# Patient Record
Sex: FEMALE | Race: WHITE | NOT HISPANIC OR LATINO | Employment: OTHER | URBAN - METROPOLITAN AREA
[De-identification: names, ages, dates, MRNs, and addresses within clinical notes are randomized per-mention and may not be internally consistent; named-entity substitution may affect disease eponyms.]

---

## 2017-01-16 ENCOUNTER — ALLSCRIPTS OFFICE VISIT (OUTPATIENT)
Dept: OTHER | Facility: OTHER | Age: 47
End: 2017-01-16

## 2017-01-16 DIAGNOSIS — E78.1 PURE HYPERGLYCERIDEMIA: ICD-10-CM

## 2017-01-16 DIAGNOSIS — M79.661 PAIN OF RIGHT LOWER LEG: ICD-10-CM

## 2017-01-16 DIAGNOSIS — E55.9 VITAMIN D DEFICIENCY: ICD-10-CM

## 2017-01-16 DIAGNOSIS — E03.9 HYPOTHYROIDISM: ICD-10-CM

## 2017-01-20 ENCOUNTER — HOSPITAL ENCOUNTER (OUTPATIENT)
Dept: NON INVASIVE DIAGNOSTICS | Facility: CLINIC | Age: 47
Discharge: HOME | End: 2017-01-20
Payer: COMMERCIAL

## 2017-01-20 DIAGNOSIS — M79.661 PAIN OF RIGHT LOWER LEG: ICD-10-CM

## 2017-01-20 PROCEDURE — 93971 EXTREMITY STUDY: CPT

## 2017-02-06 ENCOUNTER — ALLSCRIPTS OFFICE VISIT (OUTPATIENT)
Dept: OTHER | Facility: OTHER | Age: 47
End: 2017-02-06

## 2017-02-07 ENCOUNTER — TRANSCRIBE ORDERS (OUTPATIENT)
Dept: ADMINISTRATIVE | Facility: HOSPITAL | Age: 47
End: 2017-02-07

## 2017-02-07 DIAGNOSIS — J44.1 CHRONIC OBSTRUCTIVE ASTHMA WITH EXACERBATION (HCC): Primary | ICD-10-CM

## 2017-02-07 DIAGNOSIS — J45.901 CHRONIC OBSTRUCTIVE ASTHMA WITH EXACERBATION (HCC): Primary | ICD-10-CM

## 2017-02-13 ENCOUNTER — HOSPITAL ENCOUNTER (OUTPATIENT)
Facility: HOSPITAL | Age: 47
Setting detail: OUTPATIENT SURGERY
Discharge: HOME | End: 2017-02-13
Attending: INTERNAL MEDICINE | Admitting: INTERNAL MEDICINE
Payer: COMMERCIAL

## 2017-02-13 ENCOUNTER — ANESTHESIA (OUTPATIENT)
Dept: GASTROENTEROLOGY | Facility: HOSPITAL | Age: 47
End: 2017-02-13
Payer: COMMERCIAL

## 2017-02-13 ENCOUNTER — GENERIC CONVERSION - ENCOUNTER (OUTPATIENT)
Dept: OTHER | Facility: OTHER | Age: 47
End: 2017-02-13

## 2017-02-13 ENCOUNTER — ANESTHESIA EVENT (OUTPATIENT)
Dept: GASTROENTEROLOGY | Facility: HOSPITAL | Age: 47
End: 2017-02-13
Payer: COMMERCIAL

## 2017-02-13 VITALS
DIASTOLIC BLOOD PRESSURE: 88 MMHG | HEART RATE: 69 BPM | RESPIRATION RATE: 18 BRPM | HEIGHT: 67 IN | SYSTOLIC BLOOD PRESSURE: 139 MMHG | TEMPERATURE: 97.4 F | WEIGHT: 244.27 LBS | BODY MASS INDEX: 38.34 KG/M2 | OXYGEN SATURATION: 95 %

## 2017-02-13 DIAGNOSIS — R10.9 ABDOMINAL PAIN: ICD-10-CM

## 2017-02-13 DIAGNOSIS — K21.9 CHRONIC GERD: ICD-10-CM

## 2017-02-13 LAB — EXT PREGNANCY TEST URINE: NEGATIVE

## 2017-02-13 PROCEDURE — 88305 TISSUE EXAM BY PATHOLOGIST: CPT | Performed by: INTERNAL MEDICINE

## 2017-02-13 PROCEDURE — 88342 IMHCHEM/IMCYTCHM 1ST ANTB: CPT | Performed by: INTERNAL MEDICINE

## 2017-02-13 RX ORDER — SUCRALFATE 1 G/1
1 TABLET ORAL 4 TIMES DAILY
COMMUNITY
End: 2017-07-25

## 2017-02-13 RX ORDER — SERTRALINE HYDROCHLORIDE 100 MG/1
100 TABLET, FILM COATED ORAL 2 TIMES DAILY
COMMUNITY
End: 2018-05-07 | Stop reason: SDUPTHER

## 2017-02-13 RX ORDER — DIAZEPAM 10 MG/1
10 TABLET ORAL EVERY 6 HOURS PRN
COMMUNITY
End: 2018-02-20 | Stop reason: SDUPTHER

## 2017-02-13 RX ORDER — LEVOTHYROXINE SODIUM 88 UG/1
88 CAPSULE ORAL DAILY
COMMUNITY
End: 2018-05-07 | Stop reason: SDUPTHER

## 2017-02-13 RX ORDER — PROMETHAZINE HYDROCHLORIDE AND CODEINE PHOSPHATE 6.25; 1 MG/5ML; MG/5ML
5 SYRUP ORAL EVERY 4 HOURS PRN
COMMUNITY
End: 2017-07-25

## 2017-02-13 RX ORDER — PANTOPRAZOLE SODIUM 40 MG/1
40 TABLET, DELAYED RELEASE ORAL DAILY
COMMUNITY
End: 2018-05-07 | Stop reason: SDUPTHER

## 2017-02-13 RX ORDER — SODIUM CHLORIDE, SODIUM LACTATE, POTASSIUM CHLORIDE, CALCIUM CHLORIDE 600; 310; 30; 20 MG/100ML; MG/100ML; MG/100ML; MG/100ML
125 INJECTION, SOLUTION INTRAVENOUS CONTINUOUS
Status: DISCONTINUED | OUTPATIENT
Start: 2017-02-13 | End: 2017-02-13 | Stop reason: HOSPADM

## 2017-02-13 RX ORDER — ALBUTEROL SULFATE 90 UG/1
2 AEROSOL, METERED RESPIRATORY (INHALATION) EVERY 6 HOURS PRN
COMMUNITY
End: 2021-12-21 | Stop reason: SDUPTHER

## 2017-02-13 RX ORDER — OXYCODONE AND ACETAMINOPHEN 10; 325 MG/1; MG/1
1 TABLET ORAL EVERY 4 HOURS PRN
COMMUNITY
End: 2018-11-08 | Stop reason: SDUPTHER

## 2017-02-13 RX ORDER — GABAPENTIN 100 MG/1
100 CAPSULE ORAL 3 TIMES DAILY
COMMUNITY
End: 2017-07-25

## 2017-02-13 RX ORDER — PROPOFOL 10 MG/ML
INJECTION, EMULSION INTRAVENOUS AS NEEDED
Status: DISCONTINUED | OUTPATIENT
Start: 2017-02-13 | End: 2017-02-13 | Stop reason: SURG

## 2017-02-13 RX ORDER — AMLODIPINE BESYLATE 5 MG/1
5 TABLET ORAL DAILY
COMMUNITY
End: 2018-05-07 | Stop reason: SDUPTHER

## 2017-02-13 RX ORDER — LIDOCAINE HYDROCHLORIDE 10 MG/ML
INJECTION, SOLUTION INFILTRATION; PERINEURAL AS NEEDED
Status: DISCONTINUED | OUTPATIENT
Start: 2017-02-13 | End: 2017-02-13 | Stop reason: SURG

## 2017-02-13 RX ORDER — DULOXETIN HYDROCHLORIDE 30 MG/1
30 CAPSULE, DELAYED RELEASE ORAL 2 TIMES DAILY
COMMUNITY
End: 2018-05-05

## 2017-02-13 RX ADMIN — LIDOCAINE HYDROCHLORIDE 30 MG: 10 INJECTION, SOLUTION INFILTRATION; PERINEURAL at 08:47

## 2017-02-13 RX ADMIN — SODIUM CHLORIDE, SODIUM LACTATE, POTASSIUM CHLORIDE, AND CALCIUM CHLORIDE 125 ML/HR: .6; .31; .03; .02 INJECTION, SOLUTION INTRAVENOUS at 08:03

## 2017-02-13 RX ADMIN — PROPOFOL 40 MG: 10 INJECTION, EMULSION INTRAVENOUS at 08:49

## 2017-02-13 RX ADMIN — PROPOFOL 100 MG: 10 INJECTION, EMULSION INTRAVENOUS at 08:47

## 2017-02-14 ENCOUNTER — GENERIC CONVERSION - ENCOUNTER (OUTPATIENT)
Dept: OTHER | Facility: OTHER | Age: 47
End: 2017-02-14

## 2017-02-14 ENCOUNTER — HOSPITAL ENCOUNTER (OUTPATIENT)
Dept: PULMONOLOGY | Facility: HOSPITAL | Age: 47
Discharge: HOME | End: 2017-02-14
Attending: INTERNAL MEDICINE
Payer: COMMERCIAL

## 2017-02-14 DIAGNOSIS — J45.901 CHRONIC OBSTRUCTIVE ASTHMA WITH EXACERBATION (HCC): ICD-10-CM

## 2017-02-14 DIAGNOSIS — J44.1 CHRONIC OBSTRUCTIVE ASTHMA WITH EXACERBATION (HCC): ICD-10-CM

## 2017-02-14 PROCEDURE — 94729 DIFFUSING CAPACITY: CPT

## 2017-02-14 PROCEDURE — 94726 PLETHYSMOGRAPHY LUNG VOLUMES: CPT

## 2017-02-14 PROCEDURE — 94060 EVALUATION OF WHEEZING: CPT

## 2017-02-14 PROCEDURE — 94760 N-INVAS EAR/PLS OXIMETRY 1: CPT

## 2017-02-14 RX ORDER — SODIUM CHLORIDE FOR INHALATION 0.9 %
VIAL, NEBULIZER (ML) INHALATION
Status: DISCONTINUED
Start: 2017-02-14 | End: 2017-02-18 | Stop reason: HOSPADM

## 2017-02-19 ENCOUNTER — GENERIC CONVERSION - ENCOUNTER (OUTPATIENT)
Dept: OTHER | Facility: OTHER | Age: 47
End: 2017-02-19

## 2017-02-23 ENCOUNTER — ALLSCRIPTS OFFICE VISIT (OUTPATIENT)
Dept: OTHER | Facility: OTHER | Age: 47
End: 2017-02-23

## 2017-02-23 DIAGNOSIS — I73.9 PERIPHERAL VASCULAR DISEASE (HCC): ICD-10-CM

## 2017-03-06 ENCOUNTER — ALLSCRIPTS OFFICE VISIT (OUTPATIENT)
Dept: OTHER | Facility: OTHER | Age: 47
End: 2017-03-06

## 2017-04-03 ENCOUNTER — ALLSCRIPTS OFFICE VISIT (OUTPATIENT)
Dept: OTHER | Facility: OTHER | Age: 47
End: 2017-04-03

## 2017-05-01 ENCOUNTER — GENERIC CONVERSION - ENCOUNTER (OUTPATIENT)
Dept: OTHER | Facility: OTHER | Age: 47
End: 2017-05-01

## 2017-05-12 ENCOUNTER — GENERIC CONVERSION - ENCOUNTER (OUTPATIENT)
Dept: OTHER | Facility: OTHER | Age: 47
End: 2017-05-12

## 2017-05-12 ENCOUNTER — ALLSCRIPTS OFFICE VISIT (OUTPATIENT)
Dept: OTHER | Facility: OTHER | Age: 47
End: 2017-05-12

## 2017-06-13 ENCOUNTER — ALLSCRIPTS OFFICE VISIT (OUTPATIENT)
Dept: OTHER | Facility: OTHER | Age: 47
End: 2017-06-13

## 2017-06-21 ENCOUNTER — ALLSCRIPTS OFFICE VISIT (OUTPATIENT)
Dept: OTHER | Facility: OTHER | Age: 47
End: 2017-06-21

## 2017-06-29 ENCOUNTER — GENERIC CONVERSION - ENCOUNTER (OUTPATIENT)
Dept: OTHER | Facility: OTHER | Age: 47
End: 2017-06-29

## 2017-06-29 ENCOUNTER — ALLSCRIPTS OFFICE VISIT (OUTPATIENT)
Dept: OTHER | Facility: OTHER | Age: 47
End: 2017-06-29

## 2017-06-30 ENCOUNTER — GENERIC CONVERSION - ENCOUNTER (OUTPATIENT)
Dept: OTHER | Facility: OTHER | Age: 47
End: 2017-06-30

## 2017-07-13 ENCOUNTER — HOSPITAL ENCOUNTER (OUTPATIENT)
Dept: NON INVASIVE DIAGNOSTICS | Facility: CLINIC | Age: 47
Discharge: HOME | End: 2017-07-13
Payer: COMMERCIAL

## 2017-07-13 DIAGNOSIS — I73.9 PERIPHERAL VASCULAR DISEASE (HCC): ICD-10-CM

## 2017-07-13 PROCEDURE — 93924 LWR XTR VASC STDY BILAT: CPT

## 2017-07-24 ENCOUNTER — ALLSCRIPTS OFFICE VISIT (OUTPATIENT)
Dept: OTHER | Facility: OTHER | Age: 47
End: 2017-07-24

## 2017-07-24 DIAGNOSIS — E03.9 HYPOTHYROIDISM: ICD-10-CM

## 2017-07-24 DIAGNOSIS — E55.9 VITAMIN D DEFICIENCY: ICD-10-CM

## 2017-07-24 DIAGNOSIS — F32.9 MAJOR DEPRESSIVE DISORDER, SINGLE EPISODE: ICD-10-CM

## 2017-07-24 DIAGNOSIS — M54.50 LOW BACK PAIN: ICD-10-CM

## 2017-07-24 LAB
BILIRUB UR QL STRIP: NORMAL
CLARITY UR: NORMAL
COLOR UR: YELLOW
GLUCOSE (HISTORICAL): NORMAL
HGB UR QL STRIP.AUTO: NORMAL
KETONES UR STRIP-MCNC: NORMAL MG/DL
LEUKOCYTE ESTERASE UR QL STRIP: NORMAL
NITRITE UR QL STRIP: NORMAL
PH UR STRIP.AUTO: 5.5 [PH]
PROT UR STRIP-MCNC: NORMAL MG/DL
SP GR UR STRIP.AUTO: 1.03
UROBILINOGEN UR QL STRIP.AUTO: 0.2

## 2017-07-25 ENCOUNTER — OFFICE VISIT (OUTPATIENT)
Dept: URGENT CARE | Age: 47
End: 2017-07-25
Payer: COMMERCIAL

## 2017-07-25 ENCOUNTER — APPOINTMENT (EMERGENCY)
Dept: RADIOLOGY | Facility: HOSPITAL | Age: 47
End: 2017-07-25
Payer: COMMERCIAL

## 2017-07-25 ENCOUNTER — GENERIC CONVERSION - ENCOUNTER (OUTPATIENT)
Dept: OTHER | Facility: OTHER | Age: 47
End: 2017-07-25

## 2017-07-25 ENCOUNTER — HOSPITAL ENCOUNTER (EMERGENCY)
Facility: HOSPITAL | Age: 47
Discharge: HOME | End: 2017-07-25
Attending: EMERGENCY MEDICINE | Admitting: EMERGENCY MEDICINE
Payer: COMMERCIAL

## 2017-07-25 VITALS
HEIGHT: 67 IN | TEMPERATURE: 97.7 F | HEART RATE: 78 BPM | OXYGEN SATURATION: 99 % | BODY MASS INDEX: 37.2 KG/M2 | DIASTOLIC BLOOD PRESSURE: 50 MMHG | WEIGHT: 237 LBS | RESPIRATION RATE: 17 BRPM | SYSTOLIC BLOOD PRESSURE: 118 MMHG

## 2017-07-25 DIAGNOSIS — K57.32: Primary | ICD-10-CM

## 2017-07-25 LAB
ALBUMIN SERPL BCP-MCNC: 3.8 G/DL (ref 3.5–5)
ALP SERPL-CCNC: 115 U/L (ref 46–116)
ALT SERPL W P-5'-P-CCNC: 23 U/L (ref 12–78)
ANION GAP SERPL CALCULATED.3IONS-SCNC: 7 MMOL/L (ref 4–13)
AST SERPL W P-5'-P-CCNC: 13 U/L (ref 5–45)
BACTERIA UR QL AUTO: ABNORMAL /HPF
BASOPHILS # BLD AUTO: 0.02 THOUSANDS/ΜL (ref 0–0.1)
BASOPHILS NFR BLD AUTO: 0 % (ref 0–1)
BILIRUB SERPL-MCNC: 0.64 MG/DL (ref 0.2–1)
BILIRUB UR QL STRIP: ABNORMAL
BUN SERPL-MCNC: 17 MG/DL (ref 5–25)
CALCIUM SERPL-MCNC: 9.1 MG/DL (ref 8.3–10.1)
CHLORIDE SERPL-SCNC: 108 MMOL/L (ref 100–108)
CLARITY UR: ABNORMAL
CO2 SERPL-SCNC: 22 MMOL/L (ref 21–32)
COLOR UR: YELLOW
COLOR, POC: YELLOW
CREAT SERPL-MCNC: 0.86 MG/DL (ref 0.6–1.3)
EOSINOPHIL # BLD AUTO: 0.07 THOUSAND/ΜL (ref 0–0.61)
EOSINOPHIL NFR BLD AUTO: 0 % (ref 0–6)
ERYTHROCYTE [DISTWIDTH] IN BLOOD BY AUTOMATED COUNT: 15.9 % (ref 11.6–15.1)
GFR SERPL CREATININE-BSD FRML MDRD: 81 ML/MIN/1.73SQ M
GLUCOSE SERPL-MCNC: 97 MG/DL (ref 65–140)
GLUCOSE UR STRIP-MCNC: NEGATIVE MG/DL
HCG UR QL: NEGATIVE
HCT VFR BLD AUTO: 45.5 % (ref 34.8–46.1)
HGB BLD-MCNC: 15.3 G/DL (ref 11.5–15.4)
HGB UR QL STRIP.AUTO: ABNORMAL
HOLD SPECIMEN: NORMAL
HOLD SPECIMEN: NORMAL
HYALINE CASTS #/AREA URNS LPF: ABNORMAL /LPF
KETONES UR STRIP-MCNC: NEGATIVE MG/DL
LEUKOCYTE ESTERASE UR QL STRIP: NEGATIVE
LIPASE SERPL-CCNC: 100 U/L (ref 73–393)
LYMPHOCYTES # BLD AUTO: 2.02 THOUSANDS/ΜL (ref 0.6–4.47)
LYMPHOCYTES NFR BLD AUTO: 12 % (ref 14–44)
MCH RBC QN AUTO: 30.2 PG (ref 26.8–34.3)
MCHC RBC AUTO-ENTMCNC: 33.6 G/DL (ref 31.4–37.4)
MCV RBC AUTO: 90 FL (ref 82–98)
MONOCYTES # BLD AUTO: 1.31 THOUSAND/ΜL (ref 0.17–1.22)
MONOCYTES NFR BLD AUTO: 8 % (ref 4–12)
NEUTROPHILS # BLD AUTO: 13.72 THOUSANDS/ΜL (ref 1.85–7.62)
NEUTS SEG NFR BLD AUTO: 80 % (ref 43–75)
NITRITE UR QL STRIP: NEGATIVE
NON-SQ EPI CELLS URNS QL MICRO: ABNORMAL /HPF
NRBC BLD AUTO-RTO: 0 /100 WBCS
PH UR STRIP.AUTO: 5.5 [PH] (ref 4.5–8)
PLATELET # BLD AUTO: 327 THOUSANDS/UL (ref 149–390)
PMV BLD AUTO: 9.7 FL (ref 8.9–12.7)
POTASSIUM SERPL-SCNC: 3.8 MMOL/L (ref 3.5–5.3)
PROT SERPL-MCNC: 7.8 G/DL (ref 6.4–8.2)
PROT UR STRIP-MCNC: ABNORMAL MG/DL
RBC # BLD AUTO: 5.06 MILLION/UL (ref 3.81–5.12)
RBC #/AREA URNS AUTO: ABNORMAL /HPF
SODIUM SERPL-SCNC: 137 MMOL/L (ref 136–145)
SP GR UR STRIP.AUTO: 1.02 (ref 1–1.03)
UROBILINOGEN UR QL STRIP.AUTO: 0.2 E.U./DL
WBC # BLD AUTO: 17.26 THOUSAND/UL (ref 4.31–10.16)
WBC #/AREA URNS AUTO: ABNORMAL /HPF

## 2017-07-25 PROCEDURE — 36415 COLL VENOUS BLD VENIPUNCTURE: CPT | Performed by: EMERGENCY MEDICINE

## 2017-07-25 PROCEDURE — 96374 THER/PROPH/DIAG INJ IV PUSH: CPT

## 2017-07-25 PROCEDURE — 74177 CT ABD & PELVIS W/CONTRAST: CPT

## 2017-07-25 PROCEDURE — 83690 ASSAY OF LIPASE: CPT | Performed by: EMERGENCY MEDICINE

## 2017-07-25 PROCEDURE — 96361 HYDRATE IV INFUSION ADD-ON: CPT

## 2017-07-25 PROCEDURE — 85025 COMPLETE CBC W/AUTO DIFF WBC: CPT | Performed by: EMERGENCY MEDICINE

## 2017-07-25 PROCEDURE — 81025 URINE PREGNANCY TEST: CPT | Performed by: EMERGENCY MEDICINE

## 2017-07-25 PROCEDURE — 81002 URINALYSIS NONAUTO W/O SCOPE: CPT | Performed by: EMERGENCY MEDICINE

## 2017-07-25 PROCEDURE — 87086 URINE CULTURE/COLONY COUNT: CPT

## 2017-07-25 PROCEDURE — 99284 EMERGENCY DEPT VISIT MOD MDM: CPT

## 2017-07-25 PROCEDURE — G0382 LEV 3 HOSP TYPE B ED VISIT: HCPCS | Performed by: FAMILY MEDICINE

## 2017-07-25 PROCEDURE — 81001 URINALYSIS AUTO W/SCOPE: CPT

## 2017-07-25 PROCEDURE — 80053 COMPREHEN METABOLIC PANEL: CPT | Performed by: EMERGENCY MEDICINE

## 2017-07-25 RX ORDER — AMOXICILLIN AND CLAVULANATE POTASSIUM 875; 125 MG/1; MG/1
1 TABLET, FILM COATED ORAL EVERY 12 HOURS
Qty: 20 TABLET | Refills: 0 | Status: SHIPPED | OUTPATIENT
Start: 2017-07-25 | End: 2017-08-04

## 2017-07-25 RX ORDER — FLUTICASONE PROPIONATE 50 MCG
1 SPRAY, SUSPENSION (ML) NASAL 2 TIMES DAILY
COMMUNITY
End: 2018-05-07 | Stop reason: SDUPTHER

## 2017-07-25 RX ORDER — BUTALBITAL, ASPIRIN, AND CAFFEINE 50; 325; 40 MG/1; MG/1; MG/1
1 CAPSULE ORAL EVERY 4 HOURS PRN
COMMUNITY
End: 2019-03-07 | Stop reason: SDUPTHER

## 2017-07-25 RX ORDER — AMOXICILLIN AND CLAVULANATE POTASSIUM 875; 125 MG/1; MG/1
1 TABLET, FILM COATED ORAL ONCE
Status: COMPLETED | OUTPATIENT
Start: 2017-07-25 | End: 2017-07-25

## 2017-07-25 RX ORDER — HYDROCHLOROTHIAZIDE 25 MG/1
25 TABLET ORAL DAILY
COMMUNITY
End: 2018-05-05

## 2017-07-25 RX ORDER — ONDANSETRON 4 MG/1
4 TABLET, FILM COATED ORAL EVERY 6 HOURS
Qty: 12 TABLET | Refills: 0 | Status: SHIPPED | OUTPATIENT
Start: 2017-07-25 | End: 2017-11-24

## 2017-07-25 RX ORDER — MORPHINE SULFATE 10 MG/ML
6 INJECTION, SOLUTION INTRAMUSCULAR; INTRAVENOUS ONCE
Status: COMPLETED | OUTPATIENT
Start: 2017-07-25 | End: 2017-07-25

## 2017-07-25 RX ADMIN — SODIUM CHLORIDE 1000 ML: 0.9 INJECTION, SOLUTION INTRAVENOUS at 13:57

## 2017-07-25 RX ADMIN — MORPHINE SULFATE 6 MG: 10 INJECTION, SOLUTION INTRAMUSCULAR; INTRAVENOUS at 14:33

## 2017-07-25 RX ADMIN — AMOXICILLIN AND CLAVULANATE POTASSIUM 1 TABLET: 875; 125 TABLET, FILM COATED ORAL at 16:33

## 2017-07-25 RX ADMIN — IOHEXOL 100 ML: 350 INJECTION, SOLUTION INTRAVENOUS at 15:31

## 2017-07-26 LAB
BACTERIA UR CULT: NORMAL
CULTURE RESULT (HISTORICAL): NORMAL

## 2017-08-01 ENCOUNTER — ALLSCRIPTS OFFICE VISIT (OUTPATIENT)
Dept: OTHER | Facility: OTHER | Age: 47
End: 2017-08-01

## 2017-09-28 ENCOUNTER — GENERIC CONVERSION - ENCOUNTER (OUTPATIENT)
Dept: OTHER | Facility: OTHER | Age: 47
End: 2017-09-28

## 2017-09-28 DIAGNOSIS — E55.9 VITAMIN D DEFICIENCY: ICD-10-CM

## 2017-09-28 DIAGNOSIS — E03.9 HYPOTHYROIDISM: ICD-10-CM

## 2017-09-28 DIAGNOSIS — Z13.220 ENCOUNTER FOR SCREENING FOR LIPOID DISORDERS: ICD-10-CM

## 2017-09-28 DIAGNOSIS — I10 ESSENTIAL (PRIMARY) HYPERTENSION: ICD-10-CM

## 2017-10-03 ENCOUNTER — GENERIC CONVERSION - ENCOUNTER (OUTPATIENT)
Dept: OTHER | Facility: OTHER | Age: 47
End: 2017-10-03

## 2017-11-13 ENCOUNTER — ALLSCRIPTS OFFICE VISIT (OUTPATIENT)
Dept: OTHER | Facility: OTHER | Age: 47
End: 2017-11-13

## 2017-11-13 LAB
FLUAV AG SPEC QL IA: NEGATIVE
INFLUENZA B AG (HISTORICAL): NEGATIVE

## 2017-11-14 NOTE — PROGRESS NOTES
Assessment    1  Acute recurrent frontal sinusitis (461 1) (J01 11)   2  COPD (chronic obstructive pulmonary disease) with acute bronchitis (491 22) (J44 0,J20  9)    Plan  Acute recurrent frontal sinusitis    · LevoFLOXacin 500 MG Oral Tablet; TAKE 1 TABLET DAILY AS DIRECTED  COPD (chronic obstructive pulmonary disease) with acute bronchitis    · Albuterol Sulfate (2 5 MG/3ML) 0 083% Inhalation Nebulization Solution; USE 1UNIT DOSE IN NEBULIZER EVERY 4 TO 6 HOURS AS NEEDED  Fever    · Rapid Flu A and B- POC; Source:Nose; Status:Complete;   Done: 91PJL7183 01:40PM  PMH: Acute bronchitis, unspecified organism    · From  Promethazine-Codeine 6 25-10 MG/5ML Oral Syrup TAKE 1 TSP Every 8hours To Promethazine-Codeine 6 25-10 MG/5ML Oral Syrup TAKE 5 ML BY MOUTH ATBEDTIME AS NEEDED    Chief Complaint    1  Cough   2  Fever  Patient is here c/o cough, headache, earache both ears, coughing up green mucus  last Thursday  Pt states symptoms are more worst in the night  History of Present Illness  Cough:   Kristin Loza presents with complaints of sudden onset of constant episodes of moderate cough Episodes started 5 days ago (c/o cough, headache, earache both ears, coughing up green mucus  Started last Thursday  Pt states symptoms are more worst in the night)  Associated symptoms include dyspnea,-- wheezing,-- chills,-- fever,-- mouth breathing,-- noisy breathing-- and-- hoarseness, but-- no runny nose,-- no stuffy nose,-- no sore throat,-- no myalgias,-- no pleuritic chest pain,-- no chest pain,-- no vomiting,-- no heartburn,-- no postnasal drainage,-- no painful swallowing,-- no eye itching,-- no nose itching,-- no headache,-- no hemoptysis-- and-- no night sweats  Review of Systems   Constitutional: fever-- and-- chills, but-- not feeling poorly-- and-- not feeling tired  ENT: as noted in HPI  Cardiovascular: no chest pain-- and-- no palpitations    Respiratory: cough,-- wheezing-- and-- shortness of breath during exertion, but-- no shortness of breath-- and-- no orthopnea  Gastrointestinal: no nausea-- and-- no vomiting  Active Problems    1  Acute lumbar radiculopathy (724 4) (M54 16)   2  Anxiety (300 00) (F41 9)   3  Benign hypertension (401 1) (I10)   4  COPD (chronic obstructive pulmonary disease) with acute bronchitis (491 22) (J44 0,J20 9)   5  Depression (311) (F32 9)   6  Gastroesophageal reflux disease with esophagitis (530 11) (K21 0)   7  Headache (784 0) (R51)   8  Hypothyroidism (244 9) (E03 9)   9  Intermittent claudication (443 9) (I73 9)   10  Low back pain (724 2) (M54 5)   11  Lumbar spinal stenosis (724 02) (M48 061)   12  Nerve pain (729 2) (M79 2)   13  Right sided abdominal pain (789 09) (R10 9)   14  Screening cholesterol level (V77 91) (Z13 220)   15  Seasonal allergies (477 9) (J30 2)   16  Tobacco abuse disorder (305 1) (Z72 0)   17  Vitamin D deficiency (268 9) (E55 9)    Social History     · Current every day smoker (305 1) (G30 817)   · No illicit drug use   · Social alcohol use (Z78 9)    Current Meds   1  AmLODIPine Besylate 5 MG Oral Tablet; TAKE 1 TABLET DAILY  Requested for: 56VYH4712; Last Rx:81Jwt6731 Ordered   2  Butalbital-Aspirin-Caffeine -40 MG Oral Capsule; TAKE 1 CAPSULE EVERY 4 HOURS AS NEEDED; Therapy: 22MEF8876 to (Evaluate:95Vlv9459); Last Rx:61Ksc0352 Ordered   3  DiazePAM 10 MG Oral Tablet; TAKE 1 TABLET Every 6 hours PRN; Therapy: 67RZP7484 to (Evaluate:11Nov2017); Last Rx:06Nov2017 Ordered   4  DULoxetine HCl - 60 MG Oral Capsule Delayed Release Particles; Take 1 capsule twice daily; Therapy: 90EZG4616 to (Evaluate:20Jan2018)  Requested for: 66Nmk0266; Last Rx:32Ahm2330 Ordered   5  Fluticasone Propionate 50 MCG/ACT Nasal Suspension; USE 1 SPRAY IN EACH NOSTRIL TWICE DAILY; Therapy: 98BNQ3783 to (Last Rx:55Bbk5715)  Requested for: 66LPZ0032 Ordered   6  HydroCHLOROthiazide 25 MG Oral Tablet; TAKE 1 TABLET DAILY;  Therapy: 29DEC0761 to (Evaluate:20Jan2018) Requested for: 49Cqs8348; Last Rx:34Knu9237 Ordered   7  Levothyroxine Sodium 88 MCG Oral Tablet; TAKE 1 TABLET DAILY AS DIRECTED  Requested for: 15Krh6969; Last Rx:52End7548 Ordered   8  Multi-Vitamins Oral Tablet; TAKE 1 TABLET DAILY; Therapy: 44PWA5270 to Recorded   9  Oxycodone-Acetaminophen  MG Oral Tablet; TAKE 1 TABLET 4 TIMES DAILY AS NEEDED FOR PAIN; Last RA:76XDX1664 Ordered   10  Pantoprazole Sodium 40 MG Oral Tablet Delayed Release; take one tablet once daily; Therapy: 10UND2188 to (Evaluate:20Jan2018)  Requested for: 79Hmp9802; Last  Rx:52Jme5524 Ordered   11  ProAir  (90 Base) MCG/ACT Inhalation Aerosol Solution; INHALE 1 TO 2 PUFFS  EVERY 4 TO 6 HOURS AS NEEDED; Therapy: 30HVP8860 to (Last Rx:85Bff4961)  Requested for: 12IWZ4910 Ordered   12  Sertraline HCl - 100 MG Oral Tablet; Take 1 tablet twice daily; Therapy: 72LZK4235 to (Last Rx:05Jal9563)  Requested for: 09Whd3236 Ordered   13  Vitamin D (Ergocalciferol) 66066 UNIT Oral Capsule; TAKE 1 CAPSULE WEEKLY; Therapy: 70YMN2010 to (Last Rx:90Jry0760)  Requested for: 25Fdy4213 Ordered    Allergies  1  Toradol SOLN   2  Ultram TABS  3  Seasonal    Vitals   Recorded: 67YXW9830 01:10PM   Temperature 98 2 F, Oral   Heart Rate 72   Systolic 266, LUE, Sitting   Diastolic 60, LUE, Sitting   BP CUFF SIZE Large   Height 5 ft 7 in   Weight 222 lb    BMI Calculated 34 77   BSA Calculated 2 11   O2 Saturation 98, RA       Physical Exam   Constitutional  General appearance: No acute distress, well appearing and well nourished  Eyes  Conjunctiva and lids: No swelling, erythema or discharge  Pupils and irises: Equal, round and reactive to light  Ears, Nose, Mouth, and Throat  External inspection of ears and nose: Normal    Otoscopic examination: Tympanic membranes translucent with normal light reflex  Canals patent without erythema  Nasal mucosa, septum, and turbinates: Abnormal  -- frontal and maxillary sinus TTP    Oropharynx: Abnormal  -- PND and erythema  Pulmonary  Respiratory effort: No increased work of breathing or signs of respiratory distress  Auscultation of lungs: Clear to auscultation  Cardiovascular  Auscultation of heart: Normal rate and rhythm, normal S1 and S2, without murmurs  Examination of extremities for edema and/or varicosities: Normal    Abdomen  Abdomen: Non-tender, no masses  Liver and spleen: No hepatomegaly or splenomegaly     Musculoskeletal  Gait and station: Normal    Psychiatric  Orientation to person, place, and time: Normal    Mood and affect: Normal          Results/Data  Rapid Flu A and B- POC 42CIC7092 01:40PM Nicolle Richardson     Test Name Result Flag Reference   Rapid Flu A Negative     Rapid Flu B Negative         Future Appointments    Date/Time Provider Specialty Site   01/04/2018 11:00 AM Nicolle Richardson DO Internal Medicine [facility]       Signatures   Electronically signed by : Yeimi Abernathy DO; Nov 13 2017  1:56PM EST                       (Author)

## 2017-11-19 ENCOUNTER — HOSPITAL ENCOUNTER (EMERGENCY)
Facility: HOSPITAL | Age: 47
Discharge: HOME | End: 2017-11-19
Attending: EMERGENCY MEDICINE | Admitting: EMERGENCY MEDICINE

## 2017-11-19 ENCOUNTER — APPOINTMENT (OUTPATIENT)
Dept: CT IMAGING | Facility: HOSPITAL | Age: 47
End: 2017-11-19

## 2017-11-19 VITALS
HEIGHT: 63 IN | OXYGEN SATURATION: 100 % | DIASTOLIC BLOOD PRESSURE: 82 MMHG | RESPIRATION RATE: 18 BRPM | BODY MASS INDEX: 30.12 KG/M2 | SYSTOLIC BLOOD PRESSURE: 114 MMHG | TEMPERATURE: 98 F | WEIGHT: 170 LBS | HEART RATE: 67 BPM

## 2017-11-19 DIAGNOSIS — S39.012A STRAIN OF LUMBAR REGION, INITIAL ENCOUNTER: Primary | ICD-10-CM

## 2017-11-19 LAB
ALBUMIN SERPL-MCNC: 4.8 G/DL (ref 3.5–5)
ALBUMIN/GLOB SERPL: 1.7 G/DL (ref 1.5–2.5)
ALP SERPL-CCNC: 65 U/L (ref 35–104)
ALT SERPL W P-5'-P-CCNC: 21 U/L (ref 10–36)
ANION GAP SERPL CALCULATED.3IONS-SCNC: 4.2 MMOL/L (ref 3.6–11.2)
AST SERPL-CCNC: 24 U/L (ref 10–30)
BASOPHILS # BLD AUTO: 0.06 10*3/MM3 (ref 0–0.3)
BASOPHILS NFR BLD AUTO: 1 % (ref 0–2)
BILIRUB SERPL-MCNC: 0.5 MG/DL (ref 0.2–1.8)
BILIRUB UR QL STRIP: NEGATIVE
BUN BLD-MCNC: 16 MG/DL (ref 7–21)
BUN/CREAT SERPL: 17.4 (ref 7–25)
CALCIUM SPEC-SCNC: 9.3 MG/DL (ref 7.7–10)
CHLORIDE SERPL-SCNC: 107 MMOL/L (ref 99–112)
CLARITY UR: CLEAR
CO2 SERPL-SCNC: 26.8 MMOL/L (ref 24.3–31.9)
COLOR UR: YELLOW
CREAT BLD-MCNC: 0.92 MG/DL (ref 0.43–1.29)
DEPRECATED RDW RBC AUTO: 39 FL (ref 37–54)
EOSINOPHIL # BLD AUTO: 0.21 10*3/MM3 (ref 0–0.7)
EOSINOPHIL NFR BLD AUTO: 3.4 % (ref 0–5)
ERYTHROCYTE [DISTWIDTH] IN BLOOD BY AUTOMATED COUNT: 12.4 % (ref 11.5–14.5)
GFR SERPL CREATININE-BSD FRML MDRD: 65 ML/MIN/1.73
GLOBULIN UR ELPH-MCNC: 2.9 GM/DL
GLUCOSE BLD-MCNC: 93 MG/DL (ref 70–110)
GLUCOSE UR STRIP-MCNC: NEGATIVE MG/DL
HCT VFR BLD AUTO: 42.8 % (ref 37–47)
HGB BLD-MCNC: 14.9 G/DL (ref 12–16)
HGB UR QL STRIP.AUTO: NEGATIVE
IMM GRANULOCYTES # BLD: 0.01 10*3/MM3 (ref 0–0.03)
IMM GRANULOCYTES NFR BLD: 0.2 % (ref 0–0.5)
KETONES UR QL STRIP: NEGATIVE
LEUKOCYTE ESTERASE UR QL STRIP.AUTO: NEGATIVE
LYMPHOCYTES # BLD AUTO: 2.32 10*3/MM3 (ref 1–3)
LYMPHOCYTES NFR BLD AUTO: 38.1 % (ref 21–51)
MCH RBC QN AUTO: 30.6 PG (ref 27–33)
MCHC RBC AUTO-ENTMCNC: 34.8 G/DL (ref 33–37)
MCV RBC AUTO: 87.9 FL (ref 80–94)
MONOCYTES # BLD AUTO: 0.38 10*3/MM3 (ref 0.1–0.9)
MONOCYTES NFR BLD AUTO: 6.2 % (ref 0–10)
NEUTROPHILS # BLD AUTO: 3.11 10*3/MM3 (ref 1.4–6.5)
NEUTROPHILS NFR BLD AUTO: 51.1 % (ref 30–70)
NITRITE UR QL STRIP: NEGATIVE
OSMOLALITY SERPL CALC.SUM OF ELEC: 276.6 MOSM/KG (ref 273–305)
PH UR STRIP.AUTO: 7.5 [PH] (ref 5–8)
PLATELET # BLD AUTO: 327 10*3/MM3 (ref 130–400)
PMV BLD AUTO: 10.2 FL (ref 6–10)
POTASSIUM BLD-SCNC: 4 MMOL/L (ref 3.5–5.3)
PROT SERPL-MCNC: 7.7 G/DL (ref 6–8)
PROT UR QL STRIP: NEGATIVE
RBC # BLD AUTO: 4.87 10*6/MM3 (ref 4.2–5.4)
SODIUM BLD-SCNC: 138 MMOL/L (ref 135–153)
SP GR UR STRIP: 1.01 (ref 1–1.03)
UROBILINOGEN UR QL STRIP: NORMAL
WBC NRBC COR # BLD: 6.09 10*3/MM3 (ref 4.5–12.5)

## 2017-11-19 PROCEDURE — 0 IOPAMIDOL 61 % SOLUTION: Performed by: EMERGENCY MEDICINE

## 2017-11-19 PROCEDURE — 99284 EMERGENCY DEPT VISIT MOD MDM: CPT

## 2017-11-19 PROCEDURE — 96375 TX/PRO/DX INJ NEW DRUG ADDON: CPT

## 2017-11-19 PROCEDURE — 80053 COMPREHEN METABOLIC PANEL: CPT | Performed by: PHYSICIAN ASSISTANT

## 2017-11-19 PROCEDURE — 25010000002 MORPHINE PER 10 MG: Performed by: EMERGENCY MEDICINE

## 2017-11-19 PROCEDURE — 96374 THER/PROPH/DIAG INJ IV PUSH: CPT

## 2017-11-19 PROCEDURE — 25010000002 ONDANSETRON PER 1 MG: Performed by: EMERGENCY MEDICINE

## 2017-11-19 PROCEDURE — 85025 COMPLETE CBC W/AUTO DIFF WBC: CPT | Performed by: PHYSICIAN ASSISTANT

## 2017-11-19 PROCEDURE — 74177 CT ABD & PELVIS W/CONTRAST: CPT | Performed by: RADIOLOGY

## 2017-11-19 PROCEDURE — 25010000002 KETOROLAC TROMETHAMINE PER 15 MG: Performed by: PHYSICIAN ASSISTANT

## 2017-11-19 PROCEDURE — 74177 CT ABD & PELVIS W/CONTRAST: CPT

## 2017-11-19 PROCEDURE — 81003 URINALYSIS AUTO W/O SCOPE: CPT | Performed by: PHYSICIAN ASSISTANT

## 2017-11-19 RX ORDER — NAPROXEN 500 MG/1
500 TABLET ORAL 2 TIMES DAILY WITH MEALS
Qty: 20 TABLET | Refills: 0 | Status: SHIPPED | OUTPATIENT
Start: 2017-11-19 | End: 2021-04-28

## 2017-11-19 RX ORDER — MORPHINE SULFATE 2 MG/ML
4 INJECTION, SOLUTION INTRAMUSCULAR; INTRAVENOUS ONCE
Status: COMPLETED | OUTPATIENT
Start: 2017-11-19 | End: 2017-11-19

## 2017-11-19 RX ORDER — CYCLOBENZAPRINE HCL 10 MG
10 TABLET ORAL 3 TIMES DAILY PRN
Qty: 15 TABLET | Refills: 0 | Status: SHIPPED | OUTPATIENT
Start: 2017-11-19 | End: 2021-04-28

## 2017-11-19 RX ORDER — ONDANSETRON 2 MG/ML
4 INJECTION INTRAMUSCULAR; INTRAVENOUS ONCE
Status: COMPLETED | OUTPATIENT
Start: 2017-11-19 | End: 2017-11-19

## 2017-11-19 RX ORDER — KETOROLAC TROMETHAMINE 30 MG/ML
30 INJECTION, SOLUTION INTRAMUSCULAR; INTRAVENOUS ONCE
Status: COMPLETED | OUTPATIENT
Start: 2017-11-19 | End: 2017-11-19

## 2017-11-19 RX ORDER — SODIUM CHLORIDE 0.9 % (FLUSH) 0.9 %
10 SYRINGE (ML) INJECTION AS NEEDED
Status: DISCONTINUED | OUTPATIENT
Start: 2017-11-19 | End: 2017-11-19 | Stop reason: HOSPADM

## 2017-11-19 RX ORDER — LEVOTHYROXINE SODIUM 0.12 MG/1
125 TABLET ORAL DAILY
COMMUNITY
End: 2021-03-05 | Stop reason: DRUGHIGH

## 2017-11-19 RX ADMIN — ONDANSETRON 4 MG: 2 INJECTION, SOLUTION INTRAMUSCULAR; INTRAVENOUS at 09:17

## 2017-11-19 RX ADMIN — MORPHINE SULFATE 4 MG: 2 INJECTION, SOLUTION INTRAMUSCULAR; INTRAVENOUS at 09:19

## 2017-11-19 RX ADMIN — IOPAMIDOL 100 ML: 612 INJECTION, SOLUTION INTRAVENOUS at 10:07

## 2017-11-19 RX ADMIN — KETOROLAC TROMETHAMINE 30 MG: 30 INJECTION, SOLUTION INTRAMUSCULAR at 08:37

## 2017-11-19 NOTE — DISCHARGE INSTRUCTIONS

## 2017-11-19 NOTE — ED PROVIDER NOTES
Subjective   Patient is a 47 y.o. female presenting with flank pain.   Flank Pain   Pain location:  L flank  Pain quality: sharp    Pain radiates to:  LLQ  Pain severity:  Moderate  Onset quality:  Gradual  Duration:  1 day  Timing:  Constant  Progression:  Unchanged  Chronicity:  New  Context: not alcohol use and not sick contacts    Relieved by:  Nothing  Worsened by:  Movement  Associated symptoms: no chest pain, no cough, no dysuria, no fever, no hematuria, no nausea and no vomiting    Associated symptoms comment:  Reports that she has been lifting a lot of work recently.  No pain into her buttock or leg.  No urinary or bowel symptoms      Review of Systems   Constitutional: Negative.  Negative for fever.   HENT: Negative.    Respiratory: Negative.  Negative for cough.    Cardiovascular: Negative.  Negative for chest pain.   Gastrointestinal: Negative.  Negative for abdominal pain, nausea and vomiting.   Endocrine: Negative.    Genitourinary: Positive for flank pain. Negative for dysuria and hematuria.   Skin: Negative.    Neurological: Negative.    Psychiatric/Behavioral: Negative.    All other systems reviewed and are negative.      Past Medical History:   Diagnosis Date   • Fibromyalgia    • Lupus        Allergies   Allergen Reactions   • Augmentin [Amoxicillin-Pot Clavulanate] Rash       Past Surgical History:   Procedure Laterality Date   • HYSTERECTOMY         History reviewed. No pertinent family history.    Social History     Social History   • Marital status:      Spouse name: N/A   • Number of children: N/A   • Years of education: N/A     Social History Main Topics   • Smoking status: Never Smoker   • Smokeless tobacco: None   • Alcohol use No   • Drug use: No   • Sexual activity: Not Asked     Other Topics Concern   • None     Social History Narrative   • None           Objective   Physical Exam   Constitutional: She is oriented to person, place, and time. She appears well-developed and  well-nourished. No distress.   HENT:   Head: Normocephalic and atraumatic.   Right Ear: External ear normal.   Left Ear: External ear normal.   Nose: Nose normal.   Eyes: Conjunctivae and EOM are normal. Pupils are equal, round, and reactive to light.   Neck: Normal range of motion. Neck supple. No JVD present. No tracheal deviation present.   Cardiovascular: Normal rate, regular rhythm and normal heart sounds.    No murmur heard.  Pulmonary/Chest: Effort normal and breath sounds normal. No respiratory distress. She has no wheezes.   Abdominal: Soft. Bowel sounds are normal. There is no tenderness.   Musculoskeletal: Normal range of motion. She exhibits tenderness (mild left lumbar tenderness). She exhibits no edema or deformity.   Neurological: She is alert and oriented to person, place, and time. No cranial nerve deficit.   Skin: Skin is warm and dry. No rash noted. She is not diaphoretic. No erythema. No pallor.   Psychiatric: She has a normal mood and affect. Her behavior is normal. Thought content normal.   Nursing note and vitals reviewed.      Procedures         ED Course  ED Course   Comment By Time   Patient continued to have pain despite Toradol.  We will CT her abd/pelvis LUCIO Linares 11/19 7575                  MDM  Number of Diagnoses or Management Options  new and requires workup     Amount and/or Complexity of Data Reviewed  Clinical lab tests: reviewed and ordered  Tests in the radiology section of CPT®: ordered and reviewed  Discuss the patient with other providers: yes    Risk of Complications, Morbidity, and/or Mortality  Presenting problems: moderate        Final diagnoses:   Strain of lumbar region, initial encounter            LUCIO Linares  11/19/17 1746

## 2017-11-24 ENCOUNTER — APPOINTMENT (EMERGENCY)
Dept: RADIOLOGY | Facility: HOSPITAL | Age: 47
End: 2017-11-24
Payer: COMMERCIAL

## 2017-11-24 ENCOUNTER — HOSPITAL ENCOUNTER (EMERGENCY)
Facility: HOSPITAL | Age: 47
Discharge: HOME | End: 2017-11-24
Attending: EMERGENCY MEDICINE | Admitting: EMERGENCY MEDICINE
Payer: COMMERCIAL

## 2017-11-24 VITALS
RESPIRATION RATE: 18 BRPM | HEART RATE: 60 BPM | WEIGHT: 218 LBS | HEIGHT: 67 IN | OXYGEN SATURATION: 98 % | SYSTOLIC BLOOD PRESSURE: 132 MMHG | DIASTOLIC BLOOD PRESSURE: 71 MMHG | TEMPERATURE: 97.5 F | BODY MASS INDEX: 34.21 KG/M2

## 2017-11-24 DIAGNOSIS — N20.0 KIDNEY STONE: Primary | ICD-10-CM

## 2017-11-24 DIAGNOSIS — R11.0 NAUSEA: ICD-10-CM

## 2017-11-24 DIAGNOSIS — E87.6 HYPOKALEMIA: ICD-10-CM

## 2017-11-24 LAB
ALBUMIN SERPL BCP-MCNC: 3.3 G/DL (ref 3.5–5)
ALP SERPL-CCNC: 85 U/L (ref 46–116)
ALT SERPL W P-5'-P-CCNC: 21 U/L (ref 12–78)
ANION GAP SERPL CALCULATED.3IONS-SCNC: 6 MMOL/L (ref 4–13)
AST SERPL W P-5'-P-CCNC: 22 U/L (ref 5–45)
B-HCG SERPL-ACNC: <2 MIU/ML
BACTERIA UR QL AUTO: ABNORMAL /HPF
BASOPHILS # BLD AUTO: 0.02 THOUSANDS/ΜL (ref 0–0.1)
BASOPHILS NFR BLD AUTO: 0 % (ref 0–1)
BILIRUB SERPL-MCNC: 0.46 MG/DL (ref 0.2–1)
BILIRUB UR QL STRIP: ABNORMAL
BUN SERPL-MCNC: 10 MG/DL (ref 5–25)
CALCIUM SERPL-MCNC: 8.8 MG/DL (ref 8.3–10.1)
CAOX CRY URNS QL MICRO: ABNORMAL /HPF
CHLORIDE SERPL-SCNC: 105 MMOL/L (ref 100–108)
CLARITY UR: ABNORMAL
CO2 SERPL-SCNC: 29 MMOL/L (ref 21–32)
COLOR UR: ABNORMAL
COLOR, POC: NORMAL
CREAT SERPL-MCNC: 0.95 MG/DL (ref 0.6–1.3)
EOSINOPHIL # BLD AUTO: 0.18 THOUSAND/ΜL (ref 0–0.61)
EOSINOPHIL NFR BLD AUTO: 2 % (ref 0–6)
ERYTHROCYTE [DISTWIDTH] IN BLOOD BY AUTOMATED COUNT: 16 % (ref 11.6–15.1)
EXT PREG TEST URINE: NEGATIVE
GFR SERPL CREATININE-BSD FRML MDRD: 72 ML/MIN/1.73SQ M
GLUCOSE SERPL-MCNC: 97 MG/DL (ref 65–140)
GLUCOSE UR STRIP-MCNC: NEGATIVE MG/DL
HCT VFR BLD AUTO: 45.1 % (ref 34.8–46.1)
HGB BLD-MCNC: 14.8 G/DL (ref 11.5–15.4)
HGB UR QL STRIP.AUTO: NEGATIVE
HOLD SPECIMEN: NORMAL
KETONES UR STRIP-MCNC: NEGATIVE MG/DL
LEUKOCYTE ESTERASE UR QL STRIP: NEGATIVE
LIPASE SERPL-CCNC: 70 U/L (ref 73–393)
LYMPHOCYTES # BLD AUTO: 2.07 THOUSANDS/ΜL (ref 0.6–4.47)
LYMPHOCYTES NFR BLD AUTO: 17 % (ref 14–44)
MCH RBC QN AUTO: 29.7 PG (ref 26.8–34.3)
MCHC RBC AUTO-ENTMCNC: 32.8 G/DL (ref 31.4–37.4)
MCV RBC AUTO: 90 FL (ref 82–98)
MONOCYTES # BLD AUTO: 0.92 THOUSAND/ΜL (ref 0.17–1.22)
MONOCYTES NFR BLD AUTO: 8 % (ref 4–12)
MUCOUS THREADS UR QL AUTO: ABNORMAL
NEUTROPHILS # BLD AUTO: 8.76 THOUSANDS/ΜL (ref 1.85–7.62)
NEUTS SEG NFR BLD AUTO: 73 % (ref 43–75)
NITRITE UR QL STRIP: NEGATIVE
NON-SQ EPI CELLS URNS QL MICRO: ABNORMAL /HPF
NRBC BLD AUTO-RTO: 0 /100 WBCS
PH UR STRIP.AUTO: 6 [PH] (ref 4.5–8)
PLATELET # BLD AUTO: 291 THOUSANDS/UL (ref 149–390)
PMV BLD AUTO: 9.3 FL (ref 8.9–12.7)
POTASSIUM SERPL-SCNC: 3 MMOL/L (ref 3.5–5.3)
PROT SERPL-MCNC: 7.6 G/DL (ref 6.4–8.2)
PROT UR STRIP-MCNC: ABNORMAL MG/DL
RBC # BLD AUTO: 4.99 MILLION/UL (ref 3.81–5.12)
RBC #/AREA URNS AUTO: ABNORMAL /HPF
SODIUM SERPL-SCNC: 140 MMOL/L (ref 136–145)
SP GR UR STRIP.AUTO: >=1.03 (ref 1–1.03)
UROBILINOGEN UR QL STRIP.AUTO: 1 E.U./DL
WBC # BLD AUTO: 12.04 THOUSAND/UL (ref 4.31–10.16)
WBC #/AREA URNS AUTO: ABNORMAL /HPF

## 2017-11-24 PROCEDURE — 96361 HYDRATE IV INFUSION ADD-ON: CPT

## 2017-11-24 PROCEDURE — 81001 URINALYSIS AUTO W/SCOPE: CPT

## 2017-11-24 PROCEDURE — 83690 ASSAY OF LIPASE: CPT | Performed by: EMERGENCY MEDICINE

## 2017-11-24 PROCEDURE — 84702 CHORIONIC GONADOTROPIN TEST: CPT | Performed by: EMERGENCY MEDICINE

## 2017-11-24 PROCEDURE — 96374 THER/PROPH/DIAG INJ IV PUSH: CPT

## 2017-11-24 PROCEDURE — 96375 TX/PRO/DX INJ NEW DRUG ADDON: CPT

## 2017-11-24 PROCEDURE — 99284 EMERGENCY DEPT VISIT MOD MDM: CPT

## 2017-11-24 PROCEDURE — 36415 COLL VENOUS BLD VENIPUNCTURE: CPT

## 2017-11-24 PROCEDURE — 80053 COMPREHEN METABOLIC PANEL: CPT | Performed by: EMERGENCY MEDICINE

## 2017-11-24 PROCEDURE — 85025 COMPLETE CBC W/AUTO DIFF WBC: CPT | Performed by: EMERGENCY MEDICINE

## 2017-11-24 PROCEDURE — 81025 URINE PREGNANCY TEST: CPT | Performed by: EMERGENCY MEDICINE

## 2017-11-24 PROCEDURE — 74177 CT ABD & PELVIS W/CONTRAST: CPT

## 2017-11-24 PROCEDURE — 81002 URINALYSIS NONAUTO W/O SCOPE: CPT | Performed by: EMERGENCY MEDICINE

## 2017-11-24 RX ORDER — NAPROXEN 375 MG/1
375 TABLET ORAL 2 TIMES DAILY WITH MEALS
Qty: 10 TABLET | Refills: 0 | Status: SHIPPED | OUTPATIENT
Start: 2017-11-24 | End: 2018-01-27

## 2017-11-24 RX ORDER — POTASSIUM CHLORIDE 20 MEQ/1
40 TABLET, EXTENDED RELEASE ORAL ONCE
Status: COMPLETED | OUTPATIENT
Start: 2017-11-24 | End: 2017-11-24

## 2017-11-24 RX ORDER — LEVOFLOXACIN 500 MG/1
500 TABLET, FILM COATED ORAL DAILY
COMMUNITY
End: 2018-01-27

## 2017-11-24 RX ORDER — ONDANSETRON 2 MG/ML
4 INJECTION INTRAMUSCULAR; INTRAVENOUS ONCE
Status: COMPLETED | OUTPATIENT
Start: 2017-11-24 | End: 2017-11-24

## 2017-11-24 RX ORDER — PROMETHAZINE HYDROCHLORIDE AND CODEINE PHOSPHATE 6.25; 1 MG/5ML; MG/5ML
5 SYRUP ORAL
COMMUNITY
End: 2018-05-05

## 2017-11-24 RX ADMIN — HYDROMORPHONE HYDROCHLORIDE 0.5 MG: 1 INJECTION, SOLUTION INTRAMUSCULAR; INTRAVENOUS; SUBCUTANEOUS at 12:36

## 2017-11-24 RX ADMIN — SODIUM CHLORIDE 1000 ML: 0.9 INJECTION, SOLUTION INTRAVENOUS at 13:26

## 2017-11-24 RX ADMIN — IOHEXOL 100 ML: 350 INJECTION, SOLUTION INTRAVENOUS at 13:51

## 2017-11-24 RX ADMIN — ONDANSETRON 4 MG: 2 INJECTION INTRAMUSCULAR; INTRAVENOUS at 12:35

## 2017-11-24 RX ADMIN — POTASSIUM CHLORIDE 40 MEQ: 1500 TABLET, EXTENDED RELEASE ORAL at 12:36

## 2017-11-24 NOTE — ED ATTENDING ATTESTATION
Vijay Suazo MD, saw and evaluated the patient  I have discussed the patient with the resident/non-physician practitioner and agree with the resident's/non-physician practitioner's findings, Plan of Care, and MDM as documented in the resident's/non-physician practitioner's note, except where noted  All available labs and Radiology studies were reviewed  At this point I agree with the current assessment done in the Emergency Department  I have conducted an independent evaluation of this patient a history and physical is as follows: Pt with rlq abd pain since 4:30 am that radiates to flank  No exacerbating symptoms Pt has had r sided diverticulitis in past PE: alert nad heart reg lungs clear abd soft tender rlq and r flank   Heart reg lungs clear MDM: will do ct divetic versus stone   Critical Care Time  CritCare Time

## 2017-11-24 NOTE — ED PROVIDER NOTES
History  Chief Complaint   Patient presents with    Abdominal Pain     Pt c/o LLQ abdominal pain that began this morning     51-year-old female, history of right-sided diverticulitis and kidney stones, presents to the emergency room with right lower quadrant abdominal pain that radiates to her right flank since 4 a m  this morning  Patient states the pain woke her up from sleep and was initially a crampy pain but has now become more sharp in nature  Nothing improves or worsens the symptoms  Has tried Tylenol without any relief  She also states that she has some nausea but denies any vomiting  Denies any fevers/chills, diarrhea/constipation, chest pain, shortness of breath, cough, or urinary symptoms  She states the pain is similar to her past diverticulitis, which was on her right side  She denies any other symptoms or complaints  History provided by:  Patient  Abdominal Pain   Pain location:  RLQ  Pain quality: cramping and sharp    Pain radiates to:  R flank  Pain severity:  Moderate  Onset quality:  Gradual  Duration: Since 4 a m  this morning  Timing:  Constant  Progression:  Worsening  Chronicity:  New  Context: previous surgery    Context: not alcohol use, not eating, not sick contacts and not trauma    Relieved by:  Nothing  Worsened by:  Nothing  Ineffective treatments:  Acetaminophen  Associated symptoms: nausea    Associated symptoms: no chest pain, no chills, no constipation, no cough, no diarrhea, no dysuria, no fever, no hematuria, no shortness of breath, no sore throat, no vaginal bleeding, no vaginal discharge and no vomiting    Risk factors: no alcohol abuse, has not had multiple surgeries and no recent hospitalization        Prior to Admission Medications   Prescriptions Last Dose Informant Patient Reported? Taking?    DULoxetine (CYMBALTA) 30 mg delayed release capsule 11/24/2017 at Unknown time  Yes Yes   Sig: Take 30 mg by mouth 2 (two) times a day     Ergocalciferol (VITAMIN D2 PO) Past Month at Unknown time  Yes Yes   Sig: Take 50,000 Units by mouth once a week     Levothyroxine Sodium 88 MCG CAPS 2017 at Unknown time  Yes Yes   Sig: Take 88 mcg by mouth daily     albuterol (PROVENTIL HFA,VENTOLIN HFA) 90 mcg/act inhaler Past Week at Unknown time  Yes Yes   Sig: Inhale 2 puffs every 6 (six) hours as needed for wheezing   amLODIPine (NORVASC) 5 mg tablet 2017 at Unknown time  Yes Yes   Sig: Take 5 mg by mouth daily   butalbital-aspirin-caffeine (FIORINAL) -40 mg per capsule Past Week at Unknown time  Yes Yes   Sig: Take 1 capsule by mouth every 4 (four) hours as needed for headaches   diazepam (VALIUM) 10 mg tablet 2017 at Unknown time  Yes Yes   Sig: Take 10 mg by mouth every 6 (six) hours as needed for anxiety   fluticasone (FLONASE) 50 mcg/act nasal spray 2017 at Unknown time  Yes Yes   Si spray into each nostril 2 (two) times a day   hydrochlorothiazide (HYDRODIURIL) 25 mg tablet 2017 at Unknown time  Yes Yes   Sig: Take 25 mg by mouth daily   levofloxacin (LEVAQUIN) 500 mg tablet 2017 at Unknown time  Yes Yes   Sig: Take 500 mg by mouth daily   oxyCODONE-acetaminophen (PERCOCET)  mg per tablet 2017 at Unknown time  Yes Yes   Sig: Take 1 tablet by mouth every 4 (four) hours as needed for moderate pain   pantoprazole (PROTONIX) 40 mg tablet 2017 at Unknown time  Yes Yes   Sig: Take 40 mg by mouth daily   promethazine-codeine (PHENERGAN WITH CODEINE) 6 25-10 mg/5 mL syrup 2017 at Unknown time  Yes Yes   Sig: Take 5 mL by mouth daily at bedtime as needed for cough   sertraline (ZOLOFT) 100 mg tablet 2017 at Unknown time  Yes Yes   Sig: Take 100 mg by mouth 2 (two) times a day        Facility-Administered Medications: None       Past Medical History:   Diagnosis Date    Anxiety     Asthma     Depression     Disease of thyroid gland     GERD (gastroesophageal reflux disease)     Hypertension     Migraine     Neuropathy     Seasonal allergies        Past Surgical History:   Procedure Laterality Date    CARPAL TUNNEL RELEASE      CHOLECYSTECTOMY      EYE SURGERY      FOOT SURGERY      UT ESOPHAGOGASTRODUODENOSCOPY TRANSORAL DIAGNOSTIC N/A 2/13/2017    Procedure: ESOPHAGOGASTRODUODENOSCOPY (EGD); Surgeon: Anjana Rodriguez MD;  Location: [facility]; Service: Gastroenterology       History reviewed  No pertinent family history  I have reviewed and agree with the history as documented  Social History   Substance Use Topics    Smoking status: Current Every Day Smoker     Packs/day: 1 00     Types: Cigarettes    Smokeless tobacco: Never Used    Alcohol use 1 8 oz/week     3 Glasses of wine per week      Comment: ocassionally        Review of Systems   Constitutional: Negative for chills and fever  HENT: Negative for congestion, ear pain and sore throat  Eyes: Negative for pain and visual disturbance  Respiratory: Negative for cough, shortness of breath and wheezing  Cardiovascular: Negative for chest pain and leg swelling  Gastrointestinal: Positive for abdominal pain and nausea  Negative for constipation, diarrhea and vomiting  Genitourinary: Negative for dysuria, frequency, hematuria, urgency, vaginal bleeding and vaginal discharge  Musculoskeletal: Negative for neck pain and neck stiffness  Skin: Negative for rash and wound  Neurological: Negative for weakness, numbness and headaches  Psychiatric/Behavioral: Negative for agitation and confusion  All other systems reviewed and are negative        Physical Exam  ED Triage Vitals [11/24/17 1054]   Temperature Pulse Respirations Blood Pressure SpO2   97 5 °F (36 4 °C) 65 18 133/72 97 %      Temp Source Heart Rate Source Patient Position - Orthostatic VS BP Location FiO2 (%)   Oral Monitor -- -- --      Pain Score       8           Orthostatic Vital Signs  Vitals:    11/24/17 1054 11/24/17 1233 11/24/17 1315 11/24/17 1425   BP: 133/72 131/70 126/69 132/71   Pulse: 65 55 62 60       Physical Exam   Constitutional: She is oriented to person, place, and time  She appears well-developed and well-nourished  HENT:   Head: Normocephalic and atraumatic  Mouth/Throat: Oropharynx is clear and moist    Eyes: EOM are normal  Pupils are equal, round, and reactive to light  Neck: Normal range of motion  Neck supple  Cardiovascular: Normal rate and regular rhythm  Pulmonary/Chest: Effort normal and breath sounds normal  No respiratory distress  She has no wheezes  She has no rales  Abdominal: Soft  Bowel sounds are normal  She exhibits no distension  There is tenderness in the right lower quadrant  There is CVA tenderness and tenderness at McBurney's point  There is no rigidity, no rebound, no guarding and negative Bradford's sign  Musculoskeletal: Normal range of motion  Neurological: She is alert and oriented to person, place, and time  No focal deficits   Skin: Skin is warm and dry  Nursing note and vitals reviewed        ED Medications  Medications   ondansetron (ZOFRAN) injection 4 mg (4 mg Intravenous Given 11/24/17 1235)   HYDROmorphone (DILAUDID) 1 mg/mL injection 0 5 mg (0 5 mg Intravenous Given 11/24/17 1236)   potassium chloride (K-DUR,KLOR-CON) CR tablet 40 mEq (40 mEq Oral Given 11/24/17 1236)   sodium chloride 0 9 % bolus 1,000 mL (0 mL Intravenous Stopped 11/24/17 1443)   iohexol (OMNIPAQUE) 350 MG/ML injection (MULTI-DOSE) 100 mL (100 mL Intravenous Given 11/24/17 1351)       Diagnostic Studies  Results Reviewed     Procedure Component Value Units Date/Time    Urine Microscopic [[id number]]  (Abnormal) Collected:  11/24/17 1123    Lab Status:  Final result Specimen:  Urine from Urine, Clean Catch Updated:  11/24/17 1220     RBC, UA None Seen /hpf      WBC, UA None Seen /hpf      Epithelial Cells Occasional /hpf      Bacteria, UA Occasional /hpf      Ca Oxalate Elena, UA Moderate (A) /hpf      MUCOUS THREADS Occasional    Comprehensive metabolic panel [[id number]]  (Abnormal) Collected:  11/24/17 1129    Lab Status:  Final result Specimen:  Blood from Arm, Right Updated:  11/24/17 1158     Sodium 140 mmol/L      Potassium 3 0 (L) mmol/L      Chloride 105 mmol/L      CO2 29 mmol/L      Anion Gap 6 mmol/L      BUN 10 mg/dL      Creatinine 0 95 mg/dL      Glucose 97 mg/dL      Calcium 8 8 mg/dL      AST 22 U/L      ALT 21 U/L      Alkaline Phosphatase 85 U/L      Total Protein 7 6 g/dL      Albumin 3 3 (L) g/dL      Total Bilirubin 0 46 mg/dL      eGFR 72 ml/min/1 73sq m     Narrative:         National Kidney Disease Education Program recommendations are as follows:  GFR calculation is accurate only with a steady state creatinine  Chronic Kidney disease less than 60 ml/min/1 73 sq  meters  Kidney failure less than 15 ml/min/1 73 sq  meters      Lipase [[id number]]  (Abnormal) Collected:  11/24/17 1129    Lab Status:  Final result Specimen:  Blood from Arm, Right Updated:  11/24/17 1158     Lipase 70 (L) u/L     hCG, quantitative, pregnancy [[id number]]  (Normal) Collected:  11/24/17 1129    Lab Status:  Final result Specimen:  Blood from Arm, Right Updated:  11/24/17 1158     HCG, Quant <2 mIU/mL     Narrative:          Expected Ranges:     Approximate               Approximate HCG  Gestation age          Concentration ( mIU/mL)  _____________          ______________________   Kate Levi                      HCG values  0 2-1                       5-50  1-2                           2-3                         100-5000  3-4                         500-66966  4-5                         1000-42065  5-6                         [id number]  6-8                         [id number]  8-12                        [id number]    CBC and differential [[id number]]  (Abnormal) Collected:  11/24/17 1129    Lab Status:  Final result Specimen:  Blood from Arm, Right Updated:  11/24/17 1138     WBC 12 04 (H) Thousand/uL      RBC 4 99 Million/uL      Hemoglobin 14 8 g/dL Hematocrit 45 1 %      MCV 90 fL      MCH 29 7 pg      MCHC 32 8 g/dL      RDW 16 0 (H) %      MPV 9 3 fL      Platelets 517 Thousands/uL      nRBC 0 /100 WBCs      Neutrophils Relative 73 %      Lymphocytes Relative 17 %      Monocytes Relative 8 %      Eosinophils Relative 2 %      Basophils Relative 0 %      Neutrophils Absolute 8 76 (H) Thousands/µL      Lymphocytes Absolute 2 07 Thousands/µL      Monocytes Absolute 0 92 Thousand/µL      Eosinophils Absolute 0 18 Thousand/µL      Basophils Absolute 0 02 Thousands/µL     POCT pregnancy, urine [[id number]]  (Normal) Resulted:  11/24/17 1130    Lab Status:  Final result Updated:  11/24/17 1131     EXT PREG TEST UR (Ref: Negative) negative    POCT urinalysis dipstick [[id number]]  (Normal) Resulted:  11/24/17 1129    Lab Status:  Final result Specimen:  Urine from Urine, Other Updated:  11/24/17 1129     Color, UA see chart    ED Urine Macroscopic [[id number]]  (Abnormal) Collected:  11/24/17 1123    Lab Status:  Final result Specimen:  Urine Updated:  11/24/17 1125     Color, UA Nat     Clarity, UA Cloudy     pH, UA 6 0     Leukocytes, UA Negative     Nitrite, UA Negative     Protein, UA 30 (1+) (A) mg/dl      Glucose, UA Negative mg/dl      Ketones, UA Negative mg/dl      Urobilinogen, UA 1 0 E U /dl      Bilirubin, UA Interference- unable to analyze (A)     Blood, UA Negative     Specific Gravity, UA >=1 030    Narrative:       [facility] RESULT                 CT abdomen pelvis w contrast   Final Result by Kenneth Pineda MD (11/24 6798)      There is mild to moderate right-sided hydroureteronephrosis with perinephric stranding and diminished enhancement of the lower pole of the right renal cortex  The findings could be due to a recently passed stone although no stone is visible at this time    and therefore other etiologies for ureteral obstruction could not be excluded  There may be pyelonephritis  Recommend urologic consultation and continued follow-up  There is a tiny nonobstructing calculus at the lower pole of the left kidney  There is fatty liver change and minimal splenomegaly and stable minimal prominence of robin hepatis lymph nodes of uncertain significance  There are bilateral patchy pulmonary opacities which could be atelectasis although pneumonia is not excluded  Correlate for any cough or fever  Mild sigmoid diverticulosis         Workstation performed: [id number]               Procedures  Procedures      Phone Consults  ED Phone Contact    ED Course  ED Course                                MDM  Number of Diagnoses or Management Options  Hypokalemia: new and requires workup  Kidney stone: new and requires workup  Nausea: new and requires workup  Diagnosis management comments: Patient with right lower quadrant abdominal pain that radiates to her right flank  Will do abdominal labs, UA to rule out UTI, and CT abdomen and pelvis to rule out intra-abdominal pathology  Will give patient IV fluids, and Zofran, and Dilaudid for symptom relief  Patient reevaluated and feels improved  Patient updated on results of tests  Discharge instructions given including medications, follow-up, and return precautions  Patient demonstrates verbal understanding and agrees with plan         Amount and/or Complexity of Data Reviewed  Clinical lab tests: ordered and reviewed  Tests in the radiology section of CPT®: ordered and reviewed  Tests in the medicine section of CPT®: ordered and reviewed  Discussion of test results with the performing providers: yes  Decide to obtain previous medical records or to obtain history from someone other than the patient: yes  Obtain history from someone other than the patient: yes  Review and summarize past medical records: yes  Discuss the patient with other providers: yes  Independent visualization of images, tracings, or specimens: yes    Patient Progress  Patient progress: improved    CritCare Time    Disposition  Final diagnoses:   Kidney stone   Nausea   Hypokalemia     Time reflects when diagnosis was documented in both MDM as applicable and the Disposition within this note     Time User Action Codes Description Comment    11/24/2017  2:38 PM Esther Antunez Add [N20 0] Kidney stone     11/24/2017  2:38 PM Esther Antunez Add [R11 0] Nausea     11/24/2017  2:42 PM Esther Antunez Add [E87 6] Hypokalemia       ED Disposition     ED Disposition Condition Comment    Discharge  [address] discharge to home/self care  Condition at discharge: Good        Follow-up Information     Follow up With Specialties Details Why [address], [address], Internal Medicine Call in 1 day for follow up within 2-3 days    [facility] Eliana Cha MD Urology Call As needed [address] [facility] [address] [facility]  [phone]          Discharge Medication List as of 11/24/2017  2:41 PM      START taking these medications    Details   naproxen (NAPROSYN) 375 mg tablet Take 1 tablet by mouth 2 (two) times a day with meals, Starting Fri 11/24/2017, Print         CONTINUE these medications which have NOT CHANGED    Details   albuterol (PROVENTIL HFA,VENTOLIN HFA) 90 mcg/act inhaler Inhale 2 puffs every 6 (six) hours as needed for wheezing, Until Discontinued, Historical Med      amLODIPine (NORVASC) 5 mg tablet Take 5 mg by mouth daily, Until Discontinued, Historical Med      butalbital-aspirin-caffeine (FIORINAL) -40 mg per capsule Take 1 capsule by mouth every 4 (four) hours as needed for headaches, Historical Med      diazepam (VALIUM) 10 mg tablet Take 10 mg by mouth every 6 (six) hours as needed for anxiety, Until Discontinued, Historical Med      DULoxetine (CYMBALTA) 30 mg delayed release capsule Take 30 mg by mouth 2 (two) times a day  , Historical Med      Ergocalciferol (VITAMIN D2 PO) Take 50,000 Units by mouth once a week  , Historical Med      fluticasone (FLONASE) 50 mcg/act nasal spray 1 spray into each nostril 2 (two) times a day, Historical Med      hydrochlorothiazide (HYDRODIURIL) 25 mg tablet Take 25 mg by mouth daily, Historical Med      levofloxacin (LEVAQUIN) 500 mg tablet Take 500 mg by mouth daily, Historical Med      Levothyroxine Sodium 88 MCG CAPS Take 88 mcg by mouth daily  , Historical Med      oxyCODONE-acetaminophen (PERCOCET)  mg per tablet Take 1 tablet by mouth every 4 (four) hours as needed for moderate pain, Until Discontinued, Historical Med      pantoprazole (PROTONIX) 40 mg tablet Take 40 mg by mouth daily, Until Discontinued, Historical Med      promethazine-codeine (PHENERGAN WITH CODEINE) 6 25-10 mg/5 mL syrup Take 5 mL by mouth daily at bedtime as needed for cough, Historical Med      sertraline (ZOLOFT) 100 mg tablet Take 100 mg by mouth 2 (two) times a day  , Historical Med           No discharge procedures on file  ED Provider  Attending physically available and evaluated Wallace Boxer I managed the patient along with the ED Attending      Electronically Signed by         Kat Harris DO  Resident  11/24/17 3828

## 2017-11-24 NOTE — DISCHARGE INSTRUCTIONS
Kidney Stones   WHAT YOU NEED TO KNOW:   Kidney stones form in the urinary system when the water and waste in your urine are out of balance  When this happens, certain types of waste crystals separate from the urine  The crystals build up and form kidney stones  You may have 1 or more kidney stones  DISCHARGE INSTRUCTIONS:   Return to the emergency department if:   · You have vomiting that is not relieved by medicine  Contact your healthcare provider if:   · You have a fever  · You have trouble passing urine  · You see blood in your urine  · You have severe pain  · You have any questions or concerns about your condition or care  Medicines:   · NSAIDs , such as ibuprofen, help decrease swelling, pain, and fever  This medicine is available with or without a doctor's order  NSAIDs can cause stomach bleeding or kidney problems in certain people  If you take blood thinner medicine, always ask your healthcare provider if NSAIDs are safe for you  Always read the medicine label and follow directions  · Prescription medicine  may be given  Ask how to take this medicine safely  · Medicines  to balance your electrolytes may be needed  · Take your medicine as directed  Contact your healthcare provider if you think your medicine is not helping or if you have side effects  Tell him or her if you are allergic to any medicine  Keep a list of the medicines, vitamins, and herbs you take  Include the amounts, and when and why you take them  Bring the list or the pill bottles to follow-up visits  Carry your medicine list with you in case of an emergency  Follow up with your healthcare provider as directed: You may need to return for more tests  Write down your questions so you remember to ask them during your visits  Self-care:   · Drink plenty of liquids  Your healthcare provider may tell you to drink at least 8 to 12 (eight-ounce) cups of liquids each day   This helps flush out the kidney stones when you urinate  Water is the best liquid to drink  · Strain your urine every time you go to the bathroom  Urinate through a strainer or a piece of thin cloth to catch the stones  Take the stones to your healthcare provider so they can be sent to the lab for tests  This will help your healthcare providers plan the best treatment for you  · Eat a variety of healthy foods  Healthy foods include fruits, vegetables, whole-grain breads, low-fat dairy products, beans, and fish  You may need to limit how much sodium (salt) or protein you eat  Ask for information about the best foods for you  · Stay active  Your stones may pass more easily by if you stay active  Ask about the best activities for you  After you pass your kidney stones:  Once you have passed your kidney stones, your healthcare provider may  order a 24-hour urine test  Results from a 24-hour urine test will help your healthcare provider plan ways to prevent more stones from forming  If you are told to do a 24-hour test, your healthcare provider will give you more instructions  © 2017 [facility] Information is for End User's use only and may not be sold, redistributed or otherwise used for commercial purposes  All illustrations and images included in CareNotes® are the copyrighted property of A D A M , Inc  or Lars Angela  The above information is an  only  It is not intended as medical advice for individual conditions or treatments  Talk to your doctor, nurse or pharmacist before following any medical regimen to see if it is safe and effective for you

## 2018-01-10 NOTE — RESULT NOTES
Message    Gastroparesis a benign and negative for H  pylori  Patient to eat small frequent meals     Discussed results  CS         Verified Results  (1) TISSUE EXAM [id number] 08:50AM Sung Nielsen     Test Name Result Flag Reference   LAB AP CASE REPORT (Report)     Surgical Pathology Report             Case: [id number]                   Authorizing Provider: Grant Peña MD     Collected:      02/13/2017 0850        Ordering Location:   [facility]    Received:      02/13/2017 [address] [place] [address] Endoscopy                               Pathologist:      Jerry Ross MD                              Specimen:  Stomach, Bx Gastric body   LAB AP FINAL DIAGNOSIS (Report)     A  Gastric body:  - Chronic inactive oxyntic gastritis, negative for curvilinear   Helicobacter pylori, confirmed by immunohistochemical stains, evaluated   with appropriate positive & negative controls  - No atrophy or intestinal metaplasia identified   - No epithelial dysplasia and no evidence of malignancy  Interpretation performed at [facility], [address] [place] [address]  Electronically signed by Jerry Ross MD on 2/14/2017 at 2:28 PM   LAB AP SURGICAL ADDITIONAL INFORMATION (Report)     These tests were developed and their performance characteristics   determined by [facility] or [facility]  They may not be cleared or approved by the [place]  Food and   Drug Administration  The FDA has determined that such clearance or   approval is not necessary  These tests are used for clinical purposes  They should not be regarded as investigational or for research  This   laboratory has been approved by CLIA 88, designated as a high-complexity   laboratory and is qualified to perform these tests  LAB AP GROSS DESCRIPTION (Report)     A   The specimen is received in formalin, labeled with the patient's name   and hospital number, and is designated biopsy gastric body rule out H    pylori  The specimen consists of 2 tan-pink red soft tissue fragments   each measuring 0 4 cm  Entirely submitted  One cassette  Note: The estimated total formalin fixation time based upon information   provided by the submitting clinician and the standard processing schedule   is 19 75 hours   [facility]    LAB AP CLINICAL INFORMATION R/o h pylori     R/o h pylori

## 2018-01-12 VITALS
SYSTOLIC BLOOD PRESSURE: 120 MMHG | WEIGHT: 243.38 LBS | HEART RATE: 82 BPM | OXYGEN SATURATION: 97 % | DIASTOLIC BLOOD PRESSURE: 78 MMHG | BODY MASS INDEX: 38.2 KG/M2 | TEMPERATURE: 98.7 F | HEIGHT: 67 IN

## 2018-01-13 VITALS
BODY MASS INDEX: 35.82 KG/M2 | WEIGHT: 228.25 LBS | SYSTOLIC BLOOD PRESSURE: 124 MMHG | DIASTOLIC BLOOD PRESSURE: 72 MMHG | TEMPERATURE: 98.5 F | HEART RATE: 108 BPM | OXYGEN SATURATION: 97 % | HEIGHT: 67 IN

## 2018-01-13 VITALS
HEIGHT: 67 IN | HEART RATE: 80 BPM | SYSTOLIC BLOOD PRESSURE: 116 MMHG | OXYGEN SATURATION: 98 % | DIASTOLIC BLOOD PRESSURE: 68 MMHG | BODY MASS INDEX: 39.16 KG/M2 | WEIGHT: 249.5 LBS | TEMPERATURE: 98.5 F

## 2018-01-13 VITALS
WEIGHT: 247.5 LBS | HEIGHT: 66 IN | DIASTOLIC BLOOD PRESSURE: 84 MMHG | BODY MASS INDEX: 39.77 KG/M2 | SYSTOLIC BLOOD PRESSURE: 148 MMHG | OXYGEN SATURATION: 96 % | TEMPERATURE: 98.2 F | HEART RATE: 84 BPM

## 2018-01-13 VITALS
OXYGEN SATURATION: 98 % | BODY MASS INDEX: 34.84 KG/M2 | TEMPERATURE: 98.2 F | DIASTOLIC BLOOD PRESSURE: 60 MMHG | HEIGHT: 67 IN | HEART RATE: 72 BPM | SYSTOLIC BLOOD PRESSURE: 116 MMHG | WEIGHT: 222 LBS

## 2018-01-13 VITALS
DIASTOLIC BLOOD PRESSURE: 74 MMHG | OXYGEN SATURATION: 98 % | BODY MASS INDEX: 40.66 KG/M2 | HEART RATE: 85 BPM | HEIGHT: 66 IN | WEIGHT: 253 LBS | TEMPERATURE: 98.3 F | SYSTOLIC BLOOD PRESSURE: 120 MMHG

## 2018-01-13 NOTE — MISCELLANEOUS
Message   Recorded as Task   Date: 06/29/2017 02:47 PM, Created By: Michelle Marques   Task Name: Call Back   Assigned To: Jaylen Ortiz   Regarding Patient: Temo Rubio, Status: Active   Comment:    Thu Benedict - 29 Jun 2017 2:47 PM     TASK CREATED  Caller: Self; Care Coordination; [phone] (Home)  Patient stopped in [facility] office to  a script  She asked at that time for a prescription for path to stop smoking  She stated she saw Myron Schwartz earlier today but wasn't given anything  patient can be reached at [phone]  Thank you! Elsi Honeycutt - 29 Jun 2017 2:56 PM     TASK REPLIED TO: Previously Assigned To [facility] Clinical,team  Please advise the patient that we talked about her discussing this with Dr Shasta Lockhart when she comes in for her next appointment, as today was an acute visit   Rita Torres - 29 Jun 2017 3:52 PM     TASK REASSIGNED: Previously Assigned To Marcella Sands - 30 Jun 2017 10:25 AM     TASK EDITED  Patient informed and she will have that discussion with dr Shasta Lockhart on 8/1/17  Active Problems    1  Abdominal pain (789 00) (R10 9)   2  Acute lumbar radiculopathy (724 4) (M54 16)   3  Acute recurrent frontal sinusitis (461 1) (J01 11)   4  Acute URI (465 9) (J06 9)   5  Anxiety (300 00) (F41 9)   6  Benign hypertension (401 1) (I10)   7  Bilateral lower extremity edema (782 3) (R60 0)   8  Chest pain, unspecified type (786 50) (R07 9)   9  Chronic GERD (530 81) (K21 9)   10  COPD (chronic obstructive pulmonary disease) with acute bronchitis (491 22) (J44 0)   11  Depression (311) (F32 9)   12  Dyspepsia (536 8) (R10 13)   13  Essential hypertriglyceridemia (272 1) (E78 1)   14  Gastroesophageal reflux disease with esophagitis (530 11) (K21 0)   15  Headache (784 0) (R51)   16  Hypotension (458 9) (I95 9)   17  Hypothyroidism (244 9) (E03 9)   18  Intermittent claudication (443 9) (I73 9)   19  Low back pain (724 2) (M54 5)   20   Lumbar spinal stenosis (724 02) (M48 06)   21  Nerve pain (729 2) (M79 2)   22  Right calf pain (729 5) (M79 661)   23  Seasonal allergies (477 9) (J30 2)   24  Tobacco abuse disorder (305 1) (Z72 0)   25  Vitamin D deficiency (268 9) (E55 9)    Current Meds   1  AmLODIPine Besylate 5 MG Oral Tablet; TAKE 1 TABLET DAILY  Requested for:   59ZKE8853; Last Rx:11May2017 Ordered   2  Benzonatate 100 MG Oral Capsule; TAKE 1 CAPSULE 3 TIMES DAILY AS NEEDED; Therapy: 59QEM0597 to (Evaluate:89Boz7390)  Requested for: 15USR2018; Last   Rx:29Jun2017 Ordered   3  Butalbital-ASA-Caffeine -40 MG Oral Capsule (Fiorinal); TAKE 1 CAPSULE   EVERY 4 HOURS AS NEEDED; Therapy: 45RCW3920 to (Evaluate:05Apr2017); Last Rx:06Mar2017 Ordered   4  Cetirizine HCl - 10 MG Oral Tablet; TAKE 1 TABLET AT BEDTIME  Requested for:   64CTI8938; Last Rx:17Nov2016 Ordered   5  DiazePAM 10 MG Oral Tablet (Valium); TAKE 1 TABLET Every 6 hours PRN; Therapy: 59FLU1701 to (Last Rx:29Jun2017) Ordered   6  DULoxetine HCl - 60 MG Oral Capsule Delayed Release Particles; Take 1 capsule twice   daily; Therapy: 03CUO7337 to (Evaluate:77Wur5434)  Requested for: 65YGA1657; Last   Rx:12May2017 Ordered   7  Fluticasone Propionate 50 MCG/ACT Nasal Suspension (Flonase Allergy Relief); USE 1   SPRAY IN EACH NOSTRIL TWICE DAILY; Therapy: 76CLX6979 to (Last Rx:11May2017)  Requested for: 16BBW6130 Ordered   8  Gabapentin 100 MG Oral Capsule; TAKE 1 CAPSULE Bedtime; Therapy: 86DHC2301 to Recorded   9  HydroCHLOROthiazide 25 MG Oral Tablet; TAKE 1 TABLET DAILY; Therapy: 11SOI6746 to (Evaluate:11Jun2017)  Requested for: 59UAF2419; Last   Rx:12May2017 Ordered   10  Levothyroxine Sodium 75 MCG Oral Tablet; TAKE 1 TABLET DAILY  Requested for:    60GKN2189; Last Rx:11May2017 Ordered   11  Multi-Vitamins Oral Tablet; TAKE 1 TABLET DAILY; Therapy: 69KUL3468 to Recorded   12   Oxycodone-Acetaminophen  MG Oral Tablet; TAKE 1 TABLET 4 TIMES DAILY AS    NEEDED FOR PAIN; Last VO:78HBL0997 Ordered   13  Pantoprazole Sodium 40 MG Oral Tablet Delayed Release; take one tablet once daily; Therapy: 94JPN0410 to (Evaluate:18Mar2017)  Requested for: 81BNT4091; Last    Rx:18Nov2016 Ordered   14  ProAir  (90 Base) MCG/ACT Inhalation Aerosol Solution; INHALE 1 TO 2 PUFFS    EVERY 4 TO 6 HOURS AS NEEDED; Therapy: 45MZY8273 to (Last Rx:11May2017)  Requested for: 86ZXM0124 Ordered   15  Sertraline HCl - 100 MG Oral Tablet; TAKE 1 AND 1/2 TABLETS DAILY; Therapy: 80CQG7843 to (Last Rx:11May2017)  Requested for: 47YHS6346 Ordered   16  Sucralfate 1 GM Oral Tablet (Carafate); Take 1 tablet every 12 hours; Therapy: 05PXV4367 to (Rayray Farooq)  Requested for: 44NXR1759; Last    Rx:06Mar2017 Ordered   17  Vitamin D (Ergocalciferol) 23236 UNIT Oral Capsule; TAKE 1 CAPSULE WEEKLY; Therapy: 77GXY5629 to (Last Rx:09Jan2017)  Requested for: 25SKG2442 Ordered    Allergies    1  Toradol SOLN   2   Ultram TABS    3  Seasonal    Signatures   Electronically signed by : Yeimi Abernathy DO; Jul 10 2017  5:58PM EST

## 2018-01-14 VITALS
HEART RATE: 80 BPM | BODY MASS INDEX: 38.02 KG/M2 | DIASTOLIC BLOOD PRESSURE: 84 MMHG | WEIGHT: 242.25 LBS | HEIGHT: 67 IN | SYSTOLIC BLOOD PRESSURE: 126 MMHG

## 2018-01-14 VITALS
HEIGHT: 67 IN | SYSTOLIC BLOOD PRESSURE: 120 MMHG | DIASTOLIC BLOOD PRESSURE: 80 MMHG | BODY MASS INDEX: 37.23 KG/M2 | HEART RATE: 99 BPM | OXYGEN SATURATION: 98 % | WEIGHT: 237.2 LBS | TEMPERATURE: 99.5 F

## 2018-01-14 VITALS
HEIGHT: 67 IN | TEMPERATURE: 98.3 F | BODY MASS INDEX: 38.98 KG/M2 | DIASTOLIC BLOOD PRESSURE: 84 MMHG | OXYGEN SATURATION: 98 % | HEART RATE: 81 BPM | SYSTOLIC BLOOD PRESSURE: 128 MMHG | WEIGHT: 248.38 LBS

## 2018-01-16 NOTE — MISCELLANEOUS
To whom it May concern:        Kindra Looney, claim  number [id number], date of birth July 5, 1970, does not carry the diagnosis of incompetency  She is able to manage her finances  Please do not hesitate to  contact our office with any questions  Sincerely,              FELISA Abdi        Electronically signed by:Andi Solorio DO  Oct 20 2017 12:18PM EST

## 2018-01-18 ENCOUNTER — GENERIC CONVERSION - ENCOUNTER (OUTPATIENT)
Dept: OTHER | Facility: OTHER | Age: 48
End: 2018-01-18

## 2018-01-18 DIAGNOSIS — E78.00 PURE HYPERCHOLESTEROLEMIA: ICD-10-CM

## 2018-01-18 DIAGNOSIS — E03.9 HYPOTHYROIDISM: ICD-10-CM

## 2018-01-22 VITALS
BODY MASS INDEX: 34.29 KG/M2 | HEART RATE: 84 BPM | SYSTOLIC BLOOD PRESSURE: 116 MMHG | OXYGEN SATURATION: 98 % | HEIGHT: 67 IN | DIASTOLIC BLOOD PRESSURE: 72 MMHG | TEMPERATURE: 97.9 F | WEIGHT: 218.5 LBS

## 2018-01-22 VITALS
DIASTOLIC BLOOD PRESSURE: 90 MMHG | HEART RATE: 82 BPM | HEIGHT: 66 IN | WEIGHT: 250.5 LBS | SYSTOLIC BLOOD PRESSURE: 150 MMHG | TEMPERATURE: 97.7 F | OXYGEN SATURATION: 98 % | BODY MASS INDEX: 40.26 KG/M2

## 2018-01-22 VITALS
WEIGHT: 247.5 LBS | TEMPERATURE: 97.9 F | OXYGEN SATURATION: 95 % | HEIGHT: 67 IN | HEART RATE: 77 BPM | SYSTOLIC BLOOD PRESSURE: 124 MMHG | BODY MASS INDEX: 38.84 KG/M2 | DIASTOLIC BLOOD PRESSURE: 80 MMHG

## 2018-01-24 VITALS
DIASTOLIC BLOOD PRESSURE: 72 MMHG | WEIGHT: 221 LBS | SYSTOLIC BLOOD PRESSURE: 130 MMHG | HEART RATE: 107 BPM | TEMPERATURE: 97.9 F | HEIGHT: 67 IN | BODY MASS INDEX: 34.69 KG/M2 | OXYGEN SATURATION: 98 %

## 2018-01-27 ENCOUNTER — OFFICE VISIT (OUTPATIENT)
Dept: URGENT CARE | Age: 48
End: 2018-01-27
Payer: COMMERCIAL

## 2018-01-27 VITALS
TEMPERATURE: 97.2 F | SYSTOLIC BLOOD PRESSURE: 134 MMHG | BODY MASS INDEX: 34.72 KG/M2 | DIASTOLIC BLOOD PRESSURE: 72 MMHG | HEIGHT: 67 IN | RESPIRATION RATE: 24 BRPM | HEART RATE: 80 BPM | OXYGEN SATURATION: 99 % | WEIGHT: 221.2 LBS

## 2018-01-27 DIAGNOSIS — J01.90 ACUTE SINUSITIS, RECURRENCE NOT SPECIFIED, UNSPECIFIED LOCATION: Primary | ICD-10-CM

## 2018-01-27 PROCEDURE — G0382 LEV 3 HOSP TYPE B ED VISIT: HCPCS | Performed by: FAMILY MEDICINE

## 2018-01-27 RX ORDER — AMOXICILLIN AND CLAVULANATE POTASSIUM 875; 125 MG/1; MG/1
1 TABLET, FILM COATED ORAL EVERY 12 HOURS SCHEDULED
Qty: 20 TABLET | Refills: 0 | Status: SHIPPED | OUTPATIENT
Start: 2018-01-27 | End: 2018-02-06

## 2018-01-27 NOTE — PROGRESS NOTES
Assessment/Plan     Diagnoses and all orders for this visit:    Acute sinusitis, recurrence not specified, unspecified location  -     amoxicillin-clavulanate (AUGMENTIN) 875-125 mg per tablet; Take 1 tablet by mouth every 12 (twelve) hours for 10 days  Continue Flonase, push fluids and rest   Continue inhalers as directed  Stop smoking  If no improvement in 2-3 days, begin Augmentin  Probiotics if taking  Finish all of taking  Subjective:     Patient ID: Elba Caban is a 52 y o  female  Chief Complaint:    A 49-year-old female complains of 3 days of sinus pressure and pain, thick nasal drainage, postnasal drainage and productive cough  She smokes 1 pack per day  She does have COPD  She has been using her daily inhaler as well as her rescue inhaler  She denies any improvement in symptoms  Denies fever  Past Medical History:   Diagnosis Date    Anxiety     Asthma     Depression     Disease of thyroid gland     GERD (gastroesophageal reflux disease)     Hypertension     Migraine     Neuropathy     Seasonal allergies        Past Surgical History:   Procedure Laterality Date    CARPAL TUNNEL RELEASE      CHOLECYSTECTOMY      EYE SURGERY      FOOT SURGERY      AR ESOPHAGOGASTRODUODENOSCOPY TRANSORAL DIAGNOSTIC N/A 2/13/2017    Procedure: ESOPHAGOGASTRODUODENOSCOPY (EGD); Surgeon: Vasyl Milner MD;  Location: [facility]; Service: Gastroenterology       No family history on file  Review of Systems   Constitutional: Positive for fatigue  Negative for activity change, chills and fever  HENT: Positive for congestion, rhinorrhea, sinus pain, sinus pressure and sneezing  Negative for ear discharge and ear pain  Respiratory: Positive for cough  Negative for shortness of breath  Cardiovascular: Negative for chest pain  Gastrointestinal: Negative for nausea  All other systems reviewed and are negative        Objective:    /72 (BP Location: Right arm, Patient Position: Sitting, Cuff Size: Standard)   Pulse 80   Temp (!) 97 2 °F (36 2 °C) (Temporal)   Resp (!) 24   Ht 5' 7" (1 702 m)   Wt 100 kg (221 lb 3 2 oz)   SpO2 99%   BMI 34 64 kg/m²     Physical Exam   Constitutional: She is oriented to person, place, and time  She appears well-developed and well-nourished  HENT:   Head: Normocephalic  Right Ear: External ear normal    Left Ear: External ear normal    Nose: Mucosal edema present  Mouth/Throat: Oropharynx is clear and moist    Neck: Neck supple  Cardiovascular: Normal rate, regular rhythm and normal heart sounds  Pulmonary/Chest: Effort normal and breath sounds normal    Lymphadenopathy:     She has no cervical adenopathy  Neurological: She is alert and oriented to person, place, and time  Psychiatric: She has a normal mood and affect  Her behavior is normal    Nursing note and vitals reviewed

## 2018-01-27 NOTE — PATIENT INSTRUCTIONS
Continue Flonase, push fluids and rest   Continue inhalers as directed  Stop smoking  If no improvement in 2-3 days, begin Augmentin  Probiotics if taking  Finish all of taking  Rhinosinusitis   AMBULATORY CARE:   Rhinosinusitis (RS)  is inflammation of your nose and sinuses  It commonly begins as a virus, often as a common cold  Viruses usually last 7 to 10 days and do not need treatment  When the virus does not get better on its own, you may have bacterial RS  This means that bacteria have begun to grow inside your sinuses  Acute RS lasts less than 4 weeks  Chronic RS lasts 12 weeks or more  Recurrent RS is when you have 4 or more episodes of RS in one year  Your signs and symptoms  may be worse when you lie on your back or try to sleep  You may have any of the following:  · Stuffy nose and reduced sense of smell     · Runny nose with thick yellow or green mucus     · Pressure or pain on your face or a headache     · Pain in your teeth or bad breath     · Ear pain or pressure     · Fever or cough     · Tiredness  Seek care immediately if:   · You have double vision or you cannot see  · You have a stiff neck, a fever, or a bad headache  · Your eyeball bulges out or you cannot move your eye  · Your eye and eyelid are red, swollen, and painful  · You cannot open your eye  · You are more sleepy than normal, or you notice changes in your ability to think, move, or talk  · You have swelling of your forehead or scalp  Contact your healthcare provider if:   · Your symptoms are worse or do not improve after 3 to 5 days of treatment  · You have questions or concerns about your condition or care  Treatment for rhinosinusitis  may include any of the following:  · Acetaminophen  decreases pain and fever  It is available without a doctor's order  Ask how much to take and how often to take it  Follow directions  Acetaminophen can cause liver damage if not taken correctly      · NSAIDs , such as ibuprofen, help decrease swelling, pain, and fever  This medicine is available with or without a doctor's order  NSAIDs can cause stomach bleeding or kidney problems in certain people  If you take blood thinner medicine, always ask your healthcare provider if NSAIDs are safe for you  Always read the medicine label and follow directions  · Nasal steroid sprays  decrease inflammation in your nose and sinuses  · Decongestants  reduce swelling and drain mucus in the nose and sinuses  They may help you breathe easier  · Antihistamines  dry mucus in the nose and relieve sneezing  · Antibiotics  treat a bacterial infection and may be needed if your symptoms do not improve or they get worse  · Take your medicine as directed  Contact your healthcare provider if you think your medicine is not helping or if you have side effects  Tell him or her if you are allergic to any medicine  Keep a list of the medicines, vitamins, and herbs you take  Include the amounts, and when and why you take them  Bring the list or the pill bottles to follow-up visits  Carry your medicine list with you in case of an emergency  Self-care:   · Rinse your sinuses  Use a sinus rinse device to rinse your nasal passages with a saline (salt water) solution  This will help thin the mucus in your nose and rinse away pollen and dirt  It will also help reduce swelling so you can breathe normally  Ask your healthcare provider how often to do this  · Breathe in steam   Heat a bowl of water until you see steam  Lean over the bowl and make a tent over your head with a large towel  Breathe deeply for about 20 minutes  Be careful not to get too close to the steam or burn yourself  Do this 3 times a day  You can also breathe deeply when you take a hot shower  · Sleep with your head elevated  Place an extra pillow under your head before you go to sleep to help your sinuses drain  · Drink liquids as directed    Ask your healthcare provider how much liquid to drink each day and which liquids are best for you  Liquids will thin the mucus in your nose and help it drain  Avoid drinks that contain alcohol or caffeine  · Do not smoke, and avoid secondhand smoke  Nicotine and other chemicals in cigarettes and cigars can make your symptoms worse  Ask your healthcare provider for information if you currently smoke and need help to quit  E-cigarettes or smokeless tobacco still contain nicotine  Talk to your healthcare provider before you use these products  Follow up with your healthcare provider as directed: Follow up if your symptoms are worse or not better after 3 to 5 days of treatment  Write down your questions so you remember to ask them during your visits  © 2017 [facility] [facility] Information is for End User's use only and may not be sold, redistributed or otherwise used for commercial purposes  All illustrations and images included in CareNotes® are the copyrighted property of [employer] , [employer]  or Lars Angela  The above information is an  only  It is not intended as medical advice for individual conditions or treatments  Talk to your doctor, nurse or pharmacist before following any medical regimen to see if it is safe and effective for you

## 2018-02-02 ENCOUNTER — CLINICAL SUPPORT (OUTPATIENT)
Dept: INTERNAL MEDICINE CLINIC | Facility: CLINIC | Age: 48
End: 2018-02-02
Payer: COMMERCIAL

## 2018-02-02 DIAGNOSIS — E78.2 MIXED HYPERLIPIDEMIA: ICD-10-CM

## 2018-02-02 DIAGNOSIS — E66.9 OBESITY, UNSPECIFIED CLASSIFICATION, UNSPECIFIED OBESITY TYPE, UNSPECIFIED WHETHER SERIOUS COMORBIDITY PRESENT: Primary | ICD-10-CM

## 2018-02-02 DIAGNOSIS — R53.83 OTHER FATIGUE: ICD-10-CM

## 2018-02-02 DIAGNOSIS — E55.9 VITAMIN D INSUFFICIENCY: ICD-10-CM

## 2018-02-02 DIAGNOSIS — R73.02 IMPAIRED GLUCOSE TOLERANCE: ICD-10-CM

## 2018-02-02 LAB
25(OH)D3 SERPL-MCNC: 56.4 NG/ML (ref 30–100)
ALBUMIN SERPL BCP-MCNC: 3.4 G/DL (ref 3.5–5)
ALP SERPL-CCNC: 94 U/L (ref 46–116)
ALT SERPL W P-5'-P-CCNC: 20 U/L (ref 12–78)
ANION GAP SERPL CALCULATED.3IONS-SCNC: 5 MMOL/L (ref 4–13)
AST SERPL W P-5'-P-CCNC: 12 U/L (ref 5–45)
BILIRUB SERPL-MCNC: 0.41 MG/DL (ref 0.2–1)
BUN SERPL-MCNC: 22 MG/DL (ref 5–25)
CALCIUM SERPL-MCNC: 9 MG/DL (ref 8.3–10.1)
CHLORIDE SERPL-SCNC: 107 MMOL/L (ref 100–108)
CHOLEST SERPL-MCNC: 121 MG/DL (ref 50–200)
CO2 SERPL-SCNC: 28 MMOL/L (ref 21–32)
CREAT SERPL-MCNC: 0.84 MG/DL (ref 0.6–1.3)
CRP SERPL QL: 37.1 MG/L
ERYTHROCYTE [DISTWIDTH] IN BLOOD BY AUTOMATED COUNT: 16.6 % (ref 11.6–15.1)
EST. AVERAGE GLUCOSE BLD GHB EST-MCNC: 103 MG/DL
GFR SERPL CREATININE-BSD FRML MDRD: 83 ML/MIN/1.73SQ M
GLUCOSE P FAST SERPL-MCNC: 77 MG/DL (ref 65–99)
HBA1C MFR BLD: 5.2 % (ref 4.2–6.3)
HCT VFR BLD AUTO: 45.6 % (ref 34.8–46.1)
HDLC SERPL-MCNC: 51 MG/DL (ref 40–60)
HGB BLD-MCNC: 14.5 G/DL (ref 11.5–15.4)
LDLC SERPL CALC-MCNC: 56 MG/DL (ref 0–100)
MCH RBC QN AUTO: 29.3 PG (ref 26.8–34.3)
MCHC RBC AUTO-ENTMCNC: 31.8 G/DL (ref 31.4–37.4)
MCV RBC AUTO: 92 FL (ref 82–98)
PLATELET # BLD AUTO: 285 THOUSANDS/UL (ref 149–390)
PMV BLD AUTO: 10.1 FL (ref 8.9–12.7)
POTASSIUM SERPL-SCNC: 4.5 MMOL/L (ref 3.5–5.3)
PROT SERPL-MCNC: 6.6 G/DL (ref 6.4–8.2)
RBC # BLD AUTO: 4.95 MILLION/UL (ref 3.81–5.12)
SODIUM SERPL-SCNC: 140 MMOL/L (ref 136–145)
TRIGL SERPL-MCNC: 71 MG/DL
TSH SERPL DL<=0.05 MIU/L-ACNC: 2.74 UIU/ML (ref 0.36–3.74)
WBC # BLD AUTO: 10.66 THOUSAND/UL (ref 4.31–10.16)

## 2018-02-02 PROCEDURE — 85027 COMPLETE CBC AUTOMATED: CPT | Performed by: OBSTETRICS & GYNECOLOGY

## 2018-02-02 PROCEDURE — 86140 C-REACTIVE PROTEIN: CPT | Performed by: OBSTETRICS & GYNECOLOGY

## 2018-02-02 PROCEDURE — 83036 HEMOGLOBIN GLYCOSYLATED A1C: CPT | Performed by: OBSTETRICS & GYNECOLOGY

## 2018-02-02 PROCEDURE — 80053 COMPREHEN METABOLIC PANEL: CPT | Performed by: OBSTETRICS & GYNECOLOGY

## 2018-02-02 PROCEDURE — 84443 ASSAY THYROID STIM HORMONE: CPT | Performed by: OBSTETRICS & GYNECOLOGY

## 2018-02-02 PROCEDURE — 36415 COLL VENOUS BLD VENIPUNCTURE: CPT

## 2018-02-02 PROCEDURE — 80061 LIPID PANEL: CPT | Performed by: OBSTETRICS & GYNECOLOGY

## 2018-02-02 PROCEDURE — 99999 PR OFFICE/OUTPT VISIT,PROCEDURE ONLY: CPT

## 2018-02-02 PROCEDURE — 82306 VITAMIN D 25 HYDROXY: CPT | Performed by: OBSTETRICS & GYNECOLOGY

## 2018-02-10 ENCOUNTER — HOSPITAL ENCOUNTER (EMERGENCY)
Facility: HOSPITAL | Age: 48
Discharge: HOME | End: 2018-02-10
Admitting: EMERGENCY MEDICINE
Payer: COMMERCIAL

## 2018-02-10 VITALS
OXYGEN SATURATION: 99 % | BODY MASS INDEX: 34.3 KG/M2 | RESPIRATION RATE: 18 BRPM | DIASTOLIC BLOOD PRESSURE: 56 MMHG | TEMPERATURE: 97.5 F | WEIGHT: 219 LBS | SYSTOLIC BLOOD PRESSURE: 121 MMHG | HEART RATE: 92 BPM

## 2018-02-10 DIAGNOSIS — J01.90 ACUTE SINUSITIS: Primary | ICD-10-CM

## 2018-02-10 PROCEDURE — 99283 EMERGENCY DEPT VISIT LOW MDM: CPT

## 2018-02-10 RX ORDER — GUAIFENESIN 600 MG
600 TABLET, EXTENDED RELEASE 12 HR ORAL EVERY 12 HOURS SCHEDULED
Qty: 28 TABLET | Refills: 0 | Status: SHIPPED | OUTPATIENT
Start: 2018-02-10 | End: 2018-02-24

## 2018-02-10 NOTE — ED PROVIDER NOTES
History  Chief Complaint   Patient presents with    Sinus Problem     Patient states that she has had a cough and congestion for the past 3 days  Started today with sinus pressure, has taken flonase,valum, percocet, and advil with no relief  Currently on augmentin  49-year-old female with a past history of hypothyroidism, back pain, anxiety, hyperlipidemia, presents for evaluation of nasal congestion for the past day  Patient reports that she has been having pain over her maxillary and frontal sinuses and pain that radiates towards her gums  Patient states that she has been trying to take Valium as well as ibuprofen and her pain medication without relief of her symptoms  Patient states that she also has ear pain as well  States that she has been taking her Flonase with minimal relief  Patient denies any fever, chills, nausea, vomiting, difficulty breathing, shortness of breath, chest pain  Prior to Admission Medications   Prescriptions Last Dose Informant Patient Reported? Taking?    DULoxetine (CYMBALTA) 30 mg delayed release capsule 2/10/2018 at Unknown time  Yes Yes   Sig: Take 30 mg by mouth 2 (two) times a day     Ergocalciferol (VITAMIN D2 PO) 2/10/2018 at Unknown time  Yes Yes   Sig: Take 50,000 Units by mouth once a week     Levothyroxine Sodium 88 MCG CAPS 2/10/2018 at Unknown time  Yes Yes   Sig: Take 88 mcg by mouth daily     albuterol (PROVENTIL HFA,VENTOLIN HFA) 90 mcg/act inhaler 2/10/2018 at Unknown time  Yes Yes   Sig: Inhale 2 puffs every 6 (six) hours as needed for wheezing   amLODIPine (NORVASC) 5 mg tablet 2/10/2018 at Unknown time  Yes Yes   Sig: Take 5 mg by mouth daily   butalbital-aspirin-caffeine (FIORINAL) -40 mg per capsule 2/10/2018 at Unknown time  Yes Yes   Sig: Take 1 capsule by mouth every 4 (four) hours as needed for headaches   diazepam (VALIUM) 10 mg tablet 2/10/2018 at Unknown time  Yes Yes   Sig: Take 10 mg by mouth every 6 (six) hours as needed for anxiety   fluticasone (FLONASE) 50 mcg/act nasal spray 2/10/2018 at Unknown time  Yes Yes   Si spray into each nostril 2 (two) times a day   hydrochlorothiazide (HYDRODIURIL) 25 mg tablet 2/10/2018 at Unknown time  Yes Yes   Sig: Take 25 mg by mouth daily   oxyCODONE-acetaminophen (PERCOCET)  mg per tablet 2/10/2018 at Unknown time  Yes Yes   Sig: Take 1 tablet by mouth every 4 (four) hours as needed for moderate pain   pantoprazole (PROTONIX) 40 mg tablet 2/10/2018 at Unknown time  Yes Yes   Sig: Take 40 mg by mouth daily   promethazine-codeine (PHENERGAN WITH CODEINE) 6 25-10 mg/5 mL syrup 2/10/2018 at Unknown time  Yes Yes   Sig: Take 5 mL by mouth daily at bedtime as needed for cough   sertraline (ZOLOFT) 100 mg tablet 2/10/2018 at Unknown time  Yes Yes   Sig: Take 100 mg by mouth 2 (two) times a day        Facility-Administered Medications: None       Past Medical History:   Diagnosis Date    Anxiety     Asthma     Depression     Disease of thyroid gland     GERD (gastroesophageal reflux disease)     Hypertension     Migraine     Neuropathy     Seasonal allergies        Past Surgical History:   Procedure Laterality Date    CARPAL TUNNEL RELEASE      CHOLECYSTECTOMY      EYE SURGERY      FOOT SURGERY      MT ESOPHAGOGASTRODUODENOSCOPY TRANSORAL DIAGNOSTIC N/A 2017    Procedure: ESOPHAGOGASTRODUODENOSCOPY (EGD); Surgeon: Dipti Gibbs MD;  Location: [facility]; Service: Gastroenterology       History reviewed  No pertinent family history  I have reviewed and agree with the history as documented  Social History   Substance Use Topics    Smoking status: Current Every Day Smoker     Packs/day: 1 00     Types: Cigarettes    Smokeless tobacco: Never Used    Alcohol use 1 8 oz/week     3 Glasses of wine per week      Comment: ocassionally        Review of Systems   Constitutional: Negative for chills and fever     HENT: Positive for congestion, postnasal drip, sinus pain and sinus pressure  Negative for sore throat  Respiratory: Negative for cough and wheezing  Cardiovascular: Negative for chest pain  Gastrointestinal: Negative for abdominal pain, nausea and vomiting  Skin: Negative  Physical Exam  ED Triage Vitals [02/10/18 1221]   Temperature Pulse Respirations Blood Pressure SpO2   97 5 °F (36 4 °C) 92 18 121/56 99 %      Temp Source Heart Rate Source Patient Position - Orthostatic VS BP Location FiO2 (%)   Oral Monitor Sitting Right arm --      Pain Score       9           Orthostatic Vital Signs  Vitals:    02/10/18 1221   BP: 121/56   Pulse: 92   Patient Position - Orthostatic VS: Sitting       Physical Exam   Constitutional: She is oriented to person, place, and time  She appears well-developed and well-nourished  She is cooperative  No distress  HENT:   Head: Normocephalic and atraumatic  Right Ear: Hearing, tympanic membrane, external ear and ear canal normal    Left Ear: Hearing, tympanic membrane, external ear and ear canal normal    Nose: Right sinus exhibits maxillary sinus tenderness and frontal sinus tenderness  Left sinus exhibits maxillary sinus tenderness and frontal sinus tenderness  Mouth/Throat: Uvula is midline and oropharynx is clear and moist  No oropharyngeal exudate  Neck: Normal range of motion  Neck supple  Cardiovascular: Normal rate and normal heart sounds  No murmur heard  Pulmonary/Chest: Effort normal and breath sounds normal    Musculoskeletal: Normal range of motion  Neurological: She is alert and oriented to person, place, and time  Skin: Skin is warm  No rash noted  She is not diaphoretic  No erythema  Psychiatric: She has a normal mood and affect  Vitals reviewed        ED Medications  Medications - No data to display    Diagnostic Studies  Results Reviewed     None                 No orders to display              Procedures  Procedures       Phone Contacts  ED Phone Contact    ED Course  ED Course [facility]  CritCare Time    Disposition  Final diagnoses:   Acute sinusitis     Time reflects when diagnosis was documented in both MDM as applicable and the Disposition within this note     Time User Action Codes Description Comment    2/10/2018  1:35 PM Lisa Lundberg Add [J01 90] Acute sinusitis       ED Disposition     ED Disposition Condition Comment    Discharge  [address] discharge to home/self care  Condition at discharge: Good        Follow-up Information     Follow up With Specialties Details Why Contact Lang Frye DO Family Medicine, Internal Medicine Schedule an appointment as soon as possible for a visit in 1 week Follow up for recheck of symptoms in 1 week    [address]           Discharge Medication List as of 2/10/2018  1:38 PM      START taking these medications    Details   guaiFENesin (MUCINEX) 600 mg 12 hr tablet Take 1 tablet (600 mg total) by mouth every 12 (twelve) hours for 14 days, Starting Sat 2/10/2018, Until Sat 2/24/2018, Print         CONTINUE these medications which have NOT CHANGED    Details   albuterol (PROVENTIL HFA,VENTOLIN HFA) 90 mcg/act inhaler Inhale 2 puffs every 6 (six) hours as needed for wheezing, Until Discontinued, Historical Med      amLODIPine (NORVASC) 5 mg tablet Take 5 mg by mouth daily, Until Discontinued, Historical Med      butalbital-aspirin-caffeine (FIORINAL) -40 mg per capsule Take 1 capsule by mouth every 4 (four) hours as needed for headaches, Historical Med      diazepam (VALIUM) 10 mg tablet Take 10 mg by mouth every 6 (six) hours as needed for anxiety, Until Discontinued, Historical Med      DULoxetine (CYMBALTA) 30 mg delayed release capsule Take 30 mg by mouth 2 (two) times a day  , Historical Med      Ergocalciferol (VITAMIN D2 PO) Take 50,000 Units by mouth once a week  , Historical Med      fluticasone (FLONASE) 50 mcg/act nasal spray 1 spray into each nostril 2 (two) times a day, Historical Med      hydrochlorothiazide (HYDRODIURIL) 25 mg tablet Take 25 mg by mouth daily, Historical Med      Levothyroxine Sodium 88 MCG CAPS Take 88 mcg by mouth daily  , Historical Med      oxyCODONE-acetaminophen (PERCOCET)  mg per tablet Take 1 tablet by mouth every 4 (four) hours as needed for moderate pain, Until Discontinued, Historical Med      pantoprazole (PROTONIX) 40 mg tablet Take 40 mg by mouth daily, Until Discontinued, Historical Med      promethazine-codeine (PHENERGAN WITH CODEINE) 6 25-10 mg/5 mL syrup Take 5 mL by mouth daily at bedtime as needed for cough, Historical Med      sertraline (ZOLOFT) 100 mg tablet Take 100 mg by mouth 2 (two) times a day  , Historical Med           No discharge procedures on file      ED Provider  Electronically Signed by           Jones Cameron PA-C  02/10/18 1800

## 2018-02-10 NOTE — DISCHARGE INSTRUCTIONS
- Use a Neti Pot to help with symptoms  Use a steamer to aid with symptoms  Sinusitis, Ambulatory Care   GENERAL INFORMATION:   Sinusitis  is inflammation or infection of your sinuses  It is most often caused by a virus  Acute sinusitis may last up to 12 weeks  Chronic sinusitis lasts longer than 12 weeks  Recurrent sinusitis is when you have 3 or more episodes of sinusitis in 1 year  Common symptoms include the following:   · Fever    · Pain, pressure, redness, or swelling around the forehead, cheeks, or eyes    · Thick yellow or green discharge from your nose    · Tenderness when you touch your face over your sinuses    · Dry cough that happens mostly at night or when you lie down    · Headache and face pain that is worse when you lean forward    · Teeth pain or pain when you chew  Seek immediate care for the following symptoms:   · Vision changes such as double vision    · Confusion or trouble thinking clearly    · Headache and stiff neck    · Trouble breathing  Treatment for sinusitis  may include medicines to relieve nasal and sinus congestion or to decrease pain and fever  Ask your healthcare provider which medicines you should take and how much is safe  Manage sinusitis:   · Drink liquids as directed  Ask your healthcare provider how much liquid to drink each day and which liquids are best for you  Liquids will help loosen and drain the mucus in your sinuses  · Breathe in steam   Heat a bowl of water until you see steam  Lean over the bowl and make a tent over your head with a large towel  Breathe deeply for about 20 minutes  Be careful not to get too close to the steam or burn yourself  Do this 3 times a day  You can also breathe deeply when you take a hot shower  · Rinse your sinuses  Use a sinus rinse device to rinse your nasal passages with a saline (salt water) solution  This will help thin the mucus in your nose and rinse away pollen and dirt   It will also help reduce swelling so you can breathe normally  Ask how often to do this  · Use heat on your sinuses  to decrease pain  Apply heat for 15 to 20 minutes every hour for as many days as directed  · Sleep with your head elevated  Place an extra pillow under your head before you go to sleep to help your sinuses drain  · Do not smoke and avoid secondhand smoke  If you smoke, it is never too late to quit  Ask for information about how to stop smoking if you need help  Prevent the spread of germs that cause sinusitis:  Wash your hands often with soap and water  Wash your hands after you use the bathroom, change a child's diaper, or sneeze  Wash your hands before you prepare or eat food  Follow up with your healthcare provider as directed:  Write down your questions so you remember to ask them during your visits  CARE AGREEMENT:   You have the right to help plan your care  Learn about your health condition and how it may be treated  Discuss treatment options with your caregivers to decide what care you want to receive  You always have the right to refuse treatment  The above information is an  only  It is not intended as medical advice for individual conditions or treatments  Talk to your doctor, nurse or pharmacist before following any medical regimen to see if it is safe and effective for you  © 2014 [address] is for End User's use only and may not be sold, redistributed or otherwise used for commercial purposes  All illustrations and images included in CareNotes® are the copyrighted property of A D A M [employer]  or Lars Angela

## 2018-02-20 DIAGNOSIS — F41.8 DEPRESSION WITH ANXIETY: Primary | ICD-10-CM

## 2018-02-20 RX ORDER — DIAZEPAM 10 MG/1
10 TABLET ORAL EVERY 6 HOURS PRN
Qty: 30 TABLET | Refills: 0 | Status: SHIPPED | OUTPATIENT
Start: 2018-02-20 | End: 2018-04-30 | Stop reason: SDUPTHER

## 2018-04-30 DIAGNOSIS — F41.8 DEPRESSION WITH ANXIETY: ICD-10-CM

## 2018-04-30 RX ORDER — DIAZEPAM 10 MG/1
10 TABLET ORAL EVERY 6 HOURS PRN
Qty: 30 TABLET | Refills: 0 | Status: SHIPPED | OUTPATIENT
Start: 2018-04-30 | End: 2018-07-03 | Stop reason: SDUPTHER

## 2018-05-05 ENCOUNTER — HOSPITAL ENCOUNTER (EMERGENCY)
Facility: HOSPITAL | Age: 48
Discharge: HOME | End: 2018-05-05
Attending: EMERGENCY MEDICINE
Payer: COMMERCIAL

## 2018-05-05 ENCOUNTER — APPOINTMENT (EMERGENCY)
Dept: RADIOLOGY | Facility: HOSPITAL | Age: 48
End: 2018-05-05
Payer: COMMERCIAL

## 2018-05-05 VITALS
RESPIRATION RATE: 16 BRPM | HEIGHT: 67 IN | SYSTOLIC BLOOD PRESSURE: 155 MMHG | BODY MASS INDEX: 34.36 KG/M2 | DIASTOLIC BLOOD PRESSURE: 71 MMHG | OXYGEN SATURATION: 99 % | WEIGHT: 218.92 LBS | HEART RATE: 73 BPM | TEMPERATURE: 98.4 F

## 2018-05-05 DIAGNOSIS — N30.90 CYSTITIS: Primary | ICD-10-CM

## 2018-05-05 LAB
ALBUMIN SERPL BCP-MCNC: 3.5 G/DL (ref 3.5–5)
ALP SERPL-CCNC: 86 U/L (ref 46–116)
ALT SERPL W P-5'-P-CCNC: 19 U/L (ref 12–78)
ANION GAP SERPL CALCULATED.3IONS-SCNC: 5 MMOL/L (ref 4–13)
AST SERPL W P-5'-P-CCNC: 11 U/L (ref 5–45)
BACTERIA UR QL AUTO: ABNORMAL /HPF
BASOPHILS # BLD AUTO: 0.02 THOUSANDS/ΜL (ref 0–0.1)
BASOPHILS NFR BLD AUTO: 0 % (ref 0–1)
BILIRUB SERPL-MCNC: 0.3 MG/DL (ref 0.2–1)
BILIRUB UR QL STRIP: NEGATIVE
BUN SERPL-MCNC: 14 MG/DL (ref 5–25)
CALCIUM SERPL-MCNC: 8.9 MG/DL (ref 8.3–10.1)
CHLORIDE SERPL-SCNC: 109 MMOL/L (ref 100–108)
CLARITY UR: CLEAR
CLARITY, POC: CLEAR
CO2 SERPL-SCNC: 27 MMOL/L (ref 21–32)
COLOR UR: YELLOW
COLOR, POC: YELLOW
CREAT SERPL-MCNC: 0.97 MG/DL (ref 0.6–1.3)
EOSINOPHIL # BLD AUTO: 0.15 THOUSAND/ΜL (ref 0–0.61)
EOSINOPHIL NFR BLD AUTO: 2 % (ref 0–6)
ERYTHROCYTE [DISTWIDTH] IN BLOOD BY AUTOMATED COUNT: 15.8 % (ref 11.6–15.1)
EXT PREG TEST URINE: NEGATIVE
GFR SERPL CREATININE-BSD FRML MDRD: 70 ML/MIN/1.73SQ M
GLUCOSE SERPL-MCNC: 102 MG/DL (ref 65–140)
GLUCOSE UR STRIP-MCNC: NEGATIVE MG/DL
HCT VFR BLD AUTO: 45.7 % (ref 34.8–46.1)
HGB BLD-MCNC: 14.2 G/DL (ref 11.5–15.4)
HGB UR QL STRIP.AUTO: NEGATIVE
HYALINE CASTS #/AREA URNS LPF: ABNORMAL /LPF
KETONES UR STRIP-MCNC: NEGATIVE MG/DL
LEUKOCYTE ESTERASE UR QL STRIP: ABNORMAL
LYMPHOCYTES # BLD AUTO: 2.85 THOUSANDS/ΜL (ref 0.6–4.47)
LYMPHOCYTES NFR BLD AUTO: 31 % (ref 14–44)
MCH RBC QN AUTO: 29.4 PG (ref 26.8–34.3)
MCHC RBC AUTO-ENTMCNC: 31.1 G/DL (ref 31.4–37.4)
MCV RBC AUTO: 95 FL (ref 82–98)
MONOCYTES # BLD AUTO: 0.68 THOUSAND/ΜL (ref 0.17–1.22)
MONOCYTES NFR BLD AUTO: 7 % (ref 4–12)
NEUTROPHILS # BLD AUTO: 5.61 THOUSANDS/ΜL (ref 1.85–7.62)
NEUTS SEG NFR BLD AUTO: 60 % (ref 43–75)
NITRITE UR QL STRIP: NEGATIVE
NON-SQ EPI CELLS URNS QL MICRO: ABNORMAL /HPF
NRBC BLD AUTO-RTO: 0 /100 WBCS
PH UR STRIP.AUTO: 5.5 [PH] (ref 4.5–8)
PLATELET # BLD AUTO: 257 THOUSANDS/UL (ref 149–390)
PMV BLD AUTO: 9.3 FL (ref 8.9–12.7)
POTASSIUM SERPL-SCNC: 3.9 MMOL/L (ref 3.5–5.3)
PROT SERPL-MCNC: 7.3 G/DL (ref 6.4–8.2)
PROT UR STRIP-MCNC: NEGATIVE MG/DL
RBC # BLD AUTO: 4.83 MILLION/UL (ref 3.81–5.12)
RBC #/AREA URNS AUTO: ABNORMAL /HPF
SODIUM SERPL-SCNC: 141 MMOL/L (ref 136–145)
SP GR UR STRIP.AUTO: >=1.03 (ref 1–1.03)
TROPONIN I SERPL-MCNC: <0.02 NG/ML
UROBILINOGEN UR QL STRIP.AUTO: 0.2 E.U./DL
WBC # BLD AUTO: 9.35 THOUSAND/UL (ref 4.31–10.16)
WBC #/AREA URNS AUTO: ABNORMAL /HPF

## 2018-05-05 PROCEDURE — 96361 HYDRATE IV INFUSION ADD-ON: CPT

## 2018-05-05 PROCEDURE — 81025 URINE PREGNANCY TEST: CPT | Performed by: EMERGENCY MEDICINE

## 2018-05-05 PROCEDURE — 36415 COLL VENOUS BLD VENIPUNCTURE: CPT | Performed by: EMERGENCY MEDICINE

## 2018-05-05 PROCEDURE — 96375 TX/PRO/DX INJ NEW DRUG ADDON: CPT

## 2018-05-05 PROCEDURE — 84484 ASSAY OF TROPONIN QUANT: CPT | Performed by: EMERGENCY MEDICINE

## 2018-05-05 PROCEDURE — 74177 CT ABD & PELVIS W/CONTRAST: CPT

## 2018-05-05 PROCEDURE — 85025 COMPLETE CBC W/AUTO DIFF WBC: CPT | Performed by: EMERGENCY MEDICINE

## 2018-05-05 PROCEDURE — 80053 COMPREHEN METABOLIC PANEL: CPT | Performed by: EMERGENCY MEDICINE

## 2018-05-05 PROCEDURE — 93005 ELECTROCARDIOGRAM TRACING: CPT

## 2018-05-05 PROCEDURE — 81001 URINALYSIS AUTO W/SCOPE: CPT

## 2018-05-05 PROCEDURE — 99284 EMERGENCY DEPT VISIT MOD MDM: CPT

## 2018-05-05 PROCEDURE — 81002 URINALYSIS NONAUTO W/O SCOPE: CPT | Performed by: EMERGENCY MEDICINE

## 2018-05-05 PROCEDURE — 93010 ELECTROCARDIOGRAM REPORT: CPT | Performed by: INTERNAL MEDICINE

## 2018-05-05 PROCEDURE — 96365 THER/PROPH/DIAG IV INF INIT: CPT

## 2018-05-05 RX ORDER — ONDANSETRON 2 MG/ML
4 INJECTION INTRAMUSCULAR; INTRAVENOUS ONCE
Status: COMPLETED | OUTPATIENT
Start: 2018-05-05 | End: 2018-05-05

## 2018-05-05 RX ORDER — ONDANSETRON 4 MG/1
4 TABLET, ORALLY DISINTEGRATING ORAL EVERY 8 HOURS PRN
Qty: 20 TABLET | Refills: 0 | Status: SHIPPED | OUTPATIENT
Start: 2018-05-05 | End: 2018-11-08

## 2018-05-05 RX ORDER — CEPHALEXIN 500 MG/1
500 CAPSULE ORAL EVERY 8 HOURS SCHEDULED
Qty: 21 CAPSULE | Refills: 0 | Status: SHIPPED | OUTPATIENT
Start: 2018-05-05 | End: 2018-05-07

## 2018-05-05 RX ADMIN — HYDROMORPHONE HYDROCHLORIDE 0.5 MG: 1 INJECTION, SOLUTION INTRAMUSCULAR; INTRAVENOUS; SUBCUTANEOUS at 19:18

## 2018-05-05 RX ADMIN — ONDANSETRON 4 MG: 2 INJECTION INTRAMUSCULAR; INTRAVENOUS at 18:44

## 2018-05-05 RX ADMIN — IOHEXOL 100 ML: 350 INJECTION, SOLUTION INTRAVENOUS at 20:03

## 2018-05-05 RX ADMIN — SODIUM CHLORIDE 1000 ML: 0.9 INJECTION, SOLUTION INTRAVENOUS at 18:44

## 2018-05-05 RX ADMIN — CEFTRIAXONE 1000 MG: 1 INJECTION, SOLUTION INTRAVENOUS at 21:11

## 2018-05-05 NOTE — ED ATTENDING ATTESTATION
Kai Ling DO, saw and evaluated the patient  I have discussed the patient with the resident/non-physician practitioner and agree with the resident's/non-physician practitioner's findings, Plan of Care, and MDM as documented in the resident's/non-physician practitioner's note, except where noted  All available labs and Radiology studies were reviewed  At this point I agree with the current assessment done in the Emergency Department  I have conducted an independent evaluation of this patient a history and physical is as follows:    40-year-old female presents emergency department with lower abdominal pain worsening over the course of approximately a week  Past Medical History:   Diagnosis Date    Anxiety     Asthma     Depression     Disease of thyroid gland     GERD (gastroesophageal reflux disease)     Hypertension     Migraine     Neuropathy     Seasonal allergies      Surgical history includes cholecystectomy    /89 (BP Location: Right arm)   Pulse 92   Temp 98 4 °F (36 9 °C) (Tympanic)   Resp 18   Ht 5' 7" (1 702 m)   Wt 99 3 kg (218 lb 14 7 oz)   LMP 04/05/2018 (Approximate)   SpO2 100%   BMI 34 29 kg/m²   Patient is in no acute distress, drinking soda, lungs clear, heart regular, abdomen tender in lower right and left quadrants    She states she has a history of diverticulosis    Will CT scan abdomen pelvis, check EKG and belly labs, dispose accordingly    Diagnosis  Lower abdominal pain        Critical Care Time  CritCare Time    Procedures

## 2018-05-06 LAB
ATRIAL RATE: 82 BPM
P AXIS: 57 DEGREES
PR INTERVAL: 154 MS
QRS AXIS: 81 DEGREES
QRSD INTERVAL: 82 MS
QT INTERVAL: 392 MS
QTC INTERVAL: 457 MS
T WAVE AXIS: 39 DEGREES
VENTRICULAR RATE: 82 BPM

## 2018-05-06 NOTE — ED PROVIDER NOTES
History  Chief Complaint   Patient presents with    Abdominal Pain     pt c/o lower abd/pelvis pain with nausea  States pain does radiate down left arm as well  HPI    29-year-old female presents for lower abdominal pain  Onset was four days ago  Aching  Sharp radiates to both flanks and bilateral groin  No hematuria dysuria, vomiting  Patient is constipated without diarrhea  Denies fevers or chills  No other modifying factors  Associated epigastric pressure  Numbness and tingling her left arm  No exertional chest pain or shortness of breath  Feels like her reflux  Exam is significant for bilateral lower quadrant tenderness  Assessment plan:  Abdominal pain no rebound  CT abdomen pelvis for diverticulitis or stone or appendicitis  very possible related to gastritis Pain control  EKG troponin  UA  Prior to Admission Medications   Prescriptions Last Dose Informant Patient Reported? Taking?    Ergocalciferol (VITAMIN D2 PO) Past Week at Unknown time  Yes Yes   Sig: Take 50,000 Units by mouth once a week     Levothyroxine Sodium 88 MCG CAPS Past Week at Unknown time  Yes Yes   Sig: Take 88 mcg by mouth daily     albuterol (PROVENTIL HFA,VENTOLIN HFA) 90 mcg/act inhaler Past Week at Unknown time  Yes Yes   Sig: Inhale 2 puffs every 6 (six) hours as needed for wheezing   amLODIPine (NORVASC) 5 mg tablet Past Week at Unknown time  Yes Yes   Sig: Take 5 mg by mouth daily   butalbital-aspirin-caffeine (FIORINAL) -40 mg per capsule Past Month at Unknown time  Yes Yes   Sig: Take 1 capsule by mouth every 4 (four) hours as needed for headaches   diazepam (VALIUM) 10 mg tablet Past Week at Unknown time  No Yes   Sig: Take 1 tablet (10 mg total) by mouth every 6 (six) hours as needed for anxiety   fluticasone (FLONASE) 50 mcg/act nasal spray Past Month at Unknown time  Yes Yes   Si spray into each nostril 2 (two) times a day   oxyCODONE-acetaminophen (PERCOCET)  mg per tablet 2018 at Unknown time  Yes Yes   Sig: Take 1 tablet by mouth every 4 (four) hours as needed for moderate pain   pantoprazole (PROTONIX) 40 mg tablet 5/4/2018 at Unknown time  Yes Yes   Sig: Take 40 mg by mouth daily   sertraline (ZOLOFT) 100 mg tablet Past Week at Unknown time  Yes Yes   Sig: Take 100 mg by mouth 2 (two) times a day        Facility-Administered Medications: None       Past Medical History:   Diagnosis Date    Anxiety     Asthma     Depression     Disease of thyroid gland     GERD (gastroesophageal reflux disease)     Hypertension     Migraine     Neuropathy     Seasonal allergies        Past Surgical History:   Procedure Laterality Date    CARPAL TUNNEL RELEASE      CHOLECYSTECTOMY      EYE SURGERY      FOOT SURGERY      RI ESOPHAGOGASTRODUODENOSCOPY TRANSORAL DIAGNOSTIC N/A 2/13/2017    Procedure: ESOPHAGOGASTRODUODENOSCOPY (EGD); Surgeon: Jennifer Goyal MD;  Location: [facility]; Service: Gastroenterology       History reviewed  No pertinent family history  I have reviewed and agree with the history as documented  Social History   Substance Use Topics    Smoking status: Current Every Day Smoker     Packs/day: 1 00     Types: Cigarettes    Smokeless tobacco: Never Used    Alcohol use 1 8 oz/week     3 Glasses of wine per week      Comment: ocassionally        Review of Systems   Constitutional: Negative for chills, fatigue and fever  Eyes: Negative for photophobia and visual disturbance  Respiratory: Negative for cough and shortness of breath  Cardiovascular: Negative for chest pain, palpitations and leg swelling  Gastrointestinal: Positive for abdominal pain  Negative for diarrhea, nausea and vomiting  Endocrine: Negative for polydipsia and polyuria  Genitourinary: Negative for decreased urine volume, difficulty urinating, dysuria and frequency  Musculoskeletal: Negative for back pain, neck pain and neck stiffness  Skin: Negative for color change and rash  Allergic/Immunologic: Negative for environmental allergies and immunocompromised state  Neurological: Negative for dizziness and headaches  Hematological: Negative for adenopathy  Does not bruise/bleed easily  Psychiatric/Behavioral: Negative for dysphoric mood  The patient is not nervous/anxious  Physical Exam  ED Triage Vitals [05/05/18 1815]   Temperature Pulse Respirations Blood Pressure SpO2   98 4 °F (36 9 °C) 92 18 136/89 100 %      Temp Source Heart Rate Source Patient Position - Orthostatic VS BP Location FiO2 (%)   Tympanic Monitor Sitting Right arm --      Pain Score       Worst Possible Pain           Orthostatic Vital Signs  Vitals:    05/05/18 1815 05/05/18 1923 05/05/18 2041 05/05/18 2111   BP: 136/89 154/68 134/82 155/71   Pulse: 92 90 79 73   Patient Position - Orthostatic VS: Sitting Sitting Sitting Sitting       Physical Exam   Constitutional: She is oriented to person, place, and time  She appears well-developed and well-nourished  No distress  HENT:   Head: Normocephalic and atraumatic  Nose: Nose normal    Eyes: Conjunctivae and EOM are normal  Pupils are equal, round, and reactive to light  No scleral icterus  Neck: Normal range of motion  Neck supple  No JVD present  No tracheal deviation present  No thyromegaly present  Cardiovascular: Normal rate, regular rhythm, normal heart sounds and intact distal pulses  Exam reveals no gallop and no friction rub  Pulmonary/Chest: Effort normal and breath sounds normal  No respiratory distress  She has no wheezes  She has no rales  She exhibits no tenderness  Abdominal: Soft  Bowel sounds are normal  She exhibits no distension and no mass  There is tenderness  There is no rebound and no guarding  No hernia  Musculoskeletal: Normal range of motion  She exhibits no edema, tenderness or deformity  Neurological: She is alert and oriented to person, place, and time  She has normal reflexes  No cranial nerve deficit  Coordination normal    Skin: Skin is warm and dry  Rash: lower quadrants  She is not diaphoretic  No erythema  Psychiatric: She has a normal mood and affect  Her behavior is normal    Nursing note and vitals reviewed        ED Medications  Medications   sodium chloride 0 9 % bolus 1,000 mL (0 mL Intravenous Stopped 5/5/18 2050)   ondansetron (ZOFRAN) injection 4 mg (4 mg Intravenous Given 5/5/18 1844)   HYDROmorphone (DILAUDID) injection 0 5 mg (0 5 mg Intravenous Given 5/5/18 1918)   iohexol (OMNIPAQUE) 350 MG/ML injection (MULTI-DOSE) 100 mL (100 mL Intravenous Given 5/5/18 2003)   cefTRIAXone (ROCEPHIN) IVPB (premix) 1,000 mg (0 mg Intravenous Stopped 5/5/18 2130)       Diagnostic Studies  Results Reviewed     Procedure Component Value Units Date/Time    POCT pregnancy, urine [[id number]]  (Normal) Resulted:  05/05/18 2003    Lab Status:  Final result Updated:  05/05/18 2003     EXT PREG TEST UR (Ref: Negative) negative    Urine Microscopic [[id number]]  (Abnormal) Collected:  05/05/18 1948    Lab Status:  Final result Specimen:  Urine from Urine, Clean Catch Updated:  05/05/18 1959     RBC, UA 2-4 (A) /hpf      WBC, UA 4-10 (A) /hpf      Epithelial Cells None Seen /hpf      Bacteria, UA Occasional /hpf      Hyaline Casts, UA None Seen /lpf     ED Urine Macroscopic [[id number]]  (Abnormal) Collected:  05/05/18 1948    Lab Status:  Final result Specimen:  Urine Updated:  05/05/18 1944     Color, UA Yellow     Clarity, UA Clear     pH, UA 5 5     Leukocytes, UA Trace (A)     Nitrite, UA Negative     Protein, UA Negative mg/dl      Glucose, UA Negative mg/dl      Ketones, UA Negative mg/dl      Urobilinogen, UA 0 2 E U /dl      Bilirubin, UA Negative     Blood, UA Negative     Specific Gravity, UA >=1 030    Narrative:       CLINITEK RESULT    POCT urinalysis dipstick [[id number]]  (Normal) Resulted:  05/05/18 1944    Lab Status:  Final result Specimen:  Urine Updated:  05/05/18 1944     Color, UA yellow     Clarity, UA clear    Comprehensive metabolic panel [[id number]]  (Abnormal) Collected:  05/05/18 1844    Lab Status:  Final result Specimen:  Blood from Arm, Right Updated:  05/05/18 1925     Sodium 141 mmol/L      Potassium 3 9 mmol/L      Chloride 109 (H) mmol/L      CO2 27 mmol/L      Anion Gap 5 mmol/L      BUN 14 mg/dL      Creatinine 0 97 mg/dL      Glucose 102 mg/dL      Calcium 8 9 mg/dL      AST 11 U/L      ALT 19 U/L      Alkaline Phosphatase 86 U/L      Total Protein 7 3 g/dL      Albumin 3 5 g/dL      Total Bilirubin 0 30 mg/dL      eGFR 70 ml/min/1 73sq m     Narrative:         National Kidney Disease Education Program recommendations are as follows:  GFR calculation is accurate only with a steady state creatinine  Chronic Kidney disease less than 60 ml/min/1 73 sq  meters  Kidney failure less than 15 ml/min/1 73 sq  meters  Troponin I [[id number]]  (Normal) Collected:  05/05/18 1844    Lab Status:  Final result Specimen:  Blood from Arm, Right Updated:  05/05/18 1918     Troponin I <0 02 ng/mL     Narrative:         [employer] Chemistry analyzer 99% cutoff is > 0 04 ng/mL in network labs    o cTnI 99% cutoff is useful only when applied to patients in the clinical setting of myocardial ischemia  o cTnI 99% cutoff should be interpreted in the context of clinical history, ECG findings and possibly cardiac imaging to establish correct diagnosis  o cTnI 99% cutoff may be suggestive but clearly not indicative of a coronary event without the clinical setting of myocardial ischemia      CBC and differential [[id number]]  (Abnormal) Collected:  05/05/18 1844    Lab Status:  Final result Specimen:  Blood from Arm, Right Updated:  05/05/18 1852     WBC 9 35 Thousand/uL      RBC 4 83 Million/uL      Hemoglobin 14 2 g/dL      Hematocrit 45 7 %      MCV 95 fL      MCH 29 4 pg      MCHC 31 1 (L) g/dL      RDW 15 8 (H) %      MPV 9 3 fL      Platelets 805 Thousands/uL      nRBC 0 /100 WBCs      Neutrophils Relative 60 % Lymphocytes Relative 31 %      Monocytes Relative 7 %      Eosinophils Relative 2 %      Basophils Relative 0 %      Neutrophils Absolute 5 61 Thousands/µL      Lymphocytes Absolute 2 85 Thousands/µL      Monocytes Absolute 0 68 Thousand/µL      Eosinophils Absolute 0 15 Thousand/µL      Basophils Absolute 0 02 Thousands/µL                  CT abdomen pelvis with contrast   Final Result by Shannan Rausch MD (05/05 2015)      No acute inflammatory changes within the abdomen or pelvis  Distal colonic diverticulosis without acute diverticulitis  Fatty infiltration liver unchanged from the prior study                 Workstation performed: [id number]               Procedures  Procedures      Phone Consults  ED Phone Contact    ED Course                               MDM  Number of Diagnoses or Management Options  Cystitis: new and requires workup  Diagnosis management comments: Patient feels better will discharge, treat empirically for urinary tract infection/pyelonephritis       Amount and/or Complexity of Data Reviewed  Clinical lab tests: reviewed and ordered  Tests in the radiology section of CPT®: reviewed and ordered  Tests in the medicine section of CPT®: reviewed and ordered  Discuss the patient with other providers: yes      CritCare Time    Disposition  Final diagnoses:   Cystitis     Time reflects when diagnosis was documented in both MDM as applicable and the Disposition within this note     Time User Action Codes Description Comment    5/5/2018  9:24 PM Onelia Yanes Add [N30 90] Cystitis       ED Disposition     ED Disposition Condition Comment    Discharge  Rene RENTERIA [place] discharge to home/self care  Condition at discharge: Good        Follow-up Information    None       Patient's Medications   Discharge Prescriptions    No medications on file     No discharge procedures on file  ED Provider  Attending physically available and evaluated Rene RENTERIA [place]   I managed the patient along with the ED Attending      Electronically Signed by         Patricia Victor DO  05/07/18 6420

## 2018-05-06 NOTE — DISCHARGE INSTRUCTIONS

## 2018-05-07 ENCOUNTER — OFFICE VISIT (OUTPATIENT)
Dept: INTERNAL MEDICINE CLINIC | Facility: CLINIC | Age: 48
End: 2018-05-07
Payer: COMMERCIAL

## 2018-05-07 VITALS
DIASTOLIC BLOOD PRESSURE: 80 MMHG | BODY MASS INDEX: 35.6 KG/M2 | TEMPERATURE: 98.1 F | WEIGHT: 226.8 LBS | SYSTOLIC BLOOD PRESSURE: 128 MMHG | OXYGEN SATURATION: 98 % | HEIGHT: 67 IN | HEART RATE: 84 BPM

## 2018-05-07 DIAGNOSIS — F41.9 ANXIETY: ICD-10-CM

## 2018-05-07 DIAGNOSIS — E03.9 ACQUIRED HYPOTHYROIDISM: ICD-10-CM

## 2018-05-07 DIAGNOSIS — F33.41 RECURRENT MAJOR DEPRESSIVE DISORDER, IN PARTIAL REMISSION (HCC): ICD-10-CM

## 2018-05-07 DIAGNOSIS — Z12.31 ENCOUNTER FOR SCREENING MAMMOGRAM FOR MALIGNANT NEOPLASM OF BREAST: ICD-10-CM

## 2018-05-07 DIAGNOSIS — K21.00 GASTROESOPHAGEAL REFLUX DISEASE WITH ESOPHAGITIS: ICD-10-CM

## 2018-05-07 DIAGNOSIS — E55.9 VITAMIN D DEFICIENCY: ICD-10-CM

## 2018-05-07 DIAGNOSIS — I10 BENIGN HYPERTENSION: Primary | ICD-10-CM

## 2018-05-07 DIAGNOSIS — E78.00 HYPERCHOLESTEROLEMIA: ICD-10-CM

## 2018-05-07 DIAGNOSIS — Z72.0 TOBACCO ABUSE DISORDER: ICD-10-CM

## 2018-05-07 PROBLEM — N30.00 ACUTE CYSTITIS WITHOUT HEMATURIA: Status: ACTIVE | Noted: 2018-05-07

## 2018-05-07 PROCEDURE — 99214 OFFICE O/P EST MOD 30 MIN: CPT | Performed by: FAMILY MEDICINE

## 2018-05-07 PROCEDURE — 3074F SYST BP LT 130 MM HG: CPT | Performed by: FAMILY MEDICINE

## 2018-05-07 PROCEDURE — 3079F DIAST BP 80-89 MM HG: CPT | Performed by: FAMILY MEDICINE

## 2018-05-07 RX ORDER — AMLODIPINE BESYLATE 5 MG/1
5 TABLET ORAL DAILY
Qty: 90 TABLET | Refills: 1 | Status: SHIPPED | OUTPATIENT
Start: 2018-05-07 | End: 2018-11-08 | Stop reason: SDUPTHER

## 2018-05-07 RX ORDER — LEVOTHYROXINE SODIUM 88 UG/1
88 CAPSULE ORAL DAILY
Qty: 90 CAPSULE | Refills: 1 | Status: SHIPPED | OUTPATIENT
Start: 2018-05-07 | End: 2018-11-08 | Stop reason: SDUPTHER

## 2018-05-07 RX ORDER — FLUTICASONE PROPIONATE 50 MCG
1 SPRAY, SUSPENSION (ML) NASAL 2 TIMES DAILY
Qty: 16 G | Refills: 3 | Status: SHIPPED | OUTPATIENT
Start: 2018-05-07 | End: 2018-11-08 | Stop reason: SDUPTHER

## 2018-05-07 RX ORDER — SERTRALINE HYDROCHLORIDE 100 MG/1
100 TABLET, FILM COATED ORAL 2 TIMES DAILY
Qty: 180 TABLET | Refills: 1 | Status: SHIPPED | OUTPATIENT
Start: 2018-05-07 | End: 2018-11-08 | Stop reason: SDUPTHER

## 2018-05-07 RX ORDER — ERGOCALCIFEROL 1.25 MG/1
50000 CAPSULE ORAL WEEKLY
Qty: 12 CAPSULE | Refills: 1 | Status: SHIPPED | OUTPATIENT
Start: 2018-05-07 | End: 2018-11-08 | Stop reason: SDUPTHER

## 2018-05-07 RX ORDER — PANTOPRAZOLE SODIUM 40 MG/1
40 TABLET, DELAYED RELEASE ORAL DAILY
Qty: 90 TABLET | Refills: 1 | Status: SHIPPED | OUTPATIENT
Start: 2018-05-07 | End: 2018-11-08 | Stop reason: SDUPTHER

## 2018-05-07 RX ORDER — SIMVASTATIN 20 MG
20 TABLET ORAL
Qty: 90 TABLET | Refills: 1 | Status: SHIPPED | OUTPATIENT
Start: 2018-05-07 | End: 2018-11-08 | Stop reason: SDUPTHER

## 2018-05-07 NOTE — PROGRESS NOTES
Assessment   1  Benign hypertension (401 1) (I10)   2  Depression (311) (F32 9)   3  Hypothyroidism (244 9) (E03 9)   4  Vitamin D deficiency (268 9) (E55 9)   5  Tobacco abuse disorder (305 1) (Z72 0)   6  Hypercholesterolemia (272 0) (E78 00)     Plan   Benign hypertension    · Renew: AmLODIPine Besylate 5 MG Oral Tablet; TAKE 1 TABLET DAILY   · Renew: HydroCHLOROthiazide 25 MG Oral Tablet; TAKE 1 TABLET DAILY  Depression    ·  · Renew: Sertraline HCl - 100 MG Oral Tablet; Take 1 tablet twice daily  Gastroesophageal reflux disease with esophagitis    · Renew: Pantoprazole Sodium 40 MG Oral Tablet Delayed Release; take one tablet once daily  Hypercholesterolemia    · keep  Simvastatin 20 MG Oral Tablet (Zocor); TAKE 1 TABLET AT BEDTIME   · (1) CK (CPK);    · (1) LIPID PANEL FASTING W DIRECT LDL REFLEX;   Hypothyroidism    · Renew: Levothyroxine Sodium 88 MCG Oral Tablet; TAKE 1 TABLET DAILY AS DIRECTED   · (1) CBC/ PLT (NO DIFF);     · (1) T4, FREE;  · (1) TSH;     · Follow-up visit in 6  Months Evaluation and Treatment  Follow-up   Seasonal allergies    ·  ProAir  (90 Base) MCG/ACT Inhalation Aerosol Solution; INHALE 1 TO 2 PUFFS    EVERY 4 TO 6 HOURS AS NEEDED  Vitamin D deficiency    · Renew: Vitamin D (Ergocalciferol) 30904 UNIT Oral Capsule; TAKE 1 CAPSULE WEEKLY     Discussion/Summary   Discussion Summary:    Labs reviewed  check labs in 6 months, no change to dose with vitamin D   sertraline to 200 mg per day multivitamin, discussed healthy eating and less smoking  Keep  low dose zocor for elevated cholesterol  check labs in 4months  focusing on your health and getting healthier , not motivated to quit smoking  Discussed again       Counseling Documentation With Imm: The patient was counseled regarding diagnostic results  Chief Complaint   Chief Complaint Free Text Note Form: Patient is here for a follow up appointment for Vit D Deficiency, Hypertension, Depression, Hypothyroidism   BW was completed  Chief Complaint Chronic Condition [facility]: Patient is here today for follow up of chronic conditions described in HPI  History of Present Illness   HPI: Patient is here for a follow up appointment for Vit D Deficiency, Hypertension, Depression, Hypothyroidism  feels a little more stressed at this time, feels like her friend is using her as a crutch  She is currently living with this friend at this time, because her friend feels safer when she is there  With her current stress she is not sleeping well and has not taken her medications in two days    D- not sure if she has been taking her vit  D supplement at this time, her vit  D level was 23 with her most resent BW      Hypertension (Follow-Up): The patient presents for follow-up of essential hypertension  The patient states she has been stable with her blood pressure control since the last visit  Symptoms: denies impaired vision,-- denies dyspnea,-- denies intermittent leg claudication-- and-- denies lower extremity edema--       The patient presents with complaints of substernal new onset of chest pain, described as aching, non-radiating Her symptoms are caused by no known event (Feels it may be related to indigestion and gas  When she gets chest pain she takes an Aspirin and it helps  Does not feel like it is a crushing pain)  Associated symptoms include She does report headaches but that is not new  Home monitoring: The patient checks her blood pressure sporadically  Blood pressure control has been good  Medications: the patient is adherent with her medication regimen  -- the patient complains of medication side effects  Disease Management: the patient is doing well with her blood pressure goals  Hyperthyroidism (Follow-Up): The patient is being seen for follow-up of hyperthyroidism        Interval symptoms:  denies heat intolerance,-- denies increased perspiration,-- denies palpitations,-- denies tremor,-- denies hyperactivity,-- denies anxiety,-- denies insomnia-- and-- denies fatigue  Associated symptoms: no weight loss,-- no weight gain,-- no diarrhea,-- no constipation-- and-- no vision changes  Medications:  the patient is not adherent to her medication regimen-- and-- she denies medication side effects  Disease management:  the patient is doing well with her goals  Vitamin D Deficiency: The patient is being seen for follow-up of vitamin D deficiency  Recent laboratory results: date 1/2018, 25-hydroxyvitamin D 23 ng/mL  Review of Systems   Complete-Female:      Constitutional: feeling tired, but-- as noted in HPI,-- no fever-- and-- no chills  Eyes: no eye pain,-- no eyesight problems,-- no dryness of the eyes,-- eyes not red-- and-- no itching of the eyes  ENT: no earache,-- no nosebleeds,-- no sore throat-- and-- no hearing loss  Cardiovascular: the heart rate was not slow,-- no chest pain,-- the heart rate was not fast,-- no palpitations-- and-- no lower extremity edema  Respiratory: no shortness of breath-- and-- no wheezing--        Gastrointestinal: no abdominal pain,-- no nausea,-- no vomiting,-- no diarrhea-- and-- no blood in stools  Genitourinary: no dysuria  Integumentary: no rashes  Neurological: as noted in HPI,-- no numbness,-- no tingling,-- no dizziness-- and-- no fainting  Psychiatric: under more stress lately  Hematologic/Lymphatic: no tendency for easy bleeding-- and-- no tendency for easy bruising          Blood pressure 128/80, pulse 84, temperature 98 1 °F (36 7 °C), temperature source Oral, height 5' 7" (1 702 m), weight 103 kg (226 lb 12 8 oz), SpO2 98 %  Physical Exam        Constitutional      General appearance: No acute distress, well appearing and well nourished  Eyes      Conjunctiva and lids: No swelling, erythema or discharge  Pupils and irises: Equal, round and reactive to light         Ears, Nose, Mouth, and Throat      External inspection of ears and nose: Normal        Oropharynx: Normal with no erythema, edema, exudate or lesions  Pulmonary      Respiratory effort: No increased work of breathing or signs of respiratory distress  Auscultation of lungs: Clear to auscultation  Cardiovascular      Auscultation of heart: Normal rate and rhythm, normal S1 and S2, without murmurs  Examination of extremities for edema and/or varicosities: Normal        Abdomen      Abdomen: Non-tender, no masses  Liver and spleen: No hepatomegaly or splenomegaly  Musculoskeletal      Gait and station: Normal        Skin      Skin and subcutaneous tissue: Normal without rashes or lesions  Neurologic      Cranial nerves: Cranial nerves 2-12 intact         Psychiatric      Orientation to person, place, and time: Normal        Mood and affect: Normal

## 2018-07-03 DIAGNOSIS — F41.8 DEPRESSION WITH ANXIETY: ICD-10-CM

## 2018-07-03 RX ORDER — DIAZEPAM 10 MG/1
10 TABLET ORAL EVERY 6 HOURS PRN
Qty: 30 TABLET | Refills: 0 | Status: SHIPPED | OUTPATIENT
Start: 2018-07-03 | End: 2018-11-08 | Stop reason: SDUPTHER

## 2018-07-20 ENCOUNTER — TRANSCRIBE ORDERS (OUTPATIENT)
Dept: ADMINISTRATIVE | Age: 48
End: 2018-07-20

## 2018-07-20 ENCOUNTER — OFFICE VISIT (OUTPATIENT)
Dept: LAB | Age: 48
End: 2018-07-20
Payer: COMMERCIAL

## 2018-07-20 ENCOUNTER — APPOINTMENT (OUTPATIENT)
Dept: LAB | Age: 48
End: 2018-07-20
Payer: COMMERCIAL

## 2018-07-20 DIAGNOSIS — E78.2 MIXED HYPERLIPIDEMIA: ICD-10-CM

## 2018-07-20 DIAGNOSIS — Z01.818 PRE-OPERATIVE CLEARANCE: Primary | ICD-10-CM

## 2018-07-20 DIAGNOSIS — E55.9 VITAMIN D DEFICIENCY: ICD-10-CM

## 2018-07-20 DIAGNOSIS — E66.9 OBESITY, UNSPECIFIED CLASSIFICATION, UNSPECIFIED OBESITY TYPE, UNSPECIFIED WHETHER SERIOUS COMORBIDITY PRESENT: ICD-10-CM

## 2018-07-20 DIAGNOSIS — E66.9 OBESITY, UNSPECIFIED CLASSIFICATION, UNSPECIFIED OBESITY TYPE, UNSPECIFIED WHETHER SERIOUS COMORBIDITY PRESENT: Primary | ICD-10-CM

## 2018-07-20 DIAGNOSIS — R53.83 OTHER FATIGUE: ICD-10-CM

## 2018-07-20 DIAGNOSIS — E03.9 ACQUIRED HYPOTHYROIDISM: ICD-10-CM

## 2018-07-20 DIAGNOSIS — R73.09 IMPAIRED GLUCOSE TOLERANCE TEST: ICD-10-CM

## 2018-07-20 DIAGNOSIS — I10 BENIGN HYPERTENSION: ICD-10-CM

## 2018-07-20 DIAGNOSIS — E78.00 HYPERCHOLESTEROLEMIA: ICD-10-CM

## 2018-07-20 DIAGNOSIS — Z01.818 PRE-OPERATIVE CLEARANCE: ICD-10-CM

## 2018-07-20 LAB
25(OH)D3 SERPL-MCNC: 18.8 NG/ML (ref 30–100)
ABO GROUP BLD: NORMAL
ALBUMIN SERPL BCP-MCNC: 3.7 G/DL (ref 3.5–5)
ALP SERPL-CCNC: 84 U/L (ref 46–116)
ALT SERPL W P-5'-P-CCNC: 21 U/L (ref 12–78)
ANION GAP SERPL CALCULATED.3IONS-SCNC: 4 MMOL/L (ref 4–13)
AST SERPL W P-5'-P-CCNC: 13 U/L (ref 5–45)
BASOPHILS # BLD AUTO: 0.02 THOUSANDS/ΜL (ref 0–0.1)
BASOPHILS NFR BLD AUTO: 0 % (ref 0–1)
BILIRUB SERPL-MCNC: 0.63 MG/DL (ref 0.2–1)
BLD GP AB SCN SERPL QL: NEGATIVE
BUN SERPL-MCNC: 13 MG/DL (ref 5–25)
CALCIUM SERPL-MCNC: 8.7 MG/DL (ref 8.3–10.1)
CHLORIDE SERPL-SCNC: 110 MMOL/L (ref 100–108)
CHOLEST SERPL-MCNC: 147 MG/DL (ref 50–200)
CO2 SERPL-SCNC: 25 MMOL/L (ref 21–32)
CREAT SERPL-MCNC: 0.82 MG/DL (ref 0.6–1.3)
CRP SERPL QL: 10.1 MG/L
EOSINOPHIL # BLD AUTO: 0.09 THOUSAND/ΜL (ref 0–0.61)
EOSINOPHIL NFR BLD AUTO: 1 % (ref 0–6)
ERYTHROCYTE [DISTWIDTH] IN BLOOD BY AUTOMATED COUNT: 16.1 % (ref 11.6–15.1)
EST. AVERAGE GLUCOSE BLD GHB EST-MCNC: 108 MG/DL
GFR SERPL CREATININE-BSD FRML MDRD: 85 ML/MIN/1.73SQ M
GLUCOSE P FAST SERPL-MCNC: 74 MG/DL (ref 65–99)
HBA1C MFR BLD: 5.4 % (ref 4.2–6.3)
HCT VFR BLD AUTO: 48.6 % (ref 34.8–46.1)
HDLC SERPL-MCNC: 42 MG/DL (ref 40–60)
HGB BLD-MCNC: 14.8 G/DL (ref 11.5–15.4)
IMM GRANULOCYTES # BLD AUTO: 0.09 THOUSAND/UL (ref 0–0.2)
IMM GRANULOCYTES NFR BLD AUTO: 1 % (ref 0–2)
LDLC SERPL CALC-MCNC: 73 MG/DL (ref 0–100)
LYMPHOCYTES # BLD AUTO: 1.6 THOUSANDS/ΜL (ref 0.6–4.47)
LYMPHOCYTES NFR BLD AUTO: 16 % (ref 14–44)
MCH RBC QN AUTO: 28.8 PG (ref 26.8–34.3)
MCHC RBC AUTO-ENTMCNC: 30.5 G/DL (ref 31.4–37.4)
MCV RBC AUTO: 95 FL (ref 82–98)
MONOCYTES # BLD AUTO: 0.64 THOUSAND/ΜL (ref 0.17–1.22)
MONOCYTES NFR BLD AUTO: 6 % (ref 4–12)
NEUTROPHILS # BLD AUTO: 7.77 THOUSANDS/ΜL (ref 1.85–7.62)
NEUTS SEG NFR BLD AUTO: 76 % (ref 43–75)
NONHDLC SERPL-MCNC: 105 MG/DL
NRBC BLD AUTO-RTO: 0 /100 WBCS
PLATELET # BLD AUTO: 271 THOUSANDS/UL (ref 149–390)
PMV BLD AUTO: 10.4 FL (ref 8.9–12.7)
POTASSIUM SERPL-SCNC: 4 MMOL/L (ref 3.5–5.3)
PROT SERPL-MCNC: 7.4 G/DL (ref 6.4–8.2)
RBC # BLD AUTO: 5.14 MILLION/UL (ref 3.81–5.12)
RH BLD: POSITIVE
SODIUM SERPL-SCNC: 139 MMOL/L (ref 136–145)
SPECIMEN EXPIRATION DATE: NORMAL
T4 FREE SERPL-MCNC: 0.89 NG/DL (ref 0.76–1.46)
TRIGL SERPL-MCNC: 158 MG/DL
TSH SERPL DL<=0.05 MIU/L-ACNC: 3.99 UIU/ML (ref 0.36–3.74)
WBC # BLD AUTO: 10.21 THOUSAND/UL (ref 4.31–10.16)

## 2018-07-20 PROCEDURE — 84443 ASSAY THYROID STIM HORMONE: CPT

## 2018-07-20 PROCEDURE — 82306 VITAMIN D 25 HYDROXY: CPT

## 2018-07-20 PROCEDURE — 86140 C-REACTIVE PROTEIN: CPT

## 2018-07-20 PROCEDURE — 84439 ASSAY OF FREE THYROXINE: CPT

## 2018-07-20 PROCEDURE — 93005 ELECTROCARDIOGRAM TRACING: CPT

## 2018-07-20 PROCEDURE — 80061 LIPID PANEL: CPT

## 2018-07-20 PROCEDURE — 86901 BLOOD TYPING SEROLOGIC RH(D): CPT

## 2018-07-20 PROCEDURE — 36415 COLL VENOUS BLD VENIPUNCTURE: CPT

## 2018-07-20 PROCEDURE — 83036 HEMOGLOBIN GLYCOSYLATED A1C: CPT

## 2018-07-20 PROCEDURE — 80053 COMPREHEN METABOLIC PANEL: CPT

## 2018-07-20 PROCEDURE — 86850 RBC ANTIBODY SCREEN: CPT

## 2018-07-20 PROCEDURE — 85025 COMPLETE CBC W/AUTO DIFF WBC: CPT

## 2018-07-20 PROCEDURE — 86900 BLOOD TYPING SEROLOGIC ABO: CPT

## 2018-07-21 LAB
ATRIAL RATE: 83 BPM
P AXIS: 58 DEGREES
PR INTERVAL: 150 MS
QRS AXIS: 70 DEGREES
QRSD INTERVAL: 86 MS
QT INTERVAL: 404 MS
QTC INTERVAL: 474 MS
T WAVE AXIS: 41 DEGREES
VENTRICULAR RATE: 83 BPM

## 2018-07-21 PROCEDURE — 93010 ELECTROCARDIOGRAM REPORT: CPT | Performed by: INTERNAL MEDICINE

## 2018-11-08 ENCOUNTER — TELEPHONE (OUTPATIENT)
Dept: INTERNAL MEDICINE CLINIC | Age: 48
End: 2018-11-08

## 2018-11-08 ENCOUNTER — OFFICE VISIT (OUTPATIENT)
Dept: INTERNAL MEDICINE CLINIC | Age: 48
End: 2018-11-08
Payer: COMMERCIAL

## 2018-11-08 VITALS
HEART RATE: 80 BPM | OXYGEN SATURATION: 98 % | BODY MASS INDEX: 36.26 KG/M2 | SYSTOLIC BLOOD PRESSURE: 108 MMHG | WEIGHT: 231 LBS | HEIGHT: 67 IN | TEMPERATURE: 97.8 F | DIASTOLIC BLOOD PRESSURE: 52 MMHG

## 2018-11-08 DIAGNOSIS — F41.8 DEPRESSION WITH ANXIETY: ICD-10-CM

## 2018-11-08 DIAGNOSIS — Z23 NEED FOR INFLUENZA VACCINATION: ICD-10-CM

## 2018-11-08 DIAGNOSIS — F41.9 ANXIETY: ICD-10-CM

## 2018-11-08 DIAGNOSIS — F33.41 RECURRENT MAJOR DEPRESSIVE DISORDER, IN PARTIAL REMISSION (HCC): ICD-10-CM

## 2018-11-08 DIAGNOSIS — E55.9 VITAMIN D DEFICIENCY: ICD-10-CM

## 2018-11-08 DIAGNOSIS — I10 BENIGN HYPERTENSION: Primary | ICD-10-CM

## 2018-11-08 DIAGNOSIS — R07.89 OTHER CHEST PAIN: ICD-10-CM

## 2018-11-08 DIAGNOSIS — Z72.0 TOBACCO ABUSE DISORDER: ICD-10-CM

## 2018-11-08 DIAGNOSIS — E03.9 ACQUIRED HYPOTHYROIDISM: ICD-10-CM

## 2018-11-08 DIAGNOSIS — M15.9 PRIMARY OSTEOARTHRITIS INVOLVING MULTIPLE JOINTS: ICD-10-CM

## 2018-11-08 DIAGNOSIS — K21.00 GASTROESOPHAGEAL REFLUX DISEASE WITH ESOPHAGITIS: ICD-10-CM

## 2018-11-08 DIAGNOSIS — E78.00 HYPERCHOLESTEROLEMIA: ICD-10-CM

## 2018-11-08 DIAGNOSIS — R63.5 WEIGHT GAIN: ICD-10-CM

## 2018-11-08 DIAGNOSIS — K31.84 GASTROPARESIS: ICD-10-CM

## 2018-11-08 PROBLEM — N30.00 ACUTE CYSTITIS WITHOUT HEMATURIA: Status: RESOLVED | Noted: 2018-05-07 | Resolved: 2018-11-08

## 2018-11-08 PROCEDURE — 90686 IIV4 VACC NO PRSV 0.5 ML IM: CPT

## 2018-11-08 PROCEDURE — 3008F BODY MASS INDEX DOCD: CPT | Performed by: FAMILY MEDICINE

## 2018-11-08 PROCEDURE — 3074F SYST BP LT 130 MM HG: CPT | Performed by: FAMILY MEDICINE

## 2018-11-08 PROCEDURE — 99214 OFFICE O/P EST MOD 30 MIN: CPT | Performed by: FAMILY MEDICINE

## 2018-11-08 PROCEDURE — 3078F DIAST BP <80 MM HG: CPT | Performed by: FAMILY MEDICINE

## 2018-11-08 PROCEDURE — 90471 IMMUNIZATION ADMIN: CPT

## 2018-11-08 RX ORDER — ERGOCALCIFEROL 1.25 MG/1
50000 CAPSULE ORAL WEEKLY
Qty: 12 CAPSULE | Refills: 1 | Status: SHIPPED | OUTPATIENT
Start: 2018-11-08 | End: 2019-07-16 | Stop reason: SDUPTHER

## 2018-11-08 RX ORDER — FLUTICASONE PROPIONATE 50 MCG
1 SPRAY, SUSPENSION (ML) NASAL 2 TIMES DAILY
Qty: 16 G | Refills: 5 | Status: SHIPPED | OUTPATIENT
Start: 2018-11-08 | End: 2020-03-23 | Stop reason: SDUPTHER

## 2018-11-08 RX ORDER — LEVOTHYROXINE SODIUM 88 UG/1
88 CAPSULE ORAL DAILY
Qty: 90 CAPSULE | Refills: 0 | Status: SHIPPED | OUTPATIENT
Start: 2018-11-08 | End: 2018-11-08 | Stop reason: SDUPTHER

## 2018-11-08 RX ORDER — LEVOTHYROXINE SODIUM 88 UG/1
88 CAPSULE ORAL DAILY
Qty: 90 CAPSULE | Refills: 0 | Status: SHIPPED | OUTPATIENT
Start: 2018-11-08 | End: 2019-03-07 | Stop reason: SDUPTHER

## 2018-11-08 RX ORDER — DIAZEPAM 10 MG/1
10 TABLET ORAL EVERY 6 HOURS PRN
Qty: 30 TABLET | Refills: 1 | Status: SHIPPED | OUTPATIENT
Start: 2018-11-08 | End: 2019-03-07 | Stop reason: SDUPTHER

## 2018-11-08 RX ORDER — AMLODIPINE BESYLATE 5 MG/1
5 TABLET ORAL DAILY
Qty: 90 TABLET | Refills: 1 | Status: SHIPPED | OUTPATIENT
Start: 2018-11-08 | End: 2019-07-16 | Stop reason: SDUPTHER

## 2018-11-08 RX ORDER — SIMVASTATIN 20 MG
20 TABLET ORAL
Qty: 90 TABLET | Refills: 1 | Status: SHIPPED | OUTPATIENT
Start: 2018-11-08 | End: 2019-07-16 | Stop reason: SDUPTHER

## 2018-11-08 RX ORDER — OXYCODONE AND ACETAMINOPHEN 10; 325 MG/1; MG/1
1 TABLET ORAL EVERY 6 HOURS PRN
Qty: 30 TABLET | Refills: 0 | OUTPATIENT
Start: 2018-11-08 | End: 2022-06-10

## 2018-11-08 RX ORDER — SERTRALINE HYDROCHLORIDE 100 MG/1
100 TABLET, FILM COATED ORAL 2 TIMES DAILY
Qty: 180 TABLET | Refills: 1 | Status: SHIPPED | OUTPATIENT
Start: 2018-11-08 | End: 2019-07-16 | Stop reason: SDUPTHER

## 2018-11-08 RX ORDER — LEVOTHYROXINE SODIUM 88 UG/1
88 TABLET ORAL DAILY
Qty: 90 TABLET | Refills: 1 | Status: SHIPPED | OUTPATIENT
Start: 2018-11-08 | End: 2019-07-16 | Stop reason: SDUPTHER

## 2018-11-08 RX ORDER — PANTOPRAZOLE SODIUM 40 MG/1
40 TABLET, DELAYED RELEASE ORAL 2 TIMES DAILY PRN
Qty: 180 TABLET | Refills: 1 | Status: SHIPPED | OUTPATIENT
Start: 2018-11-08 | End: 2019-07-16 | Stop reason: SDUPTHER

## 2018-11-08 NOTE — TELEPHONE ENCOUNTER
Per Dr Cleone Gaucher to shred printed script for Percocet pt  Stated she gets different provider and office  Rx from [place], [address]  [facility], [address] [address] Magdi Apple [facility]  [phone]  Shredded script in [facility] office per Dr Cleone Gaucher

## 2018-11-08 NOTE — PROGRESS NOTES
Assessment   1  Benign hypertension (401 1) (I10)   2  Depression (311) (F32 9)   3  Hypothyroidism (244 9) (E03 9)   4  Vitamin D deficiency (268 9) (E55 9)   5  Tobacco abuse disorder (305 1) (Z72 0)   6  Hypercholesterolemia (272 0) (E78 00)     Plan   Benign hypertension    ·: AmLODIPine Besylate 5 MG Oral Tablet; TAKE 1 TABLET DAILY   HydroCHLOROthiazide 25 MG Oral Tablet; TAKE 1 TABLET DAILY  Depression    ·  · : Sertraline HCl - 100 MG Oral Tablet; Take 1 tablet twice daily  Gastroesophageal reflux disease with esophagitis    ·  Pantoprazole Sodium 40 MG Oral Tablet Delayed Release; take one tablet once daily  Hypercholesterolemia    · keep  Simvastatin 20 MG Oral Tablet (Zocor); TAKE 1 TABLET AT BEDTIME   · (1) CK (CPK);    · (1) LIPID PANEL FASTING W DIRECT LDL REFLEX;   Hypothyroidism    · Renew: Levothyroxine Sodium 88 MCG Oral Tablet; TAKE 1 TABLET DAILY AS DIRECTED   · (1) CBC/ PLT (NO DIFF);     · (1) T4, FREE;  · (1) TSH;     · Follow-up visit in 6  Months Evaluation and Treatment  Follow-up   Seasonal allergies    ·  ProAir  (90 Base) MCG/ACT Inhalation Aerosol Solution; INHALE 1 TO 2 PUFFS    EVERY 4 TO 6 HOURS AS NEEDED  Vitamin D deficiency    ·: Vitamin D (Ergocalciferol) 89954 UNIT Oral Capsule; TAKE 1 CAPSULE WEEKLY     Discussion/Summary   Discussion Summary:    Labs reviewed  check labs in 6 months, no change to dose with vitamin D   sertraline to 200 mg per day multivitamin, discussed healthy eating and less smoking  Keep  low dose zocor for elevated cholesterol  check labs in 4months  focusing on your health and getting healthier , not motivated to quit smoking  Discussed again       Counseling Documentation With Imm: The patient was counseled regarding diagnostic results  Chief Complaint   Chief Complaint Free Text Note Form: Patient is here for a follow up appointment for Vit D Deficiency, Hypertension, Depression, Hypothyroidism  BW was completed      Chief Complaint Chronic Condition [facility]: Patient is here today for follow up of chronic conditions described in HPI  History of Present Illness   HPI: Patient is here for a follow up appointment for Vit D Deficiency, Hypertension, Depression, Hypothyroidism  Hypertension (Follow-Up): The patient presents for follow-up of essential hypertension  The patient states she has been stable with her blood pressure control since the last visit  Symptoms: denies impaired vision,-- denies dyspnea,-- denies intermittent leg claudication-- and-- denies lower extremity edema--       The patient presents with complaints of substernal new onset of chest pain, described as aching, non-radiating Her symptoms are caused by no known event (Feels it may be related to indigestion and gas  When she gets chest pain she takes an Aspirin and it helps  Does not feel like it is a crushing pain)  Associated symptoms include She does report headaches but that is not new  Home monitoring: The patient checks her blood pressure sporadically  Blood pressure control has been good  Medications: the patient is adherent with her medication regimen  -- the patient complains of medication side effects  Disease Management: the patient is doing well with her blood pressure goals  Hyperthyroidism (Follow-Up): The patient is being seen for follow-up of hyperthyroidism  Interval symptoms:  denies heat intolerance,-- denies increased perspiration,-- denies palpitations,-- denies tremor,-- denies hyperactivity,-- denies anxiety,-- denies insomnia-- and-- denies fatigue  Associated symptoms: no weight loss,-- no weight gain,-- no diarrhea,-- no constipation-- and-- no vision changes  Medications:  the patient is not adherent to her medication regimen-- and-- she denies medication side effects  Disease management:  the patient is doing well with her goals        Vitamin D Deficiency: The patient is being seen for follow-up of vitamin D deficiency  Recent laboratory results: date 1/2018, 25-hydroxyvitamin D 23 ng/mL  7/18 level is 18     Review of Systems   Complete-Female:      Constitutional: no fever-- and-- no chills  , + continued weight gain    Eyes: no eye pain,-- no eyesight problems,-- no dryness of the eyes,-- eyes not red-- and-- no itching of the eyes  ENT: no earache,-- no nosebleeds,--  See HPI    Cardiovascular: the heart rate was not slow,-- no chest pain,-- the heart rate was not fast,-- no palpitations-- and-- no lower extremity edema  Respiratory: no shortness of breath-- and-- no wheezing--        Gastrointestinal: no abdominal pain,-- no nausea,-- no vomiting,-- no diarrhea-- and-- no blood in stools  Genitourinary: no dysuria  Integumentary: no rashes  Neurological: as noted in HPI,-- no numbness,-- no tingling,-- no dizziness-- and-- no fainting  Psychiatric: under more stress lately  Hematologic/Lymphatic: no tendency for easy bleeding-- and-- no tendency for easy bruising          Blood pressure 108/52, pulse 80, temperature 97 8 °F (36 6 °C), temperature source Tympanic, height 5' 7 25" (1 708 m), weight 105 kg (231 lb), SpO2 98 %  Physical Exam        Constitutional      General appearance: No acute distress, well appearing and well nourished  Eyes      Conjunctiva and lids: No swelling, erythema or discharge  Pupils and irises: Equal, round and reactive to light  Ears, Nose, Mouth, and Throat      External inspection of ears and nose: Normal        Oropharynx: Normal with no erythema, edema, exudate or lesions  Pulmonary      Respiratory effort: No increased work of breathing or signs of respiratory distress  Auscultation of lungs: Clear to auscultation  Cardiovascular      Auscultation of heart: Normal rate and rhythm, normal S1 and S2, without murmurs         Examination of extremities for edema and/or varicosities: Normal  Abdomen      Abdomen: Non-tender, no masses  Liver and spleen: No hepatomegaly or splenomegaly  Musculoskeletal      Gait and station: Normal        Skin      Skin and subcutaneous tissue: Normal without rashes or lesions  Neurologic      Cranial nerves: Cranial nerves 2-12 intact         Psychiatric      Orientation to person, place, and time: Normal        Mood and affect: Normal

## 2018-11-08 NOTE — TELEPHONE ENCOUNTER
Pharmacy called and stated patient's insurance will not cover Synthroid capsules  Can you please change the Rx to levothyroxine sodium 75 mcg oral tablet, take 1 tablet daily

## 2018-11-13 ENCOUNTER — TELEPHONE (OUTPATIENT)
Dept: INTERNAL MEDICINE CLINIC | Facility: CLINIC | Age: 48
End: 2018-11-13

## 2018-11-13 NOTE — TELEPHONE ENCOUNTER
Dr Jackson Rosas ordered a [place] myocardial perfusion spect for Chely  Her appointment is set up for tomorrow 11/14 at [facility] [address] [place] [address]  There needs to be a peer to peer done for this patient  The tracking # [id number]  Please call JEET at [phone]  Thank you!

## 2018-11-14 ENCOUNTER — HOSPITAL ENCOUNTER (OUTPATIENT)
Dept: NON INVASIVE DIAGNOSTICS | Facility: CLINIC | Age: 48
Discharge: HOME | End: 2018-11-14
Payer: COMMERCIAL

## 2018-11-14 DIAGNOSIS — E78.00 HYPERCHOLESTEROLEMIA: ICD-10-CM

## 2018-11-14 DIAGNOSIS — Z72.0 TOBACCO ABUSE DISORDER: ICD-10-CM

## 2018-11-14 DIAGNOSIS — R07.89 OTHER CHEST PAIN: ICD-10-CM

## 2018-11-14 DIAGNOSIS — I10 BENIGN HYPERTENSION: ICD-10-CM

## 2018-11-14 LAB
CHEST PAIN STATEMENT: NORMAL
MAX DIASTOLIC BP: 88 MMHG
MAX HEART RATE: 144 BPM
MAX PREDICTED HEART RATE: 172 BPM
MAX. SYSTOLIC BP: 150 MMHG
PROTOCOL NAME: NORMAL
REASON FOR TERMINATION: NORMAL
TARGET HR FORMULA: NORMAL
TEST INDICATION: NORMAL
TIME IN EXERCISE PHASE: NORMAL

## 2018-11-14 PROCEDURE — A9502 TC99M TETROFOSMIN: HCPCS

## 2018-11-14 PROCEDURE — 78452 HT MUSCLE IMAGE SPECT MULT: CPT

## 2018-11-14 PROCEDURE — 93016 CV STRESS TEST SUPVJ ONLY: CPT | Performed by: INTERNAL MEDICINE

## 2018-11-14 PROCEDURE — 93017 CV STRESS TEST TRACING ONLY: CPT

## 2018-11-14 PROCEDURE — 78452 HT MUSCLE IMAGE SPECT MULT: CPT | Performed by: INTERNAL MEDICINE

## 2018-11-14 PROCEDURE — 93018 CV STRESS TEST I&R ONLY: CPT | Performed by: INTERNAL MEDICINE

## 2018-11-14 RX ADMIN — REGADENOSON 0.4 MG: 0.08 INJECTION, SOLUTION INTRAVENOUS at 09:58

## 2018-11-14 NOTE — TELEPHONE ENCOUNTER
Called for peer to peer  They state no clinical information was sent over regarding why this test was ordered  Fax number is [phone]  Please see if someone can write a letter to fax over tomorrow morning   I do not see in the visit from 11/8 why this was ordered

## 2018-11-15 NOTE — TELEPHONE ENCOUNTER
Dr Marina Richard, do you have any additional information to add for why the stress test is ordered  I dont see anything in the last OV note  Please let me know and I will call tomorrow for peer to peer

## 2018-11-15 NOTE — TELEPHONE ENCOUNTER
All clinical information was faxed over to [facility]  It was received  After further review of all clinical information, the [place] myocardial was denied  A peer to peer is still able to be done  The tracking number is [id number] and the phone number is [phone]  Please let me know that outcome  Thank yo very much

## 2018-11-16 NOTE — TELEPHONE ENCOUNTER
Called for peer to peer  Added information given by Dr Nydia Lennon  There was no EKG sent and no physical impairment   They stated she met criteria for stress echo but not the nuclear stress test  It appear pt already had the test completed on 11/14

## 2018-12-11 ENCOUNTER — HOSPITAL ENCOUNTER (OUTPATIENT)
Dept: MAMMOGRAPHY | Facility: HOSPITAL | Age: 48
Discharge: HOME | End: 2018-12-11
Admitting: INTERNAL MEDICINE

## 2018-12-11 DIAGNOSIS — Z12.39 SCREENING BREAST EXAMINATION: ICD-10-CM

## 2018-12-11 PROCEDURE — 77063 BREAST TOMOSYNTHESIS BI: CPT | Performed by: RADIOLOGY

## 2018-12-11 PROCEDURE — 77067 SCR MAMMO BI INCL CAD: CPT | Performed by: RADIOLOGY

## 2018-12-11 PROCEDURE — 77063 BREAST TOMOSYNTHESIS BI: CPT

## 2018-12-11 PROCEDURE — 77067 SCR MAMMO BI INCL CAD: CPT

## 2018-12-31 ENCOUNTER — TELEPHONE (OUTPATIENT)
Dept: INTERNAL MEDICINE CLINIC | Facility: CLINIC | Age: 48
End: 2018-12-31

## 2018-12-31 DIAGNOSIS — F17.200 SMOKING ADDICTION: Primary | ICD-10-CM

## 2018-12-31 RX ORDER — VARENICLINE TARTRATE 0.5 MG/1
0.5 TABLET, FILM COATED ORAL 2 TIMES DAILY
Qty: 60 TABLET | Refills: 0 | Status: SHIPPED | OUTPATIENT
Start: 2018-12-31 | End: 2019-03-07

## 2018-12-31 NOTE — TELEPHONE ENCOUNTER
Patient was last seen on 11/8/18 at the [place] office, patient called and stated that she needs a prior authorization for Chantix  If someone can please look into this  Thank you!

## 2018-12-31 NOTE — TELEPHONE ENCOUNTER
Prescription sent in for 1 month  After 1 month the dose changes  She should call us before her last pills on this prescriptions so we can change the dose and prescribed the next month's prescription  She can keep her regular scheduled appointment in March    Thank you

## 2018-12-31 NOTE — TELEPHONE ENCOUNTER
Patient is inquiring about getting a prescription for Chantix to quit smoking  She stated that if you could please look into this, and if the patient needs to come in to see you in regards to this she stated that she would  Thank you!

## 2019-01-03 ENCOUNTER — TELEPHONE (OUTPATIENT)
Dept: INTERNAL MEDICINE CLINIC | Age: 49
End: 2019-01-03

## 2019-01-09 ENCOUNTER — HOSPITAL ENCOUNTER (OUTPATIENT)
Dept: GENERAL RADIOLOGY | Facility: HOSPITAL | Age: 49
Discharge: HOME | End: 2019-01-09

## 2019-01-09 ENCOUNTER — HOSPITAL ENCOUNTER (OUTPATIENT)
Dept: GENERAL RADIOLOGY | Facility: HOSPITAL | Age: 49
Discharge: HOME | End: 2019-01-09
Admitting: NURSE PRACTITIONER

## 2019-01-09 ENCOUNTER — TRANSCRIBE ORDERS (OUTPATIENT)
Dept: ADMINISTRATIVE | Facility: HOSPITAL | Age: 49
End: 2019-01-09

## 2019-01-09 DIAGNOSIS — R21 RASH AND OTHER NONSPECIFIC SKIN ERUPTION: Primary | ICD-10-CM

## 2019-01-09 DIAGNOSIS — R21 RASH AND OTHER NONSPECIFIC SKIN ERUPTION: ICD-10-CM

## 2019-01-09 DIAGNOSIS — M06.4 INFLAMMATORY POLYARTHROPATHY (HCC): ICD-10-CM

## 2019-01-09 DIAGNOSIS — G93.32 CHRONIC FATIGUE SYNDROME: ICD-10-CM

## 2019-01-09 DIAGNOSIS — M79.10 MUSCLE PAIN: ICD-10-CM

## 2019-01-09 DIAGNOSIS — M32.9 SYSTEMIC LUPUS ERYTHEMATOSUS, UNSPECIFIED SLE TYPE, UNSPECIFIED ORGAN INVOLVEMENT STATUS (HCC): ICD-10-CM

## 2019-01-09 PROCEDURE — 73630 X-RAY EXAM OF FOOT: CPT

## 2019-01-09 PROCEDURE — 73120 X-RAY EXAM OF HAND: CPT

## 2019-02-19 DIAGNOSIS — F41.8 DEPRESSION WITH ANXIETY: ICD-10-CM

## 2019-02-19 RX ORDER — DIAZEPAM 10 MG/1
TABLET ORAL
Qty: 30 TABLET | Refills: 1 | OUTPATIENT
Start: 2019-02-19

## 2019-03-07 ENCOUNTER — TELEPHONE (OUTPATIENT)
Dept: INTERNAL MEDICINE CLINIC | Age: 49
End: 2019-03-07

## 2019-03-07 ENCOUNTER — OFFICE VISIT (OUTPATIENT)
Dept: INTERNAL MEDICINE CLINIC | Age: 49
End: 2019-03-07
Payer: COMMERCIAL

## 2019-03-07 ENCOUNTER — HOSPITAL ENCOUNTER (OUTPATIENT)
Dept: NON INVASIVE DIAGNOSTICS | Facility: CLINIC | Age: 49
Discharge: HOME | End: 2019-03-07
Payer: COMMERCIAL

## 2019-03-07 VITALS
DIASTOLIC BLOOD PRESSURE: 76 MMHG | SYSTOLIC BLOOD PRESSURE: 104 MMHG | BODY MASS INDEX: 35.1 KG/M2 | OXYGEN SATURATION: 99 % | HEART RATE: 96 BPM | TEMPERATURE: 97.9 F | WEIGHT: 225.8 LBS

## 2019-03-07 DIAGNOSIS — G44.219 EPISODIC TENSION-TYPE HEADACHE, NOT INTRACTABLE: ICD-10-CM

## 2019-03-07 DIAGNOSIS — Z72.0 TOBACCO ABUSE DISORDER: ICD-10-CM

## 2019-03-07 DIAGNOSIS — R60.0 LOCALIZED EDEMA: ICD-10-CM

## 2019-03-07 DIAGNOSIS — J01.00 SUBACUTE MAXILLARY SINUSITIS: ICD-10-CM

## 2019-03-07 DIAGNOSIS — Z23 NEED FOR VACCINATION AGAINST STREPTOCOCCUS PNEUMONIAE USING PNEUMOCOCCAL CONJUGATE VACCINE 13: ICD-10-CM

## 2019-03-07 DIAGNOSIS — K21.00 GASTROESOPHAGEAL REFLUX DISEASE WITH ESOPHAGITIS: ICD-10-CM

## 2019-03-07 DIAGNOSIS — F33.41 RECURRENT MAJOR DEPRESSIVE DISORDER, IN PARTIAL REMISSION (HCC): ICD-10-CM

## 2019-03-07 DIAGNOSIS — E55.9 VITAMIN D DEFICIENCY: ICD-10-CM

## 2019-03-07 DIAGNOSIS — E78.00 HYPERCHOLESTEROLEMIA: ICD-10-CM

## 2019-03-07 DIAGNOSIS — F41.9 ANXIETY: ICD-10-CM

## 2019-03-07 DIAGNOSIS — I10 BENIGN HYPERTENSION: ICD-10-CM

## 2019-03-07 DIAGNOSIS — M79.672 FOOT PAIN, LEFT: ICD-10-CM

## 2019-03-07 DIAGNOSIS — F41.8 DEPRESSION WITH ANXIETY: ICD-10-CM

## 2019-03-07 DIAGNOSIS — E03.9 ACQUIRED HYPOTHYROIDISM: Primary | ICD-10-CM

## 2019-03-07 PROBLEM — R63.5 WEIGHT GAIN: Status: RESOLVED | Noted: 2018-11-08 | Resolved: 2019-03-07

## 2019-03-07 PROCEDURE — 3074F SYST BP LT 130 MM HG: CPT | Performed by: FAMILY MEDICINE

## 2019-03-07 PROCEDURE — 90471 IMMUNIZATION ADMIN: CPT | Performed by: FAMILY MEDICINE

## 2019-03-07 PROCEDURE — 90670 PCV13 VACCINE IM: CPT

## 2019-03-07 PROCEDURE — 3078F DIAST BP <80 MM HG: CPT | Performed by: FAMILY MEDICINE

## 2019-03-07 PROCEDURE — 99214 OFFICE O/P EST MOD 30 MIN: CPT | Performed by: FAMILY MEDICINE

## 2019-03-07 PROCEDURE — 93970 EXTREMITY STUDY: CPT

## 2019-03-07 RX ORDER — AMOXICILLIN 875 MG/1
875 TABLET, COATED ORAL 2 TIMES DAILY
Qty: 14 TABLET | Refills: 0 | Status: SHIPPED | OUTPATIENT
Start: 2019-03-07 | End: 2019-03-14

## 2019-03-07 RX ORDER — DIAZEPAM 10 MG/1
10 TABLET ORAL EVERY 6 HOURS PRN
Qty: 30 TABLET | Refills: 1 | Status: SHIPPED | OUTPATIENT
Start: 2019-03-07 | End: 2019-07-16 | Stop reason: SDUPTHER

## 2019-03-07 RX ORDER — BUTALBITAL, ASPIRIN, AND CAFFEINE 50; 325; 40 MG/1; MG/1; MG/1
1 CAPSULE ORAL EVERY 6 HOURS PRN
Qty: 30 CAPSULE | Refills: 1 | Status: SHIPPED | OUTPATIENT
Start: 2019-03-07 | End: 2020-03-23 | Stop reason: ALTCHOICE

## 2019-03-07 RX ORDER — SUCRALFATE 1 G/1
1 TABLET ORAL
Qty: 90 TABLET | Refills: 5 | Status: SHIPPED | OUTPATIENT
Start: 2019-03-07 | End: 2020-05-12 | Stop reason: SDUPTHER

## 2019-03-07 RX ORDER — VARENICLINE TARTRATE 0.5 MG/1
0.5 TABLET, FILM COATED ORAL 2 TIMES DAILY
Qty: 60 TABLET | Refills: 0 | Status: SHIPPED | OUTPATIENT
Start: 2019-03-07 | End: 2019-07-16

## 2019-03-07 NOTE — PROGRESS NOTES
BMI Counseling: Body mass index is 35 1 kg/m²  Discussed the patient's BMI with her  The BMI is above average  BMI counseling and education was provided to the patient  Nutrition recommendations include reducing portion sizes, decreasing overall calorie intake, 3-5 servings of fruits/vegetables daily, reducing fast food intake, consuming healthier snacks, decreasing soda and/or juice intake, moderation in carbohydrate intake, increasing intake of lean protein, reducing intake of saturated fat and trans fat and reducing intake of cholesterol  Exercise recommendations include moderate aerobic physical activity for 150 minutes/week  Assessment   1  Benign hypertension (401 1) (I10)   2  Depression (311) (F32 9)   3  Hypothyroidism (244 9) (E03 9)   4  Vitamin D deficiency (268 9) (E55 9)   5  Tobacco abuse disorder (305 1) (Z72 0)   6  Hypercholesterolemia (272 0) (E78 00)     Plan   Benign hypertension    ·: AmLODIPine Besylate 5 MG Oral Tablet; TAKE 1 TABLET DAILY   HydroCHLOROthiazide 25 MG Oral Tablet; TAKE 1 TABLET DAILY  Depression    ·  · : Sertraline HCl - 100 MG Oral Tablet;  Take 1 tablet twice daily  Gastroesophageal reflux disease with esophagitis    ·  Pantoprazole Sodium 40 MG Oral Tablet Delayed Release; take one tablet once daily  Hypercholesterolemia    · keep  Simvastatin 20 MG Oral Tablet (Zocor); TAKE 1 TABLET AT BEDTIME   · (1) CK (CPK);    · (1) LIPID PANEL FASTING W DIRECT LDL REFLEX;   Hypothyroidism    · Levothyroxine Sodium 88 MCG Oral Tablet; TAKE 1 TABLET DAILY AS DIRECTED   · (1) CBC/ PLT (NO DIFF);     · (1) T4, FREE;  · (1) TSH;     · Follow-up visit in 6  Months Evaluation and Treatment  Follow-up   Seasonal allergies    ·  ProAir  (90 Base) MCG/ACT Inhalation Aerosol Solution; INHALE 1 TO 2 PUFFS    EVERY 4 TO 6 HOURS AS NEEDED  Vitamin D deficiency    ·: Vitamin D (Ergocalciferol) 21330 UNIT Oral Capsule; TAKE 1 CAPSULE WEEKLY     Discussion/Summary   Discussion Summary: check labs, check STAT doppler r/o DVT - just came back from a road trip  Check L forefoot xray- suspect OA  sertraline to 200 mg per day multivitamin, discussed healthy eating and less smoking  Keep  low dose zocor for elevated cholesterol  check labs in 4months  focusing on your health and getting healthier , not motivated to quit smoking- but willing to try chantix- smokes 2 PPD- new boyfriend is a smoker also  Sarah Crockett Discussed again       Counseling Documentation With Imm: The patient was counseled regarding diagnostic results  Chief Complaint   Chief Complaint Free Text Note Form: Patient is here for a follow up appointment for Vit D Deficiency, Hypertension, Depression, Hypothyroidism  Chief Complaint Chronic Condition [facility]: Patient is here today for follow up of chronic conditions described in HPI  History of Present Illness   HPI: Patient is here for a follow up appointment for Vit D Deficiency, Hypertension, Depression, Hypothyroidism  Hypertension (Follow-Up): The patient presents for follow-up of essential hypertension  The patient states she has been stable with her blood pressure control since the last visit  Symptoms: denies impaired vision,-- denies dyspnea,-- denies intermittent leg claudication-- and-- denies lower extremity edema--       The patient presents with complaints of substernal new onset of chest pain, described as aching, non-radiating Her symptoms are caused by no known event (Feels it may be related to indigestion and gas  When she gets chest pain she takes an Aspirin and it helps  Does not feel like it is a crushing pain)  Associated symptoms include She does report headaches but that is not new  Home monitoring: The patient checks her blood pressure sporadically  Blood pressure control has been good  Medications: the patient is adherent with her medication regimen  -- the patient complains of medication side effects        Disease Management: the patient is doing well with her blood pressure goals  Hyperthyroidism (Follow-Up): The patient is being seen for follow-up of hyperthyroidism  Interval symptoms:  denies heat intolerance,-- denies increased perspiration,-- denies palpitations,-- denies tremor,-- denies hyperactivity,-- denies anxiety,-- denies insomnia-- and-- denies fatigue  Associated symptoms: no weight loss,-- no weight gain,-- no diarrhea,-- no constipation-- and-- no vision changes  Medications:  the patient is not adherent to her medication regimen-- and-- she denies medication side effects  Disease management:  the patient is doing well with her goals  Vitamin D Deficiency: The patient is being seen for follow-up of vitamin D deficiency  Recent laboratory results: date 1/2018, 25-hydroxyvitamin D 23 ng/mL  7/18 level is 18     Review of Systems   Complete-Female:      Constitutional: no fever-- and-- no chills  , +  6 lb weight loss    Eyes: no eye pain,-- no eyesight problems,-- no dryness of the eyes,-- eyes not red-- and-- no itching of the eyes  ENT: no earache,-- no nosebleeds,--  See HPI    Cardiovascular: the heart rate was not slow,-- no chest pain,-- the heart rate was not fast,-- no palpitations-- and-- + lower extremity edema  Respiratory: no shortness of breath-- and-- no wheezing--        Gastrointestinal: no abdominal pain,-- no nausea,-- no vomiting,-- no diarrhea-- and-- no blood in stools  Genitourinary: no dysuria  Integumentary: no rashes  Neurological: as noted in HPI,-- no numbness,-- no tingling,-- no dizziness-- and-- no fainting  Psychiatric: under more stress lately  Hematologic/Lymphatic: no tendency for easy bleeding-- and-- no tendency for easy bruising          Blood pressure 104/76, pulse 96, temperature 97 9 °F (36 6 °C), temperature source Tympanic, weight 102 kg (225 lb 12 8 oz), SpO2 99 %          Physical Exam        Constitutional General appearance: No acute distress, well appearing and well nourished  Eyes      Conjunctiva and lids: No swelling, erythema or discharge  Pupils and irises: Equal, round and reactive to light  Ears, Nose, Mouth, and Throat      External inspection of ears and nose: Normal        Oropharynx: Normal with no erythema, edema, exudate or lesions  Pulmonary      Respiratory effort: No increased work of breathing or signs of respiratory distress  Auscultation of lungs: Clear to auscultation  Cardiovascular      Auscultation of heart: Normal rate and rhythm, normal S1 and S2, without murmurs  Examination of extremities for edema and/or varicosities:    + 1 b/l LE edema     Abdomen      Abdomen: Non-tender, no masses  Liver and spleen: No hepatomegaly or splenomegaly  Musculoskeletal      Gait and station: Normal    TTP L forefoot, FROM    Skin      Skin and subcutaneous tissue: Normal without rashes or lesions  Neurologic      Cranial nerves: Cranial nerves 2-12 intact         Psychiatric      Orientation to person, place, and time: Normal        Mood and affect: Normal

## 2019-03-08 PROCEDURE — 93970 EXTREMITY STUDY: CPT | Performed by: SURGERY

## 2019-03-19 ENCOUNTER — TELEPHONE (OUTPATIENT)
Dept: INTERNAL MEDICINE CLINIC | Facility: CLINIC | Age: 49
End: 2019-03-19

## 2019-03-19 NOTE — TELEPHONE ENCOUNTER
Patient called and stated that she is going out of town for a month and is need of her Oxycodone to be refilled before she leaves  She states that pain management has been in control of prescribing her Oxycodone, but they will not give it to her before she leaves  She is asking if you could send it into the pharmacy so she has it before she leaves  If you could please look into this thank you

## 2019-03-19 NOTE — TELEPHONE ENCOUNTER
No, she must follow up with pain mgmt recommendations otherwise they will discharge her from the practice and she will not be allowed to see them again  I advise she change her travel plans   thanks

## 2019-06-09 DIAGNOSIS — F41.8 DEPRESSION WITH ANXIETY: ICD-10-CM

## 2019-06-10 RX ORDER — DIAZEPAM 10 MG/1
TABLET ORAL
Qty: 30 TABLET | Refills: 1 | OUTPATIENT
Start: 2019-06-10

## 2019-07-03 DIAGNOSIS — F41.8 DEPRESSION WITH ANXIETY: ICD-10-CM

## 2019-07-08 DIAGNOSIS — F41.8 DEPRESSION WITH ANXIETY: ICD-10-CM

## 2019-07-08 RX ORDER — DIAZEPAM 10 MG/1
TABLET ORAL
Qty: 30 TABLET | Refills: 1 | OUTPATIENT
Start: 2019-07-08

## 2019-07-16 ENCOUNTER — OFFICE VISIT (OUTPATIENT)
Dept: INTERNAL MEDICINE CLINIC | Age: 49
End: 2019-07-16
Payer: COMMERCIAL

## 2019-07-16 VITALS
SYSTOLIC BLOOD PRESSURE: 122 MMHG | HEART RATE: 78 BPM | BODY MASS INDEX: 34.08 KG/M2 | WEIGHT: 219.2 LBS | DIASTOLIC BLOOD PRESSURE: 84 MMHG | TEMPERATURE: 97.8 F | OXYGEN SATURATION: 98 %

## 2019-07-16 DIAGNOSIS — E55.9 VITAMIN D DEFICIENCY: ICD-10-CM

## 2019-07-16 DIAGNOSIS — F41.8 DEPRESSION WITH ANXIETY: ICD-10-CM

## 2019-07-16 DIAGNOSIS — F33.41 RECURRENT MAJOR DEPRESSIVE DISORDER, IN PARTIAL REMISSION (HCC): ICD-10-CM

## 2019-07-16 DIAGNOSIS — K21.00 GASTROESOPHAGEAL REFLUX DISEASE WITH ESOPHAGITIS: ICD-10-CM

## 2019-07-16 DIAGNOSIS — Z72.0 TOBACCO ABUSE DISORDER: ICD-10-CM

## 2019-07-16 DIAGNOSIS — Z12.39 SCREENING FOR BREAST CANCER: Primary | ICD-10-CM

## 2019-07-16 DIAGNOSIS — E03.9 ACQUIRED HYPOTHYROIDISM: ICD-10-CM

## 2019-07-16 DIAGNOSIS — F41.9 ANXIETY: ICD-10-CM

## 2019-07-16 DIAGNOSIS — I10 BENIGN HYPERTENSION: ICD-10-CM

## 2019-07-16 DIAGNOSIS — E78.00 HYPERCHOLESTEROLEMIA: ICD-10-CM

## 2019-07-16 PROBLEM — R07.89 OTHER CHEST PAIN: Status: RESOLVED | Noted: 2018-11-08 | Resolved: 2019-07-16

## 2019-07-16 PROCEDURE — 99214 OFFICE O/P EST MOD 30 MIN: CPT | Performed by: FAMILY MEDICINE

## 2019-07-16 PROCEDURE — 4004F PT TOBACCO SCREEN RCVD TLK: CPT | Performed by: FAMILY MEDICINE

## 2019-07-16 PROCEDURE — 3074F SYST BP LT 130 MM HG: CPT | Performed by: FAMILY MEDICINE

## 2019-07-16 RX ORDER — LEVOTHYROXINE SODIUM 88 UG/1
88 TABLET ORAL DAILY
Qty: 90 TABLET | Refills: 1 | Status: SHIPPED | OUTPATIENT
Start: 2019-07-16 | End: 2019-11-11 | Stop reason: SDUPTHER

## 2019-07-16 RX ORDER — ERGOCALCIFEROL 1.25 MG/1
50000 CAPSULE ORAL WEEKLY
Qty: 12 CAPSULE | Refills: 1 | Status: SHIPPED | OUTPATIENT
Start: 2019-07-16 | End: 2019-11-19 | Stop reason: SDUPTHER

## 2019-07-16 RX ORDER — SERTRALINE HYDROCHLORIDE 100 MG/1
100 TABLET, FILM COATED ORAL 2 TIMES DAILY
Qty: 180 TABLET | Refills: 1 | Status: SHIPPED | OUTPATIENT
Start: 2019-07-16 | End: 2019-11-11 | Stop reason: SDUPTHER

## 2019-07-16 RX ORDER — PANTOPRAZOLE SODIUM 40 MG/1
40 TABLET, DELAYED RELEASE ORAL 2 TIMES DAILY PRN
Qty: 180 TABLET | Refills: 1 | Status: SHIPPED | OUTPATIENT
Start: 2019-07-16 | End: 2019-11-11 | Stop reason: SDUPTHER

## 2019-07-16 RX ORDER — VARENICLINE TARTRATE 0.5 MG/1
0.5 TABLET, FILM COATED ORAL 2 TIMES DAILY
Qty: 60 TABLET | Refills: 0 | Status: SHIPPED | OUTPATIENT
Start: 2019-07-16 | End: 2020-03-23 | Stop reason: ALTCHOICE

## 2019-07-16 RX ORDER — AMLODIPINE BESYLATE 5 MG/1
5 TABLET ORAL DAILY
Qty: 90 TABLET | Refills: 1 | Status: SHIPPED | OUTPATIENT
Start: 2019-07-16 | End: 2019-11-11 | Stop reason: SDUPTHER

## 2019-07-16 RX ORDER — DIAZEPAM 10 MG/1
10 TABLET ORAL EVERY 6 HOURS PRN
Qty: 30 TABLET | Refills: 1 | Status: SHIPPED | OUTPATIENT
Start: 2019-07-16 | End: 2019-11-11 | Stop reason: SDUPTHER

## 2019-07-16 RX ORDER — SIMVASTATIN 20 MG
20 TABLET ORAL
Qty: 90 TABLET | Refills: 1 | Status: SHIPPED | OUTPATIENT
Start: 2019-07-16 | End: 2019-11-11 | Stop reason: SDUPTHER

## 2019-07-16 RX ORDER — ALBUTEROL SULFATE 2.5 MG/3ML
2.5 SOLUTION RESPIRATORY (INHALATION) EVERY 6 HOURS PRN
Qty: 10 VIAL | Refills: 0 | Status: SHIPPED | OUTPATIENT
Start: 2019-07-16 | End: 2019-11-11 | Stop reason: SDUPTHER

## 2019-07-16 RX ORDER — VARENICLINE TARTRATE 0.5 MG/1
0.5 TABLET, FILM COATED ORAL 2 TIMES DAILY
Qty: 60 TABLET | Refills: 0 | Status: SHIPPED | OUTPATIENT
Start: 2019-07-16 | End: 2019-07-16 | Stop reason: SDUPTHER

## 2019-07-16 NOTE — PROGRESS NOTES
BMI Counseling: Body mass index is 34 08 kg/m²  Discussed the patient's BMI with her  The BMI is above average  BMI counseling and education was provided to the patient  Nutrition recommendations include reducing portion sizes, decreasing overall calorie intake, 3-5 servings of fruits/vegetables daily, reducing fast food intake, consuming healthier snacks, decreasing soda and/or juice intake, moderation in carbohydrate intake, increasing intake of lean protein, reducing intake of saturated fat and trans fat and reducing intake of cholesterol  Exercise recommendations include moderate aerobic physical activity for 150 minutes/week  Assessment   1  Benign hypertension (401 1) (I10)   2  Depression (311) (F32 9)   3  Hypothyroidism (244 9) (E03 9)   4  Vitamin D deficiency (268 9) (E55 9)   5  Tobacco abuse disorder (305 1) (Z72 0)   6  Hypercholesterolemia (272 0) (E78 00)     Plan   Benign hypertension    ·: AmLODIPine Besylate 5 MG Oral Tablet; TAKE 1 TABLET DAILY   HydroCHLOROthiazide 25 MG Oral Tablet; TAKE 1 TABLET DAILY  Depression    ·  · : Sertraline HCl - 100 MG Oral Tablet;  Take 1 tablet twice daily  Gastroesophageal reflux disease with esophagitis    ·  Pantoprazole Sodium 40 MG Oral Tablet Delayed Release; take one tablet once daily  Hypercholesterolemia    · keep  Simvastatin 20 MG Oral Tablet (Zocor); TAKE 1 TABLET AT BEDTIME   · (1) CK (CPK);    · (1) LIPID PANEL FASTING W DIRECT LDL REFLEX;   Hypothyroidism    · Levothyroxine Sodium 88 MCG Oral Tablet; TAKE 1 TABLET DAILY AS DIRECTED   · (1) CBC/ PLT (NO DIFF);     · (1) T4, FREE;  · (1) TSH;      Seasonal allergies    ·  ProAir  (90 Base) MCG/ACT Inhalation Aerosol Solution; INHALE 1 TO 2 PUFFS    EVERY 4 TO 6 HOURS AS NEEDED  Vitamin D deficiency    ·: Vitamin D (Ergocalciferol) 24893 UNIT Oral Capsule; TAKE 1 CAPSULE WEEKLY     Discussion/Summary   Discussion Summary:         sertraline to 200 mg per day multivitamin, discussed healthy eating and less smoking  Keep  low dose zocor for elevated cholesterol  check labs in 4 months  focusing on your health and getting healthier , not motivated to quit smoking- but willing to try chantix- smokes 2 PPD- new boyfriend is a smoker also  Farncois Bowers Discussed again  Check labs for next appt, return in 1 month for second pack of chantix  BMI Counseling: Body mass index is 34 08 kg/m²  Discussed the patient's BMI with her  The BMI is above average  BMI counseling and education was provided to the patient  Nutrition recommendations include reducing portion sizes  Tobacco Cessation Counseling: Tobacco cessation counseling and education was provided  The patient is sincerely urged to quit consumption of tobacco  She is ready to quit tobacco  The numerous health risks of tobacco consumption were discussed  Prescribed the following medications: varenicline (Chantix)       Counseling Documentation With Imm: The patient was counseled regarding diagnostic results  Chief Complaint   Chief Complaint Free Text Note Form: Patient is here for a follow up appointment for Vit D Deficiency, Hypertension, Depression, Hypothyroidism  Chief Complaint Chronic Condition [facility]: Patient is here today for follow up of chronic conditions described in HPI  History of Present Illness   HPI: Patient is here for a follow up appointment for Vit D Deficiency, Hypertension, Depression, Hypothyroidism  Patient is a 52year old female with a PMH of HTN, HLD, hypothyroidism, vitamin D deficiency, and tobacco abuse who presents today for follow up with complaint of increased need for her albuterol inhaler x 1 month  She started using this inhaler for several months after getting diagnosed with bronchitis in [place]  She is using the inhaler about once every-other day for SOB that is provoked by walking, smoking, and other exertion  No dyspnea at night  Increased sputum production    She continues to smoke but would like to quit, and is interested in renewing Chantix today  Hypertension (Follow-Up): The patient presents for follow-up of essential hypertension  The patient states she has been stable with her blood pressure control since the last visit  Symptoms: denies impaired vision,-- denies dyspnea,-- denies intermittent leg claudication-- and-- denies lower extremity edema--       The patient presents with complaints of substernal new onset of chest pain, described as aching, non-radiating Her symptoms are caused by no known event (Feels it may be related to indigestion and gas  When she gets chest pain she takes an Aspirin and it helps  Does not feel like it is a crushing pain)  Associated symptoms include She does report headaches but that is not new  Home monitoring: The patient checks her blood pressure sporadically  Blood pressure control has been good  Medications: the patient is adherent with her medication regimen  -- the patient complains of medication side effects  Disease Management: the patient is doing well with her blood pressure goals  Hyperthyroidism (Follow-Up): The patient is being seen for follow-up of hyperthyroidism  Interval symptoms:  denies heat intolerance,-- denies increased perspiration,-- denies palpitations,-- denies tremor,-- denies hyperactivity,-- denies anxiety,-- denies insomnia-- and-- denies fatigue  Associated symptoms: no weight loss,-- no weight gain,-- no diarrhea,-- no constipation-- and-- no vision changes  Medications:  the patient is not adherent to her medication regimen-- and-- she denies medication side effects  Disease management:  the patient is doing well with her goals  Vitamin D Deficiency: The patient is being seen for follow-up of vitamin D deficiency  Recent laboratory results: date 1/2018, 25-hydroxyvitamin D 23 ng/mL    7/18 level is 18    Anxiety and depression: stable with present medications, no HI or SI  compliant with meds  tobacco abuse : since age 6, 2-3 packs per day recently, never started chantix         She does not check her BP at home  Continues to have sleep difficulties, decreased appetite, and low energy with depression  She is not having anhedonia, poor concentration, or current suicidal ideation  She does not drink much water, but lots of Pepsi  Eating about 1x/day, usually a sandwich and chips  She does not exercise  Review of Systems   Complete-Female:      Constitutional: no fever-- and-- no chills  ,   6 lb weight loss    Eyes: no eye pain,-- no eyesight problems,-- no dryness of the eyes,-- eyes not red-- and-- no itching of the eyes  Cardiovascular: the heart rate was not slow,-- no chest pain,-- the heart rate was not fast,-- no palpitations-- and-- + lower extremity edema  Respiratory: no shortness of breath-- and-- no wheezing--        Gastrointestinal: no abdominal pain,-- no nausea,-- no vomiting,-- no diarrhea-- and-- no blood in stools  Genitourinary: no dysuria  Integumentary: no rashes  Neurological:-- no numbness,-- no tingling,-- no dizziness-- and-- no fainting  Psychiatric: under more stress lately  Hematologic/Lymphatic: no tendency for easy bleeding-- and-- no tendency for easy bruising          Blood pressure 122/84, pulse 78, temperature 97 8 °F (36 6 °C), temperature source Tympanic, weight 99 4 kg (219 lb 3 2 oz), SpO2 98 %  Physical Exam        Constitutional      General appearance: No acute distress, well appearing and well nourished  Eyes      Conjunctiva and lids: No swelling, erythema or discharge  Pupils and irises: Equal, round and reactive to light  Ears, Nose, Mouth, and Throat      External inspection of ears and nose: Normal        Oropharynx: Normal with no erythema, edema, exudate or lesions         Pulmonary      Respiratory effort: No increased work of breathing or signs of respiratory distress  Auscultation of lungs: Clear to auscultation  Cardiovascular      Auscultation of heart: Normal rate and rhythm, normal S1 and S2, without murmurs  Examination of extremities for edema and/or varicosities:    + 1 b/l LE edema     Abdomen      Abdomen: Non-tender, no masses  Liver and spleen: No hepatomegaly or splenomegaly  Musculoskeletal      Gait and station: Normal    TTP L forefoot, FROM    Skin      Skin and subcutaneous tissue: Normal without rashes or lesions  Neurologic      Cranial nerves: Cranial nerves 2-12 intact         Psychiatric      Orientation to person, place, and time: Normal        Mood and affect: Normal

## 2019-08-07 ENCOUNTER — TELEPHONE (OUTPATIENT)
Dept: INTERNAL MEDICINE CLINIC | Age: 49
End: 2019-08-07

## 2019-08-07 NOTE — TELEPHONE ENCOUNTER
Patient can't come in today due to no ride and also just had injections in her back and unable to get up to go  Hard to walk with this problem    Patient has a pulled muscle in the groin area on the left side  Patient has tried a heating pad on it last night without relief  Patient wasn't sure what she can use OTC for this as well and also would ice help it as well      Please call her back on the mobile

## 2019-08-07 NOTE — TELEPHONE ENCOUNTER
Pt called  Reports she pulled a muscle in her groin  She tried heating pad with no success  She also took her percocet 10mg tab approx 1 hour ago for her back pain  She did have an epidural steroid injection [facility] today  She also has valium that she takes for anxiety primarily, not often for muscle spasms  I recommend she take 1/2 tab (5mg) in 1-2 hours - d/w pt to avoid taking valium and percocet concurrently  Pt to call office back if persists so she can be seen

## 2019-11-05 ENCOUNTER — TELEPHONE (OUTPATIENT)
Dept: INTERNAL MEDICINE CLINIC | Facility: CLINIC | Age: 49
End: 2019-11-05

## 2019-11-05 NOTE — TELEPHONE ENCOUNTER
Patient called office requesting med refills but did not state which med she needed    Called back and [facility]

## 2019-11-06 NOTE — TELEPHONE ENCOUNTER
Message left for patient to call back and leave the name of med, dose and how often she takes the medication daily on the prescription line  She is to also state the pharmacy that she would like the prescriptions filled at

## 2019-11-07 DIAGNOSIS — F41.8 DEPRESSION WITH ANXIETY: ICD-10-CM

## 2019-11-07 DIAGNOSIS — Z72.0 TOBACCO ABUSE DISORDER: ICD-10-CM

## 2019-11-08 RX ORDER — ALBUTEROL SULFATE 2.5 MG/3ML
2.5 SOLUTION RESPIRATORY (INHALATION) EVERY 6 HOURS PRN
Qty: 100 VIAL | Refills: 1 | OUTPATIENT
Start: 2019-11-08

## 2019-11-08 RX ORDER — DIAZEPAM 10 MG/1
10 TABLET ORAL EVERY 6 HOURS PRN
Qty: 30 TABLET | Refills: 1 | OUTPATIENT
Start: 2019-11-08

## 2019-11-11 ENCOUNTER — OFFICE VISIT (OUTPATIENT)
Dept: INTERNAL MEDICINE CLINIC | Facility: CLINIC | Age: 49
End: 2019-11-11
Payer: COMMERCIAL

## 2019-11-11 VITALS
HEART RATE: 75 BPM | SYSTOLIC BLOOD PRESSURE: 102 MMHG | OXYGEN SATURATION: 99 % | DIASTOLIC BLOOD PRESSURE: 72 MMHG | WEIGHT: 231.8 LBS | TEMPERATURE: 98.1 F | HEIGHT: 67 IN | BODY MASS INDEX: 36.38 KG/M2

## 2019-11-11 DIAGNOSIS — F41.8 DEPRESSION WITH ANXIETY: ICD-10-CM

## 2019-11-11 DIAGNOSIS — I10 BENIGN HYPERTENSION: ICD-10-CM

## 2019-11-11 DIAGNOSIS — Z23 FLU VACCINE NEED: Primary | ICD-10-CM

## 2019-11-11 DIAGNOSIS — E03.9 ACQUIRED HYPOTHYROIDISM: ICD-10-CM

## 2019-11-11 DIAGNOSIS — E78.00 HYPERCHOLESTEROLEMIA: ICD-10-CM

## 2019-11-11 DIAGNOSIS — Z72.0 TOBACCO ABUSE DISORDER: ICD-10-CM

## 2019-11-11 DIAGNOSIS — F33.41 RECURRENT MAJOR DEPRESSIVE DISORDER, IN PARTIAL REMISSION (HCC): ICD-10-CM

## 2019-11-11 DIAGNOSIS — E55.9 VITAMIN D DEFICIENCY: ICD-10-CM

## 2019-11-11 DIAGNOSIS — K21.00 GASTROESOPHAGEAL REFLUX DISEASE WITH ESOPHAGITIS: ICD-10-CM

## 2019-11-11 PROCEDURE — 90682 RIV4 VACC RECOMBINANT DNA IM: CPT

## 2019-11-11 PROCEDURE — 99214 OFFICE O/P EST MOD 30 MIN: CPT | Performed by: FAMILY MEDICINE

## 2019-11-11 PROCEDURE — 36415 COLL VENOUS BLD VENIPUNCTURE: CPT

## 2019-11-11 PROCEDURE — 90471 IMMUNIZATION ADMIN: CPT

## 2019-11-11 RX ORDER — IBUPROFEN 600 MG/1
600 TABLET ORAL AS NEEDED
Refills: 0 | COMMUNITY
Start: 2019-08-07 | End: 2022-07-23

## 2019-11-11 RX ORDER — SERTRALINE HYDROCHLORIDE 100 MG/1
100 TABLET, FILM COATED ORAL 2 TIMES DAILY
Qty: 180 TABLET | Refills: 1 | Status: SHIPPED | OUTPATIENT
Start: 2019-11-11 | End: 2020-05-12 | Stop reason: SDUPTHER

## 2019-11-11 RX ORDER — AMLODIPINE BESYLATE 5 MG/1
5 TABLET ORAL DAILY
Qty: 90 TABLET | Refills: 1 | Status: SHIPPED | OUTPATIENT
Start: 2019-11-11 | End: 2020-05-12 | Stop reason: SDUPTHER

## 2019-11-11 RX ORDER — DIAZEPAM 10 MG/1
10 TABLET ORAL EVERY 6 HOURS PRN
Qty: 30 TABLET | Refills: 1 | Status: SHIPPED | OUTPATIENT
Start: 2019-11-11 | End: 2020-05-12 | Stop reason: SDUPTHER

## 2019-11-11 RX ORDER — PANTOPRAZOLE SODIUM 40 MG/1
40 TABLET, DELAYED RELEASE ORAL 2 TIMES DAILY PRN
Qty: 180 TABLET | Refills: 1 | Status: SHIPPED | OUTPATIENT
Start: 2019-11-11 | End: 2020-05-12 | Stop reason: SDUPTHER

## 2019-11-11 RX ORDER — LEVOTHYROXINE SODIUM 88 UG/1
88 TABLET ORAL DAILY
Qty: 90 TABLET | Refills: 1 | Status: SHIPPED | OUTPATIENT
Start: 2019-11-11 | End: 2020-05-12 | Stop reason: SDUPTHER

## 2019-11-11 RX ORDER — SIMVASTATIN 20 MG
20 TABLET ORAL
Qty: 90 TABLET | Refills: 1 | Status: SHIPPED | OUTPATIENT
Start: 2019-11-11 | End: 2020-05-12 | Stop reason: SDUPTHER

## 2019-11-11 RX ORDER — GABAPENTIN 100 MG/1
100 CAPSULE ORAL 2 TIMES DAILY
Refills: 0 | COMMUNITY
Start: 2019-10-04

## 2019-11-11 RX ORDER — ALBUTEROL SULFATE 2.5 MG/3ML
2.5 SOLUTION RESPIRATORY (INHALATION) EVERY 6 HOURS PRN
Qty: 30 VIAL | Refills: 1 | Status: SHIPPED | OUTPATIENT
Start: 2019-11-11 | End: 2020-05-12 | Stop reason: SDUPTHER

## 2019-11-11 NOTE — PROGRESS NOTES
BMI Counseling: Body mass index is 36 31 kg/m²  Discussed the patient's BMI with her  The BMI is above average  BMI counseling and education was provided to the patient  Nutrition recommendations include reducing portion sizes, decreasing overall calorie intake, 3-5 servings of fruits/vegetables daily, reducing fast food intake, consuming healthier snacks, decreasing soda and/or juice intake, moderation in carbohydrate intake, increasing intake of lean protein, reducing intake of saturated fat and trans fat and reducing intake of cholesterol  Exercise recommendations include moderate aerobic physical activity for 150 minutes/week  Assessment   1  Benign hypertension (401 1) (I10)   2  Depression (311) (F32 9)   3  Hypothyroidism (244 9) (E03 9)   4  Vitamin D deficiency (268 9) (E55 9)   5  Tobacco abuse disorder (305 1) (Z72 0)   6  Hypercholesterolemia (272 0) (E78 00)     Plan   Benign hypertension    ·: AmLODIPine Besylate 5 MG Oral Tablet; TAKE 1 TABLET DAILY   HydroCHLOROthiazide 25 MG Oral Tablet; TAKE 1 TABLET DAILY  Depression    ·  · : Sertraline HCl - 100 MG Oral Tablet;  Take 1 tablet twice daily  Gastroesophageal reflux disease with esophagitis    ·  Pantoprazole Sodium 40 MG Oral Tablet Delayed Release; take one tablet once daily  Hypercholesterolemia    · keep  Simvastatin 20 MG Oral Tablet (Zocor); TAKE 1 TABLET AT BEDTIME   · (1) CK (CPK);    · (1) LIPID PANEL FASTING W DIRECT LDL REFLEX;   Hypothyroidism    · Levothyroxine Sodium 88 MCG Oral Tablet; TAKE 1 TABLET DAILY AS DIRECTED   · (1) CBC/ PLT (NO DIFF);     · (1) T4, FREE;  · (1) TSH;      Seasonal allergies    ·  ProAir  (90 Base) MCG/ACT Inhalation Aerosol Solution; INHALE 1 TO 2 PUFFS    EVERY 4 TO 6 HOURS AS NEEDED  Vitamin D deficiency    ·: Vitamin D (Ergocalciferol) 34486 UNIT Oral Capsule; TAKE 1 CAPSULE WEEKLY     Discussion/Summary   Discussion Summary:         sertraline to 200 mg per day multivitamin, discussed healthy eating and less smoking  Keep  low dose zocor for elevated cholesterol  check labs in 4 months  focusing on your health and getting healthier , not motivated to quit smoking- but willing to try chantix- smokes 2 PPD- new boyfriend is a smoker also  Ewavlad Latif Discussed again  Check labs for next appt, return in 1 month for second pack of chantix  BMI Counseling: Body mass index is 36 31 kg/m²  Discussed the patient's BMI with her  The BMI is above average  BMI counseling and education was provided to the patient  Nutrition recommendations include reducing portion sizes  Tobacco Cessation Counseling: Tobacco cessation counseling and education was provided  The patient is sincerely urged to quit consumption of tobacco  She is ready to quit tobacco  The numerous health risks of tobacco consumption were discussed  Prescribed the following medications: varenicline (Chantix)       Counseling Documentation With Imm: The patient was counseled regarding diagnostic results  Chief Complaint   Chief Complaint Free Text Note Form: Patient is here for a follow up appointment for Vit D Deficiency, Hypertension, Depression, Hypothyroidism  Chief Complaint Chronic Condition [facility]: Patient is here today for follow up of chronic conditions described in HPI  History of Present Illness   HPI: Patient is here for a follow up appointment for Vit D Deficiency, Hypertension, Depression, Hypothyroidism  Patient is a 52year old female with a PMH of HTN, HLD, hypothyroidism, vitamin D deficiency, and tobacco abuse who presents today for follow up with complaint of increased need for her albuterol inhaler x 1 month  She started using this inhaler for several months after getting diagnosed with bronchitis in [place]  She is using the inhaler about once every-other day for SOB that is provoked by walking, smoking, and other exertion  No dyspnea at night  Increased sputum production    She continues to smoke but would like to quit, and is interested in renewing Chantix today  Hypertension (Follow-Up): The patient presents for follow-up of essential hypertension  The patient states she has been stable with her blood pressure control since the last visit  Symptoms: denies impaired vision,-- denies dyspnea,-- denies intermittent leg claudication-- and-- denies lower extremity edema--       The patient presents with complaints of substernal new onset of chest pain, described as aching, non-radiating Her symptoms are caused by no known event (Feels it may be related to indigestion and gas  When she gets chest pain she takes an Aspirin and it helps  Does not feel like it is a crushing pain)  Associated symptoms include She does report headaches but that is not new  Home monitoring: The patient checks her blood pressure sporadically  Blood pressure control has been good  Medications: the patient is adherent with her medication regimen  -- the patient complains of medication side effects  Disease Management: the patient is doing well with her blood pressure goals  Hyperthyroidism (Follow-Up): The patient is being seen for follow-up of hyperthyroidism  Interval symptoms:  denies heat intolerance,-- denies increased perspiration,-- denies palpitations,-- denies tremor,-- denies hyperactivity,-- denies anxiety,-- denies insomnia-- and-- denies fatigue  Associated symptoms: no weight loss,-- no weight gain,-- no diarrhea,-- no constipation-- and-- no vision changes  Medications:  the patient is not adherent to her medication regimen-- and-- she denies medication side effects  Disease management:  the patient is doing well with her goals  Vitamin D Deficiency: The patient is being seen for follow-up of vitamin D deficiency  Recent laboratory results: date 1/2018, 25-hydroxyvitamin D 23 ng/mL    7/18 level is 18    Anxiety and depression: stable with present medications, no HI or SI  compliant with meds  tobacco abuse : since age 6, 2-3 packs per day recently, never started chantix         She does not check her BP at home  Continues to have sleep difficulties, decreased appetite, and low energy with depression  She is not having anhedonia, poor concentration, or current suicidal ideation  She does not drink much water, but lots of Pepsi  Eating about 1x/day, usually a sandwich and chips  She does not exercise  Review of Systems   Complete-Female:      Constitutional: no fever-- and-- no chills  ,   6 lb weight loss    Eyes: no eye pain,-- no eyesight problems,-- no dryness of the eyes,-- eyes not red-- and-- no itching of the eyes  Cardiovascular: the heart rate was not slow,-- no chest pain,-- the heart rate was not fast,-- no palpitations-- and-- + lower extremity edema  Respiratory: no shortness of breath-- and-- no wheezing--        Gastrointestinal: no abdominal pain,-- no nausea,-- no vomiting,-- no diarrhea-- and-- no blood in stools  Genitourinary: no dysuria  Integumentary: no rashes  Neurological:-- no numbness,-- no tingling,-- no dizziness-- and-- no fainting  Psychiatric: under more stress lately  Hematologic/Lymphatic: no tendency for easy bleeding-- and-- no tendency for easy bruising          Blood pressure 102/72, pulse 75, temperature 98 1 °F (36 7 °C), temperature source Oral, height 5' 7" (1 702 m), weight 105 kg (231 lb 12 8 oz), SpO2 99 %  Physical Exam        Constitutional      General appearance: No acute distress, well appearing and well nourished  Eyes      Conjunctiva and lids: No swelling, erythema or discharge  Pupils and irises: Equal, round and reactive to light  Ears, Nose, Mouth, and Throat      External inspection of ears and nose: Normal        Oropharynx: Normal with no erythema, edema, exudate or lesions         Pulmonary      Respiratory effort: No increased work of breathing or signs of respiratory distress  Auscultation of lungs: Clear to auscultation  Cardiovascular      Auscultation of heart: Normal rate and rhythm, normal S1 and S2, without murmurs  Examination of extremities for edema and/or varicosities:    + 1 b/l LE edema     Abdomen      Abdomen: Non-tender, no masses  Liver and spleen: No hepatomegaly or splenomegaly  Musculoskeletal      Gait and station: Normal    TTP L forefoot, FROM    Skin      Skin and subcutaneous tissue: Normal without rashes or lesions  Neurologic      Cranial nerves: Cranial nerves 2-12 intact         Psychiatric      Orientation to person, place, and time: Normal        Mood and affect: Normal

## 2019-11-11 NOTE — PROGRESS NOTES
Assessment/Plan:    No problem-specific Assessment & Plan notes found for this encounter  Problem List Items Addressed This Visit        Digestive    Gastroesophageal reflux disease with esophagitis    Relevant Medications    pantoprazole (PROTONIX) 40 mg tablet       Endocrine    Hypothyroidism    Relevant Medications    levothyroxine 88 mcg tablet    Other Relevant Orders    TSH, 3rd generation       Cardiovascular and Mediastinum    Benign hypertension    Relevant Medications    amLODIPine (NORVASC) 5 mg tablet       Other    Depression    Relevant Medications    diazepam (VALIUM) 10 mg tablet    sertraline (ZOLOFT) 100 mg tablet    Hypercholesterolemia    Relevant Medications    simvastatin (ZOCOR) 20 mg tablet    Other Relevant Orders    Comprehensive metabolic panel    CBC and differential    Lipid panel    Tobacco abuse disorder    Relevant Medications    albuterol (2 5 mg/3 mL) 0 083 % nebulizer solution    Vitamin D deficiency    Relevant Orders    Vitamin D 25 hydroxy      Other Visit Diagnoses     Flu vaccine need    -  Primary    Relevant Orders    influenza vaccine, [id number], quadrivalent, recombinant, PF, 0 5 mL, for patients 18 yr+ (FLUBLOK) (Completed)    Depression with anxiety        Relevant Medications    diazepam (VALIUM) 10 mg tablet    sertraline (ZOLOFT) 100 mg tablet              Discussion, refilled all medications today  Patient is compliant  Discussed nutrition and heavy intake of sugary drinks as well as tobacco cessation  Check lab work today and routine follow-up 6 months  May need repeat labs at that time as well  recomm KETO diet      Tobacco Cessation Counseling: Tobacco cessation counseling was provided  The patient is sincerely urged to quit consumption of tobacco  She is not ready to quit tobacco  Medication options and side effects of medication discussed  Subjective:  Routine follow-up, due for labs     Patient ID: Antwon Alva is a 52 y o  female  HPI  Hypertension (Follow-Up): The patient presents for follow-up of essential hypertension  The patient states she has been stable with her blood pressure control since the last visit  Symptoms: denies impaired vision,-- denies dyspnea,-- denies intermittent leg claudication-- and-- denies lower extremity edema--       The patient presents with complaints of substernal new onset of chest pain, described as aching, non-radiating Her symptoms are caused by no known event (Feels it may be related to indigestion and gas  When she gets chest pain she takes an Aspirin and it helps  Does not feel like it is a crushing pain)  Associated symptoms include She does report headaches but that is not new  Home monitoring: The patient checks her blood pressure sporadically  Blood pressure control has been good  Medications: the patient is adherent with her medication regimen  -- the patient complains of medication side effects  Disease Management: the patient is doing well with her blood pressure goals  Hyperthyroidism (Follow-Up): The patient is being seen for follow-up of hyperthyroidism  Interval symptoms:  denies heat intolerance,-- denies increased perspiration,-- denies palpitations,-- denies tremor,-- denies hyperactivity,-- denies anxiety,-- denies insomnia-- and-- denies fatigue  Associated symptoms: no weight loss,-- no weight gain,-- no diarrhea,-- no constipation-- and-- no vision changes  Medications:  the patient is not adherent to her medication regimen-- and-- she denies medication side effects  Disease management:  the patient is doing well with her goals  Vitamin D Deficiency: The patient is being seen for follow-up of vitamin D deficiency  Recent laboratory results: date 1/2018, 25-hydroxyvitamin D 23 ng/mL  7/18 level is 18    Anxiety and depression: stable with present medications, no HI or SI  compliant with meds      tobacco abuse : since age 6, 2-3 packs per day recently, never started chantix November 2019, Chantix neck covered by her insurance company and she does not want to pay out-of-pocket for  Still smoking         She does not check her BP at home  Continues to have sleep difficulties, decreased appetite, and low energy with depression  She is not having anhedonia, poor concentration, or current suicidal ideation  She does not drink much water, but lots of Pepsi  Eating about 1x/day, usually a sandwich and chips  She does not exercise  November 2019, still drinking a lot of soda  Has gained weight since she is not working    Her job is only for the summers    The following portions of the patient's history were reviewed and updated as appropriate: allergies, current medications, past family history, past medical history, past social history, past surgical history and problem list     Review of Systems      Constitutional:  Denies fever or chills 12 lb weight gain in 4 months  Eyes:  Denies change in visual acuity   HENT:  Denies nasal congestion or sore throat   Respiratory:  Denies cough or shortness of breath or wheezing  Cardiovascular:  Denies palpitations or chest pain  GI:  Denies abdominal pain, nausea, or vomiting  Integument:  Denies rash   Neurologic:  Denies headache or focal weakness        Objective:      /72 (BP Location: Left arm, Patient Position: Sitting, Cuff Size: Standard)   Pulse 75   Temp 98 1 °F (36 7 °C) (Oral)   Ht 5' 7" (1 702 m)   Wt 105 kg (231 lb 12 8 oz) Comment: with shoes  SpO2 99% Comment: ra  BMI 36 31 kg/m²          Physical Exam      Constitutional:  Well developed, well nourished, no acute distress, non-toxic appearance , strong tobacco odor present  Eyes:  PERRL, conjunctiva normal , non icteric sclera  HENT:  Atraumatic, oropharynx moist  Neck-  supple   Respiratory:  CTA b/l, normal breath sounds, no rales, no wheezing   Cardiovascular:  RRR, no murmurs, no LE edema b/l  GI:  Soft, nondistended, normal bowel sounds x 4, nontender, no organomegaly, no mass, no rebound, no guarding   Neurologic:  no focal deficits noted   Psychiatric:  Speech and behavior appropriate , AAO x 3

## 2019-11-12 LAB
25(OH)D3 SERPL-MCNC: 18 NG/ML (ref 30–100)
ALBUMIN SERPL-MCNC: 4 G/DL (ref 3.6–5.1)
ALBUMIN/GLOB SERPL: 1.5 (CALC) (ref 1–2.5)
ALP SERPL-CCNC: 73 U/L (ref 33–115)
ALT SERPL-CCNC: 26 U/L (ref 6–29)
AST SERPL-CCNC: 16 U/L (ref 10–35)
BASOPHILS # BLD AUTO: 37 CELLS/UL (ref 0–200)
BASOPHILS NFR BLD AUTO: 0.5 %
BILIRUB SERPL-MCNC: 0.5 MG/DL (ref 0.2–1.2)
BUN SERPL-MCNC: 12 MG/DL (ref 7–25)
BUN/CREAT SERPL: ABNORMAL (CALC) (ref 6–22)
CALCIUM SERPL-MCNC: 9.1 MG/DL (ref 8.6–10.2)
CHLORIDE SERPL-SCNC: 105 MMOL/L (ref 98–110)
CHOLEST SERPL-MCNC: 174 MG/DL
CHOLEST/HDLC SERPL: 4.2 (CALC)
CO2 SERPL-SCNC: 26 MMOL/L (ref 20–32)
CREAT SERPL-MCNC: 0.78 MG/DL (ref 0.5–1.1)
EOSINOPHIL # BLD AUTO: 133 CELLS/UL (ref 15–500)
EOSINOPHIL NFR BLD AUTO: 1.8 %
ERYTHROCYTE [DISTWIDTH] IN BLOOD BY AUTOMATED COUNT: 14.7 % (ref 11–15)
GLOBULIN SER CALC-MCNC: 2.7 G/DL (CALC) (ref 1.9–3.7)
GLUCOSE SERPL-MCNC: 108 MG/DL (ref 65–99)
HCT VFR BLD AUTO: 44.2 % (ref 35–45)
HDLC SERPL-MCNC: 41 MG/DL
HGB BLD-MCNC: 14.5 G/DL (ref 11.7–15.5)
LDLC SERPL CALC-MCNC: 104 MG/DL (CALC)
LYMPHOCYTES # BLD AUTO: 1894 CELLS/UL (ref 850–3900)
LYMPHOCYTES NFR BLD AUTO: 25.6 %
MCH RBC QN AUTO: 31.2 PG (ref 27–33)
MCHC RBC AUTO-ENTMCNC: 32.8 G/DL (ref 32–36)
MCV RBC AUTO: 95.1 FL (ref 80–100)
MONOCYTES # BLD AUTO: 540 CELLS/UL (ref 200–950)
MONOCYTES NFR BLD AUTO: 7.3 %
NEUTROPHILS # BLD AUTO: 4795 CELLS/UL (ref 1500–7800)
NEUTROPHILS NFR BLD AUTO: 64.8 %
NONHDLC SERPL-MCNC: 133 MG/DL (CALC)
PLATELET # BLD AUTO: 271 THOUSAND/UL (ref 140–400)
PMV BLD REES-ECKER: 10.1 FL (ref 7.5–12.5)
POTASSIUM SERPL-SCNC: 4.4 MMOL/L (ref 3.5–5.3)
PROT SERPL-MCNC: 6.7 G/DL (ref 6.1–8.1)
RBC # BLD AUTO: 4.65 MILLION/UL (ref 3.8–5.1)
SL AMB EGFR AFRICAN AMERICAN: 103 ML/MIN/1.73M2
SL AMB EGFR NON AFRICAN AMERICAN: 89 ML/MIN/1.73M2
SODIUM SERPL-SCNC: 140 MMOL/L (ref 135–146)
TRIGL SERPL-MCNC: 171 MG/DL
TSH SERPL-ACNC: 8.79 MIU/L
WBC # BLD AUTO: 7.4 THOUSAND/UL (ref 3.8–10.8)

## 2019-11-19 DIAGNOSIS — E03.9 ACQUIRED HYPOTHYROIDISM: Primary | ICD-10-CM

## 2019-11-19 DIAGNOSIS — E55.9 VITAMIN D DEFICIENCY: ICD-10-CM

## 2019-11-19 RX ORDER — ERGOCALCIFEROL 1.25 MG/1
50000 CAPSULE ORAL WEEKLY
Qty: 12 CAPSULE | Refills: 1 | Status: SHIPPED | OUTPATIENT
Start: 2019-11-19 | End: 2021-03-29 | Stop reason: SDUPTHER

## 2019-12-12 ENCOUNTER — APPOINTMENT (OUTPATIENT)
Dept: MAMMOGRAPHY | Facility: HOSPITAL | Age: 49
End: 2019-12-12

## 2019-12-13 ENCOUNTER — HOSPITAL ENCOUNTER (OUTPATIENT)
Dept: MAMMOGRAPHY | Facility: HOSPITAL | Age: 49
Discharge: HOME | End: 2019-12-13
Admitting: INTERNAL MEDICINE

## 2019-12-13 DIAGNOSIS — Z12.39 SCREENING BREAST EXAMINATION: ICD-10-CM

## 2019-12-13 PROCEDURE — 77067 SCR MAMMO BI INCL CAD: CPT | Performed by: RADIOLOGY

## 2019-12-13 PROCEDURE — 77063 BREAST TOMOSYNTHESIS BI: CPT

## 2019-12-13 PROCEDURE — 77063 BREAST TOMOSYNTHESIS BI: CPT | Performed by: RADIOLOGY

## 2019-12-13 PROCEDURE — 77067 SCR MAMMO BI INCL CAD: CPT

## 2020-03-23 ENCOUNTER — TELEPHONE (OUTPATIENT)
Dept: INTERNAL MEDICINE CLINIC | Facility: CLINIC | Age: 50
End: 2020-03-23

## 2020-03-23 ENCOUNTER — TELEMEDICINE (OUTPATIENT)
Dept: INTERNAL MEDICINE CLINIC | Facility: CLINIC | Age: 50
End: 2020-03-23
Payer: COMMERCIAL

## 2020-03-23 DIAGNOSIS — J06.9 VIRAL URI WITH COUGH: Primary | ICD-10-CM

## 2020-03-23 PROCEDURE — G2012 BRIEF CHECK IN BY MD/QHP: HCPCS | Performed by: NURSE PRACTITIONER

## 2020-03-23 RX ORDER — FLUTICASONE PROPIONATE 50 MCG
1 SPRAY, SUSPENSION (ML) NASAL 2 TIMES DAILY
Qty: 16 G | Refills: 5 | Status: SHIPPED | OUTPATIENT
Start: 2020-03-23 | End: 2021-01-15 | Stop reason: SDUPTHER

## 2020-03-23 NOTE — TELEPHONE ENCOUNTER
Patient has a sinus headache and drainage making her cough, she would like something called in [facility] in [facility] for her

## 2020-03-23 NOTE — TELEPHONE ENCOUNTER
Patient needs to be evaluate by tel phone call or video visit in order to send treatment to the pharmacy

## 2020-03-23 NOTE — ASSESSMENT & PLAN NOTE
Refill flonase, as she has not used this for 2-3 months  Start Mucinex DM  Supportive care measures reviewed  Push fluids  Reviewed red flag signs to call the office with or go to the Emergency Department with  Patient verbalizes understanding

## 2020-03-23 NOTE — PROGRESS NOTES
Virtual Regular Visit    Problem List Items Addressed This Visit        Respiratory    Viral URI with cough - Primary     Refill flonase, as she has not used this for 2-3 months  Start Mucinex DM  Supportive care measures reviewed  Push fluids  Reviewed red flag signs to call the office with or go to the Emergency Department with  Patient verbalizes understanding  Relevant Medications    fluticasone (FLONASE) 50 mcg/act nasal spray    dextromethorphan-guaifenesin (MUCINEX DM)  MG per 12 hr tablet               Reason for visit is post nasal drip, cough    Encounter provider ANTONIO Thorpe    Provider located at [address] [place] [address] [address] [facility] [id number]      Recent Visits  No visits were found meeting these conditions  Showing recent visits within past 7 days and meeting all other requirements     Today's Visits  Date Type Provider Dept   03/23/20 Telephone Jd Lennon DO [facility]   Showing today's visits and meeting all other requirements     Future Appointments  No visits were found meeting these conditions  Showing future appointments within next 150 days and meeting all other requirements        After connecting through [employer], the patient was identified by name and date of birth  Yoselin Jessica was informed that this is a telemedicine visit and that the visit is being conducted through telephone which may not be secure and therefore, might not be HIPAA-compliant  My office door was closed  No one else was in the room  She acknowledged consent and understanding of privacy and security of the video platform  The patient has agreed to participate and understands they can discontinue the visit at any time  Ang Hills is a 52 y o  female   She reports that she has been suffering from a cold for 2 weeks   It started with a sore throat and sneezing, and PND, which made her cough  She was able to clear some of   She had amoxicillin from a previous provider/for bronchitis- 6 pills  She finished them last Wednesday  She took OTC Mucinex (generic)  She stopped taking this last Monday  She had taking honey, blackberry yaneth, and hot water to make a "Hot Toddy" to drink at night  This helped a bit and then for the past 2 nights, she has noted the PND has been worsening  She has been taking tylenol for her sinus headache, which she's had for the past 3 days  She has mild relief from the tylenol for symptoms          Past Medical History:   Diagnosis Date    Allergic sinusitis     last assessed - [id number]    Anxiety     last assessed - [id number]    Asthma     last assessed - [id number]    Bilateral lower extremity edema     last assessed - [id number]    Callus of foot     last assessed - [id number]    Chronic GERD     last assessed - [id number]    Constipation     Resolved - [id number]    Depression     last assessed - [id number]    Disease of thyroid gland     Diverticulitis of colon     last assessed - [id number]    Dyspepsia     Resolved - 11SVN9075    Esophageal reflux     last assessed - [id number]    Essential hypertriglyceridemia     last assessed - 72CQV5159    GERD (gastroesophageal reflux disease)     Gynecological disorder     last assessed - [id number]    Hypertension     last assessed - 44EOE7869    Hypotension     last assessed - 38Pwo3313    Migraine     Neuropathy     Pulmonary emphysema ([address] )     last assessed - [id number]    Seasonal allergies     Urinary frequency     last assessed - 44Unj4979    Vagina, candidiasis     last assessed - 82Xoz5648       Past Surgical History:   Procedure Laterality Date    CARPAL TUNNEL RELEASE      last assessed - [id number]    CHOLECYSTECTOMY      last assessed - 30SQN5747    COLONOSCOPY      Complete colonoscopy     EYE SURGERY      last assessed - [id number]   [facility] FOOT SURGERY      last assessed - 14MMP0548    MI ESOPHAGOGASTRODUODENOSCOPY TRANSORAL DIAGNOSTIC N/A 2/13/2017    Procedure: ESOPHAGOGASTRODUODENOSCOPY (EGD); Surgeon: Marisela Silvestre MD;  Location: [facility];   Service: Gastroenterology       Current Outpatient Medications   Medication Sig Dispense Refill    albuterol (2 5 mg/3 mL) 0 083 % nebulizer solution Take 1 vial (2 5 mg total) by nebulization every 6 (six) hours as needed for wheezing or shortness of breath 30 vial 1    albuterol (PROVENTIL HFA,VENTOLIN HFA) 90 mcg/act inhaler Inhale 2 puffs every 6 (six) hours as needed for wheezing      amLODIPine (NORVASC) 5 mg tablet Take 1 tablet (5 mg total) by mouth daily 90 tablet 1    diazepam (VALIUM) 10 mg tablet Take 1 tablet (10 mg total) by mouth every 6 (six) hours as needed for anxiety 30 tablet 1    ergocalciferol (VITAMIN D2) 50,000 units Take 1 capsule (50,000 Units total) by mouth once a week 12 capsule 1    fluticasone (FLONASE) 50 mcg/act nasal spray 1 spray into each nostril 2 (two) times a day 16 g 5    gabapentin (NEURONTIN) 100 mg capsule TAKE ONE TABLET EVERY 8 HOURS FOR NEUROPATHIC PAIN  0    ibuprofen (MOTRIN) 600 mg tablet   0    levothyroxine 88 mcg tablet Take 1 tablet (88 mcg total) by mouth daily 90 tablet 1    oxyCODONE-acetaminophen (PERCOCET)  mg per tablet Take 1 tablet by mouth every 6 (six) hours as needed for moderate pain Max Daily Amount: 4 tablets 30 tablet 0    pantoprazole (PROTONIX) 40 mg tablet Take 1 tablet (40 mg total) by mouth 2 (two) times a day as needed (reflux) 180 tablet 1    sertraline (ZOLOFT) 100 mg tablet Take 1 tablet (100 mg total) by mouth 2 (two) times a day 180 tablet 1    simvastatin (ZOCOR) 20 mg tablet Take 1 tablet (20 mg total) by mouth daily at bedtime 90 tablet 1    sucralfate (CARAFATE) 1 g tablet Take 1 tablet (1 g total) by mouth 3 (three) times a day before meals 90 tablet 5    dextromethorphan-guaifenesin (MUCINEX DM)  MG per 12 hr tablet Take 1 tablet by mouth every 12 (twelve) hours for 14 days 28 tablet 0     No current facility-administered medications for this visit  Allergies   Allergen Reactions    Hydrocodone-Acetaminophen Hives    Ketorolac Hives and Dizziness    Other     Toradol [Ketorolac Tromethamine] Other (See Comments)     hypotension    Ultram [Tramadol] GI Intolerance, Nausea Only and Vomiting       Review of Systems   Constitutional: Positive for fever (felt "hot", did not measure temp, on 3/12)  Negative for chills  HENT: Positive for congestion, postnasal drip, rhinorrhea, sinus pressure, sinus pain, sore throat and trouble swallowing (1 x, only about 3 days ago)  Negative for ear pain  Respiratory: Positive for cough (d/t PND)  Negative for shortness of breath  Cardiovascular: Negative for leg swelling  Gastrointestinal: Positive for diarrhea (chroniv)  Negative for abdominal pain, nausea and vomiting  Musculoskeletal: Negative for gait problem  Skin: Negative for rash  Neurological: Positive for headaches (from sinuses)  Negative for dizziness  I spent 17 minutes with the patient during this visit        [id number]

## 2020-05-12 ENCOUNTER — TELEMEDICINE (OUTPATIENT)
Dept: INTERNAL MEDICINE CLINIC | Age: 50
End: 2020-05-12
Payer: COMMERCIAL

## 2020-05-12 DIAGNOSIS — I10 BENIGN HYPERTENSION: ICD-10-CM

## 2020-05-12 DIAGNOSIS — E78.00 HYPERCHOLESTEROLEMIA: ICD-10-CM

## 2020-05-12 DIAGNOSIS — Z12.39 SCREENING FOR MALIGNANT NEOPLASM OF BREAST: Primary | ICD-10-CM

## 2020-05-12 DIAGNOSIS — E03.9 ACQUIRED HYPOTHYROIDISM: ICD-10-CM

## 2020-05-12 DIAGNOSIS — F33.41 RECURRENT MAJOR DEPRESSIVE DISORDER, IN PARTIAL REMISSION (HCC): ICD-10-CM

## 2020-05-12 DIAGNOSIS — Z72.0 TOBACCO ABUSE DISORDER: ICD-10-CM

## 2020-05-12 DIAGNOSIS — E55.9 VITAMIN D DEFICIENCY: ICD-10-CM

## 2020-05-12 DIAGNOSIS — F41.8 DEPRESSION WITH ANXIETY: ICD-10-CM

## 2020-05-12 DIAGNOSIS — F41.9 ANXIETY: ICD-10-CM

## 2020-05-12 DIAGNOSIS — K21.00 GASTROESOPHAGEAL REFLUX DISEASE WITH ESOPHAGITIS: ICD-10-CM

## 2020-05-12 PROBLEM — J06.9 VIRAL URI WITH COUGH: Status: RESOLVED | Noted: 2020-03-23 | Resolved: 2020-05-12

## 2020-05-12 PROCEDURE — 99214 OFFICE O/P EST MOD 30 MIN: CPT | Performed by: FAMILY MEDICINE

## 2020-05-12 RX ORDER — PANTOPRAZOLE SODIUM 40 MG/1
40 TABLET, DELAYED RELEASE ORAL 2 TIMES DAILY PRN
Qty: 180 TABLET | Refills: 1 | Status: SHIPPED | OUTPATIENT
Start: 2020-05-12 | End: 2021-01-15 | Stop reason: SDUPTHER

## 2020-05-12 RX ORDER — DIAZEPAM 10 MG/1
10 TABLET ORAL EVERY 6 HOURS PRN
Qty: 30 TABLET | Refills: 1 | Status: SHIPPED | OUTPATIENT
Start: 2020-05-12 | End: 2020-08-07 | Stop reason: SDUPTHER

## 2020-05-12 RX ORDER — SERTRALINE HYDROCHLORIDE 100 MG/1
100 TABLET, FILM COATED ORAL 2 TIMES DAILY
Qty: 180 TABLET | Refills: 1 | Status: SHIPPED | OUTPATIENT
Start: 2020-05-12 | End: 2021-01-15 | Stop reason: SDUPTHER

## 2020-05-12 RX ORDER — LEVOTHYROXINE SODIUM 88 UG/1
88 TABLET ORAL DAILY
Qty: 90 TABLET | Refills: 1 | Status: SHIPPED | OUTPATIENT
Start: 2020-05-12 | End: 2021-01-15 | Stop reason: SDUPTHER

## 2020-05-12 RX ORDER — BUPROPION HYDROCHLORIDE 150 MG/1
150 TABLET ORAL EVERY MORNING
Qty: 90 TABLET | Refills: 1 | Status: SHIPPED | OUTPATIENT
Start: 2020-05-12 | End: 2021-01-15 | Stop reason: SDUPTHER

## 2020-05-12 RX ORDER — SUCRALFATE 1 G/1
1 TABLET ORAL
Qty: 270 TABLET | Refills: 1 | Status: SHIPPED | OUTPATIENT
Start: 2020-05-12 | End: 2021-01-15 | Stop reason: SDUPTHER

## 2020-05-12 RX ORDER — AMLODIPINE BESYLATE 5 MG/1
5 TABLET ORAL DAILY
Qty: 90 TABLET | Refills: 1 | Status: SHIPPED | OUTPATIENT
Start: 2020-05-12 | End: 2020-08-07

## 2020-05-12 RX ORDER — SIMVASTATIN 20 MG
20 TABLET ORAL
Qty: 90 TABLET | Refills: 1 | Status: SHIPPED | OUTPATIENT
Start: 2020-05-12 | End: 2021-01-15 | Stop reason: SDUPTHER

## 2020-05-12 RX ORDER — ALBUTEROL SULFATE 2.5 MG/3ML
2.5 SOLUTION RESPIRATORY (INHALATION) EVERY 6 HOURS PRN
Qty: 30 VIAL | Refills: 5 | Status: SHIPPED | OUTPATIENT
Start: 2020-05-12 | End: 2021-12-21 | Stop reason: SDUPTHER

## 2020-06-09 ENCOUNTER — OFFICE VISIT (OUTPATIENT)
Dept: INTERNAL MEDICINE CLINIC | Facility: CLINIC | Age: 50
End: 2020-06-09
Payer: COMMERCIAL

## 2020-06-09 VITALS
DIASTOLIC BLOOD PRESSURE: 74 MMHG | HEART RATE: 88 BPM | WEIGHT: 237 LBS | SYSTOLIC BLOOD PRESSURE: 102 MMHG | TEMPERATURE: 98.9 F | BODY MASS INDEX: 37.2 KG/M2 | OXYGEN SATURATION: 97 % | HEIGHT: 67 IN

## 2020-06-09 DIAGNOSIS — E03.9 ACQUIRED HYPOTHYROIDISM: Primary | ICD-10-CM

## 2020-06-09 DIAGNOSIS — E55.9 VITAMIN D DEFICIENCY: ICD-10-CM

## 2020-06-09 DIAGNOSIS — E78.00 HYPERCHOLESTEROLEMIA: ICD-10-CM

## 2020-06-09 DIAGNOSIS — N76.89 BOIL, VAGINA: ICD-10-CM

## 2020-06-09 PROCEDURE — 3078F DIAST BP <80 MM HG: CPT | Performed by: FAMILY MEDICINE

## 2020-06-09 PROCEDURE — 4004F PT TOBACCO SCREEN RCVD TLK: CPT | Performed by: FAMILY MEDICINE

## 2020-06-09 PROCEDURE — 3008F BODY MASS INDEX DOCD: CPT | Performed by: FAMILY MEDICINE

## 2020-06-09 PROCEDURE — 99213 OFFICE O/P EST LOW 20 MIN: CPT | Performed by: FAMILY MEDICINE

## 2020-06-09 PROCEDURE — 3074F SYST BP LT 130 MM HG: CPT | Performed by: FAMILY MEDICINE

## 2020-06-09 RX ORDER — SULFAMETHOXAZOLE AND TRIMETHOPRIM 800; 160 MG/1; MG/1
TABLET ORAL
Qty: 14 TABLET | Refills: 0 | Status: SHIPPED | OUTPATIENT
Start: 2020-06-09 | End: 2020-06-15

## 2020-06-10 LAB
25(OH)D3 SERPL-MCNC: 19 NG/ML (ref 30–100)
ALBUMIN SERPL-MCNC: 4.2 G/DL (ref 3.6–5.1)
ALBUMIN/GLOB SERPL: 1.5 (CALC) (ref 1–2.5)
ALP SERPL-CCNC: 104 U/L (ref 31–125)
ALT SERPL-CCNC: 17 U/L (ref 6–29)
AST SERPL-CCNC: 17 U/L (ref 10–35)
BILIRUB SERPL-MCNC: 0.4 MG/DL (ref 0.2–1.2)
BUN SERPL-MCNC: 19 MG/DL (ref 7–25)
BUN/CREAT SERPL: NORMAL (CALC) (ref 6–22)
CALCIUM SERPL-MCNC: 9.3 MG/DL (ref 8.6–10.2)
CHLORIDE SERPL-SCNC: 107 MMOL/L (ref 98–110)
CHOLEST SERPL-MCNC: 121 MG/DL
CHOLEST/HDLC SERPL: 3.4 (CALC)
CK SERPL-CCNC: 43 U/L (ref 29–143)
CO2 SERPL-SCNC: 22 MMOL/L (ref 20–32)
CREAT SERPL-MCNC: 0.92 MG/DL (ref 0.5–1.1)
GLOBULIN SER CALC-MCNC: 2.8 G/DL (CALC) (ref 1.9–3.7)
GLUCOSE SERPL-MCNC: 97 MG/DL (ref 65–99)
HDLC SERPL-MCNC: 36 MG/DL
LDLC SERPL CALC-MCNC: 60 MG/DL (CALC)
NONHDLC SERPL-MCNC: 85 MG/DL (CALC)
POTASSIUM SERPL-SCNC: 4.5 MMOL/L (ref 3.5–5.3)
PROT SERPL-MCNC: 7 G/DL (ref 6.1–8.1)
SL AMB EGFR AFRICAN AMERICAN: 85 ML/MIN/1.73M2
SL AMB EGFR NON AFRICAN AMERICAN: 73 ML/MIN/1.73M2
SODIUM SERPL-SCNC: 140 MMOL/L (ref 135–146)
TRIGL SERPL-MCNC: 174 MG/DL
TSH SERPL-ACNC: 3.87 MIU/L

## 2020-06-19 ENCOUNTER — OFFICE VISIT (OUTPATIENT)
Dept: INTERNAL MEDICINE CLINIC | Age: 50
End: 2020-06-19
Payer: COMMERCIAL

## 2020-06-19 VITALS
TEMPERATURE: 98.2 F | HEIGHT: 67 IN | BODY MASS INDEX: 38.36 KG/M2 | DIASTOLIC BLOOD PRESSURE: 58 MMHG | HEART RATE: 75 BPM | WEIGHT: 244.4 LBS | OXYGEN SATURATION: 98 % | SYSTOLIC BLOOD PRESSURE: 100 MMHG

## 2020-06-19 DIAGNOSIS — F33.41 RECURRENT MAJOR DEPRESSIVE DISORDER, IN PARTIAL REMISSION (HCC): ICD-10-CM

## 2020-06-19 DIAGNOSIS — I10 BENIGN HYPERTENSION: ICD-10-CM

## 2020-06-19 DIAGNOSIS — E55.9 VITAMIN D DEFICIENCY: Primary | ICD-10-CM

## 2020-06-19 DIAGNOSIS — E03.9 ACQUIRED HYPOTHYROIDISM: ICD-10-CM

## 2020-06-19 DIAGNOSIS — F41.9 ANXIETY: ICD-10-CM

## 2020-06-19 DIAGNOSIS — E78.00 HYPERCHOLESTEROLEMIA: ICD-10-CM

## 2020-06-19 DIAGNOSIS — Z72.0 TOBACCO ABUSE DISORDER: ICD-10-CM

## 2020-06-19 PROCEDURE — 3074F SYST BP LT 130 MM HG: CPT | Performed by: FAMILY MEDICINE

## 2020-06-19 PROCEDURE — 3008F BODY MASS INDEX DOCD: CPT | Performed by: FAMILY MEDICINE

## 2020-06-19 PROCEDURE — 99214 OFFICE O/P EST MOD 30 MIN: CPT | Performed by: FAMILY MEDICINE

## 2020-06-19 PROCEDURE — 4004F PT TOBACCO SCREEN RCVD TLK: CPT | Performed by: FAMILY MEDICINE

## 2020-06-19 PROCEDURE — 3078F DIAST BP <80 MM HG: CPT | Performed by: FAMILY MEDICINE

## 2020-06-19 RX ORDER — ERGOCALCIFEROL 1.25 MG/1
50000 CAPSULE ORAL WEEKLY
Qty: 12 CAPSULE | Refills: 3 | Status: SHIPPED | OUTPATIENT
Start: 2020-06-19 | End: 2020-08-07 | Stop reason: ALTCHOICE

## 2020-08-07 ENCOUNTER — OFFICE VISIT (OUTPATIENT)
Dept: INTERNAL MEDICINE CLINIC | Age: 50
End: 2020-08-07
Payer: COMMERCIAL

## 2020-08-07 VITALS
SYSTOLIC BLOOD PRESSURE: 110 MMHG | HEART RATE: 68 BPM | WEIGHT: 243.4 LBS | HEIGHT: 67 IN | BODY MASS INDEX: 38.2 KG/M2 | TEMPERATURE: 98.4 F | OXYGEN SATURATION: 97 % | DIASTOLIC BLOOD PRESSURE: 76 MMHG

## 2020-08-07 DIAGNOSIS — Z12.11 SCREENING FOR COLON CANCER: ICD-10-CM

## 2020-08-07 DIAGNOSIS — R60.0 EDEMA, LOWER EXTREMITY: Primary | ICD-10-CM

## 2020-08-07 DIAGNOSIS — I10 BENIGN HYPERTENSION: ICD-10-CM

## 2020-08-07 DIAGNOSIS — F41.8 DEPRESSION WITH ANXIETY: ICD-10-CM

## 2020-08-07 PROCEDURE — 3078F DIAST BP <80 MM HG: CPT | Performed by: FAMILY MEDICINE

## 2020-08-07 PROCEDURE — 3725F SCREEN DEPRESSION PERFORMED: CPT | Performed by: FAMILY MEDICINE

## 2020-08-07 PROCEDURE — 4004F PT TOBACCO SCREEN RCVD TLK: CPT | Performed by: FAMILY MEDICINE

## 2020-08-07 PROCEDURE — 3008F BODY MASS INDEX DOCD: CPT | Performed by: FAMILY MEDICINE

## 2020-08-07 PROCEDURE — 99213 OFFICE O/P EST LOW 20 MIN: CPT | Performed by: FAMILY MEDICINE

## 2020-08-07 PROCEDURE — 3074F SYST BP LT 130 MM HG: CPT | Performed by: FAMILY MEDICINE

## 2020-08-07 RX ORDER — FUROSEMIDE 20 MG/1
20 TABLET ORAL DAILY PRN
Qty: 30 TABLET | Refills: 0 | Status: SHIPPED | OUTPATIENT
Start: 2020-08-07 | End: 2022-07-23

## 2020-08-07 RX ORDER — DIAZEPAM 10 MG/1
10 TABLET ORAL EVERY 6 HOURS PRN
Qty: 30 TABLET | Refills: 1 | Status: SHIPPED | OUTPATIENT
Start: 2020-08-07 | End: 2021-01-15 | Stop reason: SDUPTHER

## 2020-08-07 NOTE — PROGRESS NOTES
The Assessment/Plan:    1  Edema, lower extremity  -     furosemide (LASIX) 20 mg tablet; Take 1 tablet (20 mg total) by mouth daily as needed (edema)    2  Benign hypertension    3  Depression with anxiety  -     diazepam (VALIUM) 10 mg tablet; Take 1 tablet (10 mg total) by mouth every 6 (six) hours as needed for anxiety    4  Screening for colon cancer  -     Ambulatory referral to Gastroenterology; Future    Okay to stop amlodipine  I feel that she is having side effect from this  Will start low-dose Lasix to take p r n  but I advised her to take it every day for the next 2 or 3 days to monitor lower extremity edema  She is to return in 2 weeks for blood pressure check in at that time after re-evaluation of blood pressure will decide if she needs another agent to help control her blood pressure  All her questions and concerns were addressed  There are no Patient Instructions on file for this visit  Return in about 2 weeks (around 8/21/2020) for Recheck  Subjective:      Patient ID: Mian Tripathi is a 48 y o  female  Chief Complaint   Patient presents with    Foot Swelling     Bilateral foot swelling  Started about a week ago  pt  would like letter to have neice help pt  around [facility]     Due for mammo, Pap, CRC screening and physical       HPI      Lower extremity edema started few days ago  She is compliant with her medications and is asking for water pill  She denies any shortness of breath or nausea vomiting and no other issues or complaints or chest pain        The following portions of the patient's history were reviewed and updated as appropriate: allergies, current medications, past family history, past medical history, past social history, past surgical history and problem list     Review of Systems      Constitutional:  Denies fever or chills   Eyes:  Denies double or blurry vision  HENT:  Denies nasal congestion or sore throat   Respiratory:  Denies cough or shortness of breath or wheezing  Cardiovascular:  Denies palpitations or chest pain  GI:  Denies abdominal pain, nausea, or vomiting  Integument:  Denies rash , no open areas  Neurologic:  Denies headache or focal weakness        Current Outpatient Medications   Medication Sig Dispense Refill    albuterol (2 5 mg/3 mL) 0 083 % nebulizer solution Take 1 vial (2 5 mg total) by nebulization every 6 (six) hours as needed for wheezing or shortness of breath 30 vial 5    albuterol (PROVENTIL HFA,VENTOLIN HFA) 90 mcg/act inhaler Inhale 2 puffs every 6 (six) hours as needed for wheezing      buPROPion (WELLBUTRIN XL) 150 mg 24 hr tablet Take 1 tablet (150 mg total) by mouth every morning 90 tablet 1    diazepam (VALIUM) 10 mg tablet Take 1 tablet (10 mg total) by mouth every 6 (six) hours as needed for anxiety 30 tablet 1    ergocalciferol (VITAMIN D2) 50,000 units Take 1 capsule (50,000 Units total) by mouth once a week 12 capsule 1    fluticasone (FLONASE) 50 mcg/act nasal spray 1 spray into each nostril 2 (two) times a day 16 g 5    gabapentin (NEURONTIN) 100 mg capsule TAKE ONE TABLET EVERY 8 HOURS FOR NEUROPATHIC PAIN  0    ibuprofen (MOTRIN) 600 mg tablet   0    levothyroxine 88 mcg tablet Take 1 tablet (88 mcg total) by mouth daily 90 tablet 1    oxyCODONE-acetaminophen (PERCOCET)  mg per tablet Take 1 tablet by mouth every 6 (six) hours as needed for moderate pain Max Daily Amount: 4 tablets 30 tablet 0    pantoprazole (PROTONIX) 40 mg tablet Take 1 tablet (40 mg total) by mouth 2 (two) times a day as needed (reflux) 180 tablet 1    sertraline (ZOLOFT) 100 mg tablet Take 1 tablet (100 mg total) by mouth 2 (two) times a day 180 tablet 1    simvastatin (ZOCOR) 20 mg tablet Take 1 tablet (20 mg total) by mouth daily at bedtime 90 tablet 1    sucralfate (CARAFATE) 1 g tablet Take 1 tablet (1 g total) by mouth 3 (three) times a day before meals 270 tablet 1    furosemide (LASIX) 20 mg tablet Take 1 tablet (20 mg total) by mouth daily as needed (edema) 30 tablet 0     No current facility-administered medications for this visit          Objective:    /76 (BP Location: Left arm, Patient Position: Sitting, Cuff Size: Standard)   Pulse 68   Temp 98 4 °F (36 9 °C) (Temporal)   Ht 5' 7" (1 702 m)   Wt 110 kg (243 lb 6 4 oz)   SpO2 97%   BMI 38 12 kg/m²        Physical Exam    Constitutional:  Well developed, well nourished, no acute distress, non-toxic appearance   Eyes:  PERRL, conjunctiva normal , non icteric sclera  HENT:  Atraumatic, oropharynx moist  Neck-  supple   Respiratory:  CTA b/l, normal breath sounds, no rales, no wheezing   Cardiovascular:  RRR, no murmurs, trace to +1 LE edema b/l  GI:  Soft, nondistended, normal bowel sounds x 4, nontender, no organomegaly, no mass, no rebound, no guarding   Neurologic:  no focal deficits noted   Psychiatric:  Speech and behavior appropriate , AAO x 3           Velta [place], DO

## 2020-08-19 ENCOUNTER — OFFICE VISIT (OUTPATIENT)
Dept: GASTROENTEROLOGY | Facility: CLINIC | Age: 50
End: 2020-08-19
Payer: COMMERCIAL

## 2020-08-19 VITALS
HEART RATE: 70 BPM | SYSTOLIC BLOOD PRESSURE: 120 MMHG | BODY MASS INDEX: 37.67 KG/M2 | WEIGHT: 240 LBS | DIASTOLIC BLOOD PRESSURE: 86 MMHG | HEIGHT: 67 IN | TEMPERATURE: 96.2 F

## 2020-08-19 DIAGNOSIS — Z12.11 SCREENING FOR COLON CANCER: ICD-10-CM

## 2020-08-19 DIAGNOSIS — K21.9 GASTROESOPHAGEAL REFLUX DISEASE, ESOPHAGITIS PRESENCE NOT SPECIFIED: Primary | ICD-10-CM

## 2020-08-19 DIAGNOSIS — R15.9 FECAL SOILING DUE TO FECAL INCONTINENCE: ICD-10-CM

## 2020-08-19 PROCEDURE — 99244 OFF/OP CNSLTJ NEW/EST MOD 40: CPT | Performed by: INTERNAL MEDICINE

## 2020-08-19 PROCEDURE — 3079F DIAST BP 80-89 MM HG: CPT | Performed by: INTERNAL MEDICINE

## 2020-08-19 PROCEDURE — 3074F SYST BP LT 130 MM HG: CPT | Performed by: INTERNAL MEDICINE

## 2020-08-19 PROCEDURE — 4004F PT TOBACCO SCREEN RCVD TLK: CPT | Performed by: INTERNAL MEDICINE

## 2020-08-19 PROCEDURE — 3008F BODY MASS INDEX DOCD: CPT | Performed by: INTERNAL MEDICINE

## 2020-08-19 RX ORDER — POLYETHYLENE GLYCOL 3350, SODIUM CHLORIDE, SODIUM BICARBONATE, POTASSIUM CHLORIDE 420; 11.2; 5.72; 1.48 G/4L; G/4L; G/4L; G/4L
4000 POWDER, FOR SOLUTION ORAL ONCE
Qty: 4000 ML | Refills: 0 | Status: SHIPPED | OUTPATIENT
Start: 2020-08-19 | End: 2020-10-29 | Stop reason: HOSPADM

## 2020-08-19 NOTE — PATIENT INSTRUCTIONS
Colon/EGD scheduled for 10/29/20 with Dr Nicolette Edmond during clinic block @[facility] instructions given by Maximus ELIZABETH in the office
Educated on pain scale and management.  verbalized understanding.

## 2020-08-19 NOTE — PROGRESS NOTES
[facility] - Outpatient Consultation  Kya Hernandez 48 y o  female MRN: [id number]  Encounter: [id number]          ASSESSMENT AND PLAN:      1  Screening for colon cancer  - Patient is 48 y o , has not been screened for colon cancer in the past, denies any family history of GI malignancy or IBD, no alarm symptoms at this point, currently having normal bowel movements but she has had about 4 episodes of fecal incontinence associated to urgency  - Currently average risk for colonoscopy, other options for colorectal cancer screening were discussed with the patient, will perform colonoscopy, risk and benefits of the procedure were discussed with the patient including but not limited to bleeding, infection, perforation and missing an adenoma  2  Fecal incontinence  Patient described about 4 episodes of fecal incontinence over the last 4 months  Patient describes being unable to stop the urge to defecate  Denies prior rectal surgery or vaginal deliveries, only uterine surgery for fibroids  She was referred to colorectal surgery for evaluation of fecal incontinence    ______________________________________________________________________    HPI:  Patient seen and examined, she is a 51-year-old female patient that was referred to GI for colorectal cancer screening by her PCP, currently is tolerating PO route, denies nausea or vomiting, is passing flatus and having daily bowel movements of normal consistency, has had episodes of fecal incontinence with the last 4 months, denies abdominal pain, no recent weight loss    REVIEW OF SYSTEMS:    CONSTITUTIONAL: Denies any fever, chills, rigors, and weight loss  HEENT: No earache or tinnitus  Denies hearing loss or visual disturbances  CARDIOVASCULAR: No chest pain or palpitations  RESPIRATORY: Denies any cough, hemoptysis, shortness of breath or dyspnea on exertion  GASTROINTESTINAL: As noted in the History of Present Illness     GENITOURINARY: No problems with urination  Denies any hematuria or dysuria  NEUROLOGIC: No dizziness or vertigo, denies headaches  MUSCULOSKELETAL: Denies any muscle or joint pain  SKIN: Denies skin rashes or itching  ENDOCRINE: Denies excessive thirst  Denies intolerance to heat or cold  PSYCHOSOCIAL: Denies depression or anxiety  Denies any recent memory loss  Historical Information   Past Medical History:   Diagnosis Date    Allergic sinusitis     last assessed - [id number]    Anxiety     last assessed - 90AEW0056    Asthma     last assessed - 14SKL0224    Bilateral lower extremity edema     last assessed - 06VWN6355    Callus of foot     last assessed - 52Xxz4694    Chronic GERD     last assessed - 11NFD4427    Constipation     Resolved - 21OFF3698    Depression     last assessed - 35XTA2413    Disease of thyroid gland     Diverticulitis of colon     last assessed - 15QQK0734    Dyspepsia     Resolved - 72UMB8799    Esophageal reflux     last assessed - 46OFQ5518    Essential hypertriglyceridemia     last assessed - 75EWM5522    GERD (gastroesophageal reflux disease)     Gynecological disorder     last assessed - 48RWP8227    Hypertension     last assessed - 48JKY6592    Hypotension     last assessed - 56Teb3255    Migraine     Neuropathy     Pulmonary emphysema ([address] )     last assessed - 22YBN2520    Seasonal allergies     Urinary frequency     last assessed - 32Voz6453    Vagina, candidiasis     last assessed - 51Dwy1445     Past Surgical History:   Procedure Laterality Date    CARPAL TUNNEL RELEASE      last assessed - 75OCP6932    CHOLECYSTECTOMY      last assessed - 50XON0205    COLONOSCOPY      Complete colonoscopy    Jullie Liming EYE SURGERY      last assessed - 60ZYI1576   Jullie Liming FOOT SURGERY      last assessed - 33LUZ9533    ID ESOPHAGOGASTRODUODENOSCOPY TRANSORAL DIAGNOSTIC N/A 2/13/2017    Procedure: ESOPHAGOGASTRODUODENOSCOPY (EGD); Surgeon: Swapna Tovar MD;  Location: [facility];   Service: Gastroenterology     Social History   Social History     Substance and Sexual Activity   Alcohol Use Yes    Alcohol/week: 3 0 standard drinks    Types: 3 Glasses of wine per week    Comment: ocassionally; social alcohol use     Social History     Substance and Sexual Activity   Drug Use No    Comment: no illicit drug use     Social History     Tobacco Use   Smoking Status Current Every Day Smoker    Packs/day: 3 00    Types: Cigarettes   Smokeless Tobacco Never Used     Family History   Problem Relation Age of Onset    Lung cancer Sister     Hypertension Other     Lung cancer Other     Hypertension Other     Lung cancer Other     Lung cancer Other     Lung cancer Maternal Grandmother        Meds/Allergies       Current Outpatient Medications:     albuterol (2 5 mg/3 mL) 0 083 % nebulizer solution    albuterol (PROVENTIL HFA,VENTOLIN HFA) 90 mcg/act inhaler    buPROPion (WELLBUTRIN XL) 150 mg 24 hr tablet    diazepam (VALIUM) 10 mg tablet    ergocalciferol (VITAMIN D2) 50,000 units    fluticasone (FLONASE) 50 mcg/act nasal spray    furosemide (LASIX) 20 mg tablet    gabapentin (NEURONTIN) 100 mg capsule    ibuprofen (MOTRIN) 600 mg tablet    levothyroxine 88 mcg tablet    oxyCODONE-acetaminophen (PERCOCET)  mg per tablet    pantoprazole (PROTONIX) 40 mg tablet    sertraline (ZOLOFT) 100 mg tablet    simvastatin (ZOCOR) 20 mg tablet    sucralfate (CARAFATE) 1 g tablet    Allergies   Allergen Reactions    Hydrocodone-Acetaminophen Hives    Ketorolac Hives and Dizziness    Other     Toradol [Ketorolac Tromethamine] Other (See Comments)     hypotension    Ultram [Tramadol] GI Intolerance, Nausea Only and Vomiting           Objective     Blood pressure 120/86, pulse 70, temperature (!) 96 2 °F (35 7 °C), temperature source Tympanic, height 5' 7" (1 702 m), weight 109 kg (240 lb)  Body mass index is 37 59 kg/m²          PHYSICAL EXAM:      General Appearance:   Alert, cooperative, no distress   HEENT:   Normocephalic, atraumatic, anicteric      Neck:  Supple, symmetrical, trachea midline   Lungs:   Clear to auscultation bilaterally; no rales, rhonchi or wheezing; respirations unlabored    Heart[de-identified]   Regular rate and rhythm; no murmur, rub, or gallop  Abdomen:   Soft, non-tender, non-distended; normal bowel sounds; no masses, no organomegaly    Genitalia:   Deferred    Rectal:   Deferred    Extremities:  No cyanosis, clubbing or edema    Pulses:  2+ and symmetric    Skin:  No jaundice, rashes, or lesions    Lymph nodes:  No palpable cervical lymphadenopathy        Lab Results:   No visits with results within 1 Day(s) from this visit  Latest known visit with results is:   Office Visit on 06/09/2020   Component Date Value    TSH 06/09/2020 3 87     Vitamin D, 25-Hydroxy, S* 06/09/2020 19*    Glucose, Random 06/09/2020 97     BUN 06/09/2020 19     Creatinine 06/09/2020 0 92     eGFR Non  06/09/2020 73     eGFR  06/09/2020 85     SL AMB BUN/CREATININE RA* 53/03/3253 NOT APPLICABLE     Sodium 98/98/1488 140     Potassium 06/09/2020 4 5     Chloride 06/09/2020 107     CO2 06/09/2020 22     Calcium 06/09/2020 9 3     Protein, Total 06/09/2020 7 0     Albumin 06/09/2020 4 2     Globulin 06/09/2020 2 8     Albumin/Globulin Ratio 06/09/2020 1 5     TOTAL BILIRUBIN 06/09/2020 0 4     Alkaline Phosphatase 06/09/2020 104     AST 06/09/2020 17     ALT 06/09/2020 17     Total Cholesterol 06/09/2020 121     HDL 06/09/2020 36*    Triglycerides 06/09/2020 174*    LDL Calculated 06/09/2020 60     Chol HDLC Ratio 06/09/2020 3 4     Non-HDL Cholesterol 06/09/2020 85     Creatine Kinase, Total 06/09/2020 43          Radiology Results:   No results found

## 2020-09-03 ENCOUNTER — OFFICE VISIT (OUTPATIENT)
Dept: INTERNAL MEDICINE CLINIC | Facility: CLINIC | Age: 50
End: 2020-09-03
Payer: COMMERCIAL

## 2020-09-03 ENCOUNTER — TELEPHONE (OUTPATIENT)
Dept: INTERNAL MEDICINE CLINIC | Facility: CLINIC | Age: 50
End: 2020-09-03

## 2020-09-03 VITALS
WEIGHT: 243 LBS | HEIGHT: 67 IN | BODY MASS INDEX: 38.14 KG/M2 | HEART RATE: 74 BPM | OXYGEN SATURATION: 97 % | TEMPERATURE: 98.4 F | DIASTOLIC BLOOD PRESSURE: 64 MMHG | SYSTOLIC BLOOD PRESSURE: 100 MMHG

## 2020-09-03 DIAGNOSIS — R60.0 EDEMA, LOWER EXTREMITY: ICD-10-CM

## 2020-09-03 DIAGNOSIS — I10 BENIGN HYPERTENSION: Primary | ICD-10-CM

## 2020-09-03 DIAGNOSIS — Z72.0 TOBACCO ABUSE DISORDER: ICD-10-CM

## 2020-09-03 PROCEDURE — 4004F PT TOBACCO SCREEN RCVD TLK: CPT | Performed by: FAMILY MEDICINE

## 2020-09-03 PROCEDURE — 99213 OFFICE O/P EST LOW 20 MIN: CPT | Performed by: FAMILY MEDICINE

## 2020-09-03 PROCEDURE — 3078F DIAST BP <80 MM HG: CPT | Performed by: FAMILY MEDICINE

## 2020-09-03 PROCEDURE — 3074F SYST BP LT 130 MM HG: CPT | Performed by: FAMILY MEDICINE

## 2020-09-03 NOTE — PROGRESS NOTES
Assessment/Plan:    1  Benign hypertension    2  Edema, lower extremity    3  Tobacco abuse disorder    Continue with Lasix as needed  No change in medications today  Next appointment in 3 months  Patient states she is not ready to quit yet smoking but is thinking about Chantix  I advised her to mentally prepare herself and fully commit to smoking cessation and call me and I can send prescription for Chantix  She is agreeable with this plan        There are no Patient Instructions on file for this visit  Return for Next scheduled follow up  Subjective:      Patient ID: Jose Antonio Andrews is a 48 y o  female  Chief Complaint   Patient presents with    Follow-up     f/u edema & htn  Pt states her sx have been about the same since last ov       HPI  Update since last visit, has been taking furosemide as needed but not on a daily basis  She did stop her amlodipine  She has not been checking blood pressures at home but feels well  Thinks that the swelling has gone down and most states she does not have any  When she intermittently does she will take a Lasix pill  She has been doing well and has no complaints or concerns    No shortness of breath other than her COPD and smoking      The following portions of the patient's history were reviewed and updated as appropriate: allergies, current medications, past family history, past medical history, past social history, past surgical history and problem list     Review of Systems      Constitutional:  Denies fever or chills   Eyes:  Denies double or blurry vision  HENT:  Denies nasal congestion or sore throat   Respiratory:  Denies cough or shortness of breath or wheezing  Cardiovascular:  Denies palpitations or chest pain  GI:  Denies abdominal pain, nausea, or vomiting  Integument:  Denies rash , no open areas  Neurologic:  Denies headache or focal weakness        Current Outpatient Medications   Medication Sig Dispense Refill    albuterol (2 5 mg/3 mL) 0 083 % nebulizer solution Take 1 vial (2 5 mg total) by nebulization every 6 (six) hours as needed for wheezing or shortness of breath 30 vial 5    albuterol (PROVENTIL HFA,VENTOLIN HFA) 90 mcg/act inhaler Inhale 2 puffs every 6 (six) hours as needed for wheezing      buPROPion (WELLBUTRIN XL) 150 mg 24 hr tablet Take 1 tablet (150 mg total) by mouth every morning 90 tablet 1    diazepam (VALIUM) 10 mg tablet Take 1 tablet (10 mg total) by mouth every 6 (six) hours as needed for anxiety 30 tablet 1    ergocalciferol (VITAMIN D2) 50,000 units Take 1 capsule (50,000 Units total) by mouth once a week 12 capsule 1    fluticasone (FLONASE) 50 mcg/act nasal spray 1 spray into each nostril 2 (two) times a day 16 g 5    furosemide (LASIX) 20 mg tablet Take 1 tablet (20 mg total) by mouth daily as needed (edema) 30 tablet 0    gabapentin (NEURONTIN) 100 mg capsule TAKE ONE TABLET EVERY 8 HOURS FOR NEUROPATHIC PAIN  0    ibuprofen (MOTRIN) 600 mg tablet as needed   0    levothyroxine 88 mcg tablet Take 1 tablet (88 mcg total) by mouth daily 90 tablet 1    oxyCODONE-acetaminophen (PERCOCET)  mg per tablet Take 1 tablet by mouth every 6 (six) hours as needed for moderate pain Max Daily Amount: 4 tablets 30 tablet 0    pantoprazole (PROTONIX) 40 mg tablet Take 1 tablet (40 mg total) by mouth 2 (two) times a day as needed (reflux) 180 tablet 1    sertraline (ZOLOFT) 100 mg tablet Take 1 tablet (100 mg total) by mouth 2 (two) times a day 180 tablet 1    simvastatin (ZOCOR) 20 mg tablet Take 1 tablet (20 mg total) by mouth daily at bedtime 90 tablet 1    sucralfate (CARAFATE) 1 g tablet Take 1 tablet (1 g total) by mouth 3 (three) times a day before meals 270 tablet 1    polyethylene glycol-electrolytes (NULYTELY) 4000 mL solution Take 4,000 mL by mouth once for 1 dose (Patient not taking: Reported on 9/3/2020) 4000 mL 0     No current facility-administered medications for this visit          Objective:    /64 (BP Location: Left arm, Patient Position: Sitting, Cuff Size: Large)   Pulse 74   Temp 98 4 °F (36 9 °C) (Temporal)   Ht 5' 7" (1 702 m)   Wt 110 kg (243 lb) Comment: j carlos sneakers  SpO2 97% Comment: wilma  BMI 38 06 kg/m²        Physical Exam       Constitutional:  Well developed, well nourished, no acute distress, non-toxic appearance   Eyes:  PERRL, conjunctiva normal , non icteric sclera  HENT:  Atraumatic, oropharynx moist  Neck-  supple   Respiratory:  CTA b/l, normal breath sounds, no rales, no wheezing   Cardiovascular:  RRR, no murmurs, no LE edema b/l  GI:  Soft, nondistended, normal bowel sounds x 4, nontender, no organomegaly, no mass, no rebound, no guarding   Neurologic:  no focal deficits noted   Psychiatric:  Speech and behavior appropriate , AAO x 3        [facility], DO

## 2020-10-26 DIAGNOSIS — F41.8 DEPRESSION WITH ANXIETY: ICD-10-CM

## 2020-10-26 DIAGNOSIS — F33.41 RECURRENT MAJOR DEPRESSIVE DISORDER, IN PARTIAL REMISSION (HCC): ICD-10-CM

## 2020-10-26 DIAGNOSIS — E78.00 HYPERCHOLESTEROLEMIA: ICD-10-CM

## 2020-10-26 DIAGNOSIS — R60.0 EDEMA, LOWER EXTREMITY: ICD-10-CM

## 2020-10-26 DIAGNOSIS — E03.9 ACQUIRED HYPOTHYROIDISM: ICD-10-CM

## 2020-10-26 DIAGNOSIS — K21.00 GASTROESOPHAGEAL REFLUX DISEASE WITH ESOPHAGITIS: ICD-10-CM

## 2020-10-26 RX ORDER — BUPROPION HYDROCHLORIDE 150 MG/1
TABLET ORAL
Qty: 90 TABLET | Refills: 1 | OUTPATIENT
Start: 2020-10-26

## 2020-10-26 RX ORDER — SIMVASTATIN 20 MG
TABLET ORAL
Qty: 90 TABLET | Refills: 1 | OUTPATIENT
Start: 2020-10-26

## 2020-10-26 RX ORDER — FUROSEMIDE 20 MG/1
TABLET ORAL
Qty: 30 TABLET | Refills: 0 | OUTPATIENT
Start: 2020-10-26

## 2020-10-26 RX ORDER — SERTRALINE HYDROCHLORIDE 100 MG/1
TABLET, FILM COATED ORAL
Qty: 180 TABLET | Refills: 1 | OUTPATIENT
Start: 2020-10-26

## 2020-10-26 RX ORDER — SUCRALFATE 1 G/1
TABLET ORAL
Qty: 270 TABLET | Refills: 1 | OUTPATIENT
Start: 2020-10-26

## 2020-10-26 RX ORDER — PANTOPRAZOLE SODIUM 40 MG/1
TABLET, DELAYED RELEASE ORAL
Qty: 180 TABLET | Refills: 1 | OUTPATIENT
Start: 2020-10-26

## 2020-10-26 RX ORDER — LEVOTHYROXINE SODIUM 88 UG/1
TABLET ORAL
Qty: 90 TABLET | Refills: 1 | OUTPATIENT
Start: 2020-10-26

## 2020-10-28 ENCOUNTER — ANESTHESIA EVENT (OUTPATIENT)
Dept: GASTROENTEROLOGY | Facility: HOSPITAL | Age: 50
End: 2020-10-28

## 2020-10-29 ENCOUNTER — ANESTHESIA (OUTPATIENT)
Dept: GASTROENTEROLOGY | Facility: HOSPITAL | Age: 50
End: 2020-10-29

## 2020-10-29 ENCOUNTER — HOSPITAL ENCOUNTER (OUTPATIENT)
Dept: GASTROENTEROLOGY | Facility: HOSPITAL | Age: 50
Setting detail: OUTPATIENT SURGERY
Discharge: HOME | End: 2020-10-29
Attending: INTERNAL MEDICINE | Admitting: INTERNAL MEDICINE
Payer: COMMERCIAL

## 2020-10-29 VITALS
RESPIRATION RATE: 16 BRPM | DIASTOLIC BLOOD PRESSURE: 63 MMHG | BODY MASS INDEX: 37.67 KG/M2 | HEART RATE: 83 BPM | SYSTOLIC BLOOD PRESSURE: 115 MMHG | WEIGHT: 240 LBS | TEMPERATURE: 99.9 F | HEIGHT: 67 IN | OXYGEN SATURATION: 100 %

## 2020-10-29 VITALS — HEART RATE: 85 BPM

## 2020-10-29 DIAGNOSIS — Z12.11 SCREENING FOR COLON CANCER: ICD-10-CM

## 2020-10-29 DIAGNOSIS — K21.9 GASTROESOPHAGEAL REFLUX DISEASE: ICD-10-CM

## 2020-10-29 DIAGNOSIS — D12.6 ADENOMATOUS POLYP OF COLON, UNSPECIFIED PART OF COLON: Primary | ICD-10-CM

## 2020-10-29 PROCEDURE — 88305 TISSUE EXAM BY PATHOLOGIST: CPT | Performed by: PATHOLOGY

## 2020-10-29 PROCEDURE — NC001 PR NO CHARGE: Performed by: INTERNAL MEDICINE

## 2020-10-29 PROCEDURE — 45385 COLONOSCOPY W/LESION REMOVAL: CPT | Performed by: INTERNAL MEDICINE

## 2020-10-29 PROCEDURE — 43239 EGD BIOPSY SINGLE/MULTIPLE: CPT | Performed by: INTERNAL MEDICINE

## 2020-10-29 RX ORDER — PROPOFOL 10 MG/ML
INJECTION, EMULSION INTRAVENOUS AS NEEDED
Status: DISCONTINUED | OUTPATIENT
Start: 2020-10-29 | End: 2020-10-29

## 2020-10-29 RX ORDER — PROPOFOL 10 MG/ML
INJECTION, EMULSION INTRAVENOUS CONTINUOUS PRN
Status: DISCONTINUED | OUTPATIENT
Start: 2020-10-29 | End: 2020-10-29

## 2020-10-29 RX ORDER — SODIUM CHLORIDE 9 MG/ML
100 INJECTION, SOLUTION INTRAVENOUS CONTINUOUS
Status: DISCONTINUED | OUTPATIENT
Start: 2020-10-29 | End: 2020-11-02 | Stop reason: HOSPADM

## 2020-10-29 RX ORDER — GLYCOPYRROLATE 0.2 MG/ML
INJECTION INTRAMUSCULAR; INTRAVENOUS AS NEEDED
Status: DISCONTINUED | OUTPATIENT
Start: 2020-10-29 | End: 2020-10-29

## 2020-10-29 RX ADMIN — PROPOFOL 80 MG: 10 INJECTION, EMULSION INTRAVENOUS at 10:01

## 2020-10-29 RX ADMIN — PROPOFOL 20 MG: 10 INJECTION, EMULSION INTRAVENOUS at 10:13

## 2020-10-29 RX ADMIN — SODIUM CHLORIDE 100 ML/HR: 0.9 INJECTION, SOLUTION INTRAVENOUS at 09:21

## 2020-10-29 RX ADMIN — PROPOFOL 10 MG: 10 INJECTION, EMULSION INTRAVENOUS at 10:09

## 2020-10-29 RX ADMIN — SODIUM CHLORIDE: 0.9 INJECTION, SOLUTION INTRAVENOUS at 09:56

## 2020-10-29 RX ADMIN — PROPOFOL 20 MG: 10 INJECTION, EMULSION INTRAVENOUS at 10:16

## 2020-10-29 RX ADMIN — PROPOFOL 20 MG: 10 INJECTION, EMULSION INTRAVENOUS at 10:03

## 2020-10-29 RX ADMIN — PROPOFOL 20 MG: 10 INJECTION, EMULSION INTRAVENOUS at 10:08

## 2020-10-29 RX ADMIN — PROPOFOL 20 MG: 10 INJECTION, EMULSION INTRAVENOUS at 10:06

## 2020-10-29 RX ADMIN — PROPOFOL 80 MCG/KG/MIN: 10 INJECTION, EMULSION INTRAVENOUS at 10:03

## 2020-10-29 RX ADMIN — GLYCOPYRROLATE 0.1 MG: 0.2 INJECTION, SOLUTION INTRAMUSCULAR; INTRAVENOUS at 10:01

## 2020-10-30 DIAGNOSIS — E03.9 ACQUIRED HYPOTHYROIDISM: ICD-10-CM

## 2020-10-30 DIAGNOSIS — F33.41 RECURRENT MAJOR DEPRESSIVE DISORDER, IN PARTIAL REMISSION (HCC): ICD-10-CM

## 2020-10-30 DIAGNOSIS — K21.00 GASTROESOPHAGEAL REFLUX DISEASE WITH ESOPHAGITIS: ICD-10-CM

## 2020-10-30 DIAGNOSIS — F41.8 DEPRESSION WITH ANXIETY: ICD-10-CM

## 2020-10-30 RX ORDER — BUPROPION HYDROCHLORIDE 150 MG/1
TABLET ORAL
Qty: 90 TABLET | Refills: 1 | OUTPATIENT
Start: 2020-10-30

## 2020-10-30 RX ORDER — PANTOPRAZOLE SODIUM 40 MG/1
TABLET, DELAYED RELEASE ORAL
Qty: 180 TABLET | Refills: 1 | OUTPATIENT
Start: 2020-10-30

## 2020-10-30 RX ORDER — LEVOTHYROXINE SODIUM 88 UG/1
TABLET ORAL
Qty: 90 TABLET | Refills: 1 | OUTPATIENT
Start: 2020-10-30

## 2020-10-30 RX ORDER — SUCRALFATE 1 G/1
TABLET ORAL
Qty: 270 TABLET | Refills: 1 | OUTPATIENT
Start: 2020-10-30

## 2020-10-30 RX ORDER — SERTRALINE HYDROCHLORIDE 100 MG/1
TABLET, FILM COATED ORAL
Qty: 180 TABLET | Refills: 1 | OUTPATIENT
Start: 2020-10-30

## 2020-11-01 DIAGNOSIS — E78.00 HYPERCHOLESTEROLEMIA: ICD-10-CM

## 2020-11-02 RX ORDER — SIMVASTATIN 20 MG
TABLET ORAL
Qty: 90 TABLET | Refills: 1 | OUTPATIENT
Start: 2020-11-02

## 2020-11-10 ENCOUNTER — TELEPHONE (OUTPATIENT)
Dept: INTERNAL MEDICINE CLINIC | Facility: CLINIC | Age: 50
End: 2020-11-10

## 2020-11-11 DIAGNOSIS — Z72.0 TOBACCO ABUSE DISORDER: Primary | ICD-10-CM

## 2020-11-11 DIAGNOSIS — F41.8 DEPRESSION WITH ANXIETY: ICD-10-CM

## 2020-11-11 RX ORDER — DIAZEPAM 10 MG/1
TABLET ORAL
Qty: 30 TABLET | OUTPATIENT
Start: 2020-11-11

## 2020-11-11 RX ORDER — VARENICLINE TARTRATE 0.5 MG/1
0.5 TABLET, FILM COATED ORAL 2 TIMES DAILY
Qty: 60 TABLET | Refills: 0 | Status: SHIPPED | OUTPATIENT
Start: 2020-11-11 | End: 2021-01-15 | Stop reason: SDUPTHER

## 2021-01-08 DIAGNOSIS — F41.8 DEPRESSION WITH ANXIETY: ICD-10-CM

## 2021-01-08 DIAGNOSIS — F33.41 RECURRENT MAJOR DEPRESSIVE DISORDER, IN PARTIAL REMISSION (HCC): ICD-10-CM

## 2021-01-08 DIAGNOSIS — E03.9 ACQUIRED HYPOTHYROIDISM: ICD-10-CM

## 2021-01-08 RX ORDER — SERTRALINE HYDROCHLORIDE 100 MG/1
TABLET, FILM COATED ORAL
Qty: 180 TABLET | Refills: 1 | OUTPATIENT
Start: 2021-01-08

## 2021-01-08 RX ORDER — LEVOTHYROXINE SODIUM 88 UG/1
TABLET ORAL
Qty: 90 TABLET | Refills: 1 | OUTPATIENT
Start: 2021-01-08

## 2021-01-08 RX ORDER — BUPROPION HYDROCHLORIDE 150 MG/1
TABLET ORAL
Qty: 90 TABLET | Refills: 1 | OUTPATIENT
Start: 2021-01-08

## 2021-01-15 ENCOUNTER — TELEMEDICINE (OUTPATIENT)
Dept: INTERNAL MEDICINE CLINIC | Age: 51
End: 2021-01-15
Payer: COMMERCIAL

## 2021-01-15 DIAGNOSIS — E78.00 HYPERCHOLESTEROLEMIA: ICD-10-CM

## 2021-01-15 DIAGNOSIS — J06.9 VIRAL URI WITH COUGH: ICD-10-CM

## 2021-01-15 DIAGNOSIS — E03.9 ACQUIRED HYPOTHYROIDISM: ICD-10-CM

## 2021-01-15 DIAGNOSIS — K21.00 GASTROESOPHAGEAL REFLUX DISEASE WITH ESOPHAGITIS: ICD-10-CM

## 2021-01-15 DIAGNOSIS — Z72.0 TOBACCO ABUSE DISORDER: ICD-10-CM

## 2021-01-15 DIAGNOSIS — E55.9 VITAMIN D DEFICIENCY: ICD-10-CM

## 2021-01-15 DIAGNOSIS — R35.0 URINARY FREQUENCY: ICD-10-CM

## 2021-01-15 DIAGNOSIS — I10 BENIGN HYPERTENSION: ICD-10-CM

## 2021-01-15 DIAGNOSIS — Z12.39 ENCOUNTER FOR SCREENING FOR MALIGNANT NEOPLASM OF BREAST, UNSPECIFIED SCREENING MODALITY: Primary | ICD-10-CM

## 2021-01-15 DIAGNOSIS — F33.41 RECURRENT MAJOR DEPRESSIVE DISORDER, IN PARTIAL REMISSION (HCC): ICD-10-CM

## 2021-01-15 DIAGNOSIS — R60.0 EDEMA, LOWER EXTREMITY: ICD-10-CM

## 2021-01-15 DIAGNOSIS — F41.8 DEPRESSION WITH ANXIETY: ICD-10-CM

## 2021-01-15 PROCEDURE — 4004F PT TOBACCO SCREEN RCVD TLK: CPT | Performed by: FAMILY MEDICINE

## 2021-01-15 PROCEDURE — 3725F SCREEN DEPRESSION PERFORMED: CPT | Performed by: FAMILY MEDICINE

## 2021-01-15 PROCEDURE — 99214 OFFICE O/P EST MOD 30 MIN: CPT | Performed by: FAMILY MEDICINE

## 2021-01-15 RX ORDER — PANTOPRAZOLE SODIUM 40 MG/1
40 TABLET, DELAYED RELEASE ORAL 2 TIMES DAILY PRN
Qty: 180 TABLET | Refills: 1 | Status: SHIPPED | OUTPATIENT
Start: 2021-01-15 | End: 2021-07-21

## 2021-01-15 RX ORDER — BUPROPION HYDROCHLORIDE 150 MG/1
150 TABLET ORAL EVERY MORNING
Qty: 90 TABLET | Refills: 1 | Status: SHIPPED | OUTPATIENT
Start: 2021-01-15 | End: 2021-07-21

## 2021-01-15 RX ORDER — LEVOTHYROXINE SODIUM 88 UG/1
88 TABLET ORAL DAILY
Qty: 90 TABLET | Refills: 1 | Status: SHIPPED | OUTPATIENT
Start: 2021-01-15 | End: 2021-03-29 | Stop reason: SDUPTHER

## 2021-01-15 RX ORDER — NALOXONE HYDROCHLORIDE 4 MG/.1ML
SPRAY NASAL
Qty: 1 EACH | Refills: 1 | Status: SHIPPED | OUTPATIENT
Start: 2021-01-15 | End: 2022-07-21

## 2021-01-15 RX ORDER — DIAZEPAM 10 MG/1
10 TABLET ORAL EVERY 6 HOURS PRN
Qty: 30 TABLET | Refills: 1 | Status: SHIPPED | OUTPATIENT
Start: 2021-01-15 | End: 2021-10-13 | Stop reason: SDUPTHER

## 2021-01-15 RX ORDER — SIMVASTATIN 20 MG
20 TABLET ORAL
Qty: 90 TABLET | Refills: 1 | Status: SHIPPED | OUTPATIENT
Start: 2021-01-15 | End: 2021-07-21

## 2021-01-15 RX ORDER — SUCRALFATE 1 G/1
1 TABLET ORAL
Qty: 270 TABLET | Refills: 1 | Status: SHIPPED | OUTPATIENT
Start: 2021-01-15 | End: 2021-07-21

## 2021-01-15 RX ORDER — SERTRALINE HYDROCHLORIDE 100 MG/1
100 TABLET, FILM COATED ORAL 2 TIMES DAILY
Qty: 180 TABLET | Refills: 1 | Status: SHIPPED | OUTPATIENT
Start: 2021-01-15 | End: 2021-07-21

## 2021-01-15 RX ORDER — VARENICLINE TARTRATE 0.5 MG/1
0.5 TABLET, FILM COATED ORAL 2 TIMES DAILY
Qty: 60 TABLET | Refills: 0 | Status: SHIPPED | OUTPATIENT
Start: 2021-01-15 | End: 2021-03-19 | Stop reason: SDUPTHER

## 2021-01-15 RX ORDER — FLUTICASONE PROPIONATE 50 MCG
1 SPRAY, SUSPENSION (ML) NASAL 2 TIMES DAILY
Qty: 16 G | Refills: 5 | Status: SHIPPED | OUTPATIENT
Start: 2021-01-15 | End: 2021-10-13 | Stop reason: SDUPTHER

## 2021-01-15 NOTE — PROGRESS NOTES
Virtual Regular Visit      Assessment/Plan:    Problem List Items Addressed This Visit        Digestive    Gastroesophageal reflux disease with esophagitis    Relevant Medications    sucralfate (CARAFATE) 1 g tablet    pantoprazole (PROTONIX) 40 mg tablet       Endocrine    Hypothyroidism    Relevant Medications    levothyroxine 88 mcg tablet       Cardiovascular and Mediastinum    Benign hypertension       Other    Depression    Relevant Medications    sertraline (ZOLOFT) 100 mg tablet    buPROPion (WELLBUTRIN XL) 150 mg 24 hr tablet    diazepam (VALIUM) 10 mg tablet    varenicline (CHANTIX) 0 5 mg tablet    Other Relevant Orders    TSH, 3rd generation    T4, free    Hypercholesterolemia    Relevant Medications    simvastatin (ZOCOR) 20 mg tablet    Other Relevant Orders    Lipid panel    Comprehensive metabolic panel    Tobacco abuse disorder    Relevant Medications    varenicline (CHANTIX) 0 5 mg tablet    Vitamin D deficiency    Relevant Orders    Vitamin D 25 hydroxy    Edema, lower extremity    Relevant Orders    CBC and differential      Other Visit Diagnoses     Encounter for screening for malignant neoplasm of breast, unspecified screening modality    -  Primary    Relevant Orders    Mammo screening bilateral w 3d & cad    Viral URI with cough        Relevant Medications    fluticasone (FLONASE) 50 mcg/act nasal spray    Depression with anxiety        Relevant Medications    sertraline (ZOLOFT) 100 mg tablet    buPROPion (WELLBUTRIN XL) 150 mg 24 hr tablet    diazepam (VALIUM) 10 mg tablet    varenicline (CHANTIX) 0 5 mg tablet    naloxone (NARCAN) 4 mg/0 1 mL nasal spray    Urinary frequency        Relevant Orders    UA w Reflex to Microscopic w Reflex to Culture -Lab Collect          BMI Counseling: There is no height or weight on file to calculate BMI  The BMI is above normal  Nutrition recommendations include moderation in carbohydrate intake   Exercise recommendations include exercising 3-5 times per week  No pharmacotherapy was ordered  Reason for visit is   Chief Complaint   Patient presents with    Pain     pain on left hand side- for a few days- relief when lpt lays on left side-- [facility]  [phone]         Encounter provider Jae Ross DO    Provider located at [address] [facility] [id number]      Recent Visits  No visits were found meeting these conditions  Showing recent visits within past 7 days and meeting all other requirements     Today's Visits  Date Type Provider Dept   01/15/21 [address], DO [facility]   Showing today's visits and meeting all other requirements     Future Appointments  No visits were found meeting these conditions  Showing future appointments within next 150 days and meeting all other requirements        The patient was identified by name and date of birth  Royal Pandeyuss was informed that this is a telemedicine visit and that the visit is being conducted through [employer] and patient was informed that this is not a secure, HIPAA-compliant platform  She agrees to proceed     My office door was closed  No one else was in the room  She acknowledged consent and understanding of privacy and security of the video platform  The patient has agreed to participate and understands they can discontinue the visit at any time  Patient is aware this is a billable service  Adelso Diaz is a 48 y o  female    HPI   Hypertension (Follow-Up): The patient presents for follow-up of essential hypertension   The patient states she has been stable with her blood pressure control since the last visit     Symptoms: denies impaired vision,-- denies dyspnea,-- denies intermittent leg claudication-- and-- denies lower extremity edema--       The patient presents with complaints of substernal new onset of chest pain, described as aching, non-radiating Her symptoms are caused by no known event (Feels it may be related to indigestion and gas  When she gets chest pain she takes an Aspirin and it helps  Does not feel like it is a crushing pain)  Associated symptoms include She does report headaches but that is not new       Home monitoring: The patient checks her blood pressure sporadically  Blood pressure control has been good       Medications: the patient is adherent with her medication regimen  -- the patient complains of medication side effects       Disease Management: the patient is doing well with her blood pressure goals        Hyperthyroidism (Follow-Up): The patient is being seen for follow-up of hyperthyroidism       Interval symptoms:  denies heat intolerance,-- denies increased perspiration,-- denies palpitations,-- denies tremor,-- denies hyperactivity,-- denies anxiety,-- denies insomnia-- and-- denies fatigue       Associated symptoms: no weight loss,-- no weight gain,-- no diarrhea,-- no constipation-- and-- no vision changes       Medications:  the patient is not adherent to her medication regimen-- and-- she denies medication side effects       Disease management:  the patient is doing well with her goals        Vitamin D Deficiency: The patient is being seen for follow-up of vitamin D deficiency  Recent laboratory results: date 1/2018, 25-hydroxyvitamin D 23 ng/mL   7/18 level is 18 January 2021, due for labs     Anxiety and depression: stable with present medications, no HI or SI  compliant with meds  Continues to have sleep difficulties, decreased appetite, and low energy with depression   She is not having anhedonia, poor concentration, or current suicidal ideation    She does not drink much water, but lots of Pepsi   Eating about 1x/day, usually a sandwich and chips   She does not exercise  May 2020, worsening symptoms since February that she did not mid to anyone    Her ex- whom she had been with for 21 years but  for only 2 and has been  for 8 years was found D ceased  She acquired the dog from him after his death that they had shared together while being   She also has broken up from her previous boyfriend whom which she was on the road a lot because he was a   She is currently moved to [place] in a poor and has an apartment which she is able to financially maintain as well as take care of the dog but patient states she feels guilty about the death of her ex- because he had wanted to continue living together and had asked her to move back to [place] but she states that she could not live with him because she was afraid however never stop loving him  She is also not taking her medications as prescribed since she is afraid to go out due to COVID-19 restrictions and has been rationing at her medications and decreasing dosages or taking every other day  January 2021, relatively well controlled  No SI or HI        tobacco abuse : since age 6, 2-3 packs per day recently, never started chantix May 2020, smoking more than usual since February due to the above history high and unexpected death of her ex-  She has also been eating poorly and drinking her Pepsi more than normal   January 2021, insurance not paying for Chantix    Still smoking and poor motivation to quit            Past Medical History:   Diagnosis Date    Allergic sinusitis     last assessed - [id number]    Anxiety     last assessed - [id number]    Asthma     last assessed - [id number]    Bilateral lower extremity edema     last assessed - [id number]    Callus of foot     last assessed - [id number]    Chronic GERD     last assessed - 39KFB1595    Constipation     Resolved - [id number]    Depression     last assessed - [id number]    Disease of thyroid gland     Diverticulitis of colon     last assessed - [id number]    Dyspepsia     Resolved - [id number]    Esophageal reflux     last assessed - 58XFX1116    Essential hypertriglyceridemia     last assessed - 06Bqa2814    GERD (gastroesophageal reflux disease)     Gynecological disorder     last assessed - [id number]    Hypertension     last assessed - 37KDP9389    Hypotension     last assessed - 93Bzc6785    Migraine     Neuropathy     Pulmonary emphysema ([address] )     last assessed - [id number]    Seasonal allergies     Urinary frequency     last assessed - 97Ltg6944    Vagina, candidiasis     last assessed - 05Kdx3831       Past Surgical History:   Procedure Laterality Date    CARPAL TUNNEL RELEASE      last assessed - [id number]    CHOLECYSTECTOMY      last assessed - [id number]    COLONOSCOPY      Complete colonoscopy    [employer] EYE SURGERY      last assessed - [id number]   [employer] FOOT SURGERY      last assessed - [id number]    VA ESOPHAGOGASTRODUODENOSCOPY TRANSORAL DIAGNOSTIC N/A 2/13/2017    Procedure: ESOPHAGOGASTRODUODENOSCOPY (EGD); Surgeon: Gerber Jeffers MD;  Location: [facility];   Service: Gastroenterology       Current Outpatient Medications   Medication Sig Dispense Refill    albuterol (2 5 mg/3 mL) 0 083 % nebulizer solution Take 1 vial (2 5 mg total) by nebulization every 6 (six) hours as needed for wheezing or shortness of breath 30 vial 5    albuterol (PROVENTIL HFA,VENTOLIN HFA) 90 mcg/act inhaler Inhale 2 puffs every 6 (six) hours as needed for wheezing      buPROPion (WELLBUTRIN XL) 150 mg 24 hr tablet Take 1 tablet (150 mg total) by mouth every morning 90 tablet 1    diazepam (VALIUM) 10 mg tablet Take 1 tablet (10 mg total) by mouth every 6 (six) hours as needed for anxiety 30 tablet 1    ergocalciferol (VITAMIN D2) 50,000 units Take 1 capsule (50,000 Units total) by mouth once a week 12 capsule 1    fluticasone (FLONASE) 50 mcg/act nasal spray 1 spray into each nostril 2 (two) times a day 16 g 5    furosemide (LASIX) 20 mg tablet Take 1 tablet (20 mg total) by mouth daily as needed (edema) 30 tablet 0    gabapentin (NEURONTIN) 100 mg capsule TAKE ONE TABLET EVERY 8 HOURS FOR NEUROPATHIC PAIN  0    ibuprofen (MOTRIN) 600 mg tablet as needed   0    levothyroxine 88 mcg tablet Take 1 tablet (88 mcg total) by mouth daily 90 tablet 1    oxyCODONE-acetaminophen (PERCOCET)  mg per tablet Take 1 tablet by mouth every 6 (six) hours as needed for moderate pain Max Daily Amount: 4 tablets 30 tablet 0    pantoprazole (PROTONIX) 40 mg tablet Take 1 tablet (40 mg total) by mouth 2 (two) times a day as needed (reflux) 180 tablet 1    sertraline (ZOLOFT) 100 mg tablet Take 1 tablet (100 mg total) by mouth 2 (two) times a day 180 tablet 1    simvastatin (ZOCOR) 20 mg tablet Take 1 tablet (20 mg total) by mouth daily at bedtime 90 tablet 1    sucralfate (CARAFATE) 1 g tablet Take 1 tablet (1 g total) by mouth 3 (three) times a day before meals 270 tablet 1    naloxone (NARCAN) 4 mg/0 1 mL nasal spray Administer 1 spray into a nostril  If no response after 2-3 minutes, give another dose in the other nostril using a new spray  1 each 1    varenicline (CHANTIX) 0 5 mg tablet Take 1 tablet (0 5 mg total) by mouth 2 (two) times a day 60 tablet 0     No current facility-administered medications for this visit           Allergies   Allergen Reactions    Hydrocodone-Acetaminophen Hives    Ketorolac Hives and Dizziness    Other     Toradol [Ketorolac Tromethamine] Other (See Comments)     hypotension    Ultram [Tramadol] GI Intolerance, Nausea Only and Vomiting       Review of Systems     Constitutional:  Denies fever or chills , weight gain per patient , for nutrition  Eyes:  Denies double or blurry vision  HENT:  Denies nasal congestion or sore throat   Respiratory:  Denies cough or shortness of breath or wheezing  Cardiovascular:  Denies palpitations or chest pain  GI:  Denies abdominal pain, nausea, or vomiting  Integument:  Denies rash , no open areas  Neurologic:  Denies headache or focal weakness, no dizziness        Video Exam    There were no vitals filed for this visit  Physical Exam       Constitutional:  Well developed, well nourished, no acute distress, non-toxic appearance   Eyes:  Is conjunctiva normal , non icteric sclera  HENT:  Atraumatic, non congested  Respiratory:  Nonlabored, appears comfortable, no conversational dyspnea  Cardiovascular:   no LE edema b/l  Neurologic:  no focal deficits noted   Psychiatric:  Speech and behavior appropriate , AAO x 3    I spent 11 minutes directly with the patient during this visit      [address] acknowledges that she has consented to an online visit or consultation  She understands that the online visit is based solely on information provided by her, and that, in the absence of a face-to-face physical evaluation by the physician, the diagnosis she receives is both limited and provisional in terms of accuracy and completeness  This is not intended to replace a full medical face-to-face evaluation by the physician  Inis Keyanna understands and accepts these terms

## 2021-01-19 ENCOUNTER — IMMUNIZATION (OUTPATIENT)
Dept: VACCINE CLINIC | Facility: HOSPITAL | Age: 51
End: 2021-01-19

## 2021-01-19 ENCOUNTER — TELEPHONE (OUTPATIENT)
Dept: INTERNAL MEDICINE CLINIC | Facility: CLINIC | Age: 51
End: 2021-01-19

## 2021-01-19 PROCEDURE — 0001A: CPT | Performed by: FAMILY MEDICINE

## 2021-01-19 PROCEDURE — 91300 HC SARSCOV02 VAC 30MCG/0.3ML IM: CPT | Performed by: FAMILY MEDICINE

## 2021-01-19 NOTE — TELEPHONE ENCOUNTER
Per Dr Jossie Hartman to call office-Patient needs a note a note to excuse herself from [address] [place] for 03/08/2021

## 2021-01-21 ENCOUNTER — TELEPHONE (OUTPATIENT)
Dept: INTERNAL MEDICINE CLINIC | Age: 51
End: 2021-01-21

## 2021-01-21 NOTE — TELEPHONE ENCOUNTER
I apologize, can you please let me know what diagnoses to put on the letter and I will write it   Thank you

## 2021-02-09 ENCOUNTER — IMMUNIZATION (OUTPATIENT)
Dept: VACCINE CLINIC | Facility: HOSPITAL | Age: 51
End: 2021-02-09

## 2021-02-09 PROCEDURE — 91300 HC SARSCOV02 VAC 30MCG/0.3ML IM: CPT | Performed by: INTERNAL MEDICINE

## 2021-02-09 PROCEDURE — 0002A: CPT | Performed by: INTERNAL MEDICINE

## 2021-03-05 RX ORDER — LEVOTHYROXINE SODIUM 0.15 MG/1
150 TABLET ORAL DAILY
Qty: 30 TABLET | Refills: 0 | Status: SHIPPED | OUTPATIENT
Start: 2021-03-05 | End: 2021-04-08 | Stop reason: SDUPTHER

## 2021-03-08 ENCOUNTER — APPOINTMENT (EMERGENCY)
Dept: RADIOLOGY | Facility: HOSPITAL | Age: 51
End: 2021-03-08
Payer: COMMERCIAL

## 2021-03-08 ENCOUNTER — HOSPITAL ENCOUNTER (EMERGENCY)
Facility: HOSPITAL | Age: 51
Discharge: HOME | End: 2021-03-08
Attending: EMERGENCY MEDICINE | Admitting: EMERGENCY MEDICINE
Payer: COMMERCIAL

## 2021-03-08 VITALS
TEMPERATURE: 98.1 F | SYSTOLIC BLOOD PRESSURE: 120 MMHG | WEIGHT: 240.3 LBS | RESPIRATION RATE: 18 BRPM | HEIGHT: 67 IN | OXYGEN SATURATION: 100 % | DIASTOLIC BLOOD PRESSURE: 74 MMHG | BODY MASS INDEX: 37.72 KG/M2 | HEART RATE: 77 BPM

## 2021-03-08 DIAGNOSIS — E87.6 HYPOKALEMIA: ICD-10-CM

## 2021-03-08 DIAGNOSIS — R20.2 PARESTHESIAS: Primary | ICD-10-CM

## 2021-03-08 DIAGNOSIS — R07.9 CHEST PAIN: ICD-10-CM

## 2021-03-08 LAB
ALBUMIN SERPL BCP-MCNC: 3.5 G/DL (ref 3.5–5)
ALP SERPL-CCNC: 118 U/L (ref 46–116)
ALT SERPL W P-5'-P-CCNC: 39 U/L (ref 12–78)
ANION GAP SERPL CALCULATED.3IONS-SCNC: 5 MMOL/L (ref 4–13)
AST SERPL W P-5'-P-CCNC: 35 U/L (ref 5–45)
ATRIAL RATE: 76 BPM
BASOPHILS # BLD AUTO: 0.03 THOUSANDS/ΜL (ref 0–0.1)
BASOPHILS NFR BLD AUTO: 0 % (ref 0–1)
BILIRUB SERPL-MCNC: 0.42 MG/DL (ref 0.2–1)
BUN SERPL-MCNC: 14 MG/DL (ref 5–25)
CA-I BLD-SCNC: 1.15 MMOL/L (ref 1.12–1.32)
CALCIUM SERPL-MCNC: 9.3 MG/DL (ref 8.3–10.1)
CHLORIDE SERPL-SCNC: 109 MMOL/L (ref 100–108)
CO2 SERPL-SCNC: 31 MMOL/L (ref 21–32)
CREAT SERPL-MCNC: 0.91 MG/DL (ref 0.6–1.3)
EOSINOPHIL # BLD AUTO: 0.09 THOUSAND/ΜL (ref 0–0.61)
EOSINOPHIL NFR BLD AUTO: 1 % (ref 0–6)
ERYTHROCYTE [DISTWIDTH] IN BLOOD BY AUTOMATED COUNT: 14.4 % (ref 11.6–15.1)
GFR SERPL CREATININE-BSD FRML MDRD: 74 ML/MIN/1.73SQ M
GLUCOSE SERPL-MCNC: 108 MG/DL (ref 65–140)
HCG SERPL QL: NEGATIVE
HCT VFR BLD AUTO: 47.3 % (ref 34.8–46.1)
HGB BLD-MCNC: 14.8 G/DL (ref 11.5–15.4)
IMM GRANULOCYTES # BLD AUTO: 0.06 THOUSAND/UL (ref 0–0.2)
IMM GRANULOCYTES NFR BLD AUTO: 1 % (ref 0–2)
LYMPHOCYTES # BLD AUTO: 2.05 THOUSANDS/ΜL (ref 0.6–4.47)
LYMPHOCYTES NFR BLD AUTO: 21 % (ref 14–44)
MAGNESIUM SERPL-MCNC: 2.2 MG/DL (ref 1.6–2.6)
MCH RBC QN AUTO: 30 PG (ref 26.8–34.3)
MCHC RBC AUTO-ENTMCNC: 31.3 G/DL (ref 31.4–37.4)
MCV RBC AUTO: 96 FL (ref 82–98)
MONOCYTES # BLD AUTO: 0.57 THOUSAND/ΜL (ref 0.17–1.22)
MONOCYTES NFR BLD AUTO: 6 % (ref 4–12)
NEUTROPHILS # BLD AUTO: 6.95 THOUSANDS/ΜL (ref 1.85–7.62)
NEUTS SEG NFR BLD AUTO: 71 % (ref 43–75)
NRBC BLD AUTO-RTO: 0 /100 WBCS
P AXIS: 47 DEGREES
PLATELET # BLD AUTO: 204 THOUSANDS/UL (ref 149–390)
PMV BLD AUTO: 10 FL (ref 8.9–12.7)
POTASSIUM SERPL-SCNC: 3.4 MMOL/L (ref 3.5–5.3)
PR INTERVAL: 154 MS
PROT SERPL-MCNC: 7.2 G/DL (ref 6.4–8.2)
QRS AXIS: 66 DEGREES
QRSD INTERVAL: 92 MS
QT INTERVAL: 482 MS
QTC INTERVAL: 542 MS
RBC # BLD AUTO: 4.94 MILLION/UL (ref 3.81–5.12)
SODIUM SERPL-SCNC: 145 MMOL/L (ref 136–145)
T WAVE AXIS: 43 DEGREES
T4 FREE SERPL-MCNC: 0.84 NG/DL (ref 0.76–1.46)
TROPONIN I SERPL-MCNC: <0.02 NG/ML
TSH SERPL DL<=0.05 MIU/L-ACNC: 5.3 UIU/ML (ref 0.36–3.74)
VENTRICULAR RATE: 76 BPM
WBC # BLD AUTO: 9.75 THOUSAND/UL (ref 4.31–10.16)

## 2021-03-08 PROCEDURE — 71046 X-RAY EXAM CHEST 2 VIEWS: CPT

## 2021-03-08 PROCEDURE — 93010 ELECTROCARDIOGRAM REPORT: CPT | Performed by: INTERNAL MEDICINE

## 2021-03-08 PROCEDURE — 84439 ASSAY OF FREE THYROXINE: CPT | Performed by: EMERGENCY MEDICINE

## 2021-03-08 PROCEDURE — 70450 CT HEAD/BRAIN W/O DYE: CPT

## 2021-03-08 PROCEDURE — 84703 CHORIONIC GONADOTROPIN ASSAY: CPT | Performed by: EMERGENCY MEDICINE

## 2021-03-08 PROCEDURE — 83735 ASSAY OF MAGNESIUM: CPT | Performed by: EMERGENCY MEDICINE

## 2021-03-08 PROCEDURE — 72125 CT NECK SPINE W/O DYE: CPT

## 2021-03-08 PROCEDURE — G1004 CDSM NDSC: HCPCS

## 2021-03-08 PROCEDURE — 99285 EMERGENCY DEPT VISIT HI MDM: CPT | Performed by: EMERGENCY MEDICINE

## 2021-03-08 PROCEDURE — 85025 COMPLETE CBC W/AUTO DIFF WBC: CPT | Performed by: EMERGENCY MEDICINE

## 2021-03-08 PROCEDURE — 99284 EMERGENCY DEPT VISIT MOD MDM: CPT

## 2021-03-08 PROCEDURE — 93005 ELECTROCARDIOGRAM TRACING: CPT

## 2021-03-08 PROCEDURE — 84443 ASSAY THYROID STIM HORMONE: CPT | Performed by: EMERGENCY MEDICINE

## 2021-03-08 PROCEDURE — 84484 ASSAY OF TROPONIN QUANT: CPT | Performed by: EMERGENCY MEDICINE

## 2021-03-08 PROCEDURE — 36415 COLL VENOUS BLD VENIPUNCTURE: CPT | Performed by: EMERGENCY MEDICINE

## 2021-03-08 PROCEDURE — 80053 COMPREHEN METABOLIC PANEL: CPT | Performed by: EMERGENCY MEDICINE

## 2021-03-08 PROCEDURE — 82330 ASSAY OF CALCIUM: CPT | Performed by: EMERGENCY MEDICINE

## 2021-03-08 RX ORDER — POTASSIUM CHLORIDE 20 MEQ/1
40 TABLET, EXTENDED RELEASE ORAL ONCE
Status: COMPLETED | OUTPATIENT
Start: 2021-03-08 | End: 2021-03-08

## 2021-03-08 RX ADMIN — POTASSIUM CHLORIDE 40 MEQ: 1500 TABLET, EXTENDED RELEASE ORAL at 15:05

## 2021-03-08 NOTE — ED ATTENDING ATTESTATION
3/8/2021  I, Ulysses Chinchilla, MD, saw and evaluated the patient  I have discussed the patient with the resident/non-physician practitioner and agree with the resident's/non-physician practitioner's findings, Plan of Care, and MDM as documented in the resident's/non-physician practitioner's note, except where noted  All available labs and Radiology studies were reviewed  I was present for key portions of any procedure(s) performed by the resident/non-physician practitioner and I was immediately available to provide assistance  At this point I agree with the current assessment done in the Emergency Department  I have conducted an independent evaluation of this patient a history and physical is as follows:    ED Course         Critical Care Time  Procedures    49 yo female with copd, dm, c/o numbness and tingling in left arm and hand, intermittent and worse last two days, started few months ago  Pt with symptoms in feet, mouth intermittently for few months  No trouble walking, no clumsiness  Pt with chronic headaches, nausea in morning  Pt with dull achy epigastric pain with no radiation  Pt with dyspnea with walking up stairs  No diarrhea, no vomiting, no rash  Vss, afebrile, lungs cta, rrr, abdomen soft nontender, mild cervical tenderness, no focal neuro deficits  Cardiac workup, tsh, ct head  Mg, calcium

## 2021-03-08 NOTE — ED PROVIDER NOTES
History  Chief Complaint   Patient presents with    Tingling     Pt states "It feels like there's a blood pressure cuff always squeezing her arm  It's numb and it's tingling" Has been happening for the past three months but the past week its gotten worse  57-year-old female presenting for evaluation of left arm tingling associated with a feeling of a tight band around her left mid upper arm  This is been going on for about 3 months, does state that it has been worse over the past few days  She does have associated neck pain but it does not seem to be related to her arm discomfort or tingling  The tingling is in all 5 her fingers  She has no loss of motor function in her left upper extremity  In addition to this left arm paresthesia as, she does have intermittent tingling in her other extremities as well  This is not seem associated with any movement or related to anything she is doing, she notices it more rest   Does not have any associated headaches, does have associated morning nausea that has been going on for few days  Does intermittently have some chest wall pain, some dyspnea on exertion as well, does have a smoking history  No bleeding  She is concerned that her symptoms could represent the precursors to a stroke or a heart attack  History provided by:  Patient   used: No        Prior to Admission Medications   Prescriptions Last Dose Informant Patient Reported? Taking?    albuterol (2 5 mg/3 mL) 0 083 % nebulizer solution   No No   Sig: Take 1 vial (2 5 mg total) by nebulization every 6 (six) hours as needed for wheezing or shortness of breath   albuterol (PROVENTIL HFA,VENTOLIN HFA) 90 mcg/act inhaler  Self Yes No   Sig: Inhale 2 puffs every 6 (six) hours as needed for wheezing   buPROPion (WELLBUTRIN XL) 150 mg 24 hr tablet   No No   Sig: Take 1 tablet (150 mg total) by mouth every morning   diazepam (VALIUM) 10 mg tablet   No No   Sig: Take 1 tablet (10 mg total) by mouth every 6 (six) hours as needed for anxiety   ergocalciferol (VITAMIN D2) 50,000 units   No No   Sig: Take 1 capsule (50,000 Units total) by mouth once a week   fluticasone (FLONASE) 50 mcg/act nasal spray   No No   Si spray into each nostril 2 (two) times a day   furosemide (LASIX) 20 mg tablet   No No   Sig: Take 1 tablet (20 mg total) by mouth daily as needed (edema)   gabapentin (NEURONTIN) 100 mg capsule  Self Yes No   Sig: TAKE ONE TABLET EVERY 8 HOURS FOR NEUROPATHIC PAIN   ibuprofen (MOTRIN) 600 mg tablet  Self Yes No   Sig: as needed    levothyroxine 88 mcg tablet   No Yes   Sig: Take 1 tablet (88 mcg total) by mouth daily   naloxone (NARCAN) 4 mg/0 1 mL nasal spray   No No   Sig: Administer 1 spray into a nostril  If no response after 2-3 minutes, give another dose in the other nostril using a new spray     oxyCODONE-acetaminophen (PERCOCET)  mg per tablet  Self No No   Sig: Take 1 tablet by mouth every 6 (six) hours as needed for moderate pain Max Daily Amount: 4 tablets   pantoprazole (PROTONIX) 40 mg tablet   No No   Sig: Take 1 tablet (40 mg total) by mouth 2 (two) times a day as needed (reflux)   sertraline (ZOLOFT) 100 mg tablet   No No   Sig: Take 1 tablet (100 mg total) by mouth 2 (two) times a day   simvastatin (ZOCOR) 20 mg tablet   No No   Sig: Take 1 tablet (20 mg total) by mouth daily at bedtime   sucralfate (CARAFATE) 1 g tablet   No No   Sig: Take 1 tablet (1 g total) by mouth 3 (three) times a day before meals   varenicline (CHANTIX) 0 5 mg tablet   No No   Sig: Take 1 tablet (0 5 mg total) by mouth 2 (two) times a day      Facility-Administered Medications: None       Past Medical History:   Diagnosis Date    Allergic sinusitis     last assessed - [id number]    Anxiety     last assessed - 73WVM7324    Asthma     last assessed - 00WGS6377    Bilateral lower extremity edema     last assessed - 98PGG4903    Callus of foot     last assessed - 00Ykz3277    Chronic GERD last assessed - 75KFH8233    Constipation     Resolved - 14EJX6463    Depression     last assessed - 45CPC7560    Disease of thyroid gland     Diverticulitis of colon     last assessed - 37JOZ9157    Dyspepsia     Resolved - 32WSS1776    Esophageal reflux     last assessed - 47QLX6167    Essential hypertriglyceridemia     last assessed - 36BMU0126    GERD (gastroesophageal reflux disease)     Gynecological disorder     last assessed - 55YSS6015    Hypertension     last assessed - 52SFJ8756    Hypotension     last assessed - 25Xxs3808    Migraine     Neuropathy     Pulmonary emphysema ([address] )     last assessed - 66SBZ5898    Seasonal allergies     Urinary frequency     last assessed - 07Wkw1933    Vagina, candidiasis     last assessed - 97Prn0460       Past Surgical History:   Procedure Laterality Date    CARPAL TUNNEL RELEASE      last assessed - 39SPM0394    CHOLECYSTECTOMY      last assessed - 39LNC0081    COLONOSCOPY      Complete colonoscopy    [facility] EYE SURGERY      last assessed - 99EQQ0668   [facility] FOOT SURGERY      last assessed - 48SDM0313    OR ESOPHAGOGASTRODUODENOSCOPY TRANSORAL DIAGNOSTIC N/A 2/13/2017    Procedure: ESOPHAGOGASTRODUODENOSCOPY (EGD); Surgeon: Modesta Pastor MD;  Location: [facility]; Service: Gastroenterology       Family History   Problem Relation Age of Onset    Lung cancer Sister     Hypertension Other     Lung cancer Other     Hypertension Other     Lung cancer Other     Lung cancer Other     Lung cancer Maternal Grandmother      I have reviewed and agree with the history as documented      E-Cigarette/Vaping    E-Cigarette Use Former User      E-Cigarette/Vaping Substances    Nicotine Yes      Social History     Tobacco Use    Smoking status: Current Every Day Smoker     Packs/day: 2 00     Types: Cigarettes    Smokeless tobacco: Never Used    Tobacco comment: 1 5-2 packs a day instructed on QUIT line   Substance Use Topics    Alcohol use: Yes     Comment: wine twice a year    Drug use: No        Review of Systems   Constitutional: Negative for chills, fatigue and fever  HENT: Negative for congestion, ear pain and sore throat  Eyes: Negative for visual disturbance  Respiratory: Positive for shortness of breath  Negative for cough and wheezing  Cardiovascular: Positive for chest pain  Negative for palpitations  Gastrointestinal: Positive for nausea  Negative for abdominal pain, diarrhea and vomiting  Genitourinary: Negative for dysuria and hematuria  Musculoskeletal: Negative for arthralgias, back pain, neck pain and neck stiffness  Skin: Negative for color change and rash  Neurological: Positive for numbness  Negative for dizziness, seizures, syncope and headaches  Psychiatric/Behavioral: Negative for confusion  The patient is not nervous/anxious  All other systems reviewed and are negative  Physical Exam  ED Triage Vitals [03/08/21 1234]   Temperature Pulse Respirations Blood Pressure SpO2   98 1 °F (36 7 °C) 77 18 120/74 100 %      Temp Source Heart Rate Source Patient Position - Orthostatic VS BP Location FiO2 (%)   Oral Monitor Lying Left arm --      Pain Score       No Pain             Orthostatic Vital Signs  Vitals:    03/08/21 1234   BP: 120/74   Pulse: 77   Patient Position - Orthostatic VS: Lying       Physical Exam  Vitals signs and nursing note reviewed  Constitutional:       General: She is not in acute distress  Appearance: Normal appearance  She is well-developed  She is not ill-appearing, toxic-appearing or diaphoretic  HENT:      Head: Normocephalic and atraumatic  Nose: Nose normal       Mouth/Throat:      Mouth: Mucous membranes are moist       Pharynx: Oropharynx is clear  Eyes:      Conjunctiva/sclera: Conjunctivae normal       Pupils: Pupils are equal, round, and reactive to light  Neck:      Musculoskeletal: Normal range of motion and neck supple  Muscular tenderness (mild cervical TTP) present   No neck rigidity  Cardiovascular:      Rate and Rhythm: Normal rate and regular rhythm  Pulses: Normal pulses  Heart sounds: No murmur  Pulmonary:      Effort: Pulmonary effort is normal  No respiratory distress  Breath sounds: Normal breath sounds  No wheezing  Abdominal:      General: Abdomen is flat  Bowel sounds are normal  There is no distension  Palpations: Abdomen is soft  Tenderness: There is no abdominal tenderness  Musculoskeletal: Normal range of motion  Right lower leg: No edema  Left lower leg: No edema  Skin:     General: Skin is warm and dry  Neurological:      General: No focal deficit present  Mental Status: She is alert and oriented to person, place, and time  Cranial Nerves: No cranial nerve deficit  Sensory: No sensory deficit  Motor: No weakness  Gait: Gait normal    Psychiatric:         Mood and Affect: Mood normal          ED Medications  Medications   potassium chloride (K-DUR,KLOR-CON) CR tablet 40 mEq (40 mEq Oral Given 3/8/21 1505)       Diagnostic Studies  Results Reviewed     Procedure Component Value Units Date/Time    T4, free [[id number]]  (Normal) Collected: 03/08/21 1423    Lab Status: Final result Specimen: Blood from Arm, Right Updated: 03/08/21 1527     Free T4 0 84 ng/dL     hCG, qualitative pregnancy [[id number]]  (Normal) Collected: 03/08/21 1423    Lab Status: Final result Specimen: Blood from Arm, Right Updated: 03/08/21 1507     Preg, Serum Negative    TSH, 3rd generation with Free T4 reflex [[id number]]  (Abnormal) Collected: 03/08/21 1423    Lab Status: Final result Specimen: Blood from Arm, Right Updated: 03/08/21 1507     TSH 3RD Cheryle Larry 5 300 uIU/mL     Narrative:      Patients undergoing fluorescein dye angiography may retain small amounts of fluorescein in the body for 48-72 hours post procedure  Samples containing fluorescein can produce falsely depressed TSH values   If the patient had this procedure,a specimen should be resubmitted post fluorescein clearance        Troponin I [[id number]]  (Normal) Collected: 03/08/21 1423    Lab Status: Final result Specimen: Blood from Arm, Right Updated: 03/08/21 1506     Troponin I <0 02 ng/mL     Comprehensive metabolic panel [[id number]]  (Abnormal) Collected: 03/08/21 1423    Lab Status: Final result Specimen: Blood from Arm, Right Updated: 03/08/21 1457     Sodium 145 mmol/L      Potassium 3 4 mmol/L      Chloride 109 mmol/L      CO2 31 mmol/L      ANION GAP 5 mmol/L      BUN 14 mg/dL      Creatinine 0 91 mg/dL      Glucose 108 mg/dL      Calcium 9 3 mg/dL      AST 35 U/L      ALT 39 U/L      Alkaline Phosphatase 118 U/L      Total Protein 7 2 g/dL      Albumin 3 5 g/dL      Total Bilirubin 0 42 mg/dL      eGFR 74 ml/min/1 73sq m     Narrative:      [place] guidelines for Chronic Kidney Disease (CKD):     Stage 1 with normal or high GFR (GFR > 90 mL/min/1 73 square meters)    Stage 2 Mild CKD (GFR = 60-89 mL/min/1 73 square meters)    Stage 3A Moderate CKD (GFR = 45-59 mL/min/1 73 square meters)    Stage 3B Moderate CKD (GFR = 30-44 mL/min/1 73 square meters)    Stage 4 Severe CKD (GFR = 15-29 mL/min/1 73 square meters)    Stage 5 End Stage CKD (GFR <15 mL/min/1 73 square meters)  Note: GFR calculation is accurate only with a steady state creatinine    Magnesium [[id number]]  (Normal) Collected: 03/08/21 1423    Lab Status: Final result Specimen: Blood from Arm, Right Updated: 03/08/21 1457     Magnesium 2 2 mg/dL     Calcium, ionized [[id number]]  (Normal) Collected: 03/08/21 1423    Lab Status: Final result Specimen: Blood from Arm, Right Updated: 03/08/21 1444     Calcium, Ionized 1 15 mmol/L     CBC and differential [[id number]]  (Abnormal) Collected: 03/08/21 1423    Lab Status: Final result Specimen: Blood from Arm, Right Updated: 03/08/21 1435     WBC 9 75 Thousand/uL      RBC 4 94 Million/uL      Hemoglobin 14 8 g/dL Hematocrit 47 3 %      MCV 96 fL      MCH 30 0 pg      MCHC 31 3 g/dL      RDW 14 4 %      MPV 10 0 fL      Platelets 399 Thousands/uL      nRBC 0 /100 WBCs      Neutrophils Relative 71 %      Immat GRANS % 1 %      Lymphocytes Relative 21 %      Monocytes Relative 6 %      Eosinophils Relative 1 %      Basophils Relative 0 %      Neutrophils Absolute 6 95 Thousands/µL      Immature Grans Absolute 0 06 Thousand/uL      Lymphocytes Absolute 2 05 Thousands/µL      Monocytes Absolute 0 57 Thousand/µL      Eosinophils Absolute 0 09 Thousand/µL      Basophils Absolute 0 03 Thousands/µL                  XR chest 2 views   ED Interpretation by Batsheva Maldonado MD (03/08 1538)   No acute CP disease  Final Result by Katharine Harada, MD (03/08 6428)      No acute cardiopulmonary disease  Workstation performed: [id number]         CT head without contrast   Final Result by Yadi Ortiz DO (03/08 5223)      No acute intracranial abnormality  Workstation performed: [id number]         CT spine cervical without contrast   Final Result by Yadi Ortiz DO (03/08 7918)      No cervical spine fracture or traumatic malalignment  Workstation performed: [id number]               Procedures  ECG 12 Lead Documentation Only    Date/Time: 3/8/2021 1:25 PM  Performed by: Batsheva Maldonado MD  Authorized by: Batsheva Maldonado MD     Indications / Diagnosis:  CP  Patient location:  ED  Previous ECG:     Previous ECG:  Compared to current    Similarity:  Changes noted  Interpretation:     Interpretation: abnormal    Rate:     ECG rate assessment: normal    Rhythm:     Rhythm: sinus rhythm    Ectopy:     Ectopy: none    QRS:     QRS axis:  Normal  Conduction:     Conduction: normal    ST segments:     ST segments:  Non-specific  T waves:     T waves: normal    Comments:      NSR, some nonspecific ST wave changes to morphology, QTc 542, no specific ischemic changes             ED Course  ED Course as of Mar 10 2030   Mon Mar 08, 2021   1536 Free T4: 0 84             HEART Risk Score      Most Recent Value   Heart Score Risk Calculator   History  0 Filed at: 03/08/2021 1310   ECG  1 Filed at: 03/08/2021 1310   Age  1 Filed at: 03/08/2021 1310   Risk Factors  1 Filed at: 03/08/2021 1310   Troponin  0 Filed at: 03/08/2021 1310   HEART Score  3 Filed at: 03/08/2021 1310                      SBIRT 22yo+      Most Recent Value   SBIRT (25 yo +)   In order to provide better care to our patients, we are screening all of our patients for alcohol and drug use  Would it be okay to ask you these screening questions? No Filed at: 03/08/2021 1338                [facility]  Number of Diagnoses or Management Options  Chest pain:   Hypokalemia:   Paresthesias:   Diagnosis management comments: 75-year-old female presenting for evaluation of paresthesias that been going on for the past 3 months as well as some mild intermittent chest wall pain and dyspnea on exertion, patient is concerned that her symptoms may represent a precursor to a stroke or a heart attack therefore she came to the emergency department today  Her primary care physician is out of town currently but she believes that she is going back tomorrow  K 3 4, otherwise electrolytes WNL  Trop negative, ECG without any acute ischemic changes  CXR WNL  CTH and c-spine negative for any increased intracranial pressure, c-spine shows no acute changes as well  Discussed close f/u with pcp and return precautions  Patient reassured, discharged in good condition, ambulatory out of the ED         Disposition  Final diagnoses:   Paresthesias   Chest pain   Hypokalemia     Time reflects when diagnosis was documented in both MDM as applicable and the Disposition within this note     Time User Action Codes Description Comment    3/8/2021  3:40 PM Aye, Taya Yessenia Add [R20 2] Paresthesias     3/8/2021  3:40 PM Aye, Taya Yessenia Add [R07 9] Chest pain     3/8/2021  3:41 PM Mariam Almendarez M Add [E87 6] Hypokalemia       ED Disposition     ED Disposition Condition Date/Time Comment    Discharge Good Mon Mar 8, 2021  3:40 PM Jocelyn Bergman discharge to home/self care  Follow-up Information     Follow up With Specialties Details Why Contact Info Additional Information    Tracey Mays DO Family Medicine, Internal Medicine Schedule an appointment as soon as possible for a visit in 2 days For reevaluation as we discussed   [address] [address]  30 [facility] Emergency Department Emergency Medicine Go to  As needed [address] [place] [address]  958 [facility] 64 [facility] Emergency Department, [address], [address] [employer] [id number] Program Physical Therapy Schedule an appointment as soon as possible for a visit  As needed           Discharge Medication List as of 3/8/2021  3:41 PM      CONTINUE these medications which have NOT CHANGED    Details   levothyroxine 88 mcg tablet Take 1 tablet (88 mcg total) by mouth daily, Starting Fri 1/15/2021, Normal      albuterol (2 5 mg/3 mL) 0 083 % nebulizer solution Take 1 vial (2 5 mg total) by nebulization every 6 (six) hours as needed for wheezing or shortness of breath, Starting Tue 5/12/2020, Normal      albuterol (PROVENTIL HFA,VENTOLIN HFA) 90 mcg/act inhaler Inhale 2 puffs every 6 (six) hours as needed for wheezing, Historical Med      buPROPion (WELLBUTRIN XL) 150 mg 24 hr tablet Take 1 tablet (150 mg total) by mouth every morning, Starting Fri 1/15/2021, Normal      diazepam (VALIUM) 10 mg tablet Take 1 tablet (10 mg total) by mouth every 6 (six) hours as needed for anxiety, Starting Fri 1/15/2021, Normal      ergocalciferol (VITAMIN D2) 50,000 units Take 1 capsule (50,000 Units total) by mouth once a week, Starting Tue 11/19/2019, Normal      fluticasone (FLONASE) 50 mcg/act nasal spray 1 spray into each nostril 2 (two) times a day, Starting Fri 1/15/2021, Normal      furosemide (LASIX) 20 mg tablet Take 1 tablet (20 mg total) by mouth daily as needed (edema), Starting Fri 8/7/2020, Normal      gabapentin (NEURONTIN) 100 mg capsule TAKE ONE TABLET EVERY 8 HOURS FOR NEUROPATHIC PAIN, Historical Med      ibuprofen (MOTRIN) 600 mg tablet as needed , Starting Wed 8/7/2019, Historical Med      naloxone (NARCAN) 4 mg/0 1 mL nasal spray Administer 1 spray into a nostril  If no response after 2-3 minutes, give another dose in the other nostril using a new spray , Normal      oxyCODONE-acetaminophen (PERCOCET)  mg per tablet Take 1 tablet by mouth every 6 (six) hours as needed for moderate pain Max Daily Amount: 4 tablets, Starting Thu 11/8/2018, Print      pantoprazole (PROTONIX) 40 mg tablet Take 1 tablet (40 mg total) by mouth 2 (two) times a day as needed (reflux), Starting Fri 1/15/2021, Normal      sertraline (ZOLOFT) 100 mg tablet Take 1 tablet (100 mg total) by mouth 2 (two) times a day, Starting Fri 1/15/2021, Normal      simvastatin (ZOCOR) 20 mg tablet Take 1 tablet (20 mg total) by mouth daily at bedtime, Starting Fri 1/15/2021, Normal      sucralfate (CARAFATE) 1 g tablet Take 1 tablet (1 g total) by mouth 3 (three) times a day before meals, Starting Fri 1/15/2021, Normal      varenicline (CHANTIX) 0 5 mg tablet Take 1 tablet (0 5 mg total) by mouth 2 (two) times a day, Starting Fri 1/15/2021, Normal           No discharge procedures on file  PDMP Review     None           ED Provider  Attending physically available and evaluated Cira Lowry I managed the patient along with the ED Attending      Electronically Signed by         Sandra Acosta MD  03/10/21 2030       Sandra Acosta MD  03/10/21 2031

## 2021-03-08 NOTE — DISCHARGE INSTRUCTIONS
CT Head:  FINDINGS:     PARENCHYMA:  No intracranial mass, mass effect or midline shift  No CT signs of acute infarction  No acute parenchymal hemorrhage  VENTRICLES AND EXTRA-AXIAL SPACES:  Normal for the patient's age  VISUALIZED ORBITS AND PARANASAL SINUSES:  Unremarkable  CALVARIUM AND EXTRACRANIAL SOFT TISSUES:  Normal      IMPRESSION:     No acute intracranial abnormality  CT C-spine:     ALIGNMENT:  Normal alignment of the cervical spine  No subluxation  VERTEBRAL BODIES:  No fracture  DEGENERATIVE CHANGES:  No significant cervical degenerative changes are noted  PREVERTEBRAL AND PARASPINAL SOFT TISSUES:  Mild cervical degenerative change at the C5-6 level with slight loss of disc height and mild endplate osteophyte formation  THORACIC INLET:  Normal      IMPRESSION:     No cervical spine fracture or traumatic malalignment

## 2021-03-19 DIAGNOSIS — Z72.0 TOBACCO ABUSE DISORDER: ICD-10-CM

## 2021-03-19 RX ORDER — VARENICLINE TARTRATE 0.5 MG/1
0.5 TABLET, FILM COATED ORAL 2 TIMES DAILY
Qty: 60 TABLET | Refills: 0 | Status: SHIPPED | OUTPATIENT
Start: 2021-03-19 | End: 2021-03-29 | Stop reason: SDUPTHER

## 2021-03-29 ENCOUNTER — OFFICE VISIT (OUTPATIENT)
Dept: INTERNAL MEDICINE CLINIC | Facility: CLINIC | Age: 51
End: 2021-03-29
Payer: COMMERCIAL

## 2021-03-29 VITALS
WEIGHT: 254 LBS | RESPIRATION RATE: 20 BRPM | OXYGEN SATURATION: 98 % | BODY MASS INDEX: 39.87 KG/M2 | TEMPERATURE: 99.5 F | SYSTOLIC BLOOD PRESSURE: 118 MMHG | HEART RATE: 81 BPM | DIASTOLIC BLOOD PRESSURE: 78 MMHG | HEIGHT: 67 IN

## 2021-03-29 DIAGNOSIS — I10 BENIGN HYPERTENSION: ICD-10-CM

## 2021-03-29 DIAGNOSIS — K63.5 POLYP OF COLON, UNSPECIFIED PART OF COLON, UNSPECIFIED TYPE: ICD-10-CM

## 2021-03-29 DIAGNOSIS — E03.9 ACQUIRED HYPOTHYROIDISM: ICD-10-CM

## 2021-03-29 DIAGNOSIS — E78.00 HYPERCHOLESTEROLEMIA: ICD-10-CM

## 2021-03-29 DIAGNOSIS — E55.9 VITAMIN D DEFICIENCY: Primary | ICD-10-CM

## 2021-03-29 DIAGNOSIS — Z72.0 TOBACCO ABUSE DISORDER: ICD-10-CM

## 2021-03-29 DIAGNOSIS — F41.9 ANXIETY: ICD-10-CM

## 2021-03-29 DIAGNOSIS — F33.41 RECURRENT MAJOR DEPRESSIVE DISORDER, IN PARTIAL REMISSION (HCC): ICD-10-CM

## 2021-03-29 DIAGNOSIS — E74.39 GLUCOSE INTOLERANCE: ICD-10-CM

## 2021-03-29 DIAGNOSIS — K21.00 GASTROESOPHAGEAL REFLUX DISEASE WITH ESOPHAGITIS WITHOUT HEMORRHAGE: ICD-10-CM

## 2021-03-29 DIAGNOSIS — R63.5 WEIGHT GAIN: ICD-10-CM

## 2021-03-29 PROCEDURE — 3074F SYST BP LT 130 MM HG: CPT | Performed by: FAMILY MEDICINE

## 2021-03-29 PROCEDURE — 4004F PT TOBACCO SCREEN RCVD TLK: CPT | Performed by: FAMILY MEDICINE

## 2021-03-29 PROCEDURE — 3078F DIAST BP <80 MM HG: CPT | Performed by: FAMILY MEDICINE

## 2021-03-29 PROCEDURE — 3008F BODY MASS INDEX DOCD: CPT | Performed by: FAMILY MEDICINE

## 2021-03-29 PROCEDURE — 99214 OFFICE O/P EST MOD 30 MIN: CPT | Performed by: FAMILY MEDICINE

## 2021-03-29 RX ORDER — LEVOTHYROXINE SODIUM 0.1 MG/1
100 TABLET ORAL DAILY
Qty: 90 TABLET | Refills: 3 | Status: SHIPPED | OUTPATIENT
Start: 2021-03-29 | End: 2021-10-14

## 2021-03-29 RX ORDER — ERGOCALCIFEROL 1.25 MG/1
50000 CAPSULE ORAL WEEKLY
Qty: 12 CAPSULE | Refills: 1 | Status: SHIPPED | OUTPATIENT
Start: 2021-03-29 | End: 2021-10-13 | Stop reason: SDUPTHER

## 2021-03-29 RX ORDER — VARENICLINE TARTRATE 1 MG/1
1 TABLET, FILM COATED ORAL 2 TIMES DAILY
Qty: 60 TABLET | Refills: 3 | Status: SHIPPED | OUTPATIENT
Start: 2021-03-29 | End: 2021-10-13

## 2021-03-29 NOTE — PROGRESS NOTES
Assessment/Plan:    1  Vitamin D deficiency  -     Vitamin D 25 hydroxy; Future  -     ergocalciferol (VITAMIN D2) 50,000 units; Take 1 capsule (50,000 Units total) by mouth once a week    2  Tobacco abuse disorder  -     varenicline (CHANTIX) 1 mg tablet; Take 1 tablet (1 mg total) by mouth 2 (two) times a day    3  Acquired hypothyroidism  -     levothyroxine 100 mcg tablet; Take 1 tablet (100 mcg total) by mouth daily  -     TSH, 3rd generation; Future    4  Hypercholesterolemia  -     Lipid panel; Future    5  BMI 39 0-39 9,adult    6  Benign hypertension    7  Anxiety    8  Recurrent major depressive disorder, in partial remission ([facility] [address] )    9  Glucose intolerance  -     Hemoglobin A1C; Future  -     Comprehensive metabolic panel; Future    10  Weight gain    11  Polyp of colon, unspecified part of colon, unspecified type  -     Ambulatory referral to Gastroenterology; Future    12  Gastroesophageal reflux disease with esophagitis without hemorrhage      BMI Counseling: Body mass index is 39 78 kg/m²  The BMI is above normal  Nutrition recommendations include moderation in carbohydrate intake  Exercise recommendations include exercising 3-5 times per week  No pharmacotherapy was ordered  Increase thyroid medication to  100 mg daily  Advise weight loss, smoking cessation and decreasing sugar intake  Repeat lab in 4months, reviewed hospital records and labs  Replete vit D    There are no Patient Instructions on file for this visit  Return in about 4 months (around 7/29/2021) for Recheck  Subjective:      Patient ID: Anthony Burroughs is a 48 y o  female  Chief Complaint   Patient presents with    Follow-up     3 month  was in ED on 3/8 and had some BW done while there  no other issues or concerns    health maintenance     annual exam       HPI       Anxiety and depression: stable with present medications, no HI or SI  compliant with meds  Continues to have sleep difficulties, decreased appetite, and low energy with depression   She is not having anhedonia, poor concentration, or current suicidal ideation    She does not drink much water, but lots of Pepsi   Eating about 1x/day, usually a sandwich and chips   She does not exercise   May 2020, worsening symptoms since February that she did not mid to anyone  Nick Memory ex- whom she had been with for 20 years but  for only 2 and has been  for 8 years was found D ceased   She acquired the dog from him after his death that they had shared together while being   Cristina Castillo also has broken up from her previous boyfriend whom which she was on the road a lot because he was a   Cristina Castillo is currently moved to [place] in a [facility] and has an apartment which she is able to financially maintain as well as take care of the dog but patient states she feels guilty about the death of her ex- because he had wanted to continue living together and had asked her to move back to [place] but she states that she could not live with him because she was afraid however never stop loving him  Cristina Castillo is also not taking her medications as prescribed since she is afraid to go out due to COVID-19 restrictions and has been rationing at her medications and decreasing dosages or taking every other day  June 2020, she states that she has no suicidal homicidal ideations and feels that her depression is stable  She still has guilt over the passing of her ex- but feels that it is slightly better when I saw her last   She does not feel that she needs a change in her medications  She is very bored and not able to exercise since her dog is blind and cannot go outside and she has no fence yd  She continues to drink large amounts of soda and is not watching her nutritional intake  She continues to gain weight  March 2021: wellcontrolled, no HI or SI    Hypertension (Follow-Up): The patient presents for follow-up of essential hypertension   The patient states she has been stable with her blood pressure control since the last visit     Symptoms: denies impaired vision,-- denies dyspnea,-- denies intermittent leg claudication-- and-- denies lower extremity edema--       The patient presents with complaints of substernal new onset of chest pain, described as aching, non-radiating Her symptoms are caused by no known event (Feels it may be related to indigestion and gas  When she gets chest pain she takes an Aspirin and it helps  Does not feel like it is a crushing pain)  Associated symptoms include She does report headaches but that is not new       Home monitoring: The patient checks her blood pressure sporadically  Blood pressure control has been good       Medications: the patient is adherent with her medication regimen  -- the patient complains of medication side effects       Disease Management: the patient is doing well with her blood pressure goals        Hypothyroidism (Follow-Up): The patient is being seen for follow-up of hyperthyroidism       Interval symptoms:  denies heat intolerance,-- denies increased perspiration,-- denies palpitations,-- denies tremor,-- denies hyperactivity,-- denies anxiety,-- denies insomnia-- and-- denies fatigue       Associated symptoms: no weight loss,-- no weight gain,-- no diarrhea,-- no constipation-- and-- no vision changes       Medications:  the patient is not adherent to her medication regimen-- and-- she denies medication side effects       Disease management:  the patient is doing well with her goals        Vitamin D Deficiency: The patient is being seen for follow-up of vitamin D deficiency  Recent laboratory results: date 1/2018, 25-hydroxyvitamin D 23 ng/mL  7/18 level is 18 March 2021 off supplement and has not had labs        tobacco abuse : since age 6, 2-3 packs per day recently, never started chantix  March 2021 started chantix and now smoking  Less but has nto quit    The following portions of the patient's history were reviewed and updated as appropriate: allergies, current medications, past family history, past medical history, past social history, past surgical history and problem list     Review of Systems      Constitutional:  Denies fever or chills, + weight gain   Eyes:  Denies double or blurry vision  HENT:  Denies nasal congestion or sore throat   Respiratory:  Denies cough or shortness of breath or wheezing  Cardiovascular:  Denies palpitations or chest pain  GI:  Denies abdominal pain, nausea, or vomiting, no loose stools  Integument:  Denies rash , no open areas  Neurologic:  Denies headache or focal weakness, no dizziness          Current Outpatient Medications   Medication Sig Dispense Refill    albuterol (2 5 mg/3 mL) 0 083 % nebulizer solution Take 1 vial (2 5 mg total) by nebulization every 6 (six) hours as needed for wheezing or shortness of breath 30 vial 5    albuterol (PROVENTIL HFA,VENTOLIN HFA) 90 mcg/act inhaler Inhale 2 puffs every 6 (six) hours as needed for wheezing      buPROPion (WELLBUTRIN XL) 150 mg 24 hr tablet Take 1 tablet (150 mg total) by mouth every morning 90 tablet 1    diazepam (VALIUM) 10 mg tablet Take 1 tablet (10 mg total) by mouth every 6 (six) hours as needed for anxiety 30 tablet 1    fluticasone (FLONASE) 50 mcg/act nasal spray 1 spray into each nostril 2 (two) times a day (Patient taking differently: 1 spray into each nostril as needed ) 16 g 5    furosemide (LASIX) 20 mg tablet Take 1 tablet (20 mg total) by mouth daily as needed (edema) 30 tablet 0    gabapentin (NEURONTIN) 100 mg capsule as needed   0    ibuprofen (MOTRIN) 600 mg tablet as needed   0    levothyroxine 100 mcg tablet Take 1 tablet (100 mcg total) by mouth daily 90 tablet 3    oxyCODONE-acetaminophen (PERCOCET)  mg per tablet Take 1 tablet by mouth every 6 (six) hours as needed for moderate pain Max Daily Amount: 4 tablets 30 tablet 0    pantoprazole (PROTONIX) 40 mg tablet Take 1 tablet (40 mg total) by mouth 2 (two) times a day as needed (reflux) 180 tablet 1    sertraline (ZOLOFT) 100 mg tablet Take 1 tablet (100 mg total) by mouth 2 (two) times a day 180 tablet 1    simvastatin (ZOCOR) 20 mg tablet Take 1 tablet (20 mg total) by mouth daily at bedtime 90 tablet 1    sucralfate (CARAFATE) 1 g tablet Take 1 tablet (1 g total) by mouth 3 (three) times a day before meals 270 tablet 1    varenicline (CHANTIX) 1 mg tablet Take 1 tablet (1 mg total) by mouth 2 (two) times a day 60 tablet 3    ergocalciferol (VITAMIN D2) 50,000 units Take 1 capsule (50,000 Units total) by mouth once a week 12 capsule 1    naloxone (NARCAN) 4 mg/0 1 mL nasal spray Administer 1 spray into a nostril  If no response after 2-3 minutes, give another dose in the other nostril using a new spray  (Patient not taking: Reported on 3/29/2021) 1 each 1     No current facility-administered medications for this visit  Objective:    /78 (BP Location: Left arm, Patient Position: Sitting, Cuff Size: Large)   Pulse 81   Temp 99 5 °F (37 5 °C) (Tympanic)   Resp 20   Ht 5' 7" (1 702 m)   Wt 115 kg (254 lb)   SpO2 98%   BMI 39 78 kg/m²        Physical Exam       Constitutional:  Well developed, well nourished, no acute distress, non-toxic appearance   Eyes:  PERRL, conjunctiva normal , non icteric sclera  HENT:  Atraumatic, oropharynx dry    Neck-  supple   Respiratory:  CTA b/l, normal breath sounds, no rales, no wheezing   Cardiovascular:  RRR, no murmurs, no LE edema b/l  GI:  Soft, nondistended, normal bowel sounds x 4, nontender, no organomegaly, no mass, no rebound, no guarding   Neurologic:  no focal deficits noted   Psychiatric:  Speech and behavior appropriate , AAO x 3        Bobbette Fall, DO

## 2021-04-08 RX ORDER — LEVOTHYROXINE SODIUM 0.15 MG/1
150 TABLET ORAL DAILY
Qty: 30 TABLET | Refills: 0 | Status: SHIPPED | OUTPATIENT
Start: 2021-04-08 | End: 2021-04-28 | Stop reason: SDUPTHER

## 2021-04-15 ENCOUNTER — TELEPHONE (OUTPATIENT)
Dept: INTERNAL MEDICINE CLINIC | Age: 51
End: 2021-04-15

## 2021-04-15 NOTE — TELEPHONE ENCOUNTER
I called the patient and she states that the "muscle spasm are better this morning "  Appointment offered and she declined  She drinks 3 Pepsi's/day and no water  She wants to know what she can take OTC for this discomfort  She denies any changes in her activities or routine yesterday that may have  Caused this discomfort  I advised that she take Tylenol or OTC NSAID and drink more water  If her condition worsens or she changes her mind, we will see her at the office to to better evaluate her complaints

## 2021-04-15 NOTE — TELEPHONE ENCOUNTER
the patient asking to speak with Nurse     Reports having muscle spasms in legs and stomach     Asking what she can take OTC for this

## 2021-04-23 DIAGNOSIS — F41.8 DEPRESSION WITH ANXIETY: ICD-10-CM

## 2021-04-28 ENCOUNTER — LAB (OUTPATIENT)
Dept: LAB | Facility: HOSPITAL | Age: 51
End: 2021-04-28

## 2021-04-28 ENCOUNTER — OFFICE VISIT (OUTPATIENT)
Dept: ENDOCRINOLOGY | Facility: CLINIC | Age: 51
End: 2021-04-28

## 2021-04-28 VITALS
HEART RATE: 70 BPM | SYSTOLIC BLOOD PRESSURE: 116 MMHG | DIASTOLIC BLOOD PRESSURE: 60 MMHG | OXYGEN SATURATION: 97 % | HEIGHT: 63 IN | BODY MASS INDEX: 31.18 KG/M2 | TEMPERATURE: 97.1 F | WEIGHT: 176 LBS

## 2021-04-28 DIAGNOSIS — E06.3 HYPOTHYROIDISM DUE TO HASHIMOTO'S THYROIDITIS: ICD-10-CM

## 2021-04-28 DIAGNOSIS — E03.8 HYPOTHYROIDISM DUE TO HASHIMOTO'S THYROIDITIS: Primary | Chronic | ICD-10-CM

## 2021-04-28 DIAGNOSIS — R63.1 POLYDIPSIA: ICD-10-CM

## 2021-04-28 DIAGNOSIS — E06.3 HYPOTHYROIDISM DUE TO HASHIMOTO'S THYROIDITIS: Primary | Chronic | ICD-10-CM

## 2021-04-28 DIAGNOSIS — E03.8 HYPOTHYROIDISM DUE TO HASHIMOTO'S THYROIDITIS: ICD-10-CM

## 2021-04-28 DIAGNOSIS — E04.0 SIMPLE GOITER: ICD-10-CM

## 2021-04-28 LAB
ALBUMIN SERPL-MCNC: 4.6 G/DL (ref 3.5–5.2)
ALBUMIN/GLOB SERPL: 1.5 G/DL
ALP SERPL-CCNC: 85 U/L (ref 39–117)
ALT SERPL W P-5'-P-CCNC: 30 U/L (ref 1–33)
ANION GAP SERPL CALCULATED.3IONS-SCNC: 12.1 MMOL/L (ref 5–15)
AST SERPL-CCNC: 23 U/L (ref 1–32)
BILIRUB SERPL-MCNC: 0.2 MG/DL (ref 0–1.2)
BUN SERPL-MCNC: 18 MG/DL (ref 6–20)
BUN/CREAT SERPL: 22.2 (ref 7–25)
CALCIUM SPEC-SCNC: 9.7 MG/DL (ref 8.6–10.5)
CHLORIDE SERPL-SCNC: 103 MMOL/L (ref 98–107)
CO2 SERPL-SCNC: 21.9 MMOL/L (ref 22–29)
CREAT SERPL-MCNC: 0.81 MG/DL (ref 0.57–1)
GFR SERPL CREATININE-BSD FRML MDRD: 75 ML/MIN/1.73
GLOBULIN UR ELPH-MCNC: 3.1 GM/DL
GLUCOSE SERPL-MCNC: 83 MG/DL (ref 65–99)
POTASSIUM SERPL-SCNC: 3.9 MMOL/L (ref 3.5–5.2)
PROT SERPL-MCNC: 7.7 G/DL (ref 6–8.5)
SODIUM SERPL-SCNC: 137 MMOL/L (ref 136–145)
TSH SERPL DL<=0.05 MIU/L-ACNC: 0.08 UIU/ML (ref 0.27–4.2)

## 2021-04-28 PROCEDURE — 99214 OFFICE O/P EST MOD 30 MIN: CPT | Performed by: PHYSICIAN ASSISTANT

## 2021-04-28 PROCEDURE — 84443 ASSAY THYROID STIM HORMONE: CPT

## 2021-04-28 PROCEDURE — 80053 COMPREHEN METABOLIC PANEL: CPT

## 2021-04-28 RX ORDER — DIAZEPAM 10 MG/1
TABLET ORAL
Qty: 30 TABLET | OUTPATIENT
Start: 2021-04-28

## 2021-04-28 RX ORDER — LEVOTHYROXINE SODIUM 0.15 MG/1
150 TABLET ORAL DAILY
Qty: 30 TABLET | Refills: 6 | Status: SHIPPED | OUTPATIENT
Start: 2021-04-28 | End: 2021-04-30

## 2021-04-28 RX ORDER — FOLIC ACID 1 MG/1
1000 TABLET ORAL DAILY
COMMUNITY
Start: 2021-04-08

## 2021-04-28 NOTE — PROGRESS NOTES
"     Office Note      Date: 2021  Patient Name: Milagros Lundberg  MRN: [id number]  : 1970    Chief Complaint   Patient presents with   • Thyroid Problem       History of Present Illness:   Milagros Lundberg is a 50 y.o. female who presents today for hypothyroidism.   She remains on levothyroxine 150 mcg daily.  She reports taking this correctly and regularly.  She reports that she feels tired.  She notes constipation - takes Miralax as needed.  She notes heat intolerance and sweats.  She feels that this has worsened.  Has trouble sleeping at night due to heat intolerance.  No excessive hair loss.  She has not noticed any changes in the size of her neck.  No trouble swallowing.  She reports always feeling thirsty and excessive urination.    Subjective      Review of Systems:  Review of Systems   Constitutional: Positive for fatigue.   HENT: Positive for mouth sores.    Eyes: Positive for photophobia.   Cardiovascular: Negative.    Gastrointestinal: Positive for constipation.   Endocrine: Positive for heat intolerance, polydipsia and polyuria.   Musculoskeletal: Positive for arthralgias, joint swelling and myalgias.     Past Medical History:   Diagnosis Date   • Acquired acanthosis nigricans    • Fibromyalgia    • Hypothyroidism    • Lupus (CMS/HCC)    • Menopausal flushing    • Rheumatoid arthritis (CMS/HCC)    • Simple goiter      Past Surgical History:   Procedure Laterality Date   • HYSTERECTOMY      age 37       The following portions of the patient's history were reviewed and updated as appropriate: allergies, current medications, past family history, past medical history, past social history, past surgical history and problem list.    Objective     Vitals:    21 1515   BP: 116/60   BP Location: Left arm   Patient Position: Sitting   Cuff Size: Adult   Pulse: 70   Temp: 97.1 °F (36.2 °C)   TempSrc: Infrared   SpO2: 97%   Weight: 79.8 kg (176 lb)   Height: 160 cm (63\")   PainSc: 0-No pain     Body " mass index is 31.18 kg/m².    Physical Exam  Vitals reviewed.   Constitutional:       General: She is not in acute distress.  Neck:      Thyroid: Thyromegaly (Firm, irregular, no discrete nodules palpable) present. No thyroid tenderness.   Lymphadenopathy:      Cervical: No cervical adenopathy.   Neurological:      Mental Status: She is alert and oriented to person, place, and time.   Psychiatric:         Mood and Affect: Affect normal.           Current Outpatient Medications   Medication Instructions   • folic acid (FOLVITE) 1,000 mcg, Oral, Daily   • levothyroxine (SYNTHROID) 150 mcg, Oral, Daily   • Polyethylene Glycol 3350 (MIRALAX PO) PRN       Assessment / Plan      Assessment & Plan:  1. Hypothyroidism due to Hashimoto's thyroiditis  She is having some symptoms that could be thyroid related.  Continue levothyroxine.  Check TSH today.  Will notify her of results.  - TSH; Future  - Comprehensive Metabolic Panel; Future  - levothyroxine (Synthroid) 150 MCG tablet; Take 1 tablet by mouth Daily.  Dispense: 30 tablet; Refill: 6    2. Simple goiter  Last ultrasound in 2018 showed heterogeneity without dominant nodules.  No discrete nodules appreciated on exam.  Goiter is not bothersome to her.  Continue to monitor.    3. Polydipsia  Check labs.  She had candy bar bite about 4 hours ago.  - Comprehensive Metabolic Panel; Future      Return in about 6 months (around 10/28/2021) for Next scheduled follow up.    LUCIO Neff  Endocrinology  04/28/2021

## 2021-04-30 ENCOUNTER — TELEPHONE (OUTPATIENT)
Dept: INTERNAL MEDICINE CLINIC | Age: 51
End: 2021-04-30

## 2021-04-30 ENCOUNTER — PATIENT MESSAGE (OUTPATIENT)
Dept: ENDOCRINOLOGY | Facility: CLINIC | Age: 51
End: 2021-04-30

## 2021-04-30 ENCOUNTER — TELEPHONE (OUTPATIENT)
Dept: ENDOCRINOLOGY | Facility: CLINIC | Age: 51
End: 2021-04-30

## 2021-04-30 DIAGNOSIS — E03.8 HYPOTHYROIDISM DUE TO HASHIMOTO'S THYROIDITIS: Primary | ICD-10-CM

## 2021-04-30 DIAGNOSIS — Z12.31 ENCOUNTER FOR SCREENING MAMMOGRAM FOR MALIGNANT NEOPLASM OF BREAST: Primary | ICD-10-CM

## 2021-04-30 DIAGNOSIS — E06.3 HYPOTHYROIDISM DUE TO HASHIMOTO'S THYROIDITIS: Primary | ICD-10-CM

## 2021-04-30 RX ORDER — LEVOTHYROXINE SODIUM 137 UG/1
137 TABLET ORAL DAILY
Qty: 30 TABLET | Refills: 3 | Status: SHIPPED | OUTPATIENT
Start: 2021-04-30 | End: 2021-11-01

## 2021-04-30 NOTE — TELEPHONE ENCOUNTER
She and her , Darnell Lundberg (10/16/1969), usually see Dr. Ankit Clark.  I saw them on Wednesday and when I was signing lab results this morning, I noticed that they were scheduled with Dr. Carlos Mckeon in 6 months.  I just wanted to make sure that they asked to be scheduled with him and not Dr. Ankit Clark.  Thank you.

## 2021-04-30 NOTE — TELEPHONE ENCOUNTER
Called pt  To remind her of mammogram that was scheduled 5/17/21, pt  Asked to reschedule mammogram and to go to [place] location  Rescheduled mammogram for her 5/27/21 at 10:30 AM [place] in [place]      CALL PT  5/25/21 TO REMIND HER OF MAMMOGRAM SCHEDULED PER PT

## 2021-06-22 DIAGNOSIS — F41.8 DEPRESSION WITH ANXIETY: ICD-10-CM

## 2021-06-22 RX ORDER — DIAZEPAM 10 MG/1
TABLET ORAL
Qty: 30 TABLET | OUTPATIENT
Start: 2021-06-22

## 2021-07-21 DIAGNOSIS — K21.00 GASTROESOPHAGEAL REFLUX DISEASE WITH ESOPHAGITIS: ICD-10-CM

## 2021-07-21 DIAGNOSIS — E03.9 ACQUIRED HYPOTHYROIDISM: ICD-10-CM

## 2021-07-21 DIAGNOSIS — F33.41 RECURRENT MAJOR DEPRESSIVE DISORDER, IN PARTIAL REMISSION (HCC): ICD-10-CM

## 2021-07-21 DIAGNOSIS — F41.8 DEPRESSION WITH ANXIETY: ICD-10-CM

## 2021-07-21 DIAGNOSIS — E78.00 HYPERCHOLESTEROLEMIA: ICD-10-CM

## 2021-07-21 RX ORDER — SERTRALINE HYDROCHLORIDE 100 MG/1
TABLET, FILM COATED ORAL
Qty: 180 TABLET | Refills: 1 | Status: SHIPPED | OUTPATIENT
Start: 2021-07-21 | End: 2022-01-18 | Stop reason: SDUPTHER

## 2021-07-21 RX ORDER — SUCRALFATE 1 G/1
TABLET ORAL
Qty: 270 TABLET | Refills: 1 | Status: SHIPPED | OUTPATIENT
Start: 2021-07-21 | End: 2022-01-18 | Stop reason: SDUPTHER

## 2021-07-21 RX ORDER — PANTOPRAZOLE SODIUM 40 MG/1
TABLET, DELAYED RELEASE ORAL
Qty: 180 TABLET | Refills: 1 | Status: SHIPPED | OUTPATIENT
Start: 2021-07-21 | End: 2022-01-18 | Stop reason: SDUPTHER

## 2021-07-21 RX ORDER — SIMVASTATIN 20 MG
TABLET ORAL
Qty: 90 TABLET | Refills: 1 | Status: SHIPPED | OUTPATIENT
Start: 2021-07-21 | End: 2022-01-18 | Stop reason: SDUPTHER

## 2021-07-21 RX ORDER — LEVOTHYROXINE SODIUM 0.1 MG/1
100 TABLET ORAL DAILY
Qty: 90 TABLET | Refills: 1 | Status: SHIPPED | OUTPATIENT
Start: 2021-07-21 | End: 2021-10-13

## 2021-07-21 RX ORDER — BUPROPION HYDROCHLORIDE 150 MG/1
TABLET ORAL
Qty: 90 TABLET | Refills: 1 | Status: SHIPPED | OUTPATIENT
Start: 2021-07-21 | End: 2022-01-18 | Stop reason: SDUPTHER

## 2021-07-21 NOTE — TELEPHONE ENCOUNTER
pts meds approved, levothyroxine dosage changed from 88 to 100 mcg daily as dosage was increased by Dr Greta Drake on 3/29/21

## 2021-10-13 ENCOUNTER — OFFICE VISIT (OUTPATIENT)
Dept: INTERNAL MEDICINE CLINIC | Age: 51
End: 2021-10-13
Payer: COMMERCIAL

## 2021-10-13 ENCOUNTER — APPOINTMENT (OUTPATIENT)
Dept: LAB | Age: 51
End: 2021-10-13
Payer: COMMERCIAL

## 2021-10-13 VITALS
BODY MASS INDEX: 39.87 KG/M2 | SYSTOLIC BLOOD PRESSURE: 126 MMHG | HEIGHT: 67 IN | HEART RATE: 79 BPM | TEMPERATURE: 97.7 F | OXYGEN SATURATION: 97 % | WEIGHT: 254 LBS | DIASTOLIC BLOOD PRESSURE: 62 MMHG

## 2021-10-13 DIAGNOSIS — F32.9 MAJOR DEPRESSIVE DISORDER, SINGLE EPISODE: ICD-10-CM

## 2021-10-13 DIAGNOSIS — N30.00 ACUTE CYSTITIS WITHOUT HEMATURIA: Primary | ICD-10-CM

## 2021-10-13 DIAGNOSIS — E78.00 HYPERCHOLESTEROLEMIA: ICD-10-CM

## 2021-10-13 DIAGNOSIS — E74.39 GLUCOSE INTOLERANCE: ICD-10-CM

## 2021-10-13 DIAGNOSIS — E03.9 ACQUIRED HYPOTHYROIDISM: ICD-10-CM

## 2021-10-13 DIAGNOSIS — F41.9 ANXIETY DISORDER: ICD-10-CM

## 2021-10-13 DIAGNOSIS — E55.9 VITAMIN D DEFICIENCY: ICD-10-CM

## 2021-10-13 DIAGNOSIS — N30.00 ACUTE CYSTITIS WITHOUT HEMATURIA: ICD-10-CM

## 2021-10-13 DIAGNOSIS — I10 ESSENTIAL (PRIMARY) HYPERTENSION: ICD-10-CM

## 2021-10-13 DIAGNOSIS — F41.8 DEPRESSION WITH ANXIETY: ICD-10-CM

## 2021-10-13 DIAGNOSIS — R53.83 FATIGUE, UNSPECIFIED TYPE: ICD-10-CM

## 2021-10-13 DIAGNOSIS — J06.9 VIRAL URI WITH COUGH: ICD-10-CM

## 2021-10-13 DIAGNOSIS — Z72.0 TOBACCO ABUSE DISORDER: ICD-10-CM

## 2021-10-13 DIAGNOSIS — R60.0 EDEMA, LOWER EXTREMITY: ICD-10-CM

## 2021-10-13 DIAGNOSIS — F33.41 RECURRENT MAJOR DEPRESSIVE DISORDER, IN PARTIAL REMISSION (HCC): ICD-10-CM

## 2021-10-13 LAB
25(OH)D3 SERPL-MCNC: 22.6 NG/ML (ref 30–100)
ALBUMIN SERPL BCP-MCNC: 3.4 G/DL (ref 3.5–5)
ALP SERPL-CCNC: 128 U/L (ref 46–116)
ALT SERPL W P-5'-P-CCNC: 51 U/L (ref 12–78)
ANION GAP SERPL CALCULATED.3IONS-SCNC: 3 MMOL/L (ref 4–13)
AST SERPL W P-5'-P-CCNC: 48 U/L (ref 5–45)
BACTERIA UR QL AUTO: NORMAL /HPF
BASOPHILS # BLD AUTO: 0.04 THOUSANDS/ΜL (ref 0–0.1)
BASOPHILS NFR BLD AUTO: 0 % (ref 0–1)
BILIRUB SERPL-MCNC: 0.39 MG/DL (ref 0.2–1)
BILIRUB UR QL STRIP: NEGATIVE
BILIRUB UR QL STRIP: NEGATIVE
BUN SERPL-MCNC: 16 MG/DL (ref 5–25)
CALCIUM ALBUM COR SERPL-MCNC: 9.8 MG/DL (ref 8.3–10.1)
CALCIUM SERPL-MCNC: 9.3 MG/DL (ref 8.3–10.1)
CHLORIDE SERPL-SCNC: 109 MMOL/L (ref 100–108)
CHOLEST SERPL-MCNC: 121 MG/DL (ref 50–200)
CLARITY UR: ABNORMAL
CLARITY UR: CLEAR
CO2 SERPL-SCNC: 28 MMOL/L (ref 21–32)
COLOR UR: YELLOW
COLOR UR: YELLOW
CREAT SERPL-MCNC: 0.94 MG/DL (ref 0.6–1.3)
CREAT UR-MCNC: 140 MG/DL
EOSINOPHIL # BLD AUTO: 0.06 THOUSAND/ΜL (ref 0–0.61)
EOSINOPHIL NFR BLD AUTO: 1 % (ref 0–6)
ERYTHROCYTE [DISTWIDTH] IN BLOOD BY AUTOMATED COUNT: 15.4 % (ref 11.6–15.1)
EST. AVERAGE GLUCOSE BLD GHB EST-MCNC: 117 MG/DL
GFR SERPL CREATININE-BSD FRML MDRD: 70 ML/MIN/1.73SQ M
GLUCOSE P FAST SERPL-MCNC: 98 MG/DL (ref 65–99)
GLUCOSE UR STRIP-MCNC: NEGATIVE MG/DL
GLUCOSE UR STRIP-MCNC: NEGATIVE MG/DL
HBA1C MFR BLD: 5.7 %
HCT VFR BLD AUTO: 47 % (ref 34.8–46.1)
HCV AB SER QL: NORMAL
HDLC SERPL-MCNC: 38 MG/DL
HGB BLD-MCNC: 14.6 G/DL (ref 11.5–15.4)
HGB UR QL STRIP.AUTO: ABNORMAL
HGB UR QL STRIP.AUTO: ABNORMAL
IMM GRANULOCYTES # BLD AUTO: 0.09 THOUSAND/UL (ref 0–0.2)
IMM GRANULOCYTES NFR BLD AUTO: 1 % (ref 0–2)
KETONES UR STRIP-MCNC: NEGATIVE MG/DL
KETONES UR STRIP-MCNC: NEGATIVE MG/DL
LDLC SERPL CALC-MCNC: 63 MG/DL (ref 0–100)
LEUKOCYTE ESTERASE UR QL STRIP: NEGATIVE
LEUKOCYTE ESTERASE UR QL STRIP: NEGATIVE
LYMPHOCYTES # BLD AUTO: 1.98 THOUSANDS/ΜL (ref 0.6–4.47)
LYMPHOCYTES NFR BLD AUTO: 19 % (ref 14–44)
MCH RBC QN AUTO: 30.9 PG (ref 26.8–34.3)
MCHC RBC AUTO-ENTMCNC: 31.1 G/DL (ref 31.4–37.4)
MCV RBC AUTO: 100 FL (ref 82–98)
MICROALBUMIN UR-MCNC: 63.3 MG/L (ref 0–20)
MICROALBUMIN/CREAT 24H UR: 45 MG/G CREATININE (ref 0–30)
MONOCYTES # BLD AUTO: 0.86 THOUSAND/ΜL (ref 0.17–1.22)
MONOCYTES NFR BLD AUTO: 8 % (ref 4–12)
NEUTROPHILS # BLD AUTO: 7.58 THOUSANDS/ΜL (ref 1.85–7.62)
NEUTS SEG NFR BLD AUTO: 71 % (ref 43–75)
NITRITE UR QL STRIP: NEGATIVE
NITRITE UR QL STRIP: NEGATIVE
NON-SQ EPI CELLS URNS QL MICRO: NORMAL /HPF
NONHDLC SERPL-MCNC: 83 MG/DL
NRBC BLD AUTO-RTO: 0 /100 WBCS
PH UR STRIP.AUTO: 6.5 [PH]
PH UR STRIP.AUTO: 6.5 [PH]
PLATELET # BLD AUTO: 213 THOUSANDS/UL (ref 149–390)
PMV BLD AUTO: 10.8 FL (ref 8.9–12.7)
POTASSIUM SERPL-SCNC: 3.9 MMOL/L (ref 3.5–5.3)
PROT SERPL-MCNC: 7.6 G/DL (ref 6.4–8.2)
PROT UR STRIP-MCNC: ABNORMAL MG/DL
PROT UR STRIP-MCNC: NEGATIVE MG/DL
RBC # BLD AUTO: 4.72 MILLION/UL (ref 3.81–5.12)
RBC #/AREA URNS AUTO: NORMAL /HPF
SL AMB  POCT GLUCOSE, UA: ABNORMAL
SL AMB LEUKOCYTE ESTERASE,UA: ABNORMAL
SL AMB POCT BILIRUBIN,UA: ABNORMAL
SL AMB POCT BLOOD,UA: ABNORMAL
SL AMB POCT CLARITY,UA: ABNORMAL
SL AMB POCT COLOR,UA: YELLOW
SL AMB POCT KETONES,UA: ABNORMAL
SL AMB POCT NITRITE,UA: ABNORMAL
SL AMB POCT PH,UA: 7
SL AMB POCT SPECIFIC GRAVITY,UA: 1.03
SL AMB POCT URINE PROTEIN: ABNORMAL
SL AMB POCT UROBILINOGEN: 1
SODIUM SERPL-SCNC: 140 MMOL/L (ref 136–145)
SP GR UR STRIP.AUTO: 1.02 (ref 1–1.03)
SP GR UR STRIP.AUTO: 1.02 (ref 1–1.03)
T4 FREE SERPL-MCNC: 0.91 NG/DL (ref 0.76–1.46)
TRIGL SERPL-MCNC: 102 MG/DL
TSH SERPL DL<=0.05 MIU/L-ACNC: 4.27 UIU/ML (ref 0.36–3.74)
UROBILINOGEN UR QL STRIP.AUTO: 1 E.U./DL
UROBILINOGEN UR QL STRIP.AUTO: 1 E.U./DL
WBC # BLD AUTO: 10.61 THOUSAND/UL (ref 4.31–10.16)
WBC #/AREA URNS AUTO: NORMAL /HPF

## 2021-10-13 PROCEDURE — 4004F PT TOBACCO SCREEN RCVD TLK: CPT | Performed by: PHYSICIAN ASSISTANT

## 2021-10-13 PROCEDURE — 82043 UR ALBUMIN QUANTITATIVE: CPT

## 2021-10-13 PROCEDURE — 36415 COLL VENOUS BLD VENIPUNCTURE: CPT

## 2021-10-13 PROCEDURE — 86618 LYME DISEASE ANTIBODY: CPT

## 2021-10-13 PROCEDURE — 86617 LYME DISEASE ANTIBODY: CPT

## 2021-10-13 PROCEDURE — 86803 HEPATITIS C AB TEST: CPT

## 2021-10-13 PROCEDURE — 85025 COMPLETE CBC W/AUTO DIFF WBC: CPT

## 2021-10-13 PROCEDURE — 81001 URINALYSIS AUTO W/SCOPE: CPT | Performed by: FAMILY MEDICINE

## 2021-10-13 PROCEDURE — 81003 URINALYSIS AUTO W/O SCOPE: CPT | Performed by: PHYSICIAN ASSISTANT

## 2021-10-13 PROCEDURE — 80061 LIPID PANEL: CPT

## 2021-10-13 PROCEDURE — 84443 ASSAY THYROID STIM HORMONE: CPT

## 2021-10-13 PROCEDURE — 83036 HEMOGLOBIN GLYCOSYLATED A1C: CPT

## 2021-10-13 PROCEDURE — 80053 COMPREHEN METABOLIC PANEL: CPT

## 2021-10-13 PROCEDURE — 82306 VITAMIN D 25 HYDROXY: CPT

## 2021-10-13 PROCEDURE — 87086 URINE CULTURE/COLONY COUNT: CPT

## 2021-10-13 PROCEDURE — 82570 ASSAY OF URINE CREATININE: CPT

## 2021-10-13 PROCEDURE — 99214 OFFICE O/P EST MOD 30 MIN: CPT | Performed by: PHYSICIAN ASSISTANT

## 2021-10-13 PROCEDURE — 3008F BODY MASS INDEX DOCD: CPT | Performed by: PHYSICIAN ASSISTANT

## 2021-10-13 PROCEDURE — 3074F SYST BP LT 130 MM HG: CPT | Performed by: PHYSICIAN ASSISTANT

## 2021-10-13 PROCEDURE — 84439 ASSAY OF FREE THYROXINE: CPT

## 2021-10-13 PROCEDURE — 3078F DIAST BP <80 MM HG: CPT | Performed by: PHYSICIAN ASSISTANT

## 2021-10-13 RX ORDER — FLUTICASONE PROPIONATE 50 MCG
1 SPRAY, SUSPENSION (ML) NASAL 2 TIMES DAILY
Qty: 16 G | Refills: 5 | Status: SHIPPED | OUTPATIENT
Start: 2021-10-13 | End: 2021-12-21 | Stop reason: SDUPTHER

## 2021-10-13 RX ORDER — ERGOCALCIFEROL 1.25 MG/1
50000 CAPSULE ORAL WEEKLY
Qty: 12 CAPSULE | Refills: 1 | Status: SHIPPED | OUTPATIENT
Start: 2021-10-13 | End: 2022-04-27 | Stop reason: SDUPTHER

## 2021-10-13 RX ORDER — VARENICLINE TARTRATE 1 MG/1
1 TABLET, FILM COATED ORAL 2 TIMES DAILY
Qty: 60 TABLET | Refills: 3 | Status: CANCELLED | OUTPATIENT
Start: 2021-10-13

## 2021-10-13 RX ORDER — CEPHALEXIN 500 MG/1
500 CAPSULE ORAL EVERY 12 HOURS SCHEDULED
Qty: 10 CAPSULE | Refills: 0 | Status: SHIPPED | OUTPATIENT
Start: 2021-10-13 | End: 2021-10-18

## 2021-10-13 RX ORDER — DIAZEPAM 10 MG/1
10 TABLET ORAL EVERY 6 HOURS PRN
Qty: 30 TABLET | Refills: 1 | Status: SHIPPED | OUTPATIENT
Start: 2021-10-13 | End: 2022-01-25 | Stop reason: SDUPTHER

## 2021-10-14 LAB — BACTERIA UR CULT: NORMAL

## 2021-10-19 LAB — B BURGDOR IGG+IGM SER-ACNC: 213

## 2021-10-20 LAB
B BURGDOR IGG PATRN SER IB-IMP: NEGATIVE
B BURGDOR IGM PATRN SER IB-IMP: NEGATIVE
B BURGDOR18KD IGG SER QL IB: NORMAL
B BURGDOR23KD IGG SER QL IB: NORMAL
B BURGDOR23KD IGM SER QL IB: NORMAL
B BURGDOR28KD IGG SER QL IB: NORMAL
B BURGDOR30KD IGG SER QL IB: NORMAL
B BURGDOR39KD IGG SER QL IB: NORMAL
B BURGDOR39KD IGM SER QL IB: NORMAL
B BURGDOR41KD IGG SER QL IB: NORMAL
B BURGDOR41KD IGM SER QL IB: NORMAL
B BURGDOR45KD IGG SER QL IB: NORMAL
B BURGDOR58KD IGG SER QL IB: NORMAL
B BURGDOR66KD IGG SER QL IB: NORMAL
B BURGDOR93KD IGG SER QL IB: NORMAL

## 2021-11-01 ENCOUNTER — TELEPHONE (OUTPATIENT)
Dept: ENDOCRINOLOGY | Facility: CLINIC | Age: 51
End: 2021-11-01

## 2021-11-01 DIAGNOSIS — E03.8 HYPOTHYROIDISM DUE TO HASHIMOTO'S THYROIDITIS: Primary | ICD-10-CM

## 2021-11-01 DIAGNOSIS — E06.3 HYPOTHYROIDISM DUE TO HASHIMOTO'S THYROIDITIS: Primary | ICD-10-CM

## 2021-11-01 RX ORDER — LEVOTHYROXINE SODIUM 137 UG/1
TABLET ORAL
Qty: 30 TABLET | Refills: 0 | Status: SHIPPED | OUTPATIENT
Start: 2021-11-01 | End: 2021-12-21

## 2021-11-01 NOTE — TELEPHONE ENCOUNTER
I sent in a refill for her levothyroxine.  She usually sees Dr. Ankit Clark.  I saw her at the end of April and decreased her dose.  I had mailed a lab order to have labs repeated about 6 weeks after the dose change.  We have not received any results.  She canceled her appointment with Dr. Clark last week and is not scheduled until February.  I recommend that she go ahead and have labs done near her home.  She should have lab order for this.  Thanks.

## 2021-12-21 ENCOUNTER — TELEMEDICINE (OUTPATIENT)
Dept: INTERNAL MEDICINE CLINIC | Facility: OTHER | Age: 51
End: 2021-12-21
Payer: COMMERCIAL

## 2021-12-21 VITALS — BODY MASS INDEX: 38.45 KG/M2 | HEIGHT: 67 IN | WEIGHT: 245 LBS

## 2021-12-21 DIAGNOSIS — E03.8 HYPOTHYROIDISM DUE TO HASHIMOTO'S THYROIDITIS: ICD-10-CM

## 2021-12-21 DIAGNOSIS — E06.3 HYPOTHYROIDISM DUE TO HASHIMOTO'S THYROIDITIS: ICD-10-CM

## 2021-12-21 DIAGNOSIS — J06.9 VIRAL URI WITH COUGH: ICD-10-CM

## 2021-12-21 DIAGNOSIS — B34.9 VIRAL INFECTION, UNSPECIFIED: Primary | ICD-10-CM

## 2021-12-21 DIAGNOSIS — Z72.0 TOBACCO ABUSE DISORDER: ICD-10-CM

## 2021-12-21 PROCEDURE — 99213 OFFICE O/P EST LOW 20 MIN: CPT | Performed by: NURSE PRACTITIONER

## 2021-12-21 PROCEDURE — 3008F BODY MASS INDEX DOCD: CPT | Performed by: NURSE PRACTITIONER

## 2021-12-21 PROCEDURE — 4004F PT TOBACCO SCREEN RCVD TLK: CPT | Performed by: NURSE PRACTITIONER

## 2021-12-21 PROCEDURE — 3725F SCREEN DEPRESSION PERFORMED: CPT | Performed by: NURSE PRACTITIONER

## 2021-12-21 RX ORDER — FLUTICASONE PROPIONATE 50 MCG
1 SPRAY, SUSPENSION (ML) NASAL 2 TIMES DAILY
Qty: 16 G | Refills: 5 | Status: SHIPPED | OUTPATIENT
Start: 2021-12-21

## 2021-12-21 RX ORDER — LEVOTHYROXINE SODIUM 137 UG/1
TABLET ORAL
Qty: 30 TABLET | Refills: 1 | Status: SHIPPED | OUTPATIENT
Start: 2021-12-21 | End: 2022-02-03 | Stop reason: SDUPTHER

## 2021-12-21 RX ORDER — ALBUTEROL SULFATE 2.5 MG/3ML
2.5 SOLUTION RESPIRATORY (INHALATION) EVERY 6 HOURS PRN
Qty: 30 ML | Refills: 0 | Status: SHIPPED | OUTPATIENT
Start: 2021-12-21

## 2021-12-21 RX ORDER — ALBUTEROL SULFATE 90 UG/1
2 AEROSOL, METERED RESPIRATORY (INHALATION) EVERY 6 HOURS PRN
Qty: 18 G | Refills: 1 | Status: SHIPPED | OUTPATIENT
Start: 2021-12-21

## 2021-12-22 ENCOUNTER — CLINICAL SUPPORT (OUTPATIENT)
Dept: INTERNAL MEDICINE CLINIC | Facility: OTHER | Age: 51
End: 2021-12-22

## 2021-12-22 DIAGNOSIS — B34.9 VIRAL INFECTION: Primary | ICD-10-CM

## 2021-12-22 PROCEDURE — U0003 INFECTIOUS AGENT DETECTION BY NUCLEIC ACID (DNA OR RNA); SEVERE ACUTE RESPIRATORY SYNDROME CORONAVIRUS 2 (SARS-COV-2) (CORONAVIRUS DISEASE [COVID-19]), AMPLIFIED PROBE TECHNIQUE, MAKING USE OF HIGH THROUGHPUT TECHNOLOGIES AS DESCRIBED BY CMS-2020-01-R: HCPCS | Performed by: NURSE PRACTITIONER

## 2021-12-22 PROCEDURE — U0005 INFEC AGEN DETEC AMPLI PROBE: HCPCS | Performed by: NURSE PRACTITIONER

## 2021-12-23 LAB — SARS-COV-2 RNA RESP QL NAA+PROBE: NEGATIVE

## 2022-01-18 ENCOUNTER — OFFICE VISIT (OUTPATIENT)
Dept: INTERNAL MEDICINE CLINIC | Age: 52
End: 2022-01-18
Payer: MEDICARE

## 2022-01-18 VITALS
WEIGHT: 255.7 LBS | HEIGHT: 67 IN | TEMPERATURE: 98.2 F | OXYGEN SATURATION: 98 % | HEART RATE: 82 BPM | DIASTOLIC BLOOD PRESSURE: 80 MMHG | SYSTOLIC BLOOD PRESSURE: 128 MMHG | BODY MASS INDEX: 40.13 KG/M2

## 2022-01-18 DIAGNOSIS — F41.8 DEPRESSION WITH ANXIETY: ICD-10-CM

## 2022-01-18 DIAGNOSIS — Z12.11 SCREENING FOR COLON CANCER: ICD-10-CM

## 2022-01-18 DIAGNOSIS — E03.9 ACQUIRED HYPOTHYROIDISM: ICD-10-CM

## 2022-01-18 DIAGNOSIS — K21.00 GASTROESOPHAGEAL REFLUX DISEASE WITH ESOPHAGITIS: ICD-10-CM

## 2022-01-18 DIAGNOSIS — R63.5 WEIGHT GAIN: ICD-10-CM

## 2022-01-18 DIAGNOSIS — Z72.0 TOBACCO ABUSE DISORDER: ICD-10-CM

## 2022-01-18 DIAGNOSIS — E55.9 VITAMIN D DEFICIENCY: ICD-10-CM

## 2022-01-18 DIAGNOSIS — F41.9 ANXIETY: ICD-10-CM

## 2022-01-18 DIAGNOSIS — E74.39 GLUCOSE INTOLERANCE: ICD-10-CM

## 2022-01-18 DIAGNOSIS — E78.00 HYPERCHOLESTEROLEMIA: ICD-10-CM

## 2022-01-18 DIAGNOSIS — I10 BENIGN HYPERTENSION: ICD-10-CM

## 2022-01-18 DIAGNOSIS — F33.41 RECURRENT MAJOR DEPRESSIVE DISORDER, IN PARTIAL REMISSION (HCC): ICD-10-CM

## 2022-01-18 DIAGNOSIS — Z28.21 COVID-19 VACCINATION REFUSED: ICD-10-CM

## 2022-01-18 PROCEDURE — 99214 OFFICE O/P EST MOD 30 MIN: CPT | Performed by: FAMILY MEDICINE

## 2022-01-18 PROCEDURE — 3008F BODY MASS INDEX DOCD: CPT | Performed by: NURSE PRACTITIONER

## 2022-01-18 RX ORDER — PANTOPRAZOLE SODIUM 40 MG/1
40 TABLET, DELAYED RELEASE ORAL 2 TIMES DAILY PRN
Qty: 180 TABLET | Refills: 1 | Status: SHIPPED | OUTPATIENT
Start: 2022-01-18 | End: 2022-04-27 | Stop reason: SDUPTHER

## 2022-01-18 RX ORDER — BUPROPION HYDROCHLORIDE 150 MG/1
150 TABLET ORAL EVERY MORNING
Qty: 90 TABLET | Refills: 1 | Status: SHIPPED | OUTPATIENT
Start: 2022-01-18 | End: 2022-04-27 | Stop reason: SDUPTHER

## 2022-01-18 RX ORDER — SERTRALINE HYDROCHLORIDE 100 MG/1
100 TABLET, FILM COATED ORAL 2 TIMES DAILY
Qty: 180 TABLET | Refills: 1 | Status: SHIPPED | OUTPATIENT
Start: 2022-01-18 | End: 2022-04-27 | Stop reason: SDUPTHER

## 2022-01-18 RX ORDER — SUCRALFATE 1 G/1
TABLET ORAL
Qty: 270 TABLET | Refills: 1 | Status: SHIPPED | OUTPATIENT
Start: 2022-01-18 | End: 2022-04-27 | Stop reason: SDUPTHER

## 2022-01-18 RX ORDER — SIMVASTATIN 20 MG
20 TABLET ORAL
Qty: 90 TABLET | Refills: 1 | Status: SHIPPED | OUTPATIENT
Start: 2022-01-18 | End: 2022-04-27 | Stop reason: SDUPTHER

## 2022-01-18 NOTE — PROGRESS NOTES
Assessment/Plan:    1  BMI 40 0-44 9, adult ([facility] [address] )    2  Recurrent major depressive disorder, in partial remission (HCC)  -     sertraline (ZOLOFT) 100 mg tablet; Take 1 tablet (100 mg total) by mouth 2 (two) times a day  -     buPROPion (WELLBUTRIN XL) 150 mg 24 hr tablet; Take 1 tablet (150 mg total) by mouth every morning    3  Hypercholesterolemia  -     simvastatin (ZOCOR) 20 mg tablet; Take 1 tablet (20 mg total) by mouth daily at bedtime  -     Lipid panel; Future    4  Gastroesophageal reflux disease with esophagitis  -     sucralfate (CARAFATE) 1 g tablet; 1 TABLET 3 TIMES A DAY BEFORE MEALS  -     pantoprazole (PROTONIX) 40 mg tablet; Take 1 tablet (40 mg total) by mouth 2 (two) times a day as needed (acid reflux)    5  Depression with anxiety  -     buPROPion (WELLBUTRIN XL) 150 mg 24 hr tablet; Take 1 tablet (150 mg total) by mouth every morning    6  Acquired hypothyroidism  -     TSH, 3rd generation with Free T4 reflex; Future    7  Vitamin D deficiency  -     Vitamin D 25 hydroxy; Future    8  Weight gain    9  Benign hypertension  -     Comprehensive metabolic panel; Future  -     CBC and differential; Future    10  Glucose intolerance    11  COVID-19 vaccination refused    12  Screening for colon cancer  -     Ambulatory Referral to Gastroenterology; Future    13  Anxiety    14  Tobacco abuse disorder    I discussed the COVID vaccine and I recommended for to ME however she does not wanted at this time  Medications refilled  Repeat blood work for next appointment  Discussed carb heavy diet and sugar intake with her soda consumption as well as tobacco cessation        There are no Patient Instructions on file for this visit  Return in about 4 months (around 5/18/2022) for Recheck  Subjective:      Patient ID: Viky Denis is a 46 y o  female  Chief Complaint   Patient presents with    Follow-up     HTN           HPI  Anxiety and depression: stable with present medications, no HI or SI  compliant with meds  Continues to have sleep difficulties, decreased appetite, and low energy with depression   She is not having anhedonia, poor concentration, or current suicidal ideation    She does not drink much water, but lots of Pepsi   Eating about 1x/day, usually a sandwich and chips   She does not exercise   May 2020, worsening symptoms since February that she did not mid to anyone  [place] ex- whom she had been with for 20 years but  for only 2 and has been  for 8 years was found D ceased   She acquired the dog from him after his death that they had shared together while being   Mariam Everett also has broken up from her previous boyfriend whom which she was on the road a lot because he was a   Mariam Everett is currently moved to [place] in a [facility] and has an apartment which she is able to financially maintain as well as take care of the dog but patient states she feels guilty about the death of her ex- because he had wanted to continue living together and had asked her to move back to [place] but she states that she could not live with him because she was afraid however never stop loving him  Mariam Everett is also not taking her medications as prescribed since she is afraid to go out due to COVID-19 restrictions and has been rationing at her medications and decreasing dosages or taking every other day  June 2020, she states that she has no suicidal homicidal ideations and feels that her depression is stable   She still has guilt over the passing of her ex- but feels that it is slightly better when I saw her last  Mariam Everett does not feel that she needs a change in her medications  Mariam Everett is very bored and not able to exercise since her dog is blind and cannot go outside and she has no fence yd  Mariam Everett continues to drink large amounts of soda and is not watching her nutritional intake   She continues to gain weight  March 2021: wellcontrolled, no HI or SI  January 2022, no issues  Compliant with medications  Had to put down her dog in September  No HI or SI      Hypertension (Follow-Up): The patient presents for follow-up of essential hypertension  The patient states she has been stable with her blood pressure control since the last visit     Symptoms: denies impaired vision,-- denies dyspnea,-- denies intermittent leg claudication-- and-- denies lower extremity edema--       The patient presents with complaints of substernal new onset of chest pain, described as aching, non-radiating Her symptoms are caused by no known event (Feels it may be related to indigestion and gas  When she gets chest pain she takes an Aspirin and it helps  Does not feel like it is a crushing pain)  Associated symptoms include She does report headaches but that is not new       Home monitoring: The patient checks her blood pressure sporadically  Blood pressure control has been good       Medications: the patient is adherent with her medication regimen  -- the patient complains of medication side effects       Disease Management: the patient is doing well with her blood pressure goals        Hypothyroidism (Follow-Up): The patient is being seen for follow-up of hyperthyroidism       Interval symptoms:  denies heat intolerance,-- denies increased perspiration,-- denies palpitations,-- denies tremor,-- denies hyperactivity,-- denies anxiety,-- denies insomnia-- and-- denies fatigue       Associated symptoms: no weight loss,-- no weight gain,-- no diarrhea,-- no constipation-- and-- no vision changes       Medications:  the patient is not adherent to her medication regimen-- and-- she denies medication side effects       Disease management:  the patient is doing well with her goals        Vitamin D Deficiency: The patient is being seen for follow-up of vitamin D deficiency  Recent laboratory results: date 1/2018, 25-hydroxyvitamin D 23 ng/mL   7/18 level is 18 March 2021 off supplement and has not had labs  Joanna Leyva 2022, last lab still showed low vitamin-D  She states she is compliant with weekly     tobacco abuse : since age 6, 2-3 packs per day recently, never started chantix  March 2021 started chantix and now smoking  Less but has nto quit  January 2022, was on Chantix and was smoking about 5-6 cigarettes per day  Now off Chantix due to recall and is smoking 1-1 and half packs per day    Not motivated to quit    The following portions of the patient's history were reviewed and updated as appropriate: allergies, current medications, past family history, past medical history, past social history, past surgical history and problem list     Review of Systems        Constitutional:  Denies fever or chills , weight gain  Eyes:  Denies double , blurry vision or eye pain  HENT:  Denies nasal congestion or sore throat   Respiratory:  Denies cough or shortness of breath or wheezing  Cardiovascular:  Denies palpitations or chest pain  GI:  Denies abdominal pain, nausea, or vomiting, no loose stools, no reflux  Integument:  Denies rash , no open areas  Neurologic:  Denies headache or focal weakness, no dizziness  : no dysuria, or hematuria      Current Outpatient Medications   Medication Sig Dispense Refill    albuterol (2 5 mg/3 mL) 0 083 % nebulizer solution Take 3 mL (2 5 mg total) by nebulization every 6 (six) hours as needed for wheezing or shortness of breath 30 mL 0    albuterol (PROVENTIL HFA,VENTOLIN HFA) 90 mcg/act inhaler Inhale 2 puffs every 6 (six) hours as needed for wheezing 18 g 1    buPROPion (WELLBUTRIN XL) 150 mg 24 hr tablet Take 1 tablet (150 mg total) by mouth every morning 90 tablet 1    diazepam (VALIUM) 10 mg tablet Take 1 tablet (10 mg total) by mouth every 6 (six) hours as needed for anxiety 30 tablet 1    ergocalciferol (VITAMIN D2) 50,000 units Take 1 capsule (50,000 Units total) by mouth once a week 12 capsule 1    fluticasone (FLONASE) 50 mcg/act nasal spray 1 spray into each nostril 2 (two) times a day 16 g 5    furosemide (LASIX) 20 mg tablet Take 1 tablet (20 mg total) by mouth daily as needed (edema) 30 tablet 0    gabapentin (NEURONTIN) 100 mg capsule as needed   0    ibuprofen (MOTRIN) 600 mg tablet as needed   0    levothyroxine 112 mcg tablet Take 1 tablet (112 mcg total) by mouth daily 90 tablet 1    naloxone (NARCAN) 4 mg/0 1 mL nasal spray Administer 1 spray into a nostril  If no response after 2-3 minutes, give another dose in the other nostril using a new spray  1 each 1    oxyCODONE-acetaminophen (PERCOCET)  mg per tablet Take 1 tablet by mouth every 6 (six) hours as needed for moderate pain Max Daily Amount: 4 tablets 30 tablet 0    pantoprazole (PROTONIX) 40 mg tablet Take 1 tablet (40 mg total) by mouth 2 (two) times a day as needed (acid reflux) 180 tablet 1    sertraline (ZOLOFT) 100 mg tablet Take 1 tablet (100 mg total) by mouth 2 (two) times a day 180 tablet 1    simvastatin (ZOCOR) 20 mg tablet Take 1 tablet (20 mg total) by mouth daily at bedtime 90 tablet 1    sucralfate (CARAFATE) 1 g tablet 1 TABLET 3 TIMES A DAY BEFORE MEALS 270 tablet 1     No current facility-administered medications for this visit         Objective:    /80 (BP Location: Left arm, Patient Position: Sitting, Cuff Size: Standard)   Pulse 82   Temp 98 2 °F (36 8 °C) (Temporal)   Ht 5' 7" (1 702 m)   Wt 116 kg (255 lb 11 2 oz)   SpO2 98%   BMI 40 05 kg/m²        Physical Exam       Constitutional:  Well developed, well nourished, no acute distress, non-toxic appearance   Eyes:  PERRL, conjunctiva normal , non icteric sclera  HENT:  Atraumatic, oropharynx moist  Neck-  supple   Respiratory:  CTA b/l, normal breath sounds, no rales, no wheezing   Cardiovascular:  RRR, no murmurs, no LE edema b/l  GI:  Soft, nondistended, normal bowel sounds x 4, nontender, no organomegaly, no mass, no rebound, no guarding   Neurologic:  no focal deficits noted   Psychiatric:  Speech and behavior appropriate , AAO x 3        Jess Combs, DO

## 2022-01-25 DIAGNOSIS — F41.8 DEPRESSION WITH ANXIETY: ICD-10-CM

## 2022-01-25 RX ORDER — DIAZEPAM 10 MG/1
TABLET ORAL
Qty: 30 TABLET | OUTPATIENT
Start: 2022-01-25

## 2022-01-25 RX ORDER — DIAZEPAM 10 MG/1
10 TABLET ORAL EVERY 6 HOURS PRN
Qty: 30 TABLET | Refills: 1 | Status: SHIPPED | OUTPATIENT
Start: 2022-01-25 | End: 2022-03-22 | Stop reason: SDUPTHER

## 2022-02-02 ENCOUNTER — LAB (OUTPATIENT)
Dept: LAB | Facility: HOSPITAL | Age: 52
End: 2022-02-02

## 2022-02-02 ENCOUNTER — OFFICE VISIT (OUTPATIENT)
Dept: ENDOCRINOLOGY | Facility: CLINIC | Age: 52
End: 2022-02-02

## 2022-02-02 VITALS
HEART RATE: 74 BPM | WEIGHT: 177 LBS | BODY MASS INDEX: 31.36 KG/M2 | HEIGHT: 63 IN | OXYGEN SATURATION: 98 % | DIASTOLIC BLOOD PRESSURE: 72 MMHG | SYSTOLIC BLOOD PRESSURE: 122 MMHG

## 2022-02-02 DIAGNOSIS — E06.3 HYPOTHYROIDISM DUE TO HASHIMOTO'S THYROIDITIS: ICD-10-CM

## 2022-02-02 DIAGNOSIS — E03.8 HYPOTHYROIDISM DUE TO HASHIMOTO'S THYROIDITIS: ICD-10-CM

## 2022-02-02 DIAGNOSIS — E04.0 SIMPLE GOITER: Primary | ICD-10-CM

## 2022-02-02 LAB
T4 FREE SERPL-MCNC: 0.96 NG/DL (ref 0.93–1.7)
TSH SERPL DL<=0.05 MIU/L-ACNC: 6.1 UIU/ML (ref 0.27–4.2)

## 2022-02-02 PROCEDURE — 99214 OFFICE O/P EST MOD 30 MIN: CPT | Performed by: INTERNAL MEDICINE

## 2022-02-02 PROCEDURE — 84439 ASSAY OF FREE THYROXINE: CPT

## 2022-02-02 PROCEDURE — 84443 ASSAY THYROID STIM HORMONE: CPT

## 2022-02-02 NOTE — PROGRESS NOTES
"     Office Note      Date: 2022  Patient Name: Milagros Lundberg  MRN: [id number]  : 1970    Cc: thyroid follow up   History of Present Illness:   Milagros Lundberg is a 51 y.o. female who presents for thyroid .  She is on 137 mcg per day  There has been no change to her medical history.  ------  Her goiter does not bother her unless she thinks about it     Subjective          Review of Systems:   Review of Systems   Constitutional: Negative for unexpected weight change.   Endocrine: Negative for cold intolerance and heat intolerance.   Psychiatric/Behavioral: Positive for sleep disturbance. Negative for dysphoric mood. The patient is nervous/anxious.        The following portions of the patient's history were reviewed and updated as appropriate: allergies, current medications, past family history, past medical history, past social history, past surgical history and problem list.    Objective     Visit Vitals  /72   Pulse 74   Ht 160 cm (63\")   Wt 80.3 kg (177 lb)   SpO2 98%   BMI 31.35 kg/m²       Labs:    CBC w/DIFF  Lab Results   Component Value Date    WBC 6.09 2017    RBC 4.87 2017    HGB 14.9 2017    HCT 42.8 2017    MCV 87.9 2017    MCH 30.6 2017    MCHC 34.8 2017    RDW 12.4 2017    RDWSD 39.0 2017    MPV 10.2 (H) 2017     2017    NEUTRORELPCT 51.1 2017    LYMPHORELPCT 38.1 2017    MONORELPCT 6.2 2017    EOSRELPCT 3.4 2017    BASORELPCT 1.0 2017    AUTOIGPER 0.2 2017    NEUTROABS 3.11 2017    LYMPHSABS 2.32 2017    MONOSABS 0.38 2017    EOSABS 0.21 2017    BASOSABS 0.06 2017    AUTOIGNUM 0.01 2017       T4  No results found for: FREET4    TSH  No results found for: TSHBASE     Physical Exam:  Physical Exam  Vitals reviewed.   Constitutional:       Appearance: Normal appearance.   HENT:      Head: Normocephalic and atraumatic.   Eyes:      Extraocular " Movements: Extraocular movements intact.   Neck:      Comments: Diffuse, hard goiter, hashimoto's gland   Neurological:      Mental Status: She is alert.         Assessment / Plan      Assessment & Plan:  Problem List Items Addressed This Visit        Other    Hypothyroidism    Current Assessment & Plan     Clinically improved since dose reduction but will need tft's          Relevant Medications    levothyroxine (SYNTHROID, LEVOTHROID) 137 MCG tablet    Other Relevant Orders    T4, Free    TSH    Simple goiter - Primary    Overview     Last ultrasound 11/2018 [facility]: Right lobe 6 x 3 cm, left lobe is 7 x 3 cm; heterogenous bilaterally without dominant nodule.         Current Assessment & Plan     Stable on exam. No intervention needed at this point. If it bothers her enough, she would need surgery          Relevant Medications    levothyroxine (SYNTHROID, LEVOTHROID) 137 MCG tablet           Ankit Clark MD   02/02/2022

## 2022-02-02 NOTE — ASSESSMENT & PLAN NOTE
Stable on exam. No intervention needed at this point. If it bothers her enough, she would need surgery

## 2022-02-03 DIAGNOSIS — E03.8 HYPOTHYROIDISM DUE TO HASHIMOTO'S THYROIDITIS: ICD-10-CM

## 2022-02-03 DIAGNOSIS — E06.3 HYPOTHYROIDISM DUE TO HASHIMOTO'S THYROIDITIS: ICD-10-CM

## 2022-02-03 RX ORDER — LEVOTHYROXINE SODIUM 137 UG/1
137 TABLET ORAL EVERY MORNING
Qty: 90 TABLET | Refills: 3 | Status: SHIPPED | OUTPATIENT
Start: 2022-02-03 | End: 2022-03-14

## 2022-02-22 ENCOUNTER — OFFICE VISIT (OUTPATIENT)
Dept: GASTROENTEROLOGY | Facility: CLINIC | Age: 52
End: 2022-02-22
Payer: MEDICARE

## 2022-02-22 VITALS
DIASTOLIC BLOOD PRESSURE: 80 MMHG | TEMPERATURE: 98.1 F | WEIGHT: 256.8 LBS | HEIGHT: 67 IN | BODY MASS INDEX: 40.3 KG/M2 | SYSTOLIC BLOOD PRESSURE: 124 MMHG

## 2022-02-22 DIAGNOSIS — D12.6 ADENOMATOUS POLYP OF COLON, UNSPECIFIED PART OF COLON: Primary | ICD-10-CM

## 2022-02-22 DIAGNOSIS — R10.13 EPIGASTRIC PAIN: ICD-10-CM

## 2022-02-22 DIAGNOSIS — R15.9 FECAL SOILING DUE TO FECAL INCONTINENCE: ICD-10-CM

## 2022-02-22 PROCEDURE — 99214 OFFICE O/P EST MOD 30 MIN: CPT | Performed by: PHYSICIAN ASSISTANT

## 2022-02-22 NOTE — PATIENT INSTRUCTIONS
1  Start over-the-counter fiber supplement every morning     Scheduled date of colonoscopy/egd (as of today): 2/28/22  Physician performing colonoscopy: Dr Lorena Adrian  Location of colonoscopy: [place]  Bowel prep reviewed with patient: miralax/mag citrate  Instructions reviewed with patient by: Althea Rivera  Clearances:  n/a

## 2022-02-22 NOTE — PROGRESS NOTES
[facility] - Outpatient Follow-up Note  Chely Urena 46 y o  female MRN: [id number]  Encounter: [id number]          ASSESSMENT AND PLAN:      Ebonie Evans is a 45 y/o female with history of colon polyps who presents for follow-up  1  Personal hx of colon polyps   Colonoscopy 2020 depicted 16 polyps and poor prep and polyps were precancerous on pathology  Pt was to undergo repeat 3 months later due to poor prep but she did not have this done  Of note: pt says she cannot tolerate GOLYTELY and this is why she had poor prep as she only drank 1/3 of it at that time; pt says she is not willing to do 2-day prep  -colonoscopy ordered; instructions given; risks and benefits reviewed  -genetic referral placed as she did not make an appt with them in 2020 when referral was originally placed   -instructed pt that 2-day prep with miralax prep may be more ideal if she does not want to try GOLYTELY again but she again refuses 2-day prep and suprep is not covered   -mag citrate prep ordered but I explained to pt that if this is again poor prep, we would need to redo the procedure     2  Epigastric Pain  3  NSAID use  Pt complains of epigastric pain that occurs frequently but not daily; she is unsure what triggers this  Pt says this has been ongoing for "A while " Pt says protonix BID and carafate helps  Pt takes advil almost daily for pain  -EGD to be done at the same time as colon to rule out H pylori, ulcer disease, hiatal hernia   -continue protonix BID and carafate PRN  -anti-gerd diet explained   NSAID cessation discussed     4  Fecal incontinence  5  Alternating bowel habits  Pt says that she has fecal incontinence once in a while but did not go to colo-rectal surgery for this as was recommended at her last OV   Pt also notes that her BMs are usually "regular" but gets constipated once every 1-2 weeks and has diarrhea "every so often " it was very difficult to get a timeline of her symptoms as pt continues to say "I'm not sure, sometimes they're not regular" but was unable to further specify    -I asked pt to get TSH testing done that was ordered by another provider   -EGD w biopsies will allow us to rule out celiac disease   -start OTC fiber supplement  -can use OTC imodium PRN diarrhea and OTC miralax PRN constipation   -colo-rectal referral placed as she did not see colo-rectal for her incontinence when referral was originally placed   ______________________________________________________________________    SUBJECTIVE:  Cassandra Adame is a 45 y/o female with history of colon polyps who presents for follow-up  Pt says that she is here to have colonoscopy scheduled  Pt says she is unsure why she did not get the repeat one done yet however she says she is adamant that she will not to the GOLYTELY prep again  She also notes that she 'heard' she needs to do a prep for 2 days but is also refusing that, as well  Pt says her bowel habits are usually normal but can get constipated once every 1-2 weeks  Pt also notes that she does get diarrhea "every so often" but is unable to clarify  Pt continues to say "I'm not sure, sometimes they're not regular" when asked about timing and triggers of these episodes  Pt says she is unable to clarify further  Pt does however note that there are times she has incontinence when she has the episodes of diarrhea  Pt otherwise denies any bloody or black BMs  Pt also notes that she has had epigastric pain frequently for a "while" but says it does not occur daily  Pt denies n/v, dysphagia  Pt says that taking protonix twice/day and carafate as needed helps with this  Pt does admit to using advil almost daily for pain  REVIEW OF SYSTEMS IS OTHERWISE NEGATIVE        Historical Information   Past Medical History:   Diagnosis Date    Allergic sinusitis     last assessed - 03Apr2017    Anxiety     last assessed - [id number]    Asthma     last assessed - [id number]    Bilateral lower extremity edema     last assessed - 35TDP7587    Callus of foot     last assessed - 93YOQ4976    Chronic GERD     last assessed - 76TYU0009    Constipation     Resolved - 85HKS8085    Depression     last assessed - 98BIA9382    Disease of thyroid gland     Diverticulitis of colon     last assessed - 75GMW8696    Dyspepsia     Resolved - [id number]    Esophageal reflux     last assessed - 67JSJ9384    Essential hypertriglyceridemia     last assessed - 11FEH5220    GERD (gastroesophageal reflux disease)     Gynecological disorder     last assessed - 01KEL5415    Hypertension     last assessed - 83WCA3942    Hypotension     last assessed - 98Iff3155    Migraine     Neuropathy     Pulmonary emphysema ([address] )     last assessed - 41IVC1120    Seasonal allergies     Urinary frequency     last assessed - 89Kdt0602    Vagina, candidiasis     last assessed - 51Kxw4003     Past Surgical History:   Procedure Laterality Date    CARPAL TUNNEL RELEASE      last assessed - 42NBU2147    CHOLECYSTECTOMY      last assessed - 36FDK5165    COLONOSCOPY      Complete colonoscopy    Jullie Liming EYE SURGERY      last assessed - [id number]   Jullie Liming FOOT SURGERY      last assessed - 87MOM5164    NH ESOPHAGOGASTRODUODENOSCOPY TRANSORAL DIAGNOSTIC N/A 2/13/2017    Procedure: ESOPHAGOGASTRODUODENOSCOPY (EGD); Surgeon: Swapna Tovar MD;  Location: [facility];   Service: Gastroenterology     Social History   Social History     Substance and Sexual Activity   Alcohol Use Yes    Comment: wine twice a year     Social History     Substance and Sexual Activity   Drug Use No     Social History     Tobacco Use   Smoking Status Current Every Day Smoker    Packs/day: 1 00    Types: Cigarettes   Smokeless Tobacco Never Used   Tobacco Comment    one pack     Family History   Problem Relation Age of Onset    Lung cancer Sister     Hypertension Other     Lung cancer Other     Hypertension Other     Lung cancer Other     Lung cancer Other     Lung cancer Maternal Grandmother        Meds/Allergies       Current Outpatient Medications:     albuterol (2 5 mg/3 mL) 0 083 % nebulizer solution    albuterol (PROVENTIL HFA,VENTOLIN HFA) 90 mcg/act inhaler    buPROPion (WELLBUTRIN XL) 150 mg 24 hr tablet    diazepam (VALIUM) 10 mg tablet    ergocalciferol (VITAMIN D2) 50,000 units    fluticasone (FLONASE) 50 mcg/act nasal spray    furosemide (LASIX) 20 mg tablet    gabapentin (NEURONTIN) 100 mg capsule    ibuprofen (MOTRIN) 600 mg tablet    levothyroxine 112 mcg tablet    naloxone (NARCAN) 4 mg/0 1 mL nasal spray    oxyCODONE-acetaminophen (PERCOCET)  mg per tablet    pantoprazole (PROTONIX) 40 mg tablet    sertraline (ZOLOFT) 100 mg tablet    simvastatin (ZOCOR) 20 mg tablet    sucralfate (CARAFATE) 1 g tablet    Allergies   Allergen Reactions    Hydrocodone-Acetaminophen Hives    Ketorolac Hives and Dizziness    Other     Toradol [Ketorolac Tromethamine] Other (See Comments)     hypotension    Ultram [Tramadol] GI Intolerance, Nausea Only and Vomiting           Objective     There were no vitals taken for this visit  There is no height or weight on file to calculate BMI  PHYSICAL EXAM:      General Appearance:   Alert, cooperative, no distress   HEENT:   Normocephalic, atraumatic, anicteric      Neck:  Supple, symmetrical, trachea midline   Lungs:   Clear to auscultation bilaterally; no rales, rhonchi or wheezing; respirations unlabored    Heart[de-identified]   Regular rate and rhythm; no murmur, rub, or gallop  Abdomen:   Soft, non-tender, non-distended; normal bowel sounds; no masses, no organomegaly    Genitalia:   Deferred    Rectal:   Deferred    Extremities:  No cyanosis, clubbing or edema    Pulses:  2+ and symmetric    Skin:  No jaundice, rashes, or lesions    Lymph nodes:  No palpable cervical lymphadenopathy        Lab Results:   No visits with results within 1 Day(s) from this visit     Latest known visit with results is:   Telemedicine on 12/21/2021   Component Date Value    SARS-CoV-2 12/22/2021 Negative          Radiology Results:   No results found

## 2022-02-28 ENCOUNTER — ANESTHESIA (OUTPATIENT)
Dept: GASTROENTEROLOGY | Facility: HOSPITAL | Age: 52
End: 2022-02-28

## 2022-02-28 ENCOUNTER — ANESTHESIA EVENT (OUTPATIENT)
Dept: GASTROENTEROLOGY | Facility: HOSPITAL | Age: 52
End: 2022-02-28

## 2022-02-28 ENCOUNTER — HOSPITAL ENCOUNTER (OUTPATIENT)
Dept: GASTROENTEROLOGY | Facility: HOSPITAL | Age: 52
Setting detail: OUTPATIENT SURGERY
Discharge: HOME | End: 2022-02-28
Payer: MEDICARE

## 2022-02-28 VITALS
RESPIRATION RATE: 20 BRPM | DIASTOLIC BLOOD PRESSURE: 57 MMHG | HEART RATE: 85 BPM | OXYGEN SATURATION: 97 % | SYSTOLIC BLOOD PRESSURE: 110 MMHG | TEMPERATURE: 97.6 F | WEIGHT: 257 LBS | HEIGHT: 67 IN | BODY MASS INDEX: 40.34 KG/M2

## 2022-02-28 DIAGNOSIS — D12.6 ADENOMATOUS POLYP OF COLON, UNSPECIFIED PART OF COLON: ICD-10-CM

## 2022-02-28 DIAGNOSIS — R10.13 EPIGASTRIC PAIN: ICD-10-CM

## 2022-02-28 DIAGNOSIS — R15.9 FECAL SOILING DUE TO FECAL INCONTINENCE: ICD-10-CM

## 2022-02-28 PROBLEM — J43.9 PULMONARY EMPHYSEMA (HCC): Status: ACTIVE | Noted: 2022-02-28

## 2022-02-28 PROCEDURE — 88305 TISSUE EXAM BY PATHOLOGIST: CPT | Performed by: SPECIALIST

## 2022-02-28 PROCEDURE — 45385 COLONOSCOPY W/LESION REMOVAL: CPT | Performed by: INTERNAL MEDICINE

## 2022-02-28 PROCEDURE — 43239 EGD BIOPSY SINGLE/MULTIPLE: CPT | Performed by: INTERNAL MEDICINE

## 2022-02-28 RX ORDER — LIDOCAINE HYDROCHLORIDE 20 MG/ML
INJECTION, SOLUTION EPIDURAL; INFILTRATION; INTRACAUDAL; PERINEURAL AS NEEDED
Status: DISCONTINUED | OUTPATIENT
Start: 2022-02-28 | End: 2022-02-28

## 2022-02-28 RX ORDER — PROPOFOL 10 MG/ML
INJECTION, EMULSION INTRAVENOUS CONTINUOUS PRN
Status: DISCONTINUED | OUTPATIENT
Start: 2022-02-28 | End: 2022-02-28

## 2022-02-28 RX ORDER — SODIUM CHLORIDE, SODIUM LACTATE, POTASSIUM CHLORIDE, CALCIUM CHLORIDE 600; 310; 30; 20 MG/100ML; MG/100ML; MG/100ML; MG/100ML
125 INJECTION, SOLUTION INTRAVENOUS CONTINUOUS
Status: DISCONTINUED | OUTPATIENT
Start: 2022-02-28 | End: 2022-03-04 | Stop reason: HOSPADM

## 2022-02-28 RX ORDER — PROPOFOL 10 MG/ML
INJECTION, EMULSION INTRAVENOUS AS NEEDED
Status: DISCONTINUED | OUTPATIENT
Start: 2022-02-28 | End: 2022-02-28

## 2022-02-28 RX ORDER — GLYCOPYRROLATE 0.2 MG/ML
INJECTION INTRAMUSCULAR; INTRAVENOUS AS NEEDED
Status: DISCONTINUED | OUTPATIENT
Start: 2022-02-28 | End: 2022-02-28

## 2022-02-28 RX ORDER — SODIUM CHLORIDE, SODIUM LACTATE, POTASSIUM CHLORIDE, CALCIUM CHLORIDE 600; 310; 30; 20 MG/100ML; MG/100ML; MG/100ML; MG/100ML
20 INJECTION, SOLUTION INTRAVENOUS CONTINUOUS
Status: CANCELLED | OUTPATIENT
Start: 2022-02-28

## 2022-02-28 RX ADMIN — GLYCOPYRROLATE 0.2 MG: 0.2 INJECTION, SOLUTION INTRAMUSCULAR; INTRAVENOUS at 10:38

## 2022-02-28 RX ADMIN — PROPOFOL 100 MG: 10 INJECTION, EMULSION INTRAVENOUS at 10:39

## 2022-02-28 RX ADMIN — PROPOFOL 140 MCG/KG/MIN: 10 INJECTION, EMULSION INTRAVENOUS at 10:39

## 2022-02-28 RX ADMIN — LIDOCAINE HYDROCHLORIDE 50 MG: 20 INJECTION, SOLUTION EPIDURAL; INFILTRATION; INTRACAUDAL; PERINEURAL at 10:39

## 2022-02-28 RX ADMIN — SODIUM CHLORIDE, SODIUM LACTATE, POTASSIUM CHLORIDE, AND CALCIUM CHLORIDE 125 ML/HR: .6; .31; .03; .02 INJECTION, SOLUTION INTRAVENOUS at 10:01

## 2022-02-28 NOTE — ANESTHESIA POSTPROCEDURE EVALUATION
Post-Op Assessment Note    CV Status:  Stable  Pain Score: 0    Pain management: adequate     Mental Status:  Arousable   Hydration Status:  Stable   PONV Controlled:  None   Airway Patency:  Patent      Post Op Vitals Reviewed: Yes      Staff: CRNA         No complications documented      BP   94/60   Temp     Pulse  85   Resp   17   SpO2   99%

## 2022-02-28 NOTE — H&P
History and Physical -  [facility]  Chely Patel 46 y o  female MRN: [id number]                  HPI: Belle Perry is a 46y o  year old female who presents for abdominal pain and history of polyps  REVIEW OF SYSTEMS: Per the HPI, and otherwise unremarkable  Historical Information   Past Medical History:   Diagnosis Date    Allergic sinusitis     last assessed - [id number]    Anxiety     last assessed - 56JUW5953    Asthma     last assessed - 45KPE1840    Bilateral lower extremity edema     last assessed - 52FAA0093    Callus of foot     last assessed - 84Wig7284    Chronic GERD     last assessed - 02CHE0203    Constipation     Resolved - 34VXW9579    Depression     last assessed - 14SVT1582    Disease of thyroid gland     Diverticulitis of colon     last assessed - 28VXC3529    Dyspepsia     Resolved - 88VAV5445    Esophageal reflux     last assessed - 55VCX8348    Essential hypertriglyceridemia     last assessed - 97NUC3812    GERD (gastroesophageal reflux disease)     Gynecological disorder     last assessed - 01XXU9237    Hypertension     last assessed - 33VJD7025    Hypotension     last assessed - 38Xgg3433    Kidney stone     Migraine     Neuropathy     Pulmonary emphysema ([address] )     last assessed - 27QZW4512    Seasonal allergies     Urinary frequency     last assessed - 02Axv3432    Vagina, candidiasis     last assessed - 28Xws1227     Past Surgical History:   Procedure Laterality Date    CARPAL TUNNEL RELEASE      last assessed - 73BME4889    CHOLECYSTECTOMY      last assessed - 67NRS8954    COLONOSCOPY      Complete colonoscopy    Jaclyn Leisure EYE SURGERY      last assessed - 12JIB0402   Jaclyn Leisure FOOT SURGERY      last assessed - 40XDJ7060    RI ESOPHAGOGASTRODUODENOSCOPY TRANSORAL DIAGNOSTIC N/A 2/13/2017    Procedure: ESOPHAGOGASTRODUODENOSCOPY (EGD); Surgeon: Dee Dee Mcclendon MD;  Location[facility];   Service: Gastroenterology     Social History   Social History Substance and Sexual Activity   Alcohol Use Yes    Comment: wine twice a year     Social History     Substance and Sexual Activity   Drug Use No     Social History     Tobacco Use   Smoking Status Current Every Day Smoker    Packs/day: 1 00    Types: Cigarettes   Smokeless Tobacco Never Used   Tobacco Comment    one pack     Family History   Problem Relation Age of Onset    Lung cancer Sister     Hypertension Other     Lung cancer Other     Hypertension Other     Lung cancer Other     Lung cancer Other     Lung cancer Maternal Grandmother        Meds/Allergies       Current Outpatient Medications:     albuterol (2 5 mg/3 mL) 0 083 % nebulizer solution    albuterol (PROVENTIL HFA,VENTOLIN HFA) 90 mcg/act inhaler    buPROPion (WELLBUTRIN XL) 150 mg 24 hr tablet    diazepam (VALIUM) 10 mg tablet    fluticasone (FLONASE) 50 mcg/act nasal spray    gabapentin (NEURONTIN) 100 mg capsule    ibuprofen (MOTRIN) 600 mg tablet    levothyroxine 112 mcg tablet    oxyCODONE-acetaminophen (PERCOCET)  mg per tablet    pantoprazole (PROTONIX) 40 mg tablet    sertraline (ZOLOFT) 100 mg tablet    simvastatin (ZOCOR) 20 mg tablet    sucralfate (CARAFATE) 1 g tablet    ergocalciferol (VITAMIN D2) 50,000 units    furosemide (LASIX) 20 mg tablet    naloxone (NARCAN) 4 mg/0 1 mL nasal spray    polyethylene glycol (GOLYTELY) 4000 mL solution    Current Facility-Administered Medications:     lactated ringers infusion, 125 mL/hr, Intravenous, Continuous, Continue from Pre-op at 02/28/22 1009    Allergies   Allergen Reactions    Hydrocodone-Acetaminophen Hives    Ketorolac Hives and Dizziness    Other     Toradol [Ketorolac Tromethamine] Other (See Comments)     hypotension    Ultram [Tramadol] GI Intolerance, Nausea Only and Vomiting       Objective     /58   Pulse 89   Temp (!) 97 1 °F (36 2 °C) (Temporal)   Resp 16   Ht 5' 7" (1 702 m)   Wt 117 kg (257 lb)   LMP 04/05/2018 (Approximate) SpO2 99%   BMI 40 25 kg/m²       PHYSICAL EXAM    Gen: NAD  Head: NCAT  CV: RRR  CHEST: Clear  ABD: soft, NT/ND  EXT: no edema      ASSESSMENT/PLAN:  This is a 46y o  year old female here for colonoscopy and endoscopy, and she is stable and optimized for her procedure

## 2022-02-28 NOTE — DISCHARGE INSTRUCTIONS
How to Stop Smoking   WHAT YOU NEED TO KNOW:   You will improve your health and the health of others around you if you stop smoking  Your risk for heart and lung disease, cancer, stroke, heart attack, and vision problems will also decrease  Your adolescent can help prevent or stop harm to his or her brain or body  This will help him or her become a healthy adult  You can benefit from quitting no matter how long you have smoked  DISCHARGE INSTRUCTIONS:   Prepare to stop smoking:  Nicotine is a highly addictive drug found in cigarettes  Withdrawal symptoms can happen when you stop smoking and make it hard to quit  These include anxiety, depression, irritability, trouble sleeping, and increased appetite  You increase your chances of success if you prepare to quit  · Set a quit date  Young Lock a date that is within the next 2 weeks  Do not pick a day that you think may be stressful or busy  Write down the day or [place] it on your calender  · Tell friends and family that you plan to quit  Explain that you may have withdrawal symptoms when you try to quit  Ask them to support you  They may be able to encourage you and help reduce your stress to make it easier for you to quit  · Make a list of your reasons for quitting  Put the list somewhere you will see it every day, such as your refrigerator  You can look at the list when you have a craving  · Remove all tobacco and nicotine products from your home, car, and workplace  Also, remove anything else that will tempt you to smoke, such as lighters, matches, or ashtrays  Clean your car, home, and places at work that smell like smoke  The smell of smoke can trigger a craving  · Identify triggers that make you want to smoke  This may include activities, feelings, or people  Also write down 1 way you can deal with each of your triggers  For example, if you want to smoke as soon as you wake up, plan another activity during this time, such as exercise      · Make a plan for how you will quit  Learn about the tools that can help you quit, such as medicine, counseling, or nicotine replacement therapy  Choose at least 2 options to help you quit  · Help your adolescent make a plan to quit  The plan will be more successful if your adolescent makes his or her own decisions  Do not try to pressure him or her to quit immediately or in a certain way  Be supportive and offer help if needed  Tools to help you stop smoking:   · Counseling  from a trained healthcare provider can provide you with support and skills to quit smoking  The provider will also teach you to manage your withdrawal symptoms and cravings  You may receive counseling from one counselor, in group therapy, or through phone therapy called a quit line  · Nicotine replacement therapy (NRT)  such as nicotine patches, gum, or lozenges may help reduce your nicotine cravings  You may get these without a doctor's order  Do not use e-cigarettes or smokeless tobacco in place of cigarettes or to help you quit  They still contain nicotine  · Prescription medicines  such as nasal sprays or nicotine inhalers may help reduce your withdrawal symptoms  Other medicines may also be used to reduce your urge to smoke  Ask your healthcare provider about these medicines  You may need to start certain medicines 2 weeks before your quit date for them to work well  · Hypnosis  is a practice that helps guide you through thoughts and feelings  Hypnosis may help decrease your cravings and make you more willing to quit  · Acupuncture therapy  uses very thin needles to balance energy channels in the body  This is thought to help decrease cravings and symptoms of nicotine withdrawal     · Support groups  let you talk to others who are trying to quit or have already quit  It may be helpful to speak with others about how they quit  Manage your cravings:   · Avoid situations, people, and places that tempt you to smoke    Go to nonsmoking places, such as libraries or restaurants  Understand what tempts you and try to avoid these things  · Keep your hands busy  Hold things such as a stress ball or pen  · Put candy or toothpicks in your mouth  Keep lollipops, sugarless gum, or toothpicks with you at all times  · Do not have alcohol or caffeine  These drinks may tempt you to smoke  Drink healthy liquids such as water or juice instead  · Reward yourself when you resist your cravings  Rewards will motivate you and help you stay positive  · Do an activity that distracts you from your craving  Examples include cleaning, creating art, or gardening  Prevent weight gain after you quit:  You may gain a few pounds after you quit smoking  It is healthier for you to gain a few pounds than to continue to smoke  The following can help you prevent weight gain:  · Eat a variety of healthy foods  Healthy foods include fruits, vegetables, whole-grain breads, low-fat dairy products, beans, lean meats, and fish  Eat healthy snacks, such as low-fat yogurt, if you get hungry between meals  · Drink water before, during, and between meals  This will make your stomach feel full and help prevent you from overeating  Ask your healthcare provider how much liquid to drink each day and which liquids are best for you  · Be physically active  Activity may help reduce your cravings and reduce stress  Take a walk or do some kind of physical activity every day  Ask your healthcare provider which activities are right for you  For support and more information:   · American [employer] Association  [address] [facility]  [place] [address] [facility]  Phone: [facility] [address]  Phone: 2- 832 - 141-6490  Web Address: Leonie willis    · Smokefree  gov  Phone: 9- 588 - 062-7747  Web Address: www smokefree    [address] 2022 Information is for End User's use only and may not be sold, redistributed or otherwise used for commercial purposes  All illustrations and images included in CareNotes® are the copyrighted property of A D A M , Inc  or Ifrah Washburn  The above information is an  only  It is not intended as medical advice for individual conditions or treatments  Talk to your doctor, nurse or pharmacist before following any medical regimen to see if it is safe and effective for you  Cigarette Smoking and Your Health   AMBULATORY CARE:   Risks to your health if you smoke:  Nicotine and other chemicals found in tobacco and e-cigarettes can damage every cell in your body  Even if you are a light smoker, you have an increased risk for cancer, heart disease, and lung disease  If you are pregnant or have diabetes, smoking increases your risk for complications  Nicotine can affect an adolescent's developing brain  This can lead to trouble thinking, learning, or paying attention  Benefits to your health if you stop smoking:   · You decrease respiratory symptoms such as coughing, wheezing, and shortness of breath  · You reduce your risk for cancers of the lung, mouth, throat, kidney, bladder, pancreas, stomach, and cervix  If you already have cancer, you increase the benefits of chemotherapy  You also reduce your risk for cancer returning or a second cancer from developing  · You reduce your risk for heart disease, blood clots, heart attack, and stroke  · You reduce your risk for lung infections, and diseases such as pneumonia, asthma, chronic bronchitis, and emphysema  · Your circulation improves  More oxygen can be delivered to your body  If you have diabetes, you lower your risk for complications, such as kidney, artery, and eye diseases  You also lower your risk for nerve damage  Nerve damage can lead to amputations, poor vision, and blindness  · You improve your body's ability to heal and to fight infections  · An adolescent can help his or her brain and body develop in a healthy way   Talk to your adolescent about all the health risks of nicotine  If you can, start talking about nicotine when your child is younger than 12 years  This may make it easier for him or her not to start using nicotine as a teenager or adult  Explain to him or her that it is best never to start  It can be hard to try to quit later  Benefits to the health of others if you stop smoking:  Tobacco is harmful to nonsmokers who breathe in your secondhand smoke  The following are ways the health of others around you may improve when you stop smoking:  · You lower the risks for lung cancer and heart disease in nonsmoking adults  · If you are pregnant, you lower the risk for miscarriage, early delivery, low birth weight, and stillbirth  You also lower your baby's risk for SIDS, obesity, developmental delay, and neurobehavioral problems, such as ADHD  · If you have children, you lower their risk for ear infections, colds, pneumonia, bronchitis, and asthma  Follow up with your doctor as directed:  Write down your questions so you remember to ask them during your visits  For support and more information:   · American Lung Association  [address] [facility]  [address]  Phone: [facility] [address]  Phone: 7- 952 - 691-8421  Web Address: H?REL    · Smokefree  gov  Phone: 1- 084 - 096-8825  Web Address: www smokefree  [facility] 21 2022 Information is for End User's use only and may not be sold, redistributed or otherwise used for commercial purposes  All illustrations and images included in CareNotes® are the copyrighted property of A D A M , Inc  or [facility] Tj Crawford   The above information is an  only  It is not intended as medical advice for individual conditions or treatments  Talk to your doctor, nurse or pharmacist before following any medical regimen to see if it is safe and effective for you    Colorectal Polyps   WHAT YOU NEED TO KNOW:   Colorectal polyps are small growths of tissue in the lining of the colon and rectum  Most polyps are hyperplastic polyps and are usually benign (noncancerous)  Certain types of polyps, called adenomatous polyps, may turn into cancer  DISCHARGE INSTRUCTIONS:   Follow up with your healthcare provider or gastroenterologist as directed: You may need to return for more tests, such as another colonoscopy  Write down your questions so you remember to ask them during your visits  Reduce your risk for colorectal polyps:   · Eat a variety of healthy foods:  Healthy foods include fruit, vegetables, whole-grain breads, low-fat dairy products, beans, lean meat, and fish  Ask if you need to be on a special diet  · Maintain a healthy weight:  Ask your healthcare provider if you need to lose weight and how much you need to lose  Ask for help with a weight loss program     · Exercise:  Begin to exercise slowly and do more as you get stronger  Talk with your healthcare provider before you start an exercise program      · Limit alcohol:  Your risk for polyps increases the more you drink  · Do not smoke: If you smoke, it is never too late to quit  Ask for information about how to stop  For support and more information:   · Lexy Hernandez ([facility])  [address] [place], [place] [address]  Phone: 4- 575 - 729-3748  Web Address: www digestive  [facility] go[facility]    Contact your healthcare provider or gastroenterologist if:   · You have a fever  · You have chills, a cough, or feel weak and achy  · You have abdominal pain that does not go away or gets worse after you take medicine  · Your abdomen is swollen  · You are losing weight without trying  · You have questions or concerns about your condition or care  Seek care immediately or call 911 if:   · You have sudden shortness of breath  · You have a fast heart rate, fast breathing, or are too dizzy to stand up  · You have severe abdominal pain  · You see blood in your bowel movement  © Copyright [address] Information is for End User's use only and may not be sold, redistributed or otherwise used for commercial purposes  All illustrations and images included in CareNotes® are the copyrighted property of A D A M , Inc  or Ifrah Washburn  The above information is an  only  It is not intended as medical advice for individual conditions or treatments  Talk to your doctor, nurse or pharmacist before following any medical regimen to see if it is safe and effective for you  Upper Endoscopy   WHAT YOU NEED TO KNOW:   An upper endoscopy is also called an upper gastrointestinal (GI) endoscopy, or an esophagogastroduodenoscopy (EGD)  It is a procedure to examine the inside of your esophagus, stomach, and duodenum (first part of the small intestine) with a scope  You may feel bloated, gassy, or have some abdominal discomfort after your procedure  Your throat may be sore for 24 to 36 hours  You may burp or pass gas from air that is still inside your body  DISCHARGE INSTRUCTIONS:   Seek care immediately if:    You have sudden, severe abdominal pain   You have problems swallowing   You have a large amount of black, sticky bowel movements or blood in your bowel movements   You have sudden trouble breathing   You feel weak, lightheaded, or faint or your heart beats faster than normal for you  Contact your healthcare provider if:    You have a fever and chills   You have nausea or are vomiting   Your abdomen is bloated or feels full and hard   You have abdominal pain   You have black, sticky bowel movements or blood in your bowel movements   You have not had a bowel movement for 3 days after your procedure   You have rash or hives   You have questions or concerns about your procedure  Activity:    Do not lift, strain, or run for 24 hours after your procedure        Rest after your procedure  You have been given medicine to relax you  Do not drive or make important decisions until the day after your procedure  Return to your normal activity as directed   Relieve gas and discomfort from bloating by lying on your right side with a heating pad on your abdomen  You may need to take short walks to help the gas move out  Eat small meals until bloating is relieved  Follow up with your healthcare provider as directed: Write down your questions so you remember to ask them during your visits  If you take a blood thinner, please review the specific instructions from your endoscopist about when you should resume it  These can be found in the Recommendation and Your Medication list sections of this After Visit Summary  Colonoscopy   WHAT YOU NEED TO KNOW:   A colonoscopy is a procedure to examine the inside of your colon (intestine) with a scope  Polyps or tissue growths may have been removed during your colonoscopy  It is normal to feel bloated and to have some abdominal discomfort  You should be passing gas  If you have hemorrhoids or you had polyps removed, you may have a small amount of bleeding  DISCHARGE INSTRUCTIONS:   Seek care immediately if:    You have sudden, severe abdominal pain   You have problems swallowing   You have a large amount of black, sticky bowel movements or blood in your bowel movements   You have sudden trouble breathing   You feel weak, lightheaded, or faint or your heart beats faster than normal for you  Contact your healthcare provider if:    You have a fever and chills   You have nausea or are vomiting   Your abdomen is bloated or feels full and hard   You have abdominal pain   You have black, sticky bowel movements or blood in your bowel movements   You have not had a bowel movement for 3 days after your procedure   You have rash or hives     You have questions or concerns about your procedure  Activity:    Do not lift, strain, or run for 24 hours after your procedure   Rest after your procedure  You have been given medicine to relax you  Do not drive or make important decisions until the day after your procedure  Return to your normal activity as directed   Relieve gas and discomfort from bloating by lying on your right side with a heating pad on your abdomen  You may need to take short walks to help the gas move out  Eat small meals until bloating is relieved  Follow up with your healthcare provider as directed: Write down your questions so you remember to ask them during your visits  If you take a blood thinner, please review the specific instructions from your endoscopist about when you should resume it  These can be found in the Recommendation and Your Medication list sections of this After Visit Summary

## 2022-02-28 NOTE — ANESTHESIA PREPROCEDURE EVALUATION
Procedure:  COLONOSCOPY  EGD    Relevant Problems   ANESTHESIA (within normal limits)   (-) History of anesthesia complications      CARDIO   (+) Benign hypertension (Takes medication for her blood pressure "as needed" but does not measure or record her blood pressure at home  Reports she takes medication "when [she] can feel [her] heartbeat in [her] ears")   (+) Hypercholesterolemia      ENDO   (+) Hypothyroidism      GI/HEPATIC  Confirmed NPO appropriate  s/p bowel prep   (+) Gastroesophageal reflux disease with esophagitis      /RENAL (within normal limits)      HEMATOLOGY (within normal limits)      MUSCULOSKELETAL (within normal limits)      NEURO/PSYCH   (+) Anxiety   (+) Depression      PULMONARY   (+) Pulmonary emphysema (HCC) (Uses rescue inhaler ~2-3 times per week)   (-) URI (upper respiratory infection)      Other   (+) BMI 40 0-44 9, adult ([facility] [address] )   (+) COVID-19 vaccination refused   (+) Gastroparesis   (+) Tobacco abuse disorder        Physical Exam    Airway    Mallampati score: II  TM Distance: >3 FB  Neck ROM: full     Dental   upper dentures and lower dentures,     Cardiovascular  Rhythm: regular, Rate: normal,     Pulmonary  Breath sounds clear to auscultation, No wheezes,     Other Findings        Anesthesia Plan  ASA Score- 3     Anesthesia Type- IV sedation with anesthesia with ASA Monitors  Additional Monitors:   Airway Plan:     Comment: I discussed the risks and benefits of IV sedation anesthesia including the possibility of the need to convert to general anesthesia and the potential risk of awareness  The patient was given the opportunity to ask questions, which were answered          Plan Factors-Exercise tolerance (METS): >4 METS  Chart reviewed  Patient is a current smoker  Obstructive sleep apnea risk education given perioperatively  Induction- intravenous  Postoperative Plan-     Informed Consent- Anesthetic plan and risks discussed with patient    I personally reviewed this patient with the CRNA  Discussed and agreed on the Anesthesia Plan with the ROCAEL Abdullahi

## 2022-02-28 NOTE — H&P
History and Physical -  [facility]  Chely Christine Canavan 46 y o  female MRN: [id number]                  HPI: Chidi Nair is a 46y o  year old female who presents for history of polyps and abdominal pain  REVIEW OF SYSTEMS: Per the HPI, and otherwise unremarkable  Historical Information   Past Medical History:   Diagnosis Date    Allergic sinusitis     last assessed - 20Gmg5479    Anxiety     last assessed - 24OXU2918    Asthma     last assessed - 94MFO3294    Bilateral lower extremity edema     last assessed - 38MHJ9629    Callus of foot     last assessed - 63Dos9715    Chronic GERD     last assessed - 68VHK1854    Constipation     Resolved - 85XWX3182    Depression     last assessed - 88BKF0698    Disease of thyroid gland     Diverticulitis of colon     last assessed - 82QMP8704    Dyspepsia     Resolved - 58MWU5707    Esophageal reflux     last assessed - 43YUT6839    Essential hypertriglyceridemia     last assessed - 86MEG0986    GERD (gastroesophageal reflux disease)     Gynecological disorder     last assessed - 08OOV0689    Hypertension     last assessed - 25MHW2048    Hypotension     last assessed - 80Ghv8723    Kidney stone     Migraine     Neuropathy     Pulmonary emphysema ([address] )     last assessed - 89ZER9353    Seasonal allergies     Urinary frequency     last assessed - 98Szy4024    Vagina, candidiasis     last assessed - 26Sfa4945     Past Surgical History:   Procedure Laterality Date    CARPAL TUNNEL RELEASE      last assessed - 75HCE2007    CHOLECYSTECTOMY      last assessed - 11CLY8614    COLONOSCOPY      Complete colonoscopy    Marian Bieber EYE SURGERY      last assessed - 97EEV8416   Marian Bieber FOOT SURGERY      last assessed - 88MLW0989    SC ESOPHAGOGASTRODUODENOSCOPY TRANSORAL DIAGNOSTIC N/A 2/13/2017    Procedure: ESOPHAGOGASTRODUODENOSCOPY (EGD); Surgeon: Kurtis Barber MD;  Location: [facility];   Service: Gastroenterology     Social History   Social History Substance and Sexual Activity   Alcohol Use Yes    Comment: wine twice a year     Social History     Substance and Sexual Activity   Drug Use No     Social History     Tobacco Use   Smoking Status Current Every Day Smoker    Packs/day: 1 00    Types: Cigarettes   Smokeless Tobacco Never Used   Tobacco Comment    one pack     Family History   Problem Relation Age of Onset    Lung cancer Sister     Hypertension Other     Lung cancer Other     Hypertension Other     Lung cancer Other     Lung cancer Other     Lung cancer Maternal Grandmother        Meds/Allergies       Current Outpatient Medications:     albuterol (2 5 mg/3 mL) 0 083 % nebulizer solution    albuterol (PROVENTIL HFA,VENTOLIN HFA) 90 mcg/act inhaler    buPROPion (WELLBUTRIN XL) 150 mg 24 hr tablet    diazepam (VALIUM) 10 mg tablet    fluticasone (FLONASE) 50 mcg/act nasal spray    gabapentin (NEURONTIN) 100 mg capsule    ibuprofen (MOTRIN) 600 mg tablet    levothyroxine 112 mcg tablet    oxyCODONE-acetaminophen (PERCOCET)  mg per tablet    pantoprazole (PROTONIX) 40 mg tablet    sertraline (ZOLOFT) 100 mg tablet    simvastatin (ZOCOR) 20 mg tablet    sucralfate (CARAFATE) 1 g tablet    ergocalciferol (VITAMIN D2) 50,000 units    furosemide (LASIX) 20 mg tablet    naloxone (NARCAN) 4 mg/0 1 mL nasal spray    polyethylene glycol (GOLYTELY) 4000 mL solution    Current Facility-Administered Medications:     lactated ringers infusion, 125 mL/hr, Intravenous, Continuous, Continue from Pre-op at 02/28/22 1009    Allergies   Allergen Reactions    Hydrocodone-Acetaminophen Hives    Ketorolac Hives and Dizziness    Other     Toradol [Ketorolac Tromethamine] Other (See Comments)     hypotension    Ultram [Tramadol] GI Intolerance, Nausea Only and Vomiting       Objective     /58   Pulse 89   Temp (!) 97 1 °F (36 2 °C) (Temporal)   Resp 16   Ht 5' 7" (1 702 m)   Wt 117 kg (257 lb)   LMP 04/05/2018 (Approximate) SpO2 99%   BMI 40 25 kg/m²       PHYSICAL EXAM    Gen: NAD  Head: NCAT  CV: RRR  CHEST: Clear  ABD: soft, NT/ND  EXT: no edema      ASSESSMENT/PLAN:  This is a 46y o  year old female here for endoscopy and colonoscopy, and she is stable and optimized for her procedure

## 2022-03-08 ENCOUNTER — TELEPHONE (OUTPATIENT)
Dept: GENETICS | Facility: CLINIC | Age: 52
End: 2022-03-08

## 2022-03-08 NOTE — TELEPHONE ENCOUNTER
I called Chely to schedule a new patient appointment with the [facility]       Outcome:   Spoke with pt, appt scheduled for 10/20 at 10 am via televisit   Pt has no known fhx of cancer but paternal side is unknown, she was found with Adenomatous polyp in the colon

## 2022-03-14 DIAGNOSIS — E06.3 HYPOTHYROIDISM DUE TO HASHIMOTO'S THYROIDITIS: ICD-10-CM

## 2022-03-14 DIAGNOSIS — E03.8 HYPOTHYROIDISM DUE TO HASHIMOTO'S THYROIDITIS: ICD-10-CM

## 2022-03-14 RX ORDER — LEVOTHYROXINE SODIUM 137 UG/1
TABLET ORAL
Qty: 30 TABLET | Refills: 1 | Status: SHIPPED | OUTPATIENT
Start: 2022-03-14 | End: 2023-02-06 | Stop reason: SDUPTHER

## 2022-03-21 DIAGNOSIS — F41.8 DEPRESSION WITH ANXIETY: ICD-10-CM

## 2022-03-21 RX ORDER — DIAZEPAM 10 MG/1
TABLET ORAL
Qty: 30 TABLET | OUTPATIENT
Start: 2022-03-21

## 2022-03-21 NOTE — TELEPHONE ENCOUNTER
((PLEASE REFUSE))    ((PLEASE REFUSE))    ((PT NEEDS TO CONTACT PROVIDER FIRST))    ((PT NEEDS TO CONTACT PROVIDER FIRST))

## 2022-03-22 DIAGNOSIS — F41.8 DEPRESSION WITH ANXIETY: ICD-10-CM

## 2022-03-24 RX ORDER — DIAZEPAM 10 MG/1
10 TABLET ORAL EVERY 6 HOURS PRN
Qty: 30 TABLET | Refills: 0 | Status: SHIPPED | OUTPATIENT
Start: 2022-03-24 | End: 2022-04-28 | Stop reason: SDUPTHER

## 2022-03-28 ENCOUNTER — TELEPHONE (OUTPATIENT)
Dept: INTERNAL MEDICINE CLINIC | Facility: OTHER | Age: 52
End: 2022-03-28

## 2022-03-28 NOTE — TELEPHONE ENCOUNTER
She is scheduled with Dr Shahbaz Hickey 4/11 since no availability with Dr Emperatriz Dc and pt is out of state until next week

## 2022-04-27 DIAGNOSIS — F41.8 DEPRESSION WITH ANXIETY: ICD-10-CM

## 2022-04-27 DIAGNOSIS — F33.41 RECURRENT MAJOR DEPRESSIVE DISORDER, IN PARTIAL REMISSION (HCC): ICD-10-CM

## 2022-04-27 DIAGNOSIS — E78.00 HYPERCHOLESTEROLEMIA: ICD-10-CM

## 2022-04-27 DIAGNOSIS — K21.00 GASTROESOPHAGEAL REFLUX DISEASE WITH ESOPHAGITIS: ICD-10-CM

## 2022-04-27 DIAGNOSIS — E03.9 ACQUIRED HYPOTHYROIDISM: ICD-10-CM

## 2022-04-27 DIAGNOSIS — E55.9 VITAMIN D DEFICIENCY: ICD-10-CM

## 2022-04-27 RX ORDER — BUPROPION HYDROCHLORIDE 150 MG/1
150 TABLET ORAL EVERY MORNING
Qty: 90 TABLET | Refills: 1 | Status: SHIPPED | OUTPATIENT
Start: 2022-04-27

## 2022-04-27 RX ORDER — SERTRALINE HYDROCHLORIDE 100 MG/1
100 TABLET, FILM COATED ORAL 2 TIMES DAILY
Qty: 180 TABLET | Refills: 1 | Status: SHIPPED | OUTPATIENT
Start: 2022-04-27

## 2022-04-27 RX ORDER — ERGOCALCIFEROL 1.25 MG/1
50000 CAPSULE ORAL WEEKLY
Qty: 12 CAPSULE | Refills: 1 | Status: SHIPPED | OUTPATIENT
Start: 2022-04-27

## 2022-04-27 RX ORDER — LEVOTHYROXINE SODIUM 112 UG/1
112 TABLET ORAL DAILY
Qty: 90 TABLET | Refills: 1 | Status: SHIPPED | OUTPATIENT
Start: 2022-04-27 | End: 2022-08-04 | Stop reason: SDUPTHER

## 2022-04-27 RX ORDER — SIMVASTATIN 20 MG
20 TABLET ORAL
Qty: 90 TABLET | Refills: 1 | Status: SHIPPED | OUTPATIENT
Start: 2022-04-27

## 2022-04-27 RX ORDER — PANTOPRAZOLE SODIUM 40 MG/1
40 TABLET, DELAYED RELEASE ORAL 2 TIMES DAILY PRN
Qty: 180 TABLET | Refills: 1 | Status: SHIPPED | OUTPATIENT
Start: 2022-04-27

## 2022-04-27 RX ORDER — SUCRALFATE 1 G/1
TABLET ORAL
Qty: 270 TABLET | Refills: 1 | Status: SHIPPED | OUTPATIENT
Start: 2022-04-27

## 2022-04-28 DIAGNOSIS — F41.8 DEPRESSION WITH ANXIETY: ICD-10-CM

## 2022-04-28 NOTE — TELEPHONE ENCOUNTER
PROVIDERS, PLEASE ADDRESS SINCE DR VARELA IS NOT IN THE OFFICE                    NOV 05/05/2022  LAST ASSESSED 01/18/2022

## 2022-04-29 RX ORDER — DIAZEPAM 10 MG/1
10 TABLET ORAL EVERY 6 HOURS PRN
Qty: 30 TABLET | Refills: 0 | Status: SHIPPED | OUTPATIENT
Start: 2022-04-29 | End: 2022-07-14 | Stop reason: SDUPTHER

## 2022-05-08 ENCOUNTER — NURSE TRIAGE (OUTPATIENT)
Dept: OTHER | Facility: OTHER | Age: 52
End: 2022-05-08

## 2022-05-09 ENCOUNTER — TELEPHONE (OUTPATIENT)
Dept: INTERNAL MEDICINE CLINIC | Age: 52
End: 2022-05-09

## 2022-05-09 NOTE — TELEPHONE ENCOUNTER
I called the patient and she is still in [place]  She said that she will call back to schedule a follow up appt when she returns to [place] 
Reason for Disposition   Requesting regular office appointment    Answer Assessment - Initial Assessment Questions  1  REASON FOR CALL or QUESTION: "What is your reason for calling today?" or "How can I best help you?" or "What question do you have that I can help answer?"      Requesting callback r/t upcoming procedure (Lithotripsy)  Also requesting Virtual appointment   Patient currently in [place]    Protocols used: INFORMATION ONLY CALL - NO TRIAGE-ADULT-
Regarding: medical advice/ stent  ----- Message from Chente Santo sent at 5/7/2022  7:22 PM EDT -----  Pt called, requesting medical advice, " I am in [place] visiting, I went to the ED and  had a stent put in for a kidney stone   I just like to speak to someone "
Thank you
no

## 2022-05-09 NOTE — TELEPHONE ENCOUNTER
Patient was in the ED down in [place] due to a huge kidney stone that she can't pass  The urologist put in a stent in on her  The stone was the size of a quarter  She will be getting a hold of a urologist down there  She is waiting for them to call back to make the appointment  They will help her get this out  She can't drive back to [place] with the pain that she is in  They gave her the following medication:      Phenazopyridine 200 mg take one tablet by mouth 3 times a day prn    Levaquin 250 mg one tablet bid    Patient would like to speak with you regarding her condition as well      Please call her back at the mobile

## 2022-05-10 ENCOUNTER — TELEPHONE (OUTPATIENT)
Dept: INTERNAL MEDICINE CLINIC | Age: 52
End: 2022-05-10

## 2022-05-10 NOTE — TELEPHONE ENCOUNTER
Patient is calling to see if you can give her a referral for her to see a Urologist here in [address]  The Urologist there recommends her to be seen up here in [place] for the Kidney Stone that she has  They stated with in 6- 8 weeks to be seen  She is waiting to get comfortable to be ok to drive back up to [place] for her to be seen for this      Please let her know and call her back at the mobile phone

## 2022-06-10 ENCOUNTER — HOSPITAL ENCOUNTER (EMERGENCY)
Facility: HOSPITAL | Age: 52
Discharge: HOME | End: 2022-06-10
Attending: EMERGENCY MEDICINE | Admitting: EMERGENCY MEDICINE
Payer: MEDICARE

## 2022-06-10 ENCOUNTER — APPOINTMENT (EMERGENCY)
Dept: RADIOLOGY | Facility: HOSPITAL | Age: 52
End: 2022-06-10
Payer: MEDICARE

## 2022-06-10 ENCOUNTER — TELEPHONE (OUTPATIENT)
Dept: OTHER | Facility: HOSPITAL | Age: 52
End: 2022-06-10

## 2022-06-10 VITALS
HEART RATE: 92 BPM | SYSTOLIC BLOOD PRESSURE: 173 MMHG | DIASTOLIC BLOOD PRESSURE: 91 MMHG | TEMPERATURE: 98.8 F | RESPIRATION RATE: 18 BRPM | OXYGEN SATURATION: 96 %

## 2022-06-10 DIAGNOSIS — N13.30 HYDRONEPHROSIS OF LEFT KIDNEY: ICD-10-CM

## 2022-06-10 DIAGNOSIS — R16.2 HEPATOSPLENOMEGALY: ICD-10-CM

## 2022-06-10 DIAGNOSIS — N20.0 KIDNEY STONE ON LEFT SIDE: Primary | ICD-10-CM

## 2022-06-10 LAB
ANION GAP SERPL CALCULATED.3IONS-SCNC: 6 MMOL/L (ref 4–13)
BACTERIA UR QL AUTO: ABNORMAL /HPF
BASOPHILS # BLD AUTO: 0.05 THOUSANDS/ΜL (ref 0–0.1)
BASOPHILS NFR BLD AUTO: 1 % (ref 0–1)
BILIRUB UR QL STRIP: NEGATIVE
BUN SERPL-MCNC: 10 MG/DL (ref 5–25)
CALCIUM SERPL-MCNC: 8.8 MG/DL (ref 8.3–10.1)
CHLORIDE SERPL-SCNC: 113 MMOL/L (ref 100–108)
CLARITY UR: CLEAR
CLARITY, POC: CLEAR
CO2 SERPL-SCNC: 23 MMOL/L (ref 21–32)
COLOR UR: YELLOW
COLOR, POC: YELLOW
CREAT SERPL-MCNC: 1 MG/DL (ref 0.6–1.3)
EOSINOPHIL # BLD AUTO: 0.32 THOUSAND/ΜL (ref 0–0.61)
EOSINOPHIL NFR BLD AUTO: 4 % (ref 0–6)
ERYTHROCYTE [DISTWIDTH] IN BLOOD BY AUTOMATED COUNT: 15.9 % (ref 11.6–15.1)
EXT PREG TEST URINE: NEGATIVE
EXT. CONTROL ED NAV: NORMAL
GFR SERPL CREATININE-BSD FRML MDRD: 65 ML/MIN/1.73SQ M
GLUCOSE SERPL-MCNC: 170 MG/DL (ref 65–140)
GLUCOSE UR STRIP-MCNC: NEGATIVE MG/DL
HCT VFR BLD AUTO: 43.8 % (ref 34.8–46.1)
HGB BLD-MCNC: 14 G/DL (ref 11.5–15.4)
HGB UR QL STRIP.AUTO: ABNORMAL
HYALINE CASTS #/AREA URNS LPF: ABNORMAL /LPF
IMM GRANULOCYTES # BLD AUTO: 0.06 THOUSAND/UL (ref 0–0.2)
IMM GRANULOCYTES NFR BLD AUTO: 1 % (ref 0–2)
KETONES UR STRIP-MCNC: NEGATIVE MG/DL
LEUKOCYTE ESTERASE UR QL STRIP: NEGATIVE
LYMPHOCYTES # BLD AUTO: 1.41 THOUSANDS/ΜL (ref 0.6–4.47)
LYMPHOCYTES NFR BLD AUTO: 16 % (ref 14–44)
MCH RBC QN AUTO: 30.7 PG (ref 26.8–34.3)
MCHC RBC AUTO-ENTMCNC: 32 G/DL (ref 31.4–37.4)
MCV RBC AUTO: 96 FL (ref 82–98)
MONOCYTES # BLD AUTO: 0.47 THOUSAND/ΜL (ref 0.17–1.22)
MONOCYTES NFR BLD AUTO: 5 % (ref 4–12)
MUCOUS THREADS UR QL AUTO: ABNORMAL
NEUTROPHILS # BLD AUTO: 6.44 THOUSANDS/ΜL (ref 1.85–7.62)
NEUTS SEG NFR BLD AUTO: 73 % (ref 43–75)
NITRITE UR QL STRIP: NEGATIVE
NON-SQ EPI CELLS URNS QL MICRO: ABNORMAL /HPF
NRBC BLD AUTO-RTO: 0 /100 WBCS
PH UR STRIP.AUTO: 6.5 [PH] (ref 4.5–8)
PLATELET # BLD AUTO: 131 THOUSANDS/UL (ref 149–390)
PMV BLD AUTO: 10.3 FL (ref 8.9–12.7)
POTASSIUM SERPL-SCNC: 3.1 MMOL/L (ref 3.5–5.3)
PROT UR STRIP-MCNC: >=300 MG/DL
RBC # BLD AUTO: 4.56 MILLION/UL (ref 3.81–5.12)
RBC #/AREA URNS AUTO: ABNORMAL /HPF
SODIUM SERPL-SCNC: 142 MMOL/L (ref 136–145)
SP GR UR STRIP.AUTO: >=1.03 (ref 1–1.03)
UROBILINOGEN UR QL STRIP.AUTO: 2 E.U./DL
WBC # BLD AUTO: 8.75 THOUSAND/UL (ref 4.31–10.16)
WBC #/AREA URNS AUTO: ABNORMAL /HPF
WBC CLUMPS # UR AUTO: PRESENT /UL

## 2022-06-10 PROCEDURE — 81025 URINE PREGNANCY TEST: CPT | Performed by: EMERGENCY MEDICINE

## 2022-06-10 PROCEDURE — G1004 CDSM NDSC: HCPCS

## 2022-06-10 PROCEDURE — 99284 EMERGENCY DEPT VISIT MOD MDM: CPT

## 2022-06-10 PROCEDURE — 99285 EMERGENCY DEPT VISIT HI MDM: CPT | Performed by: EMERGENCY MEDICINE

## 2022-06-10 PROCEDURE — 36415 COLL VENOUS BLD VENIPUNCTURE: CPT | Performed by: EMERGENCY MEDICINE

## 2022-06-10 PROCEDURE — 80048 BASIC METABOLIC PNL TOTAL CA: CPT | Performed by: EMERGENCY MEDICINE

## 2022-06-10 PROCEDURE — 81001 URINALYSIS AUTO W/SCOPE: CPT

## 2022-06-10 PROCEDURE — 85025 COMPLETE CBC W/AUTO DIFF WBC: CPT | Performed by: EMERGENCY MEDICINE

## 2022-06-10 PROCEDURE — 74176 CT ABD & PELVIS W/O CONTRAST: CPT

## 2022-06-10 RX ORDER — ONDANSETRON 4 MG/1
4 TABLET, ORALLY DISINTEGRATING ORAL EVERY 6 HOURS PRN
Qty: 20 TABLET | Refills: 0 | Status: SHIPPED | OUTPATIENT
Start: 2022-06-10

## 2022-06-10 RX ORDER — TAMSULOSIN HYDROCHLORIDE 0.4 MG/1
0.4 CAPSULE ORAL
Qty: 7 CAPSULE | Refills: 0 | Status: SHIPPED | OUTPATIENT
Start: 2022-06-10 | End: 2022-07-18 | Stop reason: SDUPTHER

## 2022-06-10 RX ORDER — CEPHALEXIN 500 MG/1
500 CAPSULE ORAL EVERY 12 HOURS SCHEDULED
Qty: 14 CAPSULE | Refills: 0 | Status: SHIPPED | OUTPATIENT
Start: 2022-06-10 | End: 2022-06-17

## 2022-06-10 RX ORDER — OXYCODONE HYDROCHLORIDE AND ACETAMINOPHEN 5; 325 MG/1; MG/1
1 TABLET ORAL EVERY 4 HOURS PRN
Qty: 15 TABLET | Refills: 0 | Status: SHIPPED | OUTPATIENT
Start: 2022-06-10 | End: 2022-06-20

## 2022-06-10 NOTE — TELEPHONE ENCOUNTER
Patient is a 51-year-old female attempting to establish care with our practice status post ureteral stent insertion for a 2 1 cm left ureteral calculus out [place] state  She presented to the emergency room due to abdominal pain and flank pain  Renal function within normal limits, no white count, afebrile, questionable UA, discussed with ER to prescribed antibiotics on DC  CT scan reveals 21 mm left ureteral calculus nephroureteral stent in position with proximal locking loop in the lower collecting system adjacent to large calculus with moderate left-sided hydronephrosis  Discussed with Dr Randolph Mathis would like to add case on fast Track this week  For cystoscopy ureteroscopy holmium laser lithotripsy basket extraction and ureteral stent insertion on the left  Recommend high-powered laser, and 1 5 hour time block    Please assist with scheduling

## 2022-06-10 NOTE — ED ATTENDING ATTESTATION
6/10/2022  IGrant MD, saw and evaluated the patient  I have discussed the patient with the resident/non-physician practitioner and agree with the resident's/non-physician practitioner's findings, Plan of Care, and MDM as documented in the resident's/non-physician practitioner's note, except where noted  All available labs and Radiology studies were reviewed  I was present for key portions of any procedure(s) performed by the resident/non-physician practitioner and I was immediately available to provide assistance  At this point I agree with the current assessment done in the Emergency Department  I have conducted an independent evaluation of this patient a history and physical is as follows:    ED Course     Patient presents for evaluation due to left flank pain  Patient states that she has a history of a large kidney stone  She had a stent placed May 7th for this in [place]  No additional complaints  Denies fevers or dysuria  A/P:  Nephrolithiasis  Will check labs and will CT abdomen and pelvis    Will discuss with Urology    Critical Care Time  Procedures

## 2022-06-10 NOTE — DISCHARGE INSTRUCTIONS
You have been seen for left flank pain  Please take the prescribed antibiotics and nausea medicine  Return to the emergency department if you develop worsening pain, fevers, vomiting or any other symptoms of concern  Please follow up with urology by calling the number provided  Your blood pressure is elevated on your visit today  Please arrange for a blood pressure recheck with a PCP within the next week for further evaluation  A copy of your CT is included below:  FINDINGS:     ABDOMEN     LOWER CHEST: No clinically significant abnormality is identified in the visualized lower chest  No consolidation or effusion  LIVER: Hepatomegaly measuring 24 cm in maximum craniocaudal dimension  No gross mass on noncontrast study  Diffuse hepatic steatosis  BILIARY: No intrahepatic biliary ductal dilatation  Normal caliber common bile duct  GALLBLADDER: Gallbladder is surgically absent  SPLEEN: Splenomegaly measuring 15 2 cm  PANCREAS: Grossly within normal limits  ADRENAL GLANDS: Within normal limits  KIDNEYS/URETERS: Normal kidney size and position  No gross solid mass on noncontrast exam  No right renal calculi or hydronephrosis  A left lower pole calculus measures 21 x 11 x 12 mm, with several other smaller calculi noted  A left nephroureteral   stent is in position with proximal locking loop in the lower collecting system adjacent to the large calculus, may be encrusted  Moderate left hydronephrosis  STOMACH AND BOWEL: Stomach is grossly within normal limits  Normal caliber small bowel  A 2 4 cm air-filled diverticulum arises superiorly from the 3rd portion of the duodenum  Normal caliber large bowel  Colonic diverticulosis without acute   diverticulitis  The small bowel is diffusely fluid-filled, which could represent an enteritis  APPENDIX: No findings to suggest acute appendicitis  ABDOMINOPELVIC CAVITY: No ascites  No intraperitoneal free air  No lymphadenopathy   No retroperitoneal hematoma  VESSELS: Normal caliber abdominal aorta with no significant atherosclerotic plaque  PELVIS     REPRODUCTIVE ORGANS:  Anteverted uterus  There is no evidence of adnexal mass  URINARY BLADDER:  Underdistended limiting evaluation of wall thickness, but otherwise within normal limits  No calculi  Nephroureteral stent locking loop in place  ABDOMINAL WALL/INGUINAL REGIONS:  Tiny fat-containing umbilical hernia  BONES:  Mild multilevel degenerative changes in the spine  Vertebral body height is maintained  No acute fracture or destructive osseous lesion  IMPRESSION:     1  Moderate left hydronephrosis with multiple calculi, including a dominant 21 mm calculus in the lower pole adjacent to a nephroureteral stent locking loop which may be encrusted  2   Fluid-filled mildly small bowel which could represent a nonspecific enteritis in the appropriate clinical context  3   Hepatosplenomegaly with hepatic steatosis

## 2022-06-10 NOTE — ED PROVIDER NOTES
History  Chief Complaint   Patient presents with    Flank Pain     Hx of kidney stone too big to pass, had stent placed on may 7th  Pt here to make sure everything is okay due to still having pain in left flank, pain with urination with some red tint  Inis Keyanna is a 46y o  year old female with PMH of kidney stones s/p left ureteral stent placement, diverticulitis presenting to the [place] [address] ED for left lower abdominal and flank pain  Patient has had one month of left flank pain since undergoing left ureteral stent placement May 7 2022 in [facility] for kidney stone  Pain waxes/wanes, achy in nature and radiating to the left lower quadrant  Currently 5/10, 10/10 at worst  Patient acknowledges associated dysuria and gross hematuria over the past week  No fevers or N/V/D  The patient has taken roxycodone at home for symptomatic treatment  Patient is scheduled for followup evaluation with [facility] urology  History provided by:  Patient   used: No    Flank Pain  Associated symptoms: dysuria and hematuria    Associated symptoms: no chest pain, no chills, no cough, no diarrhea, no fever, no nausea, no shortness of breath and no vomiting        Prior to Admission Medications   Prescriptions Last Dose Informant Patient Reported? Taking?    albuterol (2 5 mg/3 mL) 0 083 % nebulizer solution   No No   Sig: Take 3 mL (2 5 mg total) by nebulization every 6 (six) hours as needed for wheezing or shortness of breath   albuterol (PROVENTIL HFA,VENTOLIN HFA) 90 mcg/act inhaler   No No   Sig: Inhale 2 puffs every 6 (six) hours as needed for wheezing   buPROPion (WELLBUTRIN XL) 150 mg 24 hr tablet   No No   Sig: Take 1 tablet (150 mg total) by mouth every morning   diazepam (VALIUM) 10 mg tablet   No No   Sig: Take 1 tablet (10 mg total) by mouth every 6 (six) hours as needed for anxiety   ergocalciferol (VITAMIN D2) 50,000 units   No No   Sig: Take 1 capsule (50,000 Units total) by mouth once a week fluticasone (FLONASE) 50 mcg/act nasal spray   No No   Si spray into each nostril 2 (two) times a day   furosemide (LASIX) 20 mg tablet  Self No No   Sig: Take 1 tablet (20 mg total) by mouth daily as needed (edema)   gabapentin (NEURONTIN) 100 mg capsule  Self Yes No   Sig: as needed    ibuprofen (MOTRIN) 600 mg tablet  Self Yes No   Sig: as needed    levothyroxine 112 mcg tablet   No No   Sig: Take 1 tablet (112 mcg total) by mouth daily   naloxone (NARCAN) 4 mg/0 1 mL nasal spray  Self No No   Sig: Administer 1 spray into a nostril  If no response after 2-3 minutes, give another dose in the other nostril using a new spray     oxyCODONE-acetaminophen (PERCOCET)  mg per tablet  Self No No   Sig: Take 1 tablet by mouth every 6 (six) hours as needed for moderate pain Max Daily Amount: 4 tablets   pantoprazole (PROTONIX) 40 mg tablet   No No   Sig: Take 1 tablet (40 mg total) by mouth 2 (two) times a day as needed (acid reflux)   polyethylene glycol (GOLYTELY) 4000 mL solution   No No   Sig: Take 4,000 mL by mouth once for 1 dose   sertraline (ZOLOFT) 100 mg tablet   No No   Sig: Take 1 tablet (100 mg total) by mouth 2 (two) times a day   simvastatin (ZOCOR) 20 mg tablet   No No   Sig: Take 1 tablet (20 mg total) by mouth daily at bedtime   sucralfate (CARAFATE) 1 g tablet   No No   Si TABLET 3 TIMES A DAY BEFORE MEALS      Facility-Administered Medications: None       Past Medical History:   Diagnosis Date    Allergic sinusitis     last assessed - [id number]    Anxiety     last assessed - 65GRA4618    Asthma     last assessed - [id number]    Bilateral lower extremity edema     last assessed - 63OYU5658    Callus of foot     last assessed - 96SEJ7162    Chronic GERD     last assessed - 45KCZ5443    Constipation     Resolved - 89KQS5998    Depression     last assessed - 18TFG7943    Disease of thyroid gland     Diverticulitis of colon     last assessed - 60DCG3114    Dyspepsia     Resolved - 22MIH3958    Esophageal reflux     last assessed - 04UNS6948    Essential hypertriglyceridemia     last assessed - 01XYJ6256    GERD (gastroesophageal reflux disease)     Gynecological disorder     last assessed - 84LBW0601    Hypertension     last assessed - 67YQI8093    Hypotension     last assessed - 89Chv3705    Kidney stone     Migraine     Neuropathy     Pulmonary emphysema ([facility] [address] )     last assessed - 52LPJ6969    Seasonal allergies     Urinary frequency     last assessed - 23Gzq0487    Vagina, candidiasis     last assessed - 75Zuo9537       Past Surgical History:   Procedure Laterality Date    CARPAL TUNNEL RELEASE      last assessed - 19AMJ2523    CHOLECYSTECTOMY      last assessed - 57CKY0067    COLONOSCOPY      Complete colonoscopy    Thor Bones EYE SURGERY      last assessed - 49MXV7046   Thor Bones FOOT SURGERY      last assessed - 77RLB8085    GA ESOPHAGOGASTRODUODENOSCOPY TRANSORAL DIAGNOSTIC N/A 2/13/2017    Procedure: ESOPHAGOGASTRODUODENOSCOPY (EGD); Surgeon: Cinthya Weeks MD;  Location: [facility]; Service: Gastroenterology       Family History   Problem Relation Age of Onset    Lung cancer Sister     Hypertension Other     Lung cancer Other     Hypertension Other     Lung cancer Other     Lung cancer Other     Lung cancer Maternal Grandmother      I have reviewed and agree with the history as documented  E-Cigarette/Vaping    E-Cigarette Use Former User      E-Cigarette/Vaping Substances    Nicotine Yes     THC No     CBD No     Flavoring No     Other No      Social History     Tobacco Use    Smoking status: Current Every Day Smoker     Packs/day: 1 00     Types: Cigarettes    Smokeless tobacco: Never Used    Tobacco comment: one pack   Vaping Use    Vaping Use: Former    Substances: Nicotine   Substance Use Topics    Alcohol use: Yes     Comment: wine twice a year    Drug use: No        Review of Systems   Constitutional: Negative for chills and fever     HENT: Negative for congestion  Eyes: Negative for visual disturbance  Respiratory: Negative for cough and shortness of breath  Cardiovascular: Negative for chest pain  Gastrointestinal: Positive for abdominal pain  Negative for abdominal distention, diarrhea, nausea and vomiting  Endocrine: Negative for polyuria  Genitourinary: Positive for dysuria, flank pain, frequency, hematuria and urgency  Negative for difficulty urinating  Musculoskeletal: Negative for arthralgias  Skin: Negative for rash  Neurological: Negative for syncope and light-headedness  Psychiatric/Behavioral: Negative for behavioral problems and confusion  All other systems reviewed and are negative  Physical Exam  ED Triage Vitals [06/10/22 1339]   Temperature Pulse Respirations Blood Pressure SpO2   98 8 °F (37 1 °C) 92 18 (!) 173/91 96 %      Temp Source Heart Rate Source Patient Position - Orthostatic VS BP Location FiO2 (%)   Temporal Monitor Sitting Left arm --      Pain Score       8             Orthostatic Vital Signs  Vitals:    06/10/22 1339   BP: (!) 173/91   Pulse: 92   Patient Position - Orthostatic VS: Sitting       Physical Exam  Vitals and nursing note reviewed  Constitutional:       General: She is not in acute distress  Appearance: Normal appearance  She is well-developed  She is not ill-appearing, toxic-appearing or diaphoretic  HENT:      Head: Normocephalic and atraumatic  Nose: No congestion or rhinorrhea  Eyes:      General:         Right eye: No discharge  Left eye: No discharge  Cardiovascular:      Rate and Rhythm: Normal rate and regular rhythm  Pulmonary:      Effort: Pulmonary effort is normal  No accessory muscle usage or respiratory distress  Breath sounds: Normal breath sounds  No stridor  No decreased breath sounds, wheezing, rhonchi or rales  Abdominal:      General: Bowel sounds are normal  There is no distension  Palpations: Abdomen is soft  Tenderness:  There is abdominal tenderness in the left lower quadrant  There is left CVA tenderness  There is no guarding or rebound  Musculoskeletal:      Cervical back: Normal range of motion and neck supple  Right lower leg: No tenderness  No edema  Left lower leg: No tenderness  No edema  Skin:     Capillary Refill: Capillary refill takes less than 2 seconds  Findings: No rash  Neurological:      Mental Status: She is alert and oriented to person, place, and time     Psychiatric:         Mood and Affect: Mood normal          Behavior: Behavior normal          ED Medications  Medications - No data to display    Diagnostic Studies  Results Reviewed     Procedure Component Value Units Date/Time    Basic metabolic panel [[id number]]  (Abnormal) Collected: 06/10/22 1411    Lab Status: Final result Specimen: Blood from Arm, Left Updated: 06/10/22 1516     Sodium 142 mmol/L      Potassium 3 1 mmol/L      Chloride 113 mmol/L      CO2 23 mmol/L      ANION GAP 6 mmol/L      BUN 10 mg/dL      Creatinine 1 00 mg/dL      Glucose 170 mg/dL      Calcium 8 8 mg/dL      eGFR 65 ml/min/1 73sq m     Narrative:      [place] guidelines for Chronic Kidney Disease (CKD):     Stage 1 with normal or high GFR (GFR > 90 mL/min/1 73 square meters)    Stage 2 Mild CKD (GFR = 60-89 mL/min/1 73 square meters)    Stage 3A Moderate CKD (GFR = 45-59 mL/min/1 73 square meters)    Stage 3B Moderate CKD (GFR = 30-44 mL/min/1 73 square meters)    Stage 4 Severe CKD (GFR = 15-29 mL/min/1 73 square meters)    Stage 5 End Stage CKD (GFR <15 mL/min/1 73 square meters)  Note: GFR calculation is accurate only with a steady state creatinine    Urine Microscopic [[id number]]  (Abnormal) Collected: 06/10/22 1407    Lab Status: Final result Specimen: Urine, Clean Catch Updated: 06/10/22 1443     RBC, UA Innumerable /hpf      WBC, UA Innumerable /hpf      Epithelial Cells Occasional /hpf      Bacteria, UA Occasional /hpf      MUCUS THREADS Innumerable     Hyaline Casts, UA 5-10 /lpf      WBC Clumps PRESENT    Urine culture [[id number]] Collected: 06/10/22 1407    Lab Status:  In process Specimen: Urine, Clean Catch Updated: 06/10/22 1443    CBC and differential [[id number]]  (Abnormal) Collected: 06/10/22 1411    Lab Status: Final result Specimen: Blood from Arm, Left Updated: 06/10/22 1427     WBC 8 75 Thousand/uL      RBC 4 56 Million/uL      Hemoglobin 14 0 g/dL      Hematocrit 43 8 %      MCV 96 fL      MCH 30 7 pg      MCHC 32 0 g/dL      RDW 15 9 %      MPV 10 3 fL      Platelets 622 Thousands/uL      nRBC 0 /100 WBCs      Neutrophils Relative 73 %      Immat GRANS % 1 %      Lymphocytes Relative 16 %      Monocytes Relative 5 %      Eosinophils Relative 4 %      Basophils Relative 1 %      Neutrophils Absolute 6 44 Thousands/µL      Immature Grans Absolute 0 06 Thousand/uL      Lymphocytes Absolute 1 41 Thousands/µL      Monocytes Absolute 0 47 Thousand/µL      Eosinophils Absolute 0 32 Thousand/µL      Basophils Absolute 0 05 Thousands/µL     POCT urinalysis dipstick [[id number]]  (Normal) Resulted: 06/10/22 1410    Lab Status: Final result Specimen: Urine Updated: 06/10/22 1410     Color, UA yellow     Clarity, UA clear    POCT pregnancy, urine [[id number]]  (Normal) Resulted: 06/10/22 1410    Lab Status: Final result Updated: 06/10/22 1410     EXT PREG TEST UR (Ref: Negative) negative     Control valid    Urine Macroscopic, POC [[id number]]  (Abnormal) Collected: 06/10/22 1407    Lab Status: Final result Specimen: Urine Updated: 06/10/22 1409     Color, UA Yellow     Clarity, UA Clear     pH, UA 6 5     Leukocytes, UA Negative     Nitrite, UA Negative     Protein, UA >=300 mg/dl      Glucose, UA Negative mg/dl      Ketones, UA Negative mg/dl      Urobilinogen, UA 2 0 E U /dl      Bilirubin, UA Negative     Blood, UA Large     Specific Gravity, UA >=1 030    Narrative:      CLINITEK RESULT                 CT abdomen pelvis wo contrast Final Result by Maryanne Calero MD (06/10 2045)      1  Moderate left hydronephrosis with multiple calculi, including a dominant 21 mm calculus in the lower pole adjacent to a nephroureteral stent locking loop which may be encrusted  2   Fluid-filled mildly small bowel which could represent a nonspecific enteritis in the appropriate clinical context  3   Hepatosplenomegaly with hepatic steatosis  The study was marked in EPIC for significant notification  Workstation performed: [id number]               Procedures  Procedures      ED Course  ED Course as of 06/10/22 1829   Fri Fernandez 10, 2022   1412 Leukocytes, UA: Negative   1412 Nitrite, UA: Negative                             SBIRT 22yo+    Flowsheet Row Most Recent Value   SBIRT (25 yo +)    In order to provide better care to our patients, we are screening all of our patients for alcohol and drug use  Would it be okay to ask you these screening questions? No Filed at: 06/10/2022 1416                MDM  Number of Diagnoses or Management Options  Hepatosplenomegaly  Hydronephrosis of left kidney  Kidney stone on left side  Diagnosis management comments:   46 y o  female presenting for left flank pain  Will order labs, UA and CT a/p to evaluate for kidney stone, stent migration, diverticulitis or other intraabdominal pathology  Patient declines analgesia in ED  Reassessment: CT and lab results reviewed with patient  Case discussed with urology who states okay for outpatient f/u and will facilitate prompt care  I have discussed with the patient our plan to discharge them from the ED and the patient is in agreement with this plan  The patient was provided a written after visit summary with strict RTED precautions  Discharge Plan: Rx for zofran, cephalexin per urology recs  Will also send additional Rx for analgesia  Followup: I have discussed with the patient plan to follow up with Urology   Contact information provided in AVS        Amount and/or Complexity of Data Reviewed  Clinical lab tests: ordered and reviewed  Tests in the radiology section of CPT®: ordered and reviewed  Review and summarize past medical records: yes  Independent visualization of images, tracings, or specimens: yes    Patient Progress  Patient progress: stable      Disposition  Final diagnoses:   Kidney stone on left side   Hydronephrosis of left kidney   Hepatosplenomegaly     Time reflects when diagnosis was documented in both MDM as applicable and the Disposition within this note     Time User Action Codes Description Comment    6/10/2022  4:01 PM Terri Leanne Add [N20 0] Kidney stone on left side     6/10/2022  4:01 PM Terri Leanne Add [N13 30] Hydronephrosis of left kidney     6/10/2022  4:03 PM Terri Leanne Add [R16 2] Hepatosplenomegaly       ED Disposition     ED Disposition   Discharge    Condition   Stable    Date/Time   Fri Fernandez 10, 2022  4:01 PM    Comment   Jocelyn Bergman discharge to home/self care  Follow-up Information     Follow up With Specialties Details Why Contact Info Additional [address] [facility] Schedule an appointment as soon as possible for a visit  Our office will contact to early next week on discharge to schedule surgical procedure for removal of ureteral stone  Please call with any additional questions or concerns  [address] [facility] [place], [address] [place] [address] 61 51 81, [place], [place], [address] [place] [address] [place]  Family Medicine, Internal Medicine Schedule an appointment as soon as possible for a visit  To make appointment for reevaluation in 3-5 days   [address]  [phone]             Discharge Medication List as of 6/10/2022  4:12 PM      START taking these medications    Details   cephalexin (KEFLEX) 500 mg capsule Take 1 capsule (500 mg total) by mouth every 12 (twelve) hours for 7 days, Starting Fri 6/10/2022, Until Fri 6/17/2022, Normal      ondansetron (ZOFRAN-ODT) 4 mg disintegrating tablet Take 1 tablet (4 mg total) by mouth every 6 (six) hours as needed for nausea or vomiting, Starting Fri 6/10/2022, Normal         CONTINUE these medications which have NOT CHANGED    Details   albuterol (2 5 mg/3 mL) 0 083 % nebulizer solution Take 3 mL (2 5 mg total) by nebulization every 6 (six) hours as needed for wheezing or shortness of breath, Starting Tue 12/21/2021, Normal      albuterol (PROVENTIL HFA,VENTOLIN HFA) 90 mcg/act inhaler Inhale 2 puffs every 6 (six) hours as needed for wheezing, Starting Tue 12/21/2021, Normal      buPROPion (WELLBUTRIN XL) 150 mg 24 hr tablet Take 1 tablet (150 mg total) by mouth every morning, Starting Wed 4/27/2022, Normal      diazepam (VALIUM) 10 mg tablet Take 1 tablet (10 mg total) by mouth every 6 (six) hours as needed for anxiety, Starting Fri 4/29/2022, Normal      ergocalciferol (VITAMIN D2) 50,000 units Take 1 capsule (50,000 Units total) by mouth once a week, Starting Wed 4/27/2022, Normal      fluticasone (FLONASE) 50 mcg/act nasal spray 1 spray into each nostril 2 (two) times a day, Starting Tue 12/21/2021, Normal      furosemide (LASIX) 20 mg tablet Take 1 tablet (20 mg total) by mouth daily as needed (edema), Starting Fri 8/7/2020, Normal      gabapentin (NEURONTIN) 100 mg capsule as needed , Starting Fri 10/4/2019, Historical Med      ibuprofen (MOTRIN) 600 mg tablet as needed , Starting Wed 8/7/2019, Historical Med      levothyroxine 112 mcg tablet Take 1 tablet (112 mcg total) by mouth daily, Starting Wed 4/27/2022, Normal      naloxone (NARCAN) 4 mg/0 1 mL nasal spray Administer 1 spray into a nostril   If no response after 2-3 minutes, give another dose in the other nostril using a new spray , Normal      pantoprazole (PROTONIX) 40 mg tablet Take 1 tablet (40 mg total) by mouth 2 (two) times a day as needed (acid reflux), Starting Wed 4/27/2022, Normal      polyethylene glycol (GOLYTELY) 4000 mL solution Take 4,000 mL by mouth once for 1 dose, Starting Tue 2/22/2022, Normal      sertraline (ZOLOFT) 100 mg tablet Take 1 tablet (100 mg total) by mouth 2 (two) times a day, Starting Wed 4/27/2022, Normal      simvastatin (ZOCOR) 20 mg tablet Take 1 tablet (20 mg total) by mouth daily at bedtime, Starting Wed 4/27/2022, Normal      sucralfate (CARAFATE) 1 g tablet 1 TABLET 3 TIMES A DAY BEFORE MEALS, Normal      oxyCODONE-acetaminophen (PERCOCET)  mg per tablet Take 1 tablet by mouth every 6 (six) hours as needed for moderate pain Max Daily Amount: 4 tablets, Starting Thu 11/8/2018, Print           No discharge procedures on file  PDMP Review     None           ED Provider  Attending physically available and evaluated [place] [facility]  I managed the patient along with the ED Attending      Electronically Signed by         Lizette Nielsen DO  06/10/22 7244

## 2022-06-13 ENCOUNTER — TELEPHONE (OUTPATIENT)
Dept: UROLOGY | Facility: AMBULATORY SURGERY CENTER | Age: 52
End: 2022-06-13

## 2022-06-13 ENCOUNTER — PREP FOR PROCEDURE (OUTPATIENT)
Dept: UROLOGY | Facility: AMBULATORY SURGERY CENTER | Age: 52
End: 2022-06-13

## 2022-06-13 DIAGNOSIS — N20.0 KIDNEY STONE: Primary | ICD-10-CM

## 2022-06-13 NOTE — TELEPHONE ENCOUNTER
I called pt this morning to discuss scheduling her procedure with Dr Mayra Savage this week at the [facility]  There was no answer so I did leave a voicemail asking pt to call our office back to discuss

## 2022-06-13 NOTE — TELEPHONE ENCOUNTER
I spoke with pt this afternoon and scheduled her for surgery with Dr Lisa Rojas at the [facility] on 6/16/22  I verbally went over all of pt 's pre op instructions and prep information with her  Per pt 's request I will be e-mailing her a copy of her surgical packet and I instructed her to call our office with any questions or concerns  Before hanging up pt asked about a tube being placed  She stated that she was informed at the ER that she will need a tube placed to drain her kidney  I told pt that I did not see any mention of that in her notes and will confirm with Amberly Patel and get back to her

## 2022-06-14 ENCOUNTER — TELEPHONE (OUTPATIENT)
Dept: UROLOGY | Facility: MEDICAL CENTER | Age: 52
End: 2022-06-14

## 2022-06-14 NOTE — TELEPHONE ENCOUNTER
I returned call to pt to relay Karlie's message  I was able to speak with pt and she was very thankful for this information  Pt was instructed to call our office back with any other questions or concerns regarding upcoming procedure

## 2022-06-14 NOTE — PRE-PROCEDURE INSTRUCTIONS
Pre-Surgery Instructions:   Medication Instructions    albuterol (2 5 mg/3 mL) 0 083 % nebulizer solution Uses PRN- OK to take day of surgery    albuterol (PROVENTIL HFA,VENTOLIN HFA) 90 mcg/act inhaler Uses PRN- OK to take day of surgery    buPROPion (WELLBUTRIN XL) 150 mg 24 hr tablet Take day of surgery   cephalexin (KEFLEX) 500 mg capsule Take day of surgery   diazepam (VALIUM) 10 mg tablet Uses PRN- OK to take day of surgery    ergocalciferol (VITAMIN D2) 50,000 units Stop taking 7 days prior to surgery   fluticasone (FLONASE) 50 mcg/act nasal spray Take day of surgery   furosemide (LASIX) 20 mg tablet Hold day of surgery   gabapentin (NEURONTIN) 100 mg capsule Take day of surgery   ibuprofen (MOTRIN) 600 mg tablet Stop taking 7 days prior to surgery   levothyroxine 112 mcg tablet Take day of surgery   naloxone (NARCAN) 4 mg/0 1 mL nasal spray Uses PRN- OK to take day of surgery    ondansetron (ZOFRAN-ODT) 4 mg disintegrating tablet Uses PRN- OK to take day of surgery    oxyCODONE-acetaminophen (Percocet) 5-325 mg per tablet Uses PRN- OK to take day of surgery    pantoprazole (PROTONIX) 40 mg tablet Uses PRN- OK to take day of surgery    sertraline (ZOLOFT) 100 mg tablet Take day of surgery   simvastatin (ZOCOR) 20 mg tablet Take night before surgery    sucralfate (CARAFATE) 1 g tablet Hold day of surgery   tamsulosin (FLOMAX) 0 4 mg Take night before surgery      Reviewed with patient, in detail, instructions from "My Surgical Experience"  Instructed to avoid all  OTC vitamins/supplements and NSAIDS for one week prior to surgery per anesthesia guidelines  Tylenol ok to take PRN  Advised patient that Wolf Toledo will call with surgery arrival time and hospital directions the business day prior to surgery  Advised patient nothing eat or drink after midnight prior to surgery  Instructed to call surgeon's office in meantime with any new illnesses/exposure   Patient verbalized understanding of current visitor restrictions/masking guidelines and advised that he/she can confirm these at time of arrival call with Wolf Toledo  Patient verbalized understanding and knows to call surgeon's office with any additional questions prior to surgery

## 2022-06-14 NOTE — TELEPHONE ENCOUNTER
No plan for any tube  The ER probably mention a percutaneous nephrostomy tube to her given her large stone size  As I discussed with the ER that I was communicating with Dr Catalina Wolfe to see if she needed a ureteroscopy verses PCNL  Plan is for ureteroscopy  No plan for PCNL      Thanks

## 2022-06-16 ENCOUNTER — APPOINTMENT (OUTPATIENT)
Dept: RADIOLOGY | Facility: HOSPITAL | Age: 52
End: 2022-06-16
Payer: MEDICARE

## 2022-06-16 ENCOUNTER — ANESTHESIA (OUTPATIENT)
Dept: PERIOP | Facility: HOSPITAL | Age: 52
End: 2022-06-16
Payer: MEDICARE

## 2022-06-16 ENCOUNTER — HOSPITAL ENCOUNTER (OUTPATIENT)
Facility: HOSPITAL | Age: 52
Setting detail: OUTPATIENT SURGERY
Discharge: HOME | End: 2022-06-16
Attending: UROLOGY | Admitting: UROLOGY
Payer: MEDICARE

## 2022-06-16 ENCOUNTER — ANESTHESIA EVENT (OUTPATIENT)
Dept: PERIOP | Facility: HOSPITAL | Age: 52
End: 2022-06-16
Payer: MEDICARE

## 2022-06-16 VITALS
SYSTOLIC BLOOD PRESSURE: 116 MMHG | WEIGHT: 257 LBS | DIASTOLIC BLOOD PRESSURE: 61 MMHG | BODY MASS INDEX: 40.34 KG/M2 | HEIGHT: 67 IN | HEART RATE: 104 BPM | OXYGEN SATURATION: 92 % | TEMPERATURE: 98.8 F | RESPIRATION RATE: 19 BRPM

## 2022-06-16 DIAGNOSIS — N20.0 KIDNEY STONE: ICD-10-CM

## 2022-06-16 PROCEDURE — C1769 GUIDE WIRE: HCPCS | Performed by: UROLOGY

## 2022-06-16 PROCEDURE — C1894 INTRO/SHEATH, NON-LASER: HCPCS | Performed by: UROLOGY

## 2022-06-16 PROCEDURE — 82360 CALCULUS ASSAY QUANT: CPT | Performed by: UROLOGY

## 2022-06-16 PROCEDURE — 52356 CYSTO/URETERO W/LITHOTRIPSY: CPT | Performed by: UROLOGY

## 2022-06-16 PROCEDURE — C1758 CATHETER, URETERAL: HCPCS | Performed by: UROLOGY

## 2022-06-16 PROCEDURE — NC001 PR NO CHARGE: Performed by: UROLOGY

## 2022-06-16 PROCEDURE — C2617 STENT, NON-COR, TEM W/O DEL: HCPCS | Performed by: UROLOGY

## 2022-06-16 PROCEDURE — 74420 UROGRAPHY RTRGR +-KUB: CPT

## 2022-06-16 DEVICE — INLAY OPTIMA URETERAL STENT W/O GUIDEWIRE
Type: IMPLANTABLE DEVICE | Site: URETER | Status: FUNCTIONAL
Brand: BARD® INLAY OPTIMA® URETERAL STENT

## 2022-06-16 RX ORDER — LIDOCAINE HYDROCHLORIDE 10 MG/ML
INJECTION, SOLUTION EPIDURAL; INFILTRATION; INTRACAUDAL; PERINEURAL AS NEEDED
Status: DISCONTINUED | OUTPATIENT
Start: 2022-06-16 | End: 2022-06-16

## 2022-06-16 RX ORDER — DEXAMETHASONE SODIUM PHOSPHATE 10 MG/ML
INJECTION, SOLUTION INTRAMUSCULAR; INTRAVENOUS AS NEEDED
Status: DISCONTINUED | OUTPATIENT
Start: 2022-06-16 | End: 2022-06-16

## 2022-06-16 RX ORDER — SODIUM CHLORIDE, SODIUM LACTATE, POTASSIUM CHLORIDE, CALCIUM CHLORIDE 600; 310; 30; 20 MG/100ML; MG/100ML; MG/100ML; MG/100ML
INJECTION, SOLUTION INTRAVENOUS CONTINUOUS PRN
Status: DISCONTINUED | OUTPATIENT
Start: 2022-06-16 | End: 2022-06-16

## 2022-06-16 RX ORDER — FENTANYL CITRATE 50 UG/ML
INJECTION, SOLUTION INTRAMUSCULAR; INTRAVENOUS AS NEEDED
Status: DISCONTINUED | OUTPATIENT
Start: 2022-06-16 | End: 2022-06-16

## 2022-06-16 RX ORDER — MAGNESIUM HYDROXIDE 1200 MG/15ML
LIQUID ORAL AS NEEDED
Status: DISCONTINUED | OUTPATIENT
Start: 2022-06-16 | End: 2022-06-16 | Stop reason: HOSPADM

## 2022-06-16 RX ORDER — LIDOCAINE HYDROCHLORIDE 10 MG/ML
0.5 INJECTION, SOLUTION EPIDURAL; INFILTRATION; INTRACAUDAL; PERINEURAL ONCE AS NEEDED
Status: DISCONTINUED | OUTPATIENT
Start: 2022-06-16 | End: 2022-06-16 | Stop reason: HOSPADM

## 2022-06-16 RX ORDER — MIDAZOLAM HYDROCHLORIDE 2 MG/2ML
INJECTION, SOLUTION INTRAMUSCULAR; INTRAVENOUS AS NEEDED
Status: DISCONTINUED | OUTPATIENT
Start: 2022-06-16 | End: 2022-06-16

## 2022-06-16 RX ORDER — PROPOFOL 10 MG/ML
INJECTION, EMULSION INTRAVENOUS AS NEEDED
Status: DISCONTINUED | OUTPATIENT
Start: 2022-06-16 | End: 2022-06-16

## 2022-06-16 RX ORDER — FENTANYL CITRATE/PF 50 MCG/ML
50 SYRINGE (ML) INJECTION
Status: DISCONTINUED | OUTPATIENT
Start: 2022-06-16 | End: 2022-06-16 | Stop reason: HOSPADM

## 2022-06-16 RX ORDER — SODIUM CHLORIDE, SODIUM LACTATE, POTASSIUM CHLORIDE, CALCIUM CHLORIDE 600; 310; 30; 20 MG/100ML; MG/100ML; MG/100ML; MG/100ML
75 INJECTION, SOLUTION INTRAVENOUS CONTINUOUS
Status: DISCONTINUED | OUTPATIENT
Start: 2022-06-16 | End: 2022-06-16 | Stop reason: HOSPADM

## 2022-06-16 RX ORDER — ONDANSETRON 2 MG/ML
4 INJECTION INTRAMUSCULAR; INTRAVENOUS ONCE AS NEEDED
Status: DISCONTINUED | OUTPATIENT
Start: 2022-06-16 | End: 2022-06-16 | Stop reason: HOSPADM

## 2022-06-16 RX ORDER — ALBUTEROL SULFATE 2.5 MG/3ML
2.5 SOLUTION RESPIRATORY (INHALATION) ONCE
Status: DISCONTINUED | OUTPATIENT
Start: 2022-06-16 | End: 2022-06-16

## 2022-06-16 RX ORDER — CEFAZOLIN SODIUM 1 G/3ML
INJECTION, POWDER, FOR SOLUTION INTRAMUSCULAR; INTRAVENOUS AS NEEDED
Status: DISCONTINUED | OUTPATIENT
Start: 2022-06-16 | End: 2022-06-16

## 2022-06-16 RX ORDER — ONDANSETRON 2 MG/ML
INJECTION INTRAMUSCULAR; INTRAVENOUS AS NEEDED
Status: DISCONTINUED | OUTPATIENT
Start: 2022-06-16 | End: 2022-06-16

## 2022-06-16 RX ORDER — ALBUTEROL SULFATE 2.5 MG/3ML
2.5 SOLUTION RESPIRATORY (INHALATION) ONCE AS NEEDED
Status: COMPLETED | OUTPATIENT
Start: 2022-06-16 | End: 2022-06-16

## 2022-06-16 RX ORDER — OXYCODONE HYDROCHLORIDE AND ACETAMINOPHEN 5; 325 MG/1; MG/1
1 TABLET ORAL EVERY 4 HOURS PRN
Status: DISCONTINUED | OUTPATIENT
Start: 2022-06-16 | End: 2022-06-16 | Stop reason: HOSPADM

## 2022-06-16 RX ADMIN — MIDAZOLAM 1 MG: 1 INJECTION INTRAMUSCULAR; INTRAVENOUS at 08:46

## 2022-06-16 RX ADMIN — ALBUTEROL SULFATE 2.5 MG: 2.5 SOLUTION RESPIRATORY (INHALATION) at 10:38

## 2022-06-16 RX ADMIN — MIDAZOLAM 1 MG: 1 INJECTION INTRAMUSCULAR; INTRAVENOUS at 08:41

## 2022-06-16 RX ADMIN — FENTANYL CITRATE 50 MCG: 50 INJECTION INTRAMUSCULAR; INTRAVENOUS at 09:07

## 2022-06-16 RX ADMIN — ONDANSETRON 4 MG: 2 INJECTION INTRAMUSCULAR; INTRAVENOUS at 09:02

## 2022-06-16 RX ADMIN — OXYCODONE HYDROCHLORIDE AND ACETAMINOPHEN 1 TABLET: 5; 325 TABLET ORAL at 11:49

## 2022-06-16 RX ADMIN — PROPOFOL 300 MG: 10 INJECTION, EMULSION INTRAVENOUS at 08:47

## 2022-06-16 RX ADMIN — SODIUM CHLORIDE, POTASSIUM CHLORIDE, SODIUM LACTATE AND CALCIUM CHLORIDE: 600; 310; 30; 20 INJECTION, SOLUTION INTRAVENOUS at 08:36

## 2022-06-16 RX ADMIN — FENTANYL CITRATE 25 MCG: 50 INJECTION INTRAMUSCULAR; INTRAVENOUS at 09:36

## 2022-06-16 RX ADMIN — CEFAZOLIN SODIUM 2000 MG: 1 INJECTION, POWDER, FOR SOLUTION INTRAMUSCULAR; INTRAVENOUS at 08:49

## 2022-06-16 RX ADMIN — FENTANYL CITRATE 25 MCG: 50 INJECTION INTRAMUSCULAR; INTRAVENOUS at 08:53

## 2022-06-16 RX ADMIN — DEXAMETHASONE SODIUM PHOSPHATE 4 MG: 10 INJECTION, SOLUTION INTRAMUSCULAR; INTRAVENOUS at 09:02

## 2022-06-16 RX ADMIN — SODIUM CHLORIDE, POTASSIUM CHLORIDE, SODIUM LACTATE AND CALCIUM CHLORIDE 75 ML/HR: 600; 310; 30; 20 INJECTION, SOLUTION INTRAVENOUS at 11:20

## 2022-06-16 RX ADMIN — Medication 50 MCG: at 10:53

## 2022-06-16 RX ADMIN — LIDOCAINE HYDROCHLORIDE 50 MG: 10 INJECTION, SOLUTION EPIDURAL; INFILTRATION; INTRACAUDAL; PERINEURAL at 08:47

## 2022-06-16 RX ADMIN — Medication 50 MCG: at 11:04

## 2022-06-16 NOTE — OP NOTE
OPERATIVE REPORT  PATIENT NAME: Angeles King    :  1970  MRN: [id number]  Pt Location:  CYSTO ROOM 01    SURGERY DATE: 2022    Surgeon(s) and Role:     Vanessa Juarez MD - Primary    Preop Diagnosis:  Kidney stone [N20 0]    Post-Op Diagnosis Codes:     * Kidney stone [N20 0]    Procedure(s) (LRB):  CYSTOSCOPY URETEROSCOPY WITH LITHOTRIPSY HOLMIUM LASER, RETROGRADE PYELOGRAM AND INSERTION STENT URETERAL (Left)    Specimen(s):  ID Type Source Tests Collected by Time Destination   A :  Calculus Ureter, Left STONE ANALYSIS Melanie Jack MD 2022 0941        Estimated Blood Loss:   Minimal    Drains:  * No LDAs found *    Anesthesia Type:   General    Operative Indications:  Kidney stone [N20 0]      Operative Findings:  Significant inflammatory change surrounding ureteral orifice and a long length of ureter and renal pelvis  Blood clots noted within the renal pelvis  Large calculus in lower pole/renal pelvis greater than 2 cm  Complete laser lithotripsy with high-power laser to tiny fragment  Poor visibility throughout  Final fluoroscopy no longer reveals calculi  Complications:   None    Procedure and Technique:  The patient was identified, brought to the operating room, and placed on the table in supine position  After induction of general anesthesia, the patient was placed in dorsal lithotomy position and prepped and draped in the usual sterile fashion  A complete formal timeout was performed  The 25 [place] rigid cystoscope was placed per urethra and cystoscopy was performed  There was significant inflammatory change surrounding a small multi length stent     The left ureteral stent was identified and grasped with a grasper, brought out through the meatus, and cannulated with a Solo wire  A dual-lumen catheter was placed followed by a second guidewire and ureteral access sheath    The [employer] flexible ureteroscope was placed and the stone was identified within a lower pole renal calyx emanating into the renal pelvis  There is significant inflammatory change surrounding this  There were also numerous blood clot noted in the area making visibility poor  A holmium laser fiber was placed and extensive laser lithotripsy was performed  Laser continued until the stone appeared completely fragmented  However visibility was poor  I carefully evaluated all calices to ensure that there is no larger calculus noted  On removal of the scope, numerous small fragments poured out from the access sheath and were taken as specimen  At this point, a retrograde pyelogram was performed delineating the upper urinary tract anatomy  No further filling defect identified  The ureteral length measured  The safety wire was backloaded into the cystoscope and a 26 cm x 6 Tunisian double-J stent was placed with string  The patient tolerated the procedure well and was transferred to the recovery room awake alert and in stable condition  Plan:  Stent/string until Monday 6/20        I was present for the entire procedure    Patient Disposition:  PACU       SIGNATURE: Estephania Alvarez MD  DATE: June 16, 2022  TIME: 9:50 AM

## 2022-06-16 NOTE — ANESTHESIA PREPROCEDURE EVALUATION
Procedure:  CYSTOSCOPY URETEROSCOPY WITH LITHOTRIPSY HOLMIUM LASER, RETROGRADE PYELOGRAM AND INSERTION STENT URETERAL (Left Bladder)    Relevant Problems   CARDIO   (+) Benign hypertension   (+) Hypercholesterolemia      ENDO   (+) Hypothyroidism      GI/HEPATIC   (+) Gastroesophageal reflux disease with esophagitis      /RENAL   (+) Nephrolithiasis      NEURO/PSYCH   (+) Anxiety   (+) Depression      PULMONARY   (+) Pulmonary emphysema (HCC)      Digestive   (+) Gastroparesis      Other   (+) BMI 40 0-44 9, adult (HCC)   (+) Tobacco abuse disorder        Physical Exam    Airway    Mallampati score: III  TM Distance: >3 FB  Neck ROM: full     Dental       Cardiovascular      Pulmonary      Other Findings        Anesthesia Plan  ASA Score- 3     Anesthesia Type- general with ASA Monitors  Additional Monitors:   Airway Plan: LMA  Comment: Discussed GERD severity, mild/well controlled  Proceeding with LMA  No Toradol as patient has allergy  Recent labs personally reviewed:  Lab Results       Component                Value               Date                       WBC                      8 75                06/10/2022                 HGB                      14 0                06/10/2022                 PLT                      131 (L)             06/10/2022            Lab Results       Component                Value               Date                       K                        3 1 (L)             06/10/2022                 BUN                      10                  06/10/2022                 CREATININE               1 00                06/10/2022            No results found for: PTT   No results found for: INR    Blood type Mae Morrison I, Ayleen Garcia MD, have personally seen and evaluated the patient prior to anesthetic care  I have reviewed the pre-anesthetic record, medical history, allergies, medications and any other medical records if appropriate to the anesthetic care    If a CRNA is involved in the case, I have reviewed the CRNA assessment, if present, and agree  I consented the patient for general anesthesia with appropriate airway support as indicated  We reviewed the risks associated including PONV, sore throat, allergic reaction to anesthetics and management plan to address these issues  We discussed the indication and risks associated with any invasive monitors that would be placed  We discussed post op pain control and expectations  We discussed rare complications including hypoxia, perioperative cardiac and neurologic events, and death based on the patient's baseline risk  All questions and concerns were addressed          Plan Factors-Exercise tolerance (METS): >4 METS  Existing labs reviewed  Patient summary reviewed  Patient is a current smoker  Patient instructed to abstain from smoking on day of procedure  Patient did not smoke on day of surgery  Obstructive sleep apnea risk education given perioperatively  Induction- intravenous  Postoperative Plan- Plan for postoperative opioid use  Planned trial extubation    Informed Consent- Anesthetic plan and risks discussed with patient  I personally reviewed this patient with the CRNA  Discussed and agreed on the Anesthesia Plan with the CRNA  Armin Kirk

## 2022-06-16 NOTE — PERIOPERATIVE NURSING NOTE
Called into bathroom by pt because she pulled out her stent dr Joyner Goods aware no new orders at this time

## 2022-06-16 NOTE — ANESTHESIA POSTPROCEDURE EVALUATION
Post-Op Assessment Note    CV Status:  Stable  Pain scale: MARBIN  Pain management: adequate     Mental Status:  Sleepy and arousable   Hydration Status:  Euvolemic   PONV Controlled:  Controlled   Airway Patency:  Patent      Post Op Vitals Reviewed: Yes      Staff: CRNA   Comments: vss, sv        No complications documented      BP   127/75   Temp   98 0   Pulse  73   Resp   15   SpO2   97%

## 2022-06-16 NOTE — H&P
History       Chief Complaint   Patient presents with    Flank Pain       Hx of kidney stone too big to pass, had stent placed on may 7th  Pt here to make sure everything is okay due to still having pain in left flank, pain with urination with some red tint        Angeles King is a 46y o  year old female with PMH of kidney stones s/p left ureteral stent placement, diverticulitis presenting to the [facility] [address] ED for left lower abdominal and flank pain  Patient has had one month of left flank pain since undergoing left ureteral stent placement May 7 2022 in [facility] [place] for kidney stone  Pain waxes/wanes, achy in nature and radiating to the left lower quadrant  Currently 5/10, 10/10 at worst  Patient acknowledges associated dysuria and gross hematuria over the past week  No fevers or N/V/D  The patient has taken roxycodone at home for symptomatic treatment  Patient is scheduled for followup evaluation with [facility] urology          History provided by:  Patient   used: No    Flank Pain  Associated symptoms: dysuria and hematuria    Associated symptoms: no chest pain, no chills, no cough, no diarrhea, no fever, no nausea, no shortness of breath and no vomiting          Prior to Admission Medications   Prescriptions Last Dose Informant Patient Reported? Taking?    albuterol (2 5 mg/3 mL) 0 083 % nebulizer solution     No No   Sig: Take 3 mL (2 5 mg total) by nebulization every 6 (six) hours as needed for wheezing or shortness of breath   albuterol (PROVENTIL HFA,VENTOLIN HFA) 90 mcg/act inhaler     No No   Sig: Inhale 2 puffs every 6 (six) hours as needed for wheezing   buPROPion (WELLBUTRIN XL) 150 mg 24 hr tablet     No No   Sig: Take 1 tablet (150 mg total) by mouth every morning   diazepam (VALIUM) 10 mg tablet     No No   Sig: Take 1 tablet (10 mg total) by mouth every 6 (six) hours as needed for anxiety   ergocalciferol (VITAMIN D2) 50,000 units     No No   Sig: Take 1 capsule (50,000 Units total) by mouth once a week   fluticasone (FLONASE) 50 mcg/act nasal spray     No No   Si spray into each nostril 2 (two) times a day   furosemide (LASIX) 20 mg tablet   Self No No   Sig: Take 1 tablet (20 mg total) by mouth daily as needed (edema)   gabapentin (NEURONTIN) 100 mg capsule   Self Yes No   Sig: as needed    ibuprofen (MOTRIN) 600 mg tablet   Self Yes No   Sig: as needed    levothyroxine 112 mcg tablet     No No   Sig: Take 1 tablet (112 mcg total) by mouth daily   naloxone (NARCAN) 4 mg/0 1 mL nasal spray   Self No No   Sig: Administer 1 spray into a nostril  If no response after 2-3 minutes, give another dose in the other nostril using a new spray     oxyCODONE-acetaminophen (PERCOCET)  mg per tablet   Self No No   Sig: Take 1 tablet by mouth every 6 (six) hours as needed for moderate pain Max Daily Amount: 4 tablets   pantoprazole (PROTONIX) 40 mg tablet     No No   Sig: Take 1 tablet (40 mg total) by mouth 2 (two) times a day as needed (acid reflux)   polyethylene glycol (GOLYTELY) 4000 mL solution     No No   Sig: Take 4,000 mL by mouth once for 1 dose   sertraline (ZOLOFT) 100 mg tablet     No No   Sig: Take 1 tablet (100 mg total) by mouth 2 (two) times a day   simvastatin (ZOCOR) 20 mg tablet     No No   Sig: Take 1 tablet (20 mg total) by mouth daily at bedtime   sucralfate (CARAFATE) 1 g tablet     No No   Si TABLET 3 TIMES A DAY BEFORE MEALS      Facility-Administered Medications: None         Medical History        Past Medical History:   Diagnosis Date    Allergic sinusitis       last assessed - [id number]    Anxiety       last assessed - 57GNF0142    Asthma       last assessed - 88FIJ1035    Bilateral lower extremity edema       last assessed - 81XZG7208    Callus of foot       last assessed - 98LKO8133    Chronic GERD       last assessed - 31QHM8387    Constipation       Resolved - 66SFW8146    Depression       last assessed - 15UHH9882    Disease of thyroid gland      Diverticulitis of colon       last assessed - 55AVV9905    Dyspepsia       Resolved - 03QOH1049    Esophageal reflux       last assessed - 07XQZ0290    Essential hypertriglyceridemia       last assessed - 83LEB9887    GERD (gastroesophageal reflux disease)      Gynecological disorder       last assessed - 42BRX4121    Hypertension       last assessed - 41FGP9754    Hypotension       last assessed - 42Hgx7506    Kidney stone      Migraine      Neuropathy      Pulmonary emphysema (HCC)       last assessed - 73MZZ1979    Seasonal allergies      Urinary frequency       last assessed - 14Kxo5342    Vagina, candidiasis       last assessed - 30Lvd0453            Surgical History         Past Surgical History:   Procedure Laterality Date    CARPAL TUNNEL RELEASE         last assessed - 82MFG1577    CHOLECYSTECTOMY         last assessed - 96MGF7944    COLONOSCOPY         Complete colonoscopy     EYE SURGERY         last assessed - 23CKA8900   Kelin Splinter FOOT SURGERY         last assessed - 76OPP9550    CA ESOPHAGOGASTRODUODENOSCOPY TRANSORAL DIAGNOSTIC N/A 2/13/2017     Procedure: ESOPHAGOGASTRODUODENOSCOPY (EGD); Surgeon: Bulmaro Ohara MD;  Location[facility];   Service: Gastroenterology                  Family History   Problem Relation Age of Onset    Lung cancer Sister      Hypertension Other      Lung cancer Other      Hypertension Other      Lung cancer Other      Lung cancer Other      Lung cancer Maternal Grandmother        I have reviewed and agree with the history as documented            E-Cigarette/Vaping    E-Cigarette Use Former User              E-Cigarette/Vaping Substances    Nicotine Yes      THC No      CBD No      Flavoring No      Other No        Social History            Tobacco Use    Smoking status: Current Every Day Smoker       Packs/day: 1 00       Types: Cigarettes    Smokeless tobacco: Never Used    Tobacco comment: one pack   Vaping Use    Vaping Use: Former    Substances: Nicotine   Substance Use Topics    Alcohol use: Yes       Comment: wine twice a year    Drug use: No         Review of Systems   Constitutional: Negative for chills and fever  HENT: Negative for congestion  Eyes: Negative for visual disturbance  Respiratory: Negative for cough and shortness of breath  Cardiovascular: Negative for chest pain  Gastrointestinal: Positive for abdominal pain  Negative for abdominal distention, diarrhea, nausea and vomiting  Endocrine: Negative for polyuria  Genitourinary: Positive for dysuria, flank pain, frequency, hematuria and urgency  Negative for difficulty urinating  Musculoskeletal: Negative for arthralgias  Skin: Negative for rash  Neurological: Negative for syncope and light-headedness  Psychiatric/Behavioral: Negative for behavioral problems and confusion  All other systems reviewed and are negative         Vital Signs  /87   Pulse 71   Temp 97 5 °F (36 4 °C) (Temporal)   Resp 17   Ht 5' 7" (1 702 m)   Wt 117 kg (257 lb)   LMP 04/05/2018 (Approximate)   SpO2 95%   BMI 40 25 kg/m²          Physical Exam  Vitals and nursing note reviewed  Constitutional:       General: She is not in acute distress  Appearance: Normal appearance  She is well-developed  She is not ill-appearing, toxic-appearing or diaphoretic  HENT:      Head: Normocephalic and atraumatic  Nose: No congestion or rhinorrhea  Eyes:      General:         Right eye: No discharge  Left eye: No discharge  Cardiovascular:      Rate and Rhythm: Normal rate and regular rhythm  Pulmonary:      Effort: Pulmonary effort is normal  No accessory muscle usage or respiratory distress  Breath sounds: Normal breath sounds  No stridor  No decreased breath sounds, wheezing, rhonchi or rales  Abdominal:      General: Bowel sounds are normal  There is no distension  Palpations: Abdomen is soft  Tenderness:  There is abdominal tenderness in the left lower quadrant  There is left CVA tenderness  There is no guarding or rebound  Musculoskeletal:      Cervical back: Normal range of motion and neck supple  Right lower leg: No tenderness  No edema  Left lower leg: No tenderness  No edema  Skin:     Capillary Refill: Capillary refill takes less than 2 seconds  Findings: No rash  Neurological:      Mental Status: She is alert and oriented to person, place, and time  Psychiatric:         Mood and Affect: Mood normal          Behavior: Behavior normal        Assessment/Plan  Patient had stent placed out of town for large calculus  Patient now presents for 2nd stage ureteroscopy  Procedure, risks, and benefits discussed  Consent obtained for Left ureteroscopy, laser, stone extraction, stent  All questions answered to their satisfaction      Emil Golden MD

## 2022-06-19 ENCOUNTER — HOSPITAL ENCOUNTER (OUTPATIENT)
Facility: HOSPITAL | Age: 52
Setting detail: OBSERVATION
Discharge: HOME | End: 2022-06-20
Attending: INTERNAL MEDICINE | Admitting: INTERNAL MEDICINE
Payer: MEDICARE

## 2022-06-19 ENCOUNTER — APPOINTMENT (EMERGENCY)
Dept: RADIOLOGY | Facility: HOSPITAL | Age: 52
End: 2022-06-19
Payer: MEDICARE

## 2022-06-19 ENCOUNTER — APPOINTMENT (EMERGENCY)
Dept: CT IMAGING | Facility: HOSPITAL | Age: 52
End: 2022-06-19
Payer: MEDICARE

## 2022-06-19 ENCOUNTER — NURSE TRIAGE (OUTPATIENT)
Dept: OTHER | Facility: OTHER | Age: 52
End: 2022-06-19

## 2022-06-19 DIAGNOSIS — E87.6 HYPOKALEMIA: Primary | ICD-10-CM

## 2022-06-19 DIAGNOSIS — N20.0 KIDNEY STONES: ICD-10-CM

## 2022-06-19 PROBLEM — J20.9 ACUTE BRONCHITIS WITH CHRONIC OBSTRUCTIVE PULMONARY DISEASE (COPD) (HCC): Status: ACTIVE | Noted: 2017-02-06

## 2022-06-19 PROBLEM — K30 INDIGESTION: Status: ACTIVE | Noted: 2022-06-19

## 2022-06-19 PROBLEM — M79.2 NEURALGIA: Status: ACTIVE | Noted: 2017-01-16

## 2022-06-19 PROBLEM — K21.9 GASTROESOPHAGEAL REFLUX DISEASE: Status: ACTIVE | Noted: 2022-06-19

## 2022-06-19 PROBLEM — I73.9 INTERMITTENT CLAUDICATION (HCC): Status: ACTIVE | Noted: 2017-02-23

## 2022-06-19 PROBLEM — I95.9 HYPOTENSION: Status: ACTIVE | Noted: 2022-06-19

## 2022-06-19 PROBLEM — Z13.31 POSITIVE DEPRESSION SCREENING: Status: ACTIVE | Noted: 2022-06-19

## 2022-06-19 PROBLEM — E78.1 PRIMARY HYPERTRIGLYCERIDEMIA: Status: ACTIVE | Noted: 2022-06-19

## 2022-06-19 PROBLEM — J44.0 ACUTE BRONCHITIS WITH CHRONIC OBSTRUCTIVE PULMONARY DISEASE (COPD) (HCC): Status: ACTIVE | Noted: 2017-02-06

## 2022-06-19 PROBLEM — E66.01 MORBID OBESITY (HCC): Status: ACTIVE | Noted: 2019-01-11

## 2022-06-19 LAB
ALBUMIN SERPL BCP-MCNC: 3.5 G/DL (ref 3.5–5)
ALP SERPL-CCNC: 75 U/L (ref 34–104)
ALT SERPL W P-5'-P-CCNC: 20 U/L (ref 7–52)
ANION GAP SERPL CALCULATED.3IONS-SCNC: 9 MMOL/L (ref 4–13)
APTT PPP: 48 SECONDS (ref 23–37)
AST SERPL W P-5'-P-CCNC: 29 U/L (ref 13–39)
BACTERIA UR QL AUTO: ABNORMAL /HPF
BASOPHILS # BLD AUTO: 0.03 THOUSANDS/ΜL (ref 0–0.1)
BASOPHILS NFR BLD AUTO: 0 % (ref 0–1)
BILIRUB SERPL-MCNC: 1.11 MG/DL (ref 0.2–1)
BILIRUB UR QL STRIP: ABNORMAL
BUN SERPL-MCNC: 13 MG/DL (ref 5–25)
CALCIUM SERPL-MCNC: 8.5 MG/DL (ref 8.4–10.2)
CHLORIDE SERPL-SCNC: 103 MMOL/L (ref 96–108)
CLARITY UR: ABNORMAL
CO2 SERPL-SCNC: 28 MMOL/L (ref 21–32)
COLOR UR: YELLOW
CREAT SERPL-MCNC: 1 MG/DL (ref 0.6–1.3)
EOSINOPHIL # BLD AUTO: 0.22 THOUSAND/ΜL (ref 0–0.61)
EOSINOPHIL NFR BLD AUTO: 2 % (ref 0–6)
ERYTHROCYTE [DISTWIDTH] IN BLOOD BY AUTOMATED COUNT: 16.1 % (ref 11.6–15.1)
GFR SERPL CREATININE-BSD FRML MDRD: 65 ML/MIN/1.73SQ M
GLUCOSE SERPL-MCNC: 81 MG/DL (ref 65–140)
GLUCOSE UR STRIP-MCNC: ABNORMAL MG/DL
HCT VFR BLD AUTO: 39.1 % (ref 34.8–46.1)
HGB BLD-MCNC: 12.3 G/DL (ref 11.5–15.4)
HGB UR QL STRIP.AUTO: ABNORMAL
IMM GRANULOCYTES # BLD AUTO: 0.06 THOUSAND/UL (ref 0–0.2)
IMM GRANULOCYTES NFR BLD AUTO: 1 % (ref 0–2)
INR PPP: 1.07 (ref 0.84–1.19)
KETONES UR STRIP-MCNC: NEGATIVE MG/DL
LEUKOCYTE ESTERASE UR QL STRIP: ABNORMAL
LYMPHOCYTES # BLD AUTO: 1.84 THOUSANDS/ΜL (ref 0.6–4.47)
LYMPHOCYTES NFR BLD AUTO: 19 % (ref 14–44)
MAGNESIUM SERPL-MCNC: 2 MG/DL (ref 1.9–2.7)
MCH RBC QN AUTO: 30.5 PG (ref 26.8–34.3)
MCHC RBC AUTO-ENTMCNC: 31.5 G/DL (ref 31.4–37.4)
MCV RBC AUTO: 97 FL (ref 82–98)
MONOCYTES # BLD AUTO: 0.71 THOUSAND/ΜL (ref 0.17–1.22)
MONOCYTES NFR BLD AUTO: 7 % (ref 4–12)
MUCOUS THREADS UR QL AUTO: ABNORMAL
NEUTROPHILS # BLD AUTO: 6.93 THOUSANDS/ΜL (ref 1.85–7.62)
NEUTS SEG NFR BLD AUTO: 71 % (ref 43–75)
NITRITE UR QL STRIP: NEGATIVE
NON-SQ EPI CELLS URNS QL MICRO: ABNORMAL /HPF
NRBC BLD AUTO-RTO: 0 /100 WBCS
PH UR STRIP.AUTO: 6.5 [PH]
PLATELET # BLD AUTO: 135 THOUSANDS/UL (ref 149–390)
PMV BLD AUTO: 11.1 FL (ref 8.9–12.7)
POTASSIUM SERPL-SCNC: 2.7 MMOL/L (ref 3.5–5.3)
PROT SERPL-MCNC: 6.6 G/DL (ref 6.4–8.4)
PROT UR STRIP-MCNC: ABNORMAL MG/DL
PROTHROMBIN TIME: 13.8 SECONDS (ref 11.6–14.5)
RBC # BLD AUTO: 4.03 MILLION/UL (ref 3.81–5.12)
RBC #/AREA URNS AUTO: ABNORMAL /HPF
SARS-COV-2 RNA RESP QL NAA+PROBE: NEGATIVE
SODIUM SERPL-SCNC: 140 MMOL/L (ref 135–147)
SP GR UR STRIP.AUTO: 1.02 (ref 1–1.03)
UROBILINOGEN UR QL STRIP.AUTO: 1 E.U./DL
WBC # BLD AUTO: 9.79 THOUSAND/UL (ref 4.31–10.16)
WBC #/AREA URNS AUTO: ABNORMAL /HPF

## 2022-06-19 PROCEDURE — 87635 SARS-COV-2 COVID-19 AMP PRB: CPT | Performed by: INTERNAL MEDICINE

## 2022-06-19 PROCEDURE — 93005 ELECTROCARDIOGRAM TRACING: CPT

## 2022-06-19 PROCEDURE — 99285 EMERGENCY DEPT VISIT HI MDM: CPT

## 2022-06-19 PROCEDURE — 71045 X-RAY EXAM CHEST 1 VIEW: CPT

## 2022-06-19 PROCEDURE — G1004 CDSM NDSC: HCPCS

## 2022-06-19 PROCEDURE — 96361 HYDRATE IV INFUSION ADD-ON: CPT

## 2022-06-19 PROCEDURE — 81003 URINALYSIS AUTO W/O SCOPE: CPT | Performed by: INTERNAL MEDICINE

## 2022-06-19 PROCEDURE — 85730 THROMBOPLASTIN TIME PARTIAL: CPT | Performed by: INTERNAL MEDICINE

## 2022-06-19 PROCEDURE — 87086 URINE CULTURE/COLONY COUNT: CPT | Performed by: INTERNAL MEDICINE

## 2022-06-19 PROCEDURE — 74176 CT ABD & PELVIS W/O CONTRAST: CPT

## 2022-06-19 PROCEDURE — 83735 ASSAY OF MAGNESIUM: CPT | Performed by: INTERNAL MEDICINE

## 2022-06-19 PROCEDURE — 99285 EMERGENCY DEPT VISIT HI MDM: CPT | Performed by: INTERNAL MEDICINE

## 2022-06-19 PROCEDURE — 81001 URINALYSIS AUTO W/SCOPE: CPT | Performed by: INTERNAL MEDICINE

## 2022-06-19 PROCEDURE — 80053 COMPREHEN METABOLIC PANEL: CPT | Performed by: INTERNAL MEDICINE

## 2022-06-19 PROCEDURE — 36415 COLL VENOUS BLD VENIPUNCTURE: CPT | Performed by: INTERNAL MEDICINE

## 2022-06-19 PROCEDURE — 85025 COMPLETE CBC W/AUTO DIFF WBC: CPT | Performed by: INTERNAL MEDICINE

## 2022-06-19 PROCEDURE — 85610 PROTHROMBIN TIME: CPT | Performed by: INTERNAL MEDICINE

## 2022-06-19 PROCEDURE — 96360 HYDRATION IV INFUSION INIT: CPT

## 2022-06-19 RX ORDER — POTASSIUM CHLORIDE 20 MEQ/1
20 TABLET, EXTENDED RELEASE ORAL ONCE
Status: COMPLETED | OUTPATIENT
Start: 2022-06-19 | End: 2022-06-19

## 2022-06-19 RX ORDER — POTASSIUM CHLORIDE 14.9 MG/ML
20 INJECTION INTRAVENOUS ONCE
Status: COMPLETED | OUTPATIENT
Start: 2022-06-19 | End: 2022-06-19

## 2022-06-19 RX ADMIN — SODIUM CHLORIDE 1000 ML: 0.9 INJECTION, SOLUTION INTRAVENOUS at 19:22

## 2022-06-19 RX ADMIN — POTASSIUM CHLORIDE 20 MEQ: 1500 TABLET, EXTENDED RELEASE ORAL at 21:26

## 2022-06-19 RX ADMIN — POTASSIUM CHLORIDE 20 MEQ: 14.9 INJECTION, SOLUTION INTRAVENOUS at 21:26

## 2022-06-19 NOTE — TELEPHONE ENCOUNTER
Reason for Disposition   [1] MILD-MODERATE pain AND [2] constant AND [3] present > 2 hours    Answer Assessment - Initial Assessment Questions  1  LOCATION: "Where does it hurt?"       Left middle abdominal   2  RADIATION: "Does the pain shoot anywhere else?" (e g , chest, back)      Back   3  ONSET: "When did the pain begin?" (e g , minutes, hours or days ago)       5am   4  SUDDEN: "Gradual or sudden onset?"      Gradual   5  PATTERN "Does the pain come and go, or is it constant?"     - If constant: "Is it getting better, staying the same, or worsening?"       (Note: Constant means the pain never goes away completely; most serious pain is constant and it progresses)      - If intermittent: "How long does it last?" "Do you have pain now?"      (Note: Intermittent means the pain goes away completely between bouts)      Constant   6  SEVERITY: "How bad is the pain?"  (e g , Scale 1-10; mild, moderate, or severe)    - MILD (1-3): doesn't interfere with normal activities, abdomen soft and not tender to touch     - MODERATE (4-7): interferes with normal activities or awakens from sleep, tender to touch     - SEVERE (8-10): excruciating pain, doubled over, unable to do any normal activities       6/10  7  RECURRENT SYMPTOM: "Have you ever had this type of stomach pain before?" If Yes, ask: "When was the last time?" and "What happened that time?"       no  8  CAUSE: "What do you think is causing the stomach pain?"      Unsure   9  RELIEVING/AGGRAVATING FACTORS: "What makes it better or worse?" (e g , movement, antacids, bowel movement)      Movement, feels better when she lie down to apply pressure to it   10  OTHER SYMPTOMS: "Has there been any vomiting, diarrhea, constipation, or urine problems?"        No energy, loss of appetite, dizziness must hold on to something to lift herself up   11   PREGNANCY: "Is there any chance you are pregnant?" "When was your last menstrual period?"        no    Protocols used: ABDOMINAL PAIN - [facility]

## 2022-06-19 NOTE — ED PROVIDER NOTES
History  Chief Complaint   Patient presents with    Weakness - Generalized     Patient reports generalized fatigue and weakness following a procedure on her kidneys on Thursday  80-year-old female who recently underwent lithotripsy for left-sided kidney stones  She had a stent placed at that time she came home and accidentally pulled out while she was wiping following urination  She did notify our urologist, a guess they were seeing how well she would do without the stent  She is present here now because she has more weakness than anything  She has persistent left inguinal and left low back pain  She states she has had no fever or chills just unrelenting weakness to the point where she needs to sit to wash her dishes  Exhausted just walking less than a block  She is not short of breath  She does not have chest pain  She she has not had sweats  No change in bowel or bladder habits  She recently finished a course of antibiotics  Looking back on prior labs she has been hypokalemic in the past   Her weakness has affected her balance as well  Prior to Admission Medications   Prescriptions Last Dose Informant Patient Reported? Taking?    albuterol (2 5 mg/3 mL) 0 083 % nebulizer solution   No No   Sig: Take 3 mL (2 5 mg total) by nebulization every 6 (six) hours as needed for wheezing or shortness of breath   albuterol (PROVENTIL HFA,VENTOLIN HFA) 90 mcg/act inhaler   No No   Sig: Inhale 2 puffs every 6 (six) hours as needed for wheezing   buPROPion (WELLBUTRIN XL) 150 mg 24 hr tablet   No No   Sig: Take 1 tablet (150 mg total) by mouth every morning   diazepam (VALIUM) 10 mg tablet   No No   Sig: Take 1 tablet (10 mg total) by mouth every 6 (six) hours as needed for anxiety   ergocalciferol (VITAMIN D2) 50,000 units   No No   Sig: Take 1 capsule (50,000 Units total) by mouth once a week   fluticasone (FLONASE) 50 mcg/act nasal spray   No No   Si spray into each nostril 2 (two) times a day furosemide (LASIX) 20 mg tablet  Self No No   Sig: Take 1 tablet (20 mg total) by mouth daily as needed (edema)   gabapentin (NEURONTIN) 100 mg capsule  Self Yes No   Sig: Take 100 mg by mouth 3 (three) times a day   ibuprofen (MOTRIN) 600 mg tablet  Self Yes No   Sig: Take 600 mg by mouth as needed   levothyroxine 112 mcg tablet   No No   Sig: Take 1 tablet (112 mcg total) by mouth daily   naloxone (NARCAN) 4 mg/0 1 mL nasal spray  Self No No   Sig: Administer 1 spray into a nostril  If no response after 2-3 minutes, give another dose in the other nostril using a new spray     ondansetron (ZOFRAN-ODT) 4 mg disintegrating tablet   No No   Sig: Take 1 tablet (4 mg total) by mouth every 6 (six) hours as needed for nausea or vomiting   oxyCODONE-acetaminophen (Percocet) 5-325 mg per tablet   No No   Sig: Take 1 tablet by mouth every 4 (four) hours as needed for moderate pain for up to 10 days Max Daily Amount: 6 tablets   pantoprazole (PROTONIX) 40 mg tablet   No No   Sig: Take 1 tablet (40 mg total) by mouth 2 (two) times a day as needed (acid reflux)   sertraline (ZOLOFT) 100 mg tablet   No No   Sig: Take 1 tablet (100 mg total) by mouth 2 (two) times a day   simvastatin (ZOCOR) 20 mg tablet   No No   Sig: Take 1 tablet (20 mg total) by mouth daily at bedtime   sucralfate (CARAFATE) 1 g tablet   No No   Si TABLET 3 TIMES A DAY BEFORE MEALS   tamsulosin (FLOMAX) 0 4 mg   No No   Sig: Take 1 capsule (0 4 mg total) by mouth daily with dinner      Facility-Administered Medications: None       Past Medical History:   Diagnosis Date    Allergic sinusitis     last assessed - [id number]    Anxiety     last assessed - 23TKK8550    Asthma     last assessed - 33JRM3820    Bilateral lower extremity edema     last assessed - 95WSM3300    Callus of foot     last assessed - 36PVJ3425    Chronic GERD     last assessed - 93APT5559    Colon polyp     Constipation     Resolved - 17MRZ9568    Depression     last assessed - 06VNV9745    Disease of thyroid gland     Diverticulitis of colon     last assessed - 28VNU7752    Dyspepsia     Resolved - 02HGC7043    Esophageal reflux     last assessed - 82SME5546    Essential hypertriglyceridemia     last assessed - 18GLU5756    GERD (gastroesophageal reflux disease)     Gynecological disorder     last assessed - 94XYO5348    Hypertension     last assessed - 93YEO6254    Hypotension     last assessed - 17Rsk6579    Kidney stone     Migraine     Neuropathy     Pulmonary emphysema ([address] )     last assessed - 20CYY6733    Seasonal allergies     Urinary frequency     last assessed - 40Tzq8389    Vagina, candidiasis     last assessed - 84Egq8105       Past Surgical History:   Procedure Laterality Date    CARPAL TUNNEL RELEASE      last assessed - 52HVH8470    CHOLECYSTECTOMY      last assessed - 81KCP1150    COLONOSCOPY      Complete colonoscopy    [facility] EYE SURGERY      last assessed - 32EIB7586   [facility] RETROGRADE PYELOGRAM  6/16/2022    FOOT SURGERY      last assessed - 12BMH0274    TN CYSTO/URETERO W/LITHOTRIPSY &INDWELL STENT INSRT Left 6/16/2022    Procedure: CYSTOSCOPY URETEROSCOPY WITH LITHOTRIPSY HOLMIUM LASER, RETROGRADE PYELOGRAM AND INSERTION STENT URETERAL;  Surgeon: Rajni Jean MD;  Location: [facility];  Service: Urology    TN ESOPHAGOGASTRODUODENOSCOPY TRANSORAL DIAGNOSTIC N/A 2/13/2017    Procedure: ESOPHAGOGASTRODUODENOSCOPY (EGD); Surgeon: Thanh Trujillo MD;  Location: [facility]; Service: Gastroenterology       Family History   Problem Relation Age of Onset    Lung cancer Sister     Hypertension Other     Lung cancer Other     Hypertension Other     Lung cancer Other     Lung cancer Other     Lung cancer Maternal Grandmother      I have reviewed and agree with the history as documented      E-Cigarette/Vaping    E-Cigarette Use Former User      E-Cigarette/Vaping Substances    Nicotine Yes     THC No     CBD No     Flavoring No     Other No     Unknown No      Social History     Tobacco Use    Smoking status: Current Every Day Smoker     Packs/day: 1 00     Types: Cigarettes    Smokeless tobacco: Never Used    Tobacco comment: one pack   Vaping Use    Vaping Use: Former    Substances: Nicotine   Substance Use Topics    Alcohol use: Yes     Comment: wine twice a year    Drug use: No       Review of Systems   Constitutional: Positive for fatigue  Negative for chills and fever  Malaise and weakness   HENT: Negative for rhinorrhea and sore throat  Eyes: Negative for visual disturbance  Respiratory: Negative for cough and shortness of breath  Cardiovascular: Negative for chest pain and leg swelling  Gastrointestinal: Positive for abdominal pain  Negative for diarrhea, nausea and vomiting  Genitourinary: Negative for dysuria  Musculoskeletal: Positive for back pain  Negative for myalgias  Skin: Negative for rash  Neurological: Negative for dizziness and headaches  Psychiatric/Behavioral: Negative for confusion  All other systems reviewed and are negative  Physical Exam  Physical Exam  Vitals and nursing note reviewed  Constitutional:       Appearance: Normal appearance  She is well-developed  She is obese  HENT:      Nose: Nose normal       Mouth/Throat:      Pharynx: No oropharyngeal exudate  Eyes:      General: No scleral icterus  Conjunctiva/sclera: Conjunctivae normal       Pupils: Pupils are equal, round, and reactive to light  Neck:      Vascular: No JVD  Trachea: No tracheal deviation  Cardiovascular:      Rate and Rhythm: Normal rate and regular rhythm  Heart sounds: Normal heart sounds  No murmur heard  Pulmonary:      Effort: Pulmonary effort is normal  No respiratory distress  Breath sounds: Normal breath sounds  No wheezing or rales  Abdominal:      General: Bowel sounds are normal       Palpations: Abdomen is soft  Tenderness: There is abdominal tenderness   There is no guarding  Comments: Mild left inguinal tenderness   Musculoskeletal:         General: No tenderness  Normal range of motion  Cervical back: Normal range of motion and neck supple  Skin:     General: Skin is warm and dry  Neurological:      General: No focal deficit present  Mental Status: She is alert and oriented to person, place, and time  Cranial Nerves: No cranial nerve deficit  Sensory: No sensory deficit  Motor: No abnormal muscle tone  Comments: 5/5 motor, nl sens   Psychiatric:         Mood and Affect: Mood normal          Behavior: Behavior normal          Vital Signs  ED Triage Vitals [06/19/22 1804]   Temperature Pulse Respirations Blood Pressure SpO2   98 3 °F (36 8 °C) 78 17 118/73 96 %      Temp Source Heart Rate Source Patient Position - Orthostatic VS BP Location FiO2 (%)   Tympanic -- -- -- --      Pain Score       6           Vitals:    06/19/22 1804   BP: 118/73   Pulse: 78         Visual Acuity      ED Medications  Medications   potassium chloride 20 mEq IVPB (premix) (20 mEq Intravenous New Bag 6/19/22 2126)   sodium chloride 0 9 % bolus 1,000 mL (1,000 mL Intravenous New Bag 6/19/22 1922)   potassium chloride (K-DUR,KLOR-CON) CR tablet 20 mEq (20 mEq Oral Given 6/19/22 2126)       Diagnostic Studies  Results Reviewed     Procedure Component Value Units Date/Time    COVID only - 48 hour TAT [[id number]]  (Normal) Collected: 06/19/22 1918    Lab Status: Final result Specimen: Nares from Nose Updated: 06/19/22 2109     SARS-CoV-2 Negative    Narrative:      FOR PEDIATRIC PATIENTS - copy/paste COVID Guidelines URL to browser: [email or web address]    SARS-CoV-2 assay is a Nucleic Acid Amplification assay intended for the  qualitative detection of nucleic acid from SARS-CoV-2 in nasopharyngeal  swabs  Results are for the presumptive identification of SARS-CoV-2 RNA      Positive results are indicative of infection with SARS-CoV-2, the virus  causing COVID-19, but do not rule out bacterial infection or co-infection  with other viruses  Laboratories within the [place] and its  territories are required to report all positive results to the appropriate  public health authorities  Negative results do not preclude SARS-CoV-2  infection and should not be used as the sole basis for treatment or other  patient management decisions  Negative results must be combined with  clinical observations, patient history, and epidemiological information  This test has not been FDA cleared or approved  This test has been authorized by FDA under an Emergency Use Authorization  (EUA)  This test is only authorized for the duration of time the  declaration that circumstances exist justifying the authorization of the  emergency use of an in vitro diagnostic tests for detection of SARS-CoV-2  virus and/or diagnosis of COVID-19 infection under section 564(b)(1) of  the Act, 21 U  S C  892APT-8(Z)(1), unless the authorization is terminated  or revoked sooner  The test has been validated but independent review by FDA  and CLIA is pending  Test performed using [employer] GeneXpert: This RT-PCR assay targets N2,  a region unique to SARS-CoV-2  A conserved region in the E-gene was chosen  for pan-Sarbecovirus detection which includes SARS-CoV-2      Comprehensive metabolic panel [[id number]]  (Abnormal) Collected: 06/19/22 1918    Lab Status: Final result Specimen: Blood from Arm, Left Updated: 06/19/22 2033     Sodium 140 mmol/L      Potassium 2 7 mmol/L      Chloride 103 mmol/L      CO2 28 mmol/L      ANION GAP 9 mmol/L      BUN 13 mg/dL      Creatinine 1 00 mg/dL      Glucose 81 mg/dL      Calcium 8 5 mg/dL      AST 29 U/L      ALT 20 U/L      Alkaline Phosphatase 75 U/L      Total Protein 6 6 g/dL      Albumin 3 5 g/dL      Total Bilirubin 1 11 mg/dL      eGFR 65 ml/min/1 73sq m     Narrative:      Joel guidelines for Chronic Kidney Disease (CKD):     Stage 1 with normal or high GFR (GFR > 90 mL/min/1 73 square meters)    Stage 2 Mild CKD (GFR = 60-89 mL/min/1 73 square meters)    Stage 3A Moderate CKD (GFR = 45-59 mL/min/1 73 square meters)    Stage 3B Moderate CKD (GFR = 30-44 mL/min/1 73 square meters)    Stage 4 Severe CKD (GFR = 15-29 mL/min/1 73 square meters)    Stage 5 End Stage CKD (GFR <15 mL/min/1 73 square meters)  Note: GFR calculation is accurate only with a steady state creatinine    Magnesium [[id number]]  (Normal) Collected: 06/19/22 1918    Lab Status: Final result Specimen: Blood from Arm, Left Updated: 06/19/22 2031     Magnesium 2 0 mg/dL     Protime-INR [[id number]]  (Normal) Collected: 06/19/22 1918    Lab Status: Final result Specimen: Blood from Arm, Left Updated: 06/19/22 2030     Protime 13 8 seconds      INR 1 07    APTT [[id number]]  (Abnormal) Collected: 06/19/22 1918    Lab Status: Final result Specimen: Blood from Arm, Left Updated: 06/19/22 2030     PTT 48 seconds     Urine Microscopic [[id number]]  (Abnormal) Collected: 06/19/22 2004    Lab Status: Final result Specimen: Urine, Clean Catch Updated: 06/19/22 2025     RBC, UA 1-2 /hpf      WBC, UA 20-30 /hpf      Epithelial Cells Occasional /hpf      Bacteria, UA Occasional /hpf      MUCUS THREADS Occasional    Narrative:      Laboratory received 1 5 ml urine sample  Microscopic urine analysis performed on unspun urine  Urine culture [[id number]] Collected: 06/19/22 2004    Lab Status:  In process Specimen: Urine, Clean Catch Updated: 06/19/22 2024    UA w Reflex to Microscopic w Reflex to Culture [[id number]]  (Abnormal) Collected: 06/19/22 2004    Lab Status: Final result Specimen: Urine, Clean Catch Updated: 06/19/22 2019     Color, UA Yellow     Clarity, UA Slightly Cloudy     Specific [place], UA 1 020     pH, UA 6 5     Leukocytes, UA 2+     Nitrite, UA Negative     Protein, UA 1+ mg/dl      Glucose, UA Trace mg/dl Ketones, UA Negative mg/dl      Urobilinogen, UA 1 0 E U /dl      Bilirubin, UA 1+     Blood, UA 3+    CBC and differential [[id number]]  (Abnormal) Collected: 06/19/22 1918    Lab Status: Final result Specimen: Blood from Arm, Left Updated: 06/19/22 2017     WBC 9 79 Thousand/uL      RBC 4 03 Million/uL      Hemoglobin 12 3 g/dL      Hematocrit 39 1 %      MCV 97 fL      MCH 30 5 pg      MCHC 31 5 g/dL      RDW 16 1 %      MPV 11 1 fL      Platelets 504 Thousands/uL      nRBC 0 /100 WBCs      Neutrophils Relative 71 %      Immat GRANS % 1 %      Lymphocytes Relative 19 %      Monocytes Relative 7 %      Eosinophils Relative 2 %      Basophils Relative 0 %      Neutrophils Absolute 6 93 Thousands/µL      Immature Grans Absolute 0 06 Thousand/uL      Lymphocytes Absolute 1 84 Thousands/µL      Monocytes Absolute 0 71 Thousand/µL      Eosinophils Absolute 0 22 Thousand/µL      Basophils Absolute 0 03 Thousands/µL                  CT renal stone study abdomen pelvis without contrast   ED Interpretation by Lynnette Ventura DO (06/19 2116)   Mild left hydro ureteral nephrosis  Some perinephric stranding, no obstructing stone      Final Result by Jovita Layton MD (06/19 2028)      Mild left hydroureteronephrosis with perinephric and periureteral stranding, however no obstructing calculi identified  Multiple stone fragments within the mid to lower kidney status post lithotripsy  Colonic diverticulosis without diverticulitis  Hepatosplenomegaly        Workstation performed: [id number]         XR chest 1 view portable    (Results Pending)              Procedures  ECG 12 Lead Documentation Only    Date/Time: 6/19/2022 7:15 PM  Performed by: Lynnette Ventura DO  Authorized by: Lynnette Ventura DO     Indications / Diagnosis:  Generalized weakness  ECG reviewed by me, the ED Provider: yes    Patient location:  ED  Previous ECG:     Previous ECG:  Compared to current    Similarity:  No change    Comparison to cardiac monitor: Yes    Interpretation:     Interpretation: non-specific    Rate:     ECG rate:  71    ECG rate assessment: normal    Rhythm:     Rhythm: sinus rhythm    Ectopy:     Ectopy: none    QRS:     QRS axis:  Normal    QRS intervals:  Normal  Conduction:     Conduction: normal    ST segments:     ST segments:  Non-specific  T waves:     T waves: non-specific    Other findings:     Other findings: prolonged qTc interval    Comments:      Slight prolongation of QT             ED Course  ED Course as of 06/19/22 2137   [facility] Jun 19, 2022 2108 Patient is symptomatic from her hypokalemia, with a slightly prolonged QT  Feel it best to the monitor her while potassium is being replaced  Discussed with hospitalist                                              MDM    Disposition  Final diagnoses:   Hypokalemia   Kidney stones     Time reflects when diagnosis was documented in both MDM as applicable and the Disposition within this note     Time User Action Codes Description Comment    6/19/2022  9:18 PM Juan Carlos Marks Add [E87 6] Hypokalemia     6/19/2022  9:18 PM Juan Carlos Marks Add [N20 0] Kidney stones       ED Disposition     ED Disposition   Admit    Condition   Stable    Date/Time   Sun Jun 19, 2022  9:17 PM    Comment   Case was discussed with Mervat Hinds and the patient's admission status was agreed to be Admission Status: observation status to the service of Dr Niharika Betancourt   Follow-up Information    None         Patient's Medications   Discharge Prescriptions    No medications on file       No discharge procedures on file      PDMP Review     None          ED Provider  Electronically Signed by           Candi Vazquez DO  06/19/22 2137

## 2022-06-19 NOTE — TELEPHONE ENCOUNTER
Regarding: loss of appetite, pain on left side  ----- Message from [facility] sent at 6/19/2022  1:54 PM EDT -----  "I am experiencing Loss of appetite fatigue, and pain on left side of abdomen "

## 2022-06-20 ENCOUNTER — TELEPHONE (OUTPATIENT)
Dept: INTERNAL MEDICINE CLINIC | Age: 52
End: 2022-06-20

## 2022-06-20 VITALS
BODY MASS INDEX: 40.34 KG/M2 | HEIGHT: 67 IN | TEMPERATURE: 98.3 F | WEIGHT: 257 LBS | DIASTOLIC BLOOD PRESSURE: 73 MMHG | HEART RATE: 78 BPM | SYSTOLIC BLOOD PRESSURE: 127 MMHG | RESPIRATION RATE: 22 BRPM | OXYGEN SATURATION: 94 %

## 2022-06-20 PROBLEM — E87.6 HYPOKALEMIA: Status: ACTIVE | Noted: 2022-06-20

## 2022-06-20 LAB
2HR DELTA HS TROPONIN: -1 NG/L
4HR DELTA HS TROPONIN: 1 NG/L
ANION GAP SERPL CALCULATED.3IONS-SCNC: 9 MMOL/L (ref 4–13)
BASOPHILS # BLD AUTO: 0.02 THOUSANDS/ΜL (ref 0–0.1)
BASOPHILS NFR BLD AUTO: 0 % (ref 0–1)
BUN SERPL-MCNC: 10 MG/DL (ref 5–25)
CALCIUM SERPL-MCNC: 7.9 MG/DL (ref 8.4–10.2)
CARDIAC TROPONIN I PNL SERPL HS: 5 NG/L
CARDIAC TROPONIN I PNL SERPL HS: 6 NG/L
CARDIAC TROPONIN I PNL SERPL HS: 7 NG/L
CHLORIDE SERPL-SCNC: 109 MMOL/L (ref 96–108)
CO2 SERPL-SCNC: 25 MMOL/L (ref 21–32)
CREAT SERPL-MCNC: 0.89 MG/DL (ref 0.6–1.3)
EOSINOPHIL # BLD AUTO: 0.17 THOUSAND/ΜL (ref 0–0.61)
EOSINOPHIL NFR BLD AUTO: 2 % (ref 0–6)
ERYTHROCYTE [DISTWIDTH] IN BLOOD BY AUTOMATED COUNT: 16.1 % (ref 11.6–15.1)
GFR SERPL CREATININE-BSD FRML MDRD: 75 ML/MIN/1.73SQ M
GLUCOSE P FAST SERPL-MCNC: 80 MG/DL (ref 65–99)
GLUCOSE SERPL-MCNC: 80 MG/DL (ref 65–140)
HCT VFR BLD AUTO: 34.7 % (ref 34.8–46.1)
HGB BLD-MCNC: 11 G/DL (ref 11.5–15.4)
IMM GRANULOCYTES # BLD AUTO: 0.04 THOUSAND/UL (ref 0–0.2)
IMM GRANULOCYTES NFR BLD AUTO: 1 % (ref 0–2)
LYMPHOCYTES # BLD AUTO: 1.67 THOUSANDS/ΜL (ref 0.6–4.47)
LYMPHOCYTES NFR BLD AUTO: 24 % (ref 14–44)
MCH RBC QN AUTO: 31 PG (ref 26.8–34.3)
MCHC RBC AUTO-ENTMCNC: 31.7 G/DL (ref 31.4–37.4)
MCV RBC AUTO: 98 FL (ref 82–98)
MONOCYTES # BLD AUTO: 0.6 THOUSAND/ΜL (ref 0.17–1.22)
MONOCYTES NFR BLD AUTO: 9 % (ref 4–12)
NEUTROPHILS # BLD AUTO: 4.5 THOUSANDS/ΜL (ref 1.85–7.62)
NEUTS SEG NFR BLD AUTO: 64 % (ref 43–75)
NRBC BLD AUTO-RTO: 0 /100 WBCS
PLATELET # BLD AUTO: 102 THOUSANDS/UL (ref 149–390)
PMV BLD AUTO: 10.4 FL (ref 8.9–12.7)
POTASSIUM SERPL-SCNC: 2.9 MMOL/L (ref 3.5–5.3)
RBC # BLD AUTO: 3.55 MILLION/UL (ref 3.81–5.12)
SODIUM SERPL-SCNC: 143 MMOL/L (ref 135–147)
WBC # BLD AUTO: 7 THOUSAND/UL (ref 4.31–10.16)

## 2022-06-20 PROCEDURE — 80048 BASIC METABOLIC PNL TOTAL CA: CPT | Performed by: PHYSICIAN ASSISTANT

## 2022-06-20 PROCEDURE — 84484 ASSAY OF TROPONIN QUANT: CPT | Performed by: PHYSICIAN ASSISTANT

## 2022-06-20 PROCEDURE — 36415 COLL VENOUS BLD VENIPUNCTURE: CPT | Performed by: PHYSICIAN ASSISTANT

## 2022-06-20 PROCEDURE — 85025 COMPLETE CBC W/AUTO DIFF WBC: CPT | Performed by: PHYSICIAN ASSISTANT

## 2022-06-20 PROCEDURE — 99236 HOSP IP/OBS SAME DATE HI 85: CPT | Performed by: INTERNAL MEDICINE

## 2022-06-20 RX ORDER — ALBUTEROL SULFATE 90 UG/1
2 AEROSOL, METERED RESPIRATORY (INHALATION) EVERY 6 HOURS PRN
Status: DISCONTINUED | OUTPATIENT
Start: 2022-06-20 | End: 2022-06-20 | Stop reason: HOSPADM

## 2022-06-20 RX ORDER — POTASSIUM CHLORIDE 750 MG/1
20 CAPSULE, EXTENDED RELEASE ORAL DAILY
Qty: 10 CAPSULE | Refills: 0 | Status: SHIPPED | OUTPATIENT
Start: 2022-06-20 | End: 2022-07-12 | Stop reason: SDUPTHER

## 2022-06-20 RX ORDER — POTASSIUM CHLORIDE 20 MEQ/1
40 TABLET, EXTENDED RELEASE ORAL EVERY 4 HOURS
Status: COMPLETED | OUTPATIENT
Start: 2022-06-20 | End: 2022-06-20

## 2022-06-20 RX ORDER — NICOTINE 21 MG/24HR
1 PATCH, TRANSDERMAL 24 HOURS TRANSDERMAL DAILY
Status: DISCONTINUED | OUTPATIENT
Start: 2022-06-20 | End: 2022-06-20 | Stop reason: HOSPADM

## 2022-06-20 RX ORDER — ALBUTEROL SULFATE 90 UG/1
2 AEROSOL, METERED RESPIRATORY (INHALATION) EVERY 6 HOURS PRN
Status: DISCONTINUED | OUTPATIENT
Start: 2022-06-20 | End: 2022-06-20

## 2022-06-20 RX ORDER — OXYCODONE HYDROCHLORIDE AND ACETAMINOPHEN 5; 325 MG/1; MG/1
1 TABLET ORAL EVERY 4 HOURS PRN
Status: DISCONTINUED | OUTPATIENT
Start: 2022-06-20 | End: 2022-06-20 | Stop reason: HOSPADM

## 2022-06-20 RX ORDER — POTASSIUM CHLORIDE 20 MEQ/1
20 TABLET, EXTENDED RELEASE ORAL DAILY
Status: DISCONTINUED | OUTPATIENT
Start: 2022-06-20 | End: 2022-06-20

## 2022-06-20 RX ORDER — TAMSULOSIN HYDROCHLORIDE 0.4 MG/1
0.4 CAPSULE ORAL
Status: DISCONTINUED | OUTPATIENT
Start: 2022-06-20 | End: 2022-06-20 | Stop reason: HOSPADM

## 2022-06-20 RX ORDER — SODIUM CHLORIDE AND POTASSIUM CHLORIDE .9; .15 G/100ML; G/100ML
125 SOLUTION INTRAVENOUS CONTINUOUS
Status: DISCONTINUED | OUTPATIENT
Start: 2022-06-20 | End: 2022-06-20 | Stop reason: HOSPADM

## 2022-06-20 RX ORDER — SUCRALFATE 1 G/1
1 TABLET ORAL
Status: DISCONTINUED | OUTPATIENT
Start: 2022-06-20 | End: 2022-06-20 | Stop reason: HOSPADM

## 2022-06-20 RX ORDER — BUPROPION HYDROCHLORIDE 150 MG/1
150 TABLET ORAL EVERY MORNING
Status: DISCONTINUED | OUTPATIENT
Start: 2022-06-20 | End: 2022-06-20 | Stop reason: HOSPADM

## 2022-06-20 RX ORDER — IBUPROFEN 600 MG/1
600 TABLET ORAL EVERY 6 HOURS PRN
Status: DISCONTINUED | OUTPATIENT
Start: 2022-06-20 | End: 2022-06-20 | Stop reason: HOSPADM

## 2022-06-20 RX ORDER — FLUTICASONE PROPIONATE 50 MCG
1 SPRAY, SUSPENSION (ML) NASAL 2 TIMES DAILY
Status: DISCONTINUED | OUTPATIENT
Start: 2022-06-20 | End: 2022-06-20 | Stop reason: HOSPADM

## 2022-06-20 RX ORDER — DIAZEPAM 5 MG/1
10 TABLET ORAL EVERY 6 HOURS PRN
Status: DISCONTINUED | OUTPATIENT
Start: 2022-06-20 | End: 2022-06-20 | Stop reason: HOSPADM

## 2022-06-20 RX ORDER — LEVOTHYROXINE SODIUM 112 UG/1
112 TABLET ORAL DAILY
Status: DISCONTINUED | OUTPATIENT
Start: 2022-06-20 | End: 2022-06-20 | Stop reason: HOSPADM

## 2022-06-20 RX ORDER — PRAVASTATIN SODIUM 20 MG
40 TABLET ORAL
Status: DISCONTINUED | OUTPATIENT
Start: 2022-06-20 | End: 2022-06-20 | Stop reason: HOSPADM

## 2022-06-20 RX ORDER — CEFPODOXIME PROXETIL 200 MG/1
200 TABLET, FILM COATED ORAL 2 TIMES DAILY
Qty: 10 TABLET | Refills: 0 | Status: SHIPPED | OUTPATIENT
Start: 2022-06-20 | End: 2022-06-25

## 2022-06-20 RX ORDER — ONDANSETRON 2 MG/ML
4 INJECTION INTRAMUSCULAR; INTRAVENOUS EVERY 6 HOURS PRN
Status: DISCONTINUED | OUTPATIENT
Start: 2022-06-20 | End: 2022-06-20 | Stop reason: HOSPADM

## 2022-06-20 RX ORDER — GABAPENTIN 100 MG/1
100 CAPSULE ORAL 3 TIMES DAILY
Status: DISCONTINUED | OUTPATIENT
Start: 2022-06-20 | End: 2022-06-20 | Stop reason: HOSPADM

## 2022-06-20 RX ORDER — CEFTRIAXONE 1 G/50ML
1000 INJECTION, SOLUTION INTRAVENOUS EVERY 24 HOURS
Status: DISCONTINUED | OUTPATIENT
Start: 2022-06-20 | End: 2022-06-20 | Stop reason: HOSPADM

## 2022-06-20 RX ORDER — PANTOPRAZOLE SODIUM 40 MG/1
40 TABLET, DELAYED RELEASE ORAL 2 TIMES DAILY PRN
Status: DISCONTINUED | OUTPATIENT
Start: 2022-06-20 | End: 2022-06-20 | Stop reason: HOSPADM

## 2022-06-20 RX ADMIN — BUPROPION 150 MG: 150 TABLET, EXTENDED RELEASE ORAL at 08:40

## 2022-06-20 RX ADMIN — SUCRALFATE 1 G: 1 TABLET ORAL at 07:35

## 2022-06-20 RX ADMIN — SODIUM CHLORIDE AND POTASSIUM CHLORIDE 125 ML/HR: .9; .15 SOLUTION INTRAVENOUS at 01:40

## 2022-06-20 RX ADMIN — PANTOPRAZOLE SODIUM 40 MG: 40 TABLET, DELAYED RELEASE ORAL at 07:35

## 2022-06-20 RX ADMIN — POTASSIUM CHLORIDE 40 MEQ: 1500 TABLET, EXTENDED RELEASE ORAL at 11:35

## 2022-06-20 RX ADMIN — SODIUM CHLORIDE AND POTASSIUM CHLORIDE 125 ML/HR: .9; .15 SOLUTION INTRAVENOUS at 11:29

## 2022-06-20 RX ADMIN — SUCRALFATE 1 G: 1 TABLET ORAL at 11:36

## 2022-06-20 RX ADMIN — CEFTRIAXONE 1000 MG: 1 INJECTION, SOLUTION INTRAVENOUS at 08:40

## 2022-06-20 RX ADMIN — POTASSIUM CHLORIDE 40 MEQ: 1500 TABLET, EXTENDED RELEASE ORAL at 07:35

## 2022-06-20 RX ADMIN — SERTRALINE HYDROCHLORIDE 100 MG: 50 TABLET ORAL at 08:40

## 2022-06-20 RX ADMIN — LEVOTHYROXINE SODIUM 112 MCG: 112 TABLET ORAL at 09:46

## 2022-06-20 RX ADMIN — OXYCODONE HYDROCHLORIDE AND ACETAMINOPHEN 1 TABLET: 5; 325 TABLET ORAL at 01:11

## 2022-06-20 RX ADMIN — OXYCODONE HYDROCHLORIDE AND ACETAMINOPHEN 1 TABLET: 5; 325 TABLET ORAL at 09:44

## 2022-06-20 RX ADMIN — FLUTICASONE PROPIONATE 1 SPRAY: 50 SPRAY, METERED NASAL at 08:40

## 2022-06-20 RX ADMIN — GABAPENTIN 100 MG: 100 CAPSULE ORAL at 08:40

## 2022-06-20 NOTE — ASSESSMENT & PLAN NOTE
· Placed in observation telemetry  · Patient is status post repletion in the ER  · Will give K-Dur 20 mEq p o  daily  · Continue to monitor with repeat labs in a m

## 2022-06-20 NOTE — TELEPHONE ENCOUNTER
Reviewed, patient's hospital physician updated me  Patient is supposed to have lab work done Thursday  Will follow    she was given potassium prescription to take daily and has appointment with Urology

## 2022-06-20 NOTE — ASSESSMENT & PLAN NOTE
· Potassium level improving  · Will discharge on potassium supplementation  · Repeat BMP with PCP on Thursday Walk in

## 2022-06-20 NOTE — ASSESSMENT & PLAN NOTE
Continue pre-hospital Flonase 50 mcg 1 spray each nostril b i d  and Proventil 90 mcg 2 puffs Q 6 hours p r n

## 2022-06-20 NOTE — ASSESSMENT & PLAN NOTE
· Patient with recent stent placement and lithotripsy  Per previous notes patient accidentally removed her stent prior to being discharged previous hospitalization  · CT imaging done during this admission with mild hydronephrosis  · Reviewed case over the phone with Dr Guss Galeazzi who reviewed images  No urgent needs at this time    Patient can follow-up as scheduled later this week in the office

## 2022-06-20 NOTE — H&P
[facility]  1970, 46 y o  female MRN: [id number]  Unit/Bed#: ED 25 Encounter: [id number]  Primary Care Provider: Khari Bae DO   Date and time admitted to hospital: 6/19/2022  6:02 PM    * Hypokalemia  Assessment & Plan  · Placed in observation telemetry  · Patient is status post repletion in the ER  · Will give K-Dur 20 mEq p o  daily  · Continue to monitor with repeat labs in a m  Other chest pain  Assessment & Plan  · Likely GI based on history  · Patient does have significant cardiac risk factors  · Will place on telemetry as per above  · Obtain serial troponins    Nephrolithiasis  Assessment & Plan  · Patient status post lithotripsy approximately 4 days ago  · As some continued left lower quadrant abdominal pain  · Will continue pain control p r n  · Patient had ureteral stent placed which was subsequently accidentally removed  · She spoke with her urologist recommended a watchful waiting approach  · Will consult urology if patient has continued pain, elevation in her kidney function or urinary complaints    Primary hypertriglyceridemia  Assessment & Plan  Substitute Pravachol 40 mg p o  daily for pre-hospital Zocor    Morbid obesity ([facility] [address] )  Assessment & Plan  Encourage healthy weight loss and supportive care    Pulmonary emphysema ([facility] [address] )  Assessment & Plan  Continue pre-hospital Flonase 50 mcg 1 spray each nostril b i d  and Proventil 90 mcg 2 puffs Q 6 hours p r n  Tobacco abuse disorder  Assessment & Plan  Give nicotine 21 mg transdermal daily and consult for smoking cessation education    Hypothyroidism  Assessment & Plan  Continue pre-hospital levothyroxine 112 mcg p o  daily    Gastroesophageal reflux disease with esophagitis  Assessment & Plan  Continue pre-hospital Carafate 1 g p o  t i d  and Protonix 40 mg p o  b i d      Depression  Assessment & Plan  Continue pre-hospital Zoloft 100 mg p o  b i d , Wellbutrin 150 mg p o  q a m  and Valium 10 mg p o  q 6 hours p r n  VTE Prophylaxis: Is low risk, will ambulate  Code Status:  Level 1  POLST: There is no POLST form on file for this patient (pre-hospital)  Discussion with family:  None present at bedside at time of exam    Anticipated Length of Stay:  Patient will be admitted on an Observation basis with an anticipated length of stay of  < 2 midnights  Justification for Hospital Stay:  Hypokalemia requiring telemetry monitoring and repletion, chest pain rule out MI requiring further cardiac evaluation    Chief Complaint:   Generalized weakness and fatigue x4 days    History of Present Illness:    Jocelyn Bergman is a 46 y o  female who presents with generalized weakness and fatigue x4 days  Patient presents ER for further evaluation treatment her 4 day history of increasing generalized weakness and fatigue is been going on ever since having lithotripsy  Patient states that she was discharged with a ureteral stent which subsequently dislodged when wiping herself which she contacted her urologist was advised that it was okay to remain out and that they would take a watchful waiting approach to it  Patient complains of generalized weakness and fatigue the point where she finds it difficult to ambulate has been staying with a friend and has to use her friends chair to help her get up from sitting  Additionally patient is complaining of some vague midsternal chest pain which he rates as 6/10 at its worst without aggravating or alleviating factors not accompanied with any nausea, vomiting, diaphoresis, palpitations, lightheadedness or dizziness  Patient has no known cardiac history but does have significant cardiac risk factors to include smoking, family history, hypercholesterolemia and hypertension  Review of Systems:  Review of Systems   Constitutional: Positive for fatigue  Negative for chills and fever  HENT: Negative for congestion and sore throat      Respiratory: Negative for cough, shortness of breath and wheezing  Cardiovascular: Negative for chest pain and palpitations  Gastrointestinal: Negative for abdominal pain, diarrhea, nausea and vomiting  Genitourinary: Positive for flank pain  Negative for dysuria, frequency, hematuria and urgency  Musculoskeletal: Negative for arthralgias and myalgias  Skin: Negative for wound  Neurological: Positive for weakness  Negative for dizziness, syncope, light-headedness and headaches  All other systems reviewed and are negative        Past Medical and Surgical History:   Past Medical History:   Diagnosis Date    Allergic sinusitis     last assessed - [id number]    Anxiety     last assessed - [id number]    Asthma     last assessed - 86MTE2079    Bilateral lower extremity edema     last assessed - 21VMM4718    Callus of foot     last assessed - 85Mnw4677    Chronic GERD     last assessed - 11BIT7949    Colon polyp     Constipation     Resolved - [id number]    Depression     last assessed - 72IZG1053    Disease of thyroid gland     Diverticulitis of colon     last assessed - 10NPH8687    Dyspepsia     Resolved - 67ENG0447    Esophageal reflux     last assessed - 12DAL7513    Essential hypertriglyceridemia     last assessed - 70AZM3113    GERD (gastroesophageal reflux disease)     Gynecological disorder     last assessed - 22GNY5871    Hypertension     last assessed - 94NJN7903    Hypotension     last assessed - 38Wkc5301    Kidney stone     Migraine     Neuropathy     Pulmonary emphysema ([address] )     last assessed - 19SMG7098    Seasonal allergies     Urinary frequency     last assessed - 25Yqu8979    Vagina, candidiasis     last assessed - 79Zns0074       Past Surgical History:   Procedure Laterality Date    CARPAL TUNNEL RELEASE      last assessed - 45TRM5338    CHOLECYSTECTOMY      last assessed - 98IHX4763    COLONOSCOPY      Complete colonoscopy     EYE SURGERY      last assessed - 71PJT4994   [facility] RETROGRADE PYELOGRAM 6/16/2022    FOOT SURGERY      last assessed - 35AKS1056    OR CYSTO/URETERO W/LITHOTRIPSY &INDWELL STENT INSRT Left 6/16/2022    Procedure: CYSTOSCOPY URETEROSCOPY WITH LITHOTRIPSY HOLMIUM LASER, RETROGRADE PYELOGRAM AND INSERTION STENT URETERAL;  Surgeon: Frieda Sommers MD;  Location: [facility];  Service: Urology    OR ESOPHAGOGASTRODUODENOSCOPY TRANSORAL DIAGNOSTIC N/A 2/13/2017    Procedure: ESOPHAGOGASTRODUODENOSCOPY (EGD); Surgeon: Supriya Peters MD;  Location: [facility]; Service: Gastroenterology       Meds/Allergies:  Prior to Admission medications    Medication Sig Start Date End Date Taking?  Authorizing Provider   albuterol (2 5 mg/3 mL) 0 083 % nebulizer solution Take 3 mL (2 5 mg total) by nebulization every 6 (six) hours as needed for wheezing or shortness of breath 12/21/21   ANTONIO Bryant   albuterol (PROVENTIL HFA,VENTOLIN HFA) 90 mcg/act inhaler Inhale 2 puffs every 6 (six) hours as needed for wheezing 12/21/21   ANTONIO Benavides   buPROPion (WELLBUTRIN XL) 150 mg 24 hr tablet Take 1 tablet (150 mg total) by mouth every morning 4/27/22   Sharlene Romeo DO   diazepam (VALIUM) 10 mg tablet Take 1 tablet (10 mg total) by mouth every 6 (six) hours as needed for anxiety 4/29/22   ANTONIO Bryant   ergocalciferol (VITAMIN D2) 50,000 units Take 1 capsule (50,000 Units total) by mouth once a week 4/27/22   Sharlene Romeo DO   fluticasone [facility]) 50 mcg/act nasal spray 1 spray into each nostril 2 (two) times a day 12/21/21   ANTONIO Bryant   furosemide (LASIX) 20 mg tablet Take 1 tablet (20 mg total) by mouth daily as needed (edema) 8/7/20   Sharlene Romeo DO   gabapentin (NEURONTIN) 100 mg capsule Take 100 mg by mouth 3 (three) times a day 10/4/19   Historical Provider, MD   ibuprofen (MOTRIN) 600 mg tablet Take 600 mg by mouth as needed 8/7/19   Historical Provider, MD   levothyroxine 112 mcg tablet Take 1 tablet (112 mcg total) by mouth daily 4/27/22   Sharlene Romeo,    naloxone (NARCAN) 4 mg/0 1 mL nasal spray Administer 1 spray into a nostril  If no response after 2-3 minutes, give another dose in the other nostril using a new spray  1/15/21   Latoya Kwon DO   ondansetron (ZOFRAN-ODT) 4 mg disintegrating tablet Take 1 tablet (4 mg total) by mouth every 6 (six) hours as needed for nausea or vomiting 6/10/22   Hernandez Dailey DO   oxyCODONE-acetaminophen (Percocet) 5-325 mg per tablet Take 1 tablet by mouth every 4 (four) hours as needed for moderate pain for up to 10 days Max Daily Amount: 6 tablets 6/10/22 6/20/22  Ann-Marie Angelo MD   pantoprazole (PROTONIX) 40 mg tablet Take 1 tablet (40 mg total) by mouth 2 (two) times a day as needed (acid reflux) 4/27/22   Latoya Kwon DO   sertraline (ZOLOFT) 100 mg tablet Take 1 tablet (100 mg total) by mouth 2 (two) times a day 4/27/22   Latoya Kwon DO   simvastatin (ZOCOR) 20 mg tablet Take 1 tablet (20 mg total) by mouth daily at bedtime 4/27/22   Latoya Kwon DO   sucralfate (CARAFATE) 1 g tablet 1 TABLET 3 TIMES A DAY BEFORE MEALS 4/27/22   Latoya Kwon DO   tamsulosin [employer]) 0 4 mg Take 1 capsule (0 4 mg total) by mouth daily with dinner 6/10/22   Hernandez Dailey DO     I have reviewed home medications with patient personally  Allergies:    Allergies   Allergen Reactions    Hydrocodone-Acetaminophen Hives    Ketorolac Hives and Dizziness    Toradol [Ketorolac Tromethamine] Other (See Comments)     hypotension    Ultram [Tramadol] Nausea Only, GI Intolerance and Vomiting       Social History:  Marital Status:    Occupation:  Disabled  Patient Pre-hospital Living Situation:  Resides at home alone  Patient Pre-hospital Level of Mobility:  Full without assist  Patient Pre-hospital Diet Restrictions:  None  Substance Use History:   Social History     Substance and Sexual Activity   Alcohol Use Yes    Comment: wine twice a year     Social History     Tobacco Use   Smoking Status Current Every Day Smoker  Packs/day: 1 00    Types: Cigarettes   Smokeless Tobacco Never Used   Tobacco Comment    one pack     Social History     Substance and Sexual Activity   Drug Use No       Family History:  I have reviewed the patients family history    Physical Exam:   Vitals:   Blood Pressure: 140/73 (06/19/22 2353)  Pulse: 70 (06/19/22 2353)  Temperature: 98 3 °F (36 8 °C) (06/19/22 1804)  Temp Source: Tympanic (06/19/22 1804)  Respirations: 18 (06/19/22 2353)  Height: 5' 7" (170 2 cm) (06/19/22 2353)  Weight - Scale: 117 kg (257 lb) (06/19/22 2353)  SpO2: 97 % (06/19/22 2353)    Physical Exam  Vitals and nursing note reviewed  Constitutional:       General: She is not in acute distress  Appearance: Normal appearance  She is obese  HENT:      Head: Normocephalic and atraumatic  Right Ear: Tympanic membrane normal       Left Ear: Tympanic membrane normal       Nose: Nose normal       Mouth/Throat:      Mouth: Mucous membranes are moist       Pharynx: Oropharynx is clear  No posterior oropharyngeal erythema  Eyes:      Extraocular Movements: Extraocular movements intact  Conjunctiva/sclera: Conjunctivae normal       Pupils: Pupils are equal, round, and reactive to light  Cardiovascular:      Rate and Rhythm: Normal rate and regular rhythm  Pulses: Normal pulses  Heart sounds: Normal heart sounds  No murmur heard  Pulmonary:      Effort: Pulmonary effort is normal  No respiratory distress  Breath sounds: Normal breath sounds  No rhonchi or rales  Abdominal:      General: Bowel sounds are normal       Palpations: Abdomen is soft  Tenderness: There is no abdominal tenderness  There is left CVA tenderness  Musculoskeletal:      Cervical back: Normal range of motion and neck supple  No muscular tenderness  Right lower leg: No edema  Left lower leg: No edema  Skin:     General: Skin is warm and dry  Capillary Refill: Capillary refill takes less than 2 seconds  Neurological:      General: No focal deficit present  Mental Status: She is alert and oriented to person, place, and time  Additional Data:   Lab Results: I have personally reviewed pertinent reports  Results from last 7 days   Lab Units 06/19/22 1918   WBC Thousand/uL 9 79   HEMOGLOBIN g/dL 12 3   HEMATOCRIT % 39 1   PLATELETS Thousands/uL 135*   NEUTROS PCT % 71   LYMPHS PCT % 19   MONOS PCT % 7   EOS PCT % 2     Results from last 7 days   Lab Units 06/19/22 1918   SODIUM mmol/L 140   POTASSIUM mmol/L 2 7*   CHLORIDE mmol/L 103   CO2 mmol/L 28   BUN mg/dL 13   CREATININE mg/dL 1 00   CALCIUM mg/dL 8 5   ALK PHOS U/L 75   ALT U/L 20   AST U/L 29     Results from last 7 days   Lab Units 06/19/22 1918   INR  1 07               Imaging: I have personally reviewed pertinent reports  CT renal stone study abdomen pelvis without contrast   ED Interpretation by Ana Laura Riley DO (06/19 2116)   Mild left hydro ureteral nephrosis  Some perinephric stranding, no obstructing stone      Final Result by Parish Mike MD (06/19 2028)      Mild left hydroureteronephrosis with perinephric and periureteral stranding, however no obstructing calculi identified  Multiple stone fragments within the mid to lower kidney status post lithotripsy  Colonic diverticulosis without diverticulitis  Hepatosplenomegaly  Workstation performed: [id number]         XR chest 1 view portable    (Results Pending)       EKG, Pathology, and Other Studies Reviewed on Admission:   · EKG:  Diffuse T-wave flattening    Epic Records Reviewed: Yes     ** Please Note: This note has been constructed using a voice recognition system   **

## 2022-06-20 NOTE — DISCHARGE SUMMARY
[address]  Discharge- [facility] 1970, 46 y o  female MRN: [id number]  Unit/Bed#: ED 25 Encounter: [id number]  Primary Care Provider: Estefani Cage DO   Date and time admitted to hospital: 6/19/2022  6:02 PM    * Hypokalemia  Assessment & Plan  · Potassium level improving  · Will discharge on potassium supplementation  · Repeat BMP with PCP on Thursday    Nephrolithiasis  Assessment & Plan  · Patient with recent stent placement and lithotripsy  Per previous notes patient accidentally removed her stent prior to being discharged previous hospitalization  · CT imaging done during this admission with mild hydronephrosis  · Reviewed case over the phone with Dr Guss Galeazzi who reviewed images  No urgent needs at this time  Patient can follow-up as scheduled later this week in the office    Primary hypertriglyceridemia  Assessment & Plan  Continue Zocor    Morbid obesity ([address] )  Assessment & Plan  Encourage healthy weight loss and supportive care    Pulmonary emphysema (HCC)  Assessment & Plan  Continue Flonase and Proventil     Other chest pain  Assessment & Plan  · Troponins with no significant rise or fall  · Chest pain resolved  · EKG with no obvious ischemic changes    Tobacco abuse disorder  Assessment & Plan  Smoking cessation education provided    Hypothyroidism  Assessment & Plan  Continue pre-hospital levothyroxine 112 mcg p o  daily    Gastroesophageal reflux disease with esophagitis  Assessment & Plan  Continue pre-hospital Carafate 1 g p o  t i d  and Protonix 40 mg p o  b i d      Depression  Assessment & Plan  Continue home Zoloft and Wellbutrin      Discharging Physician / Practitioner: Heath Kelly MD  PCP: Estefani Cage DO  Admission Date:   Admission Orders (From admission, onward)     Ordered        06/19/22 2119  Place in Observation  Once                      Discharge Date: 06/20/22    Medical Problems             Resolved Problems  Date Reviewed: 6/19/2022 None                 Consultations During Hospital Stay:  · none    Procedures Performed:   · none    Significant Findings / Test Results:   · Hypokalemia    Incidental Findings:   · None     Test Results Pending at Discharge (will require follow up): · None     Outpatient Tests Requested:  · BMP to be done on Thursday to monitor potassium level    Complications:     None    Reason for Admission:  Hypokalemia    Hospital Course:     Angeles King is a 46 y o  female patient who originally presented to the hospital on 6/19/2022 due to weakness and decreased appetite  Patient found to be hypokalemic  Potassium supplemented  Patient also had CT of the abdomen/pelvis which showed mild hydronephrosis, patient had recent stent placement with lithotripsy  Previous CT showed moderate hydronephrosis  Reviewed case with Dr Jc Lorenzo, patient cleared for discharge with outpatient urology follow-up  Patient discharged on antibiotics for possible residual infection of urinary tract and potassium supplementation    Please see above list of diagnoses and related plan for additional information  Condition at Discharge: stable     Discharge Day Visit / Exam:     Subjective:  Patient states she is feeling slightly better at this time  Vitals: Blood Pressure: 127/73 (06/20/22 0937)  Pulse: 78 (06/20/22 0937)  Temperature: 98 3 °F (36 8 °C) (06/19/22 1804)  Temp Source: Tympanic (06/19/22 1804)  Respirations: 22 (06/20/22 0937)  Height: 5' 7" (170 2 cm) (06/19/22 2353)  Weight - Scale: 117 kg (257 lb) (06/19/22 2353)  SpO2: 94 % (06/20/22 0937)     Exam:   Physical Exam  Constitutional:       General: She is not in acute distress  Appearance: She is obese  HENT:      Head: Normocephalic and atraumatic  Nose: Nose normal       Mouth/Throat:      Mouth: Mucous membranes are moist    Eyes:      Extraocular Movements: Extraocular movements intact        Conjunctiva/sclera: Conjunctivae normal    Cardiovascular: Rate and Rhythm: Normal rate and regular rhythm  Pulmonary:      Effort: Pulmonary effort is normal  No respiratory distress  Abdominal:      Palpations: Abdomen is soft  Tenderness: There is no abdominal tenderness  Musculoskeletal:         General: Normal range of motion  Cervical back: Normal range of motion and neck supple  Skin:     General: Skin is warm and dry  Neurological:      General: No focal deficit present  Mental Status: She is alert  Cranial Nerves: No cranial nerve deficit  Psychiatric:         Mood and Affect: Mood normal          Behavior: Behavior normal          Discussion with Family: updated patient regarding discharge plan    Discharge instructions/Information to patient and family:   See after visit summary for information provided to patient and family  Provisions for Follow-Up Care:  See after visit summary for information related to follow-up care and any pertinent home health orders  Disposition:     Home      Planned Readmission:    no     Discharge Statement:  I spent 35 minutes discharging the patient  This time was spent on the day of discharge  I had direct contact with the patient on the day of discharge  Greater than 50% of the total time was spent examining patient, answering all patient questions, arranging and discussing plan of care with patient as well as directly providing post-discharge instructions  Additional time then spent on discharge activities  Discharge Medications:  See after visit summary for reconciled discharge medications provided to patient and family        ** Please Note: This note has been constructed using a voice recognition system **

## 2022-06-20 NOTE — ASSESSMENT & PLAN NOTE
Continue pre-hospital Zoloft 100 mg p o  b i d , Wellbutrin 150 mg p o  q a m  and Valium 10 mg p o  q 6 hours p r n

## 2022-06-20 NOTE — TELEPHONE ENCOUNTER
Patient states that she is at the [facility] as of yesterday  She states that she hasn't been feeling very well  Not that she had the surgery last week  This is so different then before the surgery and after  She states that she has no energy, no appetite,  Patient states that she couldn't do the drive to [place] so that is why she went there  She was told that her potassium levels are low, but nothing is happening for her      Can you look over her chart and see if there is anything else going on     Please call her at the mobile phone

## 2022-06-20 NOTE — UTILIZATION REVIEW
Initial Clinical Review    Admission: Date/Time/Statement:   Admission Orders (From admission, onward)     Ordered        06/19/22 2119  Place in Observation  Once                      Orders Placed This Encounter   Procedures    Place in Observation     Standing Status:   Standing     Number of Occurrences:   1     Order Specific Question:   Level of Care     Answer:   Med Surg [16]     Order Specific Question:   Bed request comments     Answer:   Telemetry     ED Arrival Information     Expected   -    Arrival   6/19/2022 17:25    Acuity   Urgent            Means of arrival   Walk-In    Escorted by   [facility]    Service   Hospitalist    Admission type   Urgent            Arrival complaint   weakness, s/p kidney stone procedure, dizziness           Chief Complaint   Patient presents with    Weakness - Generalized     Patient reports generalized fatigue and weakness following a procedure on her kidneys on Thursday  Initial Presentation: 46 y o  female to the ED from home with complaints of generalized weakness, accidentally pulled out string from urologic stent  Admitted under observation for hypokalemia, nephrolithiasis  Had stent placed 6/16  As per urologist, will watch and wait in regards to urologic condition  She arrives with difficulty ambulating due to weakness  Potassium is 2 7  Given repletion in ED, recheck  IV fluids with potassium started  Having chest pain  Monitor tele, check troponins  IV abx started     Date:     Day 2:      ED Triage Vitals   Temperature Pulse Respirations Blood Pressure SpO2   06/19/22 1804 06/19/22 1804 06/19/22 1804 06/19/22 1804 06/19/22 1804   98 3 °F (36 8 °C) 78 17 118/73 96 %      Temp Source Heart Rate Source Patient Position - Orthostatic VS BP Location FiO2 (%)   06/19/22 1804 06/19/22 2353 06/19/22 2353 06/19/22 2353 --   Tympanic Monitor Lying Left arm       Pain Score       06/19/22 1804       6          Wt Readings from Last 1 Encounters:   06/19/22 117 kg (257 lb)     Additional Vital Signs:   Date/Time Temp Pulse Resp BP SpO2 O2 Device Patient Position - Orthostatic VS   06/20/22 0520 -- 66 18 96/53 92 % None (Room air) --   06/20/22 0400 -- 66 16 96/53 -- None (Room air) Lying   06/20/22 0223 -- 72 -- -- 88 % Abnormal  -- --   06/20/22 0156 -- 83 20 -- 90 % None (Room air) Sitting   06/19/22 2353 -- 70 18 140/73 97 % None (Room air) Lying   06/19/22 2215 -- 79 -- -- -- -- --   06/19/22 1804 98 3 °F (36 8 °C) 78 17 118/73 96 % None (Room air)        Pertinent Labs/Diagnostic Test Results:   6/19 EKG: Interpretation:     Interpretation: non-specific     Rate:     ECG rate:  71     ECG rate assessment: normal     Rhythm:     Rhythm: sinus rhythm     Ectopy:     Ectopy: none     QRS:     QRS axis:  Normal     QRS intervals:  Normal   Conduction:     Conduction: normal     ST segments:     ST segments:  Non-specific   T waves:     T waves: non-specific     Other findings:     Other findings: prolonged qTc interval     Comments:      Slight prolongation of QT  CT renal stone study abdomen pelvis without contrast   ED Interpretation by Sarita Kehr, DO (06/19 2116)   Mild left hydro ureteral nephrosis  Some perinephric stranding, no obstructing stone      Final Result by Be Metzger MD (06/19 2028)      Mild left hydroureteronephrosis with perinephric and periureteral stranding, however no obstructing calculi identified  Multiple stone fragments within the mid to lower kidney status post lithotripsy  Colonic diverticulosis without diverticulitis  Hepatosplenomegaly  Workstation performed: [id number]         XR chest 1 view portable   Final Result by Be Metzger MD (06/20 0715)      No acute cardiopulmonary disease                    Workstation performed: [id number]           Results from last 7 days   Lab Units 06/19/22 1918   SARS-COV-2  Negative     Results from last 7 days   Lab Units 06/20/22 0518 06/19/22 1918   WBC Thousand/uL 7 00 9 79 HEMOGLOBIN g/dL 11 0* 12 3   HEMATOCRIT % 34 7* 39 1   PLATELETS Thousands/uL 102* 135*   NEUTROS ABS Thousands/µL 4 50 6 93         Results from last 7 days   Lab Units 06/20/22 0518 06/19/22 1918   SODIUM mmol/L 143 140   POTASSIUM mmol/L 2 9* 2 7*   CHLORIDE mmol/L 109* 103   CO2 mmol/L 25 28   ANION GAP mmol/L 9 9   BUN mg/dL 10 13   CREATININE mg/dL 0 89 1 00   EGFR ml/min/1 73sq m 75 65   CALCIUM mg/dL 7 9* 8 5   MAGNESIUM mg/dL  --  2 0     Results from last 7 days   Lab Units 06/19/22 1918   AST U/L 29   ALT U/L 20   ALK PHOS U/L 75   TOTAL PROTEIN g/dL 6 6   ALBUMIN g/dL 3 5   TOTAL BILIRUBIN mg/dL 1 11*         Results from last 7 days   Lab Units 06/20/22 0518 06/19/22 1918   GLUCOSE RANDOM mg/dL 80 81       Results from last 7 days   Lab Units 06/20/22  0518 06/20/22  0400 06/20/22  0147   HS TNI 0HR ng/L  --   --  6   HS TNI 2HR ng/L  --  5  --    HSTNI D2 ng/L  --  -1  --    HS TNI 4HR ng/L 7  --   --    HSTNI D4 ng/L 1  --   --          Results from last 7 days   Lab Units 06/19/22 1918   PROTIME seconds 13 8   INR  1 07   PTT seconds 48*         Results from last 7 days   Lab Units 06/19/22 2004   CLARITY UA  Slightly Cloudy*   COLOR UA  Yellow   SPEC GRAV UA  1 020   PH UA  6 5   GLUCOSE UA mg/dl Trace*   KETONES UA mg/dl Negative   BLOOD UA  3+*   PROTEIN UA mg/dl 1+*   NITRITE UA  Negative   BILIRUBIN UA  1+*   UROBILINOGEN UA E U /dl 1 0   LEUKOCYTES UA  2+*   WBC UA /hpf 20-30*   RBC UA /hpf 1-2   BACTERIA UA /hpf Occasional   EPITHELIAL CELLS WET PREP /hpf Occasional   MUCUS THREADS  Occasional*     ED Treatment:   Medication Administration from 06/19/2022 1725 to 06/20/2022 3835       Date/Time Order Dose Route Action     06/19/2022 1922 sodium chloride 0 9 % bolus 1,000 mL 1,000 mL Intravenous New Bag     06/19/2022 2126 potassium chloride (K-DUR,KLOR-CON) CR tablet 20 mEq 20 mEq Oral Given     06/19/2022 2126 potassium chloride 20 mEq IVPB (premix) 20 mEq Intravenous New Bag 06/20/2022 0840 buPROPion (WELLBUTRIN XL) 24 hr tablet 150 mg 150 mg Oral Given     06/20/2022 0840 fluticasone (FLONASE) 50 mcg/act nasal spray 1 spray 1 spray Nasal Given     06/20/2022 0840 gabapentin (NEURONTIN) capsule 100 mg 100 mg Oral Given     06/20/2022 0111 oxyCODONE-acetaminophen (PERCOCET) 5-325 mg per tablet 1 tablet 1 tablet Oral Given     06/20/2022 0735 pantoprazole (PROTONIX) EC tablet 40 mg 40 mg Oral Given     06/20/2022 0840 sertraline (ZOLOFT) tablet 100 mg 100 mg Oral Given     06/20/2022 0735 sucralfate (CARAFATE) tablet 1 g 1 g Oral Given     06/20/2022 0140 sodium chloride 0 9 % with KCl 20 mEq/L infusion (premix) 125 mL/hr Intravenous New Bag     06/20/2022 0735 potassium chloride (K-DUR,KLOR-CON) CR tablet 40 mEq 40 mEq Oral Given     06/20/2022 0840 cefTRIAXone (ROCEPHIN) IVPB (premix in dextrose) 1,000 mg 50 mL 1,000 mg Intravenous New Bag        Past Medical History:   Diagnosis Date    Allergic sinusitis     last assessed - [id number]    Anxiety     last assessed - 83TLQ4963    Asthma     last assessed - 46RFS4695    Bilateral lower extremity edema     last assessed - 70MQN4143    Callus of foot     last assessed - 20Ral1352    Chronic GERD     last assessed - 88LYX5840    Colon polyp     Constipation     Resolved - 33XUV8517    Depression     last assessed - 90JWN3252    Disease of thyroid gland     Diverticulitis of colon     last assessed - 71TFH4379    Dyspepsia     Resolved - 01AKP7647    Esophageal reflux     last assessed - 55NBK5366    Essential hypertriglyceridemia     last assessed - 40BAT4392    GERD (gastroesophageal reflux disease)     Gynecological disorder     last assessed - 49NGI5422    Hypertension     last assessed - 54BRQ0995    Hypotension     last assessed - 34Jyy6070    Kidney stone     Migraine     Neuropathy     Pulmonary emphysema ([facility] [address] )     last assessed - 96XIX5922    Seasonal allergies     Urinary frequency     last assessed - 76PVS5461    Vagina, candidiasis     last assessed - 22Jun2016     Present on Admission:   Tobacco abuse disorder   Pulmonary emphysema ([facility] [address]   Primary hypertriglyceridemia   Morbid obesity (HCC)   Nephrolithiasis   Hypothyroidism   Gastroesophageal reflux disease with esophagitis   Depression   Hypokalemia      Admitting Diagnosis: Weakness [R53 1]  Dizziness [R42]  Age/Sex: 46 y o  female  Admission Orders:  Scheduled Medications:  buPROPion, 150 mg, Oral, QAM  cefTRIAXone, 1,000 mg, Intravenous, Q24H  fluticasone, 1 spray, Nasal, BID  gabapentin, 100 mg, Oral, TID  levothyroxine, 112 mcg, Oral, Daily  nicotine, 1 patch, Transdermal, Daily  potassium chloride, 40 mEq, Oral, Q4H  pravastatin, 40 mg, Oral, Daily With Dinner  sertraline, 100 mg, Oral, BID  sucralfate, 1 g, Oral, TID AC  tamsulosin, 0 4 mg, Oral, Daily With Dinner      Continuous IV Infusions:  sodium chloride 0 9 % with KCl 20 mEq/L, 125 mL/hr, Intravenous, Continuous      PRN Meds:  albuterol, 2 puff, Inhalation, Q6H PRN  diazepam, 10 mg, Oral, Q6H PRN  ibuprofen, 600 mg, Oral, Q6H PRN  ondansetron, 4 mg, Intravenous, Q6H PRN  oxyCODONE-acetaminophen, 1 tablet, Oral, Q4H PRN  pantoprazole, 40 mg, Oral, BID PRN        None    Network Utilization Review Department  ATTENTION: Please call with any questions or concerns to [phone] and carefully listen to the prompts so that you are directed to the right person  All voicemails are confidential   Jona Villalobos all requests for admission clinical reviews, approved or denied determinations and any other requests to dedicated fax number below belonging to the campus where the patient is receiving treatment   List of dedicated fax numbers for the Facilities:  [address] DENIALS (Administrative/Medical Necessity) [phone]   [address] (Maternity/NICU/Pediatrics) 261 [facility] [address]  [phone]   Bebe Mcginnis 50 [address] [facility] [place] [id number] [place] [address] [facility] [address] [phone]

## 2022-06-20 NOTE — ASSESSMENT & PLAN NOTE
· Likely GI based on history  · Patient does have significant cardiac risk factors  · Will place on telemetry as per above  · Obtain serial troponins

## 2022-06-20 NOTE — ASSESSMENT & PLAN NOTE
· Troponins with no significant rise or fall  · Chest pain resolved  · EKG with no obvious ischemic changes

## 2022-06-20 NOTE — ASSESSMENT & PLAN NOTE
· Patient status post lithotripsy approximately 4 days ago  · As some continued left lower quadrant abdominal pain  · Will continue pain control p r n    · Patient had ureteral stent placed which was subsequently accidentally removed  · She spoke with her urologist recommended a watchful waiting approach  · Will consult urology if patient has continued pain, elevation in her kidney function or urinary complaints

## 2022-06-21 ENCOUNTER — TRANSITIONAL CARE MANAGEMENT (OUTPATIENT)
Dept: INTERNAL MEDICINE CLINIC | Facility: OTHER | Age: 52
End: 2022-06-21

## 2022-06-21 LAB — BACTERIA UR CULT: NORMAL

## 2022-06-22 LAB
ATRIAL RATE: 71 BPM
CALCIUM OXALATE DIHYDRATE MFR STONE IR: 40 %
COLOR STONE: NORMAL
COM MFR STONE: 30 %
COMMENT-STONE3: NORMAL
COMPOSITION: NORMAL
HYDROXYAPATITE 24H ENGDIFF UR: 30 %
LABORATORY COMMENT REPORT: NORMAL
P AXIS: 55 DEGREES
PHOTO: NORMAL
PR INTERVAL: 168 MS
QRS AXIS: 57 DEGREES
QRSD INTERVAL: 94 MS
QT INTERVAL: 470 MS
QTC INTERVAL: 510 MS
SIZE STONE: NORMAL MM
SPEC SOURCE SUBJ: NORMAL
STONE ANALYSIS-IMP: NORMAL
STONE ANALYSIS-IMP: NORMAL
T WAVE AXIS: 10 DEGREES
VENTRICULAR RATE: 71 BPM
WT STONE: 10 MG

## 2022-06-22 PROCEDURE — 93010 ELECTROCARDIOGRAM REPORT: CPT | Performed by: INTERNAL MEDICINE

## 2022-06-23 ENCOUNTER — OFFICE VISIT (OUTPATIENT)
Dept: UROLOGY | Facility: AMBULATORY SURGERY CENTER | Age: 52
End: 2022-06-23
Payer: MEDICARE

## 2022-06-23 VITALS
SYSTOLIC BLOOD PRESSURE: 130 MMHG | WEIGHT: 250 LBS | HEART RATE: 78 BPM | DIASTOLIC BLOOD PRESSURE: 82 MMHG | BODY MASS INDEX: 39.24 KG/M2 | HEIGHT: 67 IN

## 2022-06-23 DIAGNOSIS — N20.0 RENAL CALCULI: Primary | ICD-10-CM

## 2022-06-23 LAB
BACTERIA UR QL AUTO: ABNORMAL /HPF
BILIRUB UR QL STRIP: NEGATIVE
CLARITY UR: CLEAR
COLOR UR: ABNORMAL
GLUCOSE UR STRIP-MCNC: NEGATIVE MG/DL
HGB UR QL STRIP.AUTO: ABNORMAL
KETONES UR STRIP-MCNC: NEGATIVE MG/DL
LEUKOCYTE ESTERASE UR QL STRIP: ABNORMAL
MUCOUS THREADS UR QL AUTO: ABNORMAL
NITRITE UR QL STRIP: NEGATIVE
NON-SQ EPI CELLS URNS QL MICRO: ABNORMAL /HPF
PH UR STRIP.AUTO: 6.5 [PH]
PROT UR STRIP-MCNC: NEGATIVE MG/DL
RBC #/AREA URNS AUTO: ABNORMAL /HPF
SL AMB  POCT GLUCOSE, UA: NORMAL
SL AMB LEUKOCYTE ESTERASE,UA: NORMAL
SL AMB POCT BILIRUBIN,UA: NORMAL
SL AMB POCT BLOOD,UA: NORMAL
SL AMB POCT CLARITY,UA: CLEAR
SL AMB POCT COLOR,UA: YELLOW
SL AMB POCT KETONES,UA: NORMAL
SL AMB POCT NITRITE,UA: NORMAL
SL AMB POCT PH,UA: 7.5
SL AMB POCT SPECIFIC GRAVITY,UA: 1.01
SL AMB POCT URINE PROTEIN: NORMAL
SL AMB POCT UROBILINOGEN: 0.2
SP GR UR STRIP.AUTO: 1.01 (ref 1–1.03)
UROBILINOGEN UR STRIP-ACNC: <2 MG/DL
WBC #/AREA URNS AUTO: ABNORMAL /HPF

## 2022-06-23 PROCEDURE — 81001 URINALYSIS AUTO W/SCOPE: CPT | Performed by: NURSE PRACTITIONER

## 2022-06-23 PROCEDURE — 81002 URINALYSIS NONAUTO W/O SCOPE: CPT | Performed by: NURSE PRACTITIONER

## 2022-06-23 PROCEDURE — 99213 OFFICE O/P EST LOW 20 MIN: CPT | Performed by: NURSE PRACTITIONER

## 2022-06-23 NOTE — PROGRESS NOTES
6/23/2022    Tejas Milligan  1970  [id number]        Assessment  -Nephrolithiasis s/p left ureteroscopy (6/16/2022)    Discussion/Plan  Chely is a 46 y o  female being managed by Dr Jimena Lowry       1  Nephrolithiasis s/p left ureteroscopy (6/16/2022)- urine specimen in the office today shows moderate amount of blood with tiny fragments  We will send for urinalysis with microscopic and culture  Patient is currently asymptomatic and denies any episodes of renal colic  I recommend patient obtain follow-up imaging with KUB and renal ultrasound  We discussed follow-up in the office to review results, but she is requesting a phone call once finalized  Reviewed dietary recommendations and provided patient with educational information  Call patient with results of follow-up KUB and renal ultrasound  She was advised to call sooner with any questions or issues     -All questions answered, patients agree with plan     History of Present Illness  46 y o  female with a history of nephrolithiasis presents today for follow up  Patient resides in the area, but states she frequently visit her sister in [place]  She reports prior history of recurrent urinary tract infections, last seen in the emergency department on 03/26/2022 for UTI  Most recently, she develops symptoms of acute left-sided flank pain and required urgent placement of left ureteral stent in [place] on 05/14/2022  She then established care with our practice and underwent left-sided ureteroscopy on 06/16/2022 for a 2 1 cm left ureteral calculus  Patient states ureteral stent fell out prior to discharge home  She re-presented to the emergency department on 06/19/2022 with generalized weakness  Patient was admitted overnight for observation  CT renal stone study identified mild left-sided hydro nephrosis and multiple stone fragments without any evidence of obstructing calculi        Patient states she has been spontaneously passing tiny calculi  She denies any lower urinary tract symptoms, gross hematuria, or dysuria  Patient continues to report lethargy, but denies any fever, chills, nausea, or vomiting  Prior to recent stone episode, she reports history of recurrent urinary tract infections and nephrolithiasis  However, she has never required surgical intervention  Patient primarily drinks [employer], but has tried to reduce consumption  Review of Systems  Review of Systems   Constitutional: Negative  HENT: Negative  Respiratory: Negative  Cardiovascular: Negative  Gastrointestinal: Negative  Genitourinary: Negative for decreased urine volume, difficulty urinating, dysuria, flank pain, frequency, hematuria and urgency  Musculoskeletal: Negative  Skin: Negative  Neurological: Negative  Psychiatric/Behavioral: Negative          Past Medical History  Past Medical History:   Diagnosis Date    Allergic sinusitis     last assessed - 14Ers6502    Anxiety     last assessed - 65UQJ9412    Asthma     last assessed - 60WUR6421    Bilateral lower extremity edema     last assessed - 64VEZ8713    Callus of foot     last assessed - 09Lsm1136    Chronic GERD     last assessed - 67XCM9997    Colon polyp     Constipation     Resolved - 48QWL1235    Depression     last assessed - 35GFQ3036    Disease of thyroid gland     Diverticulitis of colon     last assessed - 93EMT6177    Dyspepsia     Resolved - 84UMM4599    Esophageal reflux     last assessed - 17EKV5504    Essential hypertriglyceridemia     last assessed - 61FAG0326    GERD (gastroesophageal reflux disease)     Gynecological disorder     last assessed - 65CAE2564    Hypertension     last assessed - 06PIM6008    Hypotension     last assessed - 51Jme2441    Kidney stone     Migraine     Neuropathy     Pulmonary emphysema ([facility] [address] )     last assessed - 86FBR2810    Seasonal allergies     Urinary frequency     last assessed - 54Gik4390    Vagina, candidiasis last assessed - 82FNV7946       Past Social History  Past Surgical History:   Procedure Laterality Date    CARPAL TUNNEL RELEASE      last assessed - 30TVW9896    CHOLECYSTECTOMY      last assessed - 99WHX8024    COLONOSCOPY      Complete colonoscopy    [facility] EYE SURGERY      last assessed - 58HIX8269   [facility] RETROGRADE PYELOGRAM  6/16/2022    FOOT SURGERY      last assessed - 62EOY1373    [place] CYSTO/URETERO W/LITHOTRIPSY &INDWELL STENT INSRT Left 6/16/2022    Procedure: CYSTOSCOPY URETEROSCOPY WITH LITHOTRIPSY HOLMIUM LASER, RETROGRADE PYELOGRAM AND INSERTION STENT URETERAL;  Surgeon: Anna Murrieta MD;  Location: [facility];  Service: Urology    [place] ESOPHAGOGASTRODUODENOSCOPY TRANSORAL DIAGNOSTIC N/A 2/13/2017    Procedure: ESOPHAGOGASTRODUODENOSCOPY (EGD); Surgeon: Mitzi Pedersen MD;  [facility]: [facility];   Service: Gastroenterology       Past Family History  Family History   Problem Relation Age of Onset    Lung cancer Sister     Hypertension Other     Lung cancer Other     Hypertension Other     Lung cancer Other     Lung cancer Other     Lung cancer Maternal Grandmother        Past Social history  Social History     Socioeconomic History    Marital status:      Spouse name: Not on file    Number of children: Not on file    Years of education: Not on file    Highest education level: Not on file   Occupational History    Not on file   Tobacco Use    Smoking status: Current Every Day Smoker     Packs/day: 1 00     Types: Cigarettes    Smokeless tobacco: Never Used    Tobacco comment: one pack   Vaping Use    Vaping Use: Former    Substances: Nicotine   Substance and Sexual Activity    Alcohol use: Yes     Comment: wine twice a year    Drug use: No    Sexual activity: Not Currently   Other Topics Concern    Not on file   Social History Narrative    Not on file     Social Determinants of Health     Financial Resource Strain: Not on file   Food Insecurity: Not on file   Transportation Needs: Not on file   Physical Activity: Not on file   Stress: Not on file   Social Connections: Not on file   Intimate Partner Violence: Not on file   Housing Stability: Not on file       Current Medications  Current Outpatient Medications   Medication Sig Dispense Refill    albuterol (2 5 mg/3 mL) 0 083 % nebulizer solution Take 3 mL (2 5 mg total) by nebulization every 6 (six) hours as needed for wheezing or shortness of breath 30 mL 0    albuterol (PROVENTIL HFA,VENTOLIN HFA) 90 mcg/act inhaler Inhale 2 puffs every 6 (six) hours as needed for wheezing 18 g 1    buPROPion (WELLBUTRIN XL) 150 mg 24 hr tablet Take 1 tablet (150 mg total) by mouth every morning 90 tablet 1    cefpodoxime (VANTIN) 200 mg tablet Take 1 tablet (200 mg total) by mouth 2 (two) times a day for 5 days 10 tablet 0    diazepam (VALIUM) 10 mg tablet Take 1 tablet (10 mg total) by mouth every 6 (six) hours as needed for anxiety 30 tablet 0    ergocalciferol (VITAMIN D2) 50,000 units Take 1 capsule (50,000 Units total) by mouth once a week 12 capsule 1    fluticasone (FLONASE) 50 mcg/act nasal spray 1 spray into each nostril 2 (two) times a day 16 g 5    furosemide (LASIX) 20 mg tablet Take 1 tablet (20 mg total) by mouth daily as needed (edema) 30 tablet 0    gabapentin (NEURONTIN) 100 mg capsule Take 100 mg by mouth 3 (three) times a day  0    ibuprofen (MOTRIN) 600 mg tablet Take 600 mg by mouth as needed  0    levothyroxine 112 mcg tablet Take 1 tablet (112 mcg total) by mouth daily 90 tablet 1    naloxone (NARCAN) 4 mg/0 1 mL nasal spray Administer 1 spray into a nostril  If no response after 2-3 minutes, give another dose in the other nostril using a new spray   1 each 1    ondansetron (ZOFRAN-ODT) 4 mg disintegrating tablet Take 1 tablet (4 mg total) by mouth every 6 (six) hours as needed for nausea or vomiting 20 tablet 0    pantoprazole (PROTONIX) 40 mg tablet Take 1 tablet (40 mg total) by mouth 2 (two) times a day as needed (acid reflux) 180 tablet 1    potassium chloride (MICRO-K) 10 MEQ CR capsule Take 2 capsules (20 mEq total) by mouth daily for 5 days 10 capsule 0    sertraline (ZOLOFT) 100 mg tablet Take 1 tablet (100 mg total) by mouth 2 (two) times a day 180 tablet 1    simvastatin (ZOCOR) 20 mg tablet Take 1 tablet (20 mg total) by mouth daily at bedtime 90 tablet 1    sucralfate (CARAFATE) 1 g tablet 1 TABLET 3 TIMES A DAY BEFORE MEALS 270 tablet 1    tamsulosin (FLOMAX) 0 4 mg Take 1 capsule (0 4 mg total) by mouth daily with dinner 7 capsule 0     No current facility-administered medications for this visit  Allergies  Allergies   Allergen Reactions    Hydrocodone-Acetaminophen Hives    Ketorolac Hives and Dizziness    Toradol [Ketorolac Tromethamine] Other (See Comments)     hypotension    Ultram [Tramadol] Nausea Only, GI Intolerance and Vomiting       Past medical history, social history, family history, medications and allergies were reviewed  Vitals  Vitals:    06/23/22 0841   BP: 130/82   Pulse: 78   Weight: 113 kg (250 lb)   Height: 5' 7" (1 702 m)       Physical Exam  Physical Exam  Constitutional:       Appearance: Normal appearance  She is well-developed  HENT:      Head: Normocephalic  Eyes:      Pupils: Pupils are equal, round, and reactive to light  Pulmonary:      Effort: Pulmonary effort is normal    Abdominal:      Palpations: Abdomen is soft  Tenderness: There is no right CVA tenderness or left CVA tenderness  Musculoskeletal:         General: Normal range of motion  Cervical back: Normal range of motion  Skin:     General: Skin is warm and dry  Neurological:      General: No focal deficit present  Mental Status: She is alert and oriented to person, place, and time  Psychiatric:         Mood and Affect: Mood normal          Behavior: Behavior normal          Thought Content:  Thought content normal          Judgment: Judgment normal          Results    I have personally reviewed all pertinent lab results and reviewed with patient  Lab Results   Component Value Date    CALCIUM 7 9 (L) 06/20/2022    K 2 9 (L) 06/20/2022    CO2 25 06/20/2022     (H) 06/20/2022    BUN 10 06/20/2022    CREATININE 0 89 06/20/2022     Lab Results   Component Value Date    WBC 7 00 06/20/2022    HGB 11 0 (L) 06/20/2022    HCT 34 7 (L) 06/20/2022    MCV 98 06/20/2022     (L) 06/20/2022     No results found for this or any previous visit (from the past 1 hour(s))

## 2022-06-29 ENCOUNTER — HOSPITAL ENCOUNTER (OUTPATIENT)
Dept: ULTRASOUND IMAGING | Facility: HOSPITAL | Age: 52
Discharge: HOME | End: 2022-06-29
Payer: MEDICARE

## 2022-06-29 ENCOUNTER — HOSPITAL ENCOUNTER (OUTPATIENT)
Dept: RADIOLOGY | Facility: HOSPITAL | Age: 52
Discharge: HOME | End: 2022-06-29
Payer: MEDICARE

## 2022-06-29 DIAGNOSIS — N20.0 RENAL CALCULI: ICD-10-CM

## 2022-06-29 PROCEDURE — 76770 US EXAM ABDO BACK WALL COMP: CPT

## 2022-06-29 PROCEDURE — 74018 RADEX ABDOMEN 1 VIEW: CPT

## 2022-06-30 ENCOUNTER — TELEMEDICINE (OUTPATIENT)
Dept: INTERNAL MEDICINE CLINIC | Age: 52
End: 2022-06-30

## 2022-06-30 DIAGNOSIS — E55.9 VITAMIN D DEFICIENCY: ICD-10-CM

## 2022-06-30 DIAGNOSIS — N20.0 NEPHROLITHIASIS: Primary | ICD-10-CM

## 2022-06-30 DIAGNOSIS — E87.6 HYPOKALEMIA: ICD-10-CM

## 2022-06-30 DIAGNOSIS — D50.8 IRON DEFICIENCY ANEMIA SECONDARY TO INADEQUATE DIETARY IRON INTAKE: ICD-10-CM

## 2022-06-30 DIAGNOSIS — E03.9 ACQUIRED HYPOTHYROIDISM: ICD-10-CM

## 2022-06-30 DIAGNOSIS — N13.30 HYDROURETERONEPHROSIS: ICD-10-CM

## 2022-06-30 PROBLEM — M79.2 NEURALGIA: Status: RESOLVED | Noted: 2017-01-16 | Resolved: 2022-06-30

## 2022-06-30 PROBLEM — R07.89 OTHER CHEST PAIN: Status: RESOLVED | Noted: 2018-11-08 | Resolved: 2022-06-30

## 2022-06-30 PROBLEM — I95.9 HYPOTENSION: Status: RESOLVED | Noted: 2022-06-19 | Resolved: 2022-06-30

## 2022-06-30 PROBLEM — R63.5 WEIGHT GAIN: Status: RESOLVED | Noted: 2018-11-08 | Resolved: 2022-06-30

## 2022-06-30 PROBLEM — K30 INDIGESTION: Status: RESOLVED | Noted: 2022-06-19 | Resolved: 2022-06-30

## 2022-06-30 RX ORDER — OXYCODONE AND ACETAMINOPHEN 10; 325 MG/1; MG/1
1 TABLET ORAL EVERY 6 HOURS PRN
COMMUNITY
Start: 2022-06-27

## 2022-07-01 ENCOUNTER — APPOINTMENT (OUTPATIENT)
Dept: LAB | Facility: HOSPITAL | Age: 52
End: 2022-07-01
Payer: MEDICARE

## 2022-07-01 DIAGNOSIS — E78.00 HYPERCHOLESTEROLEMIA: ICD-10-CM

## 2022-07-01 DIAGNOSIS — E87.6 HYPOKALEMIA: ICD-10-CM

## 2022-07-01 DIAGNOSIS — N20.0 NEPHROLITHIASIS: ICD-10-CM

## 2022-07-01 DIAGNOSIS — E03.9 ACQUIRED HYPOTHYROIDISM: ICD-10-CM

## 2022-07-01 DIAGNOSIS — E55.9 VITAMIN D DEFICIENCY: ICD-10-CM

## 2022-07-01 DIAGNOSIS — D50.8 IRON DEFICIENCY ANEMIA SECONDARY TO INADEQUATE DIETARY IRON INTAKE: ICD-10-CM

## 2022-07-01 DIAGNOSIS — N13.30 HYDROURETERONEPHROSIS: ICD-10-CM

## 2022-07-01 DIAGNOSIS — I10 BENIGN HYPERTENSION: ICD-10-CM

## 2022-07-01 LAB
25(OH)D3 SERPL-MCNC: 91.3 NG/ML (ref 30–100)
ALBUMIN SERPL BCP-MCNC: 3.9 G/DL (ref 3.5–5)
ALP SERPL-CCNC: 87 U/L (ref 34–104)
ALT SERPL W P-5'-P-CCNC: 23 U/L (ref 7–52)
ANION GAP SERPL CALCULATED.3IONS-SCNC: 10 MMOL/L (ref 4–13)
AST SERPL W P-5'-P-CCNC: 59 U/L (ref 13–39)
BACTERIA UR QL AUTO: ABNORMAL /HPF
BASOPHILS # BLD AUTO: 0.06 THOUSANDS/ΜL (ref 0–0.1)
BASOPHILS NFR BLD AUTO: 1 % (ref 0–1)
BILIRUB SERPL-MCNC: 0.72 MG/DL (ref 0.2–1)
BILIRUB UR QL STRIP: NEGATIVE
BUN SERPL-MCNC: 13 MG/DL (ref 5–25)
CALCIUM SERPL-MCNC: 9.2 MG/DL (ref 8.4–10.2)
CAOX CRY URNS QL MICRO: ABNORMAL /HPF
CHLORIDE SERPL-SCNC: 108 MMOL/L (ref 96–108)
CHOLEST SERPL-MCNC: 115 MG/DL
CLARITY UR: ABNORMAL
CO2 SERPL-SCNC: 24 MMOL/L (ref 21–32)
COLOR UR: YELLOW
CREAT SERPL-MCNC: 1.14 MG/DL (ref 0.6–1.3)
EOSINOPHIL # BLD AUTO: 0.15 THOUSAND/ΜL (ref 0–0.61)
EOSINOPHIL NFR BLD AUTO: 2 % (ref 0–6)
ERYTHROCYTE [DISTWIDTH] IN BLOOD BY AUTOMATED COUNT: 15.9 % (ref 11.6–15.1)
FERRITIN SERPL-MCNC: 129 NG/ML (ref 8–388)
GFR SERPL CREATININE-BSD FRML MDRD: 55 ML/MIN/1.73SQ M
GLUCOSE P FAST SERPL-MCNC: 120 MG/DL (ref 65–99)
GLUCOSE UR STRIP-MCNC: NEGATIVE MG/DL
HCT VFR BLD AUTO: 43.6 % (ref 34.8–46.1)
HDLC SERPL-MCNC: 26 MG/DL
HGB BLD-MCNC: 13.6 G/DL (ref 11.5–15.4)
HGB UR QL STRIP.AUTO: ABNORMAL
IMM GRANULOCYTES # BLD AUTO: 0.05 THOUSAND/UL (ref 0–0.2)
IMM GRANULOCYTES NFR BLD AUTO: 1 % (ref 0–2)
IRON SATN MFR SERPL: 19 % (ref 15–50)
IRON SERPL-MCNC: 59 UG/DL (ref 50–170)
KETONES UR STRIP-MCNC: NEGATIVE MG/DL
LDLC SERPL CALC-MCNC: 57 MG/DL (ref 0–100)
LEUKOCYTE ESTERASE UR QL STRIP: ABNORMAL
LYMPHOCYTES # BLD AUTO: 1.64 THOUSANDS/ΜL (ref 0.6–4.47)
LYMPHOCYTES NFR BLD AUTO: 21 % (ref 14–44)
MAGNESIUM SERPL-MCNC: 2.1 MG/DL (ref 1.9–2.7)
MCH RBC QN AUTO: 30.4 PG (ref 26.8–34.3)
MCHC RBC AUTO-ENTMCNC: 31.2 G/DL (ref 31.4–37.4)
MCV RBC AUTO: 97 FL (ref 82–98)
MONOCYTES # BLD AUTO: 0.61 THOUSAND/ΜL (ref 0.17–1.22)
MONOCYTES NFR BLD AUTO: 8 % (ref 4–12)
NEUTROPHILS # BLD AUTO: 5.41 THOUSANDS/ΜL (ref 1.85–7.62)
NEUTS SEG NFR BLD AUTO: 67 % (ref 43–75)
NITRITE UR QL STRIP: NEGATIVE
NON-SQ EPI CELLS URNS QL MICRO: ABNORMAL /HPF
NONHDLC SERPL-MCNC: 89 MG/DL
NRBC BLD AUTO-RTO: 0 /100 WBCS
PH UR STRIP.AUTO: 6 [PH]
PLATELET # BLD AUTO: 165 THOUSANDS/UL (ref 149–390)
PMV BLD AUTO: 10.1 FL (ref 8.9–12.7)
POTASSIUM SERPL-SCNC: 3.2 MMOL/L (ref 3.5–5.3)
PROT SERPL-MCNC: 7.4 G/DL (ref 6.4–8.4)
PROT UR STRIP-MCNC: ABNORMAL MG/DL
RBC # BLD AUTO: 4.48 MILLION/UL (ref 3.81–5.12)
RBC #/AREA URNS AUTO: ABNORMAL /HPF
SODIUM SERPL-SCNC: 142 MMOL/L (ref 135–147)
SP GR UR STRIP.AUTO: 1.02 (ref 1–1.03)
TIBC SERPL-MCNC: 304 UG/DL (ref 250–450)
TRIGL SERPL-MCNC: 159 MG/DL
TSH SERPL DL<=0.05 MIU/L-ACNC: 1.42 UIU/ML (ref 0.45–4.5)
UROBILINOGEN UR QL STRIP.AUTO: 0.2 E.U./DL
WBC # BLD AUTO: 7.92 THOUSAND/UL (ref 4.31–10.16)
WBC #/AREA URNS AUTO: ABNORMAL /HPF

## 2022-07-01 PROCEDURE — 84443 ASSAY THYROID STIM HORMONE: CPT

## 2022-07-01 PROCEDURE — 83540 ASSAY OF IRON: CPT

## 2022-07-01 PROCEDURE — 36415 COLL VENOUS BLD VENIPUNCTURE: CPT

## 2022-07-01 PROCEDURE — 80053 COMPREHEN METABOLIC PANEL: CPT

## 2022-07-01 PROCEDURE — 81001 URINALYSIS AUTO W/SCOPE: CPT

## 2022-07-01 PROCEDURE — 80061 LIPID PANEL: CPT

## 2022-07-01 PROCEDURE — 83550 IRON BINDING TEST: CPT

## 2022-07-01 PROCEDURE — 82306 VITAMIN D 25 HYDROXY: CPT

## 2022-07-01 PROCEDURE — 85025 COMPLETE CBC W/AUTO DIFF WBC: CPT

## 2022-07-01 PROCEDURE — 82728 ASSAY OF FERRITIN: CPT

## 2022-07-01 PROCEDURE — 83735 ASSAY OF MAGNESIUM: CPT

## 2022-07-01 NOTE — PROGRESS NOTES
ERIKOM for pt to call me at [facility] office

## 2022-07-05 ENCOUNTER — TELEPHONE (OUTPATIENT)
Dept: UROLOGY | Facility: AMBULATORY SURGERY CENTER | Age: 52
End: 2022-07-05

## 2022-07-05 DIAGNOSIS — N20.0 NEPHROLITHIASIS: Primary | ICD-10-CM

## 2022-07-05 NOTE — TELEPHONE ENCOUNTER
Called patient to review results of recent imaging  Findings revealed left sided hydronephrosis and 11mm left lower pole calculus  Patient now reporting intermittent left sided flank pain  She is otherwise asymptomatic  Recommend obtaining a CT renal stone study to more definitively evaluate stone burden  Please assist patient with scheduling CT scan and review ER precautions

## 2022-07-05 NOTE — TELEPHONE ENCOUNTER
Called and spoke with patient  Advised on AP's recommendations  ER precautions reviewed  Central scheduling's number provided  Patient is not in any distress at this time

## 2022-07-12 DIAGNOSIS — E74.39 GLUCOSE INTOLERANCE: Primary | ICD-10-CM

## 2022-07-12 DIAGNOSIS — E87.6 HYPOKALEMIA: ICD-10-CM

## 2022-07-12 RX ORDER — POTASSIUM CHLORIDE 750 MG/1
10 CAPSULE, EXTENDED RELEASE ORAL DAILY
Qty: 10 CAPSULE | Refills: 0 | Status: SHIPPED | OUTPATIENT
Start: 2022-07-12 | End: 2022-07-23

## 2022-07-14 ENCOUNTER — HOSPITAL ENCOUNTER (OUTPATIENT)
Dept: CT IMAGING | Facility: HOSPITAL | Age: 52
Discharge: HOME | End: 2022-07-14
Payer: MEDICARE

## 2022-07-14 DIAGNOSIS — F41.8 DEPRESSION WITH ANXIETY: ICD-10-CM

## 2022-07-14 DIAGNOSIS — N20.0 NEPHROLITHIASIS: ICD-10-CM

## 2022-07-14 PROCEDURE — G1004 CDSM NDSC: HCPCS

## 2022-07-14 PROCEDURE — 74176 CT ABD & PELVIS W/O CONTRAST: CPT

## 2022-07-15 RX ORDER — DIAZEPAM 10 MG/1
10 TABLET ORAL EVERY 6 HOURS PRN
Qty: 30 TABLET | Refills: 0 | Status: SHIPPED | OUTPATIENT
Start: 2022-07-15 | End: 2022-09-27 | Stop reason: SDUPTHER

## 2022-07-18 ENCOUNTER — TELEPHONE (OUTPATIENT)
Dept: OTHER | Facility: OTHER | Age: 52
End: 2022-07-18

## 2022-07-18 DIAGNOSIS — N20.0 KIDNEY STONE ON LEFT SIDE: ICD-10-CM

## 2022-07-18 RX ORDER — TAMSULOSIN HYDROCHLORIDE 0.4 MG/1
0.4 CAPSULE ORAL
Qty: 30 CAPSULE | Refills: 0 | Status: SHIPPED | OUTPATIENT
Start: 2022-07-18 | End: 2022-08-15

## 2022-07-18 NOTE — TELEPHONE ENCOUNTER
Patient was 6/23 s/p  Left ureteroscopy 6/26/22  She had and u/s that showed an 11 mm left stone  She just had a CT scan that shows:    1   5 mm partially obstructing calculus at the left ureteropelvic junction causing mild hydronephrosis and stranding surrounding the renal pelvis  Multiple additional calculi scattered in multiple calyces as described above    2   Sigmoid colonic diverticulosis      Please advise

## 2022-07-18 NOTE — TELEPHONE ENCOUNTER
Attempted to call patient to review results of CT scan, however voicemail left  Patient found to have a 5 mm left UPJ calculus with mild hydronephrosis  Recommend patient take tamsulosin for medical expulsive therapy  Prescription refill was electronically sent to her pharmacy  Patient should also increase her water intake and strain all urine  We will repeat imaging in the next 1-2 weeks  Please review ER precautions

## 2022-07-18 NOTE — TELEPHONE ENCOUNTER
Pt  Missed a call From [facility] concerning the results of her CT  Pt  Would like a call back if possible after 4pm she will be home to receive the call

## 2022-07-19 ENCOUNTER — PREP FOR PROCEDURE (OUTPATIENT)
Dept: UROLOGY | Facility: AMBULATORY SURGERY CENTER | Age: 52
End: 2022-07-19

## 2022-07-19 DIAGNOSIS — N20.1 URETERAL CALCULUS: Primary | ICD-10-CM

## 2022-07-19 NOTE — TELEPHONE ENCOUNTER
Mariana Sotelo routed conversation to [facility] Joss Baer 17 hours ago (2:39 PM)     Mariana Sotelo 17 hours ago (2:39 PM)     DP       Pt  Missed a call From [facility] concerning the results of her CT  Pt  Would like a call back if possible after 4pm she will be home to receive the call

## 2022-07-19 NOTE — TELEPHONE ENCOUNTER
Called patient prior to below encounter to review results of recent CT scan  She states she continues to experience left-sided flank pain and occasional nausea  She denies any fever, chills, lower urinary tract symptoms, gross hematuria, or dysuria  Given her ongoing discomfort, she wishes to proceed with surgical intervention  We discussed scheduling cystoscopy, left-sided ureteroscopy, holmium laser lithotripsy, retrograde pyelogram, and ureteral stent placement  Informed consent was provided  In the interim, I recommend patient continue tamsulosin for medical expulsive therapy and increasing water intake  She will call our office if she passes her stone  Message will be sent to surgery coordinator and case requested  ER precautions were reviewed

## 2022-07-19 NOTE — TELEPHONE ENCOUNTER
Returned call to patient  Discussed CT results  Patient reports the past few days she had some nausea  Reports yesterday pain was 10/10  Patient reports currently no symptoms no fever  Discussed trial of stone passing on it's own  Instructed on increased hydration, straining urine, and Flomax script  Also recommendation for US in 1-2 weeks  ER precautions also discussed  Patient provided with central scheduling phone number to call to schedule US appointment

## 2022-07-21 ENCOUNTER — ANESTHESIA EVENT (INPATIENT)
Dept: PERIOP | Facility: HOSPITAL | Age: 52
DRG: 465 | End: 2022-07-21
Payer: MEDICARE

## 2022-07-21 ENCOUNTER — ANESTHESIA (INPATIENT)
Dept: PERIOP | Facility: HOSPITAL | Age: 52
DRG: 465 | End: 2022-07-21
Payer: MEDICARE

## 2022-07-21 ENCOUNTER — APPOINTMENT (EMERGENCY)
Dept: RADIOLOGY | Facility: HOSPITAL | Age: 52
DRG: 465 | End: 2022-07-21
Payer: MEDICARE

## 2022-07-21 ENCOUNTER — NURSE TRIAGE (OUTPATIENT)
Dept: OTHER | Facility: OTHER | Age: 52
End: 2022-07-21

## 2022-07-21 ENCOUNTER — HOSPITAL ENCOUNTER (INPATIENT)
Facility: HOSPITAL | Age: 52
LOS: 2 days | Discharge: HOME | DRG: 465 | End: 2022-07-23
Attending: EMERGENCY MEDICINE | Admitting: INTERNAL MEDICINE
Payer: MEDICARE

## 2022-07-21 ENCOUNTER — APPOINTMENT (INPATIENT)
Dept: RADIOLOGY | Facility: HOSPITAL | Age: 52
DRG: 465 | End: 2022-07-21
Payer: MEDICARE

## 2022-07-21 ENCOUNTER — APPOINTMENT (EMERGENCY)
Dept: CT IMAGING | Facility: HOSPITAL | Age: 52
DRG: 465 | End: 2022-07-21
Payer: MEDICARE

## 2022-07-21 DIAGNOSIS — N20.0 KIDNEY STONE: ICD-10-CM

## 2022-07-21 DIAGNOSIS — Z72.0 TOBACCO ABUSE DISORDER: ICD-10-CM

## 2022-07-21 DIAGNOSIS — I95.9 HYPOTENSION: ICD-10-CM

## 2022-07-21 DIAGNOSIS — N13.30 HYDROURETERONEPHROSIS: ICD-10-CM

## 2022-07-21 DIAGNOSIS — N12 PYELONEPHRITIS: Primary | ICD-10-CM

## 2022-07-21 PROBLEM — N20.1 LEFT URETERAL STONE: Status: ACTIVE | Noted: 2022-07-21

## 2022-07-21 LAB
ALBUMIN SERPL BCP-MCNC: 3.6 G/DL (ref 3.5–5)
ALP SERPL-CCNC: 82 U/L (ref 34–104)
ALT SERPL W P-5'-P-CCNC: 23 U/L (ref 7–52)
ANION GAP SERPL CALCULATED.3IONS-SCNC: 8 MMOL/L (ref 4–13)
APTT PPP: 38 SECONDS (ref 23–37)
AST SERPL W P-5'-P-CCNC: 56 U/L (ref 13–39)
BACTERIA UR QL AUTO: ABNORMAL /HPF
BASOPHILS # BLD AUTO: 0.03 THOUSANDS/ΜL (ref 0–0.1)
BASOPHILS NFR BLD AUTO: 0 % (ref 0–1)
BILIRUB SERPL-MCNC: 0.76 MG/DL (ref 0.2–1)
BILIRUB UR QL STRIP: NEGATIVE
BUN SERPL-MCNC: 10 MG/DL (ref 5–25)
CALCIUM SERPL-MCNC: 8.6 MG/DL (ref 8.4–10.2)
CHLORIDE SERPL-SCNC: 106 MMOL/L (ref 96–108)
CLARITY UR: ABNORMAL
CLARITY UR: CLEAR
CLARITY UR: CLEAR
CO2 SERPL-SCNC: 25 MMOL/L (ref 21–32)
COLOR UR: YELLOW
CREAT SERPL-MCNC: 1.14 MG/DL (ref 0.6–1.3)
EOSINOPHIL # BLD AUTO: 0.06 THOUSAND/ΜL (ref 0–0.61)
EOSINOPHIL NFR BLD AUTO: 1 % (ref 0–6)
ERYTHROCYTE [DISTWIDTH] IN BLOOD BY AUTOMATED COUNT: 15.4 % (ref 11.6–15.1)
FLUAV RNA RESP QL NAA+PROBE: NEGATIVE
FLUBV RNA RESP QL NAA+PROBE: NEGATIVE
GFR SERPL CREATININE-BSD FRML MDRD: 55 ML/MIN/1.73SQ M
GLUCOSE SERPL-MCNC: 145 MG/DL (ref 65–140)
GLUCOSE UR STRIP-MCNC: NEGATIVE MG/DL
HCT VFR BLD AUTO: 40.3 % (ref 34.8–46.1)
HGB BLD-MCNC: 12.5 G/DL (ref 11.5–15.4)
HGB UR QL STRIP.AUTO: ABNORMAL
IMM GRANULOCYTES # BLD AUTO: 0.04 THOUSAND/UL (ref 0–0.2)
IMM GRANULOCYTES NFR BLD AUTO: 0 % (ref 0–2)
INR PPP: 1.19 (ref 0.84–1.19)
KETONES UR STRIP-MCNC: NEGATIVE MG/DL
LACTATE SERPL-SCNC: 1.7 MMOL/L (ref 0.5–2)
LEUKOCYTE ESTERASE UR QL STRIP: ABNORMAL
LIPASE SERPL-CCNC: 14 U/L (ref 11–82)
LYMPHOCYTES # BLD AUTO: 0.69 THOUSANDS/ΜL (ref 0.6–4.47)
LYMPHOCYTES NFR BLD AUTO: 8 % (ref 14–44)
MCH RBC QN AUTO: 30.2 PG (ref 26.8–34.3)
MCHC RBC AUTO-ENTMCNC: 31 G/DL (ref 31.4–37.4)
MCV RBC AUTO: 97 FL (ref 82–98)
MONOCYTES # BLD AUTO: 0.57 THOUSAND/ΜL (ref 0.17–1.22)
MONOCYTES NFR BLD AUTO: 6 % (ref 4–12)
NEUTROPHILS # BLD AUTO: 7.73 THOUSANDS/ΜL (ref 1.85–7.62)
NEUTS SEG NFR BLD AUTO: 85 % (ref 43–75)
NITRITE UR QL STRIP: NEGATIVE
NITRITE UR QL STRIP: NEGATIVE
NITRITE UR QL STRIP: POSITIVE
NON-SQ EPI CELLS URNS QL MICRO: ABNORMAL /HPF
NRBC BLD AUTO-RTO: 0 /100 WBCS
PH UR STRIP.AUTO: 6 [PH]
PH UR STRIP.AUTO: 6 [PH]
PH UR STRIP.AUTO: 6.5 [PH]
PLATELET # BLD AUTO: 102 THOUSANDS/UL (ref 149–390)
PMV BLD AUTO: 10.4 FL (ref 8.9–12.7)
POTASSIUM SERPL-SCNC: 3.1 MMOL/L (ref 3.5–5.3)
PROCALCITONIN SERPL-MCNC: 1.64 NG/ML
PROT SERPL-MCNC: 6.8 G/DL (ref 6.4–8.4)
PROT UR STRIP-MCNC: ABNORMAL MG/DL
PROT UR STRIP-MCNC: ABNORMAL MG/DL
PROT UR STRIP-MCNC: NEGATIVE MG/DL
PROTHROMBIN TIME: 15.1 SECONDS (ref 11.6–14.5)
RBC # BLD AUTO: 4.14 MILLION/UL (ref 3.81–5.12)
RBC #/AREA URNS AUTO: ABNORMAL /HPF
RSV RNA RESP QL NAA+PROBE: NEGATIVE
SARS-COV-2 RNA RESP QL NAA+PROBE: NEGATIVE
SODIUM SERPL-SCNC: 139 MMOL/L (ref 135–147)
SP GR UR STRIP.AUTO: 1.02 (ref 1–1.03)
SP GR UR STRIP.AUTO: <=1.005 (ref 1–1.03)
SP GR UR STRIP.AUTO: <=1.005 (ref 1–1.03)
UROBILINOGEN UR QL STRIP.AUTO: 0.2 E.U./DL
WBC # BLD AUTO: 9.12 THOUSAND/UL (ref 4.31–10.16)
WBC #/AREA URNS AUTO: ABNORMAL /HPF

## 2022-07-21 PROCEDURE — 87086 URINE CULTURE/COLONY COUNT: CPT | Performed by: EMERGENCY MEDICINE

## 2022-07-21 PROCEDURE — C2617 STENT, NON-COR, TEM W/O DEL: HCPCS | Performed by: UROLOGY

## 2022-07-21 PROCEDURE — 87186 SC STD MICRODIL/AGAR DIL: CPT | Performed by: UROLOGY

## 2022-07-21 PROCEDURE — BT1F1ZZ FLUOROSCOPY OF LEFT KIDNEY, URETER AND BLADDER USING LOW OSMOLAR CONTRAST: ICD-10-PCS | Performed by: UROLOGY

## 2022-07-21 PROCEDURE — 85610 PROTHROMBIN TIME: CPT | Performed by: EMERGENCY MEDICINE

## 2022-07-21 PROCEDURE — 83690 ASSAY OF LIPASE: CPT | Performed by: EMERGENCY MEDICINE

## 2022-07-21 PROCEDURE — 96366 THER/PROPH/DIAG IV INF ADDON: CPT

## 2022-07-21 PROCEDURE — 80053 COMPREHEN METABOLIC PANEL: CPT | Performed by: EMERGENCY MEDICINE

## 2022-07-21 PROCEDURE — C1769 GUIDE WIRE: HCPCS | Performed by: UROLOGY

## 2022-07-21 PROCEDURE — 99223 1ST HOSP IP/OBS HIGH 75: CPT | Performed by: INTERNAL MEDICINE

## 2022-07-21 PROCEDURE — 93005 ELECTROCARDIOGRAM TRACING: CPT

## 2022-07-21 PROCEDURE — 74177 CT ABD & PELVIS W/CONTRAST: CPT

## 2022-07-21 PROCEDURE — 87040 BLOOD CULTURE FOR BACTERIA: CPT | Performed by: EMERGENCY MEDICINE

## 2022-07-21 PROCEDURE — 84145 PROCALCITONIN (PCT): CPT | Performed by: EMERGENCY MEDICINE

## 2022-07-21 PROCEDURE — 96368 THER/DIAG CONCURRENT INF: CPT

## 2022-07-21 PROCEDURE — 74420 UROGRAPHY RTRGR +-KUB: CPT

## 2022-07-21 PROCEDURE — 83605 ASSAY OF LACTIC ACID: CPT | Performed by: EMERGENCY MEDICINE

## 2022-07-21 PROCEDURE — 36415 COLL VENOUS BLD VENIPUNCTURE: CPT | Performed by: EMERGENCY MEDICINE

## 2022-07-21 PROCEDURE — 85025 COMPLETE CBC W/AUTO DIFF WBC: CPT | Performed by: EMERGENCY MEDICINE

## 2022-07-21 PROCEDURE — 87186 SC STD MICRODIL/AGAR DIL: CPT | Performed by: EMERGENCY MEDICINE

## 2022-07-21 PROCEDURE — 96365 THER/PROPH/DIAG IV INF INIT: CPT

## 2022-07-21 PROCEDURE — 87077 CULTURE AEROBIC IDENTIFY: CPT | Performed by: UROLOGY

## 2022-07-21 PROCEDURE — 99285 EMERGENCY DEPT VISIT HI MDM: CPT

## 2022-07-21 PROCEDURE — 0241U HB NFCT DS VIR RESP RNA 4 TRGT: CPT | Performed by: EMERGENCY MEDICINE

## 2022-07-21 PROCEDURE — 71045 X-RAY EXAM CHEST 1 VIEW: CPT

## 2022-07-21 PROCEDURE — 85730 THROMBOPLASTIN TIME PARTIAL: CPT | Performed by: EMERGENCY MEDICINE

## 2022-07-21 PROCEDURE — G1004 CDSM NDSC: HCPCS

## 2022-07-21 PROCEDURE — 99291 CRITICAL CARE FIRST HOUR: CPT | Performed by: EMERGENCY MEDICINE

## 2022-07-21 PROCEDURE — 0T778DZ DILATION OF LEFT URETER WITH INTRALUMINAL DEVICE, VIA NATURAL OR ARTIFICIAL OPENING ENDOSCOPIC: ICD-10-PCS | Performed by: UROLOGY

## 2022-07-21 PROCEDURE — 87086 URINE CULTURE/COLONY COUNT: CPT | Performed by: UROLOGY

## 2022-07-21 PROCEDURE — 87077 CULTURE AEROBIC IDENTIFY: CPT | Performed by: EMERGENCY MEDICINE

## 2022-07-21 PROCEDURE — 81001 URINALYSIS AUTO W/SCOPE: CPT | Performed by: EMERGENCY MEDICINE

## 2022-07-21 PROCEDURE — C1758 CATHETER, URETERAL: HCPCS | Performed by: UROLOGY

## 2022-07-21 RX ORDER — CEFEPIME HYDROCHLORIDE 2 G/50ML
2000 INJECTION, SOLUTION INTRAVENOUS ONCE
Status: COMPLETED | OUTPATIENT
Start: 2022-07-21 | End: 2022-07-21

## 2022-07-21 RX ORDER — POTASSIUM CHLORIDE 20 MEQ/1
40 TABLET, EXTENDED RELEASE ORAL ONCE
Status: COMPLETED | OUTPATIENT
Start: 2022-07-21 | End: 2022-07-21

## 2022-07-21 RX ORDER — DEXAMETHASONE SODIUM PHOSPHATE 10 MG/ML
INJECTION, SOLUTION INTRAMUSCULAR; INTRAVENOUS AS NEEDED
Status: DISCONTINUED | OUTPATIENT
Start: 2022-07-21 | End: 2022-07-21

## 2022-07-21 RX ORDER — SUCRALFATE 1 G/1
1 TABLET ORAL
Status: DISCONTINUED | OUTPATIENT
Start: 2022-07-21 | End: 2022-07-23 | Stop reason: HOSPADM

## 2022-07-21 RX ORDER — CEFEPIME HYDROCHLORIDE 2 G/50ML
2000 INJECTION, SOLUTION INTRAVENOUS EVERY 12 HOURS
Status: DISCONTINUED | OUTPATIENT
Start: 2022-07-22 | End: 2022-07-23 | Stop reason: HOSPADM

## 2022-07-21 RX ORDER — NICOTINE 21 MG/24HR
1 PATCH, TRANSDERMAL 24 HOURS TRANSDERMAL DAILY
Status: DISCONTINUED | OUTPATIENT
Start: 2022-07-21 | End: 2022-07-23 | Stop reason: HOSPADM

## 2022-07-21 RX ORDER — SUCCINYLCHOLINE/SOD CL,ISO/PF 100 MG/5ML
SYRINGE (ML) INTRAVENOUS AS NEEDED
Status: DISCONTINUED | OUTPATIENT
Start: 2022-07-21 | End: 2022-07-21

## 2022-07-21 RX ORDER — SODIUM CHLORIDE, SODIUM GLUCONATE, SODIUM ACETATE, POTASSIUM CHLORIDE, MAGNESIUM CHLORIDE, SODIUM PHOSPHATE, DIBASIC, AND POTASSIUM PHOSPHATE .53; .5; .37; .037; .03; .012; .00082 G/100ML; G/100ML; G/100ML; G/100ML; G/100ML; G/100ML; G/100ML
1000 INJECTION, SOLUTION INTRAVENOUS ONCE
Status: COMPLETED | OUTPATIENT
Start: 2022-07-21 | End: 2022-07-21

## 2022-07-21 RX ORDER — ACETAMINOPHEN 325 MG/1
650 TABLET ORAL EVERY 6 HOURS PRN
Status: DISCONTINUED | OUTPATIENT
Start: 2022-07-21 | End: 2022-07-23 | Stop reason: HOSPADM

## 2022-07-21 RX ORDER — NICOTINE 21 MG/24HR
1 PATCH, TRANSDERMAL 24 HOURS TRANSDERMAL DAILY
Status: DISCONTINUED | OUTPATIENT
Start: 2022-07-22 | End: 2022-07-21

## 2022-07-21 RX ORDER — FENTANYL CITRATE 50 UG/ML
INJECTION, SOLUTION INTRAMUSCULAR; INTRAVENOUS AS NEEDED
Status: DISCONTINUED | OUTPATIENT
Start: 2022-07-21 | End: 2022-07-21

## 2022-07-21 RX ORDER — ONDANSETRON 2 MG/ML
1 INJECTION INTRAMUSCULAR; INTRAVENOUS ONCE
Status: COMPLETED | OUTPATIENT
Start: 2022-07-21 | End: 2022-07-21

## 2022-07-21 RX ORDER — MAGNESIUM HYDROXIDE 1200 MG/15ML
LIQUID ORAL AS NEEDED
Status: DISCONTINUED | OUTPATIENT
Start: 2022-07-21 | End: 2022-07-21 | Stop reason: HOSPADM

## 2022-07-21 RX ORDER — GABAPENTIN 100 MG/1
100 CAPSULE ORAL 3 TIMES DAILY
Status: DISCONTINUED | OUTPATIENT
Start: 2022-07-21 | End: 2022-07-23 | Stop reason: HOSPADM

## 2022-07-21 RX ORDER — ALBUTEROL SULFATE 2.5 MG/3ML
2.5 SOLUTION RESPIRATORY (INHALATION) ONCE AS NEEDED
Status: COMPLETED | OUTPATIENT
Start: 2022-07-21 | End: 2022-07-21

## 2022-07-21 RX ORDER — SODIUM CHLORIDE 9 MG/ML
125 INJECTION, SOLUTION INTRAVENOUS CONTINUOUS
Status: DISCONTINUED | OUTPATIENT
Start: 2022-07-21 | End: 2022-07-22

## 2022-07-21 RX ORDER — METOCLOPRAMIDE HYDROCHLORIDE 5 MG/ML
INJECTION INTRAMUSCULAR; INTRAVENOUS AS NEEDED
Status: DISCONTINUED | OUTPATIENT
Start: 2022-07-21 | End: 2022-07-21

## 2022-07-21 RX ORDER — ONDANSETRON 2 MG/ML
INJECTION INTRAMUSCULAR; INTRAVENOUS AS NEEDED
Status: DISCONTINUED | OUTPATIENT
Start: 2022-07-21 | End: 2022-07-21

## 2022-07-21 RX ORDER — TAMSULOSIN HYDROCHLORIDE 0.4 MG/1
0.4 CAPSULE ORAL
Status: DISCONTINUED | OUTPATIENT
Start: 2022-07-21 | End: 2022-07-23 | Stop reason: HOSPADM

## 2022-07-21 RX ORDER — PHENYLEPHRINE HYDROCHLORIDE 10 MG/ML
INJECTION INTRAVENOUS AS NEEDED
Status: DISCONTINUED | OUTPATIENT
Start: 2022-07-21 | End: 2022-07-21

## 2022-07-21 RX ORDER — ALBUTEROL SULFATE 90 UG/1
2 AEROSOL, METERED RESPIRATORY (INHALATION) EVERY 6 HOURS PRN
Status: DISCONTINUED | OUTPATIENT
Start: 2022-07-21 | End: 2022-07-22

## 2022-07-21 RX ORDER — PRAVASTATIN SODIUM 40 MG
40 TABLET ORAL
Status: DISCONTINUED | OUTPATIENT
Start: 2022-07-21 | End: 2022-07-23 | Stop reason: HOSPADM

## 2022-07-21 RX ORDER — SODIUM CHLORIDE, SODIUM LACTATE, POTASSIUM CHLORIDE, CALCIUM CHLORIDE 600; 310; 30; 20 MG/100ML; MG/100ML; MG/100ML; MG/100ML
100 INJECTION, SOLUTION INTRAVENOUS CONTINUOUS
Status: DISCONTINUED | OUTPATIENT
Start: 2022-07-21 | End: 2022-07-22

## 2022-07-21 RX ORDER — FENTANYL CITRATE/PF 50 MCG/ML
25 SYRINGE (ML) INJECTION
Status: DISCONTINUED | OUTPATIENT
Start: 2022-07-21 | End: 2022-07-21 | Stop reason: HOSPADM

## 2022-07-21 RX ORDER — LEVOTHYROXINE SODIUM 112 UG/1
112 TABLET ORAL
Status: DISCONTINUED | OUTPATIENT
Start: 2022-07-22 | End: 2022-07-23 | Stop reason: HOSPADM

## 2022-07-21 RX ORDER — ONDANSETRON 2 MG/ML
4 INJECTION INTRAMUSCULAR; INTRAVENOUS EVERY 6 HOURS PRN
Status: DISCONTINUED | OUTPATIENT
Start: 2022-07-21 | End: 2022-07-23 | Stop reason: HOSPADM

## 2022-07-21 RX ORDER — PANTOPRAZOLE SODIUM 40 MG/1
40 TABLET, DELAYED RELEASE ORAL
Status: DISCONTINUED | OUTPATIENT
Start: 2022-07-21 | End: 2022-07-23 | Stop reason: HOSPADM

## 2022-07-21 RX ORDER — LIDOCAINE HYDROCHLORIDE 20 MG/ML
INJECTION, SOLUTION EPIDURAL; INFILTRATION; INTRACAUDAL; PERINEURAL AS NEEDED
Status: DISCONTINUED | OUTPATIENT
Start: 2022-07-21 | End: 2022-07-21

## 2022-07-21 RX ORDER — BUPROPION HYDROCHLORIDE 150 MG/1
150 TABLET ORAL EVERY MORNING
Status: DISCONTINUED | OUTPATIENT
Start: 2022-07-22 | End: 2022-07-23 | Stop reason: HOSPADM

## 2022-07-21 RX ORDER — SODIUM CHLORIDE, SODIUM LACTATE, POTASSIUM CHLORIDE, CALCIUM CHLORIDE 600; 310; 30; 20 MG/100ML; MG/100ML; MG/100ML; MG/100ML
INJECTION, SOLUTION INTRAVENOUS CONTINUOUS PRN
Status: DISCONTINUED | OUTPATIENT
Start: 2022-07-21 | End: 2022-07-21

## 2022-07-21 RX ORDER — ALBUTEROL SULFATE 2.5 MG/3ML
SOLUTION RESPIRATORY (INHALATION)
Status: COMPLETED
Start: 2022-07-21 | End: 2022-07-21

## 2022-07-21 RX ORDER — OXYCODONE HYDROCHLORIDE AND ACETAMINOPHEN 5; 325 MG/1; MG/1
2 TABLET ORAL EVERY 6 HOURS PRN
Status: DISCONTINUED | OUTPATIENT
Start: 2022-07-21 | End: 2022-07-23 | Stop reason: HOSPADM

## 2022-07-21 RX ORDER — SERTRALINE HYDROCHLORIDE 100 MG/1
100 TABLET, FILM COATED ORAL 2 TIMES DAILY
Status: DISCONTINUED | OUTPATIENT
Start: 2022-07-21 | End: 2022-07-23 | Stop reason: HOSPADM

## 2022-07-21 RX ORDER — DIAZEPAM 5 MG/1
10 TABLET ORAL EVERY 6 HOURS PRN
Status: DISCONTINUED | OUTPATIENT
Start: 2022-07-21 | End: 2022-07-23 | Stop reason: HOSPADM

## 2022-07-21 RX ORDER — CEFEPIME HYDROCHLORIDE 2 G/1
2000 INJECTION, POWDER, FOR SOLUTION INTRAVENOUS EVERY 12 HOURS
Status: DISCONTINUED | OUTPATIENT
Start: 2022-07-22 | End: 2022-07-21

## 2022-07-21 RX ORDER — PROPOFOL 10 MG/ML
INJECTION, EMULSION INTRAVENOUS AS NEEDED
Status: DISCONTINUED | OUTPATIENT
Start: 2022-07-21 | End: 2022-07-21

## 2022-07-21 RX ORDER — ENOXAPARIN SODIUM 100 MG/ML
40 INJECTION SUBCUTANEOUS DAILY
Status: DISCONTINUED | OUTPATIENT
Start: 2022-07-22 | End: 2022-07-23 | Stop reason: HOSPADM

## 2022-07-21 RX ORDER — ONDANSETRON 2 MG/ML
4 INJECTION INTRAMUSCULAR; INTRAVENOUS ONCE AS NEEDED
Status: DISCONTINUED | OUTPATIENT
Start: 2022-07-21 | End: 2022-07-21 | Stop reason: HOSPADM

## 2022-07-21 RX ADMIN — PHENYLEPHRINE HYDROCHLORIDE 100 MCG: 10 INJECTION INTRAVENOUS at 17:25

## 2022-07-21 RX ADMIN — SODIUM CHLORIDE, SODIUM GLUCONATE, SODIUM ACETATE, POTASSIUM CHLORIDE, MAGNESIUM CHLORIDE, SODIUM PHOSPHATE, DIBASIC, AND POTASSIUM PHOSPHATE 1000 ML: .53; .5; .37; .037; .03; .012; .00082 INJECTION, SOLUTION INTRAVENOUS at 12:17

## 2022-07-21 RX ADMIN — IOHEXOL 50 ML: 240 INJECTION, SOLUTION INTRATHECAL; INTRAVASCULAR; INTRAVENOUS; ORAL at 14:27

## 2022-07-21 RX ADMIN — DEXAMETHASONE SODIUM PHOSPHATE 10 MG: 10 INJECTION INTRAMUSCULAR; INTRAVENOUS at 17:03

## 2022-07-21 RX ADMIN — METOCLOPRAMIDE HYDROCHLORIDE 10 MG: 5 INJECTION INTRAMUSCULAR; INTRAVENOUS at 16:59

## 2022-07-21 RX ADMIN — FENTANYL CITRATE 50 MCG: 50 INJECTION, SOLUTION INTRAMUSCULAR; INTRAVENOUS at 17:03

## 2022-07-21 RX ADMIN — POTASSIUM CHLORIDE 40 MEQ: 1500 TABLET, EXTENDED RELEASE ORAL at 19:47

## 2022-07-21 RX ADMIN — SUCRALFATE 1 G: 1 TABLET ORAL at 19:46

## 2022-07-21 RX ADMIN — ONDANSETRON 4 MG: 2 INJECTION INTRAMUSCULAR; INTRAVENOUS at 17:30

## 2022-07-21 RX ADMIN — Medication 200 MG: at 17:04

## 2022-07-21 RX ADMIN — SERTRALINE 100 MG: 100 TABLET, FILM COATED ORAL at 19:46

## 2022-07-21 RX ADMIN — PRAVASTATIN SODIUM 40 MG: 40 TABLET ORAL at 19:47

## 2022-07-21 RX ADMIN — ALBUTEROL SULFATE 2.5 MG: 2.5 SOLUTION RESPIRATORY (INHALATION) at 17:53

## 2022-07-21 RX ADMIN — FENTANYL CITRATE 25 MCG: 50 INJECTION, SOLUTION INTRAMUSCULAR; INTRAVENOUS at 17:18

## 2022-07-21 RX ADMIN — OXYCODONE HYDROCHLORIDE AND ACETAMINOPHEN 2 TABLET: 5; 325 TABLET ORAL at 19:40

## 2022-07-21 RX ADMIN — CEFEPIME HYDROCHLORIDE 2000 MG: 2 INJECTION, SOLUTION INTRAVENOUS at 23:02

## 2022-07-21 RX ADMIN — SODIUM CHLORIDE, SODIUM LACTATE, POTASSIUM CHLORIDE, AND CALCIUM CHLORIDE: .6; .31; .03; .02 INJECTION, SOLUTION INTRAVENOUS at 16:55

## 2022-07-21 RX ADMIN — SODIUM CHLORIDE 125 ML/HR: 0.9 INJECTION, SOLUTION INTRAVENOUS at 19:48

## 2022-07-21 RX ADMIN — LIDOCAINE HYDROCHLORIDE 100 MG: 20 INJECTION, SOLUTION EPIDURAL; INFILTRATION; INTRACAUDAL; PERINEURAL at 17:03

## 2022-07-21 RX ADMIN — PANTOPRAZOLE SODIUM 40 MG: 40 TABLET, DELAYED RELEASE ORAL at 19:47

## 2022-07-21 RX ADMIN — CEFEPIME HYDROCHLORIDE 2000 MG: 2 INJECTION, SOLUTION INTRAVENOUS at 12:27

## 2022-07-21 RX ADMIN — IOHEXOL 100 ML: 350 INJECTION, SOLUTION INTRAVENOUS at 14:28

## 2022-07-21 RX ADMIN — GABAPENTIN 100 MG: 100 CAPSULE ORAL at 21:54

## 2022-07-21 RX ADMIN — POTASSIUM CHLORIDE 40 MEQ: 1500 TABLET, EXTENDED RELEASE ORAL at 19:53

## 2022-07-21 RX ADMIN — NICOTINE 1 PATCH: 14 PATCH, EXTENDED RELEASE TRANSDERMAL at 23:02

## 2022-07-21 RX ADMIN — VANCOMYCIN HYDROCHLORIDE 1750 MG: 1 INJECTION, POWDER, LYOPHILIZED, FOR SOLUTION INTRAVENOUS at 12:36

## 2022-07-21 RX ADMIN — PROPOFOL 200 MG: 10 INJECTION, EMULSION INTRAVENOUS at 17:03

## 2022-07-21 RX ADMIN — TAMSULOSIN HYDROCHLORIDE 0.4 MG: 0.4 CAPSULE ORAL at 19:46

## 2022-07-21 RX ADMIN — PHENYLEPHRINE HYDROCHLORIDE 100 MCG: 10 INJECTION INTRAVENOUS at 17:22

## 2022-07-21 NOTE — H&P
[address] 1970, 46 y o  female MRN: [id number]  Unit/Bed#: Z2 H4 Encounter: [id number]  Primary Care Provider: Gely Painting DO   Date and time admitted to hospital: 7/21/2022 11:15 AM    * Left ureteral stone  Assessment & Plan  · Presented with left flank pain and nausea  · CT aldo/pelvis (7/21): Left ureteral calculi eliciting moderate left hydroureteronephrosis and findings of left renal impairment  Findings indeterminate for left renal pyelonephritis  · Pain control with Tylenol and home Percocet  · Further supportive care with IV Zofran  · Continue home Flomax 0 4 mg daily  · Plans for cystoscopy with left ureteral stent this afternoon with Dr Vida Michael    Pyelonephritis  Assessment & Plan  · Febrile of 102 2 at home; no further fevers since admission  · UA grossly positive  · Urine cultures pending at this time  · Received cefepime in the emergency department- will continue at this time    Hydroureteronephrosis  Assessment & Plan  · Demonstrated on imaging as above  · Further management as above    Hypokalemia  Assessment & Plan  · K:  3 1  · Replete and recheck  · Check magnesium level    Hypothyroidism  Assessment & Plan  · Continue home levothyroxine 112 mcg daily    Primary hypertriglyceridemia  Assessment & Plan  · Substitute home simvastatin for pravastatin while inpatient      Gastroesophageal reflux disease with esophagitis  Assessment & Plan  · Continue home care feet 1 g t i d  and Protonix 40 mg b i d  Anxiety  Assessment & Plan  · Continue home Wellbutrin 150 mg daily, Zoloft 100 mg b i d , and Valium 10 mg q 6 p r n  VTE Pharmacologic Prophylaxis: VTE Score: 3 Moderate Risk (Score 3-4) - Pharmacological DVT Prophylaxis Ordered: enoxaparin (Lovenox)  Code Status: Level 1 - Full Code   Discussion with family: Updated  (sister) via phone      Anticipated Length of Stay: Patient will be admitted on an inpatient basis with an anticipated length of stay of greater than 2 midnights secondary to Left ureteral stone with hydronephrosis and pyelonephritis  Total Time for Visit, including Counseling / Coordination of Care: 70 minutes Greater than 50% of this total time spent on direct patient counseling and coordination of care  Chief Complaint:  Left flank pain    History of Present Illness:  Spring Carter is a 46 y o  female with a PMH of hypothyroidism, hypertriglyceridemia, and prior history of renal stones who presents with the complaint of left flank pain  She notes that the pain began suddenly around 630 this morning and has progressively worsened since onset  The pain started in the left flank and has gradually moved into the left groin  She denies any associated dysuria or hematuria  Unfortunately she did note a temperature of a 102 2° at home and this prompted her presentation to the emergency department  She has had a prior history of renal stones, cystoscopy, and ureteral stent placement  In the emergency department CT abdomen and pelvis was performed that demonstrated a left ureteral calculi with moderate hydroureter nephrosis and findings consistent with renal impairment and pyelonephritis  She was given 2 L of Plasmalyte in the emergency department for relatively low blood pressure and started on cefepime  She was ultimately admitted to the medical floor for further observation and management  Review of Systems:  Review of Systems   Constitutional: Positive for fever  Negative for chills  HENT: Negative for ear pain, sinus pressure and sore throat  Eyes: Negative for pain and visual disturbance  Respiratory: Negative for cough, shortness of breath and wheezing  Cardiovascular: Negative for chest pain and palpitations  Gastrointestinal: Positive for nausea  Negative for abdominal pain, constipation, diarrhea and vomiting  Genitourinary: Positive for flank pain  Negative for dysuria and hematuria  Musculoskeletal: Negative for arthralgias and back pain  Skin: Negative for color change and rash  Neurological: Positive for headaches  Negative for dizziness, seizures and syncope  Psychiatric/Behavioral: Negative for agitation, confusion and hallucinations  All other systems reviewed and are negative        Past Medical and Surgical History:   Past Medical History:   Diagnosis Date    Allergic sinusitis     last assessed - [id number]    Anxiety     last assessed - [id number]    Arthritis     Asthma     last assessed - 84DGT9986    Bilateral lower extremity edema     last assessed - 95YPK7785    Callus of foot     last assessed - 48Ets9007    Chronic GERD     last assessed - 99IQK3451    Colon polyp     Constipation     Resolved - 90KRZ7286    COPD (chronic obstructive pulmonary disease) ([facility] )     Depression     last assessed - 45HEB6833    Disease of thyroid gland     Diverticulitis of colon     last assessed - 70JNS8492    Dyspepsia     Resolved - 33CAK9535    Esophageal reflux     last assessed - 41KXO6393    Essential hypertriglyceridemia     last assessed - 90JTG3781    GERD (gastroesophageal reflux disease)     Gynecological disorder     last assessed - 50SRU1337    Hypertension     last assessed - 41PIL1527    Hypotension     last assessed - 00Bap7559    Kidney stone     Migraine     Neuropathy     Pulmonary emphysema ([facility] )     last assessed - 59BLL3813    Seasonal allergies     Urinary frequency     last assessed - 76Deo5205    Vagina, candidiasis     last assessed - 61Rqt4509    Visual impairment        Past Surgical History:   Procedure Laterality Date    CARPAL TUNNEL RELEASE      last assessed - 51BIC0717    CHOLECYSTECTOMY      last assessed - 28TQS4362    COLONOSCOPY      Complete colonoscopy     EYE SURGERY      last assessed - 85CTW8226   [facility] RETROGRADE PYELOGRAM  6/16/2022    FOOT SURGERY      last assessed - 16TCA3512    MO CYSTO/URETERO W/LITHOTRIPSY &INDWELL STENT INSRT Left 6/16/2022    Procedure: CYSTOSCOPY URETEROSCOPY WITH LITHOTRIPSY HOLMIUM LASER, RETROGRADE PYELOGRAM AND INSERTION STENT URETERAL;  Surgeon: Irma Magana MD;  Location: [facility];  Service: Urology    TX ESOPHAGOGASTRODUODENOSCOPY TRANSORAL DIAGNOSTIC N/A 2/13/2017    Procedure: ESOPHAGOGASTRODUODENOSCOPY (EGD); Surgeon: Tommie Ba MD;  Location: [facility]; Service: Gastroenterology       Meds/Allergies:  Prior to Admission medications    Medication Sig Start Date End Date Taking?  Authorizing Provider   diazepam (VALIUM) 10 mg tablet Take 1 tablet (10 mg total) by mouth every 6 (six) hours as needed for anxiety 7/15/22   [facility], DO   albuterol (2 5 mg/3 mL) 0 083 % nebulizer solution Take 3 mL (2 5 mg total) by nebulization every 6 (six) hours as needed for wheezing or shortness of breath 12/21/21   Rock Sensing, CRNP   albuterol (PROVENTIL HFA,VENTOLIN HFA) 90 mcg/act inhaler Inhale 2 puffs every 6 (six) hours as needed for wheezing 12/21/21   Rock Sensing, CRNP   buPROPion (WELLBUTRIN XL) 150 mg 24 hr tablet Take 1 tablet (150 mg total) by mouth every morning 4/27/22   Sandy Gregorio, DO   ergocalciferol (VITAMIN D2) 50,000 units Take 1 capsule (50,000 Units total) by mouth once a week 4/27/22   Sandy Gregorio, DO   fluticasone Janalyn Person) 50 mcg/act nasal spray 1 spray into each nostril 2 (two) times a day 12/21/21   Rock Sensing, CRNP   furosemide (LASIX) 20 mg tablet Take 1 tablet (20 mg total) by mouth daily as needed (edema) 8/7/20   Sandy Gregorio, DO   gabapentin (NEURONTIN) 100 mg capsule Take 100 mg by mouth 3 (three) times a day 10/4/19   Historical Provider, MD   ibuprofen (MOTRIN) 600 mg tablet Take 600 mg by mouth as needed 8/7/19   Historical Provider, MD   levothyroxine 112 mcg tablet Take 1 tablet (112 mcg total) by mouth daily 4/27/22   Sandy Gregorio, DO   ondansetron (ZOFRAN-ODT) 4 mg disintegrating tablet Take 1 tablet (4 mg total) by mouth every 6 (six) hours as needed for nausea or vomiting 6/10/22   Duncan De Dios DO   oxyCODONE-acetaminophen (PERCOCET)  mg per tablet take 1 tablet by mouth every 6 hours if needed for pain MAX 4 TABLETS PER DAY 6/27/22   Historical Provider, MD   pantoprazole (PROTONIX) 40 mg tablet Take 1 tablet (40 mg total) by mouth 2 (two) times a day as needed (acid reflux) 4/27/22   Jairo Duvall DO   potassium chloride (MICRO-K) 10 MEQ CR capsule Take 1 capsule (10 mEq total) by mouth daily for 10 days 7/12/22 7/22/22  Jairo Duvall DO   sertraline (ZOLOFT) 100 mg tablet Take 1 tablet (100 mg total) by mouth 2 (two) times a day 4/27/22   Jairo Duvall DO   simvastatin (ZOCOR) 20 mg tablet Take 1 tablet (20 mg total) by mouth daily at bedtime 4/27/22   Jairo Duvall DO   sucralfate (CARAFATE) 1 g tablet 1 TABLET 3 TIMES A DAY BEFORE MEALS 4/27/22   Jairo Duvall DO   tamsulosin [facility]) 0 4 mg Take 1 capsule (0 4 mg total) by mouth daily with dinner 7/18/22   Saintclair Ro Sabbia, CRNP   naloxone [facility]) 4 mg/0 1 mL nasal spray Administer 1 spray into a nostril  If no response after 2-3 minutes, give another dose in the other nostril using a new spray  Patient not taking: Reported on 6/30/2022 1/15/21 7/21/22  Jairo Duvall DO     I have reviewed home medications with patient personally  Allergies:    Allergies   Allergen Reactions    Hydrocodone-Acetaminophen Hives    Ketorolac Hives and Dizziness    Toradol [Ketorolac Tromethamine] Other (See Comments)     hypotension    Ultram [Tramadol] Nausea Only, GI Intolerance and Vomiting       Social History:  Marital Status:    Patient Pre-hospital Living Situation: Home  Patient Pre-hospital Level of Mobility: walks  Patient Pre-hospital Diet Restrictions:  None    Substance Use History:   Social History     Substance and Sexual Activity   Alcohol Use Not Currently    Comment: wine twice a year     Social History     Tobacco Use   Smoking Status Current Every Day Smoker  Packs/day: 1 50    Years: 30 00    Pack years: 45 00    Types: Cigarettes   Smokeless Tobacco Never Used   Tobacco Comment    one pack     Social History     Substance and Sexual Activity   Drug Use No       Family History:  Family History   Problem Relation Age of Onset    Lung cancer Sister     Coronary artery disease Sister     Stroke Sister     Asthma Sister     Hypertension Other     Lung cancer Other     Hypertension Other     Lung cancer Other     Lung cancer Other     Lung cancer Maternal Grandmother     Thyroid disease Mother     Diabetes Maternal Grandfather        Physical Exam:     Vitals:   Blood Pressure: 117/67 (07/21/22 1440)  Pulse: 81 (07/21/22 1440)  Temperature: 98 1 °F (36 7 °C) (07/21/22 1156)  Temp Source: Oral (07/21/22 1156)  Respirations: 18 (07/21/22 1440)  Height: 5' 7" (170 2 cm) (07/21/22 1156)  Weight - Scale: 113 kg (250 lb) (07/21/22 1156)  SpO2: 98 % (07/21/22 1440)    Physical Exam  Vitals and nursing note reviewed  Constitutional:       General: She is not in acute distress  Appearance: Normal appearance  She is well-developed  She is obese  HENT:      Head: Normocephalic and atraumatic  Mouth/Throat:      Mouth: Mucous membranes are moist       Pharynx: Oropharynx is clear  Eyes:      Extraocular Movements: Extraocular movements intact  Conjunctiva/sclera: Conjunctivae normal    Cardiovascular:      Rate and Rhythm: Normal rate and regular rhythm  Pulses: Normal pulses  Heart sounds: Normal heart sounds  No murmur heard  Pulmonary:      Effort: Pulmonary effort is normal  No respiratory distress  Breath sounds: Normal breath sounds  Abdominal:      General: Bowel sounds are normal  There is no distension  Palpations: Abdomen is soft  Tenderness: There is no abdominal tenderness  Musculoskeletal:         General: Normal range of motion  Cervical back: Normal range of motion and neck supple     Skin: General: Skin is warm and dry  Neurological:      General: No focal deficit present  Mental Status: She is alert and oriented to person, place, and time  Mental status is at baseline  Psychiatric:         Mood and Affect: Mood normal          Behavior: Behavior normal          Judgment: Judgment normal          Lab Results:  Results from last 7 days   Lab Units 07/21/22  1210   WBC Thousand/uL 9 12   HEMOGLOBIN g/dL 12 5   HEMATOCRIT % 40 3   PLATELETS Thousands/uL 102*   NEUTROS PCT % 85*   LYMPHS PCT % 8*   MONOS PCT % 6   EOS PCT % 1     Results from last 7 days   Lab Units 07/21/22  1210   SODIUM mmol/L 139   POTASSIUM mmol/L 3 1*   CHLORIDE mmol/L 106   CO2 mmol/L 25   BUN mg/dL 10   CREATININE mg/dL 1 14   ANION GAP mmol/L 8   CALCIUM mg/dL 8 6   ALBUMIN g/dL 3 6   TOTAL BILIRUBIN mg/dL 0 76   ALK PHOS U/L 82   ALT U/L 23   AST U/L 56*   GLUCOSE RANDOM mg/dL 145*     Results from last 7 days   Lab Units 07/21/22  1210   INR  1 19             Results from last 7 days   Lab Units 07/21/22  1210   LACTIC ACID mmol/L 1 7   PROCALCITONIN ng/ml 1 64*       Imaging: Reviewed radiology reports from this admission including: abdominal/pelvic CT  CT abdomen pelvis with contrast   Final Result by Eugenio Fry MD (07/21 4286)      Left ureteral calculi eliciting moderate left hydroureteronephrosis and findings of left renal impairment  Findings indeterminate for left renal pyelonephritis  The study was marked in [facility] for immediate notification  Workstation performed: [id number]         XR chest portable   Final Result by Ayde Umana MD (07/21 6227)      No acute cardiopulmonary disease  Workstation performed: [id number]         XR retrograde pyelogram    (Results Pending)       EKG and Other Studies Reviewed on Admission:   · EKG: NSR  HR 86     ** Please Note: This note has been constructed using a voice recognition system   **

## 2022-07-21 NOTE — ASSESSMENT & PLAN NOTE
· Presented with left flank pain and nausea  · CT aldo/pelvis (7/21): Left ureteral calculi eliciting moderate left hydroureteronephrosis and findings of left renal impairment  Findings indeterminate for left renal pyelonephritis     · Pain control with Tylenol and home Percocet  · Further supportive care with IV Zofran  · Continue home Flomax 0 4 mg daily  · Plans for cystoscopy with left ureteral stent this afternoon with Dr Gunner Burt

## 2022-07-21 NOTE — TELEPHONE ENCOUNTER
Reason for Disposition   SEVERE pain (e g , excruciating, scale 8-10) and present > 1 hour    Answer Assessment - Initial Assessment Questions  1  BLOOD PRESSURE: "What is the blood pressure?" "Did you take at least two measurements 5 minutes apart?"      *No Answer*  2  ONSET: "When did you take your blood pressure?"      *No Answer*  3  HOW: "How did you obtain the blood pressure?" (e g , visiting nurse, automatic home BP monitor)      *No Answer*  4  HISTORY: "Do you have a history of high blood pressure?"      *No Answer*  5  MEDICATIONS: "Are you taking any medications for blood pressure?" "Have you missed any doses recently?"      *No Answer*  6  OTHER SYMPTOMS: "Do you have any symptoms?" (e g , headache, chest pain, blurred vision, difficulty breathing, weakness)      *No Answer*    N, pain since 630am  102 2 this morning, current :100 6 no pain meds   Possible kidney stone   107  160/93 L   Slight headache- same   Denies vision changes   Feel like crap  Pain left kidney area   pain about same   630- oxycodon     Answer Assessment - Initial Assessment Questions  1  LOCATION: "Where does it hurt?" (e g , left, right)      Lower left abdomin  2  ONSET: "When did the pain start?"    3  SEVERITY: "How bad is the pain?" (e g , Scale 1-10; mild, moderate, or severe)    - MILD (1-3): doesn't interfere with normal activities     - MODERATE (4-7): interferes with normal activities or awakens from sleep     - SEVERE (8-10): excruciating pain and patient unable to do normal activities (stays in bed)        8 5/10 after taking oxycodone  4  PATTERN: "Does the pain come and go, or is it constant?"     Constant   5  CAUSE: "What do you think is causing the pain?"      Possible kidney stone  6  OTHER SYMPTOMS:  "Do you have any other symptoms?" (e g , fever, abdominal pain, vomiting, leg weakness, burning with urination, blood in urine)      Nausea, high blood pressure and fever  Protocols used:  FLANK PAIN-ADULT-OH, HIGH BLOOD PRESSURE-ADULT-OH

## 2022-07-21 NOTE — SEPSIS NOTE
Sepsis Note   Jhoana Armendariz 46 y o  female MRN: [id number]  Unit/Bed#: Z2 H4 Encounter: [id number]       qSOFA     [address] Name 07/21/22 1440 07/21/22 1326 07/21/22 1228 07/21/22 1217 07/21/22 1156    Altered mental status GCS < 15 -- -- -- -- --    Respiratory Rate > / =22 0 0 0 -- 0    Systolic BP < / =774 0 0 0 1 1    Q Sofa Score 0 0 0 1 1               Initial Sepsis Screening     Row Name 07/21/22 1217                Is the patient's history suggestive of a new or worsening infection? Yes (Proceed)  -GC        Suspected source of infection urinary tract infection  -GC        Are two or more of the following signs & symptoms of infection both present and new to the patient? Yes (Proceed)  -GC        Indicate SIRS criteria Hyperthemia > 38 3C (100 9F); Tachypnea > 20 resp per min  -GC        If the answer is yes to both questions, suspicion of sepsis is present --        If severe sepsis is present AND tissue hypoperfusion perists in the hour after fluid resuscitation or lactate > 4, the patient meets criteria for SEPTIC SHOCK --        Are any of the following organ dysfunction criteria present within 6 hours of suspected infection and SIRS criteria that are NOT considered to be chronic conditions? No  -GC        Organ dysfunction --        Date of presentation of severe sepsis --        Time of presentation of severe sepsis --        Tissue hypoperfusion persists in the hour after crystalloid fluid administration, evidenced, by either: --        Was hypotension present within one hour of the conclusion of crystalloid fluid administration?  No  -GC        Date of presentation of septic shock --        Time of presentation of septic shock --              User Key  (r) = Recorded By, (t) = Taken By, (c) = Cosigned By    [address] Name Provider Type    [address] [place] [address] III, DO Physician                Broad-spectrum antibiotics was administered, cefepime and vancomycin, 30 cc/kilos IV isolyte given on top of fluids given by prehospital personnel  Hypotension resolved  Portions of the record may have been created with voice recognition software  Occasional wrong word or "sound a like" substitutions may have occurred due to the inherent limitations of voice recognition software  Read the chart carefully and recognize, using context, where substitutions have occurred

## 2022-07-21 NOTE — OP NOTE
OPERATIVE REPORT  PATIENT NAME: Estella Almaguer    :  1970  MRN: [id number]  Pt Location: [facility] OR ROOM 02    SURGERY DATE: 2022    Surgeon(s) and Role:     * Hero Morris MD - Primary    Preop Diagnosis:  Obstructing left ureteral stones with urosepsis    Postoperative diagnosis:  Same    Procedure(s) (LRB):  CYSTOSCOPY RETROGRADE PYELOGRAM WITH INSERTION STENT URETERAL (Left)    Specimen(s):  ID Type Source Tests Collected by Time Destination   A : BLADDER URINE Urine Urinary Bladder URINE CULTURE, URINALYSIS WITH REFLEX TO SCOPE Hero Morris MD 2022 172    B : LEFT KIDNEY URINE CULTURE Urine Kidney, Left URINE CULTURE, URINALYSIS WITH REFLEX TO SCOPE Hero Morris MD 2022 172        Estimated Blood Loss:   None    Drains:  Ureteral Internal Stent Left ureter 6 Fr  (Active)   Number of days: 0       Anesthesia Type:   General endotracheal tube    Operative Indications: This is a 46year old woman status post recent left ureteroscopy and laser lithotripsy who now presents with obstructing left ureteral calculi with UTI and sepsis  Blood pressure recovered with intravenous hydration and she is now afebrile  Options, procedures, benefits and risks were discussed and she agrees to the planned procedure  Operative Findings: Moderate left hydronephrosis    Complications:   None    Procedure and Technique:  The patient was properly identified in the operating room and after adequate general anesthesia was placed in the lithotomy position  The lower abdomen and genitalia were prepped and draped in the usual fashion  Twenty-one English cystoscope was inserted per urethra and urine was drained and sent for urinalysis and culture  Cystoscopy was performed with 30 degree lens  The bladder was smooth with no masses or calcifications  The ureteral orifices were normal bilaterally  A solo guidewire was passed into the left ureteral orifice easily up the ureter into the collecting system  A 5 Czech open-ended ureteral catheter was passed over the wire into the collecting system  The wire was removed  A rapid drip of clear yellow urine was drained and sent for culture  The wire was replaced into the upper pole and the catheter was removed after measuring the ureteral length  A 6 Czech 26 cm Bard inlay optima stent was passed over the wire and the wire was removed leaving a good curl of stent within the collecting system and within the bladder  The bladder was emptied and the cystoscope was removed  The patient tolerated the procedure well and left the operating room in good condition     I was present for the entire procedure    Patient Disposition:  PACU       SIGNATURE: Delfino Saenz MD  DATE: July 21, 2022  TIME: 5:48 PM

## 2022-07-21 NOTE — TELEPHONE ENCOUNTER
Patient called in with elevated BP , abdominal pain and nausea  Current BP left arm is 160/93    Pain 8 5/10  After taking oxycodone  Accompanied with 100 6 fever( highest temp of 102 2 at 330am)  and nausea  Recently saw urology for kidney stone  States slight headache  Denies vision changes  Advised pt to go to ED unable to have someone drive  States she will call for an ambulance

## 2022-07-21 NOTE — ANESTHESIA POSTPROCEDURE EVALUATION
Post-Op Assessment Note    CV Status:  Stable    Pain management: satisfactory to patient     Mental Status:  Sleepy and arousable   Hydration Status:  Euvolemic   PONV Controlled:  Controlled   Airway Patency:  Patent  Airway: intubated      Post Op Vitals Reviewed: Yes      Staff: CRNA, Anesthesiologist         No complications documented      /67 (07/21/22 1745)    Temp (!) 97 3 °F (36 3 °C) (07/21/22 1745) 97 3   Pulse 92 (07/21/22 1745)   Resp (!) 29 (07/21/22 1745)    SpO2 91 % (07/21/22 1745)

## 2022-07-21 NOTE — ED PROVIDER NOTES
History  Chief Complaint   Patient presents with    Flank Pain     Left lower  Since 0630 today  Had fever earlier today  Sent in by urology for possible kidney stones  HPI    This is a 19-year-old female presents the emergency department via EMS secondary to T-max fever greater than 102 at home, with associated left-sided lower abdominal pain which started approximately 630 this morning, took her oxycodone at the same time, and took a 2nd oxycodone prior to coming the emergency department  Patient reports no cough, no congestion, no chest pain, 1 episode of nausea with vomiting, nonbilious in nature, no diarrhea  Past medical history significant for the following:  COPD not on home oxygen, kidney stones status post left ureteral stent placement by Dr Jamar Castro 2022, history of diverticulitis, history of cholecystectomy  Prior to Admission Medications   Prescriptions Last Dose Informant Patient Reported? Taking?    albuterol (2 5 mg/3 mL) 0 083 % nebulizer solution  Self No No   Sig: Take 3 mL (2 5 mg total) by nebulization every 6 (six) hours as needed for wheezing or shortness of breath   albuterol (PROVENTIL HFA,VENTOLIN HFA) 90 mcg/act inhaler  Self No No   Sig: Inhale 2 puffs every 6 (six) hours as needed for wheezing   buPROPion (WELLBUTRIN XL) 150 mg 24 hr tablet  Self No No   Sig: Take 1 tablet (150 mg total) by mouth every morning   diazepam (VALIUM) 10 mg tablet  Self No No   Sig: Take 1 tablet (10 mg total) by mouth every 6 (six) hours as needed for anxiety   ergocalciferol (VITAMIN D2) 50,000 units  Self No No   Sig: Take 1 capsule (50,000 Units total) by mouth once a week   fluticasone (FLONASE) 50 mcg/act nasal spray  Self No No   Si spray into each nostril 2 (two) times a day   Patient taking differently: 1 spray into each nostril as needed   furosemide (LASIX) 20 mg tablet  Self No No   Sig: Take 1 tablet (20 mg total) by mouth daily as needed (edema)   gabapentin (NEURONTIN) 100 mg capsule  Self Yes No   Sig: Take 100 mg by mouth 2 (two) times a day   ibuprofen (MOTRIN) 600 mg tablet  Self Yes No   Sig: Take 600 mg by mouth as needed   levothyroxine 112 mcg tablet  Self No No   Sig: Take 1 tablet (112 mcg total) by mouth daily   ondansetron (ZOFRAN-ODT) 4 mg disintegrating tablet  Self No No   Sig: Take 1 tablet (4 mg total) by mouth every 6 (six) hours as needed for nausea or vomiting   Patient not taking: Reported on 2022   oxyCODONE-acetaminophen (PERCOCET)  mg per tablet  Self Yes No   Sig: take 1 tablet by mouth every 6 hours if needed for pain MAX 4 TABLETS PER DAY   pantoprazole (PROTONIX) 40 mg tablet  Self No No   Sig: Take 1 tablet (40 mg total) by mouth 2 (two) times a day as needed (acid reflux)   potassium chloride (MICRO-K) 10 MEQ CR capsule   No No   Sig: Take 1 capsule (10 mEq total) by mouth daily for 10 days   sertraline (ZOLOFT) 100 mg tablet  Self No No   Sig: Take 1 tablet (100 mg total) by mouth 2 (two) times a day   simvastatin (ZOCOR) 20 mg tablet  Self No No   Sig: Take 1 tablet (20 mg total) by mouth daily at bedtime   sucralfate (CARAFATE) 1 g tablet  Self No No   Si TABLET 3 TIMES A DAY BEFORE MEALS   tamsulosin (FLOMAX) 0 4 mg  Self No No   Sig: Take 1 capsule (0 4 mg total) by mouth daily with dinner      Facility-Administered Medications: None       Past Medical History:   Diagnosis Date    Allergic sinusitis     last assessed - [id number]    Anxiety     last assessed - 15YPY9302    Arthritis     Asthma     last assessed - 86PMQ4484    Bilateral lower extremity edema     last assessed - 07ZEC1040    Callus of foot     last assessed - 59FZP6848    Chronic GERD     last assessed - 85PIL1175    Colon polyp     Constipation     Resolved - [id number]    [facility]     Depression     last assessed - 77SHS4374    Disease of thyroid gland     Diverticulitis of colon     last assessed - 19UGA6276    Dyspepsia     Resolved - 56FUD1075    Esophageal reflux     last assessed - 36AWZ8970    Essential hypertriglyceridemia     last assessed - 36Uyh3684    GERD (gastroesophageal reflux disease)     Gynecological disorder     last assessed - 77BJX5307    Hypertension     last assessed - 06FQJ1782    Hypotension     last assessed - 07Skz7632    Kidney stone     Migraine     Neuropathy     Pulmonary emphysema (HCC)     last assessed - 56CNW4380    Seasonal allergies     Urinary frequency     last assessed - 73Qsi7104    Vagina, candidiasis     last assessed - 59Ttt0035    Visual impairment        Past Surgical History:   Procedure Laterality Date    CARPAL TUNNEL RELEASE      last assessed - 31IHG7018    CHOLECYSTECTOMY      last assessed - 38PNH9787    COLONOSCOPY      Complete colonoscopy     EYE SURGERY      last assessed - 32HCY4956   [facility] RETROGRADE PYELOGRAM  6/16/2022    FOOT SURGERY      last assessed - 29BQH4266    DC CYSTO/URETERO W/LITHOTRIPSY &INDWELL STENT INSRT Left 6/16/2022    Procedure: CYSTOSCOPY URETEROSCOPY WITH LITHOTRIPSY HOLMIUM LASER, RETROGRADE PYELOGRAM AND INSERTION STENT URETERAL;  Surgeon: Sheila Recinos MD;  Location: [facility];  Service: Urology    DC CYSTOURETHROSCOPY,URETER CATHETER Left 7/21/2022    Procedure: CYSTOSCOPY RETROGRADE PYELOGRAM WITH INSERTION STENT URETERAL;  Surgeon: Frank Arriloa MD;  Location: [facility];  Service: Urology    DC ESOPHAGOGASTRODUODENOSCOPY TRANSORAL DIAGNOSTIC N/A 2/13/2017    Procedure: ESOPHAGOGASTRODUODENOSCOPY (EGD); Surgeon: Emilie Alvarez MD;  Location: [facility];   Service: Gastroenterology       Family History   Problem Relation Age of Onset    Lung cancer Sister     Coronary artery disease Sister     Stroke Sister     Asthma Sister     Hypertension Other     Lung cancer Other     Hypertension Other     Lung cancer Other     Lung cancer Other     Lung cancer Maternal Grandmother     Thyroid disease Mother  Diabetes Maternal Grandfather      I have reviewed and agree with the history as documented  E-Cigarette/Vaping    E-Cigarette Use Former User      E-Cigarette/Vaping Substances    Nicotine Yes     THC No     CBD No     Flavoring No     Other No     Unknown No      Social History     Tobacco Use    Smoking status: Current Every Day Smoker     Packs/day: 1 50     Years: 30 00     Pack years: 45 00     Types: Cigarettes    Smokeless tobacco: Never Used    Tobacco comment: one pack   Vaping Use    Vaping Use: Former    Substances: Nicotine   Substance Use Topics    Alcohol use: Not Currently     Comment: wine twice a year    Drug use: No       Review of Systems   Constitutional: Positive for chills, diaphoresis, fatigue and fever  HENT: Negative  Eyes: Negative  Respiratory: Negative  Negative for chest tightness and shortness of breath  Cardiovascular: Negative  Gastrointestinal: Positive for abdominal pain, nausea and vomiting  Endocrine: Negative  Genitourinary: Negative  Musculoskeletal: Positive for back pain  Skin: Negative  Allergic/Immunologic: Negative  Neurological: Negative  Hematological: Negative  Psychiatric/Behavioral: Negative  Physical Exam  Physical Exam  Vitals and nursing note reviewed  Constitutional:       General: She is in acute distress  Appearance: She is normal weight  She is ill-appearing and diaphoretic  HENT:      Head: Normocephalic and atraumatic  Right Ear: External ear normal       Left Ear: External ear normal       Nose: Nose normal       Mouth/Throat:      Mouth: Mucous membranes are moist       Pharynx: Oropharynx is clear  Eyes:      Extraocular Movements: Extraocular movements intact  Conjunctiva/sclera: Conjunctivae normal       Pupils: Pupils are equal, round, and reactive to light  Cardiovascular:      Rate and Rhythm: Regular rhythm  Tachycardia present  Pulses: Normal pulses  Pulmonary:      Effort: Pulmonary effort is normal       Breath sounds: Normal breath sounds  Abdominal:      General: Abdomen is flat  Bowel sounds are normal       Palpations: Abdomen is soft  Tenderness: There is abdominal tenderness  There is left CVA tenderness  Musculoskeletal:         General: No swelling, tenderness, deformity or signs of injury  Normal range of motion  Cervical back: Normal range of motion  Left lower leg: No edema  Skin:     General: Skin is warm  Capillary Refill: Capillary refill takes less than 2 seconds  Neurological:      General: No focal deficit present  Mental Status: She is alert and oriented to person, place, and time  Mental status is at baseline     Psychiatric:         Mood and Affect: Mood normal          Behavior: Behavior normal          Vital Signs  ED Triage Vitals [07/21/22 1156]   Temperature Pulse Respirations Blood Pressure SpO2   98 1 °F (36 7 °C) 91 16 (!) 83/58 95 %      Temp Source Heart Rate Source Patient Position - Orthostatic VS BP Location FiO2 (%)   Oral Monitor Sitting Left arm --      Pain Score       7           Vitals:    07/22/22 1545 07/22/22 2201 07/23/22 0027 07/23/22 0813   BP: 112/69 113/74  131/69   Pulse: 75  76 70   Patient Position - Orthostatic VS:             Visual Acuity      ED Medications  Medications   ondansetron (FOR EMS ONLY) (ZOFRAN) 4 mg/2 mL injection 4 mg (0 mg Does not apply Given to EMS 7/21/22 1201)   multi-electrolyte (PLASMALYTE-A/ISOLYTE-S PH 7 4) IV solution 1,000 mL (0 mL Intravenous Stopped 7/21/22 1247)     Followed by   multi-electrolyte (PLASMALYTE-A/ISOLYTE-S PH 7 4) IV solution 1,000 mL (0 mL Intravenous Stopped 7/21/22 1440)   cefepime (MAXIPIME) IVPB (premix in dextrose) 2,000 mg 50 mL (0 mg Intravenous Stopped 7/21/22 1257)   vancomycin (VANCOCIN) 1,750 mg in sodium chloride 0 9 % 500 mL IVPB (0 mg/kg × 82 2 kg (Adjusted) Intravenous Stopped 7/21/22 1534)   iohexol (OMNIPAQUE) 350 MG/ML injection (SINGLE-DOSE) 100 mL (100 mL Intravenous Given 7/21/22 1428)   iohexol (OMNIPAQUE) 240 MG/ML solution 50 mL (50 mL Oral Given 7/21/22 1427)   potassium chloride (K-DUR,KLOR-CON) CR tablet 40 mEq (40 mEq Oral Given 7/21/22 1953)   potassium chloride (K-DUR,KLOR-CON) CR tablet 40 mEq (40 mEq Oral Given 7/21/22 1947)   albuterol inhalation solution 2 5 mg (2 5 mg Nebulization Given 7/21/22 1753)   magnesium sulfate IVPB (premix) SOLN 1 g (1 g Intravenous New Bag 7/22/22 0930)       Diagnostic Studies  Results Reviewed     Procedure Component Value Units Date/Time    Blood culture #1 [[id number]] Collected: 07/21/22 1210    Lab Status: Final result Specimen: Blood from Arm, Left Updated: 07/26/22 2103     Blood Culture No Growth After 5 Days  Blood culture #2 [[id number]] Collected: 07/21/22 1210    Lab Status: Final result Specimen: Blood from Arm, Right Updated: 07/26/22 2103     Blood Culture No Growth After 5 Days      Urine culture [[id number]]  (Abnormal)  (Susceptibility) Collected: 07/21/22 1215    Lab Status: Final result Specimen: Urine, Clean Catch Updated: 07/23/22 1655     Urine Culture >100,000 cfu/ml Escherichia coli    Susceptibility     Escherichia coli (1)     Antibiotic Interpretation Microscan   Method Status    ZID Performed  Yes  MATEUSZ Final    Ampicillin ($$) Susceptible <=8 00 ug/ml MATEUSZ Final    Aztreonam ($$$)  Susceptible <=4 ug/ml MATEUSZ Final    Cefazolin ($) Susceptible <=2 00 ug/ml MATEUSZ Final    Ciprofloxacin ($)  Susceptible <=0 25 ug/ml MATEUSZ Final    Gentamicin ($$) Susceptible <=2 ug/ml MATEUSZ Final    Levofloxacin ($) Susceptible <=0 50 ug/ml MATEUSZ Final    Nitrofurantoin Susceptible <=32 ug/ml MATEUSZ Final    Tetracycline Susceptible <=4 ug/ml MATEUSZ Final    Tobramycin ($) Susceptible <=2 ug/ml MATEUSZ Final    Trimethoprim + Sulfamethoxazole ($$$) Susceptible <=0 5/9 5 ug/ml MATEUSZ Final                   CBC (With Platelets) [[id number]]  (Abnormal) Collected: 07/22/22 9765    Lab Status: Final result Specimen: Blood from Arm, Right Updated: 07/22/22 0651     WBC 6 82 Thousand/uL      RBC 3 61 Million/uL      Hemoglobin 11 0 g/dL      Hematocrit 36 1 %       fL      MCH 30 5 pg      MCHC 30 5 g/dL      RDW 15 7 %      Platelets 72 Thousands/uL      MPV 10 2 fL     Basic metabolic panel [[id number]]  (Abnormal) Collected: 07/22/22 0540    Lab Status: Final result Specimen: Blood from Arm, Right Updated: 07/22/22 0616     Sodium 143 mmol/L      Potassium 3 8 mmol/L      Chloride 117 mmol/L      CO2 20 mmol/L      ANION GAP 6 mmol/L      BUN 11 mg/dL      Creatinine 0 84 mg/dL      Glucose 112 mg/dL      Calcium 6 6 mg/dL      eGFR 80 ml/min/1 73sq m     Narrative:      [place] guidelines for Chronic Kidney Disease (CKD):     Stage 1 with normal or high GFR (GFR > 90 mL/min/1 73 square meters)    Stage 2 Mild CKD (GFR = 60-89 mL/min/1 73 square meters)    Stage 3A Moderate CKD (GFR = 45-59 mL/min/1 73 square meters)    Stage 3B Moderate CKD (GFR = 30-44 mL/min/1 73 square meters)    Stage 4 Severe CKD (GFR = 15-29 mL/min/1 73 square meters)    Stage 5 End Stage CKD (GFR <15 mL/min/1 73 square meters)  Note: GFR calculation is accurate only with a steady state creatinine    Magnesium [[id number]]  (Abnormal) Collected: 07/22/22 0540    Lab Status: Final result Specimen: Blood from Arm, Right Updated: 07/22/22 0616     Magnesium 1 7 mg/dL     COVID19, Influenza A/B, RSV PCR, [facility] - 2 hour STAT [[id number]]  (Normal) Collected: 07/21/22 1210    Lab Status: Final result Specimen: Nares from Nose Updated: 07/21/22 1308     SARS-CoV-2 Negative     INFLUENZA A PCR Negative     INFLUENZA B PCR Negative     RSV PCR Negative    Narrative:      FOR PEDIATRIC PATIENTS - copy/paste COVID Guidelines URL to browser: [email or web address]    SARS-CoV-2 assay is a Nucleic Acid Amplification assay intended for the  qualitative detection of nucleic acid from SARS-CoV-2 in nasopharyngeal  swabs  Results are for the presumptive identification of SARS-CoV-2 RNA  Positive results are indicative of infection with SARS-CoV-2, the virus  causing COVID-19, but do not rule out bacterial infection or co-infection  with other viruses  Laboratories within the [place] and its  territories are required to report all positive results to the appropriate  public health authorities  Negative results do not preclude SARS-CoV-2  infection and should not be used as the sole basis for treatment or other  patient management decisions  Negative results must be combined with  clinical observations, patient history, and epidemiological information  This test has not been FDA cleared or approved  This test has been authorized by FDA under an Emergency Use Authorization  (EUA)  This test is only authorized for the duration of time the  declaration that circumstances exist justifying the authorization of the  emergency use of an in vitro diagnostic tests for detection of SARS-CoV-2  virus and/or diagnosis of COVID-19 infection under section 564(b)(1) of  the Act, 21 U  S C  696FIP-8(F)(0), unless the authorization is terminated  or revoked sooner  The test has been validated but independent review by FDA  and CLIA is pending  Test performed using [employer] Gene[employer]pert: This RT-PCR assay targets N2,  a region unique to SARS-CoV-2  A conserved region in the E-gene was chosen  for pan-Sarbecovirus detection which includes SARS-CoV-2      Procalcitonin [[id number]]  (Abnormal) Collected: 07/21/22 1210    Lab Status: Final result Specimen: Blood from Arm, Right Updated: 07/21/22 1255     Procalcitonin 1 64 ng/ml     Urine Microscopic [[id number]]  (Abnormal) Collected: 07/21/22 1215    Lab Status: Final result Specimen: Urine, Clean Catch Updated: 07/21/22 1249     RBC, UA 4-10 /hpf      WBC, UA 20-30 /hpf      Epithelial Cells Occasional /hpf      Bacteria, UA Moderate /hpf     Protime-INR [[id number]]  (Abnormal) Collected: 07/21/22 1210    Lab Status: Final result Specimen: Blood from Arm, Right Updated: 07/21/22 1246     Protime 15 1 seconds      INR 1 19    APTT [[id number]]  (Abnormal) Collected: 07/21/22 1210    Lab Status: Final result Specimen: Blood from Arm, Right Updated: 07/21/22 1246     PTT 38 seconds     Comprehensive metabolic panel [[id number]]  (Abnormal) Collected: 07/21/22 1210    Lab Status: Final result Specimen: Blood from Arm, Right Updated: 07/21/22 1246     Sodium 139 mmol/L      Potassium 3 1 mmol/L      Chloride 106 mmol/L      CO2 25 mmol/L      ANION GAP 8 mmol/L      BUN 10 mg/dL      Creatinine 1 14 mg/dL      Glucose 145 mg/dL      Calcium 8 6 mg/dL      AST 56 U/L      ALT 23 U/L      Alkaline Phosphatase 82 U/L      Total Protein 6 8 g/dL      Albumin 3 6 g/dL      Total Bilirubin 0 76 mg/dL      eGFR 55 ml/min/1 73sq m     Narrative:      [place] guidelines for Chronic Kidney Disease (CKD):     Stage 1 with normal or high GFR (GFR > 90 mL/min/1 73 square meters)    Stage 2 Mild CKD (GFR = 60-89 mL/min/1 73 square meters)    Stage 3A Moderate CKD (GFR = 45-59 mL/min/1 73 square meters)    Stage 3B Moderate CKD (GFR = 30-44 mL/min/1 73 square meters)    Stage 4 Severe CKD (GFR = 15-29 mL/min/1 73 square meters)    Stage 5 End Stage CKD (GFR <15 mL/min/1 73 square meters)  Note: GFR calculation is accurate only with a steady state creatinine    Lactic acid [[id number]]  (Normal) Collected: 07/21/22 1210    Lab Status: Final result Specimen: Blood from Arm, Right Updated: 07/21/22 1246     LACTIC ACID 1 7 mmol/L     Narrative:      Result may be elevated if tourniquet was used during collection      Lipase [[id number]]  (Normal) Collected: 07/21/22 1210    Lab Status: Final result Specimen: Blood from Arm, Right Updated: 07/21/22 1246     Lipase 14 u/L     CBC and differential [[id number]]  (Abnormal) Collected: 07/21/22 1210    Lab Status: Final result Specimen: Blood from Arm, Right Updated: 07/21/22 1232     WBC 9 12 Thousand/uL      RBC 4 14 Million/uL      Hemoglobin 12 5 g/dL      Hematocrit 40 3 %      MCV 97 fL      MCH 30 2 pg      MCHC 31 0 g/dL      RDW 15 4 %      MPV 10 4 fL      Platelets 081 Thousands/uL      nRBC 0 /100 WBCs      Neutrophils Relative 85 %      Immat GRANS % 0 %      Lymphocytes Relative 8 %      Monocytes Relative 6 %      Eosinophils Relative 1 %      Basophils Relative 0 %      Neutrophils Absolute 7 73 Thousands/µL      Immature Grans Absolute 0 04 Thousand/uL      Lymphocytes Absolute 0 69 Thousands/µL      Monocytes Absolute 0 57 Thousand/µL      Eosinophils Absolute 0 06 Thousand/µL      Basophils Absolute 0 03 Thousands/µL     UA w Reflex to Microscopic w Reflex to Culture [[id number]]  (Abnormal) Collected: 07/21/22 1215    Lab Status: Final result Specimen: Urine, Clean Catch Updated: 07/21/22 1225     Color, UA Yellow     Clarity, UA Slightly Cloudy     Specific [place], UA 1 020     pH, UA 6 0     Leukocytes, UA 2+     Nitrite, UA Positive     Protein, UA 1+ mg/dl      Glucose, UA Negative mg/dl      Ketones, UA Negative mg/dl      Urobilinogen, UA 0 2 E U /dl      Bilirubin, UA Negative     Occult Blood, UA 1+                 XR retrograde pyelogram   Final Result by Diogenes Maldonado MD (07/22 1434)   Moderate left hydronephrosis   Left ureteral stent was deployed      Fluoroscopic guidance provided for left retrograde pyelogram  Please see procedure report for further details  Workstation performed: [id number]         CT abdomen pelvis with contrast   Final Result by Tomy Vidales MD (07/21 1448)      Left ureteral calculi eliciting moderate left hydroureteronephrosis and findings of left renal impairment  Findings indeterminate for left renal pyelonephritis  The study was marked in [facility] for immediate notification           Workstation performed: [id number] XR chest portable   Final Result by Mariluz Simons MD (07/21 2544)      No acute cardiopulmonary disease  Workstation performed: [id number]                    Procedures  ECG 12 Lead Documentation Only    Date/Time: 7/21/2022 12:26 PM  Performed by: Javier Landin DO  Authorized by: Deepali Jacob III, DO     Indications / Diagnosis:  Hypotension  ECG reviewed by me, the ED Provider: yes    Patient location:  ED  Comments:      I personally reviewed this EKG is performed July 21, 2022, EKG was completed at 12:25 p m  and interpreted by me at 12:26 p m   Normal sinus rhythm with a ventricular rate of 86 beats per minute, OK interval of 174 milliseconds come please note the patient has corrected QT interval appears to be prolonged of 514 milliseconds  No diffuse elevations to indicate pericarditis  No coved ST elevations greater than 2mm with negative T waves in V1-3 to indicate concern for brugada  No biphasic T waves in V2, V3 to indicate Well[place] (critical stenosis of LAD)  No elevation in aVR or deviation when compared to V1 (can be associated with ST depression in I,II, V4-6 when left main occlusion is present)      CriticalCare Time  Performed by: Javier Landin DO  Authorized by: [address], DO     Critical care provider statement:     Critical care time (minutes):  35    Critical care start time:  7/21/2022 12:10 PM    Critical care end time:  7/21/2022 1:00 PM    Critical care time was exclusive of:  Separately billable procedures and treating other patients    Critical care was necessary to treat or prevent imminent or life-threatening deterioration of the following conditions:  Circulatory failure, sepsis and shock             ED Course  ED Course as of 08/04/22 0730   u Jul 21, 2022   1208 Patient seen evaluated, noted blood pressure 83/58, patient is well perfused good capillary refill will repeat the patient's blood pressure may been a an appropriately sized cuff  1218 Patient BP was initially 83 / 58, repeat BP at 96/ 65, patient has received thus far 400 cc of normal saline pre-hospital out of a L normal saline bag  Differential diagnosis in this patient is as follows:  UTI versus pyelonephritis versus diverticulitis versus kidney stone / infected kidney stone  1218 Patient is currently afebrile but did take a Percocet (prescribed for chronic pain), prior to arrival to the emergency department  Will initiate a sepsis alert   1221 Sepsis alert called in to emergency , Charge RN: Avila Colbert informed  5 CT results of A/P:    Left ureteral calculi eliciting moderate left hydroureteronephrosis and findings of left renal impairment  Findings indeterminate for left renal pyelonephritis  18 Dr Lino Salazar tiger text results to CT imaging  26 Spoke with Dr Lino Salazar, patient will need a ureteral stent  32 61 16 Patient updated regarding the for ureteral stent, patient requested me to touch base with her urologist down in [address] Zohaib Lao, I sent to tiger text to him about the case on the patient's behalf  Initial Sepsis Screening     Row Name 07/21/22 1217                Is the patient's history suggestive of a new or worsening infection? Yes (Proceed)  -GC        Suspected source of infection urinary tract infection  -GC        Are two or more of the following signs & symptoms of infection both present and new to the patient? Yes (Proceed)  -GC        Indicate SIRS criteria Hyperthemia > 38 3C (100 9F); Tachypnea > 20 resp per min  -GC        If the answer is yes to both questions, suspicion of sepsis is present --        If severe sepsis is present AND tissue hypoperfusion perists in the hour after fluid resuscitation or lactate > 4, the patient meets criteria for SEPTIC SHOCK --        Are any of the following organ dysfunction criteria present within 6 hours of suspected infection and SIRS criteria that are NOT considered to be chronic conditions? No  -GC        Organ dysfunction --        Date of presentation of severe sepsis --        Time of presentation of severe sepsis --        Tissue hypoperfusion persists in the hour after crystalloid fluid administration, evidenced, by either: --        Was hypotension present within one hour of the conclusion of crystalloid fluid administration? No  -GC        Date of presentation of septic shock --        Time of presentation of septic shock --              User Key  (r) = Recorded By, (t) = Taken By, (c) = Cosigned By    [id number]  Name Provider Type    [facility] [employer], DO Physician                              MDM  Number of Diagnoses or Management Options  Hypotension  Kidney stone  Pyelonephritis  Diagnosis management comments: 66-year-old female presents emergency department with abrupt onset of left-sided lower abdominal pain confined to the left lower quadrant along with left flank pain, history of ureteral stents, prior UTIs and history of diverticulitis, found initially to be hypotensive blood pressure below 90, history of a temperature of a 102 5° at [place], resolved by the time patient came the emergency department because she took Percocet prior to arrival, sepsis alert initiated  Initial differential diagnosis noted in the ED course, not the patient had an infected urine but given light the patient has a history of a ureteral stent placed previously within the last 6 months, CT of the abdomen pelvis ordered with p o  and IV contrast showed moderate left-sided hydronephrosis with a 6 mm stone, case discussed with Urology, patient be going to the operating room for urgent placement of ureteral stent  Admit to the hospitalist service  Portions of the record may have been created with voice recognition software   Occasional wrong word or "sound a like" substitutions may have occurred due to the inherent limitations of voice recognition software  Read the chart carefully and recognize, using context, where substitutions have occurred  Amount and/or Complexity of Data Reviewed  Clinical lab tests: ordered and reviewed  Tests in the radiology section of CPT®: ordered and reviewed  Tests in the medicine section of CPT®: ordered and reviewed  Discussion of test results with the performing providers: yes  Decide to obtain previous medical records or to obtain history from someone other than the patient: yes  Obtain history from someone other than the patient: yes  Review and summarize past medical records: yes  Discuss the patient with other providers: yes        Disposition  Final diagnoses:   Pyelonephritis   Kidney stone   Hypotension     Time reflects when diagnosis was documented in both MDM as applicable and the Disposition within this note     Time User Action Codes Description Comment    7/21/2022  3:51 PM [place] Gwendolyn Add [N12] Pyelonephritis     7/21/2022  3:52 PM [place] Gwendolyn Add [N20 0] Kidney stone     7/21/2022  3:52 PM [place] Gwendolyn Add [I95 9] Hypotension     7/21/2022  4:02 PM H[place], [facility] [N13 30] Hydroureteronephrosis     7/23/2022  8:43 AM Haskins, [facility] Add [Z72 0] Tobacco abuse disorder       ED Disposition     ED Disposition   Admit    Condition   Stable    Date/Time   Thu Jul 21, 2022  3:51 PM    Comment   Case was discussed with Dr Jeancarlos Portillo and the patient's admission status was agreed to be Admission Status: inpatient status to the service of Dr Jeancarlos Portillo              Follow-up Information     Follow up With Specialties Details Why Contact Info    Marck Ludwig DO Family Medicine Follow up in 1 week(s)  [id number] Elena MD Internal Medicine   [place] [id number] Andersen MD Urology Follow up in 1 week(s)  [address] [address]  [phone]            Discharge Medication List as of 7/23/2022 10:30 AM      START taking these medications    Details   cefpodoxime (VANTIN) 200 mg tablet Take 1 tablet (200 mg total) by mouth 2 (two) times a day for 10 days, Starting Sat 7/23/2022, Until Tue 8/2/2022, Normal      nicotine (NICODERM CQ) 21 mg/24 hr TD 24 hr patch Place 1 patch on the skin every 24 hours, Starting Sat 7/23/2022, Normal         CONTINUE these medications which have NOT CHANGED    Details   albuterol (2 5 mg/3 mL) 0 083 % nebulizer solution Take 3 mL (2 5 mg total) by nebulization every 6 (six) hours as needed for wheezing or shortness of breath, Starting Tue 12/21/2021, Normal      albuterol (PROVENTIL HFA,VENTOLIN HFA) 90 mcg/act inhaler Inhale 2 puffs every 6 (six) hours as needed for wheezing, Starting Tue 12/21/2021, Normal      buPROPion (WELLBUTRIN XL) 150 mg 24 hr tablet Take 1 tablet (150 mg total) by mouth every morning, Starting Wed 4/27/2022, Normal      diazepam (VALIUM) 10 mg tablet Take 1 tablet (10 mg total) by mouth every 6 (six) hours as needed for anxiety, Starting Fri 7/15/2022, Normal      ergocalciferol (VITAMIN D2) 50,000 units Take 1 capsule (50,000 Units total) by mouth once a week, Starting Wed 4/27/2022, Normal      fluticasone (FLONASE) 50 mcg/act nasal spray 1 spray into each nostril 2 (two) times a day, Starting Tue 12/21/2021, Normal      gabapentin (NEURONTIN) 100 mg capsule Take 100 mg by mouth 3 (three) times a day, Starting Fri 10/4/2019, Historical Med      levothyroxine 112 mcg tablet Take 1 tablet (112 mcg total) by mouth daily, Starting Wed 4/27/2022, Normal      ondansetron (ZOFRAN-ODT) 4 mg disintegrating tablet Take 1 tablet (4 mg total) by mouth every 6 (six) hours as needed for nausea or vomiting, Starting Fri 6/10/2022, Normal      oxyCODONE-acetaminophen (PERCOCET)  mg per tablet take 1 tablet by mouth every 6 hours if needed for pain MAX 4 TABLETS PER DAY, Historical Med pantoprazole (PROTONIX) 40 mg tablet Take 1 tablet (40 mg total) by mouth 2 (two) times a day as needed (acid reflux), Starting Wed 4/27/2022, Normal      sertraline (ZOLOFT) 100 mg tablet Take 1 tablet (100 mg total) by mouth 2 (two) times a day, Starting Wed 4/27/2022, Normal      simvastatin (ZOCOR) 20 mg tablet Take 1 tablet (20 mg total) by mouth daily at bedtime, Starting Wed 4/27/2022, Normal      sucralfate (CARAFATE) 1 g tablet 1 TABLET 3 TIMES A DAY BEFORE MEALS, Normal      tamsulosin (FLOMAX) 0 4 mg Take 1 capsule (0 4 mg total) by mouth daily with dinner, Starting Mon 7/18/2022, Normal         STOP taking these medications       furosemide (LASIX) 20 mg tablet Comments:   Reason for Stopping:         ibuprofen (MOTRIN) 600 mg tablet Comments:   Reason for Stopping:         potassium chloride (MICRO-K) 10 MEQ CR capsule Comments:   Reason for Stopping:               Outpatient Discharge Orders   Discharge Diet     Activity as tolerated     Call provider for:  severe uncontrolled pain     Call provider for:  persistent nausea or vomiting       PDMP Review     None          ED Provider  Electronically Signed by           Suyapa Brizuela III, DO  07/21/22 [address] Levine, Susan. \[facility].\"", DO  08/04/22 [id number]

## 2022-07-21 NOTE — CONSULTS
Consultation - Urology   Chely Urena 46 y o  female MRN: [id number]  Unit/Bed#: MAURICE Encounter: [id number]      Assessment/Plan      Assessment:  Obstructing left ureteral calculi associated with UTI and urosepsis, status post recent laser lithotripsy  Plan:  Options, procedures, benefits and risks were discussed with the patient and she agrees to the planned procedure which will include cystoscopy with left retrograde urogram and insertion of ureteral stent  She understands that the stones will not be removed at this time and will require additional procedures in the future  She was treated with cefepime and vancomycin in the emergency department  History of Present Illness   Attending: Ian Hernandez DO  Reason for Consult / Principal Problem:  Obstructing left ureteral calculi with urosepsis  HPI: Hawa Gtz is a 46y o  year old female who presents with acute onset of left lower quadrant pain today  She had some dysuria yesterday and today took her temperature at home will and was 102°  She then took a Percocet  She came to the emergency department where she was afebrile  However, blood pressure was 83 initially and she received fluid resuscitation with normalization of her blood pressure  White blood count was normal, but urinalysis was suspicious for UTI and her procalcitonin was elevated  CT scan shows moderate left hydroureteronephrosis secondary to a 5 mm proximal ureteral stone and a 1 cm mid ureteral stone or collection of small stones  She has a history of laser lithotripsy of a large left renal stone by [facility] urology recently with subsequent stent spontaneous migration and removal   Follow-up CT scan showed residual renal calculi and then another CT scan 07/14/2022 showed a 5 mm stone in the proximal left ureter with mild hydronephrosis  The patient states she had no pain at that time      Consults    Review of Systems   Gastrointestinal: Negative for abdominal distention and abdominal pain  Genitourinary: Positive for dysuria  Negative for difficulty urinating, flank pain and hematuria         Historical Information   Past Medical History:   Diagnosis Date    Allergic sinusitis     last assessed - [id number]    Anxiety     last assessed - [id number]    Arthritis     Asthma     last assessed - 38JQR7790    Bilateral lower extremity edema     last assessed - [id number]    Callus of foot     last assessed - [id number]    Chronic GERD     last assessed - 10KGK6487    Colon polyp     Constipation     Resolved - 11JJJ0655    COPD (chronic obstructive pulmonary disease) ([facility] )     Depression     last assessed - 91SJU6355    Disease of thyroid gland     Diverticulitis of colon     last assessed - 74THJ0693    Dyspepsia     Resolved - 37OXA5049    Esophageal reflux     last assessed - 51OKU4658    Essential hypertriglyceridemia     last assessed - 72IHM0266    GERD (gastroesophageal reflux disease)     Gynecological disorder     last assessed - 30IKO9231    Hypertension     last assessed - 30HFZ6243    Hypotension     last assessed - 42Anc6851    Kidney stone     Migraine     Neuropathy     Pulmonary emphysema ([facility] )     last assessed - [id number]    Seasonal allergies     Urinary frequency     last assessed - 06Mzo1184    Vagina, candidiasis     last assessed - 32Aya2907    Visual impairment      Past Surgical History:   Procedure Laterality Date    CARPAL TUNNEL RELEASE      last assessed - 68JSI6492    CHOLECYSTECTOMY      last assessed - 97TXZ0500    COLONOSCOPY      Complete colonoscopy     EYE SURGERY      last assessed - 88VFZ9675   [facility] RETROGRADE PYELOGRAM  6/16/2022    FOOT SURGERY      last assessed - 52AHA5036    OH CYSTO/URETERO W/LITHOTRIPSY &INDWELL STENT INSRT Left 6/16/2022    Procedure: CYSTOSCOPY URETEROSCOPY WITH LITHOTRIPSY HOLMIUM LASER, RETROGRADE PYELOGRAM AND INSERTION STENT URETERAL;  Surgeon: Betty Perez MD;  Location: [facility]; Service: Urology    [place] ESOPHAGOGASTRODUODENOSCOPY TRANSORAL DIAGNOSTIC N/A 2/13/2017    Procedure: ESOPHAGOGASTRODUODENOSCOPY (EGD); Surgeon: Jonny Tafoya MD;  Location: [facility];   Service: Gastroenterology     Social History   Social History     Substance and Sexual Activity   Alcohol Use Not Currently    Comment: wine twice a year     @[email or web address]  E-Cigarette/Vaping    E-Cigarette Use Former User      E-Cigarette/Vaping Substances    Nicotine Yes     THC No     CBD No     Flavoring No     Other No     Unknown No      Social History     Tobacco Use   Smoking Status Current Every Day Smoker    Packs/day: 1 50    Years: 30 00    Pack years: 45 00    Types: Cigarettes   Smokeless Tobacco Never Used   Tobacco Comment    one pack     Family History: non-contributory    Meds/Allergies   current meds:   Current Facility-Administered Medications   Medication Dose Route Frequency    acetaminophen (TYLENOL) tablet 650 mg  650 mg Oral Q6H PRN    albuterol (PROVENTIL HFA,VENTOLIN HFA) inhaler 2 puff  2 puff Inhalation Q6H PRN    [START ON 7/22/2022] buPROPion (WELLBUTRIN XL) 24 hr tablet 150 mg  150 mg Oral QAM    [START ON 7/22/2022] cefepime (MAXIPIME) IVPB (premix in dextrose) 2,000 mg 50 mL  2,000 mg Intravenous Q12H    diazepam (VALIUM) tablet 10 mg  10 mg Oral Q6H PRN    [START ON 7/22/2022] enoxaparin (LOVENOX) subcutaneous injection 40 mg  40 mg Subcutaneous Daily    gabapentin (NEURONTIN) capsule 100 mg  100 mg Oral TID    [START ON 7/22/2022] levothyroxine tablet 112 mcg  112 mcg Oral Early Morning    [START ON 7/22/2022] nicotine (NICODERM CQ) 14 mg/24hr TD 24 hr patch 1 patch  1 patch Transdermal Daily    ondansetron (ZOFRAN) injection 4 mg  4 mg Intravenous Q6H PRN    oxyCODONE-acetaminophen (PERCOCET) 5-325 mg per tablet 2 tablet  2 tablet Oral Q6H PRN    pantoprazole (PROTONIX) EC tablet 40 mg  40 mg Oral BID AC    potassium chloride (K-DUR,KLOR-CON) CR tablet 40 mEq  40 mEq Oral Once  potassium chloride (K-DUR,KLOR-CON) CR tablet 40 mEq  40 mEq Oral Once    pravastatin (PRAVACHOL) tablet 40 mg  40 mg Oral Daily With Dinner    sertraline (ZOLOFT) tablet 100 mg  100 mg Oral BID    sodium chloride 0 9 % infusion  125 mL/hr Intravenous Continuous    sucralfate (CARAFATE) tablet 1 g  1 g Oral TID AC    tamsulosin (FLOMAX) capsule 0 4 mg  0 4 mg Oral Daily With Dinner     Allergies   Allergen Reactions    Hydrocodone-Acetaminophen Hives    Ketorolac Hives and Dizziness    Toradol [Ketorolac Tromethamine] Other (See Comments)     hypotension    Ultram [Tramadol] Nausea Only, GI Intolerance and Vomiting       Objective   Vitals: Blood pressure 117/67, pulse 81, temperature 98 1 °F (36 7 °C), temperature source Oral, resp  rate 18, height 5' 7" (1 702 m), weight 113 kg (250 lb), last menstrual period 04/05/2018, SpO2 98 %  I/O last 24 hours: In: 500 [I V :500]  Out: -     Invasive Devices  Report    Peripheral Intravenous Line  Duration           Peripheral IV 07/21/22 Left Antecubital <1 day    Peripheral IV 07/21/22 Right Arm <1 day                Physical Exam  Abdominal:      General: There is no distension  Palpations: Abdomen is soft  Tenderness: There is abdominal tenderness  There is no right CVA tenderness or left CVA tenderness       Left lower quadrant tenderness    Lab Results:   CBC:   Lab Results   Component Value Date    WBC 9 12 07/21/2022    HGB 12 5 07/21/2022    HCT 40 3 07/21/2022    MCV 97 07/21/2022     (L) 07/21/2022    MCH 30 2 07/21/2022    MCHC 31 0 (L) 07/21/2022    RDW 15 4 (H) 07/21/2022    MPV 10 4 07/21/2022    NRBC 0 07/21/2022     CMP:   Lab Results   Component Value Date    SODIUM 139 07/21/2022     07/21/2022    CO2 25 07/21/2022    BUN 10 07/21/2022    CREATININE 1 14 07/21/2022    CALCIUM 8 6 07/21/2022    AST 56 (H) 07/21/2022    ALT 23 07/21/2022    ALKPHOS 82 07/21/2022    EGFR 55 07/21/2022     Urinalysis:   Lab Results Component Value Date    COLORU Yellow 07/21/2022    CLARITYU Slightly Cloudy (A) 07/21/2022    SPECGRAV 1 020 07/21/2022    PHUR 6 0 07/21/2022    LEUKOCYTESUR 2+ (A) 07/21/2022    NITRITE Positive (A) 07/21/2022    GLUCOSEU Negative 07/21/2022    KETONESU Negative 07/21/2022    BILIRUBINUR Negative 07/21/2022    BLOODU 1+ (A) 07/21/2022     Imaging Studies: I have personally reviewed pertinent reports  EKG, Pathology, and Other Studies: I have personally reviewed pertinent reports  VTE Prophylaxis: Sequential compression device (Venodyne)     Code Status: Level 1 - Full Code  Advance Directive and Living Will:      Power of :    POLST:      Counseling / Coordination of Care  Total floor / unit time spent today 30 minutes  Greater than 50% of total time was spent with the patient and / or family counseling and / or coordination of care   A description of the counseling / coordination of care:  I have discussed the case with the emergency room staff

## 2022-07-21 NOTE — TELEPHONE ENCOUNTER
Regarding: Elevated Blood pressure   ----- Message from [facility] sent at 7/21/2022  9:24 AM EDT -----  "I have been experiencing a fever of 102  2(oral), nausea, kidney pain, shaking from head to feet  My blood sugar 182   Blood Pressure 164/101 left arm @ 0920 "

## 2022-07-21 NOTE — ANESTHESIA PREPROCEDURE EVALUATION
Procedure:  CYSTOSCOPY RETROGRADE PYELOGRAM WITH INSERTION STENT URETERAL (Left Bladder)    Relevant Problems   CARDIO   (+) Benign hypertension   (+) Hypercholesterolemia   (+) Primary hypertriglyceridemia      ENDO   (+) Hypothyroidism      GI/HEPATIC   (+) Gastroesophageal reflux disease   (+) Gastroesophageal reflux disease with esophagitis      /RENAL   (+) Hydroureteronephrosis   (+) Nephrolithiasis      HEMATOLOGY   (+) Iron deficiency anemia secondary to inadequate dietary iron intake      MUSCULOSKELETAL   (+) Degeneration of lumbar or lumbosacral intervertebral disc      NEURO/PSYCH   (+) Anxiety   (+) Depression   (+) Intermittent claudication (HCC)      PULMONARY   (+) Pulmonary emphysema (HCC)      Lab Results   Component Value Date    WBC 9 12 07/21/2022    HGB 12 5 07/21/2022    HCT 40 3 07/21/2022    MCV 97 07/21/2022     (L) 07/21/2022     Lab Results   Component Value Date    CALCIUM 8 6 07/21/2022    K 3 1 (L) 07/21/2022    CO2 25 07/21/2022     07/21/2022    BUN 10 07/21/2022    CREATININE 1 14 07/21/2022     Lab Results   Component Value Date    INR 1 19 07/21/2022    INR 1 07 06/19/2022    PROTIME 15 1 (H) 07/21/2022    PROTIME 13 8 06/19/2022     Lab Results   Component Value Date    PTT 38 (H) 07/21/2022     Type and Screen:  O     CAD/PCI/MI/CHF - Denies  COPD/ASTHMA/RUTHANN - endorses COPD and asthma, used inhalers around 2 PM   GERD - Endorses, took acid suppressing medication this morning  PROBLEMS WITH PRIOR ANESTHESIA - Denies  NPO STATUS - Patient reports last solid PO intake was yesterday evening, and last liquid PO intake was a sip of pepsi at 3:30 PM     Physical Exam    Airway    Mallampati score: II  TM Distance: >3 FB  Neck ROM: full     Dental   upper dentures and lower dentures,     Cardiovascular  Rhythm: regular, Rate: normal,     Pulmonary  Comment: Speaking in full sentences, normal work of breathing, not tachypneic,     Other Findings  2+ L radial pulse      Anesthesia Plan  ASA Score- 3 Emergent    Anesthesia Type- general with ASA Monitors  Additional Monitors:   Airway Plan: ETT  Plan Factors-Exercise tolerance (METS): <4 METS  Chart reviewed  Existing labs reviewed  Patient is a current smoker  Patient smoked on day of surgery  Obstructive sleep apnea risk education given perioperatively  Induction- intravenous and rapid sequence induction  Postoperative Plan-     Informed Consent- Anesthetic plan and risks discussed with patient  I personally reviewed this patient with the CRNA  Discussed and agreed on the Anesthesia Plan with the CRNA  Tonia Contreras

## 2022-07-21 NOTE — ASSESSMENT & PLAN NOTE
· Febrile of 102 2 at home; no further fevers since admission  · UA grossly positive  · Urine cultures pending at this time  · Received cefepime in the emergency department- will continue at this time

## 2022-07-22 ENCOUNTER — PREP FOR PROCEDURE (OUTPATIENT)
Dept: UROLOGY | Facility: AMBULATORY SURGERY CENTER | Age: 52
End: 2022-07-22

## 2022-07-22 DIAGNOSIS — R39.89 SUSPECTED UTI: ICD-10-CM

## 2022-07-22 DIAGNOSIS — N20.0 NEPHROLITHIASIS: Primary | ICD-10-CM

## 2022-07-22 PROBLEM — N13.2 URETERAL STONE WITH HYDRONEPHROSIS: Status: ACTIVE | Noted: 2022-07-21

## 2022-07-22 LAB
ANION GAP SERPL CALCULATED.3IONS-SCNC: 6 MMOL/L (ref 4–13)
ATRIAL RATE: 86 BPM
BUN SERPL-MCNC: 11 MG/DL (ref 5–25)
CALCIUM SERPL-MCNC: 6.6 MG/DL (ref 8.4–10.2)
CHLORIDE SERPL-SCNC: 117 MMOL/L (ref 96–108)
CO2 SERPL-SCNC: 20 MMOL/L (ref 21–32)
CREAT SERPL-MCNC: 0.84 MG/DL (ref 0.6–1.3)
ERYTHROCYTE [DISTWIDTH] IN BLOOD BY AUTOMATED COUNT: 15.7 % (ref 11.6–15.1)
GFR SERPL CREATININE-BSD FRML MDRD: 80 ML/MIN/1.73SQ M
GLUCOSE SERPL-MCNC: 112 MG/DL (ref 65–140)
HCT VFR BLD AUTO: 36.1 % (ref 34.8–46.1)
HGB BLD-MCNC: 11 G/DL (ref 11.5–15.4)
MAGNESIUM SERPL-MCNC: 1.7 MG/DL (ref 1.9–2.7)
MCH RBC QN AUTO: 30.5 PG (ref 26.8–34.3)
MCHC RBC AUTO-ENTMCNC: 30.5 G/DL (ref 31.4–37.4)
MCV RBC AUTO: 100 FL (ref 82–98)
P AXIS: 61 DEGREES
PLATELET # BLD AUTO: 72 THOUSANDS/UL (ref 149–390)
PMV BLD AUTO: 10.2 FL (ref 8.9–12.7)
POTASSIUM SERPL-SCNC: 3.8 MMOL/L (ref 3.5–5.3)
PR INTERVAL: 174 MS
QRS AXIS: 70 DEGREES
QRSD INTERVAL: 90 MS
QT INTERVAL: 430 MS
QTC INTERVAL: 514 MS
RBC # BLD AUTO: 3.61 MILLION/UL (ref 3.81–5.12)
SODIUM SERPL-SCNC: 143 MMOL/L (ref 135–147)
T WAVE AXIS: 29 DEGREES
VENTRICULAR RATE: 86 BPM
WBC # BLD AUTO: 6.82 THOUSAND/UL (ref 4.31–10.16)

## 2022-07-22 PROCEDURE — 99233 SBSQ HOSP IP/OBS HIGH 50: CPT | Performed by: INTERNAL MEDICINE

## 2022-07-22 PROCEDURE — 85027 COMPLETE CBC AUTOMATED: CPT | Performed by: INTERNAL MEDICINE

## 2022-07-22 PROCEDURE — 80048 BASIC METABOLIC PNL TOTAL CA: CPT | Performed by: INTERNAL MEDICINE

## 2022-07-22 PROCEDURE — 93010 ELECTROCARDIOGRAM REPORT: CPT | Performed by: INTERNAL MEDICINE

## 2022-07-22 PROCEDURE — 94640 AIRWAY INHALATION TREATMENT: CPT

## 2022-07-22 PROCEDURE — 83735 ASSAY OF MAGNESIUM: CPT | Performed by: INTERNAL MEDICINE

## 2022-07-22 PROCEDURE — 94760 N-INVAS EAR/PLS OXIMETRY 1: CPT

## 2022-07-22 RX ORDER — ALBUTEROL SULFATE 2.5 MG/3ML
2.5 SOLUTION RESPIRATORY (INHALATION) EVERY 6 HOURS PRN
Status: DISCONTINUED | OUTPATIENT
Start: 2022-07-22 | End: 2022-07-23 | Stop reason: HOSPADM

## 2022-07-22 RX ORDER — IPRATROPIUM BROMIDE AND ALBUTEROL SULFATE 2.5; .5 MG/3ML; MG/3ML
3 SOLUTION RESPIRATORY (INHALATION) EVERY 6 HOURS PRN
Status: DISCONTINUED | OUTPATIENT
Start: 2022-07-22 | End: 2022-07-22

## 2022-07-22 RX ORDER — MAGNESIUM SULFATE 1 G/100ML
1 INJECTION INTRAVENOUS ONCE
Status: COMPLETED | OUTPATIENT
Start: 2022-07-22 | End: 2022-07-22

## 2022-07-22 RX ADMIN — SUCRALFATE 1 G: 1 TABLET ORAL at 17:38

## 2022-07-22 RX ADMIN — SERTRALINE 100 MG: 100 TABLET, FILM COATED ORAL at 17:38

## 2022-07-22 RX ADMIN — PANTOPRAZOLE SODIUM 40 MG: 40 TABLET, DELAYED RELEASE ORAL at 09:23

## 2022-07-22 RX ADMIN — PRAVASTATIN SODIUM 40 MG: 40 TABLET ORAL at 17:39

## 2022-07-22 RX ADMIN — SUCRALFATE 1 G: 1 TABLET ORAL at 09:23

## 2022-07-22 RX ADMIN — OXYCODONE HYDROCHLORIDE AND ACETAMINOPHEN 2 TABLET: 5; 325 TABLET ORAL at 09:35

## 2022-07-22 RX ADMIN — MAGNESIUM SULFATE HEPTAHYDRATE 1 G: 1 INJECTION, SOLUTION INTRAVENOUS at 09:30

## 2022-07-22 RX ADMIN — TAMSULOSIN HYDROCHLORIDE 0.4 MG: 0.4 CAPSULE ORAL at 17:38

## 2022-07-22 RX ADMIN — GABAPENTIN 100 MG: 100 CAPSULE ORAL at 21:22

## 2022-07-22 RX ADMIN — CEFEPIME HYDROCHLORIDE 2000 MG: 2 INJECTION, SOLUTION INTRAVENOUS at 13:20

## 2022-07-22 RX ADMIN — ENOXAPARIN SODIUM 40 MG: 100 INJECTION SUBCUTANEOUS at 09:24

## 2022-07-22 RX ADMIN — LEVOTHYROXINE SODIUM 112 MCG: 112 TABLET ORAL at 05:11

## 2022-07-22 RX ADMIN — PANTOPRAZOLE SODIUM 40 MG: 40 TABLET, DELAYED RELEASE ORAL at 17:38

## 2022-07-22 RX ADMIN — SUCRALFATE 1 G: 1 TABLET ORAL at 13:20

## 2022-07-22 RX ADMIN — ONDANSETRON 4 MG: 2 INJECTION INTRAMUSCULAR; INTRAVENOUS at 20:16

## 2022-07-22 RX ADMIN — SERTRALINE 100 MG: 100 TABLET, FILM COATED ORAL at 09:23

## 2022-07-22 RX ADMIN — IPRATROPIUM BROMIDE AND ALBUTEROL SULFATE 3 ML: 2.5; .5 SOLUTION RESPIRATORY (INHALATION) at 11:58

## 2022-07-22 RX ADMIN — BUPROPION 150 MG: 150 TABLET, EXTENDED RELEASE ORAL at 09:23

## 2022-07-22 RX ADMIN — NICOTINE 1 PATCH: 14 PATCH, EXTENDED RELEASE TRANSDERMAL at 09:27

## 2022-07-22 RX ADMIN — SODIUM CHLORIDE 125 ML/HR: 0.9 INJECTION, SOLUTION INTRAVENOUS at 05:11

## 2022-07-22 RX ADMIN — OXYCODONE HYDROCHLORIDE AND ACETAMINOPHEN 2 TABLET: 5; 325 TABLET ORAL at 17:46

## 2022-07-22 NOTE — TELEPHONE ENCOUNTER
I spoke with pt this afternoon and scheduled her for surgery with Dr Bhumika Don at the [facility] on 8/16/2022  I verbally went over all of pt 's pre op instructions and prep information with her  She is aware that she needs to have a pre op urine culture the first week in August  Pt is also aware that she needs to have a  on the day of surgery and to stop any Aspirin and/or vitamins 1 week prior to surgery  Per pt 's request I am mailing her a copy of her surgical packet and inspected her to call our office with any questions or concerns regarding this procedure

## 2022-07-22 NOTE — UTILIZATION REVIEW
Initial Clinical Review    Admission: Date/Time/Statement:   Admission Orders (From admission, onward)     Ordered        07/21/22 1559  INPATIENT ADMISSION  Once                      Orders Placed This Encounter   Procedures    INPATIENT ADMISSION     Standing Status:   Standing     Number of Occurrences:   1     Order Specific Question:   Level of Care     Answer:   Med Surg [16]     Order Specific Question:   Estimated length of stay     Answer:   More than 2 Midnights     Order Specific Question:   Certification     Answer:   I certify that inpatient services are medically necessary for this patient for a duration of greater than two midnights  See H&P and MD Progress Notes for additional information about the patient's course of treatment  ED Arrival Information     Expected   -    Arrival   7/21/2022 11:15    Acuity   Urgent            Means of arrival   Ambulance    Escorted by   [facility]    Admission type   Urgent            Arrival complaint   Abdominal pain           Chief Complaint   Patient presents with    Flank Pain     Left lower  Since 0630 today  Had fever earlier today  Sent in by urology for possible kidney stones  Initial Presentation: 46 y o  female urgently to ED by EMS presents w L flank pain, nausea, subjective fevers to 102 2F @ home  Reports acute pain this am in L flank & moved into L groin  EXAM  CT reveals L ureteral calculi eliciting MOD L Hydroureteronephrosis, findings indeterminate for L renal Pyelonephritis, UA grossly +  Hypotension  PMH  Renal stones w Cystoscopy w Ureteral stents  PLAN  Inpatient admission for eval & treatment of L ureteral stone, Hydro ureteronephrosis, Pyelonephritis, Sepsis,  Hypokalemia  S/P 2 L Plasma lyte for BP, IV antibx; pain control  Flomax, plan for Cystoscopy per Urology consult  Follow urine cultures   Replete & monitor potassium & check MAG, cont home meds    Urology  ABD tenderness  Obstructing left ureteral calculi associated with UTI and urosepsis, status post recent laser lithotripsy  Plan procedure  cystoscopy with left retrograde urogram and insertion of ureteral stent  SURGERY DATE: 7/21/2022   Procedure(s) (LRB):  CYSTOSCOPY RETROGRADE PYELOGRAM WITH INSERTION STENT URETERAL (Left)   Anesthesia Type:   General endotracheal tube   Operative Findings: Moderate left hydronephrosis    Date:  7/22/2022   Day 2:   Urology  POST Op day 1 left ureteral stent placement  Cont stent; IV antibx  OP Urology  Feels improved, voiding wo difficulty, denies flank pain  Attending  Wheezing on exam, Reports flank pain but improving  Cont antiemetics, pain contrl, home Flomax     ED Triage Vitals [07/21/22 1156]   Temperature Pulse Respirations Blood Pressure SpO2   98 1 °F (36 7 °C) 91 16 (!) 83/58 95 %      Temp Source Heart Rate Source Patient Position - Orthostatic VS BP Location FiO2 (%)   Oral Monitor Sitting Left arm --      Pain Score       7          Wt Readings from Last 1 Encounters:   07/21/22 113 kg (250 lb)     Additional Vital Signs:   Date/Time Temp Pulse Resp BP MAP (mmHg) SpO2 O2 Flow Rate (L/min) O2 Device Cardiac (WDL) Patient Position - Orthostatic VS   07/22/22 1158 -- -- -- -- -- 98 % -- None (Room air) -- --   07/22/22 06:58:53 97 7 °F (36 5 °C) 69 20 122/69 87 95 % 4 L/min None (Room air) -- --   07/22/22 02:54:40 97 7 °F (36 5 °C) 83 -- 126/64 85 90 % -- -- -- --   07/21/22 22:48:13 98 1 °F (36 7 °C) 88 -- 126/69 88 95 % -- -- -- --   07/21/22 19:14:34 98 1 °F (36 7 °C) 88 20 115/80 92 92 % -- -- -- --   07/21/22 1815 -- 90 20 120/57 82 95 % -- None (Room air) WDL --   07/21/22 1800 -- 96 23 Abnormal  138/66 93 91 % 2 L/min Nasal cannula WDL --   07/21/22 1745 97 3 °F (36 3 °C) Abnormal  92 29 Abnormal  139/67 97 91 % 6 L/min Simple mask WDL --   07/21/22 1440 -- 81 18 117/67 -- 98 % -- None (Room air) -- Sitting   07/21/22 1326 -- 79 19 123/77 -- 95 % -- None (Room air) -- Sitting   07/21/22 1228 -- 88 16 104/69 -- 93 % -- None (Room air) -- Sitting   07/21/22 1217 -- 87 -- 96/65 -- -- -- -- -- --   07/21/22 1156 98 1 °F (36 7 °C) 91 16 83/58 Abnormal  -- 95 % -- None (Room air) -- Sitting       Weights (last 14 days)    Date/Time Weight Weight Method Height   07/21/22 1156 113 kg (250 lb) Stated 5' 7" (1 702 m)     Pertinent Labs/Diagnostic Test Results:   CT abdomen pelvis with contrast   Final Result by Andre Steiner MD (07/21 1448)      Left ureteral calculi eliciting moderate left hydroureteronephrosis and findings of left renal impairment  Findings indeterminate for left renal pyelonephritis  The study was marked in [facility] for immediate notification  XR chest portable   Final Result by Neal Hooper MD (07/21 1253)      No acute cardiopulmonary disease        XR retrograde pyelogram    (Results Pending)     Results from last 7 days   Lab Units 07/21/22  1210   SARS-COV-2  Negative     Results from last 7 days   Lab Units 07/22/22  0633 07/21/22  1210   WBC Thousand/uL 6 82 9 12   HEMOGLOBIN g/dL 11 0* 12 5   HEMATOCRIT % 36 1 40 3   PLATELETS Thousands/uL 72* 102*   NEUTROS ABS Thousands/µL  --  7 73*         Results from last 7 days   Lab Units 07/22/22  0540 07/21/22  1210   SODIUM mmol/L 143 139   POTASSIUM mmol/L 3 8 3 1*   CHLORIDE mmol/L 117* 106   CO2 mmol/L 20* 25   ANION GAP mmol/L 6 8   BUN mg/dL 11 10   CREATININE mg/dL 0 84 1 14   EGFR ml/min/1 73sq m 80 55   CALCIUM mg/dL 6 6* 8 6   MAGNESIUM mg/dL 1 7*  --      Results from last 7 days   Lab Units 07/21/22  1210   AST U/L 56*   ALT U/L 23   ALK PHOS U/L 82   TOTAL PROTEIN g/dL 6 8   ALBUMIN g/dL 3 6   TOTAL BILIRUBIN mg/dL 0 76         Results from last 7 days   Lab Units 07/22/22  0540 07/21/22  1210   GLUCOSE RANDOM mg/dL 112 145*             No results found for: BETA-HYDROXYBUTYRATE                           Results from last 7 days   Lab Units 07/21/22  1210   PROTIME seconds 15 1*   INR  1 19   PTT seconds 38*         Results from last 7 days   Lab Units 07/21/22  1210   PROCALCITONIN ng/ml 1 64*     Results from last 7 days   Lab Units 07/21/22  1210   LACTIC ACID mmol/L 1 7                         Results from last 7 days   Lab Units 07/21/22  1210   LIPASE u/L 14                 Results from last 7 days   Lab Units 07/21/22  1724 07/21/22  1215   CLARITY UA  Clear  Clear Slightly Cloudy*   COLOR UA  Yellow  Yellow Yellow   SPEC GRAV UA  <=1 005*  <=1 005* 1 020   PH UA  6 0  6 5 6 0   GLUCOSE UA mg/dl Negative  Negative Negative   KETONES UA mg/dl Negative  Negative Negative   BLOOD UA  1+*  2+* 1+*   PROTEIN UA mg/dl Negative  Trace* 1+*   NITRITE UA  Negative  Negative Positive*   BILIRUBIN UA  Negative  Negative Negative   UROBILINOGEN UA E U /dl 0 2  0 2 0 2   LEUKOCYTES UA  Trace*  2+* 2+*   WBC UA /hpf  --  20-30*   RBC UA /hpf  --  4-10*   BACTERIA UA /hpf  --  Moderate*   EPITHELIAL CELLS WET PREP /hpf  --  Occasional     Results from last 7 days   Lab Units 07/21/22  1210   INFLUENZA A PCR  Negative   INFLUENZA B PCR  Negative   RSV PCR  Negative                             Results from last 7 days   Lab Units 07/21/22  1210   BLOOD CULTURE  Received in Microbiology Lab  Culture in Progress  Received in Microbiology Lab  Culture in Progress       7/21/2022 ekg  Normal sinus rhythm  Prolonged QT  Abnormal ECG  When compared with ECG of 19-JUN-2022 19:12,  No significant change was found  ED Treatment:   Medication Administration from 07/21/2022 1115 to 07/21/2022 1659       Date/Time Order Dose Route Action     07/21/2022 1201 ondansetron (FOR EMS ONLY) (ZOFRAN) 4 mg/2 mL injection 4 mg 0 mg Does not apply Given to EMS     07/21/2022 1217 multi-electrolyte (PLASMALYTE-A/ISOLYTE-S PH 7 4) IV solution 1,000 mL 1,000 mL Intravenous New Bag     07/21/2022 1217 multi-electrolyte (PLASMALYTE-A/ISOLYTE-S PH 7 4) IV solution 1,000 mL 1,000 mL Intravenous New Bag     07/21/2022 1227 cefepime (MAXIPIME) IVPB (premix in dextrose) 2,000 mg 50 mL 2,000 mg Intravenous New Bag     07/21/2022 1236 vancomycin (VANCOCIN) 1,750 mg in sodium chloride 0 9 % 500 mL IVPB 1,750 mg Intravenous New Bag        Past Medical History:   Diagnosis Date    Allergic sinusitis     last assessed - [id number]    Anxiety     last assessed - [id number]    Arthritis     Asthma     last assessed - 65MXA5132    Bilateral lower extremity edema     last assessed - 16AQW3602    Callus of foot     last assessed - 86Vbv0762    Chronic GERD     last assessed - 30HQC5503    Colon polyp     Constipation     Resolved - 43REB7867    [facility]ch[facility] obstructive [facility] disease[facility]     Depression     last assessed - 10ERE6084    Disease of thyroid gland     Diverticulitis of colon     last assessed - 48ZBZ5284    Dyspepsia     Resolved - 76HYT8047    Esophageal reflux     last assessed - 65TCQ3910    Essential hypertriglyceridemia     last assessed - 38FPD0346    GERD (gastroesophageal reflux disease)     Gynecological disorder     last assessed - 12UUZ6157    Hypertension     last assessed - 22HSG5100    Hypotension     last assessed - 12Tmw7432    Kidney stone     Migraine     Neuropathy     Pulmonary emphysema ([facility] )     last assessed - 13OTZ3971    Seasonal allergies     Urinary frequency     last assessed - 23Dyr2446    Vagina, candidiasis     last assessed - 65Bex0582    Visual impairment      Present on Admission:   Anxiety   Tobacco abuse disorder      Admitting Diagnosis: Kidney stone [N20 0]  Hydroureteronephrosis [N13 30]  Pyelonephritis [N12]  Hypotension [I95 9]  Flank pain [R10 9]  Age/Sex: 46 y o  female  Admission Orders:  contact isolation  SCD    Scheduled Medications:  buPROPion, 150 mg, Oral, QAM  cefepime, 2,000 mg, Intravenous, Q12H  enoxaparin, 40 mg, Subcutaneous, Daily  gabapentin, 100 mg, Oral, TID  levothyroxine, 112 mcg, Oral, Early Morning  nicotine, 1 patch, Transdermal, Daily  pantoprazole, 40 mg, Oral, BID AC  pravastatin, 40 mg, Oral, Daily With Dinner  sertraline, 100 mg, Oral, BID  sucralfate, 1 g, Oral, TID AC  tamsulosin, 0 4 mg, Oral, Daily With Dinner  IV  magnesium sulfate IVPB (premix) SOLN 1 g 7/22 0930  Dose: 1 g  Freq: Once Route: IV    IV    multi-electrolyte (PLASMALYTE-A/ISOLYTE-S PH 7 4) IV solution 1,000 mL  Dose: 1,000 mL  Freq: Once Route: IV  Last Dose: Stopped (07/21/22 1247)  Start: 07/21/22 1215 End: 07/21/22 1247   Admin Instructions:   Bag #1 of 2    Based on Ideal body weight: 61 6 kg (135 lb 12 9 oz)            1217 7/21/2022     1247 7/21/2022         Followed by  multi-electrolyte (PLASMALYTE-A/ISOLYTE-S PH 7 4) IV solution 1,000 mL  Dose: 1,000 mL  Freq: Once Route: IV  Last Dose: Stopped (07/21/22 1440)  Start: 07/21/22 1236 End: 07/21/22 1440   Admin Instructions:   Bag #2 of 2  RN to Stop at 1848mL  Total Volume Final Bag Volume to infuse = 848mL    Based on Ideal body weight: 61 6 kg (135 lb 12 9 oz)            1217           Continuous IV Infusions:     PRN Meds:  acetaminophen, 650 mg, Oral, Q6H PRN  diazepam, 10 mg, Oral, Q6H PRN  ipratropium-albuterol, 3 mL, Nebulization, Q6H PRN  ondansetron, 4 mg, Intravenous, Q6H PRN  oxyCODONE-acetaminophen, 2 tablet, Oral, Q6H PRN 7/21 x1; 7/22 x1      IP CONSULT TO UROLOGY    Network Utilization Review Department  ATTENTION: Please call with any questions or concerns to [phone] and carefully listen to the prompts so that you are directed to the right person  All voicemails are confidential   [facility] all requests for admission clinical reviews, approved or denied determinations and any other requests to dedicated fax number below belonging to the campus where the patient is receiving treatment   List of dedicated fax numbers for the Facilities:  [address] [place] [address] DENIALS (Administrative/Medical Necessity) [phone]   [address] [place] [address] (Maternity/NICU/Pediatrics) [phone] [address] [facility] [phone]   [address] [place] [address] Dr [phone]   [address] [facility] [address] [facility] [id number] [place] [address] [address] [phone]   [address] [phone]

## 2022-07-22 NOTE — ASSESSMENT & PLAN NOTE
· Febrile of 102 2 at home; no further fevers since admission  · UA grossly positive; cultures pending at this time  · Continue Cefepime at this time; de-escalate pending culture results

## 2022-07-22 NOTE — PROGRESS NOTES
[facility]  Progress Note - Yudi Cuellar 1970, 46 y o  female MRN: [id number]  Unit/Bed#: -01 Encounter: [id number]  Primary Care Provider: Berhane Frye DO   Date and time admitted to hospital: 7/21/2022 11:15 AM    * Left ureteral stone with hydronephrosis  Assessment & Plan  · Presented with left flank pain and nausea  · S/p cystoscopy with left ureteral stent placement 7/21/2022  · CT aldo/pelvis (7/21): Left ureteral calculi eliciting moderate left hydroureteronephrosis and findings of left renal impairment  Findings indeterminate for left renal pyelonephritis  · Pain control with Tylenol and home Percocet  · Further supportive care with IV Zofran  · Continue home Flomax 0 4 mg daily  · Urology recommending outpatient follow-up with her previously established urologist    Pyelonephritis  Assessment & Plan  · Febrile of 102 2 at home; no further fevers since admission  · UA grossly positive; cultures pending at this time  · Continue Cefepime at this time; de-escalate pending culture results    Anxiety  Assessment & Plan  · Continue home Wellbutrin 150 mg daily, Zoloft 100 mg b i d , and Valium 10 mg q 6 p r n  Tobacco abuse disorder  Assessment & Plan  · Wheezing noted on exam today  · Asking for breathing treatment this AM  · Nicotine patch while inpatient    VTE Pharmacologic Prophylaxis: VTE Score: 3 Moderate Risk (Score 3-4) - Pharmacological DVT Prophylaxis Ordered: enoxaparin (Lovenox)  Patient Centered Rounds: I performed bedside rounds with nursing staff today  Discussions with Specialists or Other Care Team Provider: Urology, Nursing, and CM    Education and Discussions with Family / Patient: Updated  (sister) via phone  Time Spent for Care: 45 minutes  More than 50% of total time spent on counseling and coordination of care as described above  Current Length of Stay: 1 day(s)  Current Patient Status: Inpatient   Certification Statement:  The patient will continue to require additional inpatient hospital stay due to Left ureteral stone with hydronephrosis and pyelonephritis requiring IV antibiotics  Discharge Plan: Anticipate discharge in 24-48 hrs to home  Code Status: Level 1 - Full Code    Subjective:   Patient resting comfortably in bed without any acute complaints  She continues to have a some left flank pain but overall is feeling better than when she 1st arrived  No significant overnight events reported by nursing  Objective:     Vitals:   Temp (24hrs), Av 8 °F (36 6 °C), Min:97 3 °F (36 3 °C), Max:98 1 °F (36 7 °C)    Temp:  [97 3 °F (36 3 °C)-98 1 °F (36 7 °C)] 97 7 °F (36 5 °C)  HR:  [69-96] 69  Resp:  [16-29] 20  BP: ()/(57-80) 122/69  SpO2:  [90 %-98 %] 95 %  Body mass index is 39 16 kg/m²  Input and Output Summary (last 24 hours): Intake/Output Summary (Last 24 hours) at 2022 1127  Last data filed at 2022 1744  Gross per 24 hour   Intake 600 ml   Output --   Net 600 ml       Physical Exam:   Physical Exam  Vitals and nursing note reviewed  Constitutional:       General: She is not in acute distress  Appearance: She is well-developed  She is obese  HENT:      Head: Normocephalic and atraumatic  Mouth/Throat:      Mouth: Mucous membranes are moist       Pharynx: Oropharynx is clear  Eyes:      Extraocular Movements: Extraocular movements intact  Conjunctiva/sclera: Conjunctivae normal    Cardiovascular:      Rate and Rhythm: Normal rate and regular rhythm  Pulses: Normal pulses  Heart sounds: Normal heart sounds  No murmur heard  Pulmonary:      Effort: Pulmonary effort is normal  No respiratory distress  Breath sounds: Wheezing present  Abdominal:      Palpations: Abdomen is soft  Tenderness: There is no abdominal tenderness  Musculoskeletal:      Cervical back: Normal range of motion and neck supple  Skin:     General: Skin is warm and dry     Neurological: Mental Status: She is alert  Labs:  Results from last 7 days   Lab Units 07/22/22  0633 07/21/22  1210   WBC Thousand/uL 6 82 9 12   HEMOGLOBIN g/dL 11 0* 12 5   HEMATOCRIT % 36 1 40 3   PLATELETS Thousands/uL 72* 102*   NEUTROS PCT %  --  85*   LYMPHS PCT %  --  8*   MONOS PCT %  --  6   EOS PCT %  --  1     Results from last 7 days   Lab Units 07/22/22  0540 07/21/22  1210   SODIUM mmol/L 143 139   POTASSIUM mmol/L 3 8 3 1*   CHLORIDE mmol/L 117* 106   CO2 mmol/L 20* 25   BUN mg/dL 11 10   CREATININE mg/dL 0 84 1 14   ANION GAP mmol/L 6 8   CALCIUM mg/dL 6 6* 8 6   ALBUMIN g/dL  --  3 6   TOTAL BILIRUBIN mg/dL  --  0 76   ALK PHOS U/L  --  82   ALT U/L  --  23   AST U/L  --  56*   GLUCOSE RANDOM mg/dL 112 145*     Results from last 7 days   Lab Units 07/21/22  1210   INR  1 19             Results from last 7 days   Lab Units 07/21/22  1210   LACTIC ACID mmol/L 1 7   PROCALCITONIN ng/ml 1 64*       Lines/Drains:  Invasive Devices  Report    Peripheral Intravenous Line  Duration           Peripheral IV 07/21/22 Left Antecubital 1 day    Peripheral IV 07/21/22 Right Arm 1 day          Drain  Duration           Ureteral Internal Stent Left ureter 6 Fr  <1 day              Imaging: Reviewed radiology reports from this admission including: procedure reports    Recent Cultures (last 7 days):   Results from last 7 days   Lab Units 07/21/22  1210   BLOOD CULTURE  Received in Microbiology Lab  Culture in Progress  Received in Microbiology Lab  Culture in Progress         Last 24 Hours Medication List:   Current Facility-Administered Medications   Medication Dose Route Frequency Provider Last Rate    acetaminophen  650 mg Oral Q6H PRN [place] Ely Haskins, DO      albuterol  2 puff Inhalation Q6H PRN [place] Ely Haskins, DO      buPROPion  150 mg Oral QAM [place] Ely Haskins, DO      cefepime  2,000 mg Intravenous Q12H [place] Ely Haskins, DO 2,000 mg (07/21/22 2302)    diazepam  10 mg Oral Q6H PRN [place] Ely Haskins, DO      enoxaparin  40 mg Subcutaneous Daily [facility] Lauryn Haskins, DO      gabapentin  100 mg Oral TID [facility], DO      ipratropium-albuterol  3 mL Nebulization Q6H PRN [facility], DO      levothyroxine  112 mcg Oral Early Morning [facility], [place]      nicotine  1 patch Transdermal Daily [place], [place]      ondansetron  4 mg Intravenous Q6H PRN [facility], [place]      oxyCODONE-acetaminophen  2 tablet Oral Q6H PRN [facility], DO      pantoprazole  40 mg Oral BID [facility] Haskins, DO      pravastatin  40 mg Oral Daily With [address],       sertraline  100 mg Oral BID [facility], DO      sucralfate  1 g Oral TID [facility] Haskins, DO      tamsulosin  0 4 mg Oral Daily With [address] DO          Today, Patient Was Seen By: Ian Hernandez DO    **Please Note: This note may have been constructed using a voice recognition system  **

## 2022-07-22 NOTE — PLAN OF CARE
Problem: GENITOURINARY - ADULT  Goal: Maintains or returns to baseline urinary function  Description: INTERVENTIONS:  - Assess urinary function  - Encourage oral fluids to ensure adequate hydration if ordered  - Administer IV fluids as ordered to ensure adequate hydration  - Administer ordered medications as needed  - Offer frequent toileting  - Follow urinary retention protocol if ordered  Outcome: Progressing  Goal: Absence of urinary retention  Description: INTERVENTIONS:  - Assess patients ability to void and empty bladder  - Monitor I/O  - Bladder scan as needed  - Discuss with physician/AP medications to alleviate retention as needed  - Discuss catheterization for long term situations as appropriate  Outcome: Progressing     Problem: PAIN - ADULT  Goal: Verbalizes/displays adequate comfort level or baseline comfort level  Description: Interventions:  - Encourage patient to monitor pain and request assistance  - Assess pain using appropriate pain scale  - Administer analgesics based on type and severity of pain and evaluate response  - Implement non-pharmacological measures as appropriate and evaluate response  - Consider cultural and social influences on pain and pain management  - Notify physician/advanced practitioner if interventions unsuccessful or patient reports new pain  Outcome: Progressing     Problem: INFECTION - ADULT  Goal: Absence or prevention of progression during hospitalization  Description: INTERVENTIONS:  - Assess and monitor for signs and symptoms of infection  - Monitor lab/diagnostic results  - Monitor all insertion sites, i e  indwelling lines, tubes, and drains  - Monitor endotracheal if appropriate and nasal secretions for changes in amount and color  - [place] appropriate cooling/warming therapies per order  - Administer medications as ordered  - Instruct and encourage patient and family to use good hand hygiene technique  - Identify and instruct in appropriate isolation precautions for identified infection/condition  Outcome: Progressing  Goal: Absence of fever/infection during neutropenic period  Description: INTERVENTIONS:  - Monitor WBC    Outcome: Progressing     Problem: SAFETY ADULT  Goal: Patient will remain free of falls  Description: INTERVENTIONS:  - Educate patient/family on patient safety including physical limitations  - Instruct patient to call for assistance with activity   - Consult OT/PT to assist with strengthening/mobility   - Keep Call bell within reach  - Keep bed low and locked with side rails adjusted as appropriate  - Keep care items and personal belongings within reach  - Initiate and maintain comfort rounds  - Make Fall Risk Sign visible to staff  - Apply yellow socks and bracelet for high fall risk patients  - Consider moving patient to room near nurses station  Outcome: Progressing  Goal: Maintain or return to baseline ADL function  Description: INTERVENTIONS:  -  Assess patient's ability to carry out ADLs; assess patient's baseline for ADL function and identify physical deficits which impact ability to perform ADLs (bathing, care of mouth/teeth, toileting, grooming, dressing, etc )  - Assess/evaluate cause of self-care deficits   - Assess range of motion  - Assess patient's mobility; develop plan if impaired  - Assess patient's need for assistive devices and provide as appropriate  - Encourage maximum independence but intervene and supervise when necessary  - Involve family in performance of ADLs  - Assess for home care needs following discharge   - Consider OT consult to assist with ADL evaluation and planning for discharge  - Provide patient education as appropriate  Outcome: Progressing  Goal: Maintains/Returns to pre admission functional level  Description: INTERVENTIONS:  - Perform BMAT or MOVE assessment daily    - Set and communicate daily mobility goal to care team and patient/family/caregiver     - Collaborate with rehabilitation services on mobility goals if consulted  - Out of bed for toileting  - Record patient progress and toleration of activity level   Outcome: Progressing     Problem: DISCHARGE PLANNING  Goal: Discharge to home or other facility with appropriate resources  Description: INTERVENTIONS:  - Identify barriers to discharge w/patient and caregiver  - Arrange for needed discharge resources and transportation as appropriate  - Identify discharge learning needs (meds, wound care, etc )  - Arrange for interpretive services to assist at discharge as needed  - Refer to Case Management Department for coordinating discharge planning if the patient needs post-hospital services based on physician/advanced practitioner order or complex needs related to functional status, cognitive ability, or social support system  Outcome: Progressing     Problem: Knowledge Deficit  Goal: Patient/family/caregiver demonstrates understanding of disease process, treatment plan, medications, and discharge instructions  Description: Complete learning assessment and assess knowledge base    Interventions:  - Provide teaching at level of understanding  - Provide teaching via preferred learning methods  Outcome: Progressing

## 2022-07-22 NOTE — CASE MANAGEMENT
Case Management Assessment & Discharge Planning Note    Patient name Vilma Blanco  Location [address] 220/-01 MRN [id number]  : 1970 Date 2022       Current Admission Date: 2022  Current Admission Diagnosis:Left ureteral stone with hydronephrosis   Patient Active Problem List    Diagnosis Date Noted    Left ureteral stone with hydronephrosis 2022    Pyelonephritis 2022    Iron deficiency anemia secondary to inadequate dietary iron intake 2022    Hydroureteronephrosis 2022    Hypokalemia 2022    Gastroesophageal reflux disease 2022    Positive depression screening 2022    Primary hypertriglyceridemia 2022    Nephrolithiasis 2022    Pulmonary emphysema ([facility] ) 2022    BMI 40 0-44 9, adult ([facility] ) 2022    COVID-19 vaccination refused 2022    Glucose intolerance 2021    Edema, lower extremity 2020    Morbid obesity ([facility] ) 2019    Gastroparesis 2018    Hypercholesterolemia 2018    [facility] 2017    Tobacco abuse disorder 2017    Gastroesophageal reflux disease with esophagitis 2016    Vitamin D deficiency 2016    Anxiety 2016    Benign hypertension 2016    Depression 2016    Hypothyroidism 2016    Seasonal allergic rhinitis 2016    Spinal stenosis of lumbar region 2012    Degeneration of lumbar or lumbosacral intervertebral disc 2010      LOS (days): 1  Geometric Mean LOS (GMLOS) (days):   Days to GMLOS:     OBJECTIVE:    Risk of Unplanned Readmission Score: 13 76         Current admission status: Inpatient  Referral Reason: Other (d/c planning)    Preferred Pharmacy:   [facility]  [address] [place] [id number]  Phone: [phone] Fax: [phone]    Primary Care Provider: Greta Abebe DO    Primary Insurance: [place] [employer] [place] BARBRA  Secondary Insurance:     ASSESSMENT:  Sarika 26 Proxies    There are no active Health Care Proxies on file  Readmission Root Cause  30 Day Readmission: No    Patient Information  Admitted from[de-identified] Home  Mental Status: Alert  During Assessment patient was accompanied by: Not accompanied during assessment  Assessment information provided by[de-identified] Patient  Primary Caregiver: Self  Support Systems: Friend, Family members  [place] of Residence: [address] [place] [address] do you live in?: [address] entry access options   Select all that apply : Stairs  Number of steps to enter home : 2  Do the steps have railings?: No  Type of Current Residence: Apartment  Floor Level: 1  Upon entering residence, is there a bedroom on the main floor (no further steps)?: Yes  Upon entering residence, is there a bathroom on the main floor (no further steps)?: Yes  In the last 12 months, was there a time when you were not able to pay the mortgage or rent on time?: No  In the last 12 months, how many places have you lived?: 1  In the last 12 months, was there a time when you did not have a steady place to sleep or slept in a shelter (including now)?: No  Homeless/housing insecurity resource given?: N/A  Living Arrangements: Lives Alone  Is patient a ?: No    Activities of Daily Living Prior to Admission  Functional Status: Independent  Completes ADLs independently?: Yes  Ambulates independently?: Yes  Does patient currently own DME?: Yes  What DME does the patient currently own?: Other (Comment) (is using her friend glucometer, bp cuff)  Does patient have a history of Outpatient Therapy (PT/OT)?: No  Does the patient have a history of Short-Term Rehab?: No  Does patient have a history of HHC?: No  Does patient currently have [facility]?: No         Patient Information Continued  Income Source: SSI/SSD (disabled)  Does patient have prescription coverage?: Yes ([facility] [place])  Within the past 12 months, you worried that your food would run out before you got the money to buy more : Never true  Within the past 12 months, the food you bought just didn't last and you didn't have money to get more : Never true  Food insecurity resource given?: N/A  Does patient receive dialysis treatments?: No  Does patient have a history of substance abuse?: No  Does patient have a history of Mental Health Diagnosis?: Yes (depression)  Is patient receiving treatment for mental health?: Yes (medication from pcp)  Has patient received inpatient treatment related to mental health in the last 2 years?: No         Means of Transportation  Means of Transport to Appts[de-identified] Drives Self  In the past 12 months, has lack of transportation kept you from medical appointments or from getting medications?: No  In the past 12 months, has lack of transportation kept you from meetings, work, or from getting things needed for daily living?: No  Was application for public transport provided?: N/A        DISCHARGE DETAILS:    Discharge planning discussed with[de-identified] patient  and sister  Freedom of Choice: Yes  Comments - Freedom of Choice: pt denies any d/c needs  CM contacted family/caregiver?: Yes             Contacts  Patient Contacts: Liz Gómez  Relationship to Patient[de-identified] Family (sister)  Contact Method: Phone  Phone Number: [phone]  Reason/Outcome: Discharge [address]         Is the patient interested in [facility] at discharge?: No    DME Referral Provided  Referral made for DME?: No    Other Referral/Resources/Interventions Provided:  Referral Comments: pt denies any d/c needs    Would you like to participate in our [address] service program?  : No - Declined    Treatment Team Recommendation: Home (home with outpt follow up- friend will transport)

## 2022-07-22 NOTE — PROGRESS NOTES
Progress Note - Vilma Blanco 46 y o  female MRN: [id number]    Unit/Bed#: -01 Encounter: [id number]      Assessment:  Stable status post left ureteral stent placement  Plan:  Continue stent  Continue course of antibiotics  Follow up with her usual urologist at [facility] as an outpatient  Subjective:   Feels well this morning  Voiding without difficulty  Denies any flank pain  Objective:     Vitals: Blood pressure 122/69, pulse 69, temperature 97 7 °F (36 5 °C), resp  rate 20, height 5' 7" (1 702 m), weight 113 kg (250 lb), last menstrual period 04/05/2018, SpO2 95 %  ,Body mass index is 39 16 kg/m²  Intake/Output Summary (Last 24 hours) at 7/22/2022 0956  Last data filed at 7/21/2022 1744  Gross per 24 hour   Intake 1100 ml   Output --   Net 1100 ml       Physical Exam:  No abdominal or flank tenderness     Invasive Devices  Report    Peripheral Intravenous Line  Duration           Peripheral IV 07/21/22 Left Antecubital 1 day    Peripheral IV 07/21/22 Right Arm 1 day          Drain  Duration           Ureteral Internal Stent Left ureter 6 Fr  <1 day                Lab, Imaging and other studies: I have personally reviewed pertinent reports      VTE Pharmacologic Prophylaxis: Sequential compression device (Venodyne)   VTE Mechanical Prophylaxis: sequential compression device

## 2022-07-22 NOTE — ASSESSMENT & PLAN NOTE
· Presented with left flank pain and nausea  · S/p cystoscopy with left ureteral stent placement 7/21/2022  · CT aldo/pelvis (7/21): Left ureteral calculi eliciting moderate left hydroureteronephrosis and findings of left renal impairment  Findings indeterminate for left renal pyelonephritis     · Pain control with Tylenol and home Percocet  · Further supportive care with IV Zofran  · Continue home Flomax 0 4 mg daily  · Urology recommending outpatient follow-up with her previously established urologist

## 2022-07-22 NOTE — ASSESSMENT & PLAN NOTE
· Wheezing noted on exam today  · Asking for breathing treatment this AM  · Nicotine patch while inpatient

## 2022-07-23 VITALS
BODY MASS INDEX: 39.24 KG/M2 | SYSTOLIC BLOOD PRESSURE: 131 MMHG | WEIGHT: 250 LBS | OXYGEN SATURATION: 93 % | HEART RATE: 70 BPM | RESPIRATION RATE: 18 BRPM | DIASTOLIC BLOOD PRESSURE: 69 MMHG | TEMPERATURE: 98.5 F | HEIGHT: 67 IN

## 2022-07-23 LAB
ANION GAP SERPL CALCULATED.3IONS-SCNC: 7 MMOL/L (ref 4–13)
BACTERIA UR CULT: ABNORMAL
BACTERIA UR CULT: NORMAL
BUN SERPL-MCNC: 16 MG/DL (ref 5–25)
CALCIUM SERPL-MCNC: 8.5 MG/DL (ref 8.4–10.2)
CHLORIDE SERPL-SCNC: 108 MMOL/L (ref 96–108)
CO2 SERPL-SCNC: 26 MMOL/L (ref 21–32)
CREAT SERPL-MCNC: 1.05 MG/DL (ref 0.6–1.3)
ERYTHROCYTE [DISTWIDTH] IN BLOOD BY AUTOMATED COUNT: 15.8 % (ref 11.6–15.1)
GFR SERPL CREATININE-BSD FRML MDRD: 61 ML/MIN/1.73SQ M
GLUCOSE SERPL-MCNC: 94 MG/DL (ref 65–140)
HCT VFR BLD AUTO: 34 % (ref 34.8–46.1)
HGB BLD-MCNC: 10.3 G/DL (ref 11.5–15.4)
MCH RBC QN AUTO: 30.3 PG (ref 26.8–34.3)
MCHC RBC AUTO-ENTMCNC: 30.3 G/DL (ref 31.4–37.4)
MCV RBC AUTO: 100 FL (ref 82–98)
PLATELET # BLD AUTO: 89 THOUSANDS/UL (ref 149–390)
PMV BLD AUTO: 10.5 FL (ref 8.9–12.7)
POTASSIUM SERPL-SCNC: 3.5 MMOL/L (ref 3.5–5.3)
RBC # BLD AUTO: 3.4 MILLION/UL (ref 3.81–5.12)
SODIUM SERPL-SCNC: 141 MMOL/L (ref 135–147)
WBC # BLD AUTO: 7.07 THOUSAND/UL (ref 4.31–10.16)

## 2022-07-23 PROCEDURE — 99239 HOSP IP/OBS DSCHRG MGMT >30: CPT | Performed by: INTERNAL MEDICINE

## 2022-07-23 PROCEDURE — 85027 COMPLETE CBC AUTOMATED: CPT | Performed by: INTERNAL MEDICINE

## 2022-07-23 PROCEDURE — 94640 AIRWAY INHALATION TREATMENT: CPT

## 2022-07-23 PROCEDURE — 94760 N-INVAS EAR/PLS OXIMETRY 1: CPT

## 2022-07-23 PROCEDURE — 80048 BASIC METABOLIC PNL TOTAL CA: CPT | Performed by: INTERNAL MEDICINE

## 2022-07-23 RX ORDER — NICOTINE 21 MG/24HR
1 PATCH, TRANSDERMAL 24 HOURS TRANSDERMAL EVERY 24 HOURS
Qty: 28 PATCH | Refills: 0 | Status: SHIPPED | OUTPATIENT
Start: 2022-07-23

## 2022-07-23 RX ORDER — POLYETHYLENE GLYCOL 3350 17 G/17G
17 POWDER, FOR SOLUTION ORAL DAILY PRN
Status: DISCONTINUED | OUTPATIENT
Start: 2022-07-23 | End: 2022-07-23 | Stop reason: HOSPADM

## 2022-07-23 RX ORDER — CEFPODOXIME PROXETIL 200 MG/1
200 TABLET, FILM COATED ORAL 2 TIMES DAILY
Qty: 20 TABLET | Refills: 0 | Status: SHIPPED | OUTPATIENT
Start: 2022-07-23 | End: 2022-08-02

## 2022-07-23 RX ADMIN — BUPROPION 150 MG: 150 TABLET, EXTENDED RELEASE ORAL at 09:04

## 2022-07-23 RX ADMIN — SERTRALINE 100 MG: 100 TABLET, FILM COATED ORAL at 09:04

## 2022-07-23 RX ADMIN — PANTOPRAZOLE SODIUM 40 MG: 40 TABLET, DELAYED RELEASE ORAL at 08:04

## 2022-07-23 RX ADMIN — SUCRALFATE 1 G: 1 TABLET ORAL at 08:04

## 2022-07-23 RX ADMIN — GABAPENTIN 100 MG: 100 CAPSULE ORAL at 09:04

## 2022-07-23 RX ADMIN — CEFEPIME HYDROCHLORIDE 2000 MG: 2 INJECTION, SOLUTION INTRAVENOUS at 00:06

## 2022-07-23 RX ADMIN — NICOTINE 1 PATCH: 14 PATCH, EXTENDED RELEASE TRANSDERMAL at 09:05

## 2022-07-23 RX ADMIN — OXYCODONE HYDROCHLORIDE AND ACETAMINOPHEN 2 TABLET: 5; 325 TABLET ORAL at 05:22

## 2022-07-23 RX ADMIN — ENOXAPARIN SODIUM 40 MG: 100 INJECTION SUBCUTANEOUS at 09:05

## 2022-07-23 RX ADMIN — ALBUTEROL SULFATE 2.5 MG: 2.5 SOLUTION RESPIRATORY (INHALATION) at 00:25

## 2022-07-23 RX ADMIN — LEVOTHYROXINE SODIUM 112 MCG: 112 TABLET ORAL at 05:20

## 2022-07-23 NOTE — ASSESSMENT & PLAN NOTE
· Febrile of 102 2 at home; no further fevers since admission  · Urine culture growing E coli  · Discharge home with Vantin 200 mg twice daily if to complete an additional 10 days

## 2022-07-23 NOTE — RESPIRATORY THERAPY NOTE
RT Protocol Note  Opal Alcocer 46 y o  female MRN: [id number]  Unit/Bed#: -01 Encounter: [id number]    Assessment    Principal Problem:    Left ureteral stone with hydronephrosis  Active Problems:    Anxiety    Tobacco abuse disorder    Pyelonephritis      Home Pulmonary Medications:  2 puffs Albuterol as needed, Albuterol neb as needed  Home Devices/Therapy: Other (Comment) (neb/inhaler)    Past Medical History:   Diagnosis Date    Allergic sinusitis     last assessed - [id number]    Anxiety     last assessed - 45ZXG1941    Arthritis     Asthma     last assessed - 05XII5932    Bilateral lower extremity edema     last assessed - 12WYX1642    Callus of foot     last assessed - 35BDG9698    Chronic GERD     last assessed - 97UVY1646    Colon polyp     Constipation     Resolved - 92AVU8635    COPD (chronic obstructive pulmonary disease) ([facility] )     Depression     last assessed - 34XDQ2249    Disease of thyroid gland     Diverticulitis of colon     last assessed - 87MPE1992    Dyspepsia     Resolved - 93FLP5152    Esophageal reflux     last assessed - 71BBZ2639    Essential hypertriglyceridemia     last assessed - 15LAD7104    GERD (gastroesophageal reflux disease)     Gynecological disorder     last assessed - 14PTM1812    Hypertension     last assessed - 66VIX4371    Hypotension     last assessed - 60Nuj5779    Kidney stone     Migraine     Neuropathy     Pulmonary emphysema ([facility] )     last assessed - 24BFH7696    Seasonal allergies     Urinary frequency     last assessed - 46Byf2670    Vagina, candidiasis     last assessed - 70Iju1935    Visual impairment      Social History     Socioeconomic History    Marital status:      Spouse name: None    Number of children: None    Years of education: None    Highest education level: None   Occupational History    None   Tobacco Use    Smoking status: Current Every Day Smoker     Packs/day: 1 50     Years: 30 00     Pack years: 45 00     Types: Cigarettes    Smokeless tobacco: Never Used    Tobacco comment: one pack   Vaping Use    Vaping Use: Former    Substances: Nicotine   Substance and Sexual Activity    Alcohol use: Not Currently     Comment: wine twice a year    Drug use: No    Sexual activity: Not Currently   Other Topics Concern    None   Social History Narrative    None     Social Determinants of Health     Financial Resource Strain: Not on file   Food Insecurity: No Food Insecurity    Worried About Running Out of Food in the Last Year: Never true    Ran Out of Food in the Last Year: Never true   Transportation Needs: No Transportation Needs    Lack of Transportation (Medical): No    Lack of Transportation (Non-Medical): No   Physical Activity: Not on file   Stress: Not on file   Social Connections: Not on file   Intimate Partner Violence: Not on file   Housing Stability: Low Risk     Unable to Pay for Housing in the Last Year: No    Number of Places Lived in the Last Year: 1    Unstable Housing in the Last Year: No       Subjective         Objective    Physical Exam:   Assessment Type: Assess only  General Appearance: Alert, Awake  Respiratory Pattern: Normal  Chest Assessment: Chest expansion symmetrical  Bilateral Breath Sounds: Crackles (bibasilar)  Cough: None  O2 Device: room air    Vitals:  Blood pressure 112/69, pulse 75, temperature 97 8 °F (36 6 °C), resp  rate 18, height 5' 7" (1 702 m), weight 113 kg (250 lb), last menstrual period 04/05/2018, SpO2 92 %  Imaging and other studies: I have personally reviewed pertinent reports  07/22/22 1944   Respiratory Protocol   Protocol Initiated? Yes   Protocol Selection Respiratory   Language Barrier? No   Medical & Social History Reviewed? Yes   Diagnostic Studies Reviewed? Yes   Physical Assessment Performed? Yes   Home Devices/Therapy Other (Comment)  (neb/inhaler)   Respiratory Plan No distress/Pulmonary history   Respiratory Assessment   Assessment Type Assess only   General Appearance Alert; Awake Respiratory Pattern Normal   Chest Assessment Chest expansion symmetrical   Bilateral Breath Sounds Crackles  (bibasilar)   Cough None   O2 Device room air   Additional Assessments   Respirations 18       O2 Device: room air     Plan    Respiratory Plan: No distress/Pulmonary history

## 2022-07-23 NOTE — CASE MANAGEMENT
Case Management Discharge Planning Note    Patient name Felix Earing  Location [address] 220/-55 MRN [id number]  : 1970 Date 2022       Current Admission Date: 2022  Current Admission Diagnosis:Left ureteral stone with hydronephrosis   Patient Active Problem List    Diagnosis Date Noted    Left ureteral stone with hydronephrosis 2022    Pyelonephritis 2022    Iron deficiency anemia secondary to inadequate dietary iron intake 2022    Hydroureteronephrosis 2022    Hypokalemia 2022    Gastroesophageal reflux disease 2022    Positive depression screening 2022    Primary hypertriglyceridemia 2022    Nephrolithiasis 2022    Pulmonary emph[facility]sema ([facility] ) 2022    BMI 40 0-44 9, adult ([facility] ) 2022    COVID-19 vaccination refused 2022    Glucose intolerance 2021    Edema, lower extremity 2020    Morbid obesity ([facility] ) 2019    Gastroparesis 2018    Hypercholesterolemia 2018    Intermittent claudication ([facility] ) 2017    Tobacco abuse disorder 2017    Gastroesophageal reflux disease with esophagitis 2016    Vitamin D deficiency 2016    Anxiety 2016    Benign hypertension 2016    Depression 2016    Hypothyroidism 2016    Seasonal allergic rhinitis 2016    Spinal stenosis of lumbar region 2012    Degeneration of lumbar or lumbosacral intervertebral disc 2010      LOS (days): 2  Geometric Mean LOS (GMLOS) (days):   Days to GMLOS:     OBJECTIVE:  Risk of Unplanned Readmission Score: 11 89         Current admission status: Inpatient   Preferred Pharmacy:   [address] [place] [address] [address]  [address] [place] [address]  Phone: [phone] Fax: [phone]    Primary Care Provider: Greta Dotson DO    Primary Insurance: Jeremías LUDWIG  Secondary Insurance: DISCHARGE DETAILS:    Discharge planning discussed with[de-identified] patient and sister was called at 09:01 am message was left  Freedom of Choice: Yes  Comments - Freedom of Choice: pt denies any d/c needs  CM contacted family/caregiver?: Yes  Were Treatment Team discharge recommendations reviewed with patient/caregiver?: Yes  Did patient/caregiver verbalize understanding of patient care needs?: Yes  Were patient/caregiver advised of the risks associated with not following Treatment Team discharge recommendations?: Yes    Contacts  Patient Contacts: Reyna Hartman  Relationship to Patient[de-identified] Family (sister)  Phone Number: [phone]  message was left for her to call and discuss d/c plan  Reason/Outcome: Discharge [address]         Is the patient interested in [facility] at discharge?: No    DME Referral Provided  Referral made for DME?: No    Other Referral/Resources/Interventions Provided:  Referral Comments: pt denied any d/c needs    Would you like to participate in our [facility] [address] service program?  : No - Declined    Treatment Team Recommendation: Home (home with outpt follow up- friend)  Discharge Destination Plan[de-identified] Home (home with outpt follow up)  Transport at Discharge : Automobile, Other (Comment) (friend)                       Accompanied by: Friend           Family notified[de-identified] message was left for her sister

## 2022-07-23 NOTE — PLAN OF CARE
Problem: GENITOURINARY - ADULT  Goal: Maintains or returns to baseline urinary function  Description: INTERVENTIONS:  - Assess urinary function  - Encourage oral fluids to ensure adequate hydration if ordered  - Administer IV fluids as ordered to ensure adequate hydration  - Administer ordered medications as needed  - Offer frequent toileting  - Follow urinary retention protocol if ordered  Outcome: Progressing  Goal: Absence of urinary retention  Description: INTERVENTIONS:  - Assess patients ability to void and empty bladder  - Monitor I/O  - Bladder scan as needed  - Discuss with physician/AP medications to alleviate retention as needed  - Discuss catheterization for long term situations as appropriate  Outcome: Progressing     Problem: PAIN - ADULT  Goal: Verbalizes/displays adequate comfort level or baseline comfort level  Description: Interventions:  - Encourage patient to monitor pain and request assistance  - Assess pain using appropriate pain scale  - Administer analgesics based on type and severity of pain and evaluate response  - Implement non-pharmacological measures as appropriate and evaluate response  - Consider cultural and social influences on pain and pain management  - Notify physician/advanced practitioner if interventions unsuccessful or patient reports new pain  Outcome: Progressing     Problem: INFECTION - ADULT  Goal: Absence or prevention of progression during hospitalization  Description: INTERVENTIONS:  - Assess and monitor for signs and symptoms of infection  - Monitor lab/diagnostic results  - Monitor all insertion sites, i e  indwelling lines, tubes, and drains  - Monitor endotracheal if appropriate and nasal secretions for changes in amount and color  - [place] appropriate cooling/warming therapies per order  - Administer medications as ordered  - Instruct and encourage patient and family to use good hand hygiene technique  - Identify and instruct in appropriate isolation precautions for identified infection/condition  Outcome: Progressing  Goal: Absence of fever/infection during neutropenic period  Description: INTERVENTIONS:  - Monitor WBC    Outcome: Progressing     Problem: SAFETY ADULT  Goal: Patient will remain free of falls  Description: INTERVENTIONS:  - Educate patient/family on patient safety including physical limitations  - Instruct patient to call for assistance with activity   - Consult OT/PT to assist with strengthening/mobility   - Keep Call bell within reach  - Keep bed low and locked with side rails adjusted as appropriate  - Keep care items and personal belongings within reach  - Initiate and maintain comfort rounds  - Make Fall Risk Sign visible to staff  - Apply yellow socks and bracelet for high fall risk patients  - Consider moving patient to room near nurses station  Outcome: Progressing  Goal: Maintain or return to baseline ADL function  Description: INTERVENTIONS:  -  Assess patient's ability to carry out ADLs; assess patient's baseline for ADL function and identify physical deficits which impact ability to perform ADLs (bathing, care of mouth/teeth, toileting, grooming, dressing, etc )  - Assess/evaluate cause of self-care deficits   - Assess range of motion  - Assess patient's mobility; develop plan if impaired  - Assess patient's need for assistive devices and provide as appropriate  - Encourage maximum independence but intervene and supervise when necessary  - Involve family in performance of ADLs  - Assess for home care needs following discharge   - Consider OT consult to assist with ADL evaluation and planning for discharge  - Provide patient education as appropriate  Outcome: Progressing  Goal: Maintains/Returns to pre admission functional level  Description: INTERVENTIONS:  - Perform BMAT or MOVE assessment daily    - Set and communicate daily mobility goal to care team and patient/family/caregiver     - Collaborate with rehabilitation services on mobility goals if consulted  - Out of bed for toileting  - Record patient progress and toleration of activity level   Outcome: Progressing     Problem: DISCHARGE PLANNING  Goal: Discharge to home or other facility with appropriate resources  Description: INTERVENTIONS:  - Identify barriers to discharge w/patient and caregiver  - Arrange for needed discharge resources and transportation as appropriate  - Identify discharge learning needs (meds, wound care, etc )  - Arrange for interpretive services to assist at discharge as needed  - Refer to Case Management Department for coordinating discharge planning if the patient needs post-hospital services based on physician/advanced practitioner order or complex needs related to functional status, cognitive ability, or social support system  Outcome: Progressing     Problem: Knowledge Deficit  Goal: Patient/family/caregiver demonstrates understanding of disease process, treatment plan, medications, and discharge instructions  Description: Complete learning assessment and assess knowledge base    Interventions:  - Provide teaching at level of understanding  - Provide teaching via preferred learning methods  Outcome: Progressing

## 2022-07-23 NOTE — ASSESSMENT & PLAN NOTE
· Presented with left flank pain and nausea  · S/p cystoscopy with left ureteral stent placement 7/21/2022  · CT abd/pelvis (7/21): Left ureteral calculi eliciting moderate left hydroureteronephrosis and findings of left renal impairment  Findings indeterminate for left renal pyelonephritis     · Continue home Flomax 0 4 mg daily  · Urology recommending outpatient follow-up with her previously established urologist

## 2022-07-23 NOTE — NURSING NOTE
Patient voiding without difficulty  Complaints of lower back discomfort and generalized abdominal discomfort, no nausea, normoactive bowel sounds   Call bell in reach

## 2022-07-23 NOTE — DISCHARGE SUMMARY
Catherine 45  Discharge- Vilma Blanco 1970, 46 y o  female MRN: [id number]  Unit/Bed#: -01 Encounter: [id number]  Primary Care Provider: Greta Abebe DO   Date and time admitted to hospital: 7/21/2022 11:15 AM    * Left ureteral stone with hydronephrosis  Assessment & Plan  · Presented with left flank pain and nausea  · S/p cystoscopy with left ureteral stent placement 7/21/2022  · CT abd/pelvis (7/21): Left ureteral calculi eliciting moderate left hydroureteronephrosis and findings of left renal impairment  Findings indeterminate for left renal pyelonephritis  · Continue home Flomax 0 4 mg daily  · Urology recommending outpatient follow-up with her previously established urologist    Pyelonephritis  Assessment & Plan  · Febrile of 102 2 at home; no further fevers since admission  · Urine culture growing E coli  · Discharge home with Vantin 200 mg twice daily if to complete an additional 10 days    Anxiety  Assessment & Plan  · Continue home Wellbutrin 150 mg daily, Zoloft 100 mg b i d , and Valium 10 mg q 6 p r n  Tobacco abuse disorder  Assessment & Plan  · Patient requested nicotine patch for discharge      Medical Problems             Resolved Problems  Date Reviewed: 6/23/2022   None               Discharging Physician / Practitioner: Vanessa Bronson DO  PCP: Greta Abebe DO  Admission Date:   Admission Orders (From admission, onward)     Ordered        07/21/22 [facility]                      Discharge Date: 07/23/22    Consultations During Hospital Stay:  · Urology    Procedures Performed:   · S/p cystoscopy with left ureteral stent placement 7/21/2022    Significant Findings / Test Results:   · CT abd/pelvis (7/21): Left ureteral calculi eliciting moderate left hydroureteronephrosis and findings of left renal impairment  Findings indeterminate for left renal pyelonephritis       Incidental Findings:   · None     Test Results Pending at Discharge (will require follow up): · Final urine culture sensitivities     Outpatient Tests Requested:  · To be determined in the outpatient setting upon follow-up with primary care physician and Urology    Complications:  None    Reason for Admission:  Obstructing left ureteral stone    Hospital Course:   Pineda Norton is a 46 y o  female patient who originally presented to the hospital on 7/21/2022 due to left flank pain  Please see H and P as documented by myself for complete details regarding history of presenting illness  In brief, the patient has a past medical history of hypothyroidism and prior renal stones requiring cystoscopy who presented with sudden onset left flank pain with radiation to the left groin  Additionally she noted temperature of a 102 2° at home and this prompted her presentation to the emergency department  In the ED, CT abdomen pelvis was performed that demonstrated a left ureteral calculi with moderate hydro ureter nephrosis and findings consistent with renal impairment and pyelonephritis  She underwent cystoscopy with left ureteral stent placement on 07/21/2022 with Dr Good D[place] and was initiated on antibiotic therapy for pyelonephritis  She was ultimately discharged home with improvement in her symptoms and a 10 day course of Vantin to complete treatment for pyelonephritis  She was discharged in stable condition in all of her questions were answered prior to departure  This is a brief discharge summary please see full medical record for more details  Please see above list of diagnoses and related plan for additional information  Condition at Discharge: good    Discharge Day Visit / Exam:   Subjective:  Patient resting comfortably in bed without any acute complaints  No significant overnight events reported by nursing      Vitals: Blood Pressure: 131/69 (07/23/22 0813)  Pulse: 70 (07/23/22 0813)  Temperature: 98 5 °F (36 9 °C) (07/23/22 0813)  Temp Source: Oral (07/21/22 1156)  Respirations: 18 (07/23/22 0813)  Height: 5' 7" (170 2 cm) (07/21/22 1156)  Weight - Scale: 113 kg (250 lb) (07/21/22 1156)  SpO2: 93 % (07/23/22 0813)     Exam:   Physical Exam  Vitals and nursing note reviewed  Constitutional:       General: She is not in acute distress  Appearance: She is well-developed  She is obese  HENT:      Head: Normocephalic and atraumatic  Mouth/Throat:      Mouth: Mucous membranes are moist       Pharynx: Oropharynx is clear  Eyes:      Extraocular Movements: Extraocular movements intact  Conjunctiva/sclera: Conjunctivae normal    Cardiovascular:      Rate and Rhythm: Normal rate and regular rhythm  Pulses: Normal pulses  Heart sounds: Normal heart sounds  No murmur heard  Pulmonary:      Effort: Pulmonary effort is normal  No respiratory distress  Breath sounds: Normal breath sounds  Abdominal:      General: Bowel sounds are normal  There is no distension  Palpations: Abdomen is soft  Tenderness: There is no abdominal tenderness  Musculoskeletal:         General: Normal range of motion  Cervical back: Normal range of motion and neck supple  Skin:     General: Skin is warm and dry  Neurological:      Mental Status: She is alert and oriented to person, place, and time  Mental status is at baseline  Psychiatric:         Mood and Affect: Mood normal          Behavior: Behavior normal          Judgment: Judgment normal         Discussion with Family: Patient declined call to   Discharge instructions/Information to patient and family:   See after visit summary for information provided to patient and family  Provisions for Follow-Up Care:  See after visit summary for information related to follow-up care and any pertinent home health orders  Disposition:   Home    Planned Readmission:  None     Discharge Statement:  I spent > 35 minutes discharging the patient   This time was spent on the day of discharge  I had direct contact with the patient on the day of discharge  Greater than 50% of the total time was spent examining patient, answering all patient questions, arranging and discussing plan of care with patient as well as directly providing post-discharge instructions  Additional time then spent on discharge activities  Discharge Medications:  See after visit summary for reconciled discharge medications provided to patient and/or family        **Please Note: This note may have been constructed using a voice recognition system**

## 2022-07-23 NOTE — NURSING NOTE
IV sites removed  Patient dressed self  Discharge instructions given to patient, verbalized understanding  Discharged home with family friend by car

## 2022-07-23 NOTE — PLAN OF CARE
Problem: GENITOURINARY - ADULT  Goal: Maintains or returns to baseline urinary function  Description: INTERVENTIONS:  - Assess urinary function  - Encourage oral fluids to ensure adequate hydration if ordered  - Administer IV fluids as ordered to ensure adequate hydration  - Administer ordered medications as needed  - Offer frequent toileting  - Follow urinary retention protocol if ordered  Outcome: Progressing  Goal: Absence of urinary retention  Description: INTERVENTIONS:  - Assess patients ability to void and empty bladder  - Monitor I/O  - Bladder scan as needed  - Discuss with physician/AP medications to alleviate retention as needed  - Discuss catheterization for long term situations as appropriate  Outcome: Progressing     Problem: PAIN - ADULT  Goal: Verbalizes/displays adequate comfort level or baseline comfort level  Description: Interventions:  - Encourage patient to monitor pain and request assistance  - Assess pain using appropriate pain scale  - Administer analgesics based on type and severity of pain and evaluate response  - Implement non-pharmacological measures as appropriate and evaluate response  - Consider cultural and social influences on pain and pain management  - Notify physician/advanced practitioner if interventions unsuccessful or patient reports new pain  Outcome: Progressing     Problem: INFECTION - ADULT  Goal: Absence or prevention of progression during hospitalization  Description: INTERVENTIONS:  - Assess and monitor for signs and symptoms of infection  - Monitor lab/diagnostic results  - Monitor all insertion sites, i e  indwelling lines, tubes, and drains  - Monitor endotracheal if appropriate and nasal secretions for changes in amount and color  - [place] appropriate cooling/warming therapies per order  - Administer medications as ordered  - Instruct and encourage patient and family to use good hand hygiene technique  - Identify and instruct in appropriate isolation precautions for identified infection/condition  Outcome: Progressing     Problem: Knowledge Deficit  Goal: Patient/family/caregiver demonstrates understanding of disease process, treatment plan, medications, and discharge instructions  Description: Complete learning assessment and assess knowledge base    Interventions:  - Provide teaching at level of understanding  - Provide teaching via preferred learning methods  Outcome: Progressing

## 2022-07-24 LAB — BACTERIA UR CULT: ABNORMAL

## 2022-07-25 ENCOUNTER — TRANSITIONAL CARE MANAGEMENT (OUTPATIENT)
Dept: INTERNAL MEDICINE CLINIC | Facility: OTHER | Age: 52
End: 2022-07-25

## 2022-07-25 ENCOUNTER — TELEPHONE (OUTPATIENT)
Dept: INTERNAL MEDICINE CLINIC | Facility: OTHER | Age: 52
End: 2022-07-25

## 2022-07-25 NOTE — TELEPHONE ENCOUNTER
I returned pt 's call and I was able to speak with her  I verbally went over all of her pre op instructions that we discuss on 7/22/21  Pt verblaized understanding regarding her procedure on 8/16/22 with Dr Tosha Little at the [facility]  I also reminded pt that I mailed her a copy of her surgical packet so she should be receiving that later this week  Pt thanked me for returning her call and was instructed to call our office back if she has any other questions or concerns regarding this procedure

## 2022-07-25 NOTE — TELEPHONE ENCOUNTER
LMOM for pt to call me at [facility] office    Please schedule TCM on or before 8/5

## 2022-07-25 NOTE — UTILIZATION REVIEW
Notification of Discharge   This is a Notification of Discharge from our facility [address]  Please be advised that this patient has been discharge from our facility  Below you will find the admission and discharge date and time including the patients disposition  UTILIZATION REVIEW CONTACT:  Jose Rutherford  Utilization   Network Utilization Review Department  Phone: [phone] carefully listen to the prompts  All voicemails are confidential   Email: [email or web address]     PHYSICIAN ADVISORY SERVICES:  FOR [id number] REVIEW - MEDICAL NECESSITY DENIAL  Phone: [phone]  Fax: [phone]  Email: [email or web address]     PRESENTATION DATE: 7/21/2022 11:15 AM  OBERVATION ADMISSION DATE:  INPATIENT ADMISSION DATE: 7/21/22  3:59 PM   DISCHARGE DATE: 7/23/2022 11:00 AM  DISPOSITION: Home/Self Care Home/Self Care      IMPORTANT INFORMATION:  Send all requests for admission clinical reviews, approved or denied determinations and any other requests to dedicated fax number below belonging to the campus where the patient is receiving treatment   List of dedicated fax numbers:  [address] DENIALS (Administrative/Medical Necessity) [phone]   [address] (Maternity/NICU/Pediatrics) [phone]   [facility] [phone]   [address] [facility] [phone]   [address] [place] [address] [phone]   [address] [place] [address] [address] [place] [address],[address] [phone]   [facility]  [phone]   [address] [facility] [address]  [address] [facility] [address] [facility] [phone]

## 2022-07-25 NOTE — TELEPHONE ENCOUNTER
Patient called back in asking for surgery scheduler  She just says she wants to talk to to her about the previous conversation  She knows she spoke to "someone from the office" but cannot remember anything  I did relay what Kermit Edouard told her but she does not remember any of it       Please call patient back at [phone]

## 2022-07-26 ENCOUNTER — TELEPHONE (OUTPATIENT)
Dept: INTERNAL MEDICINE CLINIC | Facility: OTHER | Age: 52
End: 2022-07-26

## 2022-07-26 LAB
BACTERIA BLD CULT: NORMAL
BACTERIA BLD CULT: NORMAL

## 2022-07-28 ENCOUNTER — TELEPHONE (OUTPATIENT)
Dept: UROLOGY | Facility: MEDICAL CENTER | Age: 52
End: 2022-07-28

## 2022-07-29 ENCOUNTER — OFFICE VISIT (OUTPATIENT)
Dept: INTERNAL MEDICINE CLINIC | Facility: OTHER | Age: 52
End: 2022-07-29
Payer: MEDICARE

## 2022-07-29 VITALS
TEMPERATURE: 97.9 F | HEIGHT: 67 IN | OXYGEN SATURATION: 98 % | SYSTOLIC BLOOD PRESSURE: 128 MMHG | DIASTOLIC BLOOD PRESSURE: 78 MMHG | BODY MASS INDEX: 38.17 KG/M2 | HEART RATE: 72 BPM | WEIGHT: 243.2 LBS | RESPIRATION RATE: 20 BRPM

## 2022-07-29 DIAGNOSIS — Z12.31 ENCOUNTER FOR SCREENING MAMMOGRAM FOR MALIGNANT NEOPLASM OF BREAST: ICD-10-CM

## 2022-07-29 DIAGNOSIS — N13.2 URETERAL STONE WITH HYDRONEPHROSIS: ICD-10-CM

## 2022-07-29 DIAGNOSIS — Z76.89 ENCOUNTER FOR SUPPORT AND COORDINATION OF TRANSITION OF CARE: Primary | ICD-10-CM

## 2022-07-29 PROCEDURE — 99496 TRANSJ CARE MGMT HIGH F2F 7D: CPT | Performed by: FAMILY MEDICINE

## 2022-07-29 NOTE — PROGRESS NOTES
Family Practice  Chely Morales 46 y o  female MRN: [id number]  Encounter: [id number]  7/29/2022    Transition of Care Management Visit  Assessment/Plan:     Cristiano Mccullough is a 46 y o  female with:     Problem List Items Addressed This Visit        Genitourinary    Left ureteral stone with hydronephrosis     Admitted for left ureteral calculi with moderate hydro ureter nephrosis and findings consistent with renal impairment and pyelonephritis  She underwent cystoscopy with left ureteral stent placement on 07/21/2022 with Dr Vida Michael and was initiated on antibiotic therapy for pyelonephritis  She was discharged home with improvement in her symptoms and a 10 day course of Vantin to complete treatment for pyelonephritis with plan to follow up with urology for removal of stent on 8/16  Today reports sx are improving but continues to have fatigue  Plan  · Continue abx, continue flomax and follow up with urology  · Return if worsening sxs   · Discussed importance of water intake, recommending 2-3 L per day  Other Visit Diagnoses     Encounter for support and coordination of transition of care    -  Primary    Relevant Orders    CBC and differential    Encounter for screening mammogram for malignant neoplasm of breast        Relevant Orders    Mammo screening bilateral w 3d & cad                Next follow up appointment will be for: scheduled with Dr Malcom Beltran  ________________________________________________________________________  Subjective:     Cristiano Mccullough is a 46 y o  female here for Transition Care Management Visit  Hospital course summary:    Cristiano Mccullough is a 46 y o  female patient who originally presented to the hospital on 7/21/2022 due to left flank pain  Please see H and P as documented by myself for complete details regarding history of presenting illness    In brief, the patient has a past medical history of hypothyroidism and prior renal stones requiring cystoscopy who presented with sudden onset left flank pain with radiation to the left groin  Additionally she noted temperature of a 102 2° at home and this prompted her presentation to the emergency department  In the ED, CT abdomen pelvis was performed that demonstrated a left ureteral calculi with moderate hydro ureter nephrosis and findings consistent with renal impairment and pyelonephritis  She underwent cystoscopy with left ureteral stent placement on 07/21/2022 with Dr Vijay Holguin and was initiated on antibiotic therapy for pyelonephritis  She was ultimately discharged home with improvement in her symptoms and a 10 day course of Vantin to complete treatment for pyelonephritis  She was discharged in stable condition in all of her questions were answered prior to departure  This is a brief discharge summary please see full medical record for more details  HPI    Today patient feels still uncomfortable and some nausea but its not as much  All she wants to do is sleep  Still has dysuria, mild  She feels very fatigued, with intermittent SOB and has noted wheezing too and has palpitations  And would like to take blood pressure medication and take it when she needs it as she feels that she knows when her BP is up  Has next procedure on 8/16 when they will remove the stent  Patient reports taking Vantin 200 mg BID for the 10 additional days for pyelonephritis and flomax 0 4 daily for the kidney stone    Review of Systems   Constitutional: Negative for chills, fever and unexpected weight change  HENT: Negative for congestion, rhinorrhea and sore throat  Eyes: Negative for visual disturbance  Respiratory: Negative for chest tightness, shortness of breath (sometimes when walking) and wheezing  Cardiovascular: Negative for chest pain  Gastrointestinal: Positive for nausea  Negative for abdominal pain, constipation, diarrhea and vomiting  Genitourinary: Positive for dysuria, frequency and urgency     Musculoskeletal: Positive for back pain  Skin: Negative for color change and rash  Neurological: Positive for light-headedness (mild)  Negative for dizziness, weakness and headaches  Psychiatric/Behavioral: Negative for confusion  Objective:     /78 (BP Location: Left arm, Patient Position: Sitting, Cuff Size: Large)   Pulse 72   Temp 97 9 °F (36 6 °C) (Temporal)   Resp 20   Ht 5' 7" (1 702 m)   Wt 110 kg (243 lb 3 2 oz)   LMP 04/05/2018 (Approximate)   SpO2 98%   BMI 38 09 kg/m²      Physical Exam  Vitals reviewed  Constitutional:       General: She is not in acute distress  Appearance: Normal appearance  She is obese  She is not ill-appearing, toxic-appearing or diaphoretic  HENT:      Head: Normocephalic and atraumatic  Nose: Nose normal       Mouth/Throat:      Mouth: Mucous membranes are moist       Pharynx: No oropharyngeal exudate or posterior oropharyngeal erythema  Eyes:      General: No scleral icterus  Right eye: No discharge  Left eye: No discharge  Extraocular Movements: Extraocular movements intact  Conjunctiva/sclera: Conjunctivae normal       Pupils: Pupils are equal, round, and reactive to light  Cardiovascular:      Rate and Rhythm: Normal rate and regular rhythm  Pulses: Normal pulses  Heart sounds: Normal heart sounds  No murmur heard  Pulmonary:      Effort: Pulmonary effort is normal  No respiratory distress  Breath sounds: Normal breath sounds  No wheezing  Abdominal:      General: Abdomen is flat  Bowel sounds are normal  There is no distension  Palpations: Abdomen is soft  Tenderness: There is no abdominal tenderness  Musculoskeletal:         General: Normal range of motion  Cervical back: Normal range of motion  Right lower leg: No edema  Left lower leg: No edema  Skin:     General: Skin is warm  Capillary Refill: Capillary refill takes less than 2 seconds     Neurological:      General: No focal deficit present  Mental Status: She is alert and oriented to person, place, and time  Cranial Nerves: No cranial nerve deficit  Sensory: No sensory deficit  Motor: No weakness  Psychiatric:         Mood and Affect: Mood normal          Behavior: Behavior normal          Thought Content: Thought content normal          Transitional Care Management Review:  During the TCM phone call patient stated:  TCM Call     Date and time call was made  7/25/2022 11:11 AM    Hospital care reviewed  Records reviewed    Patient was hospitialized at  [address]    Date of Admission  07/21/22    Date of discharge  07/23/22    Diagnosis  left ureteral stone with hydronephrosis    Disposition  Home    Current Symptoms  Fatigue; Weakness; Headache; Neausea      TCM Call     Post hospital issues  Reduced activity    Scheduled for follow up?   Yes    Did you obtain your prescribed medications  Yes    Do you need help managing your prescriptions or medications  No    Is transportation to your appointment needed  No    I have advised the patient to call PCP with any new or worsening symptoms  Joe Mcallister CMA          Need for Follow-up: labs/specialists/tests/imaging ordered today or f/u if ordered on d/c    Changed/New Medications: based on hospital DS    I personally reviewed the following images:     Mihaela Gonzáles MD

## 2022-07-29 NOTE — ASSESSMENT & PLAN NOTE
Admitted for left ureteral calculi with moderate hydro ureter nephrosis and findings consistent with renal impairment and pyelonephritis  She underwent cystoscopy with left ureteral stent placement on 07/21/2022 with Dr Vida iMchael and was initiated on antibiotic therapy for pyelonephritis  She was discharged home with improvement in her symptoms and a 10 day course of Vantin to complete treatment for pyelonephritis with plan to follow up with urology for removal of stent on 8/16  Today reports sx are improving but continues to have fatigue  Plan  · Continue abx, continue flomax and follow up with urology  · Return if worsening sxs   · Discussed importance of water intake, recommending 2-3 L per day

## 2022-08-02 ENCOUNTER — APPOINTMENT (OUTPATIENT)
Dept: LAB | Facility: HOSPITAL | Age: 52
End: 2022-08-02
Payer: MEDICARE

## 2022-08-02 DIAGNOSIS — N20.0 NEPHROLITHIASIS: ICD-10-CM

## 2022-08-02 DIAGNOSIS — R39.89 SUSPECTED UTI: ICD-10-CM

## 2022-08-02 PROCEDURE — 87086 URINE CULTURE/COLONY COUNT: CPT

## 2022-08-03 LAB — BACTERIA UR CULT: NORMAL

## 2022-08-04 ENCOUNTER — HOSPITAL ENCOUNTER (OUTPATIENT)
Dept: RADIOLOGY | Facility: IMAGING CENTER | Age: 52
Discharge: HOME | End: 2022-08-04
Payer: MEDICARE

## 2022-08-04 VITALS — WEIGHT: 248 LBS | BODY MASS INDEX: 38.92 KG/M2 | HEIGHT: 67 IN

## 2022-08-04 DIAGNOSIS — Z12.31 ENCOUNTER FOR SCREENING MAMMOGRAM FOR MALIGNANT NEOPLASM OF BREAST: ICD-10-CM

## 2022-08-04 DIAGNOSIS — E03.9 ACQUIRED HYPOTHYROIDISM: ICD-10-CM

## 2022-08-04 PROCEDURE — 77067 SCR MAMMO BI INCL CAD: CPT

## 2022-08-04 PROCEDURE — 77063 BREAST TOMOSYNTHESIS BI: CPT

## 2022-08-04 RX ORDER — LEVOTHYROXINE SODIUM 112 UG/1
112 TABLET ORAL DAILY
Qty: 90 TABLET | Refills: 1 | Status: SHIPPED | OUTPATIENT
Start: 2022-08-04

## 2022-08-12 ENCOUNTER — ANESTHESIA EVENT (OUTPATIENT)
Dept: PERIOP | Facility: HOSPITAL | Age: 52
End: 2022-08-12
Payer: MEDICARE

## 2022-08-12 ENCOUNTER — TELEPHONE (OUTPATIENT)
Dept: UROLOGY | Facility: MEDICAL CENTER | Age: 52
End: 2022-08-12

## 2022-08-12 DIAGNOSIS — N30.00 ACUTE CYSTITIS WITHOUT HEMATURIA: Primary | ICD-10-CM

## 2022-08-12 DIAGNOSIS — Z91.89 RISK FACTORS FOR OBSTRUCTIVE SLEEP APNEA: Primary | ICD-10-CM

## 2022-08-12 PROCEDURE — NC001 PR NO CHARGE: Performed by: UROLOGY

## 2022-08-12 RX ORDER — CEFUROXIME AXETIL 250 MG/1
250 TABLET ORAL EVERY 12 HOURS SCHEDULED
Qty: 14 TABLET | Refills: 0 | Status: SHIPPED | OUTPATIENT
Start: 2022-08-12 | End: 2022-08-19

## 2022-08-12 RX ORDER — CEFUROXIME AXETIL 250 MG/1
250 TABLET ORAL EVERY 12 HOURS SCHEDULED
Qty: 14 TABLET | Refills: 0 | Status: SHIPPED | OUTPATIENT
Start: 2022-08-12 | End: 2022-08-12

## 2022-08-12 NOTE — H&P
HISTORY AND PHYSICAL  ? ? Patient Name: Kya Hernandez  Patient MRN: [id number]  Attending Provider: Koffi Martinez MD  Service: Urology  Chief Complaint    Left renal and ureteral calculi    HPI   Kya Hernandez is a 46 y o  female with recent stent placement for  stones in left kidney as above, with infection  I plan cysto, left ureteroscopy, laser stones, stent  Potential risks and complications discussed, and informed consent was given by the patient  Medications  Meds/Allergies   No current facility-administered medications for this encounter  Cannot display prior to admission medications because the patient has not been admitted in this contact  No current facility-administered medications for this encounter      Current Outpatient Medications:     albuterol (2 5 mg/3 mL) 0 083 % nebulizer solution, Take 3 mL (2 5 mg total) by nebulization every 6 (six) hours as needed for wheezing or shortness of breath, Disp: 30 mL, Rfl: 0    albuterol (PROVENTIL HFA,VENTOLIN HFA) 90 mcg/act inhaler, Inhale 2 puffs every 6 (six) hours as needed for wheezing, Disp: 18 g, Rfl: 1    buPROPion (WELLBUTRIN XL) 150 mg 24 hr tablet, Take 1 tablet (150 mg total) by mouth every morning, Disp: 90 tablet, Rfl: 1    diazepam (VALIUM) 10 mg tablet, Take 1 tablet (10 mg total) by mouth every 6 (six) hours as needed for anxiety, Disp: 30 tablet, Rfl: 0    ergocalciferol (VITAMIN D2) 50,000 units, Take 1 capsule (50,000 Units total) by mouth once a week, Disp: 12 capsule, Rfl: 1    fluticasone (FLONASE) 50 mcg/act nasal spray, 1 spray into each nostril 2 (two) times a day (Patient taking differently: 1 spray into each nostril as needed), Disp: 16 g, Rfl: 5    gabapentin (NEURONTIN) 100 mg capsule, Take 100 mg by mouth 2 (two) times a day, Disp: , Rfl: 0    levothyroxine 112 mcg tablet, Take 1 tablet (112 mcg total) by mouth daily, Disp: 90 tablet, Rfl: 1    nicotine (NICODERM CQ) 21 mg/24 hr TD 24 hr patch, Place 1 patch on the skin every 24 hours, Disp: 28 patch, Rfl: 0    ondansetron (ZOFRAN-ODT) 4 mg disintegrating tablet, Take 1 tablet (4 mg total) by mouth every 6 (six) hours as needed for nausea or vomiting (Patient not taking: Reported on 7/29/2022), Disp: 20 tablet, Rfl: 0    oxyCODONE-acetaminophen (PERCOCET)  mg per tablet, take 1 tablet by mouth every 6 hours if needed for pain MAX 4 TABLETS PER DAY, Disp: , Rfl:     pantoprazole (PROTONIX) 40 mg tablet, Take 1 tablet (40 mg total) by mouth 2 (two) times a day as needed (acid reflux), Disp: 180 tablet, Rfl: 1    sertraline (ZOLOFT) 100 mg tablet, Take 1 tablet (100 mg total) by mouth 2 (two) times a day, Disp: 180 tablet, Rfl: 1    simvastatin (ZOCOR) 20 mg tablet, Take 1 tablet (20 mg total) by mouth daily at bedtime, Disp: 90 tablet, Rfl: 1    sucralfate (CARAFATE) 1 g tablet, 1 TABLET 3 TIMES A DAY BEFORE MEALS, Disp: 270 tablet, Rfl: 1    tamsulosin (FLOMAX) 0 4 mg, Take 1 capsule (0 4 mg total) by mouth daily with dinner, Disp: 30 capsule, Rfl: 0  Review of Systems  10 point review of systems negative except as noted in HPI  Allergies  Allergies   Allergen Reactions    Hydrocodone-Acetaminophen Hives    Ketorolac Hives and Dizziness    Toradol [Ketorolac Tromethamine] Other (See Comments)     hypotension    Ultram [Tramadol] Nausea Only, GI Intolerance and Vomiting     PMH  Past Medical History:   Diagnosis Date    Allergic sinusitis     last assessed - [id number]    Anxiety     last assessed - 94YHN7780    Arthritis     Asthma     last assessed - 52TQQ8583    Bilateral lower extremity edema     last assessed - 19KEV8567    Callus of foot     last assessed - 84VFB8601    Chronic GERD     last assessed - 26FVR2871    Colon polyp     Constipation     Resolved - 45VPP8623    COPD (chronic obstructive pulmonary disease) (HCC)     Depression     last assessed - 07PQZ6791    Disease of thyroid gland     Diverticulitis of colon     last assessed - 44NOD4890    Dyspepsia     Resolved - 89JGC6094    Esophageal reflux     last assessed - 47KES2080    Essential hypertriglyceridemia     last assessed - 47SKX8927    GERD (gastroesophageal reflux disease)     Gynecological disorder     last assessed - 30MHE2969    Hypertension     last assessed - 71OHR0827    Hypotension     last assessed - 59Kgu3004    Kidney stone     Migraine     Neuropathy     Pulmonary emphysema (HCC)     last assessed - 31IDR2595    Seasonal allergies     Urinary frequency     last assessed - 26Uyn6853    Vagina, candidiasis     last assessed - 25Lrr9945    Visual impairment      Past surgical history  Past Surgical History:   Procedure Laterality Date    CARPAL TUNNEL RELEASE      last assessed - 40MYO1112    CHOLECYSTECTOMY      last assessed - 06ZBG9801    COLONOSCOPY      Complete colonoscopy     EYE SURGERY      last assessed - 25YNK0471   [facility] RETROGRADE PYELOGRAM  6/16/2022    FOOT SURGERY      last assessed - 25OJH1299    HI CYSTO/URETERO W/LITHOTRIPSY &INDWELL STENT INSRT Left 6/16/2022    Procedure: CYSTOSCOPY URETEROSCOPY WITH LITHOTRIPSY HOLMIUM LASER, RETROGRADE PYELOGRAM AND INSERTION STENT URETERAL;  Surgeon: Ramiro Ricci MD;  Location[facility];  Service: Urology    HI CYSTOURETHROSCOPY,URETER CATHETER Left 7/21/2022    Procedure: CYSTOSCOPY RETROGRADE PYELOGRAM WITH INSERTION STENT URETERAL;  Surgeon: Mike Winchester MD;  Location: [facility];  Service: Urology    HI ESOPHAGOGASTRODUODENOSCOPY TRANSORAL DIAGNOSTIC N/A 2/13/2017    Procedure: ESOPHAGOGASTRODUODENOSCOPY (EGD); Surgeon: Vivi Velazco MD;  Location: [facility];   Service: Gastroenterology     Social history  Social History     Tobacco Use    Smoking status: Current Every Day Smoker     Packs/day: 1 50     Years: 30 00     Pack years: 45 00     Types: Cigarettes    Smokeless tobacco: Never Used    Tobacco comment: one pack   Vaping Use    Vaping Use: Former    Substances: Nicotine   Substance Use Topics    Alcohol use: Not Currently     Comment: wine twice a year    Drug use: No     ?  Physical Exam     vs  General appearance: alert and oriented, in no acute distress  Head: Normocephalic, without obvious abnormality, atraumatic  Throat: lips, mucosa, and tongue normal; teeth and gums normal  Neck: no adenopathy, no carotid bruit, no JVD, supple, symmetrical, trachea midline and thyroid not enlarged, symmetric, no tenderness/mass/nodules  Lungs: clear to auscultation bilaterally  Heart: regular rate and rhythm, S1, S2 normal, no murmur, click, rub or gallop  Abdomen: soft, non-tender; bowel sounds normal; no masses,  no organomegaly  Extremities: extremities normal, warm and well-perfused; no cyanosis, clubbing, or edema  Azam Ortiz MD

## 2022-08-12 NOTE — PRE-PROCEDURE INSTRUCTIONS
Pre-Surgery Instructions:   Medication Instructions    albuterol (2 5 mg/3 mL) 0 083 % nebulizer solution Uses PRN- OK to take day of surgery    albuterol (PROVENTIL HFA,VENTOLIN HFA) 90 mcg/act inhaler Uses PRN- OK to take day of surgery    buPROPion (WELLBUTRIN XL) 150 mg 24 hr tablet Take day of surgery   diazepam (VALIUM) 10 mg tablet Uses PRN- OK to take day of surgery    ergocalciferol (VITAMIN D2) 50,000 units Take per normal routine weekly    fluticasone (FLONASE) 50 mcg/act nasal spray Uses PRN- OK to take day of surgery    gabapentin (NEURONTIN) 100 mg capsule Take day of surgery   levothyroxine 112 mcg tablet Take day of surgery   nicotine (NICODERM CQ) 21 mg/24 hr TD 24 hr patch Take day of surgery  Instructed to alert ASC nurses of location of patch DOS    oxyCODONE-acetaminophen (PERCOCET)  mg per tablet Uses PRN- OK to take day of surgery    pantoprazole (PROTONIX) 40 mg tablet Uses PRN- OK to take day of surgery    sertraline (ZOLOFT) 100 mg tablet Take day of surgery   simvastatin (ZOCOR) 20 mg tablet Take night before surgery    sucralfate (CARAFATE) 1 g tablet Hold day of surgery   tamsulosin (FLOMAX) 0 4 mg Take night before surgery    Preop Instructions per My Surgical Experience booklet,medications per anesthesia guidelines and showering instructions per Stacey Bell protocol using an Antibacterial Soap reviewed  Pt  Verbalized understanding of current visitor restrictions  Aware she should wear a mask DOS  Instructed to call surgeon with any illness or covid exposure now till DOS  All medications instructed to take DOS are with sips water only  Instructed to avoid all ASA/NSAIDs and OTC Vit/Supp from now until after surgery per anesthesia guidelines  Tylenol ok prn  Pt  Verbalized an understanding of all instructions reviewed and offers no concerns at this time

## 2022-08-12 NOTE — TELEPHONE ENCOUNTER
----- Message from Nura Foster MD sent at 8/12/2022  1:34 PM EDT -----   Patient has surgery on Tuesday  Prior infections from her stones  I sent Ceftin twice per day seven days to her pharmacy  Have her start it on Sunday

## 2022-08-12 NOTE — TELEPHONE ENCOUNTER
Called patient - LMOM that Dr Jose Armando Carmona sent in antibiotic to the pharmacy for her to start on Sunday for her procedure next week  She is asked to call back if she has any further questions or concerns

## 2022-08-15 DIAGNOSIS — N20.0 KIDNEY STONE ON LEFT SIDE: ICD-10-CM

## 2022-08-15 RX ORDER — TAMSULOSIN HYDROCHLORIDE 0.4 MG/1
CAPSULE ORAL
Qty: 30 CAPSULE | Refills: 0 | Status: SHIPPED | OUTPATIENT
Start: 2022-08-15 | End: 2022-09-15

## 2022-08-16 ENCOUNTER — HOSPITAL ENCOUNTER (OUTPATIENT)
Facility: HOSPITAL | Age: 52
Setting detail: OUTPATIENT SURGERY
Discharge: HOME | End: 2022-08-16
Attending: UROLOGY | Admitting: UROLOGY
Payer: MEDICARE

## 2022-08-16 ENCOUNTER — APPOINTMENT (OUTPATIENT)
Dept: RADIOLOGY | Facility: HOSPITAL | Age: 52
End: 2022-08-16
Payer: MEDICARE

## 2022-08-16 ENCOUNTER — ANESTHESIA (OUTPATIENT)
Dept: PERIOP | Facility: HOSPITAL | Age: 52
End: 2022-08-16
Payer: MEDICARE

## 2022-08-16 VITALS
HEART RATE: 96 BPM | WEIGHT: 240.74 LBS | OXYGEN SATURATION: 95 % | BODY MASS INDEX: 37.79 KG/M2 | SYSTOLIC BLOOD PRESSURE: 120 MMHG | RESPIRATION RATE: 16 BRPM | TEMPERATURE: 98.9 F | DIASTOLIC BLOOD PRESSURE: 58 MMHG | HEIGHT: 67 IN

## 2022-08-16 DIAGNOSIS — N20.1 URETERAL CALCULUS: ICD-10-CM

## 2022-08-16 DIAGNOSIS — N20.0 NEPHROLITHIASIS: Primary | ICD-10-CM

## 2022-08-16 PROBLEM — E66.9 OBESITY (BMI 35.0-39.9 WITHOUT COMORBIDITY): Status: ACTIVE | Noted: 2022-08-16

## 2022-08-16 PROCEDURE — 52356 CYSTO/URETERO W/LITHOTRIPSY: CPT | Performed by: UROLOGY

## 2022-08-16 PROCEDURE — 82360 CALCULUS ASSAY QUANT: CPT | Performed by: UROLOGY

## 2022-08-16 PROCEDURE — C1769 GUIDE WIRE: HCPCS | Performed by: UROLOGY

## 2022-08-16 PROCEDURE — 87186 SC STD MICRODIL/AGAR DIL: CPT | Performed by: UROLOGY

## 2022-08-16 PROCEDURE — 87086 URINE CULTURE/COLONY COUNT: CPT | Performed by: UROLOGY

## 2022-08-16 PROCEDURE — C1894 INTRO/SHEATH, NON-LASER: HCPCS | Performed by: UROLOGY

## 2022-08-16 PROCEDURE — 99024 POSTOP FOLLOW-UP VISIT: CPT | Performed by: UROLOGY

## 2022-08-16 PROCEDURE — 74420 UROGRAPHY RTRGR +-KUB: CPT

## 2022-08-16 PROCEDURE — C2617 STENT, NON-COR, TEM W/O DEL: HCPCS | Performed by: UROLOGY

## 2022-08-16 PROCEDURE — 87077 CULTURE AEROBIC IDENTIFY: CPT | Performed by: UROLOGY

## 2022-08-16 DEVICE — VARIABLE LENGTH INJECTION STENT SET
Type: IMPLANTABLE DEVICE | Site: URETER | Status: FUNCTIONAL
Brand: CONTOUR VL™ INJECTION STENT SET

## 2022-08-16 RX ORDER — FENTANYL CITRATE/PF 50 MCG/ML
50 SYRINGE (ML) INJECTION
Status: DISCONTINUED | OUTPATIENT
Start: 2022-08-16 | End: 2022-08-16 | Stop reason: HOSPADM

## 2022-08-16 RX ORDER — DEXAMETHASONE SODIUM PHOSPHATE 10 MG/ML
INJECTION, SOLUTION INTRAMUSCULAR; INTRAVENOUS AS NEEDED
Status: DISCONTINUED | OUTPATIENT
Start: 2022-08-16 | End: 2022-08-16

## 2022-08-16 RX ORDER — LIDOCAINE HYDROCHLORIDE 20 MG/ML
INJECTION, SOLUTION EPIDURAL; INFILTRATION; INTRACAUDAL; PERINEURAL AS NEEDED
Status: DISCONTINUED | OUTPATIENT
Start: 2022-08-16 | End: 2022-08-16

## 2022-08-16 RX ORDER — OXYCODONE HYDROCHLORIDE AND ACETAMINOPHEN 5; 325 MG/1; MG/1
1-2 TABLET ORAL EVERY 6 HOURS PRN
Qty: 10 TABLET | Refills: 0 | Status: SHIPPED | OUTPATIENT
Start: 2022-08-16 | End: 2022-08-26

## 2022-08-16 RX ORDER — CEFTRIAXONE 1 G/1
INJECTION, POWDER, FOR SOLUTION INTRAMUSCULAR; INTRAVENOUS AS NEEDED
Status: DISCONTINUED | OUTPATIENT
Start: 2022-08-16 | End: 2022-08-16

## 2022-08-16 RX ORDER — ONDANSETRON 2 MG/ML
4 INJECTION INTRAMUSCULAR; INTRAVENOUS ONCE AS NEEDED
Status: DISCONTINUED | OUTPATIENT
Start: 2022-08-16 | End: 2022-08-16 | Stop reason: HOSPADM

## 2022-08-16 RX ORDER — OXYCODONE HYDROCHLORIDE AND ACETAMINOPHEN 5; 325 MG/1; MG/1
2 TABLET ORAL EVERY 4 HOURS PRN
Status: DISCONTINUED | OUTPATIENT
Start: 2022-08-16 | End: 2022-08-16 | Stop reason: HOSPADM

## 2022-08-16 RX ORDER — OXYCODONE HYDROCHLORIDE AND ACETAMINOPHEN 5; 325 MG/1; MG/1
1 TABLET ORAL EVERY 4 HOURS PRN
Status: DISCONTINUED | OUTPATIENT
Start: 2022-08-16 | End: 2022-08-16 | Stop reason: HOSPADM

## 2022-08-16 RX ORDER — SUCCINYLCHOLINE/SOD CL,ISO/PF 100 MG/5ML
SYRINGE (ML) INTRAVENOUS AS NEEDED
Status: DISCONTINUED | OUTPATIENT
Start: 2022-08-16 | End: 2022-08-16

## 2022-08-16 RX ORDER — ROCURONIUM BROMIDE 10 MG/ML
INJECTION, SOLUTION INTRAVENOUS AS NEEDED
Status: DISCONTINUED | OUTPATIENT
Start: 2022-08-16 | End: 2022-08-16

## 2022-08-16 RX ORDER — GLYCOPYRROLATE 0.2 MG/ML
INJECTION INTRAMUSCULAR; INTRAVENOUS AS NEEDED
Status: DISCONTINUED | OUTPATIENT
Start: 2022-08-16 | End: 2022-08-16

## 2022-08-16 RX ORDER — MIDAZOLAM HYDROCHLORIDE 2 MG/2ML
INJECTION, SOLUTION INTRAMUSCULAR; INTRAVENOUS AS NEEDED
Status: DISCONTINUED | OUTPATIENT
Start: 2022-08-16 | End: 2022-08-16

## 2022-08-16 RX ORDER — CEFUROXIME AXETIL 250 MG/1
250 TABLET ORAL EVERY 12 HOURS SCHEDULED
Qty: 14 TABLET | Refills: 0 | Status: SHIPPED | OUTPATIENT
Start: 2022-08-16 | End: 2022-08-23

## 2022-08-16 RX ORDER — SODIUM CHLORIDE 9 MG/ML
125 INJECTION, SOLUTION INTRAVENOUS CONTINUOUS
Status: DISCONTINUED | OUTPATIENT
Start: 2022-08-16 | End: 2022-08-16 | Stop reason: HOSPADM

## 2022-08-16 RX ORDER — PROPOFOL 10 MG/ML
INJECTION, EMULSION INTRAVENOUS AS NEEDED
Status: DISCONTINUED | OUTPATIENT
Start: 2022-08-16 | End: 2022-08-16

## 2022-08-16 RX ORDER — MAGNESIUM HYDROXIDE 1200 MG/15ML
LIQUID ORAL AS NEEDED
Status: DISCONTINUED | OUTPATIENT
Start: 2022-08-16 | End: 2022-08-16 | Stop reason: HOSPADM

## 2022-08-16 RX ORDER — PROMETHAZINE HYDROCHLORIDE 25 MG/ML
INJECTION, SOLUTION INTRAMUSCULAR; INTRAVENOUS AS NEEDED
Status: DISCONTINUED | OUTPATIENT
Start: 2022-08-16 | End: 2022-08-16

## 2022-08-16 RX ADMIN — Medication 140 MG: at 09:54

## 2022-08-16 RX ADMIN — LIDOCAINE HYDROCHLORIDE 100 MG: 20 INJECTION, SOLUTION EPIDURAL; INFILTRATION; INTRACAUDAL at 09:54

## 2022-08-16 RX ADMIN — DEXAMETHASONE SODIUM PHOSPHATE 10 MG: 10 INJECTION, SOLUTION INTRAMUSCULAR; INTRAVENOUS at 10:00

## 2022-08-16 RX ADMIN — CEFTRIAXONE 1000 MG: 1 INJECTION, POWDER, FOR SOLUTION INTRAMUSCULAR; INTRAVENOUS at 09:25

## 2022-08-16 RX ADMIN — MIDAZOLAM 2 MG: 1 INJECTION INTRAMUSCULAR; INTRAVENOUS at 09:44

## 2022-08-16 RX ADMIN — ROCURONIUM BROMIDE 5 MG: 50 INJECTION, SOLUTION INTRAVENOUS at 09:54

## 2022-08-16 RX ADMIN — CEFTRIAXONE SODIUM 1000 MG: 10 INJECTION, POWDER, FOR SOLUTION INTRAVENOUS at 09:44

## 2022-08-16 RX ADMIN — SUGAMMADEX 200 MG: 100 INJECTION, SOLUTION INTRAVENOUS at 10:40

## 2022-08-16 RX ADMIN — PROPOFOL 150 MG: 10 INJECTION, EMULSION INTRAVENOUS at 09:54

## 2022-08-16 RX ADMIN — ROCURONIUM BROMIDE 35 MG: 50 INJECTION, SOLUTION INTRAVENOUS at 09:56

## 2022-08-16 RX ADMIN — OXYCODONE AND ACETAMINOPHEN 1 TABLET: 5; 325 TABLET ORAL at 13:35

## 2022-08-16 RX ADMIN — PROMETHAZINE HYDROCHLORIDE 6.25 MG: 25 INJECTION INTRAMUSCULAR; INTRAVENOUS at 10:10

## 2022-08-16 RX ADMIN — SODIUM CHLORIDE 125 ML/HR: 0.9 INJECTION, SOLUTION INTRAVENOUS at 09:09

## 2022-08-16 RX ADMIN — GLYCOPYRROLATE 0.2 MG: 0.2 INJECTION, SOLUTION INTRAMUSCULAR; INTRAVENOUS at 09:44

## 2022-08-16 NOTE — NURSING NOTE
Patient unable to get a hold of her ride after her procedure  Her friend, Margarita Johns was attempted to be called numerous times with no answer  Per patient, her emergency contacts do not live in the state and she cannot get another ride  Laurann Brittle with Case Management made aware  Wheelchair van arranged

## 2022-08-16 NOTE — DISCHARGE SUMMARY
Discharge Summary - Lyndsey Tavarez 46 y o  female MRN: [id number]    Admission Date: 8/16/2022     Admitting Diagnosis: Ureteral calculus [N20 1]    Procedures Performed:  Laser stone, left ureteroscopy, stent exchange    Patient underwent planned outpt surgery and recovered without complication  Discharged in good condition  Medications were prescribed  Pt knows to have office follow-up at the appropriate time

## 2022-08-16 NOTE — INTERVAL H&P NOTE
H&P reviewed  After examining the patient I find no changes in the patients condition since the H&P had been written      Vitals:    08/16/22 0907   BP: 115/55   Pulse: 81   Resp: 16   Temp: 98 °F (36 7 °C)   SpO2: 96%

## 2022-08-16 NOTE — OP NOTE
OPERATIVE REPORT  PATIENT NAME: Yudi Cuellar    :  1970  MRN: [id number]  Pt Location: [facility] OR ROOM 07    SURGERY DATE: 2022    Surgeon(s) and Role:     * Anand Layne MD - Primary    Preop Diagnosis:  Ureteral calculus [N20 1]    Post-Op Diagnosis Codes:     * Ureteral calculus [N20 1]    Procedure(s) (LRB):  CYSTOSCOPY USCOPE W/HOLMIUM LASER, RETROGRADE PYELOGRAM&STENT (Left)    Specimen(s):  ID Type Source Tests Collected by Time Destination   A : urine for culture Urine Urine, Cystoscopic URINE CULTURE Anand Layne MD 2022 10:19 AM        Estimated Blood Loss:   Minimal    Drains:  Ureteral Internal Stent Left ureter 6 Fr  (Active)   Number of days: 26       Anesthesia Type:   General    Operative Indications:  Ureteral calculus [N20 1]  Renal calculi    Operative Findings:  Numerous small stones in upper ureter and renal pelvis, all lasered into smaller stones    Complications:   None    Procedure and Technique:      PLAN FOR STENT:  Removed by nurse with string next week       The patient was brought into the OR, properly identified and positioned on the table  General anesthesia was induced, and they were placed in lithotomy position and prepped and draped using chlorhexidine solution  The 22F scope was introduced in the urethra, which showed mild stricture  Other findings upon placement of the scope included a mild bladder inflammation  The  ureteral left stent was grasped and withdrawn  One wire was placed up the ureter  The rigid ureteroscope was introduced and carefully advanced up to stone in mid to upper ureter  A second wire was placed up to the kidney,  and an access sheath was placed over one wire  The flexible scope was placed thru a sheath and advanced to stone in middle and lower calices  The Holmium laser was used on various settings to break up stone into small particles  Care was taken not to laser tissue   All particles appeared small enough to pass around the stent      The scope was removed from the ureter, and the cystoscope was used to place a 6F Contour VL stent in the ureter, with the upper coils in the renal pelvis, and the distal coil in the bladder  Retrograde pyelogram on that side confirmed good position and no extravasation of contrast    The patient was awaken from anesthesia and taken to the PACU in good condition        I was present for the entire procedure    Patient Disposition:  PACU       SIGNATURE: Shira More MD  DATE: August 16, 2022  TIME: 10:52 AM

## 2022-08-16 NOTE — DISCHARGE INSTRUCTIONS
ALL YOUR  PREVIOUS MEDS ARE THE SAME  NEW MEDS:  Percocet for pain  Cefuroxime antibiotic    BE CAREFUL NOT TO PULL THE STRING  EXPECT SOME BLOOD IN URINE, BURNING, FREQUENT URINATION  ACTIVITY:  RESUME FULL ACTIVITY after stent is out

## 2022-08-16 NOTE — ANESTHESIA POSTPROCEDURE EVALUATION
Post-Op Assessment Note    CV Status:  Stable    Pain management: adequate     Mental Status:  Alert and awake   Hydration Status:  Euvolemic   PONV Controlled:  Controlled   Airway Patency:  Patent      Post Op Vitals Reviewed: Yes      Staff: Anesthesiologist         No complications documented      /57 (08/16/22 1252)    Temp 97 6 °F (36 4 °C) (08/16/22 1252)    Pulse 87 (08/16/22 1252)   Resp 17 (08/16/22 1252)    SpO2 94 % (08/16/22 1252)    /57   Pulse 87   Temp 97 6 °F (36 4 °C)   Resp 17   Ht 5' 7" (1 702 m)   Wt 109 kg (240 lb 11 9 oz)   LMP 04/05/2018 (Approximate)   SpO2 94%   BMI 37 71 kg/m²

## 2022-08-16 NOTE — CASE MANAGEMENT
Case Management ED Discharge Planning Note    Patient name Patti Bob  Location [facility]  MRN [id number]  : 1970 Date 2022        OBJECTIVE:  Predictive Model Details         77% Factor Value    Risk of Hospital Admission or ED Visit Model Number of ED Visits 1     Number of Hospitalizations 3     Is in Relationship No     Has COPD Yes     Has Anemia Yes     Has Depression Yes     Has Chronic Liver Disease Yes     Has PCP Yes            Chief Complaint: Obesity (BMI 35 0-39 9 without comorbidity)  Patient Class:  Outpatient Surgery  Preferred Pharmacy:   [address] [place] [address] [id number] [place], [place] - [address]  [address]  [facility] [address]  Phone: [phone] Fax: [phone]    Primary Care Provider: Tomás Contreras DO    Primary Insurance: Celeste Evangelista MA [facility]  Secondary Insurance:     ED Discharge Details:    Discharge planning discussed with[de-identified] Patient                                     Other Referral/Resources/Interventions Provided:  Interventions: Transportation to treatment         Treatment Team Recommendation: Home  Discharge Destination Plan[de-identified] Home     Transport at Discharge : Wheelchair van  Dispatcher Contacted: Yes  Number/Name of Dispatcher: [facility]                 Accompanied by: Alone

## 2022-08-16 NOTE — ANESTHESIA PREPROCEDURE EVALUATION
Procedure:  CYSTOSCOPY USCOPE W/HOLMIUM LASER, RETROGRADE PYELOGRAM&STENT (Left Bladder)    Relevant Problems   CARDIO   (+) Benign hypertension   (+) Hypercholesterolemia   (+) Primary hypertriglyceridemia      ENDO   (+) Hypothyroidism      GI/HEPATIC   (+) Gastroesophageal reflux disease   (+) Gastroesophageal reflux disease with esophagitis      /RENAL   (+) Hydroureteronephrosis   (+) Left ureteral stone with hydronephrosis   (+) Nephrolithiasis      HEMATOLOGY   (+) Iron deficiency anemia secondary to inadequate dietary iron intake      MUSCULOSKELETAL   (+) Degeneration of lumbar or lumbosacral intervertebral disc      NEURO/PSYCH   (+) Anxiety   (+) Depression   (+) Intermittent claudication (HCC)      PULMONARY   (+) Pulmonary emphysema (HCC)      Other   (+) Obesity (BMI 35 0-39 9 without comorbidity)   (+) Tobacco abuse disorder      Lab Results   Component Value Date    WBC 7 07 07/23/2022    HGB 10 3 (L) 07/23/2022    HCT 34 0 (L) 07/23/2022     (H) 07/23/2022    PLT 89 (L) 07/23/2022     Lab Results   Component Value Date    CALCIUM 8 5 07/23/2022    K 3 5 07/23/2022    CO2 26 07/23/2022     07/23/2022    BUN 16 07/23/2022    CREATININE 1 05 07/23/2022     Lab Results   Component Value Date    INR 1 19 07/21/2022    INR 1 07 06/19/2022    PROTIME 15 1 (H) 07/21/2022    PROTIME 13 8 06/19/2022     Lab Results   Component Value Date    PTT 38 (H) 07/21/2022     Type and Screen:  O     CAD/PCI/MI/CHF - Denies  COPD/ASTHMA/RUTHANN - endorses COPD and asthma  GERD - EndorsesPROBLEMS WITH PRIOR ANESTHESIA - Denies    Physical Exam    Airway    Mallampati score: II  TM Distance: >3 FB  Neck ROM: full     Dental   upper dentures and lower dentures,     Cardiovascular  Rhythm: regular, Rate: normal,     Pulmonary  Comment: Speaking in full sentences, normal work of breathing, not tachypneic,     Other Findings  2+ L radial pulse      Anesthesia Plan  ASA Score- 3    Anesthesia Type- general with ASA Monitors  Additional Monitors:   Airway Plan: ETT  Plan Factors-Exercise tolerance (METS): <4 METS  Chart reviewed  Existing labs reviewed  Patient is a current smoker  Patient smoked on day of surgery  Obstructive sleep apnea risk education given perioperatively  Induction- intravenous and rapid sequence induction  Postoperative Plan-     Informed Consent- Anesthetic plan and risks discussed with patient  I personally reviewed this patient with the CRNA  Discussed and agreed on the Anesthesia Plan with the CRNA  Haresh Connor Procedure:  CYSTOSCOPY USCOPE W/HOLMIUM LASER, RETROGRADE PYELOGRAM&STENT (Left Bladder)    Relevant Problems   CARDIO   (+) Benign hypertension   (+) Hypercholesterolemia   (+) Primary hypertriglyceridemia      ENDO   (+) Hypothyroidism      GI/HEPATIC   (+) Gastroesophageal reflux disease   (+) Gastroesophageal reflux disease with esophagitis      /RENAL   (+) Hydroureteronephrosis   (+) Left ureteral stone with hydronephrosis   (+) Nephrolithiasis      HEMATOLOGY   (+) Iron deficiency anemia secondary to inadequate dietary iron intake      MUSCULOSKELETAL   (+) Degeneration of lumbar or lumbosacral intervertebral disc      NEURO/PSYCH   (+) Anxiety   (+) Depression   (+) Intermittent claudication (HCC)      PULMONARY   (+) Pulmonary emphysema (HCC)      Other   (+) Obesity (BMI 35 0-39 9 without comorbidity)   (+) Tobacco abuse disorder        Physical Exam    Airway    Mallampati score: II  TM Distance: >3 FB       Dental   upper dentures and lower dentures,     Cardiovascular  Rate: normal, Cardiovascular exam normal    Pulmonary  Comment: Faint end expiratory wheeze heard, Wheezes,     Other Findings        Anesthesia Plan  ASA Score- 3     Anesthesia Type- general with ASA Monitors  Additional Monitors:   Airway Plan: ETT  Plan Factors-    Chart reviewed  EKG reviewed  Existing labs reviewed  Patient summary reviewed      Patient is a current smoker  Patient instructed to abstain from smoking on day of procedure  Patient did not smoke on day of surgery  Induction- intravenous  Postoperative Plan-     Informed Consent- Anesthetic plan and risks discussed with patient

## 2022-08-17 ENCOUNTER — TELEPHONE (OUTPATIENT)
Dept: UROLOGY | Facility: MEDICAL CENTER | Age: 52
End: 2022-08-17

## 2022-08-17 ENCOUNTER — HOSPITAL ENCOUNTER (OUTPATIENT)
Dept: ULTRASOUND IMAGING | Facility: HOSPITAL | Age: 52
Discharge: HOME | End: 2022-08-17

## 2022-08-17 ENCOUNTER — TELEPHONE (OUTPATIENT)
Dept: UROLOGY | Facility: AMBULATORY SURGERY CENTER | Age: 52
End: 2022-08-17

## 2022-08-17 DIAGNOSIS — N20.0 KIDNEY STONE ON LEFT SIDE: ICD-10-CM

## 2022-08-17 DIAGNOSIS — N20.0 NEPHROLITHIASIS: Primary | ICD-10-CM

## 2022-08-17 NOTE — TELEPHONE ENCOUNTER
Patient called back stating she keeps missing the call since she is having pain and she takes medication that makes her sleepy  She stated if she does not answer please leave a message the time she is to come in for stent removal  She would like to come to [facility] office       She can be reached a [phone] (

## 2022-08-17 NOTE — TELEPHONE ENCOUNTER
Spoke to pt to confirm she is scheduled for stent removal in [facility] office as requested on 8/23/22

## 2022-08-17 NOTE — TELEPHONE ENCOUNTER
Spoke to pt s/p CYSTOSCOPY USCOPE W/HOLMIUM LASER, RETROGRADE PYELOGRAM&STENT (Left Bladder) on 8/16/22 with Dr Kat Helms  She states she is doing ok but has pain for which she is taking oxycodone  Advised pt to hydrate well and that she could try using warm compresses or heating pad also  Pt is to have stent removed on 8/23/22  She wishes to be seen in [facility] office  Forwarding for scheduling with [facility]

## 2022-08-17 NOTE — TELEPHONE ENCOUNTER
Patient returned call post vm to schedule appointment for stent removal  Please follow up with patient

## 2022-08-17 NOTE — TELEPHONE ENCOUNTER
----- Message from Ami Freeman MD sent at 8/16/2022  4:26 PM EDT -----   Laser stone today  Nurse visit for stent removal with string in a week or so

## 2022-08-19 ENCOUNTER — APPOINTMENT (OUTPATIENT)
Dept: MAMMOGRAPHY | Facility: HOSPITAL | Age: 52
End: 2022-08-19

## 2022-08-19 ENCOUNTER — OFFICE VISIT (OUTPATIENT)
Dept: NEUROSURGERY | Facility: CLINIC | Age: 52
End: 2022-08-19

## 2022-08-19 VITALS
RESPIRATION RATE: 18 BRPM | WEIGHT: 180.6 LBS | BODY MASS INDEX: 32 KG/M2 | HEIGHT: 63 IN | HEART RATE: 56 BPM | DIASTOLIC BLOOD PRESSURE: 83 MMHG | SYSTOLIC BLOOD PRESSURE: 122 MMHG

## 2022-08-19 DIAGNOSIS — M54.50 CHRONIC BILATERAL LOW BACK PAIN WITHOUT SCIATICA: Primary | ICD-10-CM

## 2022-08-19 DIAGNOSIS — M32.9 LUPUS: ICD-10-CM

## 2022-08-19 DIAGNOSIS — M79.7 FIBROMYALGIA: ICD-10-CM

## 2022-08-19 DIAGNOSIS — G89.29 CHRONIC BILATERAL LOW BACK PAIN WITHOUT SCIATICA: Primary | ICD-10-CM

## 2022-08-19 PROBLEM — IMO0002 LUPUS: Status: ACTIVE | Noted: 2022-08-19

## 2022-08-19 PROCEDURE — 99204 OFFICE O/P NEW MOD 45 MIN: CPT | Performed by: NEUROLOGICAL SURGERY

## 2022-08-19 NOTE — PROGRESS NOTES
Subjective     Chief Complaint: Back pain    Patient ID: Milagros Lundberg is a 52 y.o. female seen for consultation today at the request of  Kala SAMUEL MD    History of Present Illness    This is a 52-year-old woman who presents to my office with chief complaints of an approximately 3-4-month history of severe low back pain.  She has had episodes of mild back pain in the past, but this is very different for her.  She denies antecedent trauma.  She states that she woke up in the middle the night after turning over with severe back pain.  She has not tried any physical therapy or pain management.  She did receive a Toradol injection which did not help her pain noticeably.    Her medical comorbidities are significant for mild obesity, sedentary lifestyle with prolonged sitting, lack of exercise, lupus, and fibromyalgia.    The following portions of the patient's history were reviewed and updated as appropriate: allergies, current medications, past family history, past medical history, past social history, past surgical history and problem list.    Family history:   Family History   Problem Relation Age of Onset   • Heart disease Mother    • Diabetes Father    • Heart disease Father    • Heart disease Sister    • Heart disease Brother    • No Known Problems Brother    • Breast cancer Neg Hx        Social history:   Social History     Socioeconomic History   • Marital status:    Tobacco Use   • Smoking status: Never Smoker   • Smokeless tobacco: Never Used   Vaping Use   • Vaping Use: Never used   Substance and Sexual Activity   • Alcohol use: No   • Drug use: No   • Sexual activity: Defer       Review of Systems   Constitutional: Positive for activity change and fatigue.   Endocrine: Positive for heat intolerance.   Musculoskeletal: Positive for back pain, joint swelling, neck pain and neck stiffness.   Neurological: Positive for headaches.       Objective   Blood pressure 122/83, pulse 56, resp. rate 18,  "height 160 cm (63\"), weight 81.9 kg (180 lb 9.6 oz).  Body mass index is 31.99 kg/m².    Physical Exam  Vitals and nursing note reviewed.   Constitutional:       Appearance: She is well-developed. She is not toxic-appearing.   HENT:      Head: Normocephalic and atraumatic.      Right Ear: Hearing normal.      Left Ear: Hearing normal.      Nose: Nose normal.   Eyes:      General: Lids are normal.      Conjunctiva/sclera: Conjunctivae normal.      Pupils: Pupils are equal, round, and reactive to light.   Neck:      Vascular: No JVD.   Cardiovascular:      Rate and Rhythm: Normal rate and regular rhythm.      Pulses:           Radial pulses are 2+ on the right side and 2+ on the left side.   Pulmonary:      Effort: Pulmonary effort is normal. No respiratory distress.      Breath sounds: No stridor. No wheezing.   Musculoskeletal:      Cervical back: Normal range of motion.      Lumbar back: Decreased range of motion. Negative right straight leg raise test and negative left straight leg raise test.      Right hip: No tenderness. Normal range of motion.      Left hip: No tenderness. Normal range of motion.      Comments: Casual gait is somewhat antalgic with coupling of the hips and low back noted predominantly on the right side.   Skin:     General: Skin is warm and dry.      Findings: No erythema or rash.   Neurological:      Mental Status: She is alert and oriented to person, place, and time.      GCS: GCS eye subscore is 4. GCS verbal subscore is 5. GCS motor subscore is 6.      Cranial Nerves: No cranial nerve deficit.      Motor: No abnormal muscle tone.      Coordination: Romberg sign negative.      Deep Tendon Reflexes: Reflexes are normal and symmetric. Reflexes normal.      Reflex Scores:       Patellar reflexes are 2+ on the right side and 2+ on the left side.       Achilles reflexes are 2+ on the right side and 2+ on the left side.     Comments: Casual, toe raised, and heel raised gait are all performed " unremarkably.  Station is intact.  Casual gait is somewhat antalgic.   Psychiatric:         Behavior: Behavior normal.         Thought Content: Thought content normal.         Judgment: Judgment normal.         Assessment & Plan     Independent Review of Radiographic Studies:      Available for my review is a MRI of the lumbar spine that was performed on 8/3/2022.  There is moderate degenerative disc disease and severe degenerative disc disease at L4-5 and L5-S1 respectively.  Lumbar lordosis is decreased.  There are Modic endplate changes at L5-S1 with no evidence of spondylolisthesis.  There are broad-based disc osteophyte complexes at L4-5 and L5-S1.  There is moderate facet arthropathy at L4-5 and L5-S1.  These findings are also superimposed on a congenitally narrowed spinal canal and this results in moderate, bordering on severe lateral recess stenosis at L4-5 and mild, bordering on moderate lateral recess stenosis at L5-S1.  I do not appreciate much in the way of significant intraforaminal stenosis.    Medical Decision Making:      This is a 52-year-old woman with axial low back pain.  There is no role for surgical management in her symptoms.  I carefully reviewed the signs and symptoms of lumbosacral radiculopathy with her.  I directed her to contact my office with new or worsening symptoms.  A lengthy and protracted conversation regarding the importance of exercise, mobility, stability, and core muscular endurance training exercises was held with the patient and her .  I would be happy to follow-up with her on an as-needed basis.  I think she is an excellent candidate for physical therapy.  We could certainly add some pain management if she fails to respond in the expected fashion to the aforementioned treatment plan.    Diagnoses and all orders for this visit:    1. Chronic bilateral low back pain without sciatica (Primary)  -     Ambulatory Referral to Physical Therapy Evaluate and treat; Soft  Tissue Mobilizaton; Strengthening, Stretching    2. Lupus (HCC)    3. Fibromyalgia        No follow-ups on file.           This document signed by AVIVA Mims MD August 19, 2022 10:01 EDT

## 2022-08-20 LAB
CALCIUM OXALATE DIHYDRATE MFR STONE IR: 30 %
COLOR STONE: NORMAL
COM MFR STONE: 65 %
COMMENT-STONE3: NORMAL
COMPOSITION: NORMAL
HYDROXYAPATITE 24H ENGDIFF UR: 5 %
LABORATORY COMMENT REPORT: NORMAL
PHOTO: NORMAL
SIZE STONE: NORMAL MM
SPEC SOURCE SUBJ: NORMAL
STONE ANALYSIS-IMP: NORMAL
STONE ANALYSIS-IMP: NORMAL
WT STONE: 5 MG

## 2022-08-21 LAB
BACTERIA UR CULT: ABNORMAL

## 2022-08-23 ENCOUNTER — PROCEDURE VISIT (OUTPATIENT)
Dept: UROLOGY | Facility: AMBULATORY SURGERY CENTER | Age: 52
End: 2022-08-23

## 2022-08-23 ENCOUNTER — TELEPHONE (OUTPATIENT)
Dept: UROLOGY | Facility: AMBULATORY SURGERY CENTER | Age: 52
End: 2022-08-23

## 2022-08-23 VITALS
DIASTOLIC BLOOD PRESSURE: 72 MMHG | HEART RATE: 78 BPM | BODY MASS INDEX: 38.14 KG/M2 | HEIGHT: 67 IN | SYSTOLIC BLOOD PRESSURE: 112 MMHG | WEIGHT: 243 LBS

## 2022-08-23 DIAGNOSIS — N20.0 NEPHROLITHIASIS: Primary | ICD-10-CM

## 2022-08-23 DIAGNOSIS — R32 URINARY INCONTINENCE, UNSPECIFIED TYPE: Primary | ICD-10-CM

## 2022-08-23 PROCEDURE — 99024 POSTOP FOLLOW-UP VISIT: CPT

## 2022-08-23 RX ORDER — UNDERPADS 23" X 36"
EACH MISCELLANEOUS 2 TIMES DAILY
Qty: 18 EACH | Refills: 6 | Status: SHIPPED | OUTPATIENT
Start: 2022-08-23 | End: 2022-08-25 | Stop reason: SDUPTHER

## 2022-08-23 NOTE — TELEPHONE ENCOUNTER
Stone analysis identified primarily calcium oxalate  Stone risk profile remains pending  Prescription sent to her pharmacy

## 2022-08-23 NOTE — PROGRESS NOTES
8/23/2022  Fozia Cordoba is a 46 y o  female  [id number]        Diagnosis  Chief Complaint     Post-op; Stent Removal          Patient is s/p left ureteroscopy with stone extraction on 8/16/2022 with Dr Sherley Cuenca  Follow up as scheduled for 8 week follow up with stone risk analysis and KUB  Knows to call the office in the meantime with concerns  Procedure Stent with String Removal    Vitals:    08/23/22 0907   BP: 112/72   Pulse: 78   Weight: 110 kg (243 lb)   Height: 5' 7" (1 702 m)       Stent with string removed intact without difficulty  Reviewed post stent removal symptoms including flank pain, dysuria, and hematuria  Instructed patient to increase oral fluid intake  Encouraged the use of NSAIDS and other prescribed pain medication as needed for discomfort  Patient instructed to call the office or report to the ER for uncontrolled pain, fever, chills, nausea or vomiting         Richard Grady RN

## 2022-08-23 NOTE — TELEPHONE ENCOUNTER
Called Chely and notified her that her stone analysis cam back Calcium oxalate  However her stone risk profile was still pending     Prescription for depends was sent to her pharmacy

## 2022-08-23 NOTE — TELEPHONE ENCOUNTER
Patient in for stent removal today  Asking about her urine/stone results and also requesting about a script for depends

## 2022-08-23 NOTE — TELEPHONE ENCOUNTER
PT  Called in stating the diapers that were called into her pharmacy are not covered by her insurance  Please re send to a medical supply pharmacy

## 2022-08-25 RX ORDER — UNDERPADS 23" X 36"
EACH MISCELLANEOUS 2 TIMES DAILY
Qty: 18 EACH | Refills: 6 | Status: SHIPPED | OUTPATIENT
Start: 2022-08-25

## 2022-08-25 RX ORDER — UNDERPADS 23" X 36"
EACH MISCELLANEOUS 2 TIMES DAILY
Qty: 18 EACH | Refills: 6 | Status: CANCELLED | OUTPATIENT
Start: 2022-08-25

## 2022-08-25 NOTE — TELEPHONE ENCOUNTER
Patient called back and message was given as per encounter  Supplies can be sent to [facility]  This is the phone number [phone]  She is does not have the fax number for them      Patient can be reached at [phone]

## 2022-08-25 NOTE — TELEPHONE ENCOUNTER
LM for Chely that most insurances do not cover diapers or pads    If she has a specific supply company she wants us to send script to we can sent it

## 2022-08-30 ENCOUNTER — TELEPHONE (OUTPATIENT)
Dept: UROLOGY | Facility: MEDICAL CENTER | Age: 52
End: 2022-08-30

## 2022-08-30 DIAGNOSIS — N30.00 ACUTE CYSTITIS WITHOUT HEMATURIA: ICD-10-CM

## 2022-08-30 DIAGNOSIS — R32 URINARY INCONTINENCE, UNSPECIFIED TYPE: Primary | ICD-10-CM

## 2022-08-30 NOTE — TELEPHONE ENCOUNTER
----- Message from Remy Briseno MD sent at 8/30/2022  3:49 PM EDT -----  Tell pt: I tried to send Macrobid twice per day seven days to pharmacy, but there is no pharmacy on the chart    Please find out where and send it to them

## 2022-08-30 NOTE — TELEPHONE ENCOUNTER
Called patient - LMOM that she has a bladder infection and that we need to send in antibiotic to the pharmacy for her  She is asked to call back to let us know what pharmacy she uses  When patient calls back, please [facility] in [facility]

## 2022-08-31 NOTE — TELEPHONE ENCOUNTER
2nd Attempt    Called patient - LMOM that she has a bladder infection and that we need to send in antibiotic to the pharmacy for her  She is asked to call back to let us know what pharmacy she uses      When patient calls back, please send pharmacy information to [facility] Provider to send in antibiotics

## 2022-09-01 RX ORDER — NITROFURANTOIN 25; 75 MG/1; MG/1
100 CAPSULE ORAL 2 TIMES DAILY
Qty: 10 CAPSULE | Refills: 0 | Status: SHIPPED | OUTPATIENT
Start: 2022-09-01 | End: 2022-09-06

## 2022-09-01 NOTE — TELEPHONE ENCOUNTER
Spoke to pt and advised we have been trying to reach her since 8/30/22 to advise her UC came back positive for infection  We did not have a pharmacy listed in her chart  Pt advised she uses [facility] in [facility]  This info was added to her chart  Advised pt the antibiotic will be called in and she should start taking it asap

## 2022-09-02 ENCOUNTER — HOSPITAL ENCOUNTER (OUTPATIENT)
Dept: MAMMOGRAPHY | Facility: HOSPITAL | Age: 52
Discharge: HOME | End: 2022-09-02
Admitting: INTERNAL MEDICINE

## 2022-09-02 DIAGNOSIS — Z12.31 VISIT FOR SCREENING MAMMOGRAM: ICD-10-CM

## 2022-09-02 PROCEDURE — 77063 BREAST TOMOSYNTHESIS BI: CPT

## 2022-09-02 PROCEDURE — 77067 SCR MAMMO BI INCL CAD: CPT | Performed by: RADIOLOGY

## 2022-09-02 PROCEDURE — 77067 SCR MAMMO BI INCL CAD: CPT

## 2022-09-02 PROCEDURE — 77063 BREAST TOMOSYNTHESIS BI: CPT | Performed by: RADIOLOGY

## 2022-09-15 ENCOUNTER — HOSPITAL ENCOUNTER (EMERGENCY)
Facility: HOSPITAL | Age: 52
Discharge: HOME | End: 2022-09-15
Attending: EMERGENCY MEDICINE | Admitting: EMERGENCY MEDICINE

## 2022-09-15 ENCOUNTER — APPOINTMENT (OUTPATIENT)
Dept: GENERAL RADIOLOGY | Facility: HOSPITAL | Age: 52
End: 2022-09-15

## 2022-09-15 VITALS
OXYGEN SATURATION: 100 % | HEIGHT: 63 IN | WEIGHT: 175 LBS | RESPIRATION RATE: 16 BRPM | SYSTOLIC BLOOD PRESSURE: 122 MMHG | DIASTOLIC BLOOD PRESSURE: 72 MMHG | BODY MASS INDEX: 31.01 KG/M2 | TEMPERATURE: 97.7 F | HEART RATE: 62 BPM

## 2022-09-15 DIAGNOSIS — N20.0 KIDNEY STONE ON LEFT SIDE: ICD-10-CM

## 2022-09-15 DIAGNOSIS — R07.9 CHEST PAIN, UNSPECIFIED TYPE: Primary | ICD-10-CM

## 2022-09-15 LAB
ALBUMIN SERPL-MCNC: 4.69 G/DL (ref 3.5–5.2)
ALBUMIN/GLOB SERPL: 1.6 G/DL
ALP SERPL-CCNC: 90 U/L (ref 39–117)
ALT SERPL W P-5'-P-CCNC: 21 U/L (ref 1–33)
ANION GAP SERPL CALCULATED.3IONS-SCNC: 11.6 MMOL/L (ref 5–15)
AST SERPL-CCNC: 20 U/L (ref 1–32)
BASOPHILS # BLD AUTO: 0.07 10*3/MM3 (ref 0–0.2)
BASOPHILS NFR BLD AUTO: 1 % (ref 0–1.5)
BILIRUB SERPL-MCNC: 0.3 MG/DL (ref 0–1.2)
BUN SERPL-MCNC: 16 MG/DL (ref 6–20)
BUN/CREAT SERPL: 18 (ref 7–25)
CALCIUM SPEC-SCNC: 9.9 MG/DL (ref 8.6–10.5)
CHLORIDE SERPL-SCNC: 105 MMOL/L (ref 98–107)
CO2 SERPL-SCNC: 20.4 MMOL/L (ref 22–29)
CREAT SERPL-MCNC: 0.89 MG/DL (ref 0.57–1)
DEPRECATED RDW RBC AUTO: 38.7 FL (ref 37–54)
EGFRCR SERPLBLD CKD-EPI 2021: 78.1 ML/MIN/1.73
EOSINOPHIL # BLD AUTO: 0.23 10*3/MM3 (ref 0–0.4)
EOSINOPHIL NFR BLD AUTO: 3.2 % (ref 0.3–6.2)
ERYTHROCYTE [DISTWIDTH] IN BLOOD BY AUTOMATED COUNT: 12 % (ref 12.3–15.4)
GLOBULIN UR ELPH-MCNC: 3 GM/DL
GLUCOSE SERPL-MCNC: 95 MG/DL (ref 65–99)
HCT VFR BLD AUTO: 41.9 % (ref 34–46.6)
HGB BLD-MCNC: 14.3 G/DL (ref 12–15.9)
HOLD SPECIMEN: NORMAL
HOLD SPECIMEN: NORMAL
IMM GRANULOCYTES # BLD AUTO: 0.01 10*3/MM3 (ref 0–0.05)
IMM GRANULOCYTES NFR BLD AUTO: 0.1 % (ref 0–0.5)
LYMPHOCYTES # BLD AUTO: 2.57 10*3/MM3 (ref 0.7–3.1)
LYMPHOCYTES NFR BLD AUTO: 36.1 % (ref 19.6–45.3)
MCH RBC QN AUTO: 30 PG (ref 26.6–33)
MCHC RBC AUTO-ENTMCNC: 34.1 G/DL (ref 31.5–35.7)
MCV RBC AUTO: 88 FL (ref 79–97)
MONOCYTES # BLD AUTO: 0.53 10*3/MM3 (ref 0.1–0.9)
MONOCYTES NFR BLD AUTO: 7.4 % (ref 5–12)
NEUTROPHILS NFR BLD AUTO: 3.71 10*3/MM3 (ref 1.7–7)
NEUTROPHILS NFR BLD AUTO: 52.2 % (ref 42.7–76)
NRBC BLD AUTO-RTO: 0 /100 WBC (ref 0–0.2)
PLATELET # BLD AUTO: 317 10*3/MM3 (ref 140–450)
PMV BLD AUTO: 10.2 FL (ref 6–12)
POTASSIUM SERPL-SCNC: 4 MMOL/L (ref 3.5–5.2)
PROT SERPL-MCNC: 7.7 G/DL (ref 6–8.5)
QT INTERVAL: 428 MS
QTC INTERVAL: 445 MS
RBC # BLD AUTO: 4.76 10*6/MM3 (ref 3.77–5.28)
SODIUM SERPL-SCNC: 137 MMOL/L (ref 136–145)
TROPONIN T SERPL-MCNC: <0.01 NG/ML (ref 0–0.03)
WBC NRBC COR # BLD: 7.12 10*3/MM3 (ref 3.4–10.8)
WHOLE BLOOD HOLD COAG: NORMAL
WHOLE BLOOD HOLD SPECIMEN: NORMAL

## 2022-09-15 PROCEDURE — 36415 COLL VENOUS BLD VENIPUNCTURE: CPT

## 2022-09-15 PROCEDURE — 93005 ELECTROCARDIOGRAM TRACING: CPT | Performed by: EMERGENCY MEDICINE

## 2022-09-15 PROCEDURE — 80053 COMPREHEN METABOLIC PANEL: CPT | Performed by: EMERGENCY MEDICINE

## 2022-09-15 PROCEDURE — 99284 EMERGENCY DEPT VISIT MOD MDM: CPT

## 2022-09-15 PROCEDURE — 93010 ELECTROCARDIOGRAM REPORT: CPT | Performed by: INTERNAL MEDICINE

## 2022-09-15 PROCEDURE — 71045 X-RAY EXAM CHEST 1 VIEW: CPT

## 2022-09-15 PROCEDURE — 85025 COMPLETE CBC W/AUTO DIFF WBC: CPT | Performed by: EMERGENCY MEDICINE

## 2022-09-15 PROCEDURE — 71045 X-RAY EXAM CHEST 1 VIEW: CPT | Performed by: RADIOLOGY

## 2022-09-15 PROCEDURE — 84484 ASSAY OF TROPONIN QUANT: CPT | Performed by: EMERGENCY MEDICINE

## 2022-09-15 RX ORDER — SODIUM CHLORIDE 0.9 % (FLUSH) 0.9 %
10 SYRINGE (ML) INJECTION AS NEEDED
Status: DISCONTINUED | OUTPATIENT
Start: 2022-09-15 | End: 2022-09-15 | Stop reason: HOSPADM

## 2022-09-15 RX ORDER — TAMSULOSIN HYDROCHLORIDE 0.4 MG/1
CAPSULE ORAL
Qty: 30 CAPSULE | Refills: 0 | Status: SHIPPED | OUTPATIENT
Start: 2022-09-15 | End: 2022-10-20

## 2022-09-15 RX ORDER — ASPIRIN 81 MG/1
324 TABLET, CHEWABLE ORAL ONCE
Status: COMPLETED | OUTPATIENT
Start: 2022-09-15 | End: 2022-09-15

## 2022-09-15 RX ADMIN — ASPIRIN 324 MG: 81 TABLET, CHEWABLE ORAL at 12:54

## 2022-09-15 NOTE — ED NOTES
MEDICAL SCREENING:    Reason for Visit: chest pain     Patient initially seen in triage.  The patient was advised further evaluation and diagnostic testing will be needed, some of the treatment and testing will be initiated in the lobby in order to begin the process.  The patient will be returned to the waiting area for the time being and possibly be re-assessed by a subsequent ED provider.  The patient will be brought back to the treatment area in as timely manner as possible.       Arcelia Fallon PA  09/15/22 1147

## 2022-09-15 NOTE — ED PROVIDER NOTES
Subjective   History of Present Illness  This is a 52 year old female patient who presents to the ER with chief complaint of chest pain. PMH significant for hypothyroidism, HTN, lupus. This morning, she had some intermittent left sided chest pain radiating into the left arm that was sharp and moderate. She has some nausea without vomiting associated with it. Has no current chest pain, SOB, palpitations.         Review of Systems   Constitutional: Negative.  Negative for fever.   HENT: Negative.    Respiratory: Negative.    Cardiovascular: Positive for chest pain. Negative for palpitations and leg swelling.   Gastrointestinal: Negative.  Negative for abdominal pain.   Endocrine: Negative.    Genitourinary: Negative.  Negative for dysuria.   Skin: Negative.    Neurological: Negative.    Psychiatric/Behavioral: Negative.    All other systems reviewed and are negative.      Past Medical History:   Diagnosis Date   • Acquired acanthosis nigricans    • Fibromyalgia    • Headache    • Hypothyroidism    • Low back pain    • Lupus (HCC)    • Menopausal flushing    • Rheumatoid arthritis (HCC)    • Simple goiter    • Thyroid disease        Allergies   Allergen Reactions   • Augmentin [Amoxicillin-Pot Clavulanate] Rash       Past Surgical History:   Procedure Laterality Date   • CARPAL TUNNEL RELEASE     • HYSTERECTOMY      age 37       Family History   Problem Relation Age of Onset   • Heart disease Mother    • Diabetes Father    • Heart disease Father    • Heart disease Sister    • Heart disease Brother    • No Known Problems Brother    • Breast cancer Neg Hx        Social History     Socioeconomic History   • Marital status:    Tobacco Use   • Smoking status: Never Smoker   • Smokeless tobacco: Never Used   Vaping Use   • Vaping Use: Never used   Substance and Sexual Activity   • Alcohol use: No   • Drug use: No   • Sexual activity: Defer           Objective   Physical Exam  Vitals and nursing note reviewed.    Constitutional:       General: She is not in acute distress.     Appearance: She is well-developed. She is not diaphoretic.   HENT:      Head: Normocephalic and atraumatic.      Right Ear: External ear normal.      Left Ear: External ear normal.      Nose: Nose normal.   Eyes:      Conjunctiva/sclera: Conjunctivae normal.      Pupils: Pupils are equal, round, and reactive to light.   Neck:      Vascular: No JVD.      Trachea: No tracheal deviation.   Cardiovascular:      Rate and Rhythm: Normal rate and regular rhythm.      Heart sounds: Normal heart sounds. No murmur heard.  Pulmonary:      Effort: Pulmonary effort is normal. No respiratory distress.      Breath sounds: Normal breath sounds. No decreased breath sounds, wheezing, rhonchi or rales.   Abdominal:      General: Bowel sounds are normal.      Palpations: Abdomen is soft.      Tenderness: There is no abdominal tenderness.   Musculoskeletal:         General: No deformity. Normal range of motion.      Cervical back: Normal range of motion and neck supple.   Skin:     General: Skin is warm and dry.      Coloration: Skin is not pale.      Findings: No erythema or rash.   Neurological:      Mental Status: She is alert and oriented to person, place, and time.      Cranial Nerves: No cranial nerve deficit.   Psychiatric:         Behavior: Behavior normal.         Thought Content: Thought content normal.         Procedures           ED Course  ED Course as of 09/15/22 1520   Thu Sep 15, 2022   1202 ECG 12 Lead  Normal sinus rhythm.  Rate 65.  Normal axis.  Normal intervals.  No hypertrophic changes.  Nonspecific T wave changes.  No acute ischemic changes.  Borderline EKG.  Interpreted by me.  Electronically signed by Pelon Rivera MD, 09/15/22, 12:04 PM EDT.   [BC]   1232 Heart score is 2  [MM]   1248 XR Chest AP  IMPRESSION:    Unremarkable exam. No acute cardiopulmonary findings identified.     This report was finalized on 9/15/2022 12:40 PM by Dr. Olivares  MD Adalgisa [MM]   1629 Patient doesn't want to wait for her 2nd troponin, She says she feels fine. I explained the risk of not waiting and she understands. We will d/c her home with an order for outpatient stress test. She will return to ER if symptoms worsen.  [MM]      ED Course User Index  [BC] Pelon Rivera MD  [MM] Arcelia Fallon PA                                           MDM  Number of Diagnoses or Management Options     Amount and/or Complexity of Data Reviewed  Clinical lab tests: ordered and reviewed  Tests in the radiology section of CPT®: ordered and reviewed  Discuss the patient with other providers: yes        Final diagnoses:   Chest pain, unspecified type       ED Disposition  ED Disposition     ED Disposition   Discharge    Condition   Stable    Comment   --             Mali Lynn PA  [address]   [place] [address] [phone]    In 2 days           Medication List      No changes were made to your prescriptions during this visit.          Arcelia Fallon PA  09/15/22 8714

## 2022-09-27 DIAGNOSIS — F41.8 DEPRESSION WITH ANXIETY: ICD-10-CM

## 2022-09-27 RX ORDER — DIAZEPAM 10 MG/1
10 TABLET ORAL EVERY 6 HOURS PRN
Qty: 30 TABLET | Refills: 0 | Status: SHIPPED | OUTPATIENT
Start: 2022-09-27

## 2022-10-11 PROBLEM — N12 PYELONEPHRITIS: Status: RESOLVED | Noted: 2022-07-21 | Resolved: 2022-10-11

## 2022-10-14 ENCOUNTER — TELEPHONE (OUTPATIENT)
Dept: CARDIOLOGY CLINIC | Facility: CLINIC | Age: 52
End: 2022-10-14

## 2022-10-17 ENCOUNTER — TELEPHONE (OUTPATIENT)
Dept: GENETICS | Facility: CLINIC | Age: 52
End: 2022-10-17

## 2022-10-17 ENCOUNTER — TELEPHONE (OUTPATIENT)
Dept: UROLOGY | Facility: AMBULATORY SURGERY CENTER | Age: 52
End: 2022-10-17

## 2022-10-17 NOTE — TELEPHONE ENCOUNTER
ANN MARIEM stating to get XR completed prior to her appt if she is able to and that she also needs a US

## 2022-10-17 NOTE — TELEPHONE ENCOUNTER
I left a message for Chely ahead of her genetic counseling appointment on 10/20/2022  I explained that we have a family history questionnaire available that Chely can fill out prior to her visit in order to expedite the family history intake  In order to complete this form, I can send Chely a link via email that will direct her to the questionaire  Outcome:  I provided the patient the main genetics number to call should they be interested in completing the questionnaire

## 2022-10-20 ENCOUNTER — TELEPHONE (OUTPATIENT)
Dept: GENETICS | Facility: CLINIC | Age: 52
End: 2022-10-20

## 2022-10-20 DIAGNOSIS — N20.0 KIDNEY STONE ON LEFT SIDE: ICD-10-CM

## 2022-10-20 RX ORDER — TAMSULOSIN HYDROCHLORIDE 0.4 MG/1
CAPSULE ORAL
Qty: 30 CAPSULE | Refills: 0 | Status: SHIPPED | OUTPATIENT
Start: 2022-10-20

## 2022-10-20 NOTE — TELEPHONE ENCOUNTER
I called and left a message for the patient attempting to initiate their telephone appointment  This is our second attempt to reach the patient  This visit will be marked as a no show

## 2022-10-20 NOTE — TELEPHONE ENCOUNTER
I called and left a message for the patient attempting to initiate their telephone appointment  I will try again in a few minutes

## 2022-11-02 DIAGNOSIS — F41.8 DEPRESSION WITH ANXIETY: ICD-10-CM

## 2022-11-02 RX ORDER — DIAZEPAM 10 MG/1
10 TABLET ORAL EVERY 6 HOURS PRN
Qty: 30 TABLET | Refills: 0 | Status: SHIPPED | OUTPATIENT
Start: 2022-11-02

## 2022-11-08 DIAGNOSIS — K21.00 GASTROESOPHAGEAL REFLUX DISEASE WITH ESOPHAGITIS, UNSPECIFIED WHETHER HEMORRHAGE: Primary | ICD-10-CM

## 2022-11-08 DIAGNOSIS — K21.00 GASTROESOPHAGEAL REFLUX DISEASE WITH ESOPHAGITIS: ICD-10-CM

## 2022-11-08 DIAGNOSIS — E78.00 HYPERCHOLESTEROLEMIA: ICD-10-CM

## 2022-11-08 DIAGNOSIS — F33.41 RECURRENT MAJOR DEPRESSIVE DISORDER, IN PARTIAL REMISSION (HCC): ICD-10-CM

## 2022-11-08 RX ORDER — SERTRALINE HYDROCHLORIDE 100 MG/1
100 TABLET, FILM COATED ORAL 2 TIMES DAILY
Qty: 180 TABLET | Refills: 1 | Status: SHIPPED | OUTPATIENT
Start: 2022-11-08

## 2022-11-08 RX ORDER — SUCRALFATE ORAL 1 G/10ML
1 SUSPENSION ORAL
Qty: 2700 ML | Refills: 1 | Status: SHIPPED | OUTPATIENT
Start: 2022-11-08 | End: 2023-02-06

## 2022-11-08 RX ORDER — PANTOPRAZOLE SODIUM 40 MG/1
40 TABLET, DELAYED RELEASE ORAL 2 TIMES DAILY PRN
Qty: 180 TABLET | Refills: 1 | Status: SHIPPED | OUTPATIENT
Start: 2022-11-08

## 2022-11-08 RX ORDER — SIMVASTATIN 20 MG
20 TABLET ORAL
Qty: 90 TABLET | Refills: 1 | Status: SHIPPED | OUTPATIENT
Start: 2022-11-08

## 2022-11-08 RX ORDER — SUCRALFATE 1 G/1
TABLET ORAL
Qty: 270 TABLET | Refills: 1 | Status: SHIPPED | OUTPATIENT
Start: 2022-11-08 | End: 2022-11-17 | Stop reason: SDUPTHER

## 2022-11-08 NOTE — TELEPHONE ENCOUNTER
[facility] called and stated sucralfate (CARAFATE) 1 g tablet is on back order and they are unsure when they will get it in  They do have liquid form in stock  Spoke with patient, she is okay with the liquid form of medication  Could we send a new script for liquid form of CARAFATE 1 g to [facility] [place] Please?

## 2022-11-21 NOTE — PROGRESS NOTES
Virtual Regular Visit    Verification of patient location:    Patient is located in the following state in which I hold an active license [place]      Assessment/Plan:    Problem List Items Addressed This Visit        Endocrine    Hypothyroidism    Relevant Orders    TSH, 3rd generation with Free T4 reflex       Genitourinary    Nephrolithiasis - Primary    Relevant Medications    oxyCODONE-acetaminophen (PERCOCET)  mg per tablet    Other Relevant Orders    UA w Reflex to Microscopic w Reflex to Culture -Lab Collect (Completed)    CBC and differential    Hydroureteronephrosis    Relevant Orders    UA w Reflex to Microscopic w Reflex to Culture -Lab Collect (Completed)       Other    Vitamin D deficiency    Relevant Orders    Vitamin D 25 hydroxy    Hypokalemia    Relevant Orders    Comprehensive metabolic panel    Magnesium (Completed)    Iron deficiency anemia secondary to inadequate dietary iron intake    Relevant Orders    Iron Panel (Includes Ferritin, Iron Sat%, Iron, and TIBC) (Completed)            Reason for visit is   Chief Complaint   Patient presents with   • Virtual Brief Visit     Virtual my chart [phone] -  ED FU, 4 month checkup - please review potassium - if pt should be on it still - pt refused mammogram         Encounter provider Beata Griffin DO    Provider located at [address] [address]      Recent Visits  Date Type Provider Dept   11/17/22 [address], DO [facility]   Showing recent visits within past 7 days and meeting all other requirements  Future Appointments  No visits were found meeting these conditions  Showing future appointments within next 150 days and meeting all other requirements       The patient was identified by name and date of birth   Milka Low was informed that this is a telemedicine visit and that the visit is being conducted through the [email or web address] platform  She agrees to proceed     My office door was closed  No one else was in the room  She acknowledged consent and understanding of privacy and security of the video platform  The patient has agreed to participate and understands they can discontinue the visit at any time  Patient is aware this is a billable service  Olesya Cannon is a 46 y o  female    HPI     Renal stones, patient is still drinking a large amount of Pepsi  Has been in and out the hospital as well as to the urologist due to problems  Currently not having an issue and feeling well  Has not been hydrating herself with water      Anxiety and depression: stable with present medications, no HI or SI  compliant with meds  Continues to have sleep difficulties, decreased appetite, and low energy with depression   She is not having anhedonia, poor concentration, or current suicidal ideation    She does not drink much water, but lots of Pepsi   Eating about 1x/day, usually a sandwich and chips   She does not exercise   May 2020, worsening symptoms since February that she did not mid to anyone  Germania Urena ex- whom she had been with for 20 years but  for only 2 and has been  for 8 years was found D ceased   She acquired the dog from him after his death that they had shared together while being   Jona Rosenbaum also has broken up from her previous boyfriend whom which she was on the road a lot because he was a   Jona Rosenbaum is currently moved to [place] in a [facility] and has an apartment which she is able to financially maintain as well as take care of the dog but patient states she feels guilty about the death of her ex- because he had wanted to continue living together and had asked her to move back to [place] but she states that she could not live with him because she was afraid however never stop loving him  Jona Rosenbaum is also not taking her medications as prescribed since she is afraid to go out due to COVID-19 restrictions and has been rationing at her medications and decreasing dosages or taking every other day  June 2020, she states that she has no suicidal homicidal ideations and feels that her depression is stable   She still has guilt over the passing of her ex- but feels that it is slightly better when I saw her last  Bjorn Alfonso does not feel that she needs a change in her medications  jBorn Alfonso is very bored and not able to exercise since her dog is blind and cannot go outside and she has no fence yd  Bjorn Alfonso continues to drink large amounts of soda and is not watching her nutritional intake   She continues to gain weight  March 2021: wellcontrolled, no HI or SI  January 2022, no issues  Compliant with medications  Had to put down her dog in September  No HI or SI   June 2022 stable, no issues no HI or SI  Still with very poor eating habits     Hypertension (Follow-Up): The patient presents for follow-up of essential hypertension  The patient states she has been stable with her blood pressure control since the last visit     Symptoms: denies impaired vision,-- denies dyspnea,-- denies intermittent leg claudication-- and-- denies lower extremity edema--       The patient presents with complaints of substernal new onset of chest pain, described as aching, non-radiating Her symptoms are caused by no known event (Feels it may be related to indigestion and gas  When she gets chest pain she takes an Aspirin and it helps  Does not feel like it is a crushing pain)  Associated symptoms include She does report headaches but that is not new       Home monitoring: The patient checks her blood pressure sporadically  Blood pressure control has been good       Medications: the patient is adherent with her medication regimen  -- the patient complains of medication side effects       Disease Management: the patient is doing well with her blood pressure goals        Hypothyroidism (Follow-Up):  The patient is being seen for follow-up of hyperthyroidism       Interval symptoms:  denies heat intolerance,-- denies increased perspiration,-- denies palpitations,-- denies tremor,-- denies hyperactivity,-- denies anxiety,-- denies insomnia-- and-- denies fatigue       Associated symptoms: no weight loss,-- no weight gain,-- no diarrhea,-- no constipation-- and-- no vision changes       Medications:  the patient is not adherent to her medication regimen-- and-- she denies medication side effects       Disease management:  the patient is doing well with her goals        Vitamin D Deficiency: The patient is being seen for follow-up of vitamin D deficiency  Recent laboratory results: date 1/2018, 25-hydroxyvitamin D 23 ng/mL   7/18 level is 18  March 2021 off supplement and has not had labs   January 2022, last lab still showed low vitamin-D  She states she is compliant with weekly  June 2022, no recent levels but on supplementation     tobacco abuse : since age 6, 2-3 packs per day recently, never started chantix  March 2021 started chantix and now smoking  Less but has nto quit  January 2022, was on Chantix and was smoking about 5-6 cigarettes per day  Now off Chantix due to recall and is smoking 1-1 and half packs per day    Not motivated to quit  June 2022, not willing to quit          Past Medical History:   Diagnosis Date   • Allergic sinusitis     last assessed - 03Apr2017   • Anxiety     last assessed - 10STU3428   • Arthritis    • Asthma     last assessed - 51ROW5842   • Bilateral lower extremity edema     last assessed - 93JMU1178   • Callus of foot     last assessed - 22Jun2016   • Chronic GERD     last assessed - 10RQU5454   • Colon polyp    • Constipation     Resolved - 30AHN0473   • COPD (chronic obstructive pulmonary disease) ([facility] [address] )    • Depression     last assessed - 17TOK9050   • Disease of thyroid gland    • Diverticulitis of colon     last assessed - 77ZYX0037   • Dyspepsia     Resolved - 57LHV5233 • Esophageal reflux     last assessed - 26MPW1995   • Essential hypertriglyceridemia     last assessed - 04IPC7690   • GERD (gastroesophageal reflux disease)    • Gynecological disorder     last assessed - 43PRI2755   • Hypertension     last assessed - 55ZMH1053   • Hypotension     last assessed - 23Exy4398   • Kidney stone    • Migraine    • Neuropathy    • Pulmonary emphysema ([address] )     last assessed - 67AND3017   • Seasonal allergies    • Urinary frequency     last assessed - 07Uuj2338   • Vagina, candidiasis     last assessed - 22Jun2016   • Visual impairment        Past Surgical History:   Procedure Laterality Date   • CARPAL TUNNEL RELEASE      last assessed - 03ZYM3294   • CHOLECYSTECTOMY      last assessed - 99EQY8494   • COLONOSCOPY      Complete colonoscopy    • EYE SURGERY      last assessed - 23PSA3926   • FL RETROGRADE PYELOGRAM  6/16/2022   • FL RETROGRADE PYELOGRAM  8/16/2022   • FOOT SURGERY      last assessed - 32LRQ4694   • WA CYSTO/URETERO W/LITHOTRIPSY &INDWELL STENT INSRT Left 6/16/2022    Procedure: CYSTOSCOPY URETEROSCOPY WITH LITHOTRIPSY HOLMIUM LASER, RETROGRADE PYELOGRAM AND INSERTION STENT URETERAL;  Surgeon: Kailee Blankenship MD;  Location: [facility];  Service: Urology   • WA CYSTO/URETERO W/LITHOTRIPSY &INDWELL STENT INSRT Left 8/16/2022    Procedure: Shelli Speaker W/HOLMIUM LASER, RETROGRADE PYELOGRAM&STENT;  Surgeon: Ruthy Hammond MD;  Location: [facility];  Service: Urology   • WA CYSTOURETHROSCOPY,URETER CATHETER Left 7/21/2022    Procedure: CYSTOSCOPY RETROGRADE PYELOGRAM WITH INSERTION STENT URETERAL;  Surgeon: Teresa Noyola MD;  Location: [facility];  Service: Urology   • WA ESOPHAGOGASTRODUODENOSCOPY TRANSORAL DIAGNOSTIC N/A 2/13/2017    Procedure: ESOPHAGOGASTRODUODENOSCOPY (EGD); Surgeon: Jasmin Gracia MD;  Location: [facility];   Service: Gastroenterology       Current Outpatient Medications   Medication Sig Dispense Refill   • albuterol (2 5 mg/3 mL) 0 083 % nebulizer solution Take 3 mL (2 5 mg total) by nebulization every 6 (six) hours as needed for wheezing or shortness of breath 30 mL 0   • albuterol (PROVENTIL HFA,VENTOLIN HFA) 90 mcg/act inhaler Inhale 2 puffs every 6 (six) hours as needed for wheezing 18 g 1   • buPROPion (WELLBUTRIN XL) 150 mg 24 hr tablet Take 1 tablet (150 mg total) by mouth every morning 90 tablet 1   • ergocalciferol (VITAMIN D2) 50,000 units Take 1 capsule (50,000 Units total) by mouth once a week (Patient taking differently: Take 50,000 Units by mouth once a week Takes on Mondays) 12 capsule 1   • fluticasone (FLONASE) 50 mcg/act nasal spray 1 spray into each nostril 2 (two) times a day (Patient taking differently: 1 spray into each nostril as needed) 16 g 5   • gabapentin (NEURONTIN) 100 mg capsule Take 100 mg by mouth 2 (two) times a day  0   • oxyCODONE-acetaminophen (PERCOCET)  mg per tablet Take 1 tablet by mouth every 6 (six) hours as needed     • diazepam (VALIUM) 10 mg tablet Take 1 tablet (10 mg total) by mouth every 6 (six) hours as needed for anxiety 30 tablet 0   • Incontinence Supply Disposable (Incontinence Brief Large) MISC Use 2 (two) times a day 18 each 6   • levothyroxine 112 mcg tablet Take 1 tablet (112 mcg total) by mouth daily (Patient taking differently: Take 112 mcg by mouth daily in the early morning) 90 tablet 1   • nicotine (NICODERM CQ) 21 mg/24 hr TD 24 hr patch Place 1 patch on the skin every 24 hours 28 patch 0   • pantoprazole (PROTONIX) 40 mg tablet Take 1 tablet (40 mg total) by mouth 2 (two) times a day as needed (acid reflux) 180 tablet 1   • sertraline (ZOLOFT) 100 mg tablet Take 1 tablet (100 mg total) by mouth 2 (two) times a day 180 tablet 1   • simvastatin (ZOCOR) 20 mg tablet Take 1 tablet (20 mg total) by mouth daily at bedtime 90 tablet 1   • sucralfate (CARAFATE) 1 g/10 mL suspension Take 10 mL (1 g total) by mouth 3 (three) times a day before meals 2700 mL 1   • tamsulosin (FLOMAX) 0 4 mg take 1 capsule by mouth once daily with dinner 30 capsule 0     No current facility-administered medications for this visit  Allergies   Allergen Reactions   • Hydrocodone-Acetaminophen Hives   • Ketorolac Hives and Dizziness   • Toradol [Ketorolac Tromethamine] Other (See Comments)     hypotension   • Ultram [Tramadol] Nausea Only, GI Intolerance and Vomiting       Review of Systems     Constitutional:  Denies fever or chills   Eyes:  Denies double , blurry vision or eye pain  HENT:  Denies nasal congestion or sore throat   Respiratory:  Denies cough or shortness of breath or wheezing  Cardiovascular:  Denies palpitations or chest pain  GI:  Denies abdominal pain, nausea, or vomiting, no loose stools, no reflux  Integument:  Denies rash , no open areas  Neurologic:  Denies headache or focal weakness, no dizziness  : no dysuria, or hematuria      Video Exam    There were no vitals filed for this visit      Physical Exam     Constitutional:  Well developed, well nourished, no acute distress, non-toxic appearance   Eyes:  Is conjunctiva normal , non icteric sclera  HENT:  Atraumatic, non congested  Respiratory:  Nonlabored, appears comfortable, no conversational dyspnea  Cardiovascular:   no LE edema b/l  Neurologic:  no focal deficits noted   Psychiatric:  Speech and behavior appropriate , AAO x 3      I spent 11 minutes directly with the patient during this visit

## 2022-11-26 DIAGNOSIS — N20.0 KIDNEY STONE ON LEFT SIDE: ICD-10-CM

## 2022-11-27 RX ORDER — TAMSULOSIN HYDROCHLORIDE 0.4 MG/1
CAPSULE ORAL
Qty: 30 CAPSULE | Refills: 0 | Status: SHIPPED | OUTPATIENT
Start: 2022-11-27

## 2023-01-05 ENCOUNTER — TELEPHONE (OUTPATIENT)
Dept: INTERNAL MEDICINE CLINIC | Facility: CLINIC | Age: 53
End: 2023-01-05

## 2023-01-05 NOTE — TELEPHONE ENCOUNTER
If it is not in [facility], she would need to sign a records request form or call them and ask them to forward her records to us  Would you like me to call and tell her?

## 2023-01-05 NOTE — TELEPHONE ENCOUNTER
Patient called to reschedule appointment and to also ask if we can request her medical records from [facility] in [address] [place] [address] for a 2 night stay that she had

## 2023-01-16 ENCOUNTER — APPOINTMENT (OUTPATIENT)
Dept: LAB | Age: 53
End: 2023-01-16

## 2023-01-16 ENCOUNTER — OFFICE VISIT (OUTPATIENT)
Dept: INTERNAL MEDICINE CLINIC | Facility: CLINIC | Age: 53
End: 2023-01-16

## 2023-01-16 VITALS
SYSTOLIC BLOOD PRESSURE: 110 MMHG | DIASTOLIC BLOOD PRESSURE: 64 MMHG | WEIGHT: 240.6 LBS | HEART RATE: 75 BPM | HEIGHT: 67 IN | OXYGEN SATURATION: 98 % | TEMPERATURE: 96.7 F | BODY MASS INDEX: 37.76 KG/M2

## 2023-01-16 DIAGNOSIS — R07.1 CHEST PAIN ON BREATHING: ICD-10-CM

## 2023-01-16 DIAGNOSIS — E78.00 HYPERCHOLESTEROLEMIA: ICD-10-CM

## 2023-01-16 DIAGNOSIS — I10 BENIGN HYPERTENSION: ICD-10-CM

## 2023-01-16 DIAGNOSIS — E03.9 ACQUIRED HYPOTHYROIDISM: ICD-10-CM

## 2023-01-16 DIAGNOSIS — Z23 NEED FOR DIPHTHERIA-TETANUS-PERTUSSIS (TDAP) VACCINE: ICD-10-CM

## 2023-01-16 DIAGNOSIS — D50.8 IRON DEFICIENCY ANEMIA SECONDARY TO INADEQUATE DIETARY IRON INTAKE: ICD-10-CM

## 2023-01-16 DIAGNOSIS — F41.8 DEPRESSION WITH ANXIETY: ICD-10-CM

## 2023-01-16 DIAGNOSIS — K21.9 GASTROESOPHAGEAL REFLUX DISEASE WITHOUT ESOPHAGITIS: ICD-10-CM

## 2023-01-16 DIAGNOSIS — E55.9 VITAMIN D DEFICIENCY: ICD-10-CM

## 2023-01-16 DIAGNOSIS — J43.2 CENTRILOBULAR EMPHYSEMA (HCC): ICD-10-CM

## 2023-01-16 DIAGNOSIS — K21.00 GASTROESOPHAGEAL REFLUX DISEASE WITH ESOPHAGITIS, UNSPECIFIED WHETHER HEMORRHAGE: ICD-10-CM

## 2023-01-16 DIAGNOSIS — K21.00 GASTROESOPHAGEAL REFLUX DISEASE WITH ESOPHAGITIS: ICD-10-CM

## 2023-01-16 DIAGNOSIS — E87.6 HYPOKALEMIA: ICD-10-CM

## 2023-01-16 DIAGNOSIS — F41.9 ANXIETY: ICD-10-CM

## 2023-01-16 DIAGNOSIS — D50.8 IRON DEFICIENCY ANEMIA SECONDARY TO INADEQUATE DIETARY IRON INTAKE: Primary | ICD-10-CM

## 2023-01-16 DIAGNOSIS — Z86.010 HISTORY OF COLON POLYPS: Primary | ICD-10-CM

## 2023-01-16 DIAGNOSIS — K76.0 FATTY LIVER: ICD-10-CM

## 2023-01-16 DIAGNOSIS — E74.39 GLUCOSE INTOLERANCE: ICD-10-CM

## 2023-01-16 DIAGNOSIS — F17.210 VERY HEAVY CIGARETTE SMOKER: ICD-10-CM

## 2023-01-16 DIAGNOSIS — J06.9 VIRAL URI WITH COUGH: ICD-10-CM

## 2023-01-16 DIAGNOSIS — F33.41 RECURRENT MAJOR DEPRESSIVE DISORDER, IN PARTIAL REMISSION (HCC): ICD-10-CM

## 2023-01-16 DIAGNOSIS — Z72.0 TOBACCO ABUSE DISORDER: ICD-10-CM

## 2023-01-16 DIAGNOSIS — K26.0 ACUTE DUODENAL ULCER WITH BLEEDING: ICD-10-CM

## 2023-01-16 DIAGNOSIS — Z23 NEED FOR INFLUENZA VACCINATION: ICD-10-CM

## 2023-01-16 PROBLEM — E78.1 PRIMARY HYPERTRIGLYCERIDEMIA: Status: RESOLVED | Noted: 2022-06-19 | Resolved: 2023-01-16

## 2023-01-16 PROBLEM — E66.01 MORBID OBESITY (HCC): Status: RESOLVED | Noted: 2019-01-11 | Resolved: 2023-01-16

## 2023-01-16 PROBLEM — Z13.31 POSITIVE DEPRESSION SCREENING: Status: RESOLVED | Noted: 2022-06-19 | Resolved: 2023-01-16

## 2023-01-16 LAB
25(OH)D3 SERPL-MCNC: 70.6 NG/ML (ref 30–100)
ALBUMIN SERPL BCP-MCNC: 3.6 G/DL (ref 3.5–5)
ALP SERPL-CCNC: 83 U/L (ref 46–116)
ALT SERPL W P-5'-P-CCNC: 22 U/L (ref 12–78)
ANION GAP SERPL CALCULATED.3IONS-SCNC: 7 MMOL/L (ref 4–13)
AST SERPL W P-5'-P-CCNC: 29 U/L (ref 5–45)
BASOPHILS # BLD AUTO: 0.03 THOUSANDS/ÂΜL (ref 0–0.1)
BASOPHILS NFR BLD AUTO: 0 % (ref 0–1)
BILIRUB SERPL-MCNC: 0.55 MG/DL (ref 0.2–1)
BUN SERPL-MCNC: 15 MG/DL (ref 5–25)
CALCIUM SERPL-MCNC: 9.6 MG/DL (ref 8.3–10.1)
CHLORIDE SERPL-SCNC: 112 MMOL/L (ref 96–108)
CHOLEST SERPL-MCNC: 124 MG/DL
CO2 SERPL-SCNC: 24 MMOL/L (ref 21–32)
CREAT SERPL-MCNC: 0.9 MG/DL (ref 0.6–1.3)
EOSINOPHIL # BLD AUTO: 0.06 THOUSAND/ÂΜL (ref 0–0.61)
EOSINOPHIL NFR BLD AUTO: 1 % (ref 0–6)
ERYTHROCYTE [DISTWIDTH] IN BLOOD BY AUTOMATED COUNT: 17.9 % (ref 11.6–15.1)
FERRITIN SERPL-MCNC: 6 NG/ML (ref 8–388)
GFR SERPL CREATININE-BSD FRML MDRD: 73 ML/MIN/1.73SQ M
GLUCOSE P FAST SERPL-MCNC: 115 MG/DL (ref 65–99)
HCT VFR BLD AUTO: 30.2 % (ref 34.8–46.1)
HDLC SERPL-MCNC: 50 MG/DL
HGB BLD-MCNC: 8.2 G/DL (ref 11.5–15.4)
IMM GRANULOCYTES # BLD AUTO: 0.04 THOUSAND/UL (ref 0–0.2)
IMM GRANULOCYTES NFR BLD AUTO: 1 % (ref 0–2)
LDLC SERPL CALC-MCNC: 58 MG/DL (ref 0–100)
LYMPHOCYTES # BLD AUTO: 1.2 THOUSANDS/ÂΜL (ref 0.6–4.47)
LYMPHOCYTES NFR BLD AUTO: 18 % (ref 14–44)
MCH RBC QN AUTO: 24.2 PG (ref 26.8–34.3)
MCHC RBC AUTO-ENTMCNC: 27.2 G/DL (ref 31.4–37.4)
MCV RBC AUTO: 89 FL (ref 82–98)
MONOCYTES # BLD AUTO: 0.55 THOUSAND/ÂΜL (ref 0.17–1.22)
MONOCYTES NFR BLD AUTO: 8 % (ref 4–12)
NEUTROPHILS # BLD AUTO: 4.96 THOUSANDS/ÂΜL (ref 1.85–7.62)
NEUTS SEG NFR BLD AUTO: 72 % (ref 43–75)
NRBC BLD AUTO-RTO: 0 /100 WBCS
PLATELET # BLD AUTO: 147 THOUSANDS/UL (ref 149–390)
PMV BLD AUTO: 12.5 FL (ref 8.9–12.7)
POTASSIUM SERPL-SCNC: 3.7 MMOL/L (ref 3.5–5.3)
PROT SERPL-MCNC: 7.7 G/DL (ref 6.4–8.4)
RBC # BLD AUTO: 3.39 MILLION/UL (ref 3.81–5.12)
SODIUM SERPL-SCNC: 143 MMOL/L (ref 135–147)
TRIGL SERPL-MCNC: 82 MG/DL
TSH SERPL DL<=0.05 MIU/L-ACNC: 1.47 UIU/ML (ref 0.45–4.5)
WBC # BLD AUTO: 6.84 THOUSAND/UL (ref 4.31–10.16)

## 2023-01-16 RX ORDER — LEVOTHYROXINE SODIUM 112 UG/1
112 TABLET ORAL DAILY
Qty: 90 TABLET | Refills: 1 | Status: SHIPPED | OUTPATIENT
Start: 2023-01-16

## 2023-01-16 RX ORDER — ALBUTEROL SULFATE 90 UG/1
2 AEROSOL, METERED RESPIRATORY (INHALATION) EVERY 6 HOURS PRN
Qty: 18 G | Refills: 1 | Status: CANCELLED | OUTPATIENT
Start: 2023-01-16

## 2023-01-16 RX ORDER — DIAZEPAM 10 MG/1
10 TABLET ORAL EVERY 6 HOURS PRN
Qty: 30 TABLET | Refills: 0 | Status: SHIPPED | OUTPATIENT
Start: 2023-01-16

## 2023-01-16 RX ORDER — PANTOPRAZOLE SODIUM 40 MG/1
40 TABLET, DELAYED RELEASE ORAL 2 TIMES DAILY PRN
Qty: 180 TABLET | Refills: 1 | Status: SHIPPED | OUTPATIENT
Start: 2023-01-16

## 2023-01-16 RX ORDER — ERGOCALCIFEROL 1.25 MG/1
50000 CAPSULE ORAL
Qty: 12 CAPSULE | Refills: 1 | Status: SHIPPED | OUTPATIENT
Start: 2023-01-16

## 2023-01-16 RX ORDER — NICOTINE 21 MG/24HR
1 PATCH, TRANSDERMAL 24 HOURS TRANSDERMAL EVERY 24 HOURS
Qty: 28 PATCH | Refills: 5 | Status: SHIPPED | OUTPATIENT
Start: 2023-01-16

## 2023-01-16 RX ORDER — SIMVASTATIN 20 MG
20 TABLET ORAL
Qty: 90 TABLET | Refills: 1 | Status: SHIPPED | OUTPATIENT
Start: 2023-01-16

## 2023-01-16 RX ORDER — TIOTROPIUM BROMIDE 18 UG/1
18 CAPSULE ORAL; RESPIRATORY (INHALATION) DAILY
Qty: 30 CAPSULE | Refills: 5 | Status: SHIPPED | OUTPATIENT
Start: 2023-01-16

## 2023-01-16 RX ORDER — SERTRALINE HYDROCHLORIDE 100 MG/1
100 TABLET, FILM COATED ORAL 2 TIMES DAILY
Qty: 180 TABLET | Refills: 1 | Status: SHIPPED | OUTPATIENT
Start: 2023-01-16

## 2023-01-16 RX ORDER — SUCRALFATE ORAL 1 G/10ML
1 SUSPENSION ORAL
Qty: 2700 ML | Refills: 1 | Status: SHIPPED | OUTPATIENT
Start: 2023-01-16 | End: 2023-04-16

## 2023-01-16 RX ORDER — FERROUS SULFATE TAB EC 324 MG (65 MG FE EQUIVALENT) 324 (65 FE) MG
324 TABLET DELAYED RESPONSE ORAL
Qty: 30 TABLET | Refills: 1 | Status: SHIPPED | OUTPATIENT
Start: 2023-01-16 | End: 2023-03-31 | Stop reason: SDUPTHER

## 2023-01-16 RX ORDER — BUPROPION HYDROCHLORIDE 150 MG/1
150 TABLET ORAL EVERY MORNING
Qty: 90 TABLET | Refills: 1 | Status: SHIPPED | OUTPATIENT
Start: 2023-01-16

## 2023-01-16 NOTE — PROGRESS NOTES
Assessment/Plan:    1  History of colon polyps  -     Ambulatory referral for colonoscopy; Future    2  Vitamin D deficiency  -     ergocalciferol (VITAMIN D2) 50,000 units; Take 1 capsule (50,000 Units total) by mouth every 14 (fourteen) days  -     Vitamin D 25 hydroxy; Future    3  Acquired hypothyroidism  -     levothyroxine 112 mcg tablet; Take 1 tablet (112 mcg total) by mouth daily  -     TSH, 3rd generation with Free T4 reflex; Future    4  Hypercholesterolemia  -     simvastatin (ZOCOR) 20 mg tablet; Take 1 tablet (20 mg total) by mouth daily at bedtime  -     Lipid Panel with Direct LDL reflex; Future  -     Comprehensive metabolic panel; Future  -     CBC and differential; Future    5  Viral URI with cough  Comments:  see disposition     6  Depression with anxiety  -     buPROPion (WELLBUTRIN XL) 150 mg 24 hr tablet; Take 1 tablet (150 mg total) by mouth every morning  -     diazepam (VALIUM) 10 mg tablet; Take 1 tablet (10 mg total) by mouth every 6 (six) hours as needed for anxiety    7  Recurrent major depressive disorder, in partial remission (HCC)  -     buPROPion (WELLBUTRIN XL) 150 mg 24 hr tablet; Take 1 tablet (150 mg total) by mouth every morning  -     sertraline (ZOLOFT) 100 mg tablet; Take 1 tablet (100 mg total) by mouth 2 (two) times a day    8  Gastroesophageal reflux disease with esophagitis  -     pantoprazole (PROTONIX) 40 mg tablet; Take 1 tablet (40 mg total) by mouth 2 (two) times a day as needed (acid reflux)    9  Gastroesophageal reflux disease with esophagitis, unspecified whether hemorrhage  -     sucralfate (CARAFATE) 1 g/10 mL suspension; Take 10 mL (1 g total) by mouth 3 (three) times a day before meals    10  Tobacco abuse disorder  -     CT lung screening program; Future; Expected date: 01/16/2023  -     nicotine (NICODERM CQ) 21 mg/24 hr TD 24 hr patch; Place 1 patch on the skin over 24 hours every 24 hours    11  Hypokalemia    12   Glucose intolerance  -     Hemoglobin A1C; Future    13  Gastroesophageal reflux disease without esophagitis    14  BMI 37 0-37 9, adult    15  Benign hypertension    16  Anxiety    17  Iron deficiency anemia secondary to inadequate dietary iron intake  -     Iron Panel (Includes Ferritin, Iron Sat%, Iron, and TIBC); Future    18  Centrilobular emphysema (HCC)  -     tiotropium (Spiriva HandiHaler) 18 mcg inhalation capsule; Place 1 capsule (18 mcg total) into inhaler and inhale daily    19  Chest pain on breathing  -     NM myocardial perfusion spect (rx stress and/or rest); Future; Expected date: 01/16/2023    20  Acute duodenal ulcer with bleeding    21  Fatty liver    22  Very heavy cigarette smoker      BMI Counseling: Body mass index is 37 54 kg/m²  The BMI is above normal  Nutrition recommendations include moderation in carbohydrate intake  Exercise recommendations include exercising 3-5 times per week  No pharmacotherapy was ordered  Rationale for BMI follow-up plan is due to patient being overweight or obese  There are no Patient Instructions on file for this visit  Return in about 6 months (around 7/16/2023) for Recheck  Subjective:      Patient ID: Fabián Walter is a 46 y o  female  Chief Complaint   Patient presents with   • Follow-up     6 month follow up  Medication refills patient would like to discuss having a stress test done   • Health Screening     Pt has appt with gastro on 1/18 Dr Homero Baker       HPI       Gastric ulcer with bleeding, patient recently went to the hospital in [place] and was hospitalized due to anemia of 7 1  She went due to arm pain and some chest discomfort  She was given 1 unit packed red blood cells and this was 5 weeks ago  Since discharge she has not had any follow-up or any blood work  She has an appointment with a gastroenterologist in 2 days in this area  She had an EGD while she was in the hospital over there    CT scan showed possible cirrhosis, fatty liver, splenomegaly  Her sugar at the hospital was 174  We do not have other complete records      Vitamin D Deficiency: The patient is being seen for follow-up of vitamin D deficiency  Recent laboratory results: date 1/2018, 25-hydroxyvitamin D 23 ng/mL   7/18 level is 18  March 2021 off supplement and has not had labs   January 2022, last lab still showed low vitamin-D  Queta Mcginnis states she is compliant with weekly  June 2022, no recent levels but on supplementation  January 2023, due for blood work  Last level was 91      Renal stones, patient is still drinking a large amount of Pepsi  Has been in and out the hospital as well as to the urologist due to problems  Currently not having an issue and feeling well  Has not been hydrating herself with water        Anxiety and depression: stable with present medications, no HI or SI  compliant with meds  Continues to have sleep difficulties, decreased appetite, and low energy with depression   She is not having anhedonia, poor concentration, or current suicidal ideation    She does not drink much water, but lots of Pepsi   Eating about 1x/day, usually a sandwich and chips   She does not exercise   May 2020, worsening symptoms since February that she did not mid to anyone  Mariam Sanabria ex- whom she had been with for 20 years but  for only 2 and has been  for 8 years was found D ceased   She acquired the dog from him after his death that they had shared together while being   Queta Mcginnis also has broken up from her previous boyfriend whom which she was on the road a lot because he was a   Queta Mcginnis is currently moved to [place] in a [facility] and has an apartment which she is able to financially maintain as well as take care of the dog but patient states she feels guilty about the death of her ex- because he had wanted to continue living together and had asked her to move back to [place] but she states that she could not live with him because she was afraid however never stop loving him  Ileana Fam is also not taking her medications as prescribed since she is afraid to go out due to COVID-19 restrictions and has been rationing at her medications and decreasing dosages or taking every other day  June 2020, she states that she has no suicidal homicidal ideations and feels that her depression is stable   She still has guilt over the passing of her ex- but feels that it is slightly better when I saw her last  Ileana Fam does not feel that she needs a change in her medications  Ileana Fam is very bored and not able to exercise since her dog is blind and cannot go outside and she has no fence yd  Ileana Fam continues to drink large amounts of soda and is not watching her nutritional intake   She continues to gain weight  March 2021: wellcontrolled, no HI or SI  January 2022, no issues   Compliant with medications   Had to put down her dog in September   No HI or SI   June 2022 stable, no issues no HI or SI  Still with very poor eating habits  January 2023, no HI or SI, feels that the medication is working well  Still drinking Pepsi     Hypertension (Follow-Up): The patient presents for follow-up of essential hypertension  The patient states she has been stable with her blood pressure control since the last visit     Symptoms: denies impaired vision,-- denies dyspnea,-- denies intermittent leg claudication-- and-- denies lower extremity edema--       The patient presents with complaints of substernal new onset of chest pain, described as aching, non-radiating Her symptoms are caused by no known event (Feels it may be related to indigestion and gas  When she gets chest pain she takes an Aspirin and it helps  Does not feel like it is a crushing pain)  Associated symptoms include She does report headaches but that is not new       Home monitoring: The patient checks her blood pressure sporadically   Blood pressure control has been good       Medications: the patient is adherent with her medication regimen  -- the patient complains of medication side effects       Disease Management: the patient is doing well with her blood pressure goals        Hypothyroidism (Follow-Up):  The patient is being seen for follow-up of hyperthyroidism       Interval symptoms:  denies heat intolerance,-- denies increased perspiration,-- denies palpitations,-- denies tremor,-- denies hyperactivity,-- denies anxiety,-- denies insomnia-- and-- denies fatigue       Associated symptoms: no weight loss,-- no weight gain,-- no diarrhea,-- no constipation-- and-- no vision changes       Medications:  the patient is not adherent to her medication regimen-- and-- she denies medication side effects       Disease management:  the patient is doing well with her goals             tobacco abuse : since age 6, 2-3 packs per day recently, never started chantix  March 2021 started chantix and now smoking  Less but has nto quit  January 2022, was on Chantix and was smoking about 5-6 cigarettes per day   Now off Chantix due to recall and is smoking 1-1 and half packs per day   Not motivated to quit  June 2022, not willing to quit  January 2023, quit 4 days ago however she states she is struggling a lot and has a lot of cravings     The following portions of the patient's history were reviewed and updated as appropriate: allergies, current medications, past family history, past medical history, past social history, past surgical history and problem list     Review of Systems      Constitutional:  Denies fever or chills   Eyes:  Denies double , blurry vision or eye pain  HENT:  Denies nasal congestion or sore throat   Respiratory:  Denies cough or shortness of breath or wheezing  Cardiovascular:  Denies palpitations or chest pain  GI:  Denies abdominal pain, nausea, or vomiting, no loose stools, no reflux  Integument:  Denies rash , no open areas  Neurologic:  Denies headache or focal weakness, no dizziness  : no dysuria, or hematuria        Current Outpatient Medications   Medication Sig Dispense Refill   • albuterol (2 5 mg/3 mL) 0 083 % nebulizer solution Take 3 mL (2 5 mg total) by nebulization every 6 (six) hours as needed for wheezing or shortness of breath 30 mL 0   • albuterol (PROVENTIL HFA,VENTOLIN HFA) 90 mcg/act inhaler Inhale 2 puffs every 6 (six) hours as needed for wheezing 18 g 1   • buPROPion (WELLBUTRIN XL) 150 mg 24 hr tablet Take 1 tablet (150 mg total) by mouth every morning 90 tablet 1   • diazepam (VALIUM) 10 mg tablet Take 1 tablet (10 mg total) by mouth every 6 (six) hours as needed for anxiety 30 tablet 0   • ergocalciferol (VITAMIN D2) 50,000 units Take 1 capsule (50,000 Units total) by mouth every 14 (fourteen) days 12 capsule 1   • fluticasone (FLONASE) 50 mcg/act nasal spray 1 spray into each nostril 2 (two) times a day (Patient taking differently: 1 spray into each nostril as needed) 16 g 5   • gabapentin (NEURONTIN) 100 mg capsule Take 100 mg by mouth 2 (two) times a day  0   • levothyroxine 112 mcg tablet Take 1 tablet (112 mcg total) by mouth daily 90 tablet 1   • nicotine (NICODERM CQ) 21 mg/24 hr TD 24 hr patch Place 1 patch on the skin over 24 hours every 24 hours 28 patch 5   • oxyCODONE-acetaminophen (PERCOCET)  mg per tablet Take 1 tablet by mouth every 6 (six) hours as needed     • pantoprazole (PROTONIX) 40 mg tablet Take 1 tablet (40 mg total) by mouth 2 (two) times a day as needed (acid reflux) 180 tablet 1   • sertraline (ZOLOFT) 100 mg tablet Take 1 tablet (100 mg total) by mouth 2 (two) times a day 180 tablet 1   • simvastatin (ZOCOR) 20 mg tablet Take 1 tablet (20 mg total) by mouth daily at bedtime 90 tablet 1   • sucralfate (CARAFATE) 1 g/10 mL suspension Take 10 mL (1 g total) by mouth 3 (three) times a day before meals 2700 mL 1   • tamsulosin (FLOMAX) 0 4 mg take 1 capsule by mouth once daily with dinner 30 capsule 0   • tiotropium (Spiriva HandiHaler) 18 mcg inhalation capsule Place 1 capsule (18 mcg total) into inhaler and inhale daily 30 capsule 5   • Incontinence Supply Disposable (Incontinence Brief Large) MISC Use 2 (two) times a day (Patient not taking: Reported on 1/16/2023) 18 each 6     No current facility-administered medications for this visit         Objective:    /64 (BP Location: Left arm, Patient Position: Sitting, Cuff Size: Large)   Pulse 75   Temp (!) 96 7 °F (35 9 °C) (Temporal)   Ht 5' 7 13" (1 705 m)   Wt 109 kg (240 lb 9 6 oz)   LMP 04/05/2018 (Approximate)   SpO2 98%   BMI 37 54 kg/m²        Physical Exam        Constitutional:  Well developed, well nourished, no acute distress, non-toxic appearance   Eyes:  PERRL, conjunctiva normal , non icteric sclera  HENT:  Atraumatic, oropharynx moist  Neck-  supple   Respiratory:  CTA b/l, normal breath sounds, no rales, no wheezing   Cardiovascular:  RRR, no murmurs, no LE edema b/l  GI:  Soft, nondistended, normal bowel sounds x 4, nontender, no organomegaly, no mass, no rebound, no guarding   Neurologic:  no focal deficits noted   Psychiatric:  Speech and behavior appropriate , AAO x 3           Helon Holiday, DO

## 2023-01-17 DIAGNOSIS — D50.8 IRON DEFICIENCY ANEMIA SECONDARY TO INADEQUATE DIETARY IRON INTAKE: ICD-10-CM

## 2023-01-17 DIAGNOSIS — E78.00 HYPERCHOLESTEROLEMIA: ICD-10-CM

## 2023-01-17 DIAGNOSIS — E74.39 GLUCOSE INTOLERANCE: ICD-10-CM

## 2023-01-17 DIAGNOSIS — E55.9 VITAMIN D DEFICIENCY: ICD-10-CM

## 2023-01-17 DIAGNOSIS — E03.9 ACQUIRED HYPOTHYROIDISM: Primary | ICD-10-CM

## 2023-01-17 PROBLEM — E87.6 HYPOKALEMIA: Status: RESOLVED | Noted: 2022-06-20 | Resolved: 2023-01-17

## 2023-01-17 LAB
EST. AVERAGE GLUCOSE BLD GHB EST-MCNC: 100 MG/DL
HBA1C MFR BLD: 5.1 %

## 2023-01-18 ENCOUNTER — OFFICE VISIT (OUTPATIENT)
Dept: GASTROENTEROLOGY | Facility: CLINIC | Age: 53
End: 2023-01-18

## 2023-01-18 ENCOUNTER — TELEPHONE (OUTPATIENT)
Dept: GASTROENTEROLOGY | Facility: CLINIC | Age: 53
End: 2023-01-18

## 2023-01-18 VITALS
RESPIRATION RATE: 18 BRPM | HEART RATE: 100 BPM | BODY MASS INDEX: 38.14 KG/M2 | SYSTOLIC BLOOD PRESSURE: 120 MMHG | WEIGHT: 243 LBS | OXYGEN SATURATION: 99 % | DIASTOLIC BLOOD PRESSURE: 62 MMHG | HEIGHT: 67 IN

## 2023-01-18 DIAGNOSIS — R15.9 INCONTINENCE OF FECES, UNSPECIFIED FECAL INCONTINENCE TYPE: ICD-10-CM

## 2023-01-18 DIAGNOSIS — D50.9 IRON DEFICIENCY ANEMIA, UNSPECIFIED IRON DEFICIENCY ANEMIA TYPE: ICD-10-CM

## 2023-01-18 DIAGNOSIS — D69.6 THROMBOCYTOPENIA (HCC): Primary | ICD-10-CM

## 2023-01-18 DIAGNOSIS — K25.4 GASTRIC ULCER WITH HEMORRHAGE, UNSPECIFIED CHRONICITY: ICD-10-CM

## 2023-01-18 DIAGNOSIS — K76.0 HEPATIC STEATOSIS: ICD-10-CM

## 2023-01-18 DIAGNOSIS — R16.2 HEPATOSPLENOMEGALY: ICD-10-CM

## 2023-01-18 RX ORDER — OMEPRAZOLE 40 MG/1
40 CAPSULE, DELAYED RELEASE ORAL 2 TIMES DAILY
Qty: 60 CAPSULE | Refills: 3 | Status: SHIPPED | OUTPATIENT
Start: 2023-01-18

## 2023-01-18 NOTE — H&P (VIEW-ONLY)
Jaylen Pedro [facility] - Outpatient Follow-up Note  Bushra Cooper 46 y o  female MRN: [id number]  Encounter: [id number]          ASSESSMENT AND PLAN:      1  Gastric ulcer with hemorrhage, unspecified chronicity  2  Iron deficiency anemia, unspecified iron deficiency anemia type  Patient reportedly hospitalized in [place] on December 11, 12, and 13 found to have a hgb b/w 4-5 and an EGD notable for gastric ulcer  Records are unavailable for review, although have been requested  Patient was d/nishant on pantoprazole 40 mg twice daily, which she continues to take  Interestingly, patient was taking pantoprazole 40 mg twice daily prior to her hospitalization  Most recent labs on 1/16 notable for hgb 8 2 and ferritin of 6 for which her PCP has initiated oral iron supplementation  Recommended EGD with gastric biopsies to assess for ulcer healing and r/o infection with H  pylori  Also recommended switching from pantoprazole to omeprazole 40 mg twice daily, given patient developed an ulcer while compliantly taking pantoprazole BID  Strongly advised to avoid any/all NSAID and EtOH use as to not hinder ulcer healing  Also thoroughly counseled patient on the importance of tobacco cessation  Discussed risks of procedure with patient including, but not limited to, bleeding, infection, and perforation; Patient gave verbal understanding and is agreeable to proceed  Discussed and given instructions for EGD    - Ambulatory Referral to Gastroenterology  - EGD; Future  - omeprazole (PriLOSEC) 40 MG capsule; Take 1 capsule (40 mg total) by mouth 2 (two) times a day  Dispense: 60 capsule; Refill: 3    3  Hepatic steatosis  4  Hepatosplenomegaly  5  Thrombocytopenia ([address] )  6  BMI 38 0-38 9,adult  Patient incidentally noted to have diffuse hepatic steatosis and hepatosplenomegaly on imaging from 6/220, although subsequent imaging notes an unremarkable liver and spleen   Hepatic function notable for mild intermittently elevated AST, but otherwise grossly unremarkable  Also with mild thrombocytopenia  No clinical evidence of chronic liver disease or hepatic decompensation  Suspect patient has NAFLD, in the setting of multiple metabolic risk factors, with some degree of advanced fibrosis  Recommended serologies to assess synthetic function and serologies to screen for viral hepatitis and immunity to hepatitis A and B  Previous hep C Ab negative  Patient will also complete an abdominal U/S with elastography to better quantify steatosis and help assess for advanced fibrosis - If F3/F4 fibrosis, will discuss additional management in follow-up  Discussed the pathophysiology of fatty liver disease, and patient is aware of the potential to progress to cirrhosis over time if left untreated  Discussed recommendations in regards to fatty liver including, particularly including weight loss of approx 10-15% of patient's current body weight over a period of 6-12 months through low fat diet and cardiovascular exercise as tolerated - Referred to weight management as requested  NAFLD Fibrosis Score is:  845    - Ambulatory Referral to Gastroenterology  - Hepatitis A antibody, total; Future  - Hepatitis B core antibody, total; Future  - Hepatitis B surface antibody; Future  - Hepatitis B surface antigen; Future  - CBC and Platelet; Future  - Comprehensive metabolic panel; Future  - Protime-INR; Future  - US abdomen complete; Future  - US elastography; Future  - Ambulatory Referral to Weight Management; Future    7  Incontinence of feces, unspecified fecal incontinence type  Patient continues with alternating bowel habits and fecal incontinence approx 2-3x per month  Patient has not been seen by colorectal surgery for anorectal manometry as previously recommended   Given patient aforementioned and ongoing GI-conditions, recommended conservative management with a trial of a daily fiber supplement such as Metamucil/Benefiber  If patient does not notice more regular bowel habits and less fecal incontinence, would recommend pelvic floor PT prior to anorectal manometry  Follow-up after procedure to discuss results  ______________________________________________________________________    SUBJECTIVE: Patient is a 46 y o  female with PMH significant for anxiety and depression, thyroidism, central lobar emphysema, glucose intolerance, hypertension, hyperlipidemia, vitamin D deficiency, GERD and tobacco usewho presents today for follow-up regarding for multiple GI-related complaints  Patient has not been seen by [facility] in approximately 1 year  Previously seen by Meliton Tatum PA-C for multiple GI related complaints including alternating bowel habits with occasional fecal incontinence, GERD with epigastric pain in the setting of NSAID use, and a personal history of colonic polyps requiring repeat colonoscopy due to poor prep  She has since completed her EGD/colonoscopy as outlined below  Additionally, she has been having slowly downtrend hemoglobin since July 2022  States that she was hospitalized on December 11, 12th, 13th in [place] while visiting her mother for the holidays for a "bleeding ulcer"  Documentation of her hospital stay is unavailable for review, although record release have been sent  States her hemoglobin was between 4-5 on admission, requiring multiple transfusions  States she had an upper endoscopy which was notable for a bleeding ulcer, but unable to identify where her ulcer was  States she was on pantoprazole 40 mg twice daily prior to developing an ulcer, and was also discharged on pantoprazole 40 mg twice daily with the addition of Carafate twice daily  Her most recent labs on 1/16 was notable for hemoglobin of 8 2 with a ferritin of 6  PCP initiated oral iron supplementation  When asked if she noticed black stools she states, "I do not like my stools"   Denies EtOH or NSAID use  Admits to tobacco use, smoking approximately 1 to 2 packs/day x 30 years  She was also noted to have hepatosplenomegaly and diffuse hepatic steatosis on CT from 6/20/2022  Also with thrombocytopenia  Denies excessive EtOH use, personal or family history of autoimmune disorders, or known infection with viral hepatitis  In regards to metabolic risk factors, patient's BMI 30 06 with glucose intolerance, hypertension, and hyperlipidemia  EGD 2/28/22 notable for moderate erythematous mucosa in the body of the stomach and antrum and possible C2 M2 Albrecht's esophagus  Biopsies negative for H  pylori and Albrecht's esophagus  Colonoscopy 2/28/22 notable for 1 sessile adenomatous appearing polyp (<5mm) 2 pedunculated edematous appearing polyps (10mm+)  Biopsies notable for tubular adenomas  Recommended repeat colonoscopy x3 years  REVIEW OF SYSTEMS IS OTHERWISE NEGATIVE        Historical Information   Past Medical History:   Diagnosis Date   • Allergic sinusitis     last assessed - 03Apr2017   • Anxiety     last assessed - [id number]   • Arthritis    • Asthma     last assessed - 09LNE7140   • Bilateral lower extremity edema     last assessed - 29QXP2310   • Callus of foot     last assessed - 22Jun2016   • Chronic GERD     last assessed - 02EUU2065   • Colon polyp    • Constipation     Resolved - 22MEQ6959   • COPD (chronic obstructive pulmonary disease) ([facility] )    • Depression     last assessed - 20XXQ3494   • Disease of thyroid gland    • Diverticulitis of colon     last assessed - 27MRF6754   • Dyspepsia     Resolved - 40SAG6068   • Esophageal reflux     last assessed - 35BHB8710   • Essential hypertriglyceridemia     last assessed - 46MAZ2282   • Gastroesophageal reflux disease with esophagitis 11/18/2016   • GERD (gastroesophageal reflux disease)    • Gynecological disorder     last assessed - 10MMS9307   • Hypertension     last assessed - 54DQU5854   • Hypokalemia 6/20/2022   • Hypotension     last assessed - 80Cvz0517   • Kidney stone    • Migraine    • Morbid obesity ([address] ) 1/11/2019    Formatting of this note might be different from the original  Added automatically from request for surgery [id number]   • Neuropathy    • Positive depression screening 6/19/2022   • Primary hypertriglyceridemia 6/19/2022   • Pulmonary emphysema ([address] )     last assessed - 96QHD1971   • Seasonal allergies    • Urinary frequency     last assessed - 22Jun2016   • Vagina, candidiasis     last assessed - 22Jun2016   • Visual impairment      Past Surgical History:   Procedure Laterality Date   • CARPAL TUNNEL RELEASE      last assessed - [id number]   • CHOLECYSTECTOMY      last assessed - 91UHE5991   • COLONOSCOPY      Complete colonoscopy    • EYE SURGERY      last assessed - 03UDP4304   • FL RETROGRADE PYELOGRAM  6/16/2022   • FL RETROGRADE PYELOGRAM  8/16/2022   • FOOT SURGERY      last assessed - 65MTO0972   • PA CYSTO BLADDER W/URETERAL CATHETERIZATION Left 7/21/2022    Procedure: CYSTOSCOPY RETROGRADE PYELOGRAM WITH INSERTION STENT URETERAL;  Surgeon: Reese Godinez MD;  Location: [facility];  Service: Urology   • PA CYSTO/URETERO W/LITHOTRIPSY &INDWELL STENT INSRT Left 6/16/2022    Procedure: CYSTOSCOPY URETEROSCOPY WITH LITHOTRIPSY HOLMIUM LASER, RETROGRADE PYELOGRAM AND INSERTION STENT URETERAL;  Surgeon: Young Smith MD;  Location: [facility];  Service: Urology   • PA CYSTO/URETERO W/LITHOTRIPSY &INDWELL STENT INSRT Left 8/16/2022    Procedure: CYSTOSCOPY USCOPE W/HOLMIUM LASER, RETROGRADE PYELOGRAM&STENT;  Surgeon: Natalie Albarado MD;  Location: [facility];  Service: Urology   • PA ESOPHAGOGASTRODUODENOSCOPY TRANSORAL DIAGNOSTIC N/A 2/13/2017    Procedure: ESOPHAGOGASTRODUODENOSCOPY (EGD); Surgeon: Rupesh Foster MD;  Location: [facility];   Service: Gastroenterology     Social History   Social History     Substance and Sexual Activity   Alcohol Use Not Currently    Comment: wine twice a year     Social History     Substance and Sexual Activity   Drug Use Never     Social History     Tobacco Use   Smoking Status Former   • Packs/day: 1 50   • Years: 30 00   • Pack years: 45 00   • Types: Cigarettes   • Start date:    • Quit date: 2022   • Years since quittin 4   Smokeless Tobacco Never   Tobacco Comments    last cig 11 days ago     Family History   Problem Relation Age of Onset   • Thyroid disease Mother    • Cancer Mother 70   • Lung cancer Sister    • Coronary artery disease Sister    • Stroke Sister    • Asthma Sister    • Lung cancer Maternal Grandmother    • Cancer Maternal Grandfather    • Diabetes Maternal Grandfather    • No Known Problems Paternal Grandmother    • No Known Problems Paternal Grandfather    • Hypertension Other    • Lung cancer Other    • Hypertension Other    • Lung cancer Other    • Lung cancer Other    • No Known Problems Maternal Aunt        Meds/Allergies       Current Outpatient Medications:   •  albuterol (2 5 mg/3 mL) 0 083 % nebulizer solution  •  albuterol (PROVENTIL HFA,VENTOLIN HFA) 90 mcg/act inhaler  •  buPROPion (WELLBUTRIN XL) 150 mg 24 hr tablet  •  diazepam (VALIUM) 10 mg tablet  •  ferrous sulfate 324 (65 Fe) mg  •  fluticasone (FLONASE) 50 mcg/act nasal spray  •  gabapentin (NEURONTIN) 100 mg capsule  •  levothyroxine 112 mcg tablet  •  nicotine (NICODERM CQ) 21 mg/24 hr TD 24 hr patch  •  omeprazole (PriLOSEC) 40 MG capsule  •  oxyCODONE-acetaminophen (PERCOCET)  mg per tablet  •  pantoprazole (PROTONIX) 40 mg tablet  •  sertraline (ZOLOFT) 100 mg tablet  •  simvastatin (ZOCOR) 20 mg tablet  •  sucralfate (CARAFATE) 1 g/10 mL suspension  •  tamsulosin (FLOMAX) 0 4 mg  •  tiotropium (Spiriva HandiHaler) 18 mcg inhalation capsule  •  ergocalciferol (VITAMIN D2) 50,000 units  •  Incontinence Supply Disposable (Incontinence Brief Large) MISC    Allergies   Allergen Reactions   • Hydrocodone-Acetaminophen Hives   • Ketorolac Hives and Dizziness   • Toradol [Ketorolac Tromethamine] Other (See Comments)     hypotension   • Ultram [Tramadol] Nausea Only, GI Intolerance and Vomiting           Objective     Blood pressure 120/62, pulse 100, resp  rate 18, height 5' 7" (1 702 m), weight 110 kg (243 lb), last menstrual period 04/05/2018, SpO2 99 %  Body mass index is 38 06 kg/m²  PHYSICAL EXAM:      General Appearance:   Alert, cooperative, no distress   HEENT:   Normocephalic, atraumatic, anicteric  Neck:  Supple, symmetrical, trachea midline   Lungs:   Clear to auscultation bilaterally; no rales, rhonchi or wheezing; respirations unlabored    Heart[de-identified]   Regular rate and rhythm; no murmur, rub, or gallop  Abdomen:   Soft, non-tender, non-distended; normal bowel sounds; no masses, no organomegaly    Genitalia:   Deferred    Rectal:   Deferred    Extremities:  No cyanosis, clubbing or edema    Pulses:  2+ and symmetric    Skin:  No jaundice, rashes, or lesions    Lymph nodes:  No palpable cervical lymphadenopathy        Lab Results:   No visits with results within 1 Day(s) from this visit     Latest known visit with results is:   Appointment on 01/16/2023   Component Date Value   • Vit D, 25-Hydroxy 01/16/2023 70 6    • TSH 3RD GENERATON 01/16/2023 1 470    • Hemoglobin A1C 01/16/2023 5 1    • EAG 01/16/2023 100    • Cholesterol 01/16/2023 124    • Triglycerides 01/16/2023 82    • HDL, Direct 01/16/2023 50    • LDL Calculated 01/16/2023 58    • Sodium 01/16/2023 143    • Potassium 01/16/2023 3 7    • Chloride 01/16/2023 112 (H)    • CO2 01/16/2023 24    • ANION GAP 01/16/2023 7    • BUN 01/16/2023 15    • Creatinine 01/16/2023 0 90    • Glucose, Fasting 01/16/2023 115 (H)    • Calcium 01/16/2023 9 6    • AST 01/16/2023 29    • ALT 01/16/2023 22    • Alkaline Phosphatase 01/16/2023 83    • Total Protein 01/16/2023 7 7    • Albumin 01/16/2023 3 6    • Total Bilirubin 01/16/2023 0 55    • eGFR 01/16/2023 73    • WBC 01/16/2023 6 84    • RBC 01/16/2023 3 39 (L)    • Hemoglobin 01/16/2023 8 2 (L)    • Hematocrit 01/16/2023 30 2 (L)    • MCV 01/16/2023 89    • MCH 01/16/2023 24 2 (L)    • MCHC 01/16/2023 27 2 (L)    • RDW 01/16/2023 17 9 (H)    • MPV 01/16/2023 12 5    • Platelets 71/21/8072 147 (L)    • nRBC 01/16/2023 0    • Neutrophils Relative 01/16/2023 72    • Immat GRANS % 01/16/2023 1    • Lymphocytes Relative 01/16/2023 18    • Monocytes Relative 01/16/2023 8    • Eosinophils Relative 01/16/2023 1    • Basophils Relative 01/16/2023 0    • Neutrophils Absolute 01/16/2023 4 96    • Immature Grans Absolute 01/16/2023 0 04    • Lymphocytes Absolute 01/16/2023 1 20    • Monocytes Absolute 01/16/2023 0 55    • Eosinophils Absolute 01/16/2023 0 06    • Basophils Absolute 01/16/2023 0 03    • Ferritin 01/16/2023 6 (L)          Radiology Results:   No results found

## 2023-01-18 NOTE — TELEPHONE ENCOUNTER
Scheduled date of EGD(as of today):01/25/2023  Physician performing EGD: Horace  Location of EGD: [place]  Instructions reviewed with patient by: Sandra   Clearances: none

## 2023-01-18 NOTE — PROGRESS NOTES
Jaylen Pedro [facility] - Outpatient Follow-up Note  Bushra Cooper 46 y o  female MRN: [id number]  Encounter: [id number]          ASSESSMENT AND PLAN:      1  Gastric ulcer with hemorrhage, unspecified chronicity  2  Iron deficiency anemia, unspecified iron deficiency anemia type  Patient reportedly hospitalized in [place] on December 11, 12, and 13 found to have a hgb b/w 4-5 and an EGD notable for gastric ulcer  Records are unavailable for review, although have been requested  Patient was d/nishant on pantoprazole 40 mg twice daily, which she continues to take  Interestingly, patient was taking pantoprazole 40 mg twice daily prior to her hospitalization  Most recent labs on 1/16 notable for hgb 8 2 and ferritin of 6 for which her PCP has initiated oral iron supplementation  Recommended EGD with gastric biopsies to assess for ulcer healing and r/o infection with H  pylori  Also recommended switching from pantoprazole to omeprazole 40 mg twice daily, given patient developed an ulcer while compliantly taking pantoprazole BID  Strongly advised to avoid any/all NSAID and EtOH use as to not hinder ulcer healing  Also thoroughly counseled patient on the importance of tobacco cessation  Discussed risks of procedure with patient including, but not limited to, bleeding, infection, and perforation; Patient gave verbal understanding and is agreeable to proceed  Discussed and given instructions for EGD    - Ambulatory Referral to Gastroenterology  - EGD; Future  - omeprazole (PriLOSEC) 40 MG capsule; Take 1 capsule (40 mg total) by mouth 2 (two) times a day  Dispense: 60 capsule; Refill: 3    3  Hepatic steatosis  4  Hepatosplenomegaly  5  Thrombocytopenia ([address] )  6  BMI 38 0-38 9,adult  Patient incidentally noted to have diffuse hepatic steatosis and hepatosplenomegaly on imaging from 6/220, although subsequent imaging notes an unremarkable liver and spleen   Hepatic function notable for mild intermittently elevated AST, but otherwise grossly unremarkable  Also with mild thrombocytopenia  No clinical evidence of chronic liver disease or hepatic decompensation  Suspect patient has NAFLD, in the setting of multiple metabolic risk factors, with some degree of advanced fibrosis  Recommended serologies to assess synthetic function and serologies to screen for viral hepatitis and immunity to hepatitis A and B  Previous hep C Ab negative  Patient will also complete an abdominal U/S with elastography to better quantify steatosis and help assess for advanced fibrosis - If F3/F4 fibrosis, will discuss additional management in follow-up  Discussed the pathophysiology of fatty liver disease, and patient is aware of the potential to progress to cirrhosis over time if left untreated  Discussed recommendations in regards to fatty liver including, particularly including weight loss of approx 10-15% of patient's current body weight over a period of 6-12 months through low fat diet and cardiovascular exercise as tolerated - Referred to weight management as requested  NAFLD Fibrosis Score is:  845    - Ambulatory Referral to Gastroenterology  - Hepatitis A antibody, total; Future  - Hepatitis B core antibody, total; Future  - Hepatitis B surface antibody; Future  - Hepatitis B surface antigen; Future  - CBC and Platelet; Future  - Comprehensive metabolic panel; Future  - Protime-INR; Future  - US abdomen complete; Future  - US elastography; Future  - Ambulatory Referral to Weight Management; Future    7  Incontinence of feces, unspecified fecal incontinence type  Patient continues with alternating bowel habits and fecal incontinence approx 2-3x per month  Patient has not been seen by colorectal surgery for anorectal manometry as previously recommended   Given patient aforementioned and ongoing GI-conditions, recommended conservative management with a trial of a daily fiber supplement such as Metamucil/Benefiber  If patient does not notice more regular bowel habits and less fecal incontinence, would recommend pelvic floor PT prior to anorectal manometry  Follow-up after procedure to discuss results  ______________________________________________________________________    SUBJECTIVE: Patient is a 46 y o  female with PMH significant for anxiety and depression, thyroidism, central lobar emphysema, glucose intolerance, hypertension, hyperlipidemia, vitamin D deficiency, GERD and tobacco usewho presents today for follow-up regarding for multiple GI-related complaints  Patient has not been seen by [facility] in approximately 1 year  Previously seen by [place] PA-C for multiple GI related complaints including alternating bowel habits with occasional fecal incontinence, GERD with epigastric pain in the setting of NSAID use, and a personal history of colonic polyps requiring repeat colonoscopy due to poor prep  She has since completed her EGD/colonoscopy as outlined below  Additionally, she has been having slowly downtrend hemoglobin since July 2022  States that she was hospitalized on December 11, 12th, 13th in [place] while visiting her mother for the holidays for a "bleeding ulcer"  Documentation of her hospital stay is unavailable for review, although record release have been sent  States her hemoglobin was between 4-5 on admission, requiring multiple transfusions  States she had an upper endoscopy which was notable for a bleeding ulcer, but unable to identify where her ulcer was  States she was on pantoprazole 40 mg twice daily prior to developing an ulcer, and was also discharged on pantoprazole 40 mg twice daily with the addition of Carafate twice daily  Her most recent labs on 1/16 was notable for hemoglobin of 8 2 with a ferritin of 6  PCP initiated oral iron supplementation  When asked if she noticed black stools she states, "I do not like my stools"   Denies EtOH or NSAID use  Admits to tobacco use, smoking approximately 1 to 2 packs/day x 30 years  She was also noted to have hepatosplenomegaly and diffuse hepatic steatosis on CT from 6/20/2022  Also with thrombocytopenia  Denies excessive EtOH use, personal or family history of autoimmune disorders, or known infection with viral hepatitis  In regards to metabolic risk factors, patient's BMI 30 06 with glucose intolerance, hypertension, and hyperlipidemia  EGD 2/28/22 notable for moderate erythematous mucosa in the body of the stomach and antrum and possible C2 M2 Albrecht's esophagus  Biopsies negative for H  pylori and Albrecht's esophagus  Colonoscopy 2/28/22 notable for 1 sessile adenomatous appearing polyp (<5mm) 2 pedunculated edematous appearing polyps (10mm+)  Biopsies notable for tubular adenomas  Recommended repeat colonoscopy x3 years  REVIEW OF SYSTEMS IS OTHERWISE NEGATIVE        Historical Information   Past Medical History:   Diagnosis Date   • Allergic sinusitis     last assessed - 03Apr2017   • Anxiety     last assessed - 03NDJ2422   • Arthritis    • Asthma     last assessed - 13DYK9158   • Bilateral lower extremity edema     last assessed - 32CTS1080   • Callus of foot     last assessed - 22Jun2016   • Chronic GERD     last assessed - 67VNZ9333   • Colon polyp    • Constipation     Resolved - 94IKC9190   • COPD (chronic obstructive pulmonary disease) ([facility] )    • Depression     last assessed - 25SSE7145   • Disease of thyroid gland    • Diverticulitis of colon     last assessed - 71RDW3945   • Dyspepsia     Resolved - 27KYN9802   • Esophageal reflux     last assessed - 34BFX8047   • Essential hypertriglyceridemia     last assessed - 63YIK7912   • Gastroesophageal reflux disease with esophagitis 11/18/2016   • GERD (gastroesophageal reflux disease)    • Gynecological disorder     last assessed - 77EOW1275   • Hypertension     last assessed - 07OAX4180   • Hypokalemia 6/20/2022   • Hypotension     last assessed - 75Hik8143   • Kidney stone    • Migraine    • Morbid obesity ([address] ) 1/11/2019    Formatting of this note might be different from the original  Added automatically from request for surgery [id number]   • Neuropathy    • Positive depression screening 6/19/2022   • Primary hypertriglyceridemia 6/19/2022   • Pulmonary emphysema ([address] )     last assessed - 80AGP5234   • Seasonal allergies    • Urinary frequency     last assessed - 22Jun2016   • Vagina, candidiasis     last assessed - 22Jun2016   • Visual impairment      Past Surgical History:   Procedure Laterality Date   • CARPAL TUNNEL RELEASE      last assessed - 67TKS0622   • CHOLECYSTECTOMY      last assessed - 23AGW6369   • COLONOSCOPY      Complete colonoscopy    • EYE SURGERY      last assessed - 51CFE6847   • FL RETROGRADE PYELOGRAM  6/16/2022   • FL RETROGRADE PYELOGRAM  8/16/2022   • FOOT SURGERY      last assessed - 46ALL1211   • WI CYSTO BLADDER W/URETERAL CATHETERIZATION Left 7/21/2022    Procedure: CYSTOSCOPY RETROGRADE PYELOGRAM WITH INSERTION STENT URETERAL;  Surgeon: Shereen Hill MD;  Location: [facility];  Service: Urology   • WI CYSTO/URETERO W/LITHOTRIPSY &INDWELL STENT INSRT Left 6/16/2022    Procedure: CYSTOSCOPY URETEROSCOPY WITH LITHOTRIPSY HOLMIUM LASER, RETROGRADE PYELOGRAM AND INSERTION STENT URETERAL;  Surgeon: Adry Huizar MD;  Location: [facility];  Service: Urology   • WI CYSTO/URETERO W/LITHOTRIPSY &INDWELL STENT INSRT Left 8/16/2022    Procedure: CYSTOSCOPY USCOPE W/HOLMIUM LASER, RETROGRADE PYELOGRAM&STENT;  Surgeon: Goldie Vazquez MD;  Location: [facility];  Service: Urology   • WI ESOPHAGOGASTRODUODENOSCOPY TRANSORAL DIAGNOSTIC N/A 2/13/2017    Procedure: ESOPHAGOGASTRODUODENOSCOPY (EGD); Surgeon: Gale Friedman MD;  Location: [facility];   Service: Gastroenterology     Social History   Social History     Substance and Sexual Activity   Alcohol Use Not Currently    Comment: wine twice a year     Social History     Substance and Sexual Activity   Drug Use Never     Social History     Tobacco Use   Smoking Status Former   • Packs/day: 1 50   • Years: 30 00   • Pack years: 45 00   • Types: Cigarettes   • Start date:    • Quit date: 2022   • Years since quittin 4   Smokeless Tobacco Never   Tobacco Comments    last cig 11 days ago     Family History   Problem Relation Age of Onset   • Thyroid disease Mother    • Cancer Mother 70   • Lung cancer Sister    • Coronary artery disease Sister    • Stroke Sister    • Asthma Sister    • Lung cancer Maternal Grandmother    • Cancer Maternal Grandfather    • Diabetes Maternal Grandfather    • No Known Problems Paternal Grandmother    • No Known Problems Paternal Grandfather    • Hypertension Other    • Lung cancer Other    • Hypertension Other    • Lung cancer Other    • Lung cancer Other    • No Known Problems Maternal Aunt        Meds/Allergies       Current Outpatient Medications:   •  albuterol (2 5 mg/3 mL) 0 083 % nebulizer solution  •  albuterol (PROVENTIL HFA,VENTOLIN HFA) 90 mcg/act inhaler  •  buPROPion (WELLBUTRIN XL) 150 mg 24 hr tablet  •  diazepam (VALIUM) 10 mg tablet  •  ferrous sulfate 324 (65 Fe) mg  •  fluticasone (FLONASE) 50 mcg/act nasal spray  •  gabapentin (NEURONTIN) 100 mg capsule  •  levothyroxine 112 mcg tablet  •  nicotine (NICODERM CQ) 21 mg/24 hr TD 24 hr patch  •  omeprazole (PriLOSEC) 40 MG capsule  •  oxyCODONE-acetaminophen (PERCOCET)  mg per tablet  •  pantoprazole (PROTONIX) 40 mg tablet  •  sertraline (ZOLOFT) 100 mg tablet  •  simvastatin (ZOCOR) 20 mg tablet  •  sucralfate (CARAFATE) 1 g/10 mL suspension  •  tamsulosin (FLOMAX) 0 4 mg  •  tiotropium (Spiriva HandiHaler) 18 mcg inhalation capsule  •  ergocalciferol (VITAMIN D2) 50,000 units  •  Incontinence Supply Disposable (Incontinence Brief Large) MISC    Allergies   Allergen Reactions   • Hydrocodone-Acetaminophen Hives   • Ketorolac Hives and Dizziness   • Toradol [Ketorolac Tromethamine] Other (See Comments)     hypotension   • Ultram [Tramadol] Nausea Only, GI Intolerance and Vomiting           Objective     Blood pressure 120/62, pulse 100, resp  rate 18, height 5' 7" (1 702 m), weight 110 kg (243 lb), last menstrual period 04/05/2018, SpO2 99 %  Body mass index is 38 06 kg/m²  PHYSICAL EXAM:      General Appearance:   Alert, cooperative, no distress   HEENT:   Normocephalic, atraumatic, anicteric  Neck:  Supple, symmetrical, trachea midline   Lungs:   Clear to auscultation bilaterally; no rales, rhonchi or wheezing; respirations unlabored    Heart[de-identified]   Regular rate and rhythm; no murmur, rub, or gallop  Abdomen:   Soft, non-tender, non-distended; normal bowel sounds; no masses, no organomegaly    Genitalia:   Deferred    Rectal:   Deferred    Extremities:  No cyanosis, clubbing or edema    Pulses:  2+ and symmetric    Skin:  No jaundice, rashes, or lesions    Lymph nodes:  No palpable cervical lymphadenopathy        Lab Results:   No visits with results within 1 Day(s) from this visit     Latest known visit with results is:   Appointment on 01/16/2023   Component Date Value   • Vit D, 25-Hydroxy 01/16/2023 70 6    • TSH 3RD GENERATON 01/16/2023 1 470    • Hemoglobin A1C 01/16/2023 5 1    • EAG 01/16/2023 100    • Cholesterol 01/16/2023 124    • Triglycerides 01/16/2023 82    • HDL, Direct 01/16/2023 50    • LDL Calculated 01/16/2023 58    • Sodium 01/16/2023 143    • Potassium 01/16/2023 3 7    • Chloride 01/16/2023 112 (H)    • CO2 01/16/2023 24    • ANION GAP 01/16/2023 7    • BUN 01/16/2023 15    • Creatinine 01/16/2023 0 90    • Glucose, Fasting 01/16/2023 115 (H)    • Calcium 01/16/2023 9 6    • AST 01/16/2023 29    • ALT 01/16/2023 22    • Alkaline Phosphatase 01/16/2023 83    • Total Protein 01/16/2023 7 7    • Albumin 01/16/2023 3 6    • Total Bilirubin 01/16/2023 0 55    • eGFR 01/16/2023 73    • WBC 01/16/2023 6 84    • RBC 01/16/2023 3 39 (L)    • Hemoglobin 01/16/2023 8 2 (L)    • Hematocrit 01/16/2023 30 2 (L)    • MCV 01/16/2023 89    • MCH 01/16/2023 24 2 (L)    • MCHC 01/16/2023 27 2 (L)    • RDW 01/16/2023 17 9 (H)    • MPV 01/16/2023 12 5    • Platelets 02/89/0399 147 (L)    • nRBC 01/16/2023 0    • Neutrophils Relative 01/16/2023 72    • Immat GRANS % 01/16/2023 1    • Lymphocytes Relative 01/16/2023 18    • Monocytes Relative 01/16/2023 8    • Eosinophils Relative 01/16/2023 1    • Basophils Relative 01/16/2023 0    • Neutrophils Absolute 01/16/2023 4 96    • Immature Grans Absolute 01/16/2023 0 04    • Lymphocytes Absolute 01/16/2023 1 20    • Monocytes Absolute 01/16/2023 0 55    • Eosinophils Absolute 01/16/2023 0 06    • Basophils Absolute 01/16/2023 0 03    • Ferritin 01/16/2023 6 (L)          Radiology Results:   No results found

## 2023-01-20 ENCOUNTER — HOSPITAL ENCOUNTER (OUTPATIENT)
Dept: RADIOLOGY | Facility: IMAGING CENTER | Age: 53
End: 2023-01-20

## 2023-01-20 LAB
IRON SATN MFR SERPL: 3 % (ref 15–50)
IRON SERPL-MCNC: 15 UG/DL (ref 50–170)
TIBC SERPL-MCNC: 481 UG/DL (ref 250–450)

## 2023-01-21 ENCOUNTER — HOSPITAL ENCOUNTER (OUTPATIENT)
Dept: CT IMAGING | Facility: HOSPITAL | Age: 53
Discharge: HOME | End: 2023-01-21

## 2023-01-21 DIAGNOSIS — Z72.0 TOBACCO ABUSE DISORDER: ICD-10-CM

## 2023-01-25 ENCOUNTER — HOSPITAL ENCOUNTER (OUTPATIENT)
Dept: GASTROENTEROLOGY | Facility: HOSPITAL | Age: 53
Setting detail: OUTPATIENT SURGERY
Discharge: HOME | End: 2023-01-25
Admitting: INTERNAL MEDICINE

## 2023-01-25 ENCOUNTER — ANESTHESIA EVENT (OUTPATIENT)
Dept: GASTROENTEROLOGY | Facility: HOSPITAL | Age: 53
End: 2023-01-25

## 2023-01-25 ENCOUNTER — TELEPHONE (OUTPATIENT)
Dept: INTERNAL MEDICINE CLINIC | Facility: CLINIC | Age: 53
End: 2023-01-25

## 2023-01-25 ENCOUNTER — TELEPHONE (OUTPATIENT)
Dept: INTERNAL MEDICINE CLINIC | Facility: OTHER | Age: 53
End: 2023-01-25

## 2023-01-25 ENCOUNTER — ANESTHESIA (OUTPATIENT)
Dept: GASTROENTEROLOGY | Facility: HOSPITAL | Age: 53
End: 2023-01-25

## 2023-01-25 ENCOUNTER — TELEPHONE (OUTPATIENT)
Dept: UROLOGY | Facility: AMBULATORY SURGERY CENTER | Age: 53
End: 2023-01-25

## 2023-01-25 VITALS
HEIGHT: 67 IN | OXYGEN SATURATION: 100 % | DIASTOLIC BLOOD PRESSURE: 63 MMHG | RESPIRATION RATE: 18 BRPM | TEMPERATURE: 97.2 F | HEART RATE: 89 BPM | WEIGHT: 243 LBS | BODY MASS INDEX: 38.14 KG/M2 | SYSTOLIC BLOOD PRESSURE: 135 MMHG

## 2023-01-25 DIAGNOSIS — K25.4 GASTRIC ULCER WITH HEMORRHAGE, UNSPECIFIED CHRONICITY: ICD-10-CM

## 2023-01-25 DIAGNOSIS — R16.2 HEPATOSPLENOMEGALY: Primary | ICD-10-CM

## 2023-01-25 DIAGNOSIS — N20.0 NEPHROLITHIASIS: Primary | ICD-10-CM

## 2023-01-25 DIAGNOSIS — D50.9 IRON DEFICIENCY ANEMIA, UNSPECIFIED IRON DEFICIENCY ANEMIA TYPE: ICD-10-CM

## 2023-01-25 RX ORDER — LIDOCAINE HYDROCHLORIDE 20 MG/ML
SOLUTION OROPHARYNGEAL AS NEEDED
Status: COMPLETED | OUTPATIENT
Start: 2023-01-25 | End: 2023-01-25

## 2023-01-25 RX ORDER — SODIUM CHLORIDE, SODIUM LACTATE, POTASSIUM CHLORIDE, CALCIUM CHLORIDE 600; 310; 30; 20 MG/100ML; MG/100ML; MG/100ML; MG/100ML
100 INJECTION, SOLUTION INTRAVENOUS CONTINUOUS
Status: DISCONTINUED | OUTPATIENT
Start: 2023-01-25 | End: 2023-01-29 | Stop reason: HOSPADM

## 2023-01-25 RX ORDER — LIDOCAINE HYDROCHLORIDE 20 MG/ML
SOLUTION OROPHARYNGEAL
Status: DISPENSED
Start: 2023-01-25 | End: 2023-01-26

## 2023-01-25 RX ORDER — PROPOFOL 10 MG/ML
INJECTION, EMULSION INTRAVENOUS AS NEEDED
Status: DISCONTINUED | OUTPATIENT
Start: 2023-01-25 | End: 2023-01-25

## 2023-01-25 RX ORDER — LIDOCAINE HYDROCHLORIDE 20 MG/ML
INJECTION, SOLUTION EPIDURAL; INFILTRATION; INTRACAUDAL; PERINEURAL AS NEEDED
Status: DISCONTINUED | OUTPATIENT
Start: 2023-01-25 | End: 2023-01-25

## 2023-01-25 RX ADMIN — SODIUM CHLORIDE, SODIUM LACTATE, POTASSIUM CHLORIDE, AND CALCIUM CHLORIDE 100 ML/HR: .6; .31; .03; .02 INJECTION, SOLUTION INTRAVENOUS at 11:45

## 2023-01-25 RX ADMIN — LIDOCAINE HYDROCHLORIDE 15 ML: 20 SOLUTION ORAL; TOPICAL at 13:55

## 2023-01-25 RX ADMIN — SODIUM CHLORIDE, SODIUM LACTATE, POTASSIUM CHLORIDE, AND CALCIUM CHLORIDE: .6; .31; .03; .02 INJECTION, SOLUTION INTRAVENOUS at 13:34

## 2023-01-25 RX ADMIN — PROPOFOL 100 MG: 10 INJECTION, EMULSION INTRAVENOUS at 14:03

## 2023-01-25 RX ADMIN — PROPOFOL 100 MG: 10 INJECTION, EMULSION INTRAVENOUS at 14:11

## 2023-01-25 RX ADMIN — LIDOCAINE HYDROCHLORIDE 100 MG: 20 INJECTION, SOLUTION EPIDURAL; INFILTRATION; INTRACAUDAL; PERINEURAL at 14:03

## 2023-01-25 NOTE — TELEPHONE ENCOUNTER
Called patient to inform her about continued left upper lobe groundglass opacity 1 2 cm  As per report it appears to have a benign appearance and a low-density CT in 12 months is recommended  She also has splenomegaly 15 4 cm which is unchanged since July 2022 however is new since 2018  She does have hepatomegaly assumed to be secondary to fatty liver  Is scheduled for elastography  Splenomegaly is perhaps secondary to above  Recommend referral to hepatology  Patient has an appointment today for EGD  Will send a message to Dr Vu

## 2023-01-25 NOTE — INTERVAL H&P NOTE
H&P reviewed  After examining the patient I find no changes in the patients condition since the H&P had been written  Patient reports that she was hospitalized in [place] as outlined in [place] note below  She went to the hospital as she was feeling extremely fatigued and weak  In retrospect she was passing some black stools but she did not pay much attention to it  She was also taking some NSAIDs  She states she was diagnosed with a bleeding ulcer that did require therapy  Unfortunately I still could not locate these records in the chart  She is now on omeprazole 40 mg twice a day previously was on pantoprazole 40 mg twice a day  She continues to feel weak and tired  Her last hemoglobin is up to 8 2 does have a mildly low platelet count  Recent imaging of her chest did reveal left upper lung abnormality  Also significant splenomegaly that is new since 2018 but stable since July 2022  She does not smoke tobacco does not drink alcohol  She is having normal formed stools a couple times a day  Occasional skip a day  Occasionally she will have diarrhea  She is stable for today's endoscopy      Vitals:    01/25/23 1138   BP: 134/62   Pulse: 94   Resp: 18   Temp: (!) 96 6 °F (35 9 °C)   SpO2: 98%

## 2023-01-25 NOTE — TELEPHONE ENCOUNTER
----- Message from [id number] Drew Saldaña sent at 1/25/2023  3:16 PM EST -----  Received results of renal ultrasound which notes a 6mm shadow in right kidney possibly indicating a calculus  Please ask patient if she is symptomatic

## 2023-01-25 NOTE — ANESTHESIA PREPROCEDURE EVALUATION
Procedure:  EGD    Relevant Problems   CARDIO   (+) Benign hypertension   (+) Chest pain on breathing   (+) Hypercholesterolemia      ENDO   (+) Hypothyroidism      GI/HEPATIC   (+) Acute duodenal ulcer with bleeding   (+) Fatty liver   (+) Gastroesophageal reflux disease      /RENAL   (+) Hydroureteronephrosis   (+) Left ureteral stone with hydronephrosis   (+) Nephrolithiasis      HEMATOLOGY   (+) Iron deficiency anemia secondary to inadequate dietary iron intake      MUSCULOSKELETAL   (+) Degeneration of lumbar or lumbosacral intervertebral disc      NEURO/PSYCH   (+) Anxiety   (+) Depression   (+) Intermittent claudication (HCC)      PULMONARY   (+) Centrilobular emphysema (HCC)   (+) Very heavy cigarette smoker        Physical Exam    Airway    Mallampati score: II  TM Distance: >3 FB  Neck ROM: full     Dental   upper dentures and lower dentures,     Cardiovascular      Pulmonary      Other Findings        Anesthesia Plan  ASA Score- 3     Anesthesia Type- IV sedation with anesthesia with ASA Monitors  Additional Monitors:   Airway Plan:     Comment: 2018 stress normal        Plan Factors-Exercise tolerance (METS): >4 METS  Chart reviewed  Patient is not a current smoker  Patient did not smoke on day of surgery  Obstructive sleep apnea risk education given perioperatively  Induction- intravenous  Postoperative Plan-     Informed Consent- Anesthetic plan and risks discussed with patient  I personally reviewed this patient with the CRNA  Discussed and agreed on the Anesthesia Plan with the CRNA           NPO appropriate  Discussed benefits/risks of monitored anesthetic care and discussed providing a dynamic level of mild to deep sedation  Risks include awareness, airway obstruction, aspiration which may necessitate conversion to general anesthesia  All questions answered  Patient understands and wishes to proceed      Anesthesia plan and consent discussed with Chely who expressed understanding and agreement  Risks/benefits and alternatives discussed with patient including possible PONV, sore throat, damage to teeth/lips/gums and possibility of rare anesthetic and surgical emergencies

## 2023-01-25 NOTE — ANESTHESIA POSTPROCEDURE EVALUATION
Post-Op Assessment Note    CV Status:  Stable  Pain Score: 0    Pain management: adequate     Mental Status:  Awake   Hydration Status:  Euvolemic   PONV Controlled:  Controlled   Airway Patency:  Patent      Post Op Vitals Reviewed: Yes      Staff: CRNA         No notable events documented      BP   108/61   Temp     Pulse  78   Resp   12   SpO2   99

## 2023-01-25 NOTE — TELEPHONE ENCOUNTER
CC: ED    Aldo Munson Please call patient back to assess symptoms  I spoke to her but she had an outpatient procedure today with sedation for her stomach, therefore her answers would not be appropriate to accept in response  Per Tanesha, CRNP 6 mm stone was found on ultrasound and we are to assess her symptoms    08/29/2019    PSA 0.25 04/19/2018    PSA 0.19 04/20/2017    PSA 0.48 08/26/2016    PSA 0.12 04/26/2013    PSA 0.18 09/13/2011    PSA 0.14 07/15/2010    PSA 0.14 01/23/2009    PSA 0.2 03/05/2007    PSADIAG 0.18 02/12/2016    PSADIAG 2.3 05/20/2015    PSATOTAL 0.20 07/25/2014    PSAFREE 0.09 07/25/2014    PSAFREEPCT 45.00 07/25/08/29/2019    CREATININE 0.9 11/28/2018    CREATININE 0.8 06/11/

## 2023-01-26 NOTE — TELEPHONE ENCOUNTER
Recommend patient proceed with CT renal stone study to further evaluate for obstructing calculus  Would also recommend scheduling office follow up, as she has not been seen since surgery in August 2022

## 2023-01-26 NOTE — TELEPHONE ENCOUNTER
Called and spoke with patient  Advised on AP's note, she states she is having back pain bilaterally that ranges from 5-9 on a 0-10 scale  Describes it as a constant, aching pain  She has had chills and nausea  No fevers or gross hematuria

## 2023-01-26 NOTE — TELEPHONE ENCOUNTER
Called Chely and notified her that we are ordering a CT scan renal stone study - gave her number [phone] - to scheduled    Once she has a date she is to call back so we can set up a follow up appointment

## 2023-01-27 ENCOUNTER — HOSPITAL ENCOUNTER (OUTPATIENT)
Dept: ULTRASOUND IMAGING | Facility: HOSPITAL | Age: 53
End: 2023-01-27

## 2023-01-27 ENCOUNTER — APPOINTMENT (OUTPATIENT)
Dept: LAB | Facility: HOSPITAL | Age: 53
End: 2023-01-27

## 2023-01-27 DIAGNOSIS — K76.0 HEPATIC STEATOSIS: ICD-10-CM

## 2023-01-27 DIAGNOSIS — D50.8 IRON DEFICIENCY ANEMIA SECONDARY TO INADEQUATE DIETARY IRON INTAKE: ICD-10-CM

## 2023-01-27 DIAGNOSIS — D69.6 THROMBOCYTOPENIA (HCC): ICD-10-CM

## 2023-01-27 DIAGNOSIS — R16.2 HEPATOSPLENOMEGALY: ICD-10-CM

## 2023-01-27 DIAGNOSIS — E78.00 HYPERCHOLESTEROLEMIA: ICD-10-CM

## 2023-01-27 DIAGNOSIS — E74.39 GLUCOSE INTOLERANCE: ICD-10-CM

## 2023-01-27 DIAGNOSIS — E03.9 ACQUIRED HYPOTHYROIDISM: ICD-10-CM

## 2023-01-27 LAB
ALBUMIN SERPL BCP-MCNC: 4.2 G/DL (ref 3.5–5)
ALP SERPL-CCNC: 80 U/L (ref 34–104)
ALT SERPL W P-5'-P-CCNC: 16 U/L (ref 7–52)
ANION GAP SERPL CALCULATED.3IONS-SCNC: 9 MMOL/L (ref 4–13)
AST SERPL W P-5'-P-CCNC: 27 U/L (ref 13–39)
BASOPHILS # BLD AUTO: 0.03 THOUSANDS/ÂΜL (ref 0–0.1)
BASOPHILS NFR BLD AUTO: 0 % (ref 0–1)
BILIRUB SERPL-MCNC: 0.71 MG/DL (ref 0.2–1)
BUN SERPL-MCNC: 16 MG/DL (ref 5–25)
CALCIUM SERPL-MCNC: 9.1 MG/DL (ref 8.4–10.2)
CHLORIDE SERPL-SCNC: 107 MMOL/L (ref 96–108)
CHOLEST SERPL-MCNC: 126 MG/DL
CO2 SERPL-SCNC: 24 MMOL/L (ref 21–32)
CREAT SERPL-MCNC: 0.76 MG/DL (ref 0.6–1.3)
EOSINOPHIL # BLD AUTO: 0.12 THOUSAND/ÂΜL (ref 0–0.61)
EOSINOPHIL NFR BLD AUTO: 2 % (ref 0–6)
ERYTHROCYTE [DISTWIDTH] IN BLOOD BY AUTOMATED COUNT: 18.3 % (ref 11.6–15.1)
FERRITIN SERPL-MCNC: 5 NG/ML (ref 8–388)
GFR SERPL CREATININE-BSD FRML MDRD: 90 ML/MIN/1.73SQ M
GLUCOSE P FAST SERPL-MCNC: 136 MG/DL (ref 65–99)
HCT VFR BLD AUTO: 33.2 % (ref 34.8–46.1)
HDLC SERPL-MCNC: 48 MG/DL
HGB BLD-MCNC: 9 G/DL (ref 11.5–15.4)
IMM GRANULOCYTES # BLD AUTO: 0.05 THOUSAND/UL (ref 0–0.2)
IMM GRANULOCYTES NFR BLD AUTO: 1 % (ref 0–2)
INR PPP: 1.1 (ref 0.84–1.19)
IRON SATN MFR SERPL: 6 % (ref 15–50)
IRON SERPL-MCNC: 32 UG/DL (ref 50–170)
LDLC SERPL CALC-MCNC: 60 MG/DL (ref 0–100)
LYMPHOCYTES # BLD AUTO: 1.38 THOUSANDS/ÂΜL (ref 0.6–4.47)
LYMPHOCYTES NFR BLD AUTO: 18 % (ref 14–44)
MCH RBC QN AUTO: 23.4 PG (ref 26.8–34.3)
MCHC RBC AUTO-ENTMCNC: 27.1 G/DL (ref 31.4–37.4)
MCV RBC AUTO: 87 FL (ref 82–98)
MONOCYTES # BLD AUTO: 0.53 THOUSAND/ÂΜL (ref 0.17–1.22)
MONOCYTES NFR BLD AUTO: 7 % (ref 4–12)
NEUTROPHILS # BLD AUTO: 5.62 THOUSANDS/ÂΜL (ref 1.85–7.62)
NEUTS SEG NFR BLD AUTO: 72 % (ref 43–75)
NRBC BLD AUTO-RTO: 0 /100 WBCS
PLATELET # BLD AUTO: 180 THOUSANDS/UL (ref 149–390)
PMV BLD AUTO: 11.3 FL (ref 8.9–12.7)
POTASSIUM SERPL-SCNC: 4.2 MMOL/L (ref 3.5–5.3)
PROT SERPL-MCNC: 7.6 G/DL (ref 6.4–8.4)
PROTHROMBIN TIME: 14.2 SECONDS (ref 11.6–14.5)
RBC # BLD AUTO: 3.84 MILLION/UL (ref 3.81–5.12)
SODIUM SERPL-SCNC: 140 MMOL/L (ref 135–147)
TIBC SERPL-MCNC: 494 UG/DL (ref 250–450)
TRIGL SERPL-MCNC: 91 MG/DL
TSH SERPL DL<=0.05 MIU/L-ACNC: 4.14 UIU/ML (ref 0.45–4.5)
WBC # BLD AUTO: 7.73 THOUSAND/UL (ref 4.31–10.16)

## 2023-01-28 LAB
EST. AVERAGE GLUCOSE BLD GHB EST-MCNC: 103 MG/DL
HAV AB SER QL IA: REACTIVE
HBA1C MFR BLD: 5.2 %
HBV CORE AB SER QL: NORMAL
HBV SURFACE AB SER-ACNC: <3.1 MIU/ML
HBV SURFACE AG SER QL: NORMAL

## 2023-01-31 ENCOUNTER — TELEPHONE (OUTPATIENT)
Dept: OTHER | Facility: OTHER | Age: 53
End: 2023-01-31

## 2023-01-31 NOTE — RESULT ENCOUNTER NOTE
In addition to previous task it looks like she has an appointment with me on 3/13, okay to keep that thank you

## 2023-01-31 NOTE — RESULT ENCOUNTER NOTE
Please inform patient that biopsies from the duodenum were benign and negative for celiac disease  Biopsies stomach were benign and negative for bacteria called H  pylori  Patient should follow-up with Humaira Mckeon in the gastroenterology office  Please see if we can obtain records from her hospitalization in [place]  It would be important to see the endoscopy report and any laboratory data and biopsy reports in addition to history and physicals and consultative notes  If in reviewing the records, patient did not have a duodenal ulcer, then she will need repeat endoscopy in about 8 to 12 weeks    Thank you

## 2023-01-31 NOTE — TELEPHONE ENCOUNTER
Her lab work is back but not resulted  Her ultrasound is not finalized  Her tissue exam is not resulted  Routing to provider to read lab results while we await other results

## 2023-02-01 ENCOUNTER — HOSPITAL ENCOUNTER (OUTPATIENT)
Dept: CT IMAGING | Facility: HOSPITAL | Age: 53
Discharge: HOME | End: 2023-02-01

## 2023-02-01 DIAGNOSIS — N20.0 NEPHROLITHIASIS: ICD-10-CM

## 2023-02-02 ENCOUNTER — PREP FOR PROCEDURE (OUTPATIENT)
Dept: GASTROENTEROLOGY | Facility: CLINIC | Age: 53
End: 2023-02-02

## 2023-02-02 DIAGNOSIS — K26.0 ACUTE DUODENAL ULCER WITH BLEEDING: Primary | ICD-10-CM

## 2023-02-02 DIAGNOSIS — K21.9 GASTROESOPHAGEAL REFLUX DISEASE WITHOUT ESOPHAGITIS: ICD-10-CM

## 2023-02-02 DIAGNOSIS — K31.84 GASTROPARESIS: ICD-10-CM

## 2023-02-03 ENCOUNTER — OFFICE VISIT (OUTPATIENT)
Dept: ENDOCRINOLOGY | Facility: CLINIC | Age: 53
End: 2023-02-03
Payer: COMMERCIAL

## 2023-02-03 VITALS
DIASTOLIC BLOOD PRESSURE: 68 MMHG | HEART RATE: 65 BPM | WEIGHT: 179 LBS | HEIGHT: 63 IN | BODY MASS INDEX: 31.71 KG/M2 | SYSTOLIC BLOOD PRESSURE: 118 MMHG | OXYGEN SATURATION: 98 %

## 2023-02-03 DIAGNOSIS — E04.0 SIMPLE GOITER: ICD-10-CM

## 2023-02-03 DIAGNOSIS — E03.8 HYPOTHYROIDISM DUE TO HASHIMOTO'S THYROIDITIS: Primary | ICD-10-CM

## 2023-02-03 DIAGNOSIS — E06.3 HYPOTHYROIDISM DUE TO HASHIMOTO'S THYROIDITIS: Primary | ICD-10-CM

## 2023-02-03 PROCEDURE — 99213 OFFICE O/P EST LOW 20 MIN: CPT | Performed by: INTERNAL MEDICINE

## 2023-02-03 PROCEDURE — 84443 ASSAY THYROID STIM HORMONE: CPT | Performed by: INTERNAL MEDICINE

## 2023-02-03 RX ORDER — ERGOCALCIFEROL 1.25 MG/1
1 CAPSULE ORAL WEEKLY
COMMUNITY
Start: 2022-12-22

## 2023-02-03 RX ORDER — DULOXETIN HYDROCHLORIDE 30 MG/1
30 CAPSULE, DELAYED RELEASE ORAL DAILY
COMMUNITY
Start: 2022-11-15

## 2023-02-03 RX ORDER — LEVOTHYROXINE SODIUM 137 UG/1
1 TABLET ORAL DAILY
COMMUNITY
Start: 2022-09-28 | End: 2023-02-03

## 2023-02-03 RX ORDER — BELIMUMAB 200 MG/ML
1 SOLUTION SUBCUTANEOUS
COMMUNITY
Start: 2022-09-03

## 2023-02-03 NOTE — PROGRESS NOTES
"     Office Note      Date: 2023  Patient Name: Milagros Lundberg  MRN: [id number]  : 1970    Chief Complaint   Patient presents with   • Goiter       History of Present Illness:   Milagros Lundberg is a 52 y.o. female who presents for Goiter  .  With hypothyroidism   Current rx: t4 137 /d     Changes in history:none   Questions /problems: none     Subjective          Review of Systems:   Review of Systems   Constitutional: Negative for fatigue and unexpected weight change.   HENT: Negative for trouble swallowing and voice change.    Eyes: Negative for visual disturbance.   Respiratory: Negative for choking.    Cardiovascular: Negative for palpitations.   Endocrine: Positive for heat intolerance. Negative for cold intolerance.   Neurological: Negative for tremors.   Psychiatric/Behavioral: Negative for dysphoric mood and sleep disturbance. The patient is not nervous/anxious.        The following portions of the patient's history were reviewed and updated as appropriate: allergies, current medications, past family history, past medical history, past social history, past surgical history and problem list.    Objective     Visit Vitals  /68   Pulse 65   Ht 160 cm (63\")   Wt 81.2 kg (179 lb)   SpO2 98%   BMI 31.71 kg/m²           Physical Exam:  Physical Exam  Vitals reviewed.   Constitutional:       Appearance: Normal appearance.   Eyes:      Extraocular Movements: Extraocular movements intact.   Neck:      Comments: Diffuse goiter  Lymphadenopathy:      Cervical: No cervical adenopathy.   Neurological:      Mental Status: She is alert.   Psychiatric:         Mood and Affect: Mood normal.         Thought Content: Thought content normal.         Judgment: Judgment normal.         Assessment / Plan      Assessment & Plan:  Problem List Items Addressed This Visit        Other    Hypothyroidism - Primary    Current Assessment & Plan     Stable  Clinically euthyroid. Thyroid levels ordered. Medication to be " adjusted accordingly.         Relevant Medications    levothyroxine (SYNTHROID, LEVOTHROID) 137 MCG tablet    Simple goiter    Overview     Last ultrasound 11/2018 [facility]: Right lobe 6 x 3 cm, left lobe is 7 x 3 cm; heterogenous bilaterally without dominant nodule.         Current Assessment & Plan     Stable on exam. Asymptomatic          Relevant Medications    levothyroxine (SYNTHROID, LEVOTHROID) 137 MCG tablet        Ankit Clark MD   02/03/2023

## 2023-02-04 LAB — TSH SERPL DL<=0.05 MIU/L-ACNC: 2.42 UIU/ML (ref 0.27–4.2)

## 2023-02-06 DIAGNOSIS — E03.8 HYPOTHYROIDISM DUE TO HASHIMOTO'S THYROIDITIS: ICD-10-CM

## 2023-02-06 DIAGNOSIS — E06.3 HYPOTHYROIDISM DUE TO HASHIMOTO'S THYROIDITIS: ICD-10-CM

## 2023-02-06 RX ORDER — LEVOTHYROXINE SODIUM 137 UG/1
137 TABLET ORAL EVERY MORNING
Qty: 30 TABLET | Refills: 11 | Status: SHIPPED | OUTPATIENT
Start: 2023-02-06

## 2023-02-07 ENCOUNTER — TELEPHONE (OUTPATIENT)
Dept: INTERNAL MEDICINE CLINIC | Age: 53
End: 2023-02-07

## 2023-02-07 DIAGNOSIS — R07.1 CHEST PAIN ON BREATHING: Primary | ICD-10-CM

## 2023-02-07 NOTE — TELEPHONE ENCOUNTER
Patient called stating her insurance will not cover NM myocardial perfusion spect (rx stress and/or rest)  Insurance states they want patient to do the stress test on the treadmill  They patient states she cannot do the test on the treadmill do to her legs being weak and her head pounding         Please advise, thank you

## 2023-02-10 NOTE — TELEPHONE ENCOUNTER
Labs and U/S resulted  Called and spoke with patient to discuss results  Please refer to result note for complete details  Clerical, please keep patient's appointment with Dr Greta Mcneil on 3/13 but also schedule patient for first available appointment with Dr Moni Batista at the [facility] office for newly diagnosed cirrhosis  Thank you in advance!

## 2023-02-14 ENCOUNTER — TELEPHONE (OUTPATIENT)
Dept: INTERNAL MEDICINE CLINIC | Age: 53
End: 2023-02-14

## 2023-02-14 NOTE — TELEPHONE ENCOUNTER
Patient called asking if you are able to prescribe the generic of chantix for her  Patient is also using nicoderm patch        [facility] has medication in stock per patient      Please advise, thank you

## 2023-02-17 DIAGNOSIS — Z72.0 TOBACCO ABUSE DISORDER: Primary | ICD-10-CM

## 2023-02-28 NOTE — PROGRESS NOTES
3/3/2023    Michelle Chapa  1970  [id number]        Assessment  -Nephrolithiasis s/p left ureteroscopy (8/2022)    Discussion/Plan  Chely is a 46 y o  female being managed by Dr Ras Brownlee  1  Nephrolithiasis s/p left sided ureteroscopy (8/2022)- urine dip in the office today identified trace blood, no signs of infection  Specimen will be sent for urinalysis with microscopic  We discussed the results of her recent CT scan from 2/1/2023 which showed no evidence of obstructing calculi or hydronephrosis  Nonobstructing left renal calculi measuring 1 to 3 mm in size noted  Reviewed dietary recommendations  Patient asymptomatic at this time  We will continue to monitor stone burden  Follow-up in 1 year with KUB  She was advised to call sooner with any questions or issues     -All questions answered, patients agree with plan     History of Present Illness  46 y o  female with a history of nephrolithiasis presents today for follow up  Patient underwent left sided ureteroscopy on 8/16/2022  She more recently reported episodes of flank pain, which prompted a CT renal stone study on 2/1/2023  Findings noted a 1-3 mm left intrarenal calculi without any evidence of hydronephrosis  Patient states she has since been asymptomatic  She denies any lower urinary tract symptoms except chronic urinary urgency, no gross hematuria, dysuria, or renal colic  Patient denies any recent stone episodes  Review of Systems  Review of Systems   Constitutional: Negative  HENT: Negative  Respiratory: Negative  Cardiovascular: Negative  Gastrointestinal: Negative  Genitourinary: Positive for urgency  Negative for decreased urine volume, difficulty urinating, dysuria, flank pain, frequency and hematuria  Musculoskeletal: Negative  Skin: Negative  Neurological: Negative  Psychiatric/Behavioral: Negative          Past Medical History  Past Medical History:   Diagnosis Date   • Allergic sinusitis     last assessed - 50PPN0006   • Anxiety     last assessed - 92AGS7891   • Arthritis    • Asthma     last assessed - 70TFJ9153   • Bilateral lower extremity edema     last assessed - 23WOB9400   • Callus of foot     last assessed - 72Wgi5698   • Chronic GERD     last assessed - 74DAT3565   • Colon polyp    • Constipation     Resolved - 96DRN9497   • COPD (chronic obstructive pulmonary disease) ([facility])    • Depression     last assessed - 28SZQ7449   • Disease of thyroid gland    • Diverticulitis of colon     last assessed - 80WHR4880   • Dyspepsia     Resolved - 83HBU7040   • Esophageal reflux     last assessed - 23AJV3762   • Essential hypertriglyceridemia     last assessed - 75FCF2772   • Gastroesophageal reflux disease with esophagitis 11/18/2016   • GERD (gastroesophageal reflux disease)    • Gynecological disorder     last assessed - 38JUJ1660   • Hypertension     last assessed - 91YYC8463   • Hypokalemia 6/20/2022   • Hypotension     last assessed - 74Yuv9818   • Kidney stone    • Migraine    • Morbid obesity ([address] ) 1/11/2019    Formatting of this note might be different from the original  Added automatically from request for surgery [id number]   • Neuropathy    • Positive depression screening 6/19/2022   • Primary hypertriglyceridemia 6/19/2022   • Pulmonary emphysema ([address] )     last assessed - 18LSH4519   • Seasonal allergies    • Urinary frequency     last assessed - 22Jun2016   • Vagina, candidiasis     last assessed - 22Jun2016   • Visual impairment        Past Social History  Past Surgical History:   Procedure Laterality Date   • CARPAL TUNNEL RELEASE      last assessed - 82EQX7710   • CHOLECYSTECTOMY      last assessed - 38NRB7315   • COLONOSCOPY      Complete colonoscopy    • EYE SURGERY      last assessed - 42YWO6099   • FL RETROGRADE PYELOGRAM  6/16/2022   • FL RETROGRADE PYELOGRAM  8/16/2022   • FOOT SURGERY      last assessed - 10YMR9759   • DC CYSTO BLADDER W/URETERAL CATHETERIZATION Left 7/21/2022    Procedure: CYSTOSCOPY RETROGRADE PYELOGRAM WITH INSERTION STENT URETERAL;  Surgeon: Evita Dewitt MD;  Location: [facility];  Service: Urology   • CT CYSTO/URETERO W/LITHOTRIPSY &INDWELL STENT INSRT Left 2022    Procedure: CYSTOSCOPY URETEROSCOPY WITH LITHOTRIPSY HOLMIUM LASER, RETROGRADE PYELOGRAM AND INSERTION STENT URETERAL;  Surgeon: Eb Coronado MD;  Location: [facility];  Service: Urology   • CT CYSTO/URETERO W/LITHOTRIPSY &INDWELL STENT INSRT Left 2022    Procedure: CYSTOSCOPY USCOPE W/HOLMIUM LASER, RETROGRADE PYELOGRAM&STENT;  Surgeon: Hoa Leggett MD;  Location: [facility];  Service: Urology   • CT ESOPHAGOGASTRODUODENOSCOPY TRANSORAL DIAGNOSTIC N/A 2017    Procedure: ESOPHAGOGASTRODUODENOSCOPY (EGD); Surgeon: Helena Sanchez MD;  Location: [facility];   Service: Gastroenterology       Past Family History  Family History   Problem Relation Age of Onset   • Thyroid disease Mother    • Cancer Mother 70   • Lung cancer Sister    • Coronary artery disease Sister    • Stroke Sister    • Asthma Sister    • Lung cancer Maternal Grandmother    • Cancer Maternal Grandfather    • Diabetes Maternal Grandfather    • No Known Problems Paternal Grandmother    • No Known Problems Paternal Grandfather    • Hypertension Other    • Lung cancer Other    • Hypertension Other    • Lung cancer Other    • Lung cancer Other    • No Known Problems Maternal Aunt        Past Social history  Social History     Socioeconomic History   • Marital status:      Spouse name: Not on file   • Number of children: Not on file   • Years of education: Not on file   • Highest education level: Not on file   Occupational History   • Not on file   Tobacco Use   • Smoking status: Some Days     Packs/day: 1 50     Years: 30 00     Pack years: 45 00     Types: Cigarettes     Start date:      Last attempt to quit: 2022     Years since quittin 5   • Smokeless tobacco: Never   • Tobacco comments:     last cig 11 days ago Vaping Use   • Vaping Use: Former   • Substances: Nicotine   Substance and Sexual Activity   • Alcohol use: Not Currently     Comment: wine twice a year   • Drug use: Never   • Sexual activity: Not Currently   Other Topics Concern   • Not on file   Social History Narrative   • Not on file     Social Determinants of Health     Financial Resource Strain: Not on file   Food Insecurity: No Food Insecurity   • Worried About Running Out of Food in the Last Year: Never true   • Ran Out of Food in the Last Year: Never true   Transportation Needs: No Transportation Needs   • Lack of Transportation (Medical): No   • Lack of Transportation (Non-Medical):  No   Physical Activity: Not on file   Stress: Not on file   Social Connections: Not on file   Intimate Partner Violence: Not on file   Housing Stability: Low Risk    • Unable to Pay for Housing in the Last Year: No   • Number of Places Lived in the Last Year: 1   • Unstable Housing in the Last Year: No       Current Medications  Current Outpatient Medications   Medication Sig Dispense Refill   • albuterol (2 5 mg/3 mL) 0 083 % nebulizer solution Take 3 mL (2 5 mg total) by nebulization every 6 (six) hours as needed for wheezing or shortness of breath 30 mL 0   • albuterol (PROVENTIL HFA,VENTOLIN HFA) 90 mcg/act inhaler Inhale 2 puffs every 6 (six) hours as needed for wheezing 18 g 1   • buPROPion (WELLBUTRIN XL) 150 mg 24 hr tablet Take 1 tablet (150 mg total) by mouth every morning 90 tablet 1   • diazepam (VALIUM) 10 mg tablet Take 1 tablet (10 mg total) by mouth every 6 (six) hours as needed for anxiety 30 tablet 0   • ergocalciferol (VITAMIN D2) 50,000 units Take 1 capsule (50,000 Units total) by mouth every 14 (fourteen) days 12 capsule 1   • ferrous sulfate 324 (65 Fe) mg Take 1 tablet (324 mg total) by mouth daily before breakfast 30 tablet 1   • fluticasone (FLONASE) 50 mcg/act nasal spray 1 spray into each nostril 2 (two) times a day (Patient taking differently: 1 spray into each nostril as needed) 16 g 5   • gabapentin (NEURONTIN) 100 mg capsule Take 100 mg by mouth 2 (two) times a day  0   • levothyroxine 112 mcg tablet Take 1 tablet (112 mcg total) by mouth daily 90 tablet 1   • nicotine (NICODERM CQ) 21 mg/24 hr TD 24 hr patch Place 1 patch on the skin over 24 hours every 24 hours 28 patch 5   • omeprazole (PriLOSEC) 40 MG capsule Take 1 capsule (40 mg total) by mouth 2 (two) times a day 60 capsule 3   • oxyCODONE-acetaminophen (PERCOCET)  mg per tablet Take 1 tablet by mouth every 6 (six) hours as needed     • pantoprazole (PROTONIX) 40 mg tablet Take 1 tablet (40 mg total) by mouth 2 (two) times a day as needed (acid reflux) 180 tablet 1   • sertraline (ZOLOFT) 100 mg tablet Take 1 tablet (100 mg total) by mouth 2 (two) times a day 180 tablet 1   • simvastatin (ZOCOR) 20 mg tablet Take 1 tablet (20 mg total) by mouth daily at bedtime 90 tablet 1   • sucralfate (CARAFATE) 1 g/10 mL suspension Take 10 mL (1 g total) by mouth 3 (three) times a day before meals 2700 mL 1   • tamsulosin (FLOMAX) 0 4 mg take 1 capsule by mouth once daily with dinner 30 capsule 0   • tiotropium (Spiriva HandiHaler) 18 mcg inhalation capsule Place 1 capsule (18 mcg total) into inhaler and inhale daily 30 capsule 5     No current facility-administered medications for this visit  Allergies  Allergies   Allergen Reactions   • Hydrocodone-Acetaminophen Hives   • Ketorolac Hives and Dizziness   • Toradol [Ketorolac Tromethamine] Other (See Comments)     hypotension   • Ultram [Tramadol] Nausea Only, GI Intolerance and Vomiting       Past medical history, social history, family history, medications and allergies were reviewed  Vitals  Vitals:    03/03/23 1004   BP: 118/94   Pulse: 78   SpO2: 96%   Weight: 114 kg (251 lb)   Height: 5' 7" (1 702 m)       Physical Exam  Physical Exam  Constitutional:       Appearance: Normal appearance  She is well-developed     HENT:      Head: Normocephalic  Eyes:      Pupils: Pupils are equal, round, and reactive to light  Pulmonary:      Effort: Pulmonary effort is normal    Abdominal:      Palpations: Abdomen is soft  Tenderness: There is no right CVA tenderness or left CVA tenderness  Musculoskeletal:         General: Normal range of motion  Cervical back: Normal range of motion  Skin:     General: Skin is warm and dry  Neurological:      General: No focal deficit present  Mental Status: She is alert and oriented to person, place, and time  Psychiatric:         Mood and Affect: Mood normal          Behavior: Behavior normal          Thought Content:  Thought content normal          Judgment: Judgment normal          Results    I have personally reviewed all pertinent lab results and reviewed with patient  Lab Results   Component Value Date    CALCIUM 9 1 01/27/2023    K 4 2 01/27/2023    CO2 24 01/27/2023     01/27/2023    BUN 16 01/27/2023    CREATININE 0 76 01/27/2023     Lab Results   Component Value Date    WBC 7 73 01/27/2023    HGB 9 0 (L) 01/27/2023    HCT 33 2 (L) 01/27/2023    MCV 87 01/27/2023     01/27/2023     Recent Results (from the past 1 hour(s))   POCT urine dip    Collection Time: 03/03/23 10:15 AM   Result Value Ref Range    LEUKOCYTE ESTERASE,UA -     NITRITE,UA -     SL AMB POCT UROBILINOGEN 0 2     POCT URINE PROTEIN trace      PH,UA 6 0     BLOOD,UA hemolyzed trace     SPECIFIC GRAVITY,UA 1 015     KETONES,UA -     BILIRUBIN,UA -     GLUCOSE, UA -      COLOR,UA straw     CLARITY,UA clear

## 2023-03-03 ENCOUNTER — OFFICE VISIT (OUTPATIENT)
Dept: UROLOGY | Facility: AMBULATORY SURGERY CENTER | Age: 53
End: 2023-03-03

## 2023-03-03 VITALS
BODY MASS INDEX: 39.39 KG/M2 | DIASTOLIC BLOOD PRESSURE: 94 MMHG | HEIGHT: 67 IN | OXYGEN SATURATION: 96 % | WEIGHT: 251 LBS | HEART RATE: 78 BPM | SYSTOLIC BLOOD PRESSURE: 118 MMHG

## 2023-03-03 DIAGNOSIS — N20.0 NEPHROLITHIASIS: Primary | ICD-10-CM

## 2023-03-03 LAB
BACTERIA UR QL AUTO: ABNORMAL /HPF
BILIRUB UR QL STRIP: NEGATIVE
CAOX CRY URNS QL MICRO: ABNORMAL /HPF
CLARITY UR: CLEAR
COLOR UR: YELLOW
GLUCOSE UR STRIP-MCNC: NEGATIVE MG/DL
HGB UR QL STRIP.AUTO: ABNORMAL
KETONES UR STRIP-MCNC: NEGATIVE MG/DL
LEUKOCYTE ESTERASE UR QL STRIP: ABNORMAL
MUCOUS THREADS UR QL AUTO: ABNORMAL
NITRITE UR QL STRIP: POSITIVE
NON-SQ EPI CELLS URNS QL MICRO: ABNORMAL /HPF
PH UR STRIP.AUTO: 6 [PH]
PROT UR STRIP-MCNC: ABNORMAL MG/DL
RBC #/AREA URNS AUTO: ABNORMAL /HPF
SL AMB  POCT GLUCOSE, UA: ABNORMAL
SL AMB LEUKOCYTE ESTERASE,UA: ABNORMAL
SL AMB POCT BILIRUBIN,UA: ABNORMAL
SL AMB POCT BLOOD,UA: ABNORMAL
SL AMB POCT CLARITY,UA: CLEAR
SL AMB POCT COLOR,UA: ABNORMAL
SL AMB POCT KETONES,UA: ABNORMAL
SL AMB POCT NITRITE,UA: ABNORMAL
SL AMB POCT PH,UA: 6
SL AMB POCT SPECIFIC GRAVITY,UA: 1.01
SL AMB POCT URINE PROTEIN: ABNORMAL
SL AMB POCT UROBILINOGEN: 0.2
SP GR UR STRIP.AUTO: 1.02 (ref 1–1.03)
UROBILINOGEN UR STRIP-ACNC: <2 MG/DL
WBC #/AREA URNS AUTO: ABNORMAL /HPF

## 2023-03-06 ENCOUNTER — HOSPITAL ENCOUNTER (OUTPATIENT)
Dept: NON INVASIVE DIAGNOSTICS | Facility: HOSPITAL | Age: 53
Discharge: HOME | End: 2023-03-06

## 2023-03-06 VITALS
HEART RATE: 80 BPM | OXYGEN SATURATION: 98 % | DIASTOLIC BLOOD PRESSURE: 70 MMHG | BODY MASS INDEX: 38.14 KG/M2 | HEIGHT: 67 IN | SYSTOLIC BLOOD PRESSURE: 130 MMHG | RESPIRATION RATE: 16 BRPM | WEIGHT: 243 LBS

## 2023-03-06 DIAGNOSIS — R07.1 CHEST PAIN ON BREATHING: ICD-10-CM

## 2023-03-06 DIAGNOSIS — R94.39 ABNORMAL STRESS ECG WITH TREADMILL: Primary | ICD-10-CM

## 2023-03-06 LAB
ARRHY DURING EX: NORMAL
CHEST PAIN STATEMENT: NORMAL
MAX DIASTOLIC BP: 80 MMHG
MAX HEART RATE: 146 BPM
MAX HR PERCENT: 86 %
MAX HR: 146 BPM
MAX PREDICTED HEART RATE: 168 BPM
MAX. SYSTOLIC BP: 152 MMHG
PROTOCOL NAME: NORMAL
RATE PRESSURE PRODUCT: NORMAL
SL CV STRESS RECOVERY BP: 93 MMHG
SL CV STRESS RECOVERY HR: 93 BPM
SL CV STRESS RECOVERY O2 SAT: 98 %
SL CV STRESS STAGE REACHED: 2
STRESS ANGINA INDEX: 0
STRESS BASELINE BP: NORMAL MMHG
STRESS BASELINE HR: 80 BPM
STRESS O2 SAT REST: 98 %
STRESS PEAK HR: 144 BPM
STRESS POST ESTIMATED WORKLOAD: 5.7 METS
STRESS POST EXERCISE DUR MIN: 3 MIN
STRESS POST EXERCISE DUR SEC: 57 SEC
STRESS POST O2 SAT PEAK: 99 %
STRESS POST PEAK BP: 144 MMHG
TARGET HR FORMULA: NORMAL
TEST INDICATION: NORMAL
TIME IN EXERCISE PHASE: NORMAL

## 2023-03-09 ENCOUNTER — CONSULT (OUTPATIENT)
Dept: BARIATRICS | Facility: CLINIC | Age: 53
End: 2023-03-09

## 2023-03-09 VITALS
HEIGHT: 67 IN | OXYGEN SATURATION: 99 % | DIASTOLIC BLOOD PRESSURE: 70 MMHG | RESPIRATION RATE: 18 BRPM | WEIGHT: 252.6 LBS | BODY MASS INDEX: 39.65 KG/M2 | TEMPERATURE: 98.3 F | HEART RATE: 82 BPM | SYSTOLIC BLOOD PRESSURE: 128 MMHG

## 2023-03-09 DIAGNOSIS — R73.01 ELEVATED FASTING GLUCOSE: ICD-10-CM

## 2023-03-09 DIAGNOSIS — E78.00 HYPERCHOLESTEROLEMIA: ICD-10-CM

## 2023-03-09 DIAGNOSIS — E74.39 GLUCOSE INTOLERANCE: ICD-10-CM

## 2023-03-09 DIAGNOSIS — E03.9 ACQUIRED HYPOTHYROIDISM: ICD-10-CM

## 2023-03-09 DIAGNOSIS — E66.01 SEVERE OBESITY (HCC): Primary | ICD-10-CM

## 2023-03-09 DIAGNOSIS — F33.41 RECURRENT MAJOR DEPRESSIVE DISORDER, IN PARTIAL REMISSION (HCC): ICD-10-CM

## 2023-03-09 DIAGNOSIS — K76.0 HEPATIC STEATOSIS: ICD-10-CM

## 2023-03-09 DIAGNOSIS — K21.9 GASTROESOPHAGEAL REFLUX DISEASE WITHOUT ESOPHAGITIS: ICD-10-CM

## 2023-03-09 DIAGNOSIS — K76.0 FATTY LIVER: ICD-10-CM

## 2023-03-09 NOTE — ASSESSMENT & PLAN NOTE
-Discussed options of [facility]-Intensive Lifestyle Intervention Program, Very Low Calorie Diet-VLCD, Conservative Program, Uzma-En-Y Gastric Bypass and Vertical Sleeve Gastrectomy and the role of weight loss medications   -Not a candidate for Phentermine Or Topamax  -Currently on Wellbutrin  -Initial weight loss goal of 5-10% weight loss for improved health  -Screening labs: reviewed CMP, Lipid panel, TSH, HgbA1c  Check fasting insulin  -Patient is interested in pursuing bariatric surgery  Will reach out to [facility] staff to schedule  Patient was previously scheduled in Sept 2022 but did not complete eval  Also counseled patient on the need to quit smoking to be considered a surgical candidate   Reports she is using patches and taking Chantix    + Stop-Bang 4/8 - Has sleep med eval scheduled in June

## 2023-03-09 NOTE — PROGRESS NOTES
Assessment/Plan:    Severe obesity ([facility] )  -Discussed options of [facility]-Intensive Lifestyle Intervention Program, Very Low Calorie Diet-VLCD, Conservative Program, Uzma-En-Y Gastric Bypass and Vertical Sleeve Gastrectomy and the role of weight loss medications   -Not a candidate for Phentermine Or Topamax  -Currently on Wellbutrin  -Initial weight loss goal of 5-10% weight loss for improved health  -Screening labs: reviewed CMP, Lipid panel, TSH, HgbA1c  Check fasting insulin  -Patient is interested in pursuing bariatric surgery  Will reach out to [facility] staff to schedule  Patient was previously scheduled in Sept 2022 but did not complete eval  Also counseled patient on the need to quit smoking to be considered a surgical candidate  Reports she is using patches and taking Chantix    + Stop-Bang 4/8 - Has sleep med eval scheduled in June    Hypothyroidism  -On levothyroxine  -TSH WNL    Gastroesophageal reflux disease  -On Omeprazole  -avoid food triggers, large portion sizes  -can improve with weight loss  -with recent Gastric ulcer, under the care of GI  Has another EGD scheduled on 4/5  Depression  -On Wellbutrin and Zoloft    Fatty liver  -advise minimum 10% TBW loss    Hypercholesterolemia  -On Zocor  -avoid trans fats, limit saturated fats and refined carbohydrates    Elevated fasting glucose  -hgbA1c WNL  -check Fasting insulin  -avoid/limit refined carbohydrates    Goals:    Food log (ie ) www myfitnesspal com,sparkpeople  com,loseit com,calorieking  com,etc    No sugary beverages  At least 64oz of water daily  Increase physical activity by 10 minutes daily   Gradually increase physical activity to a goal of 5 days per week for 30 minutes of MODERATE intensity PLUS 2 days per week of FULL BODY resistance training      Diagnoses and all orders for this visit:    Severe obesity ([facility] )    Hepatic steatosis  -     Ambulatory Referral to Weight Management    Acquired hypothyroidism    Gastroesophageal reflux disease without esophagitis    Recurrent major depressive disorder, in partial remission (HCC)    Elevated fasting glucose  -     Insulin, fasting; Future    Fatty liver    Hypercholesterolemia    Glucose intolerance          Subjective:   No chief complaint on file  Patient ID: Talita Saavedra  is a 46 y o  female with excess weight/obesity here to pursue weight managment      Past Medical History:   Diagnosis Date   • Allergic sinusitis     last assessed - 03Apr2017   • Anxiety     last assessed - 17JFE8746   • Arthritis    • Asthma     last assessed - 88GXQ5487   • Bilateral lower extremity edema     last assessed - 15VVN3180   • Callus of foot     last assessed - 22Jun2016   • Chest pain    • Chronic GERD     last assessed - 91KFQ0655   • Colon polyp    • Constipation     Resolved - 11UHD7932   • COPD (chronic obstructive pulmonary disease) ([facility] )    • Depression     last assessed - 86IVL6434   • Disease of thyroid gland    • Diverticulitis of colon     last assessed - 91TJP1367   • Dyspepsia     Resolved - 21TZN4224   • Esophageal reflux     last assessed - 03QGG4714   • Essential hypertriglyceridemia     last assessed - 04LZN9833   • Gastroesophageal reflux disease with esophagitis 11/18/2016   • GERD (gastroesophageal reflux disease)    • Gynecological disorder     last assessed - 76WKJ0411   • Hypertension     last assessed - 30INW8407   • Hypokalemia 06/20/2022   • Hypotension     last assessed - 27Cdy4266   • Kidney stone    • Migraine    • Morbid obesity ([facility] ) 01/11/2019    Formatting of this note might be different from the original  Added automatically from request for surgery [id number]   • Neuropathy    • Positive depression screening 06/19/2022   • Primary hypertriglyceridemia 06/19/2022   • Pulmonary emphysema ([facility] )     last assessed - 85ZDS2720   • Seasonal allergies    • SOB (shortness of breath)    • Urinary frequency     last assessed - 22Jun2016   • Vagina, candidiasis     last assessed - 52CFR7760   • Visual impairment          HPI:  Obesity/Excess Weight:  Severity: Severe  Onset: since childhood  Modifiers: self created diets, medication - Phentermine and B12 shots  Contributing factors: patient is unsure  Associated symptoms: comorbid conditions and worsens SOB, generally doesn't feel well    Goals: 185-190 lbs  Highest:  Current    Hydration: water 1-2  Bottles, regular pepsi 16oz  ETOH: denies  Exercise: reports limited due to her back pain and sciatica, gets SOB  Occupation: unemployed, disbled  Sleep: reports sleeps a lot, naps during day  Smoking: currently, trying to quit    On Gabapentin for past 1-2 year s for sciatica    Colonoscopy: completed 2/2022    The following portions of the patient's history were reviewed and updated as appropriate: allergies, current medications, past family history, past medical history, past social history, past surgical history and problem list     Review of Systems   Constitutional: Positive for fatigue  Negative for chills and fever  HENT: Negative for sore throat  Cardiovascular: Positive for chest pain (seeing cardiology, sharp and heavy, none currently)  Negative for palpitations  Gastrointestinal: Positive for abdominal pain (reports chronic, sees GI) and nausea  Negative for vomiting  Genitourinary: Negative for dysuria  Musculoskeletal: Positive for back pain  Skin: Negative for rash  Neurological: Positive for headaches (occasionally)  Negative for dizziness  Psychiatric/Behavioral: Positive for dysphoric mood (reports mood stable, on medication)  Objective:    /70 (BP Location: Right arm, Patient Position: Sitting, Cuff Size: Large)   Pulse 82   Temp 98 3 °F (36 8 °C) (Temporal)   Resp 18   Ht 5' 7" (1 702 m)   Wt 115 kg (252 lb 9 6 oz)   LMP 04/05/2018 (Approximate)   SpO2 99%   BMI 39 56 kg/m²     Physical Exam  Vitals and nursing note reviewed       Constitutional   General appearance: Abnormal   well developed and obese  Acanthosis nigricans noted on neck  Eyes No conjunctival pallor  Ears, Nose, Mouth, and Throat Oral mucosa moist    Pulmonary   Respiratory effort: No increased work of breathing or signs of respiratory distress  Auscultation of lungs: Clear to auscultation, equal breath sounds bilaterally, no wheezes, no rales, no rhonci  Cardiovascular   Auscultation of heart: Normal rate and rhythm, normal S1 and S2, without murmurs  Examination of extremities for edema and/or varicosities: Normal   no edema  Abdomen   Abdomen: Abnormal   The abdomen was obese  Bowel sounds were normal  The abdomen was soft and nontender     Musculoskeletal   Gait and station: Normal     Psychiatric   Orientation to person, place and time: Normal     Affect: appropriate

## 2023-03-09 NOTE — ASSESSMENT & PLAN NOTE
-On Omeprazole  -avoid food triggers, large portion sizes  -can improve with weight loss  -with recent Gastric ulcer, under the care of GI  Has another EGD scheduled on 4/5

## 2023-03-11 NOTE — PROGRESS NOTES
[facility] Gastroenterology  Gastroenterology Outpatient Consultation  Patient Jennifer Galindo   Age 46 y o  Gender female   MRN: [id number]  [facility] [id number]     ASSESSMENT AND PLAN:   Problem List Items Addressed This Visit        Digestive    Gastroesophageal reflux disease     Patient does have a long history of gastroesophageal reflux disease  Symptoms are well controlled on high-dose PPI at this time  She is currently on omeprazole 40 mg twice a day  Lifestyle modifications for gastroesophageal reflux disease were discussed and include limiting fried and fatty foods, mints, chocolates, carbonated and caffeinated beverages , and alcohol, etc   Avoid lying down for 2-3 hours after meals  If you have nighttime symptoms consider raising the head of the bed up on 4-6 inch blocks  Pillows typically are not useful  If you are overweight, weight loss will be helpful  Fatty liver     Please see comments under cirrhosis         Cirrhosis ([facility] [address] ) - Primary     Patient was noted to have an enlarged liver with a nodular surface and a mildly enlarged spleen  Ultrasound elastography revealed F4-cirrhosis  She has never consumed much a lot of alcohol  She does have a BMI of 39 4  Her lipid profile looks okay  No history of type 2 diabetes  I suspect her liver disease is on the basis of Ledezma/NAFLD   (Nonalcoholic fatty liver disease)  Current MELD-Na 7  At this point time would recommend completing her work-up  Check work-up for autoimmune and metabolic liver diseases  Check alpha-fetoprotein tumor marker      She does have an appointment with hepatology on March 17  She will need ultrasound and alpha-fetoprotein performed every 6 months for hepatoma surveillance    I did review the natural history of liver disease and the potential progression of liver disease    I will leave further discussion of this to the hepatologist   We did talk about the possibility of developing fluid in the abdomen called ascites, confusional state: Encephalopathy and gastrointestinal bleeding from esophageal varices  She did not have any varices at time of her endoscopy  I have asked her to go to the hospital or call 911 if she develops fever greater than 100 5, black stools, vomiting blood, increased abdominal girth etc     She should avoid swimming and on chlorinated leger  She should avoid eating undercooked or raw seafood and meat  Relevant Orders    AFP tumor marker    Alpha-1-antitrypsin    Anti-smooth muscle antibody, IgG    Soluble Liver Antigen    Protime-INR    Liver/Kidney Microsomal Antibody    CINDY Screen w/ Reflex to Titer/Pattern    Angiotensin converting enzyme    Antimitochondrial antibody    Ceruloplasmin    Pancreatic lesion     Ultrasound done January 27, 2023 suggested a 1 4 x 1 2 x 1 2 cm ovoid hypoechoic focus in the region of the pancreatic head 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  Would recommend further imaging with MRI/MRCP to further evaluate         Relevant Orders    MRI abdomen w wo contrast and mrcp    Duodenal ulcer     Please see comments of history of GI bleed         Irritable bowel syndrome with both constipation and diarrhea     Patient does have a long history of chronic abdominal tenderness  She also has alternating diarrhea and constipation but is more diarrhea predominant  For now I did recommend that she go on a complete lactose-free diet and use Lactaid milk  I would recommend the addition of a fiber supplement such as Benefiber 2 teaspoonfuls or Citrucel 1 heaping tablespoonful mixed in 6 to 8 ounce of noncarbonated liquid daily to help regulate her bowel pattern  Stop drinking all soda  Avoid all diet beverages or artificially sweetened beverages  Other    Iron deficiency anemia     Patient does have an iron deficiency anemia  In looking back over her laboratory test she had a normal hemoglobin back in July 2022    Her hemoglobin dropped to a low of about 7   She did require 1 unit of packed red blood cells in December while in [place]  I will be repeating an upper endoscopy to follow-up on her duodenal ulcer and evaluate for other possible causes of her anemia  She is up-to-date with colonoscopy having had a colonoscopy February 28, 2022 where she was noted to have 2 pedunculated polyps measuring greater than 10 mm in the distal sigmoid colon  These were tubular adenomas  There is no other findings to suggest iron deficiency  Endoscopy in 2020 revealed 16 polyps and a poor preparation  Eventually I will recommend a colonoscopy sooner than later probably sometime in the next 3 to 4 months  Also consider hematology consultation  If colonoscopy and EGD are unrevealing for source for anemia, consider small bowel capsule study  Relevant Orders    CBC    Iron Panel (Includes Ferritin, Iron Sat%, Iron, and TIBC)    Celiac Disease Antibody Profile    Vitamin B12    Folate    History of colon polyps     Patient was noted to have 16 significant polyps back in October 2020 in the face of a poor preparation  Follow-up colonoscopy in 2022 revealed 2 polyps over 10 mm that were tubular adenomas  Based upon this I would recommend genetics consultation given multiple colon polyps and a 17-year-old woman  Relevant Orders    Ambulatory Referral to Genetics      _____________________________________________________________    HPI:  [employer] is a delightful 17-year-old woman whom seen in the office in follow-up  This is the first time I am meeting her in the office  I did spend about 35 minutes reviewing her records and prior hospitalization from [place]  I initially met her at time of upper endoscopy on January 25, 2023  We did not have records at that time regarding her GI bleed in [place]  She was noted to have what I thought to be a duodenal ulcer that was healing  No other source for her anemia was identified at the EGD      Since then she feels like she is doing okay  She does have some nausea couple days a week  This does not lead to vomiting  This is not interfered with her ability to eat  She does report some heartburn/reflux maybe 1-2 times a week  She is on omeprazole 40 mg twice a day and Carafate 2 times a day  She denies any dysphagia  There is been no unintentional weight loss  She does report she has alternating diarrhea and constipation  She feels that she has more diarrhea predominance  She may have diarrhea 4 of 7 days out of the week  She typically will have more than 3 loose bowel movements on the days she has diarrhea  She rarely has nocturnal stooling  She does not use artificial sweeteners  She does drink 32 ounces of regular soda daily  She does not use any NSAIDs  She does not recall taking any NSAIDs when she presented with her anemia in [place]  She will take Tylenol for pain  There is been no rectal bleeding or melena  She does have generalized abdominal tenderness and she has had this for years  None of the abdominal discomfort is new  She did have an ultrasound elastography that suggested F4 fibrosis  Ultrasound of the abdomen suggested a lymph node in the region of the head of the pancreas that requires further evaluation  Although interestingly enough CAT scan on February 1, 2023 did not mention any adenopathy in this region but it was a low dose CT imaging test done focused on kidney stones  She is unaware of any family members with colon cancer or colon polyps but she also does not have any information on her dad side of the family  She smokes up to a pack of cigarettes per day but she has been trying to quit  She does not drink alcohol at this time and never really drank much     35 minutes spent reviewing records including EGD reports, colonoscopy reports, hospitalization reports from [place]  2/1/2023 CT scan renal stone study  Spleen enlarged at 15 1 cm  Gallbladder absent  Diverticulosis without diverticulitis previously noted hydroureteronephrosis on the left side has resolved  Several nonobstructing intrarenal calculi left kidney    1/18/2023 EGD  Normal second portion of duodenum  Biopsies negative for celiac disease  Superficial ulcer in the duodenal bulb with a clean base  I described superficial ulceration with surrounding heaped up mucosa  Moderate edematous erythematous nodular mucosa in the antrum  Biopsies were benign and negative for H  pylori  Mild generalized erythema gastric body  Biopsies negative for H  pylori  Z-line regular at 39 cm  No sign of esophageal varices    1/27/2023 ultrasound abdomen  Enlarged liver 20 6 cm  Nodular surface  Heterogeneous echotexture  Mildly enlarged spleen  In the region of the pancreatic head there is a 1 4 x 1 2 x 1 2 cm ovoid hypoechoic focus likely represent a robin hepatis lymph node      1/27/2023 ultrasound elastography  F4, cirrhosis    1/27/2023-laboratory data  MELD-Na 7   Sodium 140  Creatinine 0 76  AST 27  ALT 16  Alk phos 80  T  bili 0 71  INR 1 10  Cholesterol 126  Triglycerides 91  HDL 48  LDL 60  Iron 32  Ferritin 5  Iron saturation 6%  TIBC 494  WBC 7 73 Hgb 9 0 HCT 33 2 MCV 87 platelet count 911,958 up from 147  Hemoglobin A1c 5 2  TSH 4 13  Hepatitis A total reactive  Hepatitis B surface antigen nonreactive  Hepatitis B surface antibody less than 3 10  Hepatitis B core total nonreactive  Hepatitis C antibody nonreactive    1/21/2023 CT chest lung cancer screening  Splenomegaly of 15 4 cm  Nonobstructing left renal calcification  1 2 cm left upper lobe groundglass opacity  Moderate paraseptal emphysema  Benign intrapulmonary lymph nodes abutting the minor fissure        On 1/18/2023 patient was seen by advanced practitioner Karol Irving    12/11/2022 through 12/13/2022 admitted to [address] [place] [address] with dizziness, shortness of breath, chest pain, and dark stool for couple weeks  Patient also reported chronic generalized abdominal pain  Hemoglobin admission was 7 1 -Received 1 unit of packed red blood cells  EGD revealed a nonbleeding duodenal ulcer  No intervention required  CT scan of the chest abdomen pelvis was performed  Revealed apical emphysematous changes in the lungs  Moderately enlarged liver with nodular contour and fatty infiltration suggestive of cirrhosis  Moderately enlarged spleen  Moderately enlarged gastrohepatic lymph nodes  TSH was 7 88  Hemoglobin admission 7 1 at discharge 7 9 MCV was elevated at 103  AST was 45  ALT was 32  Admission BUN was 17 creatinine 0 5 albumin 3 3          12/12/2022 EGD [place]  Normal esophagus  Gastritis-chronic atrophic without bleeding  Acute duodenal ulcer without hemorrhage or perforation    EGD 2/28/22   notable for moderate erythematous mucosa in the body of the stomach and antrum and possible C2 M2 Albrecht's esophagus  Biopsies negative for H  pylori and Albrecht's esophagus  Colonoscopy 2/28/22 notable for 1 sessile adenomatous appearing polyp (<5mm) 2 pedunculated edematous appearing polyps (10mm+)  Biopsies notable for tubular adenomas  Recommended repeat colonoscopy x3 years and that after review of pathology however following the procedure a 1 year follow-up was recommended    2/28/2022  Colonoscopy   2 pedunculated polyps measuring greater than 10 mm in the distal sigmoid colon  These were tubular adenomas  There is another 5 mm polyp noted  In the procedure report he recommended a 1 year follow-up however after the pathology recommended a 3-year follow-up  10/29/2020 EGD  Normal esophagus  No evidence of Albrecht's esophagus  Erythematous, eroded mucosa in the antrum and duodenal bulb     10/29/2020 colonoscopy Dr Conrad Beaulieu  16 polyps removed  10 mm polyp or larger ascending colon  10 or more sessile polyps adenomatous appearing, semipedunculated measuring 4 to 10 mm transverse colon    4 polyps measuring 4 to 10 mm descending colon  1 semipedunculated polyp measuring 10 mm sigmoid colon  Poor preparation  Poor preparation and additional polyps likely missed  Repeat colonoscopy 3 months        Allergies   Allergen Reactions   • Hydrocodone-Acetaminophen Hives   • Ketorolac Hives and Dizziness   • Toradol [Ketorolac Tromethamine] Other (See Comments)     hypotension   • Ultram [Tramadol] Nausea Only, GI Intolerance and Vomiting     Current Outpatient Medications   Medication Sig Dispense Refill   • albuterol (2 5 mg/3 mL) 0 083 % nebulizer solution Take 3 mL (2 5 mg total) by nebulization every 6 (six) hours as needed for wheezing or shortness of breath 30 mL 0   • albuterol (PROVENTIL HFA,VENTOLIN HFA) 90 mcg/act inhaler Inhale 2 puffs every 6 (six) hours as needed for wheezing 18 g 1   • buPROPion (WELLBUTRIN XL) 150 mg 24 hr tablet Take 1 tablet (150 mg total) by mouth every morning 90 tablet 1   • diazepam (VALIUM) 10 mg tablet Take 1 tablet (10 mg total) by mouth every 6 (six) hours as needed for anxiety 30 tablet 0   • ergocalciferol (VITAMIN D2) 50,000 units Take 1 capsule (50,000 Units total) by mouth every 14 (fourteen) days 12 capsule 1   • ferrous sulfate 324 (65 Fe) mg Take 1 tablet (324 mg total) by mouth daily before breakfast 30 tablet 1   • gabapentin (NEURONTIN) 100 mg capsule Take 100 mg by mouth 2 (two) times a day  0   • levothyroxine 112 mcg tablet Take 1 tablet (112 mcg total) by mouth daily 90 tablet 1   • nicotine (NICODERM CQ) 21 mg/24 hr TD 24 hr patch Place 1 patch on the skin over 24 hours every 24 hours 28 patch 5   • omeprazole (PriLOSEC) 40 MG capsule Take 1 capsule (40 mg total) by mouth 2 (two) times a day 60 capsule 3   • oxyCODONE-acetaminophen (PERCOCET)  mg per tablet Take 1 tablet by mouth every 6 (six) hours as needed     • sertraline (ZOLOFT) 100 mg tablet Take 1 tablet (100 mg total) by mouth 2 (two) times a day 180 tablet 1   • simvastatin (ZOCOR) 20 mg tablet Take 1 tablet (20 mg total) by mouth daily at bedtime 90 tablet 1   • sucralfate (CARAFATE) 1 g/10 mL suspension Take 10 mL (1 g total) by mouth 3 (three) times a day before meals 2700 mL 1   • tamsulosin (FLOMAX) 0 4 mg take 1 capsule by mouth once daily with dinner 30 capsule 0   • tiotropium (Spiriva HandiHaler) 18 mcg inhalation capsule Place 1 capsule (18 mcg total) into inhaler and inhale daily 30 capsule 5   • fluticasone (FLONASE) 50 mcg/act nasal spray 1 spray into each nostril 2 (two) times a day (Patient taking differently: 1 spray into each nostril as needed) 16 g 5   • pantoprazole (PROTONIX) 40 mg tablet Take 1 tablet (40 mg total) by mouth 2 (two) times a day as needed (acid reflux) (Patient not taking: Reported on 3/6/2023) 180 tablet 1     No current facility-administered medications for this visit       MEDICAL HISTORY:  Past Medical History:   Diagnosis Date   • Allergic sinusitis     last assessed - 28Gma2842   • Anxiety     last assessed - 62LKZ4553   • Arthritis    • Asthma     last assessed - 05GSL3063   • Bilateral lower extremity edema     last assessed - 12SDU2168   • Callus of foot     last assessed - 64Ryj6969   • Chest pain    • Chronic GERD     last assessed - 55EJN4230   • Colon polyp    • Constipation     Resolved - 14AXO5511   • COPD (chronic obstructive pulmonary disease) ([facility] [address] )    • Depression     last assessed - 85BZS9104   • Disease of thyroid gland    • Diverticulitis of colon     last assessed - 63AMI8881   • Dyspepsia     Resolved - 91DQB2661   • Esophageal reflux     last assessed - 00ORM5510   • Essential hypertriglyceridemia     last assessed - 77GMV6014   • Gastroesophageal reflux disease with esophagitis 11/18/2016   • GERD (gastroesophageal reflux disease)    • Gynecological disorder     last assessed - 68TBT7473   • Hypertension     last assessed - 09TVA5650   • Hypokalemia 06/20/2022   • Hypotension     last assessed - 41Bts7604   • Kidney stone    • Migraine    • Morbid obesity ([facility] [address] ) 01/11/2019    Formatting of this note might be different from the original  Added automatically from request for surgery [id number]   • Neuropathy    • Positive depression screening 06/19/2022   • Primary hypertriglyceridemia 06/19/2022   • Pulmonary emphysema ([facility] [address] )     last assessed - 47IHY8246   • Seasonal allergies    • SOB (shortness of breath)    • Urinary frequency     last assessed - 22Jun2016   • Vagina, candidiasis     last assessed - 22Jun2016   • Visual impairment      Past Surgical History:   Procedure Laterality Date   • CARPAL TUNNEL RELEASE      last assessed - 55BBE6816   • CHOLECYSTECTOMY      last assessed - 37WQL8461   • COLONOSCOPY      Complete colonoscopy    • EYE SURGERY      last assessed - 55FQM5847   • [place] RETROGRADE PYELOGRAM  6/16/2022   • [place] RETROGRADE PYELOGRAM  8/16/2022   • FOOT SURGERY      last assessed - 30KST8243   • NC CYSTO BLADDER W/URETERAL CATHETERIZATION Left 7/21/2022    Procedure: CYSTOSCOPY RETROGRADE PYELOGRAM WITH INSERTION STENT URETERAL;  Surgeon: Anurag Patel MD;  Location: [facility];  Service: Urology   • NC CYSTO/URETERO W/LITHOTRIPSY &INDWELL STENT INSRT Left 6/16/2022    Procedure: CYSTOSCOPY URETEROSCOPY WITH LITHOTRIPSY HOLMIUM LASER, RETROGRADE PYELOGRAM AND INSERTION STENT URETERAL;  Surgeon: Siddharth Reyez MD;  Location: [facility];  Service: Urology   • NC CYSTO/URETERO W/LITHOTRIPSY &INDWELL STENT INSRT Left 8/16/2022    Procedure: CYSTOSCOPY USCOPE W/HOLMIUM LASER, RETROGRADE PYELOGRAM&STENT;  Surgeon: Spenser Kirkpatrick MD;  Location: [facility];  Service: Urology   • NC ESOPHAGOGASTRODUODENOSCOPY TRANSORAL DIAGNOSTIC N/A 2/13/2017    Procedure: ESOPHAGOGASTRODUODENOSCOPY (EGD); Surgeon: Thomas Mead MD;  Location: [facility];   Service: Gastroenterology     Social History     Substance and Sexual Activity   Alcohol Use Not Currently    Comment: wine twice a year     Social History     Substance and Sexual Activity   Drug Use Never Social History     Tobacco Use   Smoking Status Some Days   • Packs/day: 1 50   • Years: 30 00   • Pack years: 45 00   • Types: Cigarettes   • Start date:    • Last attempt to quit: 2022   • Years since quittin 5   Smokeless Tobacco Never   Tobacco Comments    last cig 11 days ago     Family History   Problem Relation Age of Onset   • Obesity Mother    • Thyroid disease Mother    • Cancer Mother 70   • Obesity Sister    • Coronary artery disease Sister    • Stroke Sister    • Asthma Sister    • No Known Problems Maternal Aunt    • Diabetes Maternal Uncle    • Lung cancer Maternal Grandmother    • Cancer Maternal Grandfather    • Diabetes Maternal Grandfather    • No Known Problems Paternal Grandmother    • No Known Problems Paternal Grandfather    • Hypertension Other    • Lung cancer Other    • Hypertension Other    • Lung cancer Other    • Lung cancer Other          Objective   Blood pressure 112/74, pulse 74, resp  rate 18, height 5' 7" (1 702 m), weight 114 kg (252 lb), last menstrual period 2018, SpO2 99 %  Body mass index is 39 47 kg/m²  PHYSICAL EXAM:     Constitutional:   Alert, cooperative, no distress  Well developed, well nourished   Eyes:   Ears,Nose,mouth,thro:   Sclera anicteric  Pupils equal and reactive    Oral mucosa moist and pink  Throat normal   Neck:  Supple, symmetrical, trachea midline   Respiratory:   Clear to auscultation bilaterally; no rales, rhonchi or wheezing; respirations unlabored    Cardiovascular:   Regular rate and rhythm; no murmur, rub, or gallop  Gastrointestinal:   Soft, Non-distended; normal bowel sounds; no masses, no organomegaly   Diffusely tender to the point of guarding  (However patient is not experience any pain without palpation) no stigmata of chronic liver disease  Difficult to assess for splenomegaly due to guarding    Rectal: deferred   Genitourinary:   Deferred          Extremities:  No cyanosis, clubbing or edema    Pulses:  2+ and symmetric    Skin:  Neurologic:  No jaundice, rashes, or lesions   Awake, Alert and oriented x 3   Hematologic/lymphatic/immunologic:  Psychiatric:     No palpable cervical lymphadenopathy   Pleasant and cooperative       Lab Results:   Hospital Outpatient Visit on 03/06/2023   Component Date Value   • Baseline HR 03/06/2023 80    • Baseline BP 03/06/2023 130/70    • O2 sat rest 03/06/2023 98    • Stress peak HR 03/06/2023 144    • Post peak BP 03/06/2023 144    • Rate Pressure Product 03/06/2023 20,736 0    • O2 sat peak 03/06/2023 99    • Recovery HR 03/06/2023 93    • Recovery BP 03/06/2023 93    • O2 sat recovery 03/06/2023 98    • Max HR 03/06/2023 146    • Max HR Percent 03/06/2023 86    • Exercise duration (min) 03/06/2023 3    • Exercise duration (sec) 03/06/2023 57    • Estimated workload 03/06/2023 5 7    • Angina Index 03/06/2023 0    • Stress Stage Reached 03/06/2023 2 0    • Protocol Name 03/06/2023 MICHAEL    • Time In Exercise Phase 03/06/2023 00:03:57    • MAX   SYSTOLIC BP 14/30/4269 187    • Max Diastolic Bp 76/77/2331 80    • Max Heart Rate 03/06/2023 146    • Max Predicted Heart Rate 03/06/2023 168    • Reason for Termination 03/06/2023                      Value:Fatigue  Dyspnea, head pressure  Target Heart Rate Achieved     • Test Indication 03/06/2023 Screening for CAD    • Target Hr Formular 03/06/2023 (220 - Age)*85%    • Arrhy During Ex 03/06/2023 ventricular premature beats-isolated    • Chest Pain Statement 03/06/2023 none    Office Visit on 03/03/2023   Component Date Value   • LEUKOCYTE ESTERASE,UA 03/03/2023 -    • NITRITE,UA 03/03/2023 -    • SL AMB POCT UROBILINOGEN 03/03/2023 0 2    • POCT URINE PROTEIN 03/03/2023 trace    •  PH,UA 03/03/2023 6 0    • BLOOD,UA 03/03/2023 hemolyzed trace    • SPECIFIC GRAVITY,UA 03/03/2023 1 015    • KETONES,UA 03/03/2023 -    • BILIRUBIN,UA 03/03/2023 -    • GLUCOSE, UA 03/03/2023 -    •  COLOR,UA 03/03/2023 straw    • CLARITY,UA 03/03/2023 clear    • Color, UA 03/03/2023 Yellow    • Clarity, UA 03/03/2023 Clear    • Specific Gravity, UA 03/03/2023 1 019    • pH, UA 03/03/2023 6 0    • Leukocytes, UA 03/03/2023 Small (A)    • Nitrite, UA 03/03/2023 Positive (A)    • Protein, UA 03/03/2023 Trace (A)    • Glucose, UA 03/03/2023 Negative    • Ketones, UA 03/03/2023 Negative    • Urobilinogen, UA 03/03/2023 <2 0    • Bilirubin, UA 03/03/2023 Negative    • Occult Blood, UA 03/03/2023 Trace (A)    • RBC, UA 03/03/2023 1-2    • WBC, UA 03/03/2023 1-2    • Epithelial Cells 03/03/2023 Occasional    • Bacteria, UA 03/03/2023 Innumerable (A)    • MUCUS THREADS 03/03/2023 Moderate (A)    • Ca Oxalate Elena, UA 03/03/2023 Occasional (A)    Lab on 01/27/2023   Component Date Value   • Cholesterol 01/27/2023 126    • Triglycerides 01/27/2023 91    • HDL, Direct 01/27/2023 48 (L)    • LDL Calculated 01/27/2023 60    • Hemoglobin A1C 01/27/2023 5 2    • EAG 01/27/2023 103    • Sodium 01/27/2023 140    • Potassium 01/27/2023 4 2    • Chloride 01/27/2023 107    • CO2 01/27/2023 24    • ANION GAP 01/27/2023 9    • BUN 01/27/2023 16    • Creatinine 01/27/2023 0 76    • Glucose, Fasting 01/27/2023 136 (H)    • Calcium 01/27/2023 9 1    • AST 01/27/2023 27    • ALT 01/27/2023 16    • Alkaline Phosphatase 01/27/2023 80    • Total Protein 01/27/2023 7 6    • Albumin 01/27/2023 4 2    • Total Bilirubin 01/27/2023 0 71    • eGFR 01/27/2023 90    • WBC 01/27/2023 7 73    • RBC 01/27/2023 3 84    • Hemoglobin 01/27/2023 9 0 (L)    • Hematocrit 01/27/2023 33 2 (L)    • MCV 01/27/2023 87    • MCH 01/27/2023 23 4 (L)    • MCHC 01/27/2023 27 1 (L)    • RDW 01/27/2023 18 3 (H)    • MPV 01/27/2023 11 3    • Platelets 11/05/6875 180    • nRBC 01/27/2023 0    • Neutrophils Relative 01/27/2023 72    • Immat GRANS % 01/27/2023 1    • Lymphocytes Relative 01/27/2023 18    • Monocytes Relative 01/27/2023 7    • Eosinophils Relative 01/27/2023 2    • Basophils Relative 01/27/2023 0    • Neutrophils Absolute 01/27/2023 5 62    • Immature Grans Absolute 01/27/2023 0 05    • Lymphocytes Absolute 01/27/2023 1 38    • Monocytes Absolute 01/27/2023 0 53    • Eosinophils Absolute 01/27/2023 0 12    • Basophils Absolute 01/27/2023 0 03    • TSH 3RD GENERATON 01/27/2023 4  136    • Hep A Total Ab 01/27/2023 Reactive (A)    • Hep B Core Total Ab 01/27/2023 Non-reactive    • Hep B S Ab 01/27/2023 <3 10    • Hepatitis B Surface Ag 01/27/2023 Non-reactive    • Protime 01/27/2023 14 2    • INR 01/27/2023 1 10    • Iron Saturation 01/27/2023 6 (L)    • TIBC 01/27/2023 494 (H)    • Iron 01/27/2023 32 (L)    • Ferritin 01/27/2023 5 (L)    Hospital Outpatient Visit on 01/25/2023   Component Date Value   • Case Report 01/25/2023                      Value:Surgical Pathology Report                         Case: [id number]                                   Authorizing Provider:  Aparna Holly DO  Collected:           01/25/2023 1412              Ordering Location:     [facility] Received:            01/25/2023 1440                                     Endoscopy                                                                    Pathologist:           Bhumika Chilel DO                                                            Specimens:   A) - Duodenum, SECOND PORTION OF DUODENUM R/O CELIACS, COLD BIOPSY                                  B) - Stomach, PROMINENT ERYTHE  FOLDS IN ANTRUM                                                     C) - Stomach, GASTRIC BODY ERYTHEMA R/O H PYLORI, COLD BIOPSY                             • Final Diagnosis 01/25/2023                      Value: This result contains rich text formatting which cannot be displayed here  • Additional Information 01/25/2023                      Value: This result contains rich text formatting which cannot be displayed here  • Gross Description 01/25/2023                      Value: This result contains rich text formatting which cannot be displayed here  Appointment on 01/16/2023   Component Date Value   • Vit D, 25-Hydroxy 01/16/2023 70 6    • TSH 3RD GENERATON 01/16/2023 1 470    • Hemoglobin A1C 01/16/2023 5 1    • EAG 01/16/2023 100    • Cholesterol 01/16/2023 124    • Triglycerides 01/16/2023 82    • HDL, Direct 01/16/2023 50    • LDL Calculated 01/16/2023 58    • Sodium 01/16/2023 143    • Potassium 01/16/2023 3 7    • Chloride 01/16/2023 112 (H)    • CO2 01/16/2023 24    • ANION GAP 01/16/2023 7    • BUN 01/16/2023 15    • Creatinine 01/16/2023 0 90    • Glucose, Fasting 01/16/2023 115 (H)    • Calcium 01/16/2023 9 6    • AST 01/16/2023 29    • ALT 01/16/2023 22    • Alkaline Phosphatase 01/16/2023 83    • Total Protein 01/16/2023 7 7    • Albumin 01/16/2023 3 6    • Total Bilirubin 01/16/2023 0 55    • eGFR 01/16/2023 73    • WBC 01/16/2023 6 84    • RBC 01/16/2023 3 39 (L)    • Hemoglobin 01/16/2023 8 2 (L)    • Hematocrit 01/16/2023 30 2 (L)    • MCV 01/16/2023 89    • MCH 01/16/2023 24 2 (L)    • MCHC 01/16/2023 27 2 (L)    • RDW 01/16/2023 17 9 (H)    • MPV 01/16/2023 12 5    • Platelets 40/69/3848 147 (L)    • nRBC 01/16/2023 0    • Neutrophils Relative 01/16/2023 72    • Immat GRANS % 01/16/2023 1    • Lymphocytes Relative 01/16/2023 18    • Monocytes Relative 01/16/2023 8    • Eosinophils Relative 01/16/2023 1    • Basophils Relative 01/16/2023 0    • Neutrophils Absolute 01/16/2023 4 96    • Immature Grans Absolute 01/16/2023 0 04    • Lymphocytes Absolute 01/16/2023 1 20    • Monocytes Absolute 01/16/2023 0 55    • Eosinophils Absolute 01/16/2023 0 06    • Basophils Absolute 01/16/2023 0 03    • Iron Saturation 01/16/2023 3 (L)    • TIBC 01/16/2023 481 (H)    • Iron 01/16/2023 15 (L)    • Ferritin 01/16/2023 6 (L)      Radiology Results:   Stress test only, exercise    Result Date: 3/6/2023  Narrative: •  Stress ECG: Horizontal ST depression is noted diffusely  However nonspecific as not quite 1 mm   The stress ECG is negative for ischemia after maximal exercise, without reproduction of symptoms  Limited aerobic capacity  Nondiagnostic EKG response  Nondiagnostic symptomatic response  Follow-up testing with imaging may be of additional value and I will communicate with ordering physician in this regard  All reviewed with patient       Stress strip    Result Date: 3/6/2023  Narrative: Chest heaviness  a 1  in recovery Confirmed by REYNA VILLANUEVA ([id number]),  Veena Paul ([id number]) on 6/1/4910 3:36:33 PM    [address] [place] [address],    03/13/23   Cc: [id number] Comprehensive

## 2023-03-13 ENCOUNTER — OFFICE VISIT (OUTPATIENT)
Dept: GASTROENTEROLOGY | Facility: CLINIC | Age: 53
End: 2023-03-13

## 2023-03-13 ENCOUNTER — TELEPHONE (OUTPATIENT)
Dept: GENETICS | Facility: CLINIC | Age: 53
End: 2023-03-13

## 2023-03-13 VITALS
OXYGEN SATURATION: 99 % | RESPIRATION RATE: 18 BRPM | BODY MASS INDEX: 39.55 KG/M2 | DIASTOLIC BLOOD PRESSURE: 74 MMHG | SYSTOLIC BLOOD PRESSURE: 112 MMHG | HEIGHT: 67 IN | HEART RATE: 74 BPM | WEIGHT: 252 LBS

## 2023-03-13 DIAGNOSIS — K86.9 PANCREATIC LESION: ICD-10-CM

## 2023-03-13 DIAGNOSIS — K21.9 GASTROESOPHAGEAL REFLUX DISEASE WITHOUT ESOPHAGITIS: ICD-10-CM

## 2023-03-13 DIAGNOSIS — Z86.010 HISTORY OF COLON POLYPS: ICD-10-CM

## 2023-03-13 DIAGNOSIS — K74.60 CIRRHOSIS OF LIVER WITHOUT ASCITES, UNSPECIFIED HEPATIC CIRRHOSIS TYPE (HCC): Primary | ICD-10-CM

## 2023-03-13 DIAGNOSIS — K26.9 DUODENAL ULCER: ICD-10-CM

## 2023-03-13 DIAGNOSIS — K58.2 IRRITABLE BOWEL SYNDROME WITH BOTH CONSTIPATION AND DIARRHEA: ICD-10-CM

## 2023-03-13 DIAGNOSIS — D50.9 IRON DEFICIENCY ANEMIA, UNSPECIFIED IRON DEFICIENCY ANEMIA TYPE: ICD-10-CM

## 2023-03-13 DIAGNOSIS — K76.0 FATTY LIVER: ICD-10-CM

## 2023-03-13 PROBLEM — Z87.19 HISTORY OF GI BLEED: Status: ACTIVE | Noted: 2023-03-13

## 2023-03-13 PROBLEM — Z86.0100 HISTORY OF COLON POLYPS: Status: ACTIVE | Noted: 2023-03-13

## 2023-03-13 NOTE — TELEPHONE ENCOUNTER
I called Chely to schedule a new patient appointment with the Cancer Risk and Genetics Program       Outcome:  Genetics appointment scheduled for 8/2 at 11 am    Personal/Family History Related to Appointment:  Hx of colon polyps  fhx of prostate ca, lung ca - information has not change since she last spoke with our dept in the Fall       History of Genetic Testing:  Patient reports no personal or family history of genetic testing    Genetics Family History Questionnaire:  I confirmed the patient's e-mail on file as the best e-mail to send an invite link for our genetics family history intake

## 2023-03-13 NOTE — ASSESSMENT & PLAN NOTE
Patient does have a long history of chronic abdominal tenderness  She also has alternating diarrhea and constipation but is more diarrhea predominant  For now I did recommend that she go on a complete lactose-free diet and use Lactaid milk  I would recommend the addition of a fiber supplement such as Benefiber 2 teaspoonfuls or Citrucel 1 heaping tablespoonful mixed in 6 to 8 ounce of noncarbonated liquid daily to help regulate her bowel pattern  Stop drinking all soda  Avoid all diet beverages or artificially sweetened beverages

## 2023-03-13 NOTE — ASSESSMENT & PLAN NOTE
Patient does have an iron deficiency anemia  In looking back over her laboratory test she had a normal hemoglobin back in July 2022  Her hemoglobin dropped to a low of about 7  She did require 1 unit of packed red blood cells in December while in [place]  I will be repeating an upper endoscopy to follow-up on her duodenal ulcer and evaluate for other possible causes of her anemia  She is up-to-date with colonoscopy having had a colonoscopy February 28, 2022 where she was noted to have 2 pedunculated polyps measuring greater than 10 mm in the distal sigmoid colon  These were tubular adenomas  There is no other findings to suggest iron deficiency  Endoscopy in 2020 revealed 16 polyps and a poor preparation  Eventually I will recommend a colonoscopy sooner than later probably sometime in the next 3 to 4 months  Also consider hematology consultation  If colonoscopy and EGD are unrevealing for source for anemia, consider small bowel capsule study

## 2023-03-13 NOTE — ASSESSMENT & PLAN NOTE
Patient was noted to have an enlarged liver with a nodular surface and a mildly enlarged spleen  Ultrasound elastography revealed F4-cirrhosis  She has never consumed much a lot of alcohol  She does have a BMI of 39 4  Her lipid profile looks okay  No history of type 2 diabetes  I suspect her liver disease is on the basis of Ledezma/NAFLD   (Nonalcoholic fatty liver disease)  Current MELD-Na 7  At this point time would recommend completing her work-up  Check work-up for autoimmune and metabolic liver diseases  Check alpha-fetoprotein tumor marker      She does have an appointment with hepatology on March 17  She will need ultrasound and alpha-fetoprotein performed every 6 months for hepatoma surveillance    I did review the natural history of liver disease and the potential progression of liver disease  I will leave further discussion of this to the hepatologist   We did talk about the possibility of developing fluid in the abdomen called ascites, confusional state: Encephalopathy and gastrointestinal bleeding from esophageal varices  She did not have any varices at time of her endoscopy  I have asked her to go to the hospital or call 911 if she develops fever greater than 100 5, black stools, vomiting blood, increased abdominal girth etc     She should avoid swimming and on chlorinated leger  She should avoid eating undercooked or raw seafood and meat

## 2023-03-13 NOTE — ASSESSMENT & PLAN NOTE
Patient does have a long history of gastroesophageal reflux disease  Symptoms are well controlled on high-dose PPI at this time  She is currently on omeprazole 40 mg twice a day  Lifestyle modifications for gastroesophageal reflux disease were discussed and include limiting fried and fatty foods, mints, chocolates, carbonated and caffeinated beverages , and alcohol, etc   Avoid lying down for 2-3 hours after meals  If you have nighttime symptoms consider raising the head of the bed up on 4-6 inch blocks  Pillows typically are not useful  If you are overweight, weight loss will be helpful

## 2023-03-13 NOTE — ASSESSMENT & PLAN NOTE
Ultrasound done January 27, 2023 suggested a 1 4 x 1 2 x 1 2 cm ovoid hypoechoic focus in the region of the pancreatic head 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    Would recommend further imaging with MRI/MRCP to further evaluate

## 2023-03-13 NOTE — ASSESSMENT & PLAN NOTE
Patient was hospitalized in [place] with a severe anemia  She also presented with dark stools  She was noted to have acute duodenal ulcer without hemorrhage or perforation  No endoscopic therapy was performed  She did require 1 unit of packed red blood cells  Despite taking iron her hemoglobin only increased to 9 on January 27 laboratory test   Recommend repeating CBC at this time  Repeat iron studies  Increase iron supplement to 1 tablet twice a day for now

## 2023-03-13 NOTE — ASSESSMENT & PLAN NOTE
Patient was noted to have 16 significant polyps back in October 2020 in the face of a poor preparation  Follow-up colonoscopy in 2022 revealed 2 polyps over 10 mm that were tubular adenomas  Based upon this I would recommend genetics consultation given multiple colon polyps and a 27-year-old woman

## 2023-03-13 NOTE — PATIENT INSTRUCTIONS
Cirrhosis ([address] )  Patient was noted to have an enlarged liver with a nodular surface and a mildly enlarged spleen  Ultrasound elastography revealed F4-cirrhosis  She has never consumed much a lot of alcohol  She does have a BMI of 39 4  Her lipid profile looks okay  No history of type 2 diabetes  I suspect her liver disease is on the basis of Ledezma/NAFLD   (Nonalcoholic fatty liver disease)  Current MELD-Na 7  At this point time would recommend completing her work-up  Check work-up for autoimmune and metabolic liver diseases  Check alpha-fetoprotein tumor marker      She does have an appointment with hepatology on March 17  She will need ultrasound and alpha-fetoprotein performed every 6 months for hepatoma surveillance    I did review the natural history of liver disease and the potential progression of liver disease  I will leave further discussion of this to the hepatologist   We did talk about the possibility of developing fluid in the abdomen called ascites, confusional state: Encephalopathy and gastrointestinal bleeding from esophageal varices  She did not have any varices at time of her endoscopy  I have asked her to go to the hospital or call 911 if she develops fever greater than 100 5, black stools, vomiting blood, increased abdominal girth etc     She should avoid swimming and on chlorinated leger  She should avoid eating undercooked or raw seafood and meat  Fatty liver  Please see comments under cirrhosis    Gastroesophageal reflux disease  Patient does have a long history of gastroesophageal reflux disease  Symptoms are well controlled on high-dose PPI at this time  She is currently on omeprazole 40 mg twice a day  Lifestyle modifications for gastroesophageal reflux disease were discussed and include limiting fried and fatty foods, mints, chocolates, carbonated and caffeinated beverages , and alcohol, etc   Avoid lying down for 2-3 hours after meals    If you have nighttime symptoms consider raising the head of the bed up on 4-6 inch blocks  Pillows typically are not useful  If you are overweight, weight loss will be helpful  History of GI bleed  Patient was hospitalized in [place] with a severe anemia  She also presented with dark stools  She was noted to have acute duodenal ulcer without hemorrhage or perforation  No endoscopic therapy was performed  She did require 1 unit of packed red blood cells  Despite taking iron her hemoglobin only increased to 9 on January 27 laboratory test   Recommend repeating CBC at this time  Repeat iron studies  Increase iron supplement to 1 tablet twice a day for now  Iron deficiency anemia  Patient does have an iron deficiency anemia  In looking back over her laboratory test she had a normal hemoglobin back in July 2022  Her hemoglobin dropped to a low of about 7  She did require 1 unit of packed red blood cells in December while in [place]  I will be repeating an upper endoscopy to follow-up on her duodenal ulcer and evaluate for other possible causes of her anemia  She is up-to-date with colonoscopy having had a colonoscopy February 28, 2022 where she was noted to have 2 pedunculated polyps measuring greater than 10 mm in the distal sigmoid colon  These were tubular adenomas  There is no other findings to suggest iron deficiency  Endoscopy in 2020 revealed 16 polyps and a poor preparation  Eventually I will recommend a colonoscopy sooner than later probably sometime in the next 3 to 4 months  Also consider hematology consultation  If colonoscopy and EGD are unrevealing for source for anemia, consider small bowel capsule study  Pancreatic lesion  Ultrasound done January 27, 2023 suggested a 1 4 x 1 2 x 1 2 cm ovoid hypoechoic focus in the region of the pancreatic head 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    Would recommend further imaging with MRI/MRCP to further evaluate    History of colon polyps  Patient was noted to have 16 significant polyps back in October 2020 in the face of a poor preparation  Follow-up colonoscopy in 2022 revealed 2 polyps over 10 mm that were tubular adenomas  Based upon this I would recommend genetics consultation given multiple colon polyps and a 59-year-old woman  Duodenal ulcer  Please see comments of history of GI bleed    Irritable bowel syndrome with both constipation and diarrhea  Patient does have a long history of chronic abdominal tenderness  She also has alternating diarrhea and constipation but is more diarrhea predominant  For now I did recommend that she go on a complete lactose-free diet and use Lactaid milk  I would recommend the addition of a fiber supplement such as Benefiber 2 teaspoonfuls or Citrucel 1 heaping tablespoonful mixed in 6 to 8 ounce of noncarbonated liquid daily to help regulate her bowel pattern  Stop drinking all soda  Avoid all diet beverages or artificially sweetened beverages

## 2023-03-17 ENCOUNTER — OFFICE VISIT (OUTPATIENT)
Dept: GASTROENTEROLOGY | Facility: CLINIC | Age: 53
End: 2023-03-17

## 2023-03-17 VITALS
WEIGHT: 252 LBS | DIASTOLIC BLOOD PRESSURE: 64 MMHG | BODY MASS INDEX: 39.55 KG/M2 | HEIGHT: 67 IN | RESPIRATION RATE: 18 BRPM | SYSTOLIC BLOOD PRESSURE: 108 MMHG | HEART RATE: 87 BPM | OXYGEN SATURATION: 98 %

## 2023-03-17 DIAGNOSIS — K74.60 CIRRHOSIS OF LIVER WITHOUT ASCITES, UNSPECIFIED HEPATIC CIRRHOSIS TYPE (HCC): Primary | ICD-10-CM

## 2023-03-17 DIAGNOSIS — K76.0 FATTY LIVER: ICD-10-CM

## 2023-03-17 NOTE — PROGRESS NOTES
[facility] - Outpatient Follow-up Note  Chely Salgado 46 y o  female MRN: [id number]  Encounter: [id number]          ASSESSMENT AND PLAN:      Fatty liver on imaging with nodular appearing liver  Patient has several risk factors for fatty liver disease:  Abdominal obesity, hyperlipidemia, h/o medically treated HTN, insulin resistance  She has no physical, laboratory or endoscopic evidence of cirrhosis or portal HTN  Ultrasound showed a nodular appearing liver with elastography showing F4 fibrosis  I discussed with Ms Monica Salgado the natural history of fatty liver disease, including risks for development, and risk for progression to cirrhosis and its complications  We reviewed that there are no current medications that are FDA approved for the specific management of fatty liver disease  I explained that there are many, many compounds (both new and medications currently used for other indications) that are currently being studied to treat several aspects of fatty liver disease, including decreasing hepatic fat, hepatic inflammation, hepatic fibrosis, as well reducing risk factors for the development of fatty liver (primarily through weight loss)  We do not currently have active clinical trials through my office, but I will notify Ms Moinca Salgado when we do if she meets criteria for inclusion  I explained that even if liver enzymes are normal, it does not rule out low levels of active fatty inflammation in the liver  Ms Monica Salgado will have additional blood work done to rule out other causes of chronic liver disease/elevated liver enzymes, including  autoimmune hepatitis, hemochromatosis, Willian's disease and Alpha-1-antitrypsin deficiency    NAFLD Fibrosis Score is a non-invasive calculation based on patient characteristics and basic routine labs (Age, BMI, AST, ALT, Platelet count, Albumin and presence/absence of insulin resistance)  Ms Bill Lipscomb NAFLD Fibrosis score is 0 518    Scores higher than 0 675 correlate with advanced fibrosis with a relatively low positive predictive value  Imaging show no splenomegaly or signs of portal HTN  MRI has been requested for further evaluation  I counseled Ms Janet Chavez on the importance of weight loss through lifestyle modification, primary diet and exercise, and the benefits of seeking input of a dietician/nutritionist as well as consultation with a medical weight loss specialist or bariatric surgery team     Ms Janet Chavez will have the testing done as outlined above and follow-up in 6 months  I will review results as they return and recommend additional work-up if needed  She will continue to follow-up with Dr Toañ Guzmán, who will refer patient back with any evidence of decompensated liver disease  Health Maintenance for Possibly cirrhotic state:  Ms Janet Chavez was counseled to avoid alcohol and tobacco products  Ms Janet Chavez was also advised to avoid raw seafood/oysters and from swimming in unchlorinated leger, particularly from the [facility], due to increased risk of infection from Vibrio Vulnificus  Ms Janet Chavez was also instructed to seek immediate medical attention should she develop fevers over 101 F, evidence of GI bleeding (black or bloody stools, bloody or coffee ground emesis) or confusion  Ms Janet Chavez was advised to avoid NSAIDs, if possible, due to increase risk of renal dysfunction, and advised that if she should use acetaminophen, dose should not exceed 2 grams/day  Ms Janet Chavez was recommend to remain up to date with adult vaccination, including influenza, pneumovax and meningococcus  Inactivated HSV and zoster vaccine is safe and encouraged by PCP  Ms Janet Chavez was instructed to call either our office or that of her referring doctor should she develop worsening symptoms related to lower extremity edema, ascites, jaundice or excessive bruising/bleeding  FOLLOW-UP:  Return in about 6 months (around 9/17/2023)       VISIT DIAGNOSES AND ORDERS: 1  Cirrhosis of liver without ascites, unspecified hepatic cirrhosis type ([facility] [address] )    2  Fatty liver      Orders Placed This Encounter   Procedures   • IgG, IgA, IgM     ______________________________________________________________________    SUBJECTIVE:           Ms Noemi Peace is a 46 y o  female with medical history as outlined below including several components of the metabolic syndrome, who comes in today to establish care with hepatology after abdominal ultrasound and elastography suggested cirrhosis due to fatty liver disease  She has been following with my GI colleagues, Dwight Styles and Dr Dian Amato for GERD and iron deficiency anemia and was found to have a fatty appearing liver on imaging with further work-up showing F4 fibrosis on elastography  She denies a known history of advanced liver disease in the past   She has a family history of cirrhosis in her mother, likely secondary to fatty liver disease  Ms Hany Hernandez denies recent or history of yellow eyes/skin, dark urine, GI bleeding, abdominal distention with fluid, lower extremity swelling, easy bruising, excessive bleeding, pruritus or confusion  She has had work-up for iron deficiency anemia including upper endoscopy and colonoscopy  EGD done on January 5, 2023 suggested possible ulcer in the duodenal bulb with heaped up mucosa  Biopsies appeared benign  She denies a history of heavy alcohol use  She states she has been heavy all her life  She states she was on medication treatment for hypertension until recently  She has been treated for hyperlipidemia and states she has insulin resistance but is not currently on diabetes medications  She has been following with weight management in the past and intends to follow-up and consider bariatric surgery           REVIEW OF SYSTEMS     Review of Systems    Historical Information   Patient Active Problem List   Diagnosis   • Anxiety   • Benign hypertension   • Depression   • Hypercholesterolemia   • Hypothyroidism   • Tobacco abuse disorder   • Vitamin D deficiency   • Chest pain on breathing   • Gastroparesis   • Edema, lower extremity   • Elevated fasting glucose   • COVID-19 vaccination refused   • Centrilobular emphysema (HCC)   • Nephrolithiasis   • Degeneration of lumbar or lumbosacral intervertebral disc   • Gastroesophageal reflux disease   • Intermittent claudication (HCC)   • Seasonal allergic rhinitis   • Spinal stenosis of lumbar region   • Iron deficiency anemia   • Hydroureteronephrosis   • Left ureteral stone with hydronephrosis   • Obesity (BMI 35 0-39 9 without comorbidity)   • Severe obesity (HCC)   • Acute duodenal ulcer with bleeding   • Fatty liver   • Very heavy cigarette smoker   • Abnormal stress ECG with treadmill   • Cirrhosis ([address] )   • History of GI bleed   • Pancreatic lesion   • History of colon polyps   • Duodenal ulcer   • Irritable bowel syndrome with both constipation and diarrhea     Social History     Substance and Sexual Activity   Alcohol Use Not Currently    Comment: wine twice a year     Social History     Substance and Sexual Activity   Drug Use Never     Social History     Tobacco Use   Smoking Status Some Days   • Packs/day: 1 50   • Years: 30 00   • Pack years: 45 00   • Types: Cigarettes   • Start date:    • Last attempt to quit: 2022   • Years since quittin 6   Smokeless Tobacco Never   Tobacco Comments    last cig 11 days ago       Meds/Allergies       Current Outpatient Medications:   •  albuterol (2 5 mg/3 mL) 0 083 % nebulizer solution  •  albuterol (PROVENTIL HFA,VENTOLIN HFA) 90 mcg/act inhaler  •  buPROPion (WELLBUTRIN XL) 150 mg 24 hr tablet  •  diazepam (VALIUM) 10 mg tablet  •  ergocalciferol (VITAMIN D2) 50,000 units  •  ferrous sulfate 324 (65 Fe) mg  •  fluticasone (FLONASE) 50 mcg/act nasal spray  •  gabapentin (NEURONTIN) 100 mg capsule  •  levothyroxine 112 mcg tablet  •  nicotine (NICODERM CQ) 21 mg/24 hr TD 24 hr patch  •  omeprazole (PriLOSEC) 40 MG capsule  •  oxyCODONE-acetaminophen (PERCOCET)  mg per tablet  •  sertraline (ZOLOFT) 100 mg tablet  •  sucralfate (CARAFATE) 1 g/10 mL suspension  •  tamsulosin (FLOMAX) 0 4 mg  •  tiotropium (Spiriva HandiHaler) 18 mcg inhalation capsule  •  pantoprazole (PROTONIX) 40 mg tablet  •  simvastatin (ZOCOR) 20 mg tablet    Allergies   Allergen Reactions   • Hydrocodone-Acetaminophen Hives   • Ketorolac Hives and Dizziness   • Toradol [Ketorolac Tromethamine] Other (See Comments)     hypotension   • Ultram [Tramadol] Nausea Only, GI Intolerance and Vomiting           Objective     Blood pressure 108/64, pulse 87, resp  rate 18, height 5' 7" (1 702 m), weight 114 kg (252 lb), last menstrual period 04/05/2018, SpO2 98 %  Body mass index is 39 47 kg/m²        PHYSICAL EXAM:      Physical Exam    Lab Results:   Lab Results   Component Value Date    K 4 2 01/27/2023    CO2 24 01/27/2023     01/27/2023    BUN 16 01/27/2023    CREATININE 0 76 01/27/2023     Lab Results   Component Value Date    WBC 7 73 01/27/2023    HGB 9 0 (L) 01/27/2023    HCT 33 2 (L) 01/27/2023    MCV 87 01/27/2023     01/27/2023     Lab Results   Component Value Date    TP 7 6 01/27/2023    AST 27 01/27/2023    ALT 16 01/27/2023    INR 1 10 01/27/2023      Lab Results   Component Value Date    IRON 32 (L) 01/27/2023    FERRITIN 5 (L) 01/27/2023     Lab Results   Component Value Date    HDL 48 (L) 01/27/2023    TRIG 91 01/27/2023     NAFLD Fibrosis Score is:  518    NAFLD Score Correlated Fibrosis Severity   <-1 455 F0-F2   -1 455-0 676 Indeterminate Score   >0 676 F3-F4   **Fibrosis Severity Scale: F0 = no fibrosis; F1= mild fibrosis; F2 = moderate fibrosis; F3 = severe fibrosis; F4 = cirrhosis    NAFLD Score Component Values:  Component Value Date   Age: 46 y o      BMI: 39 47 kg/m²    IFG or DM: No    AST: 27 U/L 1/27/2023   ALT: 16 U/L 1/27/2023   Platelet: 682 Thousands/uL 1/27/2023   Albumin: 4 2 g/dL 1/27/2023     MELD-Na score: 7 at 1/27/2023 10:25 AM  MELD score: 7 at 1/27/2023 10:25 AM  Calculated from:  Serum Creatinine: 0 76 mg/dL (Using min of 1 mg/dL) at 1/27/2023 10:25 AM  Serum Sodium: 140 mmol/L (Using max of 137 mmol/L) at 1/27/2023 10:25 AM  Total Bilirubin: 0 71 mg/dL (Using min of 1 mg/dL) at 1/27/2023 10:25 AM  INR(ratio): 1 10 at 1/27/2023 10:25 AM  Age: 46 years      Radiology Results:   Stress test only, exercise    Result Date: 3/6/2023  Narrative: •  Stress ECG: Horizontal ST depression is noted diffusely  However nonspecific as not quite 1 mm  The stress ECG is negative for ischemia after maximal exercise, without reproduction of symptoms  Limited aerobic capacity  Nondiagnostic EKG response  Nondiagnostic symptomatic response  Follow-up testing with imaging may be of additional value and I will communicate with ordering physician in this regard  All reviewed with patient       Stress strip    Result Date: 3/6/2023  Narrative: Chest heaviness  a 1  in recovery Confirmed by REYNA VILLANUEVA ([id number]),  Pia Phelps ([id number]) on 1/9/3856 3:36:33 Nichole Teague MD

## 2023-03-17 NOTE — PATIENT INSTRUCTIONS
As discussed today:    Medications:  Continue taking your current medications without changes  Laboratory Testing (Listed below):  Please have blood work drawn when able  Radiology/Diagnostic Testing:  Please schedule a multiphase MRI done as requested  Avoid alcohol and tobacco products  Also avoid raw seafood/oysters and avoid swimming/wading in unchlorinated leger, particularly from the [facility], due to increased risk of infection from a bacteria called Vibrio Vulnificus  Avoid medications called NSAID's (Motrin/Ibuprofen, Naproxen/Naprosyn/Aleve) if possible, due to increase risk of kidney dysfunction, and if you use medications containing acetaminophen (Tylenol, percocet, Endocet, and many cough/cold medications), the dose should not exceed 2 grams/day  Seek immediate medical attention if you develop fevers over 101 F, have evidence of GI bleeding (black or bloody stools, bloody or coffee ground emesis) or confusion  Call our office if you develop worsening symptoms related to lower extremity edema, ascites, jaundice or excessive bruising/bleeding

## 2023-03-22 ENCOUNTER — HOSPITAL ENCOUNTER (OUTPATIENT)
Dept: MRI IMAGING | Facility: HOSPITAL | Age: 53
Discharge: HOME | End: 2023-03-22
Attending: INTERNAL MEDICINE

## 2023-03-22 ENCOUNTER — LAB (OUTPATIENT)
Dept: LAB | Facility: HOSPITAL | Age: 53
End: 2023-03-22

## 2023-03-22 DIAGNOSIS — K74.60 CIRRHOSIS OF LIVER WITHOUT ASCITES, UNSPECIFIED HEPATIC CIRRHOSIS TYPE (HCC): ICD-10-CM

## 2023-03-22 DIAGNOSIS — R73.01 ELEVATED FASTING GLUCOSE: ICD-10-CM

## 2023-03-22 DIAGNOSIS — D50.9 IRON DEFICIENCY ANEMIA, UNSPECIFIED IRON DEFICIENCY ANEMIA TYPE: ICD-10-CM

## 2023-03-22 DIAGNOSIS — K76.0 FATTY LIVER: ICD-10-CM

## 2023-03-22 DIAGNOSIS — K86.9 PANCREATIC LESION: ICD-10-CM

## 2023-03-22 LAB
AFP-TM SERPL-MCNC: 6.4 NG/ML (ref 0.5–8)
ERYTHROCYTE [DISTWIDTH] IN BLOOD BY AUTOMATED COUNT: 22 % (ref 11.6–15.1)
FERRITIN SERPL-MCNC: 9 NG/ML (ref 8–388)
FOLATE SERPL-MCNC: 8.2 NG/ML (ref 3.1–17.5)
HCT VFR BLD AUTO: 38.7 % (ref 34.8–46.1)
HGB BLD-MCNC: 10.4 G/DL (ref 11.5–15.4)
IGA SERPL-MCNC: 376 MG/DL (ref 70–400)
IGG SERPL-MCNC: 858 MG/DL (ref 700–1600)
IGM SERPL-MCNC: 242 MG/DL (ref 40–230)
INR PPP: 1.05 (ref 0.84–1.19)
INSULIN SERPL-ACNC: 142.4 MU/L (ref 3–25)
IRON SATN MFR SERPL: 7 % (ref 15–50)
IRON SERPL-MCNC: 35 UG/DL (ref 50–170)
MCH RBC QN AUTO: 22.8 PG (ref 26.8–34.3)
MCHC RBC AUTO-ENTMCNC: 26.9 G/DL (ref 31.4–37.4)
MCV RBC AUTO: 85 FL (ref 82–98)
PLATELET # BLD AUTO: 145 THOUSANDS/UL (ref 149–390)
PMV BLD AUTO: 10 FL (ref 8.9–12.7)
PROTHROMBIN TIME: 13.7 SECONDS (ref 11.6–14.5)
RBC # BLD AUTO: 4.56 MILLION/UL (ref 3.81–5.12)
TIBC SERPL-MCNC: 510 UG/DL (ref 250–450)
VIT B12 SERPL-MCNC: 331 PG/ML (ref 100–900)
WBC # BLD AUTO: 6.51 THOUSAND/UL (ref 4.31–10.16)

## 2023-03-22 RX ADMIN — GADOBUTROL 11 ML: 604.72 INJECTION INTRAVENOUS at 14:05

## 2023-03-23 ENCOUNTER — HOSPITAL ENCOUNTER (OUTPATIENT)
Dept: NUCLEAR MEDICINE | Facility: HOSPITAL | Age: 53
End: 2023-03-23

## 2023-03-23 ENCOUNTER — HOSPITAL ENCOUNTER (OUTPATIENT)
Dept: NON INVASIVE DIAGNOSTICS | Facility: HOSPITAL | Age: 53
Discharge: HOME | End: 2023-03-23

## 2023-03-23 VITALS
WEIGHT: 252 LBS | SYSTOLIC BLOOD PRESSURE: 140 MMHG | HEART RATE: 75 BPM | BODY MASS INDEX: 39.55 KG/M2 | HEIGHT: 67 IN | RESPIRATION RATE: 16 BRPM | DIASTOLIC BLOOD PRESSURE: 84 MMHG | OXYGEN SATURATION: 98 %

## 2023-03-23 DIAGNOSIS — R94.39 ABNORMAL STRESS ECG WITH TREADMILL: ICD-10-CM

## 2023-03-23 LAB
A1AT SERPL-MCNC: 244 MG/DL (ref 101–187)
ACE SERPL-CCNC: 61 U/L (ref 14–82)
ACTIN IGG SERPL-ACNC: 11 UNITS (ref 0–19)
ANA SER QL IA: NEGATIVE
CERULOPLASMIN SERPL-MCNC: 26.9 MG/DL (ref 19–39)
ENDOMYSIUM IGA SER QL: NEGATIVE
GLIADIN PEPTIDE IGA SER-ACNC: 7 UNITS (ref 0–19)
GLIADIN PEPTIDE IGG SER-ACNC: 2 UNITS (ref 0–19)
IGA SERPL-MCNC: 417 MG/DL (ref 87–352)
LKM-1 AB SER-ACNC: <20.1 UNITS (ref 0–20)
MITOCHONDRIA M2 IGG SER-ACNC: <20 UNITS (ref 0–20)
NUC STRESS EJECTION FRACTION: 63 %
RATE PRESSURE PRODUCT: NORMAL
SL CV REST NUCLEAR ISOTOPE DOSE: 15.1 MCI
SL CV STRESS NUCLEAR ISOTOPE DOSE: 45.8 MCI
SL CV STRESS RECOVERY BP: NORMAL MMHG
SL CV STRESS RECOVERY HR: 91 BPM
SL CV STRESS RECOVERY O2 SAT: 99 %
STRESS ANGINA INDEX: 0
STRESS BASELINE BP: NORMAL MMHG
STRESS BASELINE HR: 75 BPM
STRESS O2 SAT REST: 98 %
STRESS PEAK HR: 134 BPM
STRESS POST O2 SAT PEAK: 99 %
STRESS POST PEAK BP: 116 MMHG
STRESS ST DEPRESSION: 0 MM
TTG IGA SER-ACNC: <2 U/ML (ref 0–3)
TTG IGG SER-ACNC: <2 U/ML (ref 0–5)

## 2023-03-23 RX ADMIN — REGADENOSON 0.4 MG: 0.08 INJECTION, SOLUTION INTRAVENOUS at 09:28

## 2023-03-26 NOTE — RESULT ENCOUNTER NOTE
Please inform patient that blood test for celiac disease are negative  Iron studies suggest iron deficiency, CBC did reveal a low hemoglobin at 10 4 but improved from 1 month ago  Her platelet count is low at 145, and has been lower in the past so appears relatively stable  Blood test for rare causes of liver disease are negative thus far  Vitamin B12 is normal but low normal   I would recommend that she take a vitamin B12 supplement such as 5000 mcg up to 1000 mcg daily  It is best to get the under the tongue (sublingual) formula  Few other blood tests are still pending    Thank you

## 2023-03-27 ENCOUNTER — HOSPITAL ENCOUNTER (OUTPATIENT)
Dept: RADIOLOGY | Facility: IMAGING CENTER | Age: 53
Discharge: HOME | End: 2023-03-27

## 2023-03-27 DIAGNOSIS — M54.12 RADICULOPATHY, CERVICAL REGION: ICD-10-CM

## 2023-03-28 LAB
CHEST PAIN STATEMENT: NORMAL
MAX DIASTOLIC BP: 84 MMHG
MAX HEART RATE: 134 BPM
MAX PREDICTED HEART RATE: 168 BPM
MAX. SYSTOLIC BP: 140 MMHG
PROTOCOL NAME: NORMAL
REASON FOR TERMINATION: NORMAL
SOLUBLE LIVER IGG SER IA-ACNC: 4.6 UNITS (ref 0–20)
TARGET HR FORMULA: NORMAL
TIME IN EXERCISE PHASE: NORMAL

## 2023-03-28 NOTE — RESULT ENCOUNTER NOTE
Please inform patient that blood test for more rare causes of liver disease are negative to date  Her insulin level was elevated at 142, I noticed that it looks like this is already being addressed    Thank you

## 2023-03-30 ENCOUNTER — TELEPHONE (OUTPATIENT)
Dept: SURGERY | Facility: CLINIC | Age: 53
End: 2023-03-30

## 2023-03-31 ENCOUNTER — TELEPHONE (OUTPATIENT)
Dept: INTERNAL MEDICINE CLINIC | Age: 53
End: 2023-03-31

## 2023-03-31 DIAGNOSIS — D50.8 IRON DEFICIENCY ANEMIA SECONDARY TO INADEQUATE DIETARY IRON INTAKE: ICD-10-CM

## 2023-03-31 DIAGNOSIS — F41.8 DEPRESSION WITH ANXIETY: ICD-10-CM

## 2023-03-31 DIAGNOSIS — Z72.0 TOBACCO ABUSE DISORDER: ICD-10-CM

## 2023-03-31 NOTE — TELEPHONE ENCOUNTER
NOV 7/17/23      Pt needs refill of:    varenicline 0 5 MG X 11 & 1 MG X 42 tablet therapy pack         Dr Finn Acevedo said she has to call to get it refilled per Telephone encounter on 2/14

## 2023-04-01 NOTE — RESULT ENCOUNTER NOTE
Please inform patient that the MRI confirmed evidence of cirrhosis of the liver  Similar to the elastography  There is a mildly enlarged spleen  This goes along with the underlying liver disease  Mildly enlarged lymph node as seen on the ultrasound measuring 15 mm  The exact cause for this enlarged lymph node is not clear  Please recall for repeat MRI in 6 months to follow this  Alternatively could consider hematology consultation

## 2023-04-03 ENCOUNTER — TELEPHONE (OUTPATIENT)
Dept: OTHER | Facility: OTHER | Age: 53
End: 2023-04-03

## 2023-04-03 DIAGNOSIS — K86.9 PANCREATIC LESION: ICD-10-CM

## 2023-04-03 DIAGNOSIS — K74.60 CIRRHOSIS OF LIVER WITHOUT ASCITES, UNSPECIFIED HEPATIC CIRRHOSIS TYPE (HCC): Primary | ICD-10-CM

## 2023-04-03 RX ORDER — DIAZEPAM 10 MG/1
10 TABLET ORAL EVERY 6 HOURS PRN
Qty: 30 TABLET | Refills: 0 | Status: SHIPPED | OUTPATIENT
Start: 2023-04-03

## 2023-04-03 RX ORDER — FERROUS SULFATE TAB EC 324 MG (65 MG FE EQUIVALENT) 324 (65 FE) MG
324 TABLET DELAYED RESPONSE ORAL
Qty: 30 TABLET | Refills: 1 | Status: SHIPPED | OUTPATIENT
Start: 2023-04-03

## 2023-04-03 RX ORDER — VARENICLINE TARTRATE 0.5 MG/1
0.5 TABLET, FILM COATED ORAL 2 TIMES DAILY
Qty: 60 TABLET | Refills: 0 | Status: SHIPPED | OUTPATIENT
Start: 2023-04-03

## 2023-04-03 NOTE — TELEPHONE ENCOUNTER
Patient called today regarding should she continue taking the medication Tamsulosin  Please call Patient back

## 2023-04-05 ENCOUNTER — ANESTHESIA (OUTPATIENT)
Dept: GASTROENTEROLOGY | Facility: HOSPITAL | Age: 53
End: 2023-04-05

## 2023-04-05 ENCOUNTER — ANESTHESIA EVENT (OUTPATIENT)
Dept: GASTROENTEROLOGY | Facility: HOSPITAL | Age: 53
End: 2023-04-05

## 2023-04-05 RX ORDER — PROPOFOL 10 MG/ML
INJECTION, EMULSION INTRAVENOUS AS NEEDED
Status: DISCONTINUED | OUTPATIENT
Start: 2023-04-05 | End: 2023-04-05

## 2023-04-05 RX ORDER — LIDOCAINE HYDROCHLORIDE 20 MG/ML
INJECTION, SOLUTION EPIDURAL; INFILTRATION; INTRACAUDAL; PERINEURAL AS NEEDED
Status: DISCONTINUED | OUTPATIENT
Start: 2023-04-05 | End: 2023-04-05

## 2023-04-05 RX ORDER — GLYCOPYRROLATE 0.2 MG/ML
INJECTION INTRAMUSCULAR; INTRAVENOUS AS NEEDED
Status: DISCONTINUED | OUTPATIENT
Start: 2023-04-05 | End: 2023-04-05

## 2023-04-05 RX ADMIN — PROPOFOL 50 MG: 10 INJECTION, EMULSION INTRAVENOUS at 11:15

## 2023-04-05 RX ADMIN — LIDOCAINE HYDROCHLORIDE 100 MG: 20 INJECTION, SOLUTION EPIDURAL; INFILTRATION; INTRACAUDAL; PERINEURAL at 11:11

## 2023-04-05 RX ADMIN — PROPOFOL 50 MG: 10 INJECTION, EMULSION INTRAVENOUS at 11:31

## 2023-04-05 RX ADMIN — PROPOFOL 50 MG: 10 INJECTION, EMULSION INTRAVENOUS at 11:18

## 2023-04-05 RX ADMIN — PROPOFOL 100 MG: 10 INJECTION, EMULSION INTRAVENOUS at 11:11

## 2023-04-05 RX ADMIN — GLYCOPYRROLATE 0.2 MG: 0.2 INJECTION INTRAMUSCULAR; INTRAVENOUS at 11:07

## 2023-04-05 NOTE — ANESTHESIA POSTPROCEDURE EVALUATION
Post-Op Assessment Note    CV Status:  Stable  Pain Score: 0    Pain management: adequate     Mental Status:  Alert   Hydration Status:  Stable   PONV Controlled:  None   Airway Patency:  Patent      Post Op Vitals Reviewed: Yes      Staff: CRNA         No notable events documented      BP   111/58   Temp      Pulse  90   Resp   16   SpO2   98%

## 2023-04-05 NOTE — ANESTHESIA PREPROCEDURE EVALUATION
Procedure:  EGD    Relevant Problems   CARDIO   (+) Benign hypertension   (+) Chest pain on breathing   (+) Hypercholesterolemia      ENDO   (+) Hypothyroidism      GI/HEPATIC   (+) Acute duodenal ulcer with bleeding   (+) Cirrhosis (HCC)   (+) Duodenal ulcer   (+) Fatty liver   (+) Gastroesophageal reflux disease   (+) Pancreatic lesion      /RENAL   (+) Hydroureteronephrosis   (+) Left ureteral stone with hydronephrosis   (+) Nephrolithiasis      HEMATOLOGY   (+) Iron deficiency anemia      MUSCULOSKELETAL   (+) Degeneration of lumbar or lumbosacral intervertebral disc      NEURO/PSYCH   (+) Anxiety   (+) Depression   (+) History of GI bleed   (+) History of colon polyps   (+) Intermittent claudication (HCC)      PULMONARY   (+) Centrilobular emphysema (HCC)   (+) Very heavy cigarette smoker        Physical Exam    Airway    Mallampati score: I  TM Distance: >3 FB  Neck ROM: full     Dental       Cardiovascular  Cardiovascular exam normal    Pulmonary  Pulmonary exam normal     Other Findings        Anesthesia Plan  ASA Score- 3     Anesthesia Type- IV sedation with anesthesia with ASA Monitors  Additional Monitors:   Airway Plan:           Plan Factors-Exercise tolerance (METS): >4 METS  Chart reviewed  EKG reviewed  Imaging results reviewed  Existing labs reviewed  Patient summary reviewed  Induction- intravenous  Postoperative Plan- Plan for postoperative opioid use  Planned trial extubation    Informed Consent- Anesthetic plan and risks discussed with patient  I personally reviewed this patient with the CRNA  Discussed and agreed on the Anesthesia Plan with the CRNA  Lynne Muro

## 2023-05-05 ENCOUNTER — TELEPHONE (OUTPATIENT)
Dept: INTERNAL MEDICINE CLINIC | Age: 53
End: 2023-05-05

## 2023-05-05 DIAGNOSIS — Z72.0 TOBACCO ABUSE DISORDER: ICD-10-CM

## 2023-05-05 DIAGNOSIS — F41.8 DEPRESSION WITH ANXIETY: ICD-10-CM

## 2023-05-05 DIAGNOSIS — J06.9 VIRAL URI WITH COUGH: ICD-10-CM

## 2023-05-05 RX ORDER — VARENICLINE TARTRATE 0.5 MG/1
0.5 TABLET, FILM COATED ORAL 2 TIMES DAILY
Qty: 60 TABLET | Refills: 1 | Status: SHIPPED | OUTPATIENT
Start: 2023-05-05

## 2023-05-08 RX ORDER — ALBUTEROL SULFATE 2.5 MG/3ML
2.5 SOLUTION RESPIRATORY (INHALATION) EVERY 6 HOURS PRN
Qty: 30 ML | Refills: 0 | Status: SHIPPED | OUTPATIENT
Start: 2023-05-08

## 2023-05-08 NOTE — TELEPHONE ENCOUNTER
Medication was prescribed during an acute visit back in 12/2021  Patient is requesting refill through [employer]  Please advise

## 2023-05-09 DIAGNOSIS — F41.9 ANXIETY: Primary | ICD-10-CM

## 2023-05-09 RX ORDER — DIAZEPAM 5 MG/1
5 TABLET ORAL EVERY 6 HOURS PRN
Qty: 30 TABLET | Refills: 0 | Status: SHIPPED | OUTPATIENT
Start: 2023-05-09 | End: 2023-07-17 | Stop reason: SDUPTHER

## 2023-05-09 RX ORDER — DIAZEPAM 10 MG/1
10 TABLET ORAL EVERY 6 HOURS PRN
Qty: 30 TABLET | Refills: 0 | OUTPATIENT
Start: 2023-05-09

## 2023-05-09 NOTE — TELEPHONE ENCOUNTER
Diagnosis of cirrhosis is new, I will decrease her dose of Valium since it is not safe for her to be on this dose

## 2023-05-09 NOTE — TELEPHONE ENCOUNTER
Notified patient the Valium RX dosage was decreased due to her new DX of Cirrhosis and she should take Valium sparingly  She understood

## 2023-05-18 ENCOUNTER — TELEPHONE (OUTPATIENT)
Dept: BARIATRICS | Facility: CLINIC | Age: 53
End: 2023-05-18

## 2023-05-18 NOTE — TELEPHONE ENCOUNTER
Pt completed the online info seminar for the [facility] office  BMI 39 47 pt has fatty liver and gerd so qualifies for surgery  If pt calls back we need to schedule 1 hr with Delmis and let me know so we can change the step in exemplo to scheduled

## 2023-05-24 ENCOUNTER — CONSULT (OUTPATIENT)
Dept: INTERNAL MEDICINE CLINIC | Age: 53
End: 2023-05-24

## 2023-05-24 VITALS
DIASTOLIC BLOOD PRESSURE: 72 MMHG | BODY MASS INDEX: 39.24 KG/M2 | WEIGHT: 250 LBS | TEMPERATURE: 97.3 F | HEIGHT: 67 IN | SYSTOLIC BLOOD PRESSURE: 118 MMHG | HEART RATE: 83 BPM | OXYGEN SATURATION: 98 %

## 2023-05-24 DIAGNOSIS — Z01.818 PREOPERATIVE GENERAL PHYSICAL EXAMINATION: Primary | ICD-10-CM

## 2023-05-24 DIAGNOSIS — I10 BENIGN HYPERTENSION: ICD-10-CM

## 2023-05-24 DIAGNOSIS — E03.9 ACQUIRED HYPOTHYROIDISM: ICD-10-CM

## 2023-05-24 DIAGNOSIS — Z12.11 ENCOUNTER FOR SCREENING COLONOSCOPY: ICD-10-CM

## 2023-05-24 DIAGNOSIS — Z12.31 ENCOUNTER FOR SCREENING MAMMOGRAM FOR MALIGNANT NEOPLASM OF BREAST: ICD-10-CM

## 2023-05-24 DIAGNOSIS — F17.210 CIGARETTE NICOTINE DEPENDENCE WITHOUT COMPLICATION: ICD-10-CM

## 2023-05-24 DIAGNOSIS — H26.8 OTHER CATARACT OF BOTH EYES: ICD-10-CM

## 2023-05-24 DIAGNOSIS — J43.2 CENTRILOBULAR EMPHYSEMA (HCC): ICD-10-CM

## 2023-05-24 DIAGNOSIS — R94.39 ABNORMAL STRESS ECG WITH TREADMILL: ICD-10-CM

## 2023-05-24 DIAGNOSIS — R07.89 OTHER CHEST PAIN: ICD-10-CM

## 2023-05-24 NOTE — PROGRESS NOTES
Assessment/Plan:    Preoperative general physical  -patient is scheduled for  B/L cataract sx on the R eye on 6/5/23 and the left 6/19/23 using MAC anesthesia by Dr Demarcus Calderon at the surgical center [facility]  - she functions at less than 4 Mets of physical activity daily  - surgery however is low risk   -she may proceed with surgery care because of her comorbidities including her emphysema, chest pain, hypothyroidism, essential hypertension and nicotine dependence  - she should inform her PCP and her surgeon if her clinical status should change prior to surgery  -will fax completed pre-op notes to surgeon    Cataracts  -Patient is scheduled for bilateral cataract surgery, first for the right eye on 6/5/2023 and for the left eye on 6/19/2023  Hypothyroidism  -Well-controlled  -Continue with current dose of levothyroxine    Benign essential hypertension  -Well-controlled off medications  -Continue with a low-salt diet and with exercise as much as possible    Nicotine dependence with emphysema  -Nicotine dependence is much improved, patient fortunately is down to 2-3 sticks of cigarettes daily and was counseled on the importance of quitting smoking completely  -Continue with albuterol inhaler and nebulizer treatment as needed  -Continue with Chantix and Wellbutrin    Colonic polyps/ colon cancer screening  -Last colonoscopy was in February 2023 and showed 3 polyps and patient was counseled to return in 1 year for history of colonic polyps  -We will refer patient to gastroenterology for repeat colonoscopy      Chest pain with abnormal EKG  -Nuclear stress test done on 3/23/2023 was negative for ischemia  -We will continue to monitor patient clinically and she may continue with treatment for her GERD as well as analgesics     Diagnoses and all orders for this visit:    Preoperative general physical examination    Other cataract of both eyes    Abnormal stress ECG with treadmill    Benign hypertension    Acquired hypothyroidism    Cigarette nicotine dependence without complication    Centrilobular emphysema ([address] )    Encounter for screening mammogram for malignant neoplasm of breast  -     Mammo screening bilateral w 3d & cad; Future    Encounter for screening colonoscopy  -     Ambulatory referral for colonoscopy; Future    Other chest pain           Subjective:      Patient ID: Nikole Garcia is a 46 y o  female  HPI    Patient presents for a preoperative general physical exam   She is scheduled for B/L cataract sx on the R eye on 6/5/23 and the left 6/19/23 using MAC anesthesia by Dr Mark Cordova at the surgical center [facility]    Past medical history- GERD, hypothyroidism, duodenal ulcer, htn, iron def anemia, nicotine dependence( 2-3 cig a day, down from 2 packs a day), hld, depression, anxiety, IBS with constipation and diarrhea, gastroparesis, cirrhosis, nephrolithiasis,    Past surgical history- eyes sx for cross eye strabismus, left foot sx, carpal tunnel sx on the R, cholecystectomy, teeth extraction - all,     Medication- see list    Allergies-  See list    History of previous adverse reaction to anesthesia - none    Family history of adverse reaction to anesthesia - none known    Physical activity level - not very active and not able to climb 2 flights of stairs without sob but may be able to walk for one block without sob    Preop labs - none ordered TSH done on 1/27/23 was normal at 4 136    Preop EKG - none ordered     Anticoagulation use- none     Anti-platelet use - none    NSAID use - none    Alcohol use - has not taken any alcohol in 2 years    Nicotine use- has been smoking for 40 years and smoked 2 packs a day at the highest and is down to 2-3 sticks a day    Recreational drug use - none    Care after surgery - a friend from [facility]     Today, patient has no complaints but admits that her vision is impaired and it feels like she has to squint a lot and it is difficult driving at night    She admits to occasional sinus pressure, occasional chest pain, occasional dry cough, shortness of breath on exertion, occasional wheezing, constipation, occasional headaches and joint pains as well as abdominal tenderness since she had a gallbladder surgery  She denies fever, chills, night sweats, dizziness, nasal congestion, rhinorrhea, palpitations, nausea, vomiting, occasional loose stools  The following portions of the patient's history were reviewed and updated as appropriate:   She  has a past medical history of Abnormal stress test, Allergic sinusitis, Anxiety, Arthritis, Asthma, Bilateral lower extremity edema, Callus of foot, Chest pain, Chronic GERD, Colon polyp, Constipation, COPD (chronic obstructive pulmonary disease) ([facility] [place] [address] ), Depression, Disease of thyroid gland, Diverticulitis of colon, Dyspepsia, Esophageal reflux, Essential hypertriglyceridemia, Gastroesophageal reflux disease with esophagitis (11/18/2016), GERD (gastroesophageal reflux disease), Gynecological disorder, Hypertension, Hypokalemia (06/20/2022), Hypotension, Kidney stone, Migraine, Morbid obesity ([facility] [place] [address] ) (01/11/2019), Neuropathy, Positive depression screening (06/19/2022), Primary hypertriglyceridemia (06/19/2022), Pulmonary emphysema ([facility] [address] ), Seasonal allergies, SOB (shortness of breath), Urinary frequency, Vagina, candidiasis, and Visual impairment    She   Patient Active Problem List    Diagnosis Date Noted   • Cirrhosis ([facility] [place] [address] ) 03/13/2023   • History of GI bleed 03/13/2023   • Pancreatic lesion 03/13/2023   • History of colon polyps 03/13/2023   • Duodenal ulcer 03/13/2023   • Irritable bowel syndrome with both constipation and diarrhea 03/13/2023   • Abnormal stress ECG with treadmill 03/06/2023   • Severe obesity ([address] ) 01/16/2023   • Acute duodenal ulcer with bleeding 01/16/2023   • Fatty liver 01/16/2023   • Very heavy cigarette smoker 01/16/2023   • Obesity (BMI 35 0-39 9 without comorbidity) 08/16/2022   • Left ureteral stone with hydronephrosis 07/21/2022   • Iron deficiency anemia 06/30/2022   • Hydroureteronephrosis 06/30/2022   • Gastroesophageal reflux disease 06/19/2022   • Nephrolithiasis 06/16/2022   • Centrilobular emphysema ([facility] [address] ) 02/28/2022   • COVID-19 vaccination refused 01/18/2022   • Elevated fasting glucose 03/29/2021   • Edema, lower extremity 08/07/2020   • Other chest pain 11/08/2018   • Gastroparesis 11/08/2018   • Hypercholesterolemia 01/18/2018   • [facility] claudication [facility] ) 02/23/2017   • Tobacco abuse disorder 02/06/2017   • Vitamin D deficiency 03/09/2016   • Anxiety 03/04/2016   • Benign hypertension 03/04/2016   • Depression 03/04/2016   • Hypothyroidism 03/04/2016   • Seasonal allergic rhinitis 03/04/2016   • Spinal stenosis of lumbar region 03/28/2012   • Degeneration of lumbar or lumbosacral intervertebral disc 06/08/2010     She  has a past surgical history that includes Cholecystectomy; Foot surgery; Carpal tunnel release; Eye surgery; pr esophagogastroduodenoscopy transoral diagnostic (N/A, 2/13/2017); Colonoscopy; pr cysto/uretero w/lithotripsy &indwell stent insrt (Left, 6/16/2022); [place] retrograde pyelogram (6/16/2022); pr cysto bladder w/ureteral catheterization (Left, 7/21/2022); pr cysto/uretero w/lithotripsy &indwell stent insrt (Left, 8/16/2022); and FL retrograde pyelogram (8/16/2022)  Her family history includes Asthma in her sister; Cancer in her maternal grandfather; Cancer (age of onset: 70) in her mother; Coronary artery disease in her sister; Diabetes in her maternal grandfather and maternal uncle; Hypertension in her other and other; Lung cancer in her maternal grandmother, other, other, and other; No Known Problems in her maternal aunt, paternal grandfather, and paternal grandmother; Obesity in her mother and sister; Stroke in her sister; Thyroid disease in her mother  She  reports that she has been smoking cigarettes  She started smoking about 40 years ago   She has a 7 50 pack-year smoking history  She has never used smokeless tobacco  She reports that she does not currently use alcohol  She reports that she does not use drugs    Current Outpatient Medications   Medication Sig Dispense Refill   • albuterol (2 5 mg/3 mL) 0 083 % nebulizer solution Take 3 mL (2 5 mg total) by nebulization every 6 (six) hours as needed for wheezing or shortness of breath 30 mL 0   • albuterol (PROVENTIL HFA,VENTOLIN HFA) 90 mcg/act inhaler Inhale 2 puffs every 6 (six) hours as needed for wheezing 18 g 1   • buPROPion (WELLBUTRIN XL) 150 mg 24 hr tablet Take 1 tablet (150 mg total) by mouth every morning 90 tablet 1   • diazepam (VALIUM) 5 mg tablet Take 1 tablet (5 mg total) by mouth every 6 (six) hours as needed for anxiety 30 tablet 0   • ergocalciferol (VITAMIN D2) 50,000 units Take 1 capsule (50,000 Units total) by mouth every 14 (fourteen) days 12 capsule 1   • famotidine (PEPCID) 40 MG tablet Take 1 tablet (40 mg total) by mouth daily at bedtime Take in evening 2 hours prior to bed 90 tablet 3   • ferrous sulfate 324 (65 Fe) mg Take 1 tablet (324 mg total) by mouth daily before breakfast 30 tablet 1   • fluticasone (FLONASE) 50 mcg/act nasal spray 1 spray into each nostril 2 (two) times a day (Patient taking differently: 1 spray into each nostril as needed) 16 g 5   • gabapentin (NEURONTIN) 100 mg capsule Take 100 mg by mouth 2 (two) times a day  0   • levothyroxine 112 mcg tablet Take 1 tablet (112 mcg total) by mouth daily 90 tablet 1   • omeprazole (PriLOSEC) 40 MG capsule Take 1 capsule (40 mg total) by mouth 2 (two) times a day 60 capsule 3   • oxyCODONE-acetaminophen (PERCOCET)  mg per tablet Take 1 tablet by mouth every 6 (six) hours as needed     • sertraline (ZOLOFT) 100 mg tablet Take 1 tablet (100 mg total) by mouth 2 (two) times a day 180 tablet 1   • simvastatin (ZOCOR) 20 mg tablet Take 1 tablet (20 mg total) by mouth daily at bedtime 90 tablet 1   • sucralfate (CARAFATE) 1 g/10 mL suspension Take 10 mL (1 g total) by mouth 3 (three) times a day before meals 2700 mL 1   • tiotropium (Spiriva HandiHaler) 18 mcg inhalation capsule Place 1 capsule (18 mcg total) into inhaler and inhale daily 30 capsule 5   • varenicline (CHANTIX) 0 5 mg tablet Take 1 tablet (0 5 mg total) by mouth 2 (two) times a day 60 tablet 1   • nicotine (NICODERM CQ) 21 mg/24 hr TD 24 hr patch Place 1 patch on the skin over 24 hours every 24 hours 28 patch 5     No current facility-administered medications for this visit       Current Outpatient Medications on File Prior to Visit   Medication Sig   • albuterol (2 5 mg/3 mL) 0 083 % nebulizer solution Take 3 mL (2 5 mg total) by nebulization every 6 (six) hours as needed for wheezing or shortness of breath   • albuterol (PROVENTIL HFA,VENTOLIN HFA) 90 mcg/act inhaler Inhale 2 puffs every 6 (six) hours as needed for wheezing   • buPROPion (WELLBUTRIN XL) 150 mg 24 hr tablet Take 1 tablet (150 mg total) by mouth every morning   • diazepam (VALIUM) 5 mg tablet Take 1 tablet (5 mg total) by mouth every 6 (six) hours as needed for anxiety   • ergocalciferol (VITAMIN D2) 50,000 units Take 1 capsule (50,000 Units total) by mouth every 14 (fourteen) days   • famotidine (PEPCID) 40 MG tablet Take 1 tablet (40 mg total) by mouth daily at bedtime Take in evening 2 hours prior to bed   • ferrous sulfate 324 (65 Fe) mg Take 1 tablet (324 mg total) by mouth daily before breakfast   • fluticasone (FLONASE) 50 mcg/act nasal spray 1 spray into each nostril 2 (two) times a day (Patient taking differently: 1 spray into each nostril as needed)   • gabapentin (NEURONTIN) 100 mg capsule Take 100 mg by mouth 2 (two) times a day   • levothyroxine 112 mcg tablet Take 1 tablet (112 mcg total) by mouth daily   • omeprazole (PriLOSEC) 40 MG capsule Take 1 capsule (40 mg total) by mouth 2 (two) times a day   • oxyCODONE-acetaminophen (PERCOCET)  mg per tablet Take 1 tablet by mouth every 6 (six) hours as needed   • sertraline (ZOLOFT) 100 mg tablet Take 1 tablet (100 mg total) by mouth 2 (two) times a day   • simvastatin (ZOCOR) 20 mg tablet Take 1 tablet (20 mg total) by mouth daily at bedtime   • sucralfate (CARAFATE) 1 g/10 mL suspension Take 10 mL (1 g total) by mouth 3 (three) times a day before meals   • tiotropium (Spiriva HandiHaler) 18 mcg inhalation capsule Place 1 capsule (18 mcg total) into inhaler and inhale daily   • varenicline (CHANTIX) 0 5 mg tablet Take 1 tablet (0 5 mg total) by mouth 2 (two) times a day   • nicotine (NICODERM CQ) 21 mg/24 hr TD 24 hr patch Place 1 patch on the skin over 24 hours every 24 hours   • [DISCONTINUED] naloxone (NARCAN) 4 mg/0 1 mL nasal spray Administer 1 spray into a nostril  If no response after 2-3 minutes, give another dose in the other nostril using a new spray  (Patient not taking: Reported on 6/30/2022)   • [DISCONTINUED] tamsulosin (FLOMAX) 0 4 mg take 1 capsule by mouth once daily with dinner     No current facility-administered medications on file prior to visit  She is allergic to hydrocodone-acetaminophen, ketorolac, toradol [ketorolac tromethamine], and ultram [tramadol]       Review of Systems   Constitutional: Negative for activity change, chills, fatigue, fever and unexpected weight change  HENT: Positive for sinus pressure (occasionally )  Negative for ear pain, postnasal drip, rhinorrhea and sore throat  Eyes: Positive for visual disturbance  Negative for pain  Respiratory: Positive for cough (occasional smokers cough , dry ), shortness of breath (on exertion) and wheezing (occasionally )  Negative for choking and chest tightness  Cardiovascular: Positive for chest pain (occasionally - has gone for stress test 2 months ago that was negative )  Negative for palpitations and leg swelling  Gastrointestinal: Positive for constipation (constipation alternates with fiber )  Negative for abdominal pain, diarrhea, nausea and vomiting  "  Genitourinary: Negative for dysuria and hematuria  Musculoskeletal: Positive for arthralgias (l hip pain)  Negative for back pain, gait problem, joint swelling, myalgias and neck stiffness  Skin: Negative for pallor and rash  Neurological: Positive for headaches (occasionally )  Negative for dizziness, tremors, seizures, syncope and light-headedness  Hematological: Negative for adenopathy  Psychiatric/Behavioral: Negative for behavioral problems  Objective:      /72 (BP Location: Left arm, Patient Position: Sitting, Cuff Size: Large)   Pulse 83   Temp (!) 97 3 °F (36 3 °C) (Temporal)   Ht 5' 7\" (1 702 m)   Wt 113 kg (250 lb)   LMP 04/05/2018 (Approximate)   SpO2 98% Comment: room air  BMI 39 16 kg/m²          Physical Exam  Constitutional:       General: She is not in acute distress  Appearance: She is well-developed  She is not diaphoretic  HENT:      Head: Normocephalic and atraumatic  Right Ear: External ear normal       Left Ear: External ear normal       Nose: Congestion present  Mouth/Throat:      Mouth: Mucous membranes are dry  Pharynx: Posterior oropharyngeal erythema (Oropharyngeal erythema with dry mucous membranes) present  No oropharyngeal exudate  Eyes:      General: No scleral icterus  Right eye: No discharge  Left eye: No discharge  Conjunctiva/sclera: Conjunctivae normal       Pupils: Pupils are equal, round, and reactive to light  Neck:      Thyroid: No thyromegaly  Vascular: No JVD  Trachea: No tracheal deviation  Cardiovascular:      Rate and Rhythm: Normal rate and regular rhythm  Heart sounds: Normal heart sounds  No murmur heard  No friction rub  No gallop  Pulmonary:      Effort: Pulmonary effort is normal  Prolonged expiration (Prolonged expiration in all lung fields without Wheezes, rhonchi or rales) present  No respiratory distress  Breath sounds: Normal breath sounds   No wheezing or " rales    Chest:      Chest wall: No tenderness  Abdominal:      General: Bowel sounds are normal  There is no distension  Palpations: Abdomen is soft  There is no mass  Tenderness: There is abdominal tenderness (Tenderness in the bilateral abdominal quadrants, worse in the epigastric region) in the right upper quadrant, right lower quadrant, epigastric area, left upper quadrant and left lower quadrant  There is no guarding or rebound  Musculoskeletal:         General: No tenderness or deformity  Normal range of motion  Cervical back: Normal range of motion and neck supple  Lymphadenopathy:      Cervical: No cervical adenopathy  Skin:     General: Skin is warm and dry  Coloration: Skin is not pale  Findings: No erythema or rash  Neurological:      Mental Status: She is alert and oriented to person, place, and time  Cranial Nerves: No cranial nerve deficit  Motor: No abnormal muscle tone  Coordination: Coordination normal       Deep Tendon Reflexes: Reflexes are normal and symmetric     Psychiatric:         Behavior: Behavior normal            Hospital Outpatient Visit on 04/05/2023   Component Date Value Ref Range Status   • Case Report 04/05/2023    Final                    Value:Surgical Pathology Report                         Case: [id number]                                   Authorizing Provider:  Juany Denton DO  Collected:           04/05/2023 1119              Ordering Location:     [facility] Received:            04/05/2023 1414                                     Endoscopy                                                                    Pathologist:           Kelly Duke MD                                                       Specimens:   A) - Duodenum, nodular mucosa duodenal bulb                                                         B) - Stomach, gastric bx  antral erosions C) - Stomach, gastric bx antrum  R/O HP                                                             D) - Stomach, gastric bx  Evaluate for portal hypertensive gastrophy                      • Final Diagnosis 04/05/2023    Final                    Value: This result contains rich text formatting which cannot be displayed here  • Note 04/05/2023    Final                    Value: This result contains rich text formatting which cannot be displayed here  • Additional Information 04/05/2023    Final                    Value: This result contains rich text formatting which cannot be displayed here  • Gross Description 04/05/2023    Final                    Value: This result contains rich text formatting which cannot be displayed here  Hospital Outpatient Visit on 03/23/2023   Component Date Value Ref Range Status   • Protocol Name 03/23/2023 [facility]   Final   • Time In Exercise Phase 03/23/2023 00:03:00   Final   • MAX   SYSTOLIC BP 14/16/7043 887  mmHg Final   • Max Diastolic Bp 57/57/8642 84  mmHg Final   • Max Heart Rate 03/23/2023 134  BPM Final   • Max Predicted Heart Rate 03/23/2023 168  BPM Final   • Reason for Termination 03/23/2023 Test Complete   Final   • Test Indication 03/23/2023    Final                    Value:Abnormal Treadmill Test  Screening for CAD     • Target Hr Formular 03/23/2023 (220 - Age)*85%   Final   • Chest Pain Statement 03/23/2023 none   Final   Hospital Outpatient Visit on 03/23/2023   Component Date Value Ref Range Status   • Rest Nuclear Isotope Dose 03/23/2023 15 10  mCi Final   • Stress Nuclear Isotope Dose 03/23/2023 45 80  mCi Final   • Baseline HR 03/23/2023 75  bpm Final   • Baseline BP 03/23/2023 140/84  mmHg Final   • O2 sat rest 03/23/2023 98  % Final   • Stress peak HR 03/23/2023 134  bpm Final   • Post peak BP 03/23/2023 116  mmHg Final   • Rate Pressure Product 03/23/2023 15,544 0   Final   • O2 sat peak 03/23/2023 99  % Final   • Recovery HR 03/23/2023 91 bpm Final   • Recovery BP 03/23/2023 140/76  mmHg Final   • O2 sat recovery 03/23/2023 99  % Final   • Angina Index 03/23/2023 0   Final   • EF (%) 03/23/2023 63  % Final   • ST Depression (mm) 03/23/2023 0  mm Final   Lab on 03/22/2023   Component Date Value Ref Range Status   • Insulin, Fasting 03/22/2023 142 4 (H)  3 0 - 25 0 mU/L Final   • AFP TUMOR MARKER 03/22/2023 6 4  0 5 - 8 ng/mL Final   • A-1 Antitrypsin 03/22/2023 244 (H)  101 - 187 mg/dL Final   • Smooth Muscle Ab 03/22/2023 11  0 - 19 Units Final                     Negative                     0 - 19                   Weak positive               20 - 30                   Moderate to strong positive     >30   Actin Antibodies are found in 52-85% of patients with   autoimmune hepatitis or chronic active hepatitis and   in 22% of patients with primary biliary cirrhosis  • Anti-SLA, IgG 03/22/2023 4 6  0 0 - 20 0 units Final                                    Negative    0 0 - 20 0                                  Equivocal  20 1 - 24 9                                  Positive         >24 9   • Protime 03/22/2023 13 7  11 6 - 14 5 seconds Final   • INR 03/22/2023 1 05  0 84 - 1 19 Final   • Liver-Kidney Microsomal Ab 03/22/2023 <20 1  0 0 - 20 0 Units Final                                    Negative    0 0 - 20 0                                  Equivocal  20 1 - 24 9                                  Positive         >24 9  LKM type 1 antibodies are detected in patients with  autoimmune hepatitis type 2 and in up to 8% of  patients with chronic HCV infection     • CINDY 03/22/2023 Negative  Negative Final   • Angio Convert Enzyme 03/22/2023 61  14 - 82 U/L Final   • Mitochondrial Ab 03/22/2023 <20 0  0 0 - 20 0 Units Final                                    Negative    0 0 - 20 0                                  Equivocal  20 1 - 24 9                                  Positive         >24 9  Mitochondrial (M2) Antibodies are found in 90-96% of  patients with primary biliary cirrhosis  • WBC 03/22/2023 6 51  4 31 - 10 16 Thousand/uL Final   • RBC 03/22/2023 4 56  3 81 - 5 12 Million/uL Final   • Hemoglobin 03/22/2023 10 4 (L)  11 5 - 15 4 g/dL Final   • Hematocrit 03/22/2023 38 7  34 8 - 46 1 % Final   • MCV 03/22/2023 85  82 - 98 fL Final   • MCH 03/22/2023 22 8 (L)  26 8 - 34 3 pg Final   • MCHC 03/22/2023 26 9 (L)  31 4 - 37 4 g/dL Final   • RDW 03/22/2023 22 0 (H)  11 6 - 15 1 % Final   • Platelets 15/63/0792 145 (L)  149 - 390 Thousands/uL Final   • MPV 03/22/2023 10 0  8 9 - 12 7 fL Final   • IgA 03/22/2023 417 (H)  87 - 352 mg/dL Final   • Gliadin IgA 03/22/2023 7  0 - 19 units Final                       Negative                   0 - 19                     Weak Positive             20 - 30                     Moderate to Strong Positive   >30   • Gliadin IgG 03/22/2023 2  0 - 19 units Final                       Negative                   0 - 19                     Weak Positive             20 - 30                     Moderate to Strong Positive   >30   • Tissue Transglut Ab IGG 03/22/2023 <2  0 - 5 U/mL Final                                  Negative        0 - 5                                Weak Positive   6 - 9                                Positive           >9   • TISSUE TRANSGLUTAMINASE IGA 03/22/2023 <2  0 - 3 U/mL Final                                  Negative        0 -  3                                Weak Positive   4 - 10                                Positive           >10   Tissue Transglutaminase (tTG) has been identified   as the endomysial antigen  Studies have demonstr-   ated that endomysial IgA antibodies have over 99%   specificity for gluten sensitive enteropathy     • Endomysial IgA 03/22/2023 Negative  Negative Final   • Ceruloplasmin 03/22/2023 26 9  19 0 - 39 0 mg/dL Final   • Vitamin B-12 03/22/2023 331  100 - 900 pg/mL Final   • Folate 03/22/2023 8 2  3 1 - 17 5 ng/mL Final   • IGA 03/22/2023 376 0  70 0 - 400 0 mg/dL Final   • IGG 03/22/2023 858 0  700 0 - 1,600 0 mg/dL Final   • IGM 03/22/2023 242 0 (H)  40 0 - 230 0 mg/dL Final   • Iron Saturation 03/22/2023 7 (L)  15 - 50 % Final   • TIBC 03/22/2023 510 (H)  250 - 450 ug/dL Final   • Iron 03/22/2023 35 (L)  50 - 170 ug/dL Final    Patients treated with metal-binding drugs (ie  Deferoxamine) may have depressed iron values  • Ferritin 03/22/2023 9  8 - 388 ng/mL Final   Hospital Outpatient Visit on 03/06/2023   Component Date Value Ref Range Status   • Baseline HR 03/06/2023 80  bpm Final   • Baseline BP 03/06/2023 130/70  mmHg Final   • O2 sat rest 03/06/2023 98  % Final   • Stress peak HR 03/06/2023 144  bpm Final   • Post peak BP 03/06/2023 144  mmHg Final   • Rate Pressure Product 03/06/2023 20,736 0   Final   • O2 sat peak 03/06/2023 99  % Final   • Recovery HR 03/06/2023 93  bpm Final   • Recovery BP 03/06/2023 93  mmHg Final   • O2 sat recovery 03/06/2023 98  % Final   • Max HR 03/06/2023 146  bpm Final   • Max HR Percent 03/06/2023 86  % Final   • Exercise duration (min) 03/06/2023 3  min Final   • Exercise duration (sec) 03/06/2023 57  sec Final   • Estimated workload 03/06/2023 5 7  METS Final   • Angina Index 03/06/2023 0   Final   • Stress Stage Reached 03/06/2023 2 0   Final   • Protocol Name 03/06/2023 MICHAEL   Final   • Time In Exercise Phase 03/06/2023 00:03:57   Final   • MAX   SYSTOLIC BP 63/33/3717 990  mmHg Final   • Max Diastolic Bp 88/56/5290 80  mmHg Final   • Max Heart Rate 03/06/2023 146  BPM Final   • Max Predicted Heart Rate 03/06/2023 168  BPM Final   • Reason for Termination 03/06/2023    Final                    Value:Fatigue  Dyspnea, head pressure  Target Heart Rate Achieved     • Test Indication 03/06/2023 Screening for CAD   Final   • Target Hr Formular 03/06/2023 (220 - Age)*85%   Final   • Arrhy During Ex 03/06/2023 ventricular premature beats-isolated   Final   • Chest Pain Statement 03/06/2023 none   Final   Office Visit on 03/03/2023 Component Date Value Ref Range Status   • LEUKOCYTE ESTERASE,UA 03/03/2023 -   Final   • NITRITE,UA 03/03/2023 -   Final   • SL AMB POCT UROBILINOGEN 03/03/2023 0 2   Final   • POCT URINE PROTEIN 03/03/2023 trace   Final   •  PH,UA 03/03/2023 6 0   Final   • BLOOD,UA 03/03/2023 hemolyzed trace   Final   • SPECIFIC GRAVITY,UA 03/03/2023 1 015   Final   • KETONES,UA 03/03/2023 -   Final   • BILIRUBIN,UA 03/03/2023 -   Final   • GLUCOSE, UA 03/03/2023 -   Final   •  COLOR,UA 03/03/2023 straw   Final   • CLARITY,UA 03/03/2023 clear   Final   • Color, UA 03/03/2023 Yellow   Final   • Clarity, UA 03/03/2023 Clear   Final   • Specific Gravity, UA 03/03/2023 1 019  1 003 - 1 030 Final   • pH, UA 03/03/2023 6 0  4 5, 5 0, 5 5, 6 0, 6 5, 7 0, 7 5, 8 0 Final   • Leukocytes, UA 03/03/2023 Small (A)  Negative Final   • Nitrite, UA 03/03/2023 Positive (A)  Negative Final   • Protein, UA 03/03/2023 Trace (A)  Negative mg/dl Final   • Glucose, UA 03/03/2023 Negative  Negative mg/dl Final   • Ketones, UA 03/03/2023 Negative  Negative mg/dl Final   • Urobilinogen, UA 03/03/2023 <2 0  <2 0 mg/dl mg/dl Final   • Bilirubin, UA 03/03/2023 Negative  Negative Final   • Occult Blood, UA 03/03/2023 Trace (A)  Negative Final   • RBC, UA 03/03/2023 1-2  None Seen, 1-2 /hpf Final   • WBC, UA 03/03/2023 1-2  None Seen, 1-2 /hpf Final   • Epithelial Cells 03/03/2023 Occasional  None Seen, Occasional /hpf Final   • Bacteria, UA 03/03/2023 Innumerable (A)  None Seen, Occasional /hpf Final   • MUCUS THREADS 03/03/2023 Moderate (A)  None Seen Final   • Ca Oxalate Elena, UA 03/03/2023 Occasional (A)  None Seen /hpf Final   Lab on 01/27/2023   Component Date Value Ref Range Status   • Cholesterol 01/27/2023 126  See Comment mg/dL Final    Cholesterol:         Pediatric <18 Years        Desirable          <170 mg/dL      Borderline High    170-199 mg/dL      High               >=200 mg/dL        Adult >=18 Years            Desirable         <200 mg/dL      Borderline High   200-239 mg/dL      High              >239 mg/dL     • Triglycerides 01/27/2023 91  See Comment mg/dL Final    Triglyceride:     0-9Y            <75mg/dL     10Y-17Y         <90 mg/dL       >=18Y     Normal          <150 mg/dL     Borderline High 150-199 mg/dL     High            200-499 mg/dL        Very High       >499 mg/dL    Specimen collection should occur prior to N-Acetylcysteine or Metamizole administration due to the potential for falsely depressed results  • HDL, Direct 01/27/2023 48 (L)  >=50 mg/dL Final   • LDL Calculated 01/27/2023 60  0 - 100 mg/dL Final    LDL Cholesterol:     Optimal           <100 mg/dl     Near Optimal      100-129 mg/dl     Above Optimal       Borderline High 130-159 mg/dl       High            160-189 mg/dl       Very High       >189 mg/dl         This screening LDL is a calculated result  It does not have the accuracy of the Direct Measured LDL in the monitoring of patients with hyperlipidemia and/or statin therapy  Direct Measure LDL (NLF758) must be ordered separately in these patients  • Hemoglobin A1C 01/27/2023 5 2  Normal 3 8-5 6%; PreDiabetic 5 7-6 4%; Diabetic >=6 5%; Glycemic control for adults with diabetes <7 0% % Final   • EAG 01/27/2023 103  mg/dl Final   • Sodium 01/27/2023 140  135 - 147 mmol/L Final   • Potassium 01/27/2023 4 2  3 5 - 5 3 mmol/L Final   • Chloride 01/27/2023 107  96 - 108 mmol/L Final   • CO2 01/27/2023 24  21 - 32 mmol/L Final   • ANION GAP 01/27/2023 9  4 - 13 mmol/L Final   • BUN 01/27/2023 16  5 - 25 mg/dL Final   • Creatinine 01/27/2023 0 76  0 60 - 1 30 mg/dL Final    Standardized to IDMS reference method   • Glucose, Fasting 01/27/2023 136 (H)  65 - 99 mg/dL Final    Specimen collection should occur prior to Sulfasalazine administration due to the potential for falsely depressed results   Specimen collection should occur prior to Sulfapyridine administration due to the potential for falsely elevated results  • Calcium 01/27/2023 9 1  8 4 - 10 2 mg/dL Final   • AST 01/27/2023 27  13 - 39 U/L Final    Specimen collection should occur prior to Sulfasalazine administration due to the potential for falsely depressed results  • ALT 01/27/2023 16  7 - 52 U/L Final    Specimen collection should occur prior to Sulfasalazine administration due to the potential for falsely depressed results      • Alkaline Phosphatase 01/27/2023 80  34 - 104 U/L Final   • Total Protein 01/27/2023 7 6  6 4 - 8 4 g/dL Final   • Albumin 01/27/2023 4 2  3 5 - 5 0 g/dL Final   • Total Bilirubin 01/27/2023 0 71  0 20 - 1 00 mg/dL Final   • eGFR 01/27/2023 90  ml/min/1 73sq m Final   • WBC 01/27/2023 7 73  4 31 - 10 16 Thousand/uL Final   • RBC 01/27/2023 3 84  3 81 - 5 12 Million/uL Final   • Hemoglobin 01/27/2023 9 0 (L)  11 5 - 15 4 g/dL Final   • Hematocrit 01/27/2023 33 2 (L)  34 8 - 46 1 % Final   • MCV 01/27/2023 87  82 - 98 fL Final   • MCH 01/27/2023 23 4 (L)  26 8 - 34 3 pg Final   • MCHC 01/27/2023 27 1 (L)  31 4 - 37 4 g/dL Final   • RDW 01/27/2023 18 3 (H)  11 6 - 15 1 % Final   • MPV 01/27/2023 11 3  8 9 - 12 7 fL Final   • Platelets 63/64/9279 180  149 - 390 Thousands/uL Final   • nRBC 01/27/2023 0  /100 WBCs Final   • Neutrophils Relative 01/27/2023 72  43 - 75 % Final   • Immat GRANS % 01/27/2023 1  0 - 2 % Final   • Lymphocytes Relative 01/27/2023 18  14 - 44 % Final   • Monocytes Relative 01/27/2023 7  4 - 12 % Final   • Eosinophils Relative 01/27/2023 2  0 - 6 % Final   • Basophils Relative 01/27/2023 0  0 - 1 % Final   • Neutrophils Absolute 01/27/2023 5 62  1 85 - 7 62 Thousands/µL Final   • Immature Grans Absolute 01/27/2023 0 05  0 00 - 0 20 Thousand/uL Final   • Lymphocytes Absolute 01/27/2023 1 38  0 60 - 4 47 Thousands/µL Final   • Monocytes Absolute 01/27/2023 0 53  0 17 - 1 22 Thousand/µL Final   • Eosinophils Absolute 01/27/2023 0 12  0 00 - 0 61 Thousand/µL Final   • Basophils Absolute 01/27/2023 0 03  0 00 - 0 10 Thousands/µL Final   • TSH 3RD GENERATON 01/27/2023 4 136  0 450 - 4 500 uIU/mL Final    The recommended reference ranges for TSH during pregnancy are as follows:   First trimester 0 1 to 2 5 uIU/mL   Second trimester  0 2 to 3 0 uIU/mL   Third trimester 0 3 to 3 0 uIU/m    Note: Normal ranges may not apply to patients who are transgender, non-binary, or whose legal sex, sex at birth, and gender identity differ  Adult TSH (3rd generation) reference range follows the recommended guidelines of the American Thyroid Association, January, 2020  • Hep A Total Ab 01/27/2023 Reactive (A)  Non-reactive Final   • Hep B Core Total Ab 01/27/2023 Non-reactive  Non-reactive Final   • Hep B S Ab 01/27/2023 <3 10  mIU/mL Final    Protective Immunity: Hep B Surface Antibody >= 10 mIu/ml (Traceable to [facility] Reference Preparation)   • Hepatitis B Surface Ag 01/27/2023 Non-reactive  Non-reactive, NonReactive - Confirmed Final   • Protime 01/27/2023 14 2  11 6 - 14 5 seconds Final   • INR 01/27/2023 1 10  0 84 - 1 19 Final   • Iron Saturation 01/27/2023 6 (L)  15 - 50 % Final   • TIBC 01/27/2023 494 (H)  250 - 450 ug/dL Final   • Iron 01/27/2023 32 (L)  50 - 170 ug/dL Final    Patients treated with metal-binding drugs (ie  Deferoxamine) may have depressed iron values     • Ferritin 01/27/2023 5 (L)  8 - 388 ng/mL Final   Hospital Outpatient Visit on 01/25/2023   Component Date Value Ref Range Status   • Case Report 01/25/2023    Final                    Value:Surgical Pathology Report                         Case: [id number]                                   Authorizing Provider:  Silvestre Rojas DO  Collected:           01/25/2023 1412              Ordering Location:     [facility] Received:            01/25/2023 1440                                     Endoscopy                                                                    Pathologist:           Leticia Salazar DO Specimens:   A) - Duodenum, SECOND PORTION OF DUODENUM R/O CELIACS, COLD BIOPSY                                  B) - Stomach, PROMINENT ERYTHE  FOLDS IN ANTRUM                                                     C) - Stomach, GASTRIC BODY ERYTHEMA R/O H PYLORI, COLD BIOPSY                             • Final Diagnosis 01/25/2023    Final                    Value: This result contains rich text formatting which cannot be displayed here  • Additional Information 01/25/2023    Final                    Value: This result contains rich text formatting which cannot be displayed here  • Gross Description 01/25/2023    Final                    Value: This result contains rich text formatting which cannot be displayed here  Appointment on 01/16/2023   Component Date Value Ref Range Status   • Vit D, 25-Hydroxy 01/16/2023 70 6  30 0 - 100 0 ng/mL Final   • TSH 3RD GENERATON 01/16/2023 1 470  0 450 - 4 500 uIU/mL Final    The recommended reference ranges for TSH during pregnancy are as follows:   First trimester 0 1 to 2 5 uIU/mL   Second trimester  0 2 to 3 0 uIU/mL   Third trimester 0 3 to 3 0 uIU/m    Note: Normal ranges may not apply to patients who are transgender, non-binary, or whose legal sex, sex at birth, and gender identity differ  Adult TSH (3rd generation) reference range follows the recommended guidelines of the American Thyroid Association, January, 2020  • Hemoglobin A1C 01/16/2023 5 1  Normal 3 8-5 6%; PreDiabetic 5 7-6 4%;  Diabetic >=6 5%; Glycemic control for adults with diabetes <7 0% % Final   • EAG 01/16/2023 100  mg/dl Final   • Cholesterol 01/16/2023 124  See Comment mg/dL Final    Cholesterol:         Pediatric <18 Years        Desirable          <170 mg/dL      Borderline High    170-199 mg/dL      High               >=200 mg/dL        Adult >=18 Years            Desirable         <200 mg/dL      Borderline High   200-239 mg/dL      High              >239 mg/dL • Triglycerides 01/16/2023 82  See Comment mg/dL Final    Triglyceride:     0-9Y            <75mg/dL     10Y-17Y         <90 mg/dL       >=18Y     Normal          <150 mg/dL     Borderline High 150-199 mg/dL     High            200-499 mg/dL        Very High       >499 mg/dL    Specimen collection should occur prior to N-Acetylcysteine or Metamizole administration due to the potential for falsely depressed results  • HDL, Direct 01/16/2023 50  >=50 mg/dL Final   • LDL Calculated 01/16/2023 58  0 - 100 mg/dL Final    LDL Cholesterol:     Optimal           <100 mg/dl     Near Optimal      100-129 mg/dl     Above Optimal       Borderline High 130-159 mg/dl       High            160-189 mg/dl       Very High       >189 mg/dl         This screening LDL is a calculated result  It does not have the accuracy of the Direct Measured LDL in the monitoring of patients with hyperlipidemia and/or statin therapy  Direct Measure LDL (DZA084) must be ordered separately in these patients  • Sodium 01/16/2023 143  135 - 147 mmol/L Final   • Potassium 01/16/2023 3 7  3 5 - 5 3 mmol/L Final   • Chloride 01/16/2023 112 (H)  96 - 108 mmol/L Final   • CO2 01/16/2023 24  21 - 32 mmol/L Final   • ANION GAP 01/16/2023 7  4 - 13 mmol/L Final   • BUN 01/16/2023 15  5 - 25 mg/dL Final   • Creatinine 01/16/2023 0 90  0 60 - 1 30 mg/dL Final    Standardized to IDMS reference method   • Glucose, Fasting 01/16/2023 115 (H)  65 - 99 mg/dL Final    Specimen collection should occur prior to Sulfasalazine administration due to the potential for falsely depressed results  Specimen collection should occur prior to Sulfapyridine administration due to the potential for falsely elevated results  • Calcium 01/16/2023 9 6  8 3 - 10 1 mg/dL Final   • AST 01/16/2023 29  5 - 45 U/L Final    Specimen collection should occur prior to Sulfasalazine administration due to the potential for falsely depressed results      • ALT 01/16/2023 22  12 - 78 U/L Final    Specimen collection should occur prior to Sulfasalazine and/or Sulfapyridine administration due to the potential for falsely depressed results  • Alkaline Phosphatase 01/16/2023 83  46 - 116 U/L Final   • Total Protein 01/16/2023 7 7  6 4 - 8 4 g/dL Final   • Albumin 01/16/2023 3 6  3 5 - 5 0 g/dL Final   • Total Bilirubin 01/16/2023 0 55  0 20 - 1 00 mg/dL Final    Use of this assay is not recommended for patients undergoing treatment with eltrombopag due to the potential for falsely elevated results     • eGFR 01/16/2023 73  ml/min/1 73sq m Final   • WBC 01/16/2023 6 84  4 31 - 10 16 Thousand/uL Final   • RBC 01/16/2023 3 39 (L)  3 81 - 5 12 Million/uL Final   • Hemoglobin 01/16/2023 8 2 (L)  11 5 - 15 4 g/dL Final   • Hematocrit 01/16/2023 30 2 (L)  34 8 - 46 1 % Final   • MCV 01/16/2023 89  82 - 98 fL Final   • MCH 01/16/2023 24 2 (L)  26 8 - 34 3 pg Final   • MCHC 01/16/2023 27 2 (L)  31 4 - 37 4 g/dL Final   • RDW 01/16/2023 17 9 (H)  11 6 - 15 1 % Final   • MPV 01/16/2023 12 5  8 9 - 12 7 fL Final   • Platelets 49/49/1326 147 (L)  149 - 390 Thousands/uL Final   • nRBC 01/16/2023 0  /100 WBCs Final   • Neutrophils Relative 01/16/2023 72  43 - 75 % Final   • Immat GRANS % 01/16/2023 1  0 - 2 % Final   • Lymphocytes Relative 01/16/2023 18  14 - 44 % Final   • Monocytes Relative 01/16/2023 8  4 - 12 % Final   • Eosinophils Relative 01/16/2023 1  0 - 6 % Final   • Basophils Relative 01/16/2023 0  0 - 1 % Final   • Neutrophils Absolute 01/16/2023 4 96  1 85 - 7 62 Thousands/µL Final   • Immature Grans Absolute 01/16/2023 0 04  0 00 - 0 20 Thousand/uL Final   • Lymphocytes Absolute 01/16/2023 1 20  0 60 - 4 47 Thousands/µL Final   • Monocytes Absolute 01/16/2023 0 55  0 17 - 1 22 Thousand/µL Final   • Eosinophils Absolute 01/16/2023 0 06  0 00 - 0 61 Thousand/µL Final   • Basophils Absolute 01/16/2023 0 03  0 00 - 0 10 Thousands/µL Final   • Iron Saturation 01/16/2023 3 (L)  15 - 50 % Final   • TIBC 01/16/2023 481 (H)  250 - 450 ug/dL Final   • Iron 01/16/2023 15 (L)  50 - 170 ug/dL Final    Patients treated with metal-binding drugs (ie  Deferoxamine) may have depressed iron values  • Ferritin 01/16/2023 6 (L)  8 - 388 ng/mL Final   Admission on 08/16/2022, Discharged on 08/16/2022   Component Date Value Ref Range Status   • Urine Culture 08/16/2022 <10,000 cfu/ml Escherichia coli (A)   Final   • Urine Culture 08/16/2022 <10,000 cfu/ml Enterococcus  faecium VRE (A)   Final    Comment: Multi-Drug Resistant Organism  Please note: This patient requires contact precautions  This organism has been edited  The previous result was Enterococcus faecium on 8/19/2022 at 2047 EDT  • Urine Culture 08/16/2022 >100,000 cfu/ml - Possible mycoplasma hominis Gram Negative Edd (A)   Final   • COLOR 08/16/2022 Muhammad   Final   • Size 08/16/2022 3x2  mm Final    Multiple pieces received  Dimensions of the largest piece  reported  • Stone Weight 08/16/2022 5  mg Final   • Composition 08/16/2022 Comment   Final    Percentage (Represents the % composition)   • Ca Oxalate,Dihydrate 08/16/2022 30  % Final   • Ca Oxalate,Monohydr  08/16/2022 65  % Final   • STONE COMMENT 08/16/2022 Comment   Final    Calculus received in liquid  Wet calculi must be dried  before analysis, which delays reporting of results  Leaving  calculi in liquid (such as water, saline, blood, urine) may  lead to changes in composition  • STONE COMMENT 08/16/2022 Comment   Final    Physician questions regarding Calculi Analysis contact  [facility] at: [phone]  • Please note 08/16/2022 Comment   Final    Calculi report will follow via computer, mail or   delivery  • Disclaimer 08/16/2022 Comment   Final    This test was developed and its performance characteristics  determined by [employer]   It has not been cleared or approved  by the Food and Drug Administration     • Photo 08/16/2022 Comment   Final    Photograph will follow under a separate cover   • Stone Source 08/16/2022 Comment   Final    Left Ureter   • Hydroxyapatite 08/16/2022 5  % Final   There may be more visits with results that are not included

## 2023-05-29 DIAGNOSIS — K25.4 GASTRIC ULCER WITH HEMORRHAGE, UNSPECIFIED CHRONICITY: ICD-10-CM

## 2023-05-29 RX ORDER — OMEPRAZOLE 40 MG/1
CAPSULE, DELAYED RELEASE ORAL
Qty: 60 CAPSULE | Refills: 3 | Status: SHIPPED | OUTPATIENT
Start: 2023-05-29

## 2023-06-01 ENCOUNTER — DOCUMENTATION (OUTPATIENT)
Dept: BARIATRICS | Facility: CLINIC | Age: 53
End: 2023-06-01

## 2023-06-01 ENCOUNTER — OFFICE VISIT (OUTPATIENT)
Dept: BARIATRICS | Facility: CLINIC | Age: 53
End: 2023-06-01

## 2023-06-01 DIAGNOSIS — R63.5 ABNORMAL WEIGHT GAIN: Primary | ICD-10-CM

## 2023-06-01 DIAGNOSIS — Z98.84 BARIATRIC SURGERY STATUS: Primary | ICD-10-CM

## 2023-06-01 PROCEDURE — RECHECK

## 2023-06-01 NOTE — PROGRESS NOTES
Spoke to patient about surgical process for bariatric surgery  She currently uses Chantix and patches but does not smoke  Informed her of the smoking policy for surgery  EGD/Colonoscopy are to be done with Dr Kelly Costa  She has a follow up in July  Pt was referred to hematology for low iron and had an appointment scheduled for 4/26  She states it was cancelled d/t provider being ooo  Gave her the phone number to reschedule  Patient scheduled for 2 hour evaluation with Cj Iverson in [facility] on 6/9

## 2023-06-06 NOTE — PROGRESS NOTES
Telephone encounter: Spoke to patient about surgical process for bariatric surgery  She currently uses Chantix and patches but does not smoke  Informed her of the smoking policy for surgery      EGD/Colonoscopy are to be done with Dr Lisa Meraz  She has a follow up in July      Pt was referred to hematology for low iron and had an appointment scheduled for 4/26  She states it was cancelled d/t provider being ooo  Gave her the phone number to reschedule      Patient scheduled for 2 hour evaluation with Krystyna Martinez in [facility] on 6/9  Private car

## 2023-06-09 ENCOUNTER — OFFICE VISIT (OUTPATIENT)
Age: 53
End: 2023-06-09

## 2023-06-09 VITALS
WEIGHT: 258.6 LBS | BODY MASS INDEX: 40.59 KG/M2 | HEIGHT: 67 IN | WEIGHT: 258.6 LBS | HEIGHT: 67 IN | BODY MASS INDEX: 40.59 KG/M2

## 2023-06-09 DIAGNOSIS — D50.8 IRON DEFICIENCY ANEMIA SECONDARY TO INADEQUATE DIETARY IRON INTAKE: ICD-10-CM

## 2023-06-09 DIAGNOSIS — E66.01 MORBID (SEVERE) OBESITY DUE TO EXCESS CALORIES (HCC): Primary | ICD-10-CM

## 2023-06-09 DIAGNOSIS — E66.01 SEVERE OBESITY (HCC): Primary | ICD-10-CM

## 2023-06-09 PROCEDURE — RECHECK

## 2023-06-09 RX ORDER — FERROUS SULFATE TAB EC 324 MG (65 MG FE EQUIVALENT) 324 (65 FE) MG
324 TABLET DELAYED RESPONSE ORAL
Qty: 30 TABLET | Refills: 1 | Status: SHIPPED | OUTPATIENT
Start: 2023-06-09

## 2023-06-09 NOTE — PROGRESS NOTES
Bariatric Behavioral Health Evaluation    Presenting Problem:  Nils Almanzar  is a 46 y o    female   :  1970   Patient wants to have surgery to help manage health conditions and increase energy  Patient has tried calorie restriction and limiting types of food to only salads, not much activity, mobility limitations due to chronic back pain  Is the patient seeking Bariatric Surgery Eval?  Yes   If yes, how long has patient been considering, researching and/or making changes for surgery? Patient has been considering surgery for several years, current weight is the highest it's ever been so she's ready to have surgery  Patient knows people who have had the surgery and has spoken with them  Realizes Post-Op Requirements? 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  Physical Activity: Mobility is limited due to chronic back pain  Mental Health, Trauma and Substance use Assessment    Psychiatric/Psychological Treatment Diagnosis, History of Eating Disorders: Patient has a diagnosis of anxiety and depression, she is prescribed Wellbutrin XL, Zoloft, and Valium  She takes her medications consisently, symptoms are well managed, she notices a difference in symptom management when not taking it  Outpatient Counselor No, did see a therapist in the past, didn't feel it helped  Psychiatrist No     Have you had any Mental Health or Substance Use Inpatient Treatment?  No    Drug and/or Alcohol use and treatment history: Patient denies any substance use disorder, she drank alcohol socially in the past but hasn't drank alcohol in four years because it's not much of a part of her life  Tobacco/Vaping History: she is a current smoker, she is using smoking cessation aids to manage cravings    Domestic Violence: No     Abuse or Trauma History: Patient experienced some neglect when she was younger, she was mostly raised by her grandparents      Risk Assessment    Stressors and Supports: Patient struggles with weight and the stress it puts on their health, some financial difficulty  Reports not a lot of supports near by, has people from [employer] that she socializes with  Risk of harm to self or others: Patient denies any SI/HI  Presence of Audio/Visual Hallucinations:  No audio or visual hallucinations reported  Access to weapons: None     Observation: this interview    Based on the previous information, the client presents the following risk of harm to self or others: low      Physical/Mental Health Status:     Appearance: appropriate  Sociability: average  Affect: appropriate  Mood: calm  Thought Process: coherent  Speech: normal  Content: no impairment  Orientation: person  Yes , place  Yes , time  Yes , normal attention span  Yes , normal memory  Yes  , decreased in concentration ability  No and normal judgement  Yes   Insight: emotional  good       BARIATRIC SURGERY EDUCATION CHECKLIST    Patient has received the following education related to the bariatric surgery process and understands:    Patients may be required to complete a psychiatric evaluation and receive clearance for surgery from mental health provider  Patients who undergo weight loss surgery are at higher risk of increased mental health concerns and suicide attempts  Patients may be required to complete a full substance abuse evaluation and then complete all treatment recommendations prior to surgery      If diagnosis of abuse/dependence results, patient may be required to remain sober for one (1) year before having bariatric surgery  Patients on psychiatric medications should check with their provider to discuss psychiatric medications and the changes in absorption  Patient should discuss all time release medications with provider and take all medications as prescribed  The recommendation is that there is no use of any tobacco products, Hookah or vapes for the bariatric post-operation patient  Bariatric surgery patients should not consume alcohol as a post-operative patient as it may increase risk of numerous health conditions including but not limited to alcohol abuse and ulcers  There is a possibility of weight regain if patient does not follow all program guidelines and recommendations  Bariatric surgery patients should exercise thirty (30) to sixty (60) minutes per day to maintain post-surgical weight loss  Research indicates that bariatric patients are more successful when they see a therapist for up to two (2) years post-op  Patients will follow all medical and dietary recommendations provided  Patient will keep all scheduled appointments and follow up with their physician for a minimum of five (5) years  Patient will take all vitamins as recommended  Post-operative vitamins are life-long  There is a goal month set  All requirements should be met by this time  Don't wait to get started! There is a deadline month set  All requirements must be finished by this time and if not, the patient will be halted in the surgery process  The patient can be referred to the medical weight management program or can come back to the surgical program once the unfinished tasks from the previous program are completed  Female patients of childbearing years are informed that pregnancy is not recommended until 12 months post-op  Recommendations: Patient is appropriate for surgery and recommended to meet with surgeon       Note:  Patient has a diagnosis of anxiety and depression that are managed with medication regiment she is currently on  She denies any substance use disorder, she doesn't drink alcohol, she is a smoker and trying to quit with support of nicotine aids  Risk of alcohol and tobacco use post op were discussed  Impact of hormones on female reproduction post-op were discussed  Patient is not currently pregnant and doesn't plan to become pregnant within one year of surgery  Patient is appropriate for surgery and recommended to meet with surgeon    Bernie Matthews LCSW

## 2023-06-22 ENCOUNTER — TELEPHONE (OUTPATIENT)
Dept: BARIATRICS | Facility: CLINIC | Age: 53
End: 2023-06-22

## 2023-06-23 ENCOUNTER — OFFICE VISIT (OUTPATIENT)
Age: 53
End: 2023-06-23

## 2023-06-23 DIAGNOSIS — E66.01 MORBID (SEVERE) OBESITY DUE TO EXCESS CALORIES (HCC): Primary | ICD-10-CM

## 2023-06-23 PROCEDURE — RECHECK

## 2023-06-23 NOTE — PROGRESS NOTES
Patient's name and  were verified during visit  Provider explained that [employer] is a HIPPA compliant platform and to further protect their confidentiality the provider was alone and the office door was closed  Patient consented to the virtual video visit  This visit is free  Patient presents for pre-op checkin  Eating behaviors/food choices: Patient has been adding more water, about two 12oz bottles a day  Still having soda, about three cans a day and doing more snacking while trying to stop smoking  She had been around a lot of baked goods this past weekend and was partaking  She continues to skip meals, she realizes this is a routine that she's had most of her life that she will try to change  Patient feels she should work on quitting smoking and adding more walking to her routine before changing up her eating habits  Agreed that any change is good, educated on the benefits of establishing an eating schedule to manage appetite and hopefully reduce snacking  Patient does like eggs so she would consider having them for her first meal as a start  Activity/Exercise:  Patient wants to start walking more, she has chronic pain that impacts her mobility  Patient used to be able to walk long distances, is discouraged that she can no longer do that  Encouraged patient to do any type of activity she can, short amounts of time throughout the day when possible  Sleep/Rest:  Patient reports disrupted sleep the night before, felt like she was choking, couldn't breath  STOP BANG from eval was negative for RUTHANN risk factors but is supposed to go to sleep med  She did have an appointment scheduled with sleep med for  but had to reschedule due to transportation issues, next appointment not scheduled until 2024  Encouraged patient to consider contacting office to get appointment moved up, she denies having had any other sleep issues      Mental Health/Wellness:  Patient reports taking psych meds consistently but sleep schedule is not consistent so not sure if she is on a medication regiment  She does experience symptoms of anxiety and depression, felt some anxiety the night before when felt she was choking in her sleep, took Valium and said it helped  When she has symptoms of depression, she will isolate and stay indoors, she will play games on her phone but will also smoke and snack  Encouraged patient to try journaling to figure out triggers of depression and anxiety as well as mindless eating episodes  Also encouraged finding non-food coping skills to reduce snacking  Patient feels she needs to focus primarily on quitting smoking and walking more instead of making food changes  She is using a nicotine patch and taking Chantix but reports she is also smoking eight cigarettes a day, down from two packs a day  She tries to avoid smoking when she can but says it's very difficult since she has been smoking since she was [age]years old  She knows she must be nicotine free by 7/30 in order to continue surgical process      Workflow review:    Labs and PCP: labs needed, PCP letter done  Psych and EGD: no eval needed, EGD held until nicotine free  Nicotine: needs to quit by 7/30, test by 8/29  Support Group: needs to complete   Cardiology: needs to schedule  Sleep: no consult required  Weight Checks: 6    Goals:    - work on interval eating, something protein for first meal  - journal when depressed, anxious or snacking to identify triggers and coping skills  - quit smoking    Next Appointment:  With surgeon on 7/14

## 2023-06-28 ENCOUNTER — TELEPHONE (OUTPATIENT)
Dept: INTERNAL MEDICINE CLINIC | Age: 53
End: 2023-06-28

## 2023-06-28 DIAGNOSIS — L91.8 SKIN TAG, ACQUIRED: Primary | ICD-10-CM

## 2023-06-28 NOTE — TELEPHONE ENCOUNTER
Patient called would like referral for dermatologist for skin tags and moles to be evaluated      Please advise    Thank you

## 2023-07-10 ENCOUNTER — OFFICE VISIT (OUTPATIENT)
Dept: GASTROENTEROLOGY | Facility: CLINIC | Age: 53
End: 2023-07-10
Payer: MEDICARE

## 2023-07-10 ENCOUNTER — LAB (OUTPATIENT)
Dept: LAB | Facility: CLINIC | Age: 53
End: 2023-07-10
Payer: MEDICARE

## 2023-07-10 VITALS
HEART RATE: 109 BPM | HEIGHT: 67 IN | RESPIRATION RATE: 16 BRPM | OXYGEN SATURATION: 97 % | DIASTOLIC BLOOD PRESSURE: 80 MMHG | TEMPERATURE: 98.1 F | WEIGHT: 264.4 LBS | SYSTOLIC BLOOD PRESSURE: 128 MMHG | BODY MASS INDEX: 41.5 KG/M2

## 2023-07-10 DIAGNOSIS — Z87.19 HISTORY OF GI BLEED: ICD-10-CM

## 2023-07-10 DIAGNOSIS — K74.60 CIRRHOSIS OF LIVER WITHOUT ASCITES, UNSPECIFIED HEPATIC CIRRHOSIS TYPE (HCC): ICD-10-CM

## 2023-07-10 DIAGNOSIS — K86.9 PANCREATIC LESION: ICD-10-CM

## 2023-07-10 DIAGNOSIS — K58.2 IRRITABLE BOWEL SYNDROME WITH BOTH CONSTIPATION AND DIARRHEA: ICD-10-CM

## 2023-07-10 DIAGNOSIS — K26.9 DUODENAL ULCER: ICD-10-CM

## 2023-07-10 DIAGNOSIS — Z86.010 HISTORY OF COLON POLYPS: ICD-10-CM

## 2023-07-10 DIAGNOSIS — K21.9 GASTROESOPHAGEAL REFLUX DISEASE WITHOUT ESOPHAGITIS: ICD-10-CM

## 2023-07-10 DIAGNOSIS — D50.9 IRON DEFICIENCY ANEMIA, UNSPECIFIED IRON DEFICIENCY ANEMIA TYPE: ICD-10-CM

## 2023-07-10 DIAGNOSIS — R07.89 OTHER CHEST PAIN: Primary | ICD-10-CM

## 2023-07-10 PROCEDURE — 99215 OFFICE O/P EST HI 40 MIN: CPT | Performed by: INTERNAL MEDICINE

## 2023-07-10 PROCEDURE — 85027 COMPLETE CBC AUTOMATED: CPT

## 2023-07-10 PROCEDURE — 82728 ASSAY OF FERRITIN: CPT

## 2023-07-10 PROCEDURE — 83550 IRON BINDING TEST: CPT

## 2023-07-10 PROCEDURE — 36415 COLL VENOUS BLD VENIPUNCTURE: CPT

## 2023-07-10 PROCEDURE — 83540 ASSAY OF IRON: CPT

## 2023-07-10 PROCEDURE — 80053 COMPREHEN METABOLIC PANEL: CPT

## 2023-07-10 RX ORDER — NEOMYCIN SULFATE, POLYMYXIN B SULFATE AND DEXAMETHASONE 3.5; 10000; 1 MG/ML; [USP'U]/ML; MG/ML
SUSPENSION/ DROPS OPHTHALMIC
COMMUNITY
Start: 2023-06-21

## 2023-07-10 NOTE — ASSESSMENT & PLAN NOTE
Chely does have a history of GI bleed. I suspect this was secondary to duodenal ulcer. She denies NSAID use. Continue omeprazole 40 mg twice a day for now. Continue famotidine 40 mg 2 hours before bed. She was noted to have an abnormality at the junction of the first and second portion of the duodenum in addition to nodular mucosa in this region. I feel this was related to a healing ulcer however I did suggest considering next EGD to be performed by an advanced endoscopist and consider endoscopic ultrasound of this area. I explained that this will need to be done in [facility]. We will make arrangements for this. I will check to see if they could do a colonoscopy at the same time for her although they made like to keep that on separate days.

## 2023-07-10 NOTE — ASSESSMENT & PLAN NOTE
Patient was noted to have a robin hepatic adenopathy with a dominant node measuring 15 mm in the region of the pancreas. The exact cause for this is not clear. I would recommend repeating an MRI in September or October or at time of EGD/EUS this could be further evaluated. I will check with the advanced endoscopist regarding this possibility.

## 2023-07-10 NOTE — ASSESSMENT & PLAN NOTE
Patient was noted to have 16 significant polyps back in October 2020 in the face of a poor preparation. Follow-up colonoscopy in 2022 revealed 2 polyps over 10 mm that were tubular adenomas. Given multiple polyps, I did recommend a genetics consultation time of her last visit. She believes she has a genetics appointment in the near future.

## 2023-07-10 NOTE — ASSESSMENT & PLAN NOTE
Patient reports having some intermittent chest pain. She points to her mid to lower sternum where this occurs. It may radiate through to her back. This will be associated with shortness of breath and difficulty getting her breath. She also has been having dyspnea upon exertion. Her chest pain is certainly atypical.  Given her other symptoms I would recommend cardiac consultation for completeness.

## 2023-07-10 NOTE — ASSESSMENT & PLAN NOTE
Patient does have a significant iron deficiency. She was noted to have multiple gastric erosions, portal hypertensive gastropathy and a GI bleed which were thought to be secondary to a duodenal ulcer. I would like her to see hematology as she may benefit from IV iron infusions. Repeat CBC and iron studies today. Check chemistry panel  She will likely need to be scheduled for a another colonoscopy in the near future. She did have a colonoscopy last performed by Dr Ji Maciel February 28, 2022. She had 2 polyps measuring greater than 10 mm in the distal sigmoid that were tubular adenomas. Prior to that in October 2020 she had 16 polyps and a suboptimal preparation.

## 2023-07-10 NOTE — PATIENT INSTRUCTIONS
Other chest pain  Patient reports having some intermittent chest pain. She points to her mid to lower sternum where this occurs. It may radiate through to her back. This will be associated with shortness of breath and difficulty getting her breath. She also has been having dyspnea upon exertion. Her chest pain is certainly atypical.  Given her other symptoms I would recommend cardiac consultation for completeness. Iron deficiency anemia  Patient does have a significant iron deficiency. She was noted to have multiple gastric erosions, portal hypertensive gastropathy and a GI bleed which were thought to be secondary to a duodenal ulcer. I would like her to see hematology as she may benefit from IV iron infusions. Repeat CBC and iron studies today. Check chemistry panel  She will likely need to be scheduled for a another colonoscopy in the near future. She did have a colonoscopy last performed by Dr Richie Avelar February 28, 2022. She had 2 polyps measuring greater than 10 mm in the distal sigmoid that were tubular adenomas. Prior to that in October 2020 she had 16 polyps and a suboptimal preparation. History of GI bleed  Chely does have a history of GI bleed. I suspect this was secondary to duodenal ulcer. She denies NSAID use. Continue omeprazole 40 mg twice a day for now. Continue famotidine 40 mg 2 hours before bed. She was noted to have an abnormality at the junction of the first and second portion of the duodenum in addition to nodular mucosa in this region. I feel this was related to a healing ulcer however I did suggest considering next EGD to be performed by an advanced endoscopist and consider endoscopic ultrasound of this area. I explained that this will need to be done in [facility]. We will make arrangements for this. I will check to see if they could do a colonoscopy at the same time for her although they made like to keep that on separate days.     History of colon polyps  Patient was noted to have 16 significant polyps back in October 2020 in the face of a poor preparation. Follow-up colonoscopy in 2022 revealed 2 polyps over 10 mm that were tubular adenomas. Given multiple polyps, I did recommend a genetics consultation time of her last visit. She believes she has a genetics appointment in the near future. Pancreatic lesion  Patient was noted to have a robin hepatic adenopathy with a dominant node measuring 15 mm in the region of the pancreas. The exact cause for this is not clear. I would recommend repeating an MRI in September or October or at time of EGD/EUS this could be further evaluated. I will check with the advanced endoscopist regarding this possibility. Irritable bowel syndrome with both constipation and diarrhea  Chely does have alternating diarrhea and constipation. She feels that she is more diarrhea predominant. She does drink anywhere from 40 to 60 ounces of Pepsi per day. I suspect the soda may be contributing to her irregular bowel habits in addition to her irritable bowel syndrome. For now I suggested she stop drinking soda altogether. She should also avoid all products that contain high fructose corn syrup. She should also avoid all artificially sweetened products. Suggest the addition of a fiber supplement such as Benefiber 2 teaspoonfuls or Citrucel 1 heaping tablespoonful mixed in 6 to 8 ounce of noncarbonated liquid daily. Gastroesophageal reflux disease  Chely does have a long history of GERD. She reports GERD symptoms every other day despite omeprazole 40 mg twice a day and famotidine 40 mg 2 hours before bed and Carafate 1 g before 2 meals a day and at bedtime. She does have a lot going on therefore I will hold off on ordering a pH probe but at some point may want to consider pH/impedance testing. For now continue omeprazole 40 mg twice a day best to take 30 minutes to 1 hour before 1 meal a day.   Continue famotidine 40 mg 2 hours before bed. Continue liquid Carafate before 2 meals a day and at bedtime. Cutting back on soda will certainly help with her reflux. Weight loss will also help with reflux. Cirrhosis ([address])  Chely does have cirrhosis of the liver based upon abnormal ultrasound elastography, nodular liver noted on MRI and thrombocytopenia. She did see Dr. Mckinley [place] of hepatology. She is up-to-date with imaging and alpha-fetoprotein. She will be due for alpha-fetoprotein and imaging study in September 2023. She will follow-up with Dr. Mckinley [place] in September 2023. At this time I will be ordering a CBC, iron studies, chemistry panel. She should come to the hospital or call if she has hematemesis (vomiting blood) or passing black tarry stools or rectal bleeding. She should also come to the hospital she has a confusional state known as encephalopathy or if friends or family identify this. She should let us know if she notices increased abdominal girth or significant swelling in the legs. She should avoid consumption of raw or poorly cooked seafood and meats. She should avoid swimming in nonchlorinated leger. She is immune to hepatitis A. She should be vaccinated for hepatitis B if this has not yet been done. Duodenal ulcer  Patient was noted to have an abnormality in the region of the duodenum which I suspect was a duodenal ulcer. Follow-up EGD did reveal this to be healing.

## 2023-07-10 NOTE — ASSESSMENT & PLAN NOTE
Chely does have alternating diarrhea and constipation. She feels that she is more diarrhea predominant. She does drink anywhere from 40 to 60 ounces of Pepsi per day. I suspect the soda may be contributing to her irregular bowel habits in addition to her irritable bowel syndrome. For now I suggested she stop drinking soda altogether. She should also avoid all products that contain high fructose corn syrup. She should also avoid all artificially sweetened products. Suggest the addition of a fiber supplement such as Benefiber 2 teaspoonfuls or Citrucel 1 heaping tablespoonful mixed in 6 to 8 ounce of noncarbonated liquid daily.

## 2023-07-10 NOTE — PROGRESS NOTES
[facility] Gastroenterology  Gastroenterology Outpatient Follow up  Patient Jerry Urbano   Age 48 y.o. Gender female   MRN: [id number]  [facility] [id number]     ASSESSMENT AND PLAN:   Problem List Items Addressed This Visit        Digestive    Gastroesophageal reflux disease     Chely does have a long history of GERD. She reports GERD symptoms every other day despite omeprazole 40 mg twice a day and famotidine 40 mg 2 hours before bed and Carafate 1 g before 2 meals a day and at bedtime. She does have a lot going on therefore I will hold off on ordering a pH probe but at some point may want to consider pH/impedance testing. For now continue omeprazole 40 mg twice a day best to take 30 minutes to 1 hour before 1 meal a day. Continue famotidine 40 mg 2 hours before bed. Continue liquid Carafate before 2 meals a day and at bedtime. Cutting back on soda will certainly help with her reflux. Weight loss will also help with reflux. Cirrhosis ([address])     Chely does have cirrhosis of the liver based upon abnormal ultrasound elastography, nodular liver noted on MRI and thrombocytopenia. She did see Dr. Champ Hess of hepatology. She is up-to-date with imaging and alpha-fetoprotein. She will be due for alpha-fetoprotein and imaging study in September 2023. She will follow-up with Dr. Champ Hess in September 2023. At this time I will be ordering a CBC, iron studies, chemistry panel. She should come to the hospital or call if she has hematemesis (vomiting blood) or passing black tarry stools or rectal bleeding. She should also come to the hospital she has a confusional state known as encephalopathy or if friends or family identify this. She should let us know if she notices increased abdominal girth or significant swelling in the legs. She should avoid consumption of raw or poorly cooked seafood and meats. She should avoid swimming in nonchlorinated leger. She is immune to hepatitis A.   She should be vaccinated for hepatitis B if this has not yet been done. Relevant Orders    AFP tumor marker    MRI abdomen w wo contrast and mrcp    Comprehensive metabolic panel    Pancreatic lesion     Patient was noted to have a robin hepatic adenopathy with a dominant node measuring 15 mm in the region of the pancreas. The exact cause for this is not clear. I would recommend repeating an MRI in September or October or at time of EGD/EUS this could be further evaluated. I will check with the advanced endoscopist regarding this possibility. Duodenal ulcer     Patient was noted to have an abnormality in the region of the duodenum which I suspect was a duodenal ulcer. Follow-up EGD did reveal this to be healing. Irritable bowel syndrome with both constipation and diarrhea     Chely does have alternating diarrhea and constipation. She feels that she is more diarrhea predominant. She does drink anywhere from 40 to 60 ounces of Pepsi per day. I suspect the soda may be contributing to her irregular bowel habits in addition to her irritable bowel syndrome. For now I suggested she stop drinking soda altogether. She should also avoid all products that contain high fructose corn syrup. She should also avoid all artificially sweetened products. Suggest the addition of a fiber supplement such as Benefiber 2 teaspoonfuls or Citrucel 1 heaping tablespoonful mixed in 6 to 8 ounce of noncarbonated liquid daily. Relevant Orders    Comprehensive metabolic panel    Iron Panel (Includes Ferritin, Iron Sat%, Iron, and TIBC)    CBC       Other    Other chest pain - Primary     Patient reports having some intermittent chest pain. She points to her mid to lower sternum where this occurs. It may radiate through to her back. This will be associated with shortness of breath and difficulty getting her breath. She also has been having dyspnea upon exertion.   Her chest pain is certainly atypical.  Given her other symptoms I would recommend cardiac consultation for completeness. Relevant Orders    Ambulatory Referral to Cardiology    Iron deficiency anemia     Patient does have a significant iron deficiency. She was noted to have multiple gastric erosions, portal hypertensive gastropathy and a GI bleed which were thought to be secondary to a duodenal ulcer. I would like her to see hematology as she may benefit from IV iron infusions. Repeat CBC and iron studies today. Check chemistry panel  She will likely need to be scheduled for a another colonoscopy in the near future. She did have a colonoscopy last performed by Dr Larry Franklin February 28, 2022. She had 2 polyps measuring greater than 10 mm in the distal sigmoid that were tubular adenomas. Prior to that in October 2020 she had 16 polyps and a suboptimal preparation. History of GI bleed     Chely does have a history of GI bleed. I suspect this was secondary to duodenal ulcer. She denies NSAID use. Continue omeprazole 40 mg twice a day for now. Continue famotidine 40 mg 2 hours before bed. She was noted to have an abnormality at the junction of the first and second portion of the duodenum in addition to nodular mucosa in this region. I feel this was related to a healing ulcer however I did suggest considering next EGD to be performed by an advanced endoscopist and consider endoscopic ultrasound of this area. I explained that this will need to be done in [facility]. We will make arrangements for this. I will check to see if they could do a colonoscopy at the same time for her although they made like to keep that on separate days. History of colon polyps     Patient was noted to have 16 significant polyps back in October 2020 in the face of a poor preparation. Follow-up colonoscopy in 2022 revealed 2 polyps over 10 mm that were tubular adenomas. Given multiple polyps, I did recommend a genetics consultation time of her last visit.   She believes she has a genetics appointment in the near future. _____________________________________________________________    HPI:   Judith Moya is a delightful 22-year-old woman who I am seeing in the office in follow-up for a number of reasons. She is here in follow-up for fatty liver/cirrhosis, iron deficiency anemia, gastroesophageal reflux disease, robin hepatus lymphadenopathy, irritable bowel syndrome, history of GI bleed and history of colon polyps. Chely reports that she does have some left upper quadrant abdominal discomfort. It may last a few minutes at a time. The most that it lasts 30 minutes. It may be sharp. Nothing seems to trigger this. Sometimes she notices it when she is going to bed at night. Nothing seems to make it better or worse. Is not associated with eating. She does report occasional nausea but not associated with this pain. There is been no vomiting. She does report heartburn occurring about every other day despite using omeprazole 40 mg twice a day, famotidine 40 mg 2 hours before bed and Carafate before tumor feels day and at bedtime. She denies any dysphagia. There is been no vomiting. She does drink anywhere from 40 to 60 ounces of Pepsi per day. She is trying to drink more water. Her bowel habits alternate between normal formed stool, constipation, and diarrhea. She would say she is more diarrhea predominant. There is been no rectal bleeding or melena. She does report some dyspnea upon exertion. She does describe a sharp pain in her mid chest that shoots through her back. This does not typically occur with exertion, she can be lying in bed when this occurs. She may also have shortness of breath with the chest pain. She does report smoking about 4 cigarettes/day. She also reports significant back pain with walking. She is trying to do stationary exercises.   She did visit with the bariatric team and is considering bariatric surgery. Records reviewed for 20 minutes prior to office visit  4/11/2023 ER visit in [place] for nausea vomiting diarrhea. Diarrhea became dark. Fecal Hemoccult done in the hospital was negative for blood  AST was 55  ALT 25  Alk phos 75  WBC 4.7 Hgb 9.1 MCV 78 platelet count was 70,237  She did have a fever of 101.2 at that time. She had been using Pepto-Bismol. At presentation her heart rate was 80 and blood pressure was 134/73    4/5/2023 EGD  Probable healing ulcer at junction of first and second portion of duodenum. Biopsy revealed benign intestinal mucosa with retained villous architecture. Mild acute and chronic inflammation noted. Nodular mucosa associated with healing ulcer status post biopsy. Biopsy revealed mild acute and chronic inflammation, focal mucosal erosion and reactive surface foveolar epithelial changes consistent with reactive/chemical gastritis. Negative for H. pylori. Scattered areas of nodular mucosa with overlying erosions in the antrum, status post biopsy. Biopsies negative for H. pylori. Probable portal hypertensive gastropathy fundus and body status post biopsy. Biopsy revealed mucosa with mild vascular congestion and edema. Normal esophagus    3/22/2023 MRI abdomen  Redefined cirrhotic morphology of the liver. Consistent with ultrasound elastography findings. Prior cholecystectomy  Spleen is enlarged at 16.8 cm  Mild robin hepatic adenopathy. A dominant node measuring 15 mm correlates with ultrasound finding of a hypoechoic lesion adjacent to the pancreas.     3/22/2023 laboratory data  IgA 417  Alpha-1 antitrypsin level 244  ACE 61  Iron 35  Ferritin 9  Iron saturation 7%  TIBC 510  Folate 8.2  Ceruloplasmin 26.9  Vitamin B12 331  Alpha-fetoprotein tumor marker 6.4  WC 6.51 Hgb 10.4 HCT 38.7 MCV 85 platelet count 813,426  INR 1.05  Fasting insulin 142.4  CINDY negative  Mitochondrial antibody negative  Smooth muscle antibody negative  Celiac antibody negative  Liver kidney microsomal antibody negative  She is not immune to hepatitis B. She is immune to hepatitis A    3/17/2023 evaluated by Dr. Van Hernandez of hepatology. Follow-up in September 2023 2/1/2023 CT scan renal stone study. Spleen enlarged at 15.1 cm. Gallbladder absent  Diverticulosis without diverticulitis previously noted hydroureteronephrosis on the left side has resolved. Several nonobstructing intrarenal calculi left kidney     1/18/2023 EGD  Normal second portion of duodenum. Biopsies negative for celiac disease. Superficial ulcer in the duodenal bulb with a clean base. I described superficial ulceration with surrounding heaped up mucosa. Moderate edematous erythematous nodular mucosa in the antrum. Biopsies were benign and negative for H. pylori. Mild generalized erythema gastric body. Biopsies negative for H. pylori. Z-line regular at 39 cm. No sign of esophageal varices     1/27/2023 ultrasound abdomen  Enlarged liver 20.6 cm. Nodular surface  Heterogeneous echotexture  Mildly enlarged spleen  In the region of the pancreatic head there is a 1.4 x 1.2 x 1.2 cm ovoid hypoechoic focus likely represent a robin hepatis lymph node        1/27/2023 ultrasound elastography  F4, cirrhosis     1/27/2023-laboratory data  MELD-Na 7   Sodium 140  Creatinine 0.76  AST 27  ALT 16  Alk phos 80  T. bili 0.71  INR 1.10  Cholesterol 126  Triglycerides 91  HDL 48  LDL 60  Iron 32  Ferritin 5  Iron saturation 6%  TIBC 494  WBC 7.73 Hgb 9.0 HCT 33.2 MCV 87 platelet count 677,976 up from 147  Hemoglobin A1c 5.2  TSH 4.13  Hepatitis A total reactive  Hepatitis B surface antigen nonreactive  Hepatitis B surface antibody less than 3.10  Hepatitis B core total nonreactive  Hepatitis C antibody nonreactive     1/21/2023 CT chest lung cancer screening  Splenomegaly of 15.4 cm. Nonobstructing left renal calcification  1.2 cm left upper lobe groundglass opacity  Moderate paraseptal emphysema.   Benign intrapulmonary lymph nodes abutting the minor fissure.        On 1/18/2023 patient was seen by advanced practitioner Luke Dupree     12/11/2022 through 12/13/2022 admitted to [facility] [place] [facility] [place] [facility] with dizziness, shortness of breath, chest pain, and dark stool for couple weeks. Patient also reported chronic generalized abdominal pain. Hemoglobin admission was 7.1.-Received 1 unit of packed red blood cells. EGD revealed a nonbleeding duodenal ulcer. No intervention required. CT scan of the chest abdomen pelvis was performed. Revealed apical emphysematous changes in the lungs. Moderately enlarged liver with nodular contour and fatty infiltration suggestive of cirrhosis. Moderately enlarged spleen  Moderately enlarged gastrohepatic lymph nodes. TSH was 7.88  Hemoglobin admission 7.1 at discharge 7.9 MCV was elevated at 103  AST was 45  ALT was 32  Admission BUN was 17 creatinine 0.5 albumin 3.3              12/12/2022 EGD [place]  Normal esophagus  Gastritis-chronic atrophic without bleeding  Acute duodenal ulcer without hemorrhage or perforation     EGD 2/28/22   notable for moderate erythematous mucosa in the body of the stomach and antrum and possible C2 M2 Albrecht's esophagus. Biopsies negative for H. pylori and Albrecht's esophagus.    Colonoscopy 2/28/22 notable for 1 sessile adenomatous appearing polyp (<5mm) 2 pedunculated edematous appearing polyps (10mm+). Biopsies notable for tubular adenomas. Recommended repeat colonoscopy x3 years and that after review of pathology however following the procedure a 1 year follow-up was recommended     2/28/2022  Colonoscopy   2 pedunculated polyps measuring greater than 10 mm in the distal sigmoid colon. These were tubular adenomas. There is another 5 mm polyp noted.     In the procedure report he recommended a 1 year follow-up however after the pathology recommended a 3-year follow-up.     10/29/2020 EGD  Normal esophagus.   No evidence of Albrecht's esophagus. Erythematous, eroded mucosa in the antrum and duodenal bulb.     10/29/2020 colonoscopy Dr. Citlali Maurice  16 polyps removed. 10 mm polyp or larger ascending colon. 10 or more sessile polyps adenomatous appearing, semipedunculated measuring 4 to 10 mm transverse colon.   4 polyps measuring 4 to 10 mm descending colon  1 semipedunculated polyp measuring 10 mm sigmoid colon  Poor preparation  Poor preparation and additional polyps likely missed  Repeat colonoscopy 3 months       Allergies   Allergen Reactions   • Hydrocodone-Acetaminophen Hives   • Ketorolac Hives and Dizziness   • Toradol [Ketorolac Tromethamine] Other (See Comments)     hypotension   • Ultram [Tramadol] Nausea Only, GI Intolerance and Vomiting     Current Outpatient Medications   Medication Sig Dispense Refill   • albuterol (2.5 mg/3 mL) 0.083 % nebulizer solution Take 3 mL (2.5 mg total) by nebulization every 6 (six) hours as needed for wheezing or shortness of breath 30 mL 0   • albuterol (PROVENTIL HFA,VENTOLIN HFA) 90 mcg/act inhaler Inhale 2 puffs every 6 (six) hours as needed for wheezing 18 g 1   • buPROPion (WELLBUTRIN XL) 150 mg 24 hr tablet Take 1 tablet (150 mg total) by mouth every morning 90 tablet 1   • diazepam (VALIUM) 5 mg tablet Take 1 tablet (5 mg total) by mouth every 6 (six) hours as needed for anxiety 30 tablet 0   • ergocalciferol (VITAMIN D2) 50,000 units Take 1 capsule (50,000 Units total) by mouth every 14 (fourteen) days 12 capsule 1   • famotidine (PEPCID) 40 MG tablet Take 1 tablet (40 mg total) by mouth daily at bedtime Take in evening 2 hours prior to bed 90 tablet 3   • ferrous sulfate 324 (65 Fe) mg Take 1 tablet (324 mg total) by mouth daily before breakfast 30 tablet 1   • fluticasone (FLONASE) 50 mcg/act nasal spray 1 spray into each nostril 2 (two) times a day (Patient taking differently: 1 spray into each nostril as needed) 16 g 5   • gabapentin (NEURONTIN) 100 mg capsule Take 100 mg by mouth 2 (two) times a day  0   • levothyroxine 112 mcg tablet Take 1 tablet (112 mcg total) by mouth daily 90 tablet 1   • neomycin-polymyxin-dexamethasone (MAXITROL) ophthalmic suspension instill 1 drop INTO AFFECTED EYE 4 TIMES A DAY AS DIRECTED     • nicotine (NICODERM CQ) 21 mg/24 hr TD 24 hr patch Place 1 patch on the skin over 24 hours every 24 hours 28 patch 5   • omeprazole (PriLOSEC) 40 MG capsule take 1 capsule by mouth twice a day 60 capsule 3   • oxyCODONE-acetaminophen (PERCOCET)  mg per tablet Take 1 tablet by mouth every 6 (six) hours as needed     • sertraline (ZOLOFT) 100 mg tablet Take 1 tablet (100 mg total) by mouth 2 (two) times a day 180 tablet 1   • simvastatin (ZOCOR) 20 mg tablet Take 1 tablet (20 mg total) by mouth daily at bedtime 90 tablet 1   • tiotropium (Spiriva HandiHaler) 18 mcg inhalation capsule Place 1 capsule (18 mcg total) into inhaler and inhale daily 30 capsule 5   • varenicline (CHANTIX) 0.5 mg tablet Take 1 tablet (0.5 mg total) by mouth 2 (two) times a day 60 tablet 1   • sucralfate (CARAFATE) 1 g/10 mL suspension Take 10 mL (1 g total) by mouth 3 (three) times a day before meals 2700 mL 1     No current facility-administered medications for this visit.      MEDICAL HISTORY:  Past Medical History:   Diagnosis Date   • Abnormal stress test    • Allergic sinusitis     last assessed - 03Apr2017   • Anxiety     last assessed - [id number]   • Arthritis    • Asthma     last assessed - [id number]   • Bilateral lower extremity edema     last assessed - 59FPU1109   • Callus of foot     last assessed - 22Jun2016   • Chest pain    • Chronic GERD     last assessed - 46LTJ6492   • Colon polyp    • Constipation     Resolved - 76NYW3442   • COPD (chronic obstructive pulmonary disease) ([address])    • Depression     last assessed - 04AAX8215   • Disease of thyroid gland    • Diverticulitis of colon     last assessed - 39PXP7548   • Dyspepsia     Resolved - 34SME3897   • Esophageal reflux     last assessed - 49YQY6715   • Essential hypertriglyceridemia     last assessed - 76Mge1136   • Gastroesophageal reflux disease with esophagitis 11/18/2016   • GERD (gastroesophageal reflux disease)    • Gynecological disorder     last assessed - 09QRN1662   • Hypertension     last assessed - 34AQZ9903   • Hypokalemia 06/20/2022   • Hypotension     last assessed - 52Xjq7215   • Kidney stone    • Migraine    • Morbid obesity ([address]) 01/11/2019    Formatting of this note might be different from the original. Added automatically from request for surgery [id number]   • Neuropathy    • Positive depression screening 06/19/2022   • Primary hypertriglyceridemia 06/19/2022   • Pulmonary emphysema ([address])     last assessed - 04MEM0761   • Seasonal allergies    • SOB (shortness of breath)    • Urinary frequency     last assessed - 22Jun2016   • Vagina, candidiasis     last assessed - 22Jun2016   • Visual impairment      Past Surgical History:   Procedure Laterality Date   • CARPAL TUNNEL RELEASE      last assessed - [id number]   • CHOLECYSTECTOMY      last assessed - 16UJT8650   • COLONOSCOPY      Complete colonoscopy    • EYE SURGERY      last assessed - [id number]   • FL RETROGRADE PYELOGRAM  6/16/2022   • FL RETROGRADE PYELOGRAM  8/16/2022   • FOOT SURGERY      last assessed - 17DPR4155   • TX CYSTO BLADDER W/URETERAL CATHETERIZATION Left 7/21/2022    Procedure: CYSTOSCOPY RETROGRADE PYELOGRAM WITH INSERTION STENT URETERAL;  Surgeon: Darryl Parada MD;  Location: [facility];  Service: Urology   • TX CYSTO/URETERO W/LITHOTRIPSY &INDWELL STENT INSRT Left 6/16/2022    Procedure: CYSTOSCOPY URETEROSCOPY WITH LITHOTRIPSY HOLMIUM LASER, RETROGRADE PYELOGRAM AND INSERTION STENT URETERAL;  Surgeon: Lauri Almazan MD;  Location:  MAIN OR;  Service: Urology   • TX CYSTO/URETERO W/LITHOTRIPSY &INDWELL STENT INSRT Left 8/16/2022    Procedure: CYSTOSCOPY USCOPE W/HOLMIUM LASER, RETROGRADE PYELOGRAM&STENT;  Surgeon: Raegan Robertson MD;  Location: [facility];  Service: Urology   • MT ESOPHAGOGASTRODUODENOSCOPY TRANSORAL DIAGNOSTIC N/A 2017    Procedure: ESOPHAGOGASTRODUODENOSCOPY (EGD); Surgeon: Corrinne Peacock, MD;  Location: [facility]; Service: Gastroenterology     Social History     Substance and Sexual Activity   Alcohol Use Not Currently    Comment: wine twice a year     Social History     Substance and Sexual Activity   Drug Use Never     Social History     Tobacco Use   Smoking Status Some Days   • Packs/day: 1.50   • Years: 30.00   • Total pack years: 45.00   • Types: Cigarettes   • Start date: 1983   • Last attempt to quit: 2022   • Years since quittin.9   Smokeless Tobacco Never     Family History   Problem Relation Age of Onset   • Obesity Mother    • Thyroid disease Mother    • Cancer Mother    • Obesity Sister    • Coronary artery disease Sister    • Stroke Sister    • Asthma Sister    • No Known Problems Maternal Aunt    • Diabetes Maternal Uncle    • Lung cancer Maternal Grandmother    • Cancer Maternal Grandfather    • Diabetes Maternal Grandfather    • No Known Problems Paternal Grandmother    • No Known Problems Paternal Grandfather    • Hypertension Other    • Lung cancer Other    • Hypertension Other    • Lung cancer Other    • Lung cancer Other        Objective   Blood pressure 128/80, pulse (!) 109, temperature 98.1 °F (36.7 °C), temperature source Temporal, resp. rate 16, height 5' 7" (1.702 m), weight 120 kg (264 lb 6.4 oz), last menstrual period 2018, SpO2 97 %. Body mass index is 41.41 kg/m². PHYSICAL EXAM:   General Appearance: Alert, cooperative, no distress. Conjunctive a somewhat pale. HEENT: Normocephalic, atraumatic, anicteric. Neck: Supple, symmetrical, trachea midline  Lungs: Clear to auscultation bilaterally; no rales, rhonchi or wheezing; respirations unlabored   Heart: Regular rate and rhythm; no murmur, rub, or gallop.   Abdomen: Soft, bowel sounds normal, non-distended; no masses, there is no hepatosplenomegaly. Few spider angiomas chest wall. Generalized tenderness diffusely. Reportedly this is fairly typical for her abdominal examination according to the patient. Genitalia: Deferred   Rectal: Deferred   Extremities: No cyanosis, clubbing or edema. Palmar erythema noted. There is no asterixis. Skin: No jaundice, rashes, or lesions. Palmar erythema noted. Lymph nodes: No palpable cervical lymphadenopathy   Lab Results:   No visits with results within 2 Month(s) from this visit. Latest known visit with results is:   Hospital Outpatient Visit on 04/05/2023   Component Date Value   • Case Report 04/05/2023                      Value:Surgical Pathology Report                         Case: [id number]                                   Authorizing Provider:  [facility] S Mily Washburn DO  Collected:           04/05/2023 1119              Ordering Location:     [address] [place] [address] Received:            04/05/2023 1414                                     Endoscopy                                                                    Pathologist:           Yeimi Church MD                                                       Specimens:   A) - Duodenum, nodular mucosa duodenal bulb                                                         B) - Stomach, gastric bx  antral erosions                                                           C) - Stomach, gastric bx antrum  R/O HP                                                             D) - Stomach, gastric bx  Evaluate for portal hypertensive gastrophy                      • Final Diagnosis 04/05/2023                      Value: This result contains rich text formatting which cannot be displayed here. • Note 04/05/2023                      Value: This result contains rich text formatting which cannot be displayed here. • Additional Information 04/05/2023                      Value: This result contains rich text formatting which cannot be displayed here.   • Gross Description 04/05/2023                      Value: This result contains rich text formatting which cannot be displayed here. Radiology Results:   No results found. I have spent a total time of 60 minutes on 07/10/23 in caring for this patient including Diagnostic results, Risks and benefits of tx options, Instructions for management, Importance of tx compliance, Impressions, Counseling / Coordination of care, Documenting in the medical record, Reviewing / ordering tests, medicine, procedures   and Obtaining or reviewing history  .       Rayshawn Vu DO   07/10/23   Cc:

## 2023-07-10 NOTE — ASSESSMENT & PLAN NOTE
Patient was noted to have an abnormality in the region of the duodenum which I suspect was a duodenal ulcer. Follow-up EGD did reveal this to be healing.

## 2023-07-10 NOTE — ASSESSMENT & PLAN NOTE
Chely does have a long history of GERD. She reports GERD symptoms every other day despite omeprazole 40 mg twice a day and famotidine 40 mg 2 hours before bed and Carafate 1 g before 2 meals a day and at bedtime. She does have a lot going on therefore I will hold off on ordering a pH probe but at some point may want to consider pH/impedance testing. For now continue omeprazole 40 mg twice a day best to take 30 minutes to 1 hour before 1 meal a day. Continue famotidine 40 mg 2 hours before bed. Continue liquid Carafate before 2 meals a day and at bedtime. Cutting back on soda will certainly help with her reflux. Weight loss will also help with reflux.

## 2023-07-10 NOTE — ASSESSMENT & PLAN NOTE
Chely does have cirrhosis of the liver based upon abnormal ultrasound elastography, nodular liver noted on MRI and thrombocytopenia. She did see Dr. Nadja Mcnair of hepatology. She is up-to-date with imaging and alpha-fetoprotein. She will be due for alpha-fetoprotein and imaging study in September 2023. She will follow-up with Dr. Nadja Mcnair in September 2023. At this time I will be ordering a CBC, iron studies, chemistry panel. She should come to the hospital or call if she has hematemesis (vomiting blood) or passing black tarry stools or rectal bleeding. She should also come to the hospital she has a confusional state known as encephalopathy or if friends or family identify this. She should let us know if she notices increased abdominal girth or significant swelling in the legs. She should avoid consumption of raw or poorly cooked seafood and meats. She should avoid swimming in nonchlorinated leger. She is immune to hepatitis A. She should be vaccinated for hepatitis B if this has not yet been done.

## 2023-07-11 DIAGNOSIS — Z72.0 TOBACCO ABUSE DISORDER: ICD-10-CM

## 2023-07-11 LAB
ALBUMIN SERPL BCP-MCNC: 3.8 G/DL (ref 3.5–5)
ALP SERPL-CCNC: 106 U/L (ref 46–116)
ALT SERPL W P-5'-P-CCNC: 41 U/L (ref 12–78)
ANION GAP SERPL CALCULATED.3IONS-SCNC: 6 MMOL/L
AST SERPL W P-5'-P-CCNC: 60 U/L (ref 5–45)
BILIRUB SERPL-MCNC: 1.03 MG/DL (ref 0.2–1)
BUN SERPL-MCNC: 15 MG/DL (ref 5–25)
CALCIUM SERPL-MCNC: 9.3 MG/DL (ref 8.3–10.1)
CHLORIDE SERPL-SCNC: 113 MMOL/L (ref 96–108)
CO2 SERPL-SCNC: 25 MMOL/L (ref 21–32)
CREAT SERPL-MCNC: 1.2 MG/DL (ref 0.6–1.3)
ERYTHROCYTE [DISTWIDTH] IN BLOOD BY AUTOMATED COUNT: 21 % (ref 11.6–15.1)
FERRITIN SERPL-MCNC: 16 NG/ML (ref 11–307)
GFR SERPL CREATININE-BSD FRML MDRD: 51 ML/MIN/1.73SQ M
GLUCOSE P FAST SERPL-MCNC: 133 MG/DL (ref 65–99)
HCT VFR BLD AUTO: 40.8 % (ref 34.8–46.1)
HGB BLD-MCNC: 11.7 G/DL (ref 11.5–15.4)
IRON SATN MFR SERPL: 11 % (ref 15–50)
IRON SERPL-MCNC: 52 UG/DL (ref 50–170)
MCH RBC QN AUTO: 25.7 PG (ref 26.8–34.3)
MCHC RBC AUTO-ENTMCNC: 28.7 G/DL (ref 31.4–37.4)
MCV RBC AUTO: 90 FL (ref 82–98)
POTASSIUM SERPL-SCNC: 3.7 MMOL/L (ref 3.5–5.3)
PROT SERPL-MCNC: 7.5 G/DL (ref 6.4–8.4)
RBC # BLD AUTO: 4.55 MILLION/UL (ref 3.81–5.12)
SODIUM SERPL-SCNC: 144 MMOL/L (ref 135–147)
TIBC SERPL-MCNC: 460 UG/DL (ref 250–450)
WBC # BLD AUTO: 9.24 THOUSAND/UL (ref 4.31–10.16)

## 2023-07-11 RX ORDER — VARENICLINE TARTRATE 0.5 MG/1
0.5 TABLET, FILM COATED ORAL 2 TIMES DAILY
Qty: 60 TABLET | Refills: 1 | Status: SHIPPED | OUTPATIENT
Start: 2023-07-11

## 2023-07-11 NOTE — RESULT ENCOUNTER NOTE
Please inform patient that fortunately CBC revealed an improved hemoglobin up to 11.7. Platelet count could not be measured. Chemistry panel revealed a glucose to be mildly elevated at 133 one of the liver enzymes called the AST is mildly elevated at 60. Iron studies continue to reveal iron deficiency. Continue iron supplements. Please also let her know I have not yet heard back from the advanced endoscopist regarding scheduling EGD with EUS and colonoscopy at the same time. If she does not hear back from us by next Tuesday or Wednesday please have her give us a call.   Thank you thank you

## 2023-07-12 ENCOUNTER — CONSULT (OUTPATIENT)
Dept: CARDIOLOGY CLINIC | Facility: CLINIC | Age: 53
End: 2023-07-12
Payer: MEDICARE

## 2023-07-12 VITALS
SYSTOLIC BLOOD PRESSURE: 128 MMHG | HEART RATE: 98 BPM | HEIGHT: 67 IN | BODY MASS INDEX: 41.81 KG/M2 | TEMPERATURE: 98.2 F | DIASTOLIC BLOOD PRESSURE: 72 MMHG | OXYGEN SATURATION: 99 % | WEIGHT: 266.4 LBS

## 2023-07-12 DIAGNOSIS — E66.01 CLASS 3 SEVERE OBESITY DUE TO EXCESS CALORIES WITH BODY MASS INDEX (BMI) OF 40.0 TO 44.9 IN ADULT, UNSPECIFIED WHETHER SERIOUS COMORBIDITY PRESENT (HCC): ICD-10-CM

## 2023-07-12 DIAGNOSIS — R07.89 OTHER CHEST PAIN: ICD-10-CM

## 2023-07-12 DIAGNOSIS — Z72.0 CURRENT TOBACCO USE: ICD-10-CM

## 2023-07-12 DIAGNOSIS — R06.00 DYSPNEA, UNSPECIFIED TYPE: Primary | ICD-10-CM

## 2023-07-12 DIAGNOSIS — R06.83 SNORING: ICD-10-CM

## 2023-07-12 DIAGNOSIS — R94.31 PROLONGED Q-T INTERVAL ON ECG: ICD-10-CM

## 2023-07-12 DIAGNOSIS — R00.2 INTERMITTENT PALPITATIONS: ICD-10-CM

## 2023-07-12 PROCEDURE — 93000 ELECTROCARDIOGRAM COMPLETE: CPT | Performed by: INTERNAL MEDICINE

## 2023-07-12 PROCEDURE — 99204 OFFICE O/P NEW MOD 45 MIN: CPT | Performed by: INTERNAL MEDICINE

## 2023-07-12 NOTE — PROGRESS NOTES
Cardiology Consultation     Nelson Allison  [id number]  1970  [facility]  [address] [address] [address]  [phone]      1. Dyspnea, unspecified type  Ambulatory referral to Pulmonology    Echo complete w/ contrast if indicated      2. Prolonged Q-T interval on ECG  Echo complete w/ contrast if indicated    POCT ECG      3. Current tobacco use  Ambulatory referral to Pulmonology      4. Class 3 severe obesity due to excess calories with body mass index (BMI) of 40.0 to 44.9 in adult, unspecified whether serious comorbidity present ([address])        5. Snoring  Ambulatory referral to Pulmonology      6. Intermittent palpitations  Echo complete w/ contrast if indicated    Holter monitor          Discussion/Summary:  1. Dyspnea  2. Prolonged QT  3. Current tobacco use   4. Documented pulmonary emphysema  5. Obesity  6. Snoring with concern for possible obstructive sleep apnea  7.   Intermittent palpitations    -EKG performed in the office today shows sinus rhythm with prolonged QTc estimated at 509 ms  -Pharmacologic nuclear stress test 3/23/2023 showing no diagnostic evidence of ischemia on perfusion imaging with normal left ventricular function  -We will have patient undergo transthoracic echocardiogram to evaluate overall cardiac structure and function  -We will give referral to pulmonology for evaluation of lung dysfunction and potentially sooner appointment for evaluation of possible obstructive sleep apnea as patient's current appointment is for April 2024  -We will check 48-hour Holter monitor for evaluation of possible ectopic activity in the setting of intermittent palpitations  -Patient counseled on dietary and lifestyle modifications including following a low-salt, low-fat, heart healthy diet with sodium restriction to less than 1800 mg of sodium daily and fluid restriction to less than 2000 mL of fluid daily.  -Patient also instructed to speak with her physician prescribing medications that can prolong QT interval so that this is monitored  -I will see patient in 3 months or sooner if necessary once testing is completed  -Goal weight loss by next office visit will be 10 pounds along with patient requested to have complete tobacco cessation by next office visit as she is actively working towards this goal.  -Patient counseled if she were to have any warning or alarm type symptoms she is to seek emergency medical care immediately.  -Patient will attempt to get her prior cardiology records if able              History of Present Illness:  -Patient is a 51-year-old female with documented cirrhosis, GERD, iron deficiency anemia, hypothyroidism, pulmonary emphysema with current tobacco use currently less than half pack per day who presents to the office today after referral from gastroenterology for shortness of breath and chest pain. Patient notes chest pain is intermittent sharp and nonexertional.  She notes she has had the symptoms for many years even being evaluated in [place] well over 7 years ago for exact same symptomatology and was told work-up at that time was relatively unrevealing unfortunately we cannot get records from that as 's office has closed down she believes. -Her main concern here in the office today is worsening shortness of breath on exertion. The patient does note that she has chronic arthropathies including back discomfort which has limited her functional capacity but has noticed over the past month or 2 shortness of breath even with minimal tasks of making her bed or walking around her house. Now she does admit that she has been less active which may have been a contributing factor to this and when she does think about it has noticed exertional shortness of breath even before this however not to this significant a degree.   Patient also notes intermittent palpitations when going to the bathroom but has not had any loss of consciousness and these resolved after finishing going to the bathroom.  -Currently in the office she denies any active chest pain, palpitations, lightheadedness or dizziness, loss consciousness, shortness of breath, lower extremity edema, orthopnea or bendopnea. -She does note however when she does lay flat she does feel sometimes short of breath but this is not all the time.  -Currently smokes less than half pack cigarettes per day, and denies any alcohol or illicit drug use. -Patient notes family history of heart disease in maternal grandmother who had heart attack at age 73s-80s range, maternal uncle passing from massive heart attack at age mid 46s, maternal uncle #2 with MI around age 62s, maternal uncle #3 with MI in his mid 46s. Mother with pacemaker placed around age late 61s-early 71s.       Patient Active Problem List   Diagnosis   • Anxiety   • Benign hypertension   • Depression   • Hypercholesterolemia   • Hypothyroidism   • Tobacco abuse disorder   • Vitamin D deficiency   • Other chest pain   • Gastroparesis   • Edema, lower extremity   • Elevated fasting glucose   • COVID-19 vaccination refused   • Centrilobular emphysema (HCC)   • Nephrolithiasis   • Degeneration of lumbar or lumbosacral intervertebral disc   • Gastroesophageal reflux disease   • Intermittent claudication (HCC)   • Seasonal allergic rhinitis   • Spinal stenosis of lumbar region   • Iron deficiency anemia   • Hydroureteronephrosis   • Left ureteral stone with hydronephrosis   • Obesity (BMI 35.0-39.9 without comorbidity)   • Severe obesity ([address])   • Acute duodenal ulcer with bleeding   • Fatty liver   • Very heavy cigarette smoker   • Abnormal stress ECG with treadmill   • Cirrhosis ([address])   • History of GI bleed   • Pancreatic lesion   • History of colon polyps   • Duodenal ulcer   • Irritable bowel syndrome with both constipation and diarrhea     Past Medical History:   Diagnosis Date   • Abnormal stress test    • Allergic sinusitis     last assessed - 03Apr2017   • Anxiety     last assessed - [id number]   • Arthritis    • Asthma     last assessed - 63JCK0469   • Bilateral lower extremity edema     last assessed - 59DQM3404   • Callus of foot     last assessed - 22Jun2016   • Chest pain    • Chronic GERD     last assessed - 83WTJ7589   • Colon polyp    • Constipation     Resolved - 55MOW0577   • COPD (chronic obstructive pulmonary disease) ([facility])    • Depression     last assessed - 59KVS2874   • Disease of thyroid gland    • Diverticulitis of colon     last assessed - 09VTV0367   • Dyspepsia     Resolved - 98LLD7617   • Esophageal reflux     last assessed - 34KUR9111   • Essential hypertriglyceridemia     last assessed - 26QNY7135   • Gastroesophageal reflux disease with esophagitis 11/18/2016   • GERD (gastroesophageal reflux disease)    • Gynecological disorder     last assessed - 38NJY1353   • Hypertension     last assessed - 31KVN2007   • Hypokalemia 06/20/2022   • Hypotension     last assessed - 05Xha3422   • Kidney stone    • Migraine    • Morbid obesity ([address]) 01/11/2019    Formatting of this note might be different from the original. Added automatically from request for surgery [id number]   • Neuropathy    • Positive depression screening 06/19/2022   • Primary hypertriglyceridemia 06/19/2022   • Pulmonary emphysema ([address])     last assessed - 59PBD0366   • Seasonal allergies    • SOB (shortness of breath)    • Urinary frequency     last assessed - 22Jun2016   • Vagina, candidiasis     last assessed - 22Jun2016   • Visual impairment      Social History     Socioeconomic History   • Marital status:      Spouse name: Not on file   • Number of children: Not on file   • Years of education: Not on file   • Highest education level: Not on file   Occupational History   • Not on file   Tobacco Use   • Smoking status: Some Days     Packs/day: 1.50     Years: 30.00     Total pack years: 45.00     Types: Cigarettes     Start date: 1983     Last attempt to quit: 2022     Years since quittin.9   • Smokeless tobacco: Never   Vaping Use   • Vaping Use: Former   • Substances: Nicotine   Substance and Sexual Activity   • Alcohol use: Not Currently     Comment: wine twice a year   • Drug use: Never   • Sexual activity: Not Currently     Partners: Male     Birth control/protection: Abstinence   Other Topics Concern   • Not on file   Social History Narrative   • Not on file     Social Determinants of Health     Financial Resource Strain: Not on file   Food Insecurity: No Food Insecurity (2022)    Hunger Vital Sign    • Worried About Running Out of Food in the Last Year: Never true    • Ran Out of Food in the Last Year: Never true   Transportation Needs: No Transportation Needs (2022)    PRAPARE - Transportation    • Lack of Transportation (Medical): No    • Lack of Transportation (Non-Medical):  No   Physical Activity: Not on file   Stress: Not on file   Social Connections: Not on file   Intimate Partner Violence: Not on file   Housing Stability: Low Risk  (2022)    Housing Stability Vital Sign    • Unable to Pay for Housing in the Last Year: No    • Number of Places Lived in the Last Year: 1    • Unstable Housing in the Last Year: No      Family History   Problem Relation Age of Onset   • Obesity Mother    • Thyroid disease Mother    • Cancer Mother    • Obesity Sister    • Coronary artery disease Sister    • Stroke Sister    • Asthma Sister    • No Known Problems Maternal Aunt    • Diabetes Maternal Uncle    • Lung cancer Maternal Grandmother    • Cancer Maternal Grandfather    • Diabetes Maternal Grandfather    • No Known Problems Paternal Grandmother    • No Known Problems Paternal Grandfather    • Hypertension Other    • Lung cancer Other    • Hypertension Other    • Lung cancer Other    • Lung cancer Other      Past Surgical History:   Procedure Laterality Date   • CARPAL TUNNEL RELEASE      last assessed - [id number]   • CHOLECYSTECTOMY      last assessed - 69SPF9439   • COLONOSCOPY      Complete colonoscopy    • EYE SURGERY      last assessed - 63IFQ5274   • FL RETROGRADE PYELOGRAM  6/16/2022   • FL RETROGRADE PYELOGRAM  8/16/2022   • FOOT SURGERY      last assessed - 33QDQ9735   • [place] CYSTO BLADDER W/URETERAL CATHETERIZATION Left 7/21/2022    Procedure: CYSTOSCOPY RETROGRADE PYELOGRAM WITH INSERTION STENT URETERAL;  Surgeon: Georgi Moura MD;  Location: [facility];  Service: Urology   • KY CYSTO/URETERO W/LITHOTRIPSY &INDWELL STENT INSRT Left 6/16/2022    Procedure: CYSTOSCOPY URETEROSCOPY WITH LITHOTRIPSY HOLMIUM LASER, RETROGRADE PYELOGRAM AND INSERTION STENT URETERAL;  Surgeon: Willa Luo MD;  Location: [facility];  Service: Urology   • KY CYSTO/URETERO W/LITHOTRIPSY &INDWELL STENT INSRT Left 8/16/2022    Procedure: CYSTOSCOPY USCOPE W/HOLMIUM LASER, RETROGRADE PYELOGRAM&STENT;  Surgeon: Marjorie Mayfield MD;  Location: [facility];  Service: Urology   • KY ESOPHAGOGASTRODUODENOSCOPY TRANSORAL DIAGNOSTIC N/A 2/13/2017    Procedure: ESOPHAGOGASTRODUODENOSCOPY (EGD); Surgeon: Keal Weston MD;  Location: [facility];   Service: Gastroenterology       Current Outpatient Medications:   •  albuterol (2.5 mg/3 mL) 0.083 % nebulizer solution, Take 3 mL (2.5 mg total) by nebulization every 6 (six) hours as needed for wheezing or shortness of breath, Disp: 30 mL, Rfl: 0  •  albuterol (PROVENTIL HFA,VENTOLIN HFA) 90 mcg/act inhaler, Inhale 2 puffs every 6 (six) hours as needed for wheezing, Disp: 18 g, Rfl: 1  •  buPROPion (WELLBUTRIN XL) 150 mg 24 hr tablet, Take 1 tablet (150 mg total) by mouth every morning, Disp: 90 tablet, Rfl: 1  •  diazepam (VALIUM) 5 mg tablet, Take 1 tablet (5 mg total) by mouth every 6 (six) hours as needed for anxiety, Disp: 30 tablet, Rfl: 0  •  ergocalciferol (VITAMIN D2) 50,000 units, Take 1 capsule (50,000 Units total) by mouth every 14 (fourteen) days, Disp: 12 capsule, Rfl: 1  •  famotidine (PEPCID) 40 MG tablet, Take 1 tablet (40 mg total) by mouth daily at bedtime Take in evening 2 hours prior to bed, Disp: 90 tablet, Rfl: 3  •  ferrous sulfate 324 (65 Fe) mg, Take 1 tablet (324 mg total) by mouth daily before breakfast, Disp: 30 tablet, Rfl: 1  •  fluticasone (FLONASE) 50 mcg/act nasal spray, 1 spray into each nostril 2 (two) times a day (Patient taking differently: 1 spray into each nostril as needed), Disp: 16 g, Rfl: 5  •  gabapentin (NEURONTIN) 100 mg capsule, Take 100 mg by mouth 2 (two) times a day, Disp: , Rfl: 0  •  levothyroxine 112 mcg tablet, Take 1 tablet (112 mcg total) by mouth daily, Disp: 90 tablet, Rfl: 1  •  neomycin-polymyxin-dexamethasone (MAXITROL) ophthalmic suspension, instill 1 drop INTO AFFECTED EYE 4 TIMES A DAY AS DIRECTED, Disp: , Rfl:   •  nicotine (NICODERM CQ) 21 mg/24 hr TD 24 hr patch, Place 1 patch on the skin over 24 hours every 24 hours, Disp: 28 patch, Rfl: 5  •  omeprazole (PriLOSEC) 40 MG capsule, take 1 capsule by mouth twice a day, Disp: 60 capsule, Rfl: 3  •  oxyCODONE-acetaminophen (PERCOCET)  mg per tablet, Take 1 tablet by mouth every 6 (six) hours as needed, Disp: , Rfl:   •  sertraline (ZOLOFT) 100 mg tablet, Take 1 tablet (100 mg total) by mouth 2 (two) times a day, Disp: 180 tablet, Rfl: 1  •  simvastatin (ZOCOR) 20 mg tablet, Take 1 tablet (20 mg total) by mouth daily at bedtime, Disp: 90 tablet, Rfl: 1  •  sucralfate (CARAFATE) 1 g/10 mL suspension, Take 10 mL (1 g total) by mouth 3 (three) times a day before meals, Disp: 2700 mL, Rfl: 1  •  tiotropium (Spiriva HandiHaler) 18 mcg inhalation capsule, Place 1 capsule (18 mcg total) into inhaler and inhale daily, Disp: 30 capsule, Rfl: 5  •  varenicline (CHANTIX) 0.5 mg tablet, Take 1 tablet (0.5 mg total) by mouth 2 (two) times a day, Disp: 60 tablet, Rfl: 1  Allergies   Allergen Reactions   • Hydrocodone-Acetaminophen Hives   • Ketorolac Hives and Dizziness   • Toradol [Ketorolac Tromethamine] Other (See Comments)     hypotension   • Ultram [Tramadol] Nausea Only, GI Intolerance and Vomiting         Labs:  Lab on 07/10/2023   Component Date Value   • Sodium 07/10/2023 144    • Potassium 07/10/2023 3.7    • Chloride 07/10/2023 113 (H)    • CO2 07/10/2023 25    • ANION GAP 07/10/2023 6    • BUN 07/10/2023 15    • Creatinine 07/10/2023 1.20    • Glucose, Fasting 07/10/2023 133 (H)    • Calcium 07/10/2023 9.3    • AST 07/10/2023 60 (H)    • ALT 07/10/2023 41    • Alkaline Phosphatase 07/10/2023 106    • Total Protein 07/10/2023 7.5    • Albumin 07/10/2023 3.8    • Total Bilirubin 07/10/2023 1.03 (H)    • eGFR 07/10/2023 51    • WBC 07/10/2023 9.24    • RBC 07/10/2023 4.55    • Hemoglobin 07/10/2023 11.7    • Hematocrit 07/10/2023 40.8    • MCV 07/10/2023 90    • MCH 07/10/2023 25.7 (L)    • MCHC 07/10/2023 28.7 (L)    • RDW 07/10/2023 21.0 (H)    • Platelets 01/04/0816     • Iron Saturation 07/10/2023 11 (L)    • TIBC 07/10/2023 460 (H)    • Iron 07/10/2023 52    • Ferritin 07/10/2023 16         Imaging: No results found. Review of Systems:  Review of Systems   Constitutional: Negative for chills, diaphoresis, fatigue and fever. HENT: Negative for trouble swallowing and voice change. Eyes: Negative for pain and redness. Respiratory: Negative for shortness of breath and wheezing. Cardiovascular: Negative for chest pain, palpitations and leg swelling. Gastrointestinal: Negative for abdominal pain, constipation, diarrhea, nausea and vomiting. Genitourinary: Negative for dysuria. Musculoskeletal: Positive for arthralgias. Negative for neck pain and neck stiffness. Skin: Negative for rash. Neurological: Negative for dizziness, syncope, light-headedness and headaches. Psychiatric/Behavioral: Negative for agitation and confusion. All other systems reviewed and are negative.         Vitals:    07/12/23 1551   BP: 128/72   BP Location: Left arm Patient Position: Sitting   Cuff Size: Standard   Pulse: 98   Temp: 98.2 °F (36.8 °C)   TempSrc: Temporal   SpO2: 99%   Weight: 121 kg (266 lb 6.4 oz)   Height: 5' 7" (1.702 m)     Vitals:    07/12/23 1551   Weight: 121 kg (266 lb 6.4 oz)     Height: 5' 7" (170.2 cm)     Physical Exam:  Physical Exam  Vitals reviewed. Constitutional:       General: She is not in acute distress. Appearance: She is obese. She is not diaphoretic. HENT:      Head: Normocephalic and atraumatic. Eyes:      General:         Right eye: No discharge. Left eye: No discharge. Neck:      Comments: Trachea midline, neck obese, difficult to assess JVD  Cardiovascular:      Rate and Rhythm: Normal rate and regular rhythm. Heart sounds: No friction rub. Pulmonary:      Effort: Pulmonary effort is normal. No respiratory distress. Breath sounds: No wheezing. Comments: Decreased breath sounds bilaterally  Chest:      Chest wall: No tenderness. Abdominal:      General: Bowel sounds are normal.      Palpations: Abdomen is soft. Tenderness: There is no abdominal tenderness. There is no rebound. Musculoskeletal:      Right lower leg: No edema. Left lower leg: No edema. Skin:     General: Skin is warm and dry. Neurological:      Mental Status: She is alert. Comments: Awake, alert, able to answer questions appropriately, able to move extremities bilaterally.    Psychiatric:         Mood and Affect: Mood normal.         Behavior: Behavior normal.

## 2023-07-17 ENCOUNTER — OFFICE VISIT (OUTPATIENT)
Dept: INTERNAL MEDICINE CLINIC | Facility: OTHER | Age: 53
End: 2023-07-17
Payer: MEDICARE

## 2023-07-17 VITALS
WEIGHT: 267 LBS | TEMPERATURE: 98.2 F | HEIGHT: 67 IN | SYSTOLIC BLOOD PRESSURE: 138 MMHG | OXYGEN SATURATION: 97 % | RESPIRATION RATE: 18 BRPM | BODY MASS INDEX: 41.91 KG/M2 | DIASTOLIC BLOOD PRESSURE: 86 MMHG | HEART RATE: 97 BPM

## 2023-07-17 DIAGNOSIS — J43.2 CENTRILOBULAR EMPHYSEMA (HCC): ICD-10-CM

## 2023-07-17 DIAGNOSIS — F41.8 DEPRESSION WITH ANXIETY: ICD-10-CM

## 2023-07-17 DIAGNOSIS — L91.8 SKIN TAG: ICD-10-CM

## 2023-07-17 DIAGNOSIS — E03.9 ACQUIRED HYPOTHYROIDISM: ICD-10-CM

## 2023-07-17 DIAGNOSIS — R73.01 ELEVATED FASTING GLUCOSE: ICD-10-CM

## 2023-07-17 DIAGNOSIS — S76.212A INGUINAL STRAIN, LEFT, INITIAL ENCOUNTER: ICD-10-CM

## 2023-07-17 DIAGNOSIS — D50.8 IRON DEFICIENCY ANEMIA SECONDARY TO INADEQUATE DIETARY IRON INTAKE: ICD-10-CM

## 2023-07-17 DIAGNOSIS — E78.00 HYPERCHOLESTEROLEMIA: ICD-10-CM

## 2023-07-17 DIAGNOSIS — E55.9 VITAMIN D DEFICIENCY: ICD-10-CM

## 2023-07-17 DIAGNOSIS — Z86.010 HISTORY OF COLON POLYPS: Primary | ICD-10-CM

## 2023-07-17 DIAGNOSIS — Z72.0 TOBACCO ABUSE DISORDER: ICD-10-CM

## 2023-07-17 DIAGNOSIS — K74.60 CIRRHOSIS OF LIVER WITHOUT ASCITES, UNSPECIFIED HEPATIC CIRRHOSIS TYPE (HCC): ICD-10-CM

## 2023-07-17 DIAGNOSIS — F41.9 ANXIETY: ICD-10-CM

## 2023-07-17 DIAGNOSIS — F33.41 RECURRENT MAJOR DEPRESSIVE DISORDER, IN PARTIAL REMISSION (HCC): ICD-10-CM

## 2023-07-17 DIAGNOSIS — K21.00 GASTROESOPHAGEAL REFLUX DISEASE WITH ESOPHAGITIS, UNSPECIFIED WHETHER HEMORRHAGE: ICD-10-CM

## 2023-07-17 DIAGNOSIS — E66.01 SEVERE OBESITY (HCC): ICD-10-CM

## 2023-07-17 PROBLEM — F17.210 VERY HEAVY CIGARETTE SMOKER: Status: RESOLVED | Noted: 2023-01-16 | Resolved: 2023-07-17

## 2023-07-17 PROBLEM — R07.89 OTHER CHEST PAIN: Status: RESOLVED | Noted: 2018-11-08 | Resolved: 2023-07-17

## 2023-07-17 PROBLEM — S76.219A STRAIN OF GROIN: Status: ACTIVE | Noted: 2023-07-17

## 2023-07-17 PROCEDURE — 99214 OFFICE O/P EST MOD 30 MIN: CPT | Performed by: FAMILY MEDICINE

## 2023-07-17 RX ORDER — SUCRALFATE ORAL 1 G/10ML
1 SUSPENSION ORAL
Qty: 2700 ML | Refills: 1 | Status: SHIPPED | OUTPATIENT
Start: 2023-07-17 | End: 2023-10-15

## 2023-07-17 RX ORDER — DIAZEPAM 5 MG/1
5 TABLET ORAL EVERY 6 HOURS PRN
Qty: 30 TABLET | Refills: 0 | Status: CANCELLED | OUTPATIENT
Start: 2023-07-17

## 2023-07-17 RX ORDER — DIAZEPAM 5 MG/1
5 TABLET ORAL EVERY 6 HOURS PRN
Qty: 30 TABLET | Refills: 0 | Status: SHIPPED | OUTPATIENT
Start: 2023-07-17

## 2023-07-17 RX ORDER — TIOTROPIUM BROMIDE 18 UG/1
18 CAPSULE ORAL; RESPIRATORY (INHALATION) DAILY
Qty: 30 CAPSULE | Refills: 5 | Status: SHIPPED | OUTPATIENT
Start: 2023-07-17

## 2023-07-17 RX ORDER — LEVOTHYROXINE SODIUM 112 UG/1
112 TABLET ORAL DAILY
Qty: 90 TABLET | Refills: 1 | Status: SHIPPED | OUTPATIENT
Start: 2023-07-17

## 2023-07-17 RX ORDER — DIAZEPAM 5 MG/1
5 TABLET ORAL EVERY 6 HOURS PRN
Qty: 30 TABLET | Refills: 0 | Status: SHIPPED | OUTPATIENT
Start: 2023-07-17 | End: 2023-07-17 | Stop reason: SDUPTHER

## 2023-07-17 RX ORDER — NICOTINE 21 MG/24HR
1 PATCH, TRANSDERMAL 24 HOURS TRANSDERMAL EVERY 24 HOURS
Qty: 28 PATCH | Refills: 5 | Status: SHIPPED | OUTPATIENT
Start: 2023-07-17

## 2023-07-17 RX ORDER — BUPROPION HYDROCHLORIDE 150 MG/1
150 TABLET ORAL EVERY MORNING
Qty: 90 TABLET | Refills: 1 | Status: SHIPPED | OUTPATIENT
Start: 2023-07-17

## 2023-07-17 RX ORDER — SERTRALINE HYDROCHLORIDE 100 MG/1
100 TABLET, FILM COATED ORAL 2 TIMES DAILY
Qty: 180 TABLET | Refills: 1 | Status: SHIPPED | OUTPATIENT
Start: 2023-07-17

## 2023-07-17 RX ORDER — ERGOCALCIFEROL 1.25 MG/1
50000 CAPSULE ORAL
Qty: 12 CAPSULE | Refills: 1 | Status: SHIPPED | OUTPATIENT
Start: 2023-07-17

## 2023-07-17 NOTE — PROGRESS NOTES
Assessment/Plan:    1. History of colon polyps    2. Centrilobular emphysema (HCC)  -     tiotropium (Spiriva HandiHaler) 18 mcg inhalation capsule; Place 1 capsule (18 mcg total) into inhaler and inhale daily    3. Anxiety  -     diazepam (VALIUM) 5 mg tablet; Take 1 tablet (5 mg total) by mouth every 6 (six) hours as needed for anxiety    4. Recurrent major depressive disorder, in partial remission (HCC)  -     buPROPion (WELLBUTRIN XL) 150 mg 24 hr tablet; Take 1 tablet (150 mg total) by mouth every morning  -     sertraline (ZOLOFT) 100 mg tablet; Take 1 tablet (100 mg total) by mouth 2 (two) times a day    5. Depression with anxiety  -     buPROPion (WELLBUTRIN XL) 150 mg 24 hr tablet; Take 1 tablet (150 mg total) by mouth every morning    6. Gastroesophageal reflux disease with esophagitis, unspecified whether hemorrhage  -     sucralfate (CARAFATE) 1 g/10 mL suspension; Take 10 mL (1 g total) by mouth 3 (three) times a day before meals    7. Tobacco abuse disorder  -     nicotine (NICODERM CQ) 21 mg/24 hr TD 24 hr patch; Place 1 patch on the skin over 24 hours every 24 hours    8. Vitamin D deficiency  -     ergocalciferol (VITAMIN D2) 50,000 units; Take 1 capsule (50,000 Units total) by mouth every 14 (fourteen) days    9. Acquired hypothyroidism  -     levothyroxine 112 mcg tablet; Take 1 tablet (112 mcg total) by mouth daily  -     TSH, 3rd generation with Free T4 reflex; Future; Expected date: 01/17/2024    10. Cirrhosis of liver without ascites, unspecified hepatic cirrhosis type ([address])    11. Skin tag  -     Ambulatory Referral to Dermatology; Future    12. Severe obesity ([address])    13. Hypercholesterolemia  -     Lipid Panel with Direct LDL reflex; Future; Expected date: 01/17/2024  -     Comprehensive metabolic panel; Future; Expected date: 01/17/2024    14. Elevated fasting glucose  -     Comprehensive metabolic panel;  Future; Expected date: 01/17/2024  -     CBC and differential; Future; Expected date: 01/17/2024  -     Hemoglobin A1C; Future; Expected date: 01/17/2024  -     metFORMIN (GLUCOPHAGE) 500 mg tablet; Take 1 tablet (500 mg total) by mouth 2 (two) times a day with meals    15. Iron deficiency anemia secondary to inadequate dietary iron intake  -     Iron Panel (Includes Ferritin, Iron Sat%, Iron, and TIBC); Future; Expected date: 01/17/2024    16. Inguinal strain, left, initial encounter  -     Ambulatory Referral to Physical Therapy; Future    Morbid obesity with continued weight gain, patient unable to close nutrition and sugary drinks which she continues. Patient has a sedentary lifestyle and does not exercise, recommend 10 pound weight loss before next appt. There are no Patient Instructions on file for this visit. Return in about 6 months (around 1/17/2024) for Recheck. Subjective:      Patient ID: Gayla Snyder is a 48 y.o. female. Chief Complaint   Patient presents with   • Follow-up     6 month, labs done 7/10/23. • hm     Colonoscopy, annual, cervical   • Pain     Upper left thigh and groin area off and on but consistent over last 3 wks. No injury noted    • Immunizations     Note on 7/10 from gastro says she should get hep B if she hasnt had it yet. I dont see it in her chart. We do not carry it at [facility] any more       HPI          Gastric ulcer with bleeding, patient recently went to the hospital in [place] and was hospitalized due to anemia of 7.1. She went due to arm pain and some chest discomfort. She was given 1 unit packed red blood cells and this was 5 weeks ago. Since discharge she has not had any follow-up or any blood work. She has an appointment with a gastroenterologist in 2 days in this area. She had an EGD while she was in the hospital over there. CT scan showed possible cirrhosis, fatty liver, splenomegaly. Her sugar at the hospital was 174.   We do not have other complete records  July 2023, resolved        Vitamin D Deficiency: The patient is being seen for follow-up of vitamin D deficiency. Recent laboratory results: date 1/2018, 25-hydroxyvitamin D 23 ng/mL.  7/18 level is 18. March 2021 off supplement and has not had labs   January 2022, last lab still showed low vitamin-D. Francisco Javier Hopkins states she is compliant with weekly  June 2022, no recent levels but on supplementation  January 2023, due for blood work. Last level was 91  July 2023, level in range     Renal stones, patient is still drinking a large amount of Pepsi.  Has been in and out the hospital as well as to the urologist due to problems.  Currently not having an issue and feeling well.  Has not been hydrating herself with water  July 2023, does not have a follow-up with urology. Drinks 2 bottles of water per day and the rest is significant amounts of soda.     Anxiety and depression: stable with present medications, no HI or SI. compliant with meds. Continues to have sleep difficulties, decreased appetite, and low energy with depression.  She is not having anhedonia, poor concentration, or current suicidal ideation.   She does not drink much water, but lots of Pepsi.  Eating about 1x/day, usually a sandwich and chips.  She does not exercise.  May 2020, worsening symptoms since February that she did not mid to anyone. Yara Galarza ex- whom she had been with for 20 years but  for only 2 and has been  for 8 years was found D ceased.  She acquired the dog from him after his death that they had shared together while being . Francisco Javier Hopkins also has broken up from her previous boyfriend whom which she was on the road a lot because he was a . Francisco Javier Hopkins is currently moved to [place] in a [facility] and has an apartment which she is able to financially maintain as well as take care of the dog but patient states she feels guilty about the death of her ex- because he had wanted to continue living together and had asked her to move back to [place] but she states that she could not live with him because she was afraid however never stop loving him. Adrian Tolliver is also not taking her medications as prescribed since she is afraid to go out due to COVID-19 restrictions and has been rationing at her medications and decreasing dosages or taking every other day. June 2020, she states that she has no suicidal homicidal ideations and feels that her depression is stable.  She still has guilt over the passing of her ex- but feels that it is slightly better when I saw her last. Adrian Tolliver does not feel that she needs a change in her medications. Adrian Tolliver is very bored and not able to exercise since her dog is blind and cannot go outside and she has no fence yd. Adrian Tolliver continues to drink large amounts of soda and is not watching her nutritional intake.  She continues to gain weight  March 2021: wellcontrolled, no HI or SI  January 2022, no issues.  Compliant with medications.  Had to put down her dog in September.  No HI or SI.  June 2022 stable, no issues no HI or SI.  Still with very poor eating habits  January 2023, no HI or SI, feels that the medication is working well. Still drinking Pepsi  July 2023, stable, no changes medications needed. No HI or SI     Hypertension (Follow-Up): The patient presents for follow-up of essential hypertension. The patient states she has been stable with her blood pressure control since the last visit.    Symptoms: denies impaired vision,-- denies dyspnea,-- denies intermittent leg claudication-- and-- denies lower extremity edema--       The patient presents with complaints of substernal new onset of chest pain, described as aching, non-radiating Her symptoms are caused by no known event (Feels it may be related to indigestion and gas. When she gets chest pain she takes an Aspirin and it helps. Does not feel like it is a crushing pain). Associated symptoms include She does report headaches but that is not new.    July 2023, has been noticing slight increases in blood pressure at home however at her last specialist visit was very well controlled    Home monitoring: The patient checks her blood pressure sporadically. Blood pressure control has been good.      Medications: the patient is adherent with her medication regimen. -- the patient complains of medication side effects.      Disease Management: the patient is doing well with her blood pressure goals.       Hypothyroidism (Follow-Up):  The patient is being seen for follow-up of hyperthyroidism.      Interval symptoms:  denies heat intolerance,-- denies increased perspiration,-- denies palpitations,-- denies tremor,-- denies hyperactivity,-- denies anxiety,-- denies insomnia-- and-- denies fatigue.      Associated symptoms: no weight loss,-- no weight gain,-- no diarrhea,-- no constipation-- and-- no vision changes.    July 2023: stable, in range     Medications:  the patient is not adherent to her medication regimen-- and-- she denies medication side effects.      Disease management:  the patient is doing well with her goals.             tobacco abuse : since age 6, 2-3 packs per day recently, never started chantix  March 2021 started chantix and now smoking  Less but has nto quit  January 2022, was on Chantix and was smoking about 5-6 cigarettes per day.  Now off Chantix due to recall and is smoking 1-1 and half packs per day.  Not motivated to quit  June 2022, not willing to quit  January 2023, quit 4 days ago however she states she is struggling a lot and has a lot of cravings   July 2023 re started smoking     The following portions of the patient's history were reviewed and updated as appropriate: allergies, current medications, past family history, past medical history, past social history, past surgical history and problem list.    Review of Systems        Constitutional:  Denies fever or chills , + weight gain   Eyes:  Denies double , blurry vision or eye pain  HENT:  Denies nasal congestion, sore throat or new hearing issues  Respiratory: Denies cough or shortness of breath or wheezing  Cardiovascular:  Denies palpitations or chest pain  GI:  Denies abdominal pain, nausea, or vomiting, no loose stools, no reflux  Integument:  Denies rash , no open areas  Neurologic:  Denies headache or focal weakness, no dizziness  : no dysuria, or hematuria        Current Outpatient Medications   Medication Sig Dispense Refill   • albuterol (2.5 mg/3 mL) 0.083 % nebulizer solution Take 3 mL (2.5 mg total) by nebulization every 6 (six) hours as needed for wheezing or shortness of breath 30 mL 0   • albuterol (PROVENTIL HFA,VENTOLIN HFA) 90 mcg/act inhaler Inhale 2 puffs every 6 (six) hours as needed for wheezing 18 g 1   • buPROPion (WELLBUTRIN XL) 150 mg 24 hr tablet Take 1 tablet (150 mg total) by mouth every morning 90 tablet 1   • diazepam (VALIUM) 5 mg tablet Take 1 tablet (5 mg total) by mouth every 6 (six) hours as needed for anxiety 30 tablet 0   • ergocalciferol (VITAMIN D2) 50,000 units Take 1 capsule (50,000 Units total) by mouth every 14 (fourteen) days 12 capsule 1   • famotidine (PEPCID) 40 MG tablet Take 1 tablet (40 mg total) by mouth daily at bedtime Take in evening 2 hours prior to bed 90 tablet 3   • ferrous sulfate 324 (65 Fe) mg Take 1 tablet (324 mg total) by mouth daily before breakfast 30 tablet 1   • fluticasone (FLONASE) 50 mcg/act nasal spray 1 spray into each nostril 2 (two) times a day (Patient taking differently: 1 spray into each nostril as needed) 16 g 5   • gabapentin (NEURONTIN) 100 mg capsule Take 100 mg by mouth 2 (two) times a day  0   • levothyroxine 112 mcg tablet Take 1 tablet (112 mcg total) by mouth daily 90 tablet 1   • metFORMIN (GLUCOPHAGE) 500 mg tablet Take 1 tablet (500 mg total) by mouth 2 (two) times a day with meals 180 tablet 1   • neomycin-polymyxin-dexamethasone (MAXITROL) ophthalmic suspension instill 1 drop INTO AFFECTED EYE 4 TIMES A DAY AS DIRECTED     • nicotine (NICODERM CQ) 21 mg/24 hr TD 24 hr patch Place 1 patch on the skin over 24 hours every 24 hours 28 patch 5   • omeprazole (PriLOSEC) 40 MG capsule take 1 capsule by mouth twice a day 60 capsule 3   • oxyCODONE-acetaminophen (PERCOCET)  mg per tablet Take 1 tablet by mouth every 6 (six) hours as needed     • sertraline (ZOLOFT) 100 mg tablet Take 1 tablet (100 mg total) by mouth 2 (two) times a day 180 tablet 1   • simvastatin (ZOCOR) 20 mg tablet Take 1 tablet (20 mg total) by mouth daily at bedtime 90 tablet 1   • sucralfate (CARAFATE) 1 g/10 mL suspension Take 10 mL (1 g total) by mouth 3 (three) times a day before meals 2700 mL 1   • tiotropium (Spiriva HandiHaler) 18 mcg inhalation capsule Place 1 capsule (18 mcg total) into inhaler and inhale daily 30 capsule 5   • varenicline (CHANTIX) 0.5 mg tablet Take 1 tablet (0.5 mg total) by mouth 2 (two) times a day 60 tablet 1     No current facility-administered medications for this visit.        Objective:    /86 (BP Location: Left arm, Patient Position: Sitting, Cuff Size: Large)   Pulse 97   Temp 98.2 °F (36.8 °C) (Temporal)   Resp 18   Ht 5' 7" (1.702 m)   Wt 121 kg (267 lb)   LMP 04/05/2018 (Approximate)   SpO2 97%   BMI 41.82 kg/m²        Physical Exam       Constitutional:  Well developed, well nourished, no acute distress, non-toxic appearance   Eyes:  PERRL, conjunctiva normal , non icteric sclera  HENT:  Atraumatic, oropharynx moist. Neck-  supple   Respiratory:  CTA b/l, normal breath sounds, no rales, no wheezing   Cardiovascular:  RRR, no murmurs, no LE edema b/l  GI:  Soft, nondistended, normal bowel sounds x 4, nontender, no organomegaly, no mass, no rebound, no guarding   Neurologic:  no focal deficits noted   Psychiatric:  Speech and behavior appropriate , AAO x 3            Diana Galan, DO

## 2023-07-24 ENCOUNTER — OFFICE VISIT (OUTPATIENT)
Dept: BARIATRICS | Facility: CLINIC | Age: 53
End: 2023-07-24

## 2023-07-24 DIAGNOSIS — E66.01 MORBID (SEVERE) OBESITY DUE TO EXCESS CALORIES (HCC): Primary | ICD-10-CM

## 2023-07-24 PROCEDURE — RECHECK

## 2023-07-24 NOTE — Clinical Note
This pt has an appt with you on august 11th at 9:30am, but she has cardio imaging that day at [facility] at [address] and she doesn't think she will make your appt at 9:30am.  She was wondering if she can do virtual or would you prefer her to come to the office on August 25th? Let me know when you get a chance. Thanks.   Parish Pruitt

## 2023-07-24 NOTE — PROGRESS NOTES
Bariatric Nutrition Assessment Note--VIRTUAL      i. Name was verified by patient stating name? yes  ii.  verified by patient stating? yes  iii. Verification of patient location yes  iv. Verified patient is in state in which I hold an active license? yes  v. Verified the patient is alone? yes  vi. I would like to verify that you were offered a live visit but are now consenting to this telephone visit? yes  vii.  This visit is free yes      Type of surgery    Preop (6 wt checks)--2 of 6 wt check  Surgery Date: TBD--sleeve vs RYGB (does have reflux so likely leaning toward RYGB)  Surgeon: Dr. Abelardo Whitley (23)    Nutrition Assessment   Chely Manzo  48 y.o.  female     Current wt:  258.6# (via office scale as pt in office to see SW)  Ht:  67"  No weight reported today    Wt with BMI of 25: 159#   Pre-Op Excess Wt: 99#  LMP 2018 (Approximate)      [facility] [facility] Equation:    Estimated calories for weight loss 1121-3190 (1-2# per wk wt loss - sedentary )  Estimated protein needs 61-92gm (1.0-1.5 gms/kg IBW (61kg) )   Estimated fluid needs 1830-2135ml (30-35 ml/kg IBW (61kg) )      Weight History   Onset of Obesity: Childhood  Family history of obesity: Yes  Wt Loss Attempts: Exercise  Fasting  Self Created Diets (Portion Control, Healthy Food Choices, etc.)  Patient has tried the above for 6 months or more with insufficient weight loss or weight regain, which is why patient has requested to be evaluated for weight loss surgery today  Maximum Wt Lost: -10  When younger was able to maintain weight 190-200#  Today was heaviest ever      Review of History and Medications   Started on Metformin due to elevated fasting glucose of 133    Past Medical History:   Diagnosis Date   • Abnormal stress test    • Allergic sinusitis     last assessed - 2017   • Anxiety     last assessed -    • Arthritis    • Asthma     last assessed -    • Bilateral lower extremity edema     last assessed - [id number]   • Callus of foot     last assessed - 34YIC6918   • Chest pain    • Chronic GERD     last assessed - 76URZ3721   • Colon polyp    • Constipation     Resolved - 25HKJ9021   • COPD (chronic obstructive pulmonary disease) ([address])    • Depression     last assessed - 24GFW5542   • Disease of thyroid gland    • Diverticulitis of colon     last assessed - 80QGI1573   • Dyspepsia     Resolved - 20FGH7552   • Esophageal reflux     last assessed - 53AGV0108   • Essential hypertriglyceridemia     last assessed - 81XOK3300   • Gastroesophageal reflux disease with esophagitis 11/18/2016   • GERD (gastroesophageal reflux disease)    • Gynecological disorder     last assessed - 88LUT2551   • Hypertension     last assessed - 13QPK5920   • Hypokalemia 06/20/2022   • Hypotension     last assessed - 81Gbo0999   • Kidney stone    • Migraine    • Morbid obesity ([address]) 01/11/2019    Formatting of this note might be different from the original. Added automatically from request for surgery [id number]   • Neuropathy    • Other chest pain 11/8/2018   • Positive depression screening 06/19/2022   • Primary hypertriglyceridemia 06/19/2022   • Pulmonary emphysema ([address])     last assessed - 18EOJ7610   • Seasonal allergies    • SOB (shortness of breath)    • Urinary frequency     last assessed - 20Sde2481   • Vagina, candidiasis     last assessed - 22Jun2016   • Very heavy cigarette smoker 1/16/2023   • Visual impairment      Past Surgical History:   Procedure Laterality Date   • CARPAL TUNNEL RELEASE      last assessed - 86AYF4751   • CHOLECYSTECTOMY      last assessed - 21DQY7428   • COLONOSCOPY      Complete colonoscopy    • EYE SURGERY      last assessed - 30FNJ5210   • FL RETROGRADE PYELOGRAM  6/16/2022   • FL RETROGRADE PYELOGRAM  8/16/2022   • FOOT SURGERY      last assessed - 15NNQ1540   • TX CYSTO BLADDER W/URETERAL CATHETERIZATION Left 7/21/2022    Procedure: CYSTOSCOPY RETROGRADE PYELOGRAM WITH INSERTION STENT URETERAL;  Surgeon: Rosio Cali MD;  Location: [facility];  Service: Urology   • WA CYSTO/URETERO W/LITHOTRIPSY &INDWELL STENT INSRT Left 6/16/2022    Procedure: CYSTOSCOPY URETEROSCOPY WITH LITHOTRIPSY HOLMIUM LASER, RETROGRADE PYELOGRAM AND INSERTION STENT URETERAL;  Surgeon: Ortiz Stevens MD;  Location: [facility];  Service: Urology   • WA CYSTO/URETERO W/LITHOTRIPSY &INDWELL STENT INSRT Left 8/16/2022    Procedure: CYSTOSCOPY USCOPE W/HOLMIUM LASER, RETROGRADE PYELOGRAM&STENT;  Surgeon: Liam Leslie MD;  Location: [facility];  Service: Urology   • WA ESOPHAGOGASTRODUODENOSCOPY TRANSORAL DIAGNOSTIC N/A 2/13/2017    Procedure: ESOPHAGOGASTRODUODENOSCOPY (EGD); Surgeon: Rene Orozco MD;  Location: [facility]; Service: Gastroenterology     Social History     Socioeconomic History   • Marital status:      Spouse name: Not on file   • Number of children: Not on file   • Years of education: Not on file   • Highest education level: Not on file   Occupational History   • Not on file   Tobacco Use   • Smoking status: Some Days     Packs/day: 0.50     Years: 30.00     Total pack years: 15.00     Types: Cigarettes     Start date: 1/1/1983   • Smokeless tobacco: Never   Vaping Use   • Vaping Use: Former   • Substances: Nicotine   Substance and Sexual Activity   • Alcohol use: Not Currently   • Drug use: Never   • Sexual activity: Not Currently     Partners: Male     Birth control/protection: Abstinence   Other Topics Concern   • Not on file   Social History Narrative   • Not on file     Social Determinants of Health     Financial Resource Strain: Not on file   Food Insecurity: No Food Insecurity (7/22/2022)    Hunger Vital Sign    • Worried About Running Out of Food in the Last Year: Never true    • Ran Out of Food in the Last Year: Never true   Transportation Needs: No Transportation Needs (7/22/2022)    PRAPARE - Transportation    • Lack of Transportation (Medical): No    • Lack of Transportation (Non-Medical):  No Physical Activity: Not on file   Stress: Not on file   Social Connections: Not on file   Intimate Partner Violence: Not on file   Housing Stability: Low Risk  (7/22/2022)    Housing Stability Vital Sign    • Unable to Pay for Housing in the Last Year: No    • Number of Places Lived in the Last Year: 1    • Unstable Housing in the Last Year: No       Current Outpatient Medications:   •  albuterol (2.5 mg/3 mL) 0.083 % nebulizer solution, Take 3 mL (2.5 mg total) by nebulization every 6 (six) hours as needed for wheezing or shortness of breath, Disp: 30 mL, Rfl: 0  •  albuterol (PROVENTIL HFA,VENTOLIN HFA) 90 mcg/act inhaler, Inhale 2 puffs every 6 (six) hours as needed for wheezing, Disp: 18 g, Rfl: 1  •  buPROPion (WELLBUTRIN XL) 150 mg 24 hr tablet, Take 1 tablet (150 mg total) by mouth every morning, Disp: 90 tablet, Rfl: 1  •  diazepam (VALIUM) 5 mg tablet, Take 1 tablet (5 mg total) by mouth every 6 (six) hours as needed for anxiety, Disp: 30 tablet, Rfl: 0  •  ergocalciferol (VITAMIN D2) 50,000 units, Take 1 capsule (50,000 Units total) by mouth every 14 (fourteen) days, Disp: 12 capsule, Rfl: 1  •  famotidine (PEPCID) 40 MG tablet, Take 1 tablet (40 mg total) by mouth daily at bedtime Take in evening 2 hours prior to bed, Disp: 90 tablet, Rfl: 3  •  ferrous sulfate 324 (65 Fe) mg, Take 1 tablet (324 mg total) by mouth daily before breakfast, Disp: 30 tablet, Rfl: 1  •  fluticasone (FLONASE) 50 mcg/act nasal spray, 1 spray into each nostril 2 (two) times a day (Patient taking differently: 1 spray into each nostril as needed), Disp: 16 g, Rfl: 5  •  gabapentin (NEURONTIN) 100 mg capsule, Take 100 mg by mouth 2 (two) times a day, Disp: , Rfl: 0  •  levothyroxine 112 mcg tablet, Take 1 tablet (112 mcg total) by mouth daily, Disp: 90 tablet, Rfl: 1  •  metFORMIN (GLUCOPHAGE) 500 mg tablet, Take 1 tablet (500 mg total) by mouth 2 (two) times a day with meals, Disp: 180 tablet, Rfl: 1  • neomycin-polymyxin-dexamethasone (MAXITROL) ophthalmic suspension, instill 1 drop INTO AFFECTED EYE 4 TIMES A DAY AS DIRECTED, Disp: , Rfl:   •  nicotine (NICODERM CQ) 21 mg/24 hr TD 24 hr patch, Place 1 patch on the skin over 24 hours every 24 hours, Disp: 28 patch, Rfl: 5  •  omeprazole (PriLOSEC) 40 MG capsule, take 1 capsule by mouth twice a day, Disp: 60 capsule, Rfl: 3  •  oxyCODONE-acetaminophen (PERCOCET)  mg per tablet, Take 1 tablet by mouth every 6 (six) hours as needed, Disp: , Rfl:   •  sertraline (ZOLOFT) 100 mg tablet, Take 1 tablet (100 mg total) by mouth 2 (two) times a day, Disp: 180 tablet, Rfl: 1  •  simvastatin (ZOCOR) 20 mg tablet, Take 1 tablet (20 mg total) by mouth daily at bedtime, Disp: 90 tablet, Rfl: 1  •  sucralfate (CARAFATE) 1 g/10 mL suspension, Take 10 mL (1 g total) by mouth 3 (three) times a day before meals, Disp: 2700 mL, Rfl: 1  •  tiotropium (Spiriva HandiHaler) 18 mcg inhalation capsule, Place 1 capsule (18 mcg total) into inhaler and inhale daily, Disp: 30 capsule, Rfl: 5  •  varenicline (CHANTIX) 0.5 mg tablet, Take 1 tablet (0.5 mg total) by mouth 2 (two) times a day, Disp: 60 tablet, Rfl: 1    Food Intake and Lifestyle Assessment   Food Intake Assessment completed via usual diet recall  Wake: 5am, up for a few hours, then back to sleep around 10-11am  This month "craving watermelon"  Yesterday only ate one meal.  Is setting alarm to drink water, suggested to set alarm for meals too  Breakfast: has pepsi at her bed side. Snack: -   Lunch: usually first meal around 7-8wi-ysftmq suzanne or tomato soup with grilled cheese. Snack: orange/watermelon/apples/bananas and a glass of whole milk. Grazes on chips or honey mustard pretzel bites  Dinner: 6-7pm:  Last night was chicken and french fries or Hamburger helper OR beef stroganoff or steak, potatoes and peas Or beef roast with potatoes and veggies OR beef stew/soup OR hot dogs (2) and slaw.   Yesterday first ate at 5pm--pancakes (2) with syrup, 2 eggs, 4 sausage links with pepsi  Snack: popcorn OR fruit (2 navel oranges) and whole milk 12oz, but nothing to eat last night after. Awake until 2-3am (trouble sleeping)    Beverage intake: water, whole milk, juice, regular soda, coffee/tea and alcohol (hasn't had in about 4-5 years)  Protein supplement: none at this time  Estimated protein intake per day: <60gm  Estimated fluid intake per day: Pespi (2-3 bottles per day more recently--down to 1.5-2bottles, usually 4-5 bottles), 32oz water, 12oz whole milk a few times per week, coffee sometimes (3x/week) with sugar and flavored creamer  Meals eaten away from home: very little  Typical meal pattern: 2 meals per day and 3-4 snacks per day  Eating Behaviors: Consumption of high calorie/ high fat foods, Consumption of high calorie beverages, Large portion sizes, Frequent snacking/ grazing, Mindless eating, Emotional eating, Craves sweet foods and Craves salty foods    Food allergies or intolerances: Allergies   Allergen Reactions   • Hydrocodone-Acetaminophen Hives   • Ketorolac Hives and Dizziness   • Toradol [Ketorolac Tromethamine] Other (See Comments)     hypotension   • Ultram [Tramadol] Nausea Only, GI Intolerance and Vomiting     Cultural or Tenriism considerations: -    Physical Assessment  Physical Activity  Types of exercise: none, just house work  Current physical limitations: back pain and thigh and groin pain so limping when walking. Psychosocial Assessment   Support systems: friend(s)  Socioeconomic factors: does not work, lives alone  She does all the cooking and food shopping  Family lives in [place], she has been in [place] for the past 6-7 years.     Nutrition Diagnosis  Diagnosis: Overweight / Obesity (NC-3.3)  Related to: Physical inactivity and Excessive energy intake  As Evidenced by: BMI >25     Nutrition Prescription: Recommend the following diet  Regular    Interventions and Teaching   Discussed pre-op and post-op nutrition guidelines. Patient educated and handouts provided. Surgical changes to stomach / GI  Capacity of post-surgery stomach  Diet progression  Adequate hydration  Expected weight loss  Weight loss plateaus/ possibility of weight regain  Exercise  Nutrition considerations after surgery  Protein supplements  Meal planning and preparation  Appropriate carbohydrate, protein, and fat intake, and food/fluid choices to maximize safe weight loss, nutrient intake, and tolerance   Dietary and lifestyle changes  Possible problems with poor eating habits  Intuitive eating  Vitamin / Mineral supplementation of Multivitamin with minerals and Vitamin D--on iron pills for a little while now  Post-operative pregnancy guidelines    Patient is not currently pregnant and doesn't desire to become pregnant a minimum of one year post-op    Education provided to: patient    Barriers to learning: No barriers identified    Readiness to change: preparation    Prior research on procedure: books, internet and friends or family    Comprehension: verbalizes understanding     Expected Compliance: good    Recommendations  Pt is an appropriate candidate for surgery.  Yes    Evaluation / Monitoring  Dietitian to Monitor: Eating pattern as discussed Tolerance of nutrition prescription Body weight Lab values Physical activity    Pre-op weight goals:  • Do NOT Gain  • Can go to BMI of 35:   223#  • Encouraged weight loss w/ diet and lifestyle changes  • Will be started on a 2 week liver shrinking diet, possibly shorter/longer as per the discretion of the surgeon/team, directly prior to surgery    Workflow: (Incomplete in [facility]):  • Psych and/or D+A Clearance: n/a  • Blood Work: will give at 2rd or 4th wt check  • PCP letter: completed Jan 2023  • Support Group: incomplete  • Surgeon Appt: has appt on 8/11 but needs to likely reschedule or do virtual because has cardio imaging same day  • EGD: needs to be nicotine free frist  • Cardiac Risk Assessment: had consult, needs echo and holter monitor scheduled for August 11th  • Sleep Studies: neg STOP BANG  • Nicotine test: is on patch and generic chantex, still smoking less than 1/2 pack per day. Aware needs to start being nicotine free as of July 30th. • 6 Month Pre-Operative Program: 2 of 6 wt check    Pt presents today 1hr after scheduled appt time for virtual appt. Pt reports she just woke up. She has no reported weight for visit today. Did encouraged pt to try to weigh herself on occasion since she is in the pre-op surgery process and we want to ensure she is working on healthy lifestyle changes to promote weight loss prior to surgery. Reviewed 24hr recall and pt reports to only have consumed 1 meal yesterday. She has not been eating 3 meals per day. Discussed protein shakes with pt and advised can use for one meal per day at this time. Did email pt a list of protein shakes and what to look for when she is deciding on what shake to use. Also discussed setting alarms to remind herself to eat, which pt was open to. Pt continues to smoke, and was smoking while on the call. Reminded pt, as per our protocol, she will have to quite (to include be off patches) by July 30th and remain nicotine free for 30 days in order take the nicotine test and to be able to continue with the surgery process. She reports she is smoking less than 1/2 pack per day. She is afraid of weight gain when she does quite. Discussed with pt that we (DAVID and RD) will review ways to help combat the weight gain that often happens after quitting smoking.       Goals  · Eliminate sugar sweetened beverages--decrease the soda and increase the water  · Food journal via baritastic  · Exercise:  Become less sedentary and try chair yoga, resistance bands, etc  · Complete lesson plans 1-6  · Eat 3 meals per day  · Set alarms to remind herself to eat  · Can use protein shake as a meal replacement--emailed info  · Eliminate mindless snacking  · Smoker:  60 days July 30, 90 days Aug 29--needs nicotine test  · Will order labs at 3rd wt check (Aug) when needs nicotine test  · Surgeon consult on in August  · 4th wt check with SW in Sept    Time Spent:   30 mins

## 2023-07-26 ENCOUNTER — TELEPHONE (OUTPATIENT)
Dept: BARIATRICS | Facility: CLINIC | Age: 53
End: 2023-07-26

## 2023-07-26 NOTE — TELEPHONE ENCOUNTER
Called and spoke to pt. Advised pt that surgeon would prefer to meet her in person therefore would need to reschedule her August 11th appt to August 25th since she has cardio imaging scheduled on August 11th. I did remind pt that she should be quitting smoking by July 30th and should be 30 day nicotine free in order to have a nicotine test on August 30th, but if she is not nicotine free for the month of August will have to cancel her surgeon consult and halt her process. Pt verbalizes understanding.

## 2023-07-31 ENCOUNTER — TELEPHONE (OUTPATIENT)
Dept: GENETICS | Facility: CLINIC | Age: 53
End: 2023-07-31

## 2023-07-31 NOTE — TELEPHONE ENCOUNTER
I called and spoke with Galo to confirm her upcoming appointment on Wednesday 8/2, she would like to convert her appointment to televisit.

## 2023-08-01 NOTE — PROGRESS NOTES
Pre-Test Genetic Counseling Consult Note    Patient Name: Tammy Wakefield DOB/Age: 1970/53 y.o. Referring Provider: NAHEED Ruiz [facility]    Date of Service: 2023  Genetic Counselor: Ousmane Goodman MS, [facility]  Interpretation Services: None  Location: Telephone consult   Length of Visit: 61 Minutes    Chely was referred to the [address] [place], [place] [address]Nd Floor and Genetic Assessment Program due to her personal history of colon polyps and family history of prostate cancer. She presents today to discuss the possibility of a hereditary cancer syndrome, options for genetic testing, and implications for her and her family. 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 The exact cause for this is not clear. I would recommend repeating an MRI in September or 2023 or at time of EGD/EUS this could be further evaluated. I will check with the advanced endoscopist regarding this possibility. Screening Hx:     Breast:  Breast Imaging: Annual; most recent 22  Breast Biopsy: None   Breast Density: Almost entirely fatty    Colon:  Colonoscopy: History of 19 adenomatous colon polyps, most recent colonoscopy 22; recommended to repeat in 3 years     22 - 3 adenomatous colon polyps   Final Diagnosis   A. Stomach, GASTRIC ANTRUM R/O H PYLORI, COLD BIOPSY :  -  Chronic inactive antral gastritis and reactive changes. -  Negative for Helicobacter pylori, by H&E stain.  -  Negative for atrophy, intestinal metaplasia, dysplasia or carcinoma.     B.  Esophagus, ESOPHAGUS R/O BARRETTS, COLD BIOPSY :  - Squamocolumnar junction mucosa with acute and chronic inflammation and reactive changes suggestive of reflux esophagitis. - Negative for goblet cell intestinal metaplasia. - No epithelial dysplasia and no evidence of malignancy.     C. Large Intestine, Sigmoid Colon, SIGMOID COLON POLYPS X2, HOT SNARE:  - Tubular adenomas. - Negative for high grade dysplasia.      D. Polyp, Colorectal, DESCENDING COLON POLYP, HOT SNARE:  - Tubular adenoma. - Negative for high grade dysplasia. 10/29/2020 - 16 adenomatous colon polyps   Final Diagnosis   A. Duodenum, duodenal bulb bx - r/o duodenitis:       - Peptic duodenitis. - No evidence of malabsorptive enteropathy.       - No dysplasia or malignancy is identified.     B. Stomach, :       - Antral and oxyntic mucosa with chronic inactive gastritis. - No Helicobacter pylori organisms are identified on the routine stain.       - No intestinal metaplasia, dysplasia or malignancy is identified.     C. Polyp, Colorectal, ascending colon polyp - cold snare:       - Tubular adenoma involving multiple tissue fragments.       - No high-grade dysplasia or malignancy is identified.     D. Polyp, Colorectal, transverse colon polyps x10 - cold/hot snare:       - Tubular adenoma involving multiple tissue fragments (>10 tissue fragments). - No high-grade dysplasia or malignancy is identified.     E. Polyp, Colorectal, descending colon polyps x4 - cold/hot snare:       - Tubular adenoma involving multiple tissue fragments.       - No high-grade dysplasia or malignancy is identified.     F. Polyp, Colorectal, sigmoid colon polyp - cold snare:       - Tubular adenoma. - No high-grade dysplasia or malignancy is identified. Gynecologic:  Ovaries and Uterus intact      8/2/18  Final Diagnosis   Specimen submitted as "left ovarian cyst wall":  Fragment of benign ovarian tissue containing a portion of follicle (possible follicular cyst). No malignancy is seen.      Skin:  No current screening    Other screening: Takes thyroid medication for an overactive thyroid     Reproductive History  Age at menarche: 15  Age at first live birth: Nulliparous  Menopause: Post Menopausal (50)  Hormone replacement: None     Medical and Surgical History  Pertinent surgical history:   Past Surgical History:   Procedure Laterality Date   • CARPAL TUNNEL RELEASE      last assessed - [id number]   • CHOLECYSTECTOMY      last assessed - 20VBF1233   • COLONOSCOPY      Complete colonoscopy    • EYE SURGERY      last assessed - 35HKQ4735   • FL RETROGRADE PYELOGRAM  6/16/2022   • FL RETROGRADE PYELOGRAM  8/16/2022   • FOOT SURGERY      last assessed - 90KIE5506   • TN CYSTO BLADDER W/URETERAL CATHETERIZATION Left 7/21/2022    Procedure: CYSTOSCOPY RETROGRADE PYELOGRAM WITH INSERTION STENT URETERAL;  Surgeon: Jersey Marino MD;  [facility];  Service: Urology   • TN CYSTO/URETERO W/LITHOTRIPSY &INDWELL STENT INSRT Left 6/16/2022    Procedure: CYSTOSCOPY URETEROSCOPY WITH LITHOTRIPSY HOLMIUM LASER, RETROGRADE PYELOGRAM AND INSERTION STENT URETERAL;  Surgeon: Carline Dumont MD;  [facility];  Service: Urology   • TN CYSTO/URETERO W/LITHOTRIPSY &INDWELL STENT INSRT Left 8/16/2022    Procedure: CYSTOSCOPY USCOPE W/HOLMIUM LASER, RETROGRADE PYELOGRAM&STENT;  Surgeon: Tanner Whatley MD;  [facility];  Service: Urology   • TN ESOPHAGOGASTRODUODENOSCOPY TRANSORAL DIAGNOSTIC N/A 2/13/2017    Procedure: ESOPHAGOGASTRODUODENOSCOPY (EGD); Surgeon: Terry Moya MD;  [facility];   Service: Gastroenterology      Pertinent medical history:  Past Medical History:   Diagnosis Date   • Abnormal stress test    • Allergic sinusitis     last assessed - 03Apr2017   • Anxiety     last assessed - 09QRS4808   • Arthritis    • Asthma     last assessed - 53TMY8964   • Bilateral lower extremity edema     last assessed - 65KPW2308   • Callus of foot     last assessed - 22Jun2016   • Chest pain    • Chronic GERD     last assessed - 41FBH2909 • Colon polyp    • Constipation     Resolved - 55XMZ4990   • COPD (chronic obstructive pulmonary disease) (HCC)    • Depression     last assessed - 14JTJ7712   • Disease of thyroid gland    • Diverticulitis of colon     last assessed - 22ASM9297   • Dyspepsia     Resolved - 90PTX2448   • Esophageal reflux     last assessed - 73TIE1073   • Essential hypertriglyceridemia     last assessed - 62FCW0734   • Gastroesophageal reflux disease with esophagitis 11/18/2016   • GERD (gastroesophageal reflux disease)    • Gynecological disorder     last assessed - 95QNB6748   • Hypertension     last assessed - 97WRS2034   • Hypokalemia 06/20/2022   • Hypotension     last assessed - 80Vma5594   • Kidney stone    • Migraine    • Morbid obesity ([address]) 01/11/2019    Formatting of this note might be different from the original. Added automatically from request for surgery [id number]   • Neuropathy    • Other chest pain 11/8/2018   • Positive depression screening 06/19/2022   • Primary hypertriglyceridemia 06/19/2022   • Pulmonary emphysema ([address])     last assessed - 51JZW6693   • Seasonal allergies    • SOB (shortness of breath)    • Urinary frequency     last assessed - 22Jun2016   • Vagina, candidiasis     last assessed - 22Jun2016   • Very heavy cigarette smoker 1/16/2023   • Visual impairment        Other History:  Height:   Ht Readings from Last 1 Encounters:   07/17/23 5' 7" (1.702 m)     Weight:   Wt Readings from Last 1 Encounters:   07/17/23 121 kg (267 lb)     Relevant Family History   Patient reports no Ashkenazi Adventist ancestry. Maternal Family History:   Mother (age 76) with head/neck cancer  Maternal Half Sister (age 47) with no history of cancer  Grandmother (d age ?) with lung cancer  Grandfather (d age ?) with prostate cancer     Paternal Family History:  Father - Sher Vanegas does not know her biological father and has no information on him or her paternal relatives     Please refer to the scanned pedigree in the Media Tab for a complete family history     *All history is reported as provided by the patient; records are not available for review, except where indicated. Assessment:  We discussed sporadic, familial and hereditary cancer. We also discussed the many factors that influence our risk for cancer such as age, environmental exposures, lifestyle choices and family history. We reviewed the indications suggestive of a hereditary predisposition to cancer. Genetic testing is indicated for Chely based on the following criteria: Meets NCCN V1.2023 Testing Criteria for Adenomatous Polyposis which states that an individual with 10-19 adenomatous colon polyps should consider genetic testing. Quita Dueñas has a personal history of 19 adenomatous colon polyps. The risks, benefits, and limitations of genetic testing were reviewed with the patient, as well as genetic discrimination laws, and possible test results (positive, negative, variants of uncertain significance) and their clinical implications. If positive for a mutation, options for managing cancer risk including increased surveillance, chemoprevention, and in some cases prophylactic surgery were discussed. Chely was informed that if a hereditary cancer syndrome was identified in her, first degree relatives (parents, siblings, and children) have a chance of also inheriting the condition. Genetic testing would allow for predictive genetic testing in other relatives, who may also be at risk depending on their degree of relation. Billing:  Based on Chely's primary insurance being Medicaid and [employer], she should expect an out of pocket cost of $0. Chely verbalized understanding. Plan: Patient decided to proceed with testing and provided consent. Summary:     Sample Collection:  A blood kit was mailed home to the patient.   Chely requested that we send the blood kit to her relatives home at [address]. [place], [place] [facility] [address] however she understands that she will need to take the kit to a [facility]'s lab for her blood draw. Genetic Testing Preformed: CustomNext: Cancer® +GÃ¼dpodnsight® (61 genes): APC, SYLVIE, AXIN2, BAP1, BARD1, BMPR1A, BRCA1, BRCA2, BRIP1, CDH1, CDK4, CDKN1B, CDKN2A, CHEK2, CTNNA1, DICER1, EGLN1, EPCAM, FH, FLCN, GREM1, HOXB13, KIF1B, KIT, MAX, MEN1, MET, MITF, MLH1, MSH2, MSH3, MSH6, MUTYH, NBN, NF1, NTHL1, PALB2, PMS2, POLD1, POLE, POT1, PTEN, RAD50, RAD51C, RAD51D, RB1, RECQL, RET, SDHA, SDHAF2, SDHB, SDHC, SDHD, SMAD4, SMARCA4, STK11, ADFZ586, TP53, TSC1, TSC2, VHL     Result Call Information:  In the event that we need to reach Chely via telephone:  I confirmed the patient's mobile number on file as the best number to call with results  [phone]   I confirmed with the patient that we can leave a voicemail on the provided numbers    Results take approximately 2-3 weeks to complete once test is started. Chely will be notified via [employer] once results are available. Additional recommendations for surveillance/medical management will be made pending genetic test results.

## 2023-08-02 ENCOUNTER — CLINICAL SUPPORT (OUTPATIENT)
Dept: GENETICS | Facility: CLINIC | Age: 53
End: 2023-08-02

## 2023-08-02 ENCOUNTER — DOCUMENTATION (OUTPATIENT)
Dept: GENETICS | Facility: CLINIC | Age: 53
End: 2023-08-02

## 2023-08-02 DIAGNOSIS — K86.9 PANCREATIC LESION: ICD-10-CM

## 2023-08-02 DIAGNOSIS — Z86.010 HISTORY OF COLON POLYPS: Primary | ICD-10-CM

## 2023-08-02 DIAGNOSIS — Z80.42 FAMILY HISTORY OF PROSTATE CANCER: ICD-10-CM

## 2023-08-02 NOTE — LETTER
2023     [address] [facility] [address]    Patient: Jeff Mari  YOB: 1970  Date of Visit: 2023      Dear Dr. Yossi Vieira: Thank you for referring Jeff Mari to me for evaluation. Below are my notes for this consultation. If you have questions, please do not hesitate to call me. I look forward to following your patient along with you. Sincerely,        Ashley Almazan GC        CC: No Recipients        Pre-Test Genetic Counseling Consult Note    Patient Name: Jeff Mari   /Age: 1970/53 y.o. Referring Provider: NAHEED [facility] [place]    Date of Service: 2023  Genetic Counselor: Tanner Lindsey MS, [facility] [place]  Interpretation Services: None  Location: Telephone consult   Length of Visit: 61 Minutes    Chely was referred to the [address] [place], [place] [address]Nd Floor and Genetic Assessment Program due to her personal history of colon polyps and family history of prostate cancer. She presents today to discuss the possibility of a hereditary cancer syndrome, options for genetic testing, and implications for her and her family. 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 The exact cause for this is not clear. I would recommend repeating an MRI in September or 2023 or at time of EGD/EUS this could be further evaluated. I will check with the advanced endoscopist regarding this possibility.         Screening Hx:     Breast:  Breast Imaging: Annual; most recent 22  Breast Biopsy: None   Breast Density: Almost entirely fatty    Colon:  Colonoscopy: History of 19 adenomatous colon polyps, most recent colonoscopy 2/28/22; recommended to repeat in 3 years     2/28/22 - 3 adenomatous colon polyps   Final Diagnosis   A. Stomach, GASTRIC ANTRUM R/O H PYLORI, COLD BIOPSY :  -  Chronic inactive antral gastritis and reactive changes. -  Negative for Helicobacter pylori, by H&E stain.  -  Negative for atrophy, intestinal metaplasia, dysplasia or carcinoma.     B. Esophagus, ESOPHAGUS R/O BARRETTS, COLD BIOPSY :  - Squamocolumnar junction mucosa with acute and chronic inflammation and reactive changes suggestive of reflux esophagitis. - Negative for goblet cell intestinal metaplasia. - No epithelial dysplasia and no evidence of malignancy.     C. Large Intestine, Sigmoid Colon, SIGMOID COLON POLYPS X2, HOT SNARE:  - Tubular adenomas. - Negative for high grade dysplasia.      D. Polyp, Colorectal, DESCENDING COLON POLYP, HOT SNARE:  - Tubular adenoma. - Negative for high grade dysplasia. 10/29/2020 - 16 adenomatous colon polyps   Final Diagnosis   A. Duodenum, duodenal bulb bx - r/o duodenitis:       - Peptic duodenitis. - No evidence of malabsorptive enteropathy.       - No dysplasia or malignancy is identified.     B. Stomach, :       - Antral and oxyntic mucosa with chronic inactive gastritis. - No Helicobacter pylori organisms are identified on the routine stain.       - No intestinal metaplasia, dysplasia or malignancy is identified.     C. Polyp, Colorectal, ascending colon polyp - cold snare:       - Tubular adenoma involving multiple tissue fragments.       - No high-grade dysplasia or malignancy is identified.     D. Polyp, Colorectal, transverse colon polyps x10 - cold/hot snare:       - Tubular adenoma involving multiple tissue fragments (>10 tissue fragments). - No high-grade dysplasia or malignancy is identified.     E.  Polyp, Colorectal, descending colon polyps x4 - cold/hot snare:       - Tubular adenoma involving multiple tissue fragments.       - No high-grade dysplasia or malignancy is identified.     F. Polyp, Colorectal, sigmoid colon polyp - cold snare:       - Tubular adenoma. - No high-grade dysplasia or malignancy is identified. Gynecologic:  Ovaries and Uterus intact      8/2/18  Final Diagnosis   Specimen submitted as "left ovarian cyst wall":  Fragment of benign ovarian tissue containing a portion of follicle (possible follicular cyst). No malignancy is seen.      Skin:  No current screening    Other screening: Takes thyroid medication for an overactive thyroid     Reproductive History  Age at menarche: 15  Age at first live birth: Nulliparous  Menopause: Post Menopausal (50)  Hormone replacement: None     Medical and Surgical History  Pertinent surgical history:   Past Surgical History:   Procedure Laterality Date   • CARPAL TUNNEL RELEASE      last assessed - 55OUS4975   • CHOLECYSTECTOMY      last assessed - 55ZZO1915   • COLONOSCOPY      Complete colonoscopy    • EYE SURGERY      last assessed - 80AGY5431   • FL RETROGRADE PYELOGRAM  6/16/2022   • FL RETROGRADE PYELOGRAM  8/16/2022   • FOOT SURGERY      last assessed - 51ZZZ6082   • CT CYSTO BLADDER W/URETERAL CATHETERIZATION Left 7/21/2022    Procedure: CYSTOSCOPY RETROGRADE PYELOGRAM WITH INSERTION STENT URETERAL;  Surgeon: Katharina Peterson MD;  [facility];  Service: Urology   • CT CYSTO/URETERO W/LITHOTRIPSY &INDWELL STENT INSRT Left 6/16/2022    Procedure: CYSTOSCOPY URETEROSCOPY WITH LITHOTRIPSY HOLMIUM LASER, RETROGRADE PYELOGRAM AND INSERTION STENT URETERAL;  Surgeon: Tori Chaves MD;  [facility];  Service: Urology   • CT CYSTO/URETERO W/LITHOTRIPSY &INDWELL STENT INSRT Left 8/16/2022    Procedure: CYSTOSCOPY USCOPE W/HOLMIUM LASER, RETROGRADE PYELOGRAM&STENT;  Surgeon: Ambar Reyes MD;  [facility];  Service: Urology   • CT ESOPHAGOGASTRODUODENOSCOPY TRANSORAL DIAGNOSTIC N/A 2/13/2017    Procedure: ESOPHAGOGASTRODUODENOSCOPY (EGD); Surgeon: Terry Moya MD;  [facility]; Service: Gastroenterology      Pertinent medical history:  Past Medical History:   Diagnosis Date   • Abnormal stress test    • Allergic sinusitis     last assessed - 03Apr2017   • Anxiety     last assessed - 83QOM9954   • Arthritis    • Asthma     last assessed - 49YFX6508   • Bilateral lower extremity edema     last assessed - 25EDZ5918   • Callus of foot     last assessed - 22Jun2016   • Chest pain    • Chronic GERD     last assessed - 28XBG4134   • Colon polyp    • Constipation     Resolved - 53CBL4077   • COPD (chronic obstructive pulmonary disease) ([address])    • Depression     last assessed - 80VLN8187   • Disease of thyroid gland    • Diverticulitis of colon     last assessed - 97MZC8313   • Dyspepsia     Resolved - 30LDZ0372   • Esophageal reflux     last assessed - 76ASO5467   • Essential hypertriglyceridemia     last assessed - 28REC1612   • Gastroesophageal reflux disease with esophagitis 11/18/2016   • GERD (gastroesophageal reflux disease)    • Gynecological disorder     last assessed - 36FDA6500   • Hypertension     last assessed - 46OVT0858   • Hypokalemia 06/20/2022   • Hypotension     last assessed - 33Htr6631   • Kidney stone    • Migraine    • Morbid obesity ([address]) 01/11/2019    Formatting of this note might be different from the original. Added automatically from request for surgery [id number]   • Neuropathy    • Other chest pain 11/8/2018   • Positive depression screening 06/19/2022   • Primary hypertriglyceridemia 06/19/2022   • Pulmonary emphysema ([address])     last assessed - 60VID6365   • Seasonal allergies    • SOB (shortness of breath)    • Urinary frequency     last assessed - 22Jun2016   • Vagina, candidiasis     last assessed - 22Jun2016   • Very heavy cigarette smoker 1/16/2023   • Visual impairment        Other History:  Height:   Ht Readings from Last 1 Encounters:   07/17/23 5' 7" (1.702 m)     Weight:    Wt Readings from Last 1 Encounters:   07/17/23 121 kg (267 lb)     Relevant Family History   Patient reports no Ashkenazi Voodoo ancestry. Maternal Family History: Mother (age 76) with head/neck cancer  Maternal Half Sister (age 47) with no history of cancer  Grandmother (d age ?) with lung cancer  Grandfather (d age ?) with prostate cancer     Paternal Family History:  Father - Sigrid Christianson does not know her biological father and has no information on him or her paternal relatives     Please refer to the scanned pedigree in the Media Tab for a complete family history     *All history is reported as provided by the patient; records are not available for review, except where indicated. Assessment:  We discussed sporadic, familial and hereditary cancer. We also discussed the many factors that influence our risk for cancer such as age, environmental exposures, lifestyle choices and family history. We reviewed the indications suggestive of a hereditary predisposition to cancer. Genetic testing is indicated for Chely based on the following criteria: Meets NCCN V1.2023 Testing Criteria for Adenomatous Polyposis which states that an individual with 10-19 adenomatous colon polyps should consider genetic testing. Sigrid Christianson has a personal history of 19 adenomatous colon polyps. The risks, benefits, and limitations of genetic testing were reviewed with the patient, as well as genetic discrimination laws, and possible test results (positive, negative, variants of uncertain significance) and their clinical implications. If positive for a mutation, options for managing cancer risk including increased surveillance, chemoprevention, and in some cases prophylactic surgery were discussed. Chely was informed that if a hereditary cancer syndrome was identified in her, first degree relatives (parents, siblings, and children) have a chance of also inheriting the condition.  Genetic testing would allow for predictive genetic testing in other relatives, who may also be at risk depending on their degree of relation. Billing:  Based on Chely's primary insurance being Medicaid and [employer], she should expect an out of pocket cost of $0. Chely verbalized understanding. Plan: Patient decided to proceed with testing and provided consent. Summary:     Sample Collection:  A blood kit was mailed home to the patient. Chely requested that we send the blood kit to her relatives home at [address]. [place] however she understands that she will need to take the kit to a [facility] lab for her blood draw. Genetic Testing Preformed: CustomNext: Cancer® +RNAinsight® (61 genes): APC, SYLVIE, AXIN2, BAP1, BARD1, BMPR1A, BRCA1, BRCA2, BRIP1, CDH1, CDK4, CDKN1B, CDKN2A, CHEK2, CTNNA1, DICER1, EGLN1, EPCAM, FH, FLCN, GREM1, HOXB13, KIF1B, KIT, MAX, MEN1, MET, MITF, MLH1, MSH2, MSH3, MSH6, MUTYH, NBN, NF1, NTHL1, PALB2, PMS2, POLD1, POLE, POT1, PTEN, RAD50, RAD51C, RAD51D, RB1, RECQL, RET, SDHA, SDHAF2, SDHB, SDHC, SDHD, SMAD4, SMARCA4, STK11, AIKE853, TP53, TSC1, TSC2, VHL     Result Call Information:  In the event that we need to reach Chely via telephone:  I confirmed the patient's mobile number on file as the best number to call with results  [phone]   I confirmed with the patient that we can leave a voicemail on the provided numbers    Results take approximately 2-3 weeks to complete once test is started. Chely will be notified via [employer] once results are available. Additional recommendations for surveillance/medical management will be made pending genetic test results.

## 2023-08-11 ENCOUNTER — HOSPITAL ENCOUNTER (OUTPATIENT)
Dept: NON INVASIVE DIAGNOSTICS | Facility: HOSPITAL | Age: 53
Discharge: HOME | End: 2023-08-11
Payer: MEDICARE

## 2023-08-11 VITALS
HEIGHT: 67 IN | HEART RATE: 86 BPM | DIASTOLIC BLOOD PRESSURE: 66 MMHG | BODY MASS INDEX: 41.91 KG/M2 | WEIGHT: 267 LBS | SYSTOLIC BLOOD PRESSURE: 136 MMHG

## 2023-08-11 DIAGNOSIS — R06.00 DYSPNEA, UNSPECIFIED TYPE: ICD-10-CM

## 2023-08-11 DIAGNOSIS — R00.2 INTERMITTENT PALPITATIONS: ICD-10-CM

## 2023-08-11 DIAGNOSIS — R94.31 PROLONGED Q-T INTERVAL ON ECG: ICD-10-CM

## 2023-08-11 LAB
AORTIC ROOT: 2.7 CM
AORTIC VALVE MEAN VELOCITY: 10.7 M/S
APICAL FOUR CHAMBER EJECTION FRACTION: 61 %
ASCENDING AORTA: 3.1 CM
AV AREA BY CONTINUOUS VTI: 2.2 CM2
AV AREA PEAK VELOCITY: 1.8 CM2
AV LVOT MEAN GRADIENT: 2 MMHG
AV LVOT PEAK GRADIENT: 4 MMHG
AV MEAN GRADIENT: 6 MMHG
AV PEAK GRADIENT: 12 MMHG
AV VALVE AREA: 2.16 CM2
AV VELOCITY RATIO: 0.57
DOP CALC AO PEAK VEL: 1.76 M/S
DOP CALC AO VTI: 37.16 CM
DOP CALC LVOT AREA: 3.14 CM2
DOP CALC LVOT CARDIAC INDEX: 2.58 L/MIN/M2
DOP CALC LVOT CARDIAC OUTPUT: 5.9 L/MIN
DOP CALC LVOT DIAMETER: 2 CM
DOP CALC LVOT PEAK VEL VTI: 25.61 CM
DOP CALC LVOT PEAK VEL: 1 M/S
DOP CALC LVOT STROKE INDEX: 36.7 ML/M2
DOP CALC LVOT STROKE VOLUME: 80.42 CM3
E WAVE DECELERATION TIME: 208 MS
FRACTIONAL SHORTENING: 34 % (ref 28–44)
INTERVENTRICULAR SEPTUM IN DIASTOLE (PARASTERNAL SHORT AXIS VIEW): 1.2 CM
INTERVENTRICULAR SEPTUM: 1.2 CM (ref 0.6–1.1)
LAAS-AP2: 22 CM2
LAAS-AP4: 17.1 CM2
LEFT ATRIUM SIZE: 3.7 CM
LEFT ATRIUM VOLUME (MOD BIPLANE): 60 ML
LEFT INTERNAL DIMENSION IN SYSTOLE: 3.1 CM (ref 2.1–4)
LEFT VENTRICULAR INTERNAL DIMENSION IN DIASTOLE: 4.7 CM (ref 3.5–6)
LEFT VENTRICULAR POSTERIOR WALL IN END DIASTOLE: 1.2 CM
LEFT VENTRICULAR STROKE VOLUME: 63 ML
LVSV (TEICH): 63 ML
MV E'TISSUE VEL-SEP: 16 CM/S
MV PEAK A VEL: 0.81 M/S
MV PEAK E VEL: 117 CM/S
MV STENOSIS PRESSURE HALF TIME: 60 MS
MV VALVE AREA P 1/2 METHOD: 3.67 CM2
RIGHT ATRIUM AREA SYSTOLE A4C: 14.2 CM2
RIGHT VENTRICLE ID DIMENSION: 3.3 CM
SL CV LEFT ATRIUM LENGTH A2C: 5.4 CM
SL CV LV EF: 60
SL CV PED ECHO LEFT VENTRICLE DIASTOLIC VOLUME (MOD BIPLANE) 2D: 102 ML
SL CV PED ECHO LEFT VENTRICLE SYSTOLIC VOLUME (MOD BIPLANE) 2D: 39 ML
TR MAX PG: 23 MMHG
TR PEAK VELOCITY: 2.4 M/S
TRICUSPID ANNULAR PLANE SYSTOLIC EXCURSION: 2.5 CM
TRICUSPID VALVE PEAK REGURGITATION VELOCITY: 2.4 M/S

## 2023-08-11 PROCEDURE — 93306 TTE W/DOPPLER COMPLETE: CPT | Performed by: INTERNAL MEDICINE

## 2023-08-11 PROCEDURE — 93225 XTRNL ECG REC<48 HRS REC: CPT

## 2023-08-11 PROCEDURE — 93226 XTRNL ECG REC<48 HR SCAN A/R: CPT

## 2023-08-11 PROCEDURE — 93306 TTE W/DOPPLER COMPLETE: CPT

## 2023-08-11 RX ADMIN — PERFLUTREN 0.4 ML/MIN: 6.52 INJECTION, SUSPENSION INTRAVENOUS at 08:50

## 2023-08-14 ENCOUNTER — APPOINTMENT (OUTPATIENT)
Dept: LAB | Facility: CLINIC | Age: 53
End: 2023-08-14
Payer: MEDICARE

## 2023-08-14 DIAGNOSIS — Z80.42 FAMILY HISTORY OF PROSTATE CANCER: ICD-10-CM

## 2023-08-14 DIAGNOSIS — Z86.010 PERSONAL HISTORY OF COLONIC POLYPS: ICD-10-CM

## 2023-08-14 PROCEDURE — 36415 COLL VENOUS BLD VENIPUNCTURE: CPT

## 2023-08-17 PROCEDURE — 93227 XTRNL ECG REC<48 HR R&I: CPT | Performed by: INTERNAL MEDICINE

## 2023-08-23 ENCOUNTER — TELEPHONE (OUTPATIENT)
Dept: BARIATRICS | Facility: CLINIC | Age: 53
End: 2023-08-23

## 2023-08-23 NOTE — TELEPHONE ENCOUNTER
Called and spoke to pt to follow up on her current smoking status. Pt reports she is still smoking 2-3 cigarettes per day. Reminded pt, as per our policy, for the pt's safety before, during and after surgery we will have to halt her process because as per the conversation at her initial visit pt was instructed that she needs to stop smoking , be nicotine free from - in order to take a nicotine test by . At this time pt is still smoking. Advised pt we will halt the process and in order for her to restart in the future she will need to have a negative nicotine test to restart. Did discuss with pt that restart would be starting with the 2hr eval again and completing the insurance requirements and anything that would be . Pt was asking if there was a way to continue the process and without starting over; reiterated our policy for the safety of the pt. Pt verbalized understanding.

## 2023-08-28 ENCOUNTER — PREP FOR PROCEDURE (OUTPATIENT)
Dept: GASTROENTEROLOGY | Facility: MEDICAL CENTER | Age: 53
End: 2023-08-28

## 2023-08-28 DIAGNOSIS — R59.0 ABDOMINAL LYMPHADENOPATHY: ICD-10-CM

## 2023-08-28 DIAGNOSIS — K26.9 DUODENAL ULCER: Primary | ICD-10-CM

## 2023-08-29 ENCOUNTER — TELEPHONE (OUTPATIENT)
Dept: GASTROENTEROLOGY | Facility: CLINIC | Age: 53
End: 2023-08-29

## 2023-08-29 NOTE — TELEPHONE ENCOUNTER
Scheduled date of EGD / EUS(as of today):10/10/2023  Physician performing EGD / EUS: [place]    Location of EGD /EUS: [place] reviewed with patient by: Delfin Chamberlain and sent in the mail  Clearances:  NONE    Patient is DIabetic

## 2023-08-31 ENCOUNTER — TELEPHONE (OUTPATIENT)
Dept: GENETICS | Facility: CLINIC | Age: 53
End: 2023-08-31

## 2023-08-31 ENCOUNTER — TELEPHONE (OUTPATIENT)
Dept: INTERNAL MEDICINE CLINIC | Age: 53
End: 2023-08-31

## 2023-08-31 NOTE — TELEPHONE ENCOUNTER
Post-Test Genetic Counseling Consult Note  Today I spoke with Chely over the phone to review the results of her genetic test for hereditary cancer. Chely met previously with Fatuma Michael on 8/2/23 for pre-test counseling. A copy of this consult note and genetic test result will be shared with the patient. SUMMARY:    Test(s): CustomNext: Cancer® +RNAinsight® (61 genes): APC, SYLVIE, AXIN2, BAP1, BARD1, BMPR1A, BRCA1, BRCA2, BRIP1, CDH1, CDK4, CDKN1B, CDKN2A, CHEK2, CTNNA1, DICER1, EGLN1, EPCAM, FH, FLCN, GREM1, HOXB13, KIF1B, KIT, MAX, MEN1, MET, MITF, MLH1, MSH2, MSH3, MSH6, MUTYH, NBN, NF1, NTHL1, PALB2, PMS2, POLD1, POLE, POT1, PTEN, RAD50, RAD51C, RAD51D, RB1, RECQL, RET, SDHA, SDHAF2, SDHB, SDHC, SDHD, SMAD4, SMARCA4, STK11, JNTK041, TP53, TSC1, TSC2, VHL        Result: Negative - No Clinically Significant Variants Detected      Assessment:   A negative result reduces the likelihood that Chely has a hereditary polyposis condition. However, this testing is unable to completely rule it out. It remains possible that:  - There is a variant in an area of a gene which was not tested or there is a variant not detectable due to technical limitations of this test.     - There is a variant in another gene that was not included in this test or in a gene not known to be linked to cancer or tumors. - There is somatic mosaicism for an APC mutation in which sequencing of the APC gene in DNA extracted from peripheral blood lymphocytes may fail to detect pathogenic variants because of weak mutation signals. Somatic mosacism can occur in up to 20% of individuals who have unaffected parents. 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     The NCCN Genetic/Familial High Risk Assessment:Colorectal Guideline V1.2023 has surveillance/management recommendations for individuals with Colonic Adenomatous Polyposis of Unknown Etiology (CPUE). Colonic Adenomatous Polyposis of Unknown Etiology is defined as individuals with 10 or more adenomas without a pathogenic variant identified in a polyposis gene. Testing for Family Members:  Despite a negative result, Chely's first-degree relatives may be at increased risk for the cancers based on the family history. We recommend they discuss screening and management recommendations with their healthcare providers. If Chely has any affected family members with a cancer diagnosis, especially at a young age, they may still consider genetic testing. Relatives who wish to pursue genetic testing can reach out to the [address] [place], [place] [address]e S ([phone]) to schedule an appointment or visit [email or web address] to identify a local genetic counselor. Surveillance recommendations for first-degree relatives (parents, siblings & children) of individuals with Colonic Adenomatous Polyposis of Unknown Etiology (CPUE): If an individual has 10-19 adenomas and a negative genetic test result, surveillance for their first-degree relatives should be managed based on clinical judgement. Frequency of surveillance may be modified based on personal, cumulative history of adenomas     Additional Information:  A healthy lifestyle will improve overall health and reduce risk for illness. Eating a healthy diet and exercising for 4 hours per week is recommended. Both diet and exercise have been shown to help maintain a healthy weight. Postmenopausal women who are overweight are at higher risk for breast cancer. Moderate to heavy alcohol use can increase the risk for some cancers. Smoking cigarettes can also increase risk for breast, lung, prostate, pancreatic and other cancers. Plan:   Recommendations for Chely are outlined below however the surveillance and medical management should continue as clinically indicated and as determined appropriate by her healthcare providers.     Colon Cancer Screening:   Manage based on NCCN Genetic/Familial High Risk Assessment:Colorectal Guideline V1.2023 surveillance/management recommendations for individuals with Colonic Adenomatous Polyposis of Unknown Etiology (CPUE):  Personal history of 10-19 adenomas   - Manage based on clinical judgement. Frequency of surveillance may be    modified based on factors such as age at which patient met cumulative    adenoma threshold or total number of adenomas at most recent colonoscopy,    with more frequent surveillance favored for younger age at meeting threshold    or higher adenoma burden at last colonoscopy   -Consider baseline upper endoscopy at time of next colonoscopy surveillance    by age 22-19y and repeat following duodenal surveillance guidelines for FAP    Other Cancer Screening Recommendations: There are no additional recommendations based on Chely's negative result. she should continue cancer screening and medical management as clinically indicated and as determined appropriate by her healthcare providers. Negative Result: Chely was strongly encouraged to follow up with our office on an annual basis to review the most up to date guidelines as recommendations are subject to change over time. In addition changes to her personal or family history of cancer could alter our recommendations regarding genetic testing and screening.

## 2023-08-31 NOTE — TELEPHONE ENCOUNTER
Yes, my name is Cristopher Gomez and my birthday is [birth year] and I was calling because I need to find out about my iron pill. So if you can give me a call back I sure would appreciate it. My number is [phone]. And plus I was hoping to speak to a nurse or something because I had a question. Like I said, you can give me a call. I'd appreciate it.  Thank you    LEFT MESSAGE ON MACHINE to have patient call back regarding this message

## 2023-09-05 ENCOUNTER — HOSPITAL ENCOUNTER (EMERGENCY)
Facility: HOSPITAL | Age: 53
Discharge: HOME | End: 2023-09-05
Attending: STUDENT IN AN ORGANIZED HEALTH CARE EDUCATION/TRAINING PROGRAM
Payer: COMMERCIAL

## 2023-09-05 ENCOUNTER — APPOINTMENT (OUTPATIENT)
Dept: GENERAL RADIOLOGY | Facility: HOSPITAL | Age: 53
End: 2023-09-05
Payer: COMMERCIAL

## 2023-09-05 VITALS
HEART RATE: 73 BPM | WEIGHT: 176 LBS | RESPIRATION RATE: 18 BRPM | DIASTOLIC BLOOD PRESSURE: 75 MMHG | OXYGEN SATURATION: 100 % | HEIGHT: 63 IN | TEMPERATURE: 98.5 F | BODY MASS INDEX: 31.18 KG/M2 | SYSTOLIC BLOOD PRESSURE: 109 MMHG

## 2023-09-05 DIAGNOSIS — R07.9 CHEST PAIN, UNSPECIFIED TYPE: Primary | ICD-10-CM

## 2023-09-05 LAB
ALBUMIN SERPL-MCNC: 4.7 G/DL (ref 3.5–5.2)
ALBUMIN/GLOB SERPL: 1.7 G/DL
ALP SERPL-CCNC: 115 U/L (ref 39–117)
ALT SERPL W P-5'-P-CCNC: 20 U/L (ref 1–33)
ANION GAP SERPL CALCULATED.3IONS-SCNC: 12.5 MMOL/L (ref 5–15)
AST SERPL-CCNC: 20 U/L (ref 1–32)
BASOPHILS # BLD AUTO: 0.04 10*3/MM3 (ref 0–0.2)
BASOPHILS NFR BLD AUTO: 0.6 % (ref 0–1.5)
BILIRUB SERPL-MCNC: 0.4 MG/DL (ref 0–1.2)
BUN SERPL-MCNC: 22 MG/DL (ref 6–20)
BUN/CREAT SERPL: 26.5 (ref 7–25)
CALCIUM SPEC-SCNC: 9.7 MG/DL (ref 8.6–10.5)
CHLORIDE SERPL-SCNC: 104 MMOL/L (ref 98–107)
CO2 SERPL-SCNC: 20.5 MMOL/L (ref 22–29)
CREAT SERPL-MCNC: 0.83 MG/DL (ref 0.57–1)
DEPRECATED RDW RBC AUTO: 38.3 FL (ref 37–54)
EGFRCR SERPLBLD CKD-EPI 2021: 84.4 ML/MIN/1.73
EOSINOPHIL # BLD AUTO: 0.22 10*3/MM3 (ref 0–0.4)
EOSINOPHIL NFR BLD AUTO: 3.3 % (ref 0.3–6.2)
ERYTHROCYTE [DISTWIDTH] IN BLOOD BY AUTOMATED COUNT: 11.9 % (ref 12.3–15.4)
GEN 5 2HR TROPONIN T REFLEX: <6 NG/L
GLOBULIN UR ELPH-MCNC: 2.8 GM/DL
GLUCOSE SERPL-MCNC: 97 MG/DL (ref 65–99)
HCT VFR BLD AUTO: 41.3 % (ref 34–46.6)
HGB BLD-MCNC: 14.3 G/DL (ref 12–15.9)
HOLD SPECIMEN: NORMAL
HOLD SPECIMEN: NORMAL
IMM GRANULOCYTES # BLD AUTO: 0.02 10*3/MM3 (ref 0–0.05)
IMM GRANULOCYTES NFR BLD AUTO: 0.3 % (ref 0–0.5)
LYMPHOCYTES # BLD AUTO: 1.82 10*3/MM3 (ref 0.7–3.1)
LYMPHOCYTES NFR BLD AUTO: 27.5 % (ref 19.6–45.3)
MCH RBC QN AUTO: 30.6 PG (ref 26.6–33)
MCHC RBC AUTO-ENTMCNC: 34.6 G/DL (ref 31.5–35.7)
MCV RBC AUTO: 88.2 FL (ref 79–97)
MONOCYTES # BLD AUTO: 0.68 10*3/MM3 (ref 0.1–0.9)
MONOCYTES NFR BLD AUTO: 10.3 % (ref 5–12)
NEUTROPHILS NFR BLD AUTO: 3.83 10*3/MM3 (ref 1.7–7)
NEUTROPHILS NFR BLD AUTO: 58 % (ref 42.7–76)
NRBC BLD AUTO-RTO: 0 /100 WBC (ref 0–0.2)
PLATELET # BLD AUTO: 270 10*3/MM3 (ref 140–450)
PMV BLD AUTO: 10.2 FL (ref 6–12)
POTASSIUM SERPL-SCNC: 3.7 MMOL/L (ref 3.5–5.2)
PROT SERPL-MCNC: 7.5 G/DL (ref 6–8.5)
RBC # BLD AUTO: 4.68 10*6/MM3 (ref 3.77–5.28)
SODIUM SERPL-SCNC: 137 MMOL/L (ref 136–145)
TROPONIN T DELTA: NORMAL
TROPONIN T SERPL HS-MCNC: <6 NG/L
WBC NRBC COR # BLD: 6.61 10*3/MM3 (ref 3.4–10.8)
WHOLE BLOOD HOLD COAG: NORMAL
WHOLE BLOOD HOLD SPECIMEN: NORMAL

## 2023-09-05 PROCEDURE — 85025 COMPLETE CBC W/AUTO DIFF WBC: CPT | Performed by: STUDENT IN AN ORGANIZED HEALTH CARE EDUCATION/TRAINING PROGRAM

## 2023-09-05 PROCEDURE — 71045 X-RAY EXAM CHEST 1 VIEW: CPT

## 2023-09-05 PROCEDURE — 36415 COLL VENOUS BLD VENIPUNCTURE: CPT

## 2023-09-05 PROCEDURE — 80053 COMPREHEN METABOLIC PANEL: CPT | Performed by: STUDENT IN AN ORGANIZED HEALTH CARE EDUCATION/TRAINING PROGRAM

## 2023-09-05 PROCEDURE — 99284 EMERGENCY DEPT VISIT MOD MDM: CPT

## 2023-09-05 PROCEDURE — 84484 ASSAY OF TROPONIN QUANT: CPT | Performed by: STUDENT IN AN ORGANIZED HEALTH CARE EDUCATION/TRAINING PROGRAM

## 2023-09-05 PROCEDURE — 93005 ELECTROCARDIOGRAM TRACING: CPT | Performed by: STUDENT IN AN ORGANIZED HEALTH CARE EDUCATION/TRAINING PROGRAM

## 2023-09-05 PROCEDURE — 71045 X-RAY EXAM CHEST 1 VIEW: CPT | Performed by: RADIOLOGY

## 2023-09-05 RX ORDER — ASPIRIN 325 MG
325 TABLET ORAL ONCE
Status: COMPLETED | OUTPATIENT
Start: 2023-09-05 | End: 2023-09-05

## 2023-09-05 RX ORDER — SODIUM CHLORIDE 0.9 % (FLUSH) 0.9 %
10 SYRINGE (ML) INJECTION AS NEEDED
Status: DISCONTINUED | OUTPATIENT
Start: 2023-09-05 | End: 2023-09-05 | Stop reason: HOSPADM

## 2023-09-05 RX ADMIN — ASPIRIN 325 MG: 325 TABLET ORAL at 11:18

## 2023-09-05 NOTE — DISCHARGE INSTRUCTIONS
Follow up with Dr. Greenberg for further evaluation.    Follow up for stress test as discussed and be sure to bring order when arriviing for test.     Return to the emergency room for worsening symptoms

## 2023-09-05 NOTE — ED PROVIDER NOTES
Subjective   History of Present Illness  Patient is a 53-year-old white female who presents to the emergency room today with complaint of chest pain.  Patient reports about 2:30 AM last night states that she was awoken that she had some pain in her right jaw she seemed to right radiate from  the right side of her chest.  Patient reports she did have some associated shortness of breath.  Patient denies diaphoresis.  Patient reports that she has had it for a couple hours states that she noticed pain did improve somewhat.  Patient says that she got about another hour sleep states that she woke in and there was pain in the substernal area.  Patient reports that the pain is only mild currently.  Patient reports pain is a pressure.    Chest Pain    Review of Systems   Constitutional: Negative.    HENT: Negative.     Eyes: Negative.    Respiratory: Negative.     Cardiovascular:  Positive for chest pain.   Gastrointestinal: Negative.    Endocrine: Negative.    Genitourinary: Negative.    Musculoskeletal: Negative.    Skin: Negative.    Allergic/Immunologic: Negative.    Neurological: Negative.    Hematological: Negative.    Psychiatric/Behavioral: Negative.       Past Medical History:   Diagnosis Date    Acquired acanthosis nigricans     Fibromyalgia     Hashimoto's thyroiditis 1995    Headache     Hypothyroidism     Low back pain     Lupus     Menopausal flushing     Rheumatoid arthritis     Simple goiter     Thyroid disease        Allergies   Allergen Reactions    Augmentin [Amoxicillin-Pot Clavulanate] Rash       Past Surgical History:   Procedure Laterality Date    CARPAL TUNNEL RELEASE      HYSTERECTOMY      age 37       Family History   Problem Relation Age of Onset    Heart disease Mother     Diabetes Father     Heart disease Father     Heart disease Sister     Heart disease Brother     No Known Problems Brother     Breast cancer Neg Hx        Social History     Socioeconomic History    Marital status:     Tobacco Use    Smoking status: Never    Smokeless tobacco: Never   Vaping Use    Vaping Use: Never used   Substance and Sexual Activity    Alcohol use: No    Drug use: No    Sexual activity: Defer           Objective   Physical Exam  Vitals and nursing note reviewed.   Constitutional:       Appearance: She is well-developed.   HENT:      Head: Normocephalic.      Right Ear: External ear normal.      Left Ear: External ear normal.   Eyes:      Conjunctiva/sclera: Conjunctivae normal.      Pupils: Pupils are equal, round, and reactive to light.   Cardiovascular:      Rate and Rhythm: Normal rate and regular rhythm.      Heart sounds: Normal heart sounds.   Pulmonary:      Effort: Pulmonary effort is normal.      Breath sounds: Normal breath sounds.   Abdominal:      General: Bowel sounds are normal.      Palpations: Abdomen is soft.   Musculoskeletal:         General: Normal range of motion.      Cervical back: Normal range of motion and neck supple.   Skin:     General: Skin is warm and dry.      Capillary Refill: Capillary refill takes less than 2 seconds.   Neurological:      Mental Status: She is alert and oriented to person, place, and time.   Psychiatric:         Behavior: Behavior normal.         Thought Content: Thought content normal.       Procedures           ED Course  ED Course as of 09/09/23 2026   Tue Sep 05, 2023   1621 Heart score of 3   [ZULEYMA]      ED Course User Index  [ZULEYMA] Jose Hughes, APRN                                           Medical Decision Making  Problems Addressed:  Chest pain, unspecified type: complicated acute illness or injury    Amount and/or Complexity of Data Reviewed  Labs: ordered.  Radiology: ordered.  ECG/medicine tests: ordered.    Risk  OTC drugs.  Prescription drug management.        Final diagnoses:   Chest pain, unspecified type       ED Disposition  ED Disposition       ED Disposition   Discharge    Condition   Stable    Comment   --               Mali Lynn  [place], [place]  [address] [place] [address] [phone]    Schedule an appointment as soon as possible for a visit   For further evaluation    Marisel Greenberg MD  [address] [place] [id number] [phone]    Schedule an appointment as soon as possible for a visit   For further evaluation         Medication List      No changes were made to your prescriptions during this visit.            Jose Hughes P, APRN  09/09/23 2026

## 2023-09-06 ENCOUNTER — HOSPITAL ENCOUNTER (EMERGENCY)
Facility: HOSPITAL | Age: 53
Discharge: HOME | End: 2023-09-07
Payer: COMMERCIAL

## 2023-09-06 ENCOUNTER — TRANSCRIBE ORDERS (OUTPATIENT)
Dept: ADMINISTRATIVE | Facility: HOSPITAL | Age: 53
End: 2023-09-06
Payer: COMMERCIAL

## 2023-09-06 ENCOUNTER — APPOINTMENT (OUTPATIENT)
Dept: GENERAL RADIOLOGY | Facility: HOSPITAL | Age: 53
End: 2023-09-06
Payer: COMMERCIAL

## 2023-09-06 VITALS
OXYGEN SATURATION: 97 % | HEART RATE: 80 BPM | TEMPERATURE: 98.8 F | DIASTOLIC BLOOD PRESSURE: 77 MMHG | BODY MASS INDEX: 31.18 KG/M2 | SYSTOLIC BLOOD PRESSURE: 121 MMHG | WEIGHT: 176 LBS | HEIGHT: 63 IN | RESPIRATION RATE: 20 BRPM

## 2023-09-06 DIAGNOSIS — R07.9 CHEST PAIN, UNSPECIFIED TYPE: Primary | ICD-10-CM

## 2023-09-06 LAB
ALBUMIN SERPL-MCNC: 4.5 G/DL (ref 3.5–5.2)
ALBUMIN/GLOB SERPL: 1.6 G/DL
ALP SERPL-CCNC: 113 U/L (ref 39–117)
ALT SERPL W P-5'-P-CCNC: 21 U/L (ref 1–33)
ANION GAP SERPL CALCULATED.3IONS-SCNC: 10.6 MMOL/L (ref 5–15)
AST SERPL-CCNC: 18 U/L (ref 1–32)
BASOPHILS # BLD AUTO: 0.05 10*3/MM3 (ref 0–0.2)
BASOPHILS NFR BLD AUTO: 0.8 % (ref 0–1.5)
BILIRUB SERPL-MCNC: 0.3 MG/DL (ref 0–1.2)
BUN SERPL-MCNC: 23 MG/DL (ref 6–20)
BUN/CREAT SERPL: 22.5 (ref 7–25)
CALCIUM SPEC-SCNC: 9.9 MG/DL (ref 8.6–10.5)
CHLORIDE SERPL-SCNC: 102 MMOL/L (ref 98–107)
CO2 SERPL-SCNC: 23.4 MMOL/L (ref 22–29)
CREAT SERPL-MCNC: 1.02 MG/DL (ref 0.57–1)
CRP SERPL-MCNC: 0.37 MG/DL (ref 0–0.5)
DEPRECATED RDW RBC AUTO: 38.8 FL (ref 37–54)
EGFRCR SERPLBLD CKD-EPI 2021: 65.9 ML/MIN/1.73
EOSINOPHIL # BLD AUTO: 0.19 10*3/MM3 (ref 0–0.4)
EOSINOPHIL NFR BLD AUTO: 2.9 % (ref 0.3–6.2)
ERYTHROCYTE [DISTWIDTH] IN BLOOD BY AUTOMATED COUNT: 12 % (ref 12.3–15.4)
GLOBULIN UR ELPH-MCNC: 2.8 GM/DL
GLUCOSE SERPL-MCNC: 85 MG/DL (ref 65–99)
HCT VFR BLD AUTO: 41.1 % (ref 34–46.6)
HGB BLD-MCNC: 13.8 G/DL (ref 12–15.9)
HOLD SPECIMEN: NORMAL
HOLD SPECIMEN: NORMAL
IMM GRANULOCYTES # BLD AUTO: 0.01 10*3/MM3 (ref 0–0.05)
IMM GRANULOCYTES NFR BLD AUTO: 0.2 % (ref 0–0.5)
LYMPHOCYTES # BLD AUTO: 1.46 10*3/MM3 (ref 0.7–3.1)
LYMPHOCYTES NFR BLD AUTO: 22.3 % (ref 19.6–45.3)
MCH RBC QN AUTO: 29.9 PG (ref 26.6–33)
MCHC RBC AUTO-ENTMCNC: 33.6 G/DL (ref 31.5–35.7)
MCV RBC AUTO: 89 FL (ref 79–97)
MONOCYTES # BLD AUTO: 0.78 10*3/MM3 (ref 0.1–0.9)
MONOCYTES NFR BLD AUTO: 11.9 % (ref 5–12)
NEUTROPHILS NFR BLD AUTO: 4.07 10*3/MM3 (ref 1.7–7)
NEUTROPHILS NFR BLD AUTO: 61.9 % (ref 42.7–76)
NRBC BLD AUTO-RTO: 0 /100 WBC (ref 0–0.2)
PLATELET # BLD AUTO: 257 10*3/MM3 (ref 140–450)
PMV BLD AUTO: 10 FL (ref 6–12)
POTASSIUM SERPL-SCNC: 4.1 MMOL/L (ref 3.5–5.2)
PROT SERPL-MCNC: 7.3 G/DL (ref 6–8.5)
RBC # BLD AUTO: 4.62 10*6/MM3 (ref 3.77–5.28)
SODIUM SERPL-SCNC: 136 MMOL/L (ref 136–145)
TROPONIN T SERPL HS-MCNC: <6 NG/L
WBC NRBC COR # BLD: 6.56 10*3/MM3 (ref 3.4–10.8)
WHOLE BLOOD HOLD COAG: NORMAL
WHOLE BLOOD HOLD SPECIMEN: NORMAL

## 2023-09-06 PROCEDURE — 99211 OFF/OP EST MAY X REQ PHY/QHP: CPT

## 2023-09-06 PROCEDURE — 85025 COMPLETE CBC W/AUTO DIFF WBC: CPT | Performed by: STUDENT IN AN ORGANIZED HEALTH CARE EDUCATION/TRAINING PROGRAM

## 2023-09-06 PROCEDURE — 71046 X-RAY EXAM CHEST 2 VIEWS: CPT | Performed by: RADIOLOGY

## 2023-09-06 PROCEDURE — 93005 ELECTROCARDIOGRAM TRACING: CPT | Performed by: STUDENT IN AN ORGANIZED HEALTH CARE EDUCATION/TRAINING PROGRAM

## 2023-09-06 PROCEDURE — 86140 C-REACTIVE PROTEIN: CPT | Performed by: NURSE PRACTITIONER

## 2023-09-06 PROCEDURE — 71046 X-RAY EXAM CHEST 2 VIEWS: CPT

## 2023-09-06 PROCEDURE — 80053 COMPREHEN METABOLIC PANEL: CPT | Performed by: STUDENT IN AN ORGANIZED HEALTH CARE EDUCATION/TRAINING PROGRAM

## 2023-09-06 PROCEDURE — 84484 ASSAY OF TROPONIN QUANT: CPT | Performed by: STUDENT IN AN ORGANIZED HEALTH CARE EDUCATION/TRAINING PROGRAM

## 2023-09-06 RX ORDER — SODIUM CHLORIDE 0.9 % (FLUSH) 0.9 %
10 SYRINGE (ML) INJECTION AS NEEDED
Status: DISCONTINUED | OUTPATIENT
Start: 2023-09-06 | End: 2023-09-07 | Stop reason: HOSPADM

## 2023-09-06 RX ORDER — ASPIRIN 325 MG
325 TABLET ORAL ONCE
Status: DISCONTINUED | OUTPATIENT
Start: 2023-09-06 | End: 2023-09-07 | Stop reason: HOSPADM

## 2023-09-07 DIAGNOSIS — D50.8 IRON DEFICIENCY ANEMIA SECONDARY TO INADEQUATE DIETARY IRON INTAKE: ICD-10-CM

## 2023-09-07 DIAGNOSIS — Z72.0 TOBACCO ABUSE DISORDER: ICD-10-CM

## 2023-09-07 LAB
QT INTERVAL: 356 MS
QT INTERVAL: 398 MS
QTC INTERVAL: 433 MS
QTC INTERVAL: 456 MS

## 2023-09-07 RX ORDER — FERROUS SULFATE TAB EC 324 MG (65 MG FE EQUIVALENT) 324 (65 FE) MG
324 TABLET DELAYED RESPONSE ORAL
Qty: 30 TABLET | Refills: 1 | Status: SHIPPED | OUTPATIENT
Start: 2023-09-07

## 2023-09-07 RX ORDER — VARENICLINE TARTRATE 0.5 MG/1
0.5 TABLET, FILM COATED ORAL 2 TIMES DAILY
Qty: 60 TABLET | Refills: 0 | Status: SHIPPED | OUTPATIENT
Start: 2023-09-07

## 2023-09-08 ENCOUNTER — APPOINTMENT (EMERGENCY)
Dept: CT IMAGING | Facility: HOSPITAL | Age: 53
End: 2023-09-08
Payer: MEDICARE

## 2023-09-08 ENCOUNTER — APPOINTMENT (EMERGENCY)
Dept: RADIOLOGY | Facility: HOSPITAL | Age: 53
End: 2023-09-08
Payer: MEDICARE

## 2023-09-08 ENCOUNTER — HOSPITAL ENCOUNTER (EMERGENCY)
Facility: HOSPITAL | Age: 53
Discharge: HOME | End: 2023-09-08
Attending: EMERGENCY MEDICINE
Payer: MEDICARE

## 2023-09-08 VITALS
RESPIRATION RATE: 16 BRPM | SYSTOLIC BLOOD PRESSURE: 127 MMHG | TEMPERATURE: 97.4 F | HEART RATE: 100 BPM | DIASTOLIC BLOOD PRESSURE: 73 MMHG | OXYGEN SATURATION: 98 %

## 2023-09-08 DIAGNOSIS — S91.119A: Primary | ICD-10-CM

## 2023-09-08 DIAGNOSIS — N39.0 UTI (URINARY TRACT INFECTION): ICD-10-CM

## 2023-09-08 DIAGNOSIS — K74.60 HEPATIC CIRRHOSIS (HCC): ICD-10-CM

## 2023-09-08 DIAGNOSIS — S90.129A TOE CONTUSION: ICD-10-CM

## 2023-09-08 LAB
ALBUMIN SERPL BCP-MCNC: 3.7 G/DL (ref 3.5–5)
ALP SERPL-CCNC: 77 U/L (ref 34–104)
ALT SERPL W P-5'-P-CCNC: 18 U/L (ref 7–52)
ANION GAP SERPL CALCULATED.3IONS-SCNC: 8 MMOL/L
APTT PPP: 42 SECONDS (ref 23–37)
AST SERPL W P-5'-P-CCNC: 30 U/L (ref 13–39)
BACTERIA UR QL AUTO: ABNORMAL /HPF
BASOPHILS # BLD AUTO: 0.04 THOUSANDS/ÂΜL (ref 0–0.1)
BASOPHILS NFR BLD AUTO: 1 % (ref 0–1)
BILIRUB SERPL-MCNC: 0.9 MG/DL (ref 0.2–1)
BILIRUB UR QL STRIP: NEGATIVE
BUN SERPL-MCNC: 16 MG/DL (ref 5–25)
CALCIUM SERPL-MCNC: 9.2 MG/DL (ref 8.4–10.2)
CHLORIDE SERPL-SCNC: 109 MMOL/L (ref 96–108)
CLARITY UR: ABNORMAL
CO2 SERPL-SCNC: 25 MMOL/L (ref 21–32)
COLOR UR: YELLOW
CREAT SERPL-MCNC: 0.91 MG/DL (ref 0.6–1.3)
EOSINOPHIL # BLD AUTO: 0.12 THOUSAND/ÂΜL (ref 0–0.61)
EOSINOPHIL NFR BLD AUTO: 2 % (ref 0–6)
ERYTHROCYTE [DISTWIDTH] IN BLOOD BY AUTOMATED COUNT: 17.2 % (ref 11.6–15.1)
GFR SERPL CREATININE-BSD FRML MDRD: 72 ML/MIN/1.73SQ M
GLUCOSE SERPL-MCNC: 147 MG/DL (ref 65–140)
GLUCOSE UR STRIP-MCNC: NEGATIVE MG/DL
HCT VFR BLD AUTO: 36.4 % (ref 34.8–46.1)
HGB BLD-MCNC: 10.7 G/DL (ref 11.5–15.4)
HGB UR QL STRIP.AUTO: NEGATIVE
IMM GRANULOCYTES # BLD AUTO: 0.01 THOUSAND/UL (ref 0–0.2)
IMM GRANULOCYTES NFR BLD AUTO: 0 % (ref 0–2)
INR PPP: 1.19 (ref 0.84–1.19)
KETONES UR STRIP-MCNC: NEGATIVE MG/DL
LEUKOCYTE ESTERASE UR QL STRIP: NEGATIVE
LYMPHOCYTES # BLD AUTO: 1.73 THOUSANDS/ÂΜL (ref 0.6–4.47)
LYMPHOCYTES NFR BLD AUTO: 24 % (ref 14–44)
MCH RBC QN AUTO: 27.3 PG (ref 26.8–34.3)
MCHC RBC AUTO-ENTMCNC: 29.4 G/DL (ref 31.4–37.4)
MCV RBC AUTO: 93 FL (ref 82–98)
MONOCYTES # BLD AUTO: 0.75 THOUSAND/ÂΜL (ref 0.17–1.22)
MONOCYTES NFR BLD AUTO: 11 % (ref 4–12)
NEUTROPHILS # BLD AUTO: 4.48 THOUSANDS/ÂΜL (ref 1.85–7.62)
NEUTS SEG NFR BLD AUTO: 62 % (ref 43–75)
NITRITE UR QL STRIP: POSITIVE
NON-SQ EPI CELLS URNS QL MICRO: ABNORMAL /HPF
NRBC BLD AUTO-RTO: 0 /100 WBCS
PH UR STRIP.AUTO: 6 [PH]
PLATELET # BLD AUTO: 103 THOUSANDS/UL (ref 149–390)
PMV BLD AUTO: 11 FL (ref 8.9–12.7)
POTASSIUM SERPL-SCNC: 3.3 MMOL/L (ref 3.5–5.3)
PROT SERPL-MCNC: 6.2 G/DL (ref 6.4–8.4)
PROT UR STRIP-MCNC: NEGATIVE MG/DL
PROTHROMBIN TIME: 15.2 SECONDS (ref 11.6–14.5)
RBC # BLD AUTO: 3.92 MILLION/UL (ref 3.81–5.12)
RBC #/AREA URNS AUTO: ABNORMAL /HPF
SODIUM SERPL-SCNC: 142 MMOL/L (ref 135–147)
SP GR UR STRIP.AUTO: >=1.03
UROBILINOGEN UR QL STRIP.AUTO: 1 E.U./DL
WBC # BLD AUTO: 7.13 THOUSAND/UL (ref 4.31–10.16)
WBC #/AREA URNS AUTO: ABNORMAL /HPF

## 2023-09-08 PROCEDURE — 85025 COMPLETE CBC W/AUTO DIFF WBC: CPT | Performed by: EMERGENCY MEDICINE

## 2023-09-08 PROCEDURE — 99284 EMERGENCY DEPT VISIT MOD MDM: CPT | Performed by: EMERGENCY MEDICINE

## 2023-09-08 PROCEDURE — 74176 CT ABD & PELVIS W/O CONTRAST: CPT

## 2023-09-08 PROCEDURE — 85610 PROTHROMBIN TIME: CPT | Performed by: EMERGENCY MEDICINE

## 2023-09-08 PROCEDURE — 81001 URINALYSIS AUTO W/SCOPE: CPT | Performed by: EMERGENCY MEDICINE

## 2023-09-08 PROCEDURE — 99284 EMERGENCY DEPT VISIT MOD MDM: CPT

## 2023-09-08 PROCEDURE — 36415 COLL VENOUS BLD VENIPUNCTURE: CPT | Performed by: EMERGENCY MEDICINE

## 2023-09-08 PROCEDURE — G1004 CDSM NDSC: HCPCS

## 2023-09-08 PROCEDURE — 85730 THROMBOPLASTIN TIME PARTIAL: CPT | Performed by: EMERGENCY MEDICINE

## 2023-09-08 PROCEDURE — 73660 X-RAY EXAM OF TOE(S): CPT

## 2023-09-08 PROCEDURE — 80053 COMPREHEN METABOLIC PANEL: CPT | Performed by: EMERGENCY MEDICINE

## 2023-09-08 RX ORDER — POTASSIUM CHLORIDE 20 MEQ/1
40 TABLET, EXTENDED RELEASE ORAL ONCE
Status: COMPLETED | OUTPATIENT
Start: 2023-09-08 | End: 2023-09-08

## 2023-09-08 RX ORDER — CEFPODOXIME PROXETIL 200 MG/1
200 TABLET, FILM COATED ORAL ONCE
Status: COMPLETED | OUTPATIENT
Start: 2023-09-08 | End: 2023-09-08

## 2023-09-08 RX ORDER — CEFPODOXIME PROXETIL 200 MG/1
200 TABLET, FILM COATED ORAL 2 TIMES DAILY
Qty: 14 TABLET | Refills: 0 | Status: SHIPPED | OUTPATIENT
Start: 2023-09-08 | End: 2023-09-15

## 2023-09-08 RX ADMIN — POTASSIUM CHLORIDE 40 MEQ: 1500 TABLET, EXTENDED RELEASE ORAL at 14:50

## 2023-09-08 RX ADMIN — CEFPODOXIME PROXETIL 200 MG: 200 TABLET, FILM COATED ORAL at 15:41

## 2023-09-08 NOTE — DISCHARGE INSTRUCTIONS
It is very important that you follow-up with the GI doctor in regards to the following CT abdomen pelvis findings:    Left-sided nephrolithiasis without obstructive uropathy or hydronephrosis. Hepatic cirrhosis. Heterogeneous attenuation of the hepatic parenchyma, nonspecific. Evaluation for underlying hepatic lesion is limited without intravenous contrast.     Splenomegaly. Sigmoid diverticulosis without acute diverticulitis. Additional findings as above.

## 2023-09-08 NOTE — ED PROVIDER NOTES
History  Chief Complaint   Patient presents with   • Foot Injury     A board fell on left foot   • Shaking   • Blood in Urine     Small amount of blood in urine     Patient is a 60-year-old female who presents for evaluation of left second toe pain after a board fell on her toe yesterday. She says she is having increased pain in that toe and that hurts when she walks. She also says that she has been having "shakiness" for the past month. She has not followed up with her family doctor in regards to this. She says that she has also been having some left groin pain/ and felt like her urine looked "pinkish" over the last few days. She denies any fevers, chills, nausea, vomiting. She does admit to chronic back pain. Prior to Admission Medications   Prescriptions Last Dose Informant Patient Reported? Taking?    albuterol (2.5 mg/3 mL) 0.083 % nebulizer solution  Self No No   Sig: Take 3 mL (2.5 mg total) by nebulization every 6 (six) hours as needed for wheezing or shortness of breath   albuterol (PROVENTIL HFA,VENTOLIN HFA) 90 mcg/act inhaler  Self No No   Sig: Inhale 2 puffs every 6 (six) hours as needed for wheezing   buPROPion (WELLBUTRIN XL) 150 mg 24 hr tablet   No No   Sig: Take 1 tablet (150 mg total) by mouth every morning   diazepam (VALIUM) 5 mg tablet   No No   Sig: Take 1 tablet (5 mg total) by mouth every 6 (six) hours as needed for anxiety   ergocalciferol (VITAMIN D2) 50,000 units   No No   Sig: Take 1 capsule (50,000 Units total) by mouth every 14 (fourteen) days   famotidine (PEPCID) 40 MG tablet  Self No No   Sig: Take 1 tablet (40 mg total) by mouth daily at bedtime Take in evening 2 hours prior to bed   ferrous sulfate 324 (65 Fe) mg   No No   Sig: Take 1 tablet (324 mg total) by mouth daily before breakfast   fluticasone (FLONASE) 50 mcg/act nasal spray  Self No No   Si spray into each nostril 2 (two) times a day   Patient taking differently: 1 spray into each nostril as needed gabapentin (NEURONTIN) 100 mg capsule  Self Yes No   Sig: Take 100 mg by mouth 2 (two) times a day   levothyroxine 112 mcg tablet   No No   Sig: Take 1 tablet (112 mcg total) by mouth daily   metFORMIN (GLUCOPHAGE) 500 mg tablet   No No   Sig: Take 1 tablet (500 mg total) by mouth 2 (two) times a day with meals   neomycin-polymyxin-dexamethasone (MAXITROL) ophthalmic suspension  Self Yes No   Sig: instill 1 drop INTO AFFECTED EYE 4 TIMES A DAY AS DIRECTED   nicotine (NICODERM CQ) 21 mg/24 hr TD 24 hr patch   No No   Sig: Place 1 patch on the skin over 24 hours every 24 hours   omeprazole (PriLOSEC) 40 MG capsule  Self No No   Sig: take 1 capsule by mouth twice a day   oxyCODONE-acetaminophen (PERCOCET)  mg per tablet  Self Yes No   Sig: Take 1 tablet by mouth every 6 (six) hours as needed   sertraline (ZOLOFT) 100 mg tablet   No No   Sig: Take 1 tablet (100 mg total) by mouth 2 (two) times a day   simvastatin (ZOCOR) 20 mg tablet  Self No No   Sig: Take 1 tablet (20 mg total) by mouth daily at bedtime   sucralfate (CARAFATE) 1 g/10 mL suspension   No No   Sig: Take 10 mL (1 g total) by mouth 3 (three) times a day before meals   tiotropium (Spiriva HandiHaler) 18 mcg inhalation capsule   No No   Sig: Place 1 capsule (18 mcg total) into inhaler and inhale daily   varenicline (CHANTIX) 0.5 mg tablet   No No   Sig: Take 1 tablet (0.5 mg total) by mouth 2 (two) times a day      Facility-Administered Medications: None       Past Medical History:   Diagnosis Date   • Abnormal stress test    • Allergic sinusitis     last assessed - 03Apr2017   • Anxiety     last assessed - 01AHT0079   • Arthritis    • Asthma     last assessed - 25PFF7856   • Bilateral lower extremity edema     last assessed - 42VWI9275   • Callus of foot     last assessed - 22Jun2016   • Chest pain    • Chronic GERD     last assessed - 38BFI8575   • Colon polyp    • Constipation     Resolved - 47QDC9122   • COPD (chronic obstructive pulmonary disease) [facility])    • Depression     last assessed - 40DPB9046   • Disease of thyroid gland    • Diverticulitis of colon     last assessed - 86CKI4891   • Dyspepsia     Resolved - 69VZK1169   • Esophageal reflux     last assessed - 30PMS0600   • Essential hypertriglyceridemia     last assessed - 64XRS6257   • Gastroesophageal reflux disease with esophagitis 11/18/2016   • GERD (gastroesophageal reflux disease)    • Gynecological disorder     last assessed - 64TPP2522   • Hypertension     last assessed - 76HAD2263   • Hypokalemia 06/20/2022   • Hypotension     last assessed - 46Qzk9154   • Kidney stone    • Migraine    • Morbid obesity ([address]) 01/11/2019    Formatting of this note might be different from the original. Added automatically from request for surgery [id number]   • Neuropathy    • Other chest pain 11/8/2018   • Positive depression screening 06/19/2022   • Primary hypertriglyceridemia 06/19/2022   • Pulmonary emphysema ([address])     last assessed - 56UNC0097   • Seasonal allergies    • SOB (shortness of breath)    • Urinary frequency     last assessed - 96Vuj3013   • Vagina, candidiasis     last assessed - 58Rgh3693   • Very heavy cigarette smoker 1/16/2023   • Visual impairment        Past Surgical History:   Procedure Laterality Date   • CARPAL TUNNEL RELEASE      last assessed - 45TPW5479   • CHOLECYSTECTOMY      last assessed - 74RVX4022   • COLONOSCOPY      Complete colonoscopy    • EYE SURGERY      last assessed - 00KFP0523   • FL RETROGRADE PYELOGRAM  6/16/2022   • FL RETROGRADE PYELOGRAM  8/16/2022   • FOOT SURGERY      last assessed - 39PUR3380   • CT CYSTO BLADDER W/URETERAL CATHETERIZATION Left 7/21/2022    Procedure: CYSTOSCOPY RETROGRADE PYELOGRAM WITH INSERTION STENT URETERAL;  Surgeon: Elizabeth Fernandes MD;  Location: [facility];  Service: Urology   • CT CYSTO/URETERO W/LITHOTRIPSY &INDWELL STENT INSRT Left 6/16/2022    Procedure: CYSTOSCOPY URETEROSCOPY WITH LITHOTRIPSY HOLMIUM LASER, RETROGRADE PYELOGRAM AND INSERTION STENT URETERAL;  Surgeon: Janak Paiz MD;  Location: [facility];  Service: Urology   • MD CYSTO/URETERO W/LITHOTRIPSY &INDWELL STENT INSRT Left 8/16/2022    Procedure: CYSTOSCOPY USCOPE W/HOLMIUM LASER, RETROGRADE PYELOGRAM&STENT;  Surgeon: Debora Keita MD;  Location: [facility];  Service: Urology   • MD ESOPHAGOGASTRODUODENOSCOPY TRANSORAL DIAGNOSTIC N/A 2/13/2017    Procedure: ESOPHAGOGASTRODUODENOSCOPY (EGD); Surgeon: Mona Loving MD;  Location: [facility]; Service: Gastroenterology       Family History   Problem Relation Age of Onset   • Obesity Mother    • Thyroid disease Mother    • Cancer Mother    • Obesity Sister    • Coronary artery disease Sister    • Stroke Sister    • Asthma Sister    • No Known Problems Maternal Aunt    • Diabetes Maternal Uncle    • Lung cancer Maternal Grandmother    • Cancer Maternal Grandfather    • Diabetes Maternal Grandfather    • No Known Problems Paternal Grandmother    • No Known Problems Paternal Grandfather    • Hypertension Other    • Lung cancer Other    • Hypertension Other    • Lung cancer Other    • Lung cancer Other      I have reviewed and agree with the history as documented. E-Cigarette/Vaping   • E-Cigarette Use Former User      E-Cigarette/Vaping Substances   • Nicotine Yes    • THC No    • CBD No    • Flavoring No    • Other No    • Unknown No      Social History     Tobacco Use   • Smoking status: Some Days     Packs/day: 0.50     Years: 30.00     Total pack years: 15.00     Types: Cigarettes     Start date: 1/1/1983   • Smokeless tobacco: Never   Vaping Use   • Vaping Use: Former   • Substances: Nicotine   Substance Use Topics   • Alcohol use: Not Currently   • Drug use: Never       Review of Systems   Constitutional: Negative for fever and unexpected weight change. HENT: Negative for congestion, ear pain, sore throat and trouble swallowing. Eyes: Negative for pain and redness.    Respiratory: Negative for cough, chest tightness and shortness of breath. Cardiovascular: Negative for chest pain and leg swelling. Gastrointestinal: Negative for abdominal distention, abdominal pain, diarrhea and vomiting. Endocrine: Negative for polyuria. Genitourinary: Negative for dysuria, hematuria, pelvic pain and vaginal bleeding. Musculoskeletal: Positive for arthralgias (left 2nd toe). Negative for back pain and myalgias. Skin: Negative for color change and rash. Neurological: Negative for dizziness, syncope, weakness, light-headedness and headaches. Physical Exam  Physical Exam  Vitals and nursing note reviewed. Constitutional:       General: She is not in acute distress. Appearance: She is well-developed. HENT:      Head: Normocephalic and atraumatic. Right Ear: External ear normal.      Left Ear: External ear normal.      Nose: Nose normal.      Mouth/Throat:      Mouth: Mucous membranes are moist.      Pharynx: No oropharyngeal exudate. Eyes:      Conjunctiva/sclera: Conjunctivae normal.      Pupils: Pupils are equal, round, and reactive to light. Cardiovascular:      Rate and Rhythm: Normal rate and regular rhythm. Heart sounds: Normal heart sounds. No murmur heard. No friction rub. No gallop. Pulmonary:      Effort: Pulmonary effort is normal. No respiratory distress. Breath sounds: Normal breath sounds. No wheezing or rales. Abdominal:      General: There is no distension. Palpations: Abdomen is soft. Tenderness: There is abdominal tenderness (LLQ/left groin). There is no guarding. Musculoskeletal:         General: No swelling, tenderness or deformity. Normal range of motion. Cervical back: Normal range of motion and neck supple. Feet:    Lymphadenopathy:      Cervical: No cervical adenopathy. Skin:     General: Skin is warm and dry. Neurological:      General: No focal deficit present.       Mental Status: She is alert and oriented to person, place, and time. Mental status is at baseline. Cranial Nerves: No cranial nerve deficit. Sensory: No sensory deficit. Motor: No weakness or abnormal muscle tone.       Coordination: Coordination normal.         Vital Signs  ED Triage Vitals   Temperature Pulse Respirations Blood Pressure SpO2   09/08/23 1311 09/08/23 1310 09/08/23 1310 09/08/23 1310 09/08/23 1310   (!) 97.4 °F (36.3 °C) 100 16 127/73 98 %      Temp src Heart Rate Source Patient Position - Orthostatic VS BP Location FiO2 (%)   -- 09/08/23 1310 09/08/23 1310 09/08/23 1310 --    Monitor Sitting Left arm       Pain Score       09/08/23 1310       5           Vitals:    09/08/23 1310   BP: 127/73   Pulse: 100   Patient Position - Orthostatic VS: Sitting         Visual Acuity      ED Medications  Medications   potassium chloride (K-DUR,KLOR-CON) CR tablet 40 mEq (40 mEq Oral Given 9/8/23 1450)   cefpodoxime (VANTIN) tablet 200 mg (200 mg Oral Given 9/8/23 1541)       Diagnostic Studies  Results Reviewed     Procedure Component Value Units Date/Time    Urine Microscopic [[id number]]  (Abnormal) Collected: 09/08/23 1425    Lab Status: Final result Specimen: Urine, Clean Catch Updated: 09/08/23 1500     RBC, UA 0-1 /hpf      WBC, UA 2-4 /hpf      Epithelial Cells Occasional /hpf      Bacteria, UA Moderate /hpf     Protime-INR [[id number]]  (Abnormal) Collected: 09/08/23 1414    Lab Status: Final result Specimen: Blood from Arm, Right Updated: 09/08/23 1500     Protime 15.2 seconds      INR 1.19    APTT [[id number]]  (Abnormal) Collected: 09/08/23 1414    Lab Status: Final result Specimen: Blood from Arm, Right Updated: 09/08/23 1500     PTT 42 seconds     CBC and differential [[id number]]  (Abnormal) Collected: 09/08/23 1414    Lab Status: Final result Specimen: Blood from Arm, Right Updated: 09/08/23 1447     WBC 7.13 Thousand/uL      RBC 3.92 Million/uL      Hemoglobin 10.7 g/dL      Hematocrit 36.4 %      MCV 93 fL      MCH 27.3 pg      MCHC 29.4 g/dL RDW 17.2 %      MPV 11.0 fL      Platelets 755 Thousands/uL      nRBC 0 /100 WBCs      Neutrophils Relative 62 %      Immat GRANS % 0 %      Lymphocytes Relative 24 %      Monocytes Relative 11 %      Eosinophils Relative 2 %      Basophils Relative 1 %      Neutrophils Absolute 4.48 Thousands/µL      Immature Grans Absolute 0.01 Thousand/uL      Lymphocytes Absolute 1.73 Thousands/µL      Monocytes Absolute 0.75 Thousand/µL      Eosinophils Absolute 0.12 Thousand/µL      Basophils Absolute 0.04 Thousands/µL     Comprehensive metabolic panel [[id number]]  (Abnormal) Collected: 09/08/23 1414    Lab Status: Final result Specimen: Blood from Arm, Right Updated: 09/08/23 1437     Sodium 142 mmol/L      Potassium 3.3 mmol/L      Chloride 109 mmol/L      CO2 25 mmol/L      ANION GAP 8 mmol/L      BUN 16 mg/dL      Creatinine 0.91 mg/dL      Glucose 147 mg/dL      Calcium 9.2 mg/dL      AST 30 U/L      ALT 18 U/L      Alkaline Phosphatase 77 U/L      Total Protein 6.2 g/dL      Albumin 3.7 g/dL      Total Bilirubin 0.90 mg/dL      eGFR 72 ml/min/1.73sq m     Narrative:      [place] guidelines for Chronic Kidney Disease (CKD):   •  Stage 1 with normal or high GFR (GFR > 90 mL/min/1.73 square meters)  •  Stage 2 Mild CKD (GFR = 60-89 mL/min/1.73 square meters)  •  Stage 3A Moderate CKD (GFR = 45-59 mL/min/1.73 square meters)  •  Stage 3B Moderate CKD (GFR = 30-44 mL/min/1.73 square meters)  •  Stage 4 Severe CKD (GFR = 15-29 mL/min/1.73 square meters)  •  Stage 5 End Stage CKD (GFR <15 mL/min/1.73 square meters)  Note: GFR calculation is accurate only with a steady state creatinine    UA (URINE) with reflex to Scope [[id number]]  (Abnormal) Collected: 09/08/23 1425    Lab Status: Final result Specimen: Urine, Clean Catch Updated: 09/08/23 1435     Color, UA Yellow     Clarity, UA Hazy     Specific Gravity, UA >=1.030     pH, UA 6.0     Leukocytes, UA Negative     Nitrite, UA Positive     Protein, UA Negative mg/dl      Glucose, UA Negative mg/dl      Ketones, UA Negative mg/dl      Urobilinogen, UA 1.0 E.U./dl      Bilirubin, UA Negative     Occult Blood, UA Negative                 CT abdomen pelvis wo contrast   Final Result by Abhi Francisco MD (09/08 1514)      Left-sided nephrolithiasis without obstructive uropathy or hydronephrosis. Hepatic cirrhosis. Heterogeneous attenuation of the hepatic parenchyma, nonspecific. Evaluation for underlying hepatic lesion is limited without intravenous contrast.      Splenomegaly. Sigmoid diverticulosis without acute diverticulitis. Additional findings as above. Workstation performed: [id number]         XR toe second min 2 views LEFT   Final Result by Alicia Townsend MD (09/08 1440)      No acute osseous abnormality. Workstation performed: [id number]                    Procedures  Procedures         ED Course  ED Course as of 09/08/23 1640   Fri Sep 08, 2023   1334 Last tetanus January 2023   1515 XR toe second min 2 views LEFT                               SBIRT 20yo+    Flowsheet Row Most Recent Value   Initial Alcohol Screen: US AUDIT-C     1. How often do you have a drink containing alcohol? 0 Filed at: 09/08/2023 1450   2. How many drinks containing alcohol do you have on a typical day you are drinking? 0 Filed at: 09/08/2023 1450   3a. Male UNDER 65: How often do you have five or more drinks on one occasion? 0 Filed at: 09/08/2023 1450   3b. FEMALE Any Age, or MALE 65+: How often do you have 4 or more drinks on one occassion? 0 Filed at: 09/08/2023 1450   Audit-C Score 0 Filed at: 09/08/2023 1450   JEFF: How many times in the past year have you. .. Used an illegal drug or used a prescription medication for non-medical reasons? Never Filed at: 09/08/2023 1450                    Medical Decision Making  1year-old female presenting for multiple complaints.   First complaint is left toe pain after dropping a board on it yesterday. Has a superficial cut. Pain with ambulation. Wound is superficial.  Tetanus up-to-date  Times likely secondary to contusion and superficial cut. Will obtain x-ray to rule out fracture. X ray WNL. Also complaining of "pinkish urine", left groin/left lower quadrant abdominal swelling. Differential  includes kidney stone, UTI, musculoskeletal strain  Obtain CBC, CMP, UA, CT abdomen pelvis without contrast.  CT abdomen showed no obstructive uropathy. UA did show bacteria will treat with antibiotics. CT abdomen also showed hepatic cirrhosis, LFTs and T. bili normal, patient given GI referral full importance of following up. Cut of toe: acute illness or injury  Hepatic cirrhosis ([address]): acute illness or injury  Toe contusion: acute illness or injury  UTI (urinary tract infection): acute illness or injury  Amount and/or Complexity of Data Reviewed  Labs: ordered. Radiology: ordered. Decision-making details documented in ED Course. Risk  Prescription drug management. Disposition  Final diagnoses:   Cut of toe   Toe contusion   UTI (urinary tract infection)   Hepatic cirrhosis ([address])     Time reflects when diagnosis was documented in both MDM as applicable and the Disposition within this note     Time User Action Codes Description Comment    9/8/2023  3:16 PM Ranell [place] Add [T98.330F] Cut of toe     9/8/2023  3:16 PM Ranell [place] Add [F72.167O] Toe contusion     9/8/2023  3:16 PM Ranell [place] Add [N39.0] UTI (urinary tract infection)     9/8/2023  3:21 PM Ranell [place] Add [K74.60] Hepatic cirrhosis [facility])       ED Disposition     ED Disposition   Discharge    Condition   Stable    Date/Time   Fri Sep 8, 2023  3:16 PM    Comment   Irina Salas discharge to home/self care.                Follow-up Information     Follow up With Specialties Details Why Contact Info Additional [id number] [facility] Schedule an appointment as soon as possible for a visit  For follow up of of findings of hepatic cirrhosis and CT abdomen findings [address] [facility] [address]  [address] [place] [address]  [facility] [place], [address], [address], [place], [address], [phone]          Discharge Medication List as of 9/8/2023  3:22 PM      START taking these medications    Details   cefpodoxime (VANTIN) 200 mg tablet Take 1 tablet (200 mg total) by mouth 2 (two) times a day for 7 days, Starting Fri 9/8/2023, Until Fri 9/15/2023, Normal         CONTINUE these medications which have NOT CHANGED    Details   albuterol (2.5 mg/3 mL) 0.083 % nebulizer solution Take 3 mL (2.5 mg total) by nebulization every 6 (six) hours as needed for wheezing or shortness of breath, Starting Mon 5/8/2023, Normal      albuterol (PROVENTIL HFA,VENTOLIN HFA) 90 mcg/act inhaler Inhale 2 puffs every 6 (six) hours as needed for wheezing, Starting Tue 12/21/2021, Normal      buPROPion (WELLBUTRIN XL) 150 mg 24 hr tablet Take 1 tablet (150 mg total) by mouth every morning, Starting Mon 7/17/2023, Normal      diazepam (VALIUM) 5 mg tablet Take 1 tablet (5 mg total) by mouth every 6 (six) hours as needed for anxiety, Starting Mon 7/17/2023, Normal      ergocalciferol (VITAMIN D2) 50,000 units Take 1 capsule (50,000 Units total) by mouth every 14 (fourteen) days, Starting Mon 7/17/2023, Normal      famotidine (PEPCID) 40 MG tablet Take 1 tablet (40 mg total) by mouth daily at bedtime Take in evening 2 hours prior to bed, Starting Wed 4/5/2023, Normal      ferrous sulfate 324 (65 Fe) mg Take 1 tablet (324 mg total) by mouth daily before breakfast, Starting Thu 9/7/2023, Normal      fluticasone (FLONASE) 50 mcg/act nasal spray 1 spray into each nostril 2 (two) times a day, Starting Tue 12/21/2021, Normal      gabapentin (NEURONTIN) 100 mg capsule Take 100 mg by mouth 2 (two) times a day, Starting Fri 10/4/2019, Historical Med levothyroxine 112 mcg tablet Take 1 tablet (112 mcg total) by mouth daily, Starting Mon 7/17/2023, Normal      metFORMIN (GLUCOPHAGE) 500 mg tablet Take 1 tablet (500 mg total) by mouth 2 (two) times a day with meals, Starting Mon 7/17/2023, Normal      neomycin-polymyxin-dexamethasone (MAXITROL) ophthalmic suspension instill 1 drop INTO AFFECTED EYE 4 TIMES A DAY AS DIRECTED, Historical Med      nicotine (NICODERM CQ) 21 mg/24 hr TD 24 hr patch Place 1 patch on the skin over 24 hours every 24 hours, Starting Mon 7/17/2023, Normal      omeprazole (PriLOSEC) 40 MG capsule take 1 capsule by mouth twice a day, Normal      oxyCODONE-acetaminophen (PERCOCET)  mg per tablet Take 1 tablet by mouth every 6 (six) hours as needed, Starting Mon 6/27/2022, Historical Med      sertraline (ZOLOFT) 100 mg tablet Take 1 tablet (100 mg total) by mouth 2 (two) times a day, Starting Mon 7/17/2023, Normal      simvastatin (ZOCOR) 20 mg tablet Take 1 tablet (20 mg total) by mouth daily at bedtime, Starting Mon 1/16/2023, Normal      sucralfate (CARAFATE) 1 g/10 mL suspension Take 10 mL (1 g total) by mouth 3 (three) times a day before meals, Starting Mon 7/17/2023, Until Sun 10/15/2023, Normal      tiotropium (Spiriva HandiHaler) 18 mcg inhalation capsule Place 1 capsule (18 mcg total) into inhaler and inhale daily, Starting Mon 7/17/2023, Normal      varenicline (CHANTIX) 0.5 mg tablet Take 1 tablet (0.5 mg total) by mouth 2 (two) times a day, Starting Thu 9/7/2023, Normal                 PDMP Review     None          ED Provider  Electronically Signed by           Brian Mesa DO  09/08/23 1640

## 2023-09-11 ENCOUNTER — TELEPHONE (OUTPATIENT)
Dept: INTERNAL MEDICINE CLINIC | Age: 53
End: 2023-09-11

## 2023-09-11 ENCOUNTER — HOSPITAL ENCOUNTER (OUTPATIENT)
Dept: RADIOLOGY | Facility: IMAGING CENTER | Age: 53
Discharge: HOME | End: 2023-09-11
Payer: MEDICARE

## 2023-09-11 ENCOUNTER — VBI (OUTPATIENT)
Dept: ADMINISTRATIVE | Facility: OTHER | Age: 53
End: 2023-09-11

## 2023-09-11 ENCOUNTER — TRANSCRIBE ORDERS (OUTPATIENT)
Dept: ADMINISTRATIVE | Facility: HOSPITAL | Age: 53
End: 2023-09-11
Payer: COMMERCIAL

## 2023-09-11 ENCOUNTER — HOSPITAL ENCOUNTER (OUTPATIENT)
Dept: CARDIOLOGY | Facility: HOSPITAL | Age: 53
Discharge: HOME | End: 2023-09-11
Payer: COMMERCIAL

## 2023-09-11 VITALS — BODY MASS INDEX: 41.91 KG/M2 | WEIGHT: 267 LBS | HEIGHT: 67 IN

## 2023-09-11 DIAGNOSIS — Z12.31 ENCOUNTER FOR SCREENING MAMMOGRAM FOR MALIGNANT NEOPLASM OF BREAST: ICD-10-CM

## 2023-09-11 DIAGNOSIS — Z12.31 VISIT FOR SCREENING MAMMOGRAM: Primary | ICD-10-CM

## 2023-09-11 PROCEDURE — 77063 BREAST TOMOSYNTHESIS BI: CPT

## 2023-09-11 PROCEDURE — 77067 SCR MAMMO BI INCL CAD: CPT

## 2023-09-11 NOTE — TELEPHONE ENCOUNTER
[facility]    ED Visit Information     Ed visit date: 9/8/2023  Diagnosis Description: Cut of toe  In Network? Yes [address]  Discharge status: Home  Discharged with meds ? Yes  Number of ED visits to date: 1  ED Severity:3     Outreach Information    Outreach successful: Yes 2  Date letter mailed:N/A  Date Finalized:9/11/2023    Care Coordination    Follow up appointment with pcp: no ED follow up not scheduled  Transportation issues ? No    [employer] type: 3 Day Outreach  Emergent necessity warranted by diagnosis: Yes  [facility] PCP: Yes  Transportation: Self Transport  If able to choose an ED. Would you have chosen [facility]: Yes  Called PCP first?: No  Feels able to call PCP for urgent problems ?: Yes  Understands what emergencies can be handled by PCP ?: Yes  Ever any problems getting appointment with PCP for minor emergency/urgency problems?: No  Practice Contacted Patient ?: No  Pt had ED follow up with pcp/staff ?: No    Seen for follow-up out of network ?: No  Reason Patient went to ED instead of Urgent Care or PCP?: Perceived Severity of Illness  Urgent care Education?: Yes  09/11/2023 02:25 PM EDT by German Olvera 09/11/2023 02:25 PM EDT by [address] (Self) [phone] ([phone] (Mobile)  [phone] (Mobile) Remove  - Left MessageCommunicated - [email or web address]    09/11/2023 02:29 PM EDT by German Olvera 09/11/2023 02:29 PM EDT by [address] (Self) [phone] ([id number])[phone] (Mobile)  [phone] (Mobile) Remove  - Call CompleteCommunicated - Patient returned my call, ED outreach successful.

## 2023-09-11 NOTE — TELEPHONE ENCOUNTER
Patient called she would like to be seen for ED follow up with Dr. Jamar Keenan. No openings this week. Are you bale to see her this week?     Please advise    Thank you

## 2023-09-12 ENCOUNTER — OFFICE VISIT (OUTPATIENT)
Dept: GASTROENTEROLOGY | Facility: CLINIC | Age: 53
End: 2023-09-12
Payer: MEDICARE

## 2023-09-12 VITALS
OXYGEN SATURATION: 98 % | HEART RATE: 83 BPM | HEIGHT: 67 IN | WEIGHT: 276.8 LBS | SYSTOLIC BLOOD PRESSURE: 112 MMHG | BODY MASS INDEX: 43.44 KG/M2 | TEMPERATURE: 97.6 F | DIASTOLIC BLOOD PRESSURE: 68 MMHG

## 2023-09-12 DIAGNOSIS — K31.89 PORTAL HYPERTENSIVE GASTROPATHY: ICD-10-CM

## 2023-09-12 DIAGNOSIS — D50.9 IRON DEFICIENCY ANEMIA, UNSPECIFIED IRON DEFICIENCY ANEMIA TYPE: ICD-10-CM

## 2023-09-12 DIAGNOSIS — K21.9 GASTROESOPHAGEAL REFLUX DISEASE WITHOUT ESOPHAGITIS: ICD-10-CM

## 2023-09-12 DIAGNOSIS — K74.60 HEPATIC CIRRHOSIS (HCC): ICD-10-CM

## 2023-09-12 DIAGNOSIS — K26.9 DUODENAL ULCER: ICD-10-CM

## 2023-09-12 DIAGNOSIS — Z12.11 SCREENING FOR COLON CANCER: ICD-10-CM

## 2023-09-12 DIAGNOSIS — R59.9 ADENOPATHY: ICD-10-CM

## 2023-09-12 DIAGNOSIS — K74.60 CIRRHOSIS OF LIVER WITHOUT ASCITES, UNSPECIFIED HEPATIC CIRRHOSIS TYPE (HCC): Primary | ICD-10-CM

## 2023-09-12 DIAGNOSIS — K76.6 PORTAL HYPERTENSIVE GASTROPATHY: ICD-10-CM

## 2023-09-12 PROCEDURE — 99214 OFFICE O/P EST MOD 30 MIN: CPT | Performed by: FAMILY MEDICINE

## 2023-09-12 RX ORDER — SUCRALFATE 1 G/1
TABLET ORAL
COMMUNITY
Start: 2023-09-11

## 2023-09-12 NOTE — PROGRESS NOTES
[facility] - Outpatient Follow-up  Chely Vance 48 y.o. female MRN: [id number]  Encounter: [id number]          ASSESSMENT AND PLAN:      1. Cirrhosis of liver without ascites, unspecified hepatic cirrhosis type ([address])  2. Portal hypertensive gastropathy ([address])  3. Adenopathy    Summary: Patient is a 48 y.o. female with compensated cirrhosis secondary to [facility]. Current MELD-3.0 (9). 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 Fortunately, patient's liver disease appears to be well compensated without evidence of ascites, esophageal varices or confusion attributed to hepatic encephalopathy. She is up-to-date with her routine cirrhosis management including an EGD for variceal screening and imaging for hepatoma screening. She was found to have mild robin hepatic adenopathy with a dominant node adjacent to the pancreatic head first noted on U/S and redemonstrated on MRI/MRCP for which she is scheduled for an EUS for further evaluation. She will update her MELD labs (CBC, CMP, INR) just prior to her next office visit. She was following with weight management, and being considered for bariatric surgery, but cannot proceed until she wuits smoking. Advised she speak to her PCP regarding assitance with smoking cessation. Additional management as below. Ascites   - No radiographic or clinical evidence of ascites on exam today. Esophageal Varices   - EGD (4/2023) without evidence of gastric/esophageal varices with with PHG.   - Repeat EGD x2-3 years for variceal screening. Hepatic Encephalopathy   - No history or evidence of HE on exam today. - Patient aware of signs/sxs's of HE and advised to promptly inform provider if experiencing such.     [address] Screening   - MRI with MRCP (3/2023) for eval of robin hepatitis adenopathy without evidence of focal liver lesion. AFP normal.   - Scheduled for repeat MRI on 10/4 which will suffice for hepatoma screening.   - Continue imaging q6 months and AFP annually. Nutritional Status   - Encouraged high-protein diet in addition to a high-protein snack qHS to prevent prolonged fasting. Vaccines   - Patient with immunity to hep A but not hep B.   - Again recommended she complete the vaccination series for hep B either through her PCP or local pharmacy. - CBC and Platelet; Future  - Comprehensive metabolic panel; Future  - Protime-INR; Future    4. Iron deficiency anemia, unspecified iron deficiency anemia type  5. Gastroesophageal reflux disease without esophagitis  6. Duodenal ulcer  Patient with longstanding history of iron deficiency anemia in the absence of overt GI bleeding for which she has underwent an EGD and colonoscopy notable for gastric erosions, PHG and duodenal ulcer which may be contributory. Hgb overall stable and she continues to deny overt GI bleeding. She does report significant fatigue. She is scheduled with hematology/oncology for management of CONCHITA in November. In the meantime, she will continue with her current antacid regimen to include omeprazole 40 mg twice daily, famotidine 40 mg once daily at bedtime and Carafate as needed. 7. Screening for colon cancer  She is up-to-date with her CRC screening. Colonoscopy 2/28/22 notable for 1 sessile adenomatous appearing polyp (<5mm) 2 pedunculated edematous appearing polyps (10mm+). Biopsies notable for tubular adenomas. Recommended repeat colonoscopy x3 years    Follow with Dr. Jorge Jane for GI-related diagnoses.    Follow-up in 6 months with Dr. Selena Sosa.  ______________________________________________________________________    HPI: Patient is a 48 y.o. female with PMH significant for anxiety and depression, thyroidism, central lobar emphysema, glucose intolerance, hypertension, hyperlipidemia, vitamin D deficiency, GERD and tobacco use who presents today for a follow-up regarding cirrhosis secondary to ROMERO. Patient is familiar to [facility], [facility], previously being followed for multitude of GI-related complaints reestablishing care in January 2023 for anemia which she was reportedly hospitalized in [place] in December 2022 for a bleeding gastric ulcer. She underwent an EGD and colonoscopy in 2022 and has since had two subsequent EGDs with her most recent noting a probable healing ulcer at the junction of the first and second portion of the diodenum, scattered areas of nodular mucos with erosion in the antrum and probable PHG. She continues to follow with Dr. Monika Young for her GI-related concerns. She was also noted to have hepatosplenomegaly and diffuse hepatic steatosis on CT from June 2022 in addition to thrombocytopenia. This prompted an RUQ U/S notable for cirrhotic morphology, hepatosplenomegaly and an ovoid hypoechoic structure adjacent to the pancreatic head suggestive of robin hepatitis lymphadenopathy for which she has had a MRI/MRCP showing mild robin hepatic adenopathy with a dominant node adjacent to the pancreatic head. She is scheduled for an EUS. Also had an US elastography notable for F4 cirrhosis and completed a serologic eval negative for competing causes of liver disease. Denies excessive EtOH use, personal or family history of autoimmune disorders, or known infection with viral hepatitis. In regards to metabolic risk factors, patient's BMI 30.06 with glucose intolerance, hypertension, and hyperlipidemia. She is up-to-date with her CRC screening.      MELD 3.0: 9 at 9/8/2023  2:14 PM  MELD-Na: 8 at 9/8/2023  2:14 PM  Calculated from:  Serum Creatinine: 0.91 mg/dL (Using min of 1 mg/dL) at 9/8/2023  2:14 PM  Serum Sodium: 142 mmol/L (Using max of 137 mmol/L) at 9/8/2023  2:14 PM  Total Bilirubin: 0.90 mg/dL (Using min of 1 mg/dL) at 9/8/2023  2:14 PM  Serum Albumin: 3.7 g/dL (Using max of 3.5 g/dL) at 9/8/2023  2:14 PM  INR(ratio): 1.19 at 9/8/2023  2:14 PM  Age at listing (hypothetical): 48 years  Sex: Female at 9/8/2023  2:14 PM      REVIEW OF SYSTEMS:    CONSTITUTIONAL: Denies any fever, chills, rigors, and weight loss. HEENT: No earache or tinnitus. Denies hearing loss or visual disturbances. CARDIOVASCULAR: No chest pain or palpitations. RESPIRATORY: Denies any cough, hemoptysis, shortness of breath or dyspnea on exertion. GASTROINTESTINAL: As noted in the History of Present Illness. GENITOURINARY: No problems with urination. Denies any hematuria or dysuria. NEUROLOGIC: No dizziness or vertigo, denies headaches. MUSCULOSKELETAL: Denies any muscle or joint pain. SKIN: Denies skin rashes or itching. ENDOCRINE: Denies excessive thirst. Denies intolerance to heat or cold. PSYCHOSOCIAL: Denies depression or anxiety. Denies any recent memory loss.        Historical Information   Past Medical History:   Diagnosis Date   • Abnormal stress test    • Acute duodenal ulcer with bleeding 1/16/2023   • Allergic sinusitis     last assessed - [id number]   • Anxiety     last assessed - 51ULC8247   • Arthritis    • Asthma     last assessed - 83VJW6032   • Bilateral lower extremity edema     last assessed - 05USI4995   • Callus of foot     last assessed - 67Wsr8632   • Chest pain    • Chronic GERD     last assessed - 00CDI8631   • Colon polyp    • Constipation     Resolved - 35ANF4868   • COPD (chronic obstructive pulmonary disease) ([address])    • Depression     last assessed - 53JOQ4771   • Disease of thyroid gland    • Diverticulitis of colon     last assessed - 63TIA6851   • Dyspepsia     Resolved - 31KPU6203   • Edema, lower extremity 8/7/2020   • Esophageal reflux     last assessed - 40EAX0376   • Essential hypertriglyceridemia     last assessed - 65VQY4667   • Fatty liver 1/16/2023   • Gastroesophageal reflux disease with esophagitis 11/18/2016   • GERD (gastroesophageal reflux disease)    • Gynecological disorder     last assessed - 50SKY1661   • Hydroureteronephrosis 6/30/2022   • Hypertension     last assessed - 22FAV3439   • Hypokalemia 06/20/2022   • Hypotension     last assessed - 30Mba0945   • Kidney stone    • Left ureteral stone with hydronephrosis 7/21/2022   • Migraine    • Morbid obesity ([address]) 01/11/2019    Formatting of this note might be different from the original. Added automatically from request for surgery [id number]   • Neuropathy    • Other chest pain 11/8/2018   • Positive depression screening 06/19/2022   • Primary hypertriglyceridemia 06/19/2022   • Pulmonary emphysema ([address])     last assessed - 58GHU9447   • Seasonal allergies    • Severe obesity ([address]) 1/16/2023   • SOB (shortness of breath)    • Urinary frequency     last assessed - 22Jun2016   • Vagina, candidiasis     last assessed - 22Jun2016   • Very heavy cigarette smoker 1/16/2023   • Visual impairment      Past Surgical History:   Procedure Laterality Date   • CARPAL TUNNEL RELEASE      last assessed - 65CBE9937   • CHOLECYSTECTOMY      last assessed - 36JMS9269   • COLONOSCOPY      Complete colonoscopy    • EYE SURGERY      last assessed - 21WCL9370   • FL RETROGRADE PYELOGRAM  6/16/2022   • FL RETROGRADE PYELOGRAM  8/16/2022   • FOOT SURGERY      last assessed - 53JTG3117   • DC CYSTO BLADDER W/URETERAL CATHETERIZATION Left 7/21/2022    Procedure: CYSTOSCOPY RETROGRADE PYELOGRAM WITH INSERTION STENT URETERAL;  Surgeon: Lonzo Closs, MD;  Location: [facility];  Service: Urology   • DC CYSTO/URETERO W/LITHOTRIPSY &INDWELL STENT INSRT Left 6/16/2022    Procedure: CYSTOSCOPY URETEROSCOPY WITH LITHOTRIPSY HOLMIUM LASER, RETROGRADE PYELOGRAM AND INSERTION STENT URETERAL;  Surgeon: Emely Dotson MD;  Location:  MAIN OR;  Service: Urology   • DC CYSTO/URETERO W/LITHOTRIPSY &INDWELL STENT INSRT Left 8/16/2022    Procedure: CYSTOSCOPY USCOPE W/HOLMIUM LASER, RETROGRADE PYELOGRAM&STENT; Surgeon: Hudson Fam MD;  Location: [facility];  Service: Urology   • WI ESOPHAGOGASTRODUODENOSCOPY TRANSORAL DIAGNOSTIC N/A 2/13/2017    Procedure: ESOPHAGOGASTRODUODENOSCOPY (EGD); Surgeon: Jimena Brown MD;  Location: [facility];   Service: Gastroenterology     Social History   Social History     Substance and Sexual Activity   Alcohol Use Not Currently     Social History     Substance and Sexual Activity   Drug Use Never     Social History     Tobacco Use   Smoking Status Some Days   • Packs/day: 0.50   • Years: 30.00   • Total pack years: 15.00   • Types: Cigarettes   • Start date: 1/1/1983   Smokeless Tobacco Never     Family History   Problem Relation Age of Onset   • Obesity Mother    • Thyroid disease Mother    • Cancer Mother    • Obesity Sister    • Coronary artery disease Sister    • Stroke Sister    • Asthma Sister    • No Known Problems Maternal Aunt    • Diabetes Maternal Uncle    • Lung cancer Maternal Grandmother    • Cancer Maternal Grandfather    • Diabetes Maternal Grandfather    • No Known Problems Paternal Grandmother    • No Known Problems Paternal Grandfather    • Hypertension Other    • Lung cancer Other    • Hypertension Other    • Lung cancer Other    • Lung cancer Other        Meds/Allergies       Current Outpatient Medications:   •  albuterol (2.5 mg/3 mL) 0.083 % nebulizer solution  •  albuterol (PROVENTIL HFA,VENTOLIN HFA) 90 mcg/act inhaler  •  buPROPion (WELLBUTRIN XL) 150 mg 24 hr tablet  •  ergocalciferol (VITAMIN D2) 50,000 units  •  famotidine (PEPCID) 40 MG tablet  •  ferrous sulfate 324 (65 Fe) mg  •  fluticasone (FLONASE) 50 mcg/act nasal spray  •  gabapentin (NEURONTIN) 100 mg capsule  •  levothyroxine 112 mcg tablet  •  neomycin-polymyxin-dexamethasone (MAXITROL) ophthalmic suspension  •  nicotine (NICODERM CQ) 21 mg/24 hr TD 24 hr patch  •  omeprazole (PriLOSEC) 40 MG capsule  •  oxyCODONE-acetaminophen (PERCOCET)  mg per tablet  •  sertraline (ZOLOFT) 100 mg tablet  •  simvastatin (ZOCOR) 20 mg tablet  •  sucralfate (CARAFATE) 1 g tablet  •  sucralfate (CARAFATE) 1 g/10 mL suspension  •  tiotropium (Spiriva HandiHaler) 18 mcg inhalation capsule  •  varenicline (CHANTIX) 0.5 mg tablet  •  diazepam (VALIUM) 5 mg tablet  •  metFORMIN (GLUCOPHAGE) 500 mg tablet    Allergies   Allergen Reactions   • Hydrocodone-Acetaminophen Hives   • Ketorolac Hives and Dizziness   • Toradol [Ketorolac Tromethamine] Other (See Comments)     hypotension   • Ultram [Tramadol] Nausea Only, GI Intolerance and Vomiting           Objective     Blood pressure 112/68, pulse 83, temperature 97.6 °F (36.4 °C), temperature source Temporal, height 5' 7" (1.702 m), weight 126 kg (276 lb 12.8 oz), last menstrual period 04/05/2018, SpO2 98 %. Body mass index is 43.35 kg/m². PHYSICAL EXAM:      General Appearance:   Alert, cooperative, no distress; AAOx3, no asterixis   HEENT:   Normocephalic, atraumatic, anicteric; No scleral icterus     Neck:  Supple, symmetrical, trachea midline   Lungs:   Clear to auscultation bilaterally; no rales, rhonchi or wheezing; respirations unlabored    Heart[de-identified]   Regular rate and rhythm; no murmur, rub, or gallop. Abdomen:   Soft, non-tender, non-distended; normal bowel sounds; no masses, no organomegaly    Genitalia:   Deferred    Rectal:   Deferred    Extremities:  No palmar erythema, cyanosis, clubbing or edema    Pulses:  2+ and symmetric    Skin:  No spider angiomas, jaundice, rashes, or lesions    Lymph nodes:  No palpable cervical lymphadenopathy        Lab Results:   No visits with results within 1 Day(s) from this visit.    Latest known visit with results is:   Admission on 09/08/2023, Discharged on 09/08/2023   Component Date Value   • WBC 09/08/2023 7.13    • RBC 09/08/2023 3.92    • Hemoglobin 09/08/2023 10.7 (L)    • Hematocrit 09/08/2023 36.4    • MCV 09/08/2023 93    • MCH 09/08/2023 27.3    • MCHC 09/08/2023 29.4 (L)    • RDW 09/08/2023 17.2 (H)    • MPV 09/08/2023 11.0    • Platelets 00/61/5412 103 (L)    • nRBC 09/08/2023 0    • Neutrophils Relative 09/08/2023 62    • Immat GRANS % 09/08/2023 0    • Lymphocytes Relative 09/08/2023 24    • Monocytes Relative 09/08/2023 11    • Eosinophils Relative 09/08/2023 2    • Basophils Relative 09/08/2023 1    • Neutrophils Absolute 09/08/2023 4.48    • Immature Grans Absolute 09/08/2023 0.01    • Lymphocytes Absolute 09/08/2023 1.73    • Monocytes Absolute 09/08/2023 0.75    • Eosinophils Absolute 09/08/2023 0.12    • Basophils Absolute 09/08/2023 0.04    • Sodium 09/08/2023 142    • Potassium 09/08/2023 3.3 (L)    • Chloride 09/08/2023 109 (H)    • CO2 09/08/2023 25    • ANION GAP 09/08/2023 8    • BUN 09/08/2023 16    • Creatinine 09/08/2023 0.91    • Glucose 09/08/2023 147 (H)    • Calcium 09/08/2023 9.2    • AST 09/08/2023 30    • ALT 09/08/2023 18    • Alkaline Phosphatase 09/08/2023 77    • Total Protein 09/08/2023 6.2 (L)    • Albumin 09/08/2023 3.7    • Total Bilirubin 09/08/2023 0.90    • eGFR 09/08/2023 72    • Protime 09/08/2023 15.2 (H)    • INR 09/08/2023 1.19    • PTT 09/08/2023 42 (H)    • Color, UA 09/08/2023 Yellow    • Clarity, UA 09/08/2023 Hazy    • Specific Gravity, UA 09/08/2023 >=1.030 (H)    • pH, UA 09/08/2023 6.0    • Leukocytes, UA 09/08/2023 Negative    • Nitrite, UA 09/08/2023 Positive (A)    • Protein, UA 09/08/2023 Negative    • Glucose, UA 09/08/2023 Negative    • Ketones, UA 09/08/2023 Negative    • Urobilinogen, UA 09/08/2023 1.0    • Bilirubin, UA 09/08/2023 Negative    • Occult Blood, UA 09/08/2023 Negative    • RBC, UA 09/08/2023 0-1    • WBC, UA 09/08/2023 2-4    • Epithelial Cells 09/08/2023 Occasional    • Bacteria, UA 09/08/2023 Moderate (A)          Radiology Results:   CT abdomen pelvis wo contrast    Result Date: 9/8/2023  Narrative: CT ABDOMEN AND PELVIS WITHOUT IV CONTRAST INDICATION:   hematuria, left groin pain. COMPARISON: 2/1/2023.  TECHNIQUE:  CT examination of the abdomen and pelvis was performed without intravenous contrast. Multiplanar 2D reformatted images were created from the source data. This examination, like all CT scans performed in the [facility], was performed utilizing techniques to minimize radiation dose exposure, including the use of iterative reconstruction and automated exposure control. Radiation dose length product (DLP) for this visit:  1581 mGy-cm Enteric contrast was not administered. FINDINGS: ABDOMEN LOWER CHEST:  No clinically significant abnormality identified in the visualized lower chest. LIVER/BILIARY TREE: Nodular contour compatible with hepatic cirrhosis. Hepatic parenchyma appears heterogeneous in attenuation. Evaluation for underlying hepatic lesion is limited without intravenous contrast. No significant biliary ductal dilatation. GALLBLADDER: Status post cholecystectomy. SPLEEN: Stable mild splenomegaly. PANCREAS:  Unremarkable. ADRENAL GLANDS:  Unremarkable. KIDNEYS/URETERS: Several nonobstructing punctate left renal calculi again seen. No hydronephrosis or hydroureter. No discrete ureteral calculus. STOMACH AND BOWEL: There is no hydronephrosis or hydroureter. Sigmoid diverticulosis without acute diverticulitis. APPENDIX: A normal appendix was visualized. ABDOMINOPELVIC CAVITY:  No ascites. No pneumoperitoneum. No lymphadenopathy. VESSELS: The abdominal artery is calcified PELVIS REPRODUCTIVE ORGANS: No gross pelvic masses. URINARY BLADDER: No bladder calculus. ABDOMINAL WALL/INGUINAL REGIONS: Tiny fat-containing periumbilical hernia. OSSEOUS STRUCTURES: Multilevel degenerative disc disease in the thoracolumbar spine with multilevel facet arthropathy. Moderate bilateral hip osteoarthrosis. Impression: Left-sided nephrolithiasis without obstructive uropathy or hydronephrosis. Hepatic cirrhosis. Heterogeneous attenuation of the hepatic parenchyma, nonspecific.  Evaluation for underlying hepatic lesion is limited without intravenous contrast. Splenomegaly. Sigmoid diverticulosis without acute diverticulitis. Additional findings as above. Workstation performed: [id number]     XR toe second min 2 views LEFT    Result Date: 9/8/2023  Narrative: LEFT TOES INDICATION:   pain s/p injury. COMPARISON:  None VIEWS:  XR TOE LEFT SECOND MIN 2 VIEWS FINDINGS: There is no acute fracture or dislocation. No significant degenerative changes. No lytic or blastic osseous lesion. Soft tissues are unremarkable. Impression: No acute osseous abnormality. Workstation performed: [id number]       Martinez Lopez PA-C     **Please note: Dictation voice to text software may have been used in the creation of this record. Occasional wrong word or “sound alike” substitutions may have occurred due to the inherent limitations of voice recognition software. Read the chart carefully and recognize, using context, where substitutions have occurred. **

## 2023-09-14 ENCOUNTER — APPOINTMENT (OUTPATIENT)
Dept: LAB | Facility: IMAGING CENTER | Age: 53
End: 2023-09-14
Payer: MEDICARE

## 2023-09-14 ENCOUNTER — OFFICE VISIT (OUTPATIENT)
Dept: INTERNAL MEDICINE CLINIC | Facility: OTHER | Age: 53
End: 2023-09-14
Payer: MEDICARE

## 2023-09-14 VITALS
TEMPERATURE: 98.3 F | HEIGHT: 67 IN | DIASTOLIC BLOOD PRESSURE: 62 MMHG | BODY MASS INDEX: 42.38 KG/M2 | SYSTOLIC BLOOD PRESSURE: 140 MMHG | HEART RATE: 90 BPM | OXYGEN SATURATION: 96 % | WEIGHT: 270 LBS

## 2023-09-14 DIAGNOSIS — Z12.11 ENCOUNTER FOR COLORECTAL CANCER SCREENING: ICD-10-CM

## 2023-09-14 DIAGNOSIS — Z72.0 TOBACCO ABUSE DISORDER: ICD-10-CM

## 2023-09-14 DIAGNOSIS — K58.2 IRRITABLE BOWEL SYNDROME WITH BOTH CONSTIPATION AND DIARRHEA: ICD-10-CM

## 2023-09-14 DIAGNOSIS — F41.9 ANXIETY: ICD-10-CM

## 2023-09-14 DIAGNOSIS — D50.9 IRON DEFICIENCY ANEMIA, UNSPECIFIED IRON DEFICIENCY ANEMIA TYPE: Primary | ICD-10-CM

## 2023-09-14 DIAGNOSIS — D50.9 IRON DEFICIENCY ANEMIA, UNSPECIFIED IRON DEFICIENCY ANEMIA TYPE: ICD-10-CM

## 2023-09-14 DIAGNOSIS — E03.9 ACQUIRED HYPOTHYROIDISM: ICD-10-CM

## 2023-09-14 DIAGNOSIS — Z12.12 ENCOUNTER FOR COLORECTAL CANCER SCREENING: ICD-10-CM

## 2023-09-14 DIAGNOSIS — S99.922A INJURY OF TOE ON LEFT FOOT, INITIAL ENCOUNTER: ICD-10-CM

## 2023-09-14 DIAGNOSIS — R73.01 ELEVATED FASTING GLUCOSE: ICD-10-CM

## 2023-09-14 DIAGNOSIS — K21.9 GASTROESOPHAGEAL REFLUX DISEASE WITHOUT ESOPHAGITIS: ICD-10-CM

## 2023-09-14 DIAGNOSIS — N30.00 ACUTE CYSTITIS WITHOUT HEMATURIA: ICD-10-CM

## 2023-09-14 DIAGNOSIS — K74.60 CIRRHOSIS OF LIVER WITHOUT ASCITES, UNSPECIFIED HEPATIC CIRRHOSIS TYPE (HCC): ICD-10-CM

## 2023-09-14 DIAGNOSIS — I10 BENIGN HYPERTENSION: ICD-10-CM

## 2023-09-14 PROBLEM — N13.30 HYDROURETERONEPHROSIS: Status: RESOLVED | Noted: 2022-06-30 | Resolved: 2023-09-14

## 2023-09-14 PROBLEM — K26.0 ACUTE DUODENAL ULCER WITH BLEEDING: Status: RESOLVED | Noted: 2023-01-16 | Resolved: 2023-09-14

## 2023-09-14 PROBLEM — N13.2 URETERAL STONE WITH HYDRONEPHROSIS: Status: RESOLVED | Noted: 2022-07-21 | Resolved: 2023-09-14

## 2023-09-14 PROBLEM — K76.0 FATTY LIVER: Status: RESOLVED | Noted: 2023-01-16 | Resolved: 2023-09-14

## 2023-09-14 PROBLEM — E66.01 SEVERE OBESITY (HCC): Status: RESOLVED | Noted: 2023-01-16 | Resolved: 2023-09-14

## 2023-09-14 PROBLEM — R60.0 EDEMA, LOWER EXTREMITY: Status: RESOLVED | Noted: 2020-08-07 | Resolved: 2023-09-14

## 2023-09-14 LAB
ANION GAP SERPL CALCULATED.3IONS-SCNC: 10 MMOL/L
BUN SERPL-MCNC: 14 MG/DL (ref 5–25)
CALCIUM SERPL-MCNC: 9.3 MG/DL (ref 8.4–10.2)
CHLORIDE SERPL-SCNC: 107 MMOL/L (ref 96–108)
CO2 SERPL-SCNC: 24 MMOL/L (ref 21–32)
CREAT SERPL-MCNC: 0.79 MG/DL (ref 0.6–1.3)
EST. AVERAGE GLUCOSE BLD GHB EST-MCNC: 126 MG/DL
FERRITIN SERPL-MCNC: 16 NG/ML (ref 11–307)
GFR SERPL CREATININE-BSD FRML MDRD: 85 ML/MIN/1.73SQ M
GLUCOSE P FAST SERPL-MCNC: 143 MG/DL (ref 65–99)
HBA1C MFR BLD: 6 %
IRON SATN MFR SERPL: 17 % (ref 15–50)
IRON SERPL-MCNC: 69 UG/DL (ref 50–212)
POTASSIUM SERPL-SCNC: 4.1 MMOL/L (ref 3.5–5.3)
SODIUM SERPL-SCNC: 141 MMOL/L (ref 135–147)
T4 FREE SERPL-MCNC: 0.86 NG/DL (ref 0.61–1.12)
TIBC SERPL-MCNC: 396 UG/DL (ref 250–450)
TSH SERPL DL<=0.05 MIU/L-ACNC: 2.52 UIU/ML (ref 0.45–4.5)
UIBC SERPL-MCNC: 327 UG/DL (ref 155–355)

## 2023-09-14 PROCEDURE — 99214 OFFICE O/P EST MOD 30 MIN: CPT | Performed by: FAMILY MEDICINE

## 2023-09-14 PROCEDURE — 83540 ASSAY OF IRON: CPT

## 2023-09-14 PROCEDURE — 80048 BASIC METABOLIC PNL TOTAL CA: CPT

## 2023-09-14 PROCEDURE — 84443 ASSAY THYROID STIM HORMONE: CPT

## 2023-09-14 PROCEDURE — 84439 ASSAY OF FREE THYROXINE: CPT

## 2023-09-14 PROCEDURE — 82728 ASSAY OF FERRITIN: CPT

## 2023-09-14 PROCEDURE — 83036 HEMOGLOBIN GLYCOSYLATED A1C: CPT

## 2023-09-14 PROCEDURE — 36415 COLL VENOUS BLD VENIPUNCTURE: CPT

## 2023-09-14 PROCEDURE — 83550 IRON BINDING TEST: CPT

## 2023-09-14 RX ORDER — DIAZEPAM 5 MG/1
5 TABLET ORAL EVERY 6 HOURS PRN
Qty: 30 TABLET | Refills: 0 | Status: SHIPPED | OUTPATIENT
Start: 2023-09-14 | End: 2023-10-13 | Stop reason: SDUPTHER

## 2023-09-14 NOTE — PROGRESS NOTES
Assessment/Plan:    1. Iron deficiency anemia, unspecified iron deficiency anemia type  -     Iron Panel (Includes Ferritin, Iron Sat%, Iron, and TIBC); Future; Expected date: 09/14/2023  -     Basic metabolic panel; Future; Expected date: 09/14/2023    2. Encounter for colorectal cancer screening    3. Acquired hypothyroidism  -     TSH, 3rd generation; Future; Expected date: 09/14/2023  -     T4, free; Future; Expected date: 09/14/2023    4. Anxiety    5. Gastroesophageal reflux disease without esophagitis    6. Elevated fasting glucose  -     Hemoglobin A1C; Future; Expected date: 09/14/2023    7. Benign hypertension    8. Irritable bowel syndrome with both constipation and diarrhea    9. Injury of toe on left foot, initial encounter    10. Cirrhosis of liver without ascites, unspecified hepatic cirrhosis type (HCC)    11. Acute cystitis without hematuria    12. Tobacco abuse disorder  -     CT lung screening program; Future; Expected date: 01/14/2024            There are no Patient Instructions on file for this visit.    Return for Next scheduled follow up.    Subjective:      Patient ID: Chely Ruvalcaba is a 53 y.o. female.    Chief Complaint   Patient presents with    Follow-up     Patient was in the er on 9/8 a board fell on her left toe also had a small amount of blood in urine patient was placed on antibiotic    Health Screening     Colonoscopy ordered       HPI    Patient with injury to her toe, went to urgent care and had x-rays done, no fractures noted.  Has little bit of swelling and mild pain but able to ambulate without any issues.  Wanted to have it checked again.  She denies any falls        Hypertension (Follow-Up): The patient presents for follow-up of essential hypertension. The patient states she has been stable with her blood pressure control since the last visit.    Symptoms: denies impaired vision,-- denies dyspnea,-- denies intermittent leg claudication-- and-- denies lower extremity edema--        The patient presents with complaints of substernal new onset of chest pain, described as aching, non-radiating Her symptoms are caused by no known event (Feels it may be related to indigestion and gas. When she gets chest pain she takes an Aspirin and it helps. Does not feel like it is a crushing pain). Associated symptoms include She does report headaches but that is not new.   July 2023, has been noticing slight increases in blood pressure at home however at her last specialist visit was very well controlled    Home monitoring: The patient checks her blood pressure sporadically. Blood pressure control has been good.      Medications: the patient is adherent with her medication regimen.-- the patient complains of medication side effects.      Disease Management: the patient is doing well with her blood pressure goals.       Hypothyroidism (Follow-Up): The patient is being seen for follow-up of hyperthyroidism.      Interval symptoms:  denies heat intolerance,-- denies increased perspiration,-- denies palpitations,-- denies tremor,-- denies hyperactivity,-- denies anxiety,-- denies insomnia-- and-- denies fatigue.      Associated symptoms: no weight loss,-- no weight gain,-- no diarrhea,-- no constipation-- and-- no vision changes.    July 2023: stable, in range     Medications:  the patient is not adherent to her medication regimen-- and-- she denies medication side effects.      Disease management:  the patient is doing well with her goals.             tobacco abuse : since age 11, 2-3 packs per day recently, never started chantix  March 2021 started chantix and now smoking  Less but has nto quit  January 2022, was on Chantix and was smoking about 5-6 cigarettes per day.  Now off Chantix due to recall and is smoking 1-1 and half packs per day.  Not motivated to quit  June 2022, not willing to quit  January 2023, quit 4 days ago however she states she is struggling a lot and has a lot of cravings   July 2023 re  started smoking   September 2023, patient unable to quit      The following portions of the patient's history were reviewed and updated as appropriate: allergies, current medications, past family history, past medical history, past social history, past surgical history and problem list.    Review of Systems      Constitutional:  Denies fever or chills   Eyes:  Denies double , blurry vision or eye pain  HENT:  Denies nasal congestion, sore throat or new hearing issues  Respiratory:  Denies cough or shortness of breath or wheezing  Cardiovascular:  Denies palpitations or chest pain  GI:  Denies abdominal pain, nausea, or vomiting, no loose stools, no reflux  Integument:  Denies rash , no open areas  Neurologic:  Denies headache or focal weakness, no dizziness  : no dysuria, or hematuria      Current Outpatient Medications   Medication Sig Dispense Refill    albuterol (2.5 mg/3 mL) 0.083 % nebulizer solution Take 3 mL (2.5 mg total) by nebulization every 6 (six) hours as needed for wheezing or shortness of breath 30 mL 0    albuterol (PROVENTIL HFA,VENTOLIN HFA) 90 mcg/act inhaler Inhale 2 puffs every 6 (six) hours as needed for wheezing 18 g 1    buPROPion (WELLBUTRIN XL) 150 mg 24 hr tablet Take 1 tablet (150 mg total) by mouth every morning 90 tablet 1    ergocalciferol (VITAMIN D2) 50,000 units Take 1 capsule (50,000 Units total) by mouth every 14 (fourteen) days 12 capsule 1    famotidine (PEPCID) 40 MG tablet Take 1 tablet (40 mg total) by mouth daily at bedtime Take in evening 2 hours prior to bed 90 tablet 3    fluticasone (FLONASE) 50 mcg/act nasal spray 1 spray into each nostril 2 (two) times a day (Patient taking differently: 1 spray into each nostril as needed) 16 g 5    gabapentin (NEURONTIN) 100 mg capsule Take 100 mg by mouth 2 (two) times a day  0    levothyroxine 112 mcg tablet Take 1 tablet (112 mcg total) by mouth daily 90 tablet 1    neomycin-polymyxin-dexamethasone (MAXITROL) ophthalmic  "suspension instill 1 drop INTO AFFECTED EYE 4 TIMES A DAY AS DIRECTED      nicotine (NICODERM CQ) 21 mg/24 hr TD 24 hr patch Place 1 patch on the skin over 24 hours every 24 hours 28 patch 5    oxyCODONE-acetaminophen (PERCOCET)  mg per tablet Take 1 tablet by mouth every 6 (six) hours as needed      sertraline (ZOLOFT) 100 mg tablet Take 1 tablet (100 mg total) by mouth 2 (two) times a day 180 tablet 1    sucralfate (CARAFATE) 1 g tablet Take 1 g by mouth 2 (two) times a day      diazepam (VALIUM) 5 mg tablet Take 1 tablet (5 mg total) by mouth every 6 (six) hours as needed for anxiety 30 tablet 0    ferrous sulfate 324 (65 Fe) mg Take 1 tablet (324 mg total) by mouth daily before breakfast 30 tablet 0    omeprazole (PriLOSEC) 40 MG capsule take 1 capsule by mouth twice a day 60 capsule 5    Semaglutide-Weight Management (WEGOVY) 0.25 MG/0.5ML Inject 0.5 mL (0.25 mg total) under the skin once a week 2 mL 0    simvastatin (ZOCOR) 20 mg tablet Take 1 tablet (20 mg total) by mouth daily at bedtime 90 tablet 1    spironolactone (ALDACTONE) 25 mg tablet Take 2 tablets (50 mg total) by mouth daily 30 tablet 5    sucralfate (CARAFATE) 1 g/10 mL suspension Take 10 mL (1 g total) by mouth 4 (four) times a day (with meals and at bedtime) 2700 mL 1    tiotropium-olodaterol (Stiolto Respimat) 2.5-2.5 MCG/ACT inhaler Inhale 2 puffs daily 4 g 5    varenicline (CHANTIX) 0.5 mg tablet Take 1 tablet (0.5 mg total) by mouth 2 (two) times a day 60 tablet 0     No current facility-administered medications for this visit.       Objective:    /62 (BP Location: Left arm, Patient Position: Sitting, Cuff Size: Standard)   Pulse 90   Temp 98.3 °F (36.8 °C) (Temporal)   Ht 5' 6.93\" (1.7 m)   Wt 122 kg (270 lb)   LMP 04/05/2018 (Approximate)   SpO2 96%   BMI 42.38 kg/m²        Physical Exam       Constitutional:  Well developed, well nourished, no acute distress, non-toxic appearance   Eyes:  PERRL, conjunctiva normal , non " icteric sclera  HENT:  Atraumatic, oropharynx moist. Neck-  supple   Respiratory:  CTA b/l, normal breath sounds, no rales, no wheezing   Cardiovascular:  RRR, no murmurs, no LE edema b/l  GI:  Soft, nondistended, normal bowel sounds x 4, nontender, no organomegaly, no mass, no rebound, no guarding   Neurologic:  no focal deficits noted   Psychiatric:  Speech and behavior appropriate , AAO x 3      Andi Mason, DO

## 2023-09-19 DIAGNOSIS — R73.03 PREDIABETES: Primary | ICD-10-CM

## 2023-09-19 PROBLEM — L91.8 SKIN TAG: Status: RESOLVED | Noted: 2023-07-17 | Resolved: 2023-09-19

## 2023-09-19 PROBLEM — N30.00 ACUTE CYSTITIS WITHOUT HEMATURIA: Status: RESOLVED | Noted: 2018-05-07 | Resolved: 2023-09-19

## 2023-09-19 PROBLEM — K86.9 PANCREATIC LESION: Status: RESOLVED | Noted: 2023-03-13 | Resolved: 2023-09-19

## 2023-09-19 PROBLEM — S76.219A STRAIN OF GROIN: Status: RESOLVED | Noted: 2023-07-17 | Resolved: 2023-09-19

## 2023-09-27 NOTE — TELEPHONE ENCOUNTER
[employer] message sent.  Cardiology contact info provided.    Pt is asking if you will review her labs and let her know if she is to continue taking the iron      Please advise

## 2023-09-28 ENCOUNTER — TELEPHONE (OUTPATIENT)
Dept: INTERNAL MEDICINE CLINIC | Age: 53
End: 2023-09-28

## 2023-09-28 ENCOUNTER — TELEPHONE (OUTPATIENT)
Dept: GASTROENTEROLOGY | Facility: MEDICAL CENTER | Age: 53
End: 2023-09-28

## 2023-09-28 NOTE — TELEPHONE ENCOUNTER
Patient is calling to find out why Ozepmic was sent to the pharmacy. She wasn't aware that she was getting put on this medication. She wants to talk to you on this and find out why you are putting her on this.     Please call her back at the [phone]

## 2023-09-28 NOTE — TELEPHONE ENCOUNTER
Spoke to patient and made aware to hold Ozempic for 7 days. She states she has not started it yet and not received it yet from her pharmacy. She will hold off until after her EUS with Dr. Dinah Stewart on 10/10/2023.

## 2023-09-29 ENCOUNTER — TELEPHONE (OUTPATIENT)
Dept: INTERNAL MEDICINE CLINIC | Age: 53
End: 2023-09-29

## 2023-09-29 NOTE — TELEPHONE ENCOUNTER
Received prior authorization for medication:    semaglutide, 0.25 or 0.5 mg/dose, (Ozempic, 0.25 or 0.5 MG/DOSE,) 2 mg/3 mL injection pen         Scanning into media and sending to [facility]

## 2023-09-29 NOTE — TELEPHONE ENCOUNTER
Spoke with patient and let her know that this is what Dr. Rasta Aguilera wants her to take due to her Diabetes and also will help with weight loss. She stated ok and wanted to know if the prior authorization will be done. I stated to the patient once we get the paper work we will work on it.   Will let you know if approved or denied when the insurance will let us know this in a couple of days

## 2023-10-02 NOTE — TELEPHONE ENCOUNTER
Started prior authorization in cover my meds also filled out form from  [facility] sent medical noted along with form faxed to [phone] phone number [phone]

## 2023-10-04 ENCOUNTER — TELEPHONE (OUTPATIENT)
Age: 53
End: 2023-10-04

## 2023-10-04 ENCOUNTER — HOSPITAL ENCOUNTER (OUTPATIENT)
Dept: MRI IMAGING | Facility: HOSPITAL | Age: 53
Discharge: HOME | End: 2023-10-04
Attending: INTERNAL MEDICINE
Payer: MEDICARE

## 2023-10-04 DIAGNOSIS — K86.9 PANCREATIC LESION: ICD-10-CM

## 2023-10-04 DIAGNOSIS — K74.60 CIRRHOSIS OF LIVER WITHOUT ASCITES, UNSPECIFIED HEPATIC CIRRHOSIS TYPE (HCC): ICD-10-CM

## 2023-10-04 PROCEDURE — G1004 CDSM NDSC: HCPCS

## 2023-10-04 PROCEDURE — 74183 MRI ABD W/O CNTR FLWD CNTR: CPT

## 2023-10-04 PROCEDURE — A9585 GADOBUTROL INJECTION: HCPCS | Performed by: INTERNAL MEDICINE

## 2023-10-04 RX ORDER — GADOBUTROL 604.72 MG/ML
12 INJECTION INTRAVENOUS
Status: COMPLETED | OUTPATIENT
Start: 2023-10-04 | End: 2023-10-04

## 2023-10-04 RX ADMIN — GADOBUTROL 12 ML: 604.72 INJECTION INTRAVENOUS at 07:43

## 2023-10-04 NOTE — TELEPHONE ENCOUNTER
[facility]  Key: [id number]  Prior Auth not required.

## 2023-10-04 NOTE — TELEPHONE ENCOUNTER
Pharmacy Benefits     [address] PLAN NO ACCUMS ([address])    Covered:  Retail, Specialty    Not covered:  Mail Order    Unknown:  Long-Term Care   Member ID:  [id number]   Group ID:  [id number]   Group name:  [employer] [place] [id number] ADULT    BIN:  [id number]   PCN:  [id number]           PA for Omeprazole 40 mg BID submitted through CoverMyMeds

## 2023-10-10 ENCOUNTER — ANESTHESIA (OUTPATIENT)
Dept: GASTROENTEROLOGY | Facility: HOSPITAL | Age: 53
End: 2023-10-10

## 2023-10-10 ENCOUNTER — ANESTHESIA EVENT (OUTPATIENT)
Dept: GASTROENTEROLOGY | Facility: HOSPITAL | Age: 53
End: 2023-10-10

## 2023-10-10 ENCOUNTER — HOSPITAL ENCOUNTER (OUTPATIENT)
Dept: MAMMOGRAPHY | Facility: HOSPITAL | Age: 53
Discharge: HOME | End: 2023-10-10
Admitting: INTERNAL MEDICINE
Payer: COMMERCIAL

## 2023-10-10 ENCOUNTER — HOSPITAL ENCOUNTER (OUTPATIENT)
Dept: GASTROENTEROLOGY | Facility: HOSPITAL | Age: 53
Setting detail: OUTPATIENT SURGERY
Discharge: HOME | End: 2023-10-10
Attending: INTERNAL MEDICINE
Payer: MEDICARE

## 2023-10-10 VITALS
SYSTOLIC BLOOD PRESSURE: 136 MMHG | HEART RATE: 94 BPM | RESPIRATION RATE: 18 BRPM | TEMPERATURE: 97.5 F | OXYGEN SATURATION: 96 % | DIASTOLIC BLOOD PRESSURE: 88 MMHG

## 2023-10-10 DIAGNOSIS — R59.0 ABDOMINAL LYMPHADENOPATHY: ICD-10-CM

## 2023-10-10 DIAGNOSIS — Z12.31 VISIT FOR SCREENING MAMMOGRAM: ICD-10-CM

## 2023-10-10 DIAGNOSIS — K26.9 DUODENAL ULCER: ICD-10-CM

## 2023-10-10 LAB — GLUCOSE SERPL-MCNC: 144 MG/DL (ref 65–140)

## 2023-10-10 PROCEDURE — 77063 BREAST TOMOSYNTHESIS BI: CPT

## 2023-10-10 PROCEDURE — 88313 SPECIAL STAINS GROUP 2: CPT | Performed by: STUDENT IN AN ORGANIZED HEALTH CARE EDUCATION/TRAINING PROGRAM

## 2023-10-10 PROCEDURE — 88305 TISSUE EXAM BY PATHOLOGIST: CPT | Performed by: STUDENT IN AN ORGANIZED HEALTH CARE EDUCATION/TRAINING PROGRAM

## 2023-10-10 PROCEDURE — 77067 SCR MAMMO BI INCL CAD: CPT

## 2023-10-10 PROCEDURE — 82948 REAGENT STRIP/BLOOD GLUCOSE: CPT

## 2023-10-10 RX ORDER — PROPOFOL 10 MG/ML
INJECTION, EMULSION INTRAVENOUS AS NEEDED
Status: DISCONTINUED | OUTPATIENT
Start: 2023-10-10 | End: 2023-10-10

## 2023-10-10 RX ORDER — SODIUM CHLORIDE 9 MG/ML
INJECTION, SOLUTION INTRAVENOUS CONTINUOUS PRN
Status: DISCONTINUED | OUTPATIENT
Start: 2023-10-10 | End: 2023-10-10

## 2023-10-10 RX ORDER — LIDOCAINE HYDROCHLORIDE 10 MG/ML
INJECTION, SOLUTION EPIDURAL; INFILTRATION; INTRACAUDAL; PERINEURAL AS NEEDED
Status: DISCONTINUED | OUTPATIENT
Start: 2023-10-10 | End: 2023-10-10

## 2023-10-10 RX ORDER — PROPOFOL 10 MG/ML
INJECTION, EMULSION INTRAVENOUS CONTINUOUS PRN
Status: DISCONTINUED | OUTPATIENT
Start: 2023-10-10 | End: 2023-10-10

## 2023-10-10 RX ADMIN — PROPOFOL 20 MG: 10 INJECTION, EMULSION INTRAVENOUS at 12:50

## 2023-10-10 RX ADMIN — SODIUM CHLORIDE: 9 INJECTION, SOLUTION INTRAVENOUS at 12:12

## 2023-10-10 RX ADMIN — PROPOFOL 20 MG: 10 INJECTION, EMULSION INTRAVENOUS at 12:45

## 2023-10-10 RX ADMIN — Medication 40 MG: at 12:17

## 2023-10-10 RX ADMIN — PROPOFOL 200 MCG/KG/MIN: 10 INJECTION, EMULSION INTRAVENOUS at 12:15

## 2023-10-10 RX ADMIN — LIDOCAINE HYDROCHLORIDE 20 MG: 10 INJECTION, SOLUTION EPIDURAL; INFILTRATION; INTRACAUDAL; PERINEURAL at 12:16

## 2023-10-10 RX ADMIN — PROPOFOL 60 MG: 10 INJECTION, EMULSION INTRAVENOUS at 12:14

## 2023-10-10 RX ADMIN — PROPOFOL 20 MG: 10 INJECTION, EMULSION INTRAVENOUS at 12:24

## 2023-10-10 RX ADMIN — LIDOCAINE HYDROCHLORIDE 80 MG: 10 INJECTION, SOLUTION EPIDURAL; INFILTRATION; INTRACAUDAL; PERINEURAL at 12:14

## 2023-10-10 RX ADMIN — PROPOFOL 20 MG: 10 INJECTION, EMULSION INTRAVENOUS at 12:31

## 2023-10-10 RX ADMIN — PROPOFOL 20 MG: 10 INJECTION, EMULSION INTRAVENOUS at 12:38

## 2023-10-10 NOTE — ANESTHESIA POSTPROCEDURE EVALUATION
Post-Op Assessment Note    CV Status:  Stable  Pain Score: 0    Pain management: adequate     Mental Status:  Alert and awake   Hydration Status:  Euvolemic   PONV Controlled:  Controlled   Airway Patency:  Patent      Post Op Vitals Reviewed: Yes      Staff: Anesthesiologist, CRNA         No notable events documented.     BP      Temp      Pulse     Resp      SpO2

## 2023-10-10 NOTE — H&P
History and Physical - [facility]  Chely Silva Anuj 48 y.o. female MRN: [id number]                  HPI: Haylee Bazzi is a 48y.o. year old female who presents for pancreatic lesion. REVIEW OF SYSTEMS: Per the HPI, and otherwise unremarkable.     Historical Information   Past Medical History:   Diagnosis Date    Abnormal stress test     Acute cystitis without hematuria 5/7/2018    Acute duodenal ulcer with bleeding 1/16/2023    Allergic sinusitis     last assessed - [id number]    Anxiety     last assessed - 67RDH8303    Arthritis     Asthma     last assessed - 88JNX6463    Bilateral lower extremity edema     last assessed - 77KWY6139    Callus of foot     last assessed - 82Gkk5366    Chest pain     Chronic GERD     last assessed - 76MNP7247    Colon polyp     Constipation     Resolved - 72BAE1355    COPD (chronic obstructive pulmonary disease) ([address])     Depression     last assessed - 07KVW1541    Disease of thyroid gland     Diverticulitis of colon     last assessed - 69CIS5041    Dyspepsia     Resolved - 17AKS1166    Edema, lower extremity 8/7/2020    Esophageal reflux     last assessed - 60HXQ0818    Essential hypertriglyceridemia     last assessed - 18Rxt7998    Fatty liver 1/16/2023    Gastroesophageal reflux disease with esophagitis 11/18/2016    GERD (gastroesophageal reflux disease)     Gynecological disorder     last assessed - 80XEV1082    Hydroureteronephrosis 6/30/2022    Hypertension     last assessed - 16EGZ8532    Hypokalemia 06/20/2022    Hypotension     last assessed - 00Ioc7834    Kidney stone     Left ureteral stone with hydronephrosis 7/21/2022    Migraine     Morbid obesity ([address]) 01/11/2019    Formatting of this note might be different from the original. Added automatically from request for surgery [id number]    Neuropathy     Other chest pain 11/8/2018    Pancreatic lesion 3/13/2023    Positive depression screening 06/19/2022    Primary hypertriglyceridemia 06/19/2022    Pulmonary emphysema ([address])     last assessed - 18GBU1447    Seasonal allergies     Severe obesity ([address]) 1/16/2023    Skin tag 7/17/2023    SOB (shortness of breath)     Strain of groin 7/17/2023    Urinary frequency     last assessed - 22Jun2016    Vagina, candidiasis     last assessed - 22Jun2016    Very heavy cigarette smoker 1/16/2023    Visual impairment      Past Surgical History:   Procedure Laterality Date    CARPAL TUNNEL RELEASE      last assessed - [id number]    CHOLECYSTECTOMY      last assessed - 95PBW2204    COLONOSCOPY      Complete colonoscopy     EYE SURGERY      last assessed - 33VBE0790    [facility] RETROGRADE PYELOGRAM  6/16/2022    FL RETROGRADE PYELOGRAM  8/16/2022    FOOT SURGERY      last assessed - 23APB0637    VA CYSTO BLADDER W/URETERAL CATHETERIZATION Left 7/21/2022    Procedure: CYSTOSCOPY RETROGRADE PYELOGRAM WITH INSERTION STENT URETERAL;  Surgeon: Adelfo Syed MD;  Location: [facility];  Service: Urology    VA CYSTO/URETERO W/LITHOTRIPSY &INDWELL STENT INSRT Left 6/16/2022    Procedure: CYSTOSCOPY URETEROSCOPY WITH LITHOTRIPSY HOLMIUM LASER, RETROGRADE PYELOGRAM AND INSERTION STENT URETERAL;  Surgeon: Rodrigo Britton MD;  Location: [facility];  Service: Urology    VA CYSTO/URETERO W/LITHOTRIPSY &INDWELL STENT INSRT Left 8/16/2022    Procedure: CYSTOSCOPY USCOPE W/HOLMIUM LASER, RETROGRADE PYELOGRAM&STENT;  Surgeon: David Desouza MD;  Location: [facility];  Service: Urology    VA ESOPHAGOGASTRODUODENOSCOPY TRANSORAL DIAGNOSTIC N/A 2/13/2017    Procedure: ESOPHAGOGASTRODUODENOSCOPY (EGD); Surgeon: Theresa Valle MD;  Location: [facility];   Service: Gastroenterology     Social History   Social History     Substance and Sexual Activity   Alcohol Use Not Currently     Social History     Substance and Sexual Activity   Drug Use Never     Social History     Tobacco Use   Smoking Status Some Days    Packs/day: 0.50    Years: 30.00    Total pack years: 15.00    Types: Cigarettes    Start date: 1/1/1983 Smokeless Tobacco Never     Family History   Problem Relation Age of Onset    Obesity Mother     Thyroid disease Mother     Cancer Mother     Obesity Sister     Coronary artery disease Sister     Stroke Sister     Asthma Sister     No Known Problems Maternal Aunt     Diabetes Maternal Uncle     Lung cancer Maternal Grandmother     Cancer Maternal Grandfather     Diabetes Maternal Grandfather     No Known Problems Paternal Grandmother     No Known Problems Paternal Grandfather     Hypertension Other     Lung cancer Other     Hypertension Other     Lung cancer Other     Lung cancer Other        Meds/Allergies       Current Outpatient Medications:     albuterol (2.5 mg/3 mL) 0.083 % nebulizer solution    albuterol (PROVENTIL HFA,VENTOLIN HFA) 90 mcg/act inhaler    buPROPion (WELLBUTRIN XL) 150 mg 24 hr tablet    diazepam (VALIUM) 5 mg tablet    ergocalciferol (VITAMIN D2) 50,000 units    famotidine (PEPCID) 40 MG tablet    ferrous sulfate 324 (65 Fe) mg    levothyroxine 112 mcg tablet    nicotine (NICODERM CQ) 21 mg/24 hr TD 24 hr patch    omeprazole (PriLOSEC) 40 MG capsule    oxyCODONE-acetaminophen (PERCOCET)  mg per tablet    sertraline (ZOLOFT) 100 mg tablet    simvastatin (ZOCOR) 20 mg tablet    sucralfate (CARAFATE) 1 g tablet    sucralfate (CARAFATE) 1 g/10 mL suspension    tiotropium (Spiriva HandiHaler) 18 mcg inhalation capsule    varenicline (CHANTIX) 0.5 mg tablet    fluticasone (FLONASE) 50 mcg/act nasal spray    gabapentin (NEURONTIN) 100 mg capsule    neomycin-polymyxin-dexamethasone (MAXITROL) ophthalmic suspension    semaglutide, 0.25 or 0.5 mg/dose, (Ozempic, 0.25 or 0.5 MG/DOSE,) 2 mg/3 mL injection pen    Allergies   Allergen Reactions    Hydrocodone-Acetaminophen Hives    Ketorolac Hives and Dizziness    Toradol [Ketorolac Tromethamine] Other (See Comments)     hypotension    Ultram [Tramadol] Nausea Only, GI Intolerance and Vomiting       Objective     /75   Pulse 77   Temp 98.1 °F (36.7 °C) (Tympanic)   Resp 18   LMP 04/05/2018 (Approximate)   SpO2 97%       PHYSICAL EXAM    Gen: NAD  Head: NCAT  CV: RRR  CHEST: Clear  ABD: soft, NT/ND  EXT: no edema      ASSESSMENT/PLAN:  This is a 48y.o. year old female here for EUS, and she is stable and optimized for her procedure.

## 2023-10-10 NOTE — ANESTHESIA PREPROCEDURE EVALUATION
Procedure:  ENDOSCOPIC ULTRASOUND (UPPER)    Relevant Problems   CARDIO   (+) Benign hypertension   (+) Hypercholesterolemia      ENDO   (+) Hypothyroidism      GI/HEPATIC   (+) Cirrhosis (HCC)   (+) Duodenal ulcer   (+) Gastroesophageal reflux disease      /RENAL   (+) Nephrolithiasis      HEMATOLOGY   (+) Iron deficiency anemia      MUSCULOSKELETAL   (+) Degeneration of lumbar or lumbosacral intervertebral disc      NEURO/PSYCH   (+) Anxiety   (+) Depression   (+) Intermittent claudication (HCC)      PULMONARY   (+) Centrilobular emphysema (HCC)      Denies anesthesia issues  Rare chest pain with exertion  GERD, mild to severe per pt  Some SOB  Limited exercise tolerance due to pain in legs and back   Physical Exam    Airway    Mallampati score: II  TM Distance: >3 FB  Neck ROM: full     Dental   lower dentures and upper dentures,     Cardiovascular  Cardiovascular exam normal    Pulmonary  Pulmonary exam normal     Other Findings        Anesthesia Plan  ASA Score- 3     Anesthesia Type- IV sedation with anesthesia with ASA Monitors. Additional Monitors:   Airway Plan:           Plan Factors-Exercise tolerance (METS): <4 METS. Chart reviewed. EKG reviewed. Patient is a current smoker. Induction- intravenous. Postoperative Plan-     Informed Consent- Anesthetic plan and risks discussed with patient. I personally reviewed this patient with the CRNA. Discussed and agreed on the Anesthesia Plan with the CRNA. Shamir Castano EKG 7/22  Normal sinus rhythm  Prolonged QT  Abnormal ECG  When compared with ECG of 19-JUN-2022 19:12,  No significant change was found  Confirmed by Silverio Zhu ([id number]) on 7/22/2022 8:30:31 AM    Echo 8/23  •  Left Ventricle: Left ventricular cavity size is normal. Wall thickness is mildly increased. The left ventricular ejection fraction is 60%. Systolic function is normal. Wall motion is normal. Diastolic function is normal.  •  Mitral Valve:  There is mild regurgitation. •  Tricuspid Valve: There is mild regurgitation. Stress test 3/23  •  Stress ECG: No ST deviation is noted. The ECG was not diagnostic due to pharmacological (vasodilator) stress. •  Perfusion: There are no perfusion defects. •  Stress Function: Left ventricular function post-stress is normal. Post-stress ejection fraction is 63 %. •  Stress Combined Conclusion: The ECG and SPECT imaging portions of the stress study are concordant with no evidence of stress induced myocardial ischemia.     Normal left ventricular systolic function. No evidence for prior myocardial infarction. No evidence for ischemia by perfusion imaging.       Results reviewed with patient.     Cr 0.79  Hgb 10.7  Plt 103

## 2023-10-11 PROCEDURE — 88313 SPECIAL STAINS GROUP 2: CPT | Performed by: STUDENT IN AN ORGANIZED HEALTH CARE EDUCATION/TRAINING PROGRAM

## 2023-10-11 PROCEDURE — 88305 TISSUE EXAM BY PATHOLOGIST: CPT | Performed by: STUDENT IN AN ORGANIZED HEALTH CARE EDUCATION/TRAINING PROGRAM

## 2023-10-11 NOTE — RESULT ENCOUNTER NOTE
Please inform patient MRI revealed enlarged liver with surface nodularity suggestive of cirrhosis in addition to fatty infiltration liver. No abnormality in the bile duct. The pancreas is okay. Spleen is enlarged likely related to underlying liver disease. There are enlarged lymph nodes in the upper abdomen. These lymph nodes are unchanged from March 2023 and in fact have been present since 2015 and have changed very little in size since 2015. Would recommend discussing this with hematology when she sees them in follow-up. Radiology feels that these lymph nodes are benign.

## 2023-10-12 ENCOUNTER — OFFICE VISIT (OUTPATIENT)
Dept: CARDIOLOGY CLINIC | Facility: CLINIC | Age: 53
End: 2023-10-12
Payer: MEDICARE

## 2023-10-12 VITALS
BODY MASS INDEX: 44 KG/M2 | HEART RATE: 89 BPM | TEMPERATURE: 97.6 F | WEIGHT: 273.8 LBS | DIASTOLIC BLOOD PRESSURE: 68 MMHG | SYSTOLIC BLOOD PRESSURE: 118 MMHG | OXYGEN SATURATION: 99 % | HEIGHT: 66 IN

## 2023-10-12 DIAGNOSIS — R94.31 QT PROLONGATION: ICD-10-CM

## 2023-10-12 DIAGNOSIS — Z72.0 CURRENT TOBACCO USE: ICD-10-CM

## 2023-10-12 DIAGNOSIS — R06.00 DYSPNEA, UNSPECIFIED TYPE: Primary | ICD-10-CM

## 2023-10-12 DIAGNOSIS — R00.2 INTERMITTENT PALPITATIONS: ICD-10-CM

## 2023-10-12 DIAGNOSIS — R06.83 SNORING: ICD-10-CM

## 2023-10-12 DIAGNOSIS — E66.01 CLASS 3 SEVERE OBESITY DUE TO EXCESS CALORIES WITH BODY MASS INDEX (BMI) OF 40.0 TO 44.9 IN ADULT, UNSPECIFIED WHETHER SERIOUS COMORBIDITY PRESENT (HCC): ICD-10-CM

## 2023-10-12 PROCEDURE — 99214 OFFICE O/P EST MOD 30 MIN: CPT | Performed by: INTERNAL MEDICINE

## 2023-10-12 NOTE — PROGRESS NOTES
Cardiology Follow Up    Davis Rushing  [id number]  1970  [facility] [address] [facility] [address] [phone]      1. Dyspnea, unspecified type        2. QT prolongation        3. Class 3 severe obesity due to excess calories with body mass index (BMI) of 40.0 to 44.9 in adult, unspecified whether serious comorbidity present ([address])        4. Snoring        5. Intermittent palpitations        6. Current tobacco use            Discussion/Summary:  1. Dyspnea-improved  2. Prolonged QT  3. Current tobacco use half pack per day  4. Pulmonary emphysema  5. Intermittent palpitations-improving  6. Snoring with concern for possible obstructive sleep apnea  7.   Obesity    -Nuclear stress test 3/23/2023 showing no diagnostic evidence of ischemia on perfusion imaging.  -Transthoracic echocardiogram 8/11/2023 showing left-ventricular systolic function normal estimated LVEF 60% with normal diastolic parameters, mild tricuspid regurgitation, mild mitral regurgitation and IVC normal size  -Holter monitor 8/11/2023 showing predominantly sinus rhythm with average heart rate 88 bpm with rare supraventricular and ventricular ectopic activity with no significant arrhythmias appreciated and accompanying patient diary symptoms correlating with sinus rhythm.  -As patient notes overall doing well she was counseled on dietary and lifestyle modifications including following a low-salt, low-fat, heart healthy diet with sodium restriction to less than 1800 mg of sodium daily along with fluid restriction less than 2000 mL of fluid daily and weight reduction with goal BMI less than 30  -Patient instructed to continue to speak with her physicians prescribing medications that can prolong QT so this can be monitored  -I will see patient in 6 months or sooner if necessary with goal weight loss by next office visit 20 jaret  -Patient also counseled on the importance of complete tobacco cessation and with follow-up and evaluation by pulmonology and sleep medicine which is currently scheduled and to continue to follow with hematology and gastroenterology as well.  -Patient counseled if she were to have any warning or alarm type symptoms she is to seek emergency medical care immediately. History of Present Illness:  -Patient is a 49-year-old female with cirrhosis, GERD, iron deficiency anemia, hypothyroidism, pulmonary emphysema with current tobacco use half pack per day who originally presented to the office in July 2023 after referral from gastroenterology for shortness of breath and chest discomfort. Patient had been having her symptoms for many years and was even seen and evaluated in [place] over 7 years ago for exact same symptomatology and work-up at that time she was told was relatively unrevealing.  -Currently in office today patient denies any chest pain, palpitations, lightness or dizziness, loss conscious, shortness of breath, lower extreme edema, orthopnea or bendopnea. She notes she has been trying to get more active and reduce her tobacco usage but has found losing weight difficult but does note there is room for improvement particularly in her diet. She has blood pressures at home are very well controlled and denies any significant symptoms at this time.     Patient Active Problem List   Diagnosis    Anxiety    Benign hypertension    Depression    Hypercholesterolemia    Hypothyroidism    Tobacco abuse disorder    Vitamin D deficiency    Gastroparesis    Elevated fasting glucose    COVID-19 vaccination refused    Centrilobular emphysema (HCC)    Nephrolithiasis    Degeneration of lumbar or lumbosacral intervertebral disc    Gastroesophageal reflux disease    Intermittent claudication (HCC)    Seasonal allergic rhinitis    Spinal stenosis of lumbar region    Iron deficiency anemia    Obesity (BMI 35.0-39.9 without comorbidity)    Abnormal stress ECG with treadmill    Cirrhosis ([address])    History of GI bleed    History of colon polyps    Duodenal ulcer    Irritable bowel syndrome with both constipation and diarrhea    Injury of toe on left foot     Past Medical History:   Diagnosis Date    Abnormal stress test     Acute cystitis without hematuria 5/7/2018    Acute duodenal ulcer with bleeding 1/16/2023    Allergic sinusitis     last assessed - [id number]    Anxiety     last assessed - 18JGE2636    Arthritis     Asthma     last assessed - 75JXC4834    Bilateral lower extremity edema     last assessed - 67BTA5775    Callus of foot     last assessed - 34Xkb2713    Chest pain     Chronic GERD     last assessed - 61VQU3642    Colon polyp     Constipation     Resolved - 53KBH5504    COPD (chronic obstructive pulmonary disease) ([address])     Depression     last assessed - 01SOB1055    Disease of thyroid gland     Diverticulitis of colon     last assessed - 75FPS3507    Dyspepsia     Resolved - 03RGS3572    Edema, lower extremity 8/7/2020    Esophageal reflux     last assessed - 05HZR6977    Essential hypertriglyceridemia     last assessed - 28KEL5081    Fatty liver 1/16/2023    Gastroesophageal reflux disease with esophagitis 11/18/2016    GERD (gastroesophageal reflux disease)     Gynecological disorder     last assessed - 79BTJ7359    Hydroureteronephrosis 6/30/2022    Hypertension     last assessed - 31HES2626    Hypokalemia 06/20/2022    Hypotension     last assessed - 43Oot5742    Kidney stone     Left ureteral stone with hydronephrosis 7/21/2022    Migraine     Morbid obesity ([address]) 01/11/2019    Formatting of this note might be different from the original. Added automatically from request for surgery [id number]    Neuropathy     Other chest pain 11/8/2018    Pancreatic lesion 3/13/2023    Positive depression screening 06/19/2022    Primary hypertriglyceridemia 06/19/2022    Pulmonary emphysema ([address])     last assessed - 99RTU7246    Seasonal allergies     Severe obesity ([address]) 1/16/2023    Skin tag 7/17/2023    SOB (shortness of breath)     Strain of groin 7/17/2023    Urinary frequency     last assessed - 22Jun2016    Vagina, candidiasis     last assessed - 22Jun2016    Very heavy cigarette smoker 1/16/2023    Visual impairment      Social History     Socioeconomic History    Marital status:      Spouse name: Not on file    Number of children: Not on file    Years of education: Not on file    Highest education level: Not on file   Occupational History    Not on file   Tobacco Use    Smoking status: Some Days     Packs/day: 0.50     Years: 30.00     Total pack years: 15.00     Types: Cigarettes     Start date: 1/1/1983    Smokeless tobacco: Never   Vaping Use    Vaping Use: Former    Start date: 1/1/2019    Quit date: 5/1/2019    Substances: Nicotine   Substance and Sexual Activity    Alcohol use: Not Currently    Drug use: Never    Sexual activity: Not Currently     Partners: Male     Birth control/protection: Abstinence   Other Topics Concern    Not on file   Social History Narrative    Not on file     Social Determinants of Health     Financial Resource Strain: Not on file   Food Insecurity: No Food Insecurity (7/22/2022)    Hunger Vital Sign     Worried About Running Out of Food in the Last Year: Never true     Ran Out of Food in the Last Year: Never true   Transportation Needs: No Transportation Needs (7/22/2022)    PRAPARE - Transportation     Lack of Transportation (Medical): No     Lack of Transportation (Non-Medical):  No   Physical Activity: Not on file   Stress: Not on file   Social Connections: Not on file   Intimate Partner Violence: Not on file   Housing Stability: Low Risk  (7/22/2022)    Housing Stability Vital Sign     Unable to Pay for Housing in the Last Year: No     Number of Places Lived in the Last Year: 1     Unstable Housing in the Last Year: No      Family History   Problem Relation Age of Onset Obesity Mother     Thyroid disease Mother     Cancer Mother     Obesity Sister     Coronary artery disease Sister     Stroke Sister     Asthma Sister     No Known Problems Maternal Aunt     Diabetes Maternal Uncle     Lung cancer Maternal Grandmother     Cancer Maternal Grandfather     Diabetes Maternal Grandfather     No Known Problems Paternal Grandmother     No Known Problems Paternal Grandfather     Hypertension Other     Lung cancer Other     Hypertension Other     Lung cancer Other     Lung cancer Other      Past Surgical History:   Procedure Laterality Date    CARPAL TUNNEL RELEASE      last assessed - [id number]    CHOLECYSTECTOMY      last assessed - 40QOA9061    COLONOSCOPY      Complete colonoscopy     EYE SURGERY      last assessed - [id number]    [facility] RETROGRADE PYELOGRAM  6/16/2022    [place] RETROGRADE PYELOGRAM  8/16/2022    FOOT SURGERY      last assessed - 32WXH2852    [place] CYSTO BLADDER W/URETERAL CATHETERIZATION Left 7/21/2022    Procedure: CYSTOSCOPY RETROGRADE PYELOGRAM WITH INSERTION STENT URETERAL;  Surgeon: Gopi Vaz MD;  Location: [facility];  Service: Urology    NY CYSTO/URETERO W/LITHOTRIPSY &INDWELL STENT INSRT Left 6/16/2022    Procedure: CYSTOSCOPY URETEROSCOPY WITH LITHOTRIPSY HOLMIUM LASER, RETROGRADE PYELOGRAM AND INSERTION STENT URETERAL;  Surgeon: Marck Romano MD;  Location: [facility];  Service: Urology    NY CYSTO/URETERO W/LITHOTRIPSY &INDWELL STENT INSRT Left 8/16/2022    Procedure: CYSTOSCOPY USCOPE W/HOLMIUM LASER, RETROGRADE PYELOGRAM&STENT;  Surgeon: Natasha Power MD;  Location: [facility];  Service: Urology    [place] ESOPHAGOGASTRODUODENOSCOPY TRANSORAL DIAGNOSTIC N/A 2/13/2017    Procedure: ESOPHAGOGASTRODUODENOSCOPY (EGD); Surgeon: Adelina Chaudhari MD;  Location: [facility];   Service: Gastroenterology       Current Outpatient Medications:     albuterol (2.5 mg/3 mL) 0.083 % nebulizer solution, Take 3 mL (2.5 mg total) by nebulization every 6 (six) hours as needed for wheezing or shortness of breath, Disp: 30 mL, Rfl: 0    albuterol (PROVENTIL HFA,VENTOLIN HFA) 90 mcg/act inhaler, Inhale 2 puffs every 6 (six) hours as needed for wheezing, Disp: 18 g, Rfl: 1    buPROPion (WELLBUTRIN XL) 150 mg 24 hr tablet, Take 1 tablet (150 mg total) by mouth every morning, Disp: 90 tablet, Rfl: 1    diazepam (VALIUM) 5 mg tablet, Take 1 tablet (5 mg total) by mouth every 6 (six) hours as needed for anxiety, Disp: 30 tablet, Rfl: 0    ergocalciferol (VITAMIN D2) 50,000 units, Take 1 capsule (50,000 Units total) by mouth every 14 (fourteen) days, Disp: 12 capsule, Rfl: 1    famotidine (PEPCID) 40 MG tablet, Take 1 tablet (40 mg total) by mouth daily at bedtime Take in evening 2 hours prior to bed, Disp: 90 tablet, Rfl: 3    ferrous sulfate 324 (65 Fe) mg, Take 1 tablet (324 mg total) by mouth daily before breakfast, Disp: 30 tablet, Rfl: 1    fluticasone (FLONASE) 50 mcg/act nasal spray, 1 spray into each nostril 2 (two) times a day (Patient taking differently: 1 spray into each nostril as needed), Disp: 16 g, Rfl: 5    gabapentin (NEURONTIN) 100 mg capsule, Take 100 mg by mouth 2 (two) times a day, Disp: , Rfl: 0    levothyroxine 112 mcg tablet, Take 1 tablet (112 mcg total) by mouth daily, Disp: 90 tablet, Rfl: 1    neomycin-polymyxin-dexamethasone (MAXITROL) ophthalmic suspension, instill 1 drop INTO AFFECTED EYE 4 TIMES A DAY AS DIRECTED, Disp: , Rfl:     nicotine (NICODERM CQ) 21 mg/24 hr TD 24 hr patch, Place 1 patch on the skin over 24 hours every 24 hours, Disp: 28 patch, Rfl: 5    omeprazole (PriLOSEC) 40 MG capsule, take 1 capsule by mouth twice a day, Disp: 60 capsule, Rfl: 5    oxyCODONE-acetaminophen (PERCOCET)  mg per tablet, Take 1 tablet by mouth every 6 (six) hours as needed, Disp: , Rfl:     semaglutide, 0.25 or 0.5 mg/dose, (Ozempic, 0.25 or 0.5 MG/DOSE,) 2 mg/3 mL injection pen, Inject 0.75 mL (0.5 mg total) under the skin every 7 days, Disp: 9 mL, Rfl: 1    sertraline (ZOLOFT) 100 mg tablet, Take 1 tablet (100 mg total) by mouth 2 (two) times a day, Disp: 180 tablet, Rfl: 1    simvastatin (ZOCOR) 20 mg tablet, Take 1 tablet (20 mg total) by mouth daily at bedtime, Disp: 90 tablet, Rfl: 1    sucralfate (CARAFATE) 1 g tablet, , Disp: , Rfl:     sucralfate (CARAFATE) 1 g/10 mL suspension, Take 10 mL (1 g total) by mouth 3 (three) times a day before meals, Disp: 2700 mL, Rfl: 1    tiotropium (Spiriva HandiHaler) 18 mcg inhalation capsule, Place 1 capsule (18 mcg total) into inhaler and inhale daily, Disp: 30 capsule, Rfl: 5    varenicline (CHANTIX) 0.5 mg tablet, Take 1 tablet (0.5 mg total) by mouth 2 (two) times a day, Disp: 60 tablet, Rfl: 0  Allergies   Allergen Reactions    Hydrocodone-Acetaminophen Hives    Ketorolac Hives and Dizziness    Toradol [Ketorolac Tromethamine] Other (See Comments)     hypotension    Ultram [Tramadol] Nausea Only, GI Intolerance and Vomiting         Labs:  Hospital Outpatient Visit on 10/10/2023   Component Date Value    POC Glucose 10/10/2023 144 (H)     Case Report 10/10/2023                      Value:Surgical Pathology Report                         Case: [id number]                                   Authorizing Provider:  Jazmine Tidwell DO      Collected:           10/10/2023 1255              Ordering Location:     [facility]      Received:            10/10/2023 [address] [facility]                                                           Pathologist:           Umu Zapien DO                                                           Specimen:    Esophagus, esophageal nodule bx                                                            Final Diagnosis 10/10/2023                      Value: This result contains rich text formatting which cannot be displayed here. Note 10/10/2023                      Value: This result contains rich text formatting which cannot be displayed here. Additional Information 10/10/2023                      Value: This result contains rich text formatting which cannot be displayed here. Gross Description 10/10/2023                      Value: This result contains rich text formatting which cannot be displayed here. Clinical Information 10/10/2023                      Value:· Polypoid area seen in the midesophagus. Biopsies were done. · Otherwise normal esophagus.    Appointment on 09/14/2023   Component Date Value    Hemoglobin A1C 09/14/2023 6.0 (H)     EAG 09/14/2023 126     Sodium 09/14/2023 141     Potassium 09/14/2023 4.1     Chloride 09/14/2023 107     CO2 09/14/2023 24     ANION GAP 09/14/2023 10     BUN 09/14/2023 14     Creatinine 09/14/2023 0.79     Glucose, Fasting 09/14/2023 143 (H)     Calcium 09/14/2023 9.3     eGFR 09/14/2023 85     TSH 3RD GENERATON 09/14/2023 2.516     Free T4 09/14/2023 0.86     Iron Saturation 09/14/2023 17     TIBC 09/14/2023 396     Iron 09/14/2023 69     UIBC 09/14/2023 327     Ferritin 09/14/2023 16    Admission on 09/08/2023, Discharged on 09/08/2023   Component Date Value    WBC 09/08/2023 7.13     RBC 09/08/2023 3.92     Hemoglobin 09/08/2023 10.7 (L)     Hematocrit 09/08/2023 36.4     MCV 09/08/2023 93     MCH 09/08/2023 27.3     MCHC 09/08/2023 29.4 (L)     RDW 09/08/2023 17.2 (H)     MPV 09/08/2023 11.0     Platelets 67/99/3458 103 (L)     nRBC 09/08/2023 0     Neutrophils Relative 09/08/2023 62     Immat GRANS % 09/08/2023 0     Lymphocytes Relative 09/08/2023 24     Monocytes Relative 09/08/2023 11     Eosinophils Relative 09/08/2023 2     Basophils Relative 09/08/2023 1     Neutrophils Absolute 09/08/2023 4.48     Immature Grans Absolute 09/08/2023 0.01     Lymphocytes Absolute 09/08/2023 1.73     Monocytes Absolute 09/08/2023 0.75     Eosinophils Absolute 09/08/2023 0.12     Basophils Absolute 09/08/2023 0.04     Sodium 09/08/2023 142     Potassium 09/08/2023 3.3 (L)     Chloride 09/08/2023 109 (H)     CO2 09/08/2023 25     ANION GAP 09/08/2023 8     BUN 09/08/2023 16     Creatinine 09/08/2023 0.91     Glucose 09/08/2023 147 (H)     Calcium 09/08/2023 9.2     AST 09/08/2023 30     ALT 09/08/2023 18     Alkaline Phosphatase 09/08/2023 77     Total Protein 09/08/2023 6.2 (L)     Albumin 09/08/2023 3.7     Total Bilirubin 09/08/2023 0.90     eGFR 09/08/2023 72     Protime 09/08/2023 15.2 (H)     INR 09/08/2023 1.19     PTT 09/08/2023 42 (H)     Color, UA 09/08/2023 Yellow     Clarity, UA 09/08/2023 Hazy     Specific Gravity, UA 09/08/2023 >=1.030 (H)     pH, UA 09/08/2023 6.0     Leukocytes, UA 09/08/2023 Negative     Nitrite, UA 09/08/2023 Positive (A)     Protein, UA 09/08/2023 Negative     Glucose, UA 09/08/2023 Negative     Ketones, UA 09/08/2023 Negative     Urobilinogen, UA 09/08/2023 1.0     Bilirubin, UA 09/08/2023 Negative     Occult Blood, UA 09/08/2023 Negative     RBC, UA 09/08/2023 0-1     WBC, UA 09/08/2023 2-4     Epithelial Cells 09/08/2023 Occasional     Bacteria, UA 09/08/2023 Moderate (A)    Appointment on 08/14/2023   Component Date Value    Miscellaneous Lab Test R* 08/14/2023          Imaging: MRI abdomen w wo contrast and mrcp    Result Date: 10/10/2023  Narrative: MRI OF THE ABDOMEN WITH AND WITHOUT CONTRAST WITH MRCP INDICATION: K74.60: Unspecified cirrhosis of liver. K86.9: Disease of pancreas, unspecified. Follow-up of cirrhosis and adenopathy in the region of the pancreas. COMPARISON: MRI of the abdomen from 3/22/2023 and CT of the abdomen and pelvis from 9/8/2023, 5/5/2018, and 12/26/2015. TECHNIQUE:  Multiplanar/multisequence MRI of the abdomen with 3D MRCP was performed before and after administration of contrast. IV Contrast:  12 mL of Gadobutrol injection (SINGLE-DOSE) FINDINGS: LOWER CHEST: Unremarkable. LIVER: The liver is enlarged, measuring 23.7 cm in greatest craniocaudal dimension. There is surface nodularity suggestive of underlying cirrhosis. Mild diffuse hepatic steatosis.  No suspicious mass. The hepatic veins and portal veins are patent. BILE DUCTS:  No intrahepatic biliary ductal dilation. The common bile duct measures 6 mm in diameter, at upper limits of normal. No choledocholithiasis or biliary stricture. GALLBLADDER: The patient is status post cholecystectomy. PANCREAS:  Unremarkable appearance of the pancreas. No dilation of the main pancreatic duct. ADRENAL GLANDS:  Within normal limits. SPLEEN: The spleen is enlarged, measuring 15.9 cm in greatest craniocaudad dimension. This is similar to more recent studies, however increased from older studies. There are no focal splenic lesions. KIDNEYS/PROXIMAL URETERS:  No hydroureteronephrosis. No suspicious renal mass. There is a small simple cyst and a subcentimeter hemorrhagic or proteinaceous cyst in the left kidney. BOWEL: No dilated loops of bowel. PERITONEUM/RETROPERITONEUM: No ascites. LYMPH NODES: There are 4 enlarged upper abdominal lymph nodes, similar to the March 2023 MRI (when remeasured in a similar manner). They have been present since at least 2015, with changes in measurement as described below. The short axis measurements are as follows: - 1.3 cm lymph node lymph node adjacent to the neck of the pancreas (series 8 image 18), compared to 1.3 cm in December 2015. -1.1 cm periportal lymph node (series 8 image 18), compared to 1.0 cm in December 2015. - 1.4 cm portacaval lymph node (series 8 image 22), compared to 1.2 cm in December 2015. - 0.9 cm portacaval lymph node just medially (series 8 image 21), compared to 0.7 cm in 2015. There is no other abdominal lymphadenopathy. VASCULAR STRUCTURES: No aneurysm. Patent major branch vessels. ABDOMINAL WALL: Unremarkable. OSSEOUS STRUCTURES: No suspicious osseous lesion. Impression: 1) Hepatomegaly with surface nodularity suggestive of cirrhosis. Mild diffuse hepatic steatosis. No hepatic lesions. 2) Splenomegaly, which may indicate portal hypertension.  3) Four enlarged upper abdominal lymph nodes (2 portacaval, 1 periportal, and 1 peripancreatic), as described above, stable compared to recent studies, and stable to slightly enlarged since 2015. These are most likely reactive. No new abdominal lymphadenopathy. 4) No focal pancreatic lesions. Normal caliber main pancreatic duct. Workstation performed: [id number]     Endoscopic ultrasonography, GI (Upper) Linear    Result Date: 10/10/2023  Narrative: Table formatting from the original result was not included. [address] [place] [address] Endoscopy [address] [place] [id number] [phone] DATE OF SERVICE: 10/10/23 PHYSICIAN(S): Attending: Debbie Silverio MD Fellow: Rian Mayo DO INDICATION: Duodenal ulcer, Abdominal lymphadenopathy POST-OP DIAGNOSIS: See the impression below. PREPROCEDURE: Informed consent was obtained for the procedure, including sedation. Risks of perforation, hemorrhage, adverse drug reaction and aspiration were discussed. The patient was placed in the left lateral decubitus position. Patient was explained about the risks and benefits of the procedure. Risks including but not limited to bleeding, infection, and perforation were explained in detail. Also explained about less than 100% sensitivity with the exam and other alternatives. PROCEDURE: EUS UPPER DETAILS OF PROCEDURE: Patient was taken to the procedure room where a time out was performed to confirm correct patient and correct procedure. The patient underwent monitored anesthesia care, which was administered by an anesthesia professional. The patient's blood pressure, heart rate, oxygen, respirations, level of consciousness and ECG were monitored throughout the procedure. The endoscope and linear scope were used. The patient experienced no blood loss. The procedure was not difficult. The patient tolerated the procedure well. There were no apparent adverse events.  ANESTHESIA INFORMATION: ASA: III Anesthesia Type: IV Sedation with Anesthesia MEDICATIONS: simethicone (MYLICON) 40 mg in sterile water 500 mL 40 mg (Totals for administrations occurring from 1210 to 1303 on 10/10/23) FINDINGS: Polypoid area seen in the midesophagus. Biopsies were done. Otherwise normal esophagus. Diffuse gastric antral vascular ectasia in the antrum; induced coagulation with with argon plasma coagulation Mild, diffuse and mosaic portal hypertensive gastropathy in the body of the stomach No evidence of esophageal or gastric varices. The duodenum appeared normal. The bile duct appeared normal. The cystic duct was seen. A hyperechoic area with shadowing was seen within the cystic duct. This could represent a small stone or a clip. The pancreatic parenchyma was visualized and appeared normal. . The parenchyma had hyperechoic foci. The pancreatic duct appeared normal. The parenchyma was visualized. The parenchyma was heterogeneous and had hyperechoic foci and shadowing. The celiac axis was normal and no lymphadenopathy was seen. Three triangular and hyperechoic periportal nodes with well-defined margins, measuring 11 x 7 mm. The largest lymph node in this area measured 11 x 7 mm. These were hyperechoic and triangular and did not appear to be malignant. SPECIMENS: ID Type Source Tests Collected by Time Destination 1 : esophageal nodule bx Tissue Esophagus TISSUE EXAM Phillip Hernandez DO 10/10/2023 12:55 PM       Impression: Diffuse gastric antral vascular ectasia in the antrum; induced coagulation with argon plasma coagulation Portal hypertensive gastropathy in the body of the stomach Healed duodenal ulcer. Three hyperechoic periportal nodes with well-defined margins (largest measuring 11 x 7 mm) but together likely about 2cm. These appeared benign and not sampled. Nodular liver. RECOMMENDATION:  Await pathology results Continue omeprazole. Follow up with Dr. Ysabel Caceres (GI) and Dr. Vitor Lin (hepatology).   Carlee Mesa MD       Review of Systems:  Review of Systems Constitutional:  Negative for chills, diaphoresis, fatigue and fever. HENT:  Negative for trouble swallowing and voice change. Eyes:  Negative for pain and redness. Respiratory:  Negative for shortness of breath and wheezing. Cardiovascular:  Negative for chest pain, palpitations and leg swelling. Gastrointestinal:  Negative for abdominal pain, blood in stool, constipation, diarrhea, nausea and vomiting. Genitourinary:  Negative for dysuria. Musculoskeletal:  Positive for arthralgias. Negative for neck pain and neck stiffness. Skin:  Negative for rash. Neurological:  Negative for dizziness, syncope, light-headedness and headaches. Psychiatric/Behavioral:  Negative for agitation and confusion. All other systems reviewed and are negative. Vitals:    10/12/23 1040   BP: 118/68   BP Location: Left arm   Patient Position: Sitting   Cuff Size: Standard   Pulse: 89   Temp: 97.6 °F (36.4 °C)   TempSrc: Temporal   SpO2: 99%   Weight: 124 kg (273 lb 12.8 oz)   Height: 5' 6" (1.676 m)     Vitals:    10/12/23 1040   Weight: 124 kg (273 lb 12.8 oz)     Height: 5' 6" (167.6 cm)     Physical Exam:  Physical Exam  Vitals reviewed. Constitutional:       General: She is not in acute distress. Appearance: She is obese. She is not diaphoretic. HENT:      Head: Normocephalic and atraumatic. Eyes:      General:         Right eye: No discharge. Left eye: No discharge. Neck:      Comments: Trachea midline, neck obese, difficult to assess JVD  Cardiovascular:      Rate and Rhythm: Normal rate and regular rhythm. Heart sounds: No friction rub. Pulmonary:      Effort: Pulmonary effort is normal. No respiratory distress. Breath sounds: No wheezing. Chest:      Chest wall: No tenderness. Abdominal:      General: Bowel sounds are normal.      Palpations: Abdomen is soft. Tenderness: There is no abdominal tenderness. There is no rebound.    Musculoskeletal:      Right lower leg: No edema. Left lower leg: No edema. Skin:     General: Skin is warm and dry. Neurological:      Mental Status: She is alert. Comments: Awake, alert, able to answer questions appropriately, able to move extremities bilaterally.    Psychiatric:         Mood and Affect: Mood normal.         Behavior: Behavior normal.

## 2023-10-13 ENCOUNTER — HOSPITAL ENCOUNTER (OUTPATIENT)
Dept: CARDIOLOGY | Facility: HOSPITAL | Age: 53
Discharge: HOME | End: 2023-10-13
Payer: COMMERCIAL

## 2023-10-13 DIAGNOSIS — R07.9 CHEST PAIN, UNSPECIFIED TYPE: ICD-10-CM

## 2023-10-13 DIAGNOSIS — Z72.0 TOBACCO ABUSE DISORDER: ICD-10-CM

## 2023-10-13 DIAGNOSIS — F41.9 ANXIETY: ICD-10-CM

## 2023-10-13 PROCEDURE — 93017 CV STRESS TEST TRACING ONLY: CPT

## 2023-10-13 RX ORDER — DIAZEPAM 5 MG/1
5 TABLET ORAL EVERY 6 HOURS PRN
Qty: 30 TABLET | Refills: 0 | Status: SHIPPED | OUTPATIENT
Start: 2023-10-13

## 2023-10-13 RX ORDER — VARENICLINE TARTRATE 0.5 MG/1
0.5 TABLET, FILM COATED ORAL 2 TIMES DAILY
Qty: 60 TABLET | Refills: 0 | Status: SHIPPED | OUTPATIENT
Start: 2023-10-13

## 2023-10-14 LAB
BH CV STRESS BP STAGE 1: NORMAL
BH CV STRESS BP STAGE 2: NORMAL
BH CV STRESS DURATION MIN STAGE 1: 3
BH CV STRESS DURATION MIN STAGE 2: 3
BH CV STRESS DURATION MIN STAGE 3: 2
BH CV STRESS DURATION SEC STAGE 1: 0
BH CV STRESS DURATION SEC STAGE 2: 0
BH CV STRESS DURATION SEC STAGE 3: 0
BH CV STRESS GRADE STAGE 1: 10
BH CV STRESS GRADE STAGE 2: 12
BH CV STRESS GRADE STAGE 3: 14
BH CV STRESS HR STAGE 1: 114
BH CV STRESS HR STAGE 2: 146
BH CV STRESS HR STAGE 3: 160
BH CV STRESS METS STAGE 1: 5
BH CV STRESS METS STAGE 2: 7.5
BH CV STRESS METS STAGE 3: 10
BH CV STRESS PROTOCOL 1: NORMAL
BH CV STRESS RECOVERY BP: NORMAL MMHG
BH CV STRESS RECOVERY HR: 86 BPM
BH CV STRESS SPEED STAGE 1: 1.7
BH CV STRESS SPEED STAGE 2: 2.5
BH CV STRESS SPEED STAGE 3: 3.4
BH CV STRESS STAGE 1: 1
BH CV STRESS STAGE 2: 2
BH CV STRESS STAGE 3: 3
MAXIMAL PREDICTED HEART RATE: 167 BPM
PERCENT MAX PREDICTED HR: 95.81 %
STRESS BASELINE BP: NORMAL MMHG
STRESS BASELINE HR: 77 BPM
STRESS PERCENT HR: 113 %
STRESS POST ESTIMATED WORKLOAD: 10.1 METS
STRESS POST EXERCISE DUR MIN: 8 MIN
STRESS POST PEAK BP: NORMAL MMHG
STRESS POST PEAK HR: 160 BPM
STRESS TARGET HR: 142 BPM

## 2023-10-17 DIAGNOSIS — K21.00 GASTROESOPHAGEAL REFLUX DISEASE WITH ESOPHAGITIS, UNSPECIFIED WHETHER HEMORRHAGE: ICD-10-CM

## 2023-10-17 RX ORDER — SUCRALFATE ORAL 1 G/10ML
1 SUSPENSION ORAL
Qty: 2700 ML | Refills: 1 | Status: SHIPPED | OUTPATIENT
Start: 2023-10-17 | End: 2024-01-15

## 2023-10-24 ENCOUNTER — HOSPITAL ENCOUNTER (OUTPATIENT)
Dept: MAMMOGRAPHY | Facility: HOSPITAL | Age: 53
Discharge: HOME | End: 2023-10-24
Payer: MEDICARE

## 2023-10-24 ENCOUNTER — HOSPITAL ENCOUNTER (OUTPATIENT)
Dept: ULTRASOUND IMAGING | Facility: HOSPITAL | Age: 53
Discharge: HOME | End: 2023-10-24
Payer: MEDICARE

## 2023-10-24 DIAGNOSIS — R92.8 ABNORMAL SCREENING MAMMOGRAM: ICD-10-CM

## 2023-10-24 PROCEDURE — G0279 TOMOSYNTHESIS, MAMMO: HCPCS

## 2023-10-24 PROCEDURE — 76642 ULTRASOUND BREAST LIMITED: CPT

## 2023-10-24 PROCEDURE — 77065 DX MAMMO INCL CAD UNI: CPT

## 2023-10-26 ENCOUNTER — TELEPHONE (OUTPATIENT)
Dept: INTERNAL MEDICINE CLINIC | Age: 53
End: 2023-10-26

## 2023-10-26 DIAGNOSIS — E66.9 OBESITY (BMI 35.0-39.9 WITHOUT COMORBIDITY): Primary | ICD-10-CM

## 2023-10-26 NOTE — TELEPHONE ENCOUNTER
Patient is calling back to find out what went on with the [facility]. I seen in the message that was sent back in 10/4/2023 stating that they will not approved the Ozempic. They stated to either switch to [facility] or [facility].     Please advise and let her know what we switched it to    [facility] in [facility]

## 2023-10-27 NOTE — TELEPHONE ENCOUNTER
LEFT MESSAGE ON MACHINE to let patient know the new medication was sent over for her to [facility]

## 2023-10-30 NOTE — TELEPHONE ENCOUNTER
Received prior authorization for medication:      Semaglutide-Weight Management (WEGOVY) 0.25 MG/0.5ML     Scanning into media and sending to [facility]

## 2023-11-01 ENCOUNTER — DOCUMENTATION (OUTPATIENT)
Dept: INTERNAL MEDICINE CLINIC | Facility: OTHER | Age: 53
End: 2023-11-01

## 2023-11-01 ENCOUNTER — TELEPHONE (OUTPATIENT)
Dept: INTERNAL MEDICINE CLINIC | Facility: OTHER | Age: 53
End: 2023-11-01

## 2023-11-01 NOTE — TELEPHONE ENCOUNTER
Patient called back about a voicemail that was left on her phone.     spoke to patient about her wegovy prescription,

## 2023-11-01 NOTE — TELEPHONE ENCOUNTER
Ann-Marie Almeida has been approved from October 31 st 2023 to April 30 th 2024. Called notified pharmacy medication is on back order will call patient when they get medication in also left message on machine for patient. Called pharmacy and clarified issue and rx's ready to be picked up

## 2023-11-02 ENCOUNTER — TELEPHONE (OUTPATIENT)
Age: 53
End: 2023-11-02

## 2023-11-02 ENCOUNTER — CONSULT (OUTPATIENT)
Dept: HEMATOLOGY ONCOLOGY | Facility: CLINIC | Age: 53
End: 2023-11-02
Payer: MEDICARE

## 2023-11-02 VITALS
TEMPERATURE: 98.7 F | HEART RATE: 94 BPM | RESPIRATION RATE: 18 BRPM | BODY MASS INDEX: 44.52 KG/M2 | WEIGHT: 277 LBS | SYSTOLIC BLOOD PRESSURE: 130 MMHG | DIASTOLIC BLOOD PRESSURE: 70 MMHG | OXYGEN SATURATION: 99 % | HEIGHT: 66 IN

## 2023-11-02 DIAGNOSIS — D69.6 THROMBOCYTOPENIA (HCC): ICD-10-CM

## 2023-11-02 DIAGNOSIS — R97.8 OTHER ABNORMAL TUMOR MARKERS: ICD-10-CM

## 2023-11-02 DIAGNOSIS — R59.0 LYMPHADENOPATHY, ABDOMINAL: ICD-10-CM

## 2023-11-02 DIAGNOSIS — R16.2 HEPATOSPLENOMEGALY: ICD-10-CM

## 2023-11-02 DIAGNOSIS — K74.60 HEPATIC CIRRHOSIS, UNSPECIFIED HEPATIC CIRRHOSIS TYPE, UNSPECIFIED WHETHER ASCITES PRESENT (HCC): ICD-10-CM

## 2023-11-02 DIAGNOSIS — R29.898 BILATERAL LEG WEAKNESS: ICD-10-CM

## 2023-11-02 DIAGNOSIS — K21.9 GASTROESOPHAGEAL REFLUX DISEASE WITHOUT ESOPHAGITIS: ICD-10-CM

## 2023-11-02 DIAGNOSIS — D64.9 NORMOCYTIC ANEMIA: Primary | ICD-10-CM

## 2023-11-02 DIAGNOSIS — R26.89 BALANCE PROBLEM: ICD-10-CM

## 2023-11-02 DIAGNOSIS — D50.8 OTHER IRON DEFICIENCY ANEMIA: ICD-10-CM

## 2023-11-02 PROCEDURE — 99245 OFF/OP CONSLTJ NEW/EST HI 55: CPT | Performed by: NURSE PRACTITIONER

## 2023-11-02 NOTE — TELEPHONE ENCOUNTER
Pt calling stating she is returning a call back from our ph number. Per pt no one left a message. I do not see any notes and was not able to inform pt why someone called her. I did see she has an appt w/ [facility] today so I informed her that they might be calling her to confirm her appt. Per pt she will go on line to confirm.

## 2023-11-02 NOTE — PROGRESS NOTES
Hematology/Oncology Outpatient Consultation  Gwen Varma 48 y.o. female 1970 [id number]    Date:  11/2/2023      Assessment and Plan:  1. Normocytic anemia  Patient noted to have normocytic anemia hemoglobin 10.7 with iron deficiency ferritin 16. She states that she started to become anemic late last year and actually required blood transfusion December 2022 when she was admitted to the hospital in [place]. Suspect that the etiology of her iron deficiency anemia may be due to GI bleeding was found to have diffuse gastric antral vascular dysphagia on recent endoscopy which was treated with APC. Malabsorption from chronic PPI use may be contributory as well. She has been taking oral iron but does not seem to be responding well to this. Was offered a course of IV iron Venofer 200 mg weekly x4 sessions total which will hopefully improve her iron stores and hemoglobin. She was educated about the iron product, administration and possible side effects including but not limited to: Anaphylaxis/allergic reaction, headaches, nausea, arthralgias/myalgias and phlebitis. Agrees to proceed. Patient states that she is planning an extended trip to [place] in the next day or so and is requesting to start the iron in about 4 weeks/end of the month when she returns. She will continue her oral iron for now until she starts the IV replacement at which point it can be discontinued. We will schedule follow-up appointment in about 3 months from now with repeat labs or sooner should the need arise. We will also pursue additional anemia work-up to rule out other etiologies of her normocytic anemia. Additional laboratory were work-up to rule out other etiologies of her thrombocytopenia, splenomegaly, lymphadenopathy will be pursued as well. - CBC and differential; Future  - Comprehensive metabolic panel; Future  - C-reactive protein; Future  - Sedimentation rate, automated;  Future  - IgG, IgA, IgM; Future  - Protein electrophoresis, serum; Future  - Immunoglobulin free LT chains blood; Future  - Iron Panel (Includes Ferritin, Iron Sat%, Iron, and TIBC); Future  - Ferritin; Future  - Folate; Future  - Vitamin B12; Future  - LD,Blood; Future  - Erythropoietin; Future  - Direct antiglobulin test; Future  - Haptoglobin; Future    2. Other iron deficiency anemia  - CBC and differential; Future  - Comprehensive metabolic panel; Future  - C-reactive protein; Future  - Sedimentation rate, automated; Future  - Iron Panel (Includes Ferritin, Iron Sat%, Iron, and TIBC); Future  - Ferritin; Future    3. Lymphadenopathy, abdominal  Patient noted on imaging to have 4 enlarged upper abdominal lymph nodes largest measuring 1.4 cm. Have slightly enlarged in comparison to imaging in 2015 stable in comparison to imaging from March 2023. Denies any fevers, night sweats, appetite change or weight loss. She does report ongoing significant fatigue which could be related to iron deficiency or her other comorbid conditions. New leg weakness of unclear etiology. Given stability suspect these are likely benign. Alpha-fetoprotein tumor marker has been normal.    For completeness sake we could attempt to pursue PET imaging to ensure the spleen and lymphadenopathy is not hypermetabolic the active/rule out malignant process if insurance will allow. Otherwise will likely continue close surveillance. - CBC and differential; Future  - Comprehensive metabolic panel; Future  - C-reactive protein; Future  - Sedimentation rate, automated; Future  - IgG, IgA, IgM; Future  - Protein electrophoresis, serum; Future  - Immunoglobulin free LT chains blood; Future  - Iron Panel (Includes Ferritin, Iron Sat%, Iron, and TIBC); Future  - Ferritin; Future  - Folate; Future  - Vitamin B12; Future  - LD,Blood; Future  - Erythropoietin; Future  - Direct antiglobulin test; Future  - Haptoglobin; Future  - RF Screen w/ Reflex to Titer;  Future  - Peripheral Smear; Future  - Leukemia/Lymphoma flow cytometry; Future  - HIV 1/2 AG/AB w Reflex [facility] for 2 yr old and above; Future  - AFP tumor marker; Future  - Cancer antigen 19-9; Future  - CEA; Future  - NM PET CT skull base to mid thigh; Future    4. Thrombocytopenia ([address])  Likely due to liver cirrhosis/splenomegaly. Will monitor. Additional work-up to rule out other etiologies ordered. - CBC and differential; Future  - Comprehensive metabolic panel; Future  - C-reactive protein; Future  - Sedimentation rate, automated; Future  - IgG, IgA, IgM; Future  - Protein electrophoresis, serum; Future  - Immunoglobulin free LT chains blood; Future  - Vitamin B12; Future  - LD,Blood; Future  - Haptoglobin; Future  - RF Screen w/ Reflex to Titer; Future  - Peripheral Smear; Future  - Leukemia/Lymphoma flow cytometry; Future  - HIV 1/2 AG/AB w Reflex [facility] for 2 yr old and above; Future    5. Hepatic cirrhosis, unspecified hepatic cirrhosis type, unspecified whether ascites present (HCC)  - AFP tumor marker; Future    6. Bilateral leg weakness  Patient states for the past month or 2 she has been noticing significant shakiness of her legs when she stands or ambulates. Does not have this issue when she is sitting/resting. Admits that her balance has been off as well. Noted obvious tremoring of her lower extremities upon standing on physical exam.  Has not sought care regarding this. Etiology is not entirely clear. Did offer to refer her to neurology for further evaluation which she agreed to. - Ambulatory referral to Neurology; Future    7. Balance problem  - Ambulatory referral to Neurology; Future        HPI:  Patient is a pleasant 70-year-old female presents today for further evaluation of her normocytic anemia/iron deficiency and peripancreatic lymphadenopathy. She also seems to have thrombocytopenia. Originally from [place].   She has past medical history of GERD, high cholesterol, anxiety, depression, COPD, hypertension and liver cirrhosis. Had genetic testing done recently for some reason 8/14/2023 which came back negative. She has been taking oral iron supplements for some time which do not seem to be very effective. Has been following closely with her GI team.  She had colonoscopy last year 2/2020 to 3 polyps were removed and 1 year follow-up was recommended. She had upper endoscopy earlier this year 4/5/2023 which showed probable healing ulcers and probable portal hypertensive gastropathy. She had upper GI EUS recently 10/10/2023 which showed diffuse gastric antral vascular schedule in the stomach which was treated with APC, portal hypertensive gastropathy, healed duodenal ulcer, nodular liver. Noted 3 benign-appearing hyperechoic periportal nodes with well-defined margins. Reports that she has been experiencing significant fatigue for some time. Reports chronic low back pain. She denies any obvious bleeding from any site with the exception of 1 episode of small scant amount of pink blood when she wiped the other day. She states her stools are are black in color at times but unclear if this is due to bleeding versus oral iron supplement. Reports mild rash to her upper chest.  Reports significant abdominal discomfort/tenderness. She denies any poor appetite, weight loss, fevers, night sweats or palpable lymphadenopathy. She brings to my attention today that she has been having significant weakness/shakiness to both her legs especially on the left side when she stands and even somewhat when she ambulates. She is not noticing this symptom when she is resting/sitting. She also notes that her balance has been off. She states this symptom has been occurring for at least the last month if not the last 2 months. Her most recent laboratory studies 9/2023 showed normal WBC 7.13, she has normocytic anemia H&H 10.7/36.4, MCV 94, thrombocytopenia platelet count 114.   Glucose 147 creatinine 0.91, GFR 72, total protein 6.2 her liver enzymes are normal.  PT slightly prolonged 15.2 with normal INR 1.19. PTT prolonged 42. Reviewed additional labs 3/22/2023 IgM slightly elevated to 42 with normal IgG IgA. Folic acid lower than average 8.2, B12 lower than average 331. CINDY was negative. Hepatitis work-up negative. Alpha-fetoprotein normal 6.4. She had an MRI/MRCP of the abdomen with and without contrast 10/4/2023:  IMPRESSION:  1) Hepatomegaly with surface nodularity suggestive of cirrhosis. Mild diffuse hepatic steatosis. No hepatic lesions. 2) Splenomegaly, which may indicate portal hypertension. 3) Four enlarged upper abdominal lymph nodes (2 portacaval, 1 periportal, and 1 peripancreatic), as described above, stable compared to recent studies, and stable to slightly enlarged since 2015. These are most likely reactive. No new abdominal   lymphadenopathy. 4) No focal pancreatic lesions. Normal caliber main pancreatic duct. ROS: Review of Systems   Constitutional:  Positive for activity change and fatigue. Respiratory:  Positive for shortness of breath. Gastrointestinal:  Positive for constipation. Musculoskeletal:  Positive for back pain, gait problem and myalgias. Skin:  Positive for rash. Neurological:  Positive for dizziness, tremors (legs with standing/ambulation), weakness, numbness and headaches. Psychiatric/Behavioral:  Positive for sleep disturbance. All other systems reviewed and are negative.       Past Medical History:   Diagnosis Date    Abnormal stress test     Acute cystitis without hematuria 5/7/2018    Acute duodenal ulcer with bleeding 1/16/2023    Allergic sinusitis     last assessed - [id number]    Anxiety     last assessed - 83CCJ0371    Arthritis     Asthma     last assessed - 08HLD2897    Bilateral lower extremity edema     last assessed - 07GWA0631    Callus of foot     last assessed - 47Btv1920    Chest pain     Chronic GERD     last assessed - 89FHT5312    Colon polyp     Constipation     Resolved - 01TYT8499    COPD (chronic obstructive pulmonary disease) (HCC)     Depression     last assessed - 80YTC7885    Disease of thyroid gland     Diverticulitis of colon     last assessed - 14LLK8848    Dyspepsia     Resolved - 93SHV2362    Edema, lower extremity 8/7/2020    Esophageal reflux     last assessed - 55FVC0534    Essential hypertriglyceridemia     last assessed - 77Ssb8693    Fatty liver 1/16/2023    Gastroesophageal reflux disease with esophagitis 11/18/2016    GERD (gastroesophageal reflux disease)     Gynecological disorder     last assessed - [id number]    Hydroureteronephrosis 6/30/2022    Hypertension     last assessed - 99XIF0169    Hypokalemia 06/20/2022    Hypotension     last assessed - 15Ndg9787    Kidney stone     Left ureteral stone with hydronephrosis 7/21/2022    Migraine     Morbid obesity ([address]) 01/11/2019    Formatting of this note might be different from the original. Added automatically from request for surgery [id number]    Neuropathy     Other chest pain 11/8/2018    Pancreatic lesion 3/13/2023    Positive depression screening 06/19/2022    Primary hypertriglyceridemia 06/19/2022    Pulmonary emphysema ([address])     last assessed - 68XFB3086    Seasonal allergies     Severe obesity ([address]) 1/16/2023    Skin tag 7/17/2023    SOB (shortness of breath)     Strain of groin 7/17/2023    Urinary frequency     last assessed - 92Gjl6514    Vagina, candidiasis     last assessed - 03Dqu9959    Very heavy cigarette smoker 1/16/2023    Visual impairment        Past Surgical History:   Procedure Laterality Date    CARPAL TUNNEL RELEASE      last assessed - [id number]    CHOLECYSTECTOMY      last assessed - 07NTI0882    COLONOSCOPY      Complete colonoscopy     EYE SURGERY      last assessed - 75TNP9049    [facility] RETROGRADE PYELOGRAM  6/16/2022    [place] RETROGRADE PYELOGRAM  8/16/2022    FOOT SURGERY      last assessed - [id number]    [place] CYSTO BLADDER W/URETERAL CATHETERIZATION Left 7/21/2022    Procedure: CYSTOSCOPY RETROGRADE PYELOGRAM WITH INSERTION STENT URETERAL;  Surgeon: Pascual Peterson MD;  Location: [facility];  Service: Urology    [place] CYSTO/URETERO W/LITHOTRIPSY &INDWELL STENT INSRT Left 6/16/2022    Procedure: CYSTOSCOPY URETEROSCOPY WITH LITHOTRIPSY HOLMIUM LASER, RETROGRADE PYELOGRAM AND INSERTION STENT URETERAL;  Surgeon: Tyshawn Smith MD;  Location: [facility];  Service: Urology    [place] CYSTO/URETERO W/LITHOTRIPSY &INDWELL STENT INSRT Left 8/16/2022    Procedure: CYSTOSCOPY USCOPE W/HOLMIUM LASER, RETROGRADE PYELOGRAM&STENT;  Surgeon: Any Leggett MD;  Location: [facility];  Service: Urology    W[place] ESOPHAGOGASTRODUODENOSCOPY TRANSORAL DIAGNOSTIC N/A 2/13/2017    Procedure: ESOPHAGOGASTRODUODENOSCOPY (EGD); Surgeon: Agata Mo MD;  Location: [facility];   Service: Gastroenterology       Social History     Socioeconomic History    Marital status:      Spouse name: None    Number of children: None    Years of education: None    Highest education level: None   Occupational History    None   Tobacco Use    Smoking status: Some Days     Packs/day: 0.50     Years: 30.00     Total pack years: 15.00     Types: Cigarettes     Start date: 1/1/1983    Smokeless tobacco: Never   Vaping Use    Vaping Use: Former    Start date: 1/1/2019    Quit date: 5/1/2019    Substances: Nicotine   Substance and Sexual Activity    Alcohol use: Not Currently    Drug use: Never    Sexual activity: Not Currently     Partners: Male     Birth control/protection: Abstinence   Other Topics Concern    None   Social History Narrative    None     Social Determinants of Health     Financial Resource Strain: Not on file   Food Insecurity: No Food Insecurity (7/22/2022)    Hunger Vital Sign     Worried About Running Out of Food in the Last Year: Never true     Ran Out of Food in the Last Year: Never true   Transportation Needs: No Transportation Needs (7/22/2022)    [employer] [place] Transportation     Lack of Transportation (Medical): No     Lack of Transportation (Non-Medical):  No   Physical Activity: Not on file   Stress: Not on file   Social Connections: Not on file   Intimate Partner Violence: Not on file   Housing Stability: Low Risk  (7/22/2022)    Housing Stability Vital Sign     Unable to Pay for Housing in the Last Year: No     Number of Places Lived in the Last Year: 1     Unstable Housing in the Last Year: No       Family History   Problem Relation Age of Onset    Obesity Mother     Thyroid disease Mother     Cancer Mother     Obesity Sister     Coronary artery disease Sister     Stroke Sister     Asthma Sister     No Known Problems Maternal Aunt     Diabetes Maternal Uncle     Lung cancer Maternal Grandmother     Cancer Maternal Grandfather     Diabetes Maternal Grandfather     No Known Problems Paternal Grandmother     No Known Problems Paternal Grandfather     Hypertension Other     Lung cancer Other     Hypertension Other     Lung cancer Other     Lung cancer Other        Allergies   Allergen Reactions    Hydrocodone-Acetaminophen Hives    Ketorolac Hives and Dizziness    Toradol [Ketorolac Tromethamine] Other (See Comments)     hypotension    Ultram [Tramadol] Nausea Only, GI Intolerance and Vomiting         Current Outpatient Medications:     albuterol (2.5 mg/3 mL) 0.083 % nebulizer solution, Take 3 mL (2.5 mg total) by nebulization every 6 (six) hours as needed for wheezing or shortness of breath, Disp: 30 mL, Rfl: 0    albuterol (PROVENTIL HFA,VENTOLIN HFA) 90 mcg/act inhaler, Inhale 2 puffs every 6 (six) hours as needed for wheezing, Disp: 18 g, Rfl: 1    buPROPion (WELLBUTRIN XL) 150 mg 24 hr tablet, Take 1 tablet (150 mg total) by mouth every morning, Disp: 90 tablet, Rfl: 1    diazepam (VALIUM) 5 mg tablet, Take 1 tablet (5 mg total) by mouth every 6 (six) hours as needed for anxiety, Disp: 30 tablet, Rfl: 0    ergocalciferol (VITAMIN D2) 50,000 units, Take 1 capsule (50,000 Units total) by mouth every 14 (fourteen) days, Disp: 12 capsule, Rfl: 1    famotidine (PEPCID) 40 MG tablet, Take 1 tablet (40 mg total) by mouth daily at bedtime Take in evening 2 hours prior to bed, Disp: 90 tablet, Rfl: 3    ferrous sulfate 324 (65 Fe) mg, Take 1 tablet (324 mg total) by mouth daily before breakfast, Disp: 30 tablet, Rfl: 1    fluticasone (FLONASE) 50 mcg/act nasal spray, 1 spray into each nostril 2 (two) times a day (Patient taking differently: 1 spray into each nostril as needed), Disp: 16 g, Rfl: 5    gabapentin (NEURONTIN) 100 mg capsule, Take 100 mg by mouth 2 (two) times a day, Disp: , Rfl: 0    levothyroxine 112 mcg tablet, Take 1 tablet (112 mcg total) by mouth daily, Disp: 90 tablet, Rfl: 1    neomycin-polymyxin-dexamethasone (MAXITROL) ophthalmic suspension, instill 1 drop INTO AFFECTED EYE 4 TIMES A DAY AS DIRECTED, Disp: , Rfl:     nicotine (NICODERM CQ) 21 mg/24 hr TD 24 hr patch, Place 1 patch on the skin over 24 hours every 24 hours, Disp: 28 patch, Rfl: 5    omeprazole (PriLOSEC) 40 MG capsule, take 1 capsule by mouth twice a day, Disp: 60 capsule, Rfl: 5    oxyCODONE-acetaminophen (PERCOCET)  mg per tablet, Take 1 tablet by mouth every 6 (six) hours as needed, Disp: , Rfl:     Semaglutide-Weight Management (WEGOVY) 0.25 MG/0.5ML, Inject 0.5 mL (0.25 mg total) under the skin once a week, Disp: 2 mL, Rfl: 0    sertraline (ZOLOFT) 100 mg tablet, Take 1 tablet (100 mg total) by mouth 2 (two) times a day, Disp: 180 tablet, Rfl: 1    simvastatin (ZOCOR) 20 mg tablet, Take 1 tablet (20 mg total) by mouth daily at bedtime, Disp: 90 tablet, Rfl: 1    sucralfate (CARAFATE) 1 g tablet, , Disp: , Rfl:     sucralfate (CARAFATE) 1 g/10 mL suspension, Take 10 mL (1 g total) by mouth 4 (four) times a day (with meals and at bedtime), Disp: 2700 mL, Rfl: 1    tiotropium (Spiriva HandiHaler) 18 mcg inhalation capsule, Place 1 capsule (18 mcg total) into inhaler and inhale daily, Disp: 30 capsule, Rfl: 5    varenicline (CHANTIX) 0.5 mg tablet, Take 1 tablet (0.5 mg total) by mouth 2 (two) times a day, Disp: 60 tablet, Rfl: 0      Physical Exam:  /70 (BP Location: Right arm, Patient Position: Sitting, Cuff Size: Adult)   Pulse 94   Temp 98.7 °F (37.1 °C)   Resp 18   Ht 5' 6" (1.676 m)   Wt 126 kg (277 lb)   LMP 04/05/2018 (Approximate)   SpO2 99%   BMI 44.71 kg/m²     Physical Exam  Vitals reviewed. Constitutional:       General: She is not in acute distress. Appearance: She is well-developed. She is obese. She is not diaphoretic. HENT:      Head: Normocephalic and atraumatic. Eyes:      General: No scleral icterus. Conjunctiva/sclera: Conjunctivae normal.      Pupils: Pupils are equal, round, and reactive to light. Neck:      Thyroid: No thyromegaly. Cardiovascular:      Rate and Rhythm: Normal rate and regular rhythm. Heart sounds: Normal heart sounds. No murmur heard. Pulmonary:      Effort: Pulmonary effort is normal. No respiratory distress. Breath sounds: Normal breath sounds. Abdominal:      General: There is no distension. Palpations: Abdomen is soft. There is hepatomegaly and splenomegaly. Tenderness: There is generalized abdominal tenderness (with light palpation). Musculoskeletal:         General: No swelling. Lymphadenopathy:      Cervical: No cervical adenopathy. Upper Body:      Right upper body: No axillary adenopathy. Left upper body: No axillary adenopathy. Skin:     General: Skin is warm and dry. Coloration: Skin is pale. Findings: Rash (mild upper chest) present. No erythema. Comments: Teleangiectasia noted to upper chest and face   Neurological:      General: No focal deficit present. Mental Status: She is alert and oriented to person, place, and time. Motor: Tremor (noted shakiness tremoring of both legs when she stands up) present.    Psychiatric:         Mood and Affect: Mood normal. Affect is blunt. Behavior: Behavior normal. Behavior is cooperative. Thought Content: Thought content normal.         Judgment: Judgment normal.           Labs:  Lab Results   Component Value Date    WBC 7.13 09/08/2023    HGB 10.7 (L) 09/08/2023    HCT 36.4 09/08/2023    MCV 93 09/08/2023     (L) 09/08/2023        Patient voiced understanding and agreement in the above discussion. Aware to contact our office with questions/symptoms in the interim. This note has been generated by voice recognition software system. Therefore, there may be spelling, grammar, and or syntax errors. Please contact if questions arise.

## 2023-11-06 ENCOUNTER — CONSULT (OUTPATIENT)
Dept: PULMONOLOGY | Facility: CLINIC | Age: 53
End: 2023-11-06
Payer: MEDICARE

## 2023-11-06 VITALS
HEART RATE: 76 BPM | BODY MASS INDEX: 43.79 KG/M2 | HEIGHT: 67 IN | SYSTOLIC BLOOD PRESSURE: 120 MMHG | OXYGEN SATURATION: 97 % | WEIGHT: 279 LBS | DIASTOLIC BLOOD PRESSURE: 68 MMHG | TEMPERATURE: 98.4 F

## 2023-11-06 DIAGNOSIS — Z72.0 CURRENT TOBACCO USE: ICD-10-CM

## 2023-11-06 DIAGNOSIS — R06.83 SNORING: ICD-10-CM

## 2023-11-06 DIAGNOSIS — R06.00 DYSPNEA, UNSPECIFIED TYPE: ICD-10-CM

## 2023-11-06 DIAGNOSIS — J43.9 PULMONARY EMPHYSEMA, UNSPECIFIED EMPHYSEMA TYPE (HCC): Primary | ICD-10-CM

## 2023-11-06 PROCEDURE — 99244 OFF/OP CNSLTJ NEW/EST MOD 40: CPT | Performed by: INTERNAL MEDICINE

## 2023-11-06 RX ORDER — TIOTROPIUM BROMIDE AND OLODATEROL 3.124; 2.736 UG/1; UG/1
2 SPRAY, METERED RESPIRATORY (INHALATION) DAILY
Qty: 4 G | Refills: 5 | Status: SHIPPED | OUTPATIENT
Start: 2023-11-06

## 2023-11-06 NOTE — PROGRESS NOTES
Pulmonary Consultation   Sandoval Posey 48 y.o. female MRN: [id number]  11/6/2023      Assessment:  Emphysema  PFT in 2017 showed a normal ratio, no BD response  Heavy tobacco abuse history at least 60-pack-year  Noted progressive dyspnea on exertion over the years  No history of frequent exacerbation or PRN use  On Spiriva HandiHaler 18, PRN albuterol    Plan:  Step up to LAMA/LABA, Stiolto Respimat/given samples  Educated about the proper inhaler use technique  Recheck PFT/BD response  Counseled about smoking cessation  Up-to-date in immunization    Positive RUTHANN screen  Reports a chronic unrefreshing sleep, daytime sleepiness  Also reported loud snoring by roommates  Ordered in lab sleep study/insurance does not cover home study    Active tobacco abuse  Smoked for at least 30 years 2.5 PPD  Cut down to less than 1 pack/day for the past several months using Chantix  Counseled extensively    Pulmonary nodule  1.2 cm at AMANDA/GGO no solid component  This was noticed on LDCT for lung cancer screening in 1/2023  Needs a follow-up by now, however due to abdominal lymphadenopathy she is ordered a PET/CT  We will follow on the PET/CT results/currently in the process of getting it approved by the insurance  If this was not done we will order follow-up CT chest      Return in about 3 months (around 2/6/2024). History of Present Illness     New Consult for: Emphysema/dyspnea      Background  48 y.o. female with a h/o allergic sinusitis, and anxiety/depression, class III obesity, acid reflux, hypothyroidism, HTN, tobacco abuse    Noted to have upper abdominal lymph nodes enlarged/4 of them, for which he was seen by hematology/oncology, ordered a PET/CT/not done yet. Reports chronic dyspnea on exertion, for few years and more noticeable recently. Such as walking on the surface level at a fast pace, or climbing 1 flight of stairs. Partially relieved with PRN albuterol.   Has been managed with Spiriva HandiHaler for several years. Last oral steroid use/ED visits for bronchitis/COPD was more than a year ago    Smoking<1 PPD currently, smoked for at least 30 years, 2.5 PPD for the most part. Using Chantix. Social/exposure history  Originally from [place], moved to [place] 7 years ago  At least 60-pack-year tobacco abuse  Nonalcoholic  On disability due to mental issues, never worked before  Lives in an older house with friends no exposure to mold  Pets has 1 dog    Review of Systems  As per hpi, all other systems reviewed and were negative. Studies:  Imaging and other studies: I have personally reviewed pertinent films in PACS    LDCT 1/21/2020 23-1.2 cm AMANDA GGO. Intrapulmonary lymph node, moderate paraseptal emphysema. Few thin-walled cysts at the inferior lingula/LLL. Pulmonary function testing:   PFT 2/14/2017-ratio 76%, FEV1 3.19 L / 73%, FVC 3.08 L / 77%. TLC 88%, RV 93%, DLCO 55% no BD response    EKG, Pathology, and Other Studies: I have personally reviewed pertinent reports.           Historical Information   Past Medical History:   Diagnosis Date    Abnormal stress test     Acute cystitis without hematuria 5/7/2018    Acute duodenal ulcer with bleeding 1/16/2023    Allergic sinusitis     last assessed - [id number]    Anxiety     last assessed - 40SBR0636    Arthritis     Asthma     last assessed - 80IKO3991    Bilateral lower extremity edema     last assessed - 17TEI8093    Callus of foot     last assessed - 70Wtj3105    Chest pain     Chronic GERD     last assessed - 52ZJO0284    Colon polyp     Constipation     Resolved - 05MUD8412    COPD (chronic obstructive pulmonary disease) ([facility])     Depression     last assessed - 96BSF2690    Disease of thyroid gland     Diverticulitis of colon     last assessed - 24HKP7987    Dyspepsia     Resolved - 72VCL6732    Edema, lower extremity 8/7/2020    Esophageal reflux     last assessed - 21RDG4275    Essential hypertriglyceridemia     last assessed - 73ZWI0353    Fatty liver 1/16/2023    Gastroesophageal reflux disease with esophagitis 11/18/2016    GERD (gastroesophageal reflux disease)     Gynecological disorder     last assessed - 58AZE0537    Hydroureteronephrosis 6/30/2022    Hypertension     last assessed - 76CXI0247    Hypokalemia 06/20/2022    Hypotension     last assessed - 14Xdt7641    Kidney stone     Left ureteral stone with hydronephrosis 7/21/2022    Migraine     Morbid obesity ([address]) 01/11/2019    Formatting of this note might be different from the original. Added automatically from request for surgery [id number]    Neuropathy     Other chest pain 11/8/2018    Pancreatic lesion 3/13/2023    Positive depression screening 06/19/2022    Primary hypertriglyceridemia 06/19/2022    Pulmonary emphysema ([address])     last assessed - 49FKJ7270    Seasonal allergies     Severe obesity ([address]) 1/16/2023    Skin tag 7/17/2023    SOB (shortness of breath)     Strain of groin 7/17/2023    Urinary frequency     last assessed - 94Mqw5147    Vagina, candidiasis     last assessed - 87Opm8982    Very heavy cigarette smoker 1/16/2023    Visual impairment      Past Surgical History:   Procedure Laterality Date    CARPAL TUNNEL RELEASE      last assessed - 14JBX1853    CHOLECYSTECTOMY      last assessed - 23FVC1973    COLONOSCOPY      Complete colonoscopy     EYE SURGERY      last assessed - 15HBV8042    [facility] RETROGRADE PYELOGRAM  6/16/2022    FL RETROGRADE PYELOGRAM  8/16/2022    FOOT SURGERY      last assessed - 10USR7560    SD CYSTO BLADDER W/URETERAL CATHETERIZATION Left 7/21/2022    Procedure: CYSTOSCOPY RETROGRADE PYELOGRAM WITH INSERTION STENT URETERAL;  Surgeon: Elham Brady MD;  Location: [facility];  Service: Urology    SD CYSTO/URETERO W/LITHOTRIPSY &INDWELL STENT INSRT Left 6/16/2022    Procedure: CYSTOSCOPY URETEROSCOPY WITH LITHOTRIPSY HOLMIUM LASER, RETROGRADE PYELOGRAM AND INSERTION STENT URETERAL;  Surgeon: Milly Yañez MD;  Location: [facility];  Service: Urology    OR CYSTO/URETERO W/LITHOTRIPSY &INDWELL STENT INSRT Left 8/16/2022    Procedure: CYSTOSCOPY USCOPE W/HOLMIUM LASER, RETROGRADE PYELOGRAM&STENT;  Surgeon: Sejal Franco MD;  Location: [facility];  Service: Urology    OR ESOPHAGOGASTRODUODENOSCOPY TRANSORAL DIAGNOSTIC N/A 2/13/2017    Procedure: ESOPHAGOGASTRODUODENOSCOPY (EGD); Surgeon: Genetta Klinefelter, MD;  Location: [facility]; Service: Gastroenterology     Family History   Problem Relation Age of Onset    Obesity Mother     Thyroid disease Mother     Cancer Mother     Obesity Sister     Coronary artery disease Sister     Stroke Sister     Asthma Sister     No Known Problems Maternal Aunt     Diabetes Maternal Uncle     Lung cancer Maternal Grandmother     Cancer Maternal Grandfather     Diabetes Maternal Grandfather     No Known Problems Paternal Grandmother     No Known Problems Paternal Grandfather     Hypertension Other     Lung cancer Other     Hypertension Other     Lung cancer Other     Lung cancer Other          Medications/Allergies: Reviewed    Vitals: Blood pressure 120/68, pulse 76, temperature 98.4 °F (36.9 °C), temperature source Temporal, height 5' 7" (1.702 m), weight 127 kg (279 lb), last menstrual period 04/05/2018, SpO2 97 %. Body mass index is 43.7 kg/m². Oxygen Therapy  SpO2: 97 %      Physical Exam  Body mass index is 43.7 kg/m².    Gen: not in acute distress, obese  Neck/Eyes: supple, moderate thoracic kyphosis, PERRL  Ear: normal appearance, no significant hearing impairment  Nose:  normal nasal mucosa, no drainage  Chest: normal respiratory efforts, moderate air movement, no wheezes, crackles or rhonchi  CV: RRR, no murmurs appreciated, no edema  Abdomen: soft, non tender  Extremities:  No observed deformity   Skin: unremarkable  Neuro: AAO X3, no focal motor deficit         Labs:  Lab Results   Component Value Date    WBC 7.13 09/08/2023    HGB 10.7 (L) 09/08/2023    HCT 36.4 09/08/2023    MCV 93 09/08/2023     (L) 09/08/2023     Lab Results   Component Value Date    CALCIUM 9.3 09/14/2023    K 4.1 09/14/2023    CO2 24 09/14/2023     09/14/2023    BUN 14 09/14/2023    CREATININE 0.79 09/14/2023     No results found for: "IGE"  Lab Results   Component Value Date    ALT 18 09/08/2023    AST 30 09/08/2023    ALKPHOS 77 09/08/2023           Portions of the record may have been created with voice recognition software. Occasional wrong word or "sound a like" substitutions may have occurred due to the inherent limitations of voice recognition software. Read the chart carefully and recognize, using context, where substitutions have occurred    Lev Mason M.D.   [facility]

## 2023-11-15 ENCOUNTER — TELEPHONE (OUTPATIENT)
Dept: HEMATOLOGY ONCOLOGY | Facility: CLINIC | Age: 53
End: 2023-11-15

## 2023-11-15 NOTE — TELEPHONE ENCOUNTER
Phoned pt and left her a voice message that pet Scan was cancelled due to insurance denying as it is not medically necessary.

## 2023-11-20 ENCOUNTER — TELEPHONE (OUTPATIENT)
Dept: SLEEP CENTER | Facility: CLINIC | Age: 53
End: 2023-11-20

## 2023-11-20 NOTE — TELEPHONE ENCOUNTER
----- Message from Dennis Paul MD sent at 11/19/2023 10:44 AM EST -----  approved  ----- Message -----  From: Robert Fishman  Sent: 11/7/2023  11:35 AM EST  To: Sleep Medicine JUSTIN Provider    This diagnostic sleep study needs approval.     If approved please sign and return to clerical pool. If denied please include reasons why. Also provide alternative testing if warranted. Please sign and return to clerical pool.

## 2023-11-22 DIAGNOSIS — Z72.0 TOBACCO ABUSE DISORDER: ICD-10-CM

## 2023-11-22 RX ORDER — VARENICLINE TARTRATE 0.5 MG/1
0.5 TABLET, FILM COATED ORAL 2 TIMES DAILY
Qty: 60 TABLET | Refills: 0 | Status: SHIPPED | OUTPATIENT
Start: 2023-11-22

## 2023-12-12 ENCOUNTER — TELEPHONE (OUTPATIENT)
Dept: INTERNAL MEDICINE CLINIC | Age: 53
End: 2023-12-12

## 2023-12-21 ENCOUNTER — APPOINTMENT (OUTPATIENT)
Dept: LAB | Facility: IMAGING CENTER | Age: 53
End: 2023-12-21
Payer: MEDICARE

## 2023-12-21 ENCOUNTER — OFFICE VISIT (OUTPATIENT)
Age: 53
End: 2023-12-21
Payer: MEDICARE

## 2023-12-21 VITALS
OXYGEN SATURATION: 99 % | SYSTOLIC BLOOD PRESSURE: 150 MMHG | WEIGHT: 280 LBS | DIASTOLIC BLOOD PRESSURE: 82 MMHG | TEMPERATURE: 97.8 F | HEART RATE: 78 BPM | BODY MASS INDEX: 43.95 KG/M2 | HEIGHT: 67 IN

## 2023-12-21 DIAGNOSIS — R60.0 EDEMA, LOWER EXTREMITY: Primary | ICD-10-CM

## 2023-12-21 DIAGNOSIS — R60.0 EDEMA, LOWER EXTREMITY: ICD-10-CM

## 2023-12-21 DIAGNOSIS — D50.8 IRON DEFICIENCY ANEMIA SECONDARY TO INADEQUATE DIETARY IRON INTAKE: ICD-10-CM

## 2023-12-21 LAB
ALBUMIN SERPL BCP-MCNC: 3.8 G/DL (ref 3.5–5)
ALP SERPL-CCNC: 94 U/L (ref 34–104)
ALT SERPL W P-5'-P-CCNC: 21 U/L (ref 7–52)
ANION GAP SERPL CALCULATED.3IONS-SCNC: 8 MMOL/L
AST SERPL W P-5'-P-CCNC: 48 U/L (ref 13–39)
BASOPHILS # BLD AUTO: 0.03 THOUSANDS/ÂΜL (ref 0–0.1)
BASOPHILS NFR BLD AUTO: 1 % (ref 0–1)
BILIRUB DIRECT SERPL-MCNC: 0.33 MG/DL (ref 0–0.2)
BILIRUB SERPL-MCNC: 1.19 MG/DL (ref 0.2–1)
BUN SERPL-MCNC: 10 MG/DL (ref 5–25)
CALCIUM SERPL-MCNC: 9.2 MG/DL (ref 8.4–10.2)
CHLORIDE SERPL-SCNC: 109 MMOL/L (ref 96–108)
CO2 SERPL-SCNC: 25 MMOL/L (ref 21–32)
CREAT SERPL-MCNC: 0.73 MG/DL (ref 0.6–1.3)
EOSINOPHIL # BLD AUTO: 0.11 THOUSAND/ÂΜL (ref 0–0.61)
EOSINOPHIL NFR BLD AUTO: 2 % (ref 0–6)
ERYTHROCYTE [DISTWIDTH] IN BLOOD BY AUTOMATED COUNT: 17.9 % (ref 11.6–15.1)
GFR SERPL CREATININE-BSD FRML MDRD: 94 ML/MIN/1.73SQ M
GLUCOSE SERPL-MCNC: 178 MG/DL (ref 65–140)
HCT VFR BLD AUTO: 38.5 % (ref 34.8–46.1)
HGB BLD-MCNC: 11.5 G/DL (ref 11.5–15.4)
IMM GRANULOCYTES # BLD AUTO: 0.03 THOUSAND/UL (ref 0–0.2)
IMM GRANULOCYTES NFR BLD AUTO: 1 % (ref 0–2)
LYMPHOCYTES # BLD AUTO: 1.31 THOUSANDS/ÂΜL (ref 0.6–4.47)
LYMPHOCYTES NFR BLD AUTO: 20 % (ref 14–44)
MCH RBC QN AUTO: 28.5 PG (ref 26.8–34.3)
MCHC RBC AUTO-ENTMCNC: 29.9 G/DL (ref 31.4–37.4)
MCV RBC AUTO: 95 FL (ref 82–98)
MONOCYTES # BLD AUTO: 0.59 THOUSAND/ÂΜL (ref 0.17–1.22)
MONOCYTES NFR BLD AUTO: 9 % (ref 4–12)
NEUTROPHILS # BLD AUTO: 4.45 THOUSANDS/ÂΜL (ref 1.85–7.62)
NEUTS SEG NFR BLD AUTO: 67 % (ref 43–75)
NRBC BLD AUTO-RTO: 0 /100 WBCS
PLATELET BLD QL SMEAR: ABNORMAL
PMV BLD AUTO: 12.5 FL (ref 8.9–12.7)
POTASSIUM SERPL-SCNC: 4.3 MMOL/L (ref 3.5–5.3)
PROT SERPL-MCNC: 6.9 G/DL (ref 6.4–8.4)
RBC # BLD AUTO: 4.04 MILLION/UL (ref 3.81–5.12)
RBC MORPH BLD: PRESENT
SODIUM SERPL-SCNC: 142 MMOL/L (ref 135–147)
WBC # BLD AUTO: 6.52 THOUSAND/UL (ref 4.31–10.16)

## 2023-12-21 PROCEDURE — 80053 COMPREHEN METABOLIC PANEL: CPT

## 2023-12-21 PROCEDURE — 36415 COLL VENOUS BLD VENIPUNCTURE: CPT

## 2023-12-21 PROCEDURE — 85025 COMPLETE CBC W/AUTO DIFF WBC: CPT

## 2023-12-21 PROCEDURE — 82248 BILIRUBIN DIRECT: CPT

## 2023-12-21 PROCEDURE — 99214 OFFICE O/P EST MOD 30 MIN: CPT

## 2023-12-21 RX ORDER — FERROUS SULFATE 324(65)MG
324 TABLET, DELAYED RELEASE (ENTERIC COATED) ORAL
Qty: 30 TABLET | Refills: 0 | Status: SHIPPED | OUTPATIENT
Start: 2023-12-21

## 2023-12-21 RX ORDER — SPIRONOLACTONE 25 MG/1
50 TABLET ORAL DAILY
Qty: 30 TABLET | Refills: 5 | Status: SHIPPED | OUTPATIENT
Start: 2023-12-21

## 2023-12-21 RX ORDER — DIAZEPAM 5 MG/1
5 TABLET ORAL EVERY 6 HOURS PRN
Qty: 30 TABLET | Refills: 0 | Status: CANCELLED | OUTPATIENT
Start: 2023-12-21

## 2023-12-21 NOTE — ASSESSMENT & PLAN NOTE
Patient endorses exertional dyspnea.  Recently saw hematologist to do CBC and iron panel showing hemoglobin of 10.7 with normocytic anemia and a ferritin of 16.  Given patient's clear lungs on auscultation, and extensive cardiac workup showing no evidence of ischemia, wall motion abnormalities or ventriculomegaly, pathology concluded that exertional dyspnea is likely due to anemia and emphysema.    Plan  With heme-onc  Outpatient CBC and CMP

## 2023-12-21 NOTE — ASSESSMENT & PLAN NOTE
53-year-old female with past medical history significant for ROMERO cirrhosis, iron deficiency anemia, GERD, HLD, hypothyroidism, gastroparesis, IBS, centrilobular emphysema, and 10 cigarette/day smoker presents with 6-week history of bilateral lower extremity pitting edema and redness, worse on the left lower extremity than the right. Patient is is from [place] and visited an ED in [place] with similar complaints 4 weeks ago.  There, she claims that they did a venous Doppler and confirmed that she did not have a DVT.  She claims it has never happened before and has only improved minimally since symptoms began.  Denies trauma to the area, fevers, chills, OCP use, new medications, chest pain, and dizziness.  On physical exam, there is bilateral pitting edema, worse on the left than the right.  On the left, signs of venous stasis are more apparent --pooling around the ankle, noticing erythema, anterior aspect of left ankle beginning to show leathery shiny skin typical of venous stasis.  Patient also endorses right upper quadrant abdominal pain. Patient recently had an MRI of abdomen showing nodular changes in the liver consistent with ROMERO cirrhosis and splenomegaly suggesting portal hypertension.  Given that patient's lungs were clear to auscultation bilaterally, and recent nuclear medicine stress test and echocardiogram showed 0 evidence of ischemia and normal wall motion, and is reasonable to conclude that bilateral pitting edema is due to progression of liver cirrhosis.  Will order Aldactone and referral to GI, in hopes of eventually following hepatology.    Plan  Aldactone 50 mg  Outpatient GI referral

## 2023-12-21 NOTE — PROGRESS NOTES
Name: Chely Ruvalcaba      : 1970      MRN: [id number]  Encounter Provider: Elijah Jones MD  Encounter Date: 2023   Encounter department: [facility] [place]    Assessment & Plan     1. Edema, lower extremity  Assessment & Plan:  53-year-old female with past medical history significant for ROMERO cirrhosis, iron deficiency anemia, GERD, HLD, hypothyroidism, gastroparesis, IBS, centrilobular emphysema, and 10 cigarette/day smoker presents with 6-week history of bilateral lower extremity pitting edema and redness, worse on the left lower extremity than the right. Patient is is from [place] and visited an ED in [place] with similar complaints 4 weeks ago.  There, she claims that they did a venous Doppler and confirmed that she did not have a DVT.  She claims it has never happened before and has only improved minimally since symptoms began.  Denies trauma to the area, fevers, chills, OCP use, new medications, chest pain, and dizziness.  On physical exam, there is bilateral pitting edema, worse on the left than the right.  On the left, signs of venous stasis are more apparent --pooling around the ankle, noticing erythema, anterior aspect of left ankle beginning to show leathery shiny skin typical of venous stasis.  Patient also endorses right upper quadrant abdominal pain. Patient recently had an MRI of abdomen showing nodular changes in the liver consistent with ROMERO cirrhosis and splenomegaly suggesting portal hypertension.  Given that patient's lungs were clear to auscultation bilaterally, and recent nuclear medicine stress test and echocardiogram showed 0 evidence of ischemia and normal wall motion, and is reasonable to conclude that bilateral pitting edema is due to progression of liver cirrhosis.  Will order Aldactone and referral to GI, in hopes of eventually following hepatology.    Plan  Aldactone 50 mg  Outpatient GI referral      Orders:  -     Ambulatory Referral  to Gastroenterology; Future  -     ferrous sulfate 324 (65 Fe) mg; Take 1 tablet (324 mg total) by mouth daily before breakfast  -     Comprehensive metabolic panel; Future  -     Hepatic function panel; Future  -     CBC and differential; Future  -     spironolactone (ALDACTONE) 25 mg tablet; Take 2 tablets (50 mg total) by mouth daily    2. Iron deficiency anemia secondary to inadequate dietary iron intake  -     ferrous sulfate 324 (65 Fe) mg; Take 1 tablet (324 mg total) by mouth daily before breakfast    Patient endorses exertional dyspnea.  Recently saw hematologist to do CBC and iron panel showing hemoglobin of 10.7 with normocytic anemia and a ferritin of 16.  Given patient's clear lungs on auscultation, and extensive cardiac workup showing no evidence of ischemia, wall motion abnormalities or ventriculomegaly, pathology concluded that exertional dyspnea is likely due to anemia and emphysema.    Plan  With heme-onc  Outpatient CBC and CMP    Subjective      53-year-old female with past medical history significant for ROMERO cirrhosis, iron deficiency anemia, GERD, HLD, hypothyroidism, gastroparesis, IBS, centrilobular emphysema, and 10 cigarette/day smoker presents with 6-week history of bilateral lower extremity pitting edema and redness, worse on the left lower extremity than the right. Patient is is from [place] and visited an ED in [place] with similar complaints 4 weeks ago.  There, she claims that they did a venous Doppler and confirmed that she did not have a DVT.  She claims it has never happened before and has only improved minimally since symptoms began.  Denies trauma to the area, fevers, chills, OCP use, new medications, chest pain, and dizziness.  On physical exam, there is bilateral pitting edema, worse on the left than the right.  On the left, signs of venous stasis are more apparent --pooling around the ankle, noticing erythema, anterior aspect of left ankle beginning to show leathery shiny  skin typical of venous stasis.  Patient also endorses right upper quadrant abdominal pain. Patient recently had an MRI of abdomen showing nodular changes in the liver consistent with ROMERO cirrhosis and splenomegaly suggesting portal hypertension.  Given that patient's lungs were clear to auscultation bilaterally, and recent nuclear medicine stress test and echocardiogram showed 0 evidence of ischemia and normal wall motion, and is reasonable to conclude that bilateral pitting edema is due to progression of liver cirrhosis.  Will order Aldactone and referral to GI, in hopes of eventually following hepatology.    Patient endorses exertional dyspnea.  Recently saw hematologist to do CBC and iron panel showing hemoglobin of 10.7 with normocytic anemia and a ferritin of 16.  Given patient's clear lungs on auscultation, and extensive cardiac workup showing no evidence of ischemia, wall motion abnormalities or ventriculomegaly, pathology concluded that exertional dyspnea is likely due to anemia and emphysema.    Review of Systems   Constitutional:  Positive for fatigue. Negative for diaphoresis, fever and unexpected weight change.   Respiratory:  Positive for shortness of breath. Negative for apnea, cough, choking, chest tightness, wheezing and stridor.    Cardiovascular:  Positive for leg swelling. Negative for chest pain and palpitations.   Gastrointestinal:  Positive for abdominal pain. Negative for abdominal distention, anal bleeding, blood in stool, constipation, diarrhea, nausea, rectal pain and vomiting.     Current Outpatient Medications on File Prior to Visit   Medication Sig   • albuterol (2.5 mg/3 mL) 0.083 % nebulizer solution Take 3 mL (2.5 mg total) by nebulization every 6 (six) hours as needed for wheezing or shortness of breath   • buPROPion (WELLBUTRIN XL) 150 mg 24 hr tablet Take 1 tablet (150 mg total) by mouth every morning   • diazepam (VALIUM) 5 mg tablet Take 1 tablet (5 mg total) by mouth every 6  (six) hours as needed for anxiety   • ergocalciferol (VITAMIN D2) 50,000 units Take 1 capsule (50,000 Units total) by mouth every 14 (fourteen) days   • famotidine (PEPCID) 40 MG tablet Take 1 tablet (40 mg total) by mouth daily at bedtime Take in evening 2 hours prior to bed   • fluticasone (FLONASE) 50 mcg/act nasal spray 1 spray into each nostril 2 (two) times a day (Patient taking differently: 1 spray into each nostril as needed)   • gabapentin (NEURONTIN) 100 mg capsule Take 100 mg by mouth 2 (two) times a day   • levothyroxine 112 mcg tablet Take 1 tablet (112 mcg total) by mouth daily   • neomycin-polymyxin-dexamethasone (MAXITROL) ophthalmic suspension instill 1 drop INTO AFFECTED EYE 4 TIMES A DAY AS DIRECTED   • nicotine (NICODERM CQ) 21 mg/24 hr TD 24 hr patch Place 1 patch on the skin over 24 hours every 24 hours   • omeprazole (PriLOSEC) 40 MG capsule take 1 capsule by mouth twice a day   • oxyCODONE-acetaminophen (PERCOCET)  mg per tablet Take 1 tablet by mouth every 6 (six) hours as needed   • sertraline (ZOLOFT) 100 mg tablet Take 1 tablet (100 mg total) by mouth 2 (two) times a day   • simvastatin (ZOCOR) 20 mg tablet Take 1 tablet (20 mg total) by mouth daily at bedtime   • sucralfate (CARAFATE) 1 g tablet Take 1 g by mouth 2 (two) times a day   • tiotropium-olodaterol (Stiolto Respimat) 2.5-2.5 MCG/ACT inhaler Inhale 2 puffs daily   • varenicline (CHANTIX) 0.5 mg tablet Take 1 tablet (0.5 mg total) by mouth 2 (two) times a day   • [DISCONTINUED] ferrous sulfate 324 (65 Fe) mg Take 1 tablet (324 mg total) by mouth daily before breakfast   • albuterol (PROVENTIL HFA,VENTOLIN HFA) 90 mcg/act inhaler Inhale 2 puffs every 6 (six) hours as needed for wheezing   • Semaglutide-Weight Management (WEGOVY) 0.25 MG/0.5ML Inject 0.5 mL (0.25 mg total) under the skin once a week (Patient not taking: Reported on 12/21/2023)   • sucralfate (CARAFATE) 1 g/10 mL suspension Take 10 mL (1 g total) by mouth 4  "(four) times a day (with meals and at bedtime)   • [DISCONTINUED] naloxone (NARCAN) 4 mg/0.1 mL nasal spray Administer 1 spray into a nostril. If no response after 2-3 minutes, give another dose in the other nostril using a new spray. (Patient not taking: Reported on 6/30/2022)       Objective     /82 (BP Location: Left arm, Patient Position: Sitting, Cuff Size: Large)   Pulse 78   Temp 97.8 °F (36.6 °C) (Temporal)   Ht 5' 6.69\" (1.694 m)   Wt 127 kg (280 lb)   LMP 04/05/2018 (Approximate)   SpO2 99%   BMI 44.26 kg/m²     Physical Exam  Constitutional:       General: She is not in acute distress.     Appearance: She is obese. She is not ill-appearing, toxic-appearing or diaphoretic.   Cardiovascular:      Rate and Rhythm: Normal rate and regular rhythm.      Heart sounds: Normal heart sounds. No murmur heard.     No friction rub. No gallop.   Pulmonary:      Effort: Pulmonary effort is normal. No respiratory distress.      Breath sounds: Normal breath sounds. No stridor. No wheezing, rhonchi or rales.   Chest:      Chest wall: No tenderness.   Abdominal:      General: Abdomen is flat. Bowel sounds are normal. There is no distension.      Palpations: Abdomen is soft. There is no mass.      Tenderness: There is abdominal tenderness. There is no right CVA tenderness, left CVA tenderness, guarding or rebound.      Hernia: No hernia is present.   Musculoskeletal:         General: Swelling and tenderness present.      Right lower leg: Edema present.      Left lower leg: Edema present.   Skin:     Findings: Erythema present.   Neurological:      Mental Status: She is alert.   Elijah Jones MD    "

## 2023-12-26 NOTE — RESULT ENCOUNTER NOTE
Please inform patient that chemistry panel revealed a glucose to be high at 178, she should follow-up with endocrinology or her primary care provider regarding this.  AST is mildly elevated at 48, remainder liver profile was okay.  CBC revealed a normal hemoglobin at 11.5 which is up from 10.73 months ago.  Platelet count could not be measured.  Does she have a follow-up with me at some point?  Thank you

## 2023-12-27 ENCOUNTER — NURSE TRIAGE (OUTPATIENT)
Age: 53
End: 2023-12-27

## 2023-12-27 ENCOUNTER — TELEPHONE (OUTPATIENT)
Age: 53
End: 2023-12-27

## 2023-12-27 NOTE — TELEPHONE ENCOUNTER
"Patient returned call regarding lab results. Her next follow up with you is scheduled 5/6/24. She questioned reason for incomplete platelets and I reviewed due to clumping of blood (stuck together so could not properly count).   Patient has recently noted lower leg edema worse on left. She has seen her primary care and informed them she was seen in ER in [facility], doppler completed and DVT ruled out. Primary ordered Aldactone. Patient concerned edema is related to cirrhosis and would like your input.  Reason for Disposition   Information only question and nurse able to answer    Answer Assessment - Initial Assessment Questions  1. REASON FOR CALL or QUESTION: \"What is your reason for calling today?\" or \"How can I best help you?\" or \"What question do you have that I can help answer?\"      Patient returned call for her test results.    Protocols used: Information Only Call - No Triage-ADULT-[place]    "

## 2024-01-12 DIAGNOSIS — D50.9 IRON DEFICIENCY ANEMIA, UNSPECIFIED IRON DEFICIENCY ANEMIA TYPE: ICD-10-CM

## 2024-01-12 DIAGNOSIS — E55.9 VITAMIN D DEFICIENCY: ICD-10-CM

## 2024-01-12 DIAGNOSIS — E03.9 ACQUIRED HYPOTHYROIDISM: Primary | ICD-10-CM

## 2024-01-12 DIAGNOSIS — E78.00 HYPERCHOLESTEROLEMIA: ICD-10-CM

## 2024-01-12 DIAGNOSIS — R73.09 ABNORMAL GLUCOSE: ICD-10-CM

## 2024-01-17 ENCOUNTER — TELEPHONE (OUTPATIENT)
Dept: HEMATOLOGY ONCOLOGY | Facility: CLINIC | Age: 54
End: 2024-01-17

## 2024-01-17 DIAGNOSIS — E66.9 OBESITY (BMI 35.0-39.9 WITHOUT COMORBIDITY): ICD-10-CM

## 2024-01-17 DIAGNOSIS — Z72.0 TOBACCO ABUSE DISORDER: ICD-10-CM

## 2024-01-17 DIAGNOSIS — F41.9 ANXIETY: ICD-10-CM

## 2024-01-17 DIAGNOSIS — E78.00 HYPERCHOLESTEROLEMIA: ICD-10-CM

## 2024-01-17 RX ORDER — SIMVASTATIN 20 MG
20 TABLET ORAL
Qty: 90 TABLET | Refills: 1 | Status: SHIPPED | OUTPATIENT
Start: 2024-01-17

## 2024-01-17 RX ORDER — VARENICLINE TARTRATE 0.5 MG/1
0.5 TABLET, FILM COATED ORAL 2 TIMES DAILY
Qty: 60 TABLET | Refills: 0 | Status: SHIPPED | OUTPATIENT
Start: 2024-01-17

## 2024-01-17 NOTE — TELEPHONE ENCOUNTER
Left message for patient stating we had to reschedule her appointment with Dr. Nicole from 2/6/24 to 3/27/24 at 3:40PM.

## 2024-01-18 RX ORDER — DIAZEPAM 5 MG/1
5 TABLET ORAL EVERY 6 HOURS PRN
Qty: 30 TABLET | Refills: 0 | Status: SHIPPED | OUTPATIENT
Start: 2024-01-18

## 2024-02-09 ENCOUNTER — OFFICE VISIT (OUTPATIENT)
Dept: ENDOCRINOLOGY | Facility: CLINIC | Age: 54
End: 2024-02-09
Payer: COMMERCIAL

## 2024-02-09 VITALS
OXYGEN SATURATION: 99 % | DIASTOLIC BLOOD PRESSURE: 70 MMHG | BODY MASS INDEX: 30.83 KG/M2 | WEIGHT: 174 LBS | SYSTOLIC BLOOD PRESSURE: 122 MMHG | HEIGHT: 63 IN | HEART RATE: 62 BPM

## 2024-02-09 DIAGNOSIS — L83 ACANTHOSIS NIGRICANS: ICD-10-CM

## 2024-02-09 DIAGNOSIS — E04.0 SIMPLE GOITER: ICD-10-CM

## 2024-02-09 DIAGNOSIS — E03.8 HYPOTHYROIDISM DUE TO HASHIMOTO'S THYROIDITIS: Primary | ICD-10-CM

## 2024-02-09 DIAGNOSIS — E06.3 HYPOTHYROIDISM DUE TO HASHIMOTO'S THYROIDITIS: Primary | ICD-10-CM

## 2024-02-09 PROCEDURE — 84443 ASSAY THYROID STIM HORMONE: CPT | Performed by: INTERNAL MEDICINE

## 2024-02-09 PROCEDURE — 80053 COMPREHEN METABOLIC PANEL: CPT | Performed by: INTERNAL MEDICINE

## 2024-02-09 PROCEDURE — 83525 ASSAY OF INSULIN: CPT | Performed by: INTERNAL MEDICINE

## 2024-02-09 PROCEDURE — 84439 ASSAY OF FREE THYROXINE: CPT | Performed by: INTERNAL MEDICINE

## 2024-02-09 RX ORDER — CELECOXIB 100 MG/1
100 CAPSULE ORAL EVERY 12 HOURS SCHEDULED
COMMUNITY
Start: 2024-01-25

## 2024-02-09 NOTE — PROGRESS NOTES
"     Office Note      Date: 2024  Patient Name: Milagros Lundberg  MRN: [id number]  : 1970    Chief Complaint   Patient presents with    Hypothyroidism    Goiter       History of Present Illness:   Milagros Lundberg is a 53 y.o. female who presents for Hypothyroidism and Goiter  .   Current rx: t4- 137 mcg per day     Changes in history: had to go to the ER in the fall. Had palpitations and turned out to have pneumonia  Questions /problems:is having hair loss, fatigue, dysphagei, arthralgias. . Notes dark spots on her skin     Subjective          Review of Systems:   Review of Systems   Constitutional:  Positive for fatigue and unexpected weight change.   HENT:  Positive for trouble swallowing.    Endocrine:        Hair loss   Skin:  Positive for color change.       The following portions of the patient's history were reviewed and updated as appropriate: allergies, current medications, past family history, past medical history, past social history, past surgical history, and problem list.    Objective     Visit Vitals  /70 (BP Location: Left arm, Patient Position: Sitting, Cuff Size: Adult)   Pulse 62   Ht 160 cm (63\")   Wt 78.9 kg (174 lb)   SpO2 99%   BMI 30.82 kg/m²           Physical Exam:  Physical Exam  Vitals reviewed.   Constitutional:       Appearance: Normal appearance.   HENT:      Head: Normocephalic and atraumatic.   Eyes:      Extraocular Movements: Extraocular movements intact.   Neck:      Comments: Large, hard irregular goiter  Lymphadenopathy:      Cervical: No cervical adenopathy.   Skin:     Comments: acanthosis   Neurological:      Mental Status: She is alert.   Psychiatric:         Mood and Affect: Mood normal.         Thought Content: Thought content normal.         Judgment: Judgment normal.         Assessment / Plan      Assessment & Plan:  Problem List Items Addressed This Visit          Other    Hypothyroidism - Primary    Current Assessment & Plan     Clinically euthyroid. Thyroid " levels ordered. Medication to be adjusted accordingly.          Relevant Medications    levothyroxine (SYNTHROID, LEVOTHROID) 137 MCG tablet    Other Relevant Orders    TSH    T4, Free    Simple goiter    Overview     Last ultrasound 11/2018 [facility]: Right lobe 6 x 3 cm, left lobe is 7 x 3 cm; heterogenous bilaterally without dominant nodule.         Current Assessment & Plan     Larger and more symptomatic. The time has come for surgery. Will refer         Relevant Medications    levothyroxine (SYNTHROID, LEVOTHROID) 137 MCG tablet    Other Relevant Orders    Ambulatory Referral to General Surgery (Completed)    Acanthosis nigricans    Current Assessment & Plan      A sign of pre diabetes/ insulin resistance . We need to check this          Relevant Orders    Comprehensive Metabolic Panel    Insulin, Total        Electronically signed by : Ankit Clark MD   02/09/2024

## 2024-02-10 LAB
ALBUMIN SERPL-MCNC: 4.9 G/DL (ref 3.5–5.2)
ALBUMIN/GLOB SERPL: 1.8 G/DL
ALP SERPL-CCNC: 95 U/L (ref 39–117)
ALT SERPL W P-5'-P-CCNC: 20 U/L (ref 1–33)
ANION GAP SERPL CALCULATED.3IONS-SCNC: 13.3 MMOL/L (ref 5–15)
AST SERPL-CCNC: 22 U/L (ref 1–32)
BILIRUB SERPL-MCNC: 0.3 MG/DL (ref 0–1.2)
BUN SERPL-MCNC: 19 MG/DL (ref 6–20)
BUN/CREAT SERPL: 20.7 (ref 7–25)
CALCIUM SPEC-SCNC: 9.7 MG/DL (ref 8.6–10.5)
CHLORIDE SERPL-SCNC: 105 MMOL/L (ref 98–107)
CO2 SERPL-SCNC: 19.7 MMOL/L (ref 22–29)
CREAT SERPL-MCNC: 0.92 MG/DL (ref 0.57–1)
EGFRCR SERPLBLD CKD-EPI 2021: 74.6 ML/MIN/1.73
GLOBULIN UR ELPH-MCNC: 2.7 GM/DL
GLUCOSE SERPL-MCNC: 82 MG/DL (ref 65–99)
INSULIN SERPL-ACNC: 20.8 UIU/ML (ref 2.6–24.9)
POTASSIUM SERPL-SCNC: 3.9 MMOL/L (ref 3.5–5.2)
PROT SERPL-MCNC: 7.6 G/DL (ref 6–8.5)
SODIUM SERPL-SCNC: 138 MMOL/L (ref 136–145)
T4 FREE SERPL-MCNC: 1.27 NG/DL (ref 0.93–1.7)
TSH SERPL DL<=0.05 MIU/L-ACNC: 0.3 UIU/ML (ref 0.27–4.2)

## 2024-02-12 DIAGNOSIS — E06.3 HYPOTHYROIDISM DUE TO HASHIMOTO'S THYROIDITIS: ICD-10-CM

## 2024-02-12 DIAGNOSIS — E03.8 HYPOTHYROIDISM DUE TO HASHIMOTO'S THYROIDITIS: ICD-10-CM

## 2024-02-12 RX ORDER — LEVOTHYROXINE SODIUM 137 UG/1
137 TABLET ORAL EVERY MORNING
Qty: 30 TABLET | Refills: 11 | Status: SHIPPED | OUTPATIENT
Start: 2024-02-12

## 2024-02-13 ENCOUNTER — TELEMEDICINE (OUTPATIENT)
Dept: GASTROENTEROLOGY | Facility: CLINIC | Age: 54
End: 2024-02-13
Payer: MEDICARE

## 2024-02-13 DIAGNOSIS — R11.0 NAUSEA: ICD-10-CM

## 2024-02-13 DIAGNOSIS — R14.0 ABDOMINAL BLOATING: ICD-10-CM

## 2024-02-13 DIAGNOSIS — R60.0 BILATERAL LOWER EXTREMITY EDEMA: ICD-10-CM

## 2024-02-13 DIAGNOSIS — K74.60 CIRRHOSIS OF LIVER WITHOUT ASCITES, UNSPECIFIED HEPATIC CIRRHOSIS TYPE (HCC): Primary | ICD-10-CM

## 2024-02-13 PROCEDURE — 99214 OFFICE O/P EST MOD 30 MIN: CPT | Performed by: FAMILY MEDICINE

## 2024-02-13 NOTE — PROGRESS NOTES
Virtual Regular Visit    Verification of patient location:    Patient is located at Home in the following state in which I hold an active license [place]      Assessment/Plan:    Problem List Items Addressed This Visit     Cirrhosis (HCC) - Primary    Relevant Orders    US abdomen complete    AFP tumor marker    CBC and differential    Comprehensive metabolic panel    Protime-INR   Other Visit Diagnoses     Abdominal bloating        Bilateral lower extremity edema        Nausea        Relevant Medications    scopolamine (TRANSDERM-SCOP) 1 mg/3 days TD 72 hr patch        1. Cirrhosis of liver without ascites, unspecified hepatic cirrhosis type (HCC)  2. Abdominal bloating  3. Bilateral lower extremity edema    Summary: Patient is a 53 y.o. 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 Current MELD-3.0 (9).     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 Her liver disease has been fairly compensated although she was recently seen by her PCP for b/l ALEJO. She reportedly had a b/l LE Duplex in [place] negative for a DVT and recent Echo (8/2023) was unremarkable. She was started on Aldactone 25 mg once daily with good effect but continue to report abdominal bloating. Will plan for an U/S to assess for ascites.     She also had an EUS for evaluation of a duodenal ulcer and abdominal lymphadenopathy with showed a nodular liver, diffuse GAVE s/p APC, PHG, healed duodenal ulcer and 3 hypoechoic periportal nodes with well-defined margins benign in appearance. No evidence of gastric or esophageal varices. She is also up-to-date with her imaging for hepatoma screening.     Otherwise, she will have labs drawn in order to update her MELD score. She will also continue with her efforts towards steady and sustainable weight loss. She was prescribed Wegovy but has  had difficult obtaining this medication due to supply chain issues. Additional management as below.     Ascites   - Reports new b/l ALEJO and abdominal bloating.   - Continue Aldactone 25 mg once daily (PCP).   - U/S abd to assess for ascites.   - If with ascites or worsening b/l ALEJO would recommend the addition of Lasix 20 mg once daily.      Esophageal Varices   - EUS (10/2023) without evidence of gastric/esophageal varices but with PHG.   - Repeat EGD x2-3 years for variceal screening.      Hepatic Encephalopathy   - No history or evidence of HE on exam today.   - Patient aware of signs/sxs's of HE and advised to promptly inform provider if experiencing such.     HCC Screening   - MRI with MRCP (10/2023) without a focal liver lesion  - U/S now to assess for ascites and will also serve as hepatoma screening  - AFP level to be drawn with her next set of routine labs.   - Continue imaging q6 months and AFP annually.      Nutritional Status   - Encouraged high-protein diet in addition to a high-protein snack qHS to prevent prolonged fasting.     - US abdomen complete; Future  - AFP tumor marker; Future  - CBC and differential; Future  - Comprehensive metabolic panel; Future  - Protime-INR; Future    4. Nausea  Patient reports new intermittent nausea without vomiting occurring 2-3 times weekly for the last month. She reports that her nausea is at random and not associated with oral intake or breakthrough reflux/heartburn. She is on maximum acid suppression with omeprazole BID, famotidine qHS and Carafate PRN. Recent EUS was also without evidence of a peptic process and prior gastric biopsies were negative for H. pylori. She has not yet started taking Wegovy and denies any additional new medications. Denies any additional upper GI alarm features.    The etiology of her nausea is currently unclear. Possibly related to gastroparesis although she is prediabetic and denies early satiety. Recommended symptom management with  antiemetics at this time. Patient's last EKG shows a QTc of 509 and therefore prescribed scopolamine patches. If her nausea persists, would recommend a GES to evaluate for gastroparesis.       Reason for visit is   Chief Complaint   Patient presents with   • Virtual Regular Visit          Encounter provider Lisandra Leggett PA-C    Provider located at [facility] [address] [place] [address]  [phone]      Recent Visits  Date Type Provider Dept   02/13/24 Telemedicine Lisandra Leggett PA-C [facility]   Showing recent visits within past 7 days and meeting all other requirements  Future Appointments  No visits were found meeting these conditions.  Showing future appointments within next 150 days and meeting all other requirements       The patient was identified by name and date of birth. Chely Ruvalcaba was informed that this is a telemedicine visit and that the visit is being conducted through the Epic Embedded platform. She agrees to proceed. My office door was closed. No one else was in the room.  She acknowledged consent and understanding of privacy and security of the video platform. The patient has agreed to participate and understands they can discontinue the visit at any time.    It was my intent to perform this visit via video technology but the patient was not able to do a video connection so the visit was completed via audio telephone only.    Patient is aware this is a billable service.     Subjective  Chely Ruvalcaba is a 53 y.o. female with PMH significant for anxiety and depression, thyroidism, central lobar emphysema, glucose intolerance, hypertension, hyperlipidemia, vitamin D deficiency, GERD and tobacco use who presents today for a follow-up regarding cirrhosis secondary to ROMERO.     Interval history  - EUS (10/10/2023) for evaluation of abdominal lymphadenopathy was notable for a nodular  liver, diffuse GAVE s/p APC, PHG, healed duodenal ulcer and 3 hypoechoic periportal nodes with well-defined margins benign in appearance.  - MRI with MRCP (10/2023) notable for cirrhosis, hepatomegaly, hepatic steatosis and splenomegaly as well as known abdominal lymphadenopathy.   - Seen in consultation by pulmonology and hematology/oncology.   - Seen by her PCP for b/l ALEJO (left worse than right) and started on Aldactone 25 mg once daily. Reportedly had a LE venous Doppler in [place] to r/o DVT and an Echo (8/2023) which was unremarkable.   - Reports some mild abdominal bloating and nausea.     Extended liver history  Patient is familiar to [facility], previously being followed for multitude of GI-related complaints reestablishing care in January 2023 for anemia which she was reportedly hospitalized in [place] in December 2022 for a bleeding gastric ulcer. She underwent an EGD and colonoscopy in 2022 and has since had two subsequent EGDs with her most recent noting a probable healing ulcer at the junction of the first and second portion of the diodenum, scattered areas of nodular mucos with erosion in the antrum and probable PHG. She continues to follow with Dr. Vu for her GI-related concerns.      She was also noted to have hepatosplenomegaly and diffuse hepatic steatosis on CT from June 2022 in addition to thrombocytopenia. This prompted an RUQ U/S notable for cirrhotic morphology, hepatosplenomegaly and an ovoid hypoechoic structure adjacent to the pancreatic head suggestive of robin hepatitis lymphadenopathy for which she has had a MRI/MRCP showing mild robin hepatic adenopathy with a dominant node adjacent to the pancreatic head. She is scheduled for an EUS. Also had an US elastography notable for F4 cirrhosis and completed a serologic eval negative for competing causes of liver disease.      Denies excessive EtOH use, personal or family history of autoimmune disorders, or known infection with viral  hepatitis. In regards to metabolic risk factors, patient's BMI 30.06 with glucose intolerance, hypertension, and hyperlipidemia.     She is up-to-date with her CRC screening.     MELD 3.0: 9 at 9/8/2023  2:14 PM  MELD-Na: 8 at 9/8/2023  2:14 PM  Calculated from:  Serum Creatinine: 0.91 mg/dL (Using min of 1 mg/dL) at 9/8/2023  2:14 PM  Serum Sodium: 142 mmol/L (Using max of 137 mmol/L) at 9/8/2023  2:14 PM  Total Bilirubin: 0.90 mg/dL (Using min of 1 mg/dL) at 9/8/2023  2:14 PM  Serum Albumin: 3.7 g/dL (Using max of 3.5 g/dL) at 9/8/2023  2:14 PM  INR(ratio): 1.19 at 9/8/2023  2:14 PM  Age at listing (hypothetical): 53 years  Sex: Female at 9/8/2023  2:14 PM      HPI     Past Medical History:   Diagnosis Date   • Abnormal stress test    • Acute cystitis without hematuria 05/07/2018   • Acute duodenal ulcer with bleeding 01/16/2023   • Allergic sinusitis     last assessed - 03Apr2017   • Anemia Around december   • Anxiety     last assessed - 04Mar2016   • Arthritis    • Asthma     last assessed - 04Mar2016   • Bilateral lower extremity edema     last assessed - 12May2017   • Callus of foot     last assessed - 22Jun2016   • Chest pain    • Chronic GERD     last assessed - 16Jan2017   • Colon polyp    • Constipation     Resolved - 13Jan2017   • COPD (chronic obstructive pulmonary disease) (HCC)    • Depression     last assessed - 04Mar2016   • Disease of thyroid gland    • Diverticulitis of colon     last assessed - 04Mar2016   • Dyspepsia     Resolved - [id number]   • Edema, lower extremity 08/07/2020   • Esophageal reflux     last assessed - 04Mar2016   • Essential hypertriglyceridemia     last assessed - 23Feb2017   • Fatty liver 01/16/2023   • Gastroesophageal reflux disease with esophagitis 11/18/2016   • GERD (gastroesophageal reflux disease)    • Gynecological disorder     last assessed - 04Mar2016   • Hydroureteronephrosis 06/30/2022   • Hypertension     last assessed - 04Mar2016   • Hypokalemia 06/20/2022   •  Hypotension     last assessed - 25Aug2016   • Kidney stone    • Left ureteral stone with hydronephrosis 07/21/2022   • Migraine    • Morbid obesity (HCC) 01/11/2019    Formatting of this note might be different from the original. Added automatically from request for surgery [id number]   • Neuropathy    • Obesity    • Other chest pain 11/08/2018   • Pancreatic lesion 03/13/2023   • Positive depression screening 06/19/2022   • Primary hypertriglyceridemia 06/19/2022   • Pulmonary emphysema (HCC)     last assessed - 04Mar2016   • Seasonal allergies    • Severe obesity (HCC) 01/16/2023   • Skin tag 07/17/2023   • SOB (shortness of breath)    • Strain of groin 07/17/2023   • Urinary frequency     last assessed - 22Jun2016   • Vagina, candidiasis     last assessed - 22Jun2016   • Very heavy cigarette smoker 01/16/2023   • Visual impairment        Past Surgical History:   Procedure Laterality Date   • CARPAL TUNNEL RELEASE      last assessed - 04MAr2016   • CHOLECYSTECTOMY      last assessed - 04Mar2016   • COLONOSCOPY      Complete colonoscopy    • EYE SURGERY      last assessed - 04Mar2016   • FL RETROGRADE PYELOGRAM  6/16/2022   • FL RETROGRADE PYELOGRAM  8/16/2022   • FOOT SURGERY      last assessed - 04Mar2016   • MO CYSTO BLADDER W/URETERAL CATHETERIZATION Left 7/21/2022    Procedure: CYSTOSCOPY RETROGRADE PYELOGRAM WITH INSERTION STENT URETERAL;  Surgeon: Facundo Mataomros MD;  Location: [facility];  Service: Urology   • MO CYSTO/URETERO W/LITHOTRIPSY &INDWELL STENT INSRT Left 6/16/2022    Procedure: CYSTOSCOPY URETEROSCOPY WITH LITHOTRIPSY HOLMIUM LASER, RETROGRADE PYELOGRAM AND INSERTION STENT URETERAL;  Surgeon: Sammy Ferrer MD;  Location: [facility];  Service: Urology   • MO CYSTO/URETERO W/LITHOTRIPSY &INDWELL STENT INSRT Left 8/16/2022    Procedure: CYSTOSCOPY USCOPE W/HOLMIUM LASER, RETROGRADE PYELOGRAM&STENT;  Surgeon: Sudheer Bradford MD;  Location: [facility];  Service: Urology   • MO  ESOPHAGOGASTRODUODENOSCOPY TRANSORAL DIAGNOSTIC N/A 2/13/2017    Procedure: ESOPHAGOGASTRODUODENOSCOPY (EGD);  Surgeon: Alison Saleem MD;  Location: [facility];  Service: Gastroenterology       Current Outpatient Medications   Medication Sig Dispense Refill   • scopolamine (TRANSDERM-SCOP) 1 mg/3 days TD 72 hr patch Apply 1 patch (1 mg/3 days) behind the ear for use up to 72 hrs; If needed for  greater than 72 hrs then remove the old patch and place a new one behind the opposite ear. 10 patch 0   • albuterol (2.5 mg/3 mL) 0.083 % nebulizer solution Take 3 mL (2.5 mg total) by nebulization every 6 (six) hours as needed for wheezing or shortness of breath 30 mL 0   • albuterol (PROVENTIL HFA,VENTOLIN HFA) 90 mcg/act inhaler Inhale 2 puffs every 6 (six) hours as needed for wheezing 18 g 1   • buPROPion (WELLBUTRIN XL) 150 mg 24 hr tablet Take 1 tablet (150 mg total) by mouth every morning 90 tablet 1   • diazepam (VALIUM) 5 mg tablet Take 1 tablet (5 mg total) by mouth every 6 (six) hours as needed for anxiety 30 tablet 0   • ergocalciferol (VITAMIN D2) 50,000 units Take 1 capsule (50,000 Units total) by mouth every 14 (fourteen) days 12 capsule 1   • famotidine (PEPCID) 40 MG tablet Take 1 tablet (40 mg total) by mouth daily at bedtime Take in evening 2 hours prior to bed 90 tablet 3   • ferrous sulfate 324 (65 Fe) mg Take 1 tablet (324 mg total) by mouth daily before breakfast 30 tablet 0   • fluticasone (FLONASE) 50 mcg/act nasal spray 1 spray into each nostril 2 (two) times a day (Patient taking differently: 1 spray into each nostril as needed) 16 g 5   • gabapentin (NEURONTIN) 100 mg capsule Take 100 mg by mouth 2 (two) times a day  0   • levothyroxine 112 mcg tablet Take 1 tablet (112 mcg total) by mouth daily 90 tablet 1   • neomycin-polymyxin-dexamethasone (MAXITROL) ophthalmic suspension instill 1 drop INTO AFFECTED EYE 4 TIMES A DAY AS DIRECTED     • nicotine (NICODERM CQ) 21 mg/24 hr TD 24 hr patch Place 1  patch on the skin over 24 hours every 24 hours 28 patch 5   • omeprazole (PriLOSEC) 40 MG capsule take 1 capsule by mouth twice a day 60 capsule 5   • oxyCODONE-acetaminophen (PERCOCET)  mg per tablet Take 1 tablet by mouth every 6 (six) hours as needed     • Semaglutide-Weight Management (WEGOVY) 0.25 MG/0.5ML Inject 0.5 mL (0.25 mg total) under the skin once a week 2 mL 0   • sertraline (ZOLOFT) 100 mg tablet Take 1 tablet (100 mg total) by mouth 2 (two) times a day 180 tablet 1   • simvastatin (ZOCOR) 20 mg tablet Take 1 tablet (20 mg total) by mouth daily at bedtime 90 tablet 1   • spironolactone (ALDACTONE) 25 mg tablet Take 2 tablets (50 mg total) by mouth daily 30 tablet 5   • sucralfate (CARAFATE) 1 g tablet Take 1 g by mouth 2 (two) times a day     • sucralfate (CARAFATE) 1 g/10 mL suspension Take 10 mL (1 g total) by mouth 4 (four) times a day (with meals and at bedtime) 2700 mL 1   • tiotropium-olodaterol (Stiolto Respimat) 2.5-2.5 MCG/ACT inhaler Inhale 2 puffs daily 4 g 5   • varenicline (CHANTIX) 0.5 mg tablet Take 1 tablet (0.5 mg total) by mouth 2 (two) times a day 60 tablet 0     No current facility-administered medications for this visit.        Allergies   Allergen Reactions   • Hydrocodone-Acetaminophen Hives   • Ketorolac Hives and Dizziness   • Toradol [Ketorolac Tromethamine] Other (See Comments)     hypotension   • Ultram [Tramadol] Nausea Only, GI Intolerance and Vomiting       Review of Systems   All other systems reviewed and are negative.      Video Exam    There were no vitals filed for this visit.    Physical Exam  Neurological:      Mental Status: She is alert and oriented to person, place, and time. Mental status is at baseline.   Psychiatric:         Mood and Affect: Mood normal.         Behavior: Behavior normal.         Thought Content: Thought content normal.         Judgment: Judgment normal.          Visit Time  Total Visit Duration: 45 minutes.

## 2024-02-13 NOTE — Clinical Note
Clerical team, patient was seen for virtual visit on 2/13/2024.  Please call and assist patient with scheduling an in-office follow-up appointment x1 month.

## 2024-02-14 ENCOUNTER — TELEPHONE (OUTPATIENT)
Dept: GASTROENTEROLOGY | Facility: CLINIC | Age: 54
End: 2024-02-14

## 2024-02-14 RX ORDER — SCOLOPAMINE TRANSDERMAL SYSTEM 1 MG/1
PATCH, EXTENDED RELEASE TRANSDERMAL
Qty: 10 PATCH | Refills: 0 | Status: SHIPPED | OUTPATIENT
Start: 2024-02-14

## 2024-02-14 NOTE — TELEPHONE ENCOUNTER
Called pt, scheduled 1M F/U on 3/19/24 at [facility] with Dr. Rashid Wheeler (Dr. GERMANIA Banuelos in hep clinic). Mailed appointment card in hep clinic.

## 2024-02-14 NOTE — TELEPHONE ENCOUNTER
----- Message from Lisandra Leggett PA-C sent at 2/14/2024  8:39 AM EST -----  Clerical team, patient was seen for virtual visit on 2/13/2024.  Please call and assist patient with scheduling an in-office follow-up appointment x1 month.

## 2024-02-15 ENCOUNTER — TELEPHONE (OUTPATIENT)
Age: 54
End: 2024-02-15

## 2024-02-15 NOTE — TELEPHONE ENCOUNTER
PA for Scopolamine    Submitted via    [x]CMM-KEY [id number]  []Surescripts-Case ID #    []Faxed to plan   []Other website    []Phone call Case ID #      Office notes sent, clinical questions answered. Awaiting determination    Turnaround time for your insurance to make a decision on your Prior Authorization can take 7-21 business days.

## 2024-02-16 ENCOUNTER — TELEPHONE (OUTPATIENT)
Age: 54
End: 2024-02-16

## 2024-02-16 NOTE — TELEPHONE ENCOUNTER
PA for scopolamine patch    Submitted via    [x]CMM-KEY  [id number]  []Surescripts-Case ID #   []Faxed to plan   []Other website   []Phone call Case ID #     Office notes sent, clinical questions answered. Awaiting determination    Turnaround time for your insurance to make a decision on your Prior Authorization can take 7-21 business days.

## 2024-02-19 DIAGNOSIS — F41.9 ANXIETY: ICD-10-CM

## 2024-02-19 DIAGNOSIS — Z72.0 TOBACCO ABUSE DISORDER: ICD-10-CM

## 2024-02-19 DIAGNOSIS — E66.9 OBESITY (BMI 35.0-39.9 WITHOUT COMORBIDITY): ICD-10-CM

## 2024-02-19 DIAGNOSIS — K26.9 DUODENAL ULCER: ICD-10-CM

## 2024-02-19 DIAGNOSIS — F33.41 RECURRENT MAJOR DEPRESSIVE DISORDER, IN PARTIAL REMISSION (HCC): ICD-10-CM

## 2024-02-19 DIAGNOSIS — E78.00 HYPERCHOLESTEROLEMIA: ICD-10-CM

## 2024-02-19 DIAGNOSIS — R60.0 EDEMA, LOWER EXTREMITY: ICD-10-CM

## 2024-02-19 DIAGNOSIS — D50.8 IRON DEFICIENCY ANEMIA SECONDARY TO INADEQUATE DIETARY IRON INTAKE: ICD-10-CM

## 2024-02-19 RX ORDER — SIMVASTATIN 20 MG
20 TABLET ORAL
Qty: 90 TABLET | Refills: 0 | Status: CANCELLED | OUTPATIENT
Start: 2024-02-19

## 2024-02-19 RX ORDER — SERTRALINE HYDROCHLORIDE 100 MG/1
100 TABLET, FILM COATED ORAL 2 TIMES DAILY
Qty: 180 TABLET | Refills: 0 | Status: SHIPPED | OUTPATIENT
Start: 2024-02-19

## 2024-02-19 RX ORDER — FAMOTIDINE 40 MG/1
40 TABLET, FILM COATED ORAL
Qty: 90 TABLET | Refills: 1 | Status: SHIPPED | OUTPATIENT
Start: 2024-02-19 | End: 2024-02-19 | Stop reason: SDUPTHER

## 2024-02-19 RX ORDER — FERROUS SULFATE 324(65)MG
324 TABLET, DELAYED RELEASE (ENTERIC COATED) ORAL
Qty: 30 TABLET | Refills: 0 | Status: SHIPPED | OUTPATIENT
Start: 2024-02-19

## 2024-02-19 RX ORDER — SUCRALFATE 1 G/1
1 TABLET ORAL 2 TIMES DAILY
Refills: 0 | Status: CANCELLED | OUTPATIENT
Start: 2024-02-19

## 2024-02-19 RX ORDER — FAMOTIDINE 40 MG/1
40 TABLET, FILM COATED ORAL
Qty: 90 TABLET | Refills: 1 | Status: SHIPPED | OUTPATIENT
Start: 2024-02-19

## 2024-02-19 RX ORDER — VARENICLINE TARTRATE 0.5 MG/1
0.5 TABLET, FILM COATED ORAL 2 TIMES DAILY
Qty: 60 TABLET | Refills: 0 | Status: SHIPPED | OUTPATIENT
Start: 2024-02-19

## 2024-02-19 RX ORDER — SPIRONOLACTONE 25 MG/1
50 TABLET ORAL DAILY
Qty: 30 TABLET | Refills: 0 | Status: CANCELLED | OUTPATIENT
Start: 2024-02-19

## 2024-02-19 RX ORDER — DIAZEPAM 5 MG/1
5 TABLET ORAL EVERY 6 HOURS PRN
Qty: 30 TABLET | Refills: 0 | Status: SHIPPED | OUTPATIENT
Start: 2024-02-19

## 2024-02-19 NOTE — TELEPHONE ENCOUNTER
Next Office Visit 3/12/24      Can't get into the PDMP but last dispensed.       Valium:          Wegovy: not sure of dose

## 2024-02-29 ENCOUNTER — NURSE TRIAGE (OUTPATIENT)
Age: 54
End: 2024-02-29

## 2024-02-29 NOTE — TELEPHONE ENCOUNTER
"Pt calling in, reports she notice bright red rectal bleeding last night \"its not mild but not severe and only with wiping\"- was not associated with a BM. Has not noticed any bleeding today, no rectal pain and does not feel any hemorrhoids. She does have dull lower abdominal pain that comes and goes but reports that has been ongoing for a while. She also has dizzy spells but that started before rectal bleeding. Nausea but no vomiting.     I advised pt to have US done as ordered at last OV- she will do this and to monitor symptoms. If bleeding increases or feels lightheaded to go to the ED. She understood. Other recs?     Answer Assessment - Initial Assessment Questions  1. APPEARANCE of BLOOD: \"What color is it?\" \"Is it passed separately, on the surface of the stool, or mixed in with the stool?\"       Bright red   2. AMOUNT: \"How much blood was passed?\"       Moderate   3. FREQUENCY: \"How many times has blood been passed with the stools?\"       X2 last night   4. ONSET: \"When was the blood first seen in the stools?\" (Days or weeks)       No   5. DIARRHEA: \"Is there also some diarrhea?\" If Yes, ask: \"How many diarrhea stools were passed in past 24 hours?\"         6. CONSTIPATION: \"Do you have constipation?\" If Yes, ask: \"How bad is it?\"        7. RECURRENT SYMPTOMS: \"Have you had blood in your stools before?\" If Yes, ask: \"When was the last time?\" and \"What happened that time?\"       Yes a while ago but did not call anyone it went away on its own   8. BLOOD THINNERS: \"Do you take any blood thinners?\" (e.g., Coumadin/warfarin, Pradaxa/dabigatran, aspirin)        9. OTHER SYMPTOMS: \"Do you have any other symptoms?\"  (e.g., abdominal pain, vomiting, dizziness, fever)      Abd pain. Dizzy spells-chronic    Protocols used: Rectal Bleeding-ADULT-[place]    "

## 2024-03-01 NOTE — TELEPHONE ENCOUNTER
Hx ROMERO cirrhosis/ compensated. Patient reports bright red blood on tissue after BM two days ago. Reports having a BM every 1-2 days.  Denies constipation/ straining or symptoms of anemia- lightheadedness, sob, palpitations, n/v.   Recently started on ferrous sulfate.    Recommended starting miralax 1-2 scoops daily to achieve regular bowel regimen.     Unfortunately our call dropped before I could confirm patient received recommendation. Attempted to contact patient to discuss. Left detailed message with recommendation for miralax and reinforced strict ED precautions.    Patient scheduled 3/19/24 hepatology follow up.

## 2024-03-01 NOTE — TELEPHONE ENCOUNTER
Patients GI provider:  PA: Lisandra Leggett    Number to return call: [phone]    Reason for call: Pt calling stating the call dropped while speaking with Priscila     Scheduled procedure/appointment date if applicable: Apt/procedure

## 2024-03-01 NOTE — TELEPHONE ENCOUNTER
Left message on patient's voice mail. Calling to follow up Dr. Vu's message this AM regarding report of rectal bleeding. Please contact our office if bleeding/ symptoms continue. ED precautions previously discussed.

## 2024-03-20 ENCOUNTER — TELEPHONE (OUTPATIENT)
Dept: HEMATOLOGY ONCOLOGY | Facility: CLINIC | Age: 54
End: 2024-03-20

## 2024-03-20 NOTE — TELEPHONE ENCOUNTER
Appointment Change  Cancel, Reschedule, Change to Virtual      Who are you speaking with? Patient   If it is not the patient, is the caller listed on the communication consent form? N/A   Which provider is the appointment scheduled with? Dr. Nicole   When was the original appointment scheduled?    Please list date and time 3/27/24 3:40pm   At which location is the appointment scheduled to take place? [place]   Was the appointment rescheduled?     Was the appointment changed from an in person visit to a virtual visit?    If so, please list the details of the change. 6/12/24 10:40am   What is the reason for the appointment change? Patient is in the hospital in [place].  Patient has pneumonia.     Patient would like a call back from Dr Nicole's nurse to discuss her current medical condition. Patient would like a call back. [phone].  Patient is scheduled to have a procedure done within the next hour.Patient would like a call back after her procedure, around 1:00pm if possible.       Was STAR transport scheduled? No   Does STAR transport need to be scheduled for the new visit (if applicable) No   Does the patient need an infusion appointment rescheduled? No   Does the patient have an upcoming infusion appointment scheduled? If so, when? No   Is the patient undergoing chemotherapy? No   For appointments cancelled with less than 24 hours:  Was the no-show policy reviewed? Yes

## 2024-03-20 NOTE — TELEPHONE ENCOUNTER
Phoned pt at [phone] as pt requested and had to leave a voice message. I let pt know I am in the office today till 4;30 and will be back in on Friday left my teams number [phone].

## 2024-04-01 ENCOUNTER — HOSPITAL ENCOUNTER (EMERGENCY)
Facility: HOSPITAL | Age: 54
Discharge: LEFT BEFORE DISCHARGE | End: 2024-04-01
Attending: STUDENT IN AN ORGANIZED HEALTH CARE EDUCATION/TRAINING PROGRAM | Admitting: STUDENT IN AN ORGANIZED HEALTH CARE EDUCATION/TRAINING PROGRAM
Payer: COMMERCIAL

## 2024-04-01 VITALS
DIASTOLIC BLOOD PRESSURE: 66 MMHG | SYSTOLIC BLOOD PRESSURE: 124 MMHG | WEIGHT: 170 LBS | TEMPERATURE: 98.2 F | RESPIRATION RATE: 20 BRPM | BODY MASS INDEX: 30.12 KG/M2 | HEART RATE: 95 BPM | HEIGHT: 63 IN | OXYGEN SATURATION: 98 %

## 2024-04-01 PROCEDURE — 99211 OFF/OP EST MAY X REQ PHY/QHP: CPT | Performed by: STUDENT IN AN ORGANIZED HEALTH CARE EDUCATION/TRAINING PROGRAM

## 2024-04-01 RX ORDER — SODIUM CHLORIDE 0.9 % (FLUSH) 0.9 %
10 SYRINGE (ML) INJECTION AS NEEDED
Status: DISCONTINUED | OUTPATIENT
Start: 2024-04-01 | End: 2024-04-01 | Stop reason: HOSPADM

## 2024-04-02 NOTE — ED NOTES
Pt was called for several times to be taken back to a room. There was no sign of pt. Pt LWBS and did not notify any staff.

## 2024-04-10 DIAGNOSIS — K25.4 GASTRIC ULCER WITH HEMORRHAGE, UNSPECIFIED CHRONICITY: ICD-10-CM

## 2024-04-10 RX ORDER — OMEPRAZOLE 40 MG/1
CAPSULE, DELAYED RELEASE ORAL
Qty: 60 CAPSULE | Refills: 5 | Status: SHIPPED | OUTPATIENT
Start: 2024-04-10

## 2024-04-18 ENCOUNTER — TELEPHONE (OUTPATIENT)
Dept: INTERNAL MEDICINE CLINIC | Facility: OTHER | Age: 54
End: 2024-04-18

## 2024-04-18 NOTE — TELEPHONE ENCOUNTER
Pt is scheduled for a hosp f/u on 4/26/2024 with Dr Mason.  Called [facility] for medical records [phone], but it went to offsite facility that handles those requests.  They wanted signed release of information.  I stated I do not have one.  I just faxed them request on our letterhead to [phone]. Not sure if they will honor that.  Pt did say she has something and her LILIA on her phone.          No TCM since pt was discharged 4/11/2024.      Ros:  Pt was in direct contact with you for some time.  Please follow up and ensure records are obtained.  Thank you.

## 2024-04-26 ENCOUNTER — NURSE TRIAGE (OUTPATIENT)
Age: 54
End: 2024-04-26

## 2024-04-26 NOTE — TELEPHONE ENCOUNTER
Patient called to inform the nurse that her blood glucose level is 351.  Patient is also on her way to the ED for her SOB.

## 2024-04-26 NOTE — TELEPHONE ENCOUNTER
Regarding: Blood sugar  ----- Message from Ayan Romero sent at 4/26/2024 12:36 PM EDT -----  Patient calling in with concerns of blood sugar readings first reading at 5:30 am was 445 and last reading was at 8:30 was 320. Please advice. Thank You.

## 2024-04-26 NOTE — TELEPHONE ENCOUNTER
"Returned call to patient regardign hyperglycemia. Patient extremely SOB. States that her sugars have been running high for a few days and she had one  above 500 jsut the other day. Patient unable to talk in full sentences as she is extremely SOB. Advised that patient needs to be seen in ED. Patient wants to get BS checked and get her oxygen fixed as she saus she has been having issues with it and is on her way to a place that can fix. Asked if I could call 911 for the patient. Patient refused. Patient is alert and oriented at this time. Patient advised that if sugar is still high and she can not catch breath with oxygen to continue to ED. Patient verbalized understanding.           Reason for Disposition   Blood glucose > 240 mg/dL (13.3 mmol/L) and rapid breathing    Answer Assessment - Initial Assessment Questions  1. BLOOD GLUCOSE: \"What is your blood glucose level?\"       unsure  2. ONSET: \"When did you check the blood glucose?\"      This am   3. USUAL RANGE: \"What is your glucose level usually?\" (e.g., usual fasting morning value, usual evening value)      Has been high the past few days  4. KETONES: \"Do you check for ketones (urine or blood test strips)?\" If yes, ask: \"What does the test show now?\"       denies  5. TYPE 1 or 2:  \"Do you know what type of diabetes you have?\"  (e.g., Type 1, Type 2, Gestational; doesn't know)       Doesn't know  6. INSULIN: \"Do you take insulin?\" \"What type of insulin(s) do you use? What is the mode of delivery? (syringe, pen; injection or pump)?\"       *No Answer*  7. DIABETES PILLS: \"Do you take any pills for your diabetes?\" If yes, ask: \"Have you missed taking any pills recently?\"      *No Answer*  8. OTHER SYMPTOMS: \"Do you have any symptoms?\" (e.g., fever, frequent urination, difficulty breathing, dizziness, weakness, vomiting)      Difficulty breathing,    Protocols used: Diabetes - High Blood Sugar-ADULT-[place]    "

## 2024-04-29 NOTE — TELEPHONE ENCOUNTER
Left message on machine for patient to call the office back to see if she went to ED and which one did she go to.

## 2024-04-29 NOTE — TELEPHONE ENCOUNTER
Mother and sister are on communication consent.  Called mother twice and phone just disconnects.  Spoke to sister who is [place] who informed me patient is back in [place] because she has doctor appts.  She did not speak to patient since last week.  Asked sister to call pt and have her call the office.  Sister agreed.

## 2024-04-30 ENCOUNTER — OFFICE VISIT (OUTPATIENT)
Dept: INTERNAL MEDICINE CLINIC | Facility: OTHER | Age: 54
End: 2024-04-30
Payer: MEDICARE

## 2024-04-30 ENCOUNTER — HOSPITAL ENCOUNTER (OUTPATIENT)
Dept: RADIOLOGY | Facility: IMAGING CENTER | Age: 54
Discharge: HOME | End: 2024-04-30
Payer: MEDICARE

## 2024-04-30 ENCOUNTER — HOME HEALTH ADMISSION (OUTPATIENT)
Dept: HOME HEALTH SERVICES | Facility: HOME HEALTHCARE | Age: 54
End: 2024-04-30
Payer: MEDICARE

## 2024-04-30 VITALS
OXYGEN SATURATION: 97 % | TEMPERATURE: 98.1 F | DIASTOLIC BLOOD PRESSURE: 82 MMHG | WEIGHT: 251 LBS | HEIGHT: 66 IN | SYSTOLIC BLOOD PRESSURE: 144 MMHG | HEART RATE: 88 BPM | BODY MASS INDEX: 40.34 KG/M2

## 2024-04-30 DIAGNOSIS — J18.9 SEPSIS DUE TO PNEUMONIA (HCC): ICD-10-CM

## 2024-04-30 DIAGNOSIS — J18.9 PNEUMONIA OF BOTH UPPER LOBES DUE TO INFECTIOUS ORGANISM: Primary | ICD-10-CM

## 2024-04-30 DIAGNOSIS — A41.9 SEPSIS DUE TO PNEUMONIA (HCC): ICD-10-CM

## 2024-04-30 DIAGNOSIS — I50.31 ACUTE DIASTOLIC CONGESTIVE HEART FAILURE (HCC): ICD-10-CM

## 2024-04-30 DIAGNOSIS — I10 BENIGN HYPERTENSION: ICD-10-CM

## 2024-04-30 DIAGNOSIS — E78.00 HYPERCHOLESTEROLEMIA: ICD-10-CM

## 2024-04-30 DIAGNOSIS — E11.9 DIABETES MELLITUS TYPE 2, INSULIN DEPENDENT (HCC): ICD-10-CM

## 2024-04-30 DIAGNOSIS — E55.9 VITAMIN D DEFICIENCY: ICD-10-CM

## 2024-04-30 DIAGNOSIS — J93.11 PRIMARY SPONTANEOUS PNEUMOTHORAX: ICD-10-CM

## 2024-04-30 DIAGNOSIS — J96.01 ACUTE RESPIRATORY FAILURE WITH HYPOXIA (HCC): ICD-10-CM

## 2024-04-30 DIAGNOSIS — Z72.0 TOBACCO ABUSE DISORDER: ICD-10-CM

## 2024-04-30 DIAGNOSIS — R60.0 EDEMA, LOWER EXTREMITY: ICD-10-CM

## 2024-04-30 DIAGNOSIS — E53.8 FOLIC ACID DEFICIENCY: ICD-10-CM

## 2024-04-30 DIAGNOSIS — F41.9 ANXIETY: ICD-10-CM

## 2024-04-30 DIAGNOSIS — R73.01 ELEVATED FASTING GLUCOSE: ICD-10-CM

## 2024-04-30 DIAGNOSIS — E03.9 ACQUIRED HYPOTHYROIDISM: ICD-10-CM

## 2024-04-30 DIAGNOSIS — Z79.4 DIABETES MELLITUS TYPE 2, INSULIN DEPENDENT (HCC): ICD-10-CM

## 2024-04-30 DIAGNOSIS — R91.1 LUNG NODULE SEEN ON IMAGING STUDY: ICD-10-CM

## 2024-04-30 DIAGNOSIS — D50.9 IRON DEFICIENCY ANEMIA, UNSPECIFIED IRON DEFICIENCY ANEMIA TYPE: ICD-10-CM

## 2024-04-30 PROBLEM — E66.9 OBESITY (BMI 35.0-39.9 WITHOUT COMORBIDITY): Status: RESOLVED | Noted: 2022-08-16 | Resolved: 2024-04-30

## 2024-04-30 PROCEDURE — 99495 TRANSJ CARE MGMT MOD F2F 14D: CPT | Performed by: FAMILY MEDICINE

## 2024-04-30 PROCEDURE — 71046 X-RAY EXAM CHEST 2 VIEWS: CPT

## 2024-04-30 RX ORDER — NICOTINE 21 MG/24HR
1 PATCH, TRANSDERMAL 24 HOURS TRANSDERMAL EVERY 24 HOURS
Qty: 28 PATCH | Refills: 5 | Status: SHIPPED | OUTPATIENT
Start: 2024-04-30

## 2024-04-30 RX ORDER — LEVOTHYROXINE SODIUM 112 UG/1
112 TABLET ORAL DAILY
Qty: 90 TABLET | Refills: 1 | Status: SHIPPED | OUTPATIENT
Start: 2024-04-30

## 2024-04-30 RX ORDER — BUDESONIDE 0.5 MG/2ML
0.5 INHALANT ORAL 2 TIMES DAILY
COMMUNITY

## 2024-04-30 RX ORDER — SULFAMETHOXAZOLE AND TRIMETHOPRIM 800; 160 MG/1; MG/1
1 TABLET ORAL 3 TIMES WEEKLY
COMMUNITY
End: 2024-04-30 | Stop reason: SDUPTHER

## 2024-04-30 RX ORDER — FOLIC ACID 1 MG/1
1 TABLET ORAL DAILY
COMMUNITY
End: 2024-04-30 | Stop reason: SDUPTHER

## 2024-04-30 RX ORDER — FUROSEMIDE 40 MG/1
40 TABLET ORAL 2 TIMES DAILY
COMMUNITY
End: 2024-04-30 | Stop reason: SDUPTHER

## 2024-04-30 RX ORDER — INSULIN GLARGINE 100 [IU]/ML
55 INJECTION, SOLUTION SUBCUTANEOUS DAILY
Qty: 3 ML | Refills: 5 | Status: SHIPPED | OUTPATIENT
Start: 2024-04-30 | End: 2024-05-02 | Stop reason: SDUPTHER

## 2024-04-30 RX ORDER — FUROSEMIDE 40 MG/1
40 TABLET ORAL 2 TIMES DAILY
Qty: 180 TABLET | Refills: 1 | Status: SHIPPED | OUTPATIENT
Start: 2024-04-30

## 2024-04-30 RX ORDER — INSULIN LISPRO 100 [IU]/ML
15 INJECTION, SOLUTION INTRAVENOUS; SUBCUTANEOUS
Qty: 5 ML | Refills: 5 | Status: SHIPPED | OUTPATIENT
Start: 2024-04-30 | End: 2024-05-02 | Stop reason: ALTCHOICE

## 2024-04-30 RX ORDER — SULFAMETHOXAZOLE AND TRIMETHOPRIM 800; 160 MG/1; MG/1
1 TABLET ORAL 3 TIMES WEEKLY
Qty: 12 TABLET | Refills: 0 | Status: SHIPPED | OUTPATIENT
Start: 2024-05-01 | End: 2024-05-29

## 2024-04-30 RX ORDER — PREDNISONE 10 MG/1
10 TABLET ORAL DAILY
COMMUNITY

## 2024-04-30 RX ORDER — LACTULOSE 10 G/15ML
20 SOLUTION ORAL
COMMUNITY

## 2024-04-30 RX ORDER — FOLIC ACID 1 MG/1
1 TABLET ORAL DAILY
Qty: 90 TABLET | Refills: 1 | Status: SHIPPED | OUTPATIENT
Start: 2024-04-30

## 2024-04-30 RX ORDER — LACTOSE-REDUCED FOOD
1 LIQUID (ML) ORAL 2 TIMES DAILY
Qty: 500 ML | Refills: 11 | Status: SHIPPED | OUTPATIENT
Start: 2024-04-30 | End: 2024-05-06 | Stop reason: SDUPTHER

## 2024-04-30 RX ORDER — DIAZEPAM 5 MG/1
5 TABLET ORAL
Qty: 30 TABLET | Refills: 0 | Status: SHIPPED | OUTPATIENT
Start: 2024-04-30

## 2024-04-30 RX ORDER — INSULIN LISPRO 100 [IU]/ML
15 INJECTION, SOLUTION INTRAVENOUS; SUBCUTANEOUS
COMMUNITY
End: 2024-04-30 | Stop reason: SDUPTHER

## 2024-04-30 NOTE — PROGRESS NOTES
Assessment/Plan:    1. Pneumonia of both upper lobes due to infectious organism  -     sulfamethoxazole-trimethoprim (BACTRIM DS) 800-160 mg per tablet; Take 1 tablet by mouth 3 (three) times a week for 28 days  -     Nutritional Supplements (Ensure Plus) LIQD; Take 237 mL by mouth 2 (two) times a day  -     Prealbumin; Future  -     CT chest wo contrast; Future; Expected date: 07/30/2024  -     Referral to [facility]; Future    2. Vitamin D deficiency    3. Tobacco abuse disorder  -     nicotine (NICODERM CQ) 21 mg/24 hr TD 24 hr patch; Place 1 patch on the skin over 24 hours every 24 hours    4. Iron deficiency anemia, unspecified iron deficiency anemia type  -     Iron Panel (Includes Ferritin, Iron Sat%, Iron, and TIBC); Future    5. Elevated fasting glucose    6. Edema, lower extremity  -     Referral to [facility]; Future    7. Acquired hypothyroidism  -     levothyroxine 112 mcg tablet; Take 1 tablet (112 mcg total) by mouth daily    8. Hypercholesterolemia    9. Benign hypertension    10. Acute diastolic congestive heart failure (HCC)  -     B-Type Natriuretic Peptide(BNP); Future  -     furosemide (LASIX) 40 mg tablet; Take 1 tablet (40 mg total) by mouth 2 (two) times a day  -     CT chest wo contrast; Future; Expected date: 07/30/2024  -     XR chest pa & lateral; Future; Expected date: 04/30/2024  -     Referral to [facility]; Future    11. Sepsis due to pneumonia (HCC)  -     Nutritional Supplements (Ensure Plus) LIQD; Take 237 mL by mouth 2 (two) times a day  -     Prealbumin; Future  -     Referral to [facility]; Future    12. Folic acid deficiency  -     Folate; Future  -     folic acid (FOLVITE) 1 mg tablet; Take 1 tablet (1 mg total) by mouth daily    13. Acute respiratory failure with hypoxia (HCC)  -     XR chest pa & lateral; Future; Expected date: 04/30/2024  -     Referral to [facility]; Future    14.  Anxiety  -     diazepam (VALIUM) 5 mg tablet; Take 1 tablet (5 mg total) by mouth daily at bedtime as needed for anxiety    15. Diabetes mellitus type 2, insulin dependent (HCC)  -     insulin lispro (HumALOG/ADMELOG) 100 units/mL injection; Inject 15 Units under the skin 3 (three) times a day with meals  -     Insulin Glargine Solostar (Basaglar KwikPen) 100 UNIT/ML SOPN; Inject 0.55 mL (55 Units total) under the skin in the morning    16. Lung nodule seen on imaging study  -     CT chest wo contrast; Future; Expected date: 07/30/2024    17. Primary spontaneous pneumothorax            There are no Patient Instructions on file for this visit.    Return in about 4 weeks (around 5/28/2024) for  and 3 months.    Subjective:      Patient ID: Chely Ruvalcaba is a 53 y.o. female.    Chief Complaint   Patient presents with    Hospital Follow up     follow up from hospital visit while in [place] (reports in media), diagnosis with CHF, PNEUMONIA, shortness of breath. Admission: 3/6/2024 and discharged 4/11/2024    Patient is very unsure about what medications she needs to be taking. Patient  has lots of questions regarding medications   Has cardiology appointment tomorrow     Health Maint.      Mammogram due- 9/11/24  Colorectal Cancer Screening due declined   Depression Screening - completed  Physical due  Pap due        HPI      Patient here for hospital follow-up.  Was hospitalized a little over a month in [place] due to hypoxemia shortness of breath diagnosed with COPD exacerbation, congestive heart failure, and bilateral pneumonia.  Was on 15 L oxygen transition to 4 and now has 2 L oxygen around-the-clock at home.  She was never intubated.  Diuresed aggressively medications changed and she is on a long taper with PCP prophylaxis.  Due to her steroid taper her blood sugars increased and currently she is on insulin.  Patient missed read her Bactrim DS prescription and took all 12 pills in a row since being discharged instead of  Monday Wednesday Friday.  She needs refills from some of her medication and that has no difficulty giving herself insulin.  Since being home she still has shortness of breath and debility but no home physical therapy or VNA.  Does not feel back to her normal.  Has been losing weight and eating and drinking better instead of significant amount of soda and carbohydrates.  She is not smoking but trying to use a nicotine patch on top of Chantix as she states she is not ready to give up smoking        The following portions of the patient's history were reviewed and updated as appropriate: allergies, current medications, past family history, past medical history, past social history, past surgical history and problem list.    Review of Systems      Constitutional:  Denies fever or chills , weight loss due to illness  Eyes:  Denies double , blurry vision or eye pain  HENT:  Denies nasal congestion, sore throat or new hearing issues  Respiratory:   see HPI  Cardiovascular:  Denies palpitations or chest pain  GI:  Denies abdominal pain, nausea, or vomiting, no loose stools, no reflux  Integument:  Denies rash , no open areas  Neurologic:  Denies headache or focal weakness, no dizziness  : no dysuria, or hematuria      Current Outpatient Medications   Medication Sig Dispense Refill    albuterol (2.5 mg/3 mL) 0.083 % nebulizer solution Take 3 mL (2.5 mg total) by nebulization every 6 (six) hours as needed for wheezing or shortness of breath 30 mL 0    albuterol (PROVENTIL HFA,VENTOLIN HFA) 90 mcg/act inhaler Inhale 2 puffs every 6 (six) hours as needed for wheezing 18 g 1    budesonide (PULMICORT) 0.5 mg/2 mL nebulizer solution Take 0.5 mg by nebulization daily Rinse mouth after use.      buPROPion (WELLBUTRIN XL) 150 mg 24 hr tablet Take 1 tablet (150 mg total) by mouth every morning 90 tablet 1    diazepam (VALIUM) 5 mg tablet Take 1 tablet (5 mg total) by mouth daily at bedtime as needed for anxiety 30 tablet 0     ergocalciferol (VITAMIN D2) 50,000 units Take 1 capsule (50,000 Units total) by mouth every 14 (fourteen) days 12 capsule 1    famotidine (PEPCID) 40 MG tablet Take 1 tablet (40 mg total) by mouth daily at bedtime Take in evening 2 hours prior to bed 90 tablet 1    ferrous sulfate 324 (65 Fe) mg Take 1 tablet (324 mg total) by mouth daily before breakfast 30 tablet 0    fluticasone (FLONASE) 50 mcg/act nasal spray 1 spray into each nostril 2 (two) times a day (Patient taking differently: 1 spray into each nostril as needed) 16 g 5    folic acid (FOLVITE) 1 mg tablet Take 1 tablet (1 mg total) by mouth daily 90 tablet 1    furosemide (LASIX) 40 mg tablet Take 1 tablet (40 mg total) by mouth 2 (two) times a day 180 tablet 1    gabapentin (NEURONTIN) 100 mg capsule Take 100 mg by mouth every 8 (eight) hours  0    Insulin Glargine Solostar (Basaglar KwikPen) 100 UNIT/ML SOPN Inject 0.55 mL (55 Units total) under the skin in the morning 3 mL 5    insulin lispro (HumALOG/ADMELOG) 100 units/mL injection Inject 15 Units under the skin 3 (three) times a day with meals 5 mL 5    lactulose (CHRONULAC) 10 g/15 mL solution Take 20 g by mouth Daily or as needed for bowl movement      levothyroxine 112 mcg tablet Take 1 tablet (112 mcg total) by mouth daily 90 tablet 1    nicotine (NICODERM CQ) 21 mg/24 hr TD 24 hr patch Place 1 patch on the skin over 24 hours every 24 hours 28 patch 5    Nutritional Supplements (Ensure Plus) LIQD Take 237 mL by mouth 2 (two) times a day 500 mL 11    omeprazole (PriLOSEC) 40 MG capsule take 1 capsule by mouth twice a day 60 capsule 5    oxyCODONE-acetaminophen (PERCOCET)  mg per tablet Take 1 tablet by mouth every 6 (six) hours as needed      predniSONE 10 mg tablet Take 10 mg by mouth daily      sertraline (ZOLOFT) 100 mg tablet Take 1 tablet (100 mg total) by mouth 2 (two) times a day 180 tablet 0    simvastatin (ZOCOR) 20 mg tablet Take 1 tablet (20 mg total) by mouth daily at bedtime 90  "tablet 1    sucralfate (CARAFATE) 1 g tablet Take 1 g by mouth 2 (two) times a day      [START ON 5/1/2024] sulfamethoxazole-trimethoprim (BACTRIM DS) 800-160 mg per tablet Take 1 tablet by mouth 3 (three) times a week for 28 days 12 tablet 0    tiotropium-olodaterol (Stiolto Respimat) 2.5-2.5 MCG/ACT inhaler Inhale 2 puffs daily 4 g 5    varenicline (CHANTIX) 0.5 mg tablet Take 1 tablet (0.5 mg total) by mouth 2 (two) times a day 60 tablet 0    spironolactone (ALDACTONE) 25 mg tablet Take 2 tablets (50 mg total) by mouth daily (Patient not taking: Reported on 4/30/2024) 30 tablet 5     No current facility-administered medications for this visit.       Objective:    /82 (BP Location: Left arm, Patient Position: Sitting, Cuff Size: Large)   Pulse 88   Temp 98.1 °F (36.7 °C) (Temporal)   Ht 5' 6\" (1.676 m)   Wt 114 kg (251 lb)   LMP 04/05/2018 (Approximate)   SpO2 97%   BMI 40.51 kg/m²        Physical Exam         Constitutional:  Well developed, well nourished, no acute distress, non-toxic appearance   Eyes:  PERRL, conjunctiva normal , non icteric sclera  HENT:  Atraumatic, oropharynx moist. Neck-  supple   Respiratory:  CTA b/l, decreased  breath sounds, no rales, no wheezing   Cardiovascular:  RRR, no murmurs, no LE edema b/l  GI:  Soft, nondistended, normal bowel sounds x 4, nontender, no organomegaly, no mass, no rebound, no guarding   Neurologic:  no focal deficits noted   Psychiatric:  Speech and behavior appropriate , AAO x 3      Andi Mason DO"

## 2024-05-01 ENCOUNTER — HOME CARE VISIT (OUTPATIENT)
Dept: HOME HEALTH SERVICES | Facility: HOME HEALTHCARE | Age: 54
End: 2024-05-01

## 2024-05-01 ENCOUNTER — TELEPHONE (OUTPATIENT)
Age: 54
End: 2024-05-01

## 2024-05-01 ENCOUNTER — OFFICE VISIT (OUTPATIENT)
Dept: CARDIOLOGY CLINIC | Facility: CLINIC | Age: 54
End: 2024-05-01
Payer: MEDICARE

## 2024-05-01 VITALS
SYSTOLIC BLOOD PRESSURE: 132 MMHG | WEIGHT: 252.4 LBS | DIASTOLIC BLOOD PRESSURE: 74 MMHG | TEMPERATURE: 98 F | OXYGEN SATURATION: 93 % | BODY MASS INDEX: 40.56 KG/M2 | HEART RATE: 82 BPM | HEIGHT: 66 IN

## 2024-05-01 DIAGNOSIS — E66.01 CLASS 3 SEVERE OBESITY DUE TO EXCESS CALORIES WITH BODY MASS INDEX (BMI) OF 40.0 TO 44.9 IN ADULT, UNSPECIFIED WHETHER SERIOUS COMORBIDITY PRESENT (HCC): ICD-10-CM

## 2024-05-01 DIAGNOSIS — R06.83 SNORING: ICD-10-CM

## 2024-05-01 DIAGNOSIS — I34.0 MITRAL VALVE INSUFFICIENCY, UNSPECIFIED ETIOLOGY: ICD-10-CM

## 2024-05-01 DIAGNOSIS — I10 BENIGN HYPERTENSION: ICD-10-CM

## 2024-05-01 DIAGNOSIS — R06.00 DYSPNEA, UNSPECIFIED TYPE: Primary | ICD-10-CM

## 2024-05-01 DIAGNOSIS — J43.2 CENTRILOBULAR EMPHYSEMA (HCC): ICD-10-CM

## 2024-05-01 PROCEDURE — 99214 OFFICE O/P EST MOD 30 MIN: CPT | Performed by: INTERNAL MEDICINE

## 2024-05-01 PROCEDURE — 93000 ELECTROCARDIOGRAM COMPLETE: CPT | Performed by: INTERNAL MEDICINE

## 2024-05-01 NOTE — TELEPHONE ENCOUNTER
Patient called the RX Refill Line. Message is being forwarded to the office.     Patient is requesting that the Ensure that was sent to [facility] can be sent to [facility] due to insurance purposes    Please contact patient directly at [phone]

## 2024-05-01 NOTE — TELEPHONE ENCOUNTER
Patient called the RX Refill Line.  Message is being forwarded to the office.     Patient is requesting       Please contact patient at   [phone]

## 2024-05-01 NOTE — PROGRESS NOTES
Cardiology Follow up    Chely Ruvalcaba  [id number]  1970  [facility] [address] [address] [phone]      1. Dyspnea, unspecified type  POCT ECG      2. Benign hypertension        3. Centrilobular emphysema (HCC)  Ambulatory referral to Pulmonology      4. Class 3 severe obesity due to excess calories with body mass index (BMI) of 40.0 to 44.9 in adult, unspecified whether serious comorbidity present (HCC)        5. Snoring        6. Mitral valve insufficiency, unspecified etiology            Discussion/Summary:  1.  Dyspnea  2.  Obesity  3.  Abnormal ECG  4.  Snoring  5.  Mitral regurgitation   6. COPD  7.  Documented heart failure preserved ejection fraction  8.  Former tobacco use quit in March 2024 prior to hospitalization    -ECG performed in the office today shows sinus rhythm with PACs heart rate 89 bpm with prolonged QT with QTc measuring 481 ms.  -Nuclear stress testing 3/23/2023 showing no diagnostic evidence of ischemia on perfusion imaging.  -Transthoracic echocardiogram 8/11/2023 showing left-ventricular systolic function normal estimated LVEF 60% with normal diastolic parameters and mild tricuspid regurgitation or mild mitral regurgitation and IVC normal size  -Holter monitor 8/11/2023 showing predominantly sinus rhythm average heart rate 88 bpm with rare supraventricular and ventricular ectopic activity but no significant arrhythmias appreciated and patient symptoms correlating with sinus rhythm  -Will follow-up pending laboratory studies at this time  -Patient currently taking furosemide 40 mg twice daily  -She will continue simvastatin 20 mg daily  -Chest x-ray official report pending  -Right heart catheterization 3/19/2024 at [facility] showing normal pulmonary capillary wedge pressure, normal right atrial pressure, normal pulmonary artery pressure and normal  right ventricular pressure with normal cardiac output by Chrissie and normal cardiac output by thermal dilution with RA pressure measured at 6, pulmonary wedge pressure 9 and PA pressures 24/9/16   -Patient appeared to be visibly short of breath in the office today but states that this is likely because she was not wearing her nasal cannula oxygen which she is supposed to be wearing around-the-clock.  I did offer her emergency room evaluation however this time patient declined.  -I will have patient seen and evaluated by pulmonology as I do believe there is a significant component to her shortness of breath from her underlying lung disease as even evidenced by documentation from [facility]  -I will see patient in 3 months or sooner if necessary  -Patient counseled on the importance of dietary lifestyle modifications including a sodium and fluid restricted diet to less than 1800 mg of sodium daily less than 1800 mL of fluid daily along with weight reduction goal BMI less than 29 and monitoring standing daily weights.  -Patient does appear to be down approximately 20 pounds from last office visit and was encouraged to continue weight loss  -Patient counseled on the importance of continued tobacco cessation      History of Present Illness:  -Patient is a 53-year-old female with cirrhosis, GERD, iron deficiency anemia, hypothyroidism, pulmonary emphysema with tobacco use who originally presented to the office in July 2023 after referral from gastroenterology team for shortness of breath and chest discomfort.  Patient at that time had been having her symptoms for many years and was even seen and evaluated in [place] several years prior for the exact same symptomatology and had informed us that workup at that time was relatively unrevealing.  -Patient was hospitalized for over a month in [place] with concern for pneumothorax, multifocal pneumonia with sepsis, acute hypoxic respiratory failure with COPD exacerbation  and documentation stating acute on chronic heart failure with preserved ejection fraction.  She notes that medications were uptitrated and eventually she was able to be discharged to outpatient follow-up.  -Currently in the office today patient denies any chest pain, palpitations, lightness or dizziness, loss of consciousness but does note some cough and shortness of breath.  She states this is still better than when she was hospitalized in March - April 2024 however states that she has not fully recovered from that hospitalization.  She notes blood pressures at home well-controlled.  She denies significant orthopnea or lower extremity edema and has been taking her furosemide twice daily as instructed with significant urine output.  Patient is currently supposed be on 2 L nasal cannula oxygen.        Patient Active Problem List   Diagnosis    Anxiety    Benign hypertension    Depression    Hypercholesterolemia    Hypothyroidism    Tobacco abuse disorder    Vitamin D deficiency    Gastroparesis    Edema, lower extremity    Elevated fasting glucose    COVID-19 vaccination refused    Centrilobular emphysema (HCC)    Nephrolithiasis    Degeneration of lumbar or lumbosacral intervertebral disc    Gastroesophageal reflux disease    Intermittent claudication (HCC)    Seasonal allergic rhinitis    Spinal stenosis of lumbar region    Iron deficiency anemia    Abnormal stress ECG with treadmill    Cirrhosis (HCC)    History of GI bleed    History of colon polyps    Duodenal ulcer    Irritable bowel syndrome with both constipation and diarrhea    Injury of toe on left foot    Pneumonia of both upper lobes due to infectious organism    Acute diastolic congestive heart failure (HCC)    Sepsis due to pneumonia (HCC)    Folic acid deficiency    Acute respiratory failure with hypoxia (HCC)    Primary spontaneous pneumothorax     Past Medical History:   Diagnosis Date    Abnormal stress test     Acute cystitis without hematuria  05/07/2018    Acute duodenal ulcer with bleeding 01/16/2023    Allergic sinusitis     last assessed - 03Apr2017    Anemia Around december    Anxiety     last assessed - 04Mar2016    Arthritis     Asthma     last assessed - 04Mar2016    Bilateral lower extremity edema     last assessed - 12May2017    Callus of foot     last assessed - 22Jun2016    Chest pain     Chronic GERD     last assessed - 16Jan2017    Colon polyp     Constipation     Resolved - 13Jan2017    COPD (chronic obstructive pulmonary disease) ([facility])     Depression     last assessed - 04Mar2016    Disease of thyroid gland     Diverticulitis of colon     last assessed - 04Mar2016    Dyspepsia     Resolved - [id number]    Edema, lower extremity 08/07/2020    Esophageal reflux     last assessed - 04Mar2016    Essential hypertriglyceridemia     last assessed - [id number]    Fatty liver 01/16/2023    Gastroesophageal reflux disease with esophagitis 11/18/2016    GERD (gastroesophageal reflux disease)     Gynecological disorder     last assessed - 04Mar2016    Hydroureteronephrosis 06/30/2022    Hypertension     last assessed - 04Mar2016    Hypokalemia 06/20/2022    Hypotension     last assessed - [id number]    Kidney stone     Left ureteral stone with hydronephrosis 07/21/2022    Migraine     Morbid obesity ([facility]) 01/11/2019    Formatting of this note might be different from the original. Added automatically from request for surgery [id number]    Neuropathy     Obesity     Other chest pain 11/08/2018    Pancreatic lesion 03/13/2023    Positive depression screening 06/19/2022    Primary hypertriglyceridemia 06/19/2022    Pulmonary emphysema (HCC)     last assessed - 04Mar2016    Seasonal allergies     Severe obesity (HCC) 01/16/2023    Skin tag 07/17/2023    SOB (shortness of breath)     Strain of groin 07/17/2023    Urinary frequency     last assessed - 22Jun2016    Vagina, candidiasis     last assessed - 22Jun2016    Very heavy cigarette smoker 01/16/2023    Visual  impairment      Social History     Socioeconomic History    Marital status:      Spouse name: Not on file    Number of children: Not on file    Years of education: Not on file    Highest education level: Not on file   Occupational History    Not on file   Tobacco Use    Smoking status: Some Days     Current packs/day: 0.50     Average packs/day: 0.5 packs/day for 41.3 years (20.7 ttl pk-yrs)     Types: Cigarettes     Start date: 1/1/1983    Smokeless tobacco: Never   Vaping Use    Vaping status: Former    Start date: 1/1/2019    Quit date: 5/1/2019    Substances: Nicotine   Substance and Sexual Activity    Alcohol use: Not Currently    Drug use: Never    Sexual activity: Not Currently     Partners: Male     Birth control/protection: None   Other Topics Concern    Not on file   Social History Narrative    Not on file     Social Determinants of Health     Financial Resource Strain: Not on file   Food Insecurity: No Food Insecurity (7/22/2022)    Hunger Vital Sign     Worried About Running Out of Food in the Last Year: Never true     Ran Out of Food in the Last Year: Never true   Transportation Needs: No Transportation Needs (7/22/2022)    PRAPARE - Transportation     Lack of Transportation (Medical): No     Lack of Transportation (Non-Medical): No   Physical Activity: Not on file   Stress: Not on file   Social Connections: Not on file   Intimate Partner Violence: Not on file   Housing Stability: Low Risk  (7/22/2022)    Housing Stability Vital Sign     Unable to Pay for Housing in the Last Year: No     Number of Places Lived in the Last Year: 1     Unstable Housing in the Last Year: No      Family History   Problem Relation Age of Onset    Obesity Mother     Thyroid disease Mother     Cancer Mother 71    Vision loss Mother     Obesity Sister     Coronary artery disease Sister     Stroke Sister     Asthma Sister     Glaucoma Sister     No Known Problems Maternal Aunt     Diabetes Maternal Uncle     Lung cancer  Maternal Grandmother     Cancer Maternal Grandfather     Diabetes Maternal Grandfather     No Known Problems Paternal Grandmother     No Known Problems Paternal Grandfather     Hypertension Other     Lung cancer Other     Hypertension Other     Lung cancer Other     Lung cancer Other      Past Surgical History:   Procedure Laterality Date    CARPAL TUNNEL RELEASE      last assessed - 04MAr2016    CHOLECYSTECTOMY      last assessed - 04Mar2016    COLONOSCOPY      Complete colonoscopy     EYE SURGERY      last assessed - 04Mar2016    FL RETROGRADE PYELOGRAM  6/16/2022    FL RETROGRADE PYELOGRAM  8/16/2022    FOOT SURGERY      last assessed - 04Mar2016    MT CYSTO BLADDER W/URETERAL CATHETERIZATION Left 7/21/2022    Procedure: CYSTOSCOPY RETROGRADE PYELOGRAM WITH INSERTION STENT URETERAL;  Surgeon: Facundo Matamoros MD;  Location: [facility];  Service: Urology    MT CYSTO/URETERO W/LITHOTRIPSY &INDWELL STENT INSRT Left 6/16/2022    Procedure: CYSTOSCOPY URETEROSCOPY WITH LITHOTRIPSY HOLMIUM LASER, RETROGRADE PYELOGRAM AND INSERTION STENT URETERAL;  Surgeon: Sammy Ferrer MD;  Location: [facility];  Service: Urology    MT CYSTO/URETERO W/LITHOTRIPSY &INDWELL STENT INSRT Left 8/16/2022    Procedure: CYSTOSCOPY USCOPE W/HOLMIUM LASER, RETROGRADE PYELOGRAM&STENT;  Surgeon: Sudheer Bradford MD;  Location: [facility];  Service: Urology    MT ESOPHAGOGASTRODUODENOSCOPY TRANSORAL DIAGNOSTIC N/A 2/13/2017    Procedure: ESOPHAGOGASTRODUODENOSCOPY (EGD);  Surgeon: Alison Saleem MD;  Location: [facility];  Service: Gastroenterology       Current Outpatient Medications:     albuterol (2.5 mg/3 mL) 0.083 % nebulizer solution, Take 3 mL (2.5 mg total) by nebulization every 6 (six) hours as needed for wheezing or shortness of breath, Disp: 30 mL, Rfl: 0    albuterol (PROVENTIL HFA,VENTOLIN HFA) 90 mcg/act inhaler, Inhale 2 puffs every 6 (six) hours as needed for wheezing, Disp: 18 g, Rfl: 1    budesonide (PULMICORT) 0.5 mg/2 mL  nebulizer solution, Take 0.5 mg by nebulization daily Rinse mouth after use., Disp: , Rfl:     buPROPion (WELLBUTRIN XL) 150 mg 24 hr tablet, Take 1 tablet (150 mg total) by mouth every morning, Disp: 90 tablet, Rfl: 1    diazepam (VALIUM) 5 mg tablet, Take 1 tablet (5 mg total) by mouth daily at bedtime as needed for anxiety, Disp: 30 tablet, Rfl: 0    ergocalciferol (VITAMIN D2) 50,000 units, Take 1 capsule (50,000 Units total) by mouth every 14 (fourteen) days, Disp: 12 capsule, Rfl: 1    famotidine (PEPCID) 40 MG tablet, Take 1 tablet (40 mg total) by mouth daily at bedtime Take in evening 2 hours prior to bed, Disp: 90 tablet, Rfl: 1    ferrous sulfate 324 (65 Fe) mg, Take 1 tablet (324 mg total) by mouth daily before breakfast, Disp: 30 tablet, Rfl: 0    fluticasone (FLONASE) 50 mcg/act nasal spray, 1 spray into each nostril 2 (two) times a day (Patient taking differently: 1 spray into each nostril as needed), Disp: 16 g, Rfl: 5    folic acid (FOLVITE) 1 mg tablet, Take 1 tablet (1 mg total) by mouth daily, Disp: 90 tablet, Rfl: 1    furosemide (LASIX) 40 mg tablet, Take 1 tablet (40 mg total) by mouth 2 (two) times a day, Disp: 180 tablet, Rfl: 1    gabapentin (NEURONTIN) 100 mg capsule, Take 100 mg by mouth every 8 (eight) hours, Disp: , Rfl: 0    Insulin Glargine Solostar (Basaglar KwikPen) 100 UNIT/ML SOPN, Inject 0.55 mL (55 Units total) under the skin in the morning, Disp: 3 mL, Rfl: 5    insulin lispro (HumALOG/ADMELOG) 100 units/mL injection, Inject 15 Units under the skin 3 (three) times a day with meals, Disp: 5 mL, Rfl: 5    lactulose (CHRONULAC) 10 g/15 mL solution, Take 20 g by mouth Daily or as needed for bowl movement, Disp: , Rfl:     levothyroxine 112 mcg tablet, Take 1 tablet (112 mcg total) by mouth daily, Disp: 90 tablet, Rfl: 1    nicotine (NICODERM CQ) 21 mg/24 hr TD 24 hr patch, Place 1 patch on the skin over 24 hours every 24 hours, Disp: 28 patch, Rfl: 5    Nutritional Supplements  (Ensure Plus) LIQD, Take 237 mL by mouth 2 (two) times a day, Disp: 500 mL, Rfl: 11    omeprazole (PriLOSEC) 40 MG capsule, take 1 capsule by mouth twice a day, Disp: 60 capsule, Rfl: 5    oxyCODONE-acetaminophen (PERCOCET)  mg per tablet, Take 1 tablet by mouth every 6 (six) hours as needed, Disp: , Rfl:     predniSONE 10 mg tablet, Take 10 mg by mouth daily, Disp: , Rfl:     sertraline (ZOLOFT) 100 mg tablet, Take 1 tablet (100 mg total) by mouth 2 (two) times a day, Disp: 180 tablet, Rfl: 0    simvastatin (ZOCOR) 20 mg tablet, Take 1 tablet (20 mg total) by mouth daily at bedtime, Disp: 90 tablet, Rfl: 1    spironolactone (ALDACTONE) 25 mg tablet, Take 2 tablets (50 mg total) by mouth daily (Patient not taking: Reported on 4/30/2024), Disp: 30 tablet, Rfl: 5    sucralfate (CARAFATE) 1 g tablet, Take 1 g by mouth 2 (two) times a day, Disp: , Rfl:     sulfamethoxazole-trimethoprim (BACTRIM DS) 800-160 mg per tablet, Take 1 tablet by mouth 3 (three) times a week for 28 days, Disp: 12 tablet, Rfl: 0    tiotropium-olodaterol (Stiolto Respimat) 2.5-2.5 MCG/ACT inhaler, Inhale 2 puffs daily, Disp: 4 g, Rfl: 5    varenicline (CHANTIX) 0.5 mg tablet, Take 1 tablet (0.5 mg total) by mouth 2 (two) times a day, Disp: 60 tablet, Rfl: 0  Allergies   Allergen Reactions    Hydrocodone-Acetaminophen Hives    Ketorolac Hives and Dizziness    Toradol [Ketorolac Tromethamine] Other (See Comments)     hypotension    Ultram [Tramadol] Nausea Only, GI Intolerance and Vomiting         Labs:  No visits with results within 2 Month(s) from this visit.   Latest known visit with results is:   Lab on 12/21/2023   Component Date Value    Sodium 12/21/2023 142     Potassium 12/21/2023 4.3     Chloride 12/21/2023 109 (H)     CO2 12/21/2023 25     ANION GAP 12/21/2023 8     BUN 12/21/2023 10     Creatinine 12/21/2023 0.73     Glucose 12/21/2023 178 (H)     Calcium 12/21/2023 9.2     AST 12/21/2023 48 (H)     ALT 12/21/2023 21     Alkaline  "Phosphatase 12/21/2023 94     Total Protein 12/21/2023 6.9     Albumin 12/21/2023 3.8     Total Bilirubin 12/21/2023 1.19 (H)     eGFR 12/21/2023 94     WBC 12/21/2023 6.52     RBC 12/21/2023 4.04     Hemoglobin 12/21/2023 11.5     Hematocrit 12/21/2023 38.5     MCV 12/21/2023 95     MCH 12/21/2023 28.5     MCHC 12/21/2023 29.9 (L)     RDW 12/21/2023 17.9 (H)     MPV 12/21/2023 12.5     Platelets 12/21/2023      nRBC 12/21/2023 0     Segmented % 12/21/2023 67     Immature Grans % 12/21/2023 1     Lymphocytes % 12/21/2023 20     Monocytes % 12/21/2023 9     Eosinophils Relative 12/21/2023 2     Basophils Relative 12/21/2023 1     Absolute Neutrophils 12/21/2023 4.45     Absolute Immature Grans 12/21/2023 0.03     Absolute Lymphocytes 12/21/2023 1.31     Absolute Monocytes 12/21/2023 0.59     Eosinophils Absolute 12/21/2023 0.11     Basophils Absolute 12/21/2023 0.03     Bilirubin, Direct 12/21/2023 0.33 (H)     RBC Morphology 12/21/2023 Present     Platelet Estimate 12/21/2023 Unable to Estimate due to clumped platelets (A)         Imaging: No results found.    Review of Systems:  Review of Systems   Constitutional:  Negative for chills, diaphoresis, fatigue and fever.   HENT:  Negative for trouble swallowing and voice change.    Eyes:  Negative for pain and redness.   Respiratory:  Negative for shortness of breath and wheezing.    All other systems reviewed and are negative.        Vitals:    05/01/24 1048   BP: 132/74   BP Location: Right arm   Patient Position: Sitting   Cuff Size: Standard   Pulse: 82   Temp: 98 °F (36.7 °C)   TempSrc: Temporal   SpO2: 93%   Weight: 114 kg (252 lb 6.4 oz)   Height: 5' 6\" (1.676 m)     Vitals:    05/01/24 1048   Weight: 114 kg (252 lb 6.4 oz)     Height: 5' 6\" (167.6 cm)     Physical Exam:   Physical Exam  Vitals reviewed.   Constitutional:       Appearance: She is obese. She is not diaphoretic.   HENT:      Head: Normocephalic and atraumatic.   Eyes:      General:         Right " eye: No discharge.         Left eye: No discharge.   Neck:      Comments: Trachea midline, neck obese, difficult assess JVD  Cardiovascular:      Rate and Rhythm: Normal rate and regular rhythm.      Heart sounds:      No friction rub.   Pulmonary:      Breath sounds: No wheezing.      Comments: Tachypneic  Chest:      Chest wall: No tenderness.   Abdominal:      General: Bowel sounds are normal.      Palpations: Abdomen is soft.      Tenderness: There is no abdominal tenderness. There is no rebound.   Musculoskeletal:      Right lower leg: No edema.      Left lower leg: No edema.   Skin:     General: Skin is warm and dry.   Neurological:      Mental Status: She is alert.      Comments: Awake, alert, able to answer questions appropriately, able to move extremities bilaterally.   Psychiatric:         Mood and Affect: Mood normal.         Behavior: Behavior normal.

## 2024-05-02 ENCOUNTER — TELEPHONE (OUTPATIENT)
Dept: INTERNAL MEDICINE CLINIC | Facility: OTHER | Age: 54
End: 2024-05-02

## 2024-05-02 ENCOUNTER — TELEPHONE (OUTPATIENT)
Dept: INTERNAL MEDICINE CLINIC | Age: 54
End: 2024-05-02

## 2024-05-02 ENCOUNTER — HOME CARE VISIT (OUTPATIENT)
Dept: HOME HEALTH SERVICES | Facility: HOME HEALTHCARE | Age: 54
End: 2024-05-02
Payer: MEDICARE

## 2024-05-02 VITALS
HEART RATE: 84 BPM | OXYGEN SATURATION: 96 % | DIASTOLIC BLOOD PRESSURE: 80 MMHG | SYSTOLIC BLOOD PRESSURE: 122 MMHG | RESPIRATION RATE: 18 BRPM | TEMPERATURE: 97.5 F

## 2024-05-02 DIAGNOSIS — E11.9 DIABETES MELLITUS TYPE 2, INSULIN DEPENDENT (HCC): Primary | ICD-10-CM

## 2024-05-02 DIAGNOSIS — J06.9 VIRAL URI WITH COUGH: ICD-10-CM

## 2024-05-02 DIAGNOSIS — Z79.4 DIABETES MELLITUS TYPE 2, INSULIN DEPENDENT (HCC): Primary | ICD-10-CM

## 2024-05-02 DIAGNOSIS — J43.9 PULMONARY EMPHYSEMA, UNSPECIFIED EMPHYSEMA TYPE (HCC): ICD-10-CM

## 2024-05-02 PROCEDURE — 400013 VN SOC

## 2024-05-02 PROCEDURE — G0299 HHS/HOSPICE OF RN EA 15 MIN: HCPCS

## 2024-05-02 RX ORDER — ALBUTEROL SULFATE 2.5 MG/3ML
2.5 SOLUTION RESPIRATORY (INHALATION) EVERY 6 HOURS PRN
Qty: 30 ML | Refills: 5 | Status: SHIPPED | OUTPATIENT
Start: 2024-05-02

## 2024-05-02 RX ORDER — ALBUTEROL SULFATE 90 UG/1
2 AEROSOL, METERED RESPIRATORY (INHALATION) EVERY 6 HOURS PRN
Qty: 18 G | Refills: 1 | Status: SHIPPED | OUTPATIENT
Start: 2024-05-02

## 2024-05-02 RX ORDER — TIOTROPIUM BROMIDE AND OLODATEROL 3.124; 2.736 UG/1; UG/1
2 SPRAY, METERED RESPIRATORY (INHALATION) DAILY
Qty: 4 G | Refills: 5 | Status: SHIPPED | OUTPATIENT
Start: 2024-05-02 | End: 2024-05-07

## 2024-05-02 RX ORDER — INSULIN LISPRO 100 [IU]/ML
15 INJECTION, SOLUTION INTRAVENOUS; SUBCUTANEOUS
Qty: 15 ML | Refills: 5 | Status: SHIPPED | OUTPATIENT
Start: 2024-05-02

## 2024-05-02 RX ORDER — INSULIN GLARGINE 100 [IU]/ML
55 INJECTION, SOLUTION SUBCUTANEOUS DAILY
Qty: 15 ML | Refills: 5 | Status: SHIPPED | OUTPATIENT
Start: 2024-05-02 | End: 2024-05-06 | Stop reason: SDUPTHER

## 2024-05-02 NOTE — TELEPHONE ENCOUNTER
Patient has been using Oxygen 3 LPM.  Dr Mason's notes from hospital follow-up visit states she is on 2 LPM.    Relayed message to Madeline to discuss with patient.    Patient needs Basaglar and Novolog vials changed to pens due to patient's vision impairment.    Last visit 4/30/24  Next visit 6/10/24

## 2024-05-02 NOTE — TELEPHONE ENCOUNTER
Called patient. She needs to reach out to prescribing doctor. If she is having new symptoms, she needs to call the office to make an appointment.

## 2024-05-02 NOTE — TELEPHONE ENCOUNTER
Patient called could  call in an antibiotic she feel the start of thrush. Please send prescription to [id number] - [place] - [address]

## 2024-05-02 NOTE — TELEPHONE ENCOUNTER
Patient called back and I relayed the message and did offer to schedule appointment with Dr Mason. Patient stated that Dr Mason did prescribe this medication for her previously. She will be reaching out to the I let patient know of who was listed as the prescribing Dr for this medication (Dr. Hyman). She will be reaching out to that office for this medication.

## 2024-05-03 ENCOUNTER — HOME CARE VISIT (OUTPATIENT)
Dept: HOME HEALTH SERVICES | Facility: HOME HEALTHCARE | Age: 54
End: 2024-05-03
Payer: MEDICARE

## 2024-05-03 VITALS — SYSTOLIC BLOOD PRESSURE: 136 MMHG | HEART RATE: 109 BPM | OXYGEN SATURATION: 92 % | DIASTOLIC BLOOD PRESSURE: 80 MMHG

## 2024-05-03 PROCEDURE — G0152 HHCP-SERV OF OT,EA 15 MIN: HCPCS | Performed by: OCCUPATIONAL THERAPIST

## 2024-05-03 PROCEDURE — G0151 HHCP-SERV OF PT,EA 15 MIN: HCPCS

## 2024-05-04 ENCOUNTER — DOCUMENTATION (OUTPATIENT)
Dept: GASTROENTEROLOGY | Facility: CLINIC | Age: 54
End: 2024-05-04

## 2024-05-04 PROCEDURE — G0180 MD CERTIFICATION HHA PATIENT: HCPCS | Performed by: FAMILY MEDICINE

## 2024-05-04 NOTE — PROGRESS NOTES
[facility] Gastroenterology  Gastroenterology Outpatient Follow up  Patient Chely Ruvalcaba   Age 53 y.o.   Gender female   MRN: [id number]  [facility] [id number]     ASSESSMENT AND PLAN:   Problem List Items Addressed This Visit          Digestive    Gastroesophageal reflux disease     Patient continues to have reflux especially nighttime heartburn despite omeprazole 40 mg twice a day.  She is taking the medication 30 minutes to 1 hour before breakfast and before dinner.  Continue famotidine 40 mg 2 hours before bed.  I did advise her to be sure to take her thyroid medication at least 2 hours or more before acid lowering medication.  If okay with her primary care provider I would have no objection to a single Carafate 1 g at bedtime.  If she can make this into a slurry this may help with her nocturnal symptoms.    The head of her bed is raised up on 5 to 6 inches.  Avoid lying down for 3 hours after meals.           Relevant Medications    sucralfate (CARAFATE) 1 g tablet    Cirrhosis (HCC) - Primary     Chely does have cirrhosis based upon abnormal ultrasound elastography revealing F4 fibrosis, nodular liver on imaging and thrombocytopenia.  Likely etiology for cirrhosis is metabolic associated steatotic liver disease.  Her BMI remains high at 40.67.  She does have type 2 diabetes.  She did just recently have a prolonged hospitalization for bilateral pneumonia, exacerbation of COPD and reportedly congestive heart failure.  She seems to be compensated from a liver standpoint at this time.  At admission in [place] though her T. bili was 3.5 however dropped to 1.0 by discharge.    Her MELD 3.0 based upon labs from March/April is 11.  She is due for hepatoma surveillance with ultrasound and alpha-fetoprotein.  Did order an ultrasound Doppler.  I will also order CBC chemistry panel and an INR to be done soon.  Looks like she may have a hepatology follow-up in the gastroenterology clinic for May 14.  I did advise her to cut back on  all salt intake and try not to exceed 2000 mg in a 24-hour period  Watch for any gastrointestinal bleeding such as black stools or vomiting blood.  She is up-to-date with hepatoma surveillance last EGD 10/10/2023 negative for varices.  Repeat EGD in October 2025 or sooner if decompensation.    Other recommendations per hepatology         Relevant Orders    AFP tumor marker    Protime-INR    Comprehensive metabolic panel    US abdomen complete with doppler    Duodenal ulcer     Chely does have a history of duodenal ulcer and I am assuming a GI bleed related to this ulcer at 1 point when she was hospitalized in [place].  This ulcer was noted to be healed October 10, 2023.  Continue omeprazole 40 mg twice a day.  Limit all NSAIDs         Relevant Medications    sucralfate (CARAFATE) 1 g tablet    Irritable bowel syndrome with both constipation and diarrhea     Chely does have a long history of irritable bowel syndrome.  At this point in time her IBS symptoms appear to be stable.  She has been using lactulose about every other day.  She does not mind the taste.  It seems to help her with her bowel pattern.            Immune and Lymphatic    Enlarged lymph nodes, unspecified     Patient did undergo an MRI October 4, 2023 for adenopathy in the peripancreatic region.  Pancreas was unremarkable.  Spleen was enlarged.  .3 cm lymph node lymph node adjacent to the neck of the pancreas  compared to 1.3 cm in December 2015.  -1.1 cm periportal lymph node , compared to 1.0 cm in December 2015.  - 1.4 cm portacaval lymph node , compared to 1.2 cm in December 2015.  - 0.9 cm portacaval lymph node just medially , compared to 0.7 cm in 2015.  EUS October 2023 did reveal 3 hyperechoic periportal lymph nodes with well-defined margins largest measuring 11 x 7 mm.  These did appear benign and were not sampled.  They want to consider repeat imaging in October 2024.            Blood    Iron deficiency anemia     Chely was noted to  have an iron deficiency anemia.  She did have gastric antral vascular ectasias treated at time of EGD in October 2023.  Her hemoglobin was in the 12 range at admission in [place] and dropped to 10.5 after her the 6-week hospital station.  Repeat CBC  Repeat iron studies  Check vitamin B12  Check folate  Continue iron supplementation.  Follow-up with hematology.  She states that for some reason iron infusions were declined at some point after she saw the hematology group.  She does have a follow-up with hematology in the near future.         Relevant Orders    Vitamin B12    Folate    Magnesium    Iron Panel (Includes Ferritin, Iron Sat%, Iron, and TIBC)    CBC       Surgery/Wound/Pain    Edema, lower extremity       Other    History of colon polyps     Patient was noted to have 16 significant polyps back in October 2020 in the face of a poor preparation.  Follow-up colonoscopy in 2022 revealed 2 polyps over 10 mm that were tubular adenomas.  Given multiple polyps,   At some point she should consider genetics consultation.  She states she was called by them, but at some point she should follow-up with them.  Given her iron deficiency and history of multiple polyps, when improved from a cardiac/pulmonary standpoint should consider pursuing a colonoscopy.         Relevant Orders    Ambulatory Referral to Genetics     Other Visit Diagnoses       Nausea        Relevant Medications    ondansetron (ZOFRAN-ODT) 4 mg disintegrating tablet           _____________________________________________________________    HPI:   Chely is a delightful 53-year-old woman whom seen in the office for the first time since around July 2023.  She since has followed up with Lisandra antony of hepatology February 2024.  She was just recently hospitalized for a prolonged time, March 6 through April 11, 2024 with hypoxemic respiratory failure, bilateral pneumonia, pneumothorax, acute on chronic congestive heart failure and exacerbation of COPD.   Records were reviewed of this hospitalization was outlined below.  She has seen the cardiologist who outlined right heart catheterization results as well.  She did require a chest tube.  She was discharged on home oxygen.    She states she feels very weak and tired.  She continues to have difficulty breathing.  From a GI standpoint she does continue to feel acid reflux especially at night despite omeprazole 40 mg twice a day and famotidine 40 mg 2 hours before bed.  She was told by her primary care doctor to stop using Carafate altogether.    She does drink two12 ounce Pepsi's a day at this point in time.    Her bowel pattern has been improved.  She has been using lactulose about every other day.  With this she will have a bowel movement just about daily to every other day.  Denies any significant diarrhea or constipation.  There is been no black stools.  She does port occasional blood on the paper.    She has less peripheral edema at this time.  She does feel confused frequently.      Over 100 pages of records from [facility] reviewed to the best of my ability.  Records reviewed for 45 minutes prior to office visit      Admitted 3/6/2024 through 4/11/2024 [facility]  Discharge medications included  -Insulin 55 units daily  -Budesonide 0.5 mg nebulized  -Lactulose 30 mL 3 times a day  -Folic acid 1 mg daily  -Lasix 40 mg daily  -Humalog 15 units 3 times a day  -Prednisone weaning dose  -Bactrim Monday Wednesday and Friday  -Albuterol  Bupropion  mg daily  -Diazepam 5 mg every 6 hours as needed  Vitamin D  Iron 325 mg daily  Fluticasone nasal spray  Gabapentin 100 mg 2 times a day  Levothyroxine 112 mcg daily  Nicotine patch  -Omeprazole 40 mg twice a day  -Oxycodone 10/325 every 6 hours as needed  Scopolamine patch 1 mg over 3-day patch  Sertraline 100 mg daily  Simvastatin 20 mg daily  Carafate 3 times a day  Varenicline 0.5 mg twice a day (Chantix)  Discontinued medications  Semaglutide  Spironolactone 50  mg tablet    Discharge diagnoses  Sepsis  Bilateral pneumonia treated with Bactrim, steroids, ceftriaxone, and Zithromax  Hypoxemia  Exacerbation of COPD  Leukocytosis  CHF.  Right heart catheterization 3/19 normal right-sided pressures,  Acute on chronic diastolic heart failure  Acute kidney injury  Folic acid deficiency  Cirrhosis  Acute pneumothorax.  Chest tube removed 3/31/2024    4/10/2024 laboratory data  WBC 8.6 Hgb 10.5 HCT 33 platelet count 98,000 MCV 95.4.  Hemoglobin was 12.8 at admission  INR 1.31 (3/6/2024)  T. bili 3.5 at admission dropped to 1.0  Creatinine 0.83 at admission  Sodium 138 at admission  Sodium 141  Potassium 4.5  Creatinine 0.77  Glucose 95  Albumin 2.8  AST 33  ALT 69  T. bili 1.0        3/7/2024 CT scan chest [place]  Findings most compatible with pulmonary edema with groundglass opacification bilaterally with slight sparing of the lung apices.  Differential diagnosis include atypical pneumonia or much less likely hemorrhage.  Pulmonary nodules involving the left upper lobe and right middle lobe.  Left upper lobe nodule measures 2.3 x 2.2 x 7 mm right lobe nodule measures 11 mm    Patient did have a right-sided chest tube in place at some point on imaging 3/24/2024      2/13/2023 virtual visit with hepatology Lisandra antony      12/21/2023 laboratory data  Sodium 142  Potassium 4.3  BUN 10  Creatinine 0.73                                          MELD 3.0  11  Glucose 178  AST 48  ALT 21  Alk phos 94  T. bili 1.19  Direct bili 0.33  WBC 6.52 Hgb 11.5 HCT 38.5 MCV 95 platelet count unable to estimate due to clumping    10/10/2023 EGD Dr. 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  Status post APC  -Portal hypertensive gastropathy in the body of the stomach  -Healed duodenal ulcer  -3 hyperechoic periportal lymph nodes with well-defined margins largest measuring 11 x 7 mm but together likely about 2 cm.  Appeared benign not sampled  -Nodular  liver  -Polypoid area seen in the midesophagus status post biopsy.  Biopsy unremarkable  -Negative for esophageal varices    10/4/2023 MRI abdomen with MRCP  Liver enlarged at 23.7 cm.  Surface nodularity suggestive of cirrhosis  Mild diffuse hepatic steatosis.  No biliary ductal dilatation.  No gallstones  Pancreas unremarkable  Spleen enlarged at 15.9 cm similar to recent studies  Therefore enlarged upper abdominal lymph nodes similar to March 2023 MRI.  These have been present since 2015 with changes in measurement as described below.  --1.3 cm lymph node lymph node adjacent to the neck of the pancreas  compared to 1.3 cm in December 2015.  -1.1 cm periportal lymph node , compared to 1.0 cm in December 2015.  - 1.4 cm portacaval lymph node , compared to 1.2 cm in December 2015.  - 0.9 cm portacaval lymph node just medially , compared to 0.7 cm in 2015.    4/11/2023 ER visit in [place] for nausea vomiting diarrhea.  Diarrhea became dark.  Fecal Hemoccult done in the hospital was negative for blood  AST was 55  ALT 25  Alk phos 75  WBC 4.7 Hgb 9.1 MCV 78 platelet count was 94,000  She did have a fever of 101.2 at that time.  She had been using Pepto-Bismol.  At presentation her heart rate was 80 and blood pressure was 134/73     4/5/2023 EGD  Probable healing ulcer at junction of first and second portion of duodenum.  Biopsy revealed benign intestinal mucosa with retained villous architecture.  Mild acute and chronic inflammation noted.  Nodular mucosa associated with healing ulcer status post biopsy.  Biopsy revealed mild acute and chronic inflammation, focal mucosal erosion and reactive surface foveolar epithelial changes consistent with reactive/chemical gastritis.  Negative for H. pylori.  Scattered areas of nodular mucosa with overlying erosions in the antrum, status post biopsy.  Biopsies negative for H. pylori.  Probable portal hypertensive gastropathy fundus and body status post biopsy.  Biopsy  revealed mucosa with mild vascular congestion and edema.  Normal esophagus     3/22/2023 MRI abdomen  Redefined cirrhotic morphology of the liver.  Consistent with ultrasound elastography findings.  Prior cholecystectomy  Spleen is enlarged at 16.8 cm  Mild robin hepatic adenopathy.  A dominant node measuring 15 mm correlates with ultrasound finding of a hypoechoic lesion adjacent to the pancreas.     3/22/2023 laboratory data  IgA 417  Alpha-1 antitrypsin level 244  ACE 61  Iron 35  Ferritin 9  Iron saturation 7%  TIBC 510  Folate 8.2  Ceruloplasmin 26.9  Vitamin B12 331  Alpha-fetoprotein tumor marker 6.4  WC 6.51 Hgb 10.4 HCT 38.7 MCV 85 platelet count 145,000  INR 1.05  Fasting insulin 142.4  CINDY negative  Mitochondrial antibody negative  Smooth muscle antibody negative  Celiac antibody negative  Liver kidney microsomal antibody negative  She is not immune to hepatitis B.  She is immune to hepatitis A     3/17/2023 evaluated by Dr. Banuelos of hepatology.  Follow-up in September 2023 2/1/2023 CT scan renal stone study.  Spleen enlarged at 15.1 cm.  Gallbladder absent  Diverticulosis without diverticulitis previously noted hydroureteronephrosis on the left side has resolved.  Several nonobstructing intrarenal calculi left kidney     1/18/2023 EGD  Normal second portion of duodenum.  Biopsies negative for celiac disease.  Superficial ulcer in the duodenal bulb with a clean base.  I described superficial ulceration with surrounding heaped up mucosa.  Moderate edematous erythematous nodular mucosa in the antrum.  Biopsies were benign and negative for H. pylori.  Mild generalized erythema gastric body.  Biopsies negative for H. pylori.  Z-line regular at 39 cm.  No sign of esophageal varices     1/27/2023 ultrasound abdomen  Enlarged liver 20.6 cm.  Nodular surface  Heterogeneous echotexture  Mildly enlarged spleen  In the region of the pancreatic head there is a 1.4 x 1.2 x 1.2 cm ovoid hypoechoic focus likely  represent a robin hepatis lymph node        1/27/2023 ultrasound elastography  F4, cirrhosis     1/27/2023-laboratory data  MELD-Na 7   Sodium 140  Creatinine 0.76  AST 27  ALT 16  Alk phos 80  T. bili 0.71  INR 1.10  Cholesterol 126  Triglycerides 91  HDL 48  LDL 60  Iron 32  Ferritin 5  Iron saturation 6%  TIBC 494  WBC 7.73 Hgb 9.0 HCT 33.2 MCV 87 platelet count 180,000 up from 147  Hemoglobin A1c 5.2  TSH 4.13  Hepatitis A total reactive  Hepatitis B surface antigen nonreactive  Hepatitis B surface antibody less than 3.10  Hepatitis B core total nonreactive  Hepatitis C antibody nonreactive     1/21/2023 CT chest lung cancer screening  Splenomegaly of 15.4 cm.  Nonobstructing left renal calcification  1.2 cm left upper lobe groundglass opacity  Moderate paraseptal emphysema.  Benign intrapulmonary lymph nodes abutting the minor fissure.        On 1/18/2023 patient was seen by advanced practitioner Lisandra Leggett     12/11/2022 through 12/13/2022 admitted to [facility] with dizziness, shortness of breath, chest pain, and dark stool for couple weeks.  Patient also reported chronic generalized abdominal pain.  Hemoglobin admission was 7.1.-Received 1 unit of packed red blood cells.  EGD revealed a nonbleeding duodenal ulcer.  No intervention required.  CT scan of the chest abdomen pelvis was performed.  Revealed apical emphysematous changes in the lungs.  Moderately enlarged liver with nodular contour and fatty infiltration suggestive of cirrhosis.  Moderately enlarged spleen  Moderately enlarged gastrohepatic lymph nodes.  TSH was 7.88  Hemoglobin admission 7.1 at discharge 7.9 MCV was elevated at 103  AST was 45  ALT was 32  Admission BUN was 17 creatinine 0.5 albumin 3.3              12/12/2022 EGD [place]  Normal esophagus  Gastritis-chronic atrophic without bleeding  Acute duodenal ulcer without hemorrhage or perforation     EGD 2/28/22   notable for moderate erythematous mucosa in the body of  the stomach and antrum and possible C2 M2 Albrecht's esophagus. Biopsies negative for H. pylori and Albrecht's esophagus.    Colonoscopy 2/28/22 notable for 1 sessile adenomatous appearing polyp (<5mm) 2 pedunculated edematous appearing polyps (10mm+). Biopsies notable for tubular adenomas. Recommended repeat colonoscopy x3 years and that after review of pathology however following the procedure a 1 year follow-up was recommended     2/28/2022  Colonoscopy   2 pedunculated polyps measuring greater than 10 mm in the distal sigmoid colon.  These were tubular adenomas.  There is another 5 mm polyp noted.     In the procedure report he recommended a 1 year follow-up however after the pathology recommended a 3-year follow-up.     10/29/2020 EGD  Normal esophagus.  No evidence of Albrecht's esophagus.  Erythematous, eroded mucosa in the antrum and duodenal bulb.     10/29/2020 colonoscopy Dr. Keenan  16 polyps removed.  10 mm polyp or larger ascending colon.  10 or more sessile polyps adenomatous appearing, semipedunculated measuring 4 to 10 mm transverse colon.  4 polyps measuring 4 to 10 mm descending colon  1 semipedunculated polyp measuring 10 mm sigmoid colon  Poor preparation  Poor preparation and additional polyps likely missed  Repeat colonoscopy 3 months    Allergies   Allergen Reactions    Hydrocodone-Acetaminophen Hives    Ketorolac Hives and Dizziness    Toradol [Ketorolac Tromethamine] Other (See Comments)     hypotension    Ultram [Tramadol] Nausea Only, GI Intolerance and Vomiting     Current Outpatient Medications   Medication Sig Dispense Refill    albuterol (2.5 mg/3 mL) 0.083 % nebulizer solution Take 3 mL (2.5 mg total) by nebulization every 6 (six) hours as needed for wheezing or shortness of breath 30 mL 5    albuterol (PROVENTIL HFA,VENTOLIN HFA) 90 mcg/act inhaler Inhale 2 puffs every 6 (six) hours as needed for wheezing 18 g 1    budesonide (PULMICORT) 0.5 mg/2 mL nebulizer solution Take 0.5 mg by  nebulization daily Rinse mouth after use.      buPROPion (WELLBUTRIN XL) 150 mg 24 hr tablet Take 1 tablet (150 mg total) by mouth every morning 90 tablet 1    diazepam (VALIUM) 5 mg tablet Take 1 tablet (5 mg total) by mouth daily at bedtime as needed for anxiety (Patient taking differently: Take 5 mg by mouth daily at bedtime as needed for anxiety) 30 tablet 0    ergocalciferol (VITAMIN D2) 50,000 units Take 1 capsule (50,000 Units total) by mouth every 14 (fourteen) days 12 capsule 1    famotidine (PEPCID) 40 MG tablet Take 1 tablet (40 mg total) by mouth daily at bedtime Take in evening 2 hours prior to bed 90 tablet 1    ferrous sulfate 324 (65 Fe) mg Take 1 tablet (324 mg total) by mouth daily before breakfast 30 tablet 0    fluticasone (FLONASE) 50 mcg/act nasal spray 1 spray into each nostril 2 (two) times a day (Patient taking differently: 1 spray into each nostril as needed) 16 g 5    folic acid (FOLVITE) 1 mg tablet Take 1 tablet (1 mg total) by mouth daily 90 tablet 1    furosemide (LASIX) 40 mg tablet Take 1 tablet (40 mg total) by mouth 2 (two) times a day 180 tablet 1    gabapentin (NEURONTIN) 100 mg capsule Take 100 mg by mouth every 8 (eight) hours  0    Insulin Glargine Solostar (Basaglar KwikPen) 100 UNIT/ML SOPN Inject 0.55 mL (55 Units total) under the skin in the morning 15 mL 5    insulin lispro (HumaLOG) 100 units/mL injection pen Inject 15 Units under the skin 3 (three) times a day with meals 15 mL 5    Insulin Pen Needle 31G X 5 MM MISC Inject under the skin if needed (Insuline injections) 100 each 3    lactulose (CHRONULAC) 10 g/15 mL solution Take 20 g by mouth Daily or as needed for bowl movement      levothyroxine 112 mcg tablet Take 1 tablet (112 mcg total) by mouth daily 90 tablet 1    nicotine (NICODERM CQ) 21 mg/24 hr TD 24 hr patch Place 1 patch on the skin over 24 hours every 24 hours 28 patch 5    Nutritional Supplements (Ensure Plus) LIQD Take 237 mL by mouth 2 (two) times a day  500 mL 11    omeprazole (PriLOSEC) 40 MG capsule take 1 capsule by mouth twice a day 60 capsule 5    ondansetron (ZOFRAN-ODT) 4 mg disintegrating tablet Take 1 tablet (4 mg total) by mouth every 12 (twelve) hours as needed for nausea or vomiting 60 tablet 1    oxyCODONE-acetaminophen (PERCOCET)  mg per tablet Take 1 tablet by mouth every 6 (six) hours as needed for severe pain      oxygen gas Inhale 2 L/min continuous. via nasal cannula  Indications: low saturation      predniSONE 10 mg tablet Take 10 mg by mouth daily      sertraline (ZOLOFT) 100 mg tablet Take 1 tablet (100 mg total) by mouth 2 (two) times a day 180 tablet 0    simvastatin (ZOCOR) 20 mg tablet Take 1 tablet (20 mg total) by mouth daily at bedtime 90 tablet 1    sucralfate (CARAFATE) 1 g tablet Take 1 tablet (1 g total) by mouth daily at bedtime 60 tablet 3    sulfamethoxazole-trimethoprim (BACTRIM DS) 800-160 mg per tablet Take 1 tablet by mouth 3 (three) times a week for 28 days 12 tablet 0    tiotropium-olodaterol (Stiolto Respimat) 2.5-2.5 MCG/ACT inhaler Inhale 2 puffs daily 4 g 5    varenicline (CHANTIX) 0.5 mg tablet Take 1 tablet (0.5 mg total) by mouth 2 (two) times a day 60 tablet 0    spironolactone (ALDACTONE) 25 mg tablet Take 2 tablets (50 mg total) by mouth daily (Patient not taking: Reported on 4/30/2024) 30 tablet 5     No current facility-administered medications for this visit.     MEDICAL HISTORY:  Past Medical History:   Diagnosis Date    Abnormal stress test     Acute cystitis without hematuria 05/07/2018    Acute duodenal ulcer with bleeding 01/16/2023    Allergic sinusitis     last assessed - 03Apr2017    Anemia Around december    Anxiety     last assessed - 04Mar2016    Arthritis     Asthma     last assessed - 04Mar2016    Bilateral lower extremity edema     last assessed - 12May2017    Callus of foot     last assessed - 22Jun2016    Chest pain     Chronic GERD     last assessed - 16Jan2017    Colon polyp      Constipation     Resolved - 13Jan2017    COPD (chronic obstructive pulmonary disease) (HCC)     Depression     last assessed - 04Mar2016    Disease of thyroid gland     Diverticulitis of colon     last assessed - 04Mar2016    Dyspepsia     Resolved - [id number]    Edema, lower extremity 08/07/2020    Esophageal reflux     last assessed - 04Mar2016    Essential hypertriglyceridemia     last assessed - [id number]    Fatty liver 01/16/2023    Gastroesophageal reflux disease with esophagitis 11/18/2016    GERD (gastroesophageal reflux disease)     Gynecological disorder     last assessed - 04Mar2016    Hydroureteronephrosis 06/30/2022    Hypertension     last assessed - 04Mar2016    Hypokalemia 06/20/2022    Hypotension     last assessed - 25Aug2016    Kidney stone     Left ureteral stone with hydronephrosis 07/21/2022    Migraine     Morbid obesity (HCC) 01/11/2019    Formatting of this note might be different from the original. Added automatically from request for surgery [id number]    Neuropathy     Obesity     Other chest pain 11/08/2018    Pancreatic lesion 03/13/2023    Positive depression screening 06/19/2022    Primary hypertriglyceridemia 06/19/2022    Pulmonary emphysema (HCC)     last assessed - 04Mar2016    Seasonal allergies     Severe obesity (HCC) 01/16/2023    Skin tag 07/17/2023    SOB (shortness of breath)     Strain of groin 07/17/2023    Urinary frequency     last assessed - 22Jun2016    Vagina, candidiasis     last assessed - 22Jun2016    Very heavy cigarette smoker 01/16/2023    Visual impairment      Past Surgical History:   Procedure Laterality Date    CARPAL TUNNEL RELEASE      last assessed - 04MAr2016    CHOLECYSTECTOMY      last assessed - 04Mar2016    COLONOSCOPY      Complete colonoscopy     EYE SURGERY      last assessed - 04Mar2016    FL RETROGRADE PYELOGRAM  6/16/2022    FL RETROGRADE PYELOGRAM  8/16/2022    FOOT SURGERY      last assessed - 04Mar2016    ND CYSTO BLADDER W/URETERAL  "CATHETERIZATION Left 7/21/2022    Procedure: CYSTOSCOPY RETROGRADE PYELOGRAM WITH INSERTION STENT URETERAL;  Surgeon: Facundo Matamoros MD;  Location: [facility];  Service: Urology    TN CYSTO/URETERO W/LITHOTRIPSY &INDWELL STENT INSRT Left 6/16/2022    Procedure: CYSTOSCOPY URETEROSCOPY WITH LITHOTRIPSY HOLMIUM LASER, RETROGRADE PYELOGRAM AND INSERTION STENT URETERAL;  Surgeon: Sammy Ferrer MD;  Location: [facility];  Service: Urology    TN CYSTO/URETERO W/LITHOTRIPSY &INDWELL STENT INSRT Left 8/16/2022    Procedure: CYSTOSCOPY USCOPE W/HOLMIUM LASER, RETROGRADE PYELOGRAM&STENT;  Surgeon: Sudheer Bradford MD;  Location: [facility];  Service: Urology    [place] ESOPHAGOGASTRODUODENOSCOPY TRANSORAL DIAGNOSTIC N/A 2/13/2017    Procedure: ESOPHAGOGASTRODUODENOSCOPY (EGD);  Surgeon: Alison Saleem MD;  Location: [facility];  Service: Gastroenterology     Social History     Substance and Sexual Activity   Alcohol Use Not Currently     Social History     Substance and Sexual Activity   Drug Use Never     Social History     Tobacco Use   Smoking Status Some Days    Current packs/day: 0.50    Average packs/day: 0.5 packs/day for 41.3 years (20.7 ttl pk-yrs)    Types: Cigarettes    Start date: 1/1/1983   Smokeless Tobacco Never     Family History   Problem Relation Age of Onset    Obesity Mother     Thyroid disease Mother     Cancer Mother 71    Vision loss Mother     Obesity Sister     Coronary artery disease Sister     Stroke Sister     Asthma Sister     Glaucoma Sister     No Known Problems Maternal Aunt     Diabetes Maternal Uncle     Lung cancer Maternal Grandmother     Cancer Maternal Grandfather     Diabetes Maternal Grandfather     No Known Problems Paternal Grandmother     No Known Problems Paternal Grandfather     Hypertension Other     Lung cancer Other     Hypertension Other     Lung cancer Other     Lung cancer Other          Objective   Blood pressure 128/76, pulse (!) 125, resp. rate 18, height 5' 6\" (1.676 m), " weight 114 kg (252 lb), last menstrual period 04/05/2018, SpO2 90%. Body mass index is 40.67 kg/m².    PHYSICAL EXAM:   General Appearance: Alert, cooperative, she does have nasal cannula O2.  HEENT: Normocephalic, atraumatic, anicteric.   Neck: Supple, symmetrical, trachea midline  Lungs: Clear to auscultation bilaterally; no rales, rhonchi or wheezing; respirations unlabored   Heart: Regular rate and rhythm; tachycardic at 115 beats a minute.  No ectopy at this time.  No murmur.  Abdomen: Soft, bowel sounds normal, non-tender, non-distended; no masses, there is no hepatosplenomegaly. . Obese abdomen.  She does have spider angiomas on her chest wall.  Genitalia: Deferred   Rectal: Deferred   Extremities: No cyanosis, clubbing or edema.  There is no asterixis.  Skin: No jaundice, rashes, or lesions   Lymph nodes: No palpable cervical lymphadenopathy   Lab Results:   No visits with results within 2 Month(s) from this visit.   Latest known visit with results is:   Lab on 12/21/2023   Component Date Value    Sodium 12/21/2023 142     Potassium 12/21/2023 4.3     Chloride 12/21/2023 109 (H)     CO2 12/21/2023 25     ANION GAP 12/21/2023 8     BUN 12/21/2023 10     Creatinine 12/21/2023 0.73     Glucose 12/21/2023 178 (H)     Calcium 12/21/2023 9.2     AST 12/21/2023 48 (H)     ALT 12/21/2023 21     Alkaline Phosphatase 12/21/2023 94     Total Protein 12/21/2023 6.9     Albumin 12/21/2023 3.8     Total Bilirubin 12/21/2023 1.19 (H)     eGFR 12/21/2023 94     WBC 12/21/2023 6.52     RBC 12/21/2023 4.04     Hemoglobin 12/21/2023 11.5     Hematocrit 12/21/2023 38.5     MCV 12/21/2023 95     MCH 12/21/2023 28.5     MCHC 12/21/2023 29.9 (L)     RDW 12/21/2023 17.9 (H)     MPV 12/21/2023 12.5     Platelets 12/21/2023      nRBC 12/21/2023 0     Segmented % 12/21/2023 67     Immature Grans % 12/21/2023 1     Lymphocytes % 12/21/2023 20     Monocytes % 12/21/2023 9     Eosinophils Relative 12/21/2023 2     Basophils Relative  12/21/2023 1     Absolute Neutrophils 12/21/2023 4.45     Absolute Immature Grans 12/21/2023 0.03     Absolute Lymphocytes 12/21/2023 1.31     Absolute Monocytes 12/21/2023 0.59     Eosinophils Absolute 12/21/2023 0.11     Basophils Absolute 12/21/2023 0.03     Bilirubin, Direct 12/21/2023 0.33 (H)     RBC Morphology 12/21/2023 Present     Platelet Estimate 12/21/2023 Unable to Estimate due to clumped platelets (A)      Radiology Results:   No results found.  Michele Vu DO   05/06/24   Cc:

## 2024-05-04 NOTE — PROGRESS NOTES
Records reviewed and outlined below in preparation for office visit 5/6/2024;    Over 100 pages of records from [facility] reviewed to the best of my ability.  Records reviewed for 45 minutes prior to office visit      Admitted 3/6/2024 through 4/11/2024 [facility]  Discharge medications included  -Insulin 55 units daily  -Budesonide 0.5 mg nebulized  -Lactulose 30 mL 3 times a day  -Folic acid 1 mg daily  -Lasix 40 mg daily  -Humalog 15 units 3 times a day  -Prednisone weaning dose  -Bactrim Monday Wednesday and Friday  -Albuterol  Bupropion  mg daily  -Diazepam 5 mg every 6 hours as needed  Vitamin D  Iron 325 mg daily  Fluticasone nasal spray  Gabapentin 100 mg 2 times a day  Levothyroxine 112 mcg daily  Nicotine patch  -Omeprazole 40 mg twice a day  -Oxycodone 10/325 every 6 hours as needed  Scopolamine patch 1 mg over 3-day patch  Sertraline 100 mg daily  Simvastatin 20 mg daily  Carafate 3 times a day  Varenicline 0.5 mg twice a day (Chantix)  Discontinued medications  Semaglutide  Spironolactone 50 mg tablet    Discharge diagnoses  Sepsis  Bilateral pneumonia treated with Bactrim, steroids, ceftriaxone, and Zithromax  Hypoxemia  Exacerbation of COPD  Leukocytosis  CHF.  Right heart catheterization 3/19 normal right-sided pressures,  Acute on chronic diastolic heart failure  Acute kidney injury  Folic acid deficiency  Cirrhosis  Acute pneumothorax.  Chest tube removed 3/31/2024    4/10/2024 laboratory data  WBC 8.6 Hgb 10.5 HCT 33 platelet count 98,000 MCV 95.4.  Hemoglobin was 12.8 at admission  INR 1.31 (3/6/2024)  T. bili 3.5 at admission dropped to 1.0  Creatinine 0.83 at admission  Sodium 138 at admission  Sodium 141  Potassium 4.5  Creatinine 0.77  Glucose 95  Albumin 2.8  AST 33  ALT 69  T. bili 1.0        3/7/2024 CT scan chest [facility]  Findings most compatible with pulmonary edema with groundglass opacification bilaterally with slight sparing of the lung apices.  Differential diagnosis  include atypical pneumonia or much less likely hemorrhage.  Pulmonary nodules involving the left upper lobe and right middle lobe.  Left upper lobe nodule measures 2.3 x 2.2 x 7 mm right lobe nodule measures 11 mm    Patient did have a right-sided chest tube in place at some point on imaging 3/24/2024      2/13/2023 virtual visit with hepatology Lisandra ronck      12/21/2023 laboratory data  Sodium 142  Potassium 4.3  BUN 10  Creatinine 0.73  Glucose 178  AST 48  ALT 21  Alk phos 94  T. bili 1.19  Direct bili 0.33  WBC 6.52 Hgb 11.5 HCT 38.5 MCV 95 platelet count unable to estimate due to clumping    10/10/2023 EGD Dr. 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  Status post APC  -Portal hypertensive gastropathy in the body of the stomach  -Healed duodenal ulcer  -3 hyperechoic periportal lymph nodes with well-defined margins largest measuring 11 x 7 mm but together likely about 2 cm.  Appeared benign not sampled  -Nodular liver  -Polypoid area seen in the midesophagus status post biopsy.  Biopsy unremarkable  -Negative for esophageal varices    10/4/2023 MRI abdomen with MRCP  Liver enlarged at 23.7 cm.  Surface nodularity suggestive of cirrhosis  Mild diffuse hepatic steatosis.  No biliary ductal dilatation.  No gallstones  Pancreas unremarkable  Spleen enlarged at 15.9 cm similar to recent studies  Therefore enlarged upper abdominal lymph nodes similar to March 2023 MRI.  These have been present since 2015 with changes in measurement as described below.  --1.3 cm lymph node lymph node adjacent to the neck of the pancreas  compared to 1.3 cm in December 2015.  -1.1 cm periportal lymph node , compared to 1.0 cm in December 2015.  - 1.4 cm portacaval lymph node , compared to 1.2 cm in December 2015.  - 0.9 cm portacaval lymph node just medially , compared to 0.7 cm in 2015.    4/11/2023 ER visit in [place] for nausea vomiting diarrhea.  Diarrhea became dark.  Fecal Hemoccult done in  the hospital was negative for blood  AST was 55  ALT 25  Alk phos 75  WBC 4.7 Hgb 9.1 MCV 78 platelet count was 94,000  She did have a fever of 101.2 at that time.  She had been using Pepto-Bismol.  At presentation her heart rate was 80 and blood pressure was 134/73     4/5/2023 EGD  Probable healing ulcer at junction of first and second portion of duodenum.  Biopsy revealed benign intestinal mucosa with retained villous architecture.  Mild acute and chronic inflammation noted.  Nodular mucosa associated with healing ulcer status post biopsy.  Biopsy revealed mild acute and chronic inflammation, focal mucosal erosion and reactive surface foveolar epithelial changes consistent with reactive/chemical gastritis.  Negative for H. pylori.  Scattered areas of nodular mucosa with overlying erosions in the antrum, status post biopsy.  Biopsies negative for H. pylori.  Probable portal hypertensive gastropathy fundus and body status post biopsy.  Biopsy revealed mucosa with mild vascular congestion and edema.  Normal esophagus     3/22/2023 MRI abdomen  Redefined cirrhotic morphology of the liver.  Consistent with ultrasound elastography findings.  Prior cholecystectomy  Spleen is enlarged at 16.8 cm  Mild robin hepatic adenopathy.  A dominant node measuring 15 mm correlates with ultrasound finding of a hypoechoic lesion adjacent to the pancreas.     3/22/2023 laboratory data  IgA 417  Alpha-1 antitrypsin level 244  ACE 61  Iron 35  Ferritin 9  Iron saturation 7%  TIBC 510  Folate 8.2  Ceruloplasmin 26.9  Vitamin B12 331  Alpha-fetoprotein tumor marker 6.4  WC 6.51 Hgb 10.4 HCT 38.7 MCV 85 platelet count 145,000  INR 1.05  Fasting insulin 142.4  CINDY negative  Mitochondrial antibody negative  Smooth muscle antibody negative  Celiac antibody negative  Liver kidney microsomal antibody negative  She is not immune to hepatitis B.  She is immune to hepatitis A     3/17/2023 evaluated by Dr. Banuelos of hepatology.  Follow-up in  September 2023 2/1/2023 CT scan renal stone study.  Spleen enlarged at 15.1 cm.  Gallbladder absent  Diverticulosis without diverticulitis previously noted hydroureteronephrosis on the left side has resolved.  Several nonobstructing intrarenal calculi left kidney     1/18/2023 EGD  Normal second portion of duodenum.  Biopsies negative for celiac disease.  Superficial ulcer in the duodenal bulb with a clean base.  I described superficial ulceration with surrounding heaped up mucosa.  Moderate edematous erythematous nodular mucosa in the antrum.  Biopsies were benign and negative for H. pylori.  Mild generalized erythema gastric body.  Biopsies negative for H. pylori.  Z-line regular at 39 cm.  No sign of esophageal varices     1/27/2023 ultrasound abdomen  Enlarged liver 20.6 cm.  Nodular surface  Heterogeneous echotexture  Mildly enlarged spleen  In the region of the pancreatic head there is a 1.4 x 1.2 x 1.2 cm ovoid hypoechoic focus likely represent a robin hepatis lymph node        1/27/2023 ultrasound elastography  F4, cirrhosis     1/27/2023-laboratory data  MELD-Na 7   Sodium 140  Creatinine 0.76  AST 27  ALT 16  Alk phos 80  T. bili 0.71  INR 1.10  Cholesterol 126  Triglycerides 91  HDL 48  LDL 60  Iron 32  Ferritin 5  Iron saturation 6%  TIBC 494  WBC 7.73 Hgb 9.0 HCT 33.2 MCV 87 platelet count 180,000 up from 147  Hemoglobin A1c 5.2  TSH 4.13  Hepatitis A total reactive  Hepatitis B surface antigen nonreactive  Hepatitis B surface antibody less than 3.10  Hepatitis B core total nonreactive  Hepatitis C antibody nonreactive     1/21/2023 CT chest lung cancer screening  Splenomegaly of 15.4 cm.  Nonobstructing left renal calcification  1.2 cm left upper lobe groundglass opacity  Moderate paraseptal emphysema.  Benign intrapulmonary lymph nodes abutting the minor fissure.        On 1/18/2023 patient was seen by advanced practitioner Lisandra Leggett     12/11/2022 through 12/13/2022 admitted to [facility]""  [place] with dizziness, shortness of breath, chest pain, and dark stool for couple weeks.  Patient also reported chronic generalized abdominal pain.  Hemoglobin admission was 7.1.-Received 1 unit of packed red blood cells.  EGD revealed a nonbleeding duodenal ulcer.  No intervention required.  CT scan of the chest abdomen pelvis was performed.  Revealed apical emphysematous changes in the lungs.  Moderately enlarged liver with nodular contour and fatty infiltration suggestive of cirrhosis.  Moderately enlarged spleen  Moderately enlarged gastrohepatic lymph nodes.  TSH was 7.88  Hemoglobin admission 7.1 at discharge 7.9 MCV was elevated at 103  AST was 45  ALT was 32  Admission BUN was 17 creatinine 0.5 albumin 3.3              12/12/2022 EGD [place]  Normal esophagus  Gastritis-chronic atrophic without bleeding  Acute duodenal ulcer without hemorrhage or perforation     EGD 2/28/22   notable for moderate erythematous mucosa in the body of the stomach and antrum and possible C2 M2 Albrecht's esophagus. Biopsies negative for H. pylori and Albrecht's esophagus.    Colonoscopy 2/28/22 notable for 1 sessile adenomatous appearing polyp (<5mm) 2 pedunculated edematous appearing polyps (10mm+). Biopsies notable for tubular adenomas. Recommended repeat colonoscopy x3 years and that after review of pathology however following the procedure a 1 year follow-up was recommended     2/28/2022  Colonoscopy   2 pedunculated polyps measuring greater than 10 mm in the distal sigmoid colon.  These were tubular adenomas.  There is another 5 mm polyp noted.     In the procedure report he recommended a 1 year follow-up however after the pathology recommended a 3-year follow-up.     10/29/2020 EGD  Normal esophagus.  No evidence of Albrecht's esophagus.  Erythematous, eroded mucosa in the antrum and duodenal bulb.     10/29/2020 colonoscopy Dr. Keenan  16 polyps removed.  10 mm polyp or larger ascending colon.  10 or more sessile  polyps adenomatous appearing, semipedunculated measuring 4 to 10 mm transverse colon.  4 polyps measuring 4 to 10 mm descending colon  1 semipedunculated polyp measuring 10 mm sigmoid colon  Poor preparation  Poor preparation and additional polyps likely missed  Repeat colonoscopy 3 months

## 2024-05-06 ENCOUNTER — TELEPHONE (OUTPATIENT)
Age: 54
End: 2024-05-06

## 2024-05-06 ENCOUNTER — HOME CARE VISIT (OUTPATIENT)
Dept: HOME HEALTH SERVICES | Facility: HOME HEALTHCARE | Age: 54
End: 2024-05-06
Payer: MEDICARE

## 2024-05-06 ENCOUNTER — OFFICE VISIT (OUTPATIENT)
Dept: GASTROENTEROLOGY | Facility: CLINIC | Age: 54
End: 2024-05-06
Payer: MEDICARE

## 2024-05-06 VITALS
DIASTOLIC BLOOD PRESSURE: 76 MMHG | HEART RATE: 125 BPM | HEIGHT: 66 IN | BODY MASS INDEX: 40.5 KG/M2 | RESPIRATION RATE: 18 BRPM | SYSTOLIC BLOOD PRESSURE: 128 MMHG | OXYGEN SATURATION: 90 % | WEIGHT: 252 LBS

## 2024-05-06 VITALS — HEART RATE: 76 BPM | DIASTOLIC BLOOD PRESSURE: 80 MMHG | OXYGEN SATURATION: 97 % | SYSTOLIC BLOOD PRESSURE: 124 MMHG

## 2024-05-06 VITALS — HEART RATE: 97 BPM | SYSTOLIC BLOOD PRESSURE: 140 MMHG | OXYGEN SATURATION: 96 % | DIASTOLIC BLOOD PRESSURE: 76 MMHG

## 2024-05-06 VITALS — DIASTOLIC BLOOD PRESSURE: 70 MMHG | OXYGEN SATURATION: 77 % | HEART RATE: 118 BPM | SYSTOLIC BLOOD PRESSURE: 140 MMHG

## 2024-05-06 DIAGNOSIS — R60.0 EDEMA, LOWER EXTREMITY: ICD-10-CM

## 2024-05-06 DIAGNOSIS — D50.8 IRON DEFICIENCY ANEMIA SECONDARY TO INADEQUATE DIETARY IRON INTAKE: ICD-10-CM

## 2024-05-06 DIAGNOSIS — A41.9 SEPSIS DUE TO PNEUMONIA (HCC): ICD-10-CM

## 2024-05-06 DIAGNOSIS — J18.9 PNEUMONIA OF BOTH UPPER LOBES DUE TO INFECTIOUS ORGANISM: ICD-10-CM

## 2024-05-06 DIAGNOSIS — D50.9 IRON DEFICIENCY ANEMIA, UNSPECIFIED IRON DEFICIENCY ANEMIA TYPE: ICD-10-CM

## 2024-05-06 DIAGNOSIS — Z79.4 DIABETES MELLITUS TYPE 2, INSULIN DEPENDENT (HCC): ICD-10-CM

## 2024-05-06 DIAGNOSIS — E11.9 DIABETES MELLITUS TYPE 2, INSULIN DEPENDENT (HCC): ICD-10-CM

## 2024-05-06 DIAGNOSIS — R59.9 ENLARGED LYMPH NODES, UNSPECIFIED: ICD-10-CM

## 2024-05-06 DIAGNOSIS — K21.9 GASTROESOPHAGEAL REFLUX DISEASE WITHOUT ESOPHAGITIS: ICD-10-CM

## 2024-05-06 DIAGNOSIS — K26.9 DUODENAL ULCER: ICD-10-CM

## 2024-05-06 DIAGNOSIS — K58.2 IRRITABLE BOWEL SYNDROME WITH BOTH CONSTIPATION AND DIARRHEA: ICD-10-CM

## 2024-05-06 DIAGNOSIS — K74.60 CIRRHOSIS OF LIVER WITHOUT ASCITES, UNSPECIFIED HEPATIC CIRRHOSIS TYPE (HCC): Primary | ICD-10-CM

## 2024-05-06 DIAGNOSIS — Z86.010 HISTORY OF COLON POLYPS: ICD-10-CM

## 2024-05-06 DIAGNOSIS — R11.0 NAUSEA: ICD-10-CM

## 2024-05-06 DIAGNOSIS — J18.9 SEPSIS DUE TO PNEUMONIA (HCC): ICD-10-CM

## 2024-05-06 PROCEDURE — G0151 HHCP-SERV OF PT,EA 15 MIN: HCPCS

## 2024-05-06 PROCEDURE — G0152 HHCP-SERV OF OT,EA 15 MIN: HCPCS | Performed by: OCCUPATIONAL THERAPIST

## 2024-05-06 PROCEDURE — 99215 OFFICE O/P EST HI 40 MIN: CPT | Performed by: INTERNAL MEDICINE

## 2024-05-06 RX ORDER — ONDANSETRON 4 MG/1
4 TABLET, ORALLY DISINTEGRATING ORAL EVERY 12 HOURS PRN
Qty: 60 TABLET | Refills: 1 | Status: SHIPPED | OUTPATIENT
Start: 2024-05-06

## 2024-05-06 RX ORDER — INSULIN GLARGINE 100 [IU]/ML
55 INJECTION, SOLUTION SUBCUTANEOUS DAILY
Qty: 15 ML | Refills: 5 | Status: SHIPPED | OUTPATIENT
Start: 2024-05-06

## 2024-05-06 RX ORDER — SUCRALFATE 1 G/1
1 TABLET ORAL
Qty: 60 TABLET | Refills: 3 | Status: SHIPPED | OUTPATIENT
Start: 2024-05-06

## 2024-05-06 RX ORDER — LACTOSE-REDUCED FOOD
1 LIQUID (ML) ORAL 2 TIMES DAILY
Qty: 500 ML | Refills: 11 | Status: SHIPPED | OUTPATIENT
Start: 2024-05-06 | End: 2024-05-09 | Stop reason: SDUPTHER

## 2024-05-06 NOTE — ASSESSMENT & PLAN NOTE
Patient was noted to have 16 significant polyps back in October 2020 in the face of a poor preparation.  Follow-up colonoscopy in 2022 revealed 2 polyps over 10 mm that were tubular adenomas.  Given multiple polyps,   At some point she should consider genetics consultation.  She states she was called by them, but at some point she should follow-up with them.  Given her iron deficiency and history of multiple polyps, when improved from a cardiac/pulmonary standpoint should consider pursuing a colonoscopy.

## 2024-05-06 NOTE — CASE COMMUNICATION
OT to continue 1 wk 1 then 2 x per week for 2 weeks for therapeutic exercise, ADL training, review of safety with transfers and mobility, and recommendations for DME as appropriate.

## 2024-05-06 NOTE — TELEPHONE ENCOUNTER
Patient requesting pharmacy change . Basalgar is on back order in patients current pharmacy.    All out of the medication

## 2024-05-06 NOTE — ASSESSMENT & PLAN NOTE
Chely does have a history of duodenal ulcer and I am assuming a GI bleed related to this ulcer at 1 point when she was hospitalized in [place].  This ulcer was noted to be healed October 10, 2023.  Continue omeprazole 40 mg twice a day.  Limit all NSAIDs

## 2024-05-06 NOTE — ASSESSMENT & PLAN NOTE
Chely does have cirrhosis based upon abnormal ultrasound elastography revealing F4 fibrosis, nodular liver on imaging and thrombocytopenia.  Likely etiology for cirrhosis is metabolic associated steatotic liver disease.  Her BMI remains high at 40.67.  She does have type 2 diabetes.  She did just recently have a prolonged hospitalization for bilateral pneumonia, exacerbation of COPD and reportedly congestive heart failure.  She seems to be compensated from a liver standpoint at this time.  At admission in [place] though her T. bili was 3.5 however dropped to 1.0 by discharge.    Her MELD 3.0 based upon labs from March/April is 11.  She is due for hepatoma surveillance with ultrasound and alpha-fetoprotein.  Did order an ultrasound Doppler.  I will also order CBC chemistry panel and an INR to be done soon.  Looks like she may have a hepatology follow-up in the gastroenterology clinic for May 14.  I did advise her to cut back on all salt intake and try not to exceed 2000 mg in a 24-hour period  Watch for any gastrointestinal bleeding such as black stools or vomiting blood.  She is up-to-date with hepatoma surveillance last EGD 10/10/2023 negative for varices.  Repeat EGD in October 2025 or sooner if decompensation.    Other recommendations per hepatology

## 2024-05-06 NOTE — ASSESSMENT & PLAN NOTE
Chely does have a long history of irritable bowel syndrome.  At this point in time her IBS symptoms appear to be stable.  She has been using lactulose about every other day.  She does not mind the taste.  It seems to help her with her bowel pattern.

## 2024-05-06 NOTE — ASSESSMENT & PLAN NOTE
Chely was noted to have an iron deficiency anemia.  She did have gastric antral vascular ectasias treated at time of EGD in October 2023.  Her hemoglobin was in the 12 range at admission in [place] and dropped to 10.5 after her the 6-week hospital station.  Repeat CBC  Repeat iron studies  Check vitamin B12  Check folate  Continue iron supplementation.  Follow-up with hematology.  She states that for some reason iron infusions were declined at some point after she saw the hematology group.  She does have a follow-up with hematology in the near future.

## 2024-05-06 NOTE — ASSESSMENT & PLAN NOTE
Patient continues to have reflux especially nighttime heartburn despite omeprazole 40 mg twice a day.  She is taking the medication 30 minutes to 1 hour before breakfast and before dinner.  Continue famotidine 40 mg 2 hours before bed.  I did advise her to be sure to take her thyroid medication at least 2 hours or more before acid lowering medication.  If okay with her primary care provider I would have no objection to a single Carafate 1 g at bedtime.  If she can make this into a slurry this may help with her nocturnal symptoms.    The head of her bed is raised up on 5 to 6 inches.  Avoid lying down for 3 hours after meals.

## 2024-05-06 NOTE — LETTER
May 6, 2024     Rashid Pete DO  [address] [facility] [facility]  [address]  [facility] [address]    Patient: Chely Ruvalcaba   YOB: 1970   Date of Visit: 5/6/2024       Dear Dr. Pete:    Thank you for referring Chely Ruvalcaba to me for evaluation. Below are my notes for this consultation.    If you have questions, please do not hesitate to call me. I look forward to following your patient along with you.         Sincerely,        Michele Vu DO        CC: ANTONIO Nam MD Joseph John Minissale, DO  5/6/2024  3:05 PM  Incomplete  [facility] Gastroenterology  Gastroenterology Outpatient Follow up  Patient Chely Ruvalcaba   Age 53 y.o.   Gender female   MRN: [id number]  [facility] [id number]     ASSESSMENT AND PLAN:   Problem List Items Addressed This Visit          Digestive    Gastroesophageal reflux disease     Patient continues to have reflux especially nighttime heartburn despite omeprazole 40 mg twice a day.  She is taking the medication 30 minutes to 1 hour before breakfast and before dinner.  Continue famotidine 40 mg 2 hours before bed.  I did advise her to be sure to take her thyroid medication at least 2 hours or more before acid lowering medication.  If okay with her primary care provider I would have no objection to a single Carafate 1 g at bedtime.  If she can make this into a slurry this may help with her nocturnal symptoms.    The head of her bed is raised up on 5 to 6 inches.  Avoid lying down for 3 hours after meals.           Cirrhosis (HCC) - Primary     Chely does have cirrhosis based upon abnormal ultrasound elastography revealing F4 fibrosis, nodular liver on imaging and thrombocytopenia.  Likely etiology for cirrhosis is metabolic associated steatotic liver disease.  Her BMI remains high at 40.67.  She does have type 2 diabetes.  She did just recently have a prolonged hospitalization for bilateral pneumonia, exacerbation of COPD and reportedly congestive heart  failure.  She seems to be compensated from a liver standpoint at this time.  At admission in [place] though her T. bili was 3.5 however dropped to 1.0 by discharge.    Her MELD 3.0 based upon labs from March/April is 11.  She is due for hepatoma surveillance with ultrasound and alpha-fetoprotein.  Did order an ultrasound Doppler.  I will also order CBC chemistry panel and an INR to be done soon.  Looks like she may have a hepatology follow-up in the gastroenterology clinic for May 14.  I did advise her to cut back on all salt intake and try not to exceed 2000 mg in a 24-hour period  Watch for any gastrointestinal bleeding such as black stools or vomiting blood.  She is up-to-date with hepatoma surveillance last EGD 10/10/2023 negative for varices.  Repeat EGD in October 2025 or sooner if decompensation.    Other recommendations per hepatology         Relevant Orders    AFP tumor marker    Protime-INR    Comprehensive metabolic panel    US abdomen complete with doppler    Duodenal ulcer     Chely does have a history of duodenal ulcer and I am assuming a GI bleed related to this ulcer at 1 point when she was hospitalized in [place].  This ulcer was noted to be healed October 10, 2023.  Continue omeprazole 40 mg twice a day.  Limit all NSAIDs         Irritable bowel syndrome with both constipation and diarrhea     Chely does have a long history of irritable bowel syndrome.  At this point in time her IBS symptoms appear to be stable.  She has been using lactulose about every other day.  She does not mind the taste.  It seems to help her with her bowel pattern.            Immune and Lymphatic    Enlarged lymph nodes, unspecified     Patient did undergo an MRI October 4, 2023 for adenopathy in the peripancreatic region.  Pancreas was unremarkable.  Spleen was enlarged.  .3 cm lymph node lymph node adjacent to the neck of the pancreas  compared to 1.3 cm in December 2015.  -1.1 cm periportal lymph node , compared to  1.0 cm in December 2015.  - 1.4 cm portacaval lymph node , compared to 1.2 cm in December 2015.  - 0.9 cm portacaval lymph node just medially , compared to 0.7 cm in 2015.  EUS October 2023 did reveal 3 hyperechoic periportal lymph nodes with well-defined margins largest measuring 11 x 7 mm.  These did appear benign and were not sampled.  They want to consider repeat imaging in October 2024.            Blood    Iron deficiency anemia     Chely was noted to have an iron deficiency anemia.  She did have gastric antral vascular ectasias treated at time of EGD in October 2023.  Her hemoglobin was in the 12 range at admission in [place] and dropped to 10.5 after her the 6-week hospital station.  Repeat CBC  Repeat iron studies  Check vitamin B12  Check folate  Continue iron supplementation.  Follow-up with hematology.  She states that for some reason iron infusions were declined at some point after she saw the hematology group.  She does have a follow-up with hematology in the near future.         Relevant Orders    Vitamin B12    Folate    Magnesium    Iron Panel (Includes Ferritin, Iron Sat%, Iron, and TIBC)    CBC       Surgery/Wound/Pain    Edema, lower extremity       Other    History of colon polyps     Patient was noted to have 16 significant polyps back in October 2020 in the face of a poor preparation.  Follow-up colonoscopy in 2022 revealed 2 polyps over 10 mm that were tubular adenomas.  Given multiple polyps,   At some point she should consider genetics consultation.  She states she was called by them, but at some point she should follow-up with them.  Given her iron deficiency and history of multiple polyps, when improved from a cardiac/pulmonary standpoint should consider pursuing a colonoscopy.         Relevant Orders    Ambulatory Referral to Genetics      _____________________________________________________________    HPI:   Chely is a delightful 53-year-old woman whom seen in the office for the first  time since around July 2023.  She since has followed up with Lisandra antony of hepatology February 2024.  She was just recently hospitalized for a prolonged time, March 6 through April 11, 2024 with hypoxemic respiratory failure, bilateral pneumonia, pneumothorax, acute on chronic congestive heart failure and exacerbation of COPD.  Records were reviewed of this hospitalization was outlined below.  She has seen the cardiologist who outlined right heart catheterization results as well.  She did require a chest tube.  She was discharged on home oxygen.    She states she feels very weak and tired.  She continues to have difficulty breathing.  From a GI standpoint she does continue to feel acid reflux especially at night despite omeprazole 40 mg twice a day and famotidine 40 mg 2 hours before bed.  She was told by her primary care doctor to stop using Carafate altogether.    She does drink two12 ounce Pepsi's a day at this point in time.    Her bowel pattern has been improved.  She has been using lactulose about every other day.  With this she will have a bowel movement just about daily to every other day.  Denies any significant diarrhea or constipation.  There is been no black stools.  She does port occasional blood on the paper.    She has less peripheral edema at this time.  She does feel confused frequently.      Over 100 pages of records from [facility] reviewed to the best of my ability.  Records reviewed for 45 minutes prior to office visit      Admitted 3/6/2024 through 4/11/2024 [facility]  Discharge medications included  -Insulin 55 units daily  -Budesonide 0.5 mg nebulized  -Lactulose 30 mL 3 times a day  -Folic acid 1 mg daily  -Lasix 40 mg daily  -Humalog 15 units 3 times a day  -Prednisone weaning dose  -Bactrim Monday Wednesday and Friday  -Albuterol  Bupropion  mg daily  -Diazepam 5 mg every 6 hours as needed  Vitamin D  Iron 325 mg daily  Fluticasone nasal spray  Gabapentin 100 mg 2 times a  day  Levothyroxine 112 mcg daily  Nicotine patch  -Omeprazole 40 mg twice a day  -Oxycodone 10/325 every 6 hours as needed  Scopolamine patch 1 mg over 3-day patch  Sertraline 100 mg daily  Simvastatin 20 mg daily  Carafate 3 times a day  Varenicline 0.5 mg twice a day (Chantix)  Discontinued medications  Semaglutide  Spironolactone 50 mg tablet    Discharge diagnoses  Sepsis  Bilateral pneumonia treated with Bactrim, steroids, ceftriaxone, and Zithromax  Hypoxemia  Exacerbation of COPD  Leukocytosis  CHF.  Right heart catheterization 3/19 normal right-sided pressures,  Acute on chronic diastolic heart failure  Acute kidney injury  Folic acid deficiency  Cirrhosis  Acute pneumothorax.  Chest tube removed 3/31/2024    4/10/2024 laboratory data  WBC 8.6 Hgb 10.5 HCT 33 platelet count 98,000 MCV 95.4.  Hemoglobin was 12.8 at admission  INR 1.31 (3/6/2024)  T. bili 3.5 at admission dropped to 1.0  Creatinine 0.83 at admission  Sodium 138 at admission  Sodium 141  Potassium 4.5  Creatinine 0.77  Glucose 95  Albumin 2.8  AST 33  ALT 69  T. bili 1.0        3/7/2024 CT scan chest [place]  Findings most compatible with pulmonary edema with groundglass opacification bilaterally with slight sparing of the lung apices.  Differential diagnosis include atypical pneumonia or much less likely hemorrhage.  Pulmonary nodules involving the left upper lobe and right middle lobe.  Left upper lobe nodule measures 2.3 x 2.2 x 7 mm right lobe nodule measures 11 mm    Patient did have a right-sided chest tube in place at some point on imaging 3/24/2024      2/13/2023 virtual visit with hepatology Lisandra antony      12/21/2023 laboratory data  Sodium 142  Potassium 4.3  BUN 10  Creatinine 0.73                                          MELD 3.0  11  Glucose 178  AST 48  ALT 21  Alk phos 94  T. bili 1.19  Direct bili 0.33  WBC 6.52 Hgb 11.5 HCT 38.5 MCV 95 platelet count unable to estimate due to clumping    10/10/2023 EGD Dr. Reyes with  EUS  -Diffuse gastric antral vascular ectasia in the antrum.  Status post APC  -Portal hypertensive gastropathy in the body of the stomach  -Healed duodenal ulcer  -3 hyperechoic periportal lymph nodes with well-defined margins largest measuring 11 x 7 mm but together likely about 2 cm.  Appeared benign not sampled  -Nodular liver  -Polypoid area seen in the midesophagus status post biopsy.  Biopsy unremarkable  -Negative for esophageal varices    10/4/2023 MRI abdomen with MRCP  Liver enlarged at 23.7 cm.  Surface nodularity suggestive of cirrhosis  Mild diffuse hepatic steatosis.  No biliary ductal dilatation.  No gallstones  Pancreas unremarkable  Spleen enlarged at 15.9 cm similar to recent studies  Therefore enlarged upper abdominal lymph nodes similar to March 2023 MRI.  These have been present since 2015 with changes in measurement as described below.  --1.3 cm lymph node lymph node adjacent to the neck of the pancreas  compared to 1.3 cm in December 2015.  -1.1 cm periportal lymph node , compared to 1.0 cm in December 2015.  - 1.4 cm portacaval lymph node , compared to 1.2 cm in December 2015.  - 0.9 cm portacaval lymph node just medially , compared to 0.7 cm in 2015.    4/11/2023 ER visit in [place] for nausea vomiting diarrhea.  Diarrhea became dark.  Fecal Hemoccult done in the hospital was negative for blood  AST was 55  ALT 25  Alk phos 75  WBC 4.7 Hgb 9.1 MCV 78 platelet count was 94,000  She did have a fever of 101.2 at that time.  She had been using Pepto-Bismol.  At presentation her heart rate was 80 and blood pressure was 134/73     4/5/2023 EGD  Probable healing ulcer at junction of first and second portion of duodenum.  Biopsy revealed benign intestinal mucosa with retained villous architecture.  Mild acute and chronic inflammation noted.  Nodular mucosa associated with healing ulcer status post biopsy.  Biopsy revealed mild acute and chronic inflammation, focal mucosal erosion and reactive  surface foveolar epithelial changes consistent with reactive/chemical gastritis.  Negative for H. pylori.  Scattered areas of nodular mucosa with overlying erosions in the antrum, status post biopsy.  Biopsies negative for H. pylori.  Probable portal hypertensive gastropathy fundus and body status post biopsy.  Biopsy revealed mucosa with mild vascular congestion and edema.  Normal esophagus     3/22/2023 MRI abdomen  Redefined cirrhotic morphology of the liver.  Consistent with ultrasound elastography findings.  Prior cholecystectomy  Spleen is enlarged at 16.8 cm  Mild robin hepatic adenopathy.  A dominant node measuring 15 mm correlates with ultrasound finding of a hypoechoic lesion adjacent to the pancreas.     3/22/2023 laboratory data  IgA 417  Alpha-1 antitrypsin level 244  ACE 61  Iron 35  Ferritin 9  Iron saturation 7%  TIBC 510  Folate 8.2  Ceruloplasmin 26.9  Vitamin B12 331  Alpha-fetoprotein tumor marker 6.4  WC 6.51 Hgb 10.4 HCT 38.7 MCV 85 platelet count 145,000  INR 1.05  Fasting insulin 142.4  CINDY negative  Mitochondrial antibody negative  Smooth muscle antibody negative  Celiac antibody negative  Liver kidney microsomal antibody negative  She is not immune to hepatitis B.  She is immune to hepatitis A     3/17/2023 evaluated by Dr. Banuelos of hepatology.  Follow-up in September 2023 2/1/2023 CT scan renal stone study.  Spleen enlarged at 15.1 cm.  Gallbladder absent  Diverticulosis without diverticulitis previously noted hydroureteronephrosis on the left side has resolved.  Several nonobstructing intrarenal calculi left kidney     1/18/2023 EGD  Normal second portion of duodenum.  Biopsies negative for celiac disease.  Superficial ulcer in the duodenal bulb with a clean base.  I described superficial ulceration with surrounding heaped up mucosa.  Moderate edematous erythematous nodular mucosa in the antrum.  Biopsies were benign and negative for H. pylori.  Mild generalized erythema gastric body.   Biopsies negative for H. pylori.  Z-line regular at 39 cm.  No sign of esophageal varices     1/27/2023 ultrasound abdomen  Enlarged liver 20.6 cm.  Nodular surface  Heterogeneous echotexture  Mildly enlarged spleen  In the region of the pancreatic head there is a 1.4 x 1.2 x 1.2 cm ovoid hypoechoic focus likely represent a robin hepatis lymph node        1/27/2023 ultrasound elastography  F4, cirrhosis     1/27/2023-laboratory data  MELD-Na 7   Sodium 140  Creatinine 0.76  AST 27  ALT 16  Alk phos 80  T. bili 0.71  INR 1.10  Cholesterol 126  Triglycerides 91  HDL 48  LDL 60  Iron 32  Ferritin 5  Iron saturation 6%  TIBC 494  WBC 7.73 Hgb 9.0 HCT 33.2 MCV 87 platelet count 180,000 up from 147  Hemoglobin A1c 5.2  TSH 4.13  Hepatitis A total reactive  Hepatitis B surface antigen nonreactive  Hepatitis B surface antibody less than 3.10  Hepatitis B core total nonreactive  Hepatitis C antibody nonreactive     1/21/2023 CT chest lung cancer screening  Splenomegaly of 15.4 cm.  Nonobstructing left renal calcification  1.2 cm left upper lobe groundglass opacity  Moderate paraseptal emphysema.  Benign intrapulmonary lymph nodes abutting the minor fissure.        On 1/18/2023 patient was seen by advanced practitioner Lisandra Leggett     12/11/2022 through 12/13/2022 admitted to [facility] with dizziness, shortness of breath, chest pain, and dark stool for couple weeks.  Patient also reported chronic generalized abdominal pain.  Hemoglobin admission was 7.1.-Received 1 unit of packed red blood cells.  EGD revealed a nonbleeding duodenal ulcer.  No intervention required.  CT scan of the chest abdomen pelvis was performed.  Revealed apical emphysematous changes in the lungs.  Moderately enlarged liver with nodular contour and fatty infiltration suggestive of cirrhosis.  Moderately enlarged spleen  Moderately enlarged gastrohepatic lymph nodes.  TSH was 7.88  Hemoglobin admission 7.1 at discharge 7.9 MCV was elevated  at 103  AST was 45  ALT was 32  Admission BUN was 17 creatinine 0.5 albumin 3.3              12/12/2022 EGD [place]  Normal esophagus  Gastritis-chronic atrophic without bleeding  Acute duodenal ulcer without hemorrhage or perforation     EGD 2/28/22   notable for moderate erythematous mucosa in the body of the stomach and antrum and possible C2 M2 Albrecht's esophagus. Biopsies negative for H. pylori and Albrecht's esophagus.    Colonoscopy 2/28/22 notable for 1 sessile adenomatous appearing polyp (<5mm) 2 pedunculated edematous appearing polyps (10mm+). Biopsies notable for tubular adenomas. Recommended repeat colonoscopy x3 years and that after review of pathology however following the procedure a 1 year follow-up was recommended     2/28/2022  Colonoscopy   2 pedunculated polyps measuring greater than 10 mm in the distal sigmoid colon.  These were tubular adenomas.  There is another 5 mm polyp noted.     In the procedure report he recommended a 1 year follow-up however after the pathology recommended a 3-year follow-up.     10/29/2020 EGD  Normal esophagus.  No evidence of Albrecht's esophagus.  Erythematous, eroded mucosa in the antrum and duodenal bulb.     10/29/2020 colonoscopy Dr. Keenan  16 polyps removed.  10 mm polyp or larger ascending colon.  10 or more sessile polyps adenomatous appearing, semipedunculated measuring 4 to 10 mm transverse colon.  4 polyps measuring 4 to 10 mm descending colon  1 semipedunculated polyp measuring 10 mm sigmoid colon  Poor preparation  Poor preparation and additional polyps likely missed  Repeat colonoscopy 3 months    Allergies   Allergen Reactions   • Hydrocodone-Acetaminophen Hives   • Ketorolac Hives and Dizziness   • Toradol [Ketorolac Tromethamine] Other (See Comments)     hypotension   • Ultram [Tramadol] Nausea Only, GI Intolerance and Vomiting     Current Outpatient Medications   Medication Sig Dispense Refill   • albuterol (2.5 mg/3 mL) 0.083 % nebulizer  solution Take 3 mL (2.5 mg total) by nebulization every 6 (six) hours as needed for wheezing or shortness of breath 30 mL 5   • albuterol (PROVENTIL HFA,VENTOLIN HFA) 90 mcg/act inhaler Inhale 2 puffs every 6 (six) hours as needed for wheezing 18 g 1   • budesonide (PULMICORT) 0.5 mg/2 mL nebulizer solution Take 0.5 mg by nebulization daily Rinse mouth after use.     • buPROPion (WELLBUTRIN XL) 150 mg 24 hr tablet Take 1 tablet (150 mg total) by mouth every morning 90 tablet 1   • diazepam (VALIUM) 5 mg tablet Take 1 tablet (5 mg total) by mouth daily at bedtime as needed for anxiety (Patient taking differently: Take 5 mg by mouth daily at bedtime as needed for anxiety) 30 tablet 0   • ergocalciferol (VITAMIN D2) 50,000 units Take 1 capsule (50,000 Units total) by mouth every 14 (fourteen) days 12 capsule 1   • famotidine (PEPCID) 40 MG tablet Take 1 tablet (40 mg total) by mouth daily at bedtime Take in evening 2 hours prior to bed 90 tablet 1   • ferrous sulfate 324 (65 Fe) mg Take 1 tablet (324 mg total) by mouth daily before breakfast 30 tablet 0   • fluticasone (FLONASE) 50 mcg/act nasal spray 1 spray into each nostril 2 (two) times a day (Patient taking differently: 1 spray into each nostril as needed) 16 g 5   • folic acid (FOLVITE) 1 mg tablet Take 1 tablet (1 mg total) by mouth daily 90 tablet 1   • furosemide (LASIX) 40 mg tablet Take 1 tablet (40 mg total) by mouth 2 (two) times a day 180 tablet 1   • gabapentin (NEURONTIN) 100 mg capsule Take 100 mg by mouth every 8 (eight) hours  0   • Insulin Glargine Solostar (Basaglar KwikPen) 100 UNIT/ML SOPN Inject 0.55 mL (55 Units total) under the skin in the morning 15 mL 5   • insulin lispro (HumaLOG) 100 units/mL injection pen Inject 15 Units under the skin 3 (three) times a day with meals 15 mL 5   • Insulin Pen Needle 31G X 5 MM MISC Inject under the skin if needed (Insuline injections) 100 each 3   • lactulose (CHRONULAC) 10 g/15 mL solution Take 20 g by  mouth Daily or as needed for bowl movement     • levothyroxine 112 mcg tablet Take 1 tablet (112 mcg total) by mouth daily 90 tablet 1   • nicotine (NICODERM CQ) 21 mg/24 hr TD 24 hr patch Place 1 patch on the skin over 24 hours every 24 hours 28 patch 5   • Nutritional Supplements (Ensure Plus) LIQD Take 237 mL by mouth 2 (two) times a day 500 mL 11   • omeprazole (PriLOSEC) 40 MG capsule take 1 capsule by mouth twice a day 60 capsule 5   • oxyCODONE-acetaminophen (PERCOCET)  mg per tablet Take 1 tablet by mouth every 6 (six) hours as needed for severe pain     • oxygen gas Inhale 2 L/min continuous. via nasal cannula  Indications: low saturation     • predniSONE 10 mg tablet Take 10 mg by mouth daily     • sertraline (ZOLOFT) 100 mg tablet Take 1 tablet (100 mg total) by mouth 2 (two) times a day 180 tablet 0   • simvastatin (ZOCOR) 20 mg tablet Take 1 tablet (20 mg total) by mouth daily at bedtime 90 tablet 1   • sucralfate (CARAFATE) 1 g tablet Take 1 g by mouth 2 (two) times a day     • sulfamethoxazole-trimethoprim (BACTRIM DS) 800-160 mg per tablet Take 1 tablet by mouth 3 (three) times a week for 28 days 12 tablet 0   • tiotropium-olodaterol (Stiolto Respimat) 2.5-2.5 MCG/ACT inhaler Inhale 2 puffs daily 4 g 5   • varenicline (CHANTIX) 0.5 mg tablet Take 1 tablet (0.5 mg total) by mouth 2 (two) times a day 60 tablet 0   • spironolactone (ALDACTONE) 25 mg tablet Take 2 tablets (50 mg total) by mouth daily (Patient not taking: Reported on 4/30/2024) 30 tablet 5     No current facility-administered medications for this visit.     MEDICAL HISTORY:  Past Medical History:   Diagnosis Date   • Abnormal stress test    • Acute cystitis without hematuria 05/07/2018   • Acute duodenal ulcer with bleeding 01/16/2023   • Allergic sinusitis     last assessed - 03Apr2017   • Anemia Around december   • Anxiety     last assessed - 04Mar2016   • Arthritis    • Asthma     last assessed - 04Mar2016   • Bilateral lower  extremity edema     last assessed - 12May2017   • Callus of foot     last assessed - 22Jun2016   • Chest pain    • Chronic GERD     last assessed - 16Jan2017   • Colon polyp    • Constipation     Resolved - 13Jan2017   • COPD (chronic obstructive pulmonary disease) ([facility])    • Depression     last assessed - 04Mar2016   • Disease of thyroid gland    • Diverticulitis of colon     last assessed - 04Mar2016   • Dyspepsia     Resolved - [id number]   • Edema, lower extremity 08/07/2020   • Esophageal reflux     last assessed - 04Mar2016   • Essential hypertriglyceridemia     last assessed - 23Feb2017   • Fatty liver 01/16/2023   • Gastroesophageal reflux disease with esophagitis 11/18/2016   • GERD (gastroesophageal reflux disease)    • Gynecological disorder     last assessed - 04Mar2016   • Hydroureteronephrosis 06/30/2022   • Hypertension     last assessed - 04Mar2016   • Hypokalemia 06/20/2022   • Hypotension     last assessed - 25Aug2016   • Kidney stone    • Left ureteral stone with hydronephrosis 07/21/2022   • Migraine    • Morbid obesity ([facility]) 01/11/2019    Formatting of this note might be different from the original. Added automatically from request for surgery [id number]   • Neuropathy    • Obesity    • Other chest pain 11/08/2018   • Pancreatic lesion 03/13/2023   • Positive depression screening 06/19/2022   • Primary hypertriglyceridemia 06/19/2022   • Pulmonary emphysema ([facility])     last assessed - 04Mar2016   • Seasonal allergies    • Severe obesity ([facility]) 01/16/2023   • Skin tag 07/17/2023   • SOB (shortness of breath)    • Strain of groin 07/17/2023   • Urinary frequency     last assessed - 22Jun2016   • Vagina, candidiasis     last assessed - 22Jun2016   • Very heavy cigarette smoker 01/16/2023   • Visual impairment      Past Surgical History:   Procedure Laterality Date   • CARPAL TUNNEL RELEASE      last assessed - 04MAr2016   • CHOLECYSTECTOMY      last assessed - 04Mar2016   • COLONOSCOPY      Complete  colonoscopy    • EYE SURGERY      last assessed - 04Mar2016   • FL RETROGRADE PYELOGRAM  6/16/2022   • FL RETROGRADE PYELOGRAM  8/16/2022   • FOOT SURGERY      last assessed - 04Mar2016   • CA CYSTO BLADDER W/URETERAL CATHETERIZATION Left 7/21/2022    Procedure: CYSTOSCOPY RETROGRADE PYELOGRAM WITH INSERTION STENT URETERAL;  Surgeon: Facundo Matamoros MD;  Location: [facility];  Service: Urology   • CA CYSTO/URETERO W/LITHOTRIPSY &INDWELL STENT INSRT Left 6/16/2022    Procedure: CYSTOSCOPY URETEROSCOPY WITH LITHOTRIPSY HOLMIUM LASER, RETROGRADE PYELOGRAM AND INSERTION STENT URETERAL;  Surgeon: Sammy Ferrer MD;  Location: [facility];  Service: Urology   • CA CYSTO/URETERO W/LITHOTRIPSY &INDWELL STENT INSRT Left 8/16/2022    Procedure: CYSTOSCOPY USCOPE W/HOLMIUM LASER, RETROGRADE PYELOGRAM&STENT;  Surgeon: Sudheer Bradford MD;  [facility];  Service: Urology   • CA ESOPHAGOGASTRODUODENOSCOPY TRANSORAL DIAGNOSTIC N/A 2/13/2017    Procedure: ESOPHAGOGASTRODUODENOSCOPY (EGD);  Surgeon: Alison Saleem MD;  Location: [facility];  Service: Gastroenterology     Social History     Substance and Sexual Activity   Alcohol Use Not Currently     Social History     Substance and Sexual Activity   Drug Use Never     Social History     Tobacco Use   Smoking Status Some Days   • Current packs/day: 0.50   • Average packs/day: 0.5 packs/day for 41.3 years (20.7 ttl pk-yrs)   • Types: Cigarettes   • Start date: 1/1/1983   Smokeless Tobacco Never     Family History   Problem Relation Age of Onset   • Obesity Mother    • Thyroid disease Mother    • Cancer Mother 71   • Vision loss Mother    • Obesity Sister    • Coronary artery disease Sister    • Stroke Sister    • Asthma Sister    • Glaucoma Sister    • No Known Problems Maternal Aunt    • Diabetes Maternal Uncle    • Lung cancer Maternal Grandmother    • Cancer Maternal Grandfather    • Diabetes Maternal Grandfather    • No Known Problems Paternal Grandmother    • No Known  "Problems Paternal Grandfather    • Hypertension Other    • Lung cancer Other    • Hypertension Other    • Lung cancer Other    • Lung cancer Other          Objective  Blood pressure 128/76, pulse (!) 125, resp. rate 18, height 5' 6\" (1.676 m), weight 114 kg (252 lb), last menstrual period 04/05/2018, SpO2 90%. Body mass index is 40.67 kg/m².    PHYSICAL EXAM:   General Appearance: Alert, cooperative, she does have nasal cannula O2.  HEENT: Normocephalic, atraumatic, anicteric.   Neck: Supple, symmetrical, trachea midline  Lungs: Clear to auscultation bilaterally; no rales, rhonchi or wheezing; respirations unlabored   Heart: Regular rate and rhythm; tachycardic at 115 beats a minute.  No ectopy at this time.  No murmur.  Abdomen: Soft, bowel sounds normal, non-tender, non-distended; no masses, there is no hepatosplenomegaly. . Obese abdomen.  She does have spider angiomas on her chest wall.  Genitalia: Deferred   Rectal: Deferred   Extremities: No cyanosis, clubbing or edema.  There is no asterixis.  Skin: No jaundice, rashes, or lesions   Lymph nodes: No palpable cervical lymphadenopathy   Lab Results:   No visits with results within 2 Month(s) from this visit.   Latest known visit with results is:   Lab on 12/21/2023   Component Date Value   • Sodium 12/21/2023 142    • Potassium 12/21/2023 4.3    • Chloride 12/21/2023 109 (H)    • CO2 12/21/2023 25    • ANION GAP 12/21/2023 8    • BUN 12/21/2023 10    • Creatinine 12/21/2023 0.73    • Glucose 12/21/2023 178 (H)    • Calcium 12/21/2023 9.2    • AST 12/21/2023 48 (H)    • ALT 12/21/2023 21    • Alkaline Phosphatase 12/21/2023 94    • Total Protein 12/21/2023 6.9    • Albumin 12/21/2023 3.8    • Total Bilirubin 12/21/2023 1.19 (H)    • eGFR 12/21/2023 94    • WBC 12/21/2023 6.52    • RBC 12/21/2023 4.04    • Hemoglobin 12/21/2023 11.5    • Hematocrit 12/21/2023 38.5    • MCV 12/21/2023 95    • MCH 12/21/2023 28.5    • MCHC 12/21/2023 29.9 (L)    • RDW 12/21/2023 " 17.9 (H)    • MPV 12/21/2023 12.5    • Platelets 12/21/2023     • nRBC 12/21/2023 0    • Segmented % 12/21/2023 67    • Immature Grans % 12/21/2023 1    • Lymphocytes % 12/21/2023 20    • Monocytes % 12/21/2023 9    • Eosinophils Relative 12/21/2023 2    • Basophils Relative 12/21/2023 1    • Absolute Neutrophils 12/21/2023 4.45    • Absolute Immature Grans 12/21/2023 0.03    • Absolute Lymphocytes 12/21/2023 1.31    • Absolute Monocytes 12/21/2023 0.59    • Eosinophils Absolute 12/21/2023 0.11    • Basophils Absolute 12/21/2023 0.03    • Bilirubin, Direct 12/21/2023 0.33 (H)    • RBC Morphology 12/21/2023 Present    • Platelet Estimate 12/21/2023 Unable to Estimate due to clumped platelets (A)      Radiology Results:   No results found.  Michele Vu DO   05/06/24   Cc:    Michele Vu DO  5/6/2024  2:37 PM  Sign when Signing Visit  [facility]  Gastroenterology Outpatient Follow up  Patient Chely Ruvalcaba   Age 53 y.o.   Gender female   MRN: [id number]  [facility] [id number]     ASSESSMENT AND PLAN:   Problem List Items Addressed This Visit          Digestive    Cirrhosis (HCC) - Primary    Relevant Orders    AFP tumor marker    Protime-INR    Comprehensive metabolic panel    US abdomen complete with doppler       Blood    Iron deficiency anemia    Relevant Orders    Vitamin B12    Folate    Magnesium    Iron Panel (Includes Ferritin, Iron Sat%, Iron, and TIBC)    CBC       Surgery/Wound/Pain    Edema, lower extremity      _____________________________________________________________    HPI: Please see complete note in EPIC  Over 100 pages of records from [facility] reviewed to the best of my ability.  Records reviewed for 45 minutes prior to office visit      Admitted 3/6/2024 through 4/11/2024 [facility]  Discharge medications included  -Insulin 55 units daily  -Budesonide 0.5 mg nebulized  -Lactulose 30 mL 3 times a day  -Folic acid 1 mg daily  -Lasix 40 mg daily  -Humalog 15 units 3  times a day  -Prednisone weaning dose  -Bactrim Monday Wednesday and Friday  -Albuterol  Bupropion  mg daily  -Diazepam 5 mg every 6 hours as needed  Vitamin D  Iron 325 mg daily  Fluticasone nasal spray  Gabapentin 100 mg 2 times a day  Levothyroxine 112 mcg daily  Nicotine patch  -Omeprazole 40 mg twice a day  -Oxycodone 10/325 every 6 hours as needed  Scopolamine patch 1 mg over 3-day patch  Sertraline 100 mg daily  Simvastatin 20 mg daily  Carafate 3 times a day  Varenicline 0.5 mg twice a day (Chantix)  Discontinued medications  Semaglutide  Spironolactone 50 mg tablet    Discharge diagnoses  Sepsis  Bilateral pneumonia treated with Bactrim, steroids, ceftriaxone, and Zithromax  Hypoxemia  Exacerbation of COPD  Leukocytosis  CHF.  Right heart catheterization 3/19 normal right-sided pressures,  Acute on chronic diastolic heart failure  Acute kidney injury  Folic acid deficiency  Cirrhosis  Acute pneumothorax.  Chest tube removed 3/31/2024    4/10/2024 laboratory data  WBC 8.6 Hgb 10.5 HCT 33 platelet count 98,000 MCV 95.4.  Hemoglobin was 12.8 at admission  INR 1.31 (3/6/2024)  T. bili 3.5 at admission dropped to 1.0  Creatinine 0.83 at admission  Sodium 138 at admission  Sodium 141  Potassium 4.5  Creatinine 0.77  Glucose 95  Albumin 2.8  AST 33  ALT 69  T. bili 1.0        3/7/2024 CT scan chest [place]  Findings most compatible with pulmonary edema with groundglass opacification bilaterally with slight sparing of the lung apices.  Differential diagnosis include atypical pneumonia or much less likely hemorrhage.  Pulmonary nodules involving the left upper lobe and right middle lobe.  Left upper lobe nodule measures 2.3 x 2.2 x 7 mm right lobe nodule measures 11 mm    Patient did have a right-sided chest tube in place at some point on imaging 3/24/2024      2/13/2023 virtual visit with hepatology Lisandra antony      12/21/2023 laboratory data  Sodium 142  Potassium 4.3  BUN 10  Creatinine 0.73                                           MELD 3.0  11  Glucose 178  AST 48  ALT 21  Alk phos 94  T. bili 1.19  Direct bili 0.33  WBC 6.52 Hgb 11.5 HCT 38.5 MCV 95 platelet count unable to estimate due to clumping    10/10/2023 EGD Dr. 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  Status post APC  -Portal hypertensive gastropathy in the body of the stomach  -Healed duodenal ulcer  -3 hyperechoic periportal lymph nodes with well-defined margins largest measuring 11 x 7 mm but together likely about 2 cm.  Appeared benign not sampled  -Nodular liver  -Polypoid area seen in the midesophagus status post biopsy.  Biopsy unremarkable  -Negative for esophageal varices    10/4/2023 MRI abdomen with MRCP  Liver enlarged at 23.7 cm.  Surface nodularity suggestive of cirrhosis  Mild diffuse hepatic steatosis.  No biliary ductal dilatation.  No gallstones  Pancreas unremarkable  Spleen enlarged at 15.9 cm similar to recent studies  Therefore enlarged upper abdominal lymph nodes similar to March 2023 MRI.  These have been present since 2015 with changes in measurement as described below.  --1.3 cm lymph node lymph node adjacent to the neck of the pancreas  compared to 1.3 cm in December 2015.  -1.1 cm periportal lymph node , compared to 1.0 cm in December 2015.  - 1.4 cm portacaval lymph node , compared to 1.2 cm in December 2015.  - 0.9 cm portacaval lymph node just medially , compared to 0.7 cm in 2015.    4/11/2023 ER visit in [place] for nausea vomiting diarrhea.  Diarrhea became dark.  Fecal Hemoccult done in the hospital was negative for blood  AST was 55  ALT 25  Alk phos 75  WBC 4.7 Hgb 9.1 MCV 78 platelet count was 94,000  She did have a fever of 101.2 at that time.  She had been using Pepto-Bismol.  At presentation her heart rate was 80 and blood pressure was 134/73     4/5/2023 EGD  Probable healing ulcer at junction of first and second portion of duodenum.  Biopsy revealed benign intestinal  mucosa with retained villous architecture.  Mild acute and chronic inflammation noted.  Nodular mucosa associated with healing ulcer status post biopsy.  Biopsy revealed mild acute and chronic inflammation, focal mucosal erosion and reactive surface foveolar epithelial changes consistent with reactive/chemical gastritis.  Negative for H. pylori.  Scattered areas of nodular mucosa with overlying erosions in the antrum, status post biopsy.  Biopsies negative for H. pylori.  Probable portal hypertensive gastropathy fundus and body status post biopsy.  Biopsy revealed mucosa with mild vascular congestion and edema.  Normal esophagus     3/22/2023 MRI abdomen  Redefined cirrhotic morphology of the liver.  Consistent with ultrasound elastography findings.  Prior cholecystectomy  Spleen is enlarged at 16.8 cm  Mild robin hepatic adenopathy.  A dominant node measuring 15 mm correlates with ultrasound finding of a hypoechoic lesion adjacent to the pancreas.     3/22/2023 laboratory data  IgA 417  Alpha-1 antitrypsin level 244  ACE 61  Iron 35  Ferritin 9  Iron saturation 7%  TIBC 510  Folate 8.2  Ceruloplasmin 26.9  Vitamin B12 331  Alpha-fetoprotein tumor marker 6.4  WC 6.51 Hgb 10.4 HCT 38.7 MCV 85 platelet count 145,000  INR 1.05  Fasting insulin 142.4  CINDY negative  Mitochondrial antibody negative  Smooth muscle antibody negative  Celiac antibody negative  Liver kidney microsomal antibody negative  She is not immune to hepatitis B.  She is immune to hepatitis A     3/17/2023 evaluated by Dr. Banuelos of hepatology.  Follow-up in September 2023 2/1/2023 CT scan renal stone study.  Spleen enlarged at 15.1 cm.  Gallbladder absent  Diverticulosis without diverticulitis previously noted hydroureteronephrosis on the left side has resolved.  Several nonobstructing intrarenal calculi left kidney     1/18/2023 EGD  Normal second portion of duodenum.  Biopsies negative for celiac disease.  Superficial ulcer in the duodenal bulb  with a clean base.  I described superficial ulceration with surrounding heaped up mucosa.  Moderate edematous erythematous nodular mucosa in the antrum.  Biopsies were benign and negative for H. pylori.  Mild generalized erythema gastric body.  Biopsies negative for H. pylori.  Z-line regular at 39 cm.  No sign of esophageal varices     1/27/2023 ultrasound abdomen  Enlarged liver 20.6 cm.  Nodular surface  Heterogeneous echotexture  Mildly enlarged spleen  In the region of the pancreatic head there is a 1.4 x 1.2 x 1.2 cm ovoid hypoechoic focus likely represent a robin hepatis lymph node        1/27/2023 ultrasound elastography  F4, cirrhosis     1/27/2023-laboratory data  MELD-Na 7   Sodium 140  Creatinine 0.76  AST 27  ALT 16  Alk phos 80  T. bili 0.71  INR 1.10  Cholesterol 126  Triglycerides 91  HDL 48  LDL 60  Iron 32  Ferritin 5  Iron saturation 6%  TIBC 494  WBC 7.73 Hgb 9.0 HCT 33.2 MCV 87 platelet count 180,000 up from 147  Hemoglobin A1c 5.2  TSH 4.13  Hepatitis A total reactive  Hepatitis B surface antigen nonreactive  Hepatitis B surface antibody less than 3.10  Hepatitis B core total nonreactive  Hepatitis C antibody nonreactive     1/21/2023 CT chest lung cancer screening  Splenomegaly of 15.4 cm.  Nonobstructing left renal calcification  1.2 cm left upper lobe groundglass opacity  Moderate paraseptal emphysema.  Benign intrapulmonary lymph nodes abutting the minor fissure.        On 1/18/2023 patient was seen by advanced practitioner Lisandra Leggett     12/11/2022 through 12/13/2022 [facility] with dizziness, shortness of breath, chest pain, and dark stool for couple weeks.  Patient also reported chronic generalized abdominal pain.  Hemoglobin admission was 7.1.-Received 1 unit of packed red blood cells.  EGD revealed a nonbleeding duodenal ulcer.  No intervention required.  CT scan of the chest abdomen pelvis was performed.  Revealed apical emphysematous changes in the  lungs.  Moderately enlarged liver with nodular contour and fatty infiltration suggestive of cirrhosis.  Moderately enlarged spleen  Moderately enlarged gastrohepatic lymph nodes.  TSH was 7.88  Hemoglobin admission 7.1 at discharge 7.9 MCV was elevated at 103  AST was 45  ALT was 32  Admission BUN was 17 creatinine 0.5 albumin 3.3              12/12/2022 EGD [place]  Normal esophagus  Gastritis-chronic atrophic without bleeding  Acute duodenal ulcer without hemorrhage or perforation     EGD 2/28/22   notable for moderate erythematous mucosa in the body of the stomach and antrum and possible C2 M2 Albrecht's esophagus. Biopsies negative for H. pylori and Albrecht's esophagus.    Colonoscopy 2/28/22 notable for 1 sessile adenomatous appearing polyp (<5mm) 2 pedunculated edematous appearing polyps (10mm+). Biopsies notable for tubular adenomas. Recommended repeat colonoscopy x3 years and that after review of pathology however following the procedure a 1 year follow-up was recommended     2/28/2022  Colonoscopy   2 pedunculated polyps measuring greater than 10 mm in the distal sigmoid colon.  These were tubular adenomas.  There is another 5 mm polyp noted.     In the procedure report he recommended a 1 year follow-up however after the pathology recommended a 3-year follow-up.     10/29/2020 EGD  Normal esophagus.  No evidence of Albrecht's esophagus.  Erythematous, eroded mucosa in the antrum and duodenal bulb.     10/29/2020 colonoscopy Dr. Keenan  16 polyps removed.  10 mm polyp or larger ascending colon.  10 or more sessile polyps adenomatous appearing, semipedunculated measuring 4 to 10 mm transverse colon.  4 polyps measuring 4 to 10 mm descending colon  1 semipedunculated polyp measuring 10 mm sigmoid colon  Poor preparation  Poor preparation and additional polyps likely missed  Repeat colonoscopy 3 months    Allergies   Allergen Reactions   • Hydrocodone-Acetaminophen Hives   • Ketorolac Hives and Dizziness    • Toradol [Ketorolac Tromethamine] Other (See Comments)     hypotension   • Ultram [Tramadol] Nausea Only, GI Intolerance and Vomiting     Current Outpatient Medications   Medication Sig Dispense Refill   • albuterol (2.5 mg/3 mL) 0.083 % nebulizer solution Take 3 mL (2.5 mg total) by nebulization every 6 (six) hours as needed for wheezing or shortness of breath 30 mL 5   • albuterol (PROVENTIL HFA,VENTOLIN HFA) 90 mcg/act inhaler Inhale 2 puffs every 6 (six) hours as needed for wheezing 18 g 1   • budesonide (PULMICORT) 0.5 mg/2 mL nebulizer solution Take 0.5 mg by nebulization daily Rinse mouth after use.     • buPROPion (WELLBUTRIN XL) 150 mg 24 hr tablet Take 1 tablet (150 mg total) by mouth every morning 90 tablet 1   • diazepam (VALIUM) 5 mg tablet Take 1 tablet (5 mg total) by mouth daily at bedtime as needed for anxiety (Patient taking differently: Take 5 mg by mouth daily at bedtime as needed for anxiety) 30 tablet 0   • ergocalciferol (VITAMIN D2) 50,000 units Take 1 capsule (50,000 Units total) by mouth every 14 (fourteen) days 12 capsule 1   • famotidine (PEPCID) 40 MG tablet Take 1 tablet (40 mg total) by mouth daily at bedtime Take in evening 2 hours prior to bed 90 tablet 1   • ferrous sulfate 324 (65 Fe) mg Take 1 tablet (324 mg total) by mouth daily before breakfast 30 tablet 0   • fluticasone (FLONASE) 50 mcg/act nasal spray 1 spray into each nostril 2 (two) times a day (Patient taking differently: 1 spray into each nostril as needed) 16 g 5   • folic acid (FOLVITE) 1 mg tablet Take 1 tablet (1 mg total) by mouth daily 90 tablet 1   • furosemide (LASIX) 40 mg tablet Take 1 tablet (40 mg total) by mouth 2 (two) times a day 180 tablet 1   • gabapentin (NEURONTIN) 100 mg capsule Take 100 mg by mouth every 8 (eight) hours  0   • Insulin Glargine Solostar (Basaglar KwikPen) 100 UNIT/ML SOPN Inject 0.55 mL (55 Units total) under the skin in the morning 15 mL 5   • insulin lispro (HumaLOG) 100 units/mL  injection pen Inject 15 Units under the skin 3 (three) times a day with meals 15 mL 5   • Insulin Pen Needle 31G X 5 MM MISC Inject under the skin if needed (Insuline injections) 100 each 3   • lactulose (CHRONULAC) 10 g/15 mL solution Take 20 g by mouth Daily or as needed for bowl movement     • levothyroxine 112 mcg tablet Take 1 tablet (112 mcg total) by mouth daily 90 tablet 1   • nicotine (NICODERM CQ) 21 mg/24 hr TD 24 hr patch Place 1 patch on the skin over 24 hours every 24 hours 28 patch 5   • Nutritional Supplements (Ensure Plus) LIQD Take 237 mL by mouth 2 (two) times a day 500 mL 11   • omeprazole (PriLOSEC) 40 MG capsule take 1 capsule by mouth twice a day 60 capsule 5   • oxyCODONE-acetaminophen (PERCOCET)  mg per tablet Take 1 tablet by mouth every 6 (six) hours as needed for severe pain     • oxygen gas Inhale 2 L/min continuous. via nasal cannula  Indications: low saturation     • predniSONE 10 mg tablet Take 10 mg by mouth daily     • sertraline (ZOLOFT) 100 mg tablet Take 1 tablet (100 mg total) by mouth 2 (two) times a day 180 tablet 0   • simvastatin (ZOCOR) 20 mg tablet Take 1 tablet (20 mg total) by mouth daily at bedtime 90 tablet 1   • sucralfate (CARAFATE) 1 g tablet Take 1 g by mouth 2 (two) times a day     • sulfamethoxazole-trimethoprim (BACTRIM DS) 800-160 mg per tablet Take 1 tablet by mouth 3 (three) times a week for 28 days 12 tablet 0   • tiotropium-olodaterol (Stiolto Respimat) 2.5-2.5 MCG/ACT inhaler Inhale 2 puffs daily 4 g 5   • varenicline (CHANTIX) 0.5 mg tablet Take 1 tablet (0.5 mg total) by mouth 2 (two) times a day 60 tablet 0   • spironolactone (ALDACTONE) 25 mg tablet Take 2 tablets (50 mg total) by mouth daily (Patient not taking: Reported on 4/30/2024) 30 tablet 5     No current facility-administered medications for this visit.     MEDICAL HISTORY:  Past Medical History:   Diagnosis Date   • Abnormal stress test    • Acute cystitis without hematuria 05/07/2018    • Acute duodenal ulcer with bleeding 01/16/2023   • Allergic sinusitis     last assessed - 03Apr2017   • Anemia Around december   • Anxiety     last assessed - 04Mar2016   • Arthritis    • Asthma     last assessed - 04Mar2016   • Bilateral lower extremity edema     last assessed - 12May2017   • Callus of foot     last assessed - 22Jun2016   • Chest pain    • Chronic GERD     last assessed - 16Jan2017   • Colon polyp    • Constipation     Resolved - 13Jan2017   • COPD (chronic obstructive pulmonary disease) ([facility])    • Depression     last assessed - 04Mar2016   • Disease of thyroid gland    • Diverticulitis of colon     last assessed - 04Mar2016   • Dyspepsia     Resolved - 24Jul2017   • Edema, lower extremity 08/07/2020   • Esophageal reflux     last assessed - 04Mar2016   • Essential hypertriglyceridemia     last assessed - 23Feb2017   • Fatty liver 01/16/2023   • Gastroesophageal reflux disease with esophagitis 11/18/2016   • GERD (gastroesophageal reflux disease)    • Gynecological disorder     last assessed - 04Mar2016   • Hydroureteronephrosis 06/30/2022   • Hypertension     last assessed - 04Mar2016   • Hypokalemia 06/20/2022   • Hypotension     last assessed - 25Aug2016   • Kidney stone    • Left ureteral stone with hydronephrosis 07/21/2022   • Migraine    • Morbid obesity ([facility]) 01/11/2019    Formatting of this note might be different from the original. Added automatically from request for surgery [id number]   • Neuropathy    • Obesity    • Other chest pain 11/08/2018   • Pancreatic lesion 03/13/2023   • Positive depression screening 06/19/2022   • Primary hypertriglyceridemia 06/19/2022   • Pulmonary emphysema ([facility])     last assessed - 04Mar2016   • Seasonal allergies    • Severe obesity ([facility]) 01/16/2023   • Skin tag 07/17/2023   • SOB (shortness of breath)    • Strain of groin 07/17/2023   • Urinary frequency     last assessed - 22Jun2016   • Vagina, candidiasis     last assessed - 22Jun2016   • Very heavy  cigarette smoker 01/16/2023   • Visual impairment      Past Surgical History:   Procedure Laterality Date   • CARPAL TUNNEL RELEASE      last assessed - 04MAr2016   • CHOLECYSTECTOMY      last assessed - 04Mar2016   • COLONOSCOPY      Complete colonoscopy    • EYE SURGERY      last assessed - 04Mar2016   • FL RETROGRADE PYELOGRAM  6/16/2022   • FL RETROGRADE PYELOGRAM  8/16/2022   • FOOT SURGERY      last assessed - 04Mar2016   • KS CYSTO BLADDER W/URETERAL CATHETERIZATION Left 7/21/2022    Procedure: CYSTOSCOPY RETROGRADE PYELOGRAM WITH INSERTION STENT URETERAL;  Surgeon: Facundo Matamoros MD;  [facility];  Service: Urology   • KS CYSTO/URETERO W/LITHOTRIPSY &INDWELL STENT INSRT Left 6/16/2022    Procedure: CYSTOSCOPY URETEROSCOPY WITH LITHOTRIPSY HOLMIUM LASER, RETROGRADE PYELOGRAM AND INSERTION STENT URETERAL;  Surgeon: Sammy Ferrer MD;  [facility];  Service: Urology   • KS CYSTO/URETERO W/LITHOTRIPSY &INDWELL STENT INSRT Left 8/16/2022    Procedure: CYSTOSCOPY USCOPE W/HOLMIUM LASER, RETROGRADE PYELOGRAM&STENT;  Surgeon: Sudheer Bradford MD;  Location: [facility];  Service: Urology   • KS ESOPHAGOGASTRODUODENOSCOPY TRANSORAL DIAGNOSTIC N/A 2/13/2017    Procedure: ESOPHAGOGASTRODUODENOSCOPY (EGD);  Surgeon: Alison Saleem MD;  [facility]: [facility];  Service: Gastroenterology     Social History     Substance and Sexual Activity   Alcohol Use Not Currently     Social History     Substance and Sexual Activity   Drug Use Never     Social History     Tobacco Use   Smoking Status Some Days   • Current packs/day: 0.50   • Average packs/day: 0.5 packs/day for 41.3 years (20.7 ttl pk-yrs)   • Types: Cigarettes   • Start date: 1/1/1983   Smokeless Tobacco Never     Family History   Problem Relation Age of Onset   • Obesity Mother    • Thyroid disease Mother    • Cancer Mother 71   • Vision loss Mother    • Obesity Sister    • Coronary artery disease Sister    • Stroke Sister    • Asthma Sister    •  "Glaucoma Sister    • No Known Problems Maternal Aunt    • Diabetes Maternal Uncle    • Lung cancer Maternal Grandmother    • Cancer Maternal Grandfather    • Diabetes Maternal Grandfather    • No Known Problems Paternal Grandmother    • No Known Problems Paternal Grandfather    • Hypertension Other    • Lung cancer Other    • Hypertension Other    • Lung cancer Other    • Lung cancer Other          Objective  Blood pressure 128/76, pulse (!) 125, resp. rate 18, height 5' 6\" (1.676 m), weight 114 kg (252 lb), last menstrual period 04/05/2018, SpO2 90%. Body mass index is 40.67 kg/m².    PHYSICAL EXAM:   General Appearance: Alert, cooperative, no distress  HEENT: Normocephalic, atraumatic, anicteric.   Neck: Supple, symmetrical, trachea midline  Lungs: Clear to auscultation bilaterally; no rales, rhonchi or wheezing; respirations unlabored   Heart: Regular rate and rhythm; no murmur, rub, or gallop.  Abdomen: Soft, bowel sounds normal, non-tender, non-distended; no masses, there is no hepatosplenomegaly. No spider angiomas  Genitalia: Deferred   Rectal: Deferred   Extremities: No cyanosis, clubbing or edema   Skin: No jaundice, rashes, or lesions   Lymph nodes: No palpable cervical lymphadenopathy   Lab Results:   No visits with results within 2 Month(s) from this visit.   Latest known visit with results is:   Lab on 12/21/2023   Component Date Value   • Sodium 12/21/2023 142    • Potassium 12/21/2023 4.3    • Chloride 12/21/2023 109 (H)    • CO2 12/21/2023 25    • ANION GAP 12/21/2023 8    • BUN 12/21/2023 10    • Creatinine 12/21/2023 0.73    • Glucose 12/21/2023 178 (H)    • Calcium 12/21/2023 9.2    • AST 12/21/2023 48 (H)    • ALT 12/21/2023 21    • Alkaline Phosphatase 12/21/2023 94    • Total Protein 12/21/2023 6.9    • Albumin 12/21/2023 3.8    • Total Bilirubin 12/21/2023 1.19 (H)    • eGFR 12/21/2023 94    • WBC 12/21/2023 6.52    • RBC 12/21/2023 4.04    • Hemoglobin 12/21/2023 11.5    • Hematocrit " 12/21/2023 38.5    • MCV 12/21/2023 95    • MCH 12/21/2023 28.5    • MCHC 12/21/2023 29.9 (L)    • RDW 12/21/2023 17.9 (H)    • MPV 12/21/2023 12.5    • Platelets 12/21/2023     • nRBC 12/21/2023 0    • Segmented % 12/21/2023 67    • Immature Grans % 12/21/2023 1    • Lymphocytes % 12/21/2023 20    • Monocytes % 12/21/2023 9    • Eosinophils Relative 12/21/2023 2    • Basophils Relative 12/21/2023 1    • Absolute Neutrophils 12/21/2023 4.45    • Absolute Immature Grans 12/21/2023 0.03    • Absolute Lymphocytes 12/21/2023 1.31    • Absolute Monocytes 12/21/2023 0.59    • Eosinophils Absolute 12/21/2023 0.11    • Basophils Absolute 12/21/2023 0.03    • Bilirubin, Direct 12/21/2023 0.33 (H)    • RBC Morphology 12/21/2023 Present    • Platelet Estimate 12/21/2023 Unable to Estimate due to clumped platelets (A)      Radiology Results:   No results found.  Michele Vu DO   05/06/24   Cc:

## 2024-05-06 NOTE — ASSESSMENT & PLAN NOTE
Patient did undergo an MRI October 4, 2023 for adenopathy in the peripancreatic region.  Pancreas was unremarkable.  Spleen was enlarged.  .3 cm lymph node lymph node adjacent to the neck of the pancreas  compared to 1.3 cm in December 2015.  -1.1 cm periportal lymph node , compared to 1.0 cm in December 2015.  - 1.4 cm portacaval lymph node , compared to 1.2 cm in December 2015.  - 0.9 cm portacaval lymph node just medially , compared to 0.7 cm in 2015.  EUS October 2023 did reveal 3 hyperechoic periportal lymph nodes with well-defined margins largest measuring 11 x 7 mm.  These did appear benign and were not sampled.  They want to consider repeat imaging in October 2024.

## 2024-05-06 NOTE — TELEPHONE ENCOUNTER
Patient called the RX Refill Line. Message is being forwarded to the office.     Patient states when she was last seen by Dr Mason she was told not to take the iron pill and also not to take the carafate. Patient states her GI Dr thinks she should still take the iron pill and should still take the carafate 1 times daily. Patient would like a return call to discuss..     Please contact patient at [phone]

## 2024-05-06 NOTE — PATIENT INSTRUCTIONS
Cirrhosis (HCC)  Chely does have cirrhosis based upon abnormal ultrasound elastography revealing F4 fibrosis, nodular liver on imaging and thrombocytopenia.  Likely etiology for cirrhosis is metabolic associated steatotic liver disease.  Her BMI remains high at 40.67.  She does have type 2 diabetes.  She did just recently have a prolonged hospitalization for bilateral pneumonia, exacerbation of COPD and reportedly congestive heart failure.  She seems to be compensated from a liver standpoint at this time.  At admission in [place] though her T. bili was 3.5 however dropped to 1.0 by discharge.    Her MELD 3.0 based upon labs from March/April is 11.  She is due for hepatoma surveillance with ultrasound and alpha-fetoprotein.  Did order an ultrasound Doppler.  I will also order CBC chemistry panel and an INR to be done soon.  Looks like she may have a hepatology follow-up in the gastroenterology clinic for May 14.  I did advise her to cut back on all salt intake and try not to exceed 2000 mg in a 24-hour period  Watch for any gastrointestinal bleeding such as black stools or vomiting blood.  She is up-to-date with hepatoma surveillance last EGD 10/10/2023 negative for varices.  Repeat EGD in October 2025 or sooner if decompensation.    Other recommendations per hepatology    Duodenal ulcer  Chely does have a history of duodenal ulcer and I am assuming a GI bleed related to this ulcer at 1 point when she was hospitalized in [place].  This ulcer was noted to be healed October 10, 2023.  Continue omeprazole 40 mg twice a day.  Limit all NSAIDs    Gastroesophageal reflux disease  Patient continues to have reflux especially nighttime heartburn despite omeprazole 40 mg twice a day.  She is taking the medication 30 minutes to 1 hour before breakfast and before dinner.  Continue famotidine 40 mg 2 hours before bed.  I did advise her to be sure to take her thyroid medication at least 2 hours or more before acid lowering  medication.  If okay with her primary care provider I would have no objection to a single Carafate 1 g at bedtime.  If she can make this into a slurry this may help with her nocturnal symptoms.    The head of her bed is raised up on 5 to 6 inches.  Avoid lying down for 3 hours after meals.      Irritable bowel syndrome with both constipation and diarrhea  Chely does have a long history of irritable bowel syndrome.  At this point in time her IBS symptoms appear to be stable.  She has been using lactulose about every other day.  She does not mind the taste.  It seems to help her with her bowel pattern.    History of colon polyps  Patient was noted to have 16 significant polyps back in October 2020 in the face of a poor preparation.  Follow-up colonoscopy in 2022 revealed 2 polyps over 10 mm that were tubular adenomas.  Given multiple polyps,   At some point she should consider genetics consultation.  She states she was called by them, but at some point she should follow-up with them.  Given her iron deficiency and history of multiple polyps, when improved from a cardiac/pulmonary standpoint should consider pursuing a colonoscopy.    Iron deficiency anemia  Chely was noted to have an iron deficiency anemia.  She did have gastric antral vascular ectasias treated at time of EGD in October 2023.  Her hemoglobin was in the 12 range at admission in [place] and dropped to 10.5 after her the 6-week hospital station.  Repeat CBC  Repeat iron studies  Check vitamin B12  Check folate  Continue iron supplementation.  Follow-up with hematology.  She states that for some reason iron infusions were declined at some point after she saw the hematology group.  She does have a follow-up with hematology in the near future.    Enlarged lymph nodes, unspecified  Patient did undergo an MRI October 4, 2023 for adenopathy in the peripancreatic region.  Pancreas was unremarkable.  Spleen was enlarged.  .3 cm lymph node lymph node adjacent to the  neck of the pancreas  compared to 1.3 cm in December 2015.  -1.1 cm periportal lymph node , compared to 1.0 cm in December 2015.  - 1.4 cm portacaval lymph node , compared to 1.2 cm in December 2015.  - 0.9 cm portacaval lymph node just medially , compared to 0.7 cm in 2015.  EUS October 2023 did reveal 3 hyperechoic periportal lymph nodes with well-defined margins largest measuring 11 x 7 mm.  These did appear benign and were not sampled.  They want to consider repeat imaging in October 2024.

## 2024-05-07 ENCOUNTER — HOME CARE VISIT (OUTPATIENT)
Dept: HOME HEALTH SERVICES | Facility: HOME HEALTHCARE | Age: 54
End: 2024-05-07
Payer: MEDICARE

## 2024-05-07 ENCOUNTER — OFFICE VISIT (OUTPATIENT)
Dept: PULMONOLOGY | Facility: CLINIC | Age: 54
End: 2024-05-07
Payer: MEDICARE

## 2024-05-07 ENCOUNTER — TELEPHONE (OUTPATIENT)
Dept: LAB | Facility: HOSPITAL | Age: 54
End: 2024-05-07

## 2024-05-07 VITALS
BODY MASS INDEX: 38.92 KG/M2 | SYSTOLIC BLOOD PRESSURE: 124 MMHG | TEMPERATURE: 98.6 F | OXYGEN SATURATION: 94 % | DIASTOLIC BLOOD PRESSURE: 76 MMHG | HEIGHT: 67 IN | HEART RATE: 94 BPM | WEIGHT: 248 LBS

## 2024-05-07 DIAGNOSIS — R06.00 DYSPNEA, UNSPECIFIED TYPE: ICD-10-CM

## 2024-05-07 DIAGNOSIS — J96.01 ACUTE RESPIRATORY FAILURE WITH HYPOXIA (HCC): ICD-10-CM

## 2024-05-07 DIAGNOSIS — R60.0 EDEMA, LOWER EXTREMITY: ICD-10-CM

## 2024-05-07 DIAGNOSIS — R91.8 PULMONARY NODULES: ICD-10-CM

## 2024-05-07 DIAGNOSIS — D50.8 IRON DEFICIENCY ANEMIA SECONDARY TO INADEQUATE DIETARY IRON INTAKE: ICD-10-CM

## 2024-05-07 DIAGNOSIS — F17.211 CIGARETTE NICOTINE DEPENDENCE IN REMISSION: ICD-10-CM

## 2024-05-07 DIAGNOSIS — J43.2 CENTRILOBULAR EMPHYSEMA (HCC): Primary | ICD-10-CM

## 2024-05-07 DIAGNOSIS — J18.9 PNEUMONIA OF BOTH LUNGS DUE TO INFECTIOUS ORGANISM, UNSPECIFIED PART OF LUNG: ICD-10-CM

## 2024-05-07 PROBLEM — A41.9 SEPSIS DUE TO PNEUMONIA (HCC): Status: RESOLVED | Noted: 2024-04-30 | Resolved: 2024-05-07

## 2024-05-07 PROBLEM — J93.11 PRIMARY SPONTANEOUS PNEUMOTHORAX: Status: RESOLVED | Noted: 2024-04-30 | Resolved: 2024-05-07

## 2024-05-07 PROCEDURE — 94618 PULMONARY STRESS TESTING: CPT

## 2024-05-07 PROCEDURE — 99214 OFFICE O/P EST MOD 30 MIN: CPT

## 2024-05-07 RX ORDER — IPRATROPIUM BROMIDE AND ALBUTEROL SULFATE 2.5; .5 MG/3ML; MG/3ML
3 SOLUTION RESPIRATORY (INHALATION) 4 TIMES DAILY
Qty: 360 ML | Refills: 3 | Status: SHIPPED | OUTPATIENT
Start: 2024-05-07

## 2024-05-07 RX ORDER — FERROUS SULFATE 324(65)MG
324 TABLET, DELAYED RELEASE (ENTERIC COATED) ORAL
Qty: 30 TABLET | Refills: 3 | Status: SHIPPED | OUTPATIENT
Start: 2024-05-07

## 2024-05-07 RX ORDER — FERROUS SULFATE 324(65)MG
324 TABLET, DELAYED RELEASE (ENTERIC COATED) ORAL
Qty: 90 TABLET | Refills: 1 | Status: CANCELLED | OUTPATIENT
Start: 2024-05-07

## 2024-05-07 NOTE — ASSESSMENT & PLAN NOTE
-S/p hospitalization from 3/6 - 4/11 for bilateral pneumonia, pneumothorax, COPD and CHF exacerbation  -Reviewed recent chest x-ray with the patient from 4/30.  Showed clear lungs.  No evidence of pneumothorax or pleural effusion  -Lungs are clear on exam today  -Patient will complete prednisone taper, continue inhaler medication as mentioned above

## 2024-05-07 NOTE — ASSESSMENT & PLAN NOTE
-Moderate emphysema noted on prior CT imaging.  She has at least 20-pack-year smoking history, recently quit in March  -Last pulmonary function testing performed in 2017 with normal spirometry and lung volumes  -Patient is s/p hospitalization x 1 month for exacerbation secondary to bilateral pneumonia and pneumothorax   -Patient still with BRADY above her baseline    Plan:  -Update PFTs.  Encourage patient to schedule, this was ordered at her previous office visit  -Stop Stiolto, switch to all nebulized therapy with ipratropium-albuterol 4 times daily and continue nebulized budesonide twice daily  -Continue albuterol HFA/nebs every 6 hours PRN  -Complete prednisone taper as prescribed from recent hospital discharge  -Follow-up in 4 weeks or sooner if needed

## 2024-05-07 NOTE — ASSESSMENT & PLAN NOTE
-Recent CT chest from 3/7/2024 during [place] hospital stay with AMANDA and RML pulmonary nodules measuring 2.3 x 2.2 x 7 mm and 11 mm respectively  -Patient has follow-up CT chest planned for July, ordered by PCP

## 2024-05-07 NOTE — PATIENT INSTRUCTIONS
-Stop Stiolto.  Start duo nebulizer solution 4 times daily  -Continue nebulized budesonide/Pulmicort twice daily, rinse mouth after  -May continue to use albuterol inhaler/nebulizer every 6 hours as needed for breakthrough shortness of breath or wheezing  -Complete prednisone taper as prescribed  -Schedule pulmonary function testing  -Sleep study scheduled on May 28, follow-up in the office after  -3 L supplemental oxygen with activity to maintain SpO2 above 88%  -Continue working with PT/OT and performing light physical exercise daily to improve stamina and conditioning

## 2024-05-07 NOTE — ASSESSMENT & PLAN NOTE
-Approximately 21-pack-year smoking history, quit in March upon admission to the hospital.  Has not relapsed since  -Congratulated patient on her recent success.  Encouraged to refrain from smoking  -Resume CT lung cancer screening after surveillance of AMANDA and RML nodules completed

## 2024-05-07 NOTE — PROGRESS NOTES
Pulmonary Follow Up Note  Chely Ruvalcaba 53 y.o. female MRN: [id number]  5/7/2024    Assessment/Plan:    Centrilobular emphysema (HCC)  -Moderate emphysema noted on prior CT imaging.  She has at least 20-pack-year smoking history, recently quit in March  -Last pulmonary function testing performed in 2017 with normal spirometry and lung volumes  -Patient is s/p hospitalization x 1 month for exacerbation secondary to bilateral pneumonia and pneumothorax   -Patient still with BRADY above her baseline    Plan:  -Update PFTs.  Encourage patient to schedule, this was ordered at her previous office visit  -Stop Stiolto, switch to all nebulized therapy with ipratropium-albuterol 4 times daily and continue nebulized budesonide twice daily  -Continue albuterol HFA/nebs every 6 hours PRN  -Complete prednisone taper as prescribed from recent hospital discharge  -Follow-up in 4 weeks or sooner if needed    Bilateral pneumonia  -S/p hospitalization from 3/6 - 4/11 for bilateral pneumonia, pneumothorax, COPD and CHF exacerbation  -Reviewed recent chest x-ray with the patient from 4/30.  Showed clear lungs.  No evidence of pneumothorax or pleural effusion  -Lungs are clear on exam today  -Patient will complete prednisone taper, continue inhaler medication as mentioned above    Acute respiratory failure with hypoxia (HCC)  -Secondary to recent prolonged hospitalization for pneumonia, pneumothorax, exacerbation of COPD and ? CHF  -Oxygen titration study performed in the office today.  Demonstrated continued need for 3 L oxygen with exertion  -Patient will continue wearing 3 L supplemental oxygen with activity, continue wearing nightly until sleep study performed the end of this month    Cigarette nicotine dependence in remission  -Approximately 21-pack-year smoking history, quit in March upon admission to the hospital.  Has not relapsed since  -Congratulated patient on her recent success.  Encouraged to refrain from smoking  -Resume CT  lung cancer screening after surveillance of AMANDA and RML nodules completed    Pulmonary nodules  -Recent CT chest from 3/7/2024 during [place] hospital stay with AMANDA and RML pulmonary nodules measuring 2.3 x 2.2 x 7 mm and 11 mm respectively  -Patient has follow-up CT chest planned for July, ordered by PCP        Diagnoses and all orders for this visit:    Centrilobular emphysema (HCC)  -     Ambulatory referral to Pulmonology  -     POCT Oxygen Titration  -     ipratropium-albuterol (DUO-NEB) 0.5-2.5 mg/3 mL nebulizer solution; Take 3 mL by nebulization 4 (four) times a day    Acute respiratory failure with hypoxia (HCC)    Dyspnea, unspecified type    Pneumonia of both lungs due to infectious organism, unspecified part of lung    Cigarette nicotine dependence in remission    Pulmonary nodules    Return in about 3 weeks (around 5/28/2024).      History of Present Illness     Chief Complaint:   Chief Complaint   Patient presents with    Follow-up     Patient states she has been SOB       Patient ID: Chely is a 53 y.o. y.o. female has a past medical history of Abnormal stress test, Acute cystitis without hematuria (05/07/2018), Acute duodenal ulcer with bleeding (01/16/2023), Allergic sinusitis, Anemia (Around december), Anxiety, Arthritis, Asthma, Bilateral lower extremity edema, Callus of foot, Chest pain, Chronic GERD, Colon polyp, Constipation, COPD (chronic obstructive pulmonary disease) ([facility]), Depression, Disease of thyroid gland, Diverticulitis of colon, Dyspepsia, Edema, lower extremity (08/07/2020), Esophageal reflux, Essential hypertriglyceridemia, Fatty liver (01/16/2023), Gastroesophageal reflux disease with esophagitis (11/18/2016), GERD (gastroesophageal reflux disease), Gynecological disorder, Hydroureteronephrosis (06/30/2022), Hypertension, Hypokalemia (06/20/2022), Hypotension, Kidney stone, Left ureteral stone with hydronephrosis (07/21/2022), Migraine, Morbid obesity (HCC) (01/11/2019), Neuropathy,  Obesity, Other chest pain (11/08/2018), Pancreatic lesion (03/13/2023), Positive depression screening (06/19/2022), Primary hypertriglyceridemia (06/19/2022), Pulmonary emphysema (HCC), Seasonal allergies, Severe obesity (HCC) (01/16/2023), Skin tag (07/17/2023), SOB (shortness of breath), Strain of groin (07/17/2023), Urinary frequency, Vagina, candidiasis, Very heavy cigarette smoker (01/16/2023), and Visual impairment.      5/7/2024  HPI: Chely Ruvalcaba is a 53 y.o. female with a PMH of emphysema, tobacco abuse, GERD, iron deficiency anemia, obesity, and cirrhosis who is here today for a hospital follow-up visit.  She was recently hospitalized for over a month in [place] from March 6th through April 11th with bilateral pneumonia, pneumothorax requiring chest tube, COPD exacerbation, and acute on chronic congestive heart failure. Patient was diuresed aggressively in the hospital.  She was discharged on long prednisone taper with Bactrim for PCP prophylaxis, nebulized budesonide, and home oxygen.  She is currently receiving home PT and OT.     Patient returns today with complaints of significant fatigue and low energy.  She also reports shortness of breath above her baseline and developed a dry cough a few days ago.  No fevers, but patient states she had a 99 temperature few days ago.  She remains on her Stiolto inhaler and uses albuterol twice per day in addition to nebulized budesonide twice per day.  Still remains off cigarettes since her hospital admission.  She is wearing 3 to 5 L nasal cannula continuously.  She is still on prednisone taper.  States she is on 2 pills/day now.  Unknown tablet milligrams.      Review of Systems   Constitutional:  Positive for fatigue. Negative for activity change, chills, diaphoresis, fever and unexpected weight change.   HENT:  Negative for congestion, postnasal drip, rhinorrhea, sore throat, trouble swallowing and voice change.    Respiratory:  Positive for cough and shortness of  breath. Negative for chest tightness and wheezing.    Cardiovascular:  Negative for chest pain, palpitations and leg swelling.   Allergic/Immunologic: Negative.        Historical Information   Past Medical History:   Diagnosis Date    Abnormal stress test     Acute cystitis without hematuria 05/07/2018    Acute duodenal ulcer with bleeding 01/16/2023    Allergic sinusitis     last assessed - 03Apr2017    Anemia Around december    Anxiety     last assessed - 04Mar2016    Arthritis     Asthma     last assessed - 04Mar2016    Bilateral lower extremity edema     last assessed - [id number]    Callus of foot     last assessed - 22Jun2016    Chest pain     Chronic GERD     last assessed - 16Jan2017    Colon polyp     Constipation     Resolved - 13Jan2017    COPD (chronic obstructive pulmonary disease) (HCC)     Depression     last assessed - 04Mar2016    Disease of thyroid gland     Diverticulitis of colon     last assessed - 04Mar2016    Dyspepsia     Resolved - [id number]    Edema, lower extremity 08/07/2020    Esophageal reflux     last assessed - 04Mar2016    Essential hypertriglyceridemia     last assessed - [id number]    Fatty liver 01/16/2023    Gastroesophageal reflux disease with esophagitis 11/18/2016    GERD (gastroesophageal reflux disease)     Gynecological disorder     last assessed - 04Mar2016    Hydroureteronephrosis 06/30/2022    Hypertension     last assessed - 04Mar2016    Hypokalemia 06/20/2022    Hypotension     last assessed - [id number]    Kidney stone     Left ureteral stone with hydronephrosis 07/21/2022    Migraine     Morbid obesity (HCC) 01/11/2019    Formatting of this note might be different from the original. Added automatically from request for surgery [id number]    Neuropathy     Obesity     Other chest pain 11/08/2018    Pancreatic lesion 03/13/2023    Positive depression screening 06/19/2022    Primary hypertriglyceridemia 06/19/2022    Pulmonary emphysema (HCC)     last assessed - 04Mar2016     Seasonal allergies     Severe obesity (HCC) 01/16/2023    Skin tag 07/17/2023    SOB (shortness of breath)     Strain of groin 07/17/2023    Urinary frequency     last assessed - 22Jun2016    Vagina, candidiasis     last assessed - 22Jun2016    Very heavy cigarette smoker 01/16/2023    Visual impairment      Past Surgical History:   Procedure Laterality Date    CARPAL TUNNEL RELEASE      last assessed - 04MAr2016    CHOLECYSTECTOMY      last assessed - 04Mar2016    COLONOSCOPY      Complete colonoscopy     EYE SURGERY      last assessed - 04Mar2016    FL RETROGRADE PYELOGRAM  6/16/2022    FL RETROGRADE PYELOGRAM  8/16/2022    FOOT SURGERY      last assessed - 04Mar2016    NM CYSTO BLADDER W/URETERAL CATHETERIZATION Left 7/21/2022    Procedure: CYSTOSCOPY RETROGRADE PYELOGRAM WITH INSERTION STENT URETERAL;  Surgeon: Facundo Matamoros MD;  Location: [facility];  Service: Urology    NM CYSTO/URETERO W/LITHOTRIPSY &INDWELL STENT INSRT Left 6/16/2022    Procedure: CYSTOSCOPY URETEROSCOPY WITH LITHOTRIPSY HOLMIUM LASER, RETROGRADE PYELOGRAM AND INSERTION STENT URETERAL;  Surgeon: Sammy Ferrer MD;  Location: [facility];  Service: Urology    NM CYSTO/URETERO W/LITHOTRIPSY &INDWELL STENT INSRT Left 8/16/2022    Procedure: CYSTOSCOPY USCOPE W/HOLMIUM LASER, RETROGRADE PYELOGRAM&STENT;  Surgeon: Sudheer Bradford MD;  Location: [facility];  Service: Urology    NM ESOPHAGOGASTRODUODENOSCOPY TRANSORAL DIAGNOSTIC N/A 2/13/2017    Procedure: ESOPHAGOGASTRODUODENOSCOPY (EGD);  Surgeon: Alison Saleem MD;  Location: [facility];  Service: Gastroenterology     Family History   Problem Relation Age of Onset    Obesity Mother     Thyroid disease Mother     Cancer Mother 71    Vision loss Mother     Obesity Sister     Coronary artery disease Sister     Stroke Sister     Asthma Sister     Glaucoma Sister     No Known Problems Maternal Aunt     Diabetes Maternal Uncle     Lung cancer Maternal Grandmother     Cancer Maternal Grandfather      Diabetes Maternal Grandfather     No Known Problems Paternal Grandmother     No Known Problems Paternal Grandfather     Hypertension Other     Lung cancer Other     Hypertension Other     Lung cancer Other     Lung cancer Other        Smoking history: She reports that she has been smoking cigarettes. She started smoking about 41 years ago. She has a 20.7 pack-year smoking history. She has never used smokeless tobacco.    Occupational History:     Immunization History   Administered Date(s) Administered    INFLUENZA 12/27/2015, 09/06/2017, 11/08/2018, 10/25/2021, 01/16/2023    Influenza Quadrivalent Preservative Free 3 years and older IM 09/06/2017    Influenza, injectable, quadrivalent, preservative free 0.5 mL 11/08/2018    Influenza, recombinant, quadrivalent,injectable, preservative free 11/11/2019, 01/16/2023    Influenza, seasonal, injectable 12/04/2015, 09/01/2016    Pneumococcal Conjugate 13-Valent 03/07/2019    Tdap 01/16/2023       Meds/Allergies     Current Outpatient Medications:     albuterol (2.5 mg/3 mL) 0.083 % nebulizer solution, Take 3 mL (2.5 mg total) by nebulization every 6 (six) hours as needed for wheezing or shortness of breath, Disp: 30 mL, Rfl: 5    albuterol (PROVENTIL HFA,VENTOLIN HFA) 90 mcg/act inhaler, Inhale 2 puffs every 6 (six) hours as needed for wheezing, Disp: 18 g, Rfl: 1    budesonide (PULMICORT) 0.5 mg/2 mL nebulizer solution, Take 0.5 mg by nebulization 2 (two) times a day Rinse mouth after use., Disp: , Rfl:     buPROPion (WELLBUTRIN XL) 150 mg 24 hr tablet, Take 1 tablet (150 mg total) by mouth every morning, Disp: 90 tablet, Rfl: 1    diazepam (VALIUM) 5 mg tablet, Take 1 tablet (5 mg total) by mouth daily at bedtime as needed for anxiety (Patient taking differently: Take 5 mg by mouth daily at bedtime as needed for anxiety), Disp: 30 tablet, Rfl: 0    ergocalciferol (VITAMIN D2) 50,000 units, Take 1 capsule (50,000 Units total) by mouth every 14 (fourteen) days, Disp: 12  capsule, Rfl: 1    famotidine (PEPCID) 40 MG tablet, Take 1 tablet (40 mg total) by mouth daily at bedtime Take in evening 2 hours prior to bed, Disp: 90 tablet, Rfl: 1    ferrous sulfate 324 (65 Fe) mg, Take 1 tablet (324 mg total) by mouth daily before breakfast, Disp: 30 tablet, Rfl: 0    fluticasone (FLONASE) 50 mcg/act nasal spray, 1 spray into each nostril 2 (two) times a day (Patient taking differently: 1 spray into each nostril as needed), Disp: 16 g, Rfl: 5    folic acid (FOLVITE) 1 mg tablet, Take 1 tablet (1 mg total) by mouth daily, Disp: 90 tablet, Rfl: 1    furosemide (LASIX) 40 mg tablet, Take 1 tablet (40 mg total) by mouth 2 (two) times a day, Disp: 180 tablet, Rfl: 1    gabapentin (NEURONTIN) 100 mg capsule, Take 100 mg by mouth every 8 (eight) hours, Disp: , Rfl: 0    Insulin Glargine Solostar (Basaglar KwikPen) 100 UNIT/ML SOPN, Inject 0.55 mL (55 Units total) under the skin in the morning, Disp: 15 mL, Rfl: 5    insulin lispro (HumaLOG) 100 units/mL injection pen, Inject 15 Units under the skin 3 (three) times a day with meals, Disp: 15 mL, Rfl: 5    Insulin Pen Needle 31G X 5 MM MISC, Inject under the skin if needed (Insuline injections), Disp: 100 each, Rfl: 3    ipratropium-albuterol (DUO-NEB) 0.5-2.5 mg/3 mL nebulizer solution, Take 3 mL by nebulization 4 (four) times a day, Disp: 360 mL, Rfl: 3    lactulose (CHRONULAC) 10 g/15 mL solution, Take 20 g by mouth Daily or as needed for bowl movement, Disp: , Rfl:     levothyroxine 112 mcg tablet, Take 1 tablet (112 mcg total) by mouth daily, Disp: 90 tablet, Rfl: 1    nicotine (NICODERM CQ) 21 mg/24 hr TD 24 hr patch, Place 1 patch on the skin over 24 hours every 24 hours, Disp: 28 patch, Rfl: 5    Nutritional Supplements (Ensure Plus) LIQD, Take 237 mL by mouth 2 (two) times a day, Disp: 500 mL, Rfl: 11    omeprazole (PriLOSEC) 40 MG capsule, take 1 capsule by mouth twice a day, Disp: 60 capsule, Rfl: 5    ondansetron (ZOFRAN-ODT) 4 mg  "disintegrating tablet, Take 1 tablet (4 mg total) by mouth every 12 (twelve) hours as needed for nausea or vomiting, Disp: 60 tablet, Rfl: 1    oxyCODONE-acetaminophen (PERCOCET)  mg per tablet, Take 1 tablet by mouth every 6 (six) hours as needed for severe pain, Disp: , Rfl:     oxygen gas, Inhale 2 L/min continuous. via nasal cannula  Indications: low saturation, Disp: , Rfl:     predniSONE 10 mg tablet, Take 10 mg by mouth daily, Disp: , Rfl:     sertraline (ZOLOFT) 100 mg tablet, Take 1 tablet (100 mg total) by mouth 2 (two) times a day, Disp: 180 tablet, Rfl: 0    simvastatin (ZOCOR) 20 mg tablet, Take 1 tablet (20 mg total) by mouth daily at bedtime, Disp: 90 tablet, Rfl: 1    sucralfate (CARAFATE) 1 g tablet, Take 1 tablet (1 g total) by mouth daily at bedtime, Disp: 60 tablet, Rfl: 3    sulfamethoxazole-trimethoprim (BACTRIM DS) 800-160 mg per tablet, Take 1 tablet by mouth 3 (three) times a week for 28 days, Disp: 12 tablet, Rfl: 0    varenicline (CHANTIX) 0.5 mg tablet, Take 1 tablet (0.5 mg total) by mouth 2 (two) times a day, Disp: 60 tablet, Rfl: 0    spironolactone (ALDACTONE) 25 mg tablet, Take 2 tablets (50 mg total) by mouth daily (Patient not taking: Reported on 4/30/2024), Disp: 30 tablet, Rfl: 5    Allergies:   Allergies   Allergen Reactions    Hydrocodone-Acetaminophen Hives    Ketorolac Hives and Dizziness    Toradol [Ketorolac Tromethamine] Other (See Comments)     hypotension    Ultram [Tramadol] Nausea Only, GI Intolerance and Vomiting         Vitals:  Vitals:    05/07/24 1049   BP: 124/76   BP Location: Right arm   Patient Position: Sitting   Cuff Size: Large   Pulse: 94   Temp: 98.6 °F (37 °C)   TempSrc: Temporal   SpO2: 94%   Weight: 112 kg (248 lb)   Height: 5' 7\" (1.702 m)   Oxygen Therapy  SpO2: 94 % (5 liters)  .  Wt Readings from Last 3 Encounters:   05/07/24 112 kg (248 lb)   05/06/24 114 kg (252 lb)   05/01/24 114 kg (252 lb 6.4 oz)     Body mass index is 38.84 " "kg/m².    Physical Exam  Vitals and nursing note reviewed.   Constitutional:       General: She is not in acute distress.     Appearance: Normal appearance. She is well-developed. She is obese.   Cardiovascular:      Rate and Rhythm: Normal rate and regular rhythm.      Heart sounds: Normal heart sounds, S1 normal and S2 normal. No murmur heard.  Pulmonary:      Effort: Pulmonary effort is normal.      Breath sounds: Normal breath sounds. No decreased breath sounds, wheezing, rhonchi or rales.   Musculoskeletal:         General: No swelling.      Right lower leg: No edema.      Left lower leg: No edema.   Neurological:      Mental Status: She is alert.   Psychiatric:         Mood and Affect: Mood and affect normal.         Behavior: Behavior normal. Behavior is cooperative.           Labs: I have personally reviewed pertinent lab results.  Lab Results   Component Value Date    WBC 6.52 12/21/2023    HGB 11.5 12/21/2023    HCT 38.5 12/21/2023    MCV 95 12/21/2023    PLT  12/21/2023      Comment:      Not reported due to platelet clumping.Manual Review of Smear Performed     Lab Results   Component Value Date    CALCIUM 9.2 12/21/2023    K 4.3 12/21/2023    CO2 25 12/21/2023     (H) 12/21/2023    BUN 10 12/21/2023    CREATININE 0.73 12/21/2023     No results found for: \"IGE\"  Lab Results   Component Value Date    ALT 21 12/21/2023    AST 48 (H) 12/21/2023    ALKPHOS 94 12/21/2023       Studies:    Imaging and other studies: I have personally reviewed pertinent reports and I have personally reviewed pertinent films in PACS     Chest x-ray 4/3/2024  Clear lungs.  No pneumothorax or pleural effusion.  No acute cardiopulmonary disease.    3/7/2024 CT scan chest [facility] [place]  Findings most compatible with pulmonary edema with groundglass opacification bilaterally with slight sparing of the lung apices.  Differential diagnosis include atypical pneumonia or much less likely hemorrhage.  Pulmonary nodules involving the " left upper lobe and right middle lobe.  Left upper lobe nodule measures 2.3 x 2.2 x 7 mm right lobe nodule measures 11 mm     CT lung cancer screening 1/21/2023  1.2 cm left upper lobe groundglass opacity.  Lung RADS 2, benign appearance or behavior.  Continue annual screening with LDCT in 12 months.    EKG, Pathology, and Other Studies: I have personally reviewed pertinent reports.        Pulmonary function testing:     Pulmonary Functions Testing Results: 2/14/2017    FEV1/FVC ratio 76%    FEV1 73% predicted  FVC 77% predicted  (-) response to bronchodilators  TLC 88 % predicted  RV 93 % predicted  DLCO corrected for hemoglobin 55 % predicted    Impression: Normal spirometry.  Normal lung volumes.  Reduced diffusion capacity.  Normal flow volume loops.  Isolated reduction in diffusion capacity may be associated with early interstitial lung disease, pulmonary hypertension, or diastolic HF.

## 2024-05-07 NOTE — ASSESSMENT & PLAN NOTE
-Secondary to recent prolonged hospitalization for pneumonia, pneumothorax, exacerbation of COPD and ? CHF  -Oxygen titration study performed in the office today.  Demonstrated continued need for 3 L oxygen with exertion  -Patient will continue wearing 3 L supplemental oxygen with activity, continue wearing nightly until sleep study performed the end of this month

## 2024-05-07 NOTE — TELEPHONE ENCOUNTER
Patient is requesting if you will still manage her Iron and that she can get her refills from you.  Refill pended      Called mobile lab to help schedule upcoming blood work

## 2024-05-08 ENCOUNTER — TELEPHONE (OUTPATIENT)
Age: 54
End: 2024-05-08

## 2024-05-08 ENCOUNTER — HOME CARE VISIT (OUTPATIENT)
Dept: HOME HEALTH SERVICES | Facility: HOME HEALTHCARE | Age: 54
End: 2024-05-08
Payer: MEDICARE

## 2024-05-08 VITALS — OXYGEN SATURATION: 93 % | HEART RATE: 81 BPM | SYSTOLIC BLOOD PRESSURE: 122 MMHG | DIASTOLIC BLOOD PRESSURE: 62 MMHG

## 2024-05-08 DIAGNOSIS — E03.9 ACQUIRED HYPOTHYROIDISM: Primary | ICD-10-CM

## 2024-05-08 DIAGNOSIS — Z79.4 TYPE 2 DIABETES MELLITUS WITHOUT COMPLICATION, WITH LONG-TERM CURRENT USE OF INSULIN (HCC): ICD-10-CM

## 2024-05-08 DIAGNOSIS — Z72.0 TOBACCO ABUSE DISORDER: ICD-10-CM

## 2024-05-08 DIAGNOSIS — E11.9 TYPE 2 DIABETES MELLITUS WITHOUT COMPLICATION, WITH LONG-TERM CURRENT USE OF INSULIN (HCC): ICD-10-CM

## 2024-05-08 PROCEDURE — G0152 HHCP-SERV OF OT,EA 15 MIN: HCPCS | Performed by: OCCUPATIONAL THERAPIST

## 2024-05-08 RX ORDER — VARENICLINE TARTRATE 0.5 MG/1
0.5 TABLET, FILM COATED ORAL 2 TIMES DAILY
Qty: 60 TABLET | Refills: 0 | Status: SHIPPED | OUTPATIENT
Start: 2024-05-08

## 2024-05-08 NOTE — TELEPHONE ENCOUNTER
Pt called to schedule a follow up appointment to discuss having her kidney function tested. Pt stated she was recently diagnosed with diabetes and her doctors have been concerned about her kidneys. Pt was scheduled for the next available of 8/12 and added to wait list but asking if any testing can be done ahead of time.     Pt call back: [phone]

## 2024-05-08 NOTE — TELEPHONE ENCOUNTER
Called and spoke with patient. She states she was diagnosed with diabetes at the [facility] in [place] that she was in as well as her PCP. Informed that she should contact her PCP for a referral to a Nephrologist as they deal with the kidney functions and tests.

## 2024-05-08 NOTE — TELEPHONE ENCOUNTER
Pt called looking for a referral for [facility]. Was suggested by PT that she reach out and request one from Dr. Mason.

## 2024-05-09 ENCOUNTER — TELEPHONE (OUTPATIENT)
Age: 54
End: 2024-05-09

## 2024-05-09 ENCOUNTER — HOME CARE VISIT (OUTPATIENT)
Dept: HOME HEALTH SERVICES | Facility: HOME HEALTHCARE | Age: 54
End: 2024-05-09
Payer: MEDICARE

## 2024-05-09 ENCOUNTER — TELEPHONE (OUTPATIENT)
Dept: GASTROENTEROLOGY | Facility: CLINIC | Age: 54
End: 2024-05-09

## 2024-05-09 VITALS — SYSTOLIC BLOOD PRESSURE: 140 MMHG | TEMPERATURE: 97.9 F | DIASTOLIC BLOOD PRESSURE: 80 MMHG

## 2024-05-09 VITALS — OXYGEN SATURATION: 97 % | DIASTOLIC BLOOD PRESSURE: 78 MMHG | HEART RATE: 95 BPM | SYSTOLIC BLOOD PRESSURE: 132 MMHG

## 2024-05-09 DIAGNOSIS — J18.9 PNEUMONIA OF BOTH UPPER LOBES DUE TO INFECTIOUS ORGANISM: ICD-10-CM

## 2024-05-09 DIAGNOSIS — J18.9 SEPSIS DUE TO PNEUMONIA (HCC): ICD-10-CM

## 2024-05-09 DIAGNOSIS — A41.9 SEPSIS DUE TO PNEUMONIA (HCC): ICD-10-CM

## 2024-05-09 PROCEDURE — G0151 HHCP-SERV OF PT,EA 15 MIN: HCPCS

## 2024-05-09 PROCEDURE — G0299 HHS/HOSPICE OF RN EA 15 MIN: HCPCS

## 2024-05-09 RX ORDER — LACTOSE-REDUCED FOOD
1 LIQUID (ML) ORAL 2 TIMES DAILY
Qty: 500 ML | Refills: 11 | Status: SHIPPED | OUTPATIENT
Start: 2024-05-09 | End: 2024-05-17

## 2024-05-09 NOTE — TELEPHONE ENCOUNTER
Lois, from [facility], called asking if Dr. Mason could send a new order for the Nutritional Supplements (Ensure Plus) LIQD, along with chart notes as to why pt needs Ensure, and pts insurance information.     The Previous script that was sent was not to the correct place. The correct fax number is [phone]

## 2024-05-12 DIAGNOSIS — F33.41 RECURRENT MAJOR DEPRESSIVE DISORDER, IN PARTIAL REMISSION (HCC): ICD-10-CM

## 2024-05-13 ENCOUNTER — TELEPHONE (OUTPATIENT)
Dept: OTHER | Facility: OTHER | Age: 54
End: 2024-05-13

## 2024-05-13 ENCOUNTER — HOME CARE VISIT (OUTPATIENT)
Dept: HOME HEALTH SERVICES | Facility: HOME HEALTHCARE | Age: 54
End: 2024-05-13
Payer: MEDICARE

## 2024-05-13 ENCOUNTER — LAB (OUTPATIENT)
Dept: LAB | Facility: HOSPITAL | Age: 54
DRG: 425 | End: 2024-05-13
Attending: INTERNAL MEDICINE
Payer: MEDICARE

## 2024-05-13 VITALS — OXYGEN SATURATION: 98 % | HEART RATE: 90 BPM | DIASTOLIC BLOOD PRESSURE: 72 MMHG | SYSTOLIC BLOOD PRESSURE: 126 MMHG

## 2024-05-13 VITALS
DIASTOLIC BLOOD PRESSURE: 72 MMHG | OXYGEN SATURATION: 98 % | RESPIRATION RATE: 20 BRPM | SYSTOLIC BLOOD PRESSURE: 126 MMHG | TEMPERATURE: 97.8 F | HEART RATE: 90 BPM

## 2024-05-13 DIAGNOSIS — D50.9 IRON DEFICIENCY ANEMIA, UNSPECIFIED IRON DEFICIENCY ANEMIA TYPE: ICD-10-CM

## 2024-05-13 DIAGNOSIS — K74.60 CIRRHOSIS OF LIVER WITHOUT ASCITES, UNSPECIFIED HEPATIC CIRRHOSIS TYPE (HCC): ICD-10-CM

## 2024-05-13 LAB
AFP-TM SERPL-MCNC: 5.16 NG/ML (ref 0–9)
ALBUMIN SERPL BCP-MCNC: 3.5 G/DL (ref 3.5–5)
ALP SERPL-CCNC: 83 U/L (ref 34–104)
ALT SERPL W P-5'-P-CCNC: 26 U/L (ref 7–52)
ANION GAP SERPL CALCULATED.3IONS-SCNC: 12 MMOL/L (ref 4–13)
AST SERPL W P-5'-P-CCNC: 27 U/L (ref 13–39)
BILIRUB SERPL-MCNC: 1.12 MG/DL (ref 0.2–1)
BUN SERPL-MCNC: 17 MG/DL (ref 5–25)
CALCIUM SERPL-MCNC: 9.1 MG/DL (ref 8.4–10.2)
CHLORIDE SERPL-SCNC: 98 MMOL/L (ref 96–108)
CO2 SERPL-SCNC: 31 MMOL/L (ref 21–32)
CREAT SERPL-MCNC: 0.94 MG/DL (ref 0.6–1.3)
ERYTHROCYTE [DISTWIDTH] IN BLOOD BY AUTOMATED COUNT: 16.8 % (ref 11.6–15.1)
FERRITIN SERPL-MCNC: 24 NG/ML (ref 11–307)
FOLATE SERPL-MCNC: 18 NG/ML
GFR SERPL CREATININE-BSD FRML MDRD: 69 ML/MIN/1.73SQ M
GLUCOSE P FAST SERPL-MCNC: 265 MG/DL (ref 65–99)
HCT VFR BLD AUTO: 39.9 % (ref 34.8–46.1)
HGB BLD-MCNC: 12.3 G/DL (ref 11.5–15.4)
INR PPP: 1.24 (ref 0.84–1.19)
IRON SATN MFR SERPL: 15 % (ref 15–50)
IRON SERPL-MCNC: 50 UG/DL (ref 50–212)
MAGNESIUM SERPL-MCNC: 2 MG/DL (ref 1.9–2.7)
MCH RBC QN AUTO: 29.9 PG (ref 26.8–34.3)
MCHC RBC AUTO-ENTMCNC: 30.8 G/DL (ref 31.4–37.4)
MCV RBC AUTO: 97 FL (ref 82–98)
PLATELET # BLD AUTO: 97 THOUSANDS/UL (ref 149–390)
PMV BLD AUTO: 12.1 FL (ref 8.9–12.7)
POTASSIUM SERPL-SCNC: 2.4 MMOL/L (ref 3.5–5.3)
PROT SERPL-MCNC: 6.2 G/DL (ref 6.4–8.4)
PROTHROMBIN TIME: 15.4 SECONDS (ref 11.6–14.5)
RBC # BLD AUTO: 4.12 MILLION/UL (ref 3.81–5.12)
SODIUM SERPL-SCNC: 141 MMOL/L (ref 135–147)
TIBC SERPL-MCNC: 335 UG/DL (ref 250–450)
UIBC SERPL-MCNC: 285 UG/DL (ref 155–355)
VIT B12 SERPL-MCNC: 285 PG/ML (ref 180–914)
WBC # BLD AUTO: 13.27 THOUSAND/UL (ref 4.31–10.16)

## 2024-05-13 PROCEDURE — 85610 PROTHROMBIN TIME: CPT

## 2024-05-13 PROCEDURE — G0152 HHCP-SERV OF OT,EA 15 MIN: HCPCS | Performed by: OCCUPATIONAL THERAPIST

## 2024-05-13 PROCEDURE — 83550 IRON BINDING TEST: CPT

## 2024-05-13 PROCEDURE — 82728 ASSAY OF FERRITIN: CPT

## 2024-05-13 PROCEDURE — G0151 HHCP-SERV OF PT,EA 15 MIN: HCPCS

## 2024-05-13 PROCEDURE — 82607 VITAMIN B-12: CPT

## 2024-05-13 PROCEDURE — 36415 COLL VENOUS BLD VENIPUNCTURE: CPT

## 2024-05-13 PROCEDURE — 83540 ASSAY OF IRON: CPT

## 2024-05-13 PROCEDURE — 83735 ASSAY OF MAGNESIUM: CPT

## 2024-05-13 PROCEDURE — 80053 COMPREHEN METABOLIC PANEL: CPT

## 2024-05-13 PROCEDURE — 82105 ALPHA-FETOPROTEIN SERUM: CPT

## 2024-05-13 PROCEDURE — G0299 HHS/HOSPICE OF RN EA 15 MIN: HCPCS

## 2024-05-13 PROCEDURE — 82746 ASSAY OF FOLIC ACID SERUM: CPT

## 2024-05-13 PROCEDURE — 85027 COMPLETE CBC AUTOMATED: CPT

## 2024-05-13 NOTE — TELEPHONE ENCOUNTER
Lab Result: Potassium 2.4   Date/Time Drawn: 05/13/24 at 10:55 am    Ordering Provider: Michele Vu DO    Lab Personnel's Name: Sandra        The following critical/stat result was read back to the lab as stated above and [employer] messaged to the on-call provider. The provider confirmed receipt of the message.

## 2024-05-13 NOTE — RESULT ENCOUNTER NOTE
I left a detailed message on patient's voicemail that her potassium is very low at 2.4.  I explained that if she does have potassium at home that she should take 40 mill equivalents tonight and 40 mg tomorrow but she should reach out to her primary care provider or cardiology for further instructions on management of her low potassium.  Her glucose was also very high at 265.  Her CBC did reveal one of the best hemoglobins that she has had in a while, this is good news.  However her white count was mildly elevated at 13,000.  INR mildly elevated as well magnesium was normal iron studies improved but still borderline iron deficient.  Vitamin B12 is low normal at 285 I did suggest that she take 1000 mcg of vitamin B12 sublingual (under the tongue formula) daily.  I will copy her cardiologist and primary care provider given the very low potassium.  Thank you

## 2024-05-13 NOTE — CASE COMMUNICATION
I am so sorry.  The BS this am  at 7:04am was 324.  704 was the time not the BS.  ----- Message -----  From: Andi Mason DO  Sent: 5/13/2024   1:10 PM EDT  To: Frieda Segovia RN      Hello, thank you for the update, with the blood sugar above 700 fasting and not feeling well she needs to be at the hospital.  Please advise her to go to the hospital.  Thank you  ----- Message -----  From: Frieda Segovia RN  Sent: 5/13/2024  10:47  AM EDT  To: Andi Mason DO    Good morning.  Chely hasn't been feeling good last few days.  She did have BW done by mobile lab this am.  Glucometer readings for BS since yesterday were high. 161 Sunday at noon,  269 9 pm, 408 at  357 this am,  324 at 704 this am.  Vitals this am 97.8   90 reg   126/72  98 % on 4L.  Pt turned up oxygen 2nd not feeling like she was getting enough oxygen.  Possibly nose breathing.  Lungs clear slightly d ecreased and intermitt dry cough.  No edema. Doing better with incentive spirometer. States she feels tired and sleepy all the time.     Statement Selected

## 2024-05-13 NOTE — Clinical Note
Good morning.  Chely hasn't been feeling good last few days.  She did have BW done by mobile lab this am.  Glucometer readings for BS since yesterday were high. 161 Sunday at noon,  269 9 pm, 408 at  357 this am,  324 at 704 this am.  Vitals this am 97.8   90 reg   126/72  98 % on 4L.  Pt turned up oxygen 2nd not feeling like she was getting enough oxygen.  Possibly nose breathing.  Lungs clear slightly decreased and intermitt dry cough.  No edema. Doing better with incentive spirometer. States she feels tired and sleepy all the time.

## 2024-05-14 ENCOUNTER — APPOINTMENT (EMERGENCY)
Dept: RADIOLOGY | Facility: HOSPITAL | Age: 54
DRG: 425 | End: 2024-05-14
Payer: MEDICARE

## 2024-05-14 ENCOUNTER — HOSPITAL ENCOUNTER (INPATIENT)
Facility: HOSPITAL | Age: 54
LOS: 2 days | Discharge: HOME HEALTH | DRG: 425 | End: 2024-05-17
Attending: EMERGENCY MEDICINE | Admitting: INTERNAL MEDICINE
Payer: MEDICARE

## 2024-05-14 VITALS — HEART RATE: 99 BPM | SYSTOLIC BLOOD PRESSURE: 118 MMHG | DIASTOLIC BLOOD PRESSURE: 78 MMHG | OXYGEN SATURATION: 94 %

## 2024-05-14 DIAGNOSIS — E11.9 DIABETES MELLITUS TYPE 2, INSULIN DEPENDENT (HCC): ICD-10-CM

## 2024-05-14 DIAGNOSIS — I10 HTN (HYPERTENSION): ICD-10-CM

## 2024-05-14 DIAGNOSIS — F41.8 DEPRESSION WITH ANXIETY: ICD-10-CM

## 2024-05-14 DIAGNOSIS — I50.31 ACUTE DIASTOLIC CONGESTIVE HEART FAILURE (HCC): ICD-10-CM

## 2024-05-14 DIAGNOSIS — K57.92 DIVERTICULITIS: ICD-10-CM

## 2024-05-14 DIAGNOSIS — Z79.4 DIABETES MELLITUS TYPE 2, INSULIN DEPENDENT (HCC): ICD-10-CM

## 2024-05-14 DIAGNOSIS — E66.9 OBESITY: ICD-10-CM

## 2024-05-14 DIAGNOSIS — E87.6 HYPOKALEMIA: Primary | ICD-10-CM

## 2024-05-14 DIAGNOSIS — I50.9 CHF (CONGESTIVE HEART FAILURE) (HCC): ICD-10-CM

## 2024-05-14 DIAGNOSIS — F33.41 RECURRENT MAJOR DEPRESSIVE DISORDER, IN PARTIAL REMISSION (HCC): ICD-10-CM

## 2024-05-14 DIAGNOSIS — F33.9 RECURRENT MAJOR DEPRESSIVE DISORDER, REMISSION STATUS UNSPECIFIED (HCC): ICD-10-CM

## 2024-05-14 DIAGNOSIS — J44.9 COPD (CHRONIC OBSTRUCTIVE PULMONARY DISEASE) (HCC): ICD-10-CM

## 2024-05-14 DIAGNOSIS — R26.2 AMBULATORY DYSFUNCTION: ICD-10-CM

## 2024-05-14 PROBLEM — I50.30 DIASTOLIC CHF (HCC): Status: ACTIVE | Noted: 2024-05-14

## 2024-05-14 PROBLEM — D69.6 THROMBOCYTOPENIA (HCC): Chronic | Status: ACTIVE | Noted: 2024-05-14

## 2024-05-14 PROBLEM — D72.829 LEUKOCYTOSIS: Status: ACTIVE | Noted: 2024-05-14

## 2024-05-14 LAB
2HR DELTA HS TROPONIN: 1 NG/L
4HR DELTA HS TROPONIN: 3 NG/L
ANION GAP SERPL CALCULATED.3IONS-SCNC: 10 MMOL/L (ref 4–13)
ANION GAP SERPL CALCULATED.3IONS-SCNC: 14 MMOL/L (ref 4–13)
ATRIAL RATE: 101 BPM
ATRIAL RATE: 86 BPM
BUN SERPL-MCNC: 20 MG/DL (ref 5–25)
BUN SERPL-MCNC: 20 MG/DL (ref 5–25)
CALCIUM SERPL-MCNC: 8.7 MG/DL (ref 8.4–10.2)
CALCIUM SERPL-MCNC: 9.3 MG/DL (ref 8.4–10.2)
CARDIAC TROPONIN I PNL SERPL HS: 20 NG/L
CARDIAC TROPONIN I PNL SERPL HS: 21 NG/L
CARDIAC TROPONIN I PNL SERPL HS: 23 NG/L
CHLORIDE SERPL-SCNC: 100 MMOL/L (ref 96–108)
CHLORIDE SERPL-SCNC: 102 MMOL/L (ref 96–108)
CO2 SERPL-SCNC: 27 MMOL/L (ref 21–32)
CO2 SERPL-SCNC: 31 MMOL/L (ref 21–32)
CREAT SERPL-MCNC: 1.02 MG/DL (ref 0.6–1.3)
CREAT SERPL-MCNC: 1.11 MG/DL (ref 0.6–1.3)
GFR SERPL CREATININE-BSD FRML MDRD: 56 ML/MIN/1.73SQ M
GFR SERPL CREATININE-BSD FRML MDRD: 62 ML/MIN/1.73SQ M
GLUCOSE SERPL-MCNC: 143 MG/DL (ref 65–140)
GLUCOSE SERPL-MCNC: 218 MG/DL (ref 65–140)
GLUCOSE SERPL-MCNC: 273 MG/DL (ref 65–140)
GLUCOSE SERPL-MCNC: 294 MG/DL (ref 65–140)
GLUCOSE SERPL-MCNC: 350 MG/DL (ref 65–140)
MAGNESIUM SERPL-MCNC: 1.9 MG/DL (ref 1.9–2.7)
P AXIS: 40 DEGREES
P AXIS: 45 DEGREES
PHOSPHATE SERPL-MCNC: 3.3 MG/DL (ref 2.7–4.5)
PLATELET # BLD AUTO: 104 THOUSANDS/UL (ref 149–390)
PMV BLD AUTO: 11.2 FL (ref 8.9–12.7)
POTASSIUM SERPL-SCNC: 2.5 MMOL/L (ref 3.5–5.3)
POTASSIUM SERPL-SCNC: 2.9 MMOL/L (ref 3.5–5.3)
POTASSIUM SERPL-SCNC: 3.4 MMOL/L (ref 3.5–5.3)
PR INTERVAL: 150 MS
PR INTERVAL: 152 MS
QRS AXIS: 34 DEGREES
QRS AXIS: 75 DEGREES
QRSD INTERVAL: 98 MS
QRSD INTERVAL: 98 MS
QT INTERVAL: 432 MS
QT INTERVAL: 456 MS
QTC INTERVAL: 545 MS
QTC INTERVAL: 560 MS
SODIUM SERPL-SCNC: 141 MMOL/L (ref 135–147)
SODIUM SERPL-SCNC: 143 MMOL/L (ref 135–147)
T WAVE AXIS: 18 DEGREES
T WAVE AXIS: 20 DEGREES
VENTRICULAR RATE: 101 BPM
VENTRICULAR RATE: 86 BPM

## 2024-05-14 PROCEDURE — 94640 AIRWAY INHALATION TREATMENT: CPT

## 2024-05-14 PROCEDURE — 94760 N-INVAS EAR/PLS OXIMETRY 1: CPT

## 2024-05-14 PROCEDURE — 93005 ELECTROCARDIOGRAM TRACING: CPT

## 2024-05-14 PROCEDURE — 99222 1ST HOSP IP/OBS MODERATE 55: CPT | Performed by: INTERNAL MEDICINE

## 2024-05-14 PROCEDURE — 80048 BASIC METABOLIC PNL TOTAL CA: CPT

## 2024-05-14 PROCEDURE — 36415 COLL VENOUS BLD VENIPUNCTURE: CPT

## 2024-05-14 PROCEDURE — 99291 CRITICAL CARE FIRST HOUR: CPT | Performed by: EMERGENCY MEDICINE

## 2024-05-14 PROCEDURE — NC001 PR NO CHARGE: Performed by: INTERNAL MEDICINE

## 2024-05-14 PROCEDURE — 96374 THER/PROPH/DIAG INJ IV PUSH: CPT

## 2024-05-14 PROCEDURE — 84132 ASSAY OF SERUM POTASSIUM: CPT

## 2024-05-14 PROCEDURE — 85049 AUTOMATED PLATELET COUNT: CPT

## 2024-05-14 PROCEDURE — 84484 ASSAY OF TROPONIN QUANT: CPT

## 2024-05-14 PROCEDURE — 82948 REAGENT STRIP/BLOOD GLUCOSE: CPT

## 2024-05-14 PROCEDURE — 93010 ELECTROCARDIOGRAM REPORT: CPT | Performed by: INTERNAL MEDICINE

## 2024-05-14 PROCEDURE — 71045 X-RAY EXAM CHEST 1 VIEW: CPT

## 2024-05-14 PROCEDURE — 99285 EMERGENCY DEPT VISIT HI MDM: CPT

## 2024-05-14 PROCEDURE — 83735 ASSAY OF MAGNESIUM: CPT

## 2024-05-14 PROCEDURE — 84100 ASSAY OF PHOSPHORUS: CPT

## 2024-05-14 RX ORDER — PRAVASTATIN SODIUM 20 MG
40 TABLET ORAL
Status: DISCONTINUED | OUTPATIENT
Start: 2024-05-14 | End: 2024-05-17 | Stop reason: HOSPADM

## 2024-05-14 RX ORDER — PANTOPRAZOLE SODIUM 40 MG/1
40 TABLET, DELAYED RELEASE ORAL
Status: DISCONTINUED | OUTPATIENT
Start: 2024-05-14 | End: 2024-05-17 | Stop reason: HOSPADM

## 2024-05-14 RX ORDER — SULFAMETHOXAZOLE AND TRIMETHOPRIM 800; 160 MG/1; MG/1
1 TABLET ORAL 3 TIMES WEEKLY
Status: DISCONTINUED | OUTPATIENT
Start: 2024-05-15 | End: 2024-05-17 | Stop reason: HOSPADM

## 2024-05-14 RX ORDER — IPRATROPIUM BROMIDE AND ALBUTEROL SULFATE 2.5; .5 MG/3ML; MG/3ML
3 SOLUTION RESPIRATORY (INHALATION) 4 TIMES DAILY
Status: DISCONTINUED | OUTPATIENT
Start: 2024-05-14 | End: 2024-05-14

## 2024-05-14 RX ORDER — GABAPENTIN 100 MG/1
100 CAPSULE ORAL EVERY 8 HOURS
Status: DISCONTINUED | OUTPATIENT
Start: 2024-05-14 | End: 2024-05-17 | Stop reason: HOSPADM

## 2024-05-14 RX ORDER — INSULIN LISPRO 100 [IU]/ML
15 INJECTION, SOLUTION INTRAVENOUS; SUBCUTANEOUS
Status: DISCONTINUED | OUTPATIENT
Start: 2024-05-14 | End: 2024-05-17

## 2024-05-14 RX ORDER — FUROSEMIDE 40 MG/1
40 TABLET ORAL DAILY
Status: DISCONTINUED | OUTPATIENT
Start: 2024-05-15 | End: 2024-05-17 | Stop reason: HOSPADM

## 2024-05-14 RX ORDER — LEVALBUTEROL INHALATION SOLUTION 1.25 MG/3ML
1.25 SOLUTION RESPIRATORY (INHALATION)
Status: DISCONTINUED | OUTPATIENT
Start: 2024-05-14 | End: 2024-05-17 | Stop reason: HOSPADM

## 2024-05-14 RX ORDER — NICOTINE 21 MG/24HR
21 PATCH, TRANSDERMAL 24 HOURS TRANSDERMAL DAILY
Status: DISCONTINUED | OUTPATIENT
Start: 2024-05-14 | End: 2024-05-17 | Stop reason: HOSPADM

## 2024-05-14 RX ORDER — FERROUS SULFATE 325(65) MG
325 TABLET ORAL
Status: DISCONTINUED | OUTPATIENT
Start: 2024-05-14 | End: 2024-05-17 | Stop reason: HOSPADM

## 2024-05-14 RX ORDER — LACTULOSE 10 G/15ML
20 SOLUTION ORAL DAILY
Status: DISCONTINUED | OUTPATIENT
Start: 2024-05-14 | End: 2024-05-17 | Stop reason: HOSPADM

## 2024-05-14 RX ORDER — SERTRALINE HYDROCHLORIDE 100 MG/1
100 TABLET, FILM COATED ORAL 2 TIMES DAILY
Status: DISCONTINUED | OUTPATIENT
Start: 2024-05-14 | End: 2024-05-17 | Stop reason: HOSPADM

## 2024-05-14 RX ORDER — OXYCODONE HYDROCHLORIDE AND ACETAMINOPHEN 5; 325 MG/1; MG/1
1 TABLET ORAL EVERY 6 HOURS PRN
Status: DISCONTINUED | OUTPATIENT
Start: 2024-05-14 | End: 2024-05-17 | Stop reason: HOSPADM

## 2024-05-14 RX ORDER — POTASSIUM CHLORIDE 14.9 MG/ML
20 INJECTION INTRAVENOUS
Qty: 200 ML | Refills: 0 | Status: COMPLETED | OUTPATIENT
Start: 2024-05-14 | End: 2024-05-14

## 2024-05-14 RX ORDER — FUROSEMIDE 40 MG/1
40 TABLET ORAL 2 TIMES DAILY
Status: DISCONTINUED | OUTPATIENT
Start: 2024-05-14 | End: 2024-05-14

## 2024-05-14 RX ORDER — PREDNISONE 10 MG/1
10 TABLET ORAL DAILY
Status: DISCONTINUED | OUTPATIENT
Start: 2024-05-14 | End: 2024-05-17 | Stop reason: HOSPADM

## 2024-05-14 RX ORDER — LISINOPRIL 5 MG/1
5 TABLET ORAL DAILY
Status: DISCONTINUED | OUTPATIENT
Start: 2024-05-14 | End: 2024-05-17 | Stop reason: HOSPADM

## 2024-05-14 RX ORDER — SERTRALINE HYDROCHLORIDE 100 MG/1
100 TABLET, FILM COATED ORAL 2 TIMES DAILY
Qty: 180 TABLET | Refills: 1 | Status: SHIPPED | OUTPATIENT
Start: 2024-05-14

## 2024-05-14 RX ORDER — POTASSIUM CHLORIDE 20 MEQ/1
20 TABLET, EXTENDED RELEASE ORAL ONCE
Status: COMPLETED | OUTPATIENT
Start: 2024-05-14 | End: 2024-05-14

## 2024-05-14 RX ORDER — ALBUTEROL SULFATE 2.5 MG/3ML
2.5 SOLUTION RESPIRATORY (INHALATION) EVERY 6 HOURS PRN
Status: DISCONTINUED | OUTPATIENT
Start: 2024-05-14 | End: 2024-05-17 | Stop reason: HOSPADM

## 2024-05-14 RX ORDER — VARENICLINE TARTRATE 0.5 MG/1
0.5 TABLET, FILM COATED ORAL 2 TIMES DAILY
Status: DISCONTINUED | OUTPATIENT
Start: 2024-05-14 | End: 2024-05-14

## 2024-05-14 RX ORDER — INSULIN LISPRO 100 [IU]/ML
1-6 INJECTION, SOLUTION INTRAVENOUS; SUBCUTANEOUS
Status: DISCONTINUED | OUTPATIENT
Start: 2024-05-14 | End: 2024-05-17 | Stop reason: HOSPADM

## 2024-05-14 RX ORDER — BUDESONIDE 0.5 MG/2ML
0.5 INHALANT ORAL 2 TIMES DAILY
Status: DISCONTINUED | OUTPATIENT
Start: 2024-05-14 | End: 2024-05-14

## 2024-05-14 RX ORDER — FOLIC ACID 1 MG/1
1 TABLET ORAL DAILY
Status: DISCONTINUED | OUTPATIENT
Start: 2024-05-14 | End: 2024-05-17 | Stop reason: HOSPADM

## 2024-05-14 RX ORDER — BUDESONIDE 0.5 MG/2ML
0.5 INHALANT ORAL
Status: DISCONTINUED | OUTPATIENT
Start: 2024-05-14 | End: 2024-05-17 | Stop reason: HOSPADM

## 2024-05-14 RX ORDER — ERGOCALCIFEROL 1.25 MG/1
50000 CAPSULE ORAL
Status: DISCONTINUED | OUTPATIENT
Start: 2024-05-14 | End: 2024-05-17 | Stop reason: HOSPADM

## 2024-05-14 RX ORDER — LEVOTHYROXINE SODIUM 112 UG/1
112 TABLET ORAL DAILY
Status: DISCONTINUED | OUTPATIENT
Start: 2024-05-15 | End: 2024-05-17 | Stop reason: HOSPADM

## 2024-05-14 RX ORDER — NICOTINE 21 MG/24HR
1 PATCH, TRANSDERMAL 24 HOURS TRANSDERMAL DAILY
Status: DISCONTINUED | OUTPATIENT
Start: 2024-05-14 | End: 2024-05-14

## 2024-05-14 RX ORDER — INSULIN GLARGINE 100 [IU]/ML
55 INJECTION, SOLUTION SUBCUTANEOUS
Status: DISCONTINUED | OUTPATIENT
Start: 2024-05-14 | End: 2024-05-17 | Stop reason: HOSPADM

## 2024-05-14 RX ORDER — ENOXAPARIN SODIUM 100 MG/ML
40 INJECTION SUBCUTANEOUS DAILY
Status: DISCONTINUED | OUTPATIENT
Start: 2024-05-14 | End: 2024-05-17 | Stop reason: HOSPADM

## 2024-05-14 RX ORDER — POTASSIUM CHLORIDE 20 MEQ/1
40 TABLET, EXTENDED RELEASE ORAL ONCE
Status: COMPLETED | OUTPATIENT
Start: 2024-05-14 | End: 2024-05-14

## 2024-05-14 RX ORDER — ACETAMINOPHEN 325 MG/1
975 TABLET ORAL ONCE
Status: COMPLETED | OUTPATIENT
Start: 2024-05-14 | End: 2024-05-14

## 2024-05-14 RX ORDER — MAGNESIUM SULFATE HEPTAHYDRATE 40 MG/ML
2 INJECTION, SOLUTION INTRAVENOUS ONCE
Status: COMPLETED | OUTPATIENT
Start: 2024-05-14 | End: 2024-05-14

## 2024-05-14 RX ORDER — BUPROPION HYDROCHLORIDE 150 MG/1
150 TABLET ORAL EVERY MORNING
Status: DISCONTINUED | OUTPATIENT
Start: 2024-05-14 | End: 2024-05-17 | Stop reason: HOSPADM

## 2024-05-14 RX ORDER — SUCRALFATE 1 G/1
1 TABLET ORAL
Status: DISCONTINUED | OUTPATIENT
Start: 2024-05-14 | End: 2024-05-17 | Stop reason: HOSPADM

## 2024-05-14 RX ORDER — DIAZEPAM 5 MG/1
5 TABLET ORAL
Status: DISCONTINUED | OUTPATIENT
Start: 2024-05-14 | End: 2024-05-17 | Stop reason: HOSPADM

## 2024-05-14 RX ORDER — FAMOTIDINE 20 MG/1
40 TABLET, FILM COATED ORAL
Status: DISCONTINUED | OUTPATIENT
Start: 2024-05-14 | End: 2024-05-17 | Stop reason: HOSPADM

## 2024-05-14 RX ORDER — ONDANSETRON 4 MG/1
4 TABLET, ORALLY DISINTEGRATING ORAL EVERY 12 HOURS PRN
Status: DISCONTINUED | OUTPATIENT
Start: 2024-05-14 | End: 2024-05-17 | Stop reason: HOSPADM

## 2024-05-14 RX ADMIN — INSULIN LISPRO 4 UNITS: 100 INJECTION, SOLUTION INTRAVENOUS; SUBCUTANEOUS at 16:10

## 2024-05-14 RX ADMIN — SERTRALINE HYDROCHLORIDE 100 MG: 100 TABLET ORAL at 09:52

## 2024-05-14 RX ADMIN — MAGNESIUM SULFATE HEPTAHYDRATE 2 G: 40 INJECTION, SOLUTION INTRAVENOUS at 04:01

## 2024-05-14 RX ADMIN — GABAPENTIN 100 MG: 100 CAPSULE ORAL at 09:52

## 2024-05-14 RX ADMIN — POTASSIUM CHLORIDE 40 MEQ: 1500 TABLET, EXTENDED RELEASE ORAL at 16:08

## 2024-05-14 RX ADMIN — IPRATROPIUM BROMIDE 0.5 MG: 0.5 SOLUTION RESPIRATORY (INHALATION) at 13:58

## 2024-05-14 RX ADMIN — FUROSEMIDE 40 MG: 40 TABLET ORAL at 09:51

## 2024-05-14 RX ADMIN — SUCRALFATE 1 G: 1 TABLET ORAL at 21:29

## 2024-05-14 RX ADMIN — FOLIC ACID 1 MG: 1 TABLET ORAL at 09:52

## 2024-05-14 RX ADMIN — ONDANSETRON 4 MG: 4 TABLET, ORALLY DISINTEGRATING ORAL at 12:49

## 2024-05-14 RX ADMIN — INSULIN GLARGINE 55 UNITS: 100 INJECTION, SOLUTION SUBCUTANEOUS at 09:53

## 2024-05-14 RX ADMIN — ACETAMINOPHEN 975 MG: 325 TABLET, FILM COATED ORAL at 03:30

## 2024-05-14 RX ADMIN — PRAVASTATIN SODIUM 40 MG: 20 TABLET ORAL at 16:08

## 2024-05-14 RX ADMIN — PANTOPRAZOLE SODIUM 40 MG: 40 TABLET, DELAYED RELEASE ORAL at 16:08

## 2024-05-14 RX ADMIN — NICOTINE 1 PATCH: 14 PATCH, EXTENDED RELEASE TRANSDERMAL at 08:42

## 2024-05-14 RX ADMIN — POTASSIUM CHLORIDE 40 MEQ: 1500 TABLET, EXTENDED RELEASE ORAL at 10:01

## 2024-05-14 RX ADMIN — INSULIN LISPRO 15 UNITS: 100 INJECTION, SOLUTION INTRAVENOUS; SUBCUTANEOUS at 09:55

## 2024-05-14 RX ADMIN — ENOXAPARIN SODIUM 40 MG: 40 INJECTION SUBCUTANEOUS at 08:43

## 2024-05-14 RX ADMIN — PREDNISONE 10 MG: 10 TABLET ORAL at 09:52

## 2024-05-14 RX ADMIN — INSULIN LISPRO 15 UNITS: 100 INJECTION, SOLUTION INTRAVENOUS; SUBCUTANEOUS at 16:11

## 2024-05-14 RX ADMIN — OXYCODONE HYDROCHLORIDE AND ACETAMINOPHEN 1 TABLET: 5; 325 TABLET ORAL at 16:07

## 2024-05-14 RX ADMIN — POTASSIUM CHLORIDE 20 MEQ: 14.9 INJECTION, SOLUTION INTRAVENOUS at 10:57

## 2024-05-14 RX ADMIN — LEVALBUTEROL HYDROCHLORIDE 1.25 MG: 1.25 SOLUTION RESPIRATORY (INHALATION) at 13:58

## 2024-05-14 RX ADMIN — ERGOCALCIFEROL 50000 UNITS: 1.25 CAPSULE ORAL at 10:41

## 2024-05-14 RX ADMIN — POTASSIUM CHLORIDE 20 MEQ: 14.9 INJECTION, SOLUTION INTRAVENOUS at 08:56

## 2024-05-14 RX ADMIN — LEVALBUTEROL HYDROCHLORIDE 1.25 MG: 1.25 SOLUTION RESPIRATORY (INHALATION) at 19:51

## 2024-05-14 RX ADMIN — BUDESONIDE 0.5 MG: 0.5 INHALANT ORAL at 19:51

## 2024-05-14 RX ADMIN — OXYCODONE HYDROCHLORIDE AND ACETAMINOPHEN 1 TABLET: 5; 325 TABLET ORAL at 21:29

## 2024-05-14 RX ADMIN — SERTRALINE HYDROCHLORIDE 100 MG: 100 TABLET ORAL at 17:06

## 2024-05-14 RX ADMIN — POTASSIUM CHLORIDE 20 MEQ: 1500 TABLET, EXTENDED RELEASE ORAL at 05:31

## 2024-05-14 RX ADMIN — GABAPENTIN 100 MG: 100 CAPSULE ORAL at 16:13

## 2024-05-14 RX ADMIN — FAMOTIDINE 40 MG: 20 TABLET, FILM COATED ORAL at 21:30

## 2024-05-14 RX ADMIN — OXYCODONE HYDROCHLORIDE AND ACETAMINOPHEN 1 TABLET: 5; 325 TABLET ORAL at 10:04

## 2024-05-14 RX ADMIN — BUPROPION HYDROCHLORIDE 150 MG: 150 TABLET, EXTENDED RELEASE ORAL at 09:53

## 2024-05-14 RX ADMIN — LISINOPRIL 5 MG: 5 TABLET ORAL at 13:25

## 2024-05-14 RX ADMIN — POTASSIUM CHLORIDE 40 MEQ: 1500 TABLET, EXTENDED RELEASE ORAL at 04:00

## 2024-05-14 RX ADMIN — INSULIN LISPRO 15 UNITS: 100 INJECTION, SOLUTION INTRAVENOUS; SUBCUTANEOUS at 12:01

## 2024-05-14 RX ADMIN — IPRATROPIUM BROMIDE 0.5 MG: 0.5 SOLUTION RESPIRATORY (INHALATION) at 19:51

## 2024-05-14 RX ADMIN — INSULIN LISPRO 6 UNITS: 100 INJECTION, SOLUTION INTRAVENOUS; SUBCUTANEOUS at 10:43

## 2024-05-14 RX ADMIN — FERROUS SULFATE TAB 325 MG (65 MG ELEMENTAL FE) 325 MG: 325 (65 FE) TAB at 09:51

## 2024-05-14 RX ADMIN — NICOTINE 21 MG: 21 PATCH, EXTENDED RELEASE TRANSDERMAL at 09:02

## 2024-05-14 RX ADMIN — SODIUM CHLORIDE 250 ML: 0.9 INJECTION, SOLUTION INTRAVENOUS at 04:02

## 2024-05-14 NOTE — ASSESSMENT & PLAN NOTE
"Pt presents with cirrhosis secondary to NAFLD. Currently compensated.     MRI 10/23: displaying hepatic cirrhosis with fatty deposition and splenomegaly which may indicate portal hypertension. No ascites, no leg swelling.    Per GI note on 5/6 \"abnormal ultrasound elastography revealing F4 fibrosis, nodular liver on imaging and thrombocytopenia. Likely etiology for cirrhosis is metabolic associated steatotic liver disease. Her MELD 3.0 based upon labs from March/April is 11\"     Labs from 2023 showed normal anti-smooth muscle antibody, ceruloplasmin, antimicrosomal antibody, Protime-INR,folate, B12, AFP. Labs also showed elevated alpha-1 antitrypsin and IgM. GI noted \"Blood test for rare causes of liver disease are negative thus far\"     CT abdomen/pelvis 5/15 showed splenomegaly and portal hypertension supporting cirrhosis.     MELD 3.0: 11 at 5/15/2024  4:57 AM  MELD-Na: 10 at 5/15/2024  4:57 AM  Calculated from:  Serum Creatinine: 1.05 mg/dL at 5/15/2024  4:57 AM  Serum Sodium: 143 mmol/L (Using max of 137 mmol/L) at 5/15/2024  4:57 AM  Total Bilirubin: 1.19 mg/dL at 5/15/2024  4:57 AM  Serum Albumin: 3.4 g/dL at 5/15/2024  4:57 AM  INR(ratio): 1.24 at 5/13/2024 10:55 AM  Age at listing (hypothetical): 53 years  Sex: Female at 5/15/2024  4:57 AM        Plan:  - Continue to f/u outpatient with GI  - No acute management while inpatient. Continue lasix to optimize fluid balance  "

## 2024-05-14 NOTE — PROGRESS NOTES
INTERNAL MEDICINE RESIDENCY SENIOR ADMISSION NOTE     Name: Chely Ruvalcaba   Age & Sex: 53 y.o. female   MRN: [id number]  Unit/Bed#: CW2 214-01   Encounter: [id number]  Primary Care Provider: Andi Mason DO    Admit to team: SOD Team C     Patient seen and examined. Reviewed H&P per Dr. Guillen . Agree with the assessment and plan with any exception/addition as noted below:    Patient seen and examined. She reports generalized pains and nonspecific symptoms, chronic headaches. Lungs clear, HNRR, no focal edema. Obese body habitus.     Principal Problem:    Hypokalemia  Active Problems:    Anxiety    Benign hypertension    Depression    Hypercholesterolemia    Hypothyroidism    Cigarette nicotine dependence in remission    Centrilobular emphysema (HCC)    Gastroesophageal reflux disease    Cirrhosis (HCC)    Diastolic CHF (HCC)    Hypokalemia  -K 2.5 in the ED.  -Was repleted 60 mg of K total in the ED.  -Recheck K at 2.9, will give an additional 80 mg of K and recheck this PM.  -ECG ordered.  -Monitor on telemetry for changes.  -Monitor symptoms closely.    Leukocytosis  -Patient's WBC was 13.27 prior to hospital admission.  -Unclear etiology, patient not meeting SIRS criteria at this time.  -Monitor with routine labwork.    Type 2 Diabetes  -Last A1C 6.0.  -Blood sugar 294 on admission.  -Will continue home regimen of 55 units Lantus and 15 units Lispro.    Anxiety  -Continue PTA Zoloft and Wellbutrin.    Emphysema  -Patient on 3L O2 at baseline at home, confirmed at recent pulmonology appointment.  -On 3L O2 inpatient.  -Continue PTA nebulizers.    Code Status: Level 1 - Full Code  Admission Status: OBSERVATION  Disposition: Patient requires Med/Surg  Expected Length of Stay: < 2 midnights

## 2024-05-14 NOTE — CASE COMMUNICATION
I sent her a text and she called me back this evening.  I told her that her  physicians were concerned about her bloodwork results and wanted her to go to the hospital. I informed her that her the potassium level was critically  low.   ----- Message -----  From: Andi Mason DO  Sent: 5/13/2024   6:22 PM EDT  To: Frieda Segovia RN  Subject: RE:                                                She has markedly abnormal results and her  specialists have been trying to call her also.  She needs to go to the hospital  ----- Message -----  From: Frieda Segovia RN  Sent: 5/13/2024   4:42 PM EDT  To: Andi Mason DO  Subject: RE:                                              I am so sorry.  The BS this am  at 7:04am was 324.  704 was the time not the BS.  ----- Message -----  From: Andi Mason DO  Sent: 5/13/2024   1:10 PM EDT  To: JAY Polk than k you for the update, with the blood sugar above 700 fasting and not feeling well she needs to be at the hospital.  Please advise her to go to the hospital.  Thank you  ----- Message -----  From: Frieda Segovia RN  Sent: 5/13/2024  10:47 AM EDT  To: Andi Mason DO    Good morning.  Chely hasn't been feeling good last few days.  She did have BW done by mobile lab this am.  Glucometer readings for BS since yesterday were high. 161 Wyatt nday at noon,  269 9 pm, 408 at  357 this am,  324 at 704 this am.  Vitals this am 97.8   90 reg   126/72  98 % on 4L.  Pt turned up oxygen 2nd not feeling like she was getting enough oxygen.  Possibly nose breathing.  Lungs clear slightly decreased and intermitt dry cough.  No edema. Doing better with incentive spirometer. States she feels tired and sleepy all the time.

## 2024-05-14 NOTE — ASSESSMENT & PLAN NOTE
Stable. Secondary to steroid use. No fevers/SIRS.     Patient found to have WBC of 13.27 on admission. Patient on prednisone taper from most recent hospital admission in [place] on April 11. Currently on 10mg prednisone PO daily.    Afebrile, CXR negative, clear lungs b/l, leukocytosis likely 2ndary to prednisone taper.    Recent Labs     05/15/24  0457 05/16/24  0526   WBC 12.90* 11.11*         Plan:  - Monitor at home for signs of infection

## 2024-05-14 NOTE — ASSESSMENT & PLAN NOTE
Patient with charted history of hypertension, with outpatient medications including Lasix 40 mg twice daily and Aldactone 50 mg daily.  BP on arrival slightly elevated at 142/66. Pt. Does not take Aldactone at home.    Plan  - Continue lasix 40 mg po qd   - Lisinopril 5mg qd

## 2024-05-14 NOTE — H&P
INTERNAL MEDICINE RESIDENCY ADMISSION H&P     Name: Chely Ruvalcaba   Age & Sex: 53 y.o. female   MRN: [id number]  Unit/Bed#: CW2 214-01   Encounter: [id number]  Primary Care Provider: Andi Mason DO    Code Status: Level 1 - Full Code  Admission Status: OBSERVATION  Disposition: Patient requires Med/Surg    Admit to team: SOD Team C     ASSESSMENT/PLAN     Principal Problem:    Hypokalemia  Active Problems:    Anxiety    Benign hypertension    Depression    Hypercholesterolemia    Hypothyroidism    Cigarette nicotine dependence in remission    Elevated fasting glucose    Centrilobular emphysema (HCC)    Gastroesophageal reflux disease    Cirrhosis (HCC)    Diastolic CHF (HCC)    Thrombocytopenia (HCC)    Leukocytosis      Leukocytosis  Assessment & Plan  Patient found to have WBC of 13.27 on admission. Patient on prednisone taper from most recent hospital admission in [place] on April 11. Currently on 10mg prednisone PO daily.    Afebrile, CXR negative, clear lungs b/l, leukocytosis likely 2ndary to prednisone taper.    Plan:  - Monitor CBC  - Monitor for signs of infection    Thrombocytopenia (HCC)  Assessment & Plan  On admission patient noted to have platelet count of 97. Repeat Platelets = 102.     MRI 10/23: Showed signs of chronic splenomegaly which may indicate portal hypertension.    Hgb stable at 12.3    Plan:  - Monitor platelet count  - Monitor for signs of bleeding     Diastolic CHF (HCC)  Assessment & Plan  Wt Readings from Last 3 Encounters:   05/14/24 115 kg (254 lb 9.6 oz)   05/07/24 112 kg (248 lb)   05/06/24 114 kg (252 lb)     Patient noted to have TTE 08/23: displayed EF 60%; mild L. Ventricular thickness, mild mitral and tricuspid regurgitation.    Patient states she was told about newly worsening CHF after her most recent hospital admission in [place] on April 11 2024.     Plan:  - Continue home GDMT  - Monitor lytes goal K>4 Mg>2            Cirrhosis (HCC)  Assessment & Plan  MRI 10/23:  displaying hepatic cirrhosis with fatty deposition and splenomegaly which may indicate portal hypertension. No ascites, no leg swelling.    Plan:  - Monitor while in-patient  - Monitor LFTs    Gastroesophageal reflux disease  Assessment & Plan  Patient reports intermittent heartburn with meals. States she follows with GI outpatient.    Plan:  - Cont Pepcid 40mg  - Cont. Pantoprazole 40mg  - Encourage GI f/u outpatient, consider GI consult in-patient if symptoms worsen    Centrilobular emphysema (HCC)  Assessment & Plan  Patient with history of centrilobular emphysema in the setting of prolonged nicotine dependence and use.  Follows with pulmonology in the outpatient setting, with last visit 5/7/2024.  At that visit, patient was instructed to have repeat PFTs performed and was transitioned over to all nebulized therapy.  Of note, patient was also admitted with pneumonia/pneumothorax/sepsis/questionable COPD exacerbation in March 2024 in [place] and has been taking a prolonged steroid taper as well as Bactrim prophylaxis.    Plan  -Continue PTA nebulizer regimen  -Encourage outpatient follow-up with pulmonology    Elevated fasting glucose  Assessment & Plan  Patient does not have formal type 2 DM diagnosis. However, on admission glucose level: 265 & patient on home insulin regimen: Lantus 55, Lispro 15 TID w meals.    Plan:  - Continue home insulin regimen Lantus 55, Lispro 15 TID w meals  -Sliding scale while inpatient  - Monitor POC glucose 4x/day  - Goal 140-180 in-patient    Vitamin D deficiency  Assessment & Plan  Plan:  - Continue ergocalciferol every 2 weeks    Cigarette nicotine dependence in remission  Assessment & Plan  Patient states she quit smoking over 3 months ago. Previously smoker since 13 years old. Nicotine dependence being treated with Chantix, nicotine patch.    Plan:  - Cont. Nicotine patch  - Cont. Home Chantix     Hypothyroidism  Assessment & Plan  Patient noted to have aquired hypothyroidims  "with TSH of 4,270 and low free T4 in 2019. Started on levothyroxine at that time.    Plan:  - Cont levothyroxine tablet 112mcg.  - Monitor for acute changes    Hypercholesterolemia  Assessment & Plan  Plan:  - Cont. Pravastatin 40mg PO    Depression  Assessment & Plan  Please refer to \"Anxiety\" problem for plan    Benign hypertension  Assessment & Plan  Patient with charted history of hypertension, with outpatient medications including Lasix 40 mg twice daily and Aldactone 50 mg daily.  BP on arrival slightly elevated at 142/66. Pt. Does not take Aldactone at home.    Plan  -Continue to monitor vital signs  -Adjust pharmacotherapy as needed if having to alter patient's diuretic regimen to account for hypokalemia    Anxiety  Assessment & Plan  Patient with history of anxiety/depression, treated with Wellbutrin 150 mg daily and Zoloft 100 mg twice daily.  Also has a prescription written for Valium 5 mg daily as needed at bedtime.    Plan  -Continue PTA medications    * Hypokalemia  Assessment & Plan  Initially presented with potassium of 2.5.  S/P 60 mg of oral repletion in the ED.  S/P additional IV 20mEq K+   S/P IV Mg sulfate 2g/50mL repletion     Plan:  -Recheck K at 2.9.  -Will replete with an additional 80 mg K and recheck this PM  -Monitor on telemetry for changes.  -Trend bloodwork.        VTE Pharmacologic Prophylaxis: Enoxaparin (Lovenox)  VTE Mechanical Prophylaxis: sequential compression device    CHIEF COMPLAINT     Chief Complaint   Patient presents with    Low Potassium     Pt. Had blood work from home nurse today at 1830. Reports K was 2.4. pt. Reports headache      HISTORY OF PRESENT ILLNESS     53 y.o. female w PMH of CHF, COPD, Chronic LBP, obesity, type 2 DM, Hypothyroidism, presenting due to hypokalemia found on outpatient at-home bloodwork. Patient states she feels chronically ill, even after she was hospitalized for a month back in April 11th while in [place], visiting a friend. However, prompted " to come in due to bloodwork results. States at that time she was found to have pneumonia, sepsis, and newly diagnosed CHF, and begun on current medication regimen at that time. She is on 3L 02 at home, follows with pulmonology outpatient, and has appt. schedule for sleep apnea eval end of this month. Endorses chronic lower back pain on percocet daily, intermittent left sided CP that worsens upon palpitation, denies any recent travel since returning from [place]. Denies fevers, chills, dizziness, palpations, abdominal pain, dysuria, change in bowel movements, numbness tingling.    ED Course:  Patient vitals at admission: afebrile, , rr 18 /66 97% on 3LO2. Platelets: 97; WBC: 13.2 on prednisone taper.    In ED: K+: 2.4; Fasting glucose: 265  Pt. Received 60mEq K+ Cl  IV magnesium sulfate 2g 25mL/hr.    Patient was admitted to [facility] for hypokalemia management.    Patient was confirmed Code status level 1.    REVIEW OF SYSTEMS     Review of Systems   Respiratory:  Positive for shortness of breath. Negative for cough.    Cardiovascular:  Negative for palpitations and leg swelling.   Gastrointestinal:  Negative for constipation.   Genitourinary:  Positive for urgency. Negative for flank pain.   Musculoskeletal:  Positive for back pain.   Skin:  Negative for color change.   Neurological:  Negative for dizziness, seizures and numbness.     OBJECTIVE     Vitals:    24 0536 24 0600 24 0700 24 0803   BP: 118/57 164/76 162/72    BP Location: Left arm Left arm Left arm    Pulse: 92 94 98    Resp: 16 21 20    Temp:       TempSrc:       SpO2: 97% 97% 96%    Weight:    115 kg (254 lb 9.6 oz)      Temperature:   Temp (24hrs), Av.1 °F (36.7 °C), Min:98.1 °F (36.7 °C), Max:98.1 °F (36.7 °C)    Temperature: 98.1 °F (36.7 °C)  Intake & Output:  I/O       None          Weights:        Body mass index is 39.88 kg/m².  Weight (last 2 days)       Date/Time Weight    24 0803 115 (254.6)     Weight:  Simultaneous filing. User may not have seen previous data. at 05/14/24 0803          Physical Exam  Constitutional:       General: She is not in acute distress.     Appearance: She is obese. She is not ill-appearing or diaphoretic.   HENT:      Head: Normocephalic.      Mouth/Throat:      Mouth: Mucous membranes are moist.   Eyes:      General: No scleral icterus.     Pupils: Pupils are equal, round, and reactive to light.   Cardiovascular:      Rate and Rhythm: Normal rate and regular rhythm.   Pulmonary:      Comments: Diminished breath sounds b/l, slight rhales b/l.  Abdominal:      General: There is distension.      Tenderness: There is no abdominal tenderness. There is no guarding.   Musculoskeletal:         General: No swelling.      Cervical back: Neck supple.      Comments: L. Sided CP on palpitation.   Skin:     General: Skin is warm.      Coloration: Skin is not jaundiced.      Findings: No bruising.   Neurological:      Mental Status: She is oriented to person, place, and time.       PAST MEDICAL HISTORY     Past Medical History:   Diagnosis Date    Abnormal stress test     Acute cystitis without hematuria 05/07/2018    Acute duodenal ulcer with bleeding 01/16/2023    Allergic sinusitis     last assessed - 03Apr2017    Anemia Around december    Anxiety     last assessed - 04Mar2016    Arthritis     Asthma     last assessed - 04Mar2016    Bilateral lower extremity edema     last assessed - [id number]    Callus of foot     last assessed - 22Jun2016    Chest pain     Chronic GERD     last assessed - 16Jan2017    Colon polyp     Constipation     Resolved - 13Jan2017    COPD (chronic obstructive pulmonary disease) (HCC)     Depression     last assessed - 04Mar2016    Disease of thyroid gland     Diverticulitis of colon     last assessed - 04Mar2016    Dyspepsia     Resolved - [id number]    Edema, lower extremity 08/07/2020    Esophageal reflux     last assessed - 04Mar2016    Essential hypertriglyceridemia      last assessed - [id number]    Fatty liver 01/16/2023    Gastroesophageal reflux disease with esophagitis 11/18/2016    GERD (gastroesophageal reflux disease)     Gynecological disorder     last assessed - 04Mar2016    Hydroureteronephrosis 06/30/2022    Hypertension     last assessed - 04Mar2016    Hypokalemia 06/20/2022    Hypotension     last assessed - [id number]    Kidney stone     Left ureteral stone with hydronephrosis 07/21/2022    Migraine     Morbid obesity (HCC) 01/11/2019    Formatting of this note might be different from the original. Added automatically from request for surgery [id number]    Neuropathy     Obesity     Other chest pain 11/08/2018    Pancreatic lesion 03/13/2023    Positive depression screening 06/19/2022    Primary hypertriglyceridemia 06/19/2022    Pulmonary emphysema (HCC)     last assessed - 04Mar2016    Seasonal allergies     Severe obesity (HCC) 01/16/2023    Skin tag 07/17/2023    SOB (shortness of breath)     Strain of groin 07/17/2023    Urinary frequency     last assessed - 22Jun2016    Vagina, candidiasis     last assessed - 22Jun2016    Very heavy cigarette smoker 01/16/2023    Visual impairment      PAST SURGICAL HISTORY     Past Surgical History:   Procedure Laterality Date    CARPAL TUNNEL RELEASE      last assessed - 04MAr2016    CHOLECYSTECTOMY      last assessed - 04Mar2016    COLONOSCOPY      Complete colonoscopy     EYE SURGERY      last assessed - 04Mar2016    FL RETROGRADE PYELOGRAM  6/16/2022    FL RETROGRADE PYELOGRAM  8/16/2022    FOOT SURGERY      last assessed - 04Mar2016    KS CYSTO BLADDER W/URETERAL CATHETERIZATION Left 7/21/2022    Procedure: CYSTOSCOPY RETROGRADE PYELOGRAM WITH INSERTION STENT URETERAL;  Surgeon: Facundo Matamoros MD;  Location: [facility];  Service: Urology    KS CYSTO/URETERO W/LITHOTRIPSY &INDWELL STENT INSRT Left 6/16/2022    Procedure: CYSTOSCOPY URETEROSCOPY WITH LITHOTRIPSY HOLMIUM LASER, RETROGRADE PYELOGRAM AND INSERTION STENT URETERAL;   Surgeon: Sammy Ferrer MD;  Location: [facility];  Service: Urology    WV CYSTO/URETERO W/LITHOTRIPSY &INDWELL STENT INSRT Left 8/16/2022    Procedure: CYSTOSCOPY USCOPE W/HOLMIUM LASER, RETROGRADE PYELOGRAM&STENT;  Surgeon: Sudheer Bradford MD;  Location: [facility];  Service: Urology    WV ESOPHAGOGASTRODUODENOSCOPY TRANSORAL DIAGNOSTIC N/A 2/13/2017    Procedure: ESOPHAGOGASTRODUODENOSCOPY (EGD);  Surgeon: Alison Saleem MD;  Location: [facility];  Service: Gastroenterology     SOCIAL & FAMILY HISTORY     Social History     Substance and Sexual Activity   Alcohol Use Not Currently       Social History     Substance and Sexual Activity   Drug Use Never     Social History     Tobacco Use   Smoking Status Some Days    Current packs/day: 0.50    Average packs/day: 0.5 packs/day for 41.4 years (20.7 ttl pk-yrs)    Types: Cigarettes    Start date: 1/1/1983   Smokeless Tobacco Never     Family History   Problem Relation Age of Onset    Obesity Mother     Thyroid disease Mother     Cancer Mother 71    Vision loss Mother     Obesity Sister     Coronary artery disease Sister     Stroke Sister     Asthma Sister     Glaucoma Sister     No Known Problems Maternal Aunt     Diabetes Maternal Uncle     Lung cancer Maternal Grandmother     Cancer Maternal Grandfather     Diabetes Maternal Grandfather     No Known Problems Paternal Grandmother     No Known Problems Paternal Grandfather     Hypertension Other     Lung cancer Other     Hypertension Other     Lung cancer Other     Lung cancer Other      LABORATORY DATA     Labs: I have personally reviewed pertinent reports.    Results from last 7 days   Lab Units 05/14/24  0822 05/13/24  1055   WBC Thousand/uL  --  13.27*   HEMOGLOBIN g/dL  --  12.3   HEMATOCRIT %  --  39.9   PLATELETS Thousands/uL 104* 97*      Results from last 7 days   Lab Units 05/14/24  0822 05/14/24  0322 05/13/24  1055   POTASSIUM mmol/L 2.9* 2.5* 2.4*   CHLORIDE mmol/L  --  100 98   CO2 mmol/L  --  27 31    BUN mg/dL  --  20 17   CREATININE mg/dL  --  1.11 0.94   CALCIUM mg/dL  --  9.3 9.1   ALK PHOS U/L  --   --  83   ALT U/L  --   --  26   AST U/L  --   --  27     Results from last 7 days   Lab Units 05/14/24  0322 05/13/24  1055   MAGNESIUM mg/dL 1.9 2.0     Results from last 7 days   Lab Units 05/14/24  0322   PHOSPHORUS mg/dL 3.3      Results from last 7 days   Lab Units 05/13/24  1055   INR  1.24*             Micro:  Lab Results   Component Value Date    BLOODCX No Growth After 5 Days. 07/21/2022    BLOODCX No Growth After 5 Days. 07/21/2022    URINECX <10,000 cfu/ml Escherichia coli (A) 08/16/2022    URINECX <10,000 cfu/ml Enterococcus  faecium VRE (A) 08/16/2022    URINECX (A) 08/16/2022     >100,000 cfu/ml - Possible mycoplasma hominis Gram Negative Edd     IMAGING & DIAGNOSTIC TESTS     Imaging: I have personally reviewed pertinent reports.    No results found.  EKG, Pathology, and Other Studies: I have personally reviewed pertinent reports.     ALLERGIES     Allergies   Allergen Reactions    Hydrocodone-Acetaminophen Hives    Ketorolac Hives and Dizziness    Toradol [Ketorolac Tromethamine] Other (See Comments)     hypotension    Ultram [Tramadol] Nausea Only, GI Intolerance and Vomiting     MEDICATIONS PRIOR TO ARRIVAL     Prior to Admission medications    Medication Sig Start Date End Date Taking? Authorizing Provider   albuterol (2.5 mg/3 mL) 0.083 % nebulizer solution Take 3 mL (2.5 mg total) by nebulization every 6 (six) hours as needed for wheezing or shortness of breath 5/2/24  Yes Andi Mason,    budesonide (PULMICORT) 0.5 mg/2 mL nebulizer solution Take 0.5 mg by nebulization 2 (two) times a day Rinse mouth after use.   Yes Historical Provider, MD   buPROPion (WELLBUTRIN XL) 150 mg 24 hr tablet Take 1 tablet (150 mg total) by mouth every morning 7/17/23  Yes Andi Mason DO   diazepam (VALIUM) 5 mg tablet Take 1 tablet (5 mg total) by mouth daily at bedtime as needed for anxiety  Patient taking  differently: Take 5 mg by mouth daily at bedtime as needed for anxiety 4/30/24  Yes Andi Mason DO   ergocalciferol (VITAMIN D2) 50,000 units Take 1 capsule (50,000 Units total) by mouth every 14 (fourteen) days 7/17/23  Yes Andi Mason DO   famotidine (PEPCID) 40 MG tablet Take 1 tablet (40 mg total) by mouth daily at bedtime Take in evening 2 hours prior to bed 2/19/24  Yes Andi Mason DO   ferrous sulfate 324 (65 Fe) mg Take 1 tablet (324 mg total) by mouth daily before breakfast 5/7/24  Yes Andi Mason DO   folic acid (FOLVITE) 1 mg tablet Take 1 tablet (1 mg total) by mouth daily 4/30/24  Yes Andi Mason DO   furosemide (LASIX) 40 mg tablet Take 1 tablet (40 mg total) by mouth 2 (two) times a day 4/30/24  Yes Andi Mason DO   gabapentin (NEURONTIN) 100 mg capsule Take 100 mg by mouth every 8 (eight) hours 10/4/19  Yes Historical Provider, MD   Insulin Glargine Solostar (Basaglar KwikPen) 100 UNIT/ML SOPN Inject 0.55 mL (55 Units total) under the skin in the morning 5/6/24  Yes Andi Mason DO   insulin lispro (HumaLOG) 100 units/mL injection pen Inject 15 Units under the skin 3 (three) times a day with meals 5/2/24  Yes Andi Mason DO   Insulin Pen Needle 31G X 5 MM MISC Inject under the skin if needed (Insuline injections) 5/2/24  Yes Andi Mason DO   ipratropium-albuterol (DUO-NEB) 0.5-2.5 mg/3 mL nebulizer solution Take 3 mL by nebulization 4 (four) times a day 5/7/24  Yes ANTONIO Nam   lactulose (CHRONULAC) 10 g/15 mL solution Take 20 g by mouth Daily or as needed for bowl movement   Yes Historical Provider, MD   levothyroxine 112 mcg tablet Take 1 tablet (112 mcg total) by mouth daily 4/30/24  Yes Andi Mason DO   nicotine (NICODERM CQ) 21 mg/24 hr TD 24 hr patch Place 1 patch on the skin over 24 hours every 24 hours 4/30/24  Yes Andi Mason,    omeprazole (PriLOSEC) 40 MG capsule take 1 capsule by mouth twice a day 4/10/24  Yes Lisandra Leggett PA-C   ondansetron  (ZOFRAN-ODT) 4 mg disintegrating tablet Take 1 tablet (4 mg total) by mouth every 12 (twelve) hours as needed for nausea or vomiting 5/6/24  Yes Michele Vu DO   oxyCODONE-acetaminophen (PERCOCET)  mg per tablet Take 1 tablet by mouth every 6 (six) hours as needed for severe pain 6/27/22  Yes Historical Provider, MD   oxygen gas Inhale 2 L/min continuous. May use 3L with exertion per Bella ALVAREZ  Keep sat >88%  Indications: low saturation   Yes Andi Mason DO   predniSONE 10 mg tablet Take 10 mg by mouth daily   Yes Historical Provider, MD   sertraline (ZOLOFT) 100 mg tablet Take 1 tablet (100 mg total) by mouth 2 (two) times a day 2/19/24  Yes Andi Mason DO   simvastatin (ZOCOR) 20 mg tablet Take 1 tablet (20 mg total) by mouth daily at bedtime 1/17/24  Yes Andi Mason DO   sucralfate (CARAFATE) 1 g tablet Take 1 tablet (1 g total) by mouth daily at bedtime 5/6/24  Yes Michele Vu DO   sulfamethoxazole-trimethoprim (BACTRIM DS) 800-160 mg per tablet Take 1 tablet by mouth 3 (three) times a week for 28 days 5/1/24 5/29/24 Yes Andi Mason DO   varenicline (CHANTIX) 0.5 mg tablet Take 1 tablet (0.5 mg total) by mouth 2 (two) times a day 5/8/24  Yes Andi Mason DO   albuterol (PROVENTIL HFA,VENTOLIN HFA) 90 mcg/act inhaler Inhale 2 puffs every 6 (six) hours as needed for wheezing  Patient not taking: Reported on 5/14/2024 5/2/24   Andi Mason DO   fluticasone (FLONASE) 50 mcg/act nasal spray 1 spray into each nostril 2 (two) times a day  Patient not taking: Reported on 5/14/2024 12/21/21   ANTONIO Bee   Nutritional Supplements (Ensure Plus) LIQD Take 237 mL by mouth 2 (two) times a day  Patient not taking: Reported on 5/14/2024 5/9/24   Andi Mason DO   spironolactone (ALDACTONE) 25 mg tablet Take 2 tablets (50 mg total) by mouth daily  Patient not taking: Reported on 4/30/2024 12/21/23   Elijah Triplett MD   naloxone (NARCAN) 4 mg/0.1 mL nasal spray Administer 1  spray into a nostril. If no response after 2-3 minutes, give another dose in the other nostril using a new spray.  Patient not taking: Reported on 6/30/2022 1/15/21 7/21/22  Escamilla DO Marlon     MEDICATIONS ADMINISTERED IN LAST 24 HOURS     Medication Administration - last 24 hours from 05/13/2024 1055 to 05/14/2024 1055         Date/Time Order Dose Route Action Action by     05/14/2024 0330 EDT acetaminophen (TYLENOL) tablet 975 mg 975 mg Oral Given Belen Grover RN     05/14/2024 0400 EDT potassium chloride (Klor-Con M20) CR tablet 40 mEq 40 mEq Oral Given Belen Grover RN     05/14/2024 0531 EDT potassium chloride (Klor-Con M20) CR tablet 20 mEq 20 mEq Oral Given Belen Grover RN     05/14/2024 0536 EDT magnesium sulfate 2 g/50 mL IVPB (premix) 2 g 0 g Intravenous Stopped Belen Grover RN     05/14/2024 0401 EDT magnesium sulfate 2 g/50 mL IVPB (premix) 2 g 2 g Intravenous New Bag Belen Grover RN     05/14/2024 0536 EDT sodium chloride 0.9 % bolus 250 mL 0 mL Intravenous Stopped Belen Grover RN     05/14/2024 0402 EDT sodium chloride 0.9 % bolus 250 mL 250 mL Intravenous New Bag Belen Grover RN     05/14/2024 0843 EDT enoxaparin (LOVENOX) subcutaneous injection 40 mg 40 mg Subcutaneous Given Georgina Ramirez     05/14/2024 0859 EDT nicotine (NICODERM CQ) 14 mg/24hr TD 24 hr patch 1 patch 1 patch Transdermal Patch Removed Ada L Ramirez     05/14/2024 0842 EDT nicotine (NICODERM CQ) 14 mg/24hr TD 24 hr patch 1 patch 1 patch Transdermal Medication Applied Ada L Ramirez     05/14/2024 0856 EDT potassium chloride 20 mEq IVPB (premix) 20 mEq Intravenous New Bag Georgina L Ramirez     05/14/2024 0902 EDT nicotine (NICODERM CQ) 21 mg/24 hr TD 24 hr patch 21 mg 21 mg Transdermal Medication Applied Ada L Ramirez     05/14/2024 0953 EDT buPROPion (WELLBUTRIN XL) 24 hr tablet 150 mg 150 mg Oral Given Georgina Ramirez     05/14/2024 1041 EDT ergocalciferol (VITAMIN D2) capsule 50,000 Units 50,000 Units Oral Given Georgina Ramirez      05/14/2024 0951 EDT ferrous sulfate tablet 325 mg 325 mg Oral Given Ada L Ramirez     05/14/2024 0952 EDT folic acid (FOLVITE) tablet 1 mg 1 mg Oral Given Ada L Ramirez     05/14/2024 0951 EDT furosemide (LASIX) tablet 40 mg 40 mg Oral Given Ada L Ramirez     05/14/2024 0952 EDT gabapentin (NEURONTIN) capsule 100 mg 100 mg Oral Given Ada L Ramirez     05/14/2024 0953 EDT insulin glargine (LANTUS) subcutaneous injection 55 Units 0.55 mL 55 Units Subcutaneous Given [facility]     05/14/2024 0955 EDT insulin lispro (HumALOG/ADMELOG) 100 units/mL subcutaneous injection 15 Units 15 Units Subcutaneous Given [facility]     05/14/2024 1043 EDT insulin lispro (HumALOG/ADMELOG) 100 units/mL subcutaneous injection 1-6 Units 6 Units Subcutaneous Given [facility]     05/14/2024 0952 EDT predniSONE tablet 10 mg 10 mg Oral Given Ada L Ramirez     05/14/2024 0952 EDT sertraline (ZOLOFT) tablet 100 mg 100 mg Oral Given Ada L Ramirez     05/14/2024 1001 EDT varenicline (CHANTIX) tablet 0.5 mg 0.5 mg Oral Not Given Ada L Ramirez     05/14/2024 0957 EDT budesonide (PULMICORT) inhalation solution 0.5 mg 0.5 mg Nebulization Not Given Kemi Mesa, RT     05/14/2024 1000 EDT lactulose (CHRONULAC) oral solution 20 g 20 g Oral Not Given Ada L Ramirez     05/14/2024 1004 EDT oxyCODONE-acetaminophen (PERCOCET) 5-325 mg per tablet 1 tablet 1 tablet Oral Given Ada L Ramirez     05/14/2024 1001 EDT potassium chloride (Klor-Con M20) CR tablet 40 mEq 40 mEq Oral Given Ada L Ramirez          CURRENT MEDICATIONS     Current Facility-Administered Medications   Medication Dose Route Frequency Provider Last Rate    albuterol  2.5 mg Nebulization Q6H PRN Tommie Buckley MD      budesonide  0.5 mg Nebulization BID Ama Araujo MD      buPROPion  150 mg Oral QAM Tommie Buckley MD      diazepam  5 mg Oral HS PRN Tommie Buckley MD      enoxaparin  40 mg Subcutaneous Daily Tommie Buckley MD      ergocalciferol  50,000 Units Oral Q14 Days  Tommie Buckley MD      famotidine  40 mg Oral HS Tommie Buckley MD      ferrous sulfate  325 mg Oral Daily With Breakfast Tommie Buckley MD      folic acid  1 mg Oral Daily Tommie Buckley MD      furosemide  40 mg Oral BID Tommie Buckley MD      gabapentin  100 mg Oral Q8H Tommie Buckley MD      insulin glargine  55 Units Subcutaneous Daily With Breakfast Tommie Buckley MD      insulin lispro  1-6 Units Subcutaneous TID AC Tommie Buckley MD      insulin lispro  1-6 Units Subcutaneous HS Tommie Buckley MD      insulin lispro  15 Units Subcutaneous Daily With Breakfast Tommie Buckley MD      insulin lispro  15 Units Subcutaneous Daily With Dinner Tommie Buckley MD      insulin lispro  15 Units Subcutaneous Daily With Lunch Tommie Buckley MD      ipratropium  0.5 mg Nebulization TID Ama Araujo MD      lactulose  20 g Oral Daily Tommie Buckley MD      levalbuterol  1.25 mg Nebulization TID Ama Araujo MD      [START ON 5/15/2024] levothyroxine  112 mcg Oral Daily Tommie Buckley MD      nicotine  21 mg Transdermal Daily Tommie Buckley MD      ondansetron  4 mg Oral Q12H PRN Tommie Buckley MD      oxyCODONE-acetaminophen  1 tablet Oral Q6H PRN Tommie Buckley MD      pantoprazole  40 mg Oral BID AC Tommie Buckley MD      potassium chloride  20 mEq Intravenous Q2H Tommie Buckley MD 20 mEq (05/14/24 0856)    pravastatin  40 mg Oral Daily With Dinner Tommie Buckley MD      predniSONE  10 mg Oral Daily Tommie Buckley MD      sertraline  100 mg Oral BID Tommie Buckley MD      sucralfate  1 g Oral HS Tommie Buckley MD      [START ON 5/15/2024] sulfamethoxazole-trimethoprim  1 tablet Oral Once per day on Monday Wednesday Friday Tommie Buckley MD          albuterol, 2.5 mg, Q6H PRN  diazepam, 5 mg, HS  "PRN  ondansetron, 4 mg, Q12H PRN  oxyCODONE-acetaminophen, 1 tablet, Q6H PRN        Admission Time  I spent 20 minutes admitting the patient.  This involved direct patient contact where I performed a full history and physical, reviewing previous records, and reviewing laboratory and other diagnostic studies.    Portions of the record may have been created with voice recognition software.  Occasional wrong word or \"sound a like\" substitutions may have occurred due to the inherent limitations of voice recognition software.  Read the chart carefully and recognize, using context, where substitutions have occurred.    ==  Jose Guillen  [facility]  Internal Medicine Residency MS4   "

## 2024-05-14 NOTE — ASSESSMENT & PLAN NOTE
"Patient with history of centrilobular emphysema in the setting of prolonged nicotine dependence and use.  Follows with pulmonology in the outpatient setting, with last visit 5/7/2024.  At that visit, patient was instructed to have repeat PFTs performed and was transitioned over to all nebulized therapy.  Of note, patient was also admitted with pneumonia/pneumothorax/sepsis/questionable COPD exacerbation in March 2024 in [place] and has been taking a prolonged steroid taper as well as Bactrim prophylaxis.    CXR on admission showed clear lung fields with no acute disease. Pt endorses a dry cough and no longer as sputum like she did yesterday.     CT chest 5/15: \"Groundglass changes throughout the lungs with some areas of reticular prominence especially in the peripheral lung zones. Findings are new when compared to January 2023 and diagnostic considerations include development of smoking-related interstitial   lung disease such as desquamative interstitial pneumonitis or diffuse somewhat atypical pulmonary infectious/postinfectious process.\"    Plan  - FU with pulmonology within 6 weeks   - Continue home nebulizer regimen and 2L oxygen  - Treat GGO with azithromycin x 2 days and continue 40 daily lasix  "

## 2024-05-14 NOTE — ASSESSMENT & PLAN NOTE
Initially presented with potassium of 2.4.  S/P 60 mg of oral repletion in the ED.  S/P additional IV 20mEq K+   S/P IV Mg sulfate 2g/50mL repletion     5/14 - received 180 meq KCl & 5/15 - received 80 meq KCl   K+: 4.2 this AM    Plan:  - FU with BMP in 1 wk

## 2024-05-14 NOTE — ASSESSMENT & PLAN NOTE
On admission patient noted to have platelet count of 97. Repeat Platelets = 102.     MRI 10/23: Showed signs of chronic splenomegaly which may indicate portal hypertension.    Hgb stable at 12.3    Plan:  - Monitor outpatient

## 2024-05-14 NOTE — ASSESSMENT & PLAN NOTE
Patient noted to have aquired hypothyroidims with TSH of 4,270 and low free T4 in 2019. Started on levothyroxine at that time.    Plan:  - Cont levothyroxine tablet 112mcg.

## 2024-05-14 NOTE — ASSESSMENT & PLAN NOTE
Wt Readings from Last 3 Encounters:   05/16/24 117 kg (257 lb 3.2 oz)   05/07/24 112 kg (248 lb)   05/06/24 114 kg (252 lb)     Patient noted to have TTE 08/23: displayed EF 60%; mild L. Ventricular thickness, mild mitral and tricuspid regurgitation.    Patient states she was told about newly worsening CHF after her most recent hospital admission in [place] on April 11 2024. R heart cath performed during that hospitalization - results showed normal CO.     Plan:  - Continue home lisinopril 5 mg daily   - reduced lasix to 40mg qd; bmp 1 week; follow-up with PCP if CHF requires increased dose of lasix  - encourage daily oupt weights

## 2024-05-14 NOTE — ASSESSMENT & PLAN NOTE
Patient with history of anxiety/depression, treated with Wellbutrin 150 mg daily and Zoloft 100 mg twice daily.  Also has a prescription written for Valium 5 mg daily as needed at bedtime.    Plan  -Resume home medications upon discharge  Increase Wellbutrin 300mg qd

## 2024-05-14 NOTE — ASSESSMENT & PLAN NOTE
Patient reports intermittent heartburn with meals. States she follows with GI outpatient.    Plan:  - Cont Pepcid 40mg  - Cont. Omeprazole 40mg  - FU with GI outpatient

## 2024-05-14 NOTE — ED ATTENDING ATTESTATION
5/14/2024  I, Alcides Lindsey MD, saw and evaluated the patient. I have discussed the patient with the resident/non-physician practitioner and agree with the resident's/non-physician practitioner's findings, Plan of Care, and MDM as documented in the resident's/non-physician practitioner's note, except where noted. All available labs and Radiology studies were reviewed.  I was present for key portions of any procedure(s) performed by the resident/non-physician practitioner and I was immediately available to provide assistance.       At this point I agree with the current assessment done in the Emergency Department.  I have conducted an independent evaluation of this patient a history and physical is as follows:    53-year-old female presents to the emergency department for evaluation of hypokalemia that was seen on outpatient labs.  Has multiple vague complaints including left-sided chest pain that is exertional in nature, nonradiating, no pleuritic symptoms.  Also complains of a mild frontal headache without any associated blurry vision or double vision.  States she has been feeling unwell for close to a month since she was admitted to a hospital in [place].    On exam, patient ill-appearing, but in no acute distress, head is normocephalic atraumatic, pupils equal round reactive, neck is supple without meningismus signs, heart is tachycardic, but regular rhythm with tight distal pulses, no increased work of breathing, respiratory distress, or stridor.  Abdomen is soft, nontender nondistended without rebound or guarding.    Differential diagnosis includes but is not limited to arrhythmia, symptomatic anemia, ACS, metabolic derangement.  Will check cardiac workup and reassess.    EKG demonstrated sinus tachycardia with prolonged QT.    Labs remarkable for hypokalemia with potassium 2.5.  Will replete, will also give magnesium and discussed case with SOD for admission.        ED Course         Critical Care  Time  CriticalCare Time    Date/Time: 5/14/2024 3:51 AM    Performed by: Alcides Lindsey MD  Authorized by: Alcides Lindsey MD    Critical care provider statement:     Critical care time (minutes):  32    Critical care time was exclusive of:  Separately billable procedures and treating other patients and teaching time    Critical care was necessary to treat or prevent imminent or life-threatening deterioration of the following conditions:  Metabolic crisis    Critical care was time spent personally by me on the following activities:  Obtaining history from patient or surrogate, development of treatment plan with patient or surrogate, evaluation of patient's response to treatment, examination of patient, ordering and performing treatments and interventions, ordering and review of laboratory studies, ordering and review of radiographic studies, re-evaluation of patient's condition and review of old charts

## 2024-05-14 NOTE — CASE MANAGEMENT
Case Management Assessment & Discharge Planning Note    Patient name Chely Ruvalcaba  Location 2 214/CW2 214-01 MRN [id number]  : 1970 Date 2024       Current Admission Date: 2024  Current Admission Diagnosis:Hypokalemia   Patient Active Problem List    Diagnosis Date Noted    Diastolic CHF (HCC) 2024    Thrombocytopenia (HCC) 2024    Leukocytosis 2024    Dyspnea 2024    Pulmonary nodules 2024    Enlarged lymph nodes, unspecified 2024    Bilateral pneumonia 2024    Acute diastolic congestive heart failure (HCC) 2024    Folic acid deficiency 2024    Acute respiratory failure with hypoxia (HCC) 2024    Injury of toe on left foot 2023    Cirrhosis (HCC) 2023    History of GI bleed 2023    History of colon polyps 2023    Duodenal ulcer 2023    Irritable bowel syndrome with both constipation and diarrhea 2023    Abnormal stress ECG with treadmill 2023    Iron deficiency anemia 2022    Hypokalemia 2022    Gastroesophageal reflux disease 2022    Nephrolithiasis 2022    Centrilobular emphysema (HCC) 2022    COVID-19 vaccination refused 2022    Elevated fasting glucose 2021    Edema, lower extremity 2020    Gastroparesis 2018    Hypercholesterolemia 2018    Intermittent claudication (HCC) 2017    Cigarette nicotine dependence in remission 2017    Vitamin D deficiency 2016    Anxiety 2016    Benign hypertension 2016    Depression 2016    Hypothyroidism 2016    Seasonal allergic rhinitis 2016    Spinal stenosis of lumbar region 2012    Degeneration of lumbar or lumbosacral intervertebral disc 2010      LOS (days): 0  Geometric Mean LOS (GMLOS) (days):   Days to GMLOS:     OBJECTIVE:              Current admission status: Observation       Preferred Pharmacy:   [id number] - [place]  [place] - [address]  [address] [address]  Phone: [phone] Fax: [phone]    [facility] - [place], [place] - [address]  [address] [address]  Phone: [phone] Fax: [phone]    [facility] - [place], [place] - [address]  [address] [address]  Phone: [phone] Fax: [phone]    [facility] - [place], [place] - [address]  [address]  [address]  [facility] [address]  Phone: [phone] Fax: [phone]    Primary Care Provider: Andi Mason DO    Primary Insurance: [employer]  Secondary Insurance:     ASSESSMENT:  Active Health Care Proxies       Anuradha Keller Health Care Representative - [facility]   Primary Phone: [phone] (Mobile)                 Readmission Root Cause  30 Day Readmission: No    Patient Information  Admitted from:: Home  Mental Status: Alert  During Assessment patient was accompanied by: Not accompanied during assessment  Assessment information provided by:: Patient  Support Systems: Family members  Type of Current Residence: Apartment  Floor Level: 1  Upon entering residence, is there a bedroom on the main floor (no further steps)?: Yes  Upon entering residence, is there a bathroom on the main floor (no further steps)?: Yes  Living Arrangements: Lives w/ Friend    Activities of Daily Living Prior to Admission  Functional Status: Independent  Completes ADLs independently?: No  Level of ADL dependence: Assistance  Ambulates independently?: Yes  Does patient use assisted devices?: Yes  Assisted Devices (DME) used: Rollator, Straight Cane, Home Oxygen concentrator  DME Company Name (respiratory supplies): [facility]  O2 Rate(s): 3 L  Does patient have a history of Outpatient Therapy (PT/OT)?: No  Does the patient have a history of Short-Term Rehab?: No  Does patient have a history of HHC?: Yes  Does patient currently have HHC?:  Yes    Current Home Health Care  Type of Current Home Care Services: Home OT, Home PT, Nurse visit  Current Home Health Agency:: [facility]  Current Home Health Follow-Up Provider:: PCP    Patient Information Continued  Income Source: SSI/SSD  Does patient receive dialysis treatments?: No  Does patient have a history of substance abuse?: No  Does patient have a history of Mental Health Diagnosis?: Yes (Depression)  Is patient receiving treatment for mental health?: Yes         Means of Transportation  Means of Transport to Appts:: Drives Self      Social Determinants of Health (SDOH)      Flowsheet Row Most Recent Value   Housing Stability    In the last 12 months, was there a time when you were not able to pay the mortgage or rent on time? N   In the last 12 months, how many places have you lived? 1   In the last 12 months, was there a time when you did not have a steady place to sleep or slept in a shelter (including now)? N   Transportation Needs    In the past 12 months, has lack of transportation kept you from medical appointments or from getting medications? no   In the past 12 months, has lack of transportation kept you from meetings, work, or from getting things needed for daily living? No   Food Insecurity    Within the past 12 months, you worried that your food would run out before you got the money to buy more. Never true   Within the past 12 months, the food you bought just didn't last and you didn't have money to get more. Never true   Utilities    In the past 12 months has the electric, gas, oil, or water company threatened to shut off services in your home? No            DISCHARGE DETAILS:    Discharge planning discussed with:: Patient  Freedom of Choice: Yes     CM contacted family/caregiver?: No- see comments (Patient is alert and orineted)                    Additional Comments: CM met with patient at bedside and introduced self and explained role. Patient reports that she lives in an apartment  with her friends. Patient reports that she was independent prior to admission but sometimes needs help with bathing. Patient reports having a cane, rollator, and is on 3-4 L of oxygen at home.

## 2024-05-14 NOTE — ASSESSMENT & PLAN NOTE
Patient states she quit smoking about 2 months ago. Previously smoker since 13 years old. Nicotine dependence being treated with Chantix, nicotine patch.    Plan:  - Cont. Nicotine patch  - Cont. Home Chantix

## 2024-05-14 NOTE — ASSESSMENT & PLAN NOTE
On admission glucose level: 265 & patient on home insulin regimen: Lantus 55, Lispro 15 TID w meals. A1c 7.8 (5/15)    Lab Results   Component Value Date    HGBA1C 7.8 (H) 05/15/2024     Recent Labs     05/15/24  2110 05/16/24  0806 05/16/24  1211   POCGLU 124 132 210*     Allegedly, patient runs as high as BG in 400s at home per ?PCP; recommend outpatient setup of CGM    Plan:  - Continue home insulin regimen Lantus 55, increased Lispro 18 TID w meals   - WM referral, nutrition counseling referrals in place  - follow-up endocrinoloy on DC  - discuss CGM with PCP

## 2024-05-14 NOTE — RESPIRATORY THERAPY NOTE
RT Protocol Note  Chely Ruvalcaba 53 y.o. female MRN: [id number]  Unit/Bed#: CW2 214-01 Encounter: [id number]    Assessment    Principal Problem:    Hypokalemia  Active Problems:    Anxiety    Benign hypertension    Depression    Hypercholesterolemia    Hypothyroidism    Cigarette nicotine dependence in remission    Elevated fasting glucose    Centrilobular emphysema (HCC)    Gastroesophageal reflux disease    Cirrhosis (HCC)    Diastolic CHF (HCC)      Home Pulmonary Medications:  Albuterol, pulmicort       Past Medical History:   Diagnosis Date    Abnormal stress test     Acute cystitis without hematuria 05/07/2018    Acute duodenal ulcer with bleeding 01/16/2023    Allergic sinusitis     last assessed - 03Apr2017    Anemia Around december    Anxiety     last assessed - 04Mar2016    Arthritis     Asthma     last assessed - 04Mar2016    Bilateral lower extremity edema     last assessed - [id number]    Callus of foot     last assessed - 22Jun2016    Chest pain     Chronic GERD     last assessed - 16Jan2017    Colon polyp     Constipation     Resolved - 13Jan2017    COPD (chronic obstructive pulmonary disease) ([facility])     Depression     last assessed - 04Mar2016    Disease of thyroid gland     Diverticulitis of colon     last assessed - 04Mar2016    Dyspepsia     Resolved - [id number]    Edema, lower extremity 08/07/2020    Esophageal reflux     last assessed - 04Mar2016    Essential hypertriglyceridemia     last assessed - [id number]    Fatty liver 01/16/2023    Gastroesophageal reflux disease with esophagitis 11/18/2016    GERD (gastroesophageal reflux disease)     Gynecological disorder     last assessed - 04Mar2016    Hydroureteronephrosis 06/30/2022    Hypertension     last assessed - 04Mar2016    Hypokalemia 06/20/2022    Hypotension     last assessed - [id number]    Kidney stone     Left ureteral stone with hydronephrosis 07/21/2022    Migraine     Morbid obesity (HCC) 01/11/2019    Formatting of this note might be  different from the original. Added automatically from request for surgery [id number]    Neuropathy     Obesity     Other chest pain 11/08/2018    Pancreatic lesion 03/13/2023    Positive depression screening 06/19/2022    Primary hypertriglyceridemia 06/19/2022    Pulmonary emphysema (HCC)     last assessed - 04Mar2016    Seasonal allergies     Severe obesity (HCC) 01/16/2023    Skin tag 07/17/2023    SOB (shortness of breath)     Strain of groin 07/17/2023    Urinary frequency     last assessed - 22Jun2016    Vagina, candidiasis     last assessed - 22Jun2016    Very heavy cigarette smoker 01/16/2023    Visual impairment      Social History     Socioeconomic History    Marital status:      Spouse name: None    Number of children: None    Years of education: None    Highest education level: None   Occupational History    None   Tobacco Use    Smoking status: Some Days     Current packs/day: 0.50     Average packs/day: 0.5 packs/day for 41.4 years (20.7 ttl pk-yrs)     Types: Cigarettes     Start date: 1/1/1983    Smokeless tobacco: Never   Vaping Use    Vaping status: Former    Start date: 1/1/2019    Quit date: 5/1/2019    Substances: Nicotine   Substance and Sexual Activity    Alcohol use: Not Currently    Drug use: Never    Sexual activity: Not Currently     Partners: Male     Birth control/protection: None   Other Topics Concern    None   Social History Narrative    None     Social Determinants of Health     Financial Resource Strain: Not on file   Food Insecurity: No Food Insecurity (7/22/2022)    Hunger Vital Sign     Worried About Running Out of Food in the Last Year: Never true     Ran Out of Food in the Last Year: Never true   Transportation Needs: No Transportation Needs (7/22/2022)    PRAPARE - Transportation     Lack of Transportation (Medical): No     Lack of Transportation (Non-Medical): No   Physical Activity: Not on file   Stress: Not on file   Social Connections: Not on file   Intimate Partner  Violence: Not on file   Housing Stability: Low Risk  (7/22/2022)    Housing Stability Vital Sign     Unable to Pay for Housing in the Last Year: No     Number of Places Lived in the Last Year: 1     Unstable Housing in the Last Year: No       Subjective         Objective    Physical Exam:   Assessment Type: (P) Assess only  General Appearance: (P) Awake  Respiratory Pattern: (P) Normal  Chest Assessment: (P) Chest expansion symmetrical  Bilateral Breath Sounds: (P) Diminished    Vitals:  Blood pressure 162/72, pulse 98, temperature 98.1 °F (36.7 °C), temperature source Oral, resp. rate 20, weight 115 kg (254 lb 9.6 oz), last menstrual period 04/05/2018, SpO2 96%.          Imaging and other studies: I have personally reviewed pertinent reports.            Plan    Respiratory Plan: (P) Home Bronchodilator Patient pathway        Resp Comments: (P) pt ordered duo neb and pulmicort. pt uses udns @ home, will d/c duo-neb and order xopenex/ atrovent  tid per protocol.  pulmicort has been orderd bid and albuterol udn prn. will continue to follow pt per protocol

## 2024-05-15 ENCOUNTER — TELEPHONE (OUTPATIENT)
Age: 54
End: 2024-05-15

## 2024-05-15 ENCOUNTER — HOME CARE VISIT (OUTPATIENT)
Dept: HOME HEALTH SERVICES | Facility: HOME HEALTHCARE | Age: 54
End: 2024-05-15
Payer: MEDICARE

## 2024-05-15 ENCOUNTER — TELEPHONE (OUTPATIENT)
Dept: CARDIOLOGY CLINIC | Facility: CLINIC | Age: 54
End: 2024-05-15

## 2024-05-15 ENCOUNTER — APPOINTMENT (INPATIENT)
Dept: RADIOLOGY | Facility: HOSPITAL | Age: 54
DRG: 425 | End: 2024-05-15
Payer: MEDICARE

## 2024-05-15 PROBLEM — M54.50 CHRONIC BILATERAL LOW BACK PAIN: Status: ACTIVE | Noted: 2024-05-15

## 2024-05-15 PROBLEM — G89.29 CHRONIC BILATERAL LOW BACK PAIN: Status: ACTIVE | Noted: 2024-05-15

## 2024-05-15 PROBLEM — N18.2 STAGE 2 CHRONIC KIDNEY DISEASE: Status: ACTIVE | Noted: 2024-05-15

## 2024-05-15 PROBLEM — E11.9 TYPE 2 DIABETES MELLITUS (HCC): Status: ACTIVE | Noted: 2021-03-29

## 2024-05-15 PROBLEM — K76.0 NAFLD (NONALCOHOLIC FATTY LIVER DISEASE): Status: ACTIVE | Noted: 2023-03-13

## 2024-05-15 LAB
ALBUMIN SERPL BCP-MCNC: 3.4 G/DL (ref 3.5–5)
ALP SERPL-CCNC: 60 U/L (ref 34–104)
ALT SERPL W P-5'-P-CCNC: 21 U/L (ref 7–52)
ANION GAP SERPL CALCULATED.3IONS-SCNC: 10 MMOL/L (ref 4–13)
AST SERPL W P-5'-P-CCNC: 22 U/L (ref 13–39)
BACTERIA UR QL AUTO: ABNORMAL /HPF
BASOPHILS # BLD AUTO: 0.07 THOUSANDS/ÂΜL (ref 0–0.1)
BASOPHILS NFR BLD AUTO: 1 % (ref 0–1)
BILIRUB DIRECT SERPL-MCNC: 0.35 MG/DL (ref 0–0.2)
BILIRUB SERPL-MCNC: 1.19 MG/DL (ref 0.2–1)
BILIRUB UR QL STRIP: NEGATIVE
BUN SERPL-MCNC: 22 MG/DL (ref 5–25)
CALCIUM SERPL-MCNC: 9.2 MG/DL (ref 8.4–10.2)
CHLORIDE SERPL-SCNC: 106 MMOL/L (ref 96–108)
CLARITY UR: CLEAR
CO2 SERPL-SCNC: 27 MMOL/L (ref 21–32)
COLOR UR: YELLOW
CREAT SERPL-MCNC: 1.05 MG/DL (ref 0.6–1.3)
EOSINOPHIL # BLD AUTO: 0.19 THOUSAND/ÂΜL (ref 0–0.61)
EOSINOPHIL NFR BLD AUTO: 2 % (ref 0–6)
ERYTHROCYTE [DISTWIDTH] IN BLOOD BY AUTOMATED COUNT: 17.4 % (ref 11.6–15.1)
EST. AVERAGE GLUCOSE BLD GHB EST-MCNC: 177 MG/DL
FERRITIN SERPL-MCNC: 22 NG/ML (ref 11–307)
GFR SERPL CREATININE-BSD FRML MDRD: 60 ML/MIN/1.73SQ M
GLUCOSE SERPL-MCNC: 124 MG/DL (ref 65–140)
GLUCOSE SERPL-MCNC: 157 MG/DL (ref 65–140)
GLUCOSE SERPL-MCNC: 176 MG/DL (ref 65–140)
GLUCOSE SERPL-MCNC: 183 MG/DL (ref 65–140)
GLUCOSE SERPL-MCNC: 220 MG/DL (ref 65–140)
GLUCOSE UR STRIP-MCNC: NEGATIVE MG/DL
HBA1C MFR BLD: 7.8 %
HCT VFR BLD AUTO: 37.1 % (ref 34.8–46.1)
HGB BLD-MCNC: 11.3 G/DL (ref 11.5–15.4)
HGB UR QL STRIP.AUTO: NEGATIVE
IMM GRANULOCYTES # BLD AUTO: 0.21 THOUSAND/UL (ref 0–0.2)
IMM GRANULOCYTES NFR BLD AUTO: 2 % (ref 0–2)
IRON SATN MFR SERPL: 15 % (ref 15–50)
IRON SERPL-MCNC: 51 UG/DL (ref 50–212)
KETONES UR STRIP-MCNC: NEGATIVE MG/DL
LEUKOCYTE ESTERASE UR QL STRIP: ABNORMAL
LYMPHOCYTES # BLD AUTO: 3.53 THOUSANDS/ÂΜL (ref 0.6–4.47)
LYMPHOCYTES NFR BLD AUTO: 27 % (ref 14–44)
MAGNESIUM SERPL-MCNC: 2.2 MG/DL (ref 1.9–2.7)
MCH RBC QN AUTO: 29.7 PG (ref 26.8–34.3)
MCHC RBC AUTO-ENTMCNC: 30.5 G/DL (ref 31.4–37.4)
MCV RBC AUTO: 97 FL (ref 82–98)
MONOCYTES # BLD AUTO: 1.08 THOUSAND/ÂΜL (ref 0.17–1.22)
MONOCYTES NFR BLD AUTO: 8 % (ref 4–12)
MUCOUS THREADS UR QL AUTO: ABNORMAL
NEUTROPHILS # BLD AUTO: 7.82 THOUSANDS/ÂΜL (ref 1.85–7.62)
NEUTS SEG NFR BLD AUTO: 60 % (ref 43–75)
NITRITE UR QL STRIP: POSITIVE
NON-SQ EPI CELLS URNS QL MICRO: ABNORMAL /HPF
NRBC BLD AUTO-RTO: 0 /100 WBCS
PH UR STRIP.AUTO: 6.5 [PH]
PLATELET # BLD AUTO: 100 THOUSANDS/UL (ref 149–390)
PMV BLD AUTO: 11 FL (ref 8.9–12.7)
POTASSIUM SERPL-SCNC: 3.3 MMOL/L (ref 3.5–5.3)
PROT SERPL-MCNC: 5.9 G/DL (ref 6.4–8.4)
PROT UR STRIP-MCNC: NEGATIVE MG/DL
RBC # BLD AUTO: 3.81 MILLION/UL (ref 3.81–5.12)
RBC #/AREA URNS AUTO: ABNORMAL /HPF
SODIUM SERPL-SCNC: 143 MMOL/L (ref 135–147)
SP GR UR STRIP.AUTO: 1.02 (ref 1–1.03)
TIBC SERPL-MCNC: 336 UG/DL (ref 250–450)
UIBC SERPL-MCNC: 285 UG/DL (ref 155–355)
UROBILINOGEN UR STRIP-ACNC: 2 MG/DL
WBC # BLD AUTO: 12.9 THOUSAND/UL (ref 4.31–10.16)
WBC #/AREA URNS AUTO: ABNORMAL /HPF

## 2024-05-15 PROCEDURE — 87077 CULTURE AEROBIC IDENTIFY: CPT

## 2024-05-15 PROCEDURE — 71260 CT THORAX DX C+: CPT

## 2024-05-15 PROCEDURE — 94640 AIRWAY INHALATION TREATMENT: CPT

## 2024-05-15 PROCEDURE — 81001 URINALYSIS AUTO W/SCOPE: CPT

## 2024-05-15 PROCEDURE — 80076 HEPATIC FUNCTION PANEL: CPT

## 2024-05-15 PROCEDURE — 87086 URINE CULTURE/COLONY COUNT: CPT

## 2024-05-15 PROCEDURE — 74177 CT ABD & PELVIS W/CONTRAST: CPT

## 2024-05-15 PROCEDURE — 83036 HEMOGLOBIN GLYCOSYLATED A1C: CPT

## 2024-05-15 PROCEDURE — 87186 SC STD MICRODIL/AGAR DIL: CPT

## 2024-05-15 PROCEDURE — 83735 ASSAY OF MAGNESIUM: CPT

## 2024-05-15 PROCEDURE — 99232 SBSQ HOSP IP/OBS MODERATE 35: CPT | Performed by: INTERNAL MEDICINE

## 2024-05-15 PROCEDURE — 97163 PT EVAL HIGH COMPLEX 45 MIN: CPT

## 2024-05-15 PROCEDURE — 83540 ASSAY OF IRON: CPT

## 2024-05-15 PROCEDURE — 83550 IRON BINDING TEST: CPT

## 2024-05-15 PROCEDURE — 80048 BASIC METABOLIC PNL TOTAL CA: CPT

## 2024-05-15 PROCEDURE — 94664 DEMO&/EVAL PT USE INHALER: CPT

## 2024-05-15 PROCEDURE — 85025 COMPLETE CBC W/AUTO DIFF WBC: CPT

## 2024-05-15 PROCEDURE — 82948 REAGENT STRIP/BLOOD GLUCOSE: CPT

## 2024-05-15 PROCEDURE — 94760 N-INVAS EAR/PLS OXIMETRY 1: CPT

## 2024-05-15 PROCEDURE — 97166 OT EVAL MOD COMPLEX 45 MIN: CPT

## 2024-05-15 PROCEDURE — 82728 ASSAY OF FERRITIN: CPT

## 2024-05-15 RX ORDER — SODIUM CHLORIDE, SODIUM GLUCONATE, SODIUM ACETATE, POTASSIUM CHLORIDE, MAGNESIUM CHLORIDE, SODIUM PHOSPHATE, DIBASIC, AND POTASSIUM PHOSPHATE .53; .5; .37; .037; .03; .012; .00082 G/100ML; G/100ML; G/100ML; G/100ML; G/100ML; G/100ML; G/100ML
125 INJECTION, SOLUTION INTRAVENOUS CONTINUOUS
Status: DISCONTINUED | OUTPATIENT
Start: 2024-05-15 | End: 2024-05-15

## 2024-05-15 RX ORDER — POTASSIUM CHLORIDE 20 MEQ/1
40 TABLET, EXTENDED RELEASE ORAL ONCE
Status: COMPLETED | OUTPATIENT
Start: 2024-05-15 | End: 2024-05-15

## 2024-05-15 RX ORDER — SODIUM CHLORIDE, SODIUM GLUCONATE, SODIUM ACETATE, POTASSIUM CHLORIDE, MAGNESIUM CHLORIDE, SODIUM PHOSPHATE, DIBASIC, AND POTASSIUM PHOSPHATE .53; .5; .37; .037; .03; .012; .00082 G/100ML; G/100ML; G/100ML; G/100ML; G/100ML; G/100ML; G/100ML
125 INJECTION, SOLUTION INTRAVENOUS CONTINUOUS
Status: DISCONTINUED | OUTPATIENT
Start: 2024-05-15 | End: 2024-05-16

## 2024-05-15 RX ORDER — ACETYLCYSTEINE 200 MG/ML
600 SOLUTION ORAL; RESPIRATORY (INHALATION) ONCE
Status: COMPLETED | OUTPATIENT
Start: 2024-05-15 | End: 2024-05-15

## 2024-05-15 RX ADMIN — IPRATROPIUM BROMIDE 0.5 MG: 0.5 SOLUTION RESPIRATORY (INHALATION) at 20:53

## 2024-05-15 RX ADMIN — SODIUM CHLORIDE, SODIUM GLUCONATE, SODIUM ACETATE, POTASSIUM CHLORIDE, MAGNESIUM CHLORIDE, SODIUM PHOSPHATE, DIBASIC, AND POTASSIUM PHOSPHATE 125 ML/HR: .53; .5; .37; .037; .03; .012; .00082 INJECTION, SOLUTION INTRAVENOUS at 15:41

## 2024-05-15 RX ADMIN — GABAPENTIN 100 MG: 100 CAPSULE ORAL at 09:13

## 2024-05-15 RX ADMIN — BUDESONIDE 0.5 MG: 0.5 INHALANT ORAL at 20:53

## 2024-05-15 RX ADMIN — OXYCODONE HYDROCHLORIDE AND ACETAMINOPHEN 1 TABLET: 5; 325 TABLET ORAL at 22:49

## 2024-05-15 RX ADMIN — INSULIN LISPRO 15 UNITS: 100 INJECTION, SOLUTION INTRAVENOUS; SUBCUTANEOUS at 06:21

## 2024-05-15 RX ADMIN — OXYCODONE HYDROCHLORIDE AND ACETAMINOPHEN 1 TABLET: 5; 325 TABLET ORAL at 14:33

## 2024-05-15 RX ADMIN — OXYCODONE HYDROCHLORIDE AND ACETAMINOPHEN 1 TABLET: 5; 325 TABLET ORAL at 09:21

## 2024-05-15 RX ADMIN — FOLIC ACID 1 MG: 1 TABLET ORAL at 09:13

## 2024-05-15 RX ADMIN — IPRATROPIUM BROMIDE 0.5 MG: 0.5 SOLUTION RESPIRATORY (INHALATION) at 07:20

## 2024-05-15 RX ADMIN — GABAPENTIN 100 MG: 100 CAPSULE ORAL at 17:09

## 2024-05-15 RX ADMIN — PANTOPRAZOLE SODIUM 40 MG: 40 TABLET, DELAYED RELEASE ORAL at 06:22

## 2024-05-15 RX ADMIN — PRAVASTATIN SODIUM 40 MG: 20 TABLET ORAL at 15:41

## 2024-05-15 RX ADMIN — NICOTINE 21 MG: 21 PATCH, EXTENDED RELEASE TRANSDERMAL at 09:13

## 2024-05-15 RX ADMIN — INSULIN LISPRO 1 UNITS: 100 INJECTION, SOLUTION INTRAVENOUS; SUBCUTANEOUS at 06:24

## 2024-05-15 RX ADMIN — INSULIN LISPRO 1 UNITS: 100 INJECTION, SOLUTION INTRAVENOUS; SUBCUTANEOUS at 11:33

## 2024-05-15 RX ADMIN — ACETYLCYSTEINE 600 MG: 200 SOLUTION ORAL; RESPIRATORY (INHALATION) at 15:41

## 2024-05-15 RX ADMIN — LEVALBUTEROL HYDROCHLORIDE 1.25 MG: 1.25 SOLUTION RESPIRATORY (INHALATION) at 13:09

## 2024-05-15 RX ADMIN — PANTOPRAZOLE SODIUM 40 MG: 40 TABLET, DELAYED RELEASE ORAL at 15:41

## 2024-05-15 RX ADMIN — SUCRALFATE 1 G: 1 TABLET ORAL at 22:45

## 2024-05-15 RX ADMIN — LEVOTHYROXINE SODIUM 112 MCG: 112 TABLET ORAL at 09:13

## 2024-05-15 RX ADMIN — IOHEXOL 100 ML: 350 INJECTION, SOLUTION INTRAVENOUS at 21:53

## 2024-05-15 RX ADMIN — IPRATROPIUM BROMIDE 0.5 MG: 0.5 SOLUTION RESPIRATORY (INHALATION) at 13:09

## 2024-05-15 RX ADMIN — LEVALBUTEROL HYDROCHLORIDE 1.25 MG: 1.25 SOLUTION RESPIRATORY (INHALATION) at 20:53

## 2024-05-15 RX ADMIN — FAMOTIDINE 40 MG: 20 TABLET, FILM COATED ORAL at 22:45

## 2024-05-15 RX ADMIN — POTASSIUM CHLORIDE 40 MEQ: 1500 TABLET, EXTENDED RELEASE ORAL at 14:30

## 2024-05-15 RX ADMIN — SULFAMETHOXAZOLE AND TRIMETHOPRIM 1 TABLET: 800; 160 TABLET ORAL at 09:14

## 2024-05-15 RX ADMIN — LEVALBUTEROL HYDROCHLORIDE 1.25 MG: 1.25 SOLUTION RESPIRATORY (INHALATION) at 07:20

## 2024-05-15 RX ADMIN — INSULIN LISPRO 15 UNITS: 100 INJECTION, SOLUTION INTRAVENOUS; SUBCUTANEOUS at 17:10

## 2024-05-15 RX ADMIN — INSULIN LISPRO 15 UNITS: 100 INJECTION, SOLUTION INTRAVENOUS; SUBCUTANEOUS at 11:33

## 2024-05-15 RX ADMIN — LISINOPRIL 5 MG: 5 TABLET ORAL at 09:14

## 2024-05-15 RX ADMIN — BUPROPION HYDROCHLORIDE 150 MG: 150 TABLET, EXTENDED RELEASE ORAL at 09:13

## 2024-05-15 RX ADMIN — POTASSIUM CHLORIDE 40 MEQ: 1500 TABLET, EXTENDED RELEASE ORAL at 09:13

## 2024-05-15 RX ADMIN — SERTRALINE HYDROCHLORIDE 100 MG: 100 TABLET ORAL at 09:12

## 2024-05-15 RX ADMIN — ENOXAPARIN SODIUM 40 MG: 40 INJECTION SUBCUTANEOUS at 09:12

## 2024-05-15 RX ADMIN — INSULIN LISPRO 2 UNITS: 100 INJECTION, SOLUTION INTRAVENOUS; SUBCUTANEOUS at 17:10

## 2024-05-15 RX ADMIN — PREDNISONE 10 MG: 10 TABLET ORAL at 09:13

## 2024-05-15 RX ADMIN — SODIUM CHLORIDE, SODIUM GLUCONATE, SODIUM ACETATE, POTASSIUM CHLORIDE, MAGNESIUM CHLORIDE, SODIUM PHOSPHATE, DIBASIC, AND POTASSIUM PHOSPHATE 125 ML/HR: .53; .5; .37; .037; .03; .012; .00082 INJECTION, SOLUTION INTRAVENOUS at 22:46

## 2024-05-15 RX ADMIN — BUDESONIDE 0.5 MG: 0.5 INHALANT ORAL at 07:20

## 2024-05-15 RX ADMIN — GABAPENTIN 100 MG: 100 CAPSULE ORAL at 01:35

## 2024-05-15 RX ADMIN — INSULIN GLARGINE 55 UNITS: 100 INJECTION, SOLUTION SUBCUTANEOUS at 06:20

## 2024-05-15 RX ADMIN — SERTRALINE HYDROCHLORIDE 100 MG: 100 TABLET ORAL at 17:09

## 2024-05-15 RX ADMIN — FERROUS SULFATE TAB 325 MG (65 MG ELEMENTAL FE) 325 MG: 325 (65 FE) TAB at 06:22

## 2024-05-15 NOTE — QUICK NOTE
Called patients sister, unable to reach them, left a voice message explaining ongoing treatment plan and any patient updates.      Maggi Walsh DO  Internal Medicine Residency PGY-1  [facility]  Available on [employer]

## 2024-05-15 NOTE — PROGRESS NOTES
INTERNAL MEDICINE RESIDENCY PROGRESS NOTE     Name: Chely Ruvalcaba   Age & Sex: 53 y.o. female   MRN: [id number]  Unit/Bed#: CW2 214-01   Encounter: [id number]  Team: SOD Team C     PATIENT INFORMATION     Name: Chely Ruvalcaba   Age & Sex: 53 y.o. female   MRN: [id number]  Hospital Stay Days: 0    ASSESSMENT/PLAN     Principal Problem:    Hypokalemia  Active Problems:    Type 2 diabetes mellitus (HCC)    Diastolic CHF (HCC)    Anxiety    Benign hypertension    Depression    Hypercholesterolemia    Hypothyroidism    Cigarette nicotine dependence in remission    Centrilobular emphysema (HCC)    Gastroesophageal reflux disease    Cirrhosis (HCC)    Thrombocytopenia (HCC)    Leukocytosis    Stage 2 chronic kidney disease    Chronic bilateral low back pain      * Hypokalemia  Assessment & Plan  Initially presented with potassium of 2.4.  S/P 60 mg of oral repletion in the ED.  S/P additional IV 20mEq K+   S/P IV Mg sulfate 2g/50mL repletion     K: 2.4 -> 2.5 -> 2.9 -> 3.4 -> 3.3    5/14 - received 180 meq KCl & 5/15 - received 80 meq KCl     Plan:  - Continue K repletion  - Monitor on telemetry for changes.  -Trend bloodwork.    Type 2 diabetes mellitus (HCC)  Assessment & Plan  On admission glucose level: 265 & patient on home insulin regimen: Lantus 55, Lispro 15 TID w meals. A1c 7.8 (5/15)    Plan:  - Continue home insulin regimen Lantus 55, Lispro 15 TID w meals   -Sliding scale while inpatient - algorithm 3  - Monitor POC glucose 4x/day  - Goal 140-180 in-patient    Diastolic CHF (HCC)  Assessment & Plan  Wt Readings from Last 3 Encounters:   05/15/24 116 kg (255 lb)   05/07/24 112 kg (248 lb)   05/06/24 114 kg (252 lb)     Patient noted to have TTE 08/23: displayed EF 60%; mild L. Ventricular thickness, mild mitral and tricuspid regurgitation.    Patient states she was told about newly worsening CHF after her most recent hospital admission in [place] on April 11 2024. R heart cath performed during that hospitalization  "- results showed normal CO.     Plan:  - Continue home GDMT  - Monitor lytes goal K>4 Mg>2            Chronic bilateral low back pain  Assessment & Plan  Pt endorses having chronic lower back pain. Physical exam revealed CVA tenderness.     Plan  - CT abdomen pelvis with contrast  - Urinalysis     Stage 2 chronic kidney disease  Assessment & Plan  Lab Results   Component Value Date    EGFR 60 05/15/2024    EGFR 62 05/14/2024    EGFR 56 05/14/2024    CREATININE 1.05 05/15/2024    CREATININE 1.02 05/14/2024    CREATININE 1.11 05/14/2024     Plan:   - CT with contrast ordered - prep with N-acetyl cysteine 6 hrs pre and post imaging       Leukocytosis  Assessment & Plan  Patient found to have WBC of 13.27 on admission. Patient on prednisone taper from most recent hospital admission in [place] on April 11. Currently on 10mg prednisone PO daily.    Afebrile, CXR negative, clear lungs b/l, leukocytosis likely 2ndary to prednisone taper.    WBC 13.27 -> 12.90    Plan:  - Monitor CBC  - Monitor for signs of infection    Thrombocytopenia (HCC)  Assessment & Plan  On admission patient noted to have platelet count of 97. Repeat Platelets = 102.     MRI 10/23: Showed signs of chronic splenomegaly which may indicate portal hypertension.    Hgb stable at 12.3    Plan:  - Monitor platelet count  - Monitor for signs of bleeding     Cirrhosis (HCC)  Assessment & Plan  Pt presents with cirrhosis secondary to NAFLD.     MRI 10/23: displaying hepatic cirrhosis with fatty deposition and splenomegaly which may indicate portal hypertension. No ascites, no leg swelling.    Per GI note on 5/6 \"abnormal ultrasound elastography revealing F4 fibrosis, nodular liver on imaging and thrombocytopenia. Likely etiology for cirrhosis is metabolic associated steatotic liver disease. Her MELD 3.0 based upon labs from March/April is 11\"     Labs from 2023 showed normal anti-smooth muscle antibody, ceruloplasmin, antimicrosomal antibody, Protime-INR,folate, " "B12, AFP. Labs also showed elevated alpha-1 antitrypsin and IgM. GI noted \"Blood test for rare causes of liver disease are negative thus far\"     Plan:  - Monitor while in-patient  - Monitor LFTs  - Continue to f/u outpatient with GI    Gastroesophageal reflux disease  Assessment & Plan  Patient reports intermittent heartburn with meals. States she follows with GI outpatient.    Plan:  - Cont Pepcid 40mg  - Cont. Pantoprazole 40mg  - Encourage GI f/u outpatient, consider GI consult in-patient if symptoms worsen    Centrilobular emphysema (HCC)  Assessment & Plan  Patient with history of centrilobular emphysema in the setting of prolonged nicotine dependence and use.  Follows with pulmonology in the outpatient setting, with last visit 5/7/2024.  At that visit, patient was instructed to have repeat PFTs performed and was transitioned over to all nebulized therapy.  Of note, patient was also admitted with pneumonia/pneumothorax/sepsis/questionable COPD exacerbation in March 2024 in [place] and has been taking a prolonged steroid taper as well as Bactrim prophylaxis.    Pt endorses a cough with productive sputum on presentation this AM. CXR on admission showed clear lung fields with no acute disease.     Plan  - CT chest   - Continue PTA nebulizer regimen  -Encourage outpatient follow-up with pulmonology    Vitamin D deficiency  Assessment & Plan  Plan:  - Continue ergocalciferol every 2 weeks    Cigarette nicotine dependence in remission  Assessment & Plan  Patient states she quit smoking about 2 months ago. Previously smoker since 13 years old. Nicotine dependence being treated with Chantix, nicotine patch.    Plan:  - Cont. Nicotine patch  - Cont. Home Chantix     Hypothyroidism  Assessment & Plan  Patient noted to have aquired hypothyroidims with TSH of 4,270 and low free T4 in 2019. Started on levothyroxine at that time.    Plan:  - Cont levothyroxine tablet 112mcg.  - Monitor for acute " "changes    Hypercholesterolemia  Assessment & Plan  Plan:  - Cont. Pravastatin 40mg PO    Depression  Assessment & Plan  Please refer to \"Anxiety\" problem for plan    Benign hypertension  Assessment & Plan  Patient with charted history of hypertension, with outpatient medications including Lasix 40 mg twice daily and Aldactone 50 mg daily.  BP on arrival slightly elevated at 142/66. Pt. Does not take Aldactone at home.    Plan  -Continue to monitor vital signs  -Adjust pharmacotherapy as needed if having to alter patient's diuretic regimen to account for hypokalemia    Anxiety  Assessment & Plan  Patient with history of anxiety/depression, treated with Wellbutrin 150 mg daily and Zoloft 100 mg twice daily.  Also has a prescription written for Valium 5 mg daily as needed at bedtime.    Plan  -Continue PTA medications        Disposition: Continue inpatient management, K repletion, CT abdomen pelvis / Urinalysis      HOSPITAL COURSE     53 y.o. female w PMH of CHF, COPD, Chronic LBP, obesity, type 2 DM, Hypothyroidism, presenting due to hypokalemia found on outpatient at-home bloodwork. Patient states she feels chronically ill, even after she was hospitalized for a month back in April 11th while in [place], visiting a friend. However, prompted to come in due to bloodwork results. States at that time she was found to have pneumonia, sepsis, and newly diagnosed CHF, and begun on current medication regimen at that time. She is on 3L 02 at home, follows with pulmonology outpatient, and has appt. schedule for sleep apnea eval end of this month. Endorses chronic lower back pain on percocet daily, intermittent left sided CP that worsens upon palpitation, denies any recent travel since returning from [place]. Denies fevers, chills, dizziness, palpations, abdominal pain, dysuria, change in bowel movements, numbness tingling.     ED Course:  Patient vitals at admission: afebrile, , rr 18 /66 97% on 3LO2. Platelets: 97; " WBC: 13.2 on prednisone taper.     In ED: K+: 2.4; Fasting glucose: 265  Pt. Received 60mEq K+ Cl  IV magnesium sulfate 2g 25mL/hr.    SUBJECTIVE     Patient seen and examined. No acute events overnight. Pt states that she does not feel well and cannot remember the last time she did feel well. She states that she has low back pain that has been present for years and L sided chest pain with palpation that has occurred on and off for years as well. She also endorses that she experiences epistaxis about every day. Pt reports occasional headaches, cough, and palpitations. She denies nausea, vomiting, numbness or tingling. Remainder of ROS negative    OBJECTIVE     Vitals:    05/15/24 0724 05/15/24 1114 05/15/24 1309 05/15/24 1455   BP:  129/93  134/93   BP Location:       Pulse:  94  103   Resp:       Temp:    98.3 °F (36.8 °C)   TempSrc:       SpO2: 92% 91% 95% 92%   Weight:          Temperature:   Temp (24hrs), Av.3 °F (36.8 °C), Min:98.3 °F (36.8 °C), Max:98.3 °F (36.8 °C)    Temperature: 98.3 °F (36.8 °C)  Intake & Output:  I/O          0701   0700  0701  05/15 0700 05/15 0701   0700    P.O.  120 800    Total Intake(mL/kg)  120 (1) 800 (6.9)    Net  +120 +800           Unmeasured Urine Occurrence  2 x     Unmeasured Stool Occurrence  1 x           Weights:        Body mass index is 39.94 kg/m².  Weight (last 2 days)       Date/Time Weight    05/15/24 0541 116 (255)    24 0803 115 (254.6)     Weight: Simultaneous filing. User may not have seen previous data. at 24 0803          Physical Exam  Vitals reviewed.   Constitutional:       General: She is not in acute distress.     Appearance: She is obese. She is not ill-appearing.   HENT:      Head: Normocephalic and atraumatic.      Right Ear: External ear normal.      Left Ear: External ear normal.      Mouth/Throat:      Mouth: Mucous membranes are moist.   Eyes:      Conjunctiva/sclera: Conjunctivae normal.   Cardiovascular:       Rate and Rhythm: Normal rate and regular rhythm.      Pulses: Normal pulses.      Heart sounds: Normal heart sounds.   Pulmonary:      Breath sounds: Decreased breath sounds present.      Comments: Dyspnea   Chest:      Chest wall: Tenderness (L sided chest pain with palpation) present.   Abdominal:      General: Bowel sounds are normal.      Palpations: Abdomen is soft.   Musculoskeletal:         General: Tenderness (Pt notes tendernes to palpation on RLE near ankle) present. No swelling.   Skin:     General: Skin is warm.      Capillary Refill: Capillary refill takes less than 2 seconds.   Neurological:      General: No focal deficit present.      Mental Status: She is oriented to person, place, and time. She is lethargic.       LABORATORY DATA     Labs: I have personally reviewed pertinent reports.  Results from last 7 days   Lab Units 05/15/24  0457 05/14/24  0822 05/13/24  1055   WBC Thousand/uL 12.90*  --  13.27*   HEMOGLOBIN g/dL 11.3*  --  12.3   HEMATOCRIT % 37.1  --  39.9   PLATELETS Thousands/uL 100* 104* 97*   SEGS PCT % 60  --   --    MONO PCT % 8  --   --    EOS PCT % 2  --   --       Results from last 7 days   Lab Units 05/15/24  0457 05/14/24  1407 05/14/24  0822 05/14/24 0322 05/13/24  1055   POTASSIUM mmol/L 3.3* 3.4* 2.9* 2.5* 2.4*   CHLORIDE mmol/L 106 102  --  100 98   CO2 mmol/L 27 31  --  27 31   BUN mg/dL 22 20  --  20 17   CREATININE mg/dL 1.05 1.02  --  1.11 0.94   CALCIUM mg/dL 9.2 8.7  --  9.3 9.1   ALK PHOS U/L 60  --   --   --  83   ALT U/L 21  --   --   --  26   AST U/L 22  --   --   --  27     Results from last 7 days   Lab Units 05/15/24  0457 05/14/24  0322 05/13/24  1055   MAGNESIUM mg/dL 2.2 1.9 2.0     Results from last 7 days   Lab Units 05/14/24  0322   PHOSPHORUS mg/dL 3.3      Results from last 7 days   Lab Units 05/13/24  1055   INR  1.24*               IMAGING & DIAGNOSTIC TESTING     Radiology Results: I have personally reviewed pertinent reports.  XR chest 1 view  portable    Result Date: 5/14/2024  Impression: No acute cardiopulmonary disease. Workstation performed: [id number]     Other Diagnostic Testing: I have personally reviewed pertinent reports.    ACTIVE MEDICATIONS     Current Facility-Administered Medications   Medication Dose Route Frequency    albuterol inhalation solution 2.5 mg  2.5 mg Nebulization Q6H PRN    budesonide (PULMICORT) inhalation solution 0.5 mg  0.5 mg Nebulization BID    buPROPion (WELLBUTRIN XL) 24 hr tablet 150 mg  150 mg Oral QAM    diazepam (VALIUM) tablet 5 mg  5 mg Oral HS PRN    enoxaparin (LOVENOX) subcutaneous injection 40 mg  40 mg Subcutaneous Daily    ergocalciferol (VITAMIN D2) capsule 50,000 Units  50,000 Units Oral Q14 Days    famotidine (PEPCID) tablet 40 mg  40 mg Oral HS    ferrous sulfate tablet 325 mg  325 mg Oral Daily With Breakfast    folic acid (FOLVITE) tablet 1 mg  1 mg Oral Daily    furosemide (LASIX) tablet 40 mg  40 mg Oral Daily    gabapentin (NEURONTIN) capsule 100 mg  100 mg Oral Q8H    insulin glargine (LANTUS) subcutaneous injection 55 Units 0.55 mL  55 Units Subcutaneous Daily With Breakfast    insulin lispro (HumALOG/ADMELOG) 100 units/mL subcutaneous injection 1-6 Units  1-6 Units Subcutaneous TID AC    insulin lispro (HumALOG/ADMELOG) 100 units/mL subcutaneous injection 1-6 Units  1-6 Units Subcutaneous HS    insulin lispro (HumALOG/ADMELOG) 100 units/mL subcutaneous injection 15 Units  15 Units Subcutaneous Daily With Breakfast    insulin lispro (HumALOG/ADMELOG) 100 units/mL subcutaneous injection 15 Units  15 Units Subcutaneous Daily With Dinner    insulin lispro (HumALOG/ADMELOG) 100 units/mL subcutaneous injection 15 Units  15 Units Subcutaneous Daily With Lunch    ipratropium (ATROVENT) 0.02 % inhalation solution 0.5 mg  0.5 mg Nebulization TID    lactulose (CHRONULAC) oral solution 20 g  20 g Oral Daily    levalbuterol (XOPENEX) inhalation solution 1.25 mg  1.25 mg Nebulization TID    levothyroxine  "tablet 112 mcg  112 mcg Oral Daily    lisinopril (ZESTRIL) tablet 5 mg  5 mg Oral Daily    multi-electrolyte (PLASMALYTE-A/ISOLYTE-S PH 7.4) IV solution  125 mL/hr Intravenous Continuous    multi-electrolyte (PLASMALYTE-A/ISOLYTE-S PH 7.4) IV solution  125 mL/hr Intravenous Continuous    nicotine (NICODERM CQ) 21 mg/24 hr TD 24 hr patch 21 mg  21 mg Transdermal Daily    ondansetron (ZOFRAN-ODT) dispersible tablet 4 mg  4 mg Oral Q12H PRN    oxyCODONE-acetaminophen (PERCOCET) 5-325 mg per tablet 1 tablet  1 tablet Oral Q6H PRN    pantoprazole (PROTONIX) EC tablet 40 mg  40 mg Oral BID AC    pravastatin (PRAVACHOL) tablet 40 mg  40 mg Oral Daily With Dinner    predniSONE tablet 10 mg  10 mg Oral Daily    sertraline (ZOLOFT) tablet 100 mg  100 mg Oral BID    sucralfate (CARAFATE) tablet 1 g  1 g Oral HS    sulfamethoxazole-trimethoprim (BACTRIM DS) 800-160 mg per tablet 1 tablet  1 tablet Oral Once per day on Monday Wednesday Friday       VTE Pharmacologic Prophylaxis: Enoxaparin (Lovenox)  VTE Mechanical Prophylaxis: sequential compression device    Portions of the record may have been created with voice recognition software.  Occasional wrong word or \"sound a like\" substitutions may have occurred due to the inherent limitations of voice recognition software.  Read the chart carefully and recognize, using context, where substitutions have occurred.  ==    Maggi Walsh DO  [facility]  Internal Medicine Residency PGY-1     "

## 2024-05-15 NOTE — TELEPHONE ENCOUNTER
Patient called stating they are currently hospitalized and would like PCP to see her in hospital.  She has questions about why her potassium and platelets are low and feels hospital doctors are unable to answer.  Please contact patient.

## 2024-05-15 NOTE — ASSESSMENT & PLAN NOTE
Lab Results   Component Value Date    EGFR 62 05/16/2024    EGFR 60 05/15/2024    EGFR 62 05/14/2024    CREATININE 1.02 05/16/2024    CREATININE 1.05 05/15/2024    CREATININE 1.02 05/14/2024     Plan:   - Monitor outpatient

## 2024-05-15 NOTE — TELEPHONE ENCOUNTER
Patients GI provider:       Number to return call: ( [phone]    Reason for call: Pt calling asking to have any testing that the dr would like her to have ordered to be done wile she is admitted to [facility]    Please call pt when orders are placed     Scheduled procedure/appointment date if applicable: Apt/procedure

## 2024-05-15 NOTE — TELEPHONE ENCOUNTER
Caller: Chely    Doctor: Yanci    Reason for call: Admitted to hospital. States she has chest pain. States EKG was normal, but has low potassium.     Call back#: [phone]

## 2024-05-15 NOTE — UTILIZATION REVIEW
Initial Clinical Review    OBSERVATION WRITTEN 5/14/24 @ 0412 CONVERTED TO INPATIENT ADMISSION 5/15/24 @ 1443 DUE TO FURTHER DIAGNOSTIC WORKUP REQUIRED FOR HYPOKALEMIA AND LEUKOCYTOSIS, REQUIRING AT LEAST A 2 MIDNIGHT STAY.    Admission: Date/Time/Statement:   Admission Orders (From admission, onward)       Ordered        05/15/24 1443  INPATIENT ADMISSION  Once            05/14/24 0412  Place in Observation  Once                          Orders Placed This Encounter   Procedures    INPATIENT ADMISSION     Standing Status:   Standing     Number of Occurrences:   1     Order Specific Question:   Level of Care     Answer:   Med Surg [16]     Order Specific Question:   Estimated length of stay     Answer:   More than 2 Midnights     Order Specific Question:   Certification     Answer:   I certify that inpatient services are medically necessary for this patient for a duration of greater than two midnights. See H&P and MD Progress Notes for additional information about the patient's course of treatment.     ED Arrival Information       Expected   5/14/2024     Arrival   5/14/2024 02:59    Acuity   Urgent              Means of arrival   Ambulance    Escorted by   [facility]   [facility]    Admission type   Emergency              Arrival complaint   Low Potassium             Chief Complaint   Patient presents with    Low Potassium     Pt. Had blood work from home nurse today at 1830. Reports K was 2.4. pt. Reports headache       Initial Presentation: 53 y.o. female  with PMHx: CHF, COPD, Chronic LBP, obesity, type 2 DM, Hypothyroidism, who presented to [facility] ED via EMS initially admitted Observation status then converted to Inpatient due to Hypokalemia, Leukocytosis.  Presented due to hypokalemia found on outpatient at-home bloodwork. Patient states she feels chronically ill, even after she was hospitalized for a month back in April 11th while in [place]. However, prompted to come in due to  bloodwork results. States at that time she was found to have pneumonia, sepsis, and newly diagnosed CHF, and begun on current medication regimen at that time. She is on 3L 02 at home, follows with pulmonology outpatient, and has appt. schedule for sleep apnea eval end of this month. Endorses chronic lower back pain on percocet daily, intermittent left sided CP that worsens upon palpitation, denies any recent travel since returning from [place]. In the ED, patient vitals at admission: afebrile, , rr 18 /66 97% on 3LO2. Platelets: 97; WBC: 13.2 on prednisone taper. Labs revealed K 2.4; Fasting glucose: 265. Pt received 60mEq K+ Cl. IV magnesium sulfate 2g 25mL/hr.  Plan: med surg telemetry, recheck potassium and replete prn, trend bloodwork. Monitor CBC and for sign of infection. Monitor plt count and signs of bleeding. Continue home meds. Monitor LFTs. PT, OT eval, CM following. Monitor IO and daily wts, accuchecks.     5/15/2024 Inpatient Admission:   Continue telemetry, monitor electrolytes and replete prn, trend labs, monitor CBC, signs of bleeding and for signs of infection, continue accuchecks w/ SSI, monitor volume status, fu on UA. CT with contrast ordered - prep with N-acetyl cysteine 6 hrs pre and post imaging. Continue home meds. Continue inpatient management, K repletion, CT abdomen pelvis / Urinalysis.    Date:   5/16  Day 2:  No new complaints today. Trops negative, continue to monitor electrolytes and replete prn, reduce lasix to 40 mg daily for now. Continue to monitor BP and start lisinopril.  Continued treatment for diverticulitis with Unasyn and flagyl. Continue to monitor VS and labs. Continue med surg telemetry level of care.     ED Triage Vitals   Temperature Pulse Respirations Blood Pressure SpO2   05/14/24 0305 05/14/24 0305 05/14/24 0305 05/14/24 0305 05/14/24 0305   98.1 °F (36.7 °C) 102 18 142/66 97 %      Temp Source Heart Rate Source Patient Position - Orthostatic VS BP Location  FiO2 (%)   05/14/24 0305 05/14/24 0305 05/14/24 0305 05/14/24 0305 --   Oral Monitor Lying Left arm       Pain Score       05/14/24 0333       9          Wt Readings from Last 1 Encounters:   05/16/24 117 kg (257 lb 3.2 oz)     Additional Vital Signs:   Date/Time Temp Pulse Resp BP MAP (mmHg) SpO2 Calculated FIO2 (%) - Nasal Cannula Nasal Cannula O2 Flow Rate (L/min) O2 Device Patient Position - Orthostatic VS   05/16/24 0714 -- -- -- -- -- 93 % -- -- -- --   05/16/24 07:07:09 97.9 °F (36.6 °C) 86 18 113/68 83 91 % -- -- Nasal cannula Sitting   05/15/24 22:54:56 -- 96 -- 110/68 82 89 % Abnormal  -- -- -- --   05/15/24 1945 98.3 °F (36.8 °C) -- -- -- -- 92 % 32 3 L/min Nasal cannula --   05/15/24 14:55:09 98.3 °F (36.8 °C) 103 -- 134/93 107 92 % -- -- -- --   05/15/24 1309 -- -- -- -- -- 95 % -- -- -- --   05/15/24 11:14:07 -- 94 -- 129/93 105 91 % -- -- -- --   05/15/24 0724 -- -- -- -- -- 92 % 36 4 L/min Nasal cannula --   05/15/24 0300 -- -- -- -- -- 94 % 36 4 L/min Nasal cannula --   05/14/24 1953 -- -- -- -- -- -- 40 5 L/min Nasal cannula --   05/14/24 1952 -- -- -- -- -- 94 % -- -- -- --   05/14/24 1400 -- -- -- -- -- 96 % 36 4 L/min Nasal cannula --   05/14/24 0814 -- -- -- -- -- 95 % 36 4 L/min Nasal cannula --   05/14/24 0700 -- 98 20 162/72 104 96 % 32 3 L/min Nasal cannula Sitting   05/14/24 0600 -- 94 21 164/76 109 97 % 32 3 L/min Nasal cannula Sitting   05/14/24 0536 -- 92 16 118/57 82 97 % 32 3 L/min Nasal cannula Sitting   05/14/24 0333 -- -- -- -- -- -- -- -- Nasal cannula --   05/14/24 0305 98.1 °F (36.7 °C) 102 18 142/66 95 97 % 32 3 L/min Nasal cannula Lying     Pertinent Labs/Diagnostic Test Results:   5/14 EKG: Normal sinus rhythm  Possible Left atrial enlargement  Prolonged QT  Abnormal ECG    CT chest abdomen pelvis w contrast   Final Result by Paulo Lacey MD (05/16 0922)      Groundglass changes throughout the lungs with some areas of reticular prominence especially in the peripheral  "lung zones. Findings are new when compared to January 2023 and diagnostic considerations include development of smoking-related interstitial    lung disease such as desquamative interstitial pneumonitis or diffuse somewhat atypical pulmonary infectious/postinfectious process. Interval follow-up chest CT should be performed on a interval in accordance with pulmonologist recommendations.      Mild acute uncomplicated sigmoid diverticulitis. Additionally, CT changes of chronic sigmoid diverticular disease.      Hepatic cirrhosis. Portal hypertension as evidenced by splenomegaly. Borderline/mildly enlarged periportal and periceliac lymph nodes statistically most likely related to hepatic cirrhosis. Otherwise no significant lymphadenopathy.      This examination was marked \"immediate notification\" in Epic in order to begin the standard process by which the radiology reading room liaison alerts the referring practitioner.         Workstation performed: [id number]         XR chest 1 view portable   ED Interpretation by Charly Fisher DO (05/14 0403)   Wet read -diffuse patchy infiltrates versus emphysematous changes.  No consolidations or effusions.  No pneumothorax.  Consistent with prior chest x-ray      Final Result by Con Lucas MD (05/14 1543)      No acute cardiopulmonary disease.            Workstation performed: [id number]               Results from last 7 days   Lab Units 05/16/24  0526 05/15/24  0457 05/14/24  0822 05/13/24  1055   WBC Thousand/uL 11.11* 12.90*  --  13.27*   HEMOGLOBIN g/dL 10.7* 11.3*  --  12.3   HEMATOCRIT % 35.4 37.1  --  39.9   PLATELETS Thousands/uL 95* 100* 104* 97*   TOTAL NEUT ABS Thousands/µL 6.13 7.82*  --   --          Results from last 7 days   Lab Units 05/16/24  0526 05/15/24  0457 05/14/24  1407 05/14/24  0822 05/14/24  0322 05/13/24  1055   SODIUM mmol/L 141 143 143  --  141 141   POTASSIUM mmol/L 4.2 3.3* 3.4* 2.9* 2.5* 2.4*   CHLORIDE mmol/L 105 106 102  --  100 98 "   CO2 mmol/L 29 27 31  --  27 31   ANION GAP mmol/L 7 10 10  --  14* 12   BUN mg/dL 20 22 20  --  20 17   CREATININE mg/dL 1.02 1.05 1.02  --  1.11 0.94   EGFR ml/min/1.73sq m 62 60 62  --  56 69   CALCIUM mg/dL 9.4 9.2 8.7  --  9.3 9.1   MAGNESIUM mg/dL 2.3 2.2  --   --  1.9 2.0   PHOSPHORUS mg/dL  --   --   --   --  3.3  --      Results from last 7 days   Lab Units 05/15/24  0457 05/13/24  1055   AST U/L 22 27   ALT U/L 21 26   ALK PHOS U/L 60 83   TOTAL PROTEIN g/dL 5.9* 6.2*   ALBUMIN g/dL 3.4* 3.5   TOTAL BILIRUBIN mg/dL 1.19* 1.12*   BILIRUBIN DIRECT mg/dL 0.35*  --      Results from last 7 days   Lab Units 05/16/24  0806 05/15/24  2110 05/15/24  1632 05/15/24  1113 05/15/24  0502 05/14/24  2030 05/14/24  1603 05/14/24  1023   POC GLUCOSE mg/dl 132 124 220* 157* 183* 143* 273* 350*     Results from last 7 days   Lab Units 05/16/24  0526 05/15/24  0457 05/14/24  1407 05/14/24  0322   GLUCOSE RANDOM mg/dL 106 176* 218* 294*         Results from last 7 days   Lab Units 05/15/24  0457   HEMOGLOBIN A1C % 7.8*   EAG mg/dl 177       Results from last 7 days   Lab Units 05/14/24  0719 05/14/24  0532 05/14/24  0322   HS TNI 0HR ng/L  --   --  20   HS TNI 2HR ng/L  --  21  --    HSTNI D2 ng/L  --  1  --    HS TNI 4HR ng/L 23  --   --    HSTNI D4 ng/L 3  --   --          Results from last 7 days   Lab Units 05/13/24  1055   PROTIME seconds 15.4*   INR  1.24*       Results from last 7 days   Lab Units 05/15/24  0457 05/13/24  1055   FERRITIN ng/mL 22 24   IRON SATURATION % 15 15   IRON ug/dL 51 50   TIBC ug/dL 336 335     ED Treatment:   Medication Administration from 05/14/2024 0259 to 05/14/2024 0744         Date/Time Order Dose Route Action     05/14/2024 0330 EDT acetaminophen (TYLENOL) tablet 975 mg 975 mg Oral Given     05/14/2024 0400 EDT potassium chloride (Klor-Con M20) CR tablet 40 mEq 40 mEq Oral Given     05/14/2024 0531 EDT potassium chloride (Klor-Con M20) CR tablet 20 mEq 20 mEq Oral Given     05/14/2024  0401 EDT magnesium sulfate 2 g/50 mL IVPB (premix) 2 g 2 g Intravenous New Bag     05/14/2024 0402 EDT sodium chloride 0.9 % bolus 250 mL 250 mL Intravenous New Bag          Past Medical History:   Diagnosis Date    Abnormal stress test     Acute cystitis without hematuria 05/07/2018    Acute duodenal ulcer with bleeding 01/16/2023    Allergic sinusitis     last assessed - 03Apr2017    Anemia Around december    Anxiety     last assessed - 04Mar2016    Arthritis     Asthma     last assessed - 04Mar2016    Bilateral lower extremity edema     last assessed - [id number]    Callus of foot     last assessed - 22Jun2016    Chest pain     Chronic GERD     last assessed - 16Jan2017    Colon polyp     Constipation     Resolved - 13Jan2017    COPD (chronic obstructive pulmonary disease) (HCC)     Depression     last assessed - 04Mar2016    Disease of thyroid gland     Diverticulitis of colon     last assessed - 04Mar2016    Dyspepsia     Resolved - [id number]    Edema, lower extremity 08/07/2020    Esophageal reflux     last assessed - 04Mar2016    Essential hypertriglyceridemia     last assessed - [id number]    Fatty liver 01/16/2023    Gastroesophageal reflux disease with esophagitis 11/18/2016    GERD (gastroesophageal reflux disease)     Gynecological disorder     last assessed - 04Mar2016    Hydroureteronephrosis 06/30/2022    Hypertension     last assessed - 04Mar2016    Hypokalemia 06/20/2022    Hypotension     last assessed - [id number]    Kidney stone     Left ureteral stone with hydronephrosis 07/21/2022    Migraine     Morbid obesity (HCC) 01/11/2019    Formatting of this note might be different from the original. Added automatically from request for surgery [id number]    Neuropathy     Obesity     Other chest pain 11/08/2018    Pancreatic lesion 03/13/2023    Positive depression screening 06/19/2022    Primary hypertriglyceridemia 06/19/2022    Pulmonary emphysema (HCC)     last assessed - 04Mar2016    Seasonal allergies      Severe obesity (HCC) 01/16/2023    Skin tag 07/17/2023    SOB (shortness of breath)     Strain of groin 07/17/2023    Urinary frequency     last assessed - 22Jun2016    Vagina, candidiasis     last assessed - 22Jun2016    Very heavy cigarette smoker 01/16/2023    Visual impairment      Present on Admission:   Anxiety   Cirrhosis ([facility])   Hypercholesterolemia   Hypokalemia   Hypothyroidism   Centrilobular emphysema ([facility])   Gastroesophageal reflux disease   Benign hypertension   Depression   Type 2 diabetes mellitus ([facility])   Thrombocytopenia ([facility])   Leukocytosis      Admitting Diagnosis: COPD (chronic obstructive pulmonary disease) ([facility]) [J44.9]  Hypokalemia [E87.6]  CHF (congestive heart failure) ([facility]) [I50.9]  HTN (hypertension) [I10]  Obesity [E66.9]  Low blood potassium [E87.6]  Age/Sex: 53 y.o. female  Admission Orders:  Scheduled Medications:  amoxicillin-clavulanate, 1 tablet, Oral, Q12H IRIS  budesonide, 0.5 mg, Nebulization, BID  buPROPion, 150 mg, Oral, QAM  enoxaparin, 40 mg, Subcutaneous, Daily  ergocalciferol, 50,000 Units, Oral, Q14 Days  famotidine, 40 mg, Oral, HS  ferrous sulfate, 325 mg, Oral, Daily With Breakfast  folic acid, 1 mg, Oral, Daily  furosemide, 40 mg, Oral, Daily  gabapentin, 100 mg, Oral, Q8H  insulin glargine, 55 Units, Subcutaneous, Daily With Breakfast  insulin lispro, 1-6 Units, Subcutaneous, TID AC  insulin lispro, 1-6 Units, Subcutaneous, HS  insulin lispro, 15 Units, Subcutaneous, Daily With Breakfast  insulin lispro, 15 Units, Subcutaneous, Daily With Dinner  insulin lispro, 15 Units, Subcutaneous, Daily With Lunch  ipratropium, 0.5 mg, Nebulization, TID  lactulose, 20 g, Oral, Daily  levalbuterol, 1.25 mg, Nebulization, TID  levothyroxine, 112 mcg, Oral, Daily  lisinopril, 5 mg, Oral, Daily  metroNIDAZOLE, 500 mg, Oral, Q8H IRIS  nicotine, 21 mg, Transdermal, Daily  pantoprazole, 40 mg, Oral, BID AC  pravastatin, 40 mg, Oral, Daily With Dinner  predniSONE, 10 mg, Oral,  Daily  sertraline, 100 mg, Oral, BID  sucralfate, 1 g, Oral, HS  sulfamethoxazole-trimethoprim, 1 tablet, Oral, Once per day on Monday Wednesday Friday      Continuous IV Infusions:  multi-electrolyte (PLASMALYTE-A/ISOLYTE-S PH 7.4) IV solution  Rate: 125 mL/hr Dose: 125 mL/hr  Freq: Continuous Route: IV  Indications of Use: IV Hydration  Indications Comment: to be given after CT scan for CKD treatment  Last Dose: Stopped (05/16/24 0609)  Start: 05/15/24 2200 End: 05/16/24 0445    multi-electrolyte (PLASMALYTE-A/ISOLYTE-S PH 7.4) IV solution  Rate: 125 mL/hr Dose: 125 mL/hr  Freq: Continuous Route: IV  Indications of Use: IV Hydration  Indications Comment: 6hrs prior to CT  Last Dose: 125 mL/hr (05/15/24 1541)  Start: 05/15/24 1500 End: 05/15/24 2140       PRN Meds:  albuterol, 2.5 mg, Nebulization, Q6H PRN  diazepam, 5 mg, Oral, HS PRN  ondansetron, 4 mg, Oral, Q12H PRN x2 thus far  oxyCODONE-acetaminophen, 1 tablet, Oral, Q6H PRN x7 thus far  trimethobenzamide, 200 mg, Intramuscular, Q6H PRN    scd    IP CONSULT TO CASE MANAGEMENT    Network Utilization Review Department  ATTENTION: Please call with any questions or concerns to [phone] and carefully listen to the prompts so that you are directed to the right person. All voicemails are confidential.   For Discharge needs, contact Care Management DC Support Team at [phone] opt. [phone]  Send all requests for admission clinical reviews, approved or denied determinations and any other requests to dedicated fax number below belonging to the campus where the patient is receiving treatment. List of dedicated fax numbers for the Facilities:  FACILITY NAME UR FAX NUMBER   ADMISSION DENIALS (Administrative/Medical Necessity) [phone]   DISCHARGE SUPPORT TEAM (NETWORK) [phone]   PARENT CHILD HEALTH (Maternity/NICU/Pediatrics) [phone]   [facility] [phone]   [facility] [phone]   [facility]  [facility] [phone]   [facility] [phone]   [facility] [phone]   [facility] [phone]   [facility] [phone]   [facility] [phone]   [facility] [phone]   [facility] [phone]   [facility] [phone]   [facility] [place] [facility] [phone]

## 2024-05-15 NOTE — ASSESSMENT & PLAN NOTE
Pt endorses having chronic lower back pain. Physical exam revealed CVA tenderness.     Plan  - Percocet  mg prn for severe pain

## 2024-05-15 NOTE — TELEPHONE ENCOUNTER
Pt would like Bella to know she has been admitted to the hospital, and if there are any tests that she would like her to have she would prefer they be ordered now. Pt would like a call back to advise.

## 2024-05-15 NOTE — OCCUPATIONAL THERAPY NOTE
Occupational Therapy Evaluation     Patient Name: Chely Ruvalcaba  Today's Date: 5/15/2024  Problem List  Principal Problem:    Hypokalemia  Active Problems:    Anxiety    Benign hypertension    Depression    Hypercholesterolemia    Hypothyroidism    Cigarette nicotine dependence in remission    Type 2 diabetes mellitus (HCC)    Centrilobular emphysema (HCC)    Gastroesophageal reflux disease    Cirrhosis (HCC)    Diastolic CHF (HCC)    Thrombocytopenia (HCC)    Leukocytosis    Stage 2 chronic kidney disease    Chronic bilateral low back pain    Past Medical History  Past Medical History:   Diagnosis Date    Abnormal stress test     Acute cystitis without hematuria 05/07/2018    Acute duodenal ulcer with bleeding 01/16/2023    Allergic sinusitis     last assessed - 03Apr2017    Anemia Around december    Anxiety     last assessed - 04Mar2016    Arthritis     Asthma     last assessed - 04Mar2016    Bilateral lower extremity edema     last assessed - [id number]    Callus of foot     last assessed - 22Jun2016    Chest pain     Chronic GERD     last assessed - 16Jan2017    Colon polyp     Constipation     Resolved - 13Jan2017    COPD (chronic obstructive pulmonary disease) (HCC)     Depression     last assessed - 04Mar2016    Disease of thyroid gland     Diverticulitis of colon     last assessed - 04Mar2016    Dyspepsia     Resolved - [id number]    Edema, lower extremity 08/07/2020    Esophageal reflux     last assessed - 04Mar2016    Essential hypertriglyceridemia     last assessed - [id number]    Fatty liver 01/16/2023    Gastroesophageal reflux disease with esophagitis 11/18/2016    GERD (gastroesophageal reflux disease)     Gynecological disorder     last assessed - 04Mar2016    Hydroureteronephrosis 06/30/2022    Hypertension     last assessed - 04Mar2016    Hypokalemia 06/20/2022    Hypotension     last assessed - [id number]    Kidney stone     Left ureteral stone with hydronephrosis 07/21/2022    Migraine     Morbid  obesity (HCC) 01/11/2019    Formatting of this note might be different from the original. Added automatically from request for surgery [id number]    Neuropathy     Obesity     Other chest pain 11/08/2018    Pancreatic lesion 03/13/2023    Positive depression screening 06/19/2022    Primary hypertriglyceridemia 06/19/2022    Pulmonary emphysema (HCC)     last assessed - 04Mar2016    Seasonal allergies     Severe obesity (HCC) 01/16/2023    Skin tag 07/17/2023    SOB (shortness of breath)     Strain of groin 07/17/2023    Urinary frequency     last assessed - 22Jun2016    Vagina, candidiasis     last assessed - 22Jun2016    Very heavy cigarette smoker 01/16/2023    Visual impairment      Past Surgical History  Past Surgical History:   Procedure Laterality Date    CARPAL TUNNEL RELEASE      last assessed - 04MAr2016    CHOLECYSTECTOMY      last assessed - 04Mar2016    COLONOSCOPY      Complete colonoscopy     EYE SURGERY      last assessed - 04Mar2016    FL RETROGRADE PYELOGRAM  6/16/2022    FL RETROGRADE PYELOGRAM  8/16/2022    FOOT SURGERY      last assessed - 04Mar2016    GA CYSTO BLADDER W/URETERAL CATHETERIZATION Left 7/21/2022    Procedure: CYSTOSCOPY RETROGRADE PYELOGRAM WITH INSERTION STENT URETERAL;  Surgeon: Facundo Matamoros MD;  [facility];  Service: Urology    GA CYSTO/URETERO W/LITHOTRIPSY &INDWELL STENT INSRT Left 6/16/2022    Procedure: CYSTOSCOPY URETEROSCOPY WITH LITHOTRIPSY HOLMIUM LASER, RETROGRADE PYELOGRAM AND INSERTION STENT URETERAL;  Surgeon: Sammy Ferrer MD;  [facility];  Service: Urology    GA CYSTO/URETERO W/LITHOTRIPSY &INDWELL STENT INSRT Left 8/16/2022    Procedure: CYSTOSCOPY USCOPE W/HOLMIUM LASER, RETROGRADE PYELOGRAM&STENT;  Surgeon: Sudheer Bradford MD;  [facility];  Service: Urology    GA ESOPHAGOGASTRODUODENOSCOPY TRANSORAL DIAGNOSTIC N/A 2/13/2017    Procedure: ESOPHAGOGASTRODUODENOSCOPY (EGD);  Surgeon: Alison Saleem MD;  [facility]: [facility];  Service:  "Gastroenterology           05/15/24 1148   OT Last Visit   OT Visit Date 05/15/24   Note Type   Note type Evaluation   Pain Assessment   Pain Assessment Tool 0-10   Pain Score No Pain   Restrictions/Precautions   Weight Bearing Precautions Per Order No   Other Precautions Chair Alarm;Bed Alarm;Multiple lines;O2  (3-4LO2)   Home Living   Type of Home Apartment   Home Layout One level  (0STE)   Bathroom Shower/Tub Walk-in shower   Bathroom Toilet Standard   Bathroom Equipment Grab bars in shower;Shower chair   Home Equipment Walker;Cane  (+ rollator. Did not use PTA)   Additional Comments wears 3LO2 at baseline   Prior Function   Level of [place] Independent with ADLs;Independent with IADLS   Lives With Friend(s)   Receives Help From Friend(s)   IADLs Independent with medication management;Independent with meal prep   Falls in the last 6 months 1 to 4  (1 per pt report)   Vocational On disability   Lifestyle   Autonomy PTA, pt reports primarily being I with ADLs, IADLs, fnxl mobility (has PRN A for bathing), has not driven recently   Reciprocal Relationships 2 friends who reside with her - someone is almost always home   Service to Others On disability   Intrinsic Gratification Fishing   Subjective   Subjective \"I stay tired\"   ADL   Where Assessed Edge of bed   Eating Assistance 7  Independent   Grooming Assistance 7  Independent   UB Bathing Assistance 5  Supervision/Setup   LB Bathing Assistance 5  Supervision/Setup   UB Dressing Assistance 5  Supervision/Setup   LB Dressing Assistance 5  Supervision/Setup   LB Dressing Deficit Don/doff R sock;Don/doff L sock   Toileting Assistance  5  Supervision/Setup   Toileting Deficit Perineal hygiene   Bed Mobility   Supine to Sit Unable to assess   Sit to Supine Unable to assess   Additional Comments pt seated EOB upon arrival   Transfers   Sit to Stand 5  Supervision   Additional items Increased time required   Stand to Sit 5  Supervision   Additional items Increased " time required   Toilet transfer 5  Supervision   Additional items Increased time required   Additional Comments transfers w/o AD   Functional Mobility   Functional Mobility 5  Supervision   Additional Comments no AD. Pt did desat to 81% on 3LO2 during activity, increased to 4L - RN aware   Balance   Static Sitting Good   Dynamic Sitting Fair +   Static Standing Fair   Dynamic Standing Fair   Ambulatory Fair   Activity Tolerance   Activity Tolerance Patient tolerated treatment well   Nurse Made Aware RN clearance for session   RUE Assessment   RUE Assessment WFL   LUE Assessment   LUE Assessment WFL   Vision-Basic Assessment   Current Vision Wears glasses all the time   Cognition   Overall Cognitive Status WFL   Arousal/Participation Responsive;Cooperative   Attention Attends with cues to redirect   Orientation Level Oriented X4   Following Commands Follows one step commands with increased time or repetition   Comments Pt pleasant and cooperative during session. Initially somewhat lethargic but improved as session progressed   Assessment   Assessment Pt is a 53 y.o. female admitted to [facility] on 5/14/2024 w/ Hypokalemia, leukocytosis.  has a past medical history of Acute cystitis, Acute duodenal ulcer with bleeding, Allergic sinusitis, Anemia, Anxiety, Arthritis, Asthma, Bilateral lower extremity edema, Callus of foot, Chronic GERD, Colon polyp, Constipation, COPD, Depression, Disease of thyroid gland, Diverticulitis of colon, Dyspepsia, Edema, Esophageal reflux, Essential hypertriglyceridemia, Hydroureteronephrosis, Hypertension, Hypokalemia, Hypotension, Kidney stone, Left ureteral stone with hydronephrosis, Morbid obesity, Neuropathy, Pancreatic lesion, Pulmonary emphysema, Vagina, candidiasis, and Visual impairment.Pt with active OT orders and up and OOB as tolerated orders. As per pt report, pta, [facility] Pt was I w/  ADLS and IADLS. Upon evaluation, pt currently requires S for  transfers and mobility. Pt currently requires I eating, I grooming, S UB ADLs, S LB ADLs, and S toileting. Pt reports supportive friends/roommates who can assist as needed upon d/c. From OT standpoint, recommendation would be home with skilled therapy and social support. No further acute OT needs. D/C OT. Please re-consult if needed. Thank you. Pt was left after session with all current needs met. The patient's raw score on the AM-PAC Daily Activity Inpatient Short Form is 20. A raw score of greater than or equal to 19 suggests the patient may benefit from discharge to home. Please refer to the recommendation of the Occupational Therapist for safe discharge planning.   Goals   Patient Goals to go to the bathroom   Plan   OT Frequency Eval only   Discharge Recommendation   Rehab Resource Intensity Level, OT III (Minimum Resource Intensity)   AM-PAC Daily Activity Inpatient   Lower Body Dressing 3   Bathing 3   Toileting 3   Upper Body Dressing 3   Grooming 4   Eating 4   Daily Activity Raw Score 20   Daily Activity Standardized Score (Calc for Raw Score >=11) 42.03   AM-[facility] Applied Cognition Inpatient   Following a Speech/Presentation 3   Understanding Ordinary Conversation 4   Taking Medications 4   Remembering Where Things Are Placed or Put Away 4   Remembering List of 4-5 Errands 4   Taking Care of Complicated Tasks 3   Applied Cognition Raw Score 22   Applied Cognition Standardized Score 47.83     Esther Szymanski MS, OTR/L

## 2024-05-15 NOTE — TELEPHONE ENCOUNTER
Tried to call the patient and let her know that Bella did not need any additional testing at this time but had to leave a message for her to return call.  Vi Juarez

## 2024-05-15 NOTE — PHYSICAL THERAPY NOTE
PHYSICAL THERAPY EVALUATION  NAME:  Chely Ruvalcaba  DATE: 05/15/24    AGE:   53 y.o.  Mrn:   [id number]  ADMIT DX:  COPD (chronic obstructive pulmonary disease) ([facility]) [J44.9]  Hypokalemia [E87.6]  CHF (congestive heart failure) ([facility]) [I50.9]  HTN (hypertension) [I10]  Obesity [E66.9]  Low blood potassium [E87.6]    Past Medical History:   Diagnosis Date    Abnormal stress test     Acute cystitis without hematuria 05/07/2018    Acute duodenal ulcer with bleeding 01/16/2023    Allergic sinusitis     last assessed - 03Apr2017    Anemia Around december    Anxiety     last assessed - 04Mar2016    Arthritis     Asthma     last assessed - 04Mar2016    Bilateral lower extremity edema     last assessed - [id number]    Callus of foot     last assessed - 22Jun2016    Chest pain     Chronic GERD     last assessed - 16Jan2017    Colon polyp     Constipation     Resolved - 13Jan2017    COPD (chronic obstructive pulmonary disease) ([facility])     Depression     last assessed - 04Mar2016    Disease of thyroid gland     Diverticulitis of colon     last assessed - 04Mar2016    Dyspepsia     Resolved - [id number]    Edema, lower extremity 08/07/2020    Esophageal reflux     last assessed - 04Mar2016    Essential hypertriglyceridemia     last assessed - [id number]    Fatty liver 01/16/2023    Gastroesophageal reflux disease with esophagitis 11/18/2016    GERD (gastroesophageal reflux disease)     Gynecological disorder     last assessed - 04Mar2016    Hydroureteronephrosis 06/30/2022    Hypertension     last assessed - 04Mar2016    Hypokalemia 06/20/2022    Hypotension     last assessed - [id number]    Kidney stone     Left ureteral stone with hydronephrosis 07/21/2022    Migraine     Morbid obesity ([facility]) 01/11/2019    Formatting of this note might be different from the original. Added automatically from request for surgery [id number]    Neuropathy     Obesity     Other chest pain 11/08/2018    Pancreatic lesion 03/13/2023    Positive depression  screening 06/19/2022    Primary hypertriglyceridemia 06/19/2022    Pulmonary emphysema (HCC)     last assessed - 04Mar2016    Seasonal allergies     Severe obesity (HCC) 01/16/2023    Skin tag 07/17/2023    SOB (shortness of breath)     Strain of groin 07/17/2023    Urinary frequency     last assessed - 22Jun2016    Vagina, candidiasis     last assessed - 22Jun2016    Very heavy cigarette smoker 01/16/2023    Visual impairment      Past Surgical History:   Procedure Laterality Date    CARPAL TUNNEL RELEASE      last assessed - 04MAr2016    CHOLECYSTECTOMY      last assessed - 04Mar2016    COLONOSCOPY      Complete colonoscopy     EYE SURGERY      last assessed - 04Mar2016    FL RETROGRADE PYELOGRAM  6/16/2022    FL RETROGRADE PYELOGRAM  8/16/2022    FOOT SURGERY      last assessed - 04Mar2016    TN CYSTO BLADDER W/URETERAL CATHETERIZATION Left 7/21/2022    Procedure: CYSTOSCOPY RETROGRADE PYELOGRAM WITH INSERTION STENT URETERAL;  Surgeon: Facundo Matamoros MD;  [facility];  Service: Urology    TN CYSTO/URETERO W/LITHOTRIPSY &INDWELL STENT INSRT Left 6/16/2022    Procedure: CYSTOSCOPY URETEROSCOPY WITH LITHOTRIPSY HOLMIUM LASER, RETROGRADE PYELOGRAM AND INSERTION STENT URETERAL;  Surgeon: Sammy Ferrer MD;  [facility];  Service: Urology    TN CYSTO/URETERO W/LITHOTRIPSY &INDWELL STENT INSRT Left 8/16/2022    Procedure: CYSTOSCOPY USCOPE W/HOLMIUM LASER, RETROGRADE PYELOGRAM&STENT;  Surgeon: Sudheer Bradford MD;  [facility];  Service: Urology    TN ESOPHAGOGASTRODUODENOSCOPY TRANSORAL DIAGNOSTIC N/A 2/13/2017    Procedure: ESOPHAGOGASTRODUODENOSCOPY (EGD);  Surgeon: Alison Saleem MD;  [facility];  Service: Gastroenterology       Length Of Stay: 0  PHYSICAL THERAPY EVALUATION :    05/15/24 1341   PT Last Visit   PT Visit Date 05/15/24   Note Type   Note type Evaluation   Pain Assessment   Pain Assessment Tool 0-10   Pain Score 6   Pain Location/Orientation Location: Generalized  "  Pain Onset/Description Onset: Ongoing   Restrictions/Precautions   Weight Bearing Precautions Per Order No   Braces or Orthoses   (none)   Other Precautions Chair Alarm;Bed Alarm;O2;Multiple lines;Hard of hearing   Home Living   Type of Home Apartment   Home Layout One level;Performs ADLs on one level   Home Equipment Walker;Cane;Grab bars  (rollator- O2 concentrator)   Additional Comments At baseline, pt reports living w/ 2 friends/ roommates in a FF apt; pt has rollator ; O2 concentrator and SPC at home/ wears 2Lo2; was open and reports receiving [facility] RN + PT/OT; pt reports she does ont drive and denies falls.   Prior Function   Level of [place] Independent with ADLs;Independent with functional mobility;Needs assistance with IADLS   Lives With Friend(s)   Receives Help From Friend(s);Home health  (was receiving [facility] PT OT and RN prior)   IADLs Independent with meal prep;Independent with medication management;Family/Friend/Other provides transportation  (pt reports her roomate mostly does cooking)   Falls in the last 6 months 0   Vocational Unemployed   Comments see above   General   Family/Caregiver Present No   Cognition   Arousal/Participation Responsive  (lethargic but awakens easily adn answers all questions appropriately)   Orientation Level Oriented X4   Following Commands Follows one step commands without difficulty   Comments pt overall pleasant and cooperative; vagen when describing sx and complaints; repetitive at times\" I just dont feel well and I havn;t for years\".   RUE Assessment   RUE Assessment WFL   LUE Assessment   LUE Assessment WFL   RLE Assessment   RLE Assessment WFL   LLE Assessment   LLE Assessment WFL   Coordination   Movements are Fluid and Coordinated 1   Sensation WFL   Light Touch   RLE Light Touch Grossly intact   LLE Light Touch Grossly intact   Sharp/Dull   RLE Sharp/Dull Grossly intact   LLE Sharp/Dull Grossly intact   Proprioception   RLE Proprioception Grossly intact   LLE " "Proprioception Grossly Intact   Bed Mobility   Supine to Sit 6  Modified independent   Sit to Supine 6  Modified independent   Transfers   Sit to Stand 5  Supervision   Additional items Increased time required   Stand to Sit 5  Supervision   Additional items Increased time required   Additional Comments w/ AD in room on 2Lo2   Ambulation/Elevation   Gait pattern Improper Weight shift;Decreased foot clearance;Excessively slow   Gait Assistance 5  Supervision   Assistive Device Rolling walker;None   Distance ~15' w/o AD and clsoe S; slow garcía and mildly unsteady- additional 40' w/ RW and S- improved balance- distances self limited by pt 2* \"too tired\"- Spo2 on 2L remains 96-97% throughout   Balance   Static Sitting Good   Dynamic Sitting Fair +   Static Standing Fair   Dynamic Standing Fair   Ambulatory Fair  (w/ RW)   Activity Tolerance   Activity Tolerance Patient tolerated treatment well;Patient limited by fatigue   Medical Staff Made Aware RN- Sussy   Nurse Made Aware yes- cleared for session   Assessment  Pt is 53 y.o. female seen for PT evaluation s/p admit to [facility] on 5/14/2024  w/ Hypokalemia- abdormal labs drawn by home nurse.  Per H+P also Has multiple vague complaints including left-sided chest pain that is exertional in nature, nonradiating, no pleuritic symptoms. Also complains of a mild frontal headache without any associated blurry vision or double vision. States she has been feeling unwell for some time.  Pt  has a past medical history of Abnormal stress test, Acute cystitis without hematuria (05/07/2018), Acute duodenal ulcer with bleeding (01/16/2023), Allergic sinusitis, Anemia (Around december), Anxiety, Arthritis, Asthma, Bilateral lower extremity edema, Callus of foot, Chest pain, Chronic GERD, Colon polyp, Constipation, COPD (chronic obstructive pulmonary disease) ([facility] Depression, Disease of thyroid gland, Diverticulitis of colon, Dyspepsia, Edema, lower extremity (08/07/2020), " "Esophageal reflux, Essential hypertriglyceridemia, Fatty liver (01/16/2023), Gastroesophageal reflux disease with esophagitis (11/18/2016), GERD (gastroesophageal reflux disease), Gynecological disorder, Hydroureteronephrosis (06/30/2022), Hypertension, Hypokalemia (06/20/2022), Hypotension, Kidney stone, Left ureteral stone with hydronephrosis (07/21/2022), Migraine, Morbid obesity (HCC) (01/11/2019), Neuropathy, Obesity, Other chest pain (11/08/2018), Pancreatic lesion (03/13/2023), Positive depression screening (06/19/2022), Primary hypertriglyceridemia (06/19/2022), Pulmonary emphysema ([facility]), Seasonal allergies, Severe obesity (HCC) (01/16/2023), Skin tag (07/17/2023), SOB (shortness of breath), Strain of groin (07/17/2023), Urinary frequency, Vagina, candidiasis, Very heavy cigarette smoker (01/16/2023), and Visual impairment.   Due to acute medical issues, ongoing medical workup for primary dx;  decreased activity tolerance compared to baseline, increased assistance needed  at current time, continuous monitoring, trending labs;  multiple lines, health management issues; note unstable clinical picture (high complexity).      At baseline, pt reports living w/ 2 friends/ roommates in a FF apt; pt has rollator ; O2 concentrator and SPC at home/ wears 2Lo2; was open and reports receiving [facility] RN + PT/OT; pt reports she does ont drive and denies falls.  . Currently,  pt  is requiring S A for bed skills; S for functional transfers and close S for ambulation w/o AD and S> MI for ambulation w/ RW household distances. Pt reports c/o fatigue and SOB  w/ mobility but has stable vitals including O2 > 96% and HR 90s w/ mobility. Pt continues to state- \"just don't feel well\" but unable to elaborate- RN aware. Mobility wise- pt reports near her baseline w/ no concerns for d/c home.       Pt presents  currently w/ overall mobility deficits 2* to: lethargy;  limited flexibility;  generalized weakness/ deconditioning; decreased " endurance; decreased activity tolerance; obesity;  impaired balance;  SOB/BRADY; fatigue; limited insight into current deficits; bed/ chair alarms; multiple lines; / visual impairments;   (Please find additional objective findings from PT assessment regarding body systems outlined above.) Pt will continue to benefit from skilled PT interventions to address stated impairments; to maximize functional potential; for ongoing pt/ family training; and DME needs.      PT is recommending PT Resource Intensity Level  3 (continuation w/ HHC PT/ on d/c.          Pt is cleared for d/c home when medically stable. Currently no skilled acute PT needs identified as pt is functioning at her baseline. Pt was educated to ambulate 3-4x daiy w/ staff assist prn for fall prevention- pt in agreement w/ plan and d/c from PT at this time.  Please reconsult if functional decline or status changes.      Prognosis Fair   Problem List Decreased endurance;Impaired balance;Decreased mobility;Decreased safety awareness;Impaired vision;Obesity;Pain   Goals   Patient Goals to feel better; get some rest   Discharge Recommendation   Rehab Resource Intensity Level, PT III (Minimum Resource Intensity)   Equipment Recommended   (has rolltor ; O2 SPC at home for d/c)   AM-PAC Basic Mobility Inpatient   Turning in Flat Bed Without Bedrails 4   Lying on Back to Sitting on Edge of Flat Bed Without Bedrails 4   Moving Bed to Chair 4   Standing Up From Chair Using Arms 4   Walk in Room 4   Climb 3-5 Stairs With Railing 3   Basic Mobility Inpatient Raw Score 23   Basic Mobility Standardized Score 50.88   [facility] Highest Level Of Mobility   -HLM Goal 7: Walk 25 feet or more   -HLM Achieved 7: Walk 25 feet or more   End of Consult   Patient Position at End of Consult Supine;Bed/Chair alarm activated;All needs within reach     The patient's AM-PAC Basic Mobility Inpatient Short Form Raw Score is 23. A Raw score of greater than 16 suggests the patient may  benefit from discharge to home. Please also refer to the recommendation of the Physical Therapist for safe discharge planning.

## 2024-05-16 PROBLEM — E87.6 HYPOKALEMIA: Status: RESOLVED | Noted: 2022-06-20 | Resolved: 2024-05-16

## 2024-05-16 PROBLEM — K57.92 DIVERTICULITIS: Status: ACTIVE | Noted: 2024-05-16

## 2024-05-16 LAB
ANION GAP SERPL CALCULATED.3IONS-SCNC: 7 MMOL/L (ref 4–13)
BASOPHILS # BLD AUTO: 0.06 THOUSANDS/ÂΜL (ref 0–0.1)
BASOPHILS NFR BLD AUTO: 1 % (ref 0–1)
BUN SERPL-MCNC: 20 MG/DL (ref 5–25)
CALCIUM SERPL-MCNC: 9.4 MG/DL (ref 8.4–10.2)
CHLORIDE SERPL-SCNC: 105 MMOL/L (ref 96–108)
CO2 SERPL-SCNC: 29 MMOL/L (ref 21–32)
CREAT SERPL-MCNC: 1.02 MG/DL (ref 0.6–1.3)
EOSINOPHIL # BLD AUTO: 0.16 THOUSAND/ÂΜL (ref 0–0.61)
EOSINOPHIL NFR BLD AUTO: 1 % (ref 0–6)
ERYTHROCYTE [DISTWIDTH] IN BLOOD BY AUTOMATED COUNT: 17.7 % (ref 11.6–15.1)
GFR SERPL CREATININE-BSD FRML MDRD: 62 ML/MIN/1.73SQ M
GLUCOSE SERPL-MCNC: 106 MG/DL (ref 65–140)
GLUCOSE SERPL-MCNC: 132 MG/DL (ref 65–140)
GLUCOSE SERPL-MCNC: 156 MG/DL (ref 65–140)
GLUCOSE SERPL-MCNC: 210 MG/DL (ref 65–140)
GLUCOSE SERPL-MCNC: 257 MG/DL (ref 65–140)
HCT VFR BLD AUTO: 35.4 % (ref 34.8–46.1)
HGB BLD-MCNC: 10.7 G/DL (ref 11.5–15.4)
IMM GRANULOCYTES # BLD AUTO: 0.25 THOUSAND/UL (ref 0–0.2)
IMM GRANULOCYTES NFR BLD AUTO: 2 % (ref 0–2)
LYMPHOCYTES # BLD AUTO: 3.6 THOUSANDS/ÂΜL (ref 0.6–4.47)
LYMPHOCYTES NFR BLD AUTO: 32 % (ref 14–44)
MAGNESIUM SERPL-MCNC: 2.3 MG/DL (ref 1.9–2.7)
MCH RBC QN AUTO: 30.1 PG (ref 26.8–34.3)
MCHC RBC AUTO-ENTMCNC: 30.2 G/DL (ref 31.4–37.4)
MCV RBC AUTO: 99 FL (ref 82–98)
MONOCYTES # BLD AUTO: 0.91 THOUSAND/ÂΜL (ref 0.17–1.22)
MONOCYTES NFR BLD AUTO: 8 % (ref 4–12)
NEUTROPHILS # BLD AUTO: 6.13 THOUSANDS/ÂΜL (ref 1.85–7.62)
NEUTS SEG NFR BLD AUTO: 56 % (ref 43–75)
NRBC BLD AUTO-RTO: 0 /100 WBCS
PLATELET # BLD AUTO: 95 THOUSANDS/UL (ref 149–390)
PMV BLD AUTO: 11.6 FL (ref 8.9–12.7)
POTASSIUM SERPL-SCNC: 4.2 MMOL/L (ref 3.5–5.3)
RBC # BLD AUTO: 3.56 MILLION/UL (ref 3.81–5.12)
SODIUM SERPL-SCNC: 141 MMOL/L (ref 135–147)
WBC # BLD AUTO: 11.11 THOUSAND/UL (ref 4.31–10.16)

## 2024-05-16 PROCEDURE — 80048 BASIC METABOLIC PNL TOTAL CA: CPT

## 2024-05-16 PROCEDURE — 94762 N-INVAS EAR/PLS OXIMTRY CONT: CPT

## 2024-05-16 PROCEDURE — 94640 AIRWAY INHALATION TREATMENT: CPT

## 2024-05-16 PROCEDURE — 94664 DEMO&/EVAL PT USE INHALER: CPT

## 2024-05-16 PROCEDURE — 82948 REAGENT STRIP/BLOOD GLUCOSE: CPT

## 2024-05-16 PROCEDURE — 85025 COMPLETE CBC W/AUTO DIFF WBC: CPT

## 2024-05-16 PROCEDURE — NC001 PR NO CHARGE: Performed by: INTERNAL MEDICINE

## 2024-05-16 PROCEDURE — 83735 ASSAY OF MAGNESIUM: CPT

## 2024-05-16 PROCEDURE — 94760 N-INVAS EAR/PLS OXIMETRY 1: CPT

## 2024-05-16 RX ORDER — AMOXICILLIN AND CLAVULANATE POTASSIUM 875; 125 MG/1; MG/1
1 TABLET, FILM COATED ORAL EVERY 12 HOURS SCHEDULED
Status: DISCONTINUED | OUTPATIENT
Start: 2024-05-16 | End: 2024-05-17 | Stop reason: HOSPADM

## 2024-05-16 RX ORDER — METRONIDAZOLE 500 MG/1
500 TABLET ORAL EVERY 8 HOURS SCHEDULED
Status: DISCONTINUED | OUTPATIENT
Start: 2024-05-16 | End: 2024-05-17 | Stop reason: HOSPADM

## 2024-05-16 RX ORDER — AMOXICILLIN AND CLAVULANATE POTASSIUM 875; 125 MG/1; MG/1
1 TABLET, FILM COATED ORAL EVERY 12 HOURS SCHEDULED
Qty: 9 TABLET | Refills: 0 | Status: SHIPPED | OUTPATIENT
Start: 2024-05-16 | End: 2024-05-24

## 2024-05-16 RX ORDER — LISINOPRIL 5 MG/1
5 TABLET ORAL DAILY
Qty: 30 TABLET | Refills: 0 | Status: SHIPPED | OUTPATIENT
Start: 2024-05-17 | End: 2024-06-16

## 2024-05-16 RX ORDER — METRONIDAZOLE 500 MG/1
500 TABLET ORAL EVERY 8 HOURS SCHEDULED
Qty: 15 TABLET | Refills: 0 | Status: SHIPPED | OUTPATIENT
Start: 2024-05-16 | End: 2024-05-24

## 2024-05-16 RX ADMIN — METRONIDAZOLE 500 MG: 500 TABLET ORAL at 21:17

## 2024-05-16 RX ADMIN — GABAPENTIN 100 MG: 100 CAPSULE ORAL at 08:58

## 2024-05-16 RX ADMIN — OXYCODONE HYDROCHLORIDE AND ACETAMINOPHEN 1 TABLET: 5; 325 TABLET ORAL at 09:06

## 2024-05-16 RX ADMIN — IPRATROPIUM BROMIDE 0.5 MG: 0.5 SOLUTION RESPIRATORY (INHALATION) at 19:30

## 2024-05-16 RX ADMIN — GABAPENTIN 100 MG: 100 CAPSULE ORAL at 00:52

## 2024-05-16 RX ADMIN — INSULIN LISPRO 15 UNITS: 100 INJECTION, SOLUTION INTRAVENOUS; SUBCUTANEOUS at 08:59

## 2024-05-16 RX ADMIN — FUROSEMIDE 40 MG: 40 TABLET ORAL at 08:58

## 2024-05-16 RX ADMIN — INSULIN LISPRO 15 UNITS: 100 INJECTION, SOLUTION INTRAVENOUS; SUBCUTANEOUS at 17:15

## 2024-05-16 RX ADMIN — AZITHROMYCIN MONOHYDRATE 500 MG: 500 INJECTION, POWDER, LYOPHILIZED, FOR SOLUTION INTRAVENOUS at 17:07

## 2024-05-16 RX ADMIN — OXYCODONE HYDROCHLORIDE AND ACETAMINOPHEN 1 TABLET: 5; 325 TABLET ORAL at 17:40

## 2024-05-16 RX ADMIN — AMOXICILLIN AND CLAVULANATE POTASSIUM 1 TABLET: 875; 125 TABLET, FILM COATED ORAL at 12:16

## 2024-05-16 RX ADMIN — AMOXICILLIN AND CLAVULANATE POTASSIUM 1 TABLET: 875; 125 TABLET, FILM COATED ORAL at 21:17

## 2024-05-16 RX ADMIN — NICOTINE 21 MG: 21 PATCH, EXTENDED RELEASE TRANSDERMAL at 08:59

## 2024-05-16 RX ADMIN — INSULIN LISPRO 15 UNITS: 100 INJECTION, SOLUTION INTRAVENOUS; SUBCUTANEOUS at 12:18

## 2024-05-16 RX ADMIN — IPRATROPIUM BROMIDE 0.5 MG: 0.5 SOLUTION RESPIRATORY (INHALATION) at 07:11

## 2024-05-16 RX ADMIN — SERTRALINE HYDROCHLORIDE 100 MG: 100 TABLET ORAL at 08:58

## 2024-05-16 RX ADMIN — LISINOPRIL 5 MG: 5 TABLET ORAL at 08:58

## 2024-05-16 RX ADMIN — PANTOPRAZOLE SODIUM 40 MG: 40 TABLET, DELAYED RELEASE ORAL at 17:13

## 2024-05-16 RX ADMIN — PRAVASTATIN SODIUM 40 MG: 20 TABLET ORAL at 17:13

## 2024-05-16 RX ADMIN — LEVOTHYROXINE SODIUM 112 MCG: 112 TABLET ORAL at 08:58

## 2024-05-16 RX ADMIN — INSULIN GLARGINE 55 UNITS: 100 INJECTION, SOLUTION SUBCUTANEOUS at 08:59

## 2024-05-16 RX ADMIN — SERTRALINE HYDROCHLORIDE 100 MG: 100 TABLET ORAL at 17:13

## 2024-05-16 RX ADMIN — FOLIC ACID 1 MG: 1 TABLET ORAL at 08:58

## 2024-05-16 RX ADMIN — LEVALBUTEROL HYDROCHLORIDE 1.25 MG: 1.25 SOLUTION RESPIRATORY (INHALATION) at 07:11

## 2024-05-16 RX ADMIN — SUCRALFATE 1 G: 1 TABLET ORAL at 21:17

## 2024-05-16 RX ADMIN — PANTOPRAZOLE SODIUM 40 MG: 40 TABLET, DELAYED RELEASE ORAL at 06:03

## 2024-05-16 RX ADMIN — BUDESONIDE 0.5 MG: 0.5 INHALANT ORAL at 19:30

## 2024-05-16 RX ADMIN — INSULIN LISPRO 2 UNITS: 100 INJECTION, SOLUTION INTRAVENOUS; SUBCUTANEOUS at 12:16

## 2024-05-16 RX ADMIN — BUPROPION HYDROCHLORIDE 150 MG: 150 TABLET, EXTENDED RELEASE ORAL at 08:57

## 2024-05-16 RX ADMIN — IPRATROPIUM BROMIDE 0.5 MG: 0.5 SOLUTION RESPIRATORY (INHALATION) at 13:03

## 2024-05-16 RX ADMIN — ENOXAPARIN SODIUM 40 MG: 40 INJECTION SUBCUTANEOUS at 08:57

## 2024-05-16 RX ADMIN — GABAPENTIN 100 MG: 100 CAPSULE ORAL at 17:13

## 2024-05-16 RX ADMIN — LEVALBUTEROL HYDROCHLORIDE 1.25 MG: 1.25 SOLUTION RESPIRATORY (INHALATION) at 13:03

## 2024-05-16 RX ADMIN — ONDANSETRON 4 MG: 4 TABLET, ORALLY DISINTEGRATING ORAL at 09:06

## 2024-05-16 RX ADMIN — FAMOTIDINE 40 MG: 20 TABLET, FILM COATED ORAL at 21:17

## 2024-05-16 RX ADMIN — BUDESONIDE 0.5 MG: 0.5 INHALANT ORAL at 07:11

## 2024-05-16 RX ADMIN — PREDNISONE 10 MG: 10 TABLET ORAL at 08:58

## 2024-05-16 RX ADMIN — INSULIN LISPRO 1 UNITS: 100 INJECTION, SOLUTION INTRAVENOUS; SUBCUTANEOUS at 21:16

## 2024-05-16 RX ADMIN — METRONIDAZOLE 500 MG: 500 TABLET ORAL at 13:37

## 2024-05-16 RX ADMIN — INSULIN LISPRO 3 UNITS: 100 INJECTION, SOLUTION INTRAVENOUS; SUBCUTANEOUS at 17:15

## 2024-05-16 RX ADMIN — LEVALBUTEROL HYDROCHLORIDE 1.25 MG: 1.25 SOLUTION RESPIRATORY (INHALATION) at 19:30

## 2024-05-16 RX ADMIN — FERROUS SULFATE TAB 325 MG (65 MG ELEMENTAL FE) 325 MG: 325 (65 FE) TAB at 08:57

## 2024-05-16 NOTE — UTILIZATION REVIEW
"NOTIFICATION OF INPATIENT ADMISSION   AUTHORIZATION REQUEST   SERVICING FACILITY:   [facility]  Address: [address] [place] [address]  Tax ID: [id number]  NPI: [id number] ATTENDING PROVIDER:  Attending Name and NPI#: Ama Araujo Md [[id number]]  Address: [address] [place] [address]  Phone: [phone]   ADMISSION INFORMATION:  Place of Service: Inpatient [facility]  Place of Service Code: 21  Inpatient Admission Date/Time: 5/15/24  2:43 PM  Discharge Date/Time: No discharge date for patient encounter.  Admitting Diagnosis Code/Description:  COPD (chronic obstructive pulmonary disease) ([facility]) [J44.9]  Hypokalemia [E87.6]  CHF (congestive heart failure) ([facility]) [I50.9]  HTN (hypertension) [I10]  Obesity [E66.9]  Low blood potassium [E87.6]     UTILIZATION REVIEW CONTACT:  Priscila Templeton"Kristine\"Shaheen Utilization   Network Utilization Review Department  Phone: [phone]  Fax: [phone]  Email: [email or web address]  Contact for approvals/pending authorizations, clinical reviews, and discharge.     PHYSICIAN ADVISORY SERVICES:  Medical Necessity Denial & Bomt-mf-Xhqk Review  Phone: [phone]  Fax: [phone]  Email: [email or web address]     DISCHARGE SUPPORT TEAM:  For Patients Discharge Needs & Updates  Phone: [phone] opt. 2 Fax: [phone]  Email: [email or web address]     "

## 2024-05-16 NOTE — ASSESSMENT & PLAN NOTE
"Intermittent abdominal pain this admission, mild-to-moderate. Has been ongoing x 1 month per patient.    CT abd 5/15: \"Mild acute uncomplicated sigmoid diverticulitis. Additionally, CT changes of chronic sigmoid diverticular disease. \"    Plan:  Start on Augmentin & metronidazole course x 5 days  Trialed clear diet but pt wants regular diet as she is pain-free  Medically stable for discharge  "

## 2024-05-16 NOTE — PROGRESS NOTES
"    INTERNAL MEDICINE RESIDENCY PROGRESS NOTE     Name: Chely Ruvalcaba   Age & Sex: 53 y.o. female   MRN: [id number]  Unit/Bed#: -[facility]   Encounter: [id number]  Team: SOD Team C     PATIENT INFORMATION     Name: Chely Ruvalcaba   Age & Sex: 53 y.o. female   MRN: [id number]  Hospital Stay Days: 1    ASSESSMENT/PLAN     Principal Problem:    Diverticulitis  Active Problems:    Anxiety    Benign hypertension    Depression    Hypercholesterolemia    Hypothyroidism    Cigarette nicotine dependence in remission    Type 2 diabetes mellitus (HCC)    Centrilobular emphysema (HCC)    Gastroesophageal reflux disease    Cirrhosis (HCC)    Diastolic CHF (HCC)    Thrombocytopenia (HCC)    Leukocytosis    Stage 2 chronic kidney disease    Chronic bilateral low back pain      * Diverticulitis  Assessment & Plan  Intermittent abdominal pain this admission, mild-to-moderate. Has been ongoing x 1 month per patient.    CT abd 5/15: \"Mild acute uncomplicated sigmoid diverticulitis. Additionally, CT changes of chronic sigmoid diverticular disease. \"    Plan:  Start on Augmentin & metronidazole course x 5 days  Trialed clear diet but pt wants regular diet as she is pain-free  Medically stable for discharge    Chronic bilateral low back pain  Assessment & Plan  Pt endorses having chronic lower back pain. Physical exam revealed CVA tenderness.     Plan  - Percocet  mg prn for severe pain    Stage 2 chronic kidney disease  Assessment & Plan  Lab Results   Component Value Date    EGFR 62 05/16/2024    EGFR 60 05/15/2024    EGFR 62 05/14/2024    CREATININE 1.02 05/16/2024    CREATININE 1.05 05/15/2024    CREATININE 1.02 05/14/2024     Plan:   - Monitor outpatient      Leukocytosis  Assessment & Plan  Stable. Secondary to steroid use. No fevers/SIRS.     Patient found to have WBC of 13.27 on admission. Patient on prednisone taper from most recent hospital admission in [place] on April 11. Currently on 10mg prednisone PO daily.    Afebrile, CXR " "negative, clear lungs b/l, leukocytosis likely 2ndary to prednisone taper.    Recent Labs     05/15/24  0457 05/16/24  0526   WBC 12.90* 11.11*         Plan:  - Monitor at home for signs of infection    Thrombocytopenia (HCC)  Assessment & Plan  On admission patient noted to have platelet count of 97. Repeat Platelets = 102.     MRI 10/23: Showed signs of chronic splenomegaly which may indicate portal hypertension.    Hgb stable at 12.3    Plan:  - Monitor outpatient    Diastolic CHF (HCC)  Assessment & Plan  Wt Readings from Last 3 Encounters:   05/16/24 117 kg (257 lb 3.2 oz)   05/07/24 112 kg (248 lb)   05/06/24 114 kg (252 lb)     Patient noted to have TTE 08/23: displayed EF 60%; mild L. Ventricular thickness, mild mitral and tricuspid regurgitation.    Patient states she was told about newly worsening CHF after her most recent hospital admission in [place] on April 11 2024. R heart cath performed during that hospitalization - results showed normal CO.     Plan:  - Continue home lisinopril 5 mg daily             Cirrhosis (HCC)  Assessment & Plan  Pt presents with cirrhosis secondary to NAFLD. Currently compensated.     MRI 10/23: displaying hepatic cirrhosis with fatty deposition and splenomegaly which may indicate portal hypertension. No ascites, no leg swelling.    Per GI note on 5/6 \"abnormal ultrasound elastography revealing F4 fibrosis, nodular liver on imaging and thrombocytopenia. Likely etiology for cirrhosis is metabolic associated steatotic liver disease. Her MELD 3.0 based upon labs from March/April is 11\"     Labs from 2023 showed normal anti-smooth muscle antibody, ceruloplasmin, antimicrosomal antibody, Protime-INR,folate, B12, AFP. Labs also showed elevated alpha-1 antitrypsin and IgM. GI noted \"Blood test for rare causes of liver disease are negative thus far\"     CT abdomen/pelvis 5/15 showed splenomegaly and portal hypertension supporting cirrhosis.     MELD 3.0: 11 at 5/15/2024  4:57 " "AM  MELD-Na: 10 at 5/15/2024  4:57 AM  Calculated from:  Serum Creatinine: 1.05 mg/dL at 5/15/2024  4:57 AM  Serum Sodium: 143 mmol/L (Using max of 137 mmol/L) at 5/15/2024  4:57 AM  Total Bilirubin: 1.19 mg/dL at 5/15/2024  4:57 AM  Serum Albumin: 3.4 g/dL at 5/15/2024  4:57 AM  INR(ratio): 1.24 at 5/13/2024 10:55 AM  Age at listing (hypothetical): 53 years  Sex: Female at 5/15/2024  4:57 AM        Plan:  - Continue to f/u outpatient with GI  - No acute management while inpatient. Continue lasix to optimize fluid balance    Gastroesophageal reflux disease  Assessment & Plan  Patient reports intermittent heartburn with meals. States she follows with GI outpatient.    Plan:  - Cont Pepcid 40mg  - Cont. Omeprazole 40mg  - FU with GI outpatient    Centrilobular emphysema (HCC)  Assessment & Plan  Patient with history of centrilobular emphysema in the setting of prolonged nicotine dependence and use.  Follows with pulmonology in the outpatient setting, with last visit 5/7/2024.  At that visit, patient was instructed to have repeat PFTs performed and was transitioned over to all nebulized therapy.  Of note, patient was also admitted with pneumonia/pneumothorax/sepsis/questionable COPD exacerbation in March 2024 in [place] and has been taking a prolonged steroid taper as well as Bactrim prophylaxis.    CXR on admission showed clear lung fields with no acute disease. Pt endorses a dry cough and no longer as sputum like she did yesterday.     CT chest 5/15: \"Groundglass changes throughout the lungs with some areas of reticular prominence especially in the peripheral lung zones. Findings are new when compared to January 2023 and diagnostic considerations include development of smoking-related interstitial   lung disease such as desquamative interstitial pneumonitis or diffuse somewhat atypical pulmonary infectious/postinfectious process.\"    Plan  - FU with pulmonology within 6 weeks   - Continue home nebulizer regimen and 2L " "oxygen  - Treat GGO with azithromycin x 3 days and continue 40 daily lasix; if hypoxic can add additional diuresis    Type 2 diabetes mellitus (HCC)  Assessment & Plan  On admission glucose level: 265 & patient on home insulin regimen: Lantus 55, Lispro 15 TID w meals. A1c 7.8 (5/15)    Lab Results   Component Value Date    HGBA1C 7.8 (H) 05/15/2024     Recent Labs     05/15/24  2110 05/16/24  0806 05/16/24  1211   POCGLU 124 132 210*     Allegedly, patient runs as high as BG in 400s at home per ?PCP; recommend outpatient setup of CGM    Plan:  - Continue home insulin regimen Lantus 55, Lispro 15 TID w meals   - DM education prior to d/c  - BG target 140-180 while IP  - Carb control level 2 diet    Vitamin D deficiency  Assessment & Plan  Plan:  - Continue ergocalciferol every 2 weeks    Cigarette nicotine dependence in remission  Assessment & Plan  Patient states she quit smoking about 2 months ago. Previously smoker since 13 years old. Nicotine dependence being treated with Chantix, nicotine patch.    Plan:  - Cont. Nicotine patch  - Cont. Home Chantix     Hypothyroidism  Assessment & Plan  Patient noted to have aquired hypothyroidims with TSH of 4,270 and low free T4 in 2019. Started on levothyroxine at that time.    Plan:  - Cont levothyroxine tablet 112mcg.    Hypercholesterolemia  Assessment & Plan  Plan:  - Cont. 40 mg omeprazole upon discharge    Depression  Assessment & Plan  Please refer to \"Anxiety\" problem for plan    Benign hypertension  Assessment & Plan  Patient with charted history of hypertension, with outpatient medications including Lasix 40 mg twice daily and Aldactone 50 mg daily.  BP on arrival slightly elevated at 142/66. Pt. Does not take Aldactone at home.    Plan  - Continue lasix 40 mg po qd while inpatient; if worsening hypoxia can resume home 40 mg po BID or give IV diuresis  - Continue 5mg lisinopril daily upon discharge     Anxiety  Assessment & Plan  Patient with history of " anxiety/depression, treated with Wellbutrin 150 mg daily and Zoloft 100 mg twice daily.  Also has a prescription written for Valium 5 mg daily as needed at bedtime.    Plan  -Resume home medications upon discharge    Hypokalemia-resolved as of 2024  Assessment & Plan  Initially presented with potassium of 2.4.  S/P 60 mg of oral repletion in the ED.  S/P additional IV 20mEq K+   S/P IV Mg sulfate 2g/50mL repletion      - received 180 meq KCl & 5/15 - received 80 meq KCl   K+: 4.2 this AM    Plan:  - FU with BMP in 1 wk         Disposition: Floor, medically stable. Discharge home tomorrow      SUBJECTIVE     Patient seen and examined. No acute events overnight. States her abdominal pain has resolved. Feels chronically short of breath but no new complaints or worsened sx.     OBJECTIVE     Vitals:    24 0714 24 1100 24 1314 24 1546   BP:    117/61   BP Location:       Pulse:       Resp:       Temp:    98 °F (36.7 °C)   TempSrc:       SpO2: 93% 92% 95%    Weight:          Temperature:   Temp (24hrs), Av.1 °F (36.7 °C), Min:97.9 °F (36.6 °C), Max:98.3 °F (36.8 °C)    Temperature: 98 °F (36.7 °C)  Intake & Output:  I/O          0701  05/15 0700 05/15 07 07 07 0700    P.O. 120 1960 240    Total Intake(mL/kg) 120 (1) 1960 (16.8) 240 (2.1)    Urine (mL/kg/hr)  600 (0.2) 0 (0)    Total Output  600 0    Net +120 +1360 +240           Unmeasured Urine Occurrence 2 x      Unmeasured Stool Occurrence 1 x            Weights:        Body mass index is 40.28 kg/m².  Weight (last 2 days)       Date/Time Weight    24 0600 117 (257.2)    05/15/24 0541 116 (255)    24 0803 115 (254.6)     Weight: Simultaneous filing. User may not have seen previous data. at 24 0803          Physical Exam  Vitals reviewed.   Constitutional:       General: She is not in acute distress.     Appearance: She is obese. She is not toxic-appearing or diaphoretic.   HENT:       Head: Normocephalic and atraumatic.      Mouth/Throat:      Mouth: Mucous membranes are moist.      Pharynx: Oropharynx is clear.   Eyes:      General: No scleral icterus.     Extraocular Movements: Extraocular movements intact.   Cardiovascular:      Rate and Rhythm: Normal rate and regular rhythm.      Heart sounds: No murmur heard.     No friction rub. No gallop.   Pulmonary:      Effort: Pulmonary effort is normal. No respiratory distress.      Breath sounds: No stridor. Rales (bibasilar) present. No wheezing or rhonchi.      Comments: Wearing oxygen nasal cannula  Abdominal:      General: Bowel sounds are normal. There is no distension.      Palpations: Abdomen is soft. There is no mass.      Tenderness: There is no abdominal tenderness. There is no guarding.   Musculoskeletal:         General: Normal range of motion.      Right lower leg: Edema (trace) present.      Left lower leg: Edema (trace) present.   Skin:     General: Skin is warm and dry.      Findings: No rash.   Neurological:      Mental Status: She is alert and oriented to person, place, and time.      Sensory: No sensory deficit.   Psychiatric:         Mood and Affect: Mood normal.         Behavior: Behavior normal.         Thought Content: Thought content normal.       LABORATORY DATA     Labs: I have personally reviewed pertinent reports.  Results from last 7 days   Lab Units 05/16/24  0526 05/15/24  0457 05/14/24  0822 05/13/24  1055   WBC Thousand/uL 11.11* 12.90*  --  13.27*   HEMOGLOBIN g/dL 10.7* 11.3*  --  12.3   HEMATOCRIT % 35.4 37.1  --  39.9   PLATELETS Thousands/uL 95* 100* 104* 97*   SEGS PCT % 56 60  --   --    MONO PCT % 8 8  --   --    EOS PCT % 1 2  --   --       Results from last 7 days   Lab Units 05/16/24  0526 05/15/24  0457 05/14/24  1407 05/14/24  0322 05/13/24  1055   POTASSIUM mmol/L 4.2 3.3* 3.4*   < > 2.4*   CHLORIDE mmol/L 105 106 102   < > 98   CO2 mmol/L 29 27 31   < > 31   BUN mg/dL 20 22 20   < > 17   CREATININE  "mg/dL 1.02 1.05 1.02   < > 0.94   CALCIUM mg/dL 9.4 9.2 8.7   < > 9.1   ALK PHOS U/L  --  60  --   --  83   ALT U/L  --  21  --   --  26   AST U/L  --  22  --   --  27    < > = values in this interval not displayed.     Results from last 7 days   Lab Units 05/16/24  0526 05/15/24  0457 05/14/24  0322   MAGNESIUM mg/dL 2.3 2.2 1.9     Results from last 7 days   Lab Units 05/14/24  0322   PHOSPHORUS mg/dL 3.3      Results from last 7 days   Lab Units 05/13/24  1055   INR  1.24*               IMAGING & DIAGNOSTIC TESTING     Radiology Results: I have personally reviewed pertinent reports.  CT chest abdomen pelvis w contrast    Result Date: 5/16/2024  Impression: Groundglass changes throughout the lungs with some areas of reticular prominence especially in the peripheral lung zones. Findings are new when compared to January 2023 and diagnostic considerations include development of smoking-related interstitial lung disease such as desquamative interstitial pneumonitis or diffuse somewhat atypical pulmonary infectious/postinfectious process. Interval follow-up chest CT should be performed on a interval in accordance with pulmonologist recommendations. Mild acute uncomplicated sigmoid diverticulitis. Additionally, CT changes of chronic sigmoid diverticular disease. Hepatic cirrhosis. Portal hypertension as evidenced by splenomegaly. Borderline/mildly enlarged periportal and periceliac lymph nodes statistically most likely related to hepatic cirrhosis. Otherwise no significant lymphadenopathy. This examination was marked \"immediate notification\" in Epic in order to begin the standard process by which the radiology reading room liaison alerts the referring practitioner. Workstation performed: [id number]     XR chest 1 view portable    Result Date: 5/14/2024  Impression: No acute cardiopulmonary disease. Workstation performed: [id number]     Other Diagnostic Testing: I have personally reviewed pertinent reports.    ACTIVE " MEDICATIONS     Current Facility-Administered Medications   Medication Dose Route Frequency    albuterol inhalation solution 2.5 mg  2.5 mg Nebulization Q6H PRN    amoxicillin-clavulanate (AUGMENTIN) 875-125 mg per tablet 1 tablet  1 tablet Oral Q12H IRIS    azithromycin (ZITHROMAX) 500 mg in sodium chloride 0.9 % 250 mL IVPB  500 mg Intravenous Q24H    budesonide (PULMICORT) inhalation solution 0.5 mg  0.5 mg Nebulization BID    buPROPion (WELLBUTRIN XL) 24 hr tablet 150 mg  150 mg Oral QAM    diazepam (VALIUM) tablet 5 mg  5 mg Oral HS PRN    enoxaparin (LOVENOX) subcutaneous injection 40 mg  40 mg Subcutaneous Daily    ergocalciferol (VITAMIN D2) capsule 50,000 Units  50,000 Units Oral Q14 Days    famotidine (PEPCID) tablet 40 mg  40 mg Oral HS    ferrous sulfate tablet 325 mg  325 mg Oral Daily With Breakfast    folic acid (FOLVITE) tablet 1 mg  1 mg Oral Daily    furosemide (LASIX) tablet 40 mg  40 mg Oral Daily    gabapentin (NEURONTIN) capsule 100 mg  100 mg Oral Q8H    insulin glargine (LANTUS) subcutaneous injection 55 Units 0.55 mL  55 Units Subcutaneous Daily With Breakfast    insulin lispro (HumALOG/ADMELOG) 100 units/mL subcutaneous injection 1-6 Units  1-6 Units Subcutaneous TID AC    insulin lispro (HumALOG/ADMELOG) 100 units/mL subcutaneous injection 1-6 Units  1-6 Units Subcutaneous HS    insulin lispro (HumALOG/ADMELOG) 100 units/mL subcutaneous injection 15 Units  15 Units Subcutaneous Daily With Breakfast    insulin lispro (HumALOG/ADMELOG) 100 units/mL subcutaneous injection 15 Units  15 Units Subcutaneous Daily With Dinner    insulin lispro (HumALOG/ADMELOG) 100 units/mL subcutaneous injection 15 Units  15 Units Subcutaneous Daily With Lunch    ipratropium (ATROVENT) 0.02 % inhalation solution 0.5 mg  0.5 mg Nebulization TID    lactulose (CHRONULAC) oral solution 20 g  20 g Oral Daily    levalbuterol (XOPENEX) inhalation solution 1.25 mg  1.25 mg Nebulization TID    levothyroxine tablet 112 mcg   "112 mcg Oral Daily    lisinopril (ZESTRIL) tablet 5 mg  5 mg Oral Daily    metroNIDAZOLE (FLAGYL) tablet 500 mg  500 mg Oral Q8H IRIS    nicotine (NICODERM CQ) 21 mg/24 hr TD 24 hr patch 21 mg  21 mg Transdermal Daily    ondansetron (ZOFRAN-ODT) dispersible tablet 4 mg  4 mg Oral Q12H PRN    oxyCODONE-acetaminophen (PERCOCET) 5-325 mg per tablet 1 tablet  1 tablet Oral Q6H PRN    pantoprazole (PROTONIX) EC tablet 40 mg  40 mg Oral BID AC    pravastatin (PRAVACHOL) tablet 40 mg  40 mg Oral Daily With Dinner    predniSONE tablet 10 mg  10 mg Oral Daily    sertraline (ZOLOFT) tablet 100 mg  100 mg Oral BID    sucralfate (CARAFATE) tablet 1 g  1 g Oral HS    sulfamethoxazole-trimethoprim (BACTRIM DS) 800-160 mg per tablet 1 tablet  1 tablet Oral Once per day on Monday Wednesday Friday    trimethobenzamide (TIGAN) IM injection 200 mg  200 mg Intramuscular Q6H PRN       VTE Pharmacologic Prophylaxis: Enoxaparin (Lovenox)  VTE Mechanical Prophylaxis: sequential compression device    Portions of the record may have been created with voice recognition software.  Occasional wrong word or \"sound a like\" substitutions may have occurred due to the inherent limitations of voice recognition software.  Read the chart carefully and recognize, using context, where substitutions have occurred.  ==  Cristina Leal MD  [facility]  Internal Medicine Residency PGY-3       "

## 2024-05-16 NOTE — INTERIM OP NOTE
Assessment:  Acute onset of severe hypokalemia secondary to diuretic use   Recent onset of acute congestive heart failure  Recent history of bronchitis  History of nicotine use and COPD/ emphysema on chronic home oxygen  Sleep apnea  Obesity  Hypothyroidism  Type 2 diabetes mellitus  Leukocytosis, diverticulitis  GERD     Plan:  Troponin x 3 normal  Replenish potassium and magnesium  Reduce Lasix to 40 mg daily for now.  Blood pressure control, start lisinopril at low dosage  Treat diverticulitis with Unasyn and metronidazole

## 2024-05-16 NOTE — PROGRESS NOTES
Patient received diabetes and insulin injection education at beside, patient returned demonstration and verbalized understanding.

## 2024-05-16 NOTE — DISCHARGE SUMMARY
"      INTERNAL MEDICINE RESIDENCY DISCHARGE SUMMARY     Chely Ruvalcaba   53 y.o. female  MRN: [id number]  Room/Bed: /-01     [facility] MED SURG 5   Encounter: [id number]    Active Problems:    Centrilobular emphysema (HCC)    Chronic bilateral low back pain    Cirrhosis (HCC)    Diastolic CHF (HCC)    Type 2 diabetes mellitus (HCC)    Stage 2 chronic kidney disease    Thrombocytopenia (HCC)    Leukocytosis    Gastroesophageal reflux disease    Benign hypertension    Hypothyroidism    Hypercholesterolemia    Cigarette nicotine dependence in remission    Anxiety    Depression    Diverticulitis      Centrilobular emphysema (HCC)  Assessment & Plan  Patient with history of centrilobular emphysema in the setting of prolonged nicotine dependence and use.  Follows with pulmonology in the outpatient setting, with last visit 5/7/2024.  At that visit, patient was instructed to have repeat PFTs performed and was transitioned over to all nebulized therapy.  Of note, patient was also admitted with pneumonia/pneumothorax/sepsis/questionable COPD exacerbation in March 2024 in [place] and has been taking a prolonged steroid taper as well as Bactrim prophylaxis.    CXR on admission showed clear lung fields with no acute disease. Pt endorses a dry cough and no longer as sputum like she did yesterday.     CT chest 5/15: \"Groundglass changes throughout the lungs with some areas of reticular prominence especially in the peripheral lung zones. Findings are new when compared to January 2023 and diagnostic considerations include development of smoking-related interstitial   lung disease such as desquamative interstitial pneumonitis or diffuse somewhat atypical pulmonary infectious/postinfectious process.\"    Plan  - FU with pulmonology within 6 weeks   - Continue home nebulizer regimen and 2L oxygen    * Hypokalemia-resolved as of 5/16/2024  Assessment & Plan  Initially presented with " "potassium of 2.4.  S/P 60 mg of oral repletion in the ED.  S/P additional IV 20mEq K+   S/P IV Mg sulfate 2g/50mL repletion     5/14 - received 180 meq KCl & 5/15 - received 80 meq KCl   K+: 4.2 this AM    Plan:  - FU with BMP in 1 wk     Chronic bilateral low back pain  Assessment & Plan  Pt endorses having chronic lower back pain. Physical exam revealed CVA tenderness.     Plan  - Percocet  mg prn for severe pain    Cirrhosis (HCC)  Assessment & Plan  Pt presents with cirrhosis secondary to NAFLD.     MRI 10/23: displaying hepatic cirrhosis with fatty deposition and splenomegaly which may indicate portal hypertension. No ascites, no leg swelling.    Per GI note on 5/6 \"abnormal ultrasound elastography revealing F4 fibrosis, nodular liver on imaging and thrombocytopenia. Likely etiology for cirrhosis is metabolic associated steatotic liver disease. Her MELD 3.0 based upon labs from March/April is 11\"     Labs from 2023 showed normal anti-smooth muscle antibody, ceruloplasmin, antimicrosomal antibody, Protime-INR,folate, B12, AFP. Labs also showed elevated alpha-1 antitrypsin and IgM. GI noted \"Blood test for rare causes of liver disease are negative thus far\"     CT abdomen/pelvis 5/15 showed splenomegaly and portal hypertension supporting cirrhosis.     Plan:  - Continue to f/u outpatient with GI    Diastolic CHF (HCC)  Assessment & Plan  Wt Readings from Last 3 Encounters:   05/16/24 117 kg (257 lb 3.2 oz)   05/07/24 112 kg (248 lb)   05/06/24 114 kg (252 lb)     Patient noted to have TTE 08/23: displayed EF 60%; mild L. Ventricular thickness, mild mitral and tricuspid regurgitation.    Patient states she was told about newly worsening CHF after her most recent hospital admission in [place] on April 11 2024. R heart cath performed during that hospitalization - results showed normal CO.     Plan:  - Continue home lisinopril 5 mg daily             Type 2 diabetes mellitus (HCC)  Assessment & Plan  On admission " glucose level: 265 & patient on home insulin regimen: Lantus 55, Lispro 15 TID w meals. A1c 7.8 (5/15)    Plan:  - Continue home insulin regimen Lantus 55, Lispro 15 TID w meals     Stage 2 chronic kidney disease  Assessment & Plan  Lab Results   Component Value Date    EGFR 62 05/16/2024    EGFR 60 05/15/2024    EGFR 62 05/14/2024    CREATININE 1.02 05/16/2024    CREATININE 1.05 05/15/2024    CREATININE 1.02 05/14/2024     Plan:   - Monitor outpatient      Thrombocytopenia (HCC)  Assessment & Plan  On admission patient noted to have platelet count of 97. Repeat Platelets = 102.     MRI 10/23: Showed signs of chronic splenomegaly which may indicate portal hypertension.    Hgb stable at 12.3    Plan:  - Monitor outpatient    Leukocytosis  Assessment & Plan  Patient found to have WBC of 13.27 on admission. Patient on prednisone taper from most recent hospital admission in [place] on April 11. Currently on 10mg prednisone PO daily.    Afebrile, CXR negative, clear lungs b/l, leukocytosis likely 2ndary to prednisone taper.    WBC 13.27 -> 12.90    Plan:  - Monitor at home for signs of infection    Gastroesophageal reflux disease  Assessment & Plan  Patient reports intermittent heartburn with meals. States she follows with GI outpatient.    Plan:  - Cont Pepcid 40mg  - Cont. Omeprazole 40mg  - FU with GI outpatient    Benign hypertension  Assessment & Plan  Patient with charted history of hypertension, with outpatient medications including Lasix 40 mg twice daily and Aldactone 50 mg daily.  BP on arrival slightly elevated at 142/66. Pt. Does not take Aldactone at home.    Plan  - Continue lasix 40 mg po qd while inpatient; if worsening hypoxia can resume home 40 mg po BID  - Continue 5mg lisinopril daily upon discharge     Hypothyroidism  Assessment & Plan  Patient noted to have aquired hypothyroidims with TSH of 4,270 and low free T4 in 2019. Started on levothyroxine at that time.    Plan:  - Cont levothyroxine tablet  "[facility].    Hypercholesterolemia  Assessment & Plan  Plan:  - Cont. 40 mg omeprazole upon discharge    Cigarette nicotine dependence in remission  Assessment & Plan  Patient states she quit smoking about 2 months ago. Previously smoker since 13 years old. Nicotine dependence being treated with Chantix, nicotine patch.    Plan:  - Cont. Nicotine patch  - Cont. Home Chantix     Depression  Assessment & Plan  Please refer to \"Anxiety\" problem for plan    Anxiety  Assessment & Plan  Patient with history of anxiety/depression, treated with Wellbutrin 150 mg daily and Zoloft 100 mg twice daily.  Also has a prescription written for Valium 5 mg daily as needed at bedtime.    Plan  -Continue home medications upon discharge    Vitamin D deficiency  Assessment & Plan  Plan:  - Continue ergocalciferol every 2 weeks        DETAILS OF HOSPITAL COURSE     53 y.o. female w PMH of CHF, COPD, Chronic LBP, obesity, type 2 DM, Hypothyroidism, presenting due to hypokalemia found on outpatient at-home bloodwork. Patient states she feels chronically ill, even after she was hospitalized for a month back in April 11th while in [place], visiting a friend. However, prompted to come in due to bloodwork results. States at that time she was found to have pneumonia, sepsis, and newly diagnosed CHF, and begun on current medication regimen at that time. She is on 3L 02 at home, follows with pulmonology outpatient, and has appt. schedule for sleep apnea eval end of this month. Endorses chronic lower back pain on percocet daily, intermittent left sided CP that worsens upon palpitation, denies any recent travel since returning from [place]. Denies fevers, chills, dizziness, palpations, abdominal pain, dysuria, change in bowel movements, numbness tingling.     ED Course:  Patient vitals at admission: afebrile, , rr 18 /66 97% on 3LO2. Platelets: 97; WBC: 13.2 on prednisone taper. In ED: K+: 2.4; Fasting glucose: 265 - Pt. Received 60mEq K+ Cl, " "IV magnesium sulfate 2g 25mL/hr.    5/14 - received 180 meq KCl & 5/15 - received 80 meq Kcl --> 5/16 K+: 4.2.     CT chest/abdomen/pelvis 5/15 showed \"Groundglass changes throughout the lungs with some areas of reticular prominence especially in the peripheral lung zones. Findings are new when compared to January 2023 and diagnostic considerations include development of smoking-related interstitial   lung disease such as desquamative interstitial pneumonitis or diffuse somewhat atypical pulmonary infectious/postinfectious process.\" FU with pulmonology is recommended within 6 weeks.    CT also showed mild acute uncomplicated sigmoid diverticulitis and Hepatic cirrhosis. Originally planned to treat with Flagyl and augmentin + clear liquid diet however, pt refused the clear liquid diet and stated that she did not have any abdominal pain. Pt will be discharged on PO augmentin + flagyl.     Pt seen and examined in the AM. No acute events overnight. Pt notes that she feels the same when compared to yesterday. She notes having some nausea in the AM and headache but notes that she always has a headache. She also notes having a sore throat today and a dry cough. She endorses feeling palpitations on occasion but none at the time. She also notes having episodes in the past as well as currently of urinary frequency, urgency and burning sensation with urination. She says she also experiences dizziness while walking. Pt denies congestion, rhinorrhea, chest pain, vomiting, diarrhea, constipation, hematuria, numbness or tingling. Remainder of ROS negative.     Follow Ups / Medication Changes:   Augmentin 875-125 mg BID + Flagyl 500 mg TID for 5 days following discharge   Restart Lisinopril 5mg daily upon discharge  Restart omeprazole 40 mg BID upon discharge   Restart simvastatin 20 mg daily upon discharge   FU with PCP in 1 week for TCM; Check BMP in 1 week  FU with puolmonology within 6 wks for repeat PFTs and follow-up chest CT " on a interval in accordance with pulmonologist recommendations   FU with GI for hepatic cirrhosis management  Sleep Study scheduled for 5/28     Physical Exam  Vitals reviewed.   Constitutional:       Appearance: She is obese.   HENT:      Head: Normocephalic and atraumatic.      Right Ear: External ear normal.      Left Ear: External ear normal.      Mouth/Throat:      Mouth: Mucous membranes are moist.   Eyes:      Conjunctiva/sclera: Conjunctivae normal.   Cardiovascular:      Rate and Rhythm: Normal rate and regular rhythm.      Pulses: Normal pulses.      Heart sounds: Normal heart sounds.   Pulmonary:      Breath sounds: Normal breath sounds.      Comments: Dyspnea on exertion  Abdominal:      General: Bowel sounds are normal. There is no distension.      Palpations: Abdomen is soft.      Tenderness: There is abdominal tenderness (Pt noted tenderness on R side in AM then denied in PM).   Musculoskeletal:         General: No swelling or tenderness.      Cervical back: No tenderness.   Skin:     General: Skin is warm.      Capillary Refill: Capillary refill takes less than 2 seconds.   Neurological:      General: No focal deficit present.      Mental Status: She is oriented to person, place, and time. She is lethargic.   Psychiatric:         Behavior: Behavior normal.           DISCHARGE INFORMATION     PCP at Discharge: Ama Araujo MD    Admitting Provider: Ama Araujo MD  Admission Date: 5/14/2024    Discharge Provider: Ama Araujo MD  Discharge Date: 5/16/24    Discharge Disposition: Home/Self Care  Discharge Condition: stable  Discharge with Lines: no    Discharge Diet: regular diet  Activity Restrictions: none  Test Results Pending at Discharge: None    Discharge Diagnoses:  Principal Problem (Resolved):    Hypokalemia  Active Problems:    Centrilobular emphysema (HCC)    Chronic bilateral low back pain    Cirrhosis (HCC)    Diastolic CHF (HCC)    Type 2 diabetes mellitus (HCC)    Stage 2  "chronic kidney disease    Thrombocytopenia (HCC)    Leukocytosis    Gastroesophageal reflux disease    Benign hypertension    Hypothyroidism    Hypercholesterolemia    Cigarette nicotine dependence in remission    Anxiety    Depression    Diverticulitis      Consulting Providers:      Diagnostic & Therapeutic Procedures Performed:  CT chest abdomen pelvis w contrast    Result Date: 5/16/2024  Impression: Groundglass changes throughout the lungs with some areas of reticular prominence especially in the peripheral lung zones. Findings are new when compared to January 2023 and diagnostic considerations include development of smoking-related interstitial lung disease such as desquamative interstitial pneumonitis or diffuse somewhat atypical pulmonary infectious/postinfectious process. Interval follow-up chest CT should be performed on a interval in accordance with pulmonologist recommendations. Mild acute uncomplicated sigmoid diverticulitis. Additionally, CT changes of chronic sigmoid diverticular disease. Hepatic cirrhosis. Portal hypertension as evidenced by splenomegaly. Borderline/mildly enlarged periportal and periceliac lymph nodes statistically most likely related to hepatic cirrhosis. Otherwise no significant lymphadenopathy. This examination was marked \"immediate notification\" in Epic in order to begin the standard process by which the radiology reading room liaison alerts the referring practitioner. Workstation performed: [id number]     XR chest 1 view portable    Result Date: 5/14/2024  Impression: No acute cardiopulmonary disease. Workstation performed: [id number]       Code Status: Level 1 - Full Code  Advance Directive & Living Will: <no information>  Power of :    POLST:      Medications:  Current Discharge Medication List        STOP taking these medications       fluticasone (FLONASE) 50 mcg/act nasal spray Comments:   Reason for Stopping:         Nutritional Supplements (Ensure Plus) LIQD " Comments:   Reason for Stopping:             Current Discharge Medication List        START taking these medications    Details   amoxicillin-clavulanate (AUGMENTIN) 875-125 mg per tablet Take 1 tablet by mouth every 12 (twelve) hours for 5 days  Qty: 9 tablet, Refills: 0    Associated Diagnoses: Diverticulitis      lisinopril (ZESTRIL) 5 mg tablet Take 1 tablet (5 mg total) by mouth daily  Qty: 30 tablet, Refills: 0    Associated Diagnoses: HTN (hypertension)      metroNIDAZOLE (FLAGYL) 500 mg tablet Take 1 tablet (500 mg total) by mouth every 8 (eight) hours for 5 days  Qty: 15 tablet, Refills: 0    Associated Diagnoses: Diverticulitis           Current Discharge Medication List        CONTINUE these medications which have NOT CHANGED    Details   albuterol (2.5 mg/3 mL) 0.083 % nebulizer solution Take 3 mL (2.5 mg total) by nebulization every 6 (six) hours as needed for wheezing or shortness of breath  Qty: 30 mL, Refills: 5    Associated Diagnoses: Viral URI with cough      budesonide (PULMICORT) 0.5 mg/2 mL nebulizer solution Take 0.5 mg by nebulization 2 (two) times a day Rinse mouth after use.      buPROPion (WELLBUTRIN XL) 150 mg 24 hr tablet Take 1 tablet (150 mg total) by mouth every morning  Qty: 90 tablet, Refills: 1    Associated Diagnoses: Recurrent major depressive disorder, in partial remission (HCC); Depression with anxiety      diazepam (VALIUM) 5 mg tablet Take 1 tablet (5 mg total) by mouth daily at bedtime as needed for anxiety  Qty: 30 tablet, Refills: 0    Associated Diagnoses: Anxiety      ergocalciferol (VITAMIN D2) 50,000 units Take 1 capsule (50,000 Units total) by mouth every 14 (fourteen) days  Qty: 12 capsule, Refills: 1    Associated Diagnoses: Vitamin D deficiency      famotidine (PEPCID) 40 MG tablet Take 1 tablet (40 mg total) by mouth daily at bedtime Take in evening 2 hours prior to bed  Qty: 90 tablet, Refills: 1    Associated Diagnoses: Duodenal ulcer      ferrous sulfate 324  (65 Fe) mg Take 1 tablet (324 mg total) by mouth daily before breakfast  Qty: 30 tablet, Refills: 3    Associated Diagnoses: Iron deficiency anemia secondary to inadequate dietary iron intake; Edema, lower extremity      folic acid (FOLVITE) 1 mg tablet Take 1 tablet (1 mg total) by mouth daily  Qty: 90 tablet, Refills: 1    Associated Diagnoses: Folic acid deficiency      furosemide (LASIX) 40 mg tablet Take 1 tablet (40 mg total) by mouth 2 (two) times a day  Qty: 180 tablet, Refills: 1    Associated Diagnoses: Acute diastolic congestive heart failure (HCC)      gabapentin (NEURONTIN) 100 mg capsule Take 100 mg by mouth every 8 (eight) hours  Refills: 0      Insulin Glargine Solostar (Basaglar KwikPen) 100 UNIT/ML SOPN Inject 0.55 mL (55 Units total) under the skin in the morning  Qty: 15 mL, Refills: 5    Associated Diagnoses: Diabetes mellitus type 2, insulin dependent (HCC)      insulin lispro (HumaLOG) 100 units/mL injection pen Inject 15 Units under the skin 3 (three) times a day with meals  Qty: 15 mL, Refills: 5    Associated Diagnoses: Diabetes mellitus type 2, insulin dependent (HCC)      Insulin Pen Needle 31G X 5 MM MISC Inject under the skin if needed (Insuline injections)  Qty: 100 each, Refills: 3    Associated Diagnoses: Diabetes mellitus type 2, insulin dependent (HCC)      ipratropium-albuterol (DUO-NEB) 0.5-2.5 mg/3 mL nebulizer solution Take 3 mL by nebulization 4 (four) times a day  Qty: 360 mL, Refills: 3    Associated Diagnoses: Centrilobular emphysema (HCC)      lactulose (CHRONULAC) 10 g/15 mL solution Take 20 g by mouth Daily or as needed for bowl movement      levothyroxine 112 mcg tablet Take 1 tablet (112 mcg total) by mouth daily  Qty: 90 tablet, Refills: 1    Associated Diagnoses: Acquired hypothyroidism      nicotine (NICODERM CQ) 21 mg/24 hr TD 24 hr patch Place 1 patch on the skin over 24 hours every 24 hours  Qty: 28 patch, Refills: 5    Associated Diagnoses: Tobacco abuse  disorder      omeprazole (PriLOSEC) 40 MG capsule take 1 capsule by mouth twice a day  Qty: 60 capsule, Refills: 5    Associated Diagnoses: Gastric ulcer with hemorrhage, unspecified chronicity      ondansetron (ZOFRAN-ODT) 4 mg disintegrating tablet Take 1 tablet (4 mg total) by mouth every 12 (twelve) hours as needed for nausea or vomiting  Qty: 60 tablet, Refills: 1    Associated Diagnoses: Nausea      oxyCODONE-acetaminophen (PERCOCET)  mg per tablet Take 1 tablet by mouth every 6 (six) hours as needed for severe pain      oxygen gas Inhale 2 L/min continuous. May use 3L with exertion per Bella BAHNP  Keep sat >88%  Indications: low saturation    Comments: patient adjusts to 3L as needed for SOB      predniSONE 10 mg tablet Take 10 mg by mouth daily      simvastatin (ZOCOR) 20 mg tablet Take 1 tablet (20 mg total) by mouth daily at bedtime  Qty: 90 tablet, Refills: 1    Associated Diagnoses: Hypercholesterolemia      sucralfate (CARAFATE) 1 g tablet Take 1 tablet (1 g total) by mouth daily at bedtime  Qty: 60 tablet, Refills: 3    Associated Diagnoses: Gastroesophageal reflux disease without esophagitis      sulfamethoxazole-trimethoprim (BACTRIM DS) 800-160 mg per tablet Take 1 tablet by mouth 3 (three) times a week for 28 days  Qty: 12 tablet, Refills: 0    Associated Diagnoses: Pneumonia of both upper lobes due to infectious organism      varenicline (CHANTIX) 0.5 mg tablet Take 1 tablet (0.5 mg total) by mouth 2 (two) times a day  Qty: 60 tablet, Refills: 0    Associated Diagnoses: Tobacco abuse disorder      albuterol (PROVENTIL HFA,VENTOLIN HFA) 90 mcg/act inhaler Inhale 2 puffs every 6 (six) hours as needed for wheezing  Qty: 18 g, Refills: 1    Comments: Substitution to a formulary equivalent within the same pharmaceutical class is authorized.  Associated Diagnoses: Viral URI with cough      sertraline (ZOLOFT) 100 mg tablet take 1 tablet by mouth twice a day  Qty: 180 tablet, Refills: 1     "Associated Diagnoses: Recurrent major depressive disorder, in partial remission (HCC)      spironolactone (ALDACTONE) 25 mg tablet Take 2 tablets (50 mg total) by mouth daily  Qty: 30 tablet, Refills: 5    Associated Diagnoses: Edema, lower extremity             Allergies:  Allergies   Allergen Reactions   • Hydrocodone-Acetaminophen Hives   • Ketorolac Hives and Dizziness   • Toradol [Ketorolac Tromethamine] Other (See Comments)     hypotension   • Ultram [Tramadol] Nausea Only, GI Intolerance and Vomiting       FOLLOW-UP     PCP Outpatient Follow-up:  Pt should FU with PCP in 1 wk for TCM    Consulting Providers Follow-up:  Pulmonology, GI      Active Issues Requiring Follow-up:   Interstitial lung disease, cirrhosis, diverticulosis    Discharge Statement:   I spent 30 minutes minutes discharging the patient. This time was spent on the day of discharge. I had direct contact with the patient on the day of discharge. Additional documentation is required if more than 30 minutes were spent on discharge.    Portions of the record may have been created with voice recognition software.  Occasional wrong word or \"sound a like\" substitutions may have occurred due to the inherent limitations of voice recognition software.  Read the chart carefully and recognize, using context, where substitutions have occurred.    ==  Clint Willingham  [facility]  Internal Medicine Resident PGY-***    "

## 2024-05-16 NOTE — INCIDENTAL FINDINGS
The following findings require follow up:  Radiographic finding    Finding:   Groundglass changes throughout the lungs with some areas of reticular prominence especially in the peripheral lung zones. Findings are new when compared to January 2023 and diagnostic considerations include development of smoking-related interstitial   lung disease such as desquamative interstitial pneumonitis or diffuse somewhat atypical pulmonary infectious/postinfectious process. Interval follow-up chest CT should be performed on a interval in accordance with pulmonologist recommendations.     Follow up required: Yes   Follow up should be done within 6 weeks with pulmonology     Please notify the following clinician to assist with the follow up:   ANTONIO Nam MD   PGY-3 Internal Medicine  [place] Resident

## 2024-05-17 VITALS
DIASTOLIC BLOOD PRESSURE: 71 MMHG | OXYGEN SATURATION: 90 % | BODY MASS INDEX: 40.19 KG/M2 | HEART RATE: 92 BPM | SYSTOLIC BLOOD PRESSURE: 135 MMHG | WEIGHT: 256.6 LBS | TEMPERATURE: 97.7 F | RESPIRATION RATE: 19 BRPM

## 2024-05-17 PROBLEM — R26.2 AMBULATORY DYSFUNCTION: Status: ACTIVE | Noted: 2024-05-17

## 2024-05-17 LAB
ANION GAP SERPL CALCULATED.3IONS-SCNC: 9 MMOL/L (ref 4–13)
BACTERIA UR CULT: ABNORMAL
BACTERIA UR CULT: ABNORMAL
BASOPHILS # BLD AUTO: 0.05 THOUSANDS/ÂΜL (ref 0–0.1)
BASOPHILS NFR BLD AUTO: 1 % (ref 0–1)
BUN SERPL-MCNC: 22 MG/DL (ref 5–25)
CALCIUM SERPL-MCNC: 9.4 MG/DL (ref 8.4–10.2)
CHLORIDE SERPL-SCNC: 106 MMOL/L (ref 96–108)
CO2 SERPL-SCNC: 25 MMOL/L (ref 21–32)
CREAT SERPL-MCNC: 1.01 MG/DL (ref 0.6–1.3)
EOSINOPHIL # BLD AUTO: 0.03 THOUSAND/ÂΜL (ref 0–0.61)
EOSINOPHIL NFR BLD AUTO: 0 % (ref 0–6)
ERYTHROCYTE [DISTWIDTH] IN BLOOD BY AUTOMATED COUNT: 17.6 % (ref 11.6–15.1)
GFR SERPL CREATININE-BSD FRML MDRD: 63 ML/MIN/1.73SQ M
GLUCOSE SERPL-MCNC: 199 MG/DL (ref 65–140)
GLUCOSE SERPL-MCNC: 203 MG/DL (ref 65–140)
GLUCOSE SERPL-MCNC: 204 MG/DL (ref 65–140)
HCT VFR BLD AUTO: 37.8 % (ref 34.8–46.1)
HGB BLD-MCNC: 11.4 G/DL (ref 11.5–15.4)
IMM GRANULOCYTES # BLD AUTO: 0.2 THOUSAND/UL (ref 0–0.2)
IMM GRANULOCYTES NFR BLD AUTO: 2 % (ref 0–2)
LYMPHOCYTES # BLD AUTO: 1.21 THOUSANDS/ÂΜL (ref 0.6–4.47)
LYMPHOCYTES NFR BLD AUTO: 13 % (ref 14–44)
MCH RBC QN AUTO: 29.5 PG (ref 26.8–34.3)
MCHC RBC AUTO-ENTMCNC: 30.2 G/DL (ref 31.4–37.4)
MCV RBC AUTO: 98 FL (ref 82–98)
MONOCYTES # BLD AUTO: 0.58 THOUSAND/ÂΜL (ref 0.17–1.22)
MONOCYTES NFR BLD AUTO: 6 % (ref 4–12)
NEUTROPHILS # BLD AUTO: 7.26 THOUSANDS/ÂΜL (ref 1.85–7.62)
NEUTS SEG NFR BLD AUTO: 78 % (ref 43–75)
NRBC BLD AUTO-RTO: 0 /100 WBCS
PLATELET # BLD AUTO: 85 THOUSANDS/UL (ref 149–390)
PMV BLD AUTO: 11.5 FL (ref 8.9–12.7)
POTASSIUM SERPL-SCNC: 5 MMOL/L (ref 3.5–5.3)
RBC # BLD AUTO: 3.87 MILLION/UL (ref 3.81–5.12)
SODIUM SERPL-SCNC: 140 MMOL/L (ref 135–147)
WBC # BLD AUTO: 9.33 THOUSAND/UL (ref 4.31–10.16)

## 2024-05-17 PROCEDURE — 82948 REAGENT STRIP/BLOOD GLUCOSE: CPT

## 2024-05-17 PROCEDURE — 94762 N-INVAS EAR/PLS OXIMTRY CONT: CPT

## 2024-05-17 PROCEDURE — 99238 HOSP IP/OBS DSCHRG MGMT 30/<: CPT | Performed by: INTERNAL MEDICINE

## 2024-05-17 PROCEDURE — 94664 DEMO&/EVAL PT USE INHALER: CPT

## 2024-05-17 PROCEDURE — 80048 BASIC METABOLIC PNL TOTAL CA: CPT

## 2024-05-17 PROCEDURE — 85025 COMPLETE CBC W/AUTO DIFF WBC: CPT

## 2024-05-17 PROCEDURE — 94760 N-INVAS EAR/PLS OXIMETRY 1: CPT

## 2024-05-17 PROCEDURE — 94640 AIRWAY INHALATION TREATMENT: CPT

## 2024-05-17 RX ORDER — INSULIN LISPRO 100 [IU]/ML
15 INJECTION, SOLUTION INTRAVENOUS; SUBCUTANEOUS
Qty: 18 ML | Refills: 5 | Status: SHIPPED | OUTPATIENT
Start: 2024-05-17 | End: 2024-05-21

## 2024-05-17 RX ORDER — BUPROPION HYDROCHLORIDE 300 MG/1
300 TABLET ORAL EVERY MORNING
Qty: 90 TABLET | Refills: 3 | Status: SHIPPED | OUTPATIENT
Start: 2024-05-17 | End: 2025-05-12

## 2024-05-17 RX ORDER — INSULIN LISPRO 100 [IU]/ML
18 INJECTION, SOLUTION INTRAVENOUS; SUBCUTANEOUS
Status: DISCONTINUED | OUTPATIENT
Start: 2024-05-17 | End: 2024-05-17 | Stop reason: HOSPADM

## 2024-05-17 RX ORDER — BUPROPION HYDROCHLORIDE 150 MG/1
300 TABLET ORAL EVERY MORNING
Qty: 90 TABLET | Refills: 1 | Status: SHIPPED | OUTPATIENT
Start: 2024-05-17 | End: 2024-05-17

## 2024-05-17 RX ADMIN — INSULIN LISPRO 2 UNITS: 100 INJECTION, SOLUTION INTRAVENOUS; SUBCUTANEOUS at 09:18

## 2024-05-17 RX ADMIN — BUDESONIDE 0.5 MG: 0.5 INHALANT ORAL at 07:14

## 2024-05-17 RX ADMIN — LEVALBUTEROL HYDROCHLORIDE 1.25 MG: 1.25 SOLUTION RESPIRATORY (INHALATION) at 07:14

## 2024-05-17 RX ADMIN — OXYCODONE HYDROCHLORIDE AND ACETAMINOPHEN 1 TABLET: 5; 325 TABLET ORAL at 00:45

## 2024-05-17 RX ADMIN — INSULIN LISPRO 18 UNITS: 100 INJECTION, SOLUTION INTRAVENOUS; SUBCUTANEOUS at 12:28

## 2024-05-17 RX ADMIN — PREDNISONE 10 MG: 10 TABLET ORAL at 09:16

## 2024-05-17 RX ADMIN — FOLIC ACID 1 MG: 1 TABLET ORAL at 09:16

## 2024-05-17 RX ADMIN — OXYCODONE HYDROCHLORIDE AND ACETAMINOPHEN 1 TABLET: 5; 325 TABLET ORAL at 09:23

## 2024-05-17 RX ADMIN — LISINOPRIL 5 MG: 5 TABLET ORAL at 09:16

## 2024-05-17 RX ADMIN — INSULIN GLARGINE 55 UNITS: 100 INJECTION, SOLUTION SUBCUTANEOUS at 09:17

## 2024-05-17 RX ADMIN — GABAPENTIN 100 MG: 100 CAPSULE ORAL at 00:41

## 2024-05-17 RX ADMIN — ENOXAPARIN SODIUM 40 MG: 40 INJECTION SUBCUTANEOUS at 09:16

## 2024-05-17 RX ADMIN — BUPROPION HYDROCHLORIDE 150 MG: 150 TABLET, EXTENDED RELEASE ORAL at 09:17

## 2024-05-17 RX ADMIN — SERTRALINE HYDROCHLORIDE 100 MG: 100 TABLET ORAL at 09:16

## 2024-05-17 RX ADMIN — FUROSEMIDE 40 MG: 40 TABLET ORAL at 09:16

## 2024-05-17 RX ADMIN — AMOXICILLIN AND CLAVULANATE POTASSIUM 1 TABLET: 875; 125 TABLET, FILM COATED ORAL at 09:17

## 2024-05-17 RX ADMIN — SULFAMETHOXAZOLE AND TRIMETHOPRIM 1 TABLET: 800; 160 TABLET ORAL at 09:17

## 2024-05-17 RX ADMIN — INSULIN LISPRO 2 UNITS: 100 INJECTION, SOLUTION INTRAVENOUS; SUBCUTANEOUS at 12:28

## 2024-05-17 RX ADMIN — PANTOPRAZOLE SODIUM 40 MG: 40 TABLET, DELAYED RELEASE ORAL at 05:13

## 2024-05-17 RX ADMIN — METRONIDAZOLE 500 MG: 500 TABLET ORAL at 05:13

## 2024-05-17 RX ADMIN — IPRATROPIUM BROMIDE 0.5 MG: 0.5 SOLUTION RESPIRATORY (INHALATION) at 07:14

## 2024-05-17 RX ADMIN — NICOTINE 21 MG: 21 PATCH, EXTENDED RELEASE TRANSDERMAL at 09:19

## 2024-05-17 RX ADMIN — FERROUS SULFATE TAB 325 MG (65 MG ELEMENTAL FE) 325 MG: 325 (65 FE) TAB at 09:16

## 2024-05-17 RX ADMIN — LEVOTHYROXINE SODIUM 112 MCG: 112 TABLET ORAL at 09:16

## 2024-05-17 RX ADMIN — GABAPENTIN 100 MG: 100 CAPSULE ORAL at 09:16

## 2024-05-17 NOTE — NURSING NOTE
Discharge instruction was provided to patient, patient verbalized understanding.   Patient family brought home portable oxygen tank.

## 2024-05-17 NOTE — PLAN OF CARE
Patient has intermittent desaturation on room air with mild SOB, and is tolerating chronic lower back pain.        Problem: CARDIOVASCULAR - ADULT  Goal: Maintains optimal cardiac output and hemodynamic stability  Description: INTERVENTIONS:  - Monitor I/O, vital signs and rhythm  - Monitor for S/S and trends of decreased cardiac output  - Administer and titrate ordered vasoactive medications to optimize hemodynamic stability  - Assess quality of pulses, skin color and temperature  - Assess for signs of decreased coronary artery perfusion  - Instruct patient to report change in severity of symptoms  Outcome: Progressing     Problem: RESPIRATORY - ADULT  Goal: Achieves optimal ventilation and oxygenation  Description: INTERVENTIONS:  - Assess for changes in respiratory status  - Assess for changes in mentation and behavior  - Position to facilitate oxygenation and minimize respiratory effort  - Oxygen administered by appropriate delivery if ordered  - Initiate smoking cessation education as indicated  - Encourage broncho-pulmonary hygiene including cough, deep breathe, Incentive Spirometry  - Assess the need for suctioning and aspirate as needed  - Assess and instruct to report SOB or any respiratory difficulty  - Respiratory Therapy support as indicated  Outcome: Progressing

## 2024-05-17 NOTE — CASE MANAGEMENT
Case Management Discharge Planning Note    Patient name Chely Ruvalcaba  Location /-01 MRN [id number]  : 1970 Date 2024       Current Admission Date: 2024  Current Admission Diagnosis:Diverticulitis   Patient Active Problem List    Diagnosis Date Noted Date Diagnosed    Diverticulitis 2024     Stage 2 chronic kidney disease 05/15/2024     Chronic bilateral low back pain 05/15/2024     Diastolic CHF (HCC) 2024     Thrombocytopenia (HCC) 2024     Leukocytosis 2024     Dyspnea 2024     Pulmonary nodules 2024     Enlarged lymph nodes, unspecified 2024     Bilateral pneumonia 2024     Acute diastolic congestive heart failure (HCC) 2024     Folic acid deficiency 2024     Acute respiratory failure with hypoxia (HCC) 2024     Injury of toe on left foot 2023     Cirrhosis (HCC) 2023     History of GI bleed 2023     History of colon polyps 2023     Duodenal ulcer 2023     Irritable bowel syndrome with both constipation and diarrhea 2023     Abnormal stress ECG with treadmill 2023     Iron deficiency anemia 2022     Gastroesophageal reflux disease 2022     Nephrolithiasis 2022     Centrilobular emphysema (HCC) 2022     COVID-19 vaccination refused 2022     Type 2 diabetes mellitus (HCC) 2021     Edema, lower extremity 2020     Gastroparesis 2018     Hypercholesterolemia 2018     Intermittent claudication (HCC) 2017     Cigarette nicotine dependence in remission 2017     Vitamin D deficiency 2016     Anxiety 2016     Benign hypertension 2016     Depression 2016     Hypothyroidism 2016     Seasonal allergic rhinitis 2016     Spinal stenosis of lumbar region 2012     Degeneration of lumbar or lumbosacral intervertebral disc 2010       LOS (days): 2  Geometric Mean LOS (GMLOS) (days):    Days to LOS:     OBJECTIVE:  Risk of Unplanned Readmission Score: 20.77         Current admission status: Inpatient   Preferred Pharmacy:   [id number] - [place], [place] - [address] [facility]  [address] [facility] [address]  Phone: [phone] Fax: [phone]    [facility] - [place], [place] - [address]  [address] [facility] [address]  Phone: [phone] Fax: [phone]    [facility] - [place], [place] - [address]  [address] [facility] [address]  Phone: [phone] Fax: [phone]    [facility] - [place], [place] - [address]  [facility] [facility]  [address]  [facility] [address]  Phone: [phone] Fax: [phone]    Primary Care Provider: Andi Mason DO    Primary Insurance: [employer] [facility]  Secondary Insurance:     DISCHARGE DETAILS:    Discharge planning discussed with:: Pt  Freedom of Choice: Yes  Comments - Freedom of Choice: Discussed FOC  CM contacted family/caregiver?: No- see comments (Pt alert and oriented)  Were Treatment Team discharge recommendations reviewed with patient/caregiver?: Yes  Did patient/caregiver verbalize understanding of patient care needs?: Yes  Were patient/caregiver advised of the risks associated with not following Treatment Team discharge recommendations?: Yes         Requested Home Health Care         Is the patient interested in HHC at discharge?: Yes  Home Health Discipline requested:: Nursing, Physical Therapy, Occupational Therapy  Home Health Agency Name:: [facility]  Home Health Follow-Up Provider:: PCP  Home Health Services Needed:: Evaluate Functional Status and Safety, Gait/ADL Training, Strengthening/Theraputic Exercises to Improve Function, Heart Failure Management, Other (comment) (Multiple comorbidities including emphysema, DM2, Diverticulitis, etc.)  Homebound Criteria Met:: Requires the Assistance of Another Person for Safe Ambulation or to Leave  the Home, Uses an Assist Device (i.e. cane, walker, etc)  Supporting Clincal Findings:: Limited Endurance, Requires Oxygen, Fatigues Easliy in Short Distances    DME Referral Provided  Referral made for DME?: No    Other Referral/Resources/Interventions Provided:  Interventions: [facility]  Referral Comments: Pt is established with [facility] and would like to resume SN, OT and PT upon d/c. CM sent referral via [employer]. Awaiting response.         Treatment Team Recommendation: Home with Home Health Care  Discharge Destination Plan:: Home with Home Health Care      **[facility] able to resume care. CM reserved services via [employer]. Reminded SOD team to put in ambulatory referral at d/c.

## 2024-05-17 NOTE — DISCHARGE INSTR - AVS FIRST PAGE
Please follow-up with your PCP within 7 to 10 days of discharge  Please continue the prescribed antibiotics  Please follow-up with the diabetic educator and nutritionist as requested  We have increased your insulin regimen slightly, please note in your AVS summary  It is very important that you follow-up with sleep medicine at your previously scheduled appointment as well as your previously scheduled gastroenterologist, endocrinologist and cardiologist appointments  I have provided the PT OT referral as requested

## 2024-05-18 PROBLEM — D72.829 LEUKOCYTOSIS: Status: RESOLVED | Noted: 2024-05-14 | Resolved: 2024-05-18

## 2024-05-18 RX ORDER — FUROSEMIDE 40 MG/1
40 TABLET ORAL DAILY
Qty: 180 TABLET | Refills: 1 | Status: SHIPPED | OUTPATIENT
Start: 2024-05-18

## 2024-05-18 NOTE — DISCHARGE SUMMARY
INTERNAL MEDICINE RESIDENCY DISCHARGE SUMMARY     Chely Ruvalcaba   53 y.o. female  MRN: [id number]  Room/Bed: /-[address] [place] [address]    Encounter: [id number]    Principal Problem:    Diverticulitis  Active Problems:    Type 2 diabetes mellitus (HCC)    Diastolic CHF (HCC)    Anxiety    Benign hypertension    Depression    Hypercholesterolemia    Hypothyroidism    Cigarette nicotine dependence in remission    Centrilobular emphysema (HCC)    Gastroesophageal reflux disease    Cirrhosis (HCC)    Thrombocytopenia (HCC)    Stage 2 chronic kidney disease    Ambulatory dysfunction      Hypokalemia-resolved as of 5/16/2024  Assessment & Plan  Initially presented with potassium of 2.4.  S/P 60 mg of oral repletion in the ED.  S/P additional IV 20mEq K+   S/P IV Mg sulfate 2g/50mL repletion     5/14 - received 180 meq KCl & 5/15 - received 80 meq KCl   K+: 4.2 this AM    Plan:  - FU with BMP in 1 wk     Type 2 diabetes mellitus (HCC)  Assessment & Plan  On admission glucose level: 265 & patient on home insulin regimen: Lantus 55, Lispro 15 TID w meals. A1c 7.8 (5/15)    Lab Results   Component Value Date    HGBA1C 7.8 (H) 05/15/2024     Recent Labs     05/15/24  2110 05/16/24  0806 05/16/24  1211   POCGLU 124 132 210*     Allegedly, patient runs as high as BG in 400s at home per ?PCP; recommend outpatient setup of CGM    Plan:  - Continue home insulin regimen Lantus 55, increased Lispro 18 TID w meals   - WM referral, nutrition counseling referrals in place  - follow-up endocrinoloy on DC  - discuss CGM with PCP    Diastolic CHF (HCC)  Assessment & Plan  Wt Readings from Last 3 Encounters:   05/16/24 117 kg (257 lb 3.2 oz)   05/07/24 112 kg (248 lb)   05/06/24 114 kg (252 lb)     Patient noted to have TTE 08/23: displayed EF 60%; mild L. Ventricular thickness, mild mitral and tricuspid regurgitation.    Patient states she was told about newly worsening CHF after her most  "recent hospital admission in [place] on April 11 2024. R heart cath performed during that hospitalization - results showed normal CO.     Plan:  - Continue home lisinopril 5 mg daily   - reduced lasix to 40mg qd; bmp 1 week; follow-up with PCP if CHF requires increased dose of lasix  - encourage daily oupt weights          Ambulatory dysfunction  Assessment & Plan  PT / OT referrals    Chronic bilateral low back pain  Assessment & Plan  Pt endorses having chronic lower back pain. Physical exam revealed CVA tenderness.     Plan  - Percocet  mg prn for severe pain    Stage 2 chronic kidney disease  Assessment & Plan  Lab Results   Component Value Date    EGFR 62 05/16/2024    EGFR 60 05/15/2024    EGFR 62 05/14/2024    CREATININE 1.02 05/16/2024    CREATININE 1.05 05/15/2024    CREATININE 1.02 05/14/2024     Plan:   - Monitor outpatient      Thrombocytopenia (HCC)  Assessment & Plan  On admission patient noted to have platelet count of 97. Repeat Platelets = 102.     MRI 10/23: Showed signs of chronic splenomegaly which may indicate portal hypertension.    Hgb stable at 12.3    Plan:  - Monitor outpatient    Cirrhosis (HCC)  Assessment & Plan  Pt presents with cirrhosis secondary to NAFLD. Currently compensated.     MRI 10/23: displaying hepatic cirrhosis with fatty deposition and splenomegaly which may indicate portal hypertension. No ascites, no leg swelling.    Per GI note on 5/6 \"abnormal ultrasound elastography revealing F4 fibrosis, nodular liver on imaging and thrombocytopenia. Likely etiology for cirrhosis is metabolic associated steatotic liver disease. Her MELD 3.0 based upon labs from March/April is 11\"     Labs from 2023 showed normal anti-smooth muscle antibody, ceruloplasmin, antimicrosomal antibody, Protime-INR,folate, B12, AFP. Labs also showed elevated alpha-1 antitrypsin and IgM. GI noted \"Blood test for rare causes of liver disease are negative thus far\"     CT abdomen/pelvis 5/15 showed " "splenomegaly and portal hypertension supporting cirrhosis.     MELD 3.0: 11 at 5/15/2024  4:57 AM  MELD-Na: 10 at 5/15/2024  4:57 AM  Calculated from:  Serum Creatinine: 1.05 mg/dL at 5/15/2024  4:57 AM  Serum Sodium: 143 mmol/L (Using max of 137 mmol/L) at 5/15/2024  4:57 AM  Total Bilirubin: 1.19 mg/dL at 5/15/2024  4:57 AM  Serum Albumin: 3.4 g/dL at 5/15/2024  4:57 AM  INR(ratio): 1.24 at 5/13/2024 10:55 AM  Age at listing (hypothetical): 53 years  Sex: Female at 5/15/2024  4:57 AM        Plan:  - Continue to f/u outpatient with GI  - No acute management while inpatient. Continue lasix to optimize fluid balance    Gastroesophageal reflux disease  Assessment & Plan  Patient reports intermittent heartburn with meals. States she follows with GI outpatient.    Plan:  - Cont Pepcid 40mg  - Cont. Omeprazole 40mg  - FU with GI outpatient    Centrilobular emphysema (HCC)  Assessment & Plan  Patient with history of centrilobular emphysema in the setting of prolonged nicotine dependence and use.  Follows with pulmonology in the outpatient setting, with last visit 5/7/2024.  At that visit, patient was instructed to have repeat PFTs performed and was transitioned over to all nebulized therapy.  Of note, patient was also admitted with pneumonia/pneumothorax/sepsis/questionable COPD exacerbation in March 2024 in [place] and has been taking a prolonged steroid taper as well as Bactrim prophylaxis.    CXR on admission showed clear lung fields with no acute disease. Pt endorses a dry cough and no longer as sputum like she did yesterday.     CT chest 5/15: \"Groundglass changes throughout the lungs with some areas of reticular prominence especially in the peripheral lung zones. Findings are new when compared to January 2023 and diagnostic considerations include development of smoking-related interstitial   lung disease such as desquamative interstitial pneumonitis or diffuse somewhat atypical pulmonary infectious/postinfectious " "process.\"    Plan  - FU with pulmonology within 6 weeks   - Continue home nebulizer regimen and 2L oxygen  - Treat GGO with azithromycin x 2 days and continue 40 daily lasix    Vitamin D deficiency  Assessment & Plan  Plan:  - Continue ergocalciferol every 2 weeks    Cigarette nicotine dependence in remission  Assessment & Plan  Patient states she quit smoking about 2 months ago. Previously smoker since 13 years old. Nicotine dependence being treated with Chantix, nicotine patch.    Plan:  - Cont. Nicotine patch  - Cont. Home Chantix     Hypothyroidism  Assessment & Plan  Patient noted to have aquired hypothyroidims with TSH of 4,270 and low free T4 in 2019. Started on levothyroxine at that time.    Plan:  - Cont levothyroxine tablet 112mcg.    Hypercholesterolemia  Assessment & Plan  Plan:  - Cont. Statin upon discharge    Depression  Assessment & Plan  Please refer to \"Anxiety\" problem for plan    Benign hypertension  Assessment & Plan  Patient with charted history of hypertension, with outpatient medications including Lasix 40 mg twice daily and Aldactone 50 mg daily.  BP on arrival slightly elevated at 142/66. Pt. Does not take Aldactone at home.    Plan  - Continue lasix 40 mg po qd   - Lisinopril 5mg qd    Anxiety  Assessment & Plan  Patient with history of anxiety/depression, treated with Wellbutrin 150 mg daily and Zoloft 100 mg twice daily.  Also has a prescription written for Valium 5 mg daily as needed at bedtime.    Plan  -Resume home medications upon discharge  Increase Wellbutrin 300mg qd    * Diverticulitis  Assessment & Plan  Intermittent abdominal pain this admission, mild-to-moderate. Has been ongoing x 1 month per patient.    CT abd 5/15: \"Mild acute uncomplicated sigmoid diverticulitis. Additionally, CT changes of chronic sigmoid diverticular disease. \"    Plan:  Start on Augmentin & metronidazole course x 5 days  Trialed clear diet but pt wants regular diet as she is pain-free  Medically stable " "for discharge    Leukocytosis-resolved as of 5/18/2024  Assessment & Plan  Stable. Secondary to steroid use. No fevers/SIRS.     Patient found to have WBC of 13.27 on admission. Patient on prednisone taper from most recent hospital admission in [place] on April 11. Currently on 10mg prednisone PO daily.    Afebrile, CXR negative, clear lungs b/l, leukocytosis likely 2ndary to prednisone taper.    Recent Labs     05/15/24  0457 05/16/24  0526   WBC 12.90* 11.11*         Plan:  - Monitor at home for signs of infection        DETAILS OF HOSPITAL COURSE     53 y.o. female w PMH of CHF, COPD, Chronic LBP, obesity, type 2 DM, Hypothyroidism, presenting due to hypokalemia found on outpatient at-home bloodwork. Patient states she feels chronically ill, even after she was hospitalized for a month back in April 11th while in [place], visiting a friend. However, prompted to come in due to bloodwork results. States at that time she was found to have pneumonia, sepsis, and newly diagnosed CHF, and begun on current medication regimen at that time. She is on 3L 02 at home, follows with pulmonology outpatient, and has appt. schedule for sleep apnea eval end of this month. Endorses chronic lower back pain on percocet daily, intermittent left sided CP that worsens upon palpitation, denies any recent travel since returning from [place]. Denies fevers, chills, dizziness, palpations, abdominal pain, dysuria, change in bowel movements, numbness tingling.     ED Course:  Patient vitals at admission: afebrile, , rr 18 /66 97% on 3LO2. Platelets: 97; WBC: 13.2 on prednisone taper. In ED: K+: 2.4; Fasting glucose: 265 - Pt. Received 60mEq K+ Cl, IV magnesium sulfate 2g 25mL/hr.     5/14 - received 180 meq KCl & 5/15 - received 80 meq Kcl --> 5/16 K+: 4.2.      CT chest/abdomen/pelvis 5/15 showed \"Groundglass changes throughout the lungs with some areas of reticular prominence especially in the peripheral lung zones. Findings are new " "when compared to January 2023 and diagnostic considerations include development of smoking-related interstitial   lung disease such as desquamative interstitial pneumonitis or diffuse somewhat atypical pulmonary infectious/postinfectious process.\" S/p Azithromycin 2d. FU with pulmonology is recommended within 6 weeks.     CT also showed mild acute uncomplicated sigmoid diverticulitis and Hepatic cirrhosis. Originally planned to treat with Flagyl and augmentin + clear liquid diet however, pt refused the clear liquid diet and stated that she did not have any abdominal pain. Pt will be discharged on PO augmentin + flagyl.      Pt seen and examined in the AM. No acute events overnight. Pt notes that she feels the same when compared to yesterday. She notes having some nausea in the AM and headache but notes that she always has a headache. She endorses feeling palpitations on occasion but none at the time. She says she also experiences dizziness while walking. Pt denies congestion, rhinorrhea, chest pain, vomiting, diarrhea, constipation, hematuria, numbness or tingling. Remainder of ROS negative.      Follow Ups / Medication Changes:   Augmentin 875-125 mg BID + Flagyl 500 mg TID for total 5 days following discharge   Lisinopril 5mg daily upon discharge  Reduced lasix from 40mg bid to 40mg qd; determine if need to increase dose further / outpt addition of Kdur  BMP 1 week  Wellbutrin increased to 300mg qd   FU with PCP in 1 week for TCM; Check BMP in 1 week  FU with puolmonology within 6 wks for repeat PFTs and follow-up chest CT on a interval in accordance with pulmonologist recommendations   FU with GI for hepatic cirrhosis management  Sleep Study scheduled for 5/28      Physical Exam  Vitals reviewed.   Constitutional:       Appearance: She is obese.   HENT:      Head: Normocephalic and atraumatic.      Right Ear: External ear normal.      Left Ear: External ear normal.      Mouth/Throat:      Mouth: Mucous membranes " are moist.   Eyes:      Conjunctiva/sclera: Conjunctivae normal.   Cardiovascular:      Rate and Rhythm: Normal rate and regular rhythm.      Pulses: Normal pulses.      Heart sounds: Normal heart sounds.   Pulmonary:      Breath sounds: Normal breath sounds.      Comments: Dyspnea on exertion  Abdominal:      General: Bowel sounds are normal. There is no distension.      Palpations: Abdomen is soft.      Tenderness: There is no abd tenderness  Musculoskeletal:         General: No swelling or tenderness.      Cervical back: No tenderness.   Skin:     General: Skin is warm.      Capillary Refill: Capillary refill takes less than 2 seconds.   Neurological:      General: No focal deficit present.      Mental Status: She is oriented to person, place, and time.   Psychiatric:         Behavior: Behavior normal.     DISCHARGE INFORMATION     PCP at Discharge:  Andi Mason DO    Admitting Provider: Ama Singh MD  Admission Date: 5/14/2024    Discharge Provider: AMA SINGH MD  Discharge Date: 5/17/2024    Discharge Disposition: Home with Home Health Care  Discharge Condition: stable  Discharge with Lines: no    Discharge Diet: diabetic diet  Activity Restrictions:  as tolerated  Test Results Pending at Discharge: none    Discharge Diagnoses:  Principal Problem:    Diverticulitis  Active Problems:    Type 2 diabetes mellitus (HCC)    Diastolic CHF (HCC)    Anxiety    Benign hypertension    Depression    Hypercholesterolemia    Hypothyroidism    Cigarette nicotine dependence in remission    Centrilobular emphysema (HCC)    Gastroesophageal reflux disease    Cirrhosis (HCC)    Thrombocytopenia (HCC)    Stage 2 chronic kidney disease    Ambulatory dysfunction  Resolved Problems:    Hypokalemia    Leukocytosis      Consulting Providers:      Diagnostic & Therapeutic Procedures Performed:  CT chest abdomen pelvis w contrast    Result Date: 5/16/2024  Impression: Groundglass changes throughout the lungs with some areas  "of reticular prominence especially in the peripheral lung zones. Findings are new when compared to January 2023 and diagnostic considerations include development of smoking-related interstitial lung disease such as desquamative interstitial pneumonitis or diffuse somewhat atypical pulmonary infectious/postinfectious process. Interval follow-up chest CT should be performed on a interval in accordance with pulmonologist recommendations. Mild acute uncomplicated sigmoid diverticulitis. Additionally, CT changes of chronic sigmoid diverticular disease. Hepatic cirrhosis. Portal hypertension as evidenced by splenomegaly. Borderline/mildly enlarged periportal and periceliac lymph nodes statistically most likely related to hepatic cirrhosis. Otherwise no significant lymphadenopathy. This examination was marked \"immediate notification\" in Epic in order to begin the standard process by which the radiology reading room liaison alerts the referring practitioner. Workstation performed: [id number]     XR chest 1 view portable    Result Date: 5/14/2024  Impression: No acute cardiopulmonary disease. Workstation performed: [id number]       Code Status: Prior  Advance Directive & Living Will: <no information>  Power of :    POLST:      Medications:  Discharge Medication List as of 5/17/2024 11:40 AM        STOP taking these medications       fluticasone (FLONASE) 50 mcg/act nasal spray Comments:   Reason for Stopping:         Nutritional Supplements (Ensure Plus) LIQD Comments:   Reason for Stopping:             Discharge Medication List as of 5/17/2024 11:40 AM        START taking these medications    Details   amoxicillin-clavulanate (AUGMENTIN) 875-125 mg per tablet Take 1 tablet by mouth every 12 (twelve) hours for 5 days, Starting Thu 5/16/2024, Until Tue 5/21/2024, Normal      lisinopril (ZESTRIL) 5 mg tablet Take 1 tablet (5 mg total) by mouth daily, Starting Fri 5/17/2024, Until Sun 6/16/2024, Normal      metroNIDAZOLE " (FLAGYL) 500 mg tablet Take 1 tablet (500 mg total) by mouth every 8 (eight) hours for 5 days, Starting Thu 5/16/2024, Until Tue 5/21/2024, Normal           Discharge Medication List as of 5/17/2024 11:40 AM        CONTINUE these medications which have NOT CHANGED    Details   albuterol (2.5 mg/3 mL) 0.083 % nebulizer solution Take 3 mL (2.5 mg total) by nebulization every 6 (six) hours as needed for wheezing or shortness of breath, Starting Thu 5/2/2024, Normal      albuterol (PROVENTIL HFA,VENTOLIN HFA) 90 mcg/act inhaler Inhale 2 puffs every 6 (six) hours as needed for wheezing, Starting Thu 5/2/2024, Normal      budesonide (PULMICORT) 0.5 mg/2 mL nebulizer solution Take 0.5 mg by nebulization 2 (two) times a day Rinse mouth after use., Historical Med      diazepam (VALIUM) 5 mg tablet Take 1 tablet (5 mg total) by mouth daily at bedtime as needed for anxiety, Starting Tue 4/30/2024, Normal      ergocalciferol (VITAMIN D2) 50,000 units Take 1 capsule (50,000 Units total) by mouth every 14 (fourteen) days, Starting Mon 7/17/2023, Normal      famotidine (PEPCID) 40 MG tablet Take 1 tablet (40 mg total) by mouth daily at bedtime Take in evening 2 hours prior to bed, Starting Mon 2/19/2024, Normal      ferrous sulfate 324 (65 Fe) mg Take 1 tablet (324 mg total) by mouth daily before breakfast, Starting Tue 5/7/2024, Normal      folic acid (FOLVITE) 1 mg tablet Take 1 tablet (1 mg total) by mouth daily, Starting Tue 4/30/2024, Normal      furosemide (LASIX) 40 mg tablet Take 1 tablet (40 mg total) by mouth 2 (two) times a day, Starting Tue 4/30/2024, Normal      gabapentin (NEURONTIN) 100 mg capsule Take 100 mg by mouth every 8 (eight) hours, Starting Fri 10/4/2019, Historical Med      Insulin Glargine Solostar (Basaglar KwikPen) 100 UNIT/ML SOPN Inject 0.55 mL (55 Units total) under the skin in the morning, Starting Mon 5/6/2024, Normal      Insulin Pen Needle 31G X 5 MM MISC Inject under the skin if needed (Insuline  injections), Starting Thu 5/2/2024, Normal      ipratropium-albuterol (DUO-NEB) 0.5-2.5 mg/3 mL nebulizer solution Take 3 mL by nebulization 4 (four) times a day, Starting Tue 5/7/2024, Normal      lactulose (CHRONULAC) 10 g/15 mL solution Take 20 g by mouth Daily or as needed for bowl movement, Historical Med      levothyroxine 112 mcg tablet Take 1 tablet (112 mcg total) by mouth daily, Starting Tue 4/30/2024, Normal      nicotine (NICODERM CQ) 21 mg/24 hr TD 24 hr patch Place 1 patch on the skin over 24 hours every 24 hours, Starting Tue 4/30/2024, Normal      omeprazole (PriLOSEC) 40 MG capsule take 1 capsule by mouth twice a day, Normal      ondansetron (ZOFRAN-ODT) 4 mg disintegrating tablet Take 1 tablet (4 mg total) by mouth every 12 (twelve) hours as needed for nausea or vomiting, Starting Mon 5/6/2024, Normal      oxyCODONE-acetaminophen (PERCOCET)  mg per tablet Take 1 tablet by mouth every 6 (six) hours as needed for severe pain, Starting Mon 6/27/2022, Historical Med      oxygen gas Inhale 2 L/min continuous. May use 3L with exertion per Bella ALVAREZ  Keep sat >88%  Indications: low saturation, Historical Med      predniSONE 10 mg tablet Take 10 mg by mouth daily, Historical Med      sertraline (ZOLOFT) 100 mg tablet take 1 tablet by mouth twice a day, Starting Tue 5/14/2024, Normal      simvastatin (ZOCOR) 20 mg tablet Take 1 tablet (20 mg total) by mouth daily at bedtime, Starting Wed 1/17/2024, Normal      spironolactone (ALDACTONE) 25 mg tablet Take 2 tablets (50 mg total) by mouth daily, Starting Thu 12/21/2023, Normal      sucralfate (CARAFATE) 1 g tablet Take 1 tablet (1 g total) by mouth daily at bedtime, Starting Mon 5/6/2024, Normal      sulfamethoxazole-trimethoprim (BACTRIM DS) 800-160 mg per tablet Take 1 tablet by mouth 3 (three) times a week for 28 days, Starting Wed 5/1/2024, Until Wed 5/29/2024, Normal      varenicline (CHANTIX) 0.5 mg tablet Take 1 tablet (0.5 mg total) by  "mouth 2 (two) times a day, Starting Wed 5/8/2024, Normal             Allergies:  Allergies   Allergen Reactions    Hydrocodone-Acetaminophen Hives    Ketorolac Hives and Dizziness    Toradol [Ketorolac Tromethamine] Other (See Comments)     hypotension    Ultram [Tramadol] Nausea Only, GI Intolerance and Vomiting       FOLLOW-UP     PCP Outpatient Follow-up:  yes  Andi Mason DO    Consulting Providers Follow-up:  Pulm    Active Issues Requiring Follow-up:   yes  Hypokalemia, CHF, RUTHANN, Cirrhosis    Discharge Statement:   I spent 30 minutes minutes discharging the patient. This time was spent on the day of discharge. I had direct contact with the patient on the day of discharge. Additional documentation is required if more than 30 minutes were spent on discharge.    Portions of the record may have been created with voice recognition software.  Occasional wrong word or \"sound a like\" substitutions may have occurred due to the inherent limitations of voice recognition software.  Read the chart carefully and recognize, using context, where substitutions have occurred.    ==  Maggi Walsh DO  [facility]  Internal Medicine, PGY-1  05/18/24 10:53 AM       "

## 2024-05-19 ENCOUNTER — HOME CARE VISIT (OUTPATIENT)
Dept: HOME HEALTH SERVICES | Facility: HOME HEALTHCARE | Age: 54
End: 2024-05-19
Payer: MEDICARE

## 2024-05-19 VITALS
SYSTOLIC BLOOD PRESSURE: 122 MMHG | TEMPERATURE: 98.3 F | HEART RATE: 92 BPM | RESPIRATION RATE: 20 BRPM | DIASTOLIC BLOOD PRESSURE: 62 MMHG | OXYGEN SATURATION: 93 %

## 2024-05-19 PROCEDURE — G0299 HHS/HOSPICE OF RN EA 15 MIN: HCPCS

## 2024-05-19 NOTE — CASE COMMUNICATION
[facility] has admitted your patient to Home Health service with the following disciplines:      SN, PT and OT  This report is informational only, no response is needed  Primary focus of home health care CP assess  Patient stated goals of care increase strength   Anticipated visit pattern and next visit date 2w3 next visit planned for 5/23 Patient needs BMP   See medication list - meds in home differ from AVS Patient has not yet pic ked up new scripts from pharmacy. Encouaged to do so ASAP. She stated she will go out today and get new meds.   Significant clinical findings see above, new scripts not yet in home.   Potential barriers to goal achievement comorbidities, compliance  Other pertinent information  NA  Thank you for allowing us to participate in the care of your patient.

## 2024-05-20 DIAGNOSIS — Z71.89 COMPLEX CARE COORDINATION: Primary | ICD-10-CM

## 2024-05-21 ENCOUNTER — HOSPITAL ENCOUNTER (INPATIENT)
Facility: HOSPITAL | Age: 54
LOS: 3 days | Discharge: HOME | DRG: 720 | End: 2024-05-24
Attending: EMERGENCY MEDICINE | Admitting: INTERNAL MEDICINE
Payer: MEDICARE

## 2024-05-21 ENCOUNTER — HOME CARE VISIT (OUTPATIENT)
Dept: HOME HEALTH SERVICES | Facility: HOME HEALTHCARE | Age: 54
End: 2024-05-21
Payer: MEDICARE

## 2024-05-21 ENCOUNTER — TELEPHONE (OUTPATIENT)
Dept: SLEEP CENTER | Facility: CLINIC | Age: 54
End: 2024-05-21

## 2024-05-21 ENCOUNTER — APPOINTMENT (EMERGENCY)
Dept: RADIOLOGY | Facility: HOSPITAL | Age: 54
DRG: 720 | End: 2024-05-21
Payer: MEDICARE

## 2024-05-21 ENCOUNTER — TELEPHONE (OUTPATIENT)
Age: 54
End: 2024-05-21

## 2024-05-21 ENCOUNTER — NURSE TRIAGE (OUTPATIENT)
Age: 54
End: 2024-05-21

## 2024-05-21 VITALS
DIASTOLIC BLOOD PRESSURE: 76 MMHG | OXYGEN SATURATION: 95 % | SYSTOLIC BLOOD PRESSURE: 136 MMHG | TEMPERATURE: 99.1 F | HEART RATE: 104 BPM

## 2024-05-21 DIAGNOSIS — J96.01 ACUTE RESPIRATORY FAILURE WITH HYPOXIA (HCC): ICD-10-CM

## 2024-05-21 DIAGNOSIS — J44.9 COPD (CHRONIC OBSTRUCTIVE PULMONARY DISEASE) (HCC): ICD-10-CM

## 2024-05-21 DIAGNOSIS — R11.0 NAUSEA: ICD-10-CM

## 2024-05-21 DIAGNOSIS — R26.2 AMBULATORY DYSFUNCTION: ICD-10-CM

## 2024-05-21 DIAGNOSIS — J18.9 PNEUMONIA: Primary | ICD-10-CM

## 2024-05-21 DIAGNOSIS — A41.9 SEVERE SEPSIS (HCC): ICD-10-CM

## 2024-05-21 DIAGNOSIS — R06.02 SHORTNESS OF BREATH: ICD-10-CM

## 2024-05-21 DIAGNOSIS — R65.20 SEVERE SEPSIS (HCC): ICD-10-CM

## 2024-05-21 LAB
ALBUMIN SERPL BCP-MCNC: 3.7 G/DL (ref 3.5–5)
ALP SERPL-CCNC: 70 U/L (ref 34–104)
ALT SERPL W P-5'-P-CCNC: 24 U/L (ref 7–52)
ANION GAP SERPL CALCULATED.3IONS-SCNC: 11 MMOL/L (ref 4–13)
APTT PPP: 34 SECONDS (ref 23–37)
AST SERPL W P-5'-P-CCNC: 31 U/L (ref 13–39)
ATRIAL RATE: 117 BPM
B PARAP IS1001 DNA NPH QL NAA+NON-PROBE: NOT DETECTED
B PERT.PT PRMT NPH QL NAA+NON-PROBE: NOT DETECTED
BASOPHILS # BLD AUTO: 0.09 THOUSANDS/ÂΜL (ref 0–0.1)
BASOPHILS NFR BLD AUTO: 1 % (ref 0–1)
BILIRUB SERPL-MCNC: 1.59 MG/DL (ref 0.2–1)
BILIRUB UR QL STRIP: NEGATIVE
BNP SERPL-MCNC: 19 PG/ML (ref 0–100)
BUN SERPL-MCNC: 16 MG/DL (ref 5–25)
C PNEUM DNA NPH QL NAA+NON-PROBE: NOT DETECTED
CALCIUM SERPL-MCNC: 9.2 MG/DL (ref 8.4–10.2)
CHLORIDE SERPL-SCNC: 102 MMOL/L (ref 96–108)
CLARITY UR: CLEAR
CO2 SERPL-SCNC: 25 MMOL/L (ref 21–32)
COLOR UR: NORMAL
CREAT SERPL-MCNC: 1.05 MG/DL (ref 0.6–1.3)
EOSINOPHIL # BLD AUTO: 0.13 THOUSAND/ÂΜL (ref 0–0.61)
EOSINOPHIL NFR BLD AUTO: 1 % (ref 0–6)
ERYTHROCYTE [DISTWIDTH] IN BLOOD BY AUTOMATED COUNT: 17.7 % (ref 11.6–15.1)
EXT PREGNANCY TEST URINE: NEGATIVE
EXT. CONTROL: NORMAL
FLUAV RNA NPH QL NAA+NON-PROBE: NOT DETECTED
FLUAV RNA RESP QL NAA+PROBE: NEGATIVE
FLUBV RNA NPH QL NAA+NON-PROBE: NOT DETECTED
FLUBV RNA RESP QL NAA+PROBE: NEGATIVE
GFR SERPL CREATININE-BSD FRML MDRD: 60 ML/MIN/1.73SQ M
GLUCOSE SERPL-MCNC: 181 MG/DL (ref 65–140)
GLUCOSE SERPL-MCNC: 185 MG/DL (ref 65–140)
GLUCOSE SERPL-MCNC: 196 MG/DL (ref 65–140)
GLUCOSE UR STRIP-MCNC: NEGATIVE MG/DL
HADV DNA NPH QL NAA+NON-PROBE: NOT DETECTED
HCOV 229E RNA NPH QL NAA+NON-PROBE: NOT DETECTED
HCOV HKU1 RNA NPH QL NAA+NON-PROBE: NOT DETECTED
HCOV NL63 RNA NPH QL NAA+NON-PROBE: NOT DETECTED
HCOV OC43 RNA NPH QL NAA+NON-PROBE: NOT DETECTED
HCT VFR BLD AUTO: 40.3 % (ref 34.8–46.1)
HGB BLD-MCNC: 12.5 G/DL (ref 11.5–15.4)
HGB UR QL STRIP.AUTO: NEGATIVE
HMPV RNA NPH QL NAA+NON-PROBE: NOT DETECTED
HPIV1 RNA NPH QL NAA+NON-PROBE: NOT DETECTED
HPIV2 RNA NPH QL NAA+NON-PROBE: NOT DETECTED
HPIV3 RNA NPH QL NAA+NON-PROBE: NOT DETECTED
HPIV4 RNA NPH QL NAA+NON-PROBE: NOT DETECTED
IMM GRANULOCYTES # BLD AUTO: 0.35 THOUSAND/UL (ref 0–0.2)
IMM GRANULOCYTES NFR BLD AUTO: 2 % (ref 0–2)
INR PPP: 1.31 (ref 0.84–1.19)
KETONES UR STRIP-MCNC: NEGATIVE MG/DL
L PNEUMO1 AG UR QL IA.RAPID: NEGATIVE
LACTATE SERPL-SCNC: 2.1 MMOL/L (ref 0.5–2)
LACTATE SERPL-SCNC: 2.5 MMOL/L (ref 0.5–2)
LEUKOCYTE ESTERASE UR QL STRIP: NEGATIVE
LIPASE SERPL-CCNC: 20 U/L (ref 11–82)
LYMPHOCYTES # BLD AUTO: 2 THOUSANDS/ÂΜL (ref 0.6–4.47)
LYMPHOCYTES NFR BLD AUTO: 13 % (ref 14–44)
M PNEUMO DNA NPH QL NAA+NON-PROBE: NOT DETECTED
MCH RBC QN AUTO: 29.9 PG (ref 26.8–34.3)
MCHC RBC AUTO-ENTMCNC: 31 G/DL (ref 31.4–37.4)
MCV RBC AUTO: 96 FL (ref 82–98)
MONOCYTES # BLD AUTO: 1.31 THOUSAND/ÂΜL (ref 0.17–1.22)
MONOCYTES NFR BLD AUTO: 8 % (ref 4–12)
NEUTROPHILS # BLD AUTO: 11.81 THOUSANDS/ÂΜL (ref 1.85–7.62)
NEUTS SEG NFR BLD AUTO: 75 % (ref 43–75)
NITRITE UR QL STRIP: NEGATIVE
NRBC BLD AUTO-RTO: 0 /100 WBCS
P AXIS: 65 DEGREES
PH UR STRIP.AUTO: 6.5 [PH]
PLATELET # BLD AUTO: 120 THOUSANDS/UL (ref 149–390)
PMV BLD AUTO: 11.3 FL (ref 8.9–12.7)
POTASSIUM SERPL-SCNC: 3.1 MMOL/L (ref 3.5–5.3)
PR INTERVAL: 150 MS
PROCALCITONIN SERPL-MCNC: 0.13 NG/ML
PROT SERPL-MCNC: 6.9 G/DL (ref 6.4–8.4)
PROT UR STRIP-MCNC: NEGATIVE MG/DL
PROTHROMBIN TIME: 16.1 SECONDS (ref 11.6–14.5)
QRS AXIS: 89 DEGREES
QRSD INTERVAL: 100 MS
QT INTERVAL: 350 MS
QTC INTERVAL: 488 MS
RBC # BLD AUTO: 4.18 MILLION/UL (ref 3.81–5.12)
RSV RNA NPH QL NAA+NON-PROBE: NOT DETECTED
RSV RNA RESP QL NAA+PROBE: NEGATIVE
RV+EV RNA NPH QL NAA+NON-PROBE: NOT DETECTED
S PNEUM AG UR QL: NEGATIVE
SARS-COV-2 RNA NPH QL NAA+NON-PROBE: NOT DETECTED
SARS-COV-2 RNA RESP QL NAA+PROBE: NEGATIVE
SODIUM SERPL-SCNC: 138 MMOL/L (ref 135–147)
SP GR UR STRIP.AUTO: 1.01 (ref 1–1.03)
T WAVE AXIS: 6 DEGREES
UROBILINOGEN UR STRIP-ACNC: <2 MG/DL
VENTRICULAR RATE: 117 BPM
WBC # BLD AUTO: 15.69 THOUSAND/UL (ref 4.31–10.16)

## 2024-05-21 PROCEDURE — 87449 NOS EACH ORGANISM AG IA: CPT | Performed by: STUDENT IN AN ORGANIZED HEALTH CARE EDUCATION/TRAINING PROGRAM

## 2024-05-21 PROCEDURE — 84145 PROCALCITONIN (PCT): CPT

## 2024-05-21 PROCEDURE — 74177 CT ABD & PELVIS W/CONTRAST: CPT

## 2024-05-21 PROCEDURE — 0202U NFCT DS 22 TRGT SARS-COV-2: CPT | Performed by: STUDENT IN AN ORGANIZED HEALTH CARE EDUCATION/TRAINING PROGRAM

## 2024-05-21 PROCEDURE — 85610 PROTHROMBIN TIME: CPT

## 2024-05-21 PROCEDURE — G0151 HHCP-SERV OF PT,EA 15 MIN: HCPCS

## 2024-05-21 PROCEDURE — 0241U HB NFCT DS VIR RESP RNA 4 TRGT: CPT

## 2024-05-21 PROCEDURE — NC001 PR NO CHARGE: Performed by: INTERNAL MEDICINE

## 2024-05-21 PROCEDURE — 83880 ASSAY OF NATRIURETIC PEPTIDE: CPT

## 2024-05-21 PROCEDURE — 81025 URINE PREGNANCY TEST: CPT

## 2024-05-21 PROCEDURE — 36415 COLL VENOUS BLD VENIPUNCTURE: CPT

## 2024-05-21 PROCEDURE — 96367 TX/PROPH/DG ADDL SEQ IV INF: CPT

## 2024-05-21 PROCEDURE — 99285 EMERGENCY DEPT VISIT HI MDM: CPT

## 2024-05-21 PROCEDURE — 85025 COMPLETE CBC W/AUTO DIFF WBC: CPT

## 2024-05-21 PROCEDURE — 83605 ASSAY OF LACTIC ACID: CPT

## 2024-05-21 PROCEDURE — 85730 THROMBOPLASTIN TIME PARTIAL: CPT

## 2024-05-21 PROCEDURE — 96365 THER/PROPH/DIAG IV INF INIT: CPT

## 2024-05-21 PROCEDURE — 87040 BLOOD CULTURE FOR BACTERIA: CPT

## 2024-05-21 PROCEDURE — 87081 CULTURE SCREEN ONLY: CPT

## 2024-05-21 PROCEDURE — 71275 CT ANGIOGRAPHY CHEST: CPT

## 2024-05-21 PROCEDURE — 99291 CRITICAL CARE FIRST HOUR: CPT | Performed by: EMERGENCY MEDICINE

## 2024-05-21 PROCEDURE — 94640 AIRWAY INHALATION TREATMENT: CPT

## 2024-05-21 PROCEDURE — 93010 ELECTROCARDIOGRAM REPORT: CPT | Performed by: INTERNAL MEDICINE

## 2024-05-21 PROCEDURE — 96376 TX/PRO/DX INJ SAME DRUG ADON: CPT

## 2024-05-21 PROCEDURE — 80053 COMPREHEN METABOLIC PANEL: CPT

## 2024-05-21 PROCEDURE — 94760 N-INVAS EAR/PLS OXIMETRY 1: CPT

## 2024-05-21 PROCEDURE — 96366 THER/PROPH/DIAG IV INF ADDON: CPT

## 2024-05-21 PROCEDURE — 81003 URINALYSIS AUTO W/O SCOPE: CPT

## 2024-05-21 PROCEDURE — 83690 ASSAY OF LIPASE: CPT

## 2024-05-21 PROCEDURE — 93005 ELECTROCARDIOGRAM TRACING: CPT

## 2024-05-21 PROCEDURE — 99223 1ST HOSP IP/OBS HIGH 75: CPT | Performed by: INTERNAL MEDICINE

## 2024-05-21 PROCEDURE — 96368 THER/DIAG CONCURRENT INF: CPT

## 2024-05-21 PROCEDURE — 82948 REAGENT STRIP/BLOOD GLUCOSE: CPT

## 2024-05-21 PROCEDURE — 94644 CONT INHLJ TX 1ST HOUR: CPT

## 2024-05-21 RX ORDER — LEVALBUTEROL INHALATION SOLUTION 1.25 MG/3ML
1.25 SOLUTION RESPIRATORY (INHALATION)
Status: DISCONTINUED | OUTPATIENT
Start: 2024-05-22 | End: 2024-05-24 | Stop reason: HOSPADM

## 2024-05-21 RX ORDER — ONDANSETRON 4 MG/1
4 TABLET, ORALLY DISINTEGRATING ORAL ONCE
Status: COMPLETED | OUTPATIENT
Start: 2024-05-21 | End: 2024-05-21

## 2024-05-21 RX ORDER — INSULIN LISPRO 100 [IU]/ML
1-6 INJECTION, SOLUTION INTRAVENOUS; SUBCUTANEOUS
Status: DISCONTINUED | OUTPATIENT
Start: 2024-05-21 | End: 2024-05-24 | Stop reason: HOSPADM

## 2024-05-21 RX ORDER — FOLIC ACID 1 MG/1
1 TABLET ORAL DAILY
Status: DISCONTINUED | OUTPATIENT
Start: 2024-05-22 | End: 2024-05-24 | Stop reason: HOSPADM

## 2024-05-21 RX ORDER — GABAPENTIN 100 MG/1
100 CAPSULE ORAL EVERY 8 HOURS
Status: DISCONTINUED | OUTPATIENT
Start: 2024-05-21 | End: 2024-05-24 | Stop reason: HOSPADM

## 2024-05-21 RX ORDER — BUDESONIDE 0.5 MG/2ML
0.5 INHALANT ORAL
Status: DISCONTINUED | OUTPATIENT
Start: 2024-05-22 | End: 2024-05-24 | Stop reason: HOSPADM

## 2024-05-21 RX ORDER — SERTRALINE HYDROCHLORIDE 100 MG/1
100 TABLET, FILM COATED ORAL
Status: DISCONTINUED | OUTPATIENT
Start: 2024-05-22 | End: 2024-05-24 | Stop reason: HOSPADM

## 2024-05-21 RX ORDER — SODIUM CHLORIDE, SODIUM GLUCONATE, SODIUM ACETATE, POTASSIUM CHLORIDE, MAGNESIUM CHLORIDE, SODIUM PHOSPHATE, DIBASIC, AND POTASSIUM PHOSPHATE .53; .5; .37; .037; .03; .012; .00082 G/100ML; G/100ML; G/100ML; G/100ML; G/100ML; G/100ML; G/100ML
1000 INJECTION, SOLUTION INTRAVENOUS ONCE
Status: COMPLETED | OUTPATIENT
Start: 2024-05-21 | End: 2024-05-21

## 2024-05-21 RX ORDER — OXYCODONE HYDROCHLORIDE 5 MG/1
5 TABLET ORAL EVERY 4 HOURS PRN
Status: DISCONTINUED | OUTPATIENT
Start: 2024-05-21 | End: 2024-05-24 | Stop reason: HOSPADM

## 2024-05-21 RX ORDER — PRAVASTATIN SODIUM 20 MG
40 TABLET ORAL
Status: DISCONTINUED | OUTPATIENT
Start: 2024-05-21 | End: 2024-05-24 | Stop reason: HOSPADM

## 2024-05-21 RX ORDER — POTASSIUM CHLORIDE 20 MEQ/1
40 TABLET, EXTENDED RELEASE ORAL ONCE
Status: COMPLETED | OUTPATIENT
Start: 2024-05-21 | End: 2024-05-21

## 2024-05-21 RX ORDER — SODIUM CHLORIDE, SODIUM GLUCONATE, SODIUM ACETATE, POTASSIUM CHLORIDE, MAGNESIUM CHLORIDE, SODIUM PHOSPHATE, DIBASIC, AND POTASSIUM PHOSPHATE .53; .5; .37; .037; .03; .012; .00082 G/100ML; G/100ML; G/100ML; G/100ML; G/100ML; G/100ML; G/100ML
1000 INJECTION, SOLUTION INTRAVENOUS ONCE
Status: COMPLETED | OUTPATIENT
Start: 2024-05-21 | End: 2024-05-22

## 2024-05-21 RX ORDER — LACTULOSE 10 G/15ML
20 SOLUTION ORAL DAILY PRN
Status: DISCONTINUED | OUTPATIENT
Start: 2024-05-21 | End: 2024-05-24 | Stop reason: HOSPADM

## 2024-05-21 RX ORDER — OXYCODONE HYDROCHLORIDE 10 MG/1
10 TABLET ORAL EVERY 4 HOURS PRN
Status: DISCONTINUED | OUTPATIENT
Start: 2024-05-21 | End: 2024-05-24 | Stop reason: HOSPADM

## 2024-05-21 RX ORDER — ONDANSETRON 4 MG/1
4 TABLET, ORALLY DISINTEGRATING ORAL EVERY 12 HOURS PRN
Status: DISCONTINUED | OUTPATIENT
Start: 2024-05-21 | End: 2024-05-23

## 2024-05-21 RX ORDER — BUPROPION HYDROCHLORIDE 150 MG/1
300 TABLET ORAL EVERY MORNING
Status: DISCONTINUED | OUTPATIENT
Start: 2024-05-22 | End: 2024-05-24 | Stop reason: HOSPADM

## 2024-05-21 RX ORDER — DIAZEPAM 5 MG/1
5 TABLET ORAL
Status: DISCONTINUED | OUTPATIENT
Start: 2024-05-21 | End: 2024-05-24 | Stop reason: HOSPADM

## 2024-05-21 RX ORDER — ALBUTEROL SULFATE 2.5 MG/3ML
2.5 SOLUTION RESPIRATORY (INHALATION) EVERY 6 HOURS PRN
Status: DISCONTINUED | OUTPATIENT
Start: 2024-05-21 | End: 2024-05-21

## 2024-05-21 RX ORDER — LEVOTHYROXINE SODIUM 112 UG/1
112 TABLET ORAL DAILY
Status: DISCONTINUED | OUTPATIENT
Start: 2024-05-22 | End: 2024-05-22

## 2024-05-21 RX ORDER — SODIUM CHLORIDE FOR INHALATION 0.9 %
12 VIAL, NEBULIZER (ML) INHALATION ONCE
Status: COMPLETED | OUTPATIENT
Start: 2024-05-21 | End: 2024-05-21

## 2024-05-21 RX ORDER — PANTOPRAZOLE SODIUM 40 MG/1
40 TABLET, DELAYED RELEASE ORAL
Status: DISCONTINUED | OUTPATIENT
Start: 2024-05-22 | End: 2024-05-24 | Stop reason: HOSPADM

## 2024-05-21 RX ORDER — ERGOCALCIFEROL 1.25 MG/1
50000 CAPSULE ORAL
Status: DISCONTINUED | OUTPATIENT
Start: 2024-05-21 | End: 2024-05-24 | Stop reason: HOSPADM

## 2024-05-21 RX ORDER — BUDESONIDE 0.5 MG/2ML
0.5 INHALANT ORAL 2 TIMES DAILY
Status: DISCONTINUED | OUTPATIENT
Start: 2024-05-21 | End: 2024-05-21

## 2024-05-21 RX ORDER — FAMOTIDINE 20 MG/1
40 TABLET, FILM COATED ORAL
Status: DISCONTINUED | OUTPATIENT
Start: 2024-05-21 | End: 2024-05-24 | Stop reason: HOSPADM

## 2024-05-21 RX ORDER — ENOXAPARIN SODIUM 100 MG/ML
40 INJECTION SUBCUTANEOUS DAILY
Status: DISCONTINUED | OUTPATIENT
Start: 2024-05-22 | End: 2024-05-24 | Stop reason: HOSPADM

## 2024-05-21 RX ORDER — ALBUTEROL SULFATE 90 UG/1
2 AEROSOL, METERED RESPIRATORY (INHALATION) EVERY 4 HOURS PRN
Status: DISCONTINUED | OUTPATIENT
Start: 2024-05-21 | End: 2024-05-24 | Stop reason: HOSPADM

## 2024-05-21 RX ORDER — IPRATROPIUM BROMIDE AND ALBUTEROL SULFATE 2.5; .5 MG/3ML; MG/3ML
3 SOLUTION RESPIRATORY (INHALATION) 4 TIMES DAILY
Status: DISCONTINUED | OUTPATIENT
Start: 2024-05-21 | End: 2024-05-21

## 2024-05-21 RX ORDER — INSULIN LISPRO 100 [IU]/ML
1-5 INJECTION, SOLUTION INTRAVENOUS; SUBCUTANEOUS
Status: DISCONTINUED | OUTPATIENT
Start: 2024-05-21 | End: 2024-05-24 | Stop reason: HOSPADM

## 2024-05-21 RX ADMIN — SODIUM CHLORIDE, SODIUM GLUCONATE, SODIUM ACETATE, POTASSIUM CHLORIDE, MAGNESIUM CHLORIDE, SODIUM PHOSPHATE, DIBASIC, AND POTASSIUM PHOSPHATE 1000 ML: .53; .5; .37; .037; .03; .012; .00082 INJECTION, SOLUTION INTRAVENOUS at 16:30

## 2024-05-21 RX ADMIN — IOHEXOL 100 ML: 350 INJECTION, SOLUTION INTRAVENOUS at 14:57

## 2024-05-21 RX ADMIN — CEFEPIME 2000 MG: 2 INJECTION, POWDER, FOR SOLUTION INTRAVENOUS at 13:29

## 2024-05-21 RX ADMIN — ALBUTEROL SULFATE 10 MG: 2.5 SOLUTION RESPIRATORY (INHALATION) at 11:58

## 2024-05-21 RX ADMIN — FAMOTIDINE 40 MG: 20 TABLET, FILM COATED ORAL at 21:30

## 2024-05-21 RX ADMIN — ISODIUM CHLORIDE 12 ML: 0.03 SOLUTION RESPIRATORY (INHALATION) at 11:58

## 2024-05-21 RX ADMIN — POTASSIUM CHLORIDE 40 MEQ: 1500 TABLET, EXTENDED RELEASE ORAL at 17:45

## 2024-05-21 RX ADMIN — GABAPENTIN 100 MG: 100 CAPSULE ORAL at 20:22

## 2024-05-21 RX ADMIN — POTASSIUM CHLORIDE 40 MEQ: 1500 TABLET, EXTENDED RELEASE ORAL at 13:29

## 2024-05-21 RX ADMIN — INSULIN LISPRO 1 UNITS: 100 INJECTION, SOLUTION INTRAVENOUS; SUBCUTANEOUS at 21:31

## 2024-05-21 RX ADMIN — IPRATROPIUM BROMIDE AND ALBUTEROL SULFATE 3 ML: 2.5; .5 SOLUTION RESPIRATORY (INHALATION) at 21:27

## 2024-05-21 RX ADMIN — ONDANSETRON 4 MG: 4 TABLET, ORALLY DISINTEGRATING ORAL at 12:02

## 2024-05-21 RX ADMIN — IPRATROPIUM BROMIDE 1 MG: 0.5 SOLUTION RESPIRATORY (INHALATION) at 11:58

## 2024-05-21 RX ADMIN — CEFTRIAXONE 1000 MG: 1 INJECTION, POWDER, FOR SOLUTION INTRAMUSCULAR; INTRAVENOUS at 17:45

## 2024-05-21 RX ADMIN — PRAVASTATIN SODIUM 40 MG: 40 TABLET ORAL at 20:22

## 2024-05-21 RX ADMIN — BUDESONIDE 0.5 MG: 0.5 INHALANT RESPIRATORY (INHALATION) at 21:27

## 2024-05-21 RX ADMIN — INSULIN LISPRO 2 UNITS: 100 INJECTION, SOLUTION INTRAVENOUS; SUBCUTANEOUS at 17:52

## 2024-05-21 RX ADMIN — VANCOMYCIN HYDROCHLORIDE 1750 MG: 1 INJECTION, POWDER, LYOPHILIZED, FOR SOLUTION INTRAVENOUS at 14:34

## 2024-05-21 RX ADMIN — SODIUM CHLORIDE, SODIUM GLUCONATE, SODIUM ACETATE, POTASSIUM CHLORIDE, MAGNESIUM CHLORIDE, SODIUM PHOSPHATE, DIBASIC, AND POTASSIUM PHOSPHATE 1000 ML: .53; .5; .37; .037; .03; .012; .00082 INJECTION, SOLUTION INTRAVENOUS at 15:28

## 2024-05-21 RX ADMIN — OXYCODONE HYDROCHLORIDE 10 MG: 10 TABLET ORAL at 18:22

## 2024-05-21 NOTE — SEPSIS NOTE
"  Sepsis Note   Chely Ruvalcaba 53 y.o. female MRN: [id number]  Unit/Bed#: ED 02 Encounter: [id number]       Initial Sepsis Screening       Row Name 05/21/24 1331                Is the patient's history suggestive of a new or worsening infection? Yes (Proceed)  -SK        Suspected source of infection pneumonia  -SK        Indicate SIRS criteria Tachycardia > 90 bpm;Tachypnea > 20 resp per min;Leukocytosis (WBC > 86661 IJL) OR Leukopenia (WBC <4000 IJL) OR Bandemia (WBC >10% bands)  -SK        Are two or more of the above signs & symptoms of infection both present and new to the patient? Yes (Proceed)  -SK        Assess for evidence of organ dysfunction: Are any of the below criteria present within 6 hours of suspected infection and SIRS criteria that are NOT considered to be chronic conditions? Lactate > 2.0  -SK        Date of presentation of severe sepsis 05/21/24  -SK        Time of presentation of severe sepsis 1300  -SK        Sepsis Note: Click \"NEXT\" below (NOT \"close\") to generate sepsis note based on above information. YES (proceed by clicking \"NEXT\")  -SK                  User Key  (r) = Recorded By, (t) = Taken By, (c) = Cosigned By      Initials Name Provider Type    SK Ion Dobbins MD Resident                        There is no height or weight on file to calculate BMI.  Wt Readings from Last 1 Encounters:   05/17/24 116 kg (256 lb 9.6 oz)        Ideal body weight: 61.6 kg (135 lb 12.9 oz)  Adjusted ideal body weight: 83.5 kg (184 lb 2 oz)    "

## 2024-05-21 NOTE — H&P
INTERNAL MEDICINE RESIDENCY ADMISSION H&P     Name: Chely Ruvalcaba   Age & Sex: 53 y.o. female   MRN: [id number]  Unit/Bed#: ED 02   Encounter: [id number]  Primary Care Provider: Andi Mason DO    Code Status: Level 1 - Full Code  Admission Status: INPATIENT   Disposition: Patient requires Med/Surg with inpatient needs for care in setting of severe sepsis, possible pulmonary source    Admit to team: SOD Team C     ASSESSMENT/PLAN     Active Problems:    Severe sepsis (HCC)    Bilateral pneumonia    Hypokalemia    Thrombocytopenia (HCC)      Severe sepsis (HCC)  Assessment & Plan  Patient presenting with leukocytosis (15.69), tachycardia (124), tachypnea (24), lactic acidosis 92.1 > 2.5) without hypotension. CTA chest PE study found no PE, with diffuse ground glass attenuation bilaterally progressed since 5/15/24 with differential including pulmonary edema and widespread pneumonia. Given dose of cefepime 2g, vancomycin 1.75g, and 2L bolus in ED. Consider pulmonary cause due to shortness of breath and findings on chest imaging. UA unremarkable.    Plan:  Monitor vitals for fevers, hypotension  Trend WBC, procal  Ceftriaxone 1g q24h  Follow up blood cultures, MRSA culture  Strep, Legionella antigen   Sputum culture and Gram stain  Respiratory panel 2    Bilateral pneumonia  Assessment & Plan  Per CXR in [place] 3/6/2024: Diffuse interstitial and airspace infiltrates. Severe edema versus severe atypical pneumonia.    Per CT Chest in [place] 3/7/2024: Alveolar opacities identified bilaterally involving upper and lower lobes. Several areas of sparing identified with normal lung parenchyma. ... IMPRESSION: 1. Findings most compatible with pulmonary edema with groundglass opacification bilaterally with slight sparing of the lung apices. Differential diagnosis includes atypical pneumonia or much less likely hemorrhage.     During hospital stay in [place], patient received extensive workup of atypical pneumonia. Found slightly  "positive CINDY, other autoimmune workup negative. During hospital stay received pulse dose of steroids and was transitioned to prednisone. Discharged with prednisone taper Bactrim PJP prophylaxis until lowest dose of the taper. Patient follows with outpatient Pulmonology, last seen in March.    Per PE study 5/21/2024: Diffuse groundglass attenuation throughout both lungs, progressed since   5/15/2024. Differential diagnosis includes pulmonary edema (including from noncardiogenic etiologies) and widespread pneumonia, including viral pneumonia. Differential diagnosis   includes several less common entities, including drug related acute interstitial pneumonia and hypersensitivity pneumonitis.     Plan:  Ceftriaxone 1g q24h  Follow up blood cultures, sputum culture and gram stain  Consider steroids once cultures come back if negative      Thrombocytopenia (HCC)  Assessment & Plan  On admission patient noted to have platelet count of 120. Increased from her recent admission values ranging from .    Hgb stable at 12.5    Plan:  - Monitor CBC    Hypokalemia  Assessment & Plan  Initially presented with potassium of 3.1. S/P 80 mg of oral K Cl repletion in the ED.    Plan:  Follow up BMP  Two doses of 40 mg oral repletion K Cl tomorrow          VTE Pharmacologic Prophylaxis: Enoxaparin (Lovenox)  VTE Mechanical Prophylaxis: sequential compression device    CHIEF COMPLAINT     Chief Complaint   Patient presents with    Shortness of Breath     Reports it has been harder to breathe today. C/o dizziness, \"heart is racing and pulse is dropping\"  Chronically on 2-3 LNC but now is on 4 LNC satting @ 90%      HISTORY OF PRESENT ILLNESS     53 year old female with PMH of COPD, diastolic CHF, T2DM, obesity, hypothyroidism, GERD, cirrhosis secondary to NAFLD and recent hospital admission on 5/14 due to hypokalemia and COPD exacerbation presenting due to low oxygen sat at home while ambulating. Patient notes feeling continued " shortness of breath with baseline 3L at home with fatigue. She describes an episode of room-spinning dizziness with a elevated heart rate when ambulating, with a low reading of 79 for O2 sat. Patient states she feels overall unchanged since recent discharge in regards to her symptoms of shortness of breath, intermittent chest pain, and chronic back pain on daily percocet. She describes another recent [place] hospitalization from March to April for about a month. The patient endorses some nausea and recent decreased PO intake. She denies any recent travel or sick contacts, and denies any cough or changes in sputum. She denies any fevers, chills, vomiting, of change in bowel movements.    ED Course: patient vitals at admission: afebrile, tachycardic to 124, tachypneic to 24  In ED: WBC 15.69, K of 3.1, Tbili 1.59, Lactic acid 2.1 > 2.5. procal 0.13. BNP 19. Flu/COVID/RSV negative.   Received 2g cefepime, 1.75g vancomycin, and 2L IV fluids. Received 80mEq K+ Cl. CT chest PE found no PE with diffuse ground glass attenuation throughout both lungs.    Patient was admitted to [facility] for management of severe sepsis in setting of possible COPD exacerbation/multifocal pneumonia    Patient confirmed Code status level 1.    REVIEW OF SYSTEMS     Review of Systems   Constitutional:  Positive for appetite change and fatigue. Negative for chills and fever.        Decreased PO intake   HENT:  Negative for rhinorrhea.    Respiratory:  Positive for shortness of breath. Negative for cough.    Cardiovascular:  Negative for chest pain.   Gastrointestinal:  Positive for abdominal distention and nausea. Negative for abdominal pain, constipation, diarrhea and vomiting.   Genitourinary:  Negative for difficulty urinating.   Musculoskeletal:  Positive for back pain.   Skin:  Negative for wound.   Neurological:  Negative for dizziness and light-headedness.   Psychiatric/Behavioral:  Negative for confusion.      OBJECTIVE     Vitals:    05/21/24  1515 24 1530 24 1545 24 1600   BP: 103/54 107/54 108/50 120/51   BP Location: Left arm Left arm Left arm Left arm   Pulse: (!) 116 (!) 112 (!) 112 (!) 108   Resp:    Temp:       TempSrc:       SpO2: 96% 94% 95% 96%   Weight:       Height:          Temperature:   Temp (24hrs), Av.8 °F (37.1 °C), Min:98.4 °F (36.9 °C), Max:99.1 °F (37.3 °C)    Temperature: 98.4 °F (36.9 °C)  Intake & Output:  I/O          0701   07 0701   07 07 07    IV Piggyback   50    Total Intake(mL/kg)   50 (0.4)    Net   +50                 Weights:   IBW (Ideal Body Weight): 61.6 kg    Body mass index is 39.94 kg/m².  Weight (last 2 days)       Date/Time Weight    24 1333 116 (255)          Physical Exam  Vitals reviewed.   Constitutional:       Appearance: She is obese.   HENT:      Head: Normocephalic and atraumatic.      Right Ear: External ear normal.      Left Ear: External ear normal.      Nose: Nose normal. No congestion.   Eyes:      General:         Right eye: No discharge.         Left eye: No discharge.   Cardiovascular:      Rate and Rhythm: Regular rhythm. Tachycardia present.      Heart sounds: Normal heart sounds.   Pulmonary:      Effort: Pulmonary effort is normal.      Breath sounds: Normal breath sounds. No wheezing.   Abdominal:      General: Abdomen is flat.      Palpations: Abdomen is soft.      Tenderness: There is no abdominal tenderness.   Musculoskeletal:      Right lower leg: No edema.      Left lower leg: No edema.   Skin:     General: Skin is warm and dry.   Neurological:      General: No focal deficit present.      Mental Status: She is alert and oriented to person, place, and time.   Psychiatric:         Mood and Affect: Mood normal.         Behavior: Behavior normal.       PAST MEDICAL HISTORY     Past Medical History:   Diagnosis Date    Abnormal stress test     Acute cystitis without hematuria 2018    Acute duodenal ulcer  with bleeding 01/16/2023    Allergic sinusitis     last assessed - 03Apr2017    Anemia Around december    Anxiety     last assessed - 04Mar2016    Arthritis     Asthma     last assessed - 04Mar2016    Bilateral lower extremity edema     last assessed - 12May2017    Callus of foot     last assessed - 22Jun2016    Chest pain     Chronic GERD     last assessed - 16Jan2017    Colon polyp     Constipation     Resolved - 13Jan2017    COPD (chronic obstructive pulmonary disease) ([facility])     Depression     last assessed - 04Mar2016    Disease of thyroid gland     Diverticulitis of colon     last assessed - 04Mar2016    Dyspepsia     Resolved - [id number]    Edema, lower extremity 08/07/2020    Esophageal reflux     last assessed - 04Mar2016    Essential hypertriglyceridemia     last assessed - [id number]    Fatty liver 01/16/2023    Gastroesophageal reflux disease with esophagitis 11/18/2016    GERD (gastroesophageal reflux disease)     Gynecological disorder     last assessed - 04Mar2016    Hydroureteronephrosis 06/30/2022    Hypertension     last assessed - 04Mar2016    Hypokalemia 06/20/2022    Hypotension     last assessed - 25Aug2016    Kidney stone     Left ureteral stone with hydronephrosis 07/21/2022    Migraine     Morbid obesity (HCC) 01/11/2019    Formatting of this note might be different from the original. Added automatically from request for surgery [id number]    Neuropathy     Obesity     Other chest pain 11/08/2018    Pancreatic lesion 03/13/2023    Positive depression screening 06/19/2022    Primary hypertriglyceridemia 06/19/2022    Pulmonary emphysema (HCC)     last assessed - 04Mar2016    Seasonal allergies     Severe obesity (HCC) 01/16/2023    Skin tag 07/17/2023    SOB (shortness of breath)     Strain of groin 07/17/2023    Urinary frequency     last assessed - 22Jun2016    Vagina, candidiasis     last assessed - 22Jun2016    Very heavy cigarette smoker 01/16/2023    Visual impairment      PAST SURGICAL  HISTORY     Past Surgical History:   Procedure Laterality Date    CARPAL TUNNEL RELEASE      last assessed - 04MAr2016    CHOLECYSTECTOMY      last assessed - 04Mar2016    COLONOSCOPY      Complete colonoscopy     EYE SURGERY      last assessed - 04Mar2016    FL RETROGRADE PYELOGRAM  6/16/2022    FL RETROGRADE PYELOGRAM  8/16/2022    FOOT SURGERY      last assessed - 04Mar2016    VT CYSTO BLADDER W/URETERAL CATHETERIZATION Left 7/21/2022    Procedure: CYSTOSCOPY RETROGRADE PYELOGRAM WITH INSERTION STENT URETERAL;  Surgeon: Facundo Matamoros MD;  Location: [facility];  Service: Urology    VT CYSTO/URETERO W/LITHOTRIPSY &INDWELL STENT INSRT Left 6/16/2022    Procedure: CYSTOSCOPY URETEROSCOPY WITH LITHOTRIPSY HOLMIUM LASER, RETROGRADE PYELOGRAM AND INSERTION STENT URETERAL;  Surgeon: Sammy Ferrer MD;  Location: [facility];  Service: Urology    VT CYSTO/URETERO W/LITHOTRIPSY &INDWELL STENT INSRT Left 8/16/2022    Procedure: CYSTOSCOPY USCOPE W/HOLMIUM LASER, RETROGRADE PYELOGRAM&STENT;  Surgeon: Sudheer Bradford MD;  Location: [facility];  Service: Urology    VT ESOPHAGOGASTRODUODENOSCOPY TRANSORAL DIAGNOSTIC N/A 2/13/2017    Procedure: ESOPHAGOGASTRODUODENOSCOPY (EGD);  Surgeon: Alison Saleem MD;  Location: [facility];  Service: Gastroenterology     SOCIAL & FAMILY HISTORY     Social History     Substance and Sexual Activity   Alcohol Use Not Currently       Social History     Substance and Sexual Activity   Drug Use Never     Social History     Tobacco Use   Smoking Status Some Days    Current packs/day: 0.50    Average packs/day: 0.5 packs/day for 41.4 years (20.7 ttl pk-yrs)    Types: Cigarettes    Start date: 1/1/1983   Smokeless Tobacco Never     Family History   Problem Relation Age of Onset    Obesity Mother     Thyroid disease Mother     Cancer Mother 71    Vision loss Mother     Obesity Sister     Coronary artery disease Sister     Stroke Sister     Asthma Sister     Glaucoma Sister     No Known Problems  Maternal Aunt     Diabetes Maternal Uncle     Lung cancer Maternal Grandmother     Cancer Maternal Grandfather     Diabetes Maternal Grandfather     No Known Problems Paternal Grandmother     No Known Problems Paternal Grandfather     Hypertension Other     Lung cancer Other     Hypertension Other     Lung cancer Other     Lung cancer Other      LABORATORY DATA     Labs: I have personally reviewed pertinent reports.    Results from last 7 days   Lab Units 05/21/24  1224 05/17/24  0520 05/16/24  0526   WBC Thousand/uL 15.69* 9.33 11.11*   HEMOGLOBIN g/dL 12.5 11.4* 10.7*   HEMATOCRIT % 40.3 37.8 35.4   PLATELETS Thousands/uL 120* 85* 95*   SEGS PCT % 75 78* 56   MONO PCT % 8 6 8   EOS PCT % 1 0 1      Results from last 7 days   Lab Units 05/21/24  1224 05/17/24  0520 05/16/24  0526 05/15/24  0457   POTASSIUM mmol/L 3.1* 5.0 4.2 3.3*   CHLORIDE mmol/L 102 106 105 106   CO2 mmol/L 25 25 29 27   BUN mg/dL 16 22 20 22   CREATININE mg/dL 1.05 1.01 1.02 1.05   CALCIUM mg/dL 9.2 9.4 9.4 9.2   ALK PHOS U/L 70  --   --  60   ALT U/L 24  --   --  21   AST U/L 31  --   --  22     Results from last 7 days   Lab Units 05/16/24  0526 05/15/24  0457   MAGNESIUM mg/dL 2.3 2.2          Results from last 7 days   Lab Units 05/21/24  1224   INR  1.31*   PTT seconds 34     Results from last 7 days   Lab Units 05/21/24  1435   LACTIC ACID mmol/L 2.5*         Micro:  Lab Results   Component Value Date    BLOODCX Received in Microbiology Lab. Culture in Progress. 05/21/2024    BLOODCX Received in Microbiology Lab. Culture in Progress. 05/21/2024    BLOODCX No Growth After 5 Days. 07/21/2022    BLOODCX No Growth After 5 Days. 07/21/2022    URINECX >100,000 cfu/ml Klebsiella pneumoniae (A) 05/15/2024    URINECX >100,000 cfu/ml Citrobacter freundii (A) 05/15/2024    URINECX <10,000 cfu/ml Escherichia coli (A) 08/16/2022    URINECX <10,000 cfu/ml Enterococcus  faecium VRE (A) 08/16/2022    URINECX (A) 08/16/2022     >100,000 cfu/ml - Possible  mycoplasma hominis Gram Negative Edd     IMAGING & DIAGNOSTIC TESTS     Imaging: I have personally reviewed pertinent reports.    PE Study with CT abdomen &pelvis with contrast    Result Date: 5/21/2024  Impression: 1.  No evidence of pulmonary embolus. 2.  Diffuse groundglass attenuation throughout both lungs, progressed since 5/15/2024. Differential diagnosis includes pulmonary edema (including from noncardiogenic etiologies) and widespread pneumonia, including viral pneumonia. Differential diagnosis includes several less common entities, including drug related acute interstitial pneumonia and hypersensitivity pneumonitis. Smoking-related interstitial lung disease can have this appearance but progression over the course of 6 days is atypical. 3.  Hepatomegaly and cirrhosis. 4.  Mild splenomegaly. 5.  Two small nonobstructing left renal calculi. 6.  No evidence of acute abnormality in the abdomen or pelvis. Workstation performed: [id number]     EKG, Pathology, and Other Studies: I have personally reviewed pertinent reports.     ALLERGIES     Allergies   Allergen Reactions    Hydrocodone-Acetaminophen Hives    Ketorolac Hives and Dizziness    Toradol [Ketorolac Tromethamine] Other (See Comments)     hypotension    Ultram [Tramadol] Nausea Only, GI Intolerance and Vomiting     MEDICATIONS PRIOR TO ARRIVAL     Prior to Admission medications    Medication Sig Start Date End Date Taking? Authorizing Provider   albuterol (2.5 mg/3 mL) 0.083 % nebulizer solution Take 3 mL (2.5 mg total) by nebulization every 6 (six) hours as needed for wheezing or shortness of breath 5/2/24   Andi Mason DO   albuterol (PROVENTIL HFA,VENTOLIN HFA) 90 mcg/act inhaler Inhale 2 puffs every 6 (six) hours as needed for wheezing  Patient not taking: Reported on 5/14/2024 5/2/24   Andi Mason DO   amoxicillin-clavulanate (AUGMENTIN) 875-125 mg per tablet Take 1 tablet by mouth every 12 (twelve) hours for 5 days  Patient not taking: Reported  on 5/19/2024 5/16/24 5/21/24  Cristina Leal MD   budesonide (PULMICORT) 0.5 mg/2 mL nebulizer solution Take 0.5 mg by nebulization 2 (two) times a day Rinse mouth after use.    Historical Provider, MD   buPROPion (WELLBUTRIN XL) 300 mg 24 hr tablet Take 1 tablet (300 mg total) by mouth every morning 5/17/24 5/12/25  Maggi Walsh DO   diazepam (VALIUM) 5 mg tablet Take 1 tablet (5 mg total) by mouth daily at bedtime as needed for anxiety  Patient taking differently: Take 5 mg by mouth daily at bedtime as needed for anxiety 4/30/24   Andi Mason DO   ergocalciferol (VITAMIN D2) 50,000 units Take 1 capsule (50,000 Units total) by mouth every 14 (fourteen) days 7/17/23   Andi Mason DO   famotidine (PEPCID) 40 MG tablet Take 1 tablet (40 mg total) by mouth daily at bedtime Take in evening 2 hours prior to bed 2/19/24   Andi Mason DO   ferrous sulfate 324 (65 Fe) mg Take 1 tablet (324 mg total) by mouth daily before breakfast 5/7/24   Andi Mason DO   folic acid (FOLVITE) 1 mg tablet Take 1 tablet (1 mg total) by mouth daily 4/30/24   Andi Mason, DO   furosemide (LASIX) 40 mg tablet Take 1 tablet (40 mg total) by mouth daily 5/18/24   Maggi Walsh DO   gabapentin (NEURONTIN) 100 mg capsule Take 100 mg by mouth every 8 (eight) hours 10/4/19   Historical Provider, MD   Insulin Glargine Solostar (Basaglar KwikPen) 100 UNIT/ML SOPN Inject 0.55 mL (55 Units total) under the skin in the morning 5/6/24   Andi Mason DO   insulin lispro (HumaLOG) 100 units/mL injection pen Inject 15 Units under the skin 3 (three) times a day with meals 5/17/24   Maggi Walsh DO   Insulin Pen Needle 31G X 5 MM MISC Inject under the skin if needed (Insuline injections) 5/2/24   Andi Mason DO   ipratropium-albuterol (DUO-NEB) 0.5-2.5 mg/3 mL nebulizer solution Take 3 mL by nebulization 4 (four) times a day 5/7/24   ANTONIO Nam   lactulose (CHRONULAC) 10 g/15 mL solution Take 20 g by mouth Daily or as needed for  bowl movement    Historical Provider, MD   levothyroxine 112 mcg tablet Take 1 tablet (112 mcg total) by mouth daily 4/30/24   Andi Mason DO   lisinopril (ZESTRIL) 5 mg tablet Take 1 tablet (5 mg total) by mouth daily  Patient not taking: Reported on 5/19/2024 5/17/24 6/16/24  Cristina Leal MD   metroNIDAZOLE (FLAGYL) 500 mg tablet Take 1 tablet (500 mg total) by mouth every 8 (eight) hours for 5 days  Patient not taking: Reported on 5/19/2024 5/16/24 5/21/24  Cristina Leal MD   nicotine (NICODERM CQ) 21 mg/24 hr TD 24 hr patch Place 1 patch on the skin over 24 hours every 24 hours 4/30/24   Andi Mason DO   omeprazole (PriLOSEC) 40 MG capsule take 1 capsule by mouth twice a day 4/10/24   Lisandra Leggett PA-C   ondansetron (ZOFRAN-ODT) 4 mg disintegrating tablet Take 1 tablet (4 mg total) by mouth every 12 (twelve) hours as needed for nausea or vomiting 5/6/24   Michele Vu DO   oxyCODONE-acetaminophen (PERCOCET)  mg per tablet Take 1 tablet by mouth every 6 (six) hours as needed for severe pain 6/27/22   Historical Provider, MD   oxygen gas Inhale 2 L/min continuous. May use 3L with exertion per Bella BAHNP  Keep sat >88%  Indications: low saturation    Andi Mason DO   predniSONE 10 mg tablet Take 10 mg by mouth daily    Historical Provider, MD   sertraline (ZOLOFT) 100 mg tablet take 1 tablet by mouth twice a day 5/14/24   Andi Mason DO   simvastatin (ZOCOR) 20 mg tablet Take 1 tablet (20 mg total) by mouth daily at bedtime 1/17/24   Andi Mason DO   spironolactone (ALDACTONE) 25 mg tablet Take 2 tablets (50 mg total) by mouth daily  Patient not taking: Reported on 4/30/2024 12/21/23   Elijah Triplett MD   sucralfate (CARAFATE) 1 g tablet Take 1 tablet (1 g total) by mouth daily at bedtime 5/6/24   Michele Vu DO   sulfamethoxazole-trimethoprim (BACTRIM DS) 800-160 mg per tablet Take 1 tablet by mouth 3 (three) times a week for 28 days 5/1/24 5/29/24   Andi Mason DO   varenicline (CHANTIX) 0.5 mg tablet Take 1 tablet (0.5 mg total) by mouth 2 (two) times a day 5/8/24   Andi Mason DO   naloxone (NARCAN) 4 mg/0.1 mL nasal spray Administer 1 spray into a nostril. If no response after 2-3 minutes, give another dose in the other nostril using a new spray.  Patient not taking: Reported on 6/30/2022 1/15/21 7/21/22  Andi Mason DO     MEDICATIONS ADMINISTERED IN LAST 24 HOURS     Medication Administration - last 24 hours from 05/20/2024 1853 to 05/21/2024 1853         Date/Time Order Dose Route Action Action by     05/21/2024 1158 EDT albuterol inhalation solution 10 mg 10 mg Nebulization Given Gia Gutierres RN     05/21/2024 1158 EDT ipratropium (ATROVENT) 0.02 % inhalation solution 1 mg 1 mg Nebulization Given Gia Gutierres RN     05/21/2024 1158 EDT sodium chloride 0.9 % inhalation solution 12 mL 12 mL Nebulization Given Gia Gutierres RN     05/21/2024 1202 EDT ondansetron (ZOFRAN-ODT) dispersible tablet 4 mg 4 mg Oral Given Gia Gutierres RN     05/21/2024 1433 EDT cefepime (MAXIPIME) 2 g/50 mL dextrose IVPB 0 mg Intravenous Stopped Mady Abebe     05/21/2024 1329 EDT cefepime (MAXIPIME) 2 g/50 mL dextrose IVPB 2,000 mg Intravenous New Bag Gia Gutierres RN     05/21/2024 1656 EDT vancomycin (VANCOCIN) 1,750 mg in sodium chloride 0.9 % 500 mL IVPB 0 mg Intravenous Stopped Gia Gutierres RN     05/21/2024 1434 EDT vancomycin (VANCOCIN) 1,750 mg in sodium chloride 0.9 % 500 mL IVPB 1,750 mg Intravenous New Bag Mady Abebe     05/21/2024 1329 EDT potassium chloride (Klor-Con M20) CR tablet 40 mEq 40 mEq Oral Given iGa Gutierres RN     05/21/2024 1457 EDT iohexol (OMNIPAQUE) 350 MG/ML injection (MULTI-DOSE) 100 mL 100 mL Intravenous Given Ange Umaña     05/21/2024 1630 EDT multi-electrolyte (ISOLYTE-S PH 7.4) bolus 1,000 mL 0 mL Intravenous Stopped Gia Gutierres RN     05/21/2024 1528 EDT multi-electrolyte (ISOLYTE-S PH 7.4) bolus 1,000 mL 1,000  "mL Intravenous New Bag Gia Guteirres RN     05/21/2024 1630 EDT multi-electrolyte (ISOLYTE-S PH 7.4) bolus 1,000 mL 1,000 mL Intravenous New Bag Gia Gutierres RN     05/21/2024 1745 EDT potassium chloride (Klor-Con M20) CR tablet 40 mEq 40 mEq Oral Given Gia Gutierres RN     05/21/2024 1752 EDT insulin lispro (HumALOG/ADMELOG) 100 units/mL subcutaneous injection 1-6 Units 2 Units Subcutaneous Given Gia Gutierres RN     05/21/2024 1810 EDT ceftriaxone (ROCEPHIN) 1 g/50 mL in dextrose IVPB 0 mg Intravenous Stopped Gia Gutierres RN     05/21/2024 1745 EDT ceftriaxone (ROCEPHIN) 1 g/50 mL in dextrose IVPB 1,000 mg Intravenous New Bag Gia Gutierres RN     05/21/2024 1822 EDT oxyCODONE (ROXICODONE) IR tablet 5 mg -- Oral See Alternative Gia Gutierres RN     05/21/2024 1822 EDT oxyCODONE (ROXICODONE) immediate release tablet 10 mg 10 mg Oral Given Gia Gutierres RN          CURRENT MEDICATIONS     Current Facility-Administered Medications   Medication Dose Route Frequency Provider Last Rate    cefTRIAXone  1,000 mg Intravenous Q24H Varsha Valladares MD Stopped (05/21/24 1810)    [START ON 5/22/2024] enoxaparin  40 mg Subcutaneous Daily Varsha Valladares MD      insulin lispro  1-5 Units Subcutaneous HS Varsha Valladares MD      insulin lispro  1-6 Units Subcutaneous TID AC Varsha Valladares MD      oxyCODONE  5 mg Oral Q4H PRN Ni Forbes MD      Or    oxyCODONE  10 mg Oral Q4H PRN Ni Forbes MD          oxyCODONE, 5 mg, Q4H PRN   Or  oxyCODONE, 10 mg, Q4H PRN        Admission Time  I spent 30 minutes admitting the patient.  This involved direct patient contact where I performed a full history and physical, reviewing previous records, and reviewing laboratory and other diagnostic studies.    Portions of the record may have been created with voice recognition software.  Occasional wrong word or \"sound a like\" substitutions may have occurred due to the inherent limitations of voice recognition software.  Read the chart " carefully and recognize, using context, where substitutions have occurred.    ==  Zelda Sheldon, MS4  [facility]  Internal Medicine Residency

## 2024-05-21 NOTE — ED ATTENDING ATTESTATION
5/21/2024  I, Anuradha Fair MD, saw and evaluated the patient. I have discussed the patient with the resident/non-physician practitioner and agree with the resident's/non-physician practitioner's findings, Plan of Care, and MDM as documented in the resident's/non-physician practitioner's note, except where noted. All available labs and Radiology studies were reviewed.  I was present for key portions of any procedure(s) performed by the resident/non-physician practitioner and I was immediately available to provide assistance.       At this point I agree with the current assessment done in the Emergency Department.  I have conducted an independent evaluation of this patient a history and physical is as follows:  This is a 53-year-old woman with history of COPD, on 3 L of oxygen at home.  Patient states she has only been on oxygen since March, which is also when she quit smoking.  The patient had a protracted hospitalization in [place] for about 3 weeks from March to April, and then was admitted here for hypokalemia, COPD exacerbation, was discharged home about 6 days ago.  Patient has been having worsening shortness of breath, dyspnea on exertion, fatigue, cough.  Patient states that she has a low oxygen even on 4 or 5 L of oxygen.  No fevers or chills.  No chest pain.  No lateralizing limb swelling.  Patient is not on blood thinners and does not have history of thromboembolic disease.  Review of systems otherwise negative in 12 systems reviewed.  On exam the patient is ill-appearing.  She is tachypneic, dyspneic, audibly wheezing, with tripod posture.  Her blood pressure is adequate.  She is tachycardic with a heart rate of 120.  She is afebrile.  On secondary survey, the patient appears adequately hydrated.  Her conjunctiva are pink.  She is anicteric.  Her mucous membranes are little tacky.  Her neck is supple and nontender.  The patient's heart is regular without appreciable murmurs.  She has scattered  wheezing throughout with fair air movement.  Her abdomen is soft and nontender.  Extremities are intact with good pulses.  She is neurologically nonfocal with normal skin and back exam.MEDICAL DECISION MAKING    Number and Complexity of Problems  Differential diagnosis: Viral illness, pneumonia, COPD exacerbation, congestive heart failure, pulmonary embolus    Medical Decision Making Data  External documents reviewed: Patient's echo from last year reviewed, technically difficult study secondary to emphysema, EF at that time was 60% with no diastolic dysfunction.  Patient's prior pulmonology notes reviewed, recent admission reviewed, patient with significant central lobar emphysema, CT in the last 1 month showing scattered groundglass opacities  My EKG interpretation: Sinus tachycardia with atrial enlargement, narrow complex, no ischemia  My CT interpretation:   My X-ray interpretation:   My ultrasound interpretation:     PE Study with CT abdomen &pelvis with contrast   Final Result      1.  No evidence of pulmonary embolus.      2.  Diffuse groundglass attenuation throughout both lungs, progressed since 5/15/2024. Differential diagnosis includes pulmonary edema (including from noncardiogenic etiologies) and widespread pneumonia, including viral pneumonia. Differential diagnosis    includes several less common entities, including drug related acute interstitial pneumonia and hypersensitivity pneumonitis. Smoking-related interstitial lung disease can have this appearance but progression over the course of 6 days is atypical.      3.  Hepatomegaly and cirrhosis.      4.  Mild splenomegaly.      5.  Two small nonobstructing left renal calculi.      6.  No evidence of acute abnormality in the abdomen or pelvis.                     Workstation performed: [id number]         US bedside procedure   Final Result          Labs Reviewed   CBC AND DIFFERENTIAL - Abnormal       Result Value Ref Range Status    WBC 15.69 (*)  4.31 - 10.16 Thousand/uL Final    RBC 4.18  3.81 - 5.12 Million/uL Final    Hemoglobin 12.5  11.5 - 15.4 g/dL Final    Hematocrit 40.3  34.8 - 46.1 % Final    MCV 96  82 - 98 fL Final    MCH 29.9  26.8 - 34.3 pg Final    MCHC 31.0 (*) 31.4 - 37.4 g/dL Final    RDW 17.7 (*) 11.6 - 15.1 % Final    MPV 11.3  8.9 - 12.7 fL Final    Platelets 120 (*) 149 - 390 Thousands/uL Final    nRBC 0  /100 WBCs Final    Segmented % 75  43 - 75 % Final    Immature Grans % 2  0 - 2 % Final    Lymphocytes % 13 (*) 14 - 44 % Final    Monocytes % 8  4 - 12 % Final    Eosinophils Relative 1  0 - 6 % Final    Basophils Relative 1  0 - 1 % Final    Absolute Neutrophils 11.81 (*) 1.85 - 7.62 Thousands/µL Final    Absolute Immature Grans 0.35 (*) 0.00 - 0.20 Thousand/uL Final    Absolute Lymphocytes 2.00  0.60 - 4.47 Thousands/µL Final    Absolute Monocytes 1.31 (*) 0.17 - 1.22 Thousand/µL Final    Eosinophils Absolute 0.13  0.00 - 0.61 Thousand/µL Final    Basophils Absolute 0.09  0.00 - 0.10 Thousands/µL Final   COMPREHENSIVE METABOLIC PANEL - Abnormal    Sodium 138  135 - 147 mmol/L Final    Potassium 3.1 (*) 3.5 - 5.3 mmol/L Final    Chloride 102  96 - 108 mmol/L Final    CO2 25  21 - 32 mmol/L Final    ANION GAP 11  4 - 13 mmol/L Final    BUN 16  5 - 25 mg/dL Final    Creatinine 1.05  0.60 - 1.30 mg/dL Final    Comment: Standardized to IDMS reference method    Glucose 181 (*) 65 - 140 mg/dL Final    Comment: If the patient is fasting, the ADA then defines impaired fasting glucose as > 100 mg/dL and diabetes as > or equal to 123 mg/dL.    Calcium 9.2  8.4 - 10.2 mg/dL Final    AST 31  13 - 39 U/L Final    ALT 24  7 - 52 U/L Final    Comment: Specimen collection should occur prior to Sulfasalazine administration due to the potential for falsely depressed results.     Alkaline Phosphatase 70  34 - 104 U/L Final    Total Protein 6.9  6.4 - 8.4 g/dL Final    Albumin 3.7  3.5 - 5.0 g/dL Final    Total Bilirubin 1.59 (*) 0.20 - 1.00 mg/dL Final     Comment: Use of this assay is not recommended for patients undergoing treatment with eltrombopag due to the potential for falsely elevated results.  N-acetyl-p-benzoquinone imine (metabolite of Acetaminophen) will generate erroneously low results in samples for patients that have taken an overdose of Acetaminophen.    eGFR 60  ml/min/1.73sq m Final    Narrative:     National Kidney Disease Foundation guidelines for Chronic Kidney Disease (CKD):     Stage 1 with normal or high GFR (GFR > 90 mL/min/1.73 square meters)    Stage 2 Mild CKD (GFR = 60-89 mL/min/1.73 square meters)    Stage 3A Moderate CKD (GFR = 45-59 mL/min/1.73 square meters)    Stage 3B Moderate CKD (GFR = 30-44 mL/min/1.73 square meters)    Stage 4 Severe CKD (GFR = 15-29 mL/min/1.73 square meters)    Stage 5 End Stage CKD (GFR <15 mL/min/1.73 square meters)  Note: GFR calculation is accurate only with a steady state creatinine   LACTIC ACID, PLASMA (W/REFLEX IF RESULT > 2.0) - Abnormal    LACTIC ACID 2.1 (*) 0.5 - 2.0 mmol/L Final    Narrative:     Result may be elevated if tourniquet was used during collection.   PROTIME-INR - Abnormal    Protime 16.1 (*) 11.6 - 14.5 seconds Final    INR 1.31 (*) 0.84 - 1.19 Final   LACTIC ACID 2 HOUR - Abnormal    LACTIC ACID 2.5 (*) 0.5 - 2.0 mmol/L Final    Narrative:     Result may be elevated if tourniquet was used during collection.   POCT GLUCOSE - Abnormal    POC Glucose 196 (*) 65 - 140 mg/dl Final   COVID19, INFLUENZA A/B, RSV PCR, [facility] - Normal    SARS-CoV-2 Negative  Negative Final    Comment:      INFLUENZA A PCR Negative  Negative Final    Comment:      INFLUENZA B PCR Negative  Negative Final    Comment:      RSV PCR Negative  Negative Final    Comment:      Narrative:     FOR PEDIATRIC PATIENTS - copy/paste COVID Guidelines URL to browser: [email or web address]    SARS-CoV-2 assay is a Nucleic Acid Amplification assay intended for  the  qualitative detection of nucleic acid from SARS-CoV-2 in nasopharyngeal  swabs. Results are for the presumptive identification of SARS-CoV-2 RNA.    Positive results are indicative of infection with SARS-CoV-2, the virus  causing COVID-19, but do not rule out bacterial infection or co-infection  with other viruses. Laboratories within the [place] and its  territories are required to report all positive results to the appropriate  public health authorities. Negative results do not preclude SARS-CoV-2  infection and should not be used as the sole basis for treatment or other  patient management decisions. Negative results must be combined with  clinical observations, patient history, and epidemiological information.  This test has not been FDA cleared or approved.    This test has been authorized by FDA under an Emergency Use Authorization  (EUA). This test is only authorized for the duration of time the  declaration that circumstances exist justifying the authorization of the  emergency use of an in vitro diagnostic tests for detection of SARS-CoV-2  virus and/or diagnosis of COVID-19 infection under section 564(b)(1) of  the Act, 21 U.S.C. 360bbb-3(b)(1), unless the authorization is terminated  or revoked sooner. The test has been validated but independent review by FDA  and CLIA is pending.    Test performed using [employer]pert: This RT-PCR assay targets N2,  a region unique to SARS-CoV-2. A conserved region in the E-gene was chosen  for pan-Sarbecovirus detection which includes SARS-CoV-2.    According to CMS-2020-01-R, this platform meets the definition of high-throughput technology.   STREP PNEUMONIAE ANTIGEN,URINE - Normal    Strep pneumoniae antigen, urine Negative  Negative Final   LEGIONELLA ANTIGEN, URINE - Normal    Legionella Urinary Antigen Negative  Negative Final   PROCALCITONIN TEST - Normal    Procalcitonin 0.13  <=0.25 ng/ml Final    Comment: Suspected Lower Respiratory Tract  Infection (LRTI):  - LESS than or EQUAL to 0.25 ng/mL:   low likelihood for bacterial LRTI; antibiotics DISCOURAGED.  - GREATER than 0.25 ng/mL:   increased likelihood for bacterial LRTI; antibiotics ENCOURAGED.    Suspected Sepsis:  - Strongly consider initiating antibiotics in ALL UNSTABLE patients.  - LESS than or EQUAL to 0.5 ng/mL:   low likelihood for bacterial sepsis; antibiotics DISCOURAGED.  - GREATER than 0.5 ng/mL:   increased likelihood for bacterial sepsis; antibiotics ENCOURAGED.  - GREATER than 2 ng/mL:   high risk for severe sepsis / septic shock; antibiotics strongly ENCOURAGED.    Decisions on antibiotic use should not be based solely on Procalcitonin (PCT) levels. If PCT is low but uncertainty exists with stopping antibiotics, repeat PCT in 6-24 hours to confirm the low level. If antibiotics are administered (regardless if initial PCT was high or low), repeat PCT every 1-2 days to consider early antibiotic cessation (when GREATER than 80% decrease from the peak OR when PCT drops below designated cutoffs, whichever comes first), so long as the infection is NOT one that typically requires prolonged treatment durations (e.g., bone/joint infections, endocarditis, Staph. aureus bacteremia).    Situations of FALSE-POSITIVE Procalcitonin values:  1) Newborns < 72 hours old  2) Massive stress from severe trauma / burns, major surgery, acute pancreatitis, cardiogenic / hemorrhagic shock, sickle cell crisis, or other organ perfusion abnormalities  3) Malaria and some Candidal infections  4) Treatment with agents that stimulate cytokines (e.g., OKT3, anti-lymphocyte globulins, alemtuzumab, IL-2, granulocyte transfusion [NOT GCSFs])  5) Chronic renal disease causes elevated baseline levels (consider GREATER than 0.75 ng/mL as an abnormal cut-off); initiating HD/CRRT may cause transient decreases  6) Paraneoplastic syndromes from medullary thyroid or SCLC, some forms of vasculitis, and acute [id number]  disease    Situations of FALSE-NEGATIVE Procalcitonin values:  1) Too early in clinical course for PCT to have reached its peak (may repeat in 6-24 hours to confirm low level)  2) Localized infection WITHOUT systemic (SIRS / sepsis) response (e.g., an abscess, osteomyelitis, cystitis)  3) Mycobacteria (e.g., Tuberculosis, MAC)  4) Cystic fibrosis exacerbations     APTT - Normal    PTT 34  23 - 37 seconds Final    Comment: Therapeutic Heparin Range =  60-90 seconds   B-TYPE NATRIURETIC PEPTIDE (BNP) - Normal    BNP 19  0 - 100 pg/mL Final   LIPASE - Normal    Lipase 20  11 - 82 u/L Final   POCT PREGNANCY, URINE - Normal    EXT Preg Test, Ur Negative   Final    Control Valid   Final   MRSA CULTURE   SPUTUM CULTURE AND GRAM STAIN   UA W REFLEX TO MICROSCOPIC WITH REFLEX TO CULTURE    Color, UA Light Yellow   Final    Clarity, UA Clear   Final    Specific Gravity, UA 1.009  1.003 - 1.030 Final    pH, UA 6.5  4.5, 5.0, 5.5, 6.0, 6.5, 7.0, 7.5, 8.0 Final    Leukocytes, UA Negative  Negative Final    Nitrite, UA Negative  Negative Final    Protein, UA Negative  Negative mg/dl Final    Glucose, UA Negative  Negative mg/dl Final    Ketones, UA Negative  Negative mg/dl Final    Urobilinogen, UA <2.0  <2.0 mg/dl mg/dl Final    Bilirubin, UA Negative  Negative Final    Occult Blood, UA Negative  Negative Final       Labs reviewed by me are significant for:   Elevated white blood cell count, negative procalcitonin  Discussed case with:   Considered admission for:     Treatment and Disposition  ED course: Seen and examined, bedside ultrasound limited, but no evidence of right heart strain, squeeze appears adequate.  IV access established.  Patient with ongoing nebs here, ongoing increased work of breathing, tachycardia.  Will CT for PE, admit for hypoxic respiratory failure.  No indication for antibiotics at this time  Shared decision making:   Code status:     ED Course         Critical Care Time  CriticalCare Time    Date/Time:  5/21/2024 3:23 PM    Performed by: Anuradha Fair MD  Authorized by: Anuradha Fair MD    Critical care provider statement:     Critical care time (minutes):  34    Critical care time was exclusive of:  Separately billable procedures and treating other patients and teaching time    Critical care was necessary to treat or prevent imminent or life-threatening deterioration of the following conditions:  Respiratory failure    Critical care was time spent personally by me on the following activities:  Blood draw for specimens, obtaining history from patient or surrogate, development of treatment plan with patient or surrogate, discussions with consultants, discussions with primary provider, evaluation of patient's response to treatment, examination of patient, review of old charts, re-evaluation of patient's condition, ordering and review of radiographic studies, ordering and review of laboratory studies and ordering and performing treatments and interventions

## 2024-05-21 NOTE — TELEPHONE ENCOUNTER
"Patient recently d.c from hospital. Today patient is very SOB. At rest o2 is 91 and HR is 114. Patient is on 4l o2. When patient stands heart rate goes above 120. Patient walked to bathroom and her oxygen dropped to 76 and heart rate was 121. Advised patient to return to ER to get o2 levels to be better controlled. Patient verbalized understanding and is being taken by family to ER. Placed on tracking board.             Reason for Disposition   SEVERE difficulty breathing (e.g., struggling for each breath, speaks in single words, pulse > 120)   MODERATE difficulty breathing (e.g., speaks in phrases, SOB even at rest, pulse 100-120) of new-onset or worse than normal    Answer Assessment - Initial Assessment Questions  1. RESPIRATORY STATUS: \"Describe your breathing?\" (e.g., wheezing, shortness of breath, unable to speak, severe coughing)       SOB with any exertion  2. ONSET: \"When did this breathing problem begin?\"       Today   3. PATTERN \"Does the difficult breathing come and go, or has it been constant since it started?\"       Comes and goes with exertion  4. SEVERITY: \"How bad is your breathing?\" (e.g., mild, moderate, severe)     - MILD: No SOB at rest, mild SOB with walking, speaks normally in sentences, can lay down, no retractions, pulse < 100.     - MODERATE: SOB at rest, SOB with minimal exertion and prefers to sit, cannot lie down flat, speaks in phrases, mild retractions, audible wheezing, pulse 100-120.     - SEVERE: Very SOB at rest, speaks in single words, struggling to breathe, sitting hunched forward, retractions, pulse > 120       Moderate to severe  5. RECURRENT SYMPTOM: \"Have you had difficulty breathing before?\" If Yes, ask: \"When was the last time?\" and \"What happened that time?\"       yes  6. CARDIAC HISTORY: \"Do you have any history of heart disease?\" (e.g., heart attack, angina, bypass surgery, angioplasty)       denies  7. LUNG HISTORY: \"Do you have any history of lung disease?\"  (e.g., " "pulmonary embolus, asthma, emphysema)      yes  8. CAUSE: \"What do you think is causing the breathing problem?\"       unsure  9. OTHER SYMPTOMS: \"Do you have any other symptoms? (e.g., dizziness, runny nose, cough, chest pain, fever)      Fatigue and dizzy   10. PREGNANCY: \"Is there any chance you are pregnant?\" \"When was your last menstrual period?\"        N.a  11. TRAVEL: \"Have you traveled out of the country in the last month?\" (e.g., travel history, exposures)        Na.    Protocols used: Breathing Difficulty-ADULT-[place]    "

## 2024-05-21 NOTE — ASSESSMENT & PLAN NOTE
On admission patient noted to have platelet count of 120. During her recent admission values ranging from . Of note is her history of cirrhosis with hepatosplenomegaly.     Platelets today 82 from 92. Hgb 10.6 today from 10.4. Smear review found borderline for platelet estimate.    Plan:  - Monitor CBC

## 2024-05-21 NOTE — ASSESSMENT & PLAN NOTE
Per CXR in [place] 3/6/2024: Diffuse interstitial and airspace infiltrates. Severe edema versus severe atypical pneumonia.    Per CT Chest in [place] 3/7/2024: Alveolar opacities identified bilaterally involving upper and lower lobes. Several areas of sparing identified with normal lung parenchyma. ... IMPRESSION: 1. Findings most compatible with pulmonary edema with groundglass opacification bilaterally with slight sparing of the lung apices. Differential diagnosis includes atypical pneumonia or much less likely hemorrhage.     During hospital stay in [place], patient received extensive workup of atypical pneumonia. Found slightly positive CINDY, other autoimmune workup negative. During hospital stay received pulse dose of steroids and was transitioned to prednisone. Discharged with prednisone taper Bactrim PJP prophylaxis until lowest dose of the taper. However, details of exact length of taper and dosages were not found upon chart review. Patient follows with outpatient Pulmonology, last seen in May.    Per PE study 5/21/2024: Diffuse groundglass attenuation throughout both lungs, progressed since   5/15/2024. Differential diagnosis includes pulmonary edema (including from noncardiogenic etiologies) and widespread pneumonia, including viral pneumonia. Differential diagnosis includes several less common entities, including drug related acute interstitial pneumonia and hypersensitivity pneumonitis.     RP2 panel negative, Strep pneumo and legionella urine negative. Blood cultures negative at 48 hours. Possible infectious cause, possible inflammatory/autoimmune etiology.    Plan:  875mg PO Augmentin q12 and 500 mg azithromycin q24h for 4 days , day 1 of 4  Continue to follow up outpatient with Pulmonology

## 2024-05-21 NOTE — PROGRESS NOTES
INTERNAL MEDICINE RESIDENCY SENIOR ADMISSION NOTE     Name: Chely Ruvalcaba   Age & Sex: 53 y.o. female   MRN: [id number]  Unit/Bed#: ED 02   Encounter: [id number]  Primary Care Provider: Andi Mason DO    Admit to team: SOD Team C     Patient seen and examined. Reviewed H&P per  . Agree with the assessment and plan with any exception/addition as noted below:    Active Problems:    Hypokalemia    Thrombocytopenia (HCC)    Severe sepsis (HCC)      Code Status: Level 1 - Full Code  Admission Status: INPATIENT   Disposition: Patient requires Med/Surg  Expected Length of Stay: More than 2 midnights      Patient is a 53-year-old female with history of COPD, diastolic CHF, obesity, hypothyroidism, GERD, cirrhosis secondary to NAFLD and history of recent hospital admission due to hyperkalemia and COPD exacerbation.  She also has history of hospitalization in [place] sometime in April 2024 at which time she had pneumonia and was subsequently treated for COPD exacerbation as well as complications including a pneumothorax and chest tube placement.  At that time, she responded to steroid taper as well as Bactrim for PJP prophylaxis.  She was subsequently seen by pulmonology has been following with them in the outpatient setting with last visit sometime in May 2024.  Patient reporting of dizziness and elevated heart rates, she had a pulse ox at home and reporting desaturations down to 79 with ambulation.  In the ED, patient was noted to be afebrile but tachycardic to 124, tachypneic with respiratory rate of 24, WBC count of 15.6, elevated lactate at 2.5, procalcitonin 0.13 and BNP of 19.  She was given one-time doses of cefepime and vancomycin.  A CT chest study resulted with diffuse groundglass attenuation throughout both lungs with some progression from a prior study on 15th May and findings that may be indicative of widespread pneumonia and possibly viral pneumonia.  Patient subsequently admitted to medicine service, SOD-C.        Assessment & Plan  1. Diffuse interstitial groundglass opacities  -As noted on CT chest study at this admission as well as the one done last week  -Flu/RSV/COVID panel negative, RPP panel ordered  -Procalcitonin 0.13, some clinical suspicion for treating it as pneumonia at this time  -Based on patient's prior history and low concern for this being hospital-acquired, we will de-escalate antibiotics to ceftriaxone  -On ceftriaxone 1 g every 24  -Respiratory protocol  -Aspiration precautions  -Streptococcus and Legionella antigen ordered  -Sputum culture ordered  -We will attempt to treat this as pneumonia for now and if patient has limited improvement despite antibiotics, can consider a trial with steroid taper due to her history of interstitial lung disease    2. Sepsis  -Likely due to #1  -Follow-up blood cultures  -Monitor VS and WBC  -Trend fever curve    3. Diabetes mellitus type 2  -On SSI algorithm 2 at bedtime and algorithm 3 with meals  -Hypoglycemic protocol  -Fingerstick glucoses as scheduled  -Carb level 2 diet  -Will add Lantus 10 units and lispro 7 units 3 times daily with meals  -At baseline patient takes 55 units of Lantus and 15 units of lispro 3 times daily with meals.  However blood glucoses over the course of this hospitalization have been around 190-200 with minimal amount of insulin usage and therefore we will hold off on home regimen and start on a lower dose    4. COPD & ?history of interstitial lung disease  -Following with pulmonary in outpatient setting  -Currently on Pulmicort, atrovent and xopenex scheduled  -PRN albuterol inhaler  -Scanned charts from 04/2024 hospitalization reviewed    5. Chronic back pain  -PDMP reviewed, patient is on percocet in outpatient setting  -Started on oxycodone 5 mg and 10 mg Q4H PRN for pain control    6. Hypothyroidism  -On levothyroxine 112 mcg    7. Hypokalemia  -Potassium at 3.1, repleted    8. Hyperlipidemia  -On pravastatin 40 mg  BRIGIDA Valladares MD  Internal Medicine Residency PGY-3  [facility], [place]

## 2024-05-21 NOTE — ASSESSMENT & PLAN NOTE
Patient presenting with leukocytosis (15.69), tachycardia (124), tachypnea (24), lactic acidosis 92.1 > 2.5) without hypotension. CTA chest PE study found no PE, with diffuse ground glass attenuation bilaterally progressed since 5/15/24 with differential including pulmonary edema and widespread pneumonia. Given dose of cefepime 2g, vancomycin 1.75g, and 2L bolus in ED. Consider pulmonary cause due to shortness of breath and findings on chest imaging. UA unremarkable. Procal 0.14 from 0.13. RP2 panel negative, Strep pneumo and legionella urine negative.     WBC today 9 from 11.11. Blood cultures no growth at 48 hours. MRSA culture also showed no growth.    Plan:  Monitor vitals for fevers, hypotension  Trend WBC  875mg PO Augmentin q12 and 500 mg azithromycin q24h for 4 days, day 1 of 4

## 2024-05-21 NOTE — TELEPHONE ENCOUNTER
Regarding: SOB  ----- Message from Ayan AQUINO sent at 5/21/2024  9:48 AM EDT -----  Patient calling recently discharged from hospital. Patient had appt this morning with PT for reassessment unable to do. Patient SOB  has oxygen turned up to 41 liters resting O2 is 94 and HR is 104. When patient walks to bathroom and back oxygen drops to 76 and HR goes up to 121. Patient not feeling well feel fatigued and dizzy. Please advise.

## 2024-05-21 NOTE — ASSESSMENT & PLAN NOTE
Initially presented with potassium of 3.1. S/P 80 mg of oral K Cl repletion in the ED. Two doses of 40 mg oral repletion K Cl yesterday. K today 4.1 from 4.0.

## 2024-05-22 ENCOUNTER — HOME CARE VISIT (OUTPATIENT)
Dept: HOME HEALTH SERVICES | Facility: HOME HEALTHCARE | Age: 54
End: 2024-05-22
Payer: MEDICARE

## 2024-05-22 ENCOUNTER — PATIENT OUTREACH (OUTPATIENT)
Dept: CASE MANAGEMENT | Facility: OTHER | Age: 54
End: 2024-05-22

## 2024-05-22 PROBLEM — E78.5 HYPERLIPIDEMIA: Status: ACTIVE | Noted: 2018-01-18

## 2024-05-22 LAB
ANION GAP SERPL CALCULATED.3IONS-SCNC: 9 MMOL/L (ref 4–13)
ANISOCYTOSIS BLD QL SMEAR: PRESENT
BASOPHILS # BLD AUTO: 0.06 THOUSANDS/ÂΜL (ref 0–0.1)
BASOPHILS NFR BLD AUTO: 1 % (ref 0–1)
BUN SERPL-MCNC: 16 MG/DL (ref 5–25)
CALCIUM SERPL-MCNC: 8.9 MG/DL (ref 8.4–10.2)
CHLORIDE SERPL-SCNC: 105 MMOL/L (ref 96–108)
CO2 SERPL-SCNC: 28 MMOL/L (ref 21–32)
CREAT SERPL-MCNC: 1.02 MG/DL (ref 0.6–1.3)
EOSINOPHIL # BLD AUTO: 0.17 THOUSAND/ÂΜL (ref 0–0.61)
EOSINOPHIL NFR BLD AUTO: 1 % (ref 0–6)
ERYTHROCYTE [DISTWIDTH] IN BLOOD BY AUTOMATED COUNT: 18.1 % (ref 11.6–15.1)
GFR SERPL CREATININE-BSD FRML MDRD: 62 ML/MIN/1.73SQ M
GLUCOSE SERPL-MCNC: 140 MG/DL (ref 65–140)
GLUCOSE SERPL-MCNC: 204 MG/DL (ref 65–140)
GLUCOSE SERPL-MCNC: 223 MG/DL (ref 65–140)
GLUCOSE SERPL-MCNC: 77 MG/DL (ref 65–140)
GLUCOSE SERPL-MCNC: 96 MG/DL (ref 65–140)
HCT VFR BLD AUTO: 35.3 % (ref 34.8–46.1)
HGB BLD-MCNC: 10.7 G/DL (ref 11.5–15.4)
IMM GRANULOCYTES # BLD AUTO: 0.23 THOUSAND/UL (ref 0–0.2)
IMM GRANULOCYTES NFR BLD AUTO: 2 % (ref 0–2)
LYMPHOCYTES # BLD AUTO: 3.45 THOUSANDS/ÂΜL (ref 0.6–4.47)
LYMPHOCYTES NFR BLD AUTO: 28 % (ref 14–44)
MAGNESIUM SERPL-MCNC: 2.2 MG/DL (ref 1.9–2.7)
MCH RBC QN AUTO: 29.9 PG (ref 26.8–34.3)
MCHC RBC AUTO-ENTMCNC: 30.3 G/DL (ref 31.4–37.4)
MCV RBC AUTO: 99 FL (ref 82–98)
MONOCYTES # BLD AUTO: 0.98 THOUSAND/ÂΜL (ref 0.17–1.22)
MONOCYTES NFR BLD AUTO: 8 % (ref 4–12)
MRSA NOSE QL CULT: NORMAL
NEUTROPHILS # BLD AUTO: 7.62 THOUSANDS/ÂΜL (ref 1.85–7.62)
NEUTS SEG NFR BLD AUTO: 60 % (ref 43–75)
NRBC BLD AUTO-RTO: 0 /100 WBCS
PHOSPHATE SERPL-MCNC: 3.5 MG/DL (ref 2.7–4.5)
PLATELET # BLD AUTO: 90 THOUSANDS/UL (ref 149–390)
PLATELET BLD QL SMEAR: ABNORMAL
PMV BLD AUTO: 11.7 FL (ref 8.9–12.7)
POTASSIUM SERPL-SCNC: 3.2 MMOL/L (ref 3.5–5.3)
PROCALCITONIN SERPL-MCNC: 0.14 NG/ML
RBC # BLD AUTO: 3.58 MILLION/UL (ref 3.81–5.12)
RBC MORPH BLD: PRESENT
SODIUM SERPL-SCNC: 142 MMOL/L (ref 135–147)
TSH SERPL DL<=0.05 MIU/L-ACNC: 0.6 UIU/ML (ref 0.45–4.5)
WBC # BLD AUTO: 12.51 THOUSAND/UL (ref 4.31–10.16)

## 2024-05-22 PROCEDURE — 97163 PT EVAL HIGH COMPLEX 45 MIN: CPT

## 2024-05-22 PROCEDURE — 99232 SBSQ HOSP IP/OBS MODERATE 35: CPT | Performed by: INTERNAL MEDICINE

## 2024-05-22 PROCEDURE — 97167 OT EVAL HIGH COMPLEX 60 MIN: CPT

## 2024-05-22 PROCEDURE — 94664 DEMO&/EVAL PT USE INHALER: CPT

## 2024-05-22 PROCEDURE — 94760 N-INVAS EAR/PLS OXIMETRY 1: CPT

## 2024-05-22 PROCEDURE — 84145 PROCALCITONIN (PCT): CPT | Performed by: STUDENT IN AN ORGANIZED HEALTH CARE EDUCATION/TRAINING PROGRAM

## 2024-05-22 PROCEDURE — 80048 BASIC METABOLIC PNL TOTAL CA: CPT | Performed by: STUDENT IN AN ORGANIZED HEALTH CARE EDUCATION/TRAINING PROGRAM

## 2024-05-22 PROCEDURE — 83735 ASSAY OF MAGNESIUM: CPT | Performed by: STUDENT IN AN ORGANIZED HEALTH CARE EDUCATION/TRAINING PROGRAM

## 2024-05-22 PROCEDURE — 93005 ELECTROCARDIOGRAM TRACING: CPT

## 2024-05-22 PROCEDURE — 82948 REAGENT STRIP/BLOOD GLUCOSE: CPT

## 2024-05-22 PROCEDURE — 84443 ASSAY THYROID STIM HORMONE: CPT | Performed by: STUDENT IN AN ORGANIZED HEALTH CARE EDUCATION/TRAINING PROGRAM

## 2024-05-22 PROCEDURE — 97116 GAIT TRAINING THERAPY: CPT

## 2024-05-22 PROCEDURE — 94640 AIRWAY INHALATION TREATMENT: CPT

## 2024-05-22 PROCEDURE — 84100 ASSAY OF PHOSPHORUS: CPT | Performed by: STUDENT IN AN ORGANIZED HEALTH CARE EDUCATION/TRAINING PROGRAM

## 2024-05-22 PROCEDURE — 85025 COMPLETE CBC W/AUTO DIFF WBC: CPT | Performed by: STUDENT IN AN ORGANIZED HEALTH CARE EDUCATION/TRAINING PROGRAM

## 2024-05-22 RX ORDER — POTASSIUM CHLORIDE 20 MEQ/1
40 TABLET, EXTENDED RELEASE ORAL ONCE
Status: COMPLETED | OUTPATIENT
Start: 2024-05-22 | End: 2024-05-22

## 2024-05-22 RX ORDER — NICOTINE 21 MG/24HR
14 PATCH, TRANSDERMAL 24 HOURS TRANSDERMAL DAILY
Status: DISCONTINUED | OUTPATIENT
Start: 2024-05-22 | End: 2024-05-24 | Stop reason: HOSPADM

## 2024-05-22 RX ORDER — LEVOTHYROXINE SODIUM 112 UG/1
112 TABLET ORAL
Status: DISCONTINUED | OUTPATIENT
Start: 2024-05-22 | End: 2024-05-24 | Stop reason: HOSPADM

## 2024-05-22 RX ADMIN — IPRATROPIUM BROMIDE 0.5 MG: 0.5 SOLUTION RESPIRATORY (INHALATION) at 08:03

## 2024-05-22 RX ADMIN — POTASSIUM CHLORIDE 40 MEQ: 1500 TABLET, EXTENDED RELEASE ORAL at 09:58

## 2024-05-22 RX ADMIN — LEVALBUTEROL HYDROCHLORIDE 1.25 MG: 1.25 SOLUTION RESPIRATORY (INHALATION) at 13:28

## 2024-05-22 RX ADMIN — POTASSIUM CHLORIDE 40 MEQ: 1500 TABLET, EXTENDED RELEASE ORAL at 09:56

## 2024-05-22 RX ADMIN — CEFTRIAXONE 1000 MG: 1 INJECTION, POWDER, FOR SOLUTION INTRAMUSCULAR; INTRAVENOUS at 18:49

## 2024-05-22 RX ADMIN — IPRATROPIUM BROMIDE 0.5 MG: 0.5 SOLUTION RESPIRATORY (INHALATION) at 19:15

## 2024-05-22 RX ADMIN — ENOXAPARIN SODIUM 40 MG: 40 INJECTION SUBCUTANEOUS at 09:56

## 2024-05-22 RX ADMIN — BUPROPION HYDROCHLORIDE 300 MG: 150 TABLET, EXTENDED RELEASE ORAL at 12:11

## 2024-05-22 RX ADMIN — LEVALBUTEROL HYDROCHLORIDE 1.25 MG: 1.25 SOLUTION RESPIRATORY (INHALATION) at 19:15

## 2024-05-22 RX ADMIN — LEVALBUTEROL HYDROCHLORIDE 1.25 MG: 1.25 SOLUTION RESPIRATORY (INHALATION) at 08:03

## 2024-05-22 RX ADMIN — LACTULOSE 20 G: 20 SOLUTION ORAL at 21:36

## 2024-05-22 RX ADMIN — INSULIN LISPRO 2 UNITS: 100 INJECTION, SOLUTION INTRAVENOUS; SUBCUTANEOUS at 18:49

## 2024-05-22 RX ADMIN — BUDESONIDE 0.5 MG: 0.5 INHALANT ORAL at 08:03

## 2024-05-22 RX ADMIN — BUDESONIDE 0.5 MG: 0.5 INHALANT ORAL at 19:15

## 2024-05-22 RX ADMIN — PRAVASTATIN SODIUM 40 MG: 40 TABLET ORAL at 18:49

## 2024-05-22 RX ADMIN — GABAPENTIN 100 MG: 100 CAPSULE ORAL at 12:11

## 2024-05-22 RX ADMIN — PANTOPRAZOLE SODIUM 40 MG: 40 TABLET, DELAYED RELEASE ORAL at 05:20

## 2024-05-22 RX ADMIN — ONDANSETRON 4 MG: 4 TABLET, ORALLY DISINTEGRATING ORAL at 23:58

## 2024-05-22 RX ADMIN — NICOTINE 14 MG: 14 PATCH, EXTENDED RELEASE TRANSDERMAL at 09:56

## 2024-05-22 RX ADMIN — GABAPENTIN 100 MG: 100 CAPSULE ORAL at 20:04

## 2024-05-22 RX ADMIN — LEVOTHYROXINE SODIUM 112 MCG: 112 TABLET ORAL at 05:20

## 2024-05-22 RX ADMIN — IPRATROPIUM BROMIDE 0.5 MG: 0.5 SOLUTION RESPIRATORY (INHALATION) at 13:28

## 2024-05-22 RX ADMIN — FAMOTIDINE 40 MG: 20 TABLET, FILM COATED ORAL at 21:36

## 2024-05-22 RX ADMIN — FOLIC ACID 1 MG: 1 TABLET ORAL at 09:56

## 2024-05-22 RX ADMIN — SERTRALINE 100 MG: 100 TABLET, FILM COATED ORAL at 21:36

## 2024-05-22 RX ADMIN — INSULIN LISPRO 2 UNITS: 100 INJECTION, SOLUTION INTRAVENOUS; SUBCUTANEOUS at 12:12

## 2024-05-22 RX ADMIN — OXYCODONE HYDROCHLORIDE 5 MG: 5 TABLET ORAL at 20:04

## 2024-05-22 RX ADMIN — OXYCODONE HYDROCHLORIDE 10 MG: 10 TABLET ORAL at 10:03

## 2024-05-22 RX ADMIN — GABAPENTIN 100 MG: 100 CAPSULE ORAL at 05:20

## 2024-05-22 NOTE — ASSESSMENT & PLAN NOTE
Lab Results   Component Value Date    HGBA1C 7.8 (H) 05/15/2024       Recent Labs     05/23/24  1656 05/23/24  2056 05/24/24  0819 05/24/24  1137   POCGLU 175* 140 173* 193*       Blood Sugar Average: Last 72 hrs:  (P) 168.9745733770246324    Patient has received 2 units of insulin total on SSI the past 24 hours. Her home regimen is 55 Lantus, and 15 Lispro with meals. Fasting glucose levels 179 from 136.    Plan:  Hold off for now on patient home regimen, continue sliding scale  Continue monitoring glucose trends

## 2024-05-22 NOTE — OCCUPATIONAL THERAPY NOTE
Occupational Therapy Evaluation     Patient Name: Chely Ruvalcaba  Today's Date: 5/22/2024  Problem List  Active Problems:    Hyperlipidemia    Hypothyroidism    Type 2 diabetes mellitus (HCC)    Centrilobular emphysema (HCC)    Hypokalemia    Bilateral pneumonia    Thrombocytopenia (HCC)    Severe sepsis (HCC)    Past Medical History  Past Medical History:   Diagnosis Date    Abnormal stress test     Acute cystitis without hematuria 05/07/2018    Acute duodenal ulcer with bleeding 01/16/2023    Allergic sinusitis     last assessed - 03Apr2017    Anemia Around december    Anxiety     last assessed - 04Mar2016    Arthritis     Asthma     last assessed - 04Mar2016    Bilateral lower extremity edema     last assessed - [id number]    Callus of foot     last assessed - 22Jun2016    Chest pain     Chronic GERD     last assessed - 16Jan2017    Colon polyp     Constipation     Resolved - 13Jan2017    COPD (chronic obstructive pulmonary disease) (HCC)     Depression     last assessed - 04Mar2016    Disease of thyroid gland     Diverticulitis of colon     last assessed - 04Mar2016    Dyspepsia     Resolved - [id number]    Edema, lower extremity 08/07/2020    Esophageal reflux     last assessed - 04Mar2016    Essential hypertriglyceridemia     last assessed - [id number]    Fatty liver 01/16/2023    Gastroesophageal reflux disease with esophagitis 11/18/2016    GERD (gastroesophageal reflux disease)     Gynecological disorder     last assessed - 04Mar2016    Hydroureteronephrosis 06/30/2022    Hypertension     last assessed - 04Mar2016    Hypokalemia 06/20/2022    Hypotension     last assessed - [id number]    Kidney stone     Left ureteral stone with hydronephrosis 07/21/2022    Migraine     Morbid obesity (HCC) 01/11/2019    Formatting of this note might be different from the original. Added automatically from request for surgery [id number]    Neuropathy     Obesity     Other chest pain 11/08/2018    Pancreatic lesion 03/13/2023     Positive depression screening 06/19/2022    Primary hypertriglyceridemia 06/19/2022    Pulmonary emphysema (HCC)     last assessed - 04Mar2016    Seasonal allergies     Severe obesity (HCC) 01/16/2023    Skin tag 07/17/2023    SOB (shortness of breath)     Strain of groin 07/17/2023    Urinary frequency     last assessed - 22Jun2016    Vagina, candidiasis     last assessed - 22Jun2016    Very heavy cigarette smoker 01/16/2023    Visual impairment      Past Surgical History  Past Surgical History:   Procedure Laterality Date    CARPAL TUNNEL RELEASE      last assessed - 04MAr2016    CHOLECYSTECTOMY      last assessed - 04Mar2016    COLONOSCOPY      Complete colonoscopy     EYE SURGERY      last assessed - 04Mar2016    FL RETROGRADE PYELOGRAM  6/16/2022    FL RETROGRADE PYELOGRAM  8/16/2022    FOOT SURGERY      last assessed - 04Mar2016    WA CYSTO BLADDER W/URETERAL CATHETERIZATION Left 7/21/2022    Procedure: CYSTOSCOPY RETROGRADE PYELOGRAM WITH INSERTION STENT URETERAL;  Surgeon: Facundo Matamoros MD;  Location: [facility];  Service: Urology    WA CYSTO/URETERO W/LITHOTRIPSY &INDWELL STENT INSRT Left 6/16/2022    Procedure: CYSTOSCOPY URETEROSCOPY WITH LITHOTRIPSY HOLMIUM LASER, RETROGRADE PYELOGRAM AND INSERTION STENT URETERAL;  Surgeon: Sammy Ferrer MD;  Location: [facility];  Service: Urology    WA CYSTO/URETERO W/LITHOTRIPSY &INDWELL STENT INSRT Left 8/16/2022    Procedure: CYSTOSCOPY USCOPE W/HOLMIUM LASER, RETROGRADE PYELOGRAM&STENT;  Surgeon: Sudheer Bradford MD;  Location: [facility];  Service: Urology    WA ESOPHAGOGASTRODUODENOSCOPY TRANSORAL DIAGNOSTIC N/A 2/13/2017    Procedure: ESOPHAGOGASTRODUODENOSCOPY (EGD);  Surgeon: Alison Saleem MD;  Location: [facility];  Service: Gastroenterology             05/22/24 0907   OT Last Visit   OT Visit Date 05/22/24   Note Type   Note type Evaluation   Pain Assessment   Pain Assessment Tool 0-10   Pain Score 8   Pain Location/Orientation Location: [facility]  "Pain Intervention(s) Repositioned;Ambulation/increased activity   Restrictions/Precautions   Weight Bearing Precautions Per Order No   Other Precautions Chair Alarm;Bed Alarm;Multiple lines;Fall Risk;O2;Pain  (3-4LO2)   Home Living   Type of Home Apartment   Home Layout One level  (0STE)   Bathroom Shower/Tub Walk-in shower   Bathroom Toilet Standard   Bathroom Equipment Grab bars in shower;Shower chair   Home Equipment Walker;Cane  (+ rollator. Did not use PTA)   Additional Comments 3LO2 at baseline   Prior Function   Level of [place] Independent with ADLs;Needs assistance with IADLS   Lives With Friend(s)  (2 friends)   Receives Help From Friend(s)   IADLs Family/Friend/Other provides transportation;Family/Friend/Other provides meals;Independent with medication management   Falls in the last 6 months 0   Vocational On disability   Lifestyle   Autonomy PTA, pt reports being I with ADLs, friends A with laundry and meals, I fnxl mobility, (-)    Reciprocal Relationships Friends who she lives with, one is home during the day to assist as needed   Service to Others On disability   Intrinsic Gratification Fishing/camping   Subjective   Subjective \"It wasn't just the breathing...it was the dizziness and the nausea\"   ADL   Where Assessed Edge of bed   Eating Assistance 7  Independent   Grooming Assistance 5  Supervision/Setup   UB Bathing Assistance 5  Supervision/Setup   LB Bathing Assistance 4  Minimal Assistance   UB Dressing Assistance 5  Supervision/Setup   LB Dressing Assistance 4  Minimal Assistance   Toileting Assistance  4  Minimal Assistance   Bed Mobility   Supine to Sit Unable to assess   Sit to Supine Unable to assess   Additional Comments Pt found seated EOB upon arrival   Transfers   Sit to Stand 4  Minimal assistance   Additional items Assist x 1;Increased time required   Stand to Sit 4  Minimal assistance   Additional items Assist x 1;Increased time required   Additional Comments transfers w/o " AD   Functional Mobility   Functional Mobility 4  Minimal assistance   Balance   Static Sitting Good   Dynamic Sitting Fair +   Static Standing Fair   Dynamic Standing Fair -   Ambulatory Poor +   Activity Tolerance   Activity Tolerance Patient limited by fatigue  (+ SOB)   Medical Staff Made Aware PT Nelida   Nurse Made Aware RN clearance for session   RUE Assessment   RUE Assessment WFL   LUE Assessment   LUE Assessment WFL   Vision-Basic Assessment   Current Vision Wears glasses all the time   Cognition   Overall Cognitive Status WFL   Arousal/Participation Responsive;Cooperative   Attention Attends with cues to redirect   Orientation Level Oriented X4   Following Commands Follows one step commands with increased time or repetition   Comments Pt pleasant and cooperative t/o session   Assessment   Limitation Decreased ADL status;Decreased UE strength;Decreased endurance;Decreased self-care trans;Decreased high-level ADLs   Prognosis Fair   Assessment Pt is a 53 y.o. female admitted to \[facility]\"" on 5/21/2024 w/ COPD exacerbation.  has a past medical history of Acute cystitis, Acute duodenal ulcer, Anemia, Anxiety, Arthritis, Asthma, Callus of foot, GERD, Colon polyp, COPD, Depression, Disease of thyroid gland, Diverticulitis of colon, Dyspepsia, Edema, Essential hypertriglyceridemia, Hydroureteronephrosis, Hypertension, Hypokalemia (06/20/2022), Hypotension, Kidney stone, Left ureteral stone with hydronephrosis, Morbid obesity, Neuropathy, Pancreatic lesion, Pulmonary emphysema, Urinary frequency, and candidiasis. Pt with active OT orders and up and OOB as tolerated orders. Pt seen as a co-evaluation with PT due to the patient's co-morbidities, clinically unstable presentation/clinical complexity, and present impairments. As per pt report, pta, [facility] Pt was I w/  ADLS and has A with IADLS, (-) drove. Upon evaluation, pt currently requires MIN A for transfers and mobility. Pt currently  requires I eating, S grooming, S UB ADLs, MIN A LB ADLs, and MIN A toileting. Pt limited by SOB and decreased activity tolerance/endurance. Requires seated rest breaks between tasks. Pt desats to ~ 81% during activity on 3L, requires increased O2 to 4L and prolonged time to recover. . Pt is limited at this time 2*: pain, endurance, activity tolerance, functional mobility, balance, functional standing tolerance, decreased I w/ ADLS/IADLS, and strength.The following Occupational Performance Areas to address include: grooming, bathing/shower, toilet hygiene, dressing, health maintenance, functional mobility, community mobility, and clothing management. Pt to benefit from immediate acute skilled OT to address above deficits, improve overall functional independence, maximize fnxl mobility and reduce caregiver burden. From OT standpoint, recommendation at time of d/c would be home with skilled therapy.  Pt was left after session with all current needs met. The patient's raw score on the AM-PAC Daily Activity Inpatient Short Form is 19. A raw score of greater than or equal to 19 suggests the patient may benefit from discharge to home. Please refer to the recommendation of the Occupational Therapist for safe discharge planning.   Goals   LTG Time Frame 10-14   Plan   Treatment Interventions ADL retraining;Functional transfer training;UE strengthening/ROM;Endurance training;Patient/family training;Equipment evaluation/education;Compensatory technique education;Continued evaluation;Energy conservation;Activityengagement   Goal Expiration Date 06/05/24   OT Frequency 2-3x/wk   Discharge Recommendation   Rehab Resource Intensity Level, OT III (Minimum Resource Intensity)   AM-PAC Daily Activity Inpatient   Lower Body Dressing 3   Bathing 3   Toileting 3   Upper Body Dressing 3   Grooming 3   Eating 4   Daily Activity Raw Score 19   Daily Activity Standardized Score (Calc for Raw Score >=11) 40.22   AM-PAC Applied  Cognition Inpatient   Following a Speech/Presentation 4   Understanding Ordinary Conversation 4   Taking Medications 4   Remembering Where Things Are Placed or Put Away 4   Remembering List of 4-5 Errands 3   Taking Care of Complicated Tasks 3   Applied Cognition Raw Score 22   Applied Cognition Standardized Score 47.83     GOALS    - Pt will complete UB dressing/self care/bathing w/ mod I using adaptive device and DME as needed    - Pt will complete LB dressing/self care/bathing w/ mod I using adaptive device and DME as needed    - Pt will complete toileting w/ mod I w/ G hygiene/thoroughness using DME as needed    - Pt will improve functional transfers to Mod I on/off all surfaces using DME as needed w/ G balance/safety     - Pt will improve functional mobility during ADL/IADL/leisure tasks to Mod I using DME as needed w/ G balance/safety     - Pt will demonstrate G carryover of pt/caregiver education and training as appropriate.    - Pt will demonstrate 100% carryover of energy conservation techniques t/o functional I/ADL/leisure tasks w/o cues s/p skilled education    - Pt will engage in ongoing cognitive assessment w/ G participation to assist w/ safe d/c planning/recommendations    - Pt will increase static and dynamic standing/sitting balance to F+  using AD/DME as needed to increase safety and I during fnxl transfers and ADLs    - Pt will maintain upright sitting position for at least 20 min during dynamic fnxl activity with F+  balance and endurance to improve ADL performance and independence, as well as reduce risk of falls       Esther Szymanski MS, OTR/L

## 2024-05-22 NOTE — ASSESSMENT & PLAN NOTE
Patient reports intermittent heartburn and follows with outpatient GI.    Plan:  Cont Pepcid 40mg  Cont. Omeprazole 40mg  FU with GI outpatient

## 2024-05-22 NOTE — ASSESSMENT & PLAN NOTE
"Pt presents with cirrhosis secondary to NAFLD. Currently compensated.     MRI 10/23: displaying hepatic cirrhosis with fatty deposition and splenomegaly which may indicate portal hypertension. No ascites, no leg swelling.    Per GI note on 5/6 \"abnormal ultrasound elastography revealing F4 fibrosis, nodular liver on imaging and thrombocytopenia. Likely etiology for cirrhosis is metabolic associated steatotic liver disease. Her MELD 3.0 based upon labs from March/April is 11\"     Labs from 2023 showed normal anti-smooth muscle antibody, ceruloplasmin, antimicrosomal antibody, Protime-INR,folate, B12, AFP. Labs also showed elevated alpha-1 antitrypsin and IgM. GI noted \"Blood test for rare causes of liver disease are negative thus far\"     CT abdomen/pelvis 5/15 showed splenomegaly and portal hypertension supporting cirrhosis. CT AP PE 5/21 also showed cirrhosis with mildly enlarged spleen. LFTs showed AST of 22, ALT of 18, ALP of 64 with 1.18 Tbili.    MELD 3.0: 12 at 5/23/2024  5:06 AM  MELD-Na: 11 at 5/23/2024  5:06 AM  Calculated from:  Serum Creatinine: 0.94 mg/dL (Using min of 1 mg/dL) at 5/23/2024  5:06 AM  Serum Sodium: 141 mmol/L (Using max of 137 mmol/L) at 5/23/2024  5:06 AM  Total Bilirubin: 1.59 mg/dL at 5/21/2024 12:24 PM  Serum Albumin: 3.7 g/dL (Using max of 3.5 g/dL) at 5/21/2024 12:24 PM  INR(ratio): 1.31 at 5/21/2024 12:24 PM  Age at listing (hypothetical): 53 years  Sex: Female at 5/23/2024  5:06 AM    Plan:  Continue to f/u outpatient with GI  No acute management while inpatient. Continue lasix to optimize fluid balance  Consider starting home aldactone once hypokalemia corrects and if symptoms such as LE edema, ascites develop  "

## 2024-05-22 NOTE — RESPIRATORY THERAPY NOTE
RT Protocol Note  Chely Ruvalcaba 53 y.o. female MRN: [id number]  Unit/Bed#: -01 Encounter: [id number]    Assessment    Active Problems:    Hypokalemia    Bilateral pneumonia    Thrombocytopenia (HCC)    Severe sepsis ([facility])      Home Pulmonary Medications:  Pulmicort BID/ DubNeb 4x's daily, PRN Albuterol   Home Devices/Therapy: (P) Home O2    Past Medical History:   Diagnosis Date    Abnormal stress test     Acute cystitis without hematuria 05/07/2018    Acute duodenal ulcer with bleeding 01/16/2023    Allergic sinusitis     last assessed - 03Apr2017    Anemia Around december    Anxiety     last assessed - 04Mar2016    Arthritis     Asthma     last assessed - 04Mar2016    Bilateral lower extremity edema     last assessed - 12May2017    Callus of foot     last assessed - 22Jun2016    Chest pain     Chronic GERD     last assessed - 16Jan2017    Colon polyp     Constipation     Resolved - 13Jan2017    COPD (chronic obstructive pulmonary disease) ([facility])     Depression     last assessed - 04Mar2016    Disease of thyroid gland     Diverticulitis of colon     last assessed - 04Mar2016    Dyspepsia     Resolved - [id number]    Edema, lower extremity 08/07/2020    Esophageal reflux     last assessed - 04Mar2016    Essential hypertriglyceridemia     last assessed - [id number]    Fatty liver 01/16/2023    Gastroesophageal reflux disease with esophagitis 11/18/2016    GERD (gastroesophageal reflux disease)     Gynecological disorder     last assessed - 04Mar2016    Hydroureteronephrosis 06/30/2022    Hypertension     last assessed - 04Mar2016    Hypokalemia 06/20/2022    Hypotension     last assessed - [id number]    Kidney stone     Left ureteral stone with hydronephrosis 07/21/2022    Migraine     Morbid obesity (HCC) 01/11/2019    Formatting of this note might be different from the original. Added automatically from request for surgery [id number]    Neuropathy     Obesity     Other chest pain 11/08/2018    Pancreatic lesion  03/13/2023    Positive depression screening 06/19/2022    Primary hypertriglyceridemia 06/19/2022    Pulmonary emphysema (HCC)     last assessed - 04Mar2016    Seasonal allergies     Severe obesity (HCC) 01/16/2023    Skin tag 07/17/2023    SOB (shortness of breath)     Strain of groin 07/17/2023    Urinary frequency     last assessed - 22Jun2016    Vagina, candidiasis     last assessed - 22Jun2016    Very heavy cigarette smoker 01/16/2023    Visual impairment      Social History     Socioeconomic History    Marital status:      Spouse name: Not on file    Number of children: Not on file    Years of education: Not on file    Highest education level: Not on file   Occupational History    Not on file   Tobacco Use    Smoking status: Some Days     Current packs/day: 0.50     Average packs/day: 0.5 packs/day for 41.4 years (20.7 ttl pk-yrs)     Types: Cigarettes     Start date: 1/1/1983    Smokeless tobacco: Never   Vaping Use    Vaping status: Former    Start date: 1/1/2019    Quit date: 5/1/2019    Substances: Nicotine   Substance and Sexual Activity    Alcohol use: Not Currently    Drug use: Never    Sexual activity: Not Currently     Partners: Male     Birth control/protection: None   Other Topics Concern    Not on file   Social History Narrative    Not on file     Social Determinants of Health     Financial Resource Strain: Not on file   Food Insecurity: No Food Insecurity (5/14/2024)    Hunger Vital Sign     Worried About Running Out of Food in the Last Year: Never true     Ran Out of Food in the Last Year: Never true   Transportation Needs: No Transportation Needs (5/14/2024)    PRAPARE - Transportation     Lack of Transportation (Medical): No     Lack of Transportation (Non-Medical): No   Physical Activity: Not on file   Stress: Not on file   Social Connections: Not on file   Intimate Partner Violence: Not on file   Housing Stability: Low Risk  (5/14/2024)    Housing Stability Vital Sign     Unable to  "Pay for Housing in the Last Year: No     Number of Places Lived in the Last Year: 1     Unstable Housing in the Last Year: No       Subjective         Objective    Physical Exam:   Assessment Type: (P) Assess only  General Appearance: (P) Awake  Respiratory Pattern: (P) Dyspnea with exertion, Dyspnea at rest  Chest Assessment: (P) Chest expansion symmetrical  Bilateral Breath Sounds: (P) Diminished, Expiratory wheezes  O2 Device: (P) NC    Vitals:  Blood pressure 121/69, pulse 95, temperature 98.4 °F (36.9 °C), temperature source Temporal, resp. rate 20, height 5' 7\" (1.702 m), weight 116 kg (255 lb), last menstrual period 04/05/2018, SpO2 92%.          Imaging and other studies: I have personally reviewed pertinent reports.      O2 Device: (P) NC     Plan    Respiratory Plan: (P) Mild Distress pathway        Resp Comments: (P) Pt ordered on txs/protocol at this time. PT came back into hospital with increased WOB/SOB. Pt has hx of COPD/Asthma and takes Txs at home. Duo nebs 4xs daily/Pulmicor BID with Albuterol PRN MDI. Pt currently is slightly labored breathing and is on 3L O2 sating 91%. PT wears Home o2 of 3L at baseline. WIll make pt TID txs per protocol with MDI Albuterol PRn.   "

## 2024-05-22 NOTE — ED PROVIDER NOTES
"History  Chief Complaint   Patient presents with    Shortness of Breath     Reports it has been harder to breathe today. C/o dizziness, \"heart is racing and pulse is dropping\"  Chronically on 2-3 LNC but now is on 4 LNC satting @ 90%     Patient is a 53-year-old female with history of COPD on chronic 2 to 3 L at home, CHF, diabetes, hypertension, and hyperlipidemia presents for shortness of breath.  Patient reports that for the past day she has been having hard time breathing with increased wheezing and heart palpitations.  She says that at home she is on 2 to 3 L of nasal cannula but now is requiring 4 L nasal cannula to keep her oxygen saturations at 90%.  She also states that she is feeling \"dizzy\".  Patient was recently admitted to this hospital for hypokalemia and COPD exacerbation.  She also reports that she was recently admitted in March to a hospital in [place] for another lung problem.  She says she has been using her nebulizer treatments at home as prescribed.        Prior to Admission Medications   Prescriptions Last Dose Informant Patient Reported? Taking?   Insulin Pen Needle 31G X 5 MM MISC   No No   Sig: Inject under the skin if needed (Insuline injections)   albuterol (2.5 mg/3 mL) 0.083 % nebulizer solution   No No   Sig: Take 3 mL (2.5 mg total) by nebulization every 6 (six) hours as needed for wheezing or shortness of breath   albuterol (PROVENTIL HFA,VENTOLIN HFA) 90 mcg/act inhaler   No No   Sig: Inhale 2 puffs every 6 (six) hours as needed for wheezing   Patient not taking: Reported on 5/14/2024   amoxicillin-clavulanate (AUGMENTIN) 875-125 mg per tablet   No No   Sig: Take 1 tablet by mouth every 12 (twelve) hours for 5 days   Patient not taking: Reported on 5/19/2024   buPROPion (WELLBUTRIN XL) 300 mg 24 hr tablet   No No   Sig: Take 1 tablet (300 mg total) by mouth every morning   budesonide (PULMICORT) 0.5 mg/2 mL nebulizer solution   Yes No   Sig: Take 0.5 mg by nebulization 2 (two) times a " day Rinse mouth after use.   diazepam (VALIUM) 5 mg tablet   No No   Sig: Take 1 tablet (5 mg total) by mouth daily at bedtime as needed for anxiety   Patient taking differently: Take 5 mg by mouth daily at bedtime as needed for anxiety   ergocalciferol (VITAMIN D2) 50,000 units  Self No No   Sig: Take 1 capsule (50,000 Units total) by mouth every 14 (fourteen) days   famotidine (PEPCID) 40 MG tablet   No No   Sig: Take 1 tablet (40 mg total) by mouth daily at bedtime Take in evening 2 hours prior to bed   ferrous sulfate 324 (65 Fe) mg   No No   Sig: Take 1 tablet (324 mg total) by mouth daily before breakfast   folic acid (FOLVITE) 1 mg tablet   No No   Sig: Take 1 tablet (1 mg total) by mouth daily   furosemide (LASIX) 40 mg tablet   No No   Sig: Take 1 tablet (40 mg total) by mouth daily   gabapentin (NEURONTIN) 100 mg capsule  Self Yes No   Sig: Take 100 mg by mouth every 8 (eight) hours   ipratropium-albuterol (DUO-NEB) 0.5-2.5 mg/3 mL nebulizer solution   No No   Sig: Take 3 mL by nebulization 4 (four) times a day   lactulose (CHRONULAC) 10 g/15 mL solution   Yes No   Sig: Take 20 g by mouth Daily or as needed for bowl movement   levothyroxine 112 mcg tablet   No No   Sig: Take 1 tablet (112 mcg total) by mouth daily   lisinopril (ZESTRIL) 5 mg tablet   No No   Sig: Take 1 tablet (5 mg total) by mouth daily   Patient not taking: Reported on 5/19/2024   metroNIDAZOLE (FLAGYL) 500 mg tablet   No No   Sig: Take 1 tablet (500 mg total) by mouth every 8 (eight) hours for 5 days   Patient not taking: Reported on 5/19/2024   nicotine (NICODERM CQ) 21 mg/24 hr TD 24 hr patch   No No   Sig: Place 1 patch on the skin over 24 hours every 24 hours   omeprazole (PriLOSEC) 40 MG capsule   No No   Sig: take 1 capsule by mouth twice a day   ondansetron (ZOFRAN-ODT) 4 mg disintegrating tablet   No No   Sig: Take 1 tablet (4 mg total) by mouth every 12 (twelve) hours as needed for nausea or vomiting   oxyCODONE-acetaminophen  (PERCOCET)  mg per tablet  Self Yes No   Sig: Take 1 tablet by mouth every 6 (six) hours as needed for severe pain   oxygen gas   Yes No   Sig: Inhale 2 L/min continuous. May use 3L with exertion per Bella ALVAREZ  Keep sat >88%  Indications: low saturation   predniSONE 10 mg tablet   Yes No   Sig: Take 10 mg by mouth daily   sertraline (ZOLOFT) 100 mg tablet   No No   Sig: take 1 tablet by mouth twice a day   simvastatin (ZOCOR) 20 mg tablet   No No   Sig: Take 1 tablet (20 mg total) by mouth daily at bedtime   spironolactone (ALDACTONE) 25 mg tablet   No No   Sig: Take 2 tablets (50 mg total) by mouth daily   Patient not taking: Reported on 4/30/2024   sucralfate (CARAFATE) 1 g tablet   No No   Sig: Take 1 tablet (1 g total) by mouth daily at bedtime   sulfamethoxazole-trimethoprim (BACTRIM DS) 800-160 mg per tablet   No No   Sig: Take 1 tablet by mouth 3 (three) times a week for 28 days   varenicline (CHANTIX) 0.5 mg tablet   No No   Sig: Take 1 tablet (0.5 mg total) by mouth 2 (two) times a day      Facility-Administered Medications: None       Past Medical History:   Diagnosis Date    Abnormal stress test     Acute cystitis without hematuria 05/07/2018    Acute duodenal ulcer with bleeding 01/16/2023    Allergic sinusitis     last assessed - 03Apr2017    Anemia Around december    Anxiety     last assessed - 04Mar2016    Arthritis     Asthma     last assessed - 04Mar2016    Bilateral lower extremity edema     last assessed - [id number]    Callus of foot     last assessed - 22Jun2016    Chest pain     Chronic GERD     last assessed - 16Jan2017    Colon polyp     Constipation     Resolved - 13Jan2017    COPD (chronic obstructive pulmonary disease) (HCC)     Depression     last assessed - 04Mar2016    Disease of thyroid gland     Diverticulitis of colon     last assessed - 04Mar2016    Dyspepsia     Resolved - [id number]    Edema, lower extremity 08/07/2020    Esophageal reflux     last assessed - 04Mar2016     Essential hypertriglyceridemia     last assessed - [id number]    Fatty liver 01/16/2023    Gastroesophageal reflux disease with esophagitis 11/18/2016    GERD (gastroesophageal reflux disease)     Gynecological disorder     last assessed - 04Mar2016    Hydroureteronephrosis 06/30/2022    Hypertension     last assessed - 04Mar2016    Hypokalemia 06/20/2022    Hypotension     last assessed - [id number]    Kidney stone     Left ureteral stone with hydronephrosis 07/21/2022    Migraine     Morbid obesity (HCC) 01/11/2019    Formatting of this note might be different from the original. Added automatically from request for surgery [id number]    Neuropathy     Obesity     Other chest pain 11/08/2018    Pancreatic lesion 03/13/2023    Positive depression screening 06/19/2022    Primary hypertriglyceridemia 06/19/2022    Pulmonary emphysema (HCC)     last assessed - 04Mar2016    Seasonal allergies     Severe obesity (HCC) 01/16/2023    Skin tag 07/17/2023    SOB (shortness of breath)     Strain of groin 07/17/2023    Urinary frequency     last assessed - 22Jun2016    Vagina, candidiasis     last assessed - 22Jun2016    Very heavy cigarette smoker 01/16/2023    Visual impairment        Past Surgical History:   Procedure Laterality Date    CARPAL TUNNEL RELEASE      last assessed - 04MAr2016    CHOLECYSTECTOMY      last assessed - 04Mar2016    COLONOSCOPY      Complete colonoscopy     EYE SURGERY      last assessed - 04Mar2016    FL RETROGRADE PYELOGRAM  6/16/2022    FL RETROGRADE PYELOGRAM  8/16/2022    FOOT SURGERY      last assessed - 04Mar2016    VT CYSTO BLADDER W/URETERAL CATHETERIZATION Left 7/21/2022    Procedure: CYSTOSCOPY RETROGRADE PYELOGRAM WITH INSERTION STENT URETERAL;  Surgeon: Facundo Matamoros MD;  Location: [facility];  Service: Urology    VT CYSTO/URETERO W/LITHOTRIPSY &INDWELL STENT INSRT Left 6/16/2022    Procedure: CYSTOSCOPY URETEROSCOPY WITH LITHOTRIPSY HOLMIUM LASER, RETROGRADE PYELOGRAM AND INSERTION  STENT URETERAL;  Surgeon: Sammy Ferrer MD;  Location: [facility];  Service: Urology    AK CYSTO/URETERO W/LITHOTRIPSY &INDWELL STENT INSRT Left 8/16/2022    Procedure: CYSTOSCOPY USCOPE W/HOLMIUM LASER, RETROGRADE PYELOGRAM&STENT;  Surgeon: Sudheer Bradford MD;  Location: [facility];  Service: Urology    AK ESOPHAGOGASTRODUODENOSCOPY TRANSORAL DIAGNOSTIC N/A 2/13/2017    Procedure: ESOPHAGOGASTRODUODENOSCOPY (EGD);  Surgeon: Alison Saleem MD;  Location: [facility];  Service: Gastroenterology       Family History   Problem Relation Age of Onset    Obesity Mother     Thyroid disease Mother     Cancer Mother 71    Vision loss Mother     Obesity Sister     Coronary artery disease Sister     Stroke Sister     Asthma Sister     Glaucoma Sister     No Known Problems Maternal Aunt     Diabetes Maternal Uncle     Lung cancer Maternal Grandmother     Cancer Maternal Grandfather     Diabetes Maternal Grandfather     No Known Problems Paternal Grandmother     No Known Problems Paternal Grandfather     Hypertension Other     Lung cancer Other     Hypertension Other     Lung cancer Other     Lung cancer Other      I have reviewed and agree with the history as documented.    E-Cigarette/Vaping    E-Cigarette Use Former User     Start Date 1/1/19     Quit Date 5/1/19      E-Cigarette/Vaping Substances    Nicotine Yes     THC No     CBD No     Flavoring No     Other No     Unknown No      Social History     Tobacco Use    Smoking status: Some Days     Current packs/day: 0.50     Average packs/day: 0.5 packs/day for 41.4 years (20.7 ttl pk-yrs)     Types: Cigarettes     Start date: 1/1/1983    Smokeless tobacco: Never   Vaping Use    Vaping status: Former    Start date: 1/1/2019    Quit date: 5/1/2019    Substances: Nicotine   Substance Use Topics    Alcohol use: Not Currently    Drug use: Never        Review of Systems   Constitutional:  Negative for chills and fever.   HENT:  Negative for congestion, rhinorrhea and sore throat.     Eyes:  Negative for pain and visual disturbance.   Respiratory:  Positive for cough, shortness of breath and wheezing.    Cardiovascular:  Positive for palpitations. Negative for chest pain.   Gastrointestinal:  Negative for abdominal pain, constipation, diarrhea, nausea and vomiting.   Genitourinary:  Negative for dysuria and hematuria.   Musculoskeletal:  Negative for back pain and neck pain.   Skin:  Negative for color change and rash.   Neurological:  Negative for weakness and numbness.   All other systems reviewed and are negative.      Physical Exam  ED Triage Vitals   Temperature Pulse Respirations Blood Pressure SpO2   05/21/24 1126 05/21/24 1126 05/21/24 1126 05/21/24 1127 05/21/24 1126   98.4 °F (36.9 °C) (!) 124 (!) 24 123/66 90 %      Temp Source Heart Rate Source Patient Position - Orthostatic VS BP Location FiO2 (%)   05/21/24 1126 05/21/24 1126 05/21/24 1415 05/21/24 1415 --   Temporal Monitor Lying Left arm       Pain Score       05/21/24 1822       10 - Worst Possible Pain             Orthostatic Vital Signs  Vitals:    05/21/24 1600 05/21/24 1910 05/21/24 2109 05/21/24 2344   BP: 120/51 103/54 121/69 98/54   Pulse: (!) 108 102 95 93   Patient Position - Orthostatic VS: Sitting   Lying       Physical Exam  Vitals and nursing note reviewed.   Constitutional:       General: She is not in acute distress.     Appearance: Normal appearance. She is well-developed. She is obese. She is ill-appearing. She is not toxic-appearing or diaphoretic.   HENT:      Head: Normocephalic and atraumatic.      Right Ear: External ear normal.      Left Ear: External ear normal.      Nose: Nose normal. No congestion or rhinorrhea.      Mouth/Throat:      Mouth: Mucous membranes are moist.      Pharynx: Oropharynx is clear. No oropharyngeal exudate or posterior oropharyngeal erythema.   Eyes:      General: No scleral icterus.        Right eye: No discharge.         Left eye: No discharge.      Extraocular Movements:  Extraocular movements intact.      Conjunctiva/sclera: Conjunctivae normal.      Pupils: Pupils are equal, round, and reactive to light.   Cardiovascular:      Rate and Rhythm: Regular rhythm. Tachycardia present.      Pulses: Normal pulses.      Heart sounds: Normal heart sounds. No murmur heard.     No friction rub. No gallop.   Pulmonary:      Effort: Pulmonary effort is normal. Tachypnea present. No respiratory distress.      Breath sounds: No stridor. Decreased breath sounds and wheezing present. No rhonchi or rales.   Abdominal:      General: Abdomen is flat. Bowel sounds are normal. There is no distension.      Palpations: Abdomen is soft.      Tenderness: There is abdominal tenderness (Mild diffuse). There is no right CVA tenderness, guarding or rebound.   Musculoskeletal:         General: No tenderness.      Cervical back: Neck supple.      Right lower leg: No edema.      Left lower leg: No edema.   Skin:     General: Skin is warm and dry.      Capillary Refill: Capillary refill takes less than 2 seconds.      Coloration: Skin is not jaundiced or pale.   Neurological:      General: No focal deficit present.      Mental Status: She is alert and oriented to person, place, and time.      Sensory: No sensory deficit.   Psychiatric:         Mood and Affect: Mood normal.         Behavior: Behavior normal.         ED Medications  Medications   enoxaparin (LOVENOX) subcutaneous injection 40 mg (has no administration in time range)   insulin lispro (HumALOG/ADMELOG) 100 units/mL subcutaneous injection 1-6 Units (2 Units Subcutaneous Given 5/21/24 1752)   insulin lispro (HumALOG/ADMELOG) 100 units/mL subcutaneous injection 1-5 Units (1 Units Subcutaneous Given 5/21/24 2131)   ceftriaxone (ROCEPHIN) 1 g/50 mL in dextrose IVPB (0 mg Intravenous Stopped 5/21/24 1810)   oxyCODONE (ROXICODONE) IR tablet 5 mg ( Oral See Alternative 5/21/24 1822)     Or   oxyCODONE (ROXICODONE) immediate release tablet 10 mg (10 mg Oral  Given 5/21/24 1822)   buPROPion (WELLBUTRIN XL) 24 hr tablet 300 mg (has no administration in time range)   diazepam (VALIUM) tablet 5 mg (has no administration in time range)   ergocalciferol (VITAMIN D2) capsule 50,000 Units (50,000 Units Oral Not Given 5/21/24 2022)   famotidine (PEPCID) tablet 40 mg (40 mg Oral Given 5/21/24 2130)   folic acid (FOLVITE) tablet 1 mg (has no administration in time range)   gabapentin (NEURONTIN) capsule 100 mg (100 mg Oral Given 5/22/24 0520)   lactulose (CHRONULAC) oral solution 20 g (has no administration in time range)   pantoprazole (PROTONIX) EC tablet 40 mg (40 mg Oral Given 5/22/24 0520)   ondansetron (ZOFRAN-ODT) dispersible tablet 4 mg (has no administration in time range)   sertraline (ZOLOFT) tablet 100 mg (has no administration in time range)   pravastatin (PRAVACHOL) tablet 40 mg (40 mg Oral Given 5/21/24 2022)   levalbuterol (XOPENEX) inhalation solution 1.25 mg (has no administration in time range)   ipratropium (ATROVENT) 0.02 % inhalation solution 0.5 mg (has no administration in time range)   albuterol (PROVENTIL HFA,VENTOLIN HFA) inhaler 2 puff (has no administration in time range)   budesonide (PULMICORT) inhalation solution 0.5 mg (has no administration in time range)   levothyroxine tablet 112 mcg (112 mcg Oral Given 5/22/24 0520)   nicotine (NICODERM CQ) 14 mg/24hr TD 24 hr patch 14 mg (has no administration in time range)   albuterol inhalation solution 10 mg (10 mg Nebulization Given 5/21/24 1158)   ipratropium (ATROVENT) 0.02 % inhalation solution 1 mg (1 mg Nebulization Given 5/21/24 1158)   sodium chloride 0.9 % inhalation solution 12 mL (12 mL Nebulization Given 5/21/24 1158)   ondansetron (ZOFRAN-ODT) dispersible tablet 4 mg (4 mg Oral Given 5/21/24 1202)   cefepime (MAXIPIME) 2 g/50 mL dextrose IVPB (0 mg Intravenous Stopped 5/21/24 1433)   vancomycin (VANCOCIN) 1,750 mg in sodium chloride 0.9 % 500 mL IVPB (0 mg Intravenous Stopped 5/21/24 1656)    potassium chloride (Klor-Con M20) CR tablet 40 mEq (40 mEq Oral Given 5/21/24 1329)   iohexol (OMNIPAQUE) 350 MG/ML injection (MULTI-DOSE) 100 mL (100 mL Intravenous Given 5/21/24 1457)   multi-electrolyte (ISOLYTE-S PH 7.4) bolus 1,000 mL (0 mL Intravenous Stopped 5/21/24 1630)   multi-electrolyte (ISOLYTE-S PH 7.4) bolus 1,000 mL (1,000 mL Intravenous New Bag 5/21/24 1630)   potassium chloride (Klor-Con M20) CR tablet 40 mEq (40 mEq Oral Given 5/21/24 1745)       Diagnostic Studies  Results Reviewed       Procedure Component Value Units Date/Time    Basic metabolic panel [[id number]] Collected: 05/22/24 0525    Lab Status: In process Specimen: Blood from Arm, Right Updated: 05/22/24 0620    Magnesium [[id number]] Collected: 05/22/24 0525    Lab Status: In process Specimen: Blood from Arm, Right Updated: 05/22/24 0620    Phosphorus [[id number]] Collected: 05/22/24 0525    Lab Status: In process Specimen: Blood from Arm, Right Updated: 05/22/24 0620    Procalcitonin, Next Day AM Collection [[id number]] Collected: 05/22/24 0525    Lab Status: In process Specimen: Blood from Arm, Right Updated: 05/22/24 0620    CBC and differential [[id number]] Collected: 05/22/24 0525    Lab Status: In process Specimen: Blood from Arm, Right Updated: 05/22/24 0620    Respiratory Panel 2.1(RP2)with COVID19 [[id number]]  (Normal) Collected: 05/21/24 1815    Lab Status: Final result Specimen: Nasopharyngeal Swab Updated: 05/21/24 2305     Adenovirus Not Detected     Bordetella parapertussis Not Detected     Bordetella pertussis Not Detected     Chlamydia pneumoniae Not Detected     SARS-CoV-2 Not Detected     Coronavirus 229E Not Detected     Coronavirus HKU1 Not Detected     Coronavirus NL63 Not Detected     Coronavirus OC43 Not Detected     Human Metapneumovirus Not Detected     Rhino/Enterovirus Not Detected     Influenza A Not Detected     Influenza B Not Detected     Mycoplasma pneumoniae Not Detected     Parainfluenza 1 Not Detected      Parainfluenza 2 Not Detected     Parainfluenza 3 Not Detected     Parainfluenza 4 Not Detected     Respiratory Syncytial Virus Not Detected    Strep Pneumoniae, Urine [[id number]]  (Normal) Collected: 05/21/24 1815    Lab Status: Final result Specimen: Urine, Clean Catch Updated: 05/21/24 2022     Strep pneumoniae antigen, urine Negative    Legionella antigen, Urine [[id number]]  (Normal) Collected: 05/21/24 1815    Lab Status: Final result Specimen: Urine, Clean Catch Updated: 05/21/24 2022     Legionella Urinary Antigen Negative    Fingerstick Glucose (POCT) [[id number]]  (Abnormal) Collected: 05/21/24 1751    Lab Status: Final result Specimen: Blood Updated: 05/21/24 1758     POC Glucose 196 mg/dl     Sputum culture and Gram stain [[id number]]     Lab Status: No result Specimen: Sputum     Platelet count [[id number]]     Lab Status: No result Specimen: Blood     Blood culture #1 [[id number]] Collected: 05/21/24 1224    Lab Status: Preliminary result Specimen: Blood from Arm, Left Updated: 05/21/24 1601     Blood Culture Received in Microbiology Lab. Culture in Progress.    Blood culture #2 [[id number]] Collected: 05/21/24 1224    Lab Status: Preliminary result Specimen: Blood from Arm, Right Updated: 05/21/24 1601     Blood Culture Received in Microbiology Lab. Culture in Progress.    MRSA culture [[id number]] Collected: 05/21/24 1529    Lab Status: In process Specimen: Nares from Nose Updated: 05/21/24 1532    Lactic acid 2 Hours [[id number]]  (Abnormal) Collected: 05/21/24 1435    Lab Status: Final result Specimen: Blood from Arm, Right Updated: 05/21/24 1512     LACTIC ACID 2.5 mmol/L     Narrative:      Result may be elevated if tourniquet was used during collection.    POCT pregnancy, urine [[id number]]  (Normal) Resulted: 05/21/24 1356    Lab Status: Final result Updated: 05/21/24 1356     EXT Preg Test, Ur Negative     Control Valid    Protime-INR [[id number]]  (Abnormal) Collected: 05/21/24 1224    Lab Status:  Final result Specimen: Blood from Arm, Right Updated: 05/21/24 1331     Protime 16.1 seconds      INR 1.31    APTT [[id number]]  (Normal) Collected: 05/21/24 1224    Lab Status: Final result Specimen: Blood from Arm, Right Updated: 05/21/24 1331     PTT 34 seconds     FLU/RSV/COVID - if FLU/RSV clinically relevant [[id number]]  (Normal) Collected: 05/21/24 1224    Lab Status: Final result Specimen: Nares from Nose Updated: 05/21/24 1322     SARS-CoV-2 Negative     INFLUENZA A PCR Negative     INFLUENZA B PCR Negative     RSV PCR Negative    Narrative:      FOR PEDIATRIC PATIENTS - copy/paste COVID Guidelines URL to browser: [email or web address]    SARS-CoV-2 assay is a Nucleic Acid Amplification assay intended for the  qualitative detection of nucleic acid from SARS-CoV-2 in nasopharyngeal  swabs. Results are for the presumptive identification of SARS-CoV-2 RNA.    Positive results are indicative of infection with SARS-CoV-2, the virus  causing COVID-19, but do not rule out bacterial infection or co-infection  with other viruses. Laboratories within the [place] and its  territories are required to report all positive results to the appropriate  public health authorities. Negative results do not preclude SARS-CoV-2  infection and should not be used as the sole basis for treatment or other  patient management decisions. Negative results must be combined with  clinical observations, patient history, and epidemiological information.  This test has not been FDA cleared or approved.    This test has been authorized by FDA under an Emergency Use Authorization  (EUA). This test is only authorized for the duration of time the  declaration that circumstances exist justifying the authorization of the  emergency use of an in vitro diagnostic tests for detection of SARS-CoV-2  virus and/or diagnosis of COVID-19 infection under section 564(b)(1) of  the Act, 21 U.S.C.  360bbb-3(b)(1), unless the authorization is terminated  or revoked sooner. The test has been validated but independent review by FDA  and CLIA is pending.    Test performed using [employer]: This RT-PCR assay targets N2,  a region unique to SARS-CoV-2. A conserved region in the E-gene was chosen  for pan-Sarbecovirus detection which includes SARS-CoV-2.    According to CMS-2020-01-R, this platform meets the definition of high-throughput technology.    Procalcitonin [[id number]]  (Normal) Collected: 05/21/24 1224    Lab Status: Final result Specimen: Blood from Arm, Right Updated: 05/21/24 1310     Procalcitonin 0.13 ng/ml     B-Type Natriuretic Peptide(BNP) [[id number]]  (Normal) Collected: 05/21/24 1224    Lab Status: Final result Specimen: Blood from Arm, Right Updated: 05/21/24 1306     BNP 19 pg/mL     Comprehensive metabolic panel [[id number]]  (Abnormal) Collected: 05/21/24 1224    Lab Status: Final result Specimen: Blood from Arm, Right Updated: 05/21/24 1301     Sodium 138 mmol/L      Potassium 3.1 mmol/L      Chloride 102 mmol/L      CO2 25 mmol/L      ANION GAP 11 mmol/L      BUN 16 mg/dL      Creatinine 1.05 mg/dL      Glucose 181 mg/dL      Calcium 9.2 mg/dL      AST 31 U/L      ALT 24 U/L      Alkaline Phosphatase 70 U/L      Total Protein 6.9 g/dL      Albumin 3.7 g/dL      Total Bilirubin 1.59 mg/dL      eGFR 60 ml/min/1.73sq m     Narrative:      National Kidney Disease Foundation guidelines for Chronic Kidney Disease (CKD):     Stage 1 with normal or high GFR (GFR > 90 mL/min/1.73 square meters)    Stage 2 Mild CKD (GFR = 60-89 mL/min/1.73 square meters)    Stage 3A Moderate CKD (GFR = 45-59 mL/min/1.73 square meters)    Stage 3B Moderate CKD (GFR = 30-44 mL/min/1.73 square meters)    Stage 4 Severe CKD (GFR = 15-29 mL/min/1.73 square meters)    Stage 5 End Stage CKD (GFR <15 mL/min/1.73 square meters)  Note: GFR calculation is accurate only with a steady state creatinine    Lipase [[id number]]   (Normal) Collected: 05/21/24 1224    Lab Status: Final result Specimen: Blood from Arm, Right Updated: 05/21/24 1301     Lipase 20 u/L     Lactic acid [[id number]]  (Abnormal) Collected: 05/21/24 1224    Lab Status: Final result Specimen: Blood from Arm, Right Updated: 05/21/24 1300     LACTIC ACID 2.1 mmol/L     Narrative:      Result may be elevated if tourniquet was used during collection.    UA w Reflex to Microscopic w Reflex to Culture [[id number]] Collected: 05/21/24 1245    Lab Status: Final result Specimen: Urine, Clean Catch Updated: 05/21/24 1258     Color, UA Light Yellow     Clarity, UA Clear     Specific Gravity, UA 1.009     pH, UA 6.5     Leukocytes, UA Negative     Nitrite, UA Negative     Protein, UA Negative mg/dl      Glucose, UA Negative mg/dl      Ketones, UA Negative mg/dl      Urobilinogen, UA <2.0 mg/dl      Bilirubin, UA Negative     Occult Blood, UA Negative    CBC and differential [[id number]]  (Abnormal) Collected: 05/21/24 1224    Lab Status: Final result Specimen: Blood from Arm, Right Updated: 05/21/24 1240     WBC 15.69 Thousand/uL      RBC 4.18 Million/uL      Hemoglobin 12.5 g/dL      Hematocrit 40.3 %      MCV 96 fL      MCH 29.9 pg      MCHC 31.0 g/dL      RDW 17.7 %      MPV 11.3 fL      Platelets 120 Thousands/uL      nRBC 0 /100 WBCs      Segmented % 75 %      Immature Grans % 2 %      Lymphocytes % 13 %      Monocytes % 8 %      Eosinophils Relative 1 %      Basophils Relative 1 %      Absolute Neutrophils 11.81 Thousands/µL      Absolute Immature Grans 0.35 Thousand/uL      Absolute Lymphocytes 2.00 Thousands/µL      Absolute Monocytes 1.31 Thousand/µL      Eosinophils Absolute 0.13 Thousand/µL      Basophils Absolute 0.09 Thousands/µL                    PE Study with CT abdomen &pelvis with contrast   Final Result by Charly Stauffer MD (05/21 1615)      1.  No evidence of pulmonary embolus.      2.  Diffuse groundglass attenuation throughout both lungs, progressed since  "5/15/2024. Differential diagnosis includes pulmonary edema (including from noncardiogenic etiologies) and widespread pneumonia, including viral pneumonia. Differential diagnosis    includes several less common entities, including drug related acute interstitial pneumonia and hypersensitivity pneumonitis. Smoking-related interstitial lung disease can have this appearance but progression over the course of 6 days is atypical.      3.  Hepatomegaly and cirrhosis.      4.  Mild splenomegaly.      5.  Two small nonobstructing left renal calculi.      6.  No evidence of acute abnormality in the abdomen or pelvis.                     Workstation performed: [id number]         US bedside procedure   Final Result by Interface, Externalimages (05/21 1150)            Procedures  Procedures      ED Course  ED Course as of 05/22/24 0629   Tue May 21, 2024   1215 This EKG interpreted by me. Rate 117, rhythm sinus tachycardia, right axis, intervals normal, no acute ST or T wave changes     1247 WBC(!): 15.69         CRAFFT      Flowsheet Row Most Recent Value   CRAFFT Initial Screen: During the past 12 months, did you:    1. Drink any alcohol (more than a few sips)?  No Filed at: 05/21/2024 1859   2. Smoke any marijuana or hashish No Filed at: 05/21/2024 1859   3. Use anything else to get high? (\"anything else\" includes illegal drugs, over the counter and prescription drugs, and things that you sniff or 'mejia')? No Filed at: 05/21/2024 1859                         Initial Sepsis Screening       Row Name 05/21/24 1331                Is the patient's history suggestive of a new or worsening infection? Yes (Proceed)  -SK        Suspected source of infection pneumonia  -SK        Indicate SIRS criteria Tachycardia > 90 bpm;Tachypnea > 20 resp per min;Leukocytosis (WBC > 52553 IJL) OR Leukopenia (WBC <4000 IJL) OR Bandemia (WBC >10% bands)  -SK        Are two or more of the above signs & symptoms of infection both present and new to " "the patient? Yes (Proceed)  -SK        Assess for evidence of organ dysfunction: Are any of the below criteria present within 6 hours of suspected infection and SIRS criteria that are NOT considered to be chronic conditions? Lactate > 2.0  -SK        Date of presentation of severe sepsis 05/21/24  -SK        Time of presentation of severe sepsis 1300  -SK        Sepsis Note: Click \"NEXT\" below (NOT \"close\") to generate sepsis note based on above information. YES (proceed by clicking \"NEXT\")  -SK                  User Key  (r) = Recorded By, (t) = Taken By, (c) = Cosigned By      Initials Name Provider Type    SK Ion Dobbins MD Resident                    SBIRT 22yo+      Flowsheet Row Most Recent Value   Initial Alcohol Screen: US AUDIT-C     1. How often do you have a drink containing alcohol? 0 Filed at: 05/21/2024 1859   2. How many drinks containing alcohol do you have on a typical day you are drinking?  0 Filed at: 05/21/2024 1859   3a. Male UNDER 65: How often do you have five or more drinks on one occasion? 0 Filed at: 05/21/2024 1859   3b. FEMALE Any Age, or MALE 65+: How often do you have 4 or more drinks on one occassion? 0 Filed at: 05/21/2024 1859   Audit-C Score 0 Filed at: 05/21/2024 1859   JEFF: How many times in the past year have you...    Used an illegal drug or used a prescription medication for non-medical reasons? Never Filed at: 05/21/2024 1859                  Medical Decision Making  Patient is a 53-year-old female with history of COPD on 2-3 L nasal cannula, diabetes, hypertension, hyperlipidemia presents for shortness of breath.    DDx includes but not limited to COPD exacerbation, sepsis, pneumonia, PE, CHF exacerbation, small bowel obstruction.  On arrival, patient requiring 4 L nasal cannula up from her usual home 2-3.  She is not however in acute respiratory distress but is tachycardic and tachypneic.  Bedside point-of-care ultrasound does show occasional B-lines but grossly " cardiac function appears normal with no discrete pericardial effusion.  Will plan to obtain sepsis labs, CTA PE study with abdomen and pelvis runoff.  Will treat now with heart neb.    Patient's labs notable for leukocytosis and elevated lactate, a sepsis alert was called and patient ultimately falls under the severe sepsis category.  She was given a total of 2 L of isolate, lactated increased slightly to 2.5.  Patient's CTA PE study shows evidence of diffuse groundglass attenuation both lungs which could be due to pulmonary edema versus multifocal pneumonia.  Patient was given empiric cefepime and vancomycin for his severe sepsis and pneumonia.  I discussed the case with the admitting resident who agrees to admit the patient for severe sepsis and pneumonia.    Amount and/or Complexity of Data Reviewed  Labs: ordered. Decision-making details documented in ED Course.  Radiology: ordered.    Risk  Prescription drug management.  Decision regarding hospitalization.          Disposition  Final diagnoses:   Pneumonia   Severe sepsis (HCC)   COPD (chronic obstructive pulmonary disease) (HCC)     Time reflects when diagnosis was documented in both MDM as applicable and the Disposition within this note       Time User Action Codes Description Comment    5/21/2024  4:37 PM Ion Dobbins Add [J18.9] Pneumonia     5/21/2024  4:37 PM Ion Dobbins Add [A41.9,  R65.20] Severe sepsis (HCC)     5/21/2024  4:38 PM Ion Dobbins Add [J44.9] COPD (chronic obstructive pulmonary disease) ([facility])     5/21/2024  5:28 PM Varsha Valladares Add [R26.2] Ambulatory dysfunction           ED Disposition       ED Disposition   Admit    Condition   Stable    Date/Time   Tue May 21, 2024 1637    Comment   Case was discussed with SOD resident and the patient's admission status was agreed to be Admission Status: inpatient status to the service of Dr. Addison .               Follow-up Information    None         Current Discharge Medication List         CONTINUE these medications which have NOT CHANGED    Details   albuterol (2.5 mg/3 mL) 0.083 % nebulizer solution Take 3 mL (2.5 mg total) by nebulization every 6 (six) hours as needed for wheezing or shortness of breath  Qty: 30 mL, Refills: 5    Associated Diagnoses: Viral URI with cough      albuterol (PROVENTIL HFA,VENTOLIN HFA) 90 mcg/act inhaler Inhale 2 puffs every 6 (six) hours as needed for wheezing  Qty: 18 g, Refills: 1    Comments: Substitution to a formulary equivalent within the same pharmaceutical class is authorized.  Associated Diagnoses: Viral URI with cough      budesonide (PULMICORT) 0.5 mg/2 mL nebulizer solution Take 0.5 mg by nebulization 2 (two) times a day Rinse mouth after use.      buPROPion (WELLBUTRIN XL) 300 mg 24 hr tablet Take 1 tablet (300 mg total) by mouth every morning  Qty: 90 tablet, Refills: 3    Associated Diagnoses: Recurrent major depressive disorder, in partial remission (HCC); Recurrent major depressive disorder, remission status unspecified (HCC)      diazepam (VALIUM) 5 mg tablet Take 1 tablet (5 mg total) by mouth daily at bedtime as needed for anxiety  Qty: 30 tablet, Refills: 0    Associated Diagnoses: Anxiety      ergocalciferol (VITAMIN D2) 50,000 units Take 1 capsule (50,000 Units total) by mouth every 14 (fourteen) days  Qty: 12 capsule, Refills: 1    Associated Diagnoses: Vitamin D deficiency      famotidine (PEPCID) 40 MG tablet Take 1 tablet (40 mg total) by mouth daily at bedtime Take in evening 2 hours prior to bed  Qty: 90 tablet, Refills: 1    Associated Diagnoses: Duodenal ulcer      ferrous sulfate 324 (65 Fe) mg Take 1 tablet (324 mg total) by mouth daily before breakfast  Qty: 30 tablet, Refills: 3    Associated Diagnoses: Iron deficiency anemia secondary to inadequate dietary iron intake; Edema, lower extremity      folic acid (FOLVITE) 1 mg tablet Take 1 tablet (1 mg total) by mouth daily  Qty: 90 tablet, Refills: 1    Associated Diagnoses:  Folic acid deficiency      furosemide (LASIX) 40 mg tablet Take 1 tablet (40 mg total) by mouth daily  Qty: 180 tablet, Refills: 1    Associated Diagnoses: Acute diastolic congestive heart failure (HCC)      gabapentin (NEURONTIN) 100 mg capsule Take 100 mg by mouth every 8 (eight) hours  Refills: 0      Insulin Pen Needle 31G X 5 MM MISC Inject under the skin if needed (Insuline injections)  Qty: 100 each, Refills: 3    Associated Diagnoses: Diabetes mellitus type 2, insulin dependent (HCC)      ipratropium-albuterol (DUO-NEB) 0.5-2.5 mg/3 mL nebulizer solution Take 3 mL by nebulization 4 (four) times a day  Qty: 360 mL, Refills: 3    Associated Diagnoses: Centrilobular emphysema (HCC)      lactulose (CHRONULAC) 10 g/15 mL solution Take 20 g by mouth Daily or as needed for bowl movement      levothyroxine 112 mcg tablet Take 1 tablet (112 mcg total) by mouth daily  Qty: 90 tablet, Refills: 1    Associated Diagnoses: Acquired hypothyroidism      lisinopril (ZESTRIL) 5 mg tablet Take 1 tablet (5 mg total) by mouth daily  Qty: 30 tablet, Refills: 0    Associated Diagnoses: HTN (hypertension)      nicotine (NICODERM CQ) 21 mg/24 hr TD 24 hr patch Place 1 patch on the skin over 24 hours every 24 hours  Qty: 28 patch, Refills: 5    Associated Diagnoses: Tobacco abuse disorder      omeprazole (PriLOSEC) 40 MG capsule take 1 capsule by mouth twice a day  Qty: 60 capsule, Refills: 5    Associated Diagnoses: Gastric ulcer with hemorrhage, unspecified chronicity      ondansetron (ZOFRAN-ODT) 4 mg disintegrating tablet Take 1 tablet (4 mg total) by mouth every 12 (twelve) hours as needed for nausea or vomiting  Qty: 60 tablet, Refills: 1    Associated Diagnoses: Nausea      oxyCODONE-acetaminophen (PERCOCET)  mg per tablet Take 1 tablet by mouth every 6 (six) hours as needed for severe pain      oxygen gas Inhale 2 L/min continuous. May use 3L with exertion per Bellariley ALVAREZ  Keep sat >88%  Indications: low  saturation    Comments: patient adjusts to 3L as needed for SOB      predniSONE 10 mg tablet Take 10 mg by mouth daily      sertraline (ZOLOFT) 100 mg tablet take 1 tablet by mouth twice a day  Qty: 180 tablet, Refills: 1    Associated Diagnoses: Recurrent major depressive disorder, in partial remission (HCC)      simvastatin (ZOCOR) 20 mg tablet Take 1 tablet (20 mg total) by mouth daily at bedtime  Qty: 90 tablet, Refills: 1    Associated Diagnoses: Hypercholesterolemia      spironolactone (ALDACTONE) 25 mg tablet Take 2 tablets (50 mg total) by mouth daily  Qty: 30 tablet, Refills: 5    Associated Diagnoses: Edema, lower extremity      sucralfate (CARAFATE) 1 g tablet Take 1 tablet (1 g total) by mouth daily at bedtime  Qty: 60 tablet, Refills: 3    Associated Diagnoses: Gastroesophageal reflux disease without esophagitis      sulfamethoxazole-trimethoprim (BACTRIM DS) 800-160 mg per tablet Take 1 tablet by mouth 3 (three) times a week for 28 days  Qty: 12 tablet, Refills: 0    Associated Diagnoses: Pneumonia of both upper lobes due to infectious organism      varenicline (CHANTIX) 0.5 mg tablet Take 1 tablet (0.5 mg total) by mouth 2 (two) times a day  Qty: 60 tablet, Refills: 0    Associated Diagnoses: Tobacco abuse disorder           STOP taking these medications       amoxicillin-clavulanate (AUGMENTIN) 875-125 mg per tablet Comments:   Reason for Stopping:         metroNIDAZOLE (FLAGYL) 500 mg tablet Comments:   Reason for Stopping:             No discharge procedures on file.    PDMP Review         Value Time User    PDMP Reviewed  Yes 5/21/2024  6:15 PM Varsha Valladares MD             ED Provider  Attending physically available and evaluated Chely Ruvalcaba. I managed the patient along with the ED Attending.    Electronically Signed by           Ion Dobbins MD  05/22/24 6157

## 2024-05-22 NOTE — PLAN OF CARE
Problem: PHYSICAL THERAPY ADULT  Goal: Performs mobility at highest level of function for planned discharge setting.  See evaluation for individualized goals.  Description: Treatment/Interventions: Functional transfer training, LE strengthening/ROM, Elevations, Therapeutic exercise, Endurance training, Patient/family training, Equipment eval/education, Bed mobility, Gait training, Spoke to nursing  Equipment Recommended:  (pt owns rollator)       See flowsheet documentation for full assessment, interventions and recommendations.  Note: Prognosis: Good  Problem List: Decreased strength, Decreased endurance, Impaired balance, Decreased mobility, Decreased safety awareness, Pain  Assessment: Pt seen for high complexity PT evaluation due to decrease in functional mobility status compared to baseline.  Pt with active PT eval/treat orders at this time.  Pt is a 53 y.o. F who presented to [facility] with hypoxia, SOB on 5/21/24.  Pt  has a past medical history of Abnormal stress test, Acute cystitis without hematuria (05/07/2018), Acute duodenal ulcer with bleeding (01/16/2023), Allergic sinusitis, Anemia (Around december), Anxiety, Arthritis, Asthma, Bilateral lower extremity edema, Callus of foot, Chest pain, Chronic GERD, Colon polyp, Constipation, COPD (chronic obstructive pulmonary disease) ([facility]), Depression, Disease of thyroid gland, Diverticulitis of colon, Dyspepsia, Edema, lower extremity (08/07/2020), Esophageal reflux, Essential hypertriglyceridemia, Fatty liver (01/16/2023), Gastroesophageal reflux disease with esophagitis (11/18/2016), GERD (gastroesophageal reflux disease), Gynecological disorder, Hydroureteronephrosis (06/30/2022), Hypertension, Hypokalemia (06/20/2022), Hypotension, Kidney stone, Left ureteral stone with hydronephrosis (07/21/2022), Migraine, Morbid obesity (HCC) (01/11/2019), Neuropathy, Obesity, Other chest pain (11/08/2018), Pancreatic lesion (03/13/2023), Positive depression  screening (06/19/2022), Primary hypertriglyceridemia (06/19/2022), Pulmonary emphysema (HCC), Seasonal allergies, Severe obesity (HCC) (01/16/2023), Skin tag (07/17/2023), SOB (shortness of breath), Strain of groin (07/17/2023), Urinary frequency, Vagina, candidiasis, Very heavy cigarette smoker (01/16/2023), and Visual impairment.  Pt resides with 2 friends in 1 story apartment.  Pt presents with decreased strength, balance, endurance that contribute to limitations in bed mobility, functional transfers, functional mobility.  Pt requires Min A for all transfers and mobility at this time.  Pt left sitting EOB with all needs in reach.  Pt will benefit from skilled therapy in order to address current impairments and functional limitations. PT to follow pt and recommending level III.  The patient's AM-PAC Basic Mobility Inpatient Short Form Raw Score is 17. A Raw score of greater than 16 suggests the patient may benefit from discharge to home. Please also refer to the recommendation of the Physical Therapist for safe discharge planning        Rehab Resource Intensity Level, PT: III (Minimum Resource Intensity)    See flowsheet documentation for full assessment.

## 2024-05-22 NOTE — PHYSICAL THERAPY NOTE
Physical Therapy Evaluation     Patient's Name: Chely Ruvalcaba    Admitting Diagnosis  COPD (chronic obstructive pulmonary disease) (HCC) [J44.9]  Pneumonia [J18.9]  SOB (shortness of breath) [R06.02]  Severe sepsis (HCC) [A41.9, R65.20]  Ambulatory dysfunction [R26.2]    Problem List  Patient Active Problem List   Diagnosis    Anxiety    Benign hypertension    Depression    Hyperlipidemia    Hypothyroidism    Cigarette nicotine dependence in remission    Vitamin D deficiency    Gastroparesis    Edema, lower extremity    Type 2 diabetes mellitus (HCC)    COVID-19 vaccination refused    Centrilobular emphysema (HCC)    Nephrolithiasis    Degeneration of lumbar or lumbosacral intervertebral disc    Gastroesophageal reflux disease    Intermittent claudication (HCC)    Seasonal allergic rhinitis    Spinal stenosis of lumbar region    Hypokalemia    Iron deficiency anemia    Abnormal stress ECG with treadmill    Cirrhosis (HCC)    History of GI bleed    History of colon polyps    Duodenal ulcer    Irritable bowel syndrome with both constipation and diarrhea    Injury of toe on left foot    Bilateral pneumonia    Acute diastolic congestive heart failure (HCC)    Folic acid deficiency    Acute respiratory failure with hypoxia (HCC)    Enlarged lymph nodes, unspecified    Dyspnea    Pulmonary nodules    Diastolic CHF (HCC)    Thrombocytopenia (HCC)    Stage 2 chronic kidney disease    Chronic bilateral low back pain    Diverticulitis    Ambulatory dysfunction    Severe sepsis (HCC)       Past Medical History  Past Medical History:   Diagnosis Date    Abnormal stress test     Acute cystitis without hematuria 05/07/2018    Acute duodenal ulcer with bleeding 01/16/2023    Allergic sinusitis     last assessed - [id number]    Anemia Around december    Anxiety     last assessed - 04Mar2016    Arthritis     Asthma     last assessed - 04Mar2016    Bilateral lower extremity edema     last assessed - [id number]    Callus of foot     last  assessed - 22Jun2016    Chest pain     Chronic GERD     last assessed - 16Jan2017    Colon polyp     Constipation     Resolved - 13Jan2017    COPD (chronic obstructive pulmonary disease) (HCC)     Depression     last assessed - 04Mar2016    Disease of thyroid gland     Diverticulitis of colon     last assessed - 04Mar2016    Dyspepsia     Resolved - [id number]    Edema, lower extremity 08/07/2020    Esophageal reflux     last assessed - 04Mar2016    Essential hypertriglyceridemia     last assessed - [id number]    Fatty liver 01/16/2023    Gastroesophageal reflux disease with esophagitis 11/18/2016    GERD (gastroesophageal reflux disease)     Gynecological disorder     last assessed - 04Mar2016    Hydroureteronephrosis 06/30/2022    Hypertension     last assessed - 04Mar2016    Hypokalemia 06/20/2022    Hypotension     last assessed - 25Aug2016    Kidney stone     Left ureteral stone with hydronephrosis 07/21/2022    Migraine     Morbid obesity (HCC) 01/11/2019    Formatting of this note might be different from the original. Added automatically from request for surgery [id number]    Neuropathy     Obesity     Other chest pain 11/08/2018    Pancreatic lesion 03/13/2023    Positive depression screening 06/19/2022    Primary hypertriglyceridemia 06/19/2022    Pulmonary emphysema (HCC)     last assessed - 04Mar2016    Seasonal allergies     Severe obesity (HCC) 01/16/2023    Skin tag 07/17/2023    SOB (shortness of breath)     Strain of groin 07/17/2023    Urinary frequency     last assessed - 22Jun2016    Vagina, candidiasis     last assessed - 22Jun2016    Very heavy cigarette smoker 01/16/2023    Visual impairment        Past Surgical History  Past Surgical History:   Procedure Laterality Date    CARPAL TUNNEL RELEASE      last assessed - 04MAr2016    CHOLECYSTECTOMY      last assessed - 04Mar2016    COLONOSCOPY      Complete colonoscopy     EYE SURGERY      last assessed - 04Mar2016    FL RETROGRADE PYELOGRAM  6/16/2022     FL RETROGRADE PYELOGRAM  8/16/2022    FOOT SURGERY      last assessed - 04Mar2016    UT CYSTO BLADDER W/URETERAL CATHETERIZATION Left 7/21/2022    Procedure: CYSTOSCOPY RETROGRADE PYELOGRAM WITH INSERTION STENT URETERAL;  Surgeon: Facundo Matamoros MD;  Location: [facility];  Service: Urology    UT CYSTO/URETERO W/LITHOTRIPSY &INDWELL STENT INSRT Left 6/16/2022    Procedure: CYSTOSCOPY URETEROSCOPY WITH LITHOTRIPSY HOLMIUM LASER, RETROGRADE PYELOGRAM AND INSERTION STENT URETERAL;  Surgeon: Sammy Ferrer MD;  Location: [facility];  Service: Urology    UT CYSTO/URETERO W/LITHOTRIPSY &INDWELL STENT INSRT Left 8/16/2022    Procedure: CYSTOSCOPY USCOPE W/HOLMIUM LASER, RETROGRADE PYELOGRAM&STENT;  Surgeon: Sudheer Bradford MD;  Location: [facility];  Service: Urology    UT ESOPHAGOGASTRODUODENOSCOPY TRANSORAL DIAGNOSTIC N/A 2/13/2017    Procedure: ESOPHAGOGASTRODUODENOSCOPY (EGD);  Surgeon: Alison Saleem MD;  Location: [facility];  Service: Gastroenterology          05/22/24 0905   PT Last Visit   PT Visit Date 05/22/24   Note Type   Note type Evaluation   Pain Assessment   Pain Assessment Tool 0-10   Pain Score 8   Pain Location/Orientation Location: Back   Restrictions/Precautions   Weight Bearing Precautions Per Order No   Other Precautions Chair Alarm;Bed Alarm;Multiple lines;O2;Fall Risk;Pain  (3-4L O2 NC)   Home Living   Type of Home Apartment   Home Layout One level   Bathroom Shower/Tub Walk-in shower   Bathroom Toilet Standard   Bathroom Equipment Grab bars in shower;Shower chair   Home Equipment Cane;Other (Comment)  (rollator, home O2 3L)   Additional Comments denies use of AD PTA   Prior Function   Level of [place] Independent with ADLs;Independent with functional mobility;Independent with IADLS   Lives With Friend(s)  (roommates x2)   Falls in the last 6 months 0   Comments -   General   Family/Caregiver Present No   Cognition   Overall Cognitive Status WFL   Arousal/Participation Alert    Memory Decreased recall of precautions   Following Commands Follows one step commands without difficulty   Subjective   Subjective Pleasant and agreeable to participate   RLE Assessment   RLE Assessment   (functionally 3+/5)   LLE Assessment   LLE Assessment   (functionally 3+/5)   Bed Mobility   Additional Comments Found and left sitting EOB with bed alarm intact   Transfers   Sit to Stand 4  Minimal assistance   Additional items Assist x 1;Increased time required;Verbal cues   Stand to Sit 4  Minimal assistance   Additional items Assist x 1;Increased time required;Verbal cues   Additional Comments without AD   Ambulation/Elevation   Gait pattern Excessively slow;Short stride;Decreased foot clearance;Improper Weight shift  (increased lateral sway)   Gait Assistance 4  Minimal assist   Additional items Assist x 1;Verbal cues;Tactile cues   Assistive Device Other (Comment)  (HHA, pt declining AD)   Distance 50 ft x 1  (see below for additional)   Ambulation/Elevation Additional Comments SpO2 desats to 84%, HR ~116 bpm.  Prolonged rest to recover to low 90's% and ~90 bpm.  O2 bumped to 4L to assist in recovery.   Balance   Static Sitting Good   Dynamic Sitting Fair +   Static Standing Fair   Dynamic Standing Fair -   Ambulatory Poor +   Activity Tolerance   Activity Tolerance Patient limited by fatigue;Other (Comment)  (SpO2 desats, tachycardia)   Medical Staff Made Aware OT Esther   Nurse Made Aware RN cleared pt to be seen by PT   Assessment   Prognosis Good   Problem List Decreased strength;Decreased endurance;Impaired balance;Decreased mobility;Decreased safety awareness;Pain   Assessment Pt seen for high complexity PT evaluation due to decrease in functional mobility status compared to baseline.  Pt with active PT eval/treat orders at this time.  Pt is a 53 y.o. F who presented to [facility] with hypoxia, SOB on 5/21/24.  Pt  has a past medical history of Abnormal stress test, Acute cystitis without  hematuria (05/07/2018), Acute duodenal ulcer with bleeding (01/16/2023), Allergic sinusitis, Anemia (Around december), Anxiety, Arthritis, Asthma, Bilateral lower extremity edema, Callus of foot, Chest pain, Chronic GERD, Colon polyp, Constipation, COPD (chronic obstructive pulmonary disease) ([facility]), Depression, Disease of thyroid gland, Diverticulitis of colon, Dyspepsia, Edema, lower extremity (08/07/2020), Esophageal reflux, Essential hypertriglyceridemia, Fatty liver (01/16/2023), Gastroesophageal reflux disease with esophagitis (11/18/2016), GERD (gastroesophageal reflux disease), Gynecological disorder, Hydroureteronephrosis (06/30/2022), Hypertension, Hypokalemia (06/20/2022), Hypotension, Kidney stone, Left ureteral stone with hydronephrosis (07/21/2022), Migraine, Morbid obesity ([facility]) (01/11/2019), Neuropathy, Obesity, Other chest pain (11/08/2018), Pancreatic lesion (03/13/2023), Positive depression screening (06/19/2022), Primary hypertriglyceridemia (06/19/2022), Pulmonary emphysema ([facility]), Seasonal allergies, Severe obesity ([facility]) (01/16/2023), Skin tag (07/17/2023), SOB (shortness of breath), Strain of groin (07/17/2023), Urinary frequency, Vagina, candidiasis, Very heavy cigarette smoker (01/16/2023), and Visual impairment.  Pt resides with 2 friends in 1 story apartment.  Pt presents with decreased strength, balance, endurance that contribute to limitations in bed mobility, functional transfers, functional mobility.  Pt requires Min A for all transfers and mobility at this time.  Pt left sitting EOB with all needs in reach.  Pt will benefit from skilled therapy in order to address current impairments and functional limitations. PT to follow pt and recommending level III.  The patient's AM-[facility] Basic Mobility Inpatient Short Form Raw Score is 17. A Raw score of greater than 16 suggests the patient may benefit from discharge to home. Please also refer to the recommendation of the Physical Therapist for  safe discharge planning   Goals   Patient Goals to get better   [facility] [place] Expiration Date 06/05/24   Short Term Goal #1 1. Pt will demonstrate ability to perform all aspects of bed mobility with I in order to increase independence and decrease burden on caregivers. 2. Pt will demonstrate ability to perform functional transfers with Mod I in order to increase independence and decrease burden on caregivers.  3. Pt will demonstrate ability to ambulate 200 ft with least restrictive AD with Mod I in order to return to mobility safely. 4. Pt will demonstrate ability to negotiate full flight steps with/without HR and Mod I in order to return to household/community mobility safely. 5. Pt will demonstrate improved balance by one grade order to decrease risk of falls.  6. Pt will increase b/l LE strength by 1 grade in order to increase ease of functional mobility and transfers.   Plan   Treatment/Interventions Functional transfer training;LE strengthening/ROM;Elevations;Therapeutic exercise;Endurance training;Patient/family training;Equipment eval/education;Bed mobility;Gait training;Spoke to nursing   PT Frequency 3-5x/wk   Discharge Recommendation   Rehab Resource Intensity Level, PT III (Minimum Resource Intensity)   Equipment Recommended   (pt owns rollator)   AM-[facility] Basic Mobility Inpatient   Turning in Flat Bed Without Bedrails 3   Lying on Back to Sitting on Edge of Flat Bed Without Bedrails 3   Moving Bed to Chair 3   Standing Up From Chair Using Arms 3   Walk in Room 3   Climb 3-5 Stairs With Railing 2   Basic Mobility Inpatient Raw Score 17   Basic Mobility Standardized Score 39.67   [facility] Highest Level Of Mobility   -HLM Goal 5: Stand one or more mins   -HLM Achieved 7: Walk 25 feet or more   Modified Ben Scale   Modified [place] Scale 4   Additional Treatment Session   Start Time 0855   End Time 0905   Treatment Assessment Pt agreeable to additional gait training.  Pt ambulated additional 50 ft x 1 with  HHA.  Significant SOB noted due to decreased endurance, though SpO2 remained above 90% this time.  Vitals monitored during gait training.  Some unsteadiness noted 2/2 remaining balance impairments.  Pt remains at risk for falls due to same.         Nelida Contreras, PT, DPT

## 2024-05-22 NOTE — PROGRESS NOTES
INTERNAL MEDICINE RESIDENCY PROGRESS NOTE     Name: Chely Ruvalcaba   Age & Sex: 53 y.o. female   MRN: [id number]  Unit/Bed#: -01   Encounter: [id number]  Team: SOD Team C     PATIENT INFORMATION     Name: Chely Ruvalcaba   Age & Sex: 53 y.o. female   MRN: [id number]  Hospital Stay Days: 1    ASSESSMENT/PLAN     Active Problems:    Severe sepsis (HCC)    Bilateral pneumonia    Cirrhosis (HCC)    Centrilobular emphysema (HCC)    Hyperlipidemia    Hypothyroidism    Type 2 diabetes mellitus (HCC)    Gastroesophageal reflux disease    Hypokalemia    Diastolic CHF (HCC)    Thrombocytopenia (HCC)    Chronic bilateral low back pain      Severe sepsis (HCC)  Assessment & Plan  Patient presenting with leukocytosis (15.69), tachycardia (124), tachypnea (24), lactic acidosis 92.1 > 2.5) without hypotension. CTA chest PE study found no PE, with diffuse ground glass attenuation bilaterally progressed since 5/15/24 with differential including pulmonary edema and widespread pneumonia. Given dose of cefepime 2g, vancomycin 1.75g, and 2L bolus in ED. Consider pulmonary cause due to shortness of breath and findings on chest imaging. UA unremarkable.    WBC today 12.51 from 15.69, and procal 0.14 from 0.13. RP2 panel negative, Strep pneumo and legionella urine negative.     Plan:  Monitor vitals for fevers, hypotension  Trend WBC, procal  Ceftriaxone 1g q24h day 2  Follow up blood cultures, MRSA culture  Sputum culture and Gram stain    Bilateral pneumonia  Assessment & Plan  Per CXR in [place] 3/6/2024: Diffuse interstitial and airspace infiltrates. Severe edema versus severe atypical pneumonia.    Per CT Chest in [place] 3/7/2024: Alveolar opacities identified bilaterally involving upper and lower lobes. Several areas of sparing identified with normal lung parenchyma. ... IMPRESSION: 1. Findings most compatible with pulmonary edema with groundglass opacification bilaterally with slight sparing of the lung apices. Differential  "diagnosis includes atypical pneumonia or much less likely hemorrhage.     During hospital stay in [place], patient received extensive workup of atypical pneumonia. Found slightly positive CINDY, other autoimmune workup negative. During hospital stay received pulse dose of steroids and was transitioned to prednisone. Discharged with prednisone taper Bactrim PJP prophylaxis until lowest dose of the taper. However, details of exact length of taper and dosages were not found upon chart review. Patient follows with outpatient Pulmonology, last seen in May.    Per PE study 5/21/2024: Diffuse groundglass attenuation throughout both lungs, progressed since   5/15/2024. Differential diagnosis includes pulmonary edema (including from noncardiogenic etiologies) and widespread pneumonia, including viral pneumonia. Differential diagnosis includes several less common entities, including drug related acute interstitial pneumonia and hypersensitivity pneumonitis.     RP2 panel negative, Strep pneumo and legionella urine negative. Possible infectious cause, possible inflammatory/autoimmune etiology.    Plan:  Ceftriaxone 1g q24h day 2  Follow up blood cultures, sputum culture and gram stain  Consider steroids once cultures come back if negative      Cirrhosis (HCC)  Assessment & Plan  Pt presents with cirrhosis secondary to NAFLD. Currently compensated.     MRI 10/23: displaying hepatic cirrhosis with fatty deposition and splenomegaly which may indicate portal hypertension. No ascites, no leg swelling.    Per GI note on 5/6 \"abnormal ultrasound elastography revealing F4 fibrosis, nodular liver on imaging and thrombocytopenia. Likely etiology for cirrhosis is metabolic associated steatotic liver disease. Her MELD 3.0 based upon labs from March/April is 11\"     Labs from 2023 showed normal anti-smooth muscle antibody, ceruloplasmin, antimicrosomal antibody, Protime-INR,folate, B12, AFP. Labs also showed elevated alpha-1 antitrypsin and " "IgM. GI noted \"Blood test for rare causes of liver disease are negative thus far\"     CT abdomen/pelvis 5/15 showed splenomegaly and portal hypertension supporting cirrhosis. CT AP PE 5/21 also showed cirrhosis with mildly enlarged spleen.    MELD 3.0: 12 at 5/22/2024  5:25 AM  MELD-Na: 11 at 5/22/2024  5:25 AM  Calculated from:  Serum Creatinine: 1.02 mg/dL at 5/22/2024  5:25 AM  Serum Sodium: 142 mmol/L (Using max of 137 mmol/L) at 5/22/2024  5:25 AM  Total Bilirubin: 1.59 mg/dL at 5/21/2024 12:24 PM  Serum Albumin: 3.7 g/dL (Using max of 3.5 g/dL) at 5/21/2024 12:24 PM  INR(ratio): 1.31 at 5/21/2024 12:24 PM  Age at listing (hypothetical): 53 years  Sex: Female at 5/22/2024  5:25 AM    Plan:  Continue to f/u outpatient with GI  No acute management while inpatient. Continue lasix to optimize fluid balance  Consider starting home aldactone once hypokalemia corrects and if symptoms such as LE edema, ascites develop    Centrilobular emphysema (HCC)  Assessment & Plan  PFTs done in 2/14/17 found FEV1/FVC of 76% and DLCO of 55%. Per report: Isolated reduction in diffusion capacity may be associated with early interstitial lung disease, pulmonary hypertension or diastolic HF.   Bilateral ground glass opacities on chest PE study per report: Smoking-related interstitial lung disease can have this appearance but progression over the course of 6 days is atypical. Patient does have significant smoking history at least 20 pack years, though recently quit in March. Patient also has symptoms of snoring at home but has not yet done a sleep study. She continues to note shortness of breath and chest tightness, currently on 3L NC.    Plan:  Xopenex/Atrovent nebs TID  Repeat PFTs outpatient  Sleep study outpatient    Chronic bilateral low back pain  Assessment & Plan  Pt endorses having chronic lower back pain. Physical exam revealed CVA tenderness.     Plan  - Oxy 5mg for moderate pain, Oxy 10mg for severe pain    Thrombocytopenia " (HCC)  Assessment & Plan  On admission patient noted to have platelet count of 90 from 120. During her recent admission values ranging from . Of note is her history of cirrhosis with hepatosplenomegaly.     Hgb 10.7 today from 12.5    Plan:  - Monitor CBC    Diastolic CHF (HCC)  Assessment & Plan  Wt Readings from Last 3 Encounters:   05/21/24 117 kg (257 lb 15 oz)   05/17/24 116 kg (256 lb 9.6 oz)   05/07/24 112 kg (248 lb)     Patient noted to have TTE 08/23: displayed EF 60%; mild L. Ventricular thickness, mild mitral and tricuspid regurgitation.    Patient states she was told about newly worsening CHF after her most recent hospital admission in [place] on April 11 2024. R heart cath performed during that hospitalization - results showed normal CO. No records found of any evidence of echo done during that admission.    Plan:  Continue home lasix 40 mg daily  Hold home lisinopril 5 for now, consider restarting once hypokalemia is resolved  Continue daily weights    Hypokalemia  Assessment & Plan  Initially presented with potassium of 3.1. S/P 80 mg of oral K Cl repletion in the ED. Two doses of 40 mg oral repletion K Cl today.    Plan:  Follow up BMP to determine if more supplementation is needed      Gastroesophageal reflux disease  Assessment & Plan  Patient reports intermittent heartburn and follows with outpatient GI.    Plan:  Cont Pepcid 40mg  Cont. Omeprazole 40mg  FU with GI outpatient    Type 2 diabetes mellitus (HCC)  Assessment & Plan  Lab Results   Component Value Date    HGBA1C 7.8 (H) 05/15/2024       Recent Labs     05/21/24  1751 05/21/24  2119 05/22/24  0801 05/22/24  1158   POCGLU 196* 185* 96 223*       Blood Sugar Average: Last 72 hrs:  (P) 175    Patient has received 3 units of insulin total on SSI. Her home regimen is 55 Lantus, and 15 Lispro with meals. Fasting glucose levels 77 today from 181    Plan:  Hold off for now on patient home regimen as prandial glucose ranging 190-200 with  lower fasting at 77 today  Continue monitoring glucose trends      Hypothyroidism  Assessment & Plan  On levothyroxine 112 mcg, 2.516 last TSH in 2023    Hyperlipidemia  Assessment & Plan  On pravistatin 40 q24h        Disposition: In patient care needs, workup of severe sepsis, in setting of bilateral pulmonary infiltrates possible pneumonia as source    SUBJECTIVE     Patient seen and examined this morning at bedside while receiving a respiratory treatment. She notes continued shortness of breath and cough with no sputum, stating the respiratory treatments have improved her symptoms. She states the medications for her back have helped the pain. Otherwise no other complaints at this time. Denies fevers, chills, chest pain, vomiting, LE swelling, or abdominal pain.    OBJECTIVE     Vitals:    24 2344 24 0732 24 0803 24 1328   BP: 98/54 (!) 91/46     BP Location: Left arm Left arm     Pulse: 93 91     Resp:  17     Temp: 98.2 °F (36.8 °C) 98.4 °F (36.9 °C)     TempSrc: Oral Oral     SpO2: 91% 90% 93% 94%   Weight:       Height:          Temperature:   Temp (24hrs), Av.3 °F (36.8 °C), Min:98.2 °F (36.8 °C), Max:98.4 °F (36.9 °C)    Temperature: 98.4 °F (36.9 °C)  Intake & Output:  I/O          0701   0700  0701   0700  0701   0700    P.O.  240 120    IV Piggyback  50     Total Intake(mL/kg)  290 (2.5) 120 (1)    Urine (mL/kg/hr)   0 (0)    Total Output   0    Net  +290 +120           Unmeasured Urine Occurrence  3 x           Weights:   IBW (Ideal Body Weight): 64.82 kg    Body mass index is 38.76 kg/m².  Weight (last 2 days)       Date/Time Weight    24 2243 117 (257.94)    24 1333 116 (255)          Physical Exam  Vitals reviewed.   HENT:      Head: Normocephalic and atraumatic.      Right Ear: External ear normal.      Left Ear: External ear normal.      Nose: Nose normal. No congestion.      Mouth/Throat:      Mouth: Mucous membranes are  moist.   Eyes:      General:         Right eye: No discharge.         Left eye: No discharge.   Cardiovascular:      Rate and Rhythm: Normal rate and regular rhythm.      Heart sounds: Normal heart sounds.   Pulmonary:      Effort: Pulmonary effort is normal.      Comments: Decreased breath sounds bilaterally  Abdominal:      General: Abdomen is flat.      Palpations: Abdomen is soft.      Tenderness: There is no abdominal tenderness.   Musculoskeletal:      Right lower leg: No edema.      Left lower leg: No edema.   Skin:     General: Skin is warm and dry.   Neurological:      General: No focal deficit present.      Mental Status: She is alert and oriented to person, place, and time.   Psychiatric:         Mood and Affect: Mood normal.         Behavior: Behavior normal.       LABORATORY DATA     Labs: I have personally reviewed pertinent reports.  Results from last 7 days   Lab Units 05/22/24  0525 05/21/24  1224 05/17/24  0520   WBC Thousand/uL 12.51* 15.69* 9.33   HEMOGLOBIN g/dL 10.7* 12.5 11.4*   HEMATOCRIT % 35.3 40.3 37.8   PLATELETS Thousands/uL 90* 120* 85*   SEGS PCT % 60 75 78*   MONO PCT % 8 8 6   EOS PCT % 1 1 0      Results from last 7 days   Lab Units 05/22/24  0525 05/21/24  1224 05/17/24  0520   POTASSIUM mmol/L 3.2* 3.1* 5.0   CHLORIDE mmol/L 105 102 106   CO2 mmol/L 28 25 25   BUN mg/dL 16 16 22   CREATININE mg/dL 1.02 1.05 1.01   CALCIUM mg/dL 8.9 9.2 9.4   ALK PHOS U/L  --  70  --    ALT U/L  --  24  --    AST U/L  --  31  --      Results from last 7 days   Lab Units 05/22/24  0525 05/16/24  0526   MAGNESIUM mg/dL 2.2 2.3     Results from last 7 days   Lab Units 05/22/24  0525   PHOSPHORUS mg/dL 3.5      Results from last 7 days   Lab Units 05/21/24  1224   INR  1.31*   PTT seconds 34     Results from last 7 days   Lab Units 05/21/24  1435   LACTIC ACID mmol/L 2.5*           IMAGING & DIAGNOSTIC TESTING     Radiology Results: I have personally reviewed pertinent reports.  PE Study with CT  abdomen &pelvis with contrast    Result Date: 5/21/2024  Impression: 1.  No evidence of pulmonary embolus. 2.  Diffuse groundglass attenuation throughout both lungs, progressed since 5/15/2024. Differential diagnosis includes pulmonary edema (including from noncardiogenic etiologies) and widespread pneumonia, including viral pneumonia. Differential diagnosis includes several less common entities, including drug related acute interstitial pneumonia and hypersensitivity pneumonitis. Smoking-related interstitial lung disease can have this appearance but progression over the course of 6 days is atypical. 3.  Hepatomegaly and cirrhosis. 4.  Mild splenomegaly. 5.  Two small nonobstructing left renal calculi. 6.  No evidence of acute abnormality in the abdomen or pelvis. Workstation performed: [id number]     Other Diagnostic Testing: I have personally reviewed pertinent reports.    ACTIVE MEDICATIONS     Current Facility-Administered Medications   Medication Dose Route Frequency    albuterol (PROVENTIL HFA,VENTOLIN HFA) inhaler 2 puff  2 puff Inhalation Q4H PRN    budesonide (PULMICORT) inhalation solution 0.5 mg  0.5 mg Nebulization BID    buPROPion (WELLBUTRIN XL) 24 hr tablet 300 mg  300 mg Oral QAM    ceftriaxone (ROCEPHIN) 1 g/50 mL in dextrose IVPB  1,000 mg Intravenous Q24H    diazepam (VALIUM) tablet 5 mg  5 mg Oral HS PRN    enoxaparin (LOVENOX) subcutaneous injection 40 mg  40 mg Subcutaneous Daily    ergocalciferol (VITAMIN D2) capsule 50,000 Units  50,000 Units Oral Q14 Days    famotidine (PEPCID) tablet 40 mg  40 mg Oral HS    folic acid (FOLVITE) tablet 1 mg  1 mg Oral Daily    gabapentin (NEURONTIN) capsule 100 mg  100 mg Oral Q8H    insulin lispro (HumALOG/ADMELOG) 100 units/mL subcutaneous injection 1-5 Units  1-5 Units Subcutaneous HS    insulin lispro (HumALOG/ADMELOG) 100 units/mL subcutaneous injection 1-6 Units  1-6 Units Subcutaneous TID AC    ipratropium (ATROVENT) 0.02 % inhalation solution 0.5  "mg  0.5 mg Nebulization TID    lactulose (CHRONULAC) oral solution 20 g  20 g Oral Daily PRN    levalbuterol (XOPENEX) inhalation solution 1.25 mg  1.25 mg Nebulization TID    levothyroxine tablet 112 mcg  112 mcg Oral Early Morning    nicotine (NICODERM CQ) 14 mg/24hr TD 24 hr patch 14 mg  14 mg Transdermal Daily    ondansetron (ZOFRAN-ODT) dispersible tablet 4 mg  4 mg Oral Q12H PRN    oxyCODONE (ROXICODONE) IR tablet 5 mg  5 mg Oral Q4H PRN    Or    oxyCODONE (ROXICODONE) immediate release tablet 10 mg  10 mg Oral Q4H PRN    pantoprazole (PROTONIX) EC tablet 40 mg  40 mg Oral Early Morning    pravastatin (PRAVACHOL) tablet 40 mg  40 mg Oral Daily With Dinner    sertraline (ZOLOFT) tablet 100 mg  100 mg Oral HS       VTE Pharmacologic Prophylaxis: Enoxaparin (Lovenox)  VTE Mechanical Prophylaxis: sequential compression device    Portions of the record may have been created with voice recognition software.  Occasional wrong word or \"sound a like\" substitutions may have occurred due to the inherent limitations of voice recognition software.  Read the chart carefully and recognize, using context, where substitutions have occurred.  ==  Zelda Sheldon, MS4  [facility]  Internal Medicine Residency        "

## 2024-05-22 NOTE — PLAN OF CARE
Problem: OCCUPATIONAL THERAPY ADULT  Goal: Performs self-care activities at highest level of function for planned discharge setting.  See evaluation for individualized goals.  Description: Treatment Interventions: ADL retraining, Functional transfer training, UE strengthening/ROM, Endurance training, Patient/family training, Equipment evaluation/education, Compensatory technique education, Continued evaluation, Energy conservation, Activityengagement          See flowsheet documentation for full assessment, interventions and recommendations.   Note: Limitation: Decreased ADL status, Decreased UE strength, Decreased endurance, Decreased self-care trans, Decreased high-level ADLs  Prognosis: Fair  Assessment: Pt is a 53 y.o. female admitted to [facility] on 5/21/2024 w/ COPD exacerbation.  has a past medical history of Acute cystitis, Acute duodenal ulcer, Anemia, Anxiety, Arthritis, Asthma, Callus of foot, GERD, Colon polyp, COPD, Depression, Disease of thyroid gland, Diverticulitis of colon, Dyspepsia, Edema, Essential hypertriglyceridemia, Hydroureteronephrosis, Hypertension, Hypokalemia (06/20/2022), Hypotension, Kidney stone, Left ureteral stone with hydronephrosis, Morbid obesity, Neuropathy, Pancreatic lesion, Pulmonary emphysema, Urinary frequency, and candidiasis. Pt with active OT orders and up and OOB as tolerated orders. Pt seen as a co-evaluation with PT due to the patient's co-morbidities, clinically unstable presentation/clinical complexity, and present impairments. As per pt report, pta, [facility] Pt was I w/  ADLS and has A with IADLS, (-) drove. Upon evaluation, pt currently requires MIN A for transfers and mobility. Pt currently requires I eating, S grooming, S UB ADLs, MIN A LB ADLs, and MIN A toileting. Pt limited by SOB and decreased activity tolerance/endurance. Requires seated rest breaks between tasks. Pt desats to ~ 81% during activity on 3L, requires increased O2  to 4L and prolonged time to recover. . Pt is limited at this time 2*: pain, endurance, activity tolerance, functional mobility, balance, functional standing tolerance, decreased I w/ ADLS/IADLS, and strength.The following Occupational Performance Areas to address include: grooming, bathing/shower, toilet hygiene, dressing, health maintenance, functional mobility, community mobility, and clothing management. Pt to benefit from immediate acute skilled OT to address above deficits, improve overall functional independence, maximize fnxl mobility and reduce caregiver burden. From OT standpoint, recommendation at time of d/c would be home with skilled therapy.  Pt was left after session with all current needs met. The patient's raw score on the -PAC Daily Activity Inpatient Short Form is 19. A raw score of greater than or equal to 19 suggests the patient may benefit from discharge to home. Please refer to the recommendation of the Occupational Therapist for safe discharge planning.     Rehab Resource Intensity Level, OT: III (Minimum Resource Intensity)

## 2024-05-22 NOTE — ASSESSMENT & PLAN NOTE
PFTs done in 2/14/17 found FEV1/FVC of 76% and DLCO of 55%. Per report: Isolated reduction in diffusion capacity may be associated with early interstitial lung disease, pulmonary hypertension or diastolic HF.   Bilateral ground glass opacities on chest PE study per report: Smoking-related interstitial lung disease can have this appearance but progression over the course of 6 days is atypical. Patient does have significant smoking history at least 20 pack years, though recently quit in March. Patient also has symptoms of snoring at home but has not yet done a sleep study. She continues to note shortness of breath, currently on 3L NC.    Plan:  Xopenex/Atrovent nebs TID  Repeat PFTs outpatient  Sleep study outpatient

## 2024-05-22 NOTE — ASSESSMENT & PLAN NOTE
Wt Readings from Last 3 Encounters:   05/21/24 117 kg (257 lb 15 oz)   05/17/24 116 kg (256 lb 9.6 oz)   05/07/24 112 kg (248 lb)     Patient noted to have TTE 08/23: displayed EF 60%; mild L. Ventricular thickness, mild mitral and tricuspid regurgitation.    Patient states she was told about newly worsening CHF after her most recent hospital admission in [place] on April 11 2024. R heart cath performed during that hospitalization - results showed normal CO. No records found of any evidence of echo done during that admission.    Plan:  Continue home lasix 40 mg daily

## 2024-05-22 NOTE — ASSESSMENT & PLAN NOTE
Pt endorses having chronic lower back pain. Physical exam revealed CVA tenderness.     Plan  - Oxy 5mg for moderate pain, Oxy 10mg for severe pain

## 2024-05-22 NOTE — UTILIZATION REVIEW
"NOTIFICATION OF INPATIENT ADMISSION   AUTHORIZATION REQUEST   SERVICING FACILITY:   [facility]  Address: [address] [place] [address]  Tax ID: [id number]  NPI: [id number] ATTENDING PROVIDER:  Attending Name and NPI#: Rupesh Addison Md [[id number]]  Address: [address] [place] [address]  Phone: [phone]   ADMISSION INFORMATION:  Place of Service: Inpatient [facility]  Place of Service Code: 21  Inpatient Admission Date/Time: 5/21/24  4:38 PM  Discharge Date/Time: No discharge date for patient encounter.  Admitting Diagnosis Code/Description:  COPD (chronic obstructive pulmonary disease) ([facility]) [J44.9]  Pneumonia [J18.9]  SOB (shortness of breath) [R06.02]  Severe sepsis ([facility]) [A41.9, R65.20]  Ambulatory dysfunction [R26.2]     UTILIZATION REVIEW CONTACT:  Priscila Templeton"Kristine\"Shaheen Utilization   Network Utilization Review Department  Phone: [phone]  Fax: [phone]  Email: [email or web address]  Contact for approvals/pending authorizations, clinical reviews, and discharge.     PHYSICIAN ADVISORY SERVICES:  Medical Necessity Denial & Jfxd-qa-Ipvl Review  Phone: [phone]  Fax: [phone]  Email: [email or web address]     DISCHARGE SUPPORT TEAM:  For Patients Discharge Needs & Updates  Phone: [phone] opt. 2 Fax: [phone]  Email: [email or web address]     "

## 2024-05-22 NOTE — PLAN OF CARE
Problem: Potential for Falls  Goal: Patient will remain free of falls  Description: INTERVENTIONS:  - Educate patient/family on patient safety including physical limitations  - Instruct patient to call for assistance with activity   - Consult OT/PT to assist with strengthening/mobility   - Keep Call bell within reach  - Keep bed low and locked with side rails adjusted as appropriate  - Keep care items and personal belongings within reach  - Initiate and maintain comfort rounds  - Make Fall Risk Sign visible to staff  - Offer Toileting every  Hours, in advance of need  - Initiate/Maintain alarm  - Obtain necessary fall risk management equipment  - Apply yellow socks and bracelet for high fall risk patients  - Consider moving patient to room near nurses station  Outcome: Progressing     Problem: CARDIOVASCULAR - ADULT  Goal: Maintains optimal cardiac output and hemodynamic stability  Description: INTERVENTIONS:  - Monitor I/O, vital signs and rhythm  - Monitor for S/S and trends of decreased cardiac output  - Administer and titrate ordered vasoactive medications to optimize hemodynamic stability  - Assess quality of pulses, skin color and temperature  - Assess for signs of decreased coronary artery perfusion  - Instruct patient to report change in severity of symptoms  Outcome: Progressing  Goal: Absence of cardiac dysrhythmias or at baseline rhythm  Description: INTERVENTIONS:  - Continuous cardiac monitoring, vital signs, obtain 12 lead EKG if ordered  - Administer antiarrhythmic and heart rate control medications as ordered  - Monitor electrolytes and administer replacement therapy as ordered  Outcome: Progressing     Problem: RESPIRATORY - ADULT  Goal: Achieves optimal ventilation and oxygenation  Description: INTERVENTIONS:  - Assess for changes in respiratory status  - Assess for changes in mentation and behavior  - Position to facilitate oxygenation and minimize respiratory effort  - Oxygen administered by  appropriate delivery if ordered  - Initiate smoking cessation education as indicated  - Encourage broncho-pulmonary hygiene including cough, deep breathe, Incentive Spirometry  - Assess the need for suctioning and aspirate as needed  - Assess and instruct to report SOB or any respiratory difficulty  - Respiratory Therapy support as indicated  Outcome: Progressing

## 2024-05-22 NOTE — PLAN OF CARE
Patient is alert/oriented, afebrile, with SOB/BRADY on 4L NC at baseline, and has chronic lower back pain. RN will continue to assess/monitor.        Problem: Potential for Falls  Goal: Patient will remain free of falls  Description: INTERVENTIONS:  - Educate patient/family on patient safety including physical limitations  - Instruct patient to call for assistance with activity   - Consult OT/PT to assist with strengthening/mobility   - Keep Call bell within reach  - Keep bed low and locked with side rails adjusted as appropriate  - Keep care items and personal belongings within reach  - Initiate and maintain comfort rounds  - Make Fall Risk Sign visible to staff  - Offer Toileting every 2 Hours, in advance of need  - Initiate/Maintain chair/bed alarm  - Apply yellow socks and bracelet for high fall risk patients  - Consider moving patient to room near nurses station  Outcome: Progressing     Problem: CARDIOVASCULAR - ADULT  Goal: Maintains optimal cardiac output and hemodynamic stability  Description: INTERVENTIONS:  - Monitor I/O, vital signs and rhythm  - Monitor for S/S and trends of decreased cardiac output  - Administer and titrate ordered vasoactive medications to optimize hemodynamic stability  - Assess quality of pulses, skin color and temperature  - Assess for signs of decreased coronary artery perfusion  - Instruct patient to report change in severity of symptoms  Outcome: Progressing     Problem: RESPIRATORY - ADULT  Goal: Achieves optimal ventilation and oxygenation  Description: INTERVENTIONS:  - Assess for changes in respiratory status  - Assess for changes in mentation and behavior  - Position to facilitate oxygenation and minimize respiratory effort  - Oxygen administered by appropriate delivery if ordered  - Initiate smoking cessation education as indicated  - Encourage broncho-pulmonary hygiene including cough, deep breathe, Incentive Spirometry  - Assess the need for suctioning and aspirate as  needed  - Assess and instruct to report SOB or any respiratory difficulty  - Respiratory Therapy support as indicated  Outcome: Progressing

## 2024-05-22 NOTE — CASE MANAGEMENT
Case Management Assessment & Discharge Planning Note    Patient name Chely Ruvalcaba  Location /-01 MRN [id number]  : 1970 Date 2024       Current Admission Date: 2024  Current Admission Diagnosis:Thrombocytopenia (HCC)   Patient Active Problem List    Diagnosis Date Noted Date Diagnosed    Severe sepsis (HCC) 2024     Ambulatory dysfunction 2024     Diverticulitis 2024     Stage 2 chronic kidney disease 05/15/2024     Chronic bilateral low back pain 05/15/2024     Diastolic CHF (HCC) 2024     Thrombocytopenia (HCC) 2024     Dyspnea 2024     Pulmonary nodules 2024     Enlarged lymph nodes, unspecified 2024     Bilateral pneumonia 2024     Acute diastolic congestive heart failure (HCC) 2024     Folic acid deficiency 2024     Acute respiratory failure with hypoxia (HCC) 2024     Injury of toe on left foot 2023     Cirrhosis (HCC) 2023     History of GI bleed 2023     History of colon polyps 2023     Duodenal ulcer 2023     Irritable bowel syndrome with both constipation and diarrhea 2023     Abnormal stress ECG with treadmill 2023     Iron deficiency anemia 2022     Hypokalemia 2022     Gastroesophageal reflux disease 2022     Nephrolithiasis 2022     Centrilobular emphysema (HCC) 2022     COVID-19 vaccination refused 2022     Type 2 diabetes mellitus (HCC) 2021     Edema, lower extremity 2020     Gastroparesis 2018     Hyperlipidemia 2018     Intermittent claudication (HCC) 2017     Cigarette nicotine dependence in remission 2017     Vitamin D deficiency 2016     Anxiety 2016     Benign hypertension 2016     Depression 2016     Hypothyroidism 2016     Seasonal allergic rhinitis 2016     Spinal stenosis of lumbar region 2012     Degeneration of lumbar or  lumbosacral intervertebral disc 06/08/2010       LOS (days): 1  Geometric Mean LOS (GMLOS) (days):   Days to GMLOS:     OBJECTIVE:  PATIENT READMITTED TO HOSPITAL  Risk of Unplanned Readmission Score: 24.67         Current admission status: Inpatient  Referral Reason: Information    Preferred Pharmacy:   [id number] - [place], [place] - [address]  [address] [address]  Phone: [phone] Fax: [phone]    [facility]acy [place], [place] - [address]  [address] [address]  Phone: [phone] Fax: [phone]    [facility] - [place], [place] - [address]  [address] [address]  Phone: [phone] Fax: [phone]    [facility] - [place], [place] - [address]  [facility]  [address]  [place] [address]  Phone: [phone] Fax: [phone]    Primary Care Provider: Andi Mason DO    Primary Insurance: [employer] [facility]  Secondary Insurance:     ASSESSMENT:  Active Health Care Proxies       Anuradha Keller Health Care Representative - [facility]   Primary Phone: [phone] (Mobile)                           Readmission Root Cause  30 Day Readmission: Yes  Who directed you to return to the hospital?: Self  Did you understand whom to contact if you had questions or problems?: Yes  Did you get your prescriptions before you left the hospital?: Yes  Were you able to get your prescriptions filled when you left the hospital?: Yes  Did you take your medications as prescribed?: Yes  Were you able to get to your follow-up appointments?: No  Reason:: Readmitted prior to appointment  During previous admission, was a post-acute recommendation made?: No  Patient was readmitted due to: Sepsis secondary to pneumonia  Action Plan: IV abx    Patient Information  Admitted from:: Home  Mental Status: Alert  During Assessment patient was accompanied by: Not accompanied during  assessment  Assessment information provided by:: Patient  Primary Caregiver: Self  Support Systems: Friend           DISCHARGE DETAILS:    Discharge planning discussed with:: Pt  Freedom of Choice: Yes  Comments - Freedom of Choice: Discussed FOC  CM contacted family/caregiver?: No- see comments (Pt alert and oriented)      Met with pt bedside to introduce self and role. CM consulted for outpatient resources. CM will send in-basket message to OPCM to continue to follow. Pt has all DME needed at home. Please refer to assessment on 5/14 for details.                                                                              Additional Comments: This is a readmission for sepsis secondary to penumonia. Pt is agreeable to referral to OPCM for complex medical comorbidities. CM will send in-basket to OPCM. PT is under care of Dr. Mason, and would like to speak to her if possible. CM relayed message to SOD team.

## 2024-05-22 NOTE — PROGRESS NOTES
Pt referred for care management for HRR, but upon phone outreach noted that she was readmitted 5/21/24 with sepsis secondary to possible COPD exacerbation/multifocal pneumonia.      Note routed to Gisselle Gamez, Care Manager

## 2024-05-22 NOTE — UTILIZATION REVIEW
"Initial Clinical Review    Admission: Date/Time/Statement:   Admission Orders (From admission, onward)       Ordered        05/21/24 1638  INPATIENT ADMISSION  Once                          Orders Placed This Encounter   Procedures    INPATIENT ADMISSION     Standing Status:   Standing     Number of Occurrences:   1     Order Specific Question:   Level of Care     Answer:   Med Surg [16]     Order Specific Question:   Estimated length of stay     Answer:   More than 2 Midnights     Order Specific Question:   Certification     Answer:   I certify that inpatient services are medically necessary for this patient for a duration of greater than two midnights. See H&P and MD Progress Notes for additional information about the patient's course of treatment.     ED Arrival Information       Expected   5/21/2024 11:00    Arrival   5/21/2024 11:23    Acuity   Emergent              Means of arrival   Walk-In    Escorted by   Family Member    Service   SOD-C Medicine    Admission type   Emergency              Arrival complaint   Shortness of Breath- low O2- reading 76 with minimal exertion.             Chief Complaint   Patient presents with    Shortness of Breath     Reports it has been harder to breathe today. C/o dizziness, \"heart is racing and pulse is dropping\"  Chronically on 2-3 LNC but now is on 4 LNC satting @ 90%       Initial Presentation: 53 y.o. female with PMHx: COPD, diastolic CHF, T2DM, obesity, hypothyroidism, GERD, cirrhosis secondary to NAFLD and recent hospital admission on 5/14 due to hypokalemia and COPD exacerbation who presented on 5/[facility] to [facility] ED due to O2 sat 79% at home while ambulating, shortness of breath with baseline 3L home O2 and fatigue, dizziness, elevated heart rate with ambulation, intermittent chest pain and chronic back pain.  Also states of nausea and decreased PO intake. On exam, tachycardic at 124, tachypneic at 24, O2 sat 90% on 3L NC.  Labs revealed WBC 15.69, Plts " 120, total neut abs 11.81, K 3.1, total bili 1.59, glucose 185, PT 16.1, INR 1.31, lactic acid 2.1.  CT CAP PE study revealed no PE, diffuse groundglass attenuation throughout both lungs, renal calculi, see full report below. Given breathing tx, 2L IVFs and started on IV cefepime in ED.  Plan:  Admit Inpatient status to med surg dt Severe Sepsis, BL Pneumonia, Thrombocytopenia, Hypokalemia: monitor VS and labs, trend wbc and procal, continue IV ABX and fu on blood and sputum cxs and gram stain, inc spirometry, monitor CBC and fu on BMP, monitor electrolytes and replete prn. Monitor accuchecks. PT OT eval, CM consult. IO, SCDs.     Date: 5/22   Day 2:  Shortness of breath and cough continues along with decreased breath sounds BL. Continue resp txs, monitor resp status, O2 sats, VS and labs, accuchecks, pain control prn, PT OT, CM following.    ED Triage Vitals   Temperature Pulse Respirations Blood Pressure SpO2   05/21/24 1126 05/21/24 1126 05/21/24 1126 05/21/24 1127 05/21/24 1126   98.4 °F (36.9 °C) (!) 124 (!) 24 123/66 90 %      Temp Source Heart Rate Source Patient Position - Orthostatic VS BP Location FiO2 (%)   05/21/24 1126 05/21/24 1126 05/21/24 1415 05/21/24 1415 --   Temporal Monitor Lying Left arm       Pain Score       05/21/24 1822       10 - Worst Possible Pain          Wt Readings from Last 1 Encounters:   05/21/24 117 kg (257 lb 15 oz)     Additional Vital Signs:   Date/Time Temp Pulse Resp BP MAP (mmHg) SpO2 Calculated FIO2 (%) - Nasal Cannula Nasal Cannula O2 Flow Rate (L/min) O2 Device Patient Position - Orthostatic VS   05/22/24 0803 -- -- -- -- -- 93 % 32 3 L/min Nasal cannula --   05/22/24 07:32:24 98.4 °F (36.9 °C) 91 17 91/46 Abnormal  61 Abnormal  90 % 32 3 L/min Nasal cannula Lying   05/21/24 23:44:03 98.2 °F (36.8 °C) 93 -- 98/54 69 91 % -- -- Nasal cannula Lying   05/21/24 2244 -- -- -- -- -- -- 36 4 L/min Nasal cannula --   05/21/24 2127 -- -- -- -- -- 92 % -- -- -- --   05/21/24  21:09:39 -- 95 20 121/69 86 92 % -- -- -- --   05/21/24 1910 -- 102 18 103/54 76 94 % 36 4 L/min Nasal cannula --   05/21/24 1600 -- 108 Abnormal  20 120/51 73 96 % 36 4 L/min Nasal cannula Sitting   05/21/24 1545 -- 112 Abnormal  19 108/50 67 95 % 36 4 L/min Nasal cannula Sitting   05/21/24 1530 -- 112 Abnormal  18 107/54 74 94 % 36 4 L/min Nasal cannula Sitting   05/21/24 1515 -- 116 Abnormal  20 103/54 74 96 % 36 4 L/min Nasal cannula Sitting   05/21/24 1500 -- 115 Abnormal  20 100/50 63 Abnormal  95 % 36 4 L/min Nasal cannula Sitting   05/21/24 1430 -- 121 Abnormal  22 94/48 Abnormal  67 95 % 36 4 L/min Nasal cannula Lying   05/21/24 1415 -- 122 Abnormal  20 106/51 74 94 % 36 4 L/min Nasal cannula Lying   05/21/24 1127 -- -- -- 123/66 -- -- -- -- -- --   05/21/24 1126 98.4 °F (36.9 °C) 124 Abnormal  24 Abnormal  -- -- 90 % 36 4 L/min Nasal cannula --     Pertinent Labs/Diagnostic Test Results:   5/21 EKG: Sinus tachycardia  Possible Left atrial enlargement  Cannot rule out Inferior infarct , age undetermined  Abnormal ECG    PE Study with CT abdomen &pelvis with contrast   Final Result by Charly Stauffer MD (05/21 1615)      1.  No evidence of pulmonary embolus.      2.  Diffuse groundglass attenuation throughout both lungs, progressed since 5/15/2024. Differential diagnosis includes pulmonary edema (including from noncardiogenic etiologies) and widespread pneumonia, including viral pneumonia. Differential diagnosis    includes several less common entities, including drug related acute interstitial pneumonia and hypersensitivity pneumonitis. Smoking-related interstitial lung disease can have this appearance but progression over the course of 6 days is atypical.      3.  Hepatomegaly and cirrhosis.      4.  Mild splenomegaly.      5.  Two small nonobstructing left renal calculi.      6.  No evidence of acute abnormality in the abdomen or pelvis.                     Workstation performed: [id number]          US bedside procedure   Final Result by Interface, Externalimages (05/21 1150)        Results from last 7 days   Lab Units 05/21/24  1815 05/21/24  1224   SARS-COV-2  Not Detected Negative     Results from last 7 days   Lab Units 05/22/24  0525 05/21/24  1224 05/17/24  0520 05/16/24  0526   WBC Thousand/uL 12.51* 15.69* 9.33 11.11*   HEMOGLOBIN g/dL 10.7* 12.5 11.4* 10.7*   HEMATOCRIT % 35.3 40.3 37.8 35.4   PLATELETS Thousands/uL 90* 120* 85* 95*   TOTAL NEUT ABS Thousands/µL 7.62 11.81* 7.26 6.13         Results from last 7 days   Lab Units 05/22/24  0525 05/21/24  1224 05/17/24  0520 05/16/24  0526   SODIUM mmol/L 142 138 140 141   POTASSIUM mmol/L 3.2* 3.1* 5.0 4.2   CHLORIDE mmol/L 105 102 106 105   CO2 mmol/L 28 25 25 29   ANION GAP mmol/L 9 11 9 7   BUN mg/dL 16 16 22 20   CREATININE mg/dL 1.02 1.05 1.01 1.02   EGFR ml/min/1.73sq m 62 60 63 62   CALCIUM mg/dL 8.9 9.2 9.4 9.4   MAGNESIUM mg/dL 2.2  --   --  2.3   PHOSPHORUS mg/dL 3.5  --   --   --      Results from last 7 days   Lab Units 05/21/24  1224   AST U/L 31   ALT U/L 24   ALK PHOS U/L 70   TOTAL PROTEIN g/dL 6.9   ALBUMIN g/dL 3.7   TOTAL BILIRUBIN mg/dL 1.59*     Results from last 7 days   Lab Units 05/22/24  0801 05/21/24  2119 05/21/24  1751 05/17/24  1158 05/17/24  0756 05/16/24  2101 05/16/24  1708 05/16/24  1211 05/16/24  0806 05/15/24  2110 05/15/24  1632   POC GLUCOSE mg/dl 96 185* 196* 203* 204* 156* 257* 210* 132 124 220*     Results from last 7 days   Lab Units 05/22/24  0525 05/21/24  1224 05/17/24  0520 05/16/24  0526   GLUCOSE RANDOM mg/dL 77 181* 199* 106     Results from last 7 days   Lab Units 05/21/24  1224   PROTIME seconds 16.1*   INR  1.31*   PTT seconds 34     Results from last 7 days   Lab Units 05/22/24  0525   TSH 3RD GENERATON uIU/mL 0.600     Results from last 7 days   Lab Units 05/22/24  0525 05/21/24  1224   PROCALCITONIN ng/ml 0.14 0.13     Results from last 7 days   Lab Units 05/21/24  1435 05/21/24  1224   LACTIC  ACID mmol/L 2.5* 2.1*     Results from last 7 days   Lab Units 05/21/24  1224   BNP pg/mL 19     Results from last 7 days   Lab Units 05/21/24  1224   LIPASE u/L 20     Results from last 7 days   Lab Units 05/21/24  1245 05/15/24  1928 05/15/24  1922   CLARITY UA  Clear  --  Clear   COLOR UA  Light Yellow  --  Yellow   SPEC GRAV UA  1.009  --  1.016   PH UA  6.5  --  6.5   GLUCOSE UA mg/dl Negative  --  Negative   KETONES UA mg/dl Negative  --  Negative   BLOOD UA  Negative  --  Negative   PROTEIN UA mg/dl Negative  --  Negative   NITRITE UA  Negative  --  Positive*   BILIRUBIN UA  Negative  --  Negative   UROBILINOGEN UA (BE) mg/dl <2.0  --  2.0*   LEUKOCYTES UA  Negative  --  Small*   WBC UA /hpf  --  10-20*  --    RBC UA /hpf  --  None Seen  --    BACTERIA UA /hpf  --  Innumerable*  --    EPITHELIAL CELLS WET PREP /hpf  --  Occasional  --    MUCUS THREADS   --  Occasional*  --      Results from last 7 days   Lab Units 05/21/24  1815 05/21/24  1224   STREP PNEUMONIAE ANTIGEN, URINE  Negative  --    LEGIONELLA URINARY ANTIGEN  Negative  --    INFLUENZA A PCR   --  Negative   INFLUENZA B PCR   --  Negative   INFLUENZA B  Not Detected  --    RSV PCR   --  Negative   RESPIRATORY SYNCYTIAL VIRUS  Not Detected  --      Results from last 7 days   Lab Units 05/21/24 1815   ADENOVIRUS  Not Detected   BORDETELLA PARAPERTUSSIS  Not Detected   BORDETELLA PERTUSSIS  Not Detected   CHLAMYDIA PNEUMONIAE  Not Detected   CORONAVIRUS 229E  Not Detected   CORONAVIRUS HKU1  Not Detected   CORONAVIRUS NL63  Not Detected   CORONAVIRUS OC43  Not Detected   METAPNEUMOVIRUS  Not Detected   RHINOVIRUS  Not Detected   MYCOPLASMA PNEUMONIAE  Not Detected   PARAINFLUENZA 1  Not Detected   PARAINFLUENZA 2  Not Detected   PARAINFLUENZA 3  Not Detected   PARAINFLUENZA 4  Not Detected     Results from last 7 days   Lab Units 05/21/24  1224 05/15/24  1928   BLOOD CULTURE  Received in Microbiology Lab. Culture in Progress.  Received in  Microbiology Lab. Culture in Progress.  --    URINE CULTURE   --  >100,000 cfu/ml Klebsiella pneumoniae*  >100,000 cfu/ml Citrobacter freundii*     ED Treatment:   Medication Administration from 05/21/2024 0958 to 05/21/2024 2104         Date/Time Order Dose Route Action     05/21/2024 1158 EDT albuterol inhalation solution 10 mg 10 mg Nebulization Given     05/21/2024 1158 EDT ipratropium (ATROVENT) 0.02 % inhalation solution 1 mg 1 mg Nebulization Given     05/21/2024 1158 EDT sodium chloride 0.9 % inhalation solution 12 mL 12 mL Nebulization Given     05/21/2024 1202 EDT ondansetron (ZOFRAN-ODT) dispersible tablet 4 mg 4 mg Oral Given     05/21/2024 1329 EDT cefepime (MAXIPIME) 2 g/50 mL dextrose IVPB 2,000 mg Intravenous New Bag     05/21/2024 1434 EDT vancomycin (VANCOCIN) 1,750 mg in sodium chloride 0.9 % 500 mL IVPB 1,750 mg Intravenous New Bag     05/21/2024 1329 EDT potassium chloride (Klor-Con M20) CR tablet 40 mEq 40 mEq Oral Given     05/21/2024 1457 EDT iohexol (OMNIPAQUE) 350 MG/ML injection (MULTI-DOSE) 100 mL 100 mL Intravenous Given     05/21/2024 1528 EDT multi-electrolyte (ISOLYTE-S PH 7.4) bolus 1,000 mL 1,000 mL Intravenous New Bag     05/21/2024 1630 EDT multi-electrolyte (ISOLYTE-S PH 7.4) bolus 1,000 mL 1,000 mL Intravenous New Bag     05/21/2024 1745 EDT potassium chloride (Klor-Con M20) CR tablet 40 mEq 40 mEq Oral Given     05/21/2024 1752 EDT insulin lispro (HumALOG/ADMELOG) 100 units/mL subcutaneous injection 1-6 Units 2 Units Subcutaneous Given     05/21/2024 1745 EDT ceftriaxone (ROCEPHIN) 1 g/50 mL in dextrose IVPB 1,000 mg Intravenous New Bag     05/21/2024 1822 EDT oxyCODONE (ROXICODONE) IR tablet 5 mg -- Oral See Alternative     05/21/2024 1822 EDT oxyCODONE (ROXICODONE) immediate release tablet 10 mg 10 mg Oral Given     05/21/2024 2022 EDT gabapentin (NEURONTIN) capsule 100 mg 100 mg Oral Given     05/21/2024 2022 EDT pravastatin (PRAVACHOL) tablet 40 mg 40 mg Oral Given           Past Medical History:   Diagnosis Date    Abnormal stress test     Acute cystitis without hematuria 05/07/2018    Acute duodenal ulcer with bleeding 01/16/2023    Allergic sinusitis     last assessed - 03Apr2017    Anemia Around december    Anxiety     last assessed - 04Mar2016    Arthritis     Asthma     last assessed - 04Mar2016    Bilateral lower extremity edema     last assessed - [id number]    Callus of foot     last assessed - 22Jun2016    Chest pain     Chronic GERD     last assessed - 16Jan2017    Colon polyp     Constipation     Resolved - 13Jan2017    COPD (chronic obstructive pulmonary disease) (HCC)     Depression     last assessed - 04Mar2016    Disease of thyroid gland     Diverticulitis of colon     last assessed - 04Mar2016    Dyspepsia     Resolved - [id number]    Edema, lower extremity 08/07/2020    Esophageal reflux     last assessed - 04Mar2016    Essential hypertriglyceridemia     last assessed - [id number]    Fatty liver 01/16/2023    Gastroesophageal reflux disease with esophagitis 11/18/2016    GERD (gastroesophageal reflux disease)     Gynecological disorder     last assessed - 04Mar2016    Hydroureteronephrosis 06/30/2022    Hypertension     last assessed - 04Mar2016    Hypokalemia 06/20/2022    Hypotension     last assessed - 25Aug2016    Kidney stone     Left ureteral stone with hydronephrosis 07/21/2022    Migraine     Morbid obesity (HCC) 01/11/2019    Formatting of this note might be different from the original. Added automatically from request for surgery [id number]    Neuropathy     Obesity     Other chest pain 11/08/2018    Pancreatic lesion 03/13/2023    Positive depression screening 06/19/2022    Primary hypertriglyceridemia 06/19/2022    Pulmonary emphysema (HCC)     last assessed - 04Mar2016    Seasonal allergies     Severe obesity (HCC) 01/16/2023    Skin tag 07/17/2023    SOB (shortness of breath)     Strain of groin 07/17/2023    Urinary frequency     last assessed - 22Jun2016     Vagina, candidiasis     last assessed - 22Jun2016    Very heavy cigarette smoker 01/16/2023    Visual impairment      Present on Admission:   Thrombocytopenia (HCC)   Severe sepsis ([facility])   Hypokalemia   Bilateral pneumonia   Type 2 diabetes mellitus (HCC)   Hypothyroidism   Centrilobular emphysema (HCC)   Hyperlipidemia      Admitting Diagnosis: COPD (chronic obstructive pulmonary disease) ([facility]) [J44.9]  Pneumonia [J18.9]  SOB (shortness of breath) [R06.02]  Severe sepsis ([facility]) [A41.9, R65.20]  Ambulatory dysfunction [R26.2]  Age/Sex: 53 y.o. female  Admission Orders:  Scheduled Medications:  budesonide, 0.5 mg, Nebulization, BID  buPROPion, 300 mg, Oral, QAM  cefTRIAXone, 1,000 mg, Intravenous, Q24H  enoxaparin, 40 mg, Subcutaneous, Daily  ergocalciferol, 50,000 Units, Oral, Q14 Days  famotidine, 40 mg, Oral, HS  folic acid, 1 mg, Oral, Daily  gabapentin, 100 mg, Oral, Q8H  insulin lispro, 1-5 Units, Subcutaneous, HS  insulin lispro, 1-6 Units, Subcutaneous, TID AC  ipratropium, 0.5 mg, Nebulization, TID  levalbuterol, 1.25 mg, Nebulization, TID  levothyroxine, 112 mcg, Oral, Early Morning  nicotine, 14 mg, Transdermal, Daily  pantoprazole, 40 mg, Oral, Early Morning  pravastatin, 40 mg, Oral, Daily With Dinner  sertraline, 100 mg, Oral, HS      Continuous IV Infusions:     PRN Meds:  albuterol, 2 puff, Inhalation, Q4H PRN  diazepam, 5 mg, Oral, HS PRN  lactulose, 20 g, Oral, Daily PRN  ondansetron, 4 mg, Oral, Q12H PRN  oxyCODONE, 5 mg, Oral, Q4H PRN   Or  oxyCODONE, 10 mg, Oral, Q4H PRN        IP CONSULT TO CASE MANAGEMENT    Network Utilization Review Department  ATTENTION: Please call with any questions or concerns to [phone] and carefully listen to the prompts so that you are directed to the right person. All voicemails are confidential.   For Discharge needs, contact Care Management DC Support Team at [phone] opt. 2  Send all requests for admission clinical reviews, approved or denied  determinations and any other requests to dedicated fax number below belonging to the campus where the patient is receiving treatment. List of dedicated fax numbers for the Facilities:  FACILITY NAME UR FAX NUMBER   ADMISSION DENIALS (Administrative/Medical Necessity) [phone]   DISCHARGE SUPPORT TEAM (NETWORK) [phone]   PARENT CHILD HEALTH (Maternity/NICU/Pediatrics) [phone]   [facility] [phone]   [facility] [phone]   [facility] [phone]   [facility] [phone]   [facility] [phone]   [facility] [phone]   [facility] [phone]   [facility] [phone]   [facility] [phone]   [facility] [phone]   [facility] [phone]   [facility] [phone]

## 2024-05-23 ENCOUNTER — TELEPHONE (OUTPATIENT)
Age: 54
End: 2024-05-23

## 2024-05-23 PROBLEM — R65.20 SEVERE SEPSIS (HCC): Status: RESOLVED | Noted: 2024-05-21 | Resolved: 2024-05-23

## 2024-05-23 PROBLEM — E87.6 HYPOKALEMIA: Status: RESOLVED | Noted: 2022-06-20 | Resolved: 2024-05-23

## 2024-05-23 PROBLEM — A41.9 SEVERE SEPSIS (HCC): Status: RESOLVED | Noted: 2024-05-21 | Resolved: 2024-05-23

## 2024-05-23 LAB
ANION GAP SERPL CALCULATED.3IONS-SCNC: 11 MMOL/L (ref 4–13)
ATRIAL RATE: 111 BPM
BASOPHILS # BLD AUTO: 0.05 THOUSANDS/ÂΜL (ref 0–0.1)
BASOPHILS NFR BLD AUTO: 1 % (ref 0–1)
BUN SERPL-MCNC: 13 MG/DL (ref 5–25)
CALCIUM SERPL-MCNC: 9.2 MG/DL (ref 8.4–10.2)
CHLORIDE SERPL-SCNC: 106 MMOL/L (ref 96–108)
CO2 SERPL-SCNC: 24 MMOL/L (ref 21–32)
CREAT SERPL-MCNC: 0.94 MG/DL (ref 0.6–1.3)
EOSINOPHIL # BLD AUTO: 0.17 THOUSAND/ÂΜL (ref 0–0.61)
EOSINOPHIL NFR BLD AUTO: 2 % (ref 0–6)
ERYTHROCYTE [DISTWIDTH] IN BLOOD BY AUTOMATED COUNT: 18.2 % (ref 11.6–15.1)
GFR SERPL CREATININE-BSD FRML MDRD: 69 ML/MIN/1.73SQ M
GLUCOSE SERPL-MCNC: 134 MG/DL (ref 65–140)
GLUCOSE SERPL-MCNC: 136 MG/DL (ref 65–140)
GLUCOSE SERPL-MCNC: 140 MG/DL (ref 65–140)
GLUCOSE SERPL-MCNC: 165 MG/DL (ref 65–140)
GLUCOSE SERPL-MCNC: 175 MG/DL (ref 65–140)
HCT VFR BLD AUTO: 34.3 % (ref 34.8–46.1)
HGB BLD-MCNC: 10.4 G/DL (ref 11.5–15.4)
IMM GRANULOCYTES # BLD AUTO: 0.15 THOUSAND/UL (ref 0–0.2)
IMM GRANULOCYTES NFR BLD AUTO: 1 % (ref 0–2)
LYMPHOCYTES # BLD AUTO: 3.01 THOUSANDS/ÂΜL (ref 0.6–4.47)
LYMPHOCYTES NFR BLD AUTO: 27 % (ref 14–44)
MCH RBC QN AUTO: 29.7 PG (ref 26.8–34.3)
MCHC RBC AUTO-ENTMCNC: 30.3 G/DL (ref 31.4–37.4)
MCV RBC AUTO: 98 FL (ref 82–98)
MONOCYTES # BLD AUTO: 1.04 THOUSAND/ÂΜL (ref 0.17–1.22)
MONOCYTES NFR BLD AUTO: 9 % (ref 4–12)
NEUTROPHILS # BLD AUTO: 6.69 THOUSANDS/ÂΜL (ref 1.85–7.62)
NEUTS SEG NFR BLD AUTO: 60 % (ref 43–75)
NRBC BLD AUTO-RTO: 0 /100 WBCS
P AXIS: 26 DEGREES
PLATELET # BLD AUTO: 92 THOUSANDS/UL (ref 149–390)
PMV BLD AUTO: 11.6 FL (ref 8.9–12.7)
POTASSIUM SERPL-SCNC: 4 MMOL/L (ref 3.5–5.3)
PR INTERVAL: 168 MS
QRS AXIS: 57 DEGREES
QRSD INTERVAL: 92 MS
QT INTERVAL: 358 MS
QTC INTERVAL: 486 MS
RBC # BLD AUTO: 3.5 MILLION/UL (ref 3.81–5.12)
SODIUM SERPL-SCNC: 141 MMOL/L (ref 135–147)
T WAVE AXIS: 6 DEGREES
VENTRICULAR RATE: 111 BPM
WBC # BLD AUTO: 11.11 THOUSAND/UL (ref 4.31–10.16)

## 2024-05-23 PROCEDURE — 93010 ELECTROCARDIOGRAM REPORT: CPT | Performed by: INTERNAL MEDICINE

## 2024-05-23 PROCEDURE — 87205 SMEAR GRAM STAIN: CPT | Performed by: STUDENT IN AN ORGANIZED HEALTH CARE EDUCATION/TRAINING PROGRAM

## 2024-05-23 PROCEDURE — 94760 N-INVAS EAR/PLS OXIMETRY 1: CPT

## 2024-05-23 PROCEDURE — 82948 REAGENT STRIP/BLOOD GLUCOSE: CPT

## 2024-05-23 PROCEDURE — 80048 BASIC METABOLIC PNL TOTAL CA: CPT | Performed by: STUDENT IN AN ORGANIZED HEALTH CARE EDUCATION/TRAINING PROGRAM

## 2024-05-23 PROCEDURE — 87070 CULTURE OTHR SPECIMN AEROBIC: CPT | Performed by: STUDENT IN AN ORGANIZED HEALTH CARE EDUCATION/TRAINING PROGRAM

## 2024-05-23 PROCEDURE — 94640 AIRWAY INHALATION TREATMENT: CPT

## 2024-05-23 PROCEDURE — 99232 SBSQ HOSP IP/OBS MODERATE 35: CPT | Performed by: INTERNAL MEDICINE

## 2024-05-23 PROCEDURE — 87106 FUNGI IDENTIFICATION YEAST: CPT | Performed by: STUDENT IN AN ORGANIZED HEALTH CARE EDUCATION/TRAINING PROGRAM

## 2024-05-23 PROCEDURE — 85025 COMPLETE CBC W/AUTO DIFF WBC: CPT | Performed by: STUDENT IN AN ORGANIZED HEALTH CARE EDUCATION/TRAINING PROGRAM

## 2024-05-23 RX ORDER — AZITHROMYCIN 500 MG/1
500 TABLET, FILM COATED ORAL EVERY 24 HOURS
Status: DISCONTINUED | OUTPATIENT
Start: 2024-05-24 | End: 2024-05-24 | Stop reason: HOSPADM

## 2024-05-23 RX ORDER — AZITHROMYCIN 500 MG/1
500 TABLET, FILM COATED ORAL DAILY
Qty: 4 TABLET | Refills: 0 | Status: SHIPPED | OUTPATIENT
Start: 2024-05-24 | End: 2024-05-28

## 2024-05-23 RX ORDER — ONDANSETRON 4 MG/1
4 TABLET, ORALLY DISINTEGRATING ORAL EVERY 6 HOURS PRN
Status: DISCONTINUED | OUTPATIENT
Start: 2024-05-23 | End: 2024-05-24 | Stop reason: HOSPADM

## 2024-05-23 RX ORDER — AMOXICILLIN AND CLAVULANATE POTASSIUM 875; 125 MG/1; MG/1
1 TABLET, FILM COATED ORAL EVERY 12 HOURS SCHEDULED
Qty: 8 TABLET | Refills: 0 | Status: SHIPPED | OUTPATIENT
Start: 2024-05-24 | End: 2024-05-28

## 2024-05-23 RX ORDER — AMOXICILLIN AND CLAVULANATE POTASSIUM 875; 125 MG/1; MG/1
1 TABLET, FILM COATED ORAL EVERY 12 HOURS SCHEDULED
Status: DISCONTINUED | OUTPATIENT
Start: 2024-05-24 | End: 2024-05-24 | Stop reason: HOSPADM

## 2024-05-23 RX ORDER — ONDANSETRON 4 MG/1
4 TABLET, FILM COATED ORAL EVERY 8 HOURS PRN
Qty: 20 TABLET | Refills: 0 | Status: SHIPPED | OUTPATIENT
Start: 2024-05-23

## 2024-05-23 RX ADMIN — OXYCODONE HYDROCHLORIDE 10 MG: 10 TABLET ORAL at 17:52

## 2024-05-23 RX ADMIN — OXYCODONE HYDROCHLORIDE 10 MG: 10 TABLET ORAL at 09:15

## 2024-05-23 RX ADMIN — GABAPENTIN 100 MG: 100 CAPSULE ORAL at 05:19

## 2024-05-23 RX ADMIN — GABAPENTIN 100 MG: 100 CAPSULE ORAL at 21:48

## 2024-05-23 RX ADMIN — GABAPENTIN 100 MG: 100 CAPSULE ORAL at 10:52

## 2024-05-23 RX ADMIN — ENOXAPARIN SODIUM 40 MG: 40 INJECTION SUBCUTANEOUS at 09:16

## 2024-05-23 RX ADMIN — BUDESONIDE 0.5 MG: 0.5 INHALANT ORAL at 20:37

## 2024-05-23 RX ADMIN — IPRATROPIUM BROMIDE 0.5 MG: 0.5 SOLUTION RESPIRATORY (INHALATION) at 07:01

## 2024-05-23 RX ADMIN — PANTOPRAZOLE SODIUM 40 MG: 40 TABLET, DELAYED RELEASE ORAL at 05:19

## 2024-05-23 RX ADMIN — LEVOTHYROXINE SODIUM 112 MCG: 112 TABLET ORAL at 05:19

## 2024-05-23 RX ADMIN — LEVALBUTEROL HYDROCHLORIDE 1.25 MG: 1.25 SOLUTION RESPIRATORY (INHALATION) at 20:37

## 2024-05-23 RX ADMIN — BUPROPION HYDROCHLORIDE 300 MG: 150 TABLET, EXTENDED RELEASE ORAL at 09:16

## 2024-05-23 RX ADMIN — PRAVASTATIN SODIUM 40 MG: 40 TABLET ORAL at 17:52

## 2024-05-23 RX ADMIN — ONDANSETRON 4 MG: 4 TABLET, ORALLY DISINTEGRATING ORAL at 10:52

## 2024-05-23 RX ADMIN — CEFTRIAXONE SODIUM 1000 MG: 10 INJECTION, POWDER, FOR SOLUTION INTRAVENOUS at 12:37

## 2024-05-23 RX ADMIN — INSULIN LISPRO 1 UNITS: 100 INJECTION, SOLUTION INTRAVENOUS; SUBCUTANEOUS at 17:52

## 2024-05-23 RX ADMIN — FOLIC ACID 1 MG: 1 TABLET ORAL at 09:15

## 2024-05-23 RX ADMIN — BUDESONIDE 0.5 MG: 0.5 INHALANT ORAL at 07:01

## 2024-05-23 RX ADMIN — FAMOTIDINE 40 MG: 20 TABLET, FILM COATED ORAL at 21:49

## 2024-05-23 RX ADMIN — NICOTINE 14 MG: 14 PATCH, EXTENDED RELEASE TRANSDERMAL at 09:15

## 2024-05-23 RX ADMIN — OXYCODONE HYDROCHLORIDE 5 MG: 5 TABLET ORAL at 21:56

## 2024-05-23 RX ADMIN — IPRATROPIUM BROMIDE 0.5 MG: 0.5 SOLUTION RESPIRATORY (INHALATION) at 20:37

## 2024-05-23 RX ADMIN — LEVALBUTEROL HYDROCHLORIDE 1.25 MG: 1.25 SOLUTION RESPIRATORY (INHALATION) at 07:01

## 2024-05-23 RX ADMIN — SERTRALINE 100 MG: 100 TABLET, FILM COATED ORAL at 21:49

## 2024-05-23 NOTE — PLAN OF CARE
Patient has no change in assessment.        Problem: Potential for Falls  Goal: Patient will remain free of falls  Description: INTERVENTIONS:  - Educate patient/family on patient safety including physical limitations  - Instruct patient to call for assistance with activity   - Consult OT/PT to assist with strengthening/mobility   - Keep Call bell within reach  - Keep bed low and locked with side rails adjusted as appropriate  - Keep care items and personal belongings within reach  - Initiate and maintain comfort rounds  - Make Fall Risk Sign visible to staff  - Offer Toileting every 2 Hours, in advance of need  - Initiate/Maintain bed/chair alarm  - Apply yellow socks and bracelet for high fall risk patients  - Consider moving patient to room near nurses station  Outcome: Progressing     Problem: RESPIRATORY - ADULT  Goal: Achieves optimal ventilation and oxygenation  Description: INTERVENTIONS:  - Assess for changes in respiratory status  - Assess for changes in mentation and behavior  - Position to facilitate oxygenation and minimize respiratory effort  - Oxygen administered by appropriate delivery if ordered  - Initiate smoking cessation education as indicated  - Encourage broncho-pulmonary hygiene including cough, deep breathe, Incentive Spirometry  - Assess the need for suctioning and aspirate as needed  - Assess and instruct to report SOB or any respiratory difficulty  - Respiratory Therapy support as indicated  Outcome: Progressing

## 2024-05-23 NOTE — RESPIRATORY THERAPY NOTE
Respiratory treatment wit xopenx/atrovent given at 1300. B/S clear and decreased. Pt tolerated well.

## 2024-05-23 NOTE — TELEPHONE ENCOUNTER
Patient called from hospital asking that Joe or someone from PCP clinical team call her in regards to questions she has.  She would like to know why her heartrate is elevated and why her oxygen levels are low.  It was suggested that she discuss these issues with current care team at hospital.  Patient would still like return call.

## 2024-05-23 NOTE — CASE MANAGEMENT
Case Management Discharge Planning Note    Patient name Chely Ruvalcaba  Location /-01 MRN [id number]  : 1970 Date 2024       Current Admission Date: 2024  Current Admission Diagnosis:Thrombocytopenia (HCC)   Patient Active Problem List    Diagnosis Date Noted Date Diagnosed    Ambulatory dysfunction 2024     Diverticulitis 2024     Stage 2 chronic kidney disease 05/15/2024     Chronic bilateral low back pain 05/15/2024     Diastolic CHF (HCC) 2024     Thrombocytopenia (HCC) 2024     Dyspnea 2024     Pulmonary nodules 2024     Enlarged lymph nodes, unspecified 2024     Bilateral pneumonia 2024     Acute diastolic congestive heart failure (HCC) 2024     Folic acid deficiency 2024     Acute respiratory failure with hypoxia (HCC) 2024     Injury of toe on left foot 2023     Cirrhosis (HCC) 2023     History of GI bleed 2023     History of colon polyps 2023     Duodenal ulcer 2023     Irritable bowel syndrome with both constipation and diarrhea 2023     Abnormal stress ECG with treadmill 2023     Iron deficiency anemia 2022     Gastroesophageal reflux disease 2022     Nephrolithiasis 2022     Centrilobular emphysema (HCC) 2022     COVID-19 vaccination refused 2022     Type 2 diabetes mellitus (HCC) 2021     Edema, lower extremity 2020     Gastroparesis 2018     Hyperlipidemia 2018     Intermittent claudication (HCC) 2017     Cigarette nicotine dependence in remission 2017     Vitamin D deficiency 2016     Anxiety 2016     Benign hypertension 2016     Depression 2016     Hypothyroidism 2016     Seasonal allergic rhinitis 2016     Spinal stenosis of lumbar region 2012     Degeneration of lumbar or lumbosacral intervertebral disc 2010       LOS (days): 2  Geometric Mean LOS  (GMLOS) (days):   Days to GMLOS:     OBJECTIVE:  Risk of Unplanned Readmission Score: 21.21         Current admission status: Inpatient   Preferred Pharmacy:   [id number] - [place], [place] - [address]  [address] [address]  Phone: [phone] Fax: [phone]    [facility] - [place], [place] - [address]  [address] [facility] [address]  Phone: [phone] Fax: [phone]    [facility] - [place], [place] - [address]  [address] [facility] [address]  Phone: [phone] Fax: [phone]    [facility] - [place], [place] - [address]  [address]  [address]  [facility] [address]  Phone: [phone] Fax: [phone]    Primary Care Provider: Andi Mason DO    Primary Insurance: [facility]  Secondary Insurance:     DISCHARGE DETAILS:    Discharge planning discussed with:: Pt  Freedom of Choice: Yes  Comments - Freedom of Choice: Discussed FOC  CM contacted family/caregiver?: Yes  Were Treatment Team discharge recommendations reviewed with patient/caregiver?: Yes  Did patient/caregiver verbalize understanding of patient care needs?: Yes                                                                             Additional Comments: Pt informed SOD that she now has housing and transportation concerns. CM met pt bedside, who stated that she received news yesterday evening that she could no longer stay at her current residence. She resides with a couple, and she pays rent in cash. Now she was told she could go back and gather her stuff after d/c but she could not stay. When asked her plan, pt stated that she would stay in her car. She also has concerns about transportation because while she does have a car, she does not feel comfortable with driving around in her current health state. CM provided her with resources on housing and transportation, including [email or web address] application and  transportation program for Medicaid pts via [facility], as well as local shelters and low cost living. Pt stated that her friend could probably bring her oxygen in tmr and provide transportation at d/c.  to follow in the AM and coordinate DCP.

## 2024-05-23 NOTE — DISCHARGE INSTR - AVS FIRST PAGE
Dear MsKwasi Jordon,    You came to the hospital 2 days ago due to an episode of dizziness, racing heart, and low oxygen saturation that you remember went down to 79. You came to the hospital with a fever and signs of infection such as high white blood cells. We did a CT chest scan that found suspicious signs of a pneumonia in both of your lungs. We gave you antibiotics to treat the lung infection. We ran tests for signs of infection in the blood and urine that did not find evidence of infection.    Please continue taking your home medications, with the addition of two new oral antibiotics for your lung infection: augmentin and azithromycin.  Please follow up with your PCP and with your pulmonologist within 1-2 weeks.    It was a pleasure to take care of you with the team. Please do not hesitate to reach out if you have any questions or concerns to us, or your PCP or pulmonologist.

## 2024-05-23 NOTE — DISCHARGE SUMMARY
INTERNAL MEDICINE RESIDENCY DISCHARGE SUMMARY     Chely Ruvalcaba   53 y.o. female  MRN: [id number]  Room/Bed: /-01     [facility] BE MED SURG 5   Encounter: [id number]    Active Problems:    Bilateral pneumonia    Cirrhosis (HCC)    Centrilobular emphysema (HCC)    Hyperlipidemia    Hypothyroidism    Type 2 diabetes mellitus (HCC)    Gastroesophageal reflux disease    Diastolic CHF (HCC)    Thrombocytopenia (HCC)    Chronic bilateral low back pain      * Severe sepsis (HCC)-resolved as of 5/23/2024  Assessment & Plan  Patient presenting with leukocytosis (15.69), tachycardia (124), tachypnea (24), lactic acidosis 92.1 > 2.5) without hypotension. CTA chest PE study found no PE, with diffuse ground glass attenuation bilaterally progressed since 5/15/24 with differential including pulmonary edema and widespread pneumonia. Given dose of cefepime 2g, vancomycin 1.75g, and 2L bolus in ED. Consider pulmonary cause due to shortness of breath and findings on chest imaging. UA unremarkable. Procal 0.14 from 0.13. RP2 panel negative, Strep pneumo and legionella urine negative.     WBC today 9 from 11.11. Blood cultures no growth at 48 hours. MRSA culture also showed no growth.    Plan:  Monitor vitals for fevers, hypotension  Trend WBC  875mg PO Augmentin q12 and 500 mg azithromycin q24h for 4 days, day 1 of 4    Bilateral pneumonia  Assessment & Plan  Per CXR in [place] 3/6/2024: Diffuse interstitial and airspace infiltrates. Severe edema versus severe atypical pneumonia.    Per CT Chest in [place] 3/7/2024: Alveolar opacities identified bilaterally involving upper and lower lobes. Several areas of sparing identified with normal lung parenchyma. ... IMPRESSION: 1. Findings most compatible with pulmonary edema with groundglass opacification bilaterally with slight sparing of the lung apices. Differential diagnosis includes atypical pneumonia or much less likely hemorrhage.  "    During hospital stay in [place], patient received extensive workup of atypical pneumonia. Found slightly positive CINDY, other autoimmune workup negative. During hospital stay received pulse dose of steroids and was transitioned to prednisone. Discharged with prednisone taper Bactrim PJP prophylaxis until lowest dose of the taper. However, details of exact length of taper and dosages were not found upon chart review. Patient follows with outpatient Pulmonology, last seen in May.    Per PE study 5/21/2024: Diffuse groundglass attenuation throughout both lungs, progressed since   5/15/2024. Differential diagnosis includes pulmonary edema (including from noncardiogenic etiologies) and widespread pneumonia, including viral pneumonia. Differential diagnosis includes several less common entities, including drug related acute interstitial pneumonia and hypersensitivity pneumonitis.     RP2 panel negative, Strep pneumo and legionella urine negative. Blood cultures negative at 48 hours. Possible infectious cause, possible inflammatory/autoimmune etiology.    Plan:  875mg PO Augmentin q12 and 500 mg azithromycin q24h for 4 days , day 1 of 4  Continue to follow up outpatient with Pulmonology      Cirrhosis (HCC)  Assessment & Plan  Pt presents with cirrhosis secondary to NAFLD. Currently compensated.     MRI 10/23: displaying hepatic cirrhosis with fatty deposition and splenomegaly which may indicate portal hypertension. No ascites, no leg swelling.    Per GI note on 5/6 \"abnormal ultrasound elastography revealing F4 fibrosis, nodular liver on imaging and thrombocytopenia. Likely etiology for cirrhosis is metabolic associated steatotic liver disease. Her MELD 3.0 based upon labs from March/April is 11\"     Labs from 2023 showed normal anti-smooth muscle antibody, ceruloplasmin, antimicrosomal antibody, Protime-INR,folate, B12, AFP. Labs also showed elevated alpha-1 antitrypsin and IgM. GI noted \"Blood test for rare causes of " "liver disease are negative thus far\"     CT abdomen/pelvis 5/15 showed splenomegaly and portal hypertension supporting cirrhosis. CT AP PE 5/21 also showed cirrhosis with mildly enlarged spleen. LFTs showed AST of 22, ALT of 18, ALP of 64 with 1.18 Tbili.    MELD 3.0: 12 at 5/23/2024  5:06 AM  MELD-Na: 11 at 5/23/2024  5:06 AM  Calculated from:  Serum Creatinine: 0.94 mg/dL (Using min of 1 mg/dL) at 5/23/2024  5:06 AM  Serum Sodium: 141 mmol/L (Using max of 137 mmol/L) at 5/23/2024  5:06 AM  Total Bilirubin: 1.59 mg/dL at 5/21/2024 12:24 PM  Serum Albumin: 3.7 g/dL (Using max of 3.5 g/dL) at 5/21/2024 12:24 PM  INR(ratio): 1.31 at 5/21/2024 12:24 PM  Age at listing (hypothetical): 53 years  Sex: Female at 5/23/2024  5:06 AM    Plan:  Continue to f/u outpatient with GI  No acute management while inpatient. Continue lasix to optimize fluid balance  Consider starting home aldactone once hypokalemia corrects and if symptoms such as LE edema, ascites develop    Centrilobular emphysema (HCC)  Assessment & Plan  PFTs done in 2/14/17 found FEV1/FVC of 76% and DLCO of 55%. Per report: Isolated reduction in diffusion capacity may be associated with early interstitial lung disease, pulmonary hypertension or diastolic HF.   Bilateral ground glass opacities on chest PE study per report: Smoking-related interstitial lung disease can have this appearance but progression over the course of 6 days is atypical. Patient does have significant smoking history at least 20 pack years, though recently quit in March. Patient also has symptoms of snoring at home but has not yet done a sleep study. She continues to note shortness of breath, currently on 3L NC.    Plan:  Xopenex/Atrovent nebs TID  Repeat PFTs outpatient  Sleep study outpatient    Chronic bilateral low back pain  Assessment & Plan  Pt endorses having chronic lower back pain. Physical exam revealed CVA tenderness.     Plan  - Oxy 5mg for moderate pain, Oxy 10mg for severe " pain    Thrombocytopenia (HCC)  Assessment & Plan  On admission patient noted to have platelet count of 120. During her recent admission values ranging from . Of note is her history of cirrhosis with hepatosplenomegaly.     Platelets today 82 from 92. Hgb 10.6 today from 10.4. Smear review found borderline for platelet estimate.    Plan:  - Monitor CBC    Diastolic CHF (HCC)  Assessment & Plan  Wt Readings from Last 3 Encounters:   05/21/24 117 kg (257 lb 15 oz)   05/17/24 116 kg (256 lb 9.6 oz)   05/07/24 112 kg (248 lb)     Patient noted to have TTE 08/23: displayed EF 60%; mild L. Ventricular thickness, mild mitral and tricuspid regurgitation.    Patient states she was told about newly worsening CHF after her most recent hospital admission in [place] on April 11 2024. R heart cath performed during that hospitalization - results showed normal CO. No records found of any evidence of echo done during that admission.    Plan:  Continue home lasix 40 mg daily    Gastroesophageal reflux disease  Assessment & Plan  Patient reports intermittent heartburn and follows with outpatient GI.    Plan:  Cont Pepcid 40mg  Cont. Omeprazole 40mg  FU with GI outpatient    Type 2 diabetes mellitus (HCC)  Assessment & Plan  Lab Results   Component Value Date    HGBA1C 7.8 (H) 05/15/2024       Recent Labs     05/23/24  1656 05/23/24  2056 05/24/24  0819 05/24/24  1137   POCGLU 175* 140 173* 193*       Blood Sugar Average: Last 72 hrs:  (P) [id number]    Patient has received 2 units of insulin total on SSI the past 24 hours. Her home regimen is 55 Lantus, and 15 Lispro with meals. Fasting glucose levels 179 from 136.    Plan:  Hold off for now on patient home regimen, continue sliding scale  Continue monitoring glucose trends      Hypothyroidism  Assessment & Plan  On levothyroxine 112 mcg, TSH returned 0.6.    Hyperlipidemia  Assessment & Plan  On pravistatin 40 q24h    Hypokalemia-resolved as of 5/23/2024  Assessment &  Plan  Initially presented with potassium of 3.1. S/P 80 mg of oral K Cl repletion in the ED. Two doses of 40 mg oral repletion K Cl yesterday. K today 4.1 from 4.0.      Subjective:  Patient seen and examined this morning at bedside. Patient was lying comfortably in bed when questions were asked. She noted improved shortness of breath, with the respiratory treatments helping somewhat. The patient notes improved nausea today and reports having a bowel movement yesterday. She states continued chronic back pain. Discussed with patient the reason why she came to the hospital, and emphasized the importance of attending her follow up scheduled sleep study and pulmonology outpatient follow up. She has no other questions at this time. Denies any fevers, chills, vomiting, chest pain, or headache.    Vitals:    05/24/24 0851   BP:    Pulse:    Resp:    Temp:    SpO2: 98%     Physical Exam  Vitals reviewed.   Constitutional:       General: She is not in acute distress.  HENT:      Head: Normocephalic and atraumatic.      Right Ear: External ear normal.      Left Ear: External ear normal.      Nose: Nose normal. No congestion.      Mouth/Throat:      Mouth: Mucous membranes are moist.   Eyes:      General:         Right eye: No discharge.         Left eye: No discharge.   Cardiovascular:      Rate and Rhythm: Normal rate and regular rhythm.      Heart sounds: Normal heart sounds.   Pulmonary:      Effort: Pulmonary effort is normal.      Breath sounds: No wheezing.      Comments: Decreased air movement  Abdominal:      General: Abdomen is flat.      Palpations: Abdomen is soft.      Tenderness: There is no abdominal tenderness.   Musculoskeletal:      Right lower leg: No edema.      Left lower leg: No edema.   Skin:     General: Skin is warm and dry.   Neurological:      General: No focal deficit present.      Mental Status: She is alert and oriented to person, place, and time.   Psychiatric:         Mood and Affect: Mood  "normal.         Behavior: Behavior normal.           DETAILS OF HOSPITAL COURSE     Per H&P by Zelda Pito, MS4:  \"53 year old female with PMH of COPD, diastolic CHF, T2DM, obesity, hypothyroidism, GERD, cirrhosis secondary to NAFLD and recent hospital admission on 5/14 due to hypokalemia and COPD exacerbation presenting due to low oxygen sat at home while ambulating. Patient notes feeling continued shortness of breath with baseline 3L at home with fatigue. She describes an episode of room-spinning dizziness with a elevated heart rate when ambulating, with a low reading of 79 for O2 sat. Patient states she feels overall unchanged since recent discharge in regards to her symptoms of shortness of breath, intermittent chest pain, and chronic back pain on daily percocet. She describes another recent [place] hospitalization from March to April for about a month. The patient endorses some nausea and recent decreased PO intake. She denies any recent travel or sick contacts, and denies any cough or changes in sputum. She denies any fevers, chills, vomiting, of change in bowel movements.     ED Course: patient vitals at admission: afebrile, tachycardic to 124, tachypneic to 24  In ED: WBC 15.69, K of 3.1, Tbili 1.59, Lactic acid 2.1 > 2.5. procal 0.13. BNP 19. Flu/COVID/RSV negative.   Received 2g cefepime, 1.75g vancomycin, and 2L IV fluids. Received 80mEq K+ Cl. CT chest PE found no PE with diffuse ground glass attenuation throughout both lungs.     Patient was admitted to [facility] for management of severe sepsis in setting of possible COPD exacerbation/multifocal pneumonia\"    Patient was admitted to service for management of severe sepsis due to bilateral pneumonia with hypokalemia on admission. She received IV ceftriaxone and respiratory treatments. Patient received repletion that normalized her potassium. Her symptoms improved and she returned to her baseline of 3L NC oxygen supplementation. She will be discharged with a 4 " day course of PO augmentin and azithromycin with follow up with her PCP and pulmonologist.     DISCHARGE INFORMATION     PCP at Discharge: Andi Mason MD    Admitting Provider: Rupesh Addison MD  Admission Date: 5/21/2024    Discharge Provider: Rupesh Addison MD  Discharge Date: 5/23/2024    Discharge Disposition: Home with Home Health Care  Discharge Condition: fair  Discharge with Lines: no    Discharge Diet: regular diet  Activity Restrictions: none  Test Results Pending at Discharge: None    Discharge Diagnoses:  Principal Problem (Resolved):    Severe sepsis (HCC)  Active Problems:    Bilateral pneumonia    Cirrhosis (HCC)    Centrilobular emphysema (HCC)    Hyperlipidemia    Hypothyroidism    Type 2 diabetes mellitus (HCC)    Gastroesophageal reflux disease    Diastolic CHF (HCC)    Thrombocytopenia (HCC)    Chronic bilateral low back pain  Resolved Problems:    Hypokalemia      Consulting Providers:      Diagnostic & Therapeutic Procedures Performed:  PE Study with CT abdomen &pelvis with contrast    Result Date: 5/21/2024  Impression: 1.  No evidence of pulmonary embolus. 2.  Diffuse groundglass attenuation throughout both lungs, progressed since 5/15/2024. Differential diagnosis includes pulmonary edema (including from noncardiogenic etiologies) and widespread pneumonia, including viral pneumonia. Differential diagnosis includes several less common entities, including drug related acute interstitial pneumonia and hypersensitivity pneumonitis. Smoking-related interstitial lung disease can have this appearance but progression over the course of 6 days is atypical. 3.  Hepatomegaly and cirrhosis. 4.  Mild splenomegaly. 5.  Two small nonobstructing left renal calculi. 6.  No evidence of acute abnormality in the abdomen or pelvis. Workstation performed: [id number]       Code Status: Level 1 - Full Code  Advance Directive & Living Will: <no information>  Power of :    POLST:      Medications:  Current  Discharge Medication List        STOP taking these medications       albuterol (2.5 mg/3 mL) 0.083 % nebulizer solution Comments:   Reason for Stopping:         metroNIDAZOLE (FLAGYL) 500 mg tablet Comments:   Reason for Stopping:         ondansetron (ZOFRAN-ODT) 4 mg disintegrating tablet Comments:   Reason for Stopping:         predniSONE 10 mg tablet Comments:   Reason for Stopping:             Current Discharge Medication List        START taking these medications    Details   azithromycin (ZITHROMAX) 500 MG tablet Take 1 tablet (500 mg total) by mouth daily for 4 days Do not start before May 24, 2024.  Qty: 4 tablet, Refills: 0    Associated Diagnoses: Pneumonia; Severe sepsis ([facility])      ondansetron (ZOFRAN) 4 mg tablet Take 1 tablet (4 mg total) by mouth every 8 (eight) hours as needed for nausea or vomiting  Qty: 20 tablet, Refills: 0    Associated Diagnoses: Nausea           Current Discharge Medication List        CONTINUE these medications which have NOT CHANGED    Details   albuterol (PROVENTIL HFA,VENTOLIN HFA) 90 mcg/act inhaler Inhale 2 puffs every 6 (six) hours as needed for wheezing  Qty: 18 g, Refills: 1    Comments: Substitution to a formulary equivalent within the same pharmaceutical class is authorized.  Associated Diagnoses: Viral URI with cough      budesonide (PULMICORT) 0.5 mg/2 mL nebulizer solution Take 0.5 mg by nebulization 2 (two) times a day Rinse mouth after use.      buPROPion (WELLBUTRIN XL) 300 mg 24 hr tablet Take 1 tablet (300 mg total) by mouth every morning  Qty: 90 tablet, Refills: 3    Associated Diagnoses: Recurrent major depressive disorder, in partial remission ([facility]); Recurrent major depressive disorder, remission status unspecified ([facility])      diazepam (VALIUM) 5 mg tablet Take 1 tablet (5 mg total) by mouth daily at bedtime as needed for anxiety  Qty: 30 tablet, Refills: 0    Associated Diagnoses: Anxiety      ergocalciferol (VITAMIN D2) 50,000 units Take 1 capsule (50,000  Units total) by mouth every 14 (fourteen) days  Qty: 12 capsule, Refills: 1    Associated Diagnoses: Vitamin D deficiency      famotidine (PEPCID) 40 MG tablet Take 1 tablet (40 mg total) by mouth daily at bedtime Take in evening 2 hours prior to bed  Qty: 90 tablet, Refills: 1    Associated Diagnoses: Duodenal ulcer      ferrous sulfate 324 (65 Fe) mg Take 1 tablet (324 mg total) by mouth daily before breakfast  Qty: 30 tablet, Refills: 3    Associated Diagnoses: Iron deficiency anemia secondary to inadequate dietary iron intake; Edema, lower extremity      folic acid (FOLVITE) 1 mg tablet Take 1 tablet (1 mg total) by mouth daily  Qty: 90 tablet, Refills: 1    Associated Diagnoses: Folic acid deficiency      furosemide (LASIX) 40 mg tablet Take 1 tablet (40 mg total) by mouth daily  Qty: 180 tablet, Refills: 1    Associated Diagnoses: Acute diastolic congestive heart failure (HCC)      gabapentin (NEURONTIN) 100 mg capsule Take 100 mg by mouth every 8 (eight) hours  Refills: 0      Insulin Pen Needle 31G X 5 MM MISC Inject under the skin if needed (Insuline injections)  Qty: 100 each, Refills: 3    Associated Diagnoses: Diabetes mellitus type 2, insulin dependent (HCC)      ipratropium-albuterol (DUO-NEB) 0.5-2.5 mg/3 mL nebulizer solution Take 3 mL by nebulization 4 (four) times a day  Qty: 360 mL, Refills: 3    Associated Diagnoses: Centrilobular emphysema (HCC)      lactulose (CHRONULAC) 10 g/15 mL solution Take 20 g by mouth Daily or as needed for bowl movement      levothyroxine 112 mcg tablet Take 1 tablet (112 mcg total) by mouth daily  Qty: 90 tablet, Refills: 1    Associated Diagnoses: Acquired hypothyroidism      lisinopril (ZESTRIL) 5 mg tablet Take 1 tablet (5 mg total) by mouth daily  Qty: 30 tablet, Refills: 0    Associated Diagnoses: HTN (hypertension)      nicotine (NICODERM CQ) 21 mg/24 hr TD 24 hr patch Place 1 patch on the skin over 24 hours every 24 hours  Qty: 28 patch, Refills: 5     Associated Diagnoses: Tobacco abuse disorder      omeprazole (PriLOSEC) 40 MG capsule take 1 capsule by mouth twice a day  Qty: 60 capsule, Refills: 5    Associated Diagnoses: Gastric ulcer with hemorrhage, unspecified chronicity      oxyCODONE-acetaminophen (PERCOCET)  mg per tablet Take 1 tablet by mouth every 6 (six) hours as needed for severe pain      oxygen gas Inhale 2 L/min continuous. May use 3L with exertion per Bella ALVAREZ  Keep sat >88%  Indications: low saturation    Comments: patient adjusts to 3L as needed for SOB      sertraline (ZOLOFT) 100 mg tablet take 1 tablet by mouth twice a day  Qty: 180 tablet, Refills: 1    Associated Diagnoses: Recurrent major depressive disorder, in partial remission (HCC)      simvastatin (ZOCOR) 20 mg tablet Take 1 tablet (20 mg total) by mouth daily at bedtime  Qty: 90 tablet, Refills: 1    Associated Diagnoses: Hypercholesterolemia      spironolactone (ALDACTONE) 25 mg tablet Take 2 tablets (50 mg total) by mouth daily  Qty: 30 tablet, Refills: 5    Associated Diagnoses: Edema, lower extremity      sucralfate (CARAFATE) 1 g tablet Take 1 tablet (1 g total) by mouth daily at bedtime  Qty: 60 tablet, Refills: 3    Associated Diagnoses: Gastroesophageal reflux disease without esophagitis      sulfamethoxazole-trimethoprim (BACTRIM DS) 800-160 mg per tablet Take 1 tablet by mouth 3 (three) times a week for 28 days  Qty: 12 tablet, Refills: 0    Associated Diagnoses: Pneumonia of both upper lobes due to infectious organism      varenicline (CHANTIX) 0.5 mg tablet Take 1 tablet (0.5 mg total) by mouth 2 (two) times a day  Qty: 60 tablet, Refills: 0    Associated Diagnoses: Tobacco abuse disorder             Allergies:  Allergies   Allergen Reactions    Hydrocodone-Acetaminophen Hives    Ketorolac Hives and Dizziness    Toradol [Ketorolac Tromethamine] Other (See Comments)     hypotension    Ultram [Tramadol] Nausea Only, GI Intolerance and Vomiting  "      FOLLOW-UP     PCP Outpatient Follow-up:  yes      Follow up: Andi Mason DO who is the patient's PCP  Location: [address]   Follow up within next: 1-2 weeks    Other Providers Follow-up:  yes      Follow up: [facility] [place]  Specialty: Pulmonology and Sleep Medicine  Office phone number: [phone]   Follow up: 5/28/2024    Follow up: [facility]  Specialty: Pulmonology  Office phone number: [phone]   Follow up: 6/5/2024    Active Issues Requiring Follow-up:   none    Discharge Statement:   I spent 45 minutes minutes discharging the patient. This time was spent on the day of discharge. I had direct contact with the patient on the day of discharge. Additional documentation is required if more than 30 minutes were spent on discharge.    Portions of the record may have been created with voice recognition software.  Occasional wrong word or \"sound a like\" substitutions may have occurred due to the inherent limitations of voice recognition software.  Read the chart carefully and recognize, using context, where substitutions have occurred.    ==  Zelda Sheldon, MS4  [facility]  Internal Medicine Residency    "

## 2024-05-23 NOTE — PROGRESS NOTES
INTERNAL MEDICINE RESIDENCY PROGRESS NOTE     Name: Chely Ruvalcaba   Age & Sex: 53 y.o. female   MRN: [id number]  Unit/Bed#: -01   Encounter: [id number]  Team: SOD Team C     PATIENT INFORMATION     Name: Chely Ruvalcaba   Age & Sex: 53 y.o. female   MRN: [id number]  Hospital Stay Days: 2    ASSESSMENT/PLAN     Active Problems:    Bilateral pneumonia    Cirrhosis (HCC)    Centrilobular emphysema (HCC)    Hyperlipidemia    Hypothyroidism    Type 2 diabetes mellitus (HCC)    Gastroesophageal reflux disease    Diastolic CHF (HCC)    Thrombocytopenia (HCC)    Chronic bilateral low back pain      * Severe sepsis (HCC)-resolved as of 5/23/2024  Assessment & Plan  Patient presenting with leukocytosis (15.69), tachycardia (124), tachypnea (24), lactic acidosis 92.1 > 2.5) without hypotension. CTA chest PE study found no PE, with diffuse ground glass attenuation bilaterally progressed since 5/15/24 with differential including pulmonary edema and widespread pneumonia. Given dose of cefepime 2g, vancomycin 1.75g, and 2L bolus in ED. Consider pulmonary cause due to shortness of breath and findings on chest imaging. UA unremarkable. Procal 0.14 from 0.13. RP2 panel negative, Strep pneumo and legionella urine negative.     WBC today 11.11 from 12.51. Blood cultures no growth at 24 hours. MRSA culture also showed no growth.    Plan:  Monitor vitals for fevers, hypotension  Trend WBC  Ceftriaxone 1g q24h day 3, transition to 875mg PO Augmentin q12 and 500 mg azithromycin q24h for 4 days  Follow up blood cultures    Bilateral pneumonia  Assessment & Plan  Per CXR in [place] 3/6/2024: Diffuse interstitial and airspace infiltrates. Severe edema versus severe atypical pneumonia.    Per CT Chest in [place] 3/7/2024: Alveolar opacities identified bilaterally involving upper and lower lobes. Several areas of sparing identified with normal lung parenchyma. ... IMPRESSION: 1. Findings most compatible with pulmonary edema with groundglass  "opacification bilaterally with slight sparing of the lung apices. Differential diagnosis includes atypical pneumonia or much less likely hemorrhage.     During hospital stay in [place], patient received extensive workup of atypical pneumonia. Found slightly positive CINDY, other autoimmune workup negative. During hospital stay received pulse dose of steroids and was transitioned to prednisone. Discharged with prednisone taper Bactrim PJP prophylaxis until lowest dose of the taper. However, details of exact length of taper and dosages were not found upon chart review. Patient follows with outpatient Pulmonology, last seen in May.    Per PE study 5/21/2024: Diffuse groundglass attenuation throughout both lungs, progressed since   5/15/2024. Differential diagnosis includes pulmonary edema (including from noncardiogenic etiologies) and widespread pneumonia, including viral pneumonia. Differential diagnosis includes several less common entities, including drug related acute interstitial pneumonia and hypersensitivity pneumonitis.     RP2 panel negative, Strep pneumo and legionella urine negative. Blood cultures negative at 24 hours. Possible infectious cause, possible inflammatory/autoimmune etiology.    Plan:  Ceftriaxone 1g q24h day 3, transition to 875mg PO Augmentin q12 and 500 mg azithromycin q24h for 4 days   Consider steroids once cultures come back if negative  Continue to follow up outpatient with Pulmonology      Cirrhosis (HCC)  Assessment & Plan  Pt presents with cirrhosis secondary to NAFLD. Currently compensated.     MRI 10/23: displaying hepatic cirrhosis with fatty deposition and splenomegaly which may indicate portal hypertension. No ascites, no leg swelling.    Per GI note on 5/6 \"abnormal ultrasound elastography revealing F4 fibrosis, nodular liver on imaging and thrombocytopenia. Likely etiology for cirrhosis is metabolic associated steatotic liver disease. Her MELD 3.0 based upon labs from March/April is " "11\"     Labs from 2023 showed normal anti-smooth muscle antibody, ceruloplasmin, antimicrosomal antibody, Protime-INR,folate, B12, AFP. Labs also showed elevated alpha-1 antitrypsin and IgM. GI noted \"Blood test for rare causes of liver disease are negative thus far\"     CT abdomen/pelvis 5/15 showed splenomegaly and portal hypertension supporting cirrhosis. CT AP PE 5/21 also showed cirrhosis with mildly enlarged spleen.    MELD 3.0: 12 at 5/23/2024  5:06 AM  MELD-Na: 11 at 5/23/2024  5:06 AM  Calculated from:  Serum Creatinine: 0.94 mg/dL (Using min of 1 mg/dL) at 5/23/2024  5:06 AM  Serum Sodium: 141 mmol/L (Using max of 137 mmol/L) at 5/23/2024  5:06 AM  Total Bilirubin: 1.59 mg/dL at 5/21/2024 12:24 PM  Serum Albumin: 3.7 g/dL (Using max of 3.5 g/dL) at 5/21/2024 12:24 PM  INR(ratio): 1.31 at 5/21/2024 12:24 PM  Age at listing (hypothetical): 53 years  Sex: Female at 5/23/2024  5:06 AM    Plan:  Continue to f/u outpatient with GI  No acute management while inpatient. Continue lasix to optimize fluid balance  Consider starting home aldactone once hypokalemia corrects and if symptoms such as LE edema, ascites develop    Centrilobular emphysema (HCC)  Assessment & Plan  PFTs done in 2/14/17 found FEV1/FVC of 76% and DLCO of 55%. Per report: Isolated reduction in diffusion capacity may be associated with early interstitial lung disease, pulmonary hypertension or diastolic HF.   Bilateral ground glass opacities on chest PE study per report: Smoking-related interstitial lung disease can have this appearance but progression over the course of 6 days is atypical. Patient does have significant smoking history at least 20 pack years, though recently quit in March. Patient also has symptoms of snoring at home but has not yet done a sleep study. She continues to note shortness of breath and chest tightness, currently on 3L NC.    Plan:  Xopenex/Atrovent nebs TID  Repeat PFTs outpatient  Sleep study outpatient    Chronic " bilateral low back pain  Assessment & Plan  Pt endorses having chronic lower back pain. Physical exam revealed CVA tenderness.     Plan  - Oxy 5mg for moderate pain, Oxy 10mg for severe pain    Thrombocytopenia (HCC)  Assessment & Plan  On admission patient noted to have platelet count of 120. During her recent admission values ranging from . Of note is her history of cirrhosis with hepatosplenomegaly.     Platelets today 92 from 90. Hgb 10.4 today from 10.7. Smear review found borderline for platelet estimate.    Plan:  - Monitor CBC    Diastolic CHF (HCC)  Assessment & Plan  Wt Readings from Last 3 Encounters:   05/21/24 117 kg (257 lb 15 oz)   05/17/24 116 kg (256 lb 9.6 oz)   05/07/24 112 kg (248 lb)     Patient noted to have TTE 08/23: displayed EF 60%; mild L. Ventricular thickness, mild mitral and tricuspid regurgitation.    Patient states she was told about newly worsening CHF after her most recent hospital admission in [place] on April 11 2024. R heart cath performed during that hospitalization - results showed normal CO. No records found of any evidence of echo done during that admission.    Plan:  Continue home lasix 40 mg daily    Gastroesophageal reflux disease  Assessment & Plan  Patient reports intermittent heartburn and follows with outpatient GI.    Plan:  Cont Pepcid 40mg  Cont. Omeprazole 40mg  FU with GI outpatient    Type 2 diabetes mellitus (HCC)  Assessment & Plan  Lab Results   Component Value Date    HGBA1C 7.8 (H) 05/15/2024       Recent Labs     05/22/24  1158 05/22/24  1721 05/22/24  2134 05/23/24  0729   POCGLU 223* 204* 140 134       Blood Sugar Average: Last 72 hrs:  (P) 168.5563444965186169    Patient has received 4 units of insulin total on SSI. Her home regimen is 55 Lantus, and 15 Lispro with meals. Fasting glucose levels 136 today from 77.    Plan:  Hold off for now on patient home regimen, continue sliding scale  Continue monitoring glucose  trends      Hypothyroidism  Assessment & Plan  On levothyroxine 112 mcg, TSH returned 0.6.    Hyperlipidemia  Assessment & Plan  On pravistatin 40 q24h    Hypokalemia-resolved as of 2024  Assessment & Plan  Initially presented with potassium of 3.1. S/P 80 mg of oral K Cl repletion in the ED. Two doses of 40 mg oral repletion K Cl yesterday. K today 4.0 from 3.2.          Disposition: Inpatient pending placement regarding finding housing, anticipate discharge tomorrow    SUBJECTIVE     Patient seen and examined this morning at bedside. Patient was sleepy but awoke when questions were asked. She notes continued shortness of breath similar to yesterday, saying the respiratory treatments did help somewhat. She notes continued chronic back pain and nausea. The patient stated difficulty with housing situation which would prevent her from going home. She has no questions or concerns at this time. Denies any fevers, chills, nausea, vomiting, chest pain, or headache.  OBJECTIVE     Vitals:    24 1533 24 1816 24 2350 24 0630   BP: 152/86 113/67 108/60 107/57   BP Location: Left arm  Right arm Right arm   Pulse: (!) 114 (!) 111 (!) 106 102   Resp: 16  18 18   Temp: 98.5 °F (36.9 °C)  98.2 °F (36.8 °C) 98.4 °F (36.9 °C)   TempSrc: Oral  Oral Oral   SpO2: 92% 92% 91% 93%   Weight:       Height:          Temperature:   Temp (24hrs), Av.4 °F (36.9 °C), Min:98.2 °F (36.8 °C), Max:98.5 °F (36.9 °C)    Temperature: 98.4 °F (36.9 °C)  Intake & Output:  I/O          07 0700  0701   0700  07 0700    P.O. 240 120     IV Piggyback 50      Total Intake(mL/kg) 290 (2.5) 120 (1)     Urine (mL/kg/hr)  0 (0)     Total Output  0     Net +290 +120            Unmeasured Urine Occurrence 3 x            Weights:   IBW (Ideal Body Weight): 64.82 kg    Body mass index is 38.76 kg/m².  Weight (last 2 days)       Date/Time Weight    24 2243 117 (257.94)    24 1333 116  (255)          Physical Exam  Vitals reviewed.   Constitutional:       General: She is not in acute distress.  HENT:      Head: Normocephalic and atraumatic.      Right Ear: External ear normal.      Left Ear: External ear normal.      Nose: No congestion.      Mouth/Throat:      Mouth: Mucous membranes are moist.   Eyes:      General:         Right eye: No discharge.         Left eye: No discharge.   Cardiovascular:      Rate and Rhythm: Regular rhythm. Tachycardia present.      Heart sounds: Normal heart sounds.   Pulmonary:      Effort: Pulmonary effort is normal.      Breath sounds: No wheezing.      Comments: Decreased air movement - shallow breaths  Abdominal:      General: Abdomen is flat.      Palpations: Abdomen is soft.      Tenderness: There is abdominal tenderness.      Comments: Mild diffuse tenderness   Musculoskeletal:      Right lower leg: No edema.      Left lower leg: No edema.   Skin:     General: Skin is warm and dry.   Neurological:      General: No focal deficit present.      Mental Status: She is alert and oriented to person, place, and time.   Psychiatric:         Mood and Affect: Mood normal.         Behavior: Behavior normal.       LABORATORY DATA     Labs: I have personally reviewed pertinent reports.  Results from last 7 days   Lab Units 05/23/24  0506 05/22/24  0525 05/21/24  1224   WBC Thousand/uL 11.11* 12.51* 15.69*   HEMOGLOBIN g/dL 10.4* 10.7* 12.5   HEMATOCRIT % 34.3* 35.3 40.3   PLATELETS Thousands/uL 92* 90* 120*   SEGS PCT % 60 60 75   MONO PCT % 9 8 8   EOS PCT % 2 1 1      Results from last 7 days   Lab Units 05/23/24  0506 05/22/24  0525 05/21/24  1224   POTASSIUM mmol/L 4.0 3.2* 3.1*   CHLORIDE mmol/L 106 105 102   CO2 mmol/L 24 28 25   BUN mg/dL 13 16 16   CREATININE mg/dL 0.94 1.02 1.05   CALCIUM mg/dL 9.2 8.9 9.2   ALK PHOS U/L  --   --  70   ALT U/L  --   --  24   AST U/L  --   --  31     Results from last 7 days   Lab Units 05/22/24  0525   MAGNESIUM mg/dL 2.2      Results from last 7 days   Lab Units 05/22/24  0525   PHOSPHORUS mg/dL 3.5      Results from last 7 days   Lab Units 05/21/24  1224   INR  1.31*   PTT seconds 34     Results from last 7 days   Lab Units 05/21/24  1435   LACTIC ACID mmol/L 2.5*           IMAGING & DIAGNOSTIC TESTING     Radiology Results: I have personally reviewed pertinent reports.  PE Study with CT abdomen &pelvis with contrast    Result Date: 5/21/2024  Impression: 1.  No evidence of pulmonary embolus. 2.  Diffuse groundglass attenuation throughout both lungs, progressed since 5/15/2024. Differential diagnosis includes pulmonary edema (including from noncardiogenic etiologies) and widespread pneumonia, including viral pneumonia. Differential diagnosis includes several less common entities, including drug related acute interstitial pneumonia and hypersensitivity pneumonitis. Smoking-related interstitial lung disease can have this appearance but progression over the course of 6 days is atypical. 3.  Hepatomegaly and cirrhosis. 4.  Mild splenomegaly. 5.  Two small nonobstructing left renal calculi. 6.  No evidence of acute abnormality in the abdomen or pelvis. Workstation performed: [id number]     Other Diagnostic Testing: I have personally reviewed pertinent reports.    ACTIVE MEDICATIONS     Current Facility-Administered Medications   Medication Dose Route Frequency    albuterol (PROVENTIL HFA,VENTOLIN HFA) inhaler 2 puff  2 puff Inhalation Q4H PRN    [START ON 5/24/2024] amoxicillin-clavulanate (AUGMENTIN) 875-125 mg per tablet 1 tablet  1 tablet Oral Q12H IRIS    [START ON 5/24/2024] azithromycin (ZITHROMAX) tablet 500 mg  500 mg Oral Q24H    budesonide (PULMICORT) inhalation solution 0.5 mg  0.5 mg Nebulization BID    buPROPion (WELLBUTRIN XL) 24 hr tablet 300 mg  300 mg Oral QAM    diazepam (VALIUM) tablet 5 mg  5 mg Oral HS PRN    enoxaparin (LOVENOX) subcutaneous injection 40 mg  40 mg Subcutaneous Daily    ergocalciferol (VITAMIN D2)  "capsule 50,000 Units  50,000 Units Oral Q14 Days    famotidine (PEPCID) tablet 40 mg  40 mg Oral HS    folic acid (FOLVITE) tablet 1 mg  1 mg Oral Daily    gabapentin (NEURONTIN) capsule 100 mg  100 mg Oral Q8H    insulin lispro (HumALOG/ADMELOG) 100 units/mL subcutaneous injection 1-5 Units  1-5 Units Subcutaneous HS    insulin lispro (HumALOG/ADMELOG) 100 units/mL subcutaneous injection 1-6 Units  1-6 Units Subcutaneous TID AC    ipratropium (ATROVENT) 0.02 % inhalation solution 0.5 mg  0.5 mg Nebulization TID    lactulose (CHRONULAC) oral solution 20 g  20 g Oral Daily PRN    levalbuterol (XOPENEX) inhalation solution 1.25 mg  1.25 mg Nebulization TID    levothyroxine tablet 112 mcg  112 mcg Oral Early Morning    nicotine (NICODERM CQ) 14 mg/24hr TD 24 hr patch 14 mg  14 mg Transdermal Daily    ondansetron (ZOFRAN-ODT) dispersible tablet 4 mg  4 mg Oral Q6H PRN    oxyCODONE (ROXICODONE) IR tablet 5 mg  5 mg Oral Q4H PRN    Or    oxyCODONE (ROXICODONE) immediate release tablet 10 mg  10 mg Oral Q4H PRN    pantoprazole (PROTONIX) EC tablet 40 mg  40 mg Oral Early Morning    pravastatin (PRAVACHOL) tablet 40 mg  40 mg Oral Daily With Dinner    sertraline (ZOLOFT) tablet 100 mg  100 mg Oral HS       VTE Pharmacologic Prophylaxis: Enoxaparin (Lovenox)  VTE Mechanical Prophylaxis: sequential compression device    Portions of the record may have been created with voice recognition software.  Occasional wrong word or \"sound a like\" substitutions may have occurred due to the inherent limitations of voice recognition software.  Read the chart carefully and recognize, using context, where substitutions have occurred.  ==  Zelda Sheldon, MS4  [facility]  Internal Medicine Residency      =  "

## 2024-05-24 ENCOUNTER — PATIENT OUTREACH (OUTPATIENT)
Dept: CASE MANAGEMENT | Facility: HOSPITAL | Age: 54
End: 2024-05-24

## 2024-05-24 VITALS
HEART RATE: 97 BPM | RESPIRATION RATE: 19 BRPM | OXYGEN SATURATION: 98 % | TEMPERATURE: 98.3 F | BODY MASS INDEX: 39.09 KG/M2 | SYSTOLIC BLOOD PRESSURE: 112 MMHG | WEIGHT: 257.94 LBS | DIASTOLIC BLOOD PRESSURE: 67 MMHG | HEIGHT: 68 IN

## 2024-05-24 DIAGNOSIS — Z71.89 COMPLEX CARE COORDINATION: Primary | ICD-10-CM

## 2024-05-24 LAB
ALBUMIN SERPL BCP-MCNC: 3.4 G/DL (ref 3.5–5)
ALP SERPL-CCNC: 64 U/L (ref 34–104)
ALT SERPL W P-5'-P-CCNC: 18 U/L (ref 7–52)
ANION GAP SERPL CALCULATED.3IONS-SCNC: 11 MMOL/L (ref 4–13)
AST SERPL W P-5'-P-CCNC: 22 U/L (ref 13–39)
BASOPHILS # BLD AUTO: 0.06 THOUSANDS/ÂΜL (ref 0–0.1)
BASOPHILS NFR BLD AUTO: 1 % (ref 0–1)
BILIRUB SERPL-MCNC: 1.18 MG/DL (ref 0.2–1)
BUN SERPL-MCNC: 13 MG/DL (ref 5–25)
CALCIUM ALBUM COR SERPL-MCNC: 10.2 MG/DL (ref 8.3–10.1)
CALCIUM SERPL-MCNC: 9.7 MG/DL (ref 8.4–10.2)
CHLORIDE SERPL-SCNC: 106 MMOL/L (ref 96–108)
CO2 SERPL-SCNC: 24 MMOL/L (ref 21–32)
CREAT SERPL-MCNC: 0.92 MG/DL (ref 0.6–1.3)
EOSINOPHIL # BLD AUTO: 0.15 THOUSAND/ÂΜL (ref 0–0.61)
EOSINOPHIL NFR BLD AUTO: 2 % (ref 0–6)
ERYTHROCYTE [DISTWIDTH] IN BLOOD BY AUTOMATED COUNT: 18 % (ref 11.6–15.1)
GFR SERPL CREATININE-BSD FRML MDRD: 71 ML/MIN/1.73SQ M
GLUCOSE SERPL-MCNC: 173 MG/DL (ref 65–140)
GLUCOSE SERPL-MCNC: 179 MG/DL (ref 65–140)
GLUCOSE SERPL-MCNC: 193 MG/DL (ref 65–140)
HCT VFR BLD AUTO: 36 % (ref 34.8–46.1)
HGB BLD-MCNC: 10.6 G/DL (ref 11.5–15.4)
IMM GRANULOCYTES # BLD AUTO: 0.15 THOUSAND/UL (ref 0–0.2)
IMM GRANULOCYTES NFR BLD AUTO: 2 % (ref 0–2)
LYMPHOCYTES # BLD AUTO: 2.11 THOUSANDS/ÂΜL (ref 0.6–4.47)
LYMPHOCYTES NFR BLD AUTO: 23 % (ref 14–44)
MCH RBC QN AUTO: 29.4 PG (ref 26.8–34.3)
MCHC RBC AUTO-ENTMCNC: 29.4 G/DL (ref 31.4–37.4)
MCV RBC AUTO: 100 FL (ref 82–98)
MONOCYTES # BLD AUTO: 0.84 THOUSAND/ÂΜL (ref 0.17–1.22)
MONOCYTES NFR BLD AUTO: 9 % (ref 4–12)
NEUTROPHILS # BLD AUTO: 5.69 THOUSANDS/ÂΜL (ref 1.85–7.62)
NEUTS SEG NFR BLD AUTO: 63 % (ref 43–75)
NRBC BLD AUTO-RTO: 0 /100 WBCS
PLATELET # BLD AUTO: 82 THOUSANDS/UL (ref 149–390)
PMV BLD AUTO: 11.2 FL (ref 8.9–12.7)
POTASSIUM SERPL-SCNC: 4.1 MMOL/L (ref 3.5–5.3)
PROT SERPL-MCNC: 6 G/DL (ref 6.4–8.4)
RBC # BLD AUTO: 3.61 MILLION/UL (ref 3.81–5.12)
SODIUM SERPL-SCNC: 141 MMOL/L (ref 135–147)
WBC # BLD AUTO: 9 THOUSAND/UL (ref 4.31–10.16)

## 2024-05-24 PROCEDURE — 94640 AIRWAY INHALATION TREATMENT: CPT

## 2024-05-24 PROCEDURE — 80053 COMPREHEN METABOLIC PANEL: CPT | Performed by: STUDENT IN AN ORGANIZED HEALTH CARE EDUCATION/TRAINING PROGRAM

## 2024-05-24 PROCEDURE — 82948 REAGENT STRIP/BLOOD GLUCOSE: CPT

## 2024-05-24 PROCEDURE — 99238 HOSP IP/OBS DSCHRG MGMT 30/<: CPT | Performed by: INTERNAL MEDICINE

## 2024-05-24 PROCEDURE — 94664 DEMO&/EVAL PT USE INHALER: CPT

## 2024-05-24 PROCEDURE — 94760 N-INVAS EAR/PLS OXIMETRY 1: CPT

## 2024-05-24 PROCEDURE — 85025 COMPLETE CBC W/AUTO DIFF WBC: CPT | Performed by: STUDENT IN AN ORGANIZED HEALTH CARE EDUCATION/TRAINING PROGRAM

## 2024-05-24 RX ADMIN — INSULIN LISPRO 1 UNITS: 100 INJECTION, SOLUTION INTRAVENOUS; SUBCUTANEOUS at 08:23

## 2024-05-24 RX ADMIN — LEVALBUTEROL HYDROCHLORIDE 1.25 MG: 1.25 SOLUTION RESPIRATORY (INHALATION) at 08:51

## 2024-05-24 RX ADMIN — IPRATROPIUM BROMIDE 0.5 MG: 0.5 SOLUTION RESPIRATORY (INHALATION) at 08:51

## 2024-05-24 RX ADMIN — BUPROPION HYDROCHLORIDE 300 MG: 150 TABLET, EXTENDED RELEASE ORAL at 08:20

## 2024-05-24 RX ADMIN — NICOTINE 14 MG: 14 PATCH, EXTENDED RELEASE TRANSDERMAL at 08:21

## 2024-05-24 RX ADMIN — LEVOTHYROXINE SODIUM 112 MCG: 112 TABLET ORAL at 04:53

## 2024-05-24 RX ADMIN — OXYCODONE HYDROCHLORIDE 5 MG: 5 TABLET ORAL at 05:24

## 2024-05-24 RX ADMIN — BUDESONIDE 0.5 MG: 0.5 INHALANT ORAL at 08:51

## 2024-05-24 RX ADMIN — GABAPENTIN 100 MG: 100 CAPSULE ORAL at 04:53

## 2024-05-24 RX ADMIN — AMOXICILLIN AND CLAVULANATE POTASSIUM 1 TABLET: 875; 125 TABLET, FILM COATED ORAL at 08:20

## 2024-05-24 RX ADMIN — AZITHROMYCIN 500 MG: 500 TABLET, FILM COATED ORAL at 08:25

## 2024-05-24 RX ADMIN — FOLIC ACID 1 MG: 1 TABLET ORAL at 08:20

## 2024-05-24 RX ADMIN — ENOXAPARIN SODIUM 40 MG: 40 INJECTION SUBCUTANEOUS at 08:20

## 2024-05-24 RX ADMIN — PANTOPRAZOLE SODIUM 40 MG: 40 TABLET, DELAYED RELEASE ORAL at 04:53

## 2024-05-24 NOTE — PROGRESS NOTES
"ADT alert received and chart review performed. Pt was hospitalized 5/21-5/24 with sepsis pneumonia. She was discharged with abx. Pt was HRR referral 5/20. Previously hospitalized 5/14-5/17 with diverticulitis. Before outreaching pt was back in hospital.    Call placed to Chely. Introduced self as RN CM and reason for call. Offered med rec. Chely states that this will have to wait as she in the process of moving somewhere else right now. She states she is currently looking at a room on [address]. She does have a car that she can drive places.   She reports she was staying with Sharath and Gisselle who were her friends but they are kicking her out today. Currently they are bringing her things out to her.   Advised Chely to call 211, number for emergency housing line. She states she is going to check out a motel on [address].   Chely currently receives SSI and food stamps. Informed Chely that I am placing referral to DAVID Ragland to follow up.     Confirmed she has all medications including abx she was discharged with from the hospital. She states she feels \"yucky\" but denies any feelings of SOB. She does have a pulse ox to check her O2 level.  Informed Chely that Gisselle Gamez RN CM would continue follow up to manage health conditions and Jonelle HERNANDEZ CM is  that will also follow up.   She is appreciative of the call.    Referral placed for social work care management. Gisselle continues on care team and note routed to same.   "

## 2024-05-24 NOTE — CASE MANAGEMENT
Case Management Discharge Planning Note    Patient name Chely Ruvalcaba  Location /-01 MRN [id number]  : 1970 Date 2024       Current Admission Date: 2024  Current Admission Diagnosis:Thrombocytopenia (HCC)   Patient Active Problem List    Diagnosis Date Noted Date Diagnosed    Ambulatory dysfunction 2024     Diverticulitis 2024     Stage 2 chronic kidney disease 05/15/2024     Chronic bilateral low back pain 05/15/2024     Diastolic CHF (HCC) 2024     Thrombocytopenia (HCC) 2024     Dyspnea 2024     Pulmonary nodules 2024     Enlarged lymph nodes, unspecified 2024     Bilateral pneumonia 2024     Acute diastolic congestive heart failure (HCC) 2024     Folic acid deficiency 2024     Acute respiratory failure with hypoxia (HCC) 2024     Injury of toe on left foot 2023     Cirrhosis (HCC) 2023     History of GI bleed 2023     History of colon polyps 2023     Duodenal ulcer 2023     Irritable bowel syndrome with both constipation and diarrhea 2023     Abnormal stress ECG with treadmill 2023     Iron deficiency anemia 2022     Gastroesophageal reflux disease 2022     Nephrolithiasis 2022     Centrilobular emphysema (HCC) 2022     COVID-19 vaccination refused 2022     Type 2 diabetes mellitus (HCC) 2021     Edema, lower extremity 2020     Gastroparesis 2018     Hyperlipidemia 2018     Intermittent claudication (HCC) 2017     Cigarette nicotine dependence in remission 2017     Vitamin D deficiency 2016     Anxiety 2016     Benign hypertension 2016     Depression 2016     Hypothyroidism 2016     Seasonal allergic rhinitis 2016     Spinal stenosis of lumbar region 2012     Degeneration of lumbar or lumbosacral intervertebral disc 2010       LOS (days): 3  Geometric Mean LOS  (GMLOS) (days):   Days to GMLOS:     OBJECTIVE:  Risk of Unplanned Readmission Score: 22.53         Current admission status: Inpatient   Preferred Pharmacy:   [id number] - [place], [place] - [address]  [address] [address]  Phone: [phone] Fax: [phone]    [facility] - [place], [place] - [address]   [address] [facility] [address]  Phone: [phone] Fax: [phone]    [facility] - [place], [place] - [address]  [address] [address]  Phone: [phone] Fax: [phone]    [facility] - [place], [place] - [facility] [facility].  [facility] [facility].  [address]  [facility] [address]  Phone: [phone] Fax: [phone]    Primary Care Provider: Andi Mason DO    Primary Insurance: [employer] [facility]  Secondary Insurance:     DISCHARGE DETAILS:    Discharge planning discussed with:: Pt  Freedom of Choice: Yes                        Requested Home Health Care         Is the patient interested in HHC at discharge?: No                   Treatment Team Recommendation: Home  Discharge Destination Plan:: Home  Transport at Discharge : Other (Comment) (Friend will provide transportation)                                      Additional Comments: Pt is medically stable for d/c today. Pt's friend will bring her home O2 setup and provide transportation. Pt has Level III rec by PT/OT, however, she is not interested because she is uncertain about her housing situation. She does follow up with PCP and has contact information of her medical teams if she requires assistance.

## 2024-05-24 NOTE — PROGRESS NOTES
Order for d/c received, AVS reviewed with pt and friend. All questions answered. IV site removed. Pt MAURICE at this time in stable condition on home O2 with all belongings.

## 2024-05-26 LAB
BACTERIA BLD CULT: NORMAL
BACTERIA BLD CULT: NORMAL
BACTERIA SPT RESP CULT: ABNORMAL
BACTERIA SPT RESP CULT: ABNORMAL
GRAM STN SPEC: ABNORMAL
GRAM STN SPEC: ABNORMAL

## 2024-05-28 ENCOUNTER — HOSPITAL ENCOUNTER (OUTPATIENT)
Dept: SLEEP CENTER | Facility: CLINIC | Age: 54
Discharge: HOME | End: 2024-05-28
Payer: MEDICARE

## 2024-05-28 ENCOUNTER — PATIENT OUTREACH (OUTPATIENT)
Dept: INTERNAL MEDICINE CLINIC | Facility: OTHER | Age: 54
End: 2024-05-28

## 2024-05-28 DIAGNOSIS — Z59.819 HOUSING INSECURITY: Primary | ICD-10-CM

## 2024-05-28 DIAGNOSIS — R06.83 SNORING: ICD-10-CM

## 2024-05-28 PROCEDURE — 95810 POLYSOM 6/> YRS 4/> PARAM: CPT | Performed by: INTERNAL MEDICINE

## 2024-05-28 PROCEDURE — 95810 POLYSOM 6/> YRS 4/> PARAM: CPT

## 2024-05-28 SDOH — ECONOMIC STABILITY - HOUSING INSECURITY: HOUSING INSTABILITY UNSPECIFIED: Z59.819

## 2024-05-28 NOTE — PROGRESS NOTES
OP CM diamondvd kevin from RN in regards to pt getting kicked out of current living situation.  Called to pt and she states she is currently staying at her friends [address] in [place].  Pt states she can stay here as long as she needs to.   Pt does have her own car.  Pt does get SSD and SNAP.  Pt states she has applied for housing. Pt states at one point she was approved for [employer] but she already had stable housing at the time so she will reapply.  Pt states she is aware of the [employer].  Pt states she has not called 211 and states she will call eventually.      Verified pts emergency contacts and pt confirms this is all correct.  Pt states her sister and mom reside in [place].      Pt has dx of anxiety and depression.  Pt does not want any OP services and states she had services in the past but does not feel it was helpful.      Pt is on 3L 02.  Pt has a rollator and a cane.      Pt denies any other needs.  Pt encouraged to follow up with [address] and reapplying for [facility].  Pt states she does not need assistance and can apply on her own.  OP CM to continue to follow.

## 2024-05-29 ENCOUNTER — HOME CARE VISIT (OUTPATIENT)
Dept: HOME HEALTH SERVICES | Facility: HOME HEALTHCARE | Age: 54
End: 2024-05-29
Payer: MEDICARE

## 2024-05-29 PROBLEM — G47.33 OSA (OBSTRUCTIVE SLEEP APNEA): Status: ACTIVE | Noted: 2024-05-29

## 2024-05-29 NOTE — PROGRESS NOTES
Sleep Study Documentation    Pre-Sleep Study       Sleep testing procedure explained to patient:YES    Patient napped prior to study:YES- less than 30 minutes. Napped after 2PM: no    Caffeine:Dayshift worker after 12PM.  Caffeine use:YES- soda  12 to 26 ounces    Alcohol:Dayshift workers after 5PM: Alcohol use:NO    Typical day for patient:YES       Study Documentation    Sleep Study Indications: BMI>30, EDS, Unrefreshing sleep    Sleep Study: Diagnostic   Snore:Moderate  Supplemental O2: 3LPM Nasal O2  O2 flow rate (L/min) range 3LPM   O2 flow rate (L/min) final 3LPM  Minimum SaO2 72%   Baseline SaO2 90%    Mode of Therapy:    EKG abnormalities: yes:  EPOCH example and comments: PVCs    EEG abnormalities: no    Were abnormal behaviors in sleep observed:NO    Is Total Sleep Study Recording Time < 2 hours: N/A    Is Total Sleep Study Recording Time > 2 hours but study is incomplete: N/A    Is Total Sleep Study Recording Time 6 hours or more but sleep was not obtained: NO    Patient classification: disabled       Post-Sleep Study    Medication used at bedtime or during sleep study:YES over the counter sleep aid    Patient reports time it took to fall asleep:30 to 60 minutes    Patient reports waking up during study:3 or more times.  Patient reports returning to sleep without difficulty.    Patient reports sleeping 2 to 4 hours without dreaming.    Does the Patient feel this is a typical night of sleep:typical    Patient rated sleepiness: Very sleepy or tired    PAP treatment:no.

## 2024-05-29 NOTE — CASE COMMUNICATION
Received message to call pt.  Spoke with pt who reports she received a call from A asking if services were still needed.  Instructed her no orders were received for resumption of care following her last hospitalization.  Report she is in need of service.  Message sent to intake requesting PRABHA orders.

## 2024-05-31 DIAGNOSIS — J43.2 CENTRILOBULAR EMPHYSEMA (HCC): ICD-10-CM

## 2024-05-31 DIAGNOSIS — R06.83 SNORING: ICD-10-CM

## 2024-05-31 DIAGNOSIS — G47.33 OSA (OBSTRUCTIVE SLEEP APNEA): Primary | ICD-10-CM

## 2024-05-31 DIAGNOSIS — G47.19 EXCESSIVE DAYTIME SLEEPINESS: ICD-10-CM

## 2024-06-03 ENCOUNTER — HOSPITAL ENCOUNTER (INPATIENT)
Facility: HOSPITAL | Age: 54
LOS: 2 days | Discharge: NURSING, REHAB OR LONG-TERM CARE | DRG: 204 | End: 2024-06-06
Attending: EMERGENCY MEDICINE | Admitting: INTERNAL MEDICINE
Payer: MEDICARE

## 2024-06-03 ENCOUNTER — APPOINTMENT (EMERGENCY)
Dept: RADIOLOGY | Facility: HOSPITAL | Age: 54
DRG: 204 | End: 2024-06-03
Payer: MEDICARE

## 2024-06-03 ENCOUNTER — TELEPHONE (OUTPATIENT)
Age: 54
End: 2024-06-03

## 2024-06-03 DIAGNOSIS — E87.6 HYPOKALEMIA: ICD-10-CM

## 2024-06-03 DIAGNOSIS — R19.7 DIARRHEA: Primary | ICD-10-CM

## 2024-06-03 DIAGNOSIS — K52.9 ENTERITIS: ICD-10-CM

## 2024-06-03 DIAGNOSIS — R55 NEAR SYNCOPE: ICD-10-CM

## 2024-06-03 LAB
2HR DELTA HS TROPONIN: -1 NG/L
ALBUMIN SERPL BCP-MCNC: 3.2 G/DL (ref 3.5–5)
ALP SERPL-CCNC: 58 U/L (ref 34–104)
ALT SERPL W P-5'-P-CCNC: 16 U/L (ref 7–52)
ANION GAP SERPL CALCULATED.3IONS-SCNC: 10 MMOL/L (ref 4–13)
AST SERPL W P-5'-P-CCNC: 42 U/L (ref 13–39)
ATRIAL RATE: 103 BPM
BASOPHILS # BLD AUTO: 0.04 THOUSANDS/ÂΜL (ref 0–0.1)
BASOPHILS NFR BLD AUTO: 1 % (ref 0–1)
BILIRUB SERPL-MCNC: 1.26 MG/DL (ref 0.2–1)
BILIRUB UR QL STRIP: NEGATIVE
BNP SERPL-MCNC: 21 PG/ML (ref 0–100)
BUN SERPL-MCNC: 8 MG/DL (ref 5–25)
CALCIUM ALBUM COR SERPL-MCNC: 9.2 MG/DL (ref 8.3–10.1)
CALCIUM SERPL-MCNC: 8.6 MG/DL (ref 8.4–10.2)
CARDIAC TROPONIN I PNL SERPL HS: 10 NG/L
CARDIAC TROPONIN I PNL SERPL HS: 11 NG/L
CHLORIDE SERPL-SCNC: 110 MMOL/L (ref 96–108)
CLARITY UR: CLEAR
CO2 SERPL-SCNC: 23 MMOL/L (ref 21–32)
COLOR UR: NORMAL
CREAT SERPL-MCNC: 0.83 MG/DL (ref 0.6–1.3)
D DIMER PPP FEU-MCNC: 0.44 UG/ML FEU
EOSINOPHIL # BLD AUTO: 0.13 THOUSAND/ÂΜL (ref 0–0.61)
EOSINOPHIL NFR BLD AUTO: 2 % (ref 0–6)
ERYTHROCYTE [DISTWIDTH] IN BLOOD BY AUTOMATED COUNT: 18.4 % (ref 11.6–15.1)
FLUAV RNA RESP QL NAA+PROBE: NEGATIVE
FLUBV RNA RESP QL NAA+PROBE: NEGATIVE
GFR SERPL CREATININE-BSD FRML MDRD: 80 ML/MIN/1.73SQ M
GLUCOSE SERPL-MCNC: 97 MG/DL (ref 65–140)
GLUCOSE UR STRIP-MCNC: NEGATIVE MG/DL
HCT VFR BLD AUTO: 36.1 % (ref 34.8–46.1)
HGB BLD-MCNC: 11 G/DL (ref 11.5–15.4)
HGB UR QL STRIP.AUTO: NEGATIVE
IMM GRANULOCYTES # BLD AUTO: 0.04 THOUSAND/UL (ref 0–0.2)
IMM GRANULOCYTES NFR BLD AUTO: 1 % (ref 0–2)
KETONES UR STRIP-MCNC: NEGATIVE MG/DL
LEUKOCYTE ESTERASE UR QL STRIP: NEGATIVE
LYMPHOCYTES # BLD AUTO: 2.15 THOUSANDS/ÂΜL (ref 0.6–4.47)
LYMPHOCYTES NFR BLD AUTO: 27 % (ref 14–44)
MCH RBC QN AUTO: 29.8 PG (ref 26.8–34.3)
MCHC RBC AUTO-ENTMCNC: 30.5 G/DL (ref 31.4–37.4)
MCV RBC AUTO: 98 FL (ref 82–98)
MONOCYTES # BLD AUTO: 0.93 THOUSAND/ÂΜL (ref 0.17–1.22)
MONOCYTES NFR BLD AUTO: 12 % (ref 4–12)
NEUTROPHILS # BLD AUTO: 4.8 THOUSANDS/ÂΜL (ref 1.85–7.62)
NEUTS SEG NFR BLD AUTO: 57 % (ref 43–75)
NITRITE UR QL STRIP: NEGATIVE
NRBC BLD AUTO-RTO: 0 /100 WBCS
P AXIS: 53 DEGREES
PH UR STRIP.AUTO: 6.5 [PH] (ref 4.5–8)
PLATELET # BLD AUTO: 106 THOUSANDS/UL (ref 149–390)
PMV BLD AUTO: 12 FL (ref 8.9–12.7)
POTASSIUM SERPL-SCNC: 2.7 MMOL/L (ref 3.5–5.3)
PR INTERVAL: 152 MS
PROT SERPL-MCNC: 6 G/DL (ref 6.4–8.4)
PROT UR STRIP-MCNC: NEGATIVE MG/DL
QRS AXIS: 81 DEGREES
QRSD INTERVAL: 100 MS
QT INTERVAL: 308 MS
QTC INTERVAL: 403 MS
RBC # BLD AUTO: 3.69 MILLION/UL (ref 3.81–5.12)
RSV RNA RESP QL NAA+PROBE: NEGATIVE
SARS-COV-2 RNA RESP QL NAA+PROBE: NEGATIVE
SODIUM SERPL-SCNC: 143 MMOL/L (ref 135–147)
SP GR UR STRIP.AUTO: 1.02 (ref 1–1.03)
T WAVE AXIS: -25 DEGREES
UROBILINOGEN UR QL STRIP.AUTO: 0.2 E.U./DL
VENTRICULAR RATE: 103 BPM
WBC # BLD AUTO: 8.09 THOUSAND/UL (ref 4.31–10.16)

## 2024-06-03 PROCEDURE — 84484 ASSAY OF TROPONIN QUANT: CPT

## 2024-06-03 PROCEDURE — 85379 FIBRIN DEGRADATION QUANT: CPT

## 2024-06-03 PROCEDURE — 93005 ELECTROCARDIOGRAM TRACING: CPT

## 2024-06-03 PROCEDURE — 74177 CT ABD & PELVIS W/CONTRAST: CPT

## 2024-06-03 PROCEDURE — 83880 ASSAY OF NATRIURETIC PEPTIDE: CPT

## 2024-06-03 PROCEDURE — 96375 TX/PRO/DX INJ NEW DRUG ADDON: CPT

## 2024-06-03 PROCEDURE — 96365 THER/PROPH/DIAG IV INF INIT: CPT

## 2024-06-03 PROCEDURE — 85025 COMPLETE CBC W/AUTO DIFF WBC: CPT

## 2024-06-03 PROCEDURE — 96366 THER/PROPH/DIAG IV INF ADDON: CPT

## 2024-06-03 PROCEDURE — 36415 COLL VENOUS BLD VENIPUNCTURE: CPT

## 2024-06-03 PROCEDURE — 99285 EMERGENCY DEPT VISIT HI MDM: CPT

## 2024-06-03 PROCEDURE — 80053 COMPREHEN METABOLIC PANEL: CPT

## 2024-06-03 PROCEDURE — 99223 1ST HOSP IP/OBS HIGH 75: CPT | Performed by: INTERNAL MEDICINE

## 2024-06-03 PROCEDURE — 0241U HB NFCT DS VIR RESP RNA 4 TRGT: CPT

## 2024-06-03 PROCEDURE — 81003 URINALYSIS AUTO W/O SCOPE: CPT

## 2024-06-03 PROCEDURE — 99285 EMERGENCY DEPT VISIT HI MDM: CPT | Performed by: EMERGENCY MEDICINE

## 2024-06-03 PROCEDURE — 71046 X-RAY EXAM CHEST 2 VIEWS: CPT

## 2024-06-03 RX ORDER — POTASSIUM CHLORIDE 20 MEQ/1
40 TABLET, EXTENDED RELEASE ORAL ONCE
Status: COMPLETED | OUTPATIENT
Start: 2024-06-03 | End: 2024-06-03

## 2024-06-03 RX ORDER — POTASSIUM CHLORIDE 14.9 MG/ML
20 INJECTION INTRAVENOUS ONCE
Status: COMPLETED | OUTPATIENT
Start: 2024-06-03 | End: 2024-06-03

## 2024-06-03 RX ORDER — METOCLOPRAMIDE HYDROCHLORIDE 5 MG/ML
10 INJECTION INTRAMUSCULAR; INTRAVENOUS ONCE
Status: COMPLETED | OUTPATIENT
Start: 2024-06-03 | End: 2024-06-03

## 2024-06-03 RX ORDER — ALBUTEROL SULFATE 2.5 MG/3ML
1 SOLUTION RESPIRATORY (INHALATION) ONCE
Status: COMPLETED | OUTPATIENT
Start: 2024-06-03 | End: 2024-06-03

## 2024-06-03 RX ORDER — HYDROMORPHONE HCL/PF 1 MG/ML
0.5 SYRINGE (ML) INJECTION ONCE
Status: COMPLETED | OUTPATIENT
Start: 2024-06-03 | End: 2024-06-03

## 2024-06-03 RX ADMIN — IOHEXOL 100 ML: 350 INJECTION, SOLUTION INTRAVENOUS at 21:47

## 2024-06-03 RX ADMIN — METOCLOPRAMIDE 10 MG: 5 INJECTION, SOLUTION INTRAMUSCULAR; INTRAVENOUS at 23:49

## 2024-06-03 RX ADMIN — POTASSIUM CHLORIDE 40 MEQ: 1500 TABLET, EXTENDED RELEASE ORAL at 19:16

## 2024-06-03 RX ADMIN — HYDROMORPHONE HYDROCHLORIDE 0.5 MG: 1 INJECTION, SOLUTION INTRAMUSCULAR; INTRAVENOUS; SUBCUTANEOUS at 23:49

## 2024-06-03 RX ADMIN — POTASSIUM CHLORIDE 20 MEQ: 14.9 INJECTION, SOLUTION INTRAVENOUS at 19:16

## 2024-06-03 NOTE — TELEPHONE ENCOUNTER
Spoke with patient - Patient declined to go to ER- Patient states she will come into office tomorrow for appointment- Patient stated she does not feel well. Patient was strongly encouraged to go to ER for Eval, stated she may go after encouragement.

## 2024-06-03 NOTE — ED PROVIDER NOTES
History  Chief Complaint   Patient presents with    Shortness of Breath     C/o of SOB and increased use of oxygen baseline 2L    Diarrhea     C/o of diarrhea for the past week     Flu Symptoms     C/o that she just generally feels unwell       53-year-old female with history of COPD on 2 L nasal cannula baseline, along with multiple medical problems as listed below, presents emergency department due to worsening dyspnea with nonproductive cough as well as diarrhea and generalized fatigue, difficulty performing ADLs.  She was recently admitted for hypoxia due to pneumonia requiring BiPAP and ICU admission in [place].  She is also had multiple other admissions for COPD recently.  She does notice that her legs have also been swelling and she feels like she is gaining weight despite her compliance with Lasix.  Also been having some abdominal pain and diarrhea which may be related to ongoing antibiotics that she has been taking.  Denies fevers, chills, chest pain, or other complaints.    Prior to Admission Medications   Prescriptions Last Dose Informant Patient Reported? Taking?   Insulin Pen Needle 31G X 5 MM MISC Not Taking  No No   Sig: Inject under the skin if needed (Insuline injections)   Patient not taking: Reported on 6/3/2024   albuterol (PROVENTIL HFA,VENTOLIN HFA) 90 mcg/act inhaler 6/3/2024  No Yes   Sig: Inhale 2 puffs every 6 (six) hours as needed for wheezing   buPROPion (WELLBUTRIN XL) 300 mg 24 hr tablet 6/2/2024  No Yes   Sig: Take 1 tablet (300 mg total) by mouth every morning   budesonide (PULMICORT) 0.5 mg/2 mL nebulizer solution 6/3/2024  Yes Yes   Sig: Take 0.5 mg by nebulization 2 (two) times a day Rinse mouth after use.   diazepam (VALIUM) 5 mg tablet 6/2/2024  No Yes   Sig: Take 1 tablet (5 mg total) by mouth daily at bedtime as needed for anxiety   Patient taking differently: Take 5 mg by mouth daily at bedtime as needed for anxiety   ergocalciferol (VITAMIN D2) 50,000 units Past Month Self No  Yes   Sig: Take 1 capsule (50,000 Units total) by mouth every 14 (fourteen) days   famotidine (PEPCID) 40 MG tablet 6/2/2024  No Yes   Sig: Take 1 tablet (40 mg total) by mouth daily at bedtime Take in evening 2 hours prior to bed   ferrous sulfate 324 (65 Fe) mg 6/3/2024  No Yes   Sig: Take 1 tablet (324 mg total) by mouth daily before breakfast   folic acid (FOLVITE) 1 mg tablet 6/3/2024  No Yes   Sig: Take 1 tablet (1 mg total) by mouth daily   furosemide (LASIX) 40 mg tablet 6/3/2024  No Yes   Sig: Take 1 tablet (40 mg total) by mouth daily   gabapentin (NEURONTIN) 100 mg capsule 6/3/2024 Self Yes Yes   Sig: Take 100 mg by mouth every 8 (eight) hours   ipratropium-albuterol (DUO-NEB) 0.5-2.5 mg/3 mL nebulizer solution 6/3/2024  No Yes   Sig: Take 3 mL by nebulization 4 (four) times a day   lactulose (CHRONULAC) 10 g/15 mL solution Past Month  Yes Yes   Sig: Take 20 g by mouth Daily or as needed for bowl movement   levothyroxine 112 mcg tablet 6/3/2024  No Yes   Sig: Take 1 tablet (112 mcg total) by mouth daily   lisinopril (ZESTRIL) 5 mg tablet 6/3/2024  No Yes   Sig: Take 1 tablet (5 mg total) by mouth daily   nicotine (NICODERM CQ) 21 mg/24 hr TD 24 hr patch 6/3/2024  No Yes   Sig: Place 1 patch on the skin over 24 hours every 24 hours   omeprazole (PriLOSEC) 40 MG capsule 6/3/2024  No Yes   Sig: take 1 capsule by mouth twice a day   ondansetron (ZOFRAN) 4 mg tablet 6/3/2024  No Yes   Sig: Take 1 tablet (4 mg total) by mouth every 8 (eight) hours as needed for nausea or vomiting   oxyCODONE-acetaminophen (PERCOCET)  mg per tablet 6/3/2024 Self Yes Yes   Sig: Take 1 tablet by mouth every 6 (six) hours as needed for severe pain   oxygen gas 6/3/2024  Yes Yes   Sig: Inhale 2 L/min continuous. May use 3L with exertion per Bella BAHNP  Keep sat >88%  Indications: low saturation   sertraline (ZOLOFT) 100 mg tablet 6/3/2024  No Yes   Sig: take 1 tablet by mouth twice a day   simvastatin (ZOCOR) 20 mg  tablet 6/2/2024  No Yes   Sig: Take 1 tablet (20 mg total) by mouth daily at bedtime   spironolactone (ALDACTONE) 25 mg tablet Unknown  No No   Sig: Take 2 tablets (50 mg total) by mouth daily   sucralfate (CARAFATE) 1 g tablet 6/2/2024  No Yes   Sig: Take 1 tablet (1 g total) by mouth daily at bedtime   varenicline (CHANTIX) 0.5 mg tablet 6/3/2024  No Yes   Sig: Take 1 tablet (0.5 mg total) by mouth 2 (two) times a day      Facility-Administered Medications: None       Past Medical History:   Diagnosis Date    Abnormal stress test     Acute cystitis without hematuria 05/07/2018    Acute duodenal ulcer with bleeding 01/16/2023    Allergic sinusitis     last assessed - 03Apr2017    Anemia Around december    Anxiety     last assessed - 04Mar2016    Arthritis     Asthma     last assessed - 04Mar2016    Bilateral lower extremity edema     last assessed - [id number]    Callus of foot     last assessed - 22Jun2016    Chest pain     Chronic GERD     last assessed - 16Jan2017    Colon polyp     Constipation     Resolved - 13Jan2017    COPD (chronic obstructive pulmonary disease) (HCC)     Depression     last assessed - 04Mar2016    Disease of thyroid gland     Diverticulitis of colon     last assessed - 04Mar2016    Dyspepsia     Resolved - [id number]    Edema, lower extremity 08/07/2020    Esophageal reflux     last assessed - 04Mar2016    Essential hypertriglyceridemia     last assessed - [id number]    Fatty liver 01/16/2023    Gastroesophageal reflux disease with esophagitis 11/18/2016    GERD (gastroesophageal reflux disease)     Gynecological disorder     last assessed - 04Mar2016    Hydroureteronephrosis 06/30/2022    Hypertension     last assessed - 04Mar2016    Hypokalemia 06/20/2022    Hypotension     last assessed - [id number]    Kidney stone     Left ureteral stone with hydronephrosis 07/21/2022    Migraine     Morbid obesity (HCC) 01/11/2019    Formatting of this note might be different from the original. Added  automatically from request for surgery [id number]    Neuropathy     Obesity     Other chest pain 11/08/2018    Pancreatic lesion 03/13/2023    Positive depression screening 06/19/2022    Primary hypertriglyceridemia 06/19/2022    Pulmonary emphysema (HCC)     last assessed - 04Mar2016    Seasonal allergies     Severe obesity (HCC) 01/16/2023    Skin tag 07/17/2023    SOB (shortness of breath)     Strain of groin 07/17/2023    Urinary frequency     last assessed - 22Jun2016    Vagina, candidiasis     last assessed - 22Jun2016    Very heavy cigarette smoker 01/16/2023    Visual impairment        Past Surgical History:   Procedure Laterality Date    CARPAL TUNNEL RELEASE      last assessed - 04MAr2016    CHOLECYSTECTOMY      last assessed - 04Mar2016    COLONOSCOPY      Complete colonoscopy     EYE SURGERY      last assessed - 04Mar2016    FL RETROGRADE PYELOGRAM  6/16/2022    FL RETROGRADE PYELOGRAM  8/16/2022    FOOT SURGERY      last assessed - 04Mar2016    CA CYSTO BLADDER W/URETERAL CATHETERIZATION Left 7/21/2022    Procedure: CYSTOSCOPY RETROGRADE PYELOGRAM WITH INSERTION STENT URETERAL;  Surgeon: Facundo Matamoros MD;  Location: [facility];  Service: Urology    CA CYSTO/URETERO W/LITHOTRIPSY &INDWELL STENT INSRT Left 6/16/2022    Procedure: CYSTOSCOPY URETEROSCOPY WITH LITHOTRIPSY HOLMIUM LASER, RETROGRADE PYELOGRAM AND INSERTION STENT URETERAL;  Surgeon: Sammy Ferrer MD;  Location: [facility];  Service: Urology    CA CYSTO/URETERO W/LITHOTRIPSY &INDWELL STENT INSRT Left 8/16/2022    Procedure: CYSTOSCOPY USCOPE W/HOLMIUM LASER, RETROGRADE PYELOGRAM&STENT;  Surgeon: Sudheer Bradford MD;  Location: [facility];  Service: Urology    CA ESOPHAGOGASTRODUODENOSCOPY TRANSORAL DIAGNOSTIC N/A 2/13/2017    Procedure: ESOPHAGOGASTRODUODENOSCOPY (EGD);  Surgeon: Alison Saleem MD;  Location: [facility];  Service: Gastroenterology       Family History   Problem Relation Age of Onset    Obesity Mother     Thyroid disease Mother      Cancer Mother 71    Vision loss Mother     Obesity Sister     Coronary artery disease Sister     Stroke Sister     Asthma Sister     Glaucoma Sister     No Known Problems Maternal Aunt     Diabetes Maternal Uncle     Lung cancer Maternal Grandmother     Cancer Maternal Grandfather     Diabetes Maternal Grandfather     No Known Problems Paternal Grandmother     No Known Problems Paternal Grandfather     Hypertension Other     Lung cancer Other     Hypertension Other     Lung cancer Other     Lung cancer Other      I have reviewed and agree with the history as documented.    E-Cigarette/Vaping    E-Cigarette Use Former User     Start Date 1/1/19     Quit Date 5/1/19      E-Cigarette/Vaping Substances    Nicotine Yes     THC No     CBD No     Flavoring No     Other No     Unknown No      Social History     Tobacco Use    Smoking status: Some Days     Current packs/day: 0.50     Average packs/day: 0.5 packs/day for 41.4 years (20.7 ttl pk-yrs)     Types: Cigarettes     Start date: 1/1/1983    Smokeless tobacco: Never   Vaping Use    Vaping status: Former    Start date: 1/1/2019    Quit date: 5/1/2019    Substances: Nicotine   Substance Use Topics    Alcohol use: Not Currently    Drug use: Never        Review of Systems   All other systems reviewed and are negative.      Physical Exam  ED Triage Vitals   Temperature Pulse Respirations Blood Pressure SpO2   06/03/24 1804 06/03/24 1802 06/03/24 1802 06/03/24 1802 06/03/24 1802   98.8 °F (37.1 °C) 104 (!) 24 108/54 94 %      Temp Source Heart Rate Source Patient Position - Orthostatic VS BP Location FiO2 (%)   06/03/24 1804 06/03/24 1802 06/03/24 1915 06/03/24 1915 --   Oral Monitor Lying Right arm       Pain Score       06/03/24 2349       8             Orthostatic Vital Signs  Vitals:    06/05/24 0736 06/05/24 1517 06/05/24 2204 06/05/24 2204   BP: 92/55 97/55 106/62 106/62   Pulse: 93 96 101    Patient Position - Orthostatic VS: Sitting Sitting         Physical  Exam  Vitals and nursing note reviewed.   Constitutional:       General: She is not in acute distress.     Appearance: She is well-developed.   HENT:      Head: Normocephalic and atraumatic.   Eyes:      Conjunctiva/sclera: Conjunctivae normal.   Cardiovascular:      Rate and Rhythm: Normal rate and regular rhythm.      Heart sounds: No murmur heard.  Pulmonary:      Comments: Shallow breath sounds, no appreciable wheezing, rales, or rhonchi.  Saturating 94% on 2 L nasal cannula.  Abdominal:      Palpations: Abdomen is soft.      Tenderness: There is abdominal tenderness (Generalized).   Musculoskeletal:         General: No swelling.      Cervical back: Neck supple.      Right lower leg: Edema present.      Left lower leg: Edema present.   Skin:     General: Skin is warm and dry.      Capillary Refill: Capillary refill takes less than 2 seconds.   Neurological:      Mental Status: She is alert.   Psychiatric:         Mood and Affect: Mood normal.         ED Medications  Medications   senna (SENOKOT) tablet 8.6 mg (8.6 mg Oral Given 6/5/24 0744)   polyethylene glycol (MIRALAX) packet 17 g (has no administration in time range)   enoxaparin (LOVENOX) subcutaneous injection 40 mg (40 mg Subcutaneous Given 6/5/24 0743)   albuterol (PROVENTIL HFA,VENTOLIN HFA) inhaler 2 puff (has no administration in time range)   nicotine (NICODERM CQ) 21 mg/24 hr TD 24 hr patch 1 patch (1 patch Transdermal Medication Applied 6/5/24 0159)   buPROPion (WELLBUTRIN XL) 24 hr tablet 300 mg (300 mg Oral Given 6/5/24 0744)   ergocalciferol (VITAMIN D2) capsule 50,000 Units (50,000 Units Oral Given 6/4/24 0816)   famotidine (PEPCID) tablet 40 mg (40 mg Oral Given 6/5/24 2147)   folic acid (FOLVITE) tablet 1 mg (1 mg Oral Given 6/5/24 0744)   gabapentin (NEURONTIN) capsule 100 mg (100 mg Oral Given 6/5/24 0744)   levothyroxine tablet 112 mcg (112 mcg Oral Given 6/5/24 0612)   pantoprazole (PROTONIX) EC tablet 40 mg (40 mg Oral Given 6/5/24 1613)    oxyCODONE-acetaminophen (PERCOCET) 5-325 mg per tablet 1 tablet (1 tablet Oral Given 6/5/24 2149)   sertraline (ZOLOFT) tablet 100 mg (100 mg Oral Given 6/5/24 1613)   pravastatin (PRAVACHOL) tablet 40 mg (40 mg Oral Given 6/5/24 1613)   sucralfate (CARAFATE) tablet 1 g (1 g Oral Given 6/5/24 2147)   diazepam (VALIUM) tablet 5 mg (5 mg Oral Given 6/5/24 2149)   ferrous sulfate tablet 325 mg (325 mg Oral Given 6/5/24 0744)   levalbuterol (XOPENEX) inhalation solution 1.25 mg (1.25 mg Nebulization Given 6/5/24 2038)   ipratropium (ATROVENT) 0.02 % inhalation solution 0.5 mg (0.5 mg Nebulization Given 6/5/24 2038)   albuterol inhalation solution 2.5 mg (has no administration in time range)   budesonide (PULMICORT) inhalation solution 0.5 mg (0.5 mg Nebulization Given 6/5/24 2038)   ondansetron (ZOFRAN-ODT) dispersible tablet 4 mg (4 mg Oral Given 6/4/24 0839)   sodium chloride (OCEAN) 0.65 % nasal spray 1 spray (1 spray Each Nare Given 6/5/24 1450)   albuterol (FOR EMS ONLY) (2.5 mg/3 mL) 0.083 % inhalation solution 2.5 mg (0 mg Does not apply Given to EMS 6/3/24 1759)   potassium chloride (Klor-Con M20) CR tablet 40 mEq (40 mEq Oral Given 6/3/24 1916)   potassium chloride 20 mEq IVPB (premix) (0 mEq Intravenous Stopped 6/3/24 2122)   iohexol (OMNIPAQUE) 350 MG/ML injection (MULTI-DOSE) 100 mL (100 mL Intravenous Given 6/3/24 2147)   HYDROmorphone (DILAUDID) injection 0.5 mg (0.5 mg Intravenous Given 6/3/24 2349)   metoclopramide (REGLAN) injection 10 mg (10 mg Intravenous Given 6/3/24 2349)   potassium chloride (Klor-Con M20) CR tablet 40 mEq (40 mEq Oral Given 6/4/24 0655)   potassium chloride 20 mEq IVPB (premix) (0 mEq Intravenous Stopped 6/4/24 1446)   albumin human (FLEXBUMIN) 25 % injection 25 g (0 g Intravenous Stopped 6/4/24 1257)   magnesium sulfate 2 g/50 mL IVPB (premix) 2 g (2 g Intravenous New Bag 6/4/24 1321)   potassium chloride (Klor-Con M20) CR tablet 20 mEq (20 mEq Oral Given 6/4/24 1651)    potassium chloride (Klor-Con M20) CR tablet 40 mEq (40 mEq Oral Given 6/5/24 0909)   multi-electrolyte (ISOLYTE-S PH 7.4) bolus 500 mL (0 mL Intravenous Stopped 6/5/24 1146)       Diagnostic Studies  Results Reviewed       Procedure Component Value Units Date/Time    CBC and differential [[id number]]  (Abnormal) Collected: 06/04/24 0404    Lab Status: Final result Specimen: Blood from Arm, Left Updated: 06/04/24 0514     WBC 8.01 Thousand/uL      RBC 3.50 Million/uL      Hemoglobin 10.4 g/dL      Hematocrit 34.2 %      MCV 98 fL      MCH 29.7 pg      MCHC 30.4 g/dL      RDW 18.6 %      MPV 11.8 fL      Platelets 98 Thousands/uL      nRBC 0 /100 WBCs      Segmented % 57 %      Immature Grans % 0 %      Lymphocytes % 31 %      Monocytes % 9 %      Eosinophils Relative 2 %      Basophils Relative 1 %      Absolute Neutrophils 4.62 Thousands/µL      Absolute Immature Grans 0.03 Thousand/uL      Absolute Lymphocytes 2.47 Thousands/µL      Absolute Monocytes 0.70 Thousand/µL      Eosinophils Absolute 0.14 Thousand/µL      Basophils Absolute 0.05 Thousands/µL     Basic metabolic panel [[id number]]  (Abnormal) Collected: 06/04/24 0404    Lab Status: Final result Specimen: Blood from Arm, Left Updated: 06/04/24 0443     Sodium 137 mmol/L      Potassium 2.5 mmol/L      Chloride 106 mmol/L      CO2 23 mmol/L      ANION GAP 8 mmol/L      BUN 8 mg/dL      Creatinine 0.89 mg/dL      Glucose 145 mg/dL      Calcium 8.1 mg/dL      eGFR 74 ml/min/1.73sq m     Narrative:      National Kidney Disease Foundation guidelines for Chronic Kidney Disease (CKD):     Stage 1 with normal or high GFR (GFR > 90 mL/min/1.73 square meters)    Stage 2 Mild CKD (GFR = 60-89 mL/min/1.73 square meters)    Stage 3A Moderate CKD (GFR = 45-59 mL/min/1.73 square meters)    Stage 3B Moderate CKD (GFR = 30-44 mL/min/1.73 square meters)    Stage 4 Severe CKD (GFR = 15-29 mL/min/1.73 square meters)    Stage 5 End Stage CKD (GFR <15 mL/min/1.73 square  meters)  Note: GFR calculation is accurate only with a steady state creatinine    Stool Enteric Bacterial Panel by PCR [[id number]]     Lab Status: No result Specimen: Stool     Platelet count [[id number]]     Lab Status: No result Specimen: Blood     HS Troponin I 2hr [[id number]]  (Normal) Collected: 06/03/24 2011    Lab Status: Final result Specimen: Blood from Arm, Left Updated: 06/03/24 2042     hs TnI 2hr 10 ng/L      Delta 2hr hsTnI -1 ng/L     Urine Macroscopic, POC [[id number]] Collected: 06/03/24 1909    Lab Status: Final result Specimen: Urine Updated: 06/03/24 1911     Color, UA Nat     Clarity, UA Clear     pH, UA 6.5     Leukocytes, UA Negative     Nitrite, UA Negative     Protein, UA Negative mg/dl      Glucose, UA Negative mg/dl      Ketones, UA Negative mg/dl      Urobilinogen, UA 0.2 E.U./dl      Bilirubin, UA Negative     Occult Blood, UA Negative     Specific Gravity, UA 1.020    Narrative:      CLINITEK RESULT    FLU/RSV/COVID - if FLU/RSV clinically relevant [[id number]]  (Normal) Collected: 06/03/24 1812    Lab Status: Final result Specimen: Nares from Nose Updated: 06/03/24 1901     SARS-CoV-2 Negative     INFLUENZA A PCR Negative     INFLUENZA B PCR Negative     RSV PCR Negative    Narrative:      FOR PEDIATRIC PATIENTS - copy/paste COVID Guidelines URL to browser: [email or web address]    SARS-CoV-2 assay is a Nucleic Acid Amplification assay intended for the  qualitative detection of nucleic acid from SARS-CoV-2 in nasopharyngeal  swabs. Results are for the presumptive identification of SARS-CoV-2 RNA.    Positive results are indicative of infection with SARS-CoV-2, the virus  causing COVID-19, but do not rule out bacterial infection or co-infection  with other viruses. Laboratories within the [place] and its  territories are required to report all positive results to the appropriate  public health authorities. Negative results do not  preclude SARS-CoV-2  infection and should not be used as the sole basis for treatment or other  patient management decisions. Negative results must be combined with  clinical observations, patient history, and epidemiological information.  This test has not been FDA cleared or approved.    This test has been authorized by FDA under an Emergency Use Authorization  (EUA). This test is only authorized for the duration of time the  declaration that circumstances exist justifying the authorization of the  emergency use of an in vitro diagnostic tests for detection of SARS-CoV-2  virus and/or diagnosis of COVID-19 infection under section 564(b)(1) of  the Act, 21 U.S.C. 360bbb-3(b)(1), unless the authorization is terminated  or revoked sooner. The test has been validated but independent review by FDA  and CLIA is pending.    Test performed using [facility] GeneXpert: This RT-PCR assay targets N2,  a region unique to SARS-CoV-2. A conserved region in the E-gene was chosen  for pan-Sarbecovirus detection which includes SARS-CoV-2.    According to CMS-2020-01-R, this platform meets the definition of high-throughput technology.    B-Type Natriuretic Peptide(BNP) [[id number]]  (Normal) Collected: 06/03/24 1812    Lab Status: Final result Specimen: Blood from Arm, Left Updated: 06/03/24 1849     BNP 21 pg/mL     HS Troponin 0hr (reflex protocol) [[id number]]  (Normal) Collected: 06/03/24 1812    Lab Status: Final result Specimen: Blood from Arm, Left Updated: 06/03/24 1844     hs TnI 0hr 11 ng/L     D-Dimer [[id number]]  (Normal) Collected: 06/03/24 1812    Lab Status: Final result Specimen: Blood from Arm, Left Updated: 06/03/24 1844     D-Dimer, Quant 0.44 ug/ml FEU     Narrative:      In the evaluation for possible pulmonary embolism, in the appropriate (Well's Score of 4 or less) patient, the age adjusted d-dimer cutoff for this patient can be calculated as:    Age x 0.01 (in ug/mL) for Age-adjusted D-dimer exclusion threshold  for a patient over 50 years.    Comprehensive metabolic panel [[id number]]  (Abnormal) Collected: 06/03/24 1812    Lab Status: Final result Specimen: Blood from Arm, Left Updated: 06/03/24 1843     Sodium 143 mmol/L      Potassium 2.7 mmol/L      Chloride 110 mmol/L      CO2 23 mmol/L      ANION GAP 10 mmol/L      BUN 8 mg/dL      Creatinine 0.83 mg/dL      Glucose 97 mg/dL      Calcium 8.6 mg/dL      Corrected Calcium 9.2 mg/dL      AST 42 U/L      ALT 16 U/L      Alkaline Phosphatase 58 U/L      Total Protein 6.0 g/dL      Albumin 3.2 g/dL      Total Bilirubin 1.26 mg/dL      eGFR 80 ml/min/1.73sq m     Narrative:      National Kidney Disease Foundation guidelines for Chronic Kidney Disease (CKD):     Stage 1 with normal or high GFR (GFR > 90 mL/min/1.73 square meters)    Stage 2 Mild CKD (GFR = 60-89 mL/min/1.73 square meters)    Stage 3A Moderate CKD (GFR = 45-59 mL/min/1.73 square meters)    Stage 3B Moderate CKD (GFR = 30-44 mL/min/1.73 square meters)    Stage 4 Severe CKD (GFR = 15-29 mL/min/1.73 square meters)    Stage 5 End Stage CKD (GFR <15 mL/min/1.73 square meters)  Note: GFR calculation is accurate only with a steady state creatinine    CBC and differential [[id number]]  (Abnormal) Collected: 06/03/24 1812    Lab Status: Final result Specimen: Blood from Arm, Left Updated: 06/03/24 1822     WBC 8.09 Thousand/uL      RBC 3.69 Million/uL      Hemoglobin 11.0 g/dL      Hematocrit 36.1 %      MCV 98 fL      MCH 29.8 pg      MCHC 30.5 g/dL      RDW 18.4 %      MPV 12.0 fL      Platelets 106 Thousands/uL      nRBC 0 /100 WBCs      Segmented % 57 %      Immature Grans % 1 %      Lymphocytes % 27 %      Monocytes % 12 %      Eosinophils Relative 2 %      Basophils Relative 1 %      Absolute Neutrophils 4.80 Thousands/µL      Absolute Immature Grans 0.04 Thousand/uL      Absolute Lymphocytes 2.15 Thousands/µL      Absolute Monocytes 0.93 Thousand/µL      Eosinophils Absolute 0.13 Thousand/µL      Basophils  Absolute 0.04 Thousands/µL                    CT abdomen pelvis with contrast   Final Result by Ruel España DO (06/03 2313)   Colonic diverticulosis with residual bowel wall thickening of the sigmoid colon and minimal adjacent fluid in the left paracolic gutter, improved from 5/15/2024 in the area of previously identified diverticulitis. Nonemergent colonoscopy is recommended    to exclude underlying mass lesion mimicking diverticulitis.   Partially imaged groundglass opacities at the lung bases, improved from prior.   Findings again suspicious for hepatic cirrhosis.   See full report for additional findings.         Workstation performed: [id number]         XR chest 2 views   Final Result by Maryanne Martins MD (06/04 1004)   Mild diffuse interstitial thickening. No focal consolidation or pleural effusion.            Workstation performed: [id number]               Procedures  ECG 12 Lead Documentation Only    Date/Time: 6/3/2024 10:05 PM    Performed by: Cedric Desouza MD  Authorized by: Cedric Desouza MD    Patient location:  ED  Previous ECG:     Previous ECG:  Compared to current    Similarity:  No change  Interpretation:     Interpretation: abnormal    Rate:     ECG rate assessment: tachycardic    Rhythm:     Rhythm: sinus rhythm    Ectopy:     Ectopy: none    QRS:     QRS axis:  Normal    QRS intervals:  Normal  Conduction:     Conduction: normal    ST segments:     ST segments:  Normal  T waves:     T waves: non-specific          ED Course  ED Course as of 06/05/24 2207 Mon Jun 03, 2024   1852 Potassium(!): 2.7   1852 D-Dimer, Quant: 0.44                             SBIRT 22yo+      Flowsheet Row Most Recent Value   Initial Alcohol Screen: US AUDIT-C     1. How often do you have a drink containing alcohol? 0 Filed at: 06/03/2024 1802   2. How many drinks containing alcohol do you have on a typical day you are drinking?  0 Filed at: 06/03/2024 1802   3b. FEMALE Any Age, or MALE 65+: How often do you  have 4 or more drinks on one occassion? 0 Filed at: 06/03/2024 1802   Audit-C Score 0 Filed at: 06/03/2024 1802   JEFF: How many times in the past year have you...    Used an illegal drug or used a prescription medication for non-medical reasons? Never Filed at: 06/03/2024 1802            Wells' Criteria for PE      Flowsheet Row Most Recent Value   Wells' Criteria for PE    Clinical signs and symptoms of DVT 3 Filed at: 06/03/2024 1803   PE is primary diagnosis or equally likely 0 Filed at: 06/03/2024 1803   HR >100 1.5 Filed at: 06/03/2024 1803   Immobilization at least 3 days or Surgery in the previous 4 weeks 0 Filed at: 06/03/2024 1803   Previous, objectively diagnosed PE or DVT 0 Filed at: 06/03/2024 1803   Hemoptysis 0 Filed at: 06/03/2024 1803   Malignancy with treatment within 6 months or palliative 0 Filed at: 06/03/2024 1803   Wells' Criteria Total 4.5 Filed at: 06/03/2024 1803              Medical Decision Making  53-year-old female present emergency department due to multiple complaints.  Will obtain labs, chest x-ray to evaluate for COPD, cardiac cause.  Patient endorsing worsening abdominal pain so obtain CT abdomen pelvis to further evaluate this.  She does have hypokalemia possibly related to her diarrhea.  CT showing nonspecific enteritis.  While patient was going to the bathroom, she had near syncopal event and blood pressure at that time taken by nurses was 70s systolic.  Ultimately, patient has multiple abnormal findings as well as a concerning blood pressure drop with associated symptomology when ambulating and is high risk for further decompensation at home.  Discussed case with internal medicine will admit for further management.    Amount and/or Complexity of Data Reviewed  Labs: ordered. Decision-making details documented in ED Course.  Radiology: ordered.    Risk  Prescription drug management.  Decision regarding hospitalization.          Disposition  Final diagnoses:   Diarrhea    Hypokalemia   Enteritis   Near syncope     Time reflects when diagnosis was documented in both MDM as applicable and the Disposition within this note       Time User Action Codes Description Comment    6/3/2024  6:55 PM Yokubaitis, Cedric Add [R19.7] Diarrhea     6/3/2024  6:55 PM Yokubaitis, Cedric Add [E87.6] Hypokalemia     6/3/2024 11:17 PM Yokubaitis, Cedric Add [K52.9] Enteritis     6/3/2024 11:17 PM Yokubaitis, Cedric Add [R55] Near syncope           ED Disposition       ED Disposition   Admit    Condition   Stable    Date/Time   Mon Fernandez 3, 2024 2414    Comment   Case was discussed with SOD and the patient's admission status was agreed to be Admission Status: inpatient status to the service of SOD .               Follow-up Information       Follow up With Specialties Details Why Contact Info    Andi Mason DO Family Medicine Schedule an appointment as soon as possible for a visit  [address] [place] [address]  [phone]      Aam Araujo MD Internal Medicine   [address] [place] [address] [phone]      Ama Araujo MD Internal Medicine   [address] [place] [address]  [phone]              Current Discharge Medication List        CONTINUE these medications which have NOT CHANGED    Details   albuterol (PROVENTIL HFA,VENTOLIN HFA) 90 mcg/act inhaler Inhale 2 puffs every 6 (six) hours as needed for wheezing  Qty: 18 g, Refills: 1    Comments: Substitution to a formulary equivalent within the same pharmaceutical class is authorized.  Associated Diagnoses: Viral URI with cough      budesonide (PULMICORT) 0.5 mg/2 mL nebulizer solution Take 0.5 mg by nebulization 2 (two) times a day Rinse mouth after use.      buPROPion (WELLBUTRIN XL) 300 mg 24 hr tablet Take 1 tablet (300 mg total) by mouth every morning  Qty: 90 tablet, Refills: 3    Associated Diagnoses: Recurrent major depressive disorder, in partial remission (HCC); Recurrent major depressive  disorder, remission status unspecified (HCC)      diazepam (VALIUM) 5 mg tablet Take 1 tablet (5 mg total) by mouth daily at bedtime as needed for anxiety  Qty: 30 tablet, Refills: 0    Associated Diagnoses: Anxiety      ergocalciferol (VITAMIN D2) 50,000 units Take 1 capsule (50,000 Units total) by mouth every 14 (fourteen) days  Qty: 12 capsule, Refills: 1    Associated Diagnoses: Vitamin D deficiency      famotidine (PEPCID) 40 MG tablet Take 1 tablet (40 mg total) by mouth daily at bedtime Take in evening 2 hours prior to bed  Qty: 90 tablet, Refills: 1    Associated Diagnoses: Duodenal ulcer      ferrous sulfate 324 (65 Fe) mg Take 1 tablet (324 mg total) by mouth daily before breakfast  Qty: 30 tablet, Refills: 3    Associated Diagnoses: Iron deficiency anemia secondary to inadequate dietary iron intake; Edema, lower extremity      folic acid (FOLVITE) 1 mg tablet Take 1 tablet (1 mg total) by mouth daily  Qty: 90 tablet, Refills: 1    Associated Diagnoses: Folic acid deficiency      furosemide (LASIX) 40 mg tablet Take 1 tablet (40 mg total) by mouth daily  Qty: 180 tablet, Refills: 1    Associated Diagnoses: Acute diastolic congestive heart failure (HCC)      gabapentin (NEURONTIN) 100 mg capsule Take 100 mg by mouth every 8 (eight) hours  Refills: 0      ipratropium-albuterol (DUO-NEB) 0.5-2.5 mg/3 mL nebulizer solution Take 3 mL by nebulization 4 (four) times a day  Qty: 360 mL, Refills: 3    Associated Diagnoses: Centrilobular emphysema (HCC)      lactulose (CHRONULAC) 10 g/15 mL solution Take 20 g by mouth Daily or as needed for bowl movement      levothyroxine 112 mcg tablet Take 1 tablet (112 mcg total) by mouth daily  Qty: 90 tablet, Refills: 1    Associated Diagnoses: Acquired hypothyroidism      lisinopril (ZESTRIL) 5 mg tablet Take 1 tablet (5 mg total) by mouth daily  Qty: 30 tablet, Refills: 0    Associated Diagnoses: HTN (hypertension)      nicotine (NICODERM CQ) 21 mg/24 hr TD 24 hr patch Place  1 patch on the skin over 24 hours every 24 hours  Qty: 28 patch, Refills: 5    Associated Diagnoses: Tobacco abuse disorder      omeprazole (PriLOSEC) 40 MG capsule take 1 capsule by mouth twice a day  Qty: 60 capsule, Refills: 5    Associated Diagnoses: Gastric ulcer with hemorrhage, unspecified chronicity      ondansetron (ZOFRAN) 4 mg tablet Take 1 tablet (4 mg total) by mouth every 8 (eight) hours as needed for nausea or vomiting  Qty: 20 tablet, Refills: 0    Associated Diagnoses: Nausea      oxyCODONE-acetaminophen (PERCOCET)  mg per tablet Take 1 tablet by mouth every 6 (six) hours as needed for severe pain      oxygen gas Inhale 2 L/min continuous. May use 3L with exertion per Bella BAHNP  Keep sat >88%  Indications: low saturation    Comments: patient adjusts to 3L as needed for SOB      sertraline (ZOLOFT) 100 mg tablet take 1 tablet by mouth twice a day  Qty: 180 tablet, Refills: 1    Associated Diagnoses: Recurrent major depressive disorder, in partial remission (HCC)      simvastatin (ZOCOR) 20 mg tablet Take 1 tablet (20 mg total) by mouth daily at bedtime  Qty: 90 tablet, Refills: 1    Associated Diagnoses: Hypercholesterolemia      sucralfate (CARAFATE) 1 g tablet Take 1 tablet (1 g total) by mouth daily at bedtime  Qty: 60 tablet, Refills: 3    Associated Diagnoses: Gastroesophageal reflux disease without esophagitis      varenicline (CHANTIX) 0.5 mg tablet Take 1 tablet (0.5 mg total) by mouth 2 (two) times a day  Qty: 60 tablet, Refills: 0    Associated Diagnoses: Tobacco abuse disorder      Insulin Pen Needle 31G X 5 MM MISC Inject under the skin if needed (Insuline injections)  Qty: 100 each, Refills: 3    Associated Diagnoses: Diabetes mellitus type 2, insulin dependent (HCC)      spironolactone (ALDACTONE) 25 mg tablet Take 2 tablets (50 mg total) by mouth daily  Qty: 30 tablet, Refills: 5    Associated Diagnoses: Edema, lower extremity           No discharge procedures on  file.    PDMP Review         Value Time User    PDMP Reviewed  Yes 5/21/2024  6:15 PM Varsha Valladares MD             ED Provider  Attending physically available and evaluated Chely Ruvalcaba. I managed the patient along with the ED Attending.    Electronically Signed by           Cedric Desouza MD  06/05/24 0101

## 2024-06-03 NOTE — ED ATTENDING ATTESTATION
6/3/2024  I, Aldair Hernandez DO, saw and evaluated the patient. I have discussed the patient with the resident/non-physician practitioner and agree with the resident's/non-physician practitioner's findings, Plan of Care, and MDM as documented in the resident's/non-physician practitioner's note, except where noted. All available labs and Radiology studies were reviewed.  I was present for key portions of any procedure(s) performed by the resident/non-physician practitioner and I was immediately available to provide assistance.       At this point I agree with the current assessment done in the Emergency Department.  I have conducted an independent evaluation of this patient a history and physical is as follows:      53-year-old woman with history of COPD on 2 L O2 at baseline presents for evaluation of worsening dyspnea associate with a dry cough which is her baseline.  She does have some left leg pain.  No history of DVT or PE.  She is taking furosemide however states that she does not feel like it is working as she has not diuresing after she takes it.  She was recently admitted to the ICU in [place] for pneumonia.     Denies fever, chest pain, vomiting, diaphoresis, abdominal pain, back pain, focal weakness, numbness, or tingling.    Impression: Dyspnea likely multifactorial recent admission for hypoxia due to pneumonia in [place].  History of COPD plan: Cardiac workup, IV furosemide, D-dimer as patient is moderate risk for PE.  Reassess after reevaluation.      ED Course         Critical Care Time  Procedures

## 2024-06-03 NOTE — TELEPHONE ENCOUNTER
Called patient again- Patient states she will go to ER- Patient is waiting for a ride to ER- Patient declined to call 911 for EMS

## 2024-06-03 NOTE — TELEPHONE ENCOUNTER
Chely called in to confirm appointment for tomorrow. She wants to let  know she is not feeling well. She is still breathing hard, even with the oxygen. Her bowels are moving on their own all week. She believes she should be admitted to the hospital again.

## 2024-06-04 ENCOUNTER — PATIENT OUTREACH (OUTPATIENT)
Dept: INTERNAL MEDICINE CLINIC | Facility: OTHER | Age: 54
End: 2024-06-04

## 2024-06-04 PROBLEM — M54.50 CHRONIC LOW BACK PAIN: Status: ACTIVE | Noted: 2024-06-04

## 2024-06-04 PROBLEM — R55 NEAR SYNCOPE: Status: ACTIVE | Noted: 2024-06-04

## 2024-06-04 PROBLEM — Z59.00 HOMELESSNESS: Status: ACTIVE | Noted: 2024-06-04

## 2024-06-04 PROBLEM — K57.90 DIVERTICULOSIS: Status: ACTIVE | Noted: 2024-06-04

## 2024-06-04 PROBLEM — G89.29 CHRONIC LOW BACK PAIN: Status: ACTIVE | Noted: 2024-06-04

## 2024-06-04 LAB
ANION GAP SERPL CALCULATED.3IONS-SCNC: 11 MMOL/L (ref 4–13)
ANION GAP SERPL CALCULATED.3IONS-SCNC: 8 MMOL/L (ref 4–13)
BASOPHILS # BLD AUTO: 0.05 THOUSANDS/ÂΜL (ref 0–0.1)
BASOPHILS NFR BLD AUTO: 1 % (ref 0–1)
BUN SERPL-MCNC: 8 MG/DL (ref 5–25)
BUN SERPL-MCNC: 8 MG/DL (ref 5–25)
CALCIUM SERPL-MCNC: 8.1 MG/DL (ref 8.4–10.2)
CALCIUM SERPL-MCNC: 8.8 MG/DL (ref 8.4–10.2)
CHLORIDE SERPL-SCNC: 106 MMOL/L (ref 96–108)
CHLORIDE SERPL-SCNC: 110 MMOL/L (ref 96–108)
CO2 SERPL-SCNC: 21 MMOL/L (ref 21–32)
CO2 SERPL-SCNC: 23 MMOL/L (ref 21–32)
CREAT SERPL-MCNC: 0.84 MG/DL (ref 0.6–1.3)
CREAT SERPL-MCNC: 0.89 MG/DL (ref 0.6–1.3)
EOSINOPHIL # BLD AUTO: 0.14 THOUSAND/ÂΜL (ref 0–0.61)
EOSINOPHIL NFR BLD AUTO: 2 % (ref 0–6)
ERYTHROCYTE [DISTWIDTH] IN BLOOD BY AUTOMATED COUNT: 18.6 % (ref 11.6–15.1)
GFR SERPL CREATININE-BSD FRML MDRD: 74 ML/MIN/1.73SQ M
GFR SERPL CREATININE-BSD FRML MDRD: 79 ML/MIN/1.73SQ M
GLUCOSE SERPL-MCNC: 125 MG/DL (ref 65–140)
GLUCOSE SERPL-MCNC: 145 MG/DL (ref 65–140)
HCT VFR BLD AUTO: 34.2 % (ref 34.8–46.1)
HGB BLD-MCNC: 10.4 G/DL (ref 11.5–15.4)
IMM GRANULOCYTES # BLD AUTO: 0.03 THOUSAND/UL (ref 0–0.2)
IMM GRANULOCYTES NFR BLD AUTO: 0 % (ref 0–2)
LYMPHOCYTES # BLD AUTO: 2.47 THOUSANDS/ÂΜL (ref 0.6–4.47)
LYMPHOCYTES NFR BLD AUTO: 31 % (ref 14–44)
MAGNESIUM SERPL-MCNC: 1.5 MG/DL (ref 1.9–2.7)
MCH RBC QN AUTO: 29.7 PG (ref 26.8–34.3)
MCHC RBC AUTO-ENTMCNC: 30.4 G/DL (ref 31.4–37.4)
MCV RBC AUTO: 98 FL (ref 82–98)
MONOCYTES # BLD AUTO: 0.7 THOUSAND/ÂΜL (ref 0.17–1.22)
MONOCYTES NFR BLD AUTO: 9 % (ref 4–12)
NEUTROPHILS # BLD AUTO: 4.62 THOUSANDS/ÂΜL (ref 1.85–7.62)
NEUTS SEG NFR BLD AUTO: 57 % (ref 43–75)
NRBC BLD AUTO-RTO: 0 /100 WBCS
PLATELET # BLD AUTO: 98 THOUSANDS/UL (ref 149–390)
PMV BLD AUTO: 11.8 FL (ref 8.9–12.7)
POTASSIUM SERPL-SCNC: 2.5 MMOL/L (ref 3.5–5.3)
POTASSIUM SERPL-SCNC: 3.7 MMOL/L (ref 3.5–5.3)
RBC # BLD AUTO: 3.5 MILLION/UL (ref 3.81–5.12)
SODIUM SERPL-SCNC: 137 MMOL/L (ref 135–147)
SODIUM SERPL-SCNC: 142 MMOL/L (ref 135–147)
WBC # BLD AUTO: 8.01 THOUSAND/UL (ref 4.31–10.16)

## 2024-06-04 PROCEDURE — 80048 BASIC METABOLIC PNL TOTAL CA: CPT

## 2024-06-04 PROCEDURE — 85025 COMPLETE CBC W/AUTO DIFF WBC: CPT

## 2024-06-04 PROCEDURE — 97167 OT EVAL HIGH COMPLEX 60 MIN: CPT

## 2024-06-04 PROCEDURE — 94664 DEMO&/EVAL PT USE INHALER: CPT

## 2024-06-04 PROCEDURE — 99232 SBSQ HOSP IP/OBS MODERATE 35: CPT | Performed by: INTERNAL MEDICINE

## 2024-06-04 PROCEDURE — 83735 ASSAY OF MAGNESIUM: CPT

## 2024-06-04 PROCEDURE — 94760 N-INVAS EAR/PLS OXIMETRY 1: CPT

## 2024-06-04 PROCEDURE — 36415 COLL VENOUS BLD VENIPUNCTURE: CPT

## 2024-06-04 PROCEDURE — 94640 AIRWAY INHALATION TREATMENT: CPT

## 2024-06-04 PROCEDURE — 97163 PT EVAL HIGH COMPLEX 45 MIN: CPT

## 2024-06-04 PROCEDURE — NC001 PR NO CHARGE: Performed by: INTERNAL MEDICINE

## 2024-06-04 RX ORDER — ENOXAPARIN SODIUM 100 MG/ML
40 INJECTION SUBCUTANEOUS DAILY
Status: DISCONTINUED | OUTPATIENT
Start: 2024-06-04 | End: 2024-06-06 | Stop reason: HOSPADM

## 2024-06-04 RX ORDER — VARENICLINE TARTRATE 0.5 MG/1
0.5 TABLET, FILM COATED ORAL 2 TIMES DAILY
Status: DISCONTINUED | OUTPATIENT
Start: 2024-06-04 | End: 2024-06-04

## 2024-06-04 RX ORDER — GABAPENTIN 100 MG/1
100 CAPSULE ORAL DAILY
Status: DISCONTINUED | OUTPATIENT
Start: 2024-06-04 | End: 2024-06-06 | Stop reason: HOSPADM

## 2024-06-04 RX ORDER — LEVOTHYROXINE SODIUM 112 UG/1
112 TABLET ORAL DAILY
Status: DISCONTINUED | OUTPATIENT
Start: 2024-06-04 | End: 2024-06-06 | Stop reason: HOSPADM

## 2024-06-04 RX ORDER — IPRATROPIUM BROMIDE AND ALBUTEROL SULFATE 2.5; .5 MG/3ML; MG/3ML
3 SOLUTION RESPIRATORY (INHALATION) 4 TIMES DAILY
Status: DISCONTINUED | OUTPATIENT
Start: 2024-06-04 | End: 2024-06-04

## 2024-06-04 RX ORDER — PANTOPRAZOLE SODIUM 40 MG/1
40 TABLET, DELAYED RELEASE ORAL
Status: DISCONTINUED | OUTPATIENT
Start: 2024-06-04 | End: 2024-06-06 | Stop reason: HOSPADM

## 2024-06-04 RX ORDER — SUCRALFATE 1 G/1
1 TABLET ORAL
Status: DISCONTINUED | OUTPATIENT
Start: 2024-06-04 | End: 2024-06-06 | Stop reason: HOSPADM

## 2024-06-04 RX ORDER — DIAZEPAM 5 MG/1
5 TABLET ORAL
Status: DISCONTINUED | OUTPATIENT
Start: 2024-06-04 | End: 2024-06-06 | Stop reason: HOSPADM

## 2024-06-04 RX ORDER — ALBUTEROL SULFATE 90 UG/1
2 AEROSOL, METERED RESPIRATORY (INHALATION) EVERY 6 HOURS PRN
Status: DISCONTINUED | OUTPATIENT
Start: 2024-06-04 | End: 2024-06-06 | Stop reason: HOSPADM

## 2024-06-04 RX ORDER — ONDANSETRON 4 MG/1
4 TABLET, ORALLY DISINTEGRATING ORAL EVERY 8 HOURS PRN
Status: DISCONTINUED | OUTPATIENT
Start: 2024-06-04 | End: 2024-06-06 | Stop reason: HOSPADM

## 2024-06-04 RX ORDER — FAMOTIDINE 20 MG/1
40 TABLET, FILM COATED ORAL
Status: DISCONTINUED | OUTPATIENT
Start: 2024-06-04 | End: 2024-06-06 | Stop reason: HOSPADM

## 2024-06-04 RX ORDER — NICOTINE 21 MG/24HR
1 PATCH, TRANSDERMAL 24 HOURS TRANSDERMAL DAILY
Status: DISCONTINUED | OUTPATIENT
Start: 2024-06-04 | End: 2024-06-04

## 2024-06-04 RX ORDER — BUDESONIDE 0.5 MG/2ML
0.5 INHALANT ORAL
Status: DISCONTINUED | OUTPATIENT
Start: 2024-06-04 | End: 2024-06-06 | Stop reason: HOSPADM

## 2024-06-04 RX ORDER — POTASSIUM CHLORIDE 14.9 MG/ML
20 INJECTION INTRAVENOUS
Status: COMPLETED | OUTPATIENT
Start: 2024-06-04 | End: 2024-06-04

## 2024-06-04 RX ORDER — MAGNESIUM SULFATE HEPTAHYDRATE 40 MG/ML
2 INJECTION, SOLUTION INTRAVENOUS ONCE
Status: COMPLETED | OUTPATIENT
Start: 2024-06-04 | End: 2024-06-04

## 2024-06-04 RX ORDER — OXYCODONE HYDROCHLORIDE AND ACETAMINOPHEN 5; 325 MG/1; MG/1
1 TABLET ORAL EVERY 6 HOURS PRN
Status: DISCONTINUED | OUTPATIENT
Start: 2024-06-04 | End: 2024-06-06 | Stop reason: HOSPADM

## 2024-06-04 RX ORDER — POTASSIUM CHLORIDE 20 MEQ/1
40 TABLET, EXTENDED RELEASE ORAL ONCE
Status: COMPLETED | OUTPATIENT
Start: 2024-06-04 | End: 2024-06-04

## 2024-06-04 RX ORDER — LEVALBUTEROL INHALATION SOLUTION 1.25 MG/3ML
1.25 SOLUTION RESPIRATORY (INHALATION)
Status: DISCONTINUED | OUTPATIENT
Start: 2024-06-04 | End: 2024-06-06

## 2024-06-04 RX ORDER — BUDESONIDE 0.5 MG/2ML
0.5 INHALANT ORAL 2 TIMES DAILY
Status: DISCONTINUED | OUTPATIENT
Start: 2024-06-04 | End: 2024-06-04

## 2024-06-04 RX ORDER — POLYETHYLENE GLYCOL 3350 17 G/17G
17 POWDER, FOR SOLUTION ORAL DAILY PRN
Status: DISCONTINUED | OUTPATIENT
Start: 2024-06-04 | End: 2024-06-06 | Stop reason: HOSPADM

## 2024-06-04 RX ORDER — BUPROPION HYDROCHLORIDE 150 MG/1
300 TABLET ORAL EVERY MORNING
Status: DISCONTINUED | OUTPATIENT
Start: 2024-06-04 | End: 2024-06-06 | Stop reason: HOSPADM

## 2024-06-04 RX ORDER — PRAVASTATIN SODIUM 40 MG
40 TABLET ORAL
Status: DISCONTINUED | OUTPATIENT
Start: 2024-06-04 | End: 2024-06-06 | Stop reason: HOSPADM

## 2024-06-04 RX ORDER — ALBUMIN (HUMAN) 12.5 G/50ML
25 SOLUTION INTRAVENOUS ONCE
Status: COMPLETED | OUTPATIENT
Start: 2024-06-04 | End: 2024-06-04

## 2024-06-04 RX ORDER — SERTRALINE HYDROCHLORIDE 100 MG/1
100 TABLET, FILM COATED ORAL 2 TIMES DAILY
Status: DISCONTINUED | OUTPATIENT
Start: 2024-06-04 | End: 2024-06-06 | Stop reason: HOSPADM

## 2024-06-04 RX ORDER — POTASSIUM CHLORIDE 20 MEQ/1
20 TABLET, EXTENDED RELEASE ORAL ONCE
Status: COMPLETED | OUTPATIENT
Start: 2024-06-04 | End: 2024-06-04

## 2024-06-04 RX ORDER — ALBUTEROL SULFATE 2.5 MG/3ML
2.5 SOLUTION RESPIRATORY (INHALATION) EVERY 6 HOURS PRN
Status: DISCONTINUED | OUTPATIENT
Start: 2024-06-04 | End: 2024-06-06

## 2024-06-04 RX ORDER — ERGOCALCIFEROL 1.25 MG/1
50000 CAPSULE ORAL
Status: DISCONTINUED | OUTPATIENT
Start: 2024-06-04 | End: 2024-06-06 | Stop reason: HOSPADM

## 2024-06-04 RX ORDER — FOLIC ACID 1 MG/1
1 TABLET ORAL DAILY
Status: DISCONTINUED | OUTPATIENT
Start: 2024-06-04 | End: 2024-06-06 | Stop reason: HOSPADM

## 2024-06-04 RX ORDER — NICOTINE 21 MG/24HR
1 PATCH, TRANSDERMAL 24 HOURS TRANSDERMAL EVERY 24 HOURS
Status: DISCONTINUED | OUTPATIENT
Start: 2024-06-04 | End: 2024-06-06 | Stop reason: HOSPADM

## 2024-06-04 RX ORDER — SENNOSIDES 8.6 MG
1 TABLET ORAL DAILY
Status: DISCONTINUED | OUTPATIENT
Start: 2024-06-04 | End: 2024-06-06 | Stop reason: HOSPADM

## 2024-06-04 RX ORDER — FERROUS SULFATE 325(65) MG
325 TABLET ORAL
Status: DISCONTINUED | OUTPATIENT
Start: 2024-06-04 | End: 2024-06-06 | Stop reason: HOSPADM

## 2024-06-04 RX ADMIN — SUCRALFATE 1 G: 1 TABLET ORAL at 01:05

## 2024-06-04 RX ADMIN — LEVALBUTEROL HYDROCHLORIDE 1.25 MG: 1.25 SOLUTION RESPIRATORY (INHALATION) at 09:18

## 2024-06-04 RX ADMIN — PRAVASTATIN SODIUM 40 MG: 40 TABLET ORAL at 16:51

## 2024-06-04 RX ADMIN — ALBUMIN (HUMAN) 25 G: 0.25 INJECTION, SOLUTION INTRAVENOUS at 08:14

## 2024-06-04 RX ADMIN — FAMOTIDINE 40 MG: 20 TABLET, FILM COATED ORAL at 22:14

## 2024-06-04 RX ADMIN — POTASSIUM CHLORIDE 20 MEQ: 1500 TABLET, EXTENDED RELEASE ORAL at 16:51

## 2024-06-04 RX ADMIN — BUDESONIDE 0.5 MG: 0.5 INHALANT ORAL at 19:50

## 2024-06-04 RX ADMIN — SERTRALINE HYDROCHLORIDE 100 MG: 100 TABLET ORAL at 08:15

## 2024-06-04 RX ADMIN — POTASSIUM CHLORIDE 20 MEQ: 14.9 INJECTION, SOLUTION INTRAVENOUS at 08:13

## 2024-06-04 RX ADMIN — IPRATROPIUM BROMIDE 0.5 MG: 0.5 SOLUTION RESPIRATORY (INHALATION) at 13:51

## 2024-06-04 RX ADMIN — IPRATROPIUM BROMIDE 0.5 MG: 0.5 SOLUTION RESPIRATORY (INHALATION) at 19:50

## 2024-06-04 RX ADMIN — PANTOPRAZOLE SODIUM 40 MG: 40 TABLET, DELAYED RELEASE ORAL at 06:55

## 2024-06-04 RX ADMIN — OXYCODONE HYDROCHLORIDE AND ACETAMINOPHEN 1 TABLET: 5; 325 TABLET ORAL at 22:52

## 2024-06-04 RX ADMIN — MAGNESIUM SULFATE HEPTAHYDRATE 2 G: 40 INJECTION, SOLUTION INTRAVENOUS at 13:21

## 2024-06-04 RX ADMIN — IPRATROPIUM BROMIDE 0.5 MG: 0.5 SOLUTION RESPIRATORY (INHALATION) at 09:18

## 2024-06-04 RX ADMIN — NICOTINE 1 PATCH: 21 PATCH, EXTENDED RELEASE TRANSDERMAL at 01:05

## 2024-06-04 RX ADMIN — ONDANSETRON 4 MG: 4 TABLET, ORALLY DISINTEGRATING ORAL at 08:39

## 2024-06-04 RX ADMIN — LEVALBUTEROL HYDROCHLORIDE 1.25 MG: 1.25 SOLUTION RESPIRATORY (INHALATION) at 19:50

## 2024-06-04 RX ADMIN — ERGOCALCIFEROL 50000 UNITS: 1.25 CAPSULE ORAL at 08:16

## 2024-06-04 RX ADMIN — FAMOTIDINE 40 MG: 20 TABLET, FILM COATED ORAL at 01:05

## 2024-06-04 RX ADMIN — SENNOSIDES 8.6 MG: 8.6 TABLET, FILM COATED ORAL at 08:14

## 2024-06-04 RX ADMIN — OXYCODONE HYDROCHLORIDE AND ACETAMINOPHEN 1 TABLET: 5; 325 TABLET ORAL at 07:09

## 2024-06-04 RX ADMIN — OXYCODONE HYDROCHLORIDE AND ACETAMINOPHEN 1 TABLET: 5; 325 TABLET ORAL at 16:54

## 2024-06-04 RX ADMIN — BUDESONIDE 0.5 MG: 0.5 INHALANT ORAL at 09:19

## 2024-06-04 RX ADMIN — SUCRALFATE 1 G: 1 TABLET ORAL at 22:14

## 2024-06-04 RX ADMIN — SERTRALINE HYDROCHLORIDE 100 MG: 100 TABLET ORAL at 16:51

## 2024-06-04 RX ADMIN — IPRATROPIUM BROMIDE AND ALBUTEROL SULFATE 3 ML: 2.5; .5 SOLUTION RESPIRATORY (INHALATION) at 01:05

## 2024-06-04 RX ADMIN — BUPROPION HYDROCHLORIDE 300 MG: 150 TABLET, EXTENDED RELEASE ORAL at 08:15

## 2024-06-04 RX ADMIN — FOLIC ACID 1 MG: 1 TABLET ORAL at 08:15

## 2024-06-04 RX ADMIN — GABAPENTIN 100 MG: 100 CAPSULE ORAL at 08:15

## 2024-06-04 RX ADMIN — LEVOTHYROXINE SODIUM 112 MCG: 112 TABLET ORAL at 06:55

## 2024-06-04 RX ADMIN — POTASSIUM CHLORIDE 20 MEQ: 14.9 INJECTION, SOLUTION INTRAVENOUS at 07:01

## 2024-06-04 RX ADMIN — FERROUS SULFATE TAB 325 MG (65 MG ELEMENTAL FE) 325 MG: 325 (65 FE) TAB at 06:55

## 2024-06-04 RX ADMIN — LEVALBUTEROL HYDROCHLORIDE 1.25 MG: 1.25 SOLUTION RESPIRATORY (INHALATION) at 13:51

## 2024-06-04 RX ADMIN — PANTOPRAZOLE SODIUM 40 MG: 40 TABLET, DELAYED RELEASE ORAL at 16:51

## 2024-06-04 RX ADMIN — ENOXAPARIN SODIUM 40 MG: 40 INJECTION SUBCUTANEOUS at 08:15

## 2024-06-04 RX ADMIN — POTASSIUM CHLORIDE 40 MEQ: 1500 TABLET, EXTENDED RELEASE ORAL at 06:55

## 2024-06-04 NOTE — PHYSICAL THERAPY NOTE
Physical Therapy Evaluation     Patient's Name: Chely Ruvalcaba    Admitting Diagnosis  Diarrhea [R19.7]  Hypokalemia [E87.6]  Enteritis [K52.9]  SOB (shortness of breath) [R06.02]  Near syncope [R55]    Problem List  Patient Active Problem List   Diagnosis    Anxiety    Benign hypertension    Depression    Hyperlipidemia    Hypothyroidism    Cigarette nicotine dependence in remission    Vitamin D deficiency    Gastroparesis    Edema, lower extremity    Type 2 diabetes mellitus (HCC)    COVID-19 vaccination refused    Centrilobular emphysema (HCC)    Nephrolithiasis    Degeneration of lumbar or lumbosacral intervertebral disc    Gastroesophageal reflux disease    Intermittent claudication (HCC)    Seasonal allergic rhinitis    Spinal stenosis of lumbar region    Hypokalemia    Iron deficiency anemia    Abnormal stress ECG with treadmill    Cirrhosis (HCC)    History of GI bleed    History of colon polyps    Duodenal ulcer    Irritable bowel syndrome with both constipation and diarrhea    Injury of toe on left foot    Bilateral pneumonia    Acute diastolic congestive heart failure (HCC)    Folic acid deficiency    Acute respiratory failure with hypoxia (HCC)    Enlarged lymph nodes, unspecified    Dyspnea    Pulmonary nodules    Diastolic CHF (HCC)    Thrombocytopenia (HCC)    Stage 2 chronic kidney disease    Chronic bilateral low back pain    Diverticulitis    Ambulatory dysfunction    RUTHANN (obstructive sleep apnea)    Snoring    Excessive daytime sleepiness    Near syncope    Chronic low back pain    Homelessness    Diverticulosis       Past Medical History  Past Medical History:   Diagnosis Date    Abnormal stress test     Acute cystitis without hematuria 05/07/2018    Acute duodenal ulcer with bleeding 01/16/2023    Allergic sinusitis     last assessed - 03Apr2017    Anemia Around december    Anxiety     last assessed - 04Mar2016    Arthritis     Asthma     last assessed - 04Mar2016    Bilateral lower extremity  edema     last assessed - 12May2017    Callus of foot     last assessed - 22Jun2016    Chest pain     Chronic GERD     last assessed - 16Jan2017    Colon polyp     Constipation     Resolved - 13Jan2017    COPD (chronic obstructive pulmonary disease) (HCC)     Depression     last assessed - 04Mar2016    Disease of thyroid gland     Diverticulitis of colon     last assessed - 04Mar2016    Dyspepsia     Resolved - [id number]    Edema, lower extremity 08/07/2020    Esophageal reflux     last assessed - 04Mar2016    Essential hypertriglyceridemia     last assessed - [id number]    Fatty liver 01/16/2023    Gastroesophageal reflux disease with esophagitis 11/18/2016    GERD (gastroesophageal reflux disease)     Gynecological disorder     last assessed - 04Mar2016    Hydroureteronephrosis 06/30/2022    Hypertension     last assessed - 04Mar2016    Hypokalemia 06/20/2022    Hypotension     last assessed - 25Aug2016    Kidney stone     Left ureteral stone with hydronephrosis 07/21/2022    Migraine     Morbid obesity (HCC) 01/11/2019    Formatting of this note might be different from the original. Added automatically from request for surgery [id number]    Neuropathy     Obesity     Other chest pain 11/08/2018    Pancreatic lesion 03/13/2023    Positive depression screening 06/19/2022    Primary hypertriglyceridemia 06/19/2022    Pulmonary emphysema (HCC)     last assessed - 04Mar2016    Seasonal allergies     Severe obesity (HCC) 01/16/2023    Skin tag 07/17/2023    SOB (shortness of breath)     Strain of groin 07/17/2023    Urinary frequency     last assessed - 22Jun2016    Vagina, candidiasis     last assessed - 22Jun2016    Very heavy cigarette smoker 01/16/2023    Visual impairment        Past Surgical History  Past Surgical History:   Procedure Laterality Date    CARPAL TUNNEL RELEASE      last assessed - 04MAr2016    CHOLECYSTECTOMY      last assessed - 04Mar2016    COLONOSCOPY      Complete colonoscopy     EYE SURGERY       last assessed - 04Mar2016    FL RETROGRADE PYELOGRAM  6/16/2022    FL RETROGRADE PYELOGRAM  8/16/2022    FOOT SURGERY      last assessed - 04Mar2016    LA CYSTO BLADDER W/URETERAL CATHETERIZATION Left 7/21/2022    Procedure: CYSTOSCOPY RETROGRADE PYELOGRAM WITH INSERTION STENT URETERAL;  Surgeon: Facundo Matamoros MD;  Location: [facility];  Service: Urology    LA CYSTO/URETERO W/LITHOTRIPSY &INDWELL STENT INSRT Left 6/16/2022    Procedure: CYSTOSCOPY URETEROSCOPY WITH LITHOTRIPSY HOLMIUM LASER, RETROGRADE PYELOGRAM AND INSERTION STENT URETERAL;  Surgeon: Sammy Ferrer MD;  Location: [facility];  Service: Urology    LA CYSTO/URETERO W/LITHOTRIPSY &INDWELL STENT INSRT Left 8/16/2022    Procedure: CYSTOSCOPY USCOPE W/HOLMIUM LASER, RETROGRADE PYELOGRAM&STENT;  Surgeon: Sudheer Bradford MD;  Location: [facility];  Service: Urology    LA ESOPHAGOGASTRODUODENOSCOPY TRANSORAL DIAGNOSTIC N/A 2/13/2017    Procedure: ESOPHAGOGASTRODUODENOSCOPY (EGD);  Surgeon: Alison Saleem MD;  Location: [facility];  Service: Gastroenterology          06/04/24 0856   PT Last Visit   PT Visit Date 06/04/24   Note Type   Note type Evaluation   Pain Assessment   Pain Assessment Tool 0-10   Pain Score 8   Pain Location/Orientation Orientation: Lower;Location: Back   Patient's Stated Pain Goal No pain   Hospital Pain Intervention(s) Repositioned;Ambulation/increased activity   Restrictions/Precautions   Weight Bearing Precautions Per Order No   Other Precautions Multiple lines;Fall Risk;Pain;Telemetry   Home Living   Type of Home Other (Comment)  (pt reports living in friends RV)   Home Layout One level;Stairs to enter with rails  (4STE)   Bathroom Shower/Tub Walk-in shower   Bathroom Toilet Standard   Bathroom Equipment   (denies)   Bathroom Accessibility Accessible   Home Equipment Cane  (+rollator, does not use PTA)   Prior Function   Level of [place] Independent with ADLs;Independent with functional mobility;Needs assistance with IADLS    Lives With (S)  Alone   Receives Help From Friend(s)   IADLs Family/Friend/Other provides transportation;Family/Friend/Other provides meals;Family/Friend/Other provides medication management   Falls in the last 6 months 0   Vocational Unemployed   General   Family/Caregiver Present No   Cognition   Overall Cognitive Status WFL   Arousal/Participation Alert   Orientation Level Oriented X4   Memory Within functional limits   Following Commands Follows all commands and directions without difficulty   Subjective   Subjective pt pleasant and cooperative throughout therapy session. pt received supine in bed   RLE Assessment   RLE Assessment X  (4-/5 grossly)   LLE Assessment   LLE Assessment X  (4-/5 grossly)   Bed Mobility   Supine to Sit 5  Supervision   Additional items Increased time required;Verbal cues   Sit to Supine 5  Supervision   Additional items Increased time required;Verbal cues   Transfers   Sit to Stand 4  Minimal assistance   Additional items Assist x 1;Increased time required;Verbal cues   Stand to Sit 4  Minimal assistance   Additional items Assist x 1;Increased time required;Verbal cues   Additional Comments transfers w RW   Ambulation/Elevation   Gait pattern Improper Weight shift;Forward Flexion;Wide SAIDA;Decreased foot clearance;Shuffling;Short stride;Knees flexed;Excessively slow   Gait Assistance 4  Minimal assist   Additional items Assist x 1;Verbal cues;Tactile cues   Assistive Device Rolling walker   Distance 20'   Stair Management Assistance Not tested   Balance   Static Sitting Fair +   Dynamic Sitting Fair   Static Standing Fair -   Dynamic Standing Poor +   Ambulatory Poor +   Endurance Deficit   Endurance Deficit Yes   Endurance Deficit Description generalized weakness, decreased exercise tolerance   Activity Tolerance   Activity Tolerance Patient limited by fatigue   Medical Staff Made Aware pily Siddiqui due to medical complexity and multiple comorbidities   Nurse Made Aware JAY  cleared and updated   Assessment   Prognosis Fair   Problem List Decreased strength;Decreased range of motion;Decreased mobility;Decreased endurance;Impaired balance;Pain   Assessment PT orders received and acknowledged. Patient was seen today for high complexity PT evaluation. High complexity evaluation due to Ongoing medical management for primary dx, Increased reliance on more restrictive AD compared to baseline, Decreased activity tolerance compared to baseline, Fall risk, Continuous pulse oximetry monitoring  Patient is a 53 y.o. female  who was admitted to [facility] on 6/3/2024  with Near syncope . Pt presents to [facility] with worsening dyspnea . Patient performed functional mobility as described above.  At baseline, pt resides alone in  and was independent with functional mobility prior to hospital admission. Currently, upon initial examination, pt  is requiring supervision   for bed mobility skills;  min assist x1 for functional transfers and  min assist x1 for ambulation with RW.  Patient currently presents below baseline with limitations in gait, balance, and transfers. Patient will benefit from continued PT services while in hospital in order to address remaining limitations. The patients AM-PAC Basic Mobility Inpatient Short From Raw Score is 17 . Based on AM-PAC scoring and patient presentation, PT currently recommending Level II (Moderate Resource Intensity). Please also refer to the recommendation of the Physical Therapist for safe discharge planning.   Barriers to Discharge Decreased caregiver support;Inaccessible home environment   Goals   Patient Goals to get more therapy   STG Expiration Date 06/18/24   Short Term Goal #1 Patient will be able to perform bed mobility tasks with  modified independent   in order to improve overall functional mobility and assist in safe d/c. STG 2 Patient will sit EOB for at least 25 minutes at  modified independent   level in order to strengthen abdominal  musculature and assist in future transfers and ambulation. STG 3 Patient will be able to perform functional transfer with  modified independent   in order to improve overall functional mobility and assist in safe discharge. STG 4 Patient will be able to ambulate at least 300 feet with least restrictive device with  modified independent   assist in order to improve overall functional mobility and assist in safe discharge. STG 5 Patient will improve sitting/standing static/dynamic balance 1/2 grade in order to improve functional mobility and assist in safe discharge. STG 6 Patient will improve LE strength by 1/2 grade in order to improve functional mobility and assist in safe discharge. STG 7 Patient will be able to negotiate at least 4 stairs with least restrictive device with  modified independent   A in order to improve overall functional mobility and assist in safe discharge   PT Treatment Day 0   Plan   Treatment/Interventions ADL retraining;Functional transfer training;LE strengthening/ROM;Elevations;Therapeutic exercise;Endurance training;Bed mobility;Gait training;Spoke to nursing;OT   PT Frequency 3-5x/wk   Discharge Recommendation   Rehab Resource Intensity Level, PT II (Moderate Resource Intensity)   AM-PAC Basic Mobility Inpatient   Turning in Flat Bed Without Bedrails 4   Lying on Back to Sitting on Edge of Flat Bed Without Bedrails 3   Moving Bed to Chair 3   Standing Up From Chair Using Arms 3   Walk in Room 2   Climb 3-5 Stairs With Railing 2   Basic Mobility Inpatient Raw Score 17   Basic Mobility Standardized Score 39.67   [facility] Highest Level Of Mobility   -HLM Goal 5: Stand one or more mins   -HLM Achieved 6: Walk 10 steps or more   End of Consult   Patient Position at End of Consult Supine;All needs within reach         Scott Barlow, PT

## 2024-06-04 NOTE — ASSESSMENT & PLAN NOTE
CT a/p showed colonic diverticulosis with residual bowel wall thickening of the sigmoid colon and minimal adjacent fluid in the left paracolic gutter, improved from 5/15/2024 in the area of previously identified diverticulitis as well as hepatic cirrhosis.    Plan:  Recommend nonemergent outpatient colonoscopy to exclude underlying mass lesion mimicking diverticulitis.

## 2024-06-04 NOTE — ASSESSMENT & PLAN NOTE
Patient reports episode of near-syncope day of arrival with lightheadedness upon standing.  Recent hospitalization and abx use.  On presentation, BP 90s/50s. When patient stood up and assisted to bathroom, BP dropped to 70s systolics and had near-syncopal episode. K 2.7. Chloride 110. WBC 8. UA negative.  CT a/p showed colonic diverticulosis with residual bowel wall thickening of the sigmoid colon and minimal adjacent fluid in the left paracolic gutter, improved from 5/15/2024 in the area of previously identified diverticulitis as well as hepatic cirrhosis.  Suspect orthostatic hypotension in setting of enteritis.    Plan:  Orthostatic pressures ordered  Continue to monitor pressures  Trend WBC and fever curves  Holding antihypertensives at this time, continue to hold outpatient until BP normalizes or PCP decides to restart   Add fluids as needed

## 2024-06-04 NOTE — ED NOTES
Pt intermittently dropping to mid 80s on 3L. Denies sleep apnea despite note in chart regarding obstructive sleep apnea. O2 increased to 5L at this time     Bella Ruvalcaba RN  06/04/24 0224

## 2024-06-04 NOTE — ASSESSMENT & PLAN NOTE
Home insecurity. Recently lost previous housing. Now temporarily staying at friend's  camper.    Plan:  Case management consulted

## 2024-06-04 NOTE — RESPIRATORY THERAPY NOTE
RT Protocol Note  Chely Ruvalcaba 53 y.o. female MRN: [id number]  Unit/Bed#: ED 27 Encounter: [id number]    Assessment    Principal Problem:    Near syncope  Active Problems:    Hyperlipidemia    Hypothyroidism    Type 2 diabetes mellitus (HCC)    Centrilobular emphysema (HCC)    Gastroesophageal reflux disease    Hypokalemia    Cirrhosis (HCC)    Diastolic CHF (HCC)    Thrombocytopenia (HCC)    Chronic low back pain    Homelessness    Diverticulosis      Home Pulmonary Medications:  Pulmicort, duoneb, albuterol         Past Medical History:   Diagnosis Date    Abnormal stress test     Acute cystitis without hematuria 05/07/2018    Acute duodenal ulcer with bleeding 01/16/2023    Allergic sinusitis     last assessed - 03Apr2017    Anemia Around december    Anxiety     last assessed - 04Mar2016    Arthritis     Asthma     last assessed - 04Mar2016    Bilateral lower extremity edema     last assessed - [id number]    Callus of foot     last assessed - 22Jun2016    Chest pain     Chronic GERD     last assessed - 16Jan2017    Colon polyp     Constipation     Resolved - 13Jan2017    COPD (chronic obstructive pulmonary disease) ([facility])     Depression     last assessed - 04Mar2016    Disease of thyroid gland     Diverticulitis of colon     last assessed - 04Mar2016    Dyspepsia     Resolved - [id number]    Edema, lower extremity 08/07/2020    Esophageal reflux     last assessed - 04Mar2016    Essential hypertriglyceridemia     last assessed - [id number]    Fatty liver 01/16/2023    Gastroesophageal reflux disease with esophagitis 11/18/2016    GERD (gastroesophageal reflux disease)     Gynecological disorder     last assessed - 04Mar2016    Hydroureteronephrosis 06/30/2022    Hypertension     last assessed - 04Mar2016    Hypokalemia 06/20/2022    Hypotension     last assessed - [id number]    Kidney stone     Left ureteral stone with hydronephrosis 07/21/2022    Migraine     Morbid obesity (HCC) 01/11/2019    Formatting of this note  might be different from the original. Added automatically from request for surgery [id number]    Neuropathy     Obesity     Other chest pain 11/08/2018    Pancreatic lesion 03/13/2023    Positive depression screening 06/19/2022    Primary hypertriglyceridemia 06/19/2022    Pulmonary emphysema (HCC)     last assessed - 04Mar2016    Seasonal allergies     Severe obesity (HCC) 01/16/2023    Skin tag 07/17/2023    SOB (shortness of breath)     Strain of groin 07/17/2023    Urinary frequency     last assessed - 22Jun2016    Vagina, candidiasis     last assessed - 22Jun2016    Very heavy cigarette smoker 01/16/2023    Visual impairment      Social History     Socioeconomic History    Marital status:      Spouse name: None    Number of children: None    Years of education: None    Highest education level: None   Occupational History    None   Tobacco Use    Smoking status: Some Days     Current packs/day: 0.50     Average packs/day: 0.5 packs/day for 41.4 years (20.7 ttl pk-yrs)     Types: Cigarettes     Start date: 1/1/1983    Smokeless tobacco: Never   Vaping Use    Vaping status: Former    Start date: 1/1/2019    Quit date: 5/1/2019    Substances: Nicotine   Substance and Sexual Activity    Alcohol use: Not Currently    Drug use: Never    Sexual activity: Not Currently     Partners: Male     Birth control/protection: None   Other Topics Concern    None   Social History Narrative    None     Social Determinants of Health     Financial Resource Strain: Not on file   Food Insecurity: No Food Insecurity (5/14/2024)    Hunger Vital Sign     Worried About Running Out of Food in the Last Year: Never true     Ran Out of Food in the Last Year: Never true   Transportation Needs: No Transportation Needs (5/14/2024)    PRAPARE - Transportation     Lack of Transportation (Medical): No     Lack of Transportation (Non-Medical): No   Physical Activity: Not on file   Stress: Not on file   Social Connections: Not on file   Intimate  Partner Violence: Not on file   Housing Stability: Low Risk  (5/14/2024)    Housing Stability Vital Sign     Unable to Pay for Housing in the Last Year: No     Number of Places Lived in the Last Year: 1     Unstable Housing in the Last Year: No       Subjective         Objective    Physical Exam:   Assessment Type: Assess only  General Appearance: Awake  Respiratory Pattern: Normal  Chest Assessment: Chest expansion symmetrical  Bilateral Breath Sounds: Diminished    Vitals:  Blood pressure 100/51, pulse 99, temperature 98.8 °F (37.1 °C), temperature source Oral, resp. rate 20, last menstrual period 04/05/2018, SpO2 95%.          Imaging and other studies: I have personally reviewed pertinent reports.            Plan    Respiratory Plan: Home Bronchodilator Patient pathway        Resp Comments: pt assessed per resp protcool. Pt here for near syncope episode. Pt has history of asthma and copd. Pt takes pulmicort and duoneb nebs, and albuterol MDI. Will order pt xopenex atrovent tid, albuterol q6 prn. Will follow per protocol.

## 2024-06-04 NOTE — ASSESSMENT & PLAN NOTE
PFTs done in 2/14/17 found FEV1/FVC of 76% and DLCO of 55%. Per report: Isolated reduction in diffusion capacity may be associated with early interstitial lung disease, pulmonary hypertension or diastolic HF.   Bilateral ground glass opacities on chest PE study per report: Smoking-related interstitial lung disease can have this appearance but progression over the course of 6 days is atypical. Patient does have significant smoking history at least 20 pack years, though recently quit in March. Sleep study recently of poor quality but did note no RUTHANN. She continues to note shortness of breath, currently on 3L NC.    Plan:  Xopenex/Atrovent nebs TID  Repeat PFTs outpatient

## 2024-06-04 NOTE — PROGRESS NOTES
Received incoming ADT alert that pt currently admitted at [facility] for near syncopal episode, CHF with weight gain of 9 pounds in 2 weeks.Chart reviewed and will follow.

## 2024-06-04 NOTE — PLAN OF CARE
Problem: PHYSICAL THERAPY ADULT  Goal: Performs mobility at highest level of function for planned discharge setting.  See evaluation for individualized goals.  Description: Treatment/Interventions: ADL retraining, Functional transfer training, LE strengthening/ROM, Elevations, Therapeutic exercise, Endurance training, Bed mobility, Gait training, Spoke to nursing, OT          See flowsheet documentation for full assessment, interventions and recommendations.  Note: Prognosis: Fair  Problem List: Decreased strength, Decreased range of motion, Decreased mobility, Decreased endurance, Impaired balance, Pain  Assessment: PT orders received and acknowledged. Patient was seen today for high complexity PT evaluation. High complexity evaluation due to Ongoing medical management for primary dx, Increased reliance on more restrictive AD compared to baseline, Decreased activity tolerance compared to baseline, Fall risk, Continuous pulse oximetry monitoring  Patient is a 53 y.o. female  who was admitted to [facility] on 6/3/2024  with Near syncope . Pt presents to [facility] with worsening dyspnea . Patient performed functional mobility as described above.  At baseline, pt resides alone in  and was independent with functional mobility prior to hospital admission. Currently, upon initial examination, pt  is requiring supervision   for bed mobility skills;  min assist x1 for functional transfers and  min assist x1 for ambulation with RW.  Patient currently presents below baseline with limitations in gait, balance, and transfers. Patient will benefit from continued PT services while in hospital in order to address remaining limitations. The patients AM-PAC Basic Mobility Inpatient Short From Raw Score is 17 . Based on AM-PAC scoring and patient presentation, PT currently recommending Level II (Moderate Resource Intensity). Please also refer to the recommendation of the Physical Therapist for safe discharge  planning.  Barriers to Discharge: Decreased caregiver support, Inaccessible home environment     Rehab Resource Intensity Level, PT: II (Moderate Resource Intensity)    See flowsheet documentation for full assessment.

## 2024-06-04 NOTE — ASSESSMENT & PLAN NOTE
Pt endorses having chronic lower back pain.     Plan:  Oxy 5mg for moderate pain, Oxy 10mg for severe pain

## 2024-06-04 NOTE — PLAN OF CARE
Problem: OCCUPATIONAL THERAPY ADULT  Goal: Performs self-care activities at highest level of function for planned discharge setting.  See evaluation for individualized goals.  Description: Treatment Interventions: ADL retraining, Functional transfer training, UE strengthening/ROM, Endurance training, Patient/family training, Equipment evaluation/education, Compensatory technique education, Continued evaluation, Energy conservation, Activityengagement          See flowsheet documentation for full assessment, interventions and recommendations.   Note: Limitation: Decreased ADL status, Decreased endurance, Decreased self-care trans, Decreased high-level ADLs  Prognosis: Fair  Assessment: Pt is a 52 yo female who presented to [facility] with near syncope event, generalized weakness, malaise, diarrhea, and nausea/vomiting in which pt dx w/ near syncope. Per chart review, pt recently hospitalized 5/21- 5/24 with pneumonia. Pt with active OT orders in which OT consulted to assess pt's functional status and occupational performance to determine safe d/c needs. Pt seen with PT to increase safety, decrease fall risk, and maximize functional/occupational performance 2* medical complexity which is a regression from pt's functional baseline. Pt lives alone in her friend's  camper with 4 ADAM. PTA, pt was independent in ADLs, has assistance for IADLs, and uses no DME PTA, but has rollator vs SPC. (-) driving. Currently, pt performing bed mobility w/ supervision, functional transfers/mobility w/ Min A x1 w/ RW, UB ADLs w/ Min A, and LB ADLs w/ Min A. Pt demonstrates the following limitations/impairments which impact the pt's ability to engage in valued occupations: balance, endurance/activity tolerance, standing tolerance, functional reach, postural/trunk control, strength, safety awareness, and pain. From an OT standpoint, recommend discharge to post-acute rehab once medically stable. The patient's raw score on the AM-PAC Daily  Activity Inpatient Short Form is 19. A raw score of greater than or equal to 19 suggests the patient may benefit from discharge to post-acute rehabilitation services. Please refer to the recommendation of the Occupational Therapist for safe discharge planning.  Pt would benefit from skilled OT services 2-3x/wk to address acute care needs and underlying performance skills to promote safety, decrease fall risk, and enhance occupational performance to return to PLOF. Goals to be met within the next 10-14 days.     Rehab Resource Intensity Level, OT: II (Moderate Resource Intensity)

## 2024-06-04 NOTE — ASSESSMENT & PLAN NOTE
On admission patient noted to have platelet count of 120. During her recent admission values ranging from . Of note is her history of cirrhosis with hepatosplenomegaly.     Platelets today 98 from 92. Hgb 9.7 today from 10.64 Smear review found borderline for platelet estimate.    Plan:  Monitor CBC  Follow up on Fe, B12, folate labs   Started on PO Iron and Folic acid

## 2024-06-04 NOTE — PROGRESS NOTES
INTERNAL MEDICINE RESIDENCY SENIOR ADMISSION NOTE     Name: Chely Ruvalcaba   Age & Sex: 53 y.o. female   MRN: [id number]  Unit/Bed#: ED 27   Encounter: [id number]  Primary Care Provider: Andi Mason DO    Admit to team: SOD Team C     Patient seen and examined. Reviewed H&P per Dr. Forbes. Agree with the assessment and plan with any exception/addition as noted below:    53-year-old female with a past medical history of HFpEF (EF 60%), cirrhosis, diabetes, emphysema, hypothyroidism, chronic thrombocytopenia, chronic low back pain, GERD, obesity who presents today due to episode of near syncope and generalized malaise. She reports she has been having recurrent near syncopal episodes since August last year and had 1 episode of fall landing in her bed but otherwise no other falls with head strike or loss of consciousness. Denies any prodromal syndrome before the syncopal episodes.  However, reports gets occasional chest pain and palpitations which are unrelated with syncopal episodes. She mentions since her discharge in March she has not felt right.     She reports generalized abdominal pain since her cholecystectomy and mentions for past week she has been having 3-5 episodes of watery diarrhea daily with nausea but no vomiting or any other associated systemic symptoms including fever. Denies any recent sick contacts, travel or new food. She currently resides in one of her friends .  She has quit smoking since March 2024 and is currently on nicotine patch and Chantix.    On presentation to the ED, she is afebrile, hypotensive and saturating well on 3L NC which is her baseline.  Labs were pertinent for K 2.7.  CT abdomen and pelvis with contrast showed colonic diverticulosis with residual bowel wall thickening of the sigmoid colon and minimal adjacent fluid in the left paracolic gutter with recommendation for nonemergent colonoscopy to exclude underlying mass lesion mimicking diverticulitis. Chest x-ray appears  normal, pending formal reading.    Patient seen and examined at bedside.  She is alert and oriented, lying comfortably in her bed and in no acute distress. On physical exam, tenderness noted on the left lower quadrant of her abdomen but otherwise normal physical findings. She is confirmed level 1 CODE STATUS.  She will be admitted to medicine team JÚNIOR RENTERIA for observation for near syncope and hypokalemia.     Assessment  Near syncope  Gastroenteritis/diverticulosis  Hypokalemia  Cirrhosis - compensated  Centrilobular emphysema  Hypothyroidism  Hyperlipidemia  Type II diabetes  GERD  HFpEF (EF 60%)  Thrombocytopenia  Chronic bilateral low back pain  Homelessness    Plan  C. difficile and stool enteric panel for diarrhea  Hold off home Lasix given hypotension.  Orthostatic blood pressure for near syncope.  Replete potassium. Likely 2/2 to diuresis.  Sliding scale insulin for diabetes  Continue other home medications  Rest of care as per Dr. helton    Code Status: Level 1 - Full Code  Admission Status: OBSERVATION  Disposition: Patient requires Med/Surg  Expected Length of Stay: less than 2 midnights      ======================================  PLEASE NOTE:  This encounter was completed utilizing the Dragon [employer] Voice Recognition Software. Grammatical errors, random word insertions, pronoun errors and incomplete sentences are occasional consequences of the system due to software limitations, ambient noise and hardware issues.These may be missed by proof reading prior to affixing electronic signature. Any questions or concerns about the content, text or information contained within the body of this dictation should be directly addressed to the physician for clarification. Please do not hesitate to call me directly if you have any any questions or concerns.

## 2024-06-04 NOTE — ASSESSMENT & PLAN NOTE
Wt Readings from Last 3 Encounters:   05/21/24 117 kg (257 lb 15 oz)   05/17/24 116 kg (256 lb 9.6 oz)   05/07/24 112 kg (248 lb)     Patient noted to have TTE 08/23: displayed EF 60%; mild L. Ventricular thickness, mild mitral and tricuspid regurgitation.    Plan:  Holding lasix given hypotension  Daily weights  Monitor I/Os

## 2024-06-04 NOTE — PROGRESS NOTES
OP CM rcvd ADT alert that pt was admitted to hospital for SOB.  Chart reviewed.  Pt is now on 5L 02.  OP CM is following for housing issues.

## 2024-06-04 NOTE — H&P
INTERNAL MEDICINE RESIDENCY ADMISSION H&P     Name: Chely Ruvalcaba   Age & Sex: 53 y.o. female   MRN: [id number]  Unit/Bed#: ED 27   Encounter: [id number]  Primary Care Provider: Andi Mason DO    Code Status: Level 1 - Full Code  Admission Status: OBSERVATION  Disposition: Patient requires Med/Surg    Admit to team: SOD Team C     ASSESSMENT/PLAN     Principal Problem:    Near syncope  Active Problems:    Hyperlipidemia    Hypothyroidism    Type 2 diabetes mellitus (HCC)    Centrilobular emphysema (HCC)    Gastroesophageal reflux disease    Hypokalemia    Cirrhosis (HCC)    Diastolic CHF (HCC)    Thrombocytopenia (HCC)    Chronic low back pain    Homelessness    Diverticulosis      * Near syncope  Assessment & Plan  Patient reports episode of near-syncope day of arrival with lightheadedness upon standing.  Recent hospitalization and abx use.  On presentation, BP 90s/50s. When patient stood up and assisted to bathroom, BP dropped to 70s systolics and had near-syncopal episode. K 2.7. Chloride 110. WBC 8. UA negative.  CT a/p showed colonic diverticulosis with residual bowel wall thickening of the sigmoid colon and minimal adjacent fluid in the left paracolic gutter, improved from 5/15/2024 in the area of previously identified diverticulitis as well as hepatic cirrhosis.  Suspect orthostatic hypotension in setting of enteritis.    Plan:  C diff and stool panel ordered  Orthostatic pressures ordered  Continue to monitor pressures  Trend WBC and fever curves  Holding antihypertensives at this time  Add fluids as needed    Diverticulosis  Assessment & Plan  CT a/p showed colonic diverticulosis with residual bowel wall thickening of the sigmoid colon and minimal adjacent fluid in the left paracolic gutter, improved from 5/15/2024 in the area of previously identified diverticulitis as well as hepatic cirrhosis.    Plan:  Recommend nonemergent outpatient colonoscopy to exclude underlying mass lesion mimicking  "diverticulitis.    Homelessness  Assessment & Plan  Home insecurity. Recently lost previous housing. Now temporarily staying at [facility].    Plan:  Case management consulted    Chronic low back pain  Assessment & Plan  Pt endorses having chronic lower back pain.     Plan:  Oxy 5mg for moderate pain, Oxy 10mg for severe pain    Thrombocytopenia (HCC)  Assessment & Plan  On admission patient noted to have platelet count of 120. During her recent admission values ranging from . Of note is her history of cirrhosis with hepatosplenomegaly.     Platelets today 82 from 92. Hgb 10.6 today from 10.4. Smear review found borderline for platelet estimate.    Plan:  Monitor CBC    Diastolic CHF (HCC)  Assessment & Plan  Wt Readings from Last 3 Encounters:   05/21/24 117 kg (257 lb 15 oz)   05/17/24 116 kg (256 lb 9.6 oz)   05/07/24 112 kg (248 lb)     Patient noted to have TTE 08/23: displayed EF 60%; mild L. Ventricular thickness, mild mitral and tricuspid regurgitation.    Plan:  Holding lasix given hypotension  Daily weights  Monitor I/Os    Cirrhosis (HCC)  Assessment & Plan  MRI 10/23: displaying hepatic cirrhosis with fatty deposition and splenomegaly which may indicate portal hypertension. No ascites, no leg swelling.    Per GI note on 5/6 \"abnormal ultrasound elastography revealing F4 fibrosis, nodular liver on imaging and thrombocytopenia. Likely etiology for cirrhosis is metabolic associated steatotic liver disease. Her MELD 3.0 based upon labs from March/April is 11\"     Labs from 2023 showed normal anti-smooth muscle antibody, ceruloplasmin, antimicrosomal antibody, Protime-INR,folate, B12, AFP. Labs also showed elevated alpha-1 antitrypsin and IgM. GI noted \"Blood test for rare causes of liver disease are negative thus far\"     CT abdomen/pelvis 5/15 showed splenomegaly and portal hypertension supporting cirrhosis. CT AP PE 5/21 also showed cirrhosis with mildly enlarged spleen. LFTs showed AST of " 22, ALT of 18, ALP of 64 with 1.18 Tbili.    MELD 3.0: 12 at 5/23/2024  5:06 AM  MELD-Na: 11 at 5/23/2024  5:06 AM  Calculated from:  Serum Creatinine: 0.94 mg/dL (Using min of 1 mg/dL) at 5/23/2024  5:06 AM  Serum Sodium: 141 mmol/L (Using max of 137 mmol/L) at 5/23/2024  5:06 AM  Total Bilirubin: 1.59 mg/dL at 5/21/2024 12:24 PM  Serum Albumin: 3.7 g/dL (Using max of 3.5 g/dL) at 5/21/2024 12:24 PM  INR(ratio): 1.31 at 5/21/2024 12:24 PM  Age at listing (hypothetical): 53 years  Sex: Female at 5/23/2024  5:06 AM    Plan:  Continue to f/u outpatient with GI  No acute management while inpatient. Continue lasix to optimize fluid balance  Patient reports she is unsure if she takes spironolactone. Will not order given hypotension    Hypokalemia  Assessment & Plan  K 2.7 on admission.    Plan:  Continue to monitor electrolytes and replete as needed    Gastroesophageal reflux disease  Assessment & Plan  Patient reports intermittent heartburn and follows with outpatient GI.    Plan:  Cont Pepcid 40mg  Cont. Omeprazole 40mg  FU with GI outpatient    Centrilobular emphysema (HCC)  Assessment & Plan  PFTs done in 2/14/17 found FEV1/FVC of 76% and DLCO of 55%. Per report: Isolated reduction in diffusion capacity may be associated with early interstitial lung disease, pulmonary hypertension or diastolic HF.   Bilateral ground glass opacities on chest PE study per report: Smoking-related interstitial lung disease can have this appearance but progression over the course of 6 days is atypical. Patient does have significant smoking history at least 20 pack years, though recently quit in March. Patient also has symptoms of snoring at home but has not yet done a sleep study. She continues to note shortness of breath, currently on 3L NC.    Plan:  Xopenex/Atrovent nebs TID  Repeat PFTs outpatient  Sleep study outpatient    Type 2 diabetes mellitus (HCC)  Assessment & Plan  Lab Results   Component Value Date    HGBA1C 7.8 (H)  05/15/2024     Recent Labs     05/23/24  1656 05/23/24  2056 05/24/24  0819 05/24/24  1137   POCGLU 175* 140 173* 193*       Blood Sugar Average: Last 72 hrs:  (P) [id number]    Patient states she has not been taking insulin at home because she does not have a way to check her blood sugar.    Plan:  SSI  Continue monitoring glucose trends    Hypothyroidism  Assessment & Plan  On levothyroxine 112 mcg, TSH returned 0.6.    Hyperlipidemia  Assessment & Plan  On pravistatin 40 q24h        VTE Pharmacologic Prophylaxis: Enoxaparin (Lovenox)  VTE Mechanical Prophylaxis: sequential compression device    CHIEF COMPLAINT     Chief Complaint   Patient presents with    Shortness of Breath     C/o of SOB and increased use of oxygen baseline 2L    Diarrhea     C/o of diarrhea for the past week     Flu Symptoms     C/o that she just generally feels unwell        HISTORY OF PRESENT ILLNESS     Chely Ruvalcaba is a 53-year-old female with a PMHx of diastolic CHF, cirrhosis, obesity, T2DM, HLD, emphysema, hypothyroidism, thrombocytopenia, chronic low back pain, and GERD who presents due to an episode of near-syncope and generalized malaise. Patient reports that around noon today, she felt lightheaded upon standing. Over the past few days, she reports multiple episodes of watery, malodorous diarrhea and nausea. Of note, pt recently admitted to hospital for severe sepsis secondary to pneumonia (5/21 - 5/24); was treated with IV ceftriaxone, and discharged on Augmentin and azithromycin. Denies emesis, fevers/chills, chest pains, palpitations, dyspnea, abdominal pain, dysuria, hematochezia, LOC, head strike, or any other symptoms.    On presentation, BP 90s/50s. When patient stood up and assisted to bathroom, BP dropped to 70s systolics and had near-syncopal episode. K 2.7. Chloride 110. WBC 8. UA negative. CT a/p showed colonic diverticulosis with residual bowel wall thickening of the sigmoid colon and minimal adjacent fluid in  the left paracolic gutter, improved from 5/15/2024 in the area of previously identified diverticulitis as well as hepatic cirrhosis. Will admit for orthostatic hypotension in setting of enteritis.    Recently lost previous housing. Now temporarily staying at friend[facility]  [facility]  Quit smoking 2024. Previously smoked since age 13.  No alcohol or drug use.    Level-1 Full Code.    REVIEW OF SYSTEMS     Review of Systems   Constitutional:  Negative for chills and fever.   HENT:  Negative for ear pain and sore throat.    Eyes:  Negative for pain and visual disturbance.   Respiratory:  Negative for cough and shortness of breath.    Cardiovascular:  Negative for chest pain and palpitations.   Gastrointestinal:  Positive for nausea. Negative for abdominal pain and vomiting.   Genitourinary:  Negative for dysuria and hematuria.   Musculoskeletal:  Negative for arthralgias and back pain.   Skin:  Negative for color change and rash.   Neurological:  Positive for light-headedness. Negative for seizures and syncope.   All other systems reviewed and are negative.    OBJECTIVE     Vitals:    24 2330 24 0000 24 0030 24 0045   BP: 90/53 95/53 (!) 89/49    BP Location: Right arm Right arm Right arm    Pulse: 98 100 102 104   Resp: 21 19 18 20   Temp:       TempSrc:       SpO2: 94% 95% 93% 93%      Temperature:   Temp (24hrs), Av.8 °F (37.1 °C), Min:98.8 °F (37.1 °C), Max:98.8 °F (37.1 °C)    Temperature: 98.8 °F (37.1 °C)  Intake & Output:  I/O          0701   0700  0701   0700    I.V.  500    Total Intake  500    Net  +500                Weights:        There is no height or weight on file to calculate BMI.  Weight (last 2 days)       None          Physical Exam  Vitals and nursing note reviewed.   Constitutional:       General: She is not in acute distress.     Appearance: She is well-developed. She is obese. She is not ill-appearing, toxic-appearing or diaphoretic.   HENT:       Head: Normocephalic and atraumatic.   Eyes:      Conjunctiva/sclera: Conjunctivae normal.   Cardiovascular:      Rate and Rhythm: Regular rhythm. Tachycardia present.      Heart sounds: No murmur heard.  Pulmonary:      Effort: Pulmonary effort is normal. No respiratory distress.      Breath sounds: Normal breath sounds.   Abdominal:      Palpations: Abdomen is soft.      Tenderness: There is no abdominal tenderness. There is no right CVA tenderness, left CVA tenderness, guarding or rebound.   Musculoskeletal:         General: No swelling or tenderness.      Cervical back: Neck supple.      Right lower leg: No edema.      Left lower leg: No edema.   Skin:     General: Skin is warm and dry.      Capillary Refill: Capillary refill takes less than 2 seconds.   Neurological:      General: No focal deficit present.      Mental Status: She is alert and oriented to person, place, and time.   Psychiatric:         Mood and Affect: Mood normal.       PAST MEDICAL HISTORY     Past Medical History:   Diagnosis Date    Abnormal stress test     Acute cystitis without hematuria 05/07/2018    Acute duodenal ulcer with bleeding 01/16/2023    Allergic sinusitis     last assessed - 03Apr2017    Anemia Around december    Anxiety     last assessed - 04Mar2016    Arthritis     Asthma     last assessed - 04Mar2016    Bilateral lower extremity edema     last assessed - [id number]    Callus of foot     last assessed - 22Jun2016    Chest pain     Chronic GERD     last assessed - 16Jan2017    Colon polyp     Constipation     Resolved - 13Jan2017    COPD (chronic obstructive pulmonary disease) (HCC)     Depression     last assessed - 04Mar2016    Disease of thyroid gland     Diverticulitis of colon     last assessed - 04Mar2016    Dyspepsia     Resolved - [id number]    Edema, lower extremity 08/07/2020    Esophageal reflux     last assessed - 04Mar2016    Essential hypertriglyceridemia     last assessed - [id number]    Fatty liver 01/16/2023     Gastroesophageal reflux disease with esophagitis 11/18/2016    GERD (gastroesophageal reflux disease)     Gynecological disorder     last assessed - 04Mar2016    Hydroureteronephrosis 06/30/2022    Hypertension     last assessed - 04Mar2016    Hypokalemia 06/20/2022    Hypotension     last assessed - 25Aug2016    Kidney stone     Left ureteral stone with hydronephrosis 07/21/2022    Migraine     Morbid obesity (HCC) 01/11/2019    Formatting of this note might be different from the original. Added automatically from request for surgery [id number]    Neuropathy     Obesity     Other chest pain 11/08/2018    Pancreatic lesion 03/13/2023    Positive depression screening 06/19/2022    Primary hypertriglyceridemia 06/19/2022    Pulmonary emphysema (HCC)     last assessed - 04Mar2016    Seasonal allergies     Severe obesity (HCC) 01/16/2023    Skin tag 07/17/2023    SOB (shortness of breath)     Strain of groin 07/17/2023    Urinary frequency     last assessed - 22Jun2016    Vagina, candidiasis     last assessed - 22Jun2016    Very heavy cigarette smoker 01/16/2023    Visual impairment      PAST SURGICAL HISTORY     Past Surgical History:   Procedure Laterality Date    CARPAL TUNNEL RELEASE      last assessed - 04MAr2016    CHOLECYSTECTOMY      last assessed - 04Mar2016    COLONOSCOPY      Complete colonoscopy     EYE SURGERY      last assessed - 04Mar2016    FL RETROGRADE PYELOGRAM  6/16/2022    FL RETROGRADE PYELOGRAM  8/16/2022    FOOT SURGERY      last assessed - 04Mar2016    NH CYSTO BLADDER W/URETERAL CATHETERIZATION Left 7/21/2022    Procedure: CYSTOSCOPY RETROGRADE PYELOGRAM WITH INSERTION STENT URETERAL;  Surgeon: Facundo Matamoros MD;  Location: [facility];  Service: Urology    NH CYSTO/URETERO W/LITHOTRIPSY &INDWELL STENT INSRT Left 6/16/2022    Procedure: CYSTOSCOPY URETEROSCOPY WITH LITHOTRIPSY HOLMIUM LASER, RETROGRADE PYELOGRAM AND INSERTION STENT URETERAL;  Surgeon: Sammy Ferrer MD;  Location: [facility];   Service: Urology    TN CYSTO/URETERO W/LITHOTRIPSY &INDWELL STENT INSRT Left 8/16/2022    Procedure: CYSTOSCOPY USCOPE W/HOLMIUM LASER, RETROGRADE PYELOGRAM&STENT;  Surgeon: Sudheer Bradford MD;  Location: [facility];  Service: Urology    TN ESOPHAGOGASTRODUODENOSCOPY TRANSORAL DIAGNOSTIC N/A 2/13/2017    Procedure: ESOPHAGOGASTRODUODENOSCOPY (EGD);  Surgeon: Alison Saleem MD;  Location: [facility];  Service: Gastroenterology     SOCIAL & FAMILY HISTORY     Social History     Substance and Sexual Activity   Alcohol Use Not Currently       Social History     Substance and Sexual Activity   Drug Use Never     Social History     Tobacco Use   Smoking Status Some Days    Current packs/day: 0.50    Average packs/day: 0.5 packs/day for 41.4 years (20.7 ttl pk-yrs)    Types: Cigarettes    Start date: 1/1/1983   Smokeless Tobacco Never     Family History   Problem Relation Age of Onset    Obesity Mother     Thyroid disease Mother     Cancer Mother 71    Vision loss Mother     Obesity Sister     Coronary artery disease Sister     Stroke Sister     Asthma Sister     Glaucoma Sister     No Known Problems Maternal Aunt     Diabetes Maternal Uncle     Lung cancer Maternal Grandmother     Cancer Maternal Grandfather     Diabetes Maternal Grandfather     No Known Problems Paternal Grandmother     No Known Problems Paternal Grandfather     Hypertension Other     Lung cancer Other     Hypertension Other     Lung cancer Other     Lung cancer Other      LABORATORY DATA     Labs: I have personally reviewed pertinent reports.    Results from last 7 days   Lab Units 06/03/24  1812   WBC Thousand/uL 8.09   HEMOGLOBIN g/dL 11.0*   HEMATOCRIT % 36.1   PLATELETS Thousands/uL 106*   SEGS PCT % 57   MONO PCT % 12   EOS PCT % 2      Results from last 7 days   Lab Units 06/03/24  1812   POTASSIUM mmol/L 2.7*   CHLORIDE mmol/L 110*   CO2 mmol/L 23   BUN mg/dL 8   CREATININE mg/dL 0.83   CALCIUM mg/dL 8.6   ALK PHOS U/L 58   ALT U/L 16   AST U/L 42*                           Micro:  Lab Results   Component Value Date    BLOODCX No Growth After 5 Days. 05/21/2024    BLOODCX No Growth After 5 Days. 05/21/2024    BLOODCX No Growth After 5 Days. 07/21/2022    BLOODCX No Growth After 5 Days. 07/21/2022    URINECX >100,000 cfu/ml Klebsiella pneumoniae (A) 05/15/2024    URINECX >100,000 cfu/ml Citrobacter freundii (A) 05/15/2024    URINECX <10,000 cfu/ml Escherichia coli (A) 08/16/2022    URINECX <10,000 cfu/ml Enterococcus  faecium VRE (A) 08/16/2022    URINECX (A) 08/16/2022     >100,000 cfu/ml - Possible mycoplasma hominis Gram Negative Edd    SPUTUMCULTUR 1+ Growth of Candida albicans (A) 05/23/2024    SPUTUMCULTUR Few Colonies of 05/23/2024     IMAGING & DIAGNOSTIC TESTS     Imaging: I have personally reviewed pertinent reports.    CT abdomen pelvis with contrast    Result Date: 6/3/2024  Impression: Colonic diverticulosis with residual bowel wall thickening of the sigmoid colon and minimal adjacent fluid in the left paracolic gutter, improved from 5/15/2024 in the area of previously identified diverticulitis. Nonemergent colonoscopy is recommended to exclude underlying mass lesion mimicking diverticulitis. Partially imaged groundglass opacities at the lung bases, improved from prior. Findings again suspicious for hepatic cirrhosis. See full report for additional findings. Workstation performed: [id number]     EKG, Pathology, and Other Studies: I have personally reviewed pertinent reports.     ALLERGIES     Allergies   Allergen Reactions    Hydrocodone-Acetaminophen Hives    Ketorolac Hives and Dizziness    Toradol [Ketorolac Tromethamine] Other (See Comments)     hypotension    Ultram [Tramadol] Nausea Only, GI Intolerance and Vomiting     MEDICATIONS PRIOR TO ARRIVAL     Prior to Admission medications    Medication Sig Start Date End Date Taking? Authorizing Provider   albuterol (PROVENTIL HFA,VENTOLIN HFA) 90 mcg/act inhaler Inhale 2 puffs every 6 (six) hours as  needed for wheezing 5/2/24   Andi Mason DO   budesonide (PULMICORT) 0.5 mg/2 mL nebulizer solution Take 0.5 mg by nebulization 2 (two) times a day Rinse mouth after use.    Historical Provider, MD   buPROPion (WELLBUTRIN XL) 300 mg 24 hr tablet Take 1 tablet (300 mg total) by mouth every morning 5/17/24 5/12/25  Maggi Walsh,    diazepam (VALIUM) 5 mg tablet Take 1 tablet (5 mg total) by mouth daily at bedtime as needed for anxiety  Patient taking differently: Take 5 mg by mouth daily at bedtime as needed for anxiety 4/30/24   Andi Mason, DO   ergocalciferol (VITAMIN D2) 50,000 units Take 1 capsule (50,000 Units total) by mouth every 14 (fourteen) days 7/17/23   Andi Mason DO   famotidine (PEPCID) 40 MG tablet Take 1 tablet (40 mg total) by mouth daily at bedtime Take in evening 2 hours prior to bed 2/19/24   Andi Mason DO   ferrous sulfate 324 (65 Fe) mg Take 1 tablet (324 mg total) by mouth daily before breakfast 5/7/24   Andi Mason, DO   folic acid (FOLVITE) 1 mg tablet Take 1 tablet (1 mg total) by mouth daily 4/30/24   Andi Mason DO   furosemide (LASIX) 40 mg tablet Take 1 tablet (40 mg total) by mouth daily 5/18/24   Maggi Walsh DO   gabapentin (NEURONTIN) 100 mg capsule Take 100 mg by mouth every 8 (eight) hours 10/4/19   Historical Provider, MD   Insulin Pen Needle 31G X 5 MM MISC Inject under the skin if needed (Insuline injections) 5/2/24   Andi Mason DO   ipratropium-albuterol (DUO-NEB) 0.5-2.5 mg/3 mL nebulizer solution Take 3 mL by nebulization 4 (four) times a day 5/7/24   ANTONIO Nam   lactulose (CHRONULAC) 10 g/15 mL solution Take 20 g by mouth Daily or as needed for bowl movement    Historical Provider, MD   levothyroxine 112 mcg tablet Take 1 tablet (112 mcg total) by mouth daily 4/30/24   Escamilla Malron, DO   lisinopril (ZESTRIL) 5 mg tablet Take 1 tablet (5 mg total) by mouth daily 5/17/24 6/16/24  Cristina Leal MD   nicotine (NICODERM CQ) 21 mg/24 hr TD 24 hr  patch Place 1 patch on the skin over 24 hours every 24 hours 4/30/24   Andi Mason DO   omeprazole (PriLOSEC) 40 MG capsule take 1 capsule by mouth twice a day 4/10/24   Lisandra Leggett PA-C   ondansetron (ZOFRAN) 4 mg tablet Take 1 tablet (4 mg total) by mouth every 8 (eight) hours as needed for nausea or vomiting 5/23/24   Sandi Reilly MD   oxyCODONE-acetaminophen (PERCOCET)  mg per tablet Take 1 tablet by mouth every 6 (six) hours as needed for severe pain 6/27/22   Historical Provider, MD   oxygen gas Inhale 2 L/min continuous. May use 3L with exertion per Bella ALVAREZ  Keep sat >88%  Indications: low saturation    Andi Mason DO   sertraline (ZOLOFT) 100 mg tablet take 1 tablet by mouth twice a day 5/14/24   Andi Mason DO   simvastatin (ZOCOR) 20 mg tablet Take 1 tablet (20 mg total) by mouth daily at bedtime 1/17/24   Andi Mason DO   spironolactone (ALDACTONE) 25 mg tablet Take 2 tablets (50 mg total) by mouth daily 12/21/23   Elijah Triplett MD   sucralfate (CARAFATE) 1 g tablet Take 1 tablet (1 g total) by mouth daily at bedtime 5/6/24   Michele Vu DO   varenicline (CHANTIX) 0.5 mg tablet Take 1 tablet (0.5 mg total) by mouth 2 (two) times a day 5/8/24   Andi Mason DO   naloxone (NARCAN) 4 mg/0.1 mL nasal spray Administer 1 spray into a nostril. If no response after 2-3 minutes, give another dose in the other nostril using a new spray.  Patient not taking: Reported on 6/30/2022 1/15/21 7/21/22  Andi Mason DO     MEDICATIONS ADMINISTERED IN LAST 24 HOURS     Medication Administration - last 24 hours from 06/03/2024 0052 to 06/04/2024 0052         Date/Time Order Dose Route Action Action by     06/03/2024 2838 EDT albuterol (FOR EMS ONLY) (2.5 mg/3 mL) 0.083 % inhalation solution 2.5 mg 0 mg Does not apply Given to EMS Debbie Grover RN     06/03/2024 1916 EDT potassium chloride (Klor-Con M20) CR tablet 40 mEq 40 mEq Oral Given Brad Abrams RN      06/03/2024 2122 EDT potassium chloride 20 mEq IVPB (premix) 0 mEq Intravenous Stopped Bella Ruvalcaba RN     06/03/2024 1916 EDT potassium chloride 20 mEq IVPB (premix) 20 mEq Intravenous New Bag Brad Abrams RN     06/03/2024 2147 EDT iohexol (OMNIPAQUE) 350 MG/ML injection (MULTI-DOSE) 100 mL 100 mL Intravenous Given Lesly Ferraira     06/03/2024 2349 EDT HYDROmorphone (DILAUDID) injection 0.5 mg 0.5 mg Intravenous Given Brad Abrams RN     06/03/2024 2349 EDT metoclopramide (REGLAN) injection 10 mg 10 mg Intravenous Given Brad Abrams RN          CURRENT MEDICATIONS     Current Facility-Administered Medications   Medication Dose Route Frequency Provider Last Rate    albuterol  2 puff Inhalation Q6H PRN Ni Forbes MD      budesonide  0.5 mg Nebulization BID Ni Forbes MD      buPROPion  300 mg Oral QAM Ni Forbes MD      diazepam  5 mg Oral HS PRN Ni Forbes MD      enoxaparin  40 mg Subcutaneous Daily Ni Forbes MD      ergocalciferol  50,000 Units Oral Q14 Days Ni Forbes MD      famotidine  40 mg Oral HS Ni Forbes MD      ferrous sulfate  325 mg Oral Daily With Breakfast Ni Forbes MD      folic acid  1 mg Oral Daily Ni Forbes MD      gabapentin  100 mg Oral Daily Ni Forbes MD      ipratropium-albuterol  3 mL Nebulization 4x Daily Ni Forbes MD      levothyroxine  112 mcg Oral Daily Ni Forbes MD      nicotine  1 patch Transdermal Q24H Ni Forbes MD      oxyCODONE-acetaminophen  1 tablet Oral Q6H PRN Ni Forbes MD      pantoprazole  40 mg Oral BID AC Ni Forbes MD      polyethylene glycol  17 g Oral Daily PRN Ni Forbes MD      pravastatin  40 mg Oral Daily With Dinner Ni Forbes MD      senna  1 tablet Oral Daily Ni Forbes MD      sertraline  100 mg Oral BID Ni Forbes MD      sucralfate  1 g Oral HS Ni Forbes MD          albuterol, 2 puff, Q6H PRN  diazepam, 5 mg, HS PRN  oxyCODONE-acetaminophen, 1 tablet, Q6H PRN  polyethylene glycol, 17 g, Daily  "PRN        Admission Time  I spent 45 minutes admitting the patient.  This involved direct patient contact where I performed a full history and physical, reviewing previous records, and reviewing laboratory and other diagnostic studies.    Portions of the record may have been created with voice recognition software.  Occasional wrong word or \"sound a like\" substitutions may have occurred due to the inherent limitations of voice recognition software.  Read the chart carefully and recognize, using context, where substitutions have occurred.    ==  Ni Forbes MD  [facility]  Internal Medicine Residency PGY-1   "

## 2024-06-04 NOTE — ASSESSMENT & PLAN NOTE
"MRI 10/23: displaying hepatic cirrhosis with fatty deposition and splenomegaly which may indicate portal hypertension. No ascites, no leg swelling.    Per GI note on 5/6 \"abnormal ultrasound elastography revealing F4 fibrosis, nodular liver on imaging and thrombocytopenia. Likely etiology for cirrhosis is metabolic associated steatotic liver disease. Her MELD 3.0 based upon labs from March/April is 11\"     Labs from 2023 showed normal anti-smooth muscle antibody, ceruloplasmin, antimicrosomal antibody, Protime-INR,folate, B12, AFP. Labs also showed elevated alpha-1 antitrypsin and IgM. GI noted \"Blood test for rare causes of liver disease are negative thus far\"     CT abdomen/pelvis 5/15 showed splenomegaly and portal hypertension supporting cirrhosis. CT AP PE 5/21 also showed cirrhosis with mildly enlarged spleen. LFTs showed AST of 22, ALT of 18, ALP of 64 with 1.18 Tbili.    MELD 3.0: 12 at 5/23/2024  5:06 AM  MELD-Na: 11 at 5/23/2024  5:06 AM  Calculated from:  Serum Creatinine: 0.94 mg/dL (Using min of 1 mg/dL) at 5/23/2024  5:06 AM  Serum Sodium: 141 mmol/L (Using max of 137 mmol/L) at 5/23/2024  5:06 AM  Total Bilirubin: 1.59 mg/dL at 5/21/2024 12:24 PM  Serum Albumin: 3.7 g/dL (Using max of 3.5 g/dL) at 5/21/2024 12:24 PM  INR(ratio): 1.31 at 5/21/2024 12:24 PM  Age at listing (hypothetical): 53 years  Sex: Female at 5/23/2024  5:06 AM    Plan:  Continue to f/u outpatient with GI  No acute management while inpatient. Continue lasix to optimize fluid balance  Patient reports she is unsure if she takes spironolactone. Will not order given hypotension  "

## 2024-06-04 NOTE — PROGRESS NOTES
INTERNAL MEDICINE RESIDENCY PROGRESS NOTE     Name: Chely Ruvalcaba   Age & Sex: 53 y.o. female   MRN: [id number]  Unit/Bed#: CW2 211-01   Encounter: [id number]  Team: SOD Team C     PATIENT INFORMATION     Name: Chely Ruvalcaba   Age & Sex: 53 y.o. female   MRN: [id number]  Hospital Stay Days: 0    ASSESSMENT/PLAN     Principal Problem:    Near syncope  Active Problems:    Hyperlipidemia    Hypothyroidism    Type 2 diabetes mellitus (HCC)    Centrilobular emphysema (HCC)    Gastroesophageal reflux disease    Hypokalemia    Cirrhosis (HCC)    Diastolic CHF (HCC)    Thrombocytopenia (HCC)    Chronic low back pain    Homelessness    Diverticulosis      * Near syncope  Assessment & Plan  Patient reports episode of near-syncope day of arrival with lightheadedness upon standing.  Recent hospitalization and abx use.  On presentation, BP 90s/50s. When patient stood up and assisted to bathroom, BP dropped to 70s systolics and had near-syncopal episode. K 2.7. Chloride 110. WBC 8. UA negative.  CT a/p showed colonic diverticulosis with residual bowel wall thickening of the sigmoid colon and minimal adjacent fluid in the left paracolic gutter, improved from 5/15/2024 in the area of previously identified diverticulitis as well as hepatic cirrhosis.  Suspect orthostatic hypotension in setting of enteritis.    Plan:  C diff and stool panel ordered  Orthostatic pressures ordered  Continue to monitor pressures  Trend WBC and fever curves  Holding antihypertensives at this time  Add fluids as needed    Diverticulosis  Assessment & Plan  CT a/p showed colonic diverticulosis with residual bowel wall thickening of the sigmoid colon and minimal adjacent fluid in the left paracolic gutter, improved from 5/15/2024 in the area of previously identified diverticulitis as well as hepatic cirrhosis.    Plan:  Recommend nonemergent outpatient colonoscopy to exclude underlying mass lesion mimicking diverticulitis.    Homelessness  Assessment &  "Plan  Home insecurity. Recently lost previous housing. Now temporarily staying at friend's [facility].    Plan:  Case management consulted    Chronic low back pain  Assessment & Plan  Pt endorses having chronic lower back pain.     Plan:  Oxy 5mg for moderate pain, Oxy 10mg for severe pain    Thrombocytopenia (HCC)  Assessment & Plan  On admission patient noted to have platelet count of 120. During her recent admission values ranging from . Of note is her history of cirrhosis with hepatosplenomegaly.     Platelets today 98 from 92. Hgb 10.4 today from 10.6. Smear review found borderline for platelet estimate.    Plan:  Monitor CBC    Diastolic CHF (HCC)  Assessment & Plan  Wt Readings from Last 3 Encounters:   05/21/24 117 kg (257 lb 15 oz)   05/17/24 116 kg (256 lb 9.6 oz)   05/07/24 112 kg (248 lb)     Patient noted to have TTE 08/23: displayed EF 60%; mild L. Ventricular thickness, mild mitral and tricuspid regurgitation.    Plan:  Holding lasix given hypotension  Daily weights  Monitor I/Os    Cirrhosis (HCC)  Assessment & Plan  MRI 10/23: displaying hepatic cirrhosis with fatty deposition and splenomegaly which may indicate portal hypertension. No ascites, no leg swelling.    Per GI note on 5/6 \"abnormal ultrasound elastography revealing F4 fibrosis, nodular liver on imaging and thrombocytopenia. Likely etiology for cirrhosis is metabolic associated steatotic liver disease. Her MELD 3.0 based upon labs from March/April is 11\"     Labs from 2023 showed normal anti-smooth muscle antibody, ceruloplasmin, antimicrosomal antibody, Protime-INR,folate, B12, AFP. Labs also showed elevated alpha-1 antitrypsin and IgM. GI noted \"Blood test for rare causes of liver disease are negative thus far\"     CT abdomen/pelvis 5/15 showed splenomegaly and portal hypertension supporting cirrhosis. CT AP PE 5/21 also showed cirrhosis with mildly enlarged spleen. LFTs showed AST of 22, ALT of 18, ALP of 64 with 1.18 " Tbili.    MELD 3.0: 12 at 5/23/2024  5:06 AM  MELD-Na: 11 at 5/23/2024  5:06 AM  Calculated from:  Serum Creatinine: 0.94 mg/dL (Using min of 1 mg/dL) at 5/23/2024  5:06 AM  Serum Sodium: 141 mmol/L (Using max of 137 mmol/L) at 5/23/2024  5:06 AM  Total Bilirubin: 1.59 mg/dL at 5/21/2024 12:24 PM  Serum Albumin: 3.7 g/dL (Using max of 3.5 g/dL) at 5/21/2024 12:24 PM  INR(ratio): 1.31 at 5/21/2024 12:24 PM  Age at listing (hypothetical): 53 years  Sex: Female at 5/23/2024  5:06 AM    Plan:  Continue to f/u outpatient with GI  No acute management while inpatient. Continue lasix to optimize fluid balance  Patient reports she is unsure if she takes spironolactone. Will not order given hypotension    Hypokalemia  Assessment & Plan  K 2.7 on admission. Now 2.5    Plan:  Continue to monitor electrolytes and replete as needed    Gastroesophageal reflux disease  Assessment & Plan  Patient reports intermittent heartburn and follows with outpatient GI.    Plan:  Cont Pepcid 40mg  Cont. Omeprazole 40mg  FU with GI outpatient    Centrilobular emphysema (HCC)  Assessment & Plan  PFTs done in 2/14/17 found FEV1/FVC of 76% and DLCO of 55%. Per report: Isolated reduction in diffusion capacity may be associated with early interstitial lung disease, pulmonary hypertension or diastolic HF.   Bilateral ground glass opacities on chest PE study per report: Smoking-related interstitial lung disease can have this appearance but progression over the course of 6 days is atypical. Patient does have significant smoking history at least 20 pack years, though recently quit in March. Patient also has symptoms of snoring at home but has not yet done a sleep study. She continues to note shortness of breath, currently on 3L NC.    Plan:  Xopenex/Atrovent nebs TID  Repeat PFTs outpatient  Sleep study outpatient    Type 2 diabetes mellitus (HCC)  Assessment & Plan  Lab Results   Component Value Date    HGBA1C 7.8 (H) 05/15/2024     Recent Labs      24  1656 24  2056 24  0819 24  1137   POCGLU 175* 140 173* 193*       Blood Sugar Average: Last 72 hrs:  (P) [id number]    Patient states she has not been taking insulin at home because she does not have a way to check her blood sugar.    Plan:  SSI  Continue monitoring glucose trends    Hypothyroidism  Assessment & Plan  On levothyroxine 112 mcg, TSH returned 0.6.    Hyperlipidemia  Assessment & Plan  On pravistatin 40 q24h        Disposition: remain inpatient      SUBJECTIVE     Patient seen and examined. No acute events overnight. Pt endorses some nausea and continued diarrhea. Denies any fevers, chills, headaches, chest pain, sob, abdominal pain, vomiting.     OBJECTIVE     Vitals:    24 0500 24 0530 24 0600 24 0745   BP: (!) 81/38 (!) 88/39 (!) 94/35 129/75   BP Location: Right arm Right arm Left arm    Pulse: 102 100 102 102   Resp:     Temp:       TempSrc:       SpO2: 97% 96% 96% 93%   Weight:   115 kg (253 lb 8.5 oz)       Temperature:   Temp (24hrs), Av.8 °F (37.1 °C), Min:98.8 °F (37.1 °C), Max:98.8 °F (37.1 °C)    Temperature: 98.8 °F (37.1 °C)  Intake & Output:  I/O          07 07 0701   07 0701   0700    I.V. (mL/kg)  500 (4.3)     Total Intake(mL/kg)  500 (4.3)     Net  +500                  Weights:        Body mass index is 38.1 kg/m².  Weight (last 2 days)       Date/Time Weight    24 0600 115 (253.53)          Physical Exam  Vitals and nursing note reviewed.   Constitutional:       General: She is not in acute distress.     Appearance: She is well-developed. She is obese. She is not ill-appearing, toxic-appearing or diaphoretic.   HENT:      Head: Normocephalic and atraumatic.   Eyes:      Conjunctiva/sclera: Conjunctivae normal.   Cardiovascular:      Rate and Rhythm: Normal rate and regular rhythm.      Heart sounds: No murmur heard.  Pulmonary:      Effort: Pulmonary effort is  normal. No respiratory distress.      Breath sounds: Normal breath sounds.   Abdominal:      Palpations: Abdomen is soft.      Tenderness: There is no abdominal tenderness. There is no right CVA tenderness, left CVA tenderness, guarding or rebound.   Musculoskeletal:         General: No swelling or tenderness.      Cervical back: Neck supple.      Right lower leg: No edema.      Left lower leg: No edema.   Skin:     General: Skin is warm and dry.      Capillary Refill: Capillary refill takes less than 2 seconds.   Neurological:      General: No focal deficit present.      Mental Status: She is alert and oriented to person, place, and time.   Psychiatric:         Mood and Affect: Mood normal.       LABORATORY DATA     Labs: I have personally reviewed pertinent reports.  Results from last 7 days   Lab Units 06/04/24  0404 06/03/24  1812   WBC Thousand/uL 8.01 8.09   HEMOGLOBIN g/dL 10.4* 11.0*   HEMATOCRIT % 34.2* 36.1   PLATELETS Thousands/uL 98* 106*   SEGS PCT % 57 57   MONO PCT % 9 12   EOS PCT % 2 2      Results from last 7 days   Lab Units 06/04/24  0404 06/03/24  1812   POTASSIUM mmol/L 2.5* 2.7*   CHLORIDE mmol/L 106 110*   CO2 mmol/L 23 23   BUN mg/dL 8 8   CREATININE mg/dL 0.89 0.83   CALCIUM mg/dL 8.1* 8.6   ALK PHOS U/L  --  58   ALT U/L  --  16   AST U/L  --  42*                            IMAGING & DIAGNOSTIC TESTING     Radiology Results: I have personally reviewed pertinent reports.  CT abdomen pelvis with contrast    Result Date: 6/3/2024  Impression: Colonic diverticulosis with residual bowel wall thickening of the sigmoid colon and minimal adjacent fluid in the left paracolic gutter, improved from 5/15/2024 in the area of previously identified diverticulitis. Nonemergent colonoscopy is recommended to exclude underlying mass lesion mimicking diverticulitis. Partially imaged groundglass opacities at the lung bases, improved from prior. Findings again suspicious for hepatic cirrhosis. See full report  for additional findings. Workstation performed: [id number]     Other Diagnostic Testing: I have personally reviewed pertinent reports.    ACTIVE MEDICATIONS     Current Facility-Administered Medications   Medication Dose Route Frequency    albuterol (PROVENTIL HFA,VENTOLIN HFA) inhaler 2 puff  2 puff Inhalation Q6H PRN    albuterol inhalation solution 2.5 mg  2.5 mg Nebulization Q6H PRN    budesonide (PULMICORT) inhalation solution 0.5 mg  0.5 mg Nebulization BID    buPROPion (WELLBUTRIN XL) 24 hr tablet 300 mg  300 mg Oral QAM    diazepam (VALIUM) tablet 5 mg  5 mg Oral HS PRN    enoxaparin (LOVENOX) subcutaneous injection 40 mg  40 mg Subcutaneous Daily    ergocalciferol (VITAMIN D2) capsule 50,000 Units  50,000 Units Oral Q14 Days    famotidine (PEPCID) tablet 40 mg  40 mg Oral HS    ferrous sulfate tablet 325 mg  325 mg Oral Daily With Breakfast    folic acid (FOLVITE) tablet 1 mg  1 mg Oral Daily    gabapentin (NEURONTIN) capsule 100 mg  100 mg Oral Daily    ipratropium (ATROVENT) 0.02 % inhalation solution 0.5 mg  0.5 mg Nebulization TID    levalbuterol (XOPENEX) inhalation solution 1.25 mg  1.25 mg Nebulization TID    levothyroxine tablet 112 mcg  112 mcg Oral Daily    nicotine (NICODERM CQ) 21 mg/24 hr TD 24 hr patch 1 patch  1 patch Transdermal Q24H    ondansetron (ZOFRAN-ODT) dispersible tablet 4 mg  4 mg Oral Q8H PRN    oxyCODONE-acetaminophen (PERCOCET) 5-325 mg per tablet 1 tablet  1 tablet Oral Q6H PRN    pantoprazole (PROTONIX) EC tablet 40 mg  40 mg Oral BID AC    polyethylene glycol (MIRALAX) packet 17 g  17 g Oral Daily PRN    potassium chloride 20 mEq IVPB (premix)  20 mEq Intravenous Q2H    pravastatin (PRAVACHOL) tablet 40 mg  40 mg Oral Daily With Dinner    senna (SENOKOT) tablet 8.6 mg  1 tablet Oral Daily    sertraline (ZOLOFT) tablet 100 mg  100 mg Oral BID    sucralfate (CARAFATE) tablet 1 g  1 g Oral HS       VTE Pharmacologic Prophylaxis: Enoxaparin (Lovenox)  VTE Mechanical Prophylaxis:  "sequential compression device    Portions of the record may have been created with voice recognition software.  Occasional wrong word or \"sound a like\" substitutions may have occurred due to the inherent limitations of voice recognition software.  Read the chart carefully and recognize, using context, where substitutions have occurred.  ==  Eloy Calvert MD  [facility]  Internal Medicine Residency PGY-1      "

## 2024-06-04 NOTE — OCCUPATIONAL THERAPY NOTE
Occupational Therapy Evaluation     Patient Name: Chely Ruvalcaba  Today's Date: 6/4/2024  Problem List  Principal Problem:    Near syncope  Active Problems:    Hyperlipidemia    Hypothyroidism    Type 2 diabetes mellitus (HCC)    Centrilobular emphysema (HCC)    Gastroesophageal reflux disease    Hypokalemia    Cirrhosis (HCC)    Diastolic CHF (HCC)    Thrombocytopenia (HCC)    Chronic low back pain    Homelessness    Diverticulosis    Past Medical History  Past Medical History:   Diagnosis Date    Abnormal stress test     Acute cystitis without hematuria 05/07/2018    Acute duodenal ulcer with bleeding 01/16/2023    Allergic sinusitis     last assessed - 03Apr2017    Anemia Around december    Anxiety     last assessed - 04Mar2016    Arthritis     Asthma     last assessed - 04Mar2016    Bilateral lower extremity edema     last assessed - [id number]    Callus of foot     last assessed - 22Jun2016    Chest pain     Chronic GERD     last assessed - 16Jan2017    Colon polyp     Constipation     Resolved - 13Jan2017    COPD (chronic obstructive pulmonary disease) (HCC)     Depression     last assessed - 04Mar2016    Disease of thyroid gland     Diverticulitis of colon     last assessed - 04Mar2016    Dyspepsia     Resolved - [id number]    Edema, lower extremity 08/07/2020    Esophageal reflux     last assessed - 04Mar2016    Essential hypertriglyceridemia     last assessed - [id number]    Fatty liver 01/16/2023    Gastroesophageal reflux disease with esophagitis 11/18/2016    GERD (gastroesophageal reflux disease)     Gynecological disorder     last assessed - 04Mar2016    Hydroureteronephrosis 06/30/2022    Hypertension     last assessed - 04Mar2016    Hypokalemia 06/20/2022    Hypotension     last assessed - [id number]    Kidney stone     Left ureteral stone with hydronephrosis 07/21/2022    Migraine     Morbid obesity (HCC) 01/11/2019    Formatting of this note might be different from the original. Added automatically  from request for surgery [id number]    Neuropathy     Obesity     Other chest pain 11/08/2018    Pancreatic lesion 03/13/2023    Positive depression screening 06/19/2022    Primary hypertriglyceridemia 06/19/2022    Pulmonary emphysema (HCC)     last assessed - 04Mar2016    Seasonal allergies     Severe obesity (HCC) 01/16/2023    Skin tag 07/17/2023    SOB (shortness of breath)     Strain of groin 07/17/2023    Urinary frequency     last assessed - 22Jun2016    Vagina, candidiasis     last assessed - 22Jun2016    Very heavy cigarette smoker 01/16/2023    Visual impairment      Past Surgical History  Past Surgical History:   Procedure Laterality Date    CARPAL TUNNEL RELEASE      last assessed - 04MAr2016    CHOLECYSTECTOMY      last assessed - 04Mar2016    COLONOSCOPY      Complete colonoscopy     EYE SURGERY      last assessed - 04Mar2016    FL RETROGRADE PYELOGRAM  6/16/2022    FL RETROGRADE PYELOGRAM  8/16/2022    FOOT SURGERY      last assessed - 04Mar2016    PA CYSTO BLADDER W/URETERAL CATHETERIZATION Left 7/21/2022    Procedure: CYSTOSCOPY RETROGRADE PYELOGRAM WITH INSERTION STENT URETERAL;  Surgeon: Facundo Matamoros MD;  Location: [facility];  Service: Urology    PA CYSTO/URETERO W/LITHOTRIPSY &INDWELL STENT INSRT Left 6/16/2022    Procedure: CYSTOSCOPY URETEROSCOPY WITH LITHOTRIPSY HOLMIUM LASER, RETROGRADE PYELOGRAM AND INSERTION STENT URETERAL;  Surgeon: Sammy Ferrer MD;  Location: [facility];  Service: Urology    PA CYSTO/URETERO W/LITHOTRIPSY &INDWELL STENT INSRT Left 8/16/2022    Procedure: CYSTOSCOPY USCOPE W/HOLMIUM LASER, RETROGRADE PYELOGRAM&STENT;  Surgeon: Sudheer Bradford MD;  Location: [facility];  Service: Urology    PA ESOPHAGOGASTRODUODENOSCOPY TRANSORAL DIAGNOSTIC N/A 2/13/2017    Procedure: ESOPHAGOGASTRODUODENOSCOPY (EGD);  Surgeon: Alison Saleem MD;  Location: [facility];  Service: Gastroenterology           06/04/24 0814   OT Last Visit   OT Visit Date 06/04/24   Note Type   Note type  "Evaluation   Additional Comments Pt seen w/ PT to increase safety, decrease fall risk, and maximize functional/occupational performance 2* medical complexity which is a regression from pt's functional/occupational baseline.   Pain Assessment   Pain Assessment Tool 0-10   Pain Score 8   Pain Location/Orientation Location: Back;Location: Generalized   Effect of Pain on Daily Activities Impacts ability to engage in valued occupations   Hospital Pain Intervention(s) Repositioned;Ambulation/increased activity;Emotional support   Restrictions/Precautions   Weight Bearing Precautions Per Order No   Other Precautions Multiple lines;Telemetry;Pain;O2;Fall Risk  (4L O2)   Home Living   Type of Home Other (Comment)  (RV camper with 4 ADAM)   Home Layout One level;Performs ADLs on one level;Able to live on main level with bedroom/bathroom;Access   Bathroom Shower/Tub Walk-in shower   Bathroom Toilet Standard   Bathroom Accessibility Accessible   Home Equipment Cane;Other (Comment)  (rollator)   Additional Comments Pt [facility]AM; reports that she has SPC and rollator however that she did not use PTA   Prior Function   Level of [place] Independent with ADLs;Independent with functional mobility;Needs assistance with IADLS   Lives With (S)  Alone   Receives Help From Friend(s)   IADLs Family/Friend/Other provides transportation;Family/Friend/Other provides meals;Family/Friend/Other provides medication management   Falls in the last 6 months 1 to 4   Vocational On disability   Comments (-) driving; friend assists with IADLs/grocery shopping   Lifestyle   Autonomy PTA, pt was independent in ADLs, has assistance for IADLs, and uses no DME PTA, but has access to rollator and SPC; (-) driving   Reciprocal Relationships Lives alone   Service to Others On disability   Intrinsic Gratification Reports enjoing playing bingo and games on her tablet   Subjective   Subjective \"I don't think I " "would be able to manage at home.\"   ADL   Where Assessed Edge of bed   Eating Assistance 5  Supervision/Setup   Grooming Assistance 5  Supervision/Setup   UB Bathing Assistance 4  Minimal Assistance   LB Bathing Assistance 4  Minimal Assistance   UB Dressing Assistance 4  Minimal Assistance   LB Dressing Assistance 4  Minimal Assistance   Toileting Assistance  4  Minimal Assistance   Functional Assistance 4  Minimal Assistance   Bed Mobility   Supine to Sit 5  Supervision   Additional items Increased time required   Sit to Supine 5  Supervision   Additional items Increased time required   Additional Comments At end of session, pt left sitting upright in recliner with all functional needs in reach   Transfers   Sit to Stand 4  Minimal assistance   Additional items Assist x 1;Increased time required;Verbal cues   Stand to Sit 4  Minimal assistance   Additional items Assist x 1;Increased time required;Verbal cues   Additional Comments w/ RW   Functional Mobility   Functional Mobility 4  Minimal assistance   Additional Comments Pt completed short household functional mobility distances within room with Min A x1 w/ RW for safety  and support with increased time; SpO2 flucuating in high 80s on 4 L O2   Additional items Rolling walker   Balance   Static Sitting Fair +   Dynamic Sitting Fair   Static Standing Fair -   Dynamic Standing Poor +   Ambulatory Poor +   Activity Tolerance   Activity Tolerance Patient limited by fatigue;Patient limited by pain   Medical Staff Made Aware PARESH Chavez   Nurse Made Aware RN cleared   RUE Assessment   RUE Assessment WFL   LUE Assessment   LUE Assessment WFL   Hand Function   Gross Motor Coordination Functional   Fine Motor Coordination Functional   Cognition   Overall Cognitive Status WFL   Arousal/Participation Alert;Responsive;Arousable;Cooperative   Attention Within functional limits   Orientation Level Oriented X4   Memory Within functional limits   Following Commands Follows one " step commands without difficulty   Comments Pt pleasant and cooperative   Assessment   Limitation Decreased ADL status;Decreased endurance;Decreased self-care trans;Decreased high-level ADLs   Prognosis Fair   Assessment Pt is a 54 yo female who presented to [facility] with near syncope event, generalized weakness, malaise, diarrhea, and nausea/vomiting in which pt dx w/ near syncope. Per chart review, pt recently hospitalized 5/21- 5/24 with pneumonia. Pt with active OT orders in which OT consulted to assess pt's functional status and occupational performance to determine safe d/c needs. Pt seen with PT to increase safety, decrease fall risk, and maximize functional/occupational performance 2* medical complexity which is a regression from pt's functional baseline. Pt lives alone in her friend's  camper with 4 ADAM. PTA, pt was independent in ADLs, has assistance for IADLs, and uses no DME PTA, but has rollator vs SPC. (-) driving. Currently, pt performing bed mobility w/ supervision, functional transfers/mobility w/ Min A x1 w/ RW, UB ADLs w/ Min A, and LB ADLs w/ Min A. Pt demonstrates the following limitations/impairments which impact the pt's ability to engage in valued occupations: balance, endurance/activity tolerance, standing tolerance, functional reach, postural/trunk control, strength, safety awareness, and pain. From an OT standpoint, recommend discharge to post-acute rehab once medically stable. The patient's raw score on the -PAC Daily Activity Inpatient Short Form is 19. A raw score of greater than or equal to 19 suggests the patient may benefit from discharge to post-acute rehabilitation services. Please refer to the recommendation of the Occupational Therapist for safe discharge planning.  Pt would benefit from skilled OT services 2-3x/wk to address acute care needs and underlying performance skills to promote safety, decrease fall risk, and enhance occupational performance to return to OF. Goals to be  met within the next 10-14 days.   Goals   Patient Goals To get stronger   LTG Time Frame 10-14   Long Term Goal #1 See OT goals listed below   Plan   Treatment Interventions ADL retraining;Functional transfer training;UE strengthening/ROM;Endurance training;Patient/family training;Equipment evaluation/education;Compensatory technique education;Continued evaluation;Energy conservation;Activityengagement   Goal Expiration Date 06/18/24   OT Frequency 2-3x/wk   Discharge Recommendation   Rehab Resource Intensity Level, OT II (Moderate Resource Intensity)   AM-PAC Daily Activity Inpatient   Lower Body Dressing 3   Bathing 3   Toileting 3   Upper Body Dressing 3   Grooming 3   Eating 4   Daily Activity Raw Score 19   Daily Activity Standardized Score (Calc for Raw Score >=11) 40.22   AM-PAC Applied Cognition Inpatient   Following a Speech/Presentation 4   Understanding Ordinary Conversation 4   Taking Medications 4   Remembering Where Things Are Placed or Put Away 4   Remembering List of 4-5 Errands 4   Taking Care of Complicated Tasks 4   Applied Cognition Raw Score 24   Applied Cognition Standardized Score 62.21   End of Consult   Education Provided Yes   Patient Position at End of Consult Supine;All needs within reach   Nurse Communication Nurse aware of consult     OT GOALS:    Pt will improve functional mobility during ADL/IADL/leisure tasks with Mod I using AE/DME prn.    Pt will improve activity tolerance/functional endurance during ADL/IADL/leisure tasks for at least 20 minutes to improve occupational performance and engagement in valued occupations using AE/DME prn.    Pt will engage in ongoing functional/formal cognitive assessments to assist with safe d/c planning and increase safety during functional tasks.    Pt will improve dynamic standing balance for at least 15 minutes with Mod I during functional tasks to decrease fall risk and improve independence and engagement in ADL/IADL/leisure activities.    Pt  will follow 100% of multi-step commands in ADL/IADL/leisure activities to improve functional cognition used in functional daily routines.    Pt will complete functional transfers on and off all surfaces used in daily routines with Mod I for safety to maximize functional/occupational performance.    Pt will complete all bed mobility tasks with Mod I to serve as a prerequisite for EOB/OOB ADL/IADL/leisure tasks, optimize positioning/comfort, and increase functional independence.    Pt will independently demonstrate good carryover of safety precautions and education/training during ADL/IADL/leisure tasks with energy conservation techniques s/p skilled instruction without verbal cues.    Pt will complete UB ADL tasks with Mod I using AE/DME prn to increase functional independence in ADL/IADL/leisure tasks.    Pt will complete LB ADL tasks with Mod I using AE/DME prn to increase functional independence in ADL/IADL/leisure tasks.     Pt will complete toileting tasks with Mod I and good hygiene/thoroughness using AE/DME prn to increase functional independence.    Pt will independently identify and utilize 2-3 positive coping strategies to enhance overall wellbeing and engagement in valued occupations.      Angeles Abernathy, MS, OTR/L

## 2024-06-04 NOTE — ED NOTES
Pt requesting medications for nausea and pain at this time, provider made aware       Bella Ruvalcaba RN  06/03/24 [id number]

## 2024-06-04 NOTE — CASE MANAGEMENT
Case Management Assessment & Discharge Planning Note    Patient name Chely Ruvalcaba  Location /-01 MRN [id number]  : 1970 Date 2024       Current Admission Date: 6/3/2024  Current Admission Diagnosis:Near syncope   Patient Active Problem List    Diagnosis Date Noted Date Diagnosed    Near syncope 2024     Chronic low back pain 2024     Homelessness 2024     Diverticulosis 2024     Snoring 2024     Excessive daytime sleepiness 2024     RUTHANN (obstructive sleep apnea) 2024     Ambulatory dysfunction 2024     Diverticulitis 2024     Stage 2 chronic kidney disease 05/15/2024     Chronic bilateral low back pain 05/15/2024     Diastolic CHF (HCC) 2024     Thrombocytopenia (HCC) 2024     Dyspnea 2024     Pulmonary nodules 2024     Enlarged lymph nodes, unspecified 2024     Bilateral pneumonia 2024     Acute diastolic congestive heart failure (HCC) 2024     Folic acid deficiency 2024     Acute respiratory failure with hypoxia (HCC) 2024     Injury of toe on left foot 2023     Cirrhosis (HCC) 2023     History of GI bleed 2023     History of colon polyps 2023     Duodenal ulcer 2023     Irritable bowel syndrome with both constipation and diarrhea 2023     Abnormal stress ECG with treadmill 2023     Iron deficiency anemia 2022     Hypokalemia 2022     Gastroesophageal reflux disease 2022     Nephrolithiasis 2022     Centrilobular emphysema (HCC) 2022     COVID-19 vaccination refused 2022     Type 2 diabetes mellitus (HCC) 2021     Edema, lower extremity 2020     Gastroparesis 2018     Hyperlipidemia 2018     Intermittent claudication (HCC) 2017     Cigarette nicotine dependence in remission 2017     Vitamin D deficiency 2016     Anxiety 2016     Benign hypertension  03/04/2016     Depression 03/04/2016     Hypothyroidism 03/04/2016     Seasonal allergic rhinitis 03/04/2016     Spinal stenosis of lumbar region 03/28/2012     Degeneration of lumbar or lumbosacral intervertebral disc 06/08/2010       LOS (days): 0  Geometric Mean LOS (GMLOS) (days):   Days to GMLOS:     OBJECTIVE:  PATIENT READMITTED TO HOSPITAL  Risk of Unplanned Readmission Score: 27.06         Current admission status: Inpatient       Preferred Pharmacy:   [id number] - [place], [place] - [address]  [address] [address]  Phone: [phone] Fax: [phone]    [facility] - [place], [place] - [address]  [address] [address]  Phone: [phone] Fax: [phone]    [facility] - [place], [place] - [address]  [address] [address]  Phone: [phone] Fax: [phone]    [facility] [place], [place] - [address]  [address]  [address]  [place] [address]  Phone: [phone] Fax: [phone]    Primary Care Provider: Andi Mason DO    Primary Insurance: [facility]  Secondary Insurance:     ASSESSMENT:  Active Health Care Proxies       Anuradha Keller Health Care Representative - [facility] Phone: [phone] (Mobile)                 Advance Directives  Does patient have a Health Care POA?: No  Was patient offered paperwork?: Yes (Declined)  Does patient have Advance Directives?: No  Was patient offered paperwork?: Yes (Declined)         Readmission Root Cause  30 Day Readmission: Yes  Who directed you to return to the hospital?: Self  Did you understand whom to contact if you had questions or problems?: Yes  Did you take your medications as prescribed?: No  Were you able to get to your follow-up appointments?: Yes  During previous admission, was a post-acute recommendation made?: Yes  What post-acute resources were offered?: [facility], Offered, but  declined  Patient was readmitted due to: Patient presented with worsening dyspnea and near syncope.    Patient Information  Admitted from:: Other (comment) (Living in friends RV)  Mental Status: Alert  During Assessment patient was accompanied by: Not accompanied during assessment  Assessment information provided by:: Patient  Support Systems: Self  Home entry access options. Select all that apply.: Stairs  Number of steps to enter home.: 4  Do the steps have railings?: Yes  Type of Current Residence: Other (Comment) (living in friends RV)    Activities of Daily Living Prior to Admission  Functional Status: Independent  Completes ADLs independently?: Yes  Ambulates independently?: Yes  Does patient use assisted devices?: Yes  Assisted Devices (DME) used: Walker, Straight Cane  Does patient have a history of Outpatient Therapy (PT/OT)?: No  Does the patient have a history of Short-Term Rehab?: No  Does patient have a history of HHC?: Yes  Does patient currently have HHC?: No         Patient Information Continued  Income Source: SSI/SSD  Does patient receive dialysis treatments?: No  Does patient have a history of substance abuse?: No  Does patient have a history of Mental Health Diagnosis?: Yes (Depression)  Is patient receiving treatment for mental health?: Yes  Has patient received inpatient treatment related to mental health in the last 2 years?: No                Social Determinants of Health (SDOH)      Flowsheet Row Most Recent Value   Housing Stability    In the last 12 months, was there a time when you were not able to pay the mortgage or rent on time? Y   At any time in the past 12 months, were you homeless or living in a shelter (including now)? Y   Transportation Needs    In the past 12 months, has lack of transportation kept you from medical appointments or from getting medications? no   In the past 12 months, has lack of transportation kept you from meetings, work, or from getting things needed for daily  living? No   Food Insecurity    Within the past 12 months, you worried that your food would run out before you got the money to buy more. Never true   Within the past 12 months, the food you bought just didn't last and you didn't have money to get more. Never true            DISCHARGE DETAILS:    Discharge planning discussed with:: Patient  Freedom of Choice: Yes     CM contacted family/caregiver?: No- see comments (Patient alert and oriented)         Other Referral/Resources/Interventions Provided:  Interventions: Shelter  Referral Comments: Shelter and room rentals resources provided to patient.       Additional Comments: CM met with patient at bedside and introduced self and explained role. Patient reports that she is living in her friend's RV. Patient reports that she has transportation and food. Patient reports  having a cane and rollator. Patient reports having difficulty with showering due to not being able to stand long.

## 2024-06-04 NOTE — ASSESSMENT & PLAN NOTE
Lab Results   Component Value Date    HGBA1C 7.8 (H) 05/15/2024     Recent Labs     05/23/24  1656 05/23/24  2056 05/24/24  0819 05/24/24  1137   POCGLU 175* 140 173* 193*       Blood Sugar Average: Last 72 hrs:  (P) [id number]    Patient states she has not been taking insulin at home because she does not have a way to check her blood sugar.    Plan:  SSI  Continue monitoring glucose trends

## 2024-06-05 LAB
ANION GAP SERPL CALCULATED.3IONS-SCNC: 7 MMOL/L (ref 4–13)
ATRIAL RATE: 99 BPM
BUN SERPL-MCNC: 8 MG/DL (ref 5–25)
CALCIUM SERPL-MCNC: 8.5 MG/DL (ref 8.4–10.2)
CHLORIDE SERPL-SCNC: 110 MMOL/L (ref 96–108)
CO2 SERPL-SCNC: 23 MMOL/L (ref 21–32)
CREAT SERPL-MCNC: 0.86 MG/DL (ref 0.6–1.3)
ERYTHROCYTE [DISTWIDTH] IN BLOOD BY AUTOMATED COUNT: 18.3 % (ref 11.6–15.1)
GFR SERPL CREATININE-BSD FRML MDRD: 77 ML/MIN/1.73SQ M
GLUCOSE SERPL-MCNC: 133 MG/DL (ref 65–140)
HCT VFR BLD AUTO: 31.7 % (ref 34.8–46.1)
HGB BLD-MCNC: 9.7 G/DL (ref 11.5–15.4)
MAGNESIUM SERPL-MCNC: 2 MG/DL (ref 1.9–2.7)
MCH RBC QN AUTO: 30.1 PG (ref 26.8–34.3)
MCHC RBC AUTO-ENTMCNC: 30.6 G/DL (ref 31.4–37.4)
MCV RBC AUTO: 98 FL (ref 82–98)
P AXIS: 63 DEGREES
PLATELET # BLD AUTO: 88 THOUSANDS/UL (ref 149–390)
PMV BLD AUTO: 11.8 FL (ref 8.9–12.7)
POTASSIUM SERPL-SCNC: 3.4 MMOL/L (ref 3.5–5.3)
PR INTERVAL: 172 MS
QRS AXIS: 72 DEGREES
QRSD INTERVAL: 96 MS
QT INTERVAL: 432 MS
QTC INTERVAL: 554 MS
RBC # BLD AUTO: 3.22 MILLION/UL (ref 3.81–5.12)
SODIUM SERPL-SCNC: 140 MMOL/L (ref 135–147)
T WAVE AXIS: 28 DEGREES
VENTRICULAR RATE: 99 BPM
WBC # BLD AUTO: 5.13 THOUSAND/UL (ref 4.31–10.16)

## 2024-06-05 PROCEDURE — 85027 COMPLETE CBC AUTOMATED: CPT

## 2024-06-05 PROCEDURE — 94640 AIRWAY INHALATION TREATMENT: CPT

## 2024-06-05 PROCEDURE — 93010 ELECTROCARDIOGRAM REPORT: CPT | Performed by: INTERNAL MEDICINE

## 2024-06-05 PROCEDURE — 94760 N-INVAS EAR/PLS OXIMETRY 1: CPT

## 2024-06-05 PROCEDURE — 93005 ELECTROCARDIOGRAM TRACING: CPT

## 2024-06-05 PROCEDURE — 83735 ASSAY OF MAGNESIUM: CPT

## 2024-06-05 PROCEDURE — 94664 DEMO&/EVAL PT USE INHALER: CPT

## 2024-06-05 PROCEDURE — 99232 SBSQ HOSP IP/OBS MODERATE 35: CPT | Performed by: INTERNAL MEDICINE

## 2024-06-05 PROCEDURE — 80048 BASIC METABOLIC PNL TOTAL CA: CPT

## 2024-06-05 RX ORDER — ECHINACEA PURPUREA EXTRACT 125 MG
1 TABLET ORAL
Status: DISCONTINUED | OUTPATIENT
Start: 2024-06-05 | End: 2024-06-06 | Stop reason: HOSPADM

## 2024-06-05 RX ORDER — POTASSIUM CHLORIDE 20 MEQ/1
40 TABLET, EXTENDED RELEASE ORAL ONCE
Status: COMPLETED | OUTPATIENT
Start: 2024-06-05 | End: 2024-06-05

## 2024-06-05 RX ORDER — SODIUM CHLORIDE, SODIUM GLUCONATE, SODIUM ACETATE, POTASSIUM CHLORIDE, MAGNESIUM CHLORIDE, SODIUM PHOSPHATE, DIBASIC, AND POTASSIUM PHOSPHATE .53; .5; .37; .037; .03; .012; .00082 G/100ML; G/100ML; G/100ML; G/100ML; G/100ML; G/100ML; G/100ML
500 INJECTION, SOLUTION INTRAVENOUS ONCE
Status: COMPLETED | OUTPATIENT
Start: 2024-06-05 | End: 2024-06-05

## 2024-06-05 RX ADMIN — LEVOTHYROXINE SODIUM 112 MCG: 112 TABLET ORAL at 06:12

## 2024-06-05 RX ADMIN — DIAZEPAM 5 MG: 5 TABLET ORAL at 21:49

## 2024-06-05 RX ADMIN — FAMOTIDINE 40 MG: 20 TABLET, FILM COATED ORAL at 21:47

## 2024-06-05 RX ADMIN — OXYCODONE HYDROCHLORIDE AND ACETAMINOPHEN 1 TABLET: 5; 325 TABLET ORAL at 14:50

## 2024-06-05 RX ADMIN — IPRATROPIUM BROMIDE 0.5 MG: 0.5 SOLUTION RESPIRATORY (INHALATION) at 13:51

## 2024-06-05 RX ADMIN — SODIUM CHLORIDE, SODIUM GLUCONATE, SODIUM ACETATE, POTASSIUM CHLORIDE, MAGNESIUM CHLORIDE, SODIUM PHOSPHATE, DIBASIC, AND POTASSIUM PHOSPHATE 500 ML: .53; .5; .37; .037; .03; .012; .00082 INJECTION, SOLUTION INTRAVENOUS at 10:46

## 2024-06-05 RX ADMIN — SUCRALFATE 1 G: 1 TABLET ORAL at 21:47

## 2024-06-05 RX ADMIN — SENNOSIDES 8.6 MG: 8.6 TABLET, FILM COATED ORAL at 07:44

## 2024-06-05 RX ADMIN — PANTOPRAZOLE SODIUM 40 MG: 40 TABLET, DELAYED RELEASE ORAL at 16:13

## 2024-06-05 RX ADMIN — LEVALBUTEROL HYDROCHLORIDE 1.25 MG: 1.25 SOLUTION RESPIRATORY (INHALATION) at 09:16

## 2024-06-05 RX ADMIN — BUPROPION HYDROCHLORIDE 300 MG: 150 TABLET, EXTENDED RELEASE ORAL at 07:44

## 2024-06-05 RX ADMIN — BUDESONIDE 0.5 MG: 0.5 INHALANT ORAL at 20:38

## 2024-06-05 RX ADMIN — FOLIC ACID 1 MG: 1 TABLET ORAL at 07:44

## 2024-06-05 RX ADMIN — OXYCODONE HYDROCHLORIDE AND ACETAMINOPHEN 1 TABLET: 5; 325 TABLET ORAL at 07:45

## 2024-06-05 RX ADMIN — LEVALBUTEROL HYDROCHLORIDE 1.25 MG: 1.25 SOLUTION RESPIRATORY (INHALATION) at 13:51

## 2024-06-05 RX ADMIN — NICOTINE 1 PATCH: 21 PATCH, EXTENDED RELEASE TRANSDERMAL at 01:59

## 2024-06-05 RX ADMIN — LEVALBUTEROL HYDROCHLORIDE 1.25 MG: 1.25 SOLUTION RESPIRATORY (INHALATION) at 20:38

## 2024-06-05 RX ADMIN — OXYCODONE HYDROCHLORIDE AND ACETAMINOPHEN 1 TABLET: 5; 325 TABLET ORAL at 21:49

## 2024-06-05 RX ADMIN — PRAVASTATIN SODIUM 40 MG: 40 TABLET ORAL at 16:13

## 2024-06-05 RX ADMIN — ENOXAPARIN SODIUM 40 MG: 40 INJECTION SUBCUTANEOUS at 07:43

## 2024-06-05 RX ADMIN — GABAPENTIN 100 MG: 100 CAPSULE ORAL at 07:44

## 2024-06-05 RX ADMIN — POTASSIUM CHLORIDE 40 MEQ: 1500 TABLET, EXTENDED RELEASE ORAL at 09:09

## 2024-06-05 RX ADMIN — PANTOPRAZOLE SODIUM 40 MG: 40 TABLET, DELAYED RELEASE ORAL at 06:12

## 2024-06-05 RX ADMIN — SERTRALINE HYDROCHLORIDE 100 MG: 100 TABLET ORAL at 16:13

## 2024-06-05 RX ADMIN — IPRATROPIUM BROMIDE 0.5 MG: 0.5 SOLUTION RESPIRATORY (INHALATION) at 09:15

## 2024-06-05 RX ADMIN — IPRATROPIUM BROMIDE 0.5 MG: 0.5 SOLUTION RESPIRATORY (INHALATION) at 20:38

## 2024-06-05 RX ADMIN — SERTRALINE HYDROCHLORIDE 100 MG: 100 TABLET ORAL at 07:44

## 2024-06-05 RX ADMIN — BUDESONIDE 0.5 MG: 0.5 INHALANT ORAL at 09:15

## 2024-06-05 RX ADMIN — Medication 1 SPRAY: at 14:50

## 2024-06-05 RX ADMIN — FERROUS SULFATE TAB 325 MG (65 MG ELEMENTAL FE) 325 MG: 325 (65 FE) TAB at 07:44

## 2024-06-05 NOTE — PLAN OF CARE
Problem: PAIN - ADULT  Goal: Verbalizes/displays adequate comfort level or baseline comfort level  Description: Interventions:  - Encourage patient to monitor pain and request assistance  - Assess pain using appropriate pain scale  - Administer analgesics based on type and severity of pain and evaluate response  - Implement non-pharmacological measures as appropriate and evaluate response  - Consider cultural and social influences on pain and pain management  - Notify physician/advanced practitioner if interventions unsuccessful or patient reports new pain  Outcome: Progressing     Problem: INFECTION - ADULT  Goal: Absence or prevention of progression during hospitalization  Description: INTERVENTIONS:  - Assess and monitor for signs and symptoms of infection  - Monitor lab/diagnostic results  - Monitor all insertion sites, i.e. indwelling lines, tubes, and drains  - Monitor endotracheal if appropriate and nasal secretions for changes in amount and color  - [place] appropriate cooling/warming therapies per order  - Administer medications as ordered  - Instruct and encourage patient and family to use good hand hygiene technique  - Identify and instruct in appropriate isolation precautions for identified infection/condition  Outcome: Progressing  Goal: Absence of fever/infection during neutropenic period  Description: INTERVENTIONS:  - Monitor WBC    Outcome: Progressing     Problem: SAFETY ADULT  Goal: Patient will remain free of falls  Description: INTERVENTIONS:  - Educate patient/family on patient safety including physical limitations  - Instruct patient to call for assistance with activity   - Consult OT/PT to assist with strengthening/mobility   - Keep Call bell within reach  - Keep bed low and locked with side rails adjusted as appropriate  - Keep care items and personal belongings within reach  - Initiate and maintain comfort rounds  - Make Fall Risk Sign visible to staff  - Offer Toileting every 3 Hours,  in advance of need  - Initiate/Maintain 3alarm  - Obtain necessary fall risk management equipment: 3  - Apply yellow socks and bracelet for high fall risk patients  - Consider moving patient to room near nurses station  Outcome: Progressing  Goal: Maintain or return to baseline ADL function  Description: INTERVENTIONS:  -  Assess patient's ability to carry out ADLs; assess patient's baseline for ADL function and identify physical deficits which impact ability to perform ADLs (bathing, care of mouth/teeth, toileting, grooming, dressing, etc.)  - Assess/evaluate cause of self-care deficits   - Assess range of motion  - Assess patient's mobility; develop plan if impaired  - Assess patient's need for assistive devices and provide as appropriate  - Encourage maximum independence but intervene and supervise when necessary  - Involve family in performance of ADLs  - Assess for home care needs following discharge   - Consider OT consult to assist with ADL evaluation and planning for discharge  - Provide patient education as appropriate  Outcome: Progressing  Goal: Maintains/Returns to pre admission functional level  Description: INTERVENTIONS:  - Perform AM-PAC 6 Click Basic Mobility/ Daily Activity assessment daily.  - Set and communicate daily mobility goal to care team and patient/family/caregiver.   - Collaborate with rehabilitation services on mobility goals if consulted  - Perform Range of Motion 3 times a day.  - Reposition patient every 3 hours.  - Dangle patient 3 times a day  - Stand patient 3 times a day  - Ambulate patient 3 times a day  - Out of bed to chair 3 times a day   - Out of bed for meals 3 times a day  - Out of bed for toileting  - Record patient progress and toleration of activity level   Outcome: Progressing     Problem: DISCHARGE PLANNING  Goal: Discharge to home or other facility with appropriate resources  Description: INTERVENTIONS:  - Identify barriers to discharge w/patient and caregiver  -  Arrange for needed discharge resources and transportation as appropriate  - Identify discharge learning needs (meds, wound care, etc.)  - Arrange for interpretive services to assist at discharge as needed  - Refer to Case Management Department for coordinating discharge planning if the patient needs post-hospital services based on physician/advanced practitioner order or complex needs related to functional status, cognitive ability, or social support system  Outcome: Progressing     Problem: Knowledge Deficit  Goal: Patient/family/caregiver demonstrates understanding of disease process, treatment plan, medications, and discharge instructions  Description: Complete learning assessment and assess knowledge base.  Interventions:  - Provide teaching at level of understanding  - Provide teaching via preferred learning methods  Outcome: Progressing

## 2024-06-05 NOTE — DISCHARGE INSTR - AVS FIRST PAGE
Follow up with PCP within 1 week of discharge, your aldactone and lisinopril are going to continue to be held while your BP are on lower side, discuss this with PCP     Follow up with GI for non-emergent colonoscopy

## 2024-06-05 NOTE — DISCHARGE SUMMARY
INTERNAL MEDICINE RESIDENCY DISCHARGE SUMMARY     Chely Ruvalcaba   53 y.o. female  MRN: [id number]  Room/Bed: /-01     [facility] BE MED SURG 7   Encounter: [id number]    Principal Problem:    Near syncope  Active Problems:    Hyperlipidemia    Hypothyroidism    Type 2 diabetes mellitus (HCC)    Centrilobular emphysema (HCC)    Gastroesophageal reflux disease    Hypokalemia    Cirrhosis (HCC)    Diastolic CHF (HCC)    Thrombocytopenia (HCC)    Chronic low back pain    Homelessness    Diverticulosis      * Near syncope  Assessment & Plan  Patient reports episode of near-syncope day of arrival with lightheadedness upon standing.  Recent hospitalization and abx use.  On presentation, BP 90s/50s. When patient stood up and assisted to bathroom, BP dropped to 70s systolics and had near-syncopal episode. K 2.7. Chloride 110. WBC 8. UA negative.  CT a/p showed colonic diverticulosis with residual bowel wall thickening of the sigmoid colon and minimal adjacent fluid in the left paracolic gutter, improved from 5/15/2024 in the area of previously identified diverticulitis as well as hepatic cirrhosis.  Suspect orthostatic hypotension in setting of enteritis.    Plan:  Orthostatic pressures ordered  Continue to monitor pressures  Trend WBC and fever curves  Holding antihypertensives at this time, continue to hold outpatient until BP normalizes or PCP decides to restart   Add fluids as needed    Diverticulosis  Assessment & Plan  CT a/p showed colonic diverticulosis with residual bowel wall thickening of the sigmoid colon and minimal adjacent fluid in the left paracolic gutter, improved from 5/15/2024 in the area of previously identified diverticulitis as well as hepatic cirrhosis.    Plan:  Recommend nonemergent outpatient colonoscopy to exclude underlying mass lesion mimicking diverticulitis.    Homelessness  Assessment & Plan  Home insecurity. Recently lost previous  "housing. Now temporarily staying at friend's [facility].    Plan:  Case management consulted    Chronic low back pain  Assessment & Plan  Pt endorses having chronic lower back pain.     Plan:  Oxy 5mg for moderate pain, Oxy 10mg for severe pain    Thrombocytopenia (HCC)  Assessment & Plan  On admission patient noted to have platelet count of 120. During her recent admission values ranging from . Of note is her history of cirrhosis with hepatosplenomegaly.     Platelets today 98 from 92. Hgb 9.7 today from 10.64 Smear review found borderline for platelet estimate.    Plan:  Monitor CBC  Follow up on Fe, B12, folate labs   Started on PO Iron and Folic acid     Diastolic CHF (HCC)  Assessment & Plan  Wt Readings from Last 3 Encounters:   05/21/24 117 kg (257 lb 15 oz)   05/17/24 116 kg (256 lb 9.6 oz)   05/07/24 112 kg (248 lb)     Patient noted to have TTE 08/23: displayed EF 60%; mild L. Ventricular thickness, mild mitral and tricuspid regurgitation.    Plan:  Holding lasix given hypotension  Daily weights  Monitor I/Os    Cirrhosis (HCC)  Assessment & Plan  MRI 10/23: displaying hepatic cirrhosis with fatty deposition and splenomegaly which may indicate portal hypertension. No ascites, no leg swelling.    Per GI note on 5/6 \"abnormal ultrasound elastography revealing F4 fibrosis, nodular liver on imaging and thrombocytopenia. Likely etiology for cirrhosis is metabolic associated steatotic liver disease. Her MELD 3.0 based upon labs from March/April is 11\"     Labs from 2023 showed normal anti-smooth muscle antibody, ceruloplasmin, antimicrosomal antibody, Protime-INR,folate, B12, AFP. Labs also showed elevated alpha-1 antitrypsin and IgM. GI noted \"Blood test for rare causes of liver disease are negative thus far\"     CT abdomen/pelvis 5/15 showed splenomegaly and portal hypertension supporting cirrhosis. CT AP PE 5/21 also showed cirrhosis with mildly enlarged spleen. LFTs showed AST of 22, ALT of 18, ALP of " 64 with 1.18 Tbili.    MELD 3.0: 12 at 5/23/2024  5:06 AM  MELD-Na: 11 at 5/23/2024  5:06 AM  Calculated from:  Serum Creatinine: 0.94 mg/dL (Using min of 1 mg/dL) at 5/23/2024  5:06 AM  Serum Sodium: 141 mmol/L (Using max of 137 mmol/L) at 5/23/2024  5:06 AM  Total Bilirubin: 1.59 mg/dL at 5/21/2024 12:24 PM  Serum Albumin: 3.7 g/dL (Using max of 3.5 g/dL) at 5/21/2024 12:24 PM  INR(ratio): 1.31 at 5/21/2024 12:24 PM  Age at listing (hypothetical): 53 years  Sex: Female at 5/23/2024  5:06 AM    Plan:  Continue to f/u outpatient with GI  No acute management while inpatient. Continue lasix to optimize fluid balance  Patient reports she is unsure if she takes spironolactone. Will not order given hypotension    Hypokalemia  Assessment & Plan  K 2.7 on admission. Now 2.5    Plan:  Continue to monitor electrolytes and replete as needed    Gastroesophageal reflux disease  Assessment & Plan  Patient reports intermittent heartburn and follows with outpatient GI.    Plan:  Cont Pepcid 40mg  Cont. Omeprazole 40mg  FU with GI outpatient    Centrilobular emphysema (HCC)  Assessment & Plan  PFTs done in 2/14/17 found FEV1/FVC of 76% and DLCO of 55%. Per report: Isolated reduction in diffusion capacity may be associated with early interstitial lung disease, pulmonary hypertension or diastolic HF.   Bilateral ground glass opacities on chest PE study per report: Smoking-related interstitial lung disease can have this appearance but progression over the course of 6 days is atypical. Patient does have significant smoking history at least 20 pack years, though recently quit in March. Sleep study recently of poor quality but did note no RUTHANN. She continues to note shortness of breath, currently on 3L NC.    Plan:  Xopenex/Atrovent nebs TID  Repeat PFTs outpatient    Type 2 diabetes mellitus (HCC)  Assessment & Plan  Lab Results   Component Value Date    HGBA1C 7.8 (H) 05/15/2024     Recent Labs     05/23/24 1656 05/23/24 2056  05/24/24  0819 05/24/24  1137   POCGLU 175* 140 173* 193*       Blood Sugar Average: Last 72 hrs:  (P) [id number]    Patient states she has not been taking insulin at home because she does not have a way to check her blood sugar.    Plan:  SSI  Continue monitoring glucose trends    Hypothyroidism  Assessment & Plan  On levothyroxine 112 mcg, TSH returned 0.6.    Hyperlipidemia  Assessment & Plan  On pravistatin 40 q24h        DETAILS OF HOSPITAL COURSE     53-year-old female with a PMHx of diastolic CHF, cirrhosis, obesity, T2DM, HLD, emphysema, hypothyroidism, thrombocytopenia, chronic low back pain, and GERD who presents due to an episode of near-syncope and generalized malaise. Patient reports she felt lightheaded upon standing. Over the past few days, she reports multiple episodes of watery, malodorous diarrhea and nausea. Of note, pt recently admitted to hospital for severe sepsis secondary to pneumonia (5/21 - 5/24); was treated with IV ceftriaxone, and discharged on Augmentin and azithromycin. Denies emesis, fevers/chills, chest pains, palpitations, dyspnea, abdominal pain, dysuria, hematochezia, LOC, head strike, or any other symptoms.     On presentation, BP 90s/50s. When patient stood up and assisted to bathroom, BP dropped to 70s systolics and had near-syncopal episode. K 2.7. Chloride 110. WBC 8. UA negative. CT a/p showed colonic diverticulosis with residual bowel wall thickening of the sigmoid colon and minimal adjacent fluid in the left paracolic gutter, improved from 5/15/2024 in the area of previously identified diverticulitis as well as hepatic cirrhosis.    On floor pt had occasional episodes of tachycardia and low BP which was responsive to IVF. Pt aldactone and lisinopril held for her soft Bps. Pt remained on BL oxygen throughout stay. Electrolytes were repleted as needed. Pt initialy on Cdiff precautions but did not have a bowel movement for over 2 days so she was removed from  precautions. Pt nausea resolved. Pt able to work with physical therapy throughout stay and was not lightheaded or dizzy during these sessions. She was recommended by PT to go for short term rehab. Pt accepted to facility. Pt medically stable for discharge. Pt verbalized agreement and understanding of plan.        Physical Exam  Vitals and nursing note reviewed.   Constitutional:       General: She is not in acute distress.     Appearance: She is well-developed. She is obese. She is not ill-appearing, toxic-appearing or diaphoretic.   HENT:      Head: Normocephalic and atraumatic.   Eyes:      Conjunctiva/sclera: Conjunctivae normal.   Cardiovascular:      Rate and Rhythm: Normal rate and regular rhythm.      Heart sounds: No murmur heard.  Pulmonary:      Effort: Pulmonary effort is normal. No respiratory distress.      Breath sounds: Normal breath sounds.   Abdominal:      Palpations: Abdomen is soft.      Tenderness: There is no abdominal tenderness. There is no right CVA tenderness, left CVA tenderness, guarding or rebound.   Musculoskeletal:         General: No swelling or tenderness.      Cervical back: Neck supple.      Right lower leg: No edema.      Left lower leg: No edema.   Skin:     General: Skin is warm and dry.      Capillary Refill: Capillary refill takes less than 2 seconds.   Neurological:      General: No focal deficit present.      Mental Status: She is alert and oriented to person, place, and time.   Psychiatric:         Mood and Affect: Mood normal.         DISCHARGE INFORMATION     PCP at Discharge: Dr. Mason    Admitting Provider: Alexis Umaña MD  Admission Date: 6/3/2024    Discharge Provider: Alexis Umaña*  Discharge Date: 6/6/2024    Discharge Disposition: Home/Self Care  Discharge Condition: stable  Discharge with Lines: no        Discharge Diagnoses:  Principal Problem:    Near syncope  Active Problems:    Hyperlipidemia    Hypothyroidism    Type 2 diabetes  mellitus (HCC)    Centrilobular emphysema (HCC)    Gastroesophageal reflux disease    Hypokalemia    Cirrhosis (HCC)    Diastolic CHF (HCC)    Thrombocytopenia (HCC)    Chronic low back pain    Homelessness    Diverticulosis  Resolved Problems:    * No resolved hospital problems. *      Consulting Providers:      Diagnostic & Therapeutic Procedures Performed:  XR chest 2 views    Result Date: 6/4/2024  Impression: Mild diffuse interstitial thickening. No focal consolidation or pleural effusion. Workstation performed: [id number]     CT abdomen pelvis with contrast    Result Date: 6/3/2024  Impression: Colonic diverticulosis with residual bowel wall thickening of the sigmoid colon and minimal adjacent fluid in the left paracolic gutter, improved from 5/15/2024 in the area of previously identified diverticulitis. Nonemergent colonoscopy is recommended to exclude underlying mass lesion mimicking diverticulitis. Partially imaged groundglass opacities at the lung bases, improved from prior. Findings again suspicious for hepatic cirrhosis. See full report for additional findings. Workstation performed: [id number]       Code Status: Level 1 - Full Code  Advance Directive & Living Will: <no information>  Power of :    POLST:      Medications:  Current Discharge Medication List        Current Discharge Medication List        Current Discharge Medication List        CONTINUE these medications which have NOT CHANGED    Details   albuterol (PROVENTIL HFA,VENTOLIN HFA) 90 mcg/act inhaler Inhale 2 puffs every 6 (six) hours as needed for wheezing  Qty: 18 g, Refills: 1    Comments: Substitution to a formulary equivalent within the same pharmaceutical class is authorized.  Associated Diagnoses: Viral URI with cough      budesonide (PULMICORT) 0.5 mg/2 mL nebulizer solution Take 0.5 mg by nebulization 2 (two) times a day Rinse mouth after use.      buPROPion (WELLBUTRIN XL) 300 mg 24 hr tablet Take 1 tablet (300 mg total) by  mouth every morning  Qty: 90 tablet, Refills: 3    Associated Diagnoses: Recurrent major depressive disorder, in partial remission (HCC); Recurrent major depressive disorder, remission status unspecified (HCC)      diazepam (VALIUM) 5 mg tablet Take 1 tablet (5 mg total) by mouth daily at bedtime as needed for anxiety  Qty: 30 tablet, Refills: 0    Associated Diagnoses: Anxiety      ergocalciferol (VITAMIN D2) 50,000 units Take 1 capsule (50,000 Units total) by mouth every 14 (fourteen) days  Qty: 12 capsule, Refills: 1    Associated Diagnoses: Vitamin D deficiency      famotidine (PEPCID) 40 MG tablet Take 1 tablet (40 mg total) by mouth daily at bedtime Take in evening 2 hours prior to bed  Qty: 90 tablet, Refills: 1    Associated Diagnoses: Duodenal ulcer      ferrous sulfate 324 (65 Fe) mg Take 1 tablet (324 mg total) by mouth daily before breakfast  Qty: 30 tablet, Refills: 3    Associated Diagnoses: Iron deficiency anemia secondary to inadequate dietary iron intake; Edema, lower extremity      folic acid (FOLVITE) 1 mg tablet Take 1 tablet (1 mg total) by mouth daily  Qty: 90 tablet, Refills: 1    Associated Diagnoses: Folic acid deficiency      furosemide (LASIX) 40 mg tablet Take 1 tablet (40 mg total) by mouth daily  Qty: 180 tablet, Refills: 1    Associated Diagnoses: Acute diastolic congestive heart failure (HCC)      gabapentin (NEURONTIN) 100 mg capsule Take 100 mg by mouth every 8 (eight) hours  Refills: 0      ipratropium-albuterol (DUO-NEB) 0.5-2.5 mg/3 mL nebulizer solution Take 3 mL by nebulization 4 (four) times a day  Qty: 360 mL, Refills: 3    Associated Diagnoses: Centrilobular emphysema (HCC)      lactulose (CHRONULAC) 10 g/15 mL solution Take 20 g by mouth Daily or as needed for bowl movement      levothyroxine 112 mcg tablet Take 1 tablet (112 mcg total) by mouth daily  Qty: 90 tablet, Refills: 1    Associated Diagnoses: Acquired hypothyroidism      lisinopril (ZESTRIL) 5 mg tablet Take 1  tablet (5 mg total) by mouth daily  Qty: 30 tablet, Refills: 0    Associated Diagnoses: HTN (hypertension)      nicotine (NICODERM CQ) 21 mg/24 hr TD 24 hr patch Place 1 patch on the skin over 24 hours every 24 hours  Qty: 28 patch, Refills: 5    Associated Diagnoses: Tobacco abuse disorder      omeprazole (PriLOSEC) 40 MG capsule take 1 capsule by mouth twice a day  Qty: 60 capsule, Refills: 5    Associated Diagnoses: Gastric ulcer with hemorrhage, unspecified chronicity      ondansetron (ZOFRAN) 4 mg tablet Take 1 tablet (4 mg total) by mouth every 8 (eight) hours as needed for nausea or vomiting  Qty: 20 tablet, Refills: 0    Associated Diagnoses: Nausea      oxyCODONE-acetaminophen (PERCOCET)  mg per tablet Take 1 tablet by mouth every 6 (six) hours as needed for severe pain      oxygen gas Inhale 2 L/min continuous. May use 3L with exertion per Bella Goodman  CRNP  Keep sat >88%  Indications: low saturation    Comments: patient adjusts to 3L as needed for SOB      sertraline (ZOLOFT) 100 mg tablet take 1 tablet by mouth twice a day  Qty: 180 tablet, Refills: 1    Associated Diagnoses: Recurrent major depressive disorder, in partial remission (HCC)      simvastatin (ZOCOR) 20 mg tablet Take 1 tablet (20 mg total) by mouth daily at bedtime  Qty: 90 tablet, Refills: 1    Associated Diagnoses: Hypercholesterolemia      sucralfate (CARAFATE) 1 g tablet Take 1 tablet (1 g total) by mouth daily at bedtime  Qty: 60 tablet, Refills: 3    Associated Diagnoses: Gastroesophageal reflux disease without esophagitis      varenicline (CHANTIX) 0.5 mg tablet Take 1 tablet (0.5 mg total) by mouth 2 (two) times a day  Qty: 60 tablet, Refills: 0    Associated Diagnoses: Tobacco abuse disorder      Insulin Pen Needle 31G X 5 MM MISC Inject under the skin if needed (Insuline injections)  Qty: 100 each, Refills: 3    Associated Diagnoses: Diabetes mellitus type 2, insulin dependent (HCC)      spironolactone (ALDACTONE) 25 mg  "tablet Take 2 tablets (50 mg total) by mouth daily  Qty: 30 tablet, Refills: 5    Associated Diagnoses: Edema, lower extremity             Allergies:  Allergies   Allergen Reactions   • Hydrocodone-Acetaminophen Hives   • Ketorolac Hives and Dizziness   • Toradol [Ketorolac Tromethamine] Other (See Comments)     hypotension   • Ultram [Tramadol] Nausea Only, GI Intolerance and Vomiting       FOLLOW-UP     PCP Outpatient Follow-up:  yes      Follow up within 1 week of discharge     Active Issues Requiring Follow-up:   none    Discharge Statement:   I spent 1 hour minutes discharging the patient. This time was spent on the day of discharge. I had direct contact with the patient on the day of discharge. Additional documentation is required if more than 30 minutes were spent on discharge.    Portions of the record may have been created with voice recognition software.  Occasional wrong word or \"sound a like\" substitutions may have occurred due to the inherent limitations of voice recognition software.  Read the chart carefully and recognize, using context, where substitutions have occurred.    ==  Eloy Calvert MD  [facility]  Internal Medicine Resident PGY-1       "

## 2024-06-05 NOTE — UTILIZATION REVIEW
"NOTIFICATION OF INPATIENT ADMISSION   AUTHORIZATION REQUEST   SERVICING FACILITY:   [facility]  Address: [address] [place] [address]  Tax ID: [id number]  NPI: [id number] ATTENDING PROVIDER:  Attending Name and NPI#: Alexis Umaña Md [[id number]]  Address: [address] [place] [address]  Phone: [phone]   ADMISSION INFORMATION:  Place of Service: Inpatient [facility]  Place of Service Code: 21  Inpatient Admission Date/Time: 6/4/24  7:31 AM  Discharge Date/Time: No discharge date for patient encounter.  Admitting Diagnosis Code/Description:  Diarrhea [R19.7]  Hypokalemia [E87.6]  Enteritis [K52.9]  SOB (shortness of breath) [R06.02]  Near syncope [R55]     UTILIZATION REVIEW CONTACT:  Priscila Templeton"Peter Jamison Utilization   Network Utilization Review Department  Phone: [phone]  Fax: [phone]  Email: [email or web address]  Contact for approvals/pending authorizations, clinical reviews, and discharge.     PHYSICIAN ADVISORY SERVICES:  Medical Necessity Denial & Cwlv-xx-Yadb Review  Phone: [phone]  Fax: [phone]  Email: [email or web address]     DISCHARGE SUPPORT TEAM:  For Patients Discharge Needs & Updates  Phone: [phone] opt. 2 Fax: [phone]  Email: [email or web address]     "

## 2024-06-05 NOTE — PROGRESS NOTES
INTERNAL MEDICINE RESIDENCY PROGRESS NOTE     Name: Chely Ruvalcaba   Age & Sex: 53 y.o. female   MRN: [id number]  Unit/Bed#: -01   Encounter: [id number]  Team: SOD Team C     PATIENT INFORMATION     Name: Chely Ruvalcaba   Age & Sex: 53 y.o. female   MRN: [id number]  Hospital Stay Days: 1    ASSESSMENT/PLAN     Principal Problem:    Near syncope  Active Problems:    Hyperlipidemia    Hypothyroidism    Type 2 diabetes mellitus (HCC)    Centrilobular emphysema (HCC)    Gastroesophageal reflux disease    Hypokalemia    Cirrhosis (HCC)    Diastolic CHF (HCC)    Thrombocytopenia (HCC)    Chronic low back pain    Homelessness    Diverticulosis      * Near syncope  Assessment & Plan  Patient reports episode of near-syncope day of arrival with lightheadedness upon standing.  Recent hospitalization and abx use.  On presentation, BP 90s/50s. When patient stood up and assisted to bathroom, BP dropped to 70s systolics and had near-syncopal episode. K 2.7. Chloride 110. WBC 8. UA negative.  CT a/p showed colonic diverticulosis with residual bowel wall thickening of the sigmoid colon and minimal adjacent fluid in the left paracolic gutter, improved from 5/15/2024 in the area of previously identified diverticulitis as well as hepatic cirrhosis.  Suspect orthostatic hypotension in setting of enteritis.    Plan:  C diff and stool panel ordered  Orthostatic pressures ordered  Continue to monitor pressures  Trend WBC and fever curves  Holding antihypertensives at this time  Add fluids as needed    Diverticulosis  Assessment & Plan  CT a/p showed colonic diverticulosis with residual bowel wall thickening of the sigmoid colon and minimal adjacent fluid in the left paracolic gutter, improved from 5/15/2024 in the area of previously identified diverticulitis as well as hepatic cirrhosis.    Plan:  Recommend nonemergent outpatient colonoscopy to exclude underlying mass lesion mimicking diverticulitis.    Homelessness  Assessment &  "Plan  Home insecurity. Recently lost previous housing. Now temporarily staying at friend's [facility].    Plan:  Case management consulted    Chronic low back pain  Assessment & Plan  Pt endorses having chronic lower back pain.     Plan:  Oxy 5mg for moderate pain, Oxy 10mg for severe pain    Thrombocytopenia (HCC)  Assessment & Plan  On admission patient noted to have platelet count of 120. During her recent admission values ranging from . Of note is her history of cirrhosis with hepatosplenomegaly.     Platelets today 98 from 92. Hgb 9.7 today from 10.64 Smear review found borderline for platelet estimate.    Plan:  Monitor CBC  Can look for cause with Fe, B12, and Folate labs if patient stays today, if she is d/c recommend f/u with PCP     Diastolic CHF (HCC)  Assessment & Plan  Wt Readings from Last 3 Encounters:   05/21/24 117 kg (257 lb 15 oz)   05/17/24 116 kg (256 lb 9.6 oz)   05/07/24 112 kg (248 lb)     Patient noted to have TTE 08/23: displayed EF 60%; mild L. Ventricular thickness, mild mitral and tricuspid regurgitation.    Plan:  Holding lasix given hypotension  Daily weights  Monitor I/Os    Cirrhosis (HCC)  Assessment & Plan  MRI 10/23: displaying hepatic cirrhosis with fatty deposition and splenomegaly which may indicate portal hypertension. No ascites, no leg swelling.    Per GI note on 5/6 \"abnormal ultrasound elastography revealing F4 fibrosis, nodular liver on imaging and thrombocytopenia. Likely etiology for cirrhosis is metabolic associated steatotic liver disease. Her MELD 3.0 based upon labs from March/April is 11\"     Labs from 2023 showed normal anti-smooth muscle antibody, ceruloplasmin, antimicrosomal antibody, Protime-INR,folate, B12, AFP. Labs also showed elevated alpha-1 antitrypsin and IgM. GI noted \"Blood test for rare causes of liver disease are negative thus far\"     CT abdomen/pelvis 5/15 showed splenomegaly and portal hypertension supporting cirrhosis. CT AP PE 5/21 also " showed cirrhosis with mildly enlarged spleen. LFTs showed AST of 22, ALT of 18, ALP of 64 with 1.18 Tbili.    MELD 3.0: 12 at 5/23/2024  5:06 AM  MELD-Na: 11 at 5/23/2024  5:06 AM  Calculated from:  Serum Creatinine: 0.94 mg/dL (Using min of 1 mg/dL) at 5/23/2024  5:06 AM  Serum Sodium: 141 mmol/L (Using max of 137 mmol/L) at 5/23/2024  5:06 AM  Total Bilirubin: 1.59 mg/dL at 5/21/2024 12:24 PM  Serum Albumin: 3.7 g/dL (Using max of 3.5 g/dL) at 5/21/2024 12:24 PM  INR(ratio): 1.31 at 5/21/2024 12:24 PM  Age at listing (hypothetical): 53 years  Sex: Female at 5/23/2024  5:06 AM    Plan:  Continue to f/u outpatient with GI  No acute management while inpatient. Continue lasix to optimize fluid balance  Patient reports she is unsure if she takes spironolactone. Will not order given hypotension    Hypokalemia  Assessment & Plan  K 2.7 on admission. Now 2.5    Plan:  Continue to monitor electrolytes and replete as needed    Gastroesophageal reflux disease  Assessment & Plan  Patient reports intermittent heartburn and follows with outpatient GI.    Plan:  Cont Pepcid 40mg  Cont. Omeprazole 40mg  FU with GI outpatient    Centrilobular emphysema (HCC)  Assessment & Plan  PFTs done in 2/14/17 found FEV1/FVC of 76% and DLCO of 55%. Per report: Isolated reduction in diffusion capacity may be associated with early interstitial lung disease, pulmonary hypertension or diastolic HF.   Bilateral ground glass opacities on chest PE study per report: Smoking-related interstitial lung disease can have this appearance but progression over the course of 6 days is atypical. Patient does have significant smoking history at least 20 pack years, though recently quit in March. Patient also has symptoms of snoring at home but has not yet done a sleep study. She continues to note shortness of breath, currently on 3L NC.    Plan:  Xopenex/Atrovent nebs TID  Repeat PFTs outpatient  Sleep study outpatient    Type 2 diabetes mellitus  "([facility])  Assessment & Plan  Lab Results   Component Value Date    HGBA1C 7.8 (H) 05/15/2024     Recent Labs     24  1656 24  2056 24  0819 24  1137   POCGLU 175* 140 173* 193*       Blood Sugar Average: Last 72 hrs:  (P) [id number]    Patient states she has not been taking insulin at home because she does not have a way to check her blood sugar.    Plan:  SSI  Continue monitoring glucose trends    Hypothyroidism  Assessment & Plan  On levothyroxine 112 mcg, TSH returned 0.6.    Hyperlipidemia  Assessment & Plan  On pravistatin 40 q24h        Disposition: remain inpatient  for placement     SUBJECTIVE     Patient seen and examined. No acute events overnight. Pt endorses some nausea but no diarrhea. Denies any fevers, chills, headaches, chest pain, sob, abdominal pain, vomiting. Pt medically stable. Pt recommending rehab     OBJECTIVE     Vitals:    24 1502 24 2212 24 0600 24 0736   BP: 112/61 109/66  92/55   BP Location: Left arm   Left arm   Pulse: 98 104  93   Resp: 19 16  22   Temp: 98 °F (36.7 °C) 98.3 °F (36.8 °C)  98.2 °F (36.8 °C)   TempSrc: Oral   Oral   SpO2: 94% 95%  95%   Weight:   115 kg (253 lb 8.5 oz)    Height:   5' 8.39\" (1.737 m)       Temperature:   Temp (24hrs), Av.2 °F (36.8 °C), Min:98 °F (36.7 °C), Max:98.3 °F (36.8 °C)    Temperature: 98.2 °F (36.8 °C)  Intake & Output:  I/O          07 07 0701   07 07 07    I.V. (mL/kg)  500 (4.3)     Total Intake(mL/kg)  500 (4.3)     Net  +500                  Weights:   IBW (Ideal Body Weight): 64.79 kg    Body mass index is 38.11 kg/m².  Weight (last 2 days)       Date/Time Weight    24 06 115 (253.53)    24 0600 115 (253.53)          Physical Exam  Vitals and nursing note reviewed.   Constitutional:       General: She is not in acute distress.     Appearance: She is well-developed. She is obese. She is not ill-appearing, toxic-appearing " or diaphoretic.   HENT:      Head: Normocephalic and atraumatic.   Eyes:      Conjunctiva/sclera: Conjunctivae normal.   Cardiovascular:      Rate and Rhythm: Normal rate and regular rhythm.      Heart sounds: No murmur heard.  Pulmonary:      Effort: Pulmonary effort is normal. No respiratory distress.      Breath sounds: Normal breath sounds.   Abdominal:      Palpations: Abdomen is soft.      Tenderness: There is no abdominal tenderness. There is no right CVA tenderness, left CVA tenderness, guarding or rebound.   Musculoskeletal:         General: No swelling or tenderness.      Cervical back: Neck supple.      Right lower leg: No edema.      Left lower leg: No edema.   Skin:     General: Skin is warm and dry.      Capillary Refill: Capillary refill takes less than 2 seconds.   Neurological:      General: No focal deficit present.      Mental Status: She is alert and oriented to person, place, and time.   Psychiatric:         Mood and Affect: Mood normal.       LABORATORY DATA     Labs: I have personally reviewed pertinent reports.  Results from last 7 days   Lab Units 06/05/24  0630 06/04/24  0404 06/03/24  1812   WBC Thousand/uL 5.13 8.01 8.09   HEMOGLOBIN g/dL 9.7* 10.4* 11.0*   HEMATOCRIT % 31.7* 34.2* 36.1   PLATELETS Thousands/uL 88* 98* 106*   SEGS PCT %  --  57 57   MONO PCT %  --  9 12   EOS PCT %  --  2 2      Results from last 7 days   Lab Units 06/05/24  0630 06/04/24  1504 06/04/24  0404 06/03/24  1812   POTASSIUM mmol/L 3.4* 3.7 2.5* 2.7*   CHLORIDE mmol/L 110* 110* 106 110*   CO2 mmol/L 23 21 23 23   BUN mg/dL 8 8 8 8   CREATININE mg/dL 0.86 0.84 0.89 0.83   CALCIUM mg/dL 8.5 8.8 8.1* 8.6   ALK PHOS U/L  --   --   --  58   ALT U/L  --   --   --  16   AST U/L  --   --   --  42*     Results from last 7 days   Lab Units 06/05/24  0630 06/04/24  0936   MAGNESIUM mg/dL 2.0 1.5*                        IMAGING & DIAGNOSTIC TESTING     Radiology Results: I have personally reviewed pertinent reports.  CT  abdomen pelvis with contrast    Result Date: 6/3/2024  Impression: Colonic diverticulosis with residual bowel wall thickening of the sigmoid colon and minimal adjacent fluid in the left paracolic gutter, improved from 5/15/2024 in the area of previously identified diverticulitis. Nonemergent colonoscopy is recommended to exclude underlying mass lesion mimicking diverticulitis. Partially imaged groundglass opacities at the lung bases, improved from prior. Findings again suspicious for hepatic cirrhosis. See full report for additional findings. Workstation performed: [id number]     Other Diagnostic Testing: I have personally reviewed pertinent reports.    ACTIVE MEDICATIONS     Current Facility-Administered Medications   Medication Dose Route Frequency    albuterol (PROVENTIL HFA,VENTOLIN HFA) inhaler 2 puff  2 puff Inhalation Q6H PRN    albuterol inhalation solution 2.5 mg  2.5 mg Nebulization Q6H PRN    budesonide (PULMICORT) inhalation solution 0.5 mg  0.5 mg Nebulization BID    buPROPion (WELLBUTRIN XL) 24 hr tablet 300 mg  300 mg Oral QAM    diazepam (VALIUM) tablet 5 mg  5 mg Oral HS PRN    enoxaparin (LOVENOX) subcutaneous injection 40 mg  40 mg Subcutaneous Daily    ergocalciferol (VITAMIN D2) capsule 50,000 Units  50,000 Units Oral Q14 Days    famotidine (PEPCID) tablet 40 mg  40 mg Oral HS    ferrous sulfate tablet 325 mg  325 mg Oral Daily With Breakfast    folic acid (FOLVITE) tablet 1 mg  1 mg Oral Daily    gabapentin (NEURONTIN) capsule 100 mg  100 mg Oral Daily    ipratropium (ATROVENT) 0.02 % inhalation solution 0.5 mg  0.5 mg Nebulization TID    levalbuterol (XOPENEX) inhalation solution 1.25 mg  1.25 mg Nebulization TID    levothyroxine tablet 112 mcg  112 mcg Oral Daily    multi-electrolyte (ISOLYTE-S PH 7.4) bolus 500 mL  500 mL Intravenous Once    nicotine (NICODERM CQ) 21 mg/24 hr TD 24 hr patch 1 patch  1 patch Transdermal Q24H    ondansetron (ZOFRAN-ODT) dispersible tablet 4 mg  4 mg Oral Q8H  "PRN    oxyCODONE-acetaminophen (PERCOCET) 5-325 mg per tablet 1 tablet  1 tablet Oral Q6H PRN    pantoprazole (PROTONIX) EC tablet 40 mg  40 mg Oral BID AC    polyethylene glycol (MIRALAX) packet 17 g  17 g Oral Daily PRN    pravastatin (PRAVACHOL) tablet 40 mg  40 mg Oral Daily With Dinner    senna (SENOKOT) tablet 8.6 mg  1 tablet Oral Daily    sertraline (ZOLOFT) tablet 100 mg  100 mg Oral BID    sodium chloride (OCEAN) 0.65 % nasal spray 1 spray  1 spray Each Nare Q1H PRN    sucralfate (CARAFATE) tablet 1 g  1 g Oral HS       VTE Pharmacologic Prophylaxis: Enoxaparin (Lovenox)  VTE Mechanical Prophylaxis: sequential compression device    Portions of the record may have been created with voice recognition software.  Occasional wrong word or \"sound a like\" substitutions may have occurred due to the inherent limitations of voice recognition software.  Read the chart carefully and recognize, using context, where substitutions have occurred.  ==  Eloy Calvert MD  [facility]  Internal Medicine Residency PGY-1      "

## 2024-06-05 NOTE — UTILIZATION REVIEW
Initial Clinical Review    Observation 6/4 @ 0020 and changed to Inpatient status on 6/4 @ 0731. Pt requiring continued stay for New Syncope, workup and treat. Homelessness    Admission: Date/Time/Statement:   Admission Orders (From admission, onward)       Ordered        06/04/24 0731  INPATIENT ADMISSION  Once            06/04/24 0020  Place in Observation  Once                          Orders Placed This Encounter   Procedures    INPATIENT ADMISSION     Standing Status:   Standing     Number of Occurrences:   1     Order Specific Question:   Level of Care     Answer:   Med Surg [16]     Order Specific Question:   Estimated length of stay     Answer:   More than 2 Midnights     Order Specific Question:   Certification     Answer:   I certify that inpatient services are medically necessary for this patient for a duration of greater than two midnights. See H&P and MD Progress Notes for additional information about the patient's course of treatment.     ED Arrival Information       Expected   -    Arrival   6/3/2024 17:53    Acuity   Urgent              Means of arrival   Ambulance    Escorted by   [facility]    Service   [facility]    Admission type   Emergency              Arrival complaint   SOB             Chief Complaint   Patient presents with    Shortness of Breath     C/o of SOB and increased use of oxygen baseline 2L    Diarrhea     C/o of diarrhea for the past week     Flu Symptoms     C/o that she just generally feels unwell         Initial Presentation: 53 y.o. female to ED presents for episode of near-syncope and generalized malaise. Per pt,  around noon today, she felt lightheaded upon standing. Over the past few days, she reports multiple episodes of watery, malodorous diarrhea and nausea. Recent hospitalization from 5/21-5/24,  treated with IV ceftriaxone, and discharged on Augmentin and azithromycin. In ED, BP 90s/50s. When pt stood up and assisted to bathroom, BP dropped to 70s  systolics and had near-syncopal episode. K 2.7. CT AP showed colonic diverticulosis with residual bowel wall thickening of the sigmoid colon and minimal adjacent fluid in the left paracolic gutter, improved from 5/15/2024 in the area of previously identified diverticulitis as well as hepatic cirrhosis.  Recently lost previous housing. Now temporarily staying at friend's [facility].   PMH for diastolic CHF, cirrhosis and follows with GI, obesity, T2DM, HLD, emphysema, hypothyroidism, thrombocytopenia, chronic low back pain, and GERD. Quit smoking March 2024. Previously smoked since age 13.  No alcohol or drug use.  Admit to Observation to Inpatient admit Dx; Near Syncope. Lightheadedness.Hypokalemia. Suspect orthostatic hypotension in setting of enteritis.  BP 90s/50s, dropped to 70s systolic's when she stood. K 2.7.  Replete K and monitor. C.;diff and stool panel. Orthostatic BP. Hold antihypertensives and home Lasix. IVFs prn.  Thrombocytopenia; Plt 82 from 92. Hgb 10.6 today from 10.4. Smear review found borderline for platelet estimate. Monitor Cbc.      Date: 6/5   Day 2:   Progress notes; K 2.7, down to 2.5 . Replete prn and continue to monitor. Continue trending BP and hold antihypertensives.   Pt reports she is unsure if she takes spironolactone. Will not order given hypotension.  Also states she has not been taking insulin at home because she does not have a way to check her blood sugar.     Incidental finding; Colonic diverticulosis with residual bowel wall thickening of the sigmoid colon and minimal adjacent fluid in the left paracolic gutter, improved from 5/15/2024 in the area of previously identified diverticulitis. Nonemergent colonoscopy is recommended   to exclude underlying mass lesion mimicking diverticulitis.  F/u should be done within 1 months with GI.    ED Triage Vitals   Temperature Pulse Respirations Blood Pressure SpO2   06/03/24 1804 06/03/24 1802 06/03/24 1802 06/03/24 1802 06/03/24 1802    98.8 °F (37.1 °C) 104 (!) 24 108/54 94 %      Temp Source Heart Rate Source Patient Position - Orthostatic VS BP Location FiO2 (%)   06/03/24 1804 06/03/24 1802 06/03/24 1915 06/03/24 1915 --   Oral Monitor Lying Right arm       Pain Score       06/03/24 2349       8          Wt Readings from Last 1 Encounters:   06/04/24 115 kg (253 lb 8.5 oz)     Additional Vital Signs:   06/05/24 07:36:36 98.2 °F (36.8 °C) 93 22 92/55 67 95 % -- -- None (Room air) Sitting   06/05/24 0100 -- -- -- -- -- -- -- -- None (Room air) --   06/04/24 22:12:59 98.3 °F (36.8 °C) 104 16 109/66 80 95 % -- -- -- --   06/04/24 1950 -- -- -- -- -- -- 34 3.5 L/min Nasal cannula --   06/04/24 15:02:59 98 °F (36.7 °C) 98 19 112/61 78 94 % 36 4 L/min Nasal cannula Sitting     06/04/24 0600 -- 102 19 94/35 Abnormal   50 Abnormal  96 % 40 5 L/min Nasal cannula Lying   BP: provider made aware at 06/04/24 0600   06/04/24 0530 -- 100 18 88/39 Abnormal   57 Abnormal  96 % 40 5 L/min Nasal cannula Lying   BP: provider made aware at 06/04/24 0530   06/04/24 0500 -- 102 19 81/38 Abnormal   55 Abnormal  97 % 40 5 L/min Nasal cannula Lying   BP: provider made aware at 06/04/24 0500   06/04/24 0445 -- 102 19 104/49 Abnormal  70 96 % 40 5 L/min N      06/04/24 0100 -- 102 21 87/50 Abnormal  62 Abnormal  93 % 32 3 L/min Nasal cannula Sitting   06/04/24 0045 -- 104 20 -- -- 93 % 32 3 L/min Nasal cannula --   06/04/24 0030 -- 102 18 89/49 Abnormal  62 Abnormal  93 % 30 2.5 L/min Nasal cannula Lying   06/04/24 0000 -- 100 19 95/53 69 95 % 30 2.5 L/min Nasal cannula Lying     Pertinent Labs/Diagnostic Test Results:   CT abdomen pelvis with contrast   Final Result by Ruel España DO (06/03 9193)   Colonic diverticulosis with residual bowel wall thickening of the sigmoid colon and minimal adjacent fluid in the left paracolic gutter, improved from 5/15/2024 in the area of previously identified diverticulitis. Nonemergent colonoscopy is recommended    to exclude  underlying mass lesion mimicking diverticulitis.   Partially imaged groundglass opacities at the lung bases, improved from prior.   Findings again suspicious for hepatic cirrhosis.   See full report for additional findings.         Workstation performed: [id number]         XR chest 2 views   Final Result by Maryanne Martins MD (06/04 1004)   Mild diffuse interstitial thickening. No focal consolidation or pleural effusion.            Workstation performed: [id number]           Results from last 7 days   Lab Units 06/03/24  1812   SARS-COV-2  Negative     Results from last 7 days   Lab Units 06/04/24  0404 06/03/24  1812   WBC Thousand/uL 8.01 8.09   HEMOGLOBIN g/dL 10.4* 11.0*   HEMATOCRIT % 34.2* 36.1   PLATELETS Thousands/uL 98* 106*   TOTAL NEUT ABS Thousands/µL 4.62 4.80         Results from last 7 days   Lab Units 06/05/24 0630 06/04/24  1504 06/04/24  0936 06/04/24 0404 06/03/24  1812   SODIUM mmol/L 140 142  --  137 143   POTASSIUM mmol/L 3.4* 3.7  --  2.5* 2.7*   CHLORIDE mmol/L 110* 110*  --  106 110*   CO2 mmol/L 23 21  --  23 23   ANION GAP mmol/L 7 11  --  8 10   BUN mg/dL 8 8  --  8 8   CREATININE mg/dL 0.86 0.84  --  0.89 0.83   EGFR ml/min/1.73sq m 77 79  --  74 80   CALCIUM mg/dL 8.5 8.8  --  8.1* 8.6   MAGNESIUM mg/dL 2.0  --  1.5*  --   --      Results from last 7 days   Lab Units 06/03/24  1812   AST U/L 42*   ALT U/L 16   ALK PHOS U/L 58   TOTAL PROTEIN g/dL 6.0*   ALBUMIN g/dL 3.2*   TOTAL BILIRUBIN mg/dL 1.26*         Results from last 7 days   Lab Units 06/05/24 0630 06/04/24  1504 06/04/24  0404 06/03/24  1812   GLUCOSE RANDOM mg/dL 133 125 145* 97       Results from last 7 days   Lab Units 06/03/24 2011 06/03/24  1812   HS TNI 0HR ng/L  --  11   HS TNI 2HR ng/L 10  --    HSTNI D2 ng/L -1  --      Results from last 7 days   Lab Units 06/03/24  1812   D-DIMER QUANTITATIVE ug/ml FEU 0.44       Results from last 7 days   Lab Units 06/03/24  1812   BNP pg/mL 21       Results from last 7 days    Lab Units 06/03/24  1909   CLARITY UA  Clear   COLOR UA  Nat   SPEC GRAV UA  1.020   PH UA  6.5   GLUCOSE UA mg/dl Negative   KETONES UA mg/dl Negative   BLOOD UA  Negative   PROTEIN UA mg/dl Negative   NITRITE UA  Negative   BILIRUBIN UA  Negative   UROBILINOGEN UA E.U./dl 0.2   LEUKOCYTES UA  Negative     Results from last 7 days   Lab Units 06/03/24  1812   INFLUENZA A PCR  Negative   INFLUENZA B PCR  Negative   RSV PCR  Negative       ED Treatment:   Medication Administration from 06/03/2024 1753 to 06/04/2024 0642         Date/Time Order Dose Route Action     06/03/2024 1759 EDT albuterol (FOR EMS ONLY) (2.5 mg/3 mL) 0.083 % inhalation solution 2.5 mg 0 mg Does not apply Given to EMS     06/03/2024 1916 EDT potassium chloride (Klor-Con M20) CR tablet 40 mEq 40 mEq Oral Given     06/03/2024 1916 EDT potassium chloride 20 mEq IVPB (premix) 20 mEq Intravenous New Bag     06/03/2024 2147 EDT iohexol (OMNIPAQUE) 350 MG/ML injection (MULTI-DOSE) 100 mL 100 mL Intravenous Given     06/03/2024 2349 EDT HYDROmorphone (DILAUDID) injection 0.5 mg 0.5 mg Intravenous Given     06/03/2024 2349 EDT metoclopramide (REGLAN) injection 10 mg 10 mg Intravenous Given     06/04/2024 0105 EDT nicotine (NICODERM CQ) 21 mg/24 hr TD 24 hr patch 1 patch 1 patch Transdermal Medication Applied     06/04/2024 0105 EDT famotidine (PEPCID) tablet 40 mg 40 mg Oral Given     06/04/2024 0105 EDT ipratropium-albuterol (DUO-NEB) 0.5-2.5 mg/3 mL inhalation solution 3 mL 3 mL Nebulization Given     06/04/2024 0105 EDT sucralfate (CARAFATE) tablet 1 g 1 g Oral Given          Past Medical History:   Diagnosis Date    Abnormal stress test     Acute cystitis without hematuria 05/07/2018    Acute duodenal ulcer with bleeding 01/16/2023    Allergic sinusitis     last assessed - [id number]    Anemia Around december    Anxiety     last assessed - 04Mar2016    Arthritis     Asthma     last assessed - 04Mar2016    Bilateral lower extremity edema     last  assessed - [id number]    Callus of foot     last assessed - 22Jun2016    Chest pain     Chronic GERD     last assessed - 16Jan2017    Colon polyp     Constipation     Resolved - 13Jan2017    COPD (chronic obstructive pulmonary disease) (HCC)     Depression     last assessed - 04Mar2016    Disease of thyroid gland     Diverticulitis of colon     last assessed - 04Mar2016    Dyspepsia     Resolved - [id number]    Edema, lower extremity 08/07/2020    Esophageal reflux     last assessed - 04Mar2016    Essential hypertriglyceridemia     last assessed - [id number]    Fatty liver 01/16/2023    Gastroesophageal reflux disease with esophagitis 11/18/2016    GERD (gastroesophageal reflux disease)     Gynecological disorder     last assessed - 04Mar2016    Hydroureteronephrosis 06/30/2022    Hypertension     last assessed - 04Mar2016    Hypokalemia 06/20/2022    Hypotension     last assessed - 25Aug2016    Kidney stone     Left ureteral stone with hydronephrosis 07/21/2022    Migraine     Morbid obesity (HCC) 01/11/2019    Formatting of this note might be different from the original. Added automatically from request for surgery [id number]    Neuropathy     Obesity     Other chest pain 11/08/2018    Pancreatic lesion 03/13/2023    Positive depression screening 06/19/2022    Primary hypertriglyceridemia 06/19/2022    Pulmonary emphysema (HCC)     last assessed - 04Mar2016    Seasonal allergies     Severe obesity (HCC) 01/16/2023    Skin tag 07/17/2023    SOB (shortness of breath)     Strain of groin 07/17/2023    Urinary frequency     last assessed - 22Jun2016    Vagina, candidiasis     last assessed - 22Jun2016    Very heavy cigarette smoker 01/16/2023    Visual impairment      Present on Admission:   Cirrhosis (HCC)   Centrilobular emphysema (HCC)   Thrombocytopenia (HCC)   Diastolic CHF (HCC)   Gastroesophageal reflux disease   Type 2 diabetes mellitus (HCC)   Hypothyroidism   Hyperlipidemia   Hypokalemia      Admitting Diagnosis:  Diarrhea [R19.7]  Hypokalemia [E87.6]  Enteritis [K52.9]  SOB (shortness of breath) [R06.02]  Near syncope [R55]  Age/Sex: 53 y.o. female    Admission Orders:  Scheduled Medications:  budesonide, 0.5 mg, Nebulization, BID  buPROPion, 300 mg, Oral, QAM  enoxaparin, 40 mg, Subcutaneous, Daily  ergocalciferol, 50,000 Units, Oral, Q14 Days  famotidine, 40 mg, Oral, HS  ferrous sulfate, 325 mg, Oral, Daily With Breakfast  folic acid, 1 mg, Oral, Daily  gabapentin, 100 mg, Oral, Daily  ipratropium, 0.5 mg, Nebulization, TID  levalbuterol, 1.25 mg, Nebulization, TID  levothyroxine, 112 mcg, Oral, Daily  nicotine, 1 patch, Transdermal, Q24H  pantoprazole, 40 mg, Oral, BID AC  pravastatin, 40 mg, Oral, Daily With Dinner  senna, 1 tablet, Oral, Daily  sertraline, 100 mg, Oral, BID  sucralfate, 1 g, Oral, HS      Continuous IV Infusions: None     PRN Meds:  albuterol, 2 puff, Inhalation, Q6H PRN  albuterol, 2.5 mg, Nebulization, Q6H PRN  diazepam, 5 mg, Oral, HS PRN  ondansetron, 4 mg, Oral, Q8H PRN  oxyCODONE-acetaminophen, 1 tablet, Oral, Q6H PRN 6/4 x3, 6/5 x1  polyethylene glycol, 17 g, Oral, Daily PRN        IP CONSULT TO CASE MANAGEMENT    Network Utilization Review Department  ATTENTION: Please call with any questions or concerns to [phone] and carefully listen to the prompts so that you are directed to the right person. All voicemails are confidential.   For Discharge needs, contact Care Management DC Support Team at [phone] opt. [phone]  Send all requests for admission clinical reviews, approved or denied determinations and any other requests to dedicated fax number below belonging to the campus where the patient is receiving treatment. List of dedicated fax numbers for the Facilities:  FACILITY NAME UR FAX NUMBER   ADMISSION DENIALS (Administrative/Medical Necessity) [phone]   DISCHARGE SUPPORT TEAM (NETWORK) [phone]   PARENT CHILD HEALTH (Maternity/NICU/Pediatrics) [phone]   [facility] [phone]   [facility] [phone]   [facility] [phone]   [facility] [phone]   [facility] [phone]   [facility] [phone]   [facility] [phone]   [facility] [phone]   [facility] [phone]   [facility] [phone]   [facility] [phone]   [facility] [phone]

## 2024-06-05 NOTE — INCIDENTAL FINDINGS
The following findings require follow up:  Radiographic finding   Finding: Colonic diverticulosis with residual bowel wall thickening of the sigmoid colon and minimal adjacent fluid in the left paracolic gutter, improved from 5/15/2024 in the area of previously identified diverticulitis. Nonemergent colonoscopy is recommended   to exclude underlying mass lesion mimicking diverticulitis.   Follow up required: GI   Follow up should be done within within 1 month     Please notify the following clinician to assist with the follow up:   Dr. Vu

## 2024-06-05 NOTE — PLAN OF CARE
Problem: PAIN - ADULT  Goal: Verbalizes/displays adequate comfort level or baseline comfort level  Description: Interventions:  - Encourage patient to monitor pain and request assistance  - Assess pain using appropriate pain scale  - Administer analgesics based on type and severity of pain and evaluate response  - Implement non-pharmacological measures as appropriate and evaluate response  - Consider cultural and social influences on pain and pain management  - Notify physician/advanced practitioner if interventions unsuccessful or patient reports new pain  Outcome: Progressing     Problem: INFECTION - ADULT  Goal: Absence or prevention of progression during hospitalization  Description: INTERVENTIONS:  - Assess and monitor for signs and symptoms of infection  - Monitor lab/diagnostic results  - Monitor all insertion sites, i.e. indwelling lines, tubes, and drains  - Monitor endotracheal if appropriate and nasal secretions for changes in amount and color  - [place] appropriate cooling/warming therapies per order  - Administer medications as ordered  - Instruct and encourage patient and family to use good hand hygiene technique  - Identify and instruct in appropriate isolation precautions for identified infection/condition  Outcome: Progressing  Goal: Absence of fever/infection during neutropenic period  Description: INTERVENTIONS:  - Monitor WBC    Outcome: Progressing     Problem: SAFETY ADULT  Goal: Patient will remain free of falls  Description: INTERVENTIONS:  - Educate patient/family on patient safety including physical limitations  - Instruct patient to call for assistance with activity   - Consult OT/PT to assist with strengthening/mobility   - Keep Call bell within reach  - Keep bed low and locked with side rails adjusted as appropriate  - Keep care items and personal belongings within reach  - Initiate and maintain comfort rounds  - Make Fall Risk Sign visible to staff  - Offer Toileting every  Hours,  in advance of need  - Initiate/Maintain alarm  - Obtain necessary fall risk management equipment:   - Apply yellow socks and bracelet for high fall risk patients  - Consider moving patient to room near nurses station  Outcome: Progressing  Goal: Maintain or return to baseline ADL function  Description: INTERVENTIONS:  -  Assess patient's ability to carry out ADLs; assess patient's baseline for ADL function and identify physical deficits which impact ability to perform ADLs (bathing, care of mouth/teeth, toileting, grooming, dressing, etc.)  - Assess/evaluate cause of self-care deficits   - Assess range of motion  - Assess patient's mobility; develop plan if impaired  - Assess patient's need for assistive devices and provide as appropriate  - Encourage maximum independence but intervene and supervise when necessary  - Involve family in performance of ADLs  - Assess for home care needs following discharge   - Consider OT consult to assist with ADL evaluation and planning for discharge  - Provide patient education as appropriate  Outcome: Progressing  Goal: Maintains/Returns to pre admission functional level  Description: INTERVENTIONS:  - Perform AM-PAC 6 Click Basic Mobility/ Daily Activity assessment daily.  - Set and communicate daily mobility goal to care team and patient/family/caregiver.   - Collaborate with rehabilitation services on mobility goals if consulted  - Perform Range of Motion  times a day.  - Reposition patient every  hours.  - Dangle patient  times a day  - Stand patient  times a day  - Ambulate patient  times a day  - Out of bed to chair  times a day   - Out of bed for meals  times a day  - Out of bed for toileting  - Record patient progress and toleration of activity level   Outcome: Progressing     Problem: DISCHARGE PLANNING  Goal: Discharge to home or other facility with appropriate resources  Description: INTERVENTIONS:  - Identify barriers to discharge w/patient and caregiver  - Arrange for  needed discharge resources and transportation as appropriate  - Identify discharge learning needs (meds, wound care, etc.)  - Arrange for interpretive services to assist at discharge as needed  - Refer to Case Management Department for coordinating discharge planning if the patient needs post-hospital services based on physician/advanced practitioner order or complex needs related to functional status, cognitive ability, or social support system  Outcome: Progressing     Problem: Knowledge Deficit  Goal: Patient/family/caregiver demonstrates understanding of disease process, treatment plan, medications, and discharge instructions  Description: Complete learning assessment and assess knowledge base.  Interventions:  - Provide teaching at level of understanding  - Provide teaching via preferred learning methods  Outcome: Progressing

## 2024-06-06 VITALS
HEIGHT: 68 IN | TEMPERATURE: 98 F | OXYGEN SATURATION: 92 % | WEIGHT: 253.53 LBS | SYSTOLIC BLOOD PRESSURE: 120 MMHG | DIASTOLIC BLOOD PRESSURE: 69 MMHG | BODY MASS INDEX: 38.42 KG/M2 | RESPIRATION RATE: 20 BRPM | HEART RATE: 101 BPM

## 2024-06-06 LAB
ANION GAP SERPL CALCULATED.3IONS-SCNC: 6 MMOL/L (ref 4–13)
BUN SERPL-MCNC: 9 MG/DL (ref 5–25)
CALCIUM SERPL-MCNC: 8.7 MG/DL (ref 8.4–10.2)
CHLORIDE SERPL-SCNC: 111 MMOL/L (ref 96–108)
CO2 SERPL-SCNC: 23 MMOL/L (ref 21–32)
CREAT SERPL-MCNC: 0.78 MG/DL (ref 0.6–1.3)
ERYTHROCYTE [DISTWIDTH] IN BLOOD BY AUTOMATED COUNT: 18.8 % (ref 11.6–15.1)
FERRITIN SERPL-MCNC: 17 NG/ML (ref 11–307)
FOLATE SERPL-MCNC: 20 NG/ML
GFR SERPL CREATININE-BSD FRML MDRD: 87 ML/MIN/1.73SQ M
GLUCOSE SERPL-MCNC: 126 MG/DL (ref 65–140)
HCT VFR BLD AUTO: 32.6 % (ref 34.8–46.1)
HGB BLD-MCNC: 10 G/DL (ref 11.5–15.4)
IRON SATN MFR SERPL: 16 % (ref 15–50)
IRON SERPL-MCNC: 42 UG/DL (ref 50–212)
MCH RBC QN AUTO: 30.7 PG (ref 26.8–34.3)
MCHC RBC AUTO-ENTMCNC: 30.7 G/DL (ref 31.4–37.4)
MCV RBC AUTO: 100 FL (ref 82–98)
PLATELET # BLD AUTO: 88 THOUSANDS/UL (ref 149–390)
PMV BLD AUTO: 11.3 FL (ref 8.9–12.7)
POTASSIUM SERPL-SCNC: 4 MMOL/L (ref 3.5–5.3)
RBC # BLD AUTO: 3.26 MILLION/UL (ref 3.81–5.12)
SODIUM SERPL-SCNC: 140 MMOL/L (ref 135–147)
TIBC SERPL-MCNC: 269 UG/DL (ref 250–450)
UIBC SERPL-MCNC: 227 UG/DL (ref 155–355)
VIT B12 SERPL-MCNC: 477 PG/ML (ref 180–914)
WBC # BLD AUTO: 5.11 THOUSAND/UL (ref 4.31–10.16)

## 2024-06-06 PROCEDURE — 83540 ASSAY OF IRON: CPT

## 2024-06-06 PROCEDURE — 82728 ASSAY OF FERRITIN: CPT

## 2024-06-06 PROCEDURE — 85027 COMPLETE CBC AUTOMATED: CPT

## 2024-06-06 PROCEDURE — 94760 N-INVAS EAR/PLS OXIMETRY 1: CPT

## 2024-06-06 PROCEDURE — 99239 HOSP IP/OBS DSCHRG MGMT >30: CPT | Performed by: INTERNAL MEDICINE

## 2024-06-06 PROCEDURE — 94640 AIRWAY INHALATION TREATMENT: CPT

## 2024-06-06 PROCEDURE — 82607 VITAMIN B-12: CPT

## 2024-06-06 PROCEDURE — 83550 IRON BINDING TEST: CPT

## 2024-06-06 PROCEDURE — 82746 ASSAY OF FOLIC ACID SERUM: CPT

## 2024-06-06 PROCEDURE — 94664 DEMO&/EVAL PT USE INHALER: CPT

## 2024-06-06 PROCEDURE — 80048 BASIC METABOLIC PNL TOTAL CA: CPT

## 2024-06-06 RX ORDER — ACETAMINOPHEN 325 MG/1
650 TABLET ORAL EVERY 6 HOURS PRN
Status: DISCONTINUED | OUTPATIENT
Start: 2024-06-06 | End: 2024-06-06 | Stop reason: HOSPADM

## 2024-06-06 RX ORDER — POTASSIUM CHLORIDE 20 MEQ/1
40 TABLET, EXTENDED RELEASE ORAL ONCE
Status: COMPLETED | OUTPATIENT
Start: 2024-06-06 | End: 2024-06-06

## 2024-06-06 RX ADMIN — FOLIC ACID 1 MG: 1 TABLET ORAL at 08:37

## 2024-06-06 RX ADMIN — LEVALBUTEROL HYDROCHLORIDE 1.25 MG: 1.25 SOLUTION RESPIRATORY (INHALATION) at 07:51

## 2024-06-06 RX ADMIN — BUDESONIDE 0.5 MG: 0.5 INHALANT ORAL at 07:51

## 2024-06-06 RX ADMIN — BUPROPION HYDROCHLORIDE 300 MG: 150 TABLET, EXTENDED RELEASE ORAL at 08:37

## 2024-06-06 RX ADMIN — IPRATROPIUM BROMIDE 0.5 MG: 0.5 SOLUTION RESPIRATORY (INHALATION) at 07:51

## 2024-06-06 RX ADMIN — PANTOPRAZOLE SODIUM 40 MG: 40 TABLET, DELAYED RELEASE ORAL at 17:52

## 2024-06-06 RX ADMIN — SERTRALINE HYDROCHLORIDE 100 MG: 100 TABLET ORAL at 08:37

## 2024-06-06 RX ADMIN — LEVOTHYROXINE SODIUM 112 MCG: 112 TABLET ORAL at 05:47

## 2024-06-06 RX ADMIN — OXYCODONE HYDROCHLORIDE AND ACETAMINOPHEN 1 TABLET: 5; 325 TABLET ORAL at 08:37

## 2024-06-06 RX ADMIN — SENNOSIDES 8.6 MG: 8.6 TABLET, FILM COATED ORAL at 08:37

## 2024-06-06 RX ADMIN — ACETAMINOPHEN 650 MG: 325 TABLET, FILM COATED ORAL at 12:12

## 2024-06-06 RX ADMIN — ENOXAPARIN SODIUM 40 MG: 40 INJECTION SUBCUTANEOUS at 08:36

## 2024-06-06 RX ADMIN — PANTOPRAZOLE SODIUM 40 MG: 40 TABLET, DELAYED RELEASE ORAL at 05:47

## 2024-06-06 RX ADMIN — GABAPENTIN 100 MG: 100 CAPSULE ORAL at 08:37

## 2024-06-06 RX ADMIN — NICOTINE 1 PATCH: 21 PATCH, EXTENDED RELEASE TRANSDERMAL at 05:47

## 2024-06-06 RX ADMIN — POTASSIUM CHLORIDE 40 MEQ: 1500 TABLET, EXTENDED RELEASE ORAL at 08:36

## 2024-06-06 RX ADMIN — PRAVASTATIN SODIUM 40 MG: 40 TABLET ORAL at 17:52

## 2024-06-06 RX ADMIN — OXYCODONE HYDROCHLORIDE AND ACETAMINOPHEN 1 TABLET: 5; 325 TABLET ORAL at 17:56

## 2024-06-06 RX ADMIN — FERROUS SULFATE TAB 325 MG (65 MG ELEMENTAL FE) 325 MG: 325 (65 FE) TAB at 08:37

## 2024-06-06 RX ADMIN — SERTRALINE HYDROCHLORIDE 100 MG: 100 TABLET ORAL at 17:52

## 2024-06-06 NOTE — CASE MANAGEMENT
Case Management Discharge Planning Note    Patient name Chely Ruvalcaba  Location /-01 MRN [id number]  : 1970 Date 2024       Current Admission Date: 6/3/2024  Current Admission Diagnosis:Near syncope   Patient Active Problem List    Diagnosis Date Noted Date Diagnosed    Near syncope 2024     Chronic low back pain 2024     Homelessness 2024     Diverticulosis 2024     Snoring 2024     Excessive daytime sleepiness 2024     RUTHANN (obstructive sleep apnea) 2024     Ambulatory dysfunction 2024     Diverticulitis 2024     Stage 2 chronic kidney disease 05/15/2024     Chronic bilateral low back pain 05/15/2024     Diastolic CHF (HCC) 2024     Thrombocytopenia (HCC) 2024     Dyspnea 2024     Pulmonary nodules 2024     Enlarged lymph nodes, unspecified 2024     Bilateral pneumonia 2024     Acute diastolic congestive heart failure (HCC) 2024     Folic acid deficiency 2024     Acute respiratory failure with hypoxia (HCC) 2024     Injury of toe on left foot 2023     Cirrhosis (HCC) 2023     History of GI bleed 2023     History of colon polyps 2023     Duodenal ulcer 2023     Irritable bowel syndrome with both constipation and diarrhea 2023     Abnormal stress ECG with treadmill 2023     Iron deficiency anemia 2022     Hypokalemia 2022     Gastroesophageal reflux disease 2022     Nephrolithiasis 2022     Centrilobular emphysema (HCC) 2022     COVID-19 vaccination refused 2022     Type 2 diabetes mellitus (HCC) 2021     Edema, lower extremity 2020     Gastroparesis 2018     Hyperlipidemia 2018     Intermittent claudication (HCC) 2017     Cigarette nicotine dependence in remission 2017     Vitamin D deficiency 2016     Anxiety 2016     Benign hypertension 2016      Depression 03/04/2016     Hypothyroidism 03/04/2016     Seasonal allergic rhinitis 03/04/2016     Spinal stenosis of lumbar region 03/28/2012     Degeneration of lumbar or lumbosacral intervertebral disc 06/08/2010       LOS (days): 2  Geometric Mean LOS (GMLOS) (days):   Days to GMLOS:     OBJECTIVE:  Risk of Unplanned Readmission Score: 23.64    Current admission status: Inpatient   Preferred Pharmacy:   [id number] - [place], [place] - [address]  [address] [address]  Phone: [phone] Fax: [phone]    [facility] - [place], [place] - [address]  [address] [address]  Phone: [phone] Fax: [phone]    [facility] - [place], [place] - [address]  [address] [facility] [address]  Phone: [phone] Fax: [phone]    [facility] - [place], [place] - [address] [facility] [facility].  [address] [place] [address].  [address] [address]  Phone: [phone] Fax: [phone]    Primary Care Provider: Andi Mason DO    Primary Insurance: [employer]  Secondary Insurance:     DISCHARGE DETAILS:  Additional Comments: CM met with patient at bedside to discuss DC. Patient expressed permission for CM to place blanket referral for rehab. Patient requested SNF in [place]; CM searched facilities based on this location. [facility] requested [facility]. CM completed PASRR paperwork and uploaded to [employer]. CM to continue to follow.    ADDENDUM 10:59AM  CM met with pt and provided choice list of accepting facilities. Pt expressed understanding and kept choice list. Following this, provider reached out to CM to notify that patient chose facility. CM met with pt bedside who chose [facility]. CM reserved facility in [place] and inquired with facility for auth information to initiate insurance auth. CM to continue to follow    ADDENDUM 11:21AM  CM was notified via [employer] per [facility] that  the facility will initiate the insurance authorization. Per Cinthya, facility will keep CM updated on insurance authorization status. CM to be available and continue to follow.

## 2024-06-06 NOTE — PROGRESS NOTES
INTERNAL MEDICINE RESIDENCY PROGRESS NOTE     Name: Chely Ruvalcaba   Age & Sex: 53 y.o. female   MRN: [id number]  Unit/Bed#: -01   Encounter: [id number]  Team: SOD Team C     PATIENT INFORMATION     Name: Chely Ruvalcaba   Age & Sex: 53 y.o. female   MRN: [id number]  Hospital Stay Days: 2    ASSESSMENT/PLAN     Principal Problem:    Near syncope  Active Problems:    Hyperlipidemia    Hypothyroidism    Type 2 diabetes mellitus (HCC)    Centrilobular emphysema (HCC)    Gastroesophageal reflux disease    Hypokalemia    Cirrhosis (HCC)    Diastolic CHF (HCC)    Thrombocytopenia (HCC)    Chronic low back pain    Homelessness    Diverticulosis      Diverticulosis  Assessment & Plan  CT a/p showed colonic diverticulosis with residual bowel wall thickening of the sigmoid colon and minimal adjacent fluid in the left paracolic gutter, improved from 5/15/2024 in the area of previously identified diverticulitis as well as hepatic cirrhosis.    Plan:  Recommend nonemergent outpatient colonoscopy to exclude underlying mass lesion mimicking diverticulitis.    Homelessness  Assessment & Plan  Home insecurity. Recently lost previous housing. Now temporarily staying at friend's [facility].    Plan:  Case management consulted    Chronic low back pain  Assessment & Plan  Pt endorses having chronic lower back pain.     Plan:  Oxy 5mg for moderate pain, Oxy 10mg for severe pain    Thrombocytopenia (HCC)  Assessment & Plan  On admission patient noted to have platelet count of 120. During her recent admission values ranging from . Of note is her history of cirrhosis with hepatosplenomegaly.     Platelets today 98 from 92. Hgb 9.7 today from 10.64 Smear review found borderline for platelet estimate.    Plan:  Monitor CBC  Follow up on Fe, B12, folate labs   Started on PO Iron and Folic acid     Diastolic CHF (HCC)  Assessment & Plan  Wt Readings from Last 3 Encounters:   05/21/24 117 kg (257 lb 15 oz)   05/17/24 116 kg (256 lb 9.6  "oz)   05/07/24 112 kg (248 lb)     Patient noted to have TTE 08/23: displayed EF 60%; mild L. Ventricular thickness, mild mitral and tricuspid regurgitation.    Plan:  Holding lasix given hypotension  Daily weights  Monitor I/Os    Cirrhosis (HCC)  Assessment & Plan  MRI 10/23: displaying hepatic cirrhosis with fatty deposition and splenomegaly which may indicate portal hypertension. No ascites, no leg swelling.    Per GI note on 5/6 \"abnormal ultrasound elastography revealing F4 fibrosis, nodular liver on imaging and thrombocytopenia. Likely etiology for cirrhosis is metabolic associated steatotic liver disease. Her MELD 3.0 based upon labs from March/April is 11\"     Labs from 2023 showed normal anti-smooth muscle antibody, ceruloplasmin, antimicrosomal antibody, Protime-INR,folate, B12, AFP. Labs also showed elevated alpha-1 antitrypsin and IgM. GI noted \"Blood test for rare causes of liver disease are negative thus far\"     CT abdomen/pelvis 5/15 showed splenomegaly and portal hypertension supporting cirrhosis. CT AP PE 5/21 also showed cirrhosis with mildly enlarged spleen. LFTs showed AST of 22, ALT of 18, ALP of 64 with 1.18 Tbili.    MELD 3.0: 12 at 5/23/2024  5:06 AM  MELD-Na: 11 at 5/23/2024  5:06 AM  Calculated from:  Serum Creatinine: 0.94 mg/dL (Using min of 1 mg/dL) at 5/23/2024  5:06 AM  Serum Sodium: 141 mmol/L (Using max of 137 mmol/L) at 5/23/2024  5:06 AM  Total Bilirubin: 1.59 mg/dL at 5/21/2024 12:24 PM  Serum Albumin: 3.7 g/dL (Using max of 3.5 g/dL) at 5/21/2024 12:24 PM  INR(ratio): 1.31 at 5/21/2024 12:24 PM  Age at listing (hypothetical): 53 years  Sex: Female at 5/23/2024  5:06 AM    Plan:  Continue to f/u outpatient with GI  No acute management while inpatient. Continue lasix to optimize fluid balance  Patient reports she is unsure if she takes spironolactone. Will not order given hypotension    Hypokalemia  Assessment & Plan  K 2.7 on admission. Now 2.5    Plan:  Continue to monitor " electrolytes and replete as needed    Gastroesophageal reflux disease  Assessment & Plan  Patient reports intermittent heartburn and follows with outpatient GI.    Plan:  Cont Pepcid 40mg  Cont. Omeprazole 40mg  FU with GI outpatient    Centrilobular emphysema (HCC)  Assessment & Plan  PFTs done in 2/14/17 found FEV1/FVC of 76% and DLCO of 55%. Per report: Isolated reduction in diffusion capacity may be associated with early interstitial lung disease, pulmonary hypertension or diastolic HF.   Bilateral ground glass opacities on chest PE study per report: Smoking-related interstitial lung disease can have this appearance but progression over the course of 6 days is atypical. Patient does have significant smoking history at least 20 pack years, though recently quit in March. Patient also has symptoms of snoring at home but has not yet done a sleep study. She continues to note shortness of breath, currently on 3L NC.    Plan:  Xopenex/Atrovent nebs TID  Repeat PFTs outpatient  Sleep study outpatient    Type 2 diabetes mellitus (HCC)  Assessment & Plan  Lab Results   Component Value Date    HGBA1C 7.8 (H) 05/15/2024     Recent Labs     05/23/24  1656 05/23/24  2056 05/24/24  0819 05/24/24  1137   POCGLU 175* 140 173* 193*       Blood Sugar Average: Last 72 hrs:  (P) [id number]    Patient states she has not been taking insulin at home because she does not have a way to check her blood sugar.    Plan:  SSI  Continue monitoring glucose trends    Hypothyroidism  Assessment & Plan  On levothyroxine 112 mcg, TSH returned 0.6.    Hyperlipidemia  Assessment & Plan  On pravistatin 40 q24h    * Near syncope  Assessment & Plan  Patient reports episode of near-syncope day of arrival with lightheadedness upon standing.  Recent hospitalization and abx use.  On presentation, BP 90s/50s. When patient stood up and assisted to bathroom, BP dropped to 70s systolics and had near-syncopal episode. K 2.7. Chloride 110. WBC 8. UA  negative.  CT a/p showed colonic diverticulosis with residual bowel wall thickening of the sigmoid colon and minimal adjacent fluid in the left paracolic gutter, improved from 5/15/2024 in the area of previously identified diverticulitis as well as hepatic cirrhosis.  Suspect orthostatic hypotension in setting of enteritis.    Plan:  C diff and stool panel ordered  Orthostatic pressures ordered  Continue to monitor pressures  Trend WBC and fever curves  Holding antihypertensives at this time  Add fluids as needed        Disposition: Waiting placement from case management then ready for d/c      SUBJECTIVE     Patient seen and examined. Patient states she feels good today and has no complaints. She denies SOB, CP, dizziness, fevers/chills, or headaches. All other ROS negative.     OBJECTIVE     Vitals:    24 1517 24 2204 24 2204 24 0716   BP: 97/55 106/62 106/62 107/64   BP Location: Left arm  Left arm    Pulse: 96 101 91 95   Resp: 20  19 16   Temp: 98 °F (36.7 °C) 98.1 °F (36.7 °C) 98.1 °F (36.7 °C) 98.2 °F (36.8 °C)   TempSrc: Oral  Oral    SpO2: 98% 96% 97% (!) 89%   Weight:       Height:          Temperature:   Temp (24hrs), Av.1 °F (36.7 °C), Min:98 °F (36.7 °C), Max:98.2 °F (36.8 °C)    Temperature: 98.2 °F (36.8 °C)  Intake & Output:  I/O          07 07 07 07 0701   07    P.O. 120      I.V. (mL/kg)       IV Piggyback 335.4      Total Intake(mL/kg) 455.4 (4)      Net +455.4                   Weights:   IBW (Ideal Body Weight): 64.79 kg    Body mass index is 38.11 kg/m².  Weight (last 2 days)       Date/Time Weight    24 0600 115 (253.53)    24 0600 115 (253.53)          Physical Exam  Constitutional:       General: She is not in acute distress.     Appearance: Normal appearance. She is obese. She is not ill-appearing or toxic-appearing.   HENT:      Head: Normocephalic and atraumatic.   Cardiovascular:      Rate and Rhythm:  Normal rate and regular rhythm.   Pulmonary:      Effort: Pulmonary effort is normal.      Breath sounds: Normal breath sounds.   Abdominal:      General: There is no distension.      Palpations: Abdomen is soft.      Tenderness: There is no abdominal tenderness. There is no guarding.   Skin:     General: Skin is warm and dry.   Neurological:      General: No focal deficit present.      Mental Status: She is alert and oriented to person, place, and time.   Psychiatric:         Mood and Affect: Mood normal.         Behavior: Behavior normal.     LABORATORY DATA     Labs: I have personally reviewed pertinent reports.  Results from last 7 days   Lab Units 06/05/24  0630 06/04/24  0404 06/03/24  1812   WBC Thousand/uL 5.13 8.01 8.09   HEMOGLOBIN g/dL 9.7* 10.4* 11.0*   HEMATOCRIT % 31.7* 34.2* 36.1   PLATELETS Thousands/uL 88* 98* 106*   SEGS PCT %  --  57 57   MONO PCT %  --  9 12   EOS PCT %  --  2 2      Results from last 7 days   Lab Units 06/05/24  0630 06/04/24  1504 06/04/24  0404 06/03/24  1812   POTASSIUM mmol/L 3.4* 3.7 2.5* 2.7*   CHLORIDE mmol/L 110* 110* 106 110*   CO2 mmol/L 23 21 23 23   BUN mg/dL 8 8 8 8   CREATININE mg/dL 0.86 0.84 0.89 0.83   CALCIUM mg/dL 8.5 8.8 8.1* 8.6   ALK PHOS U/L  --   --   --  58   ALT U/L  --   --   --  16   AST U/L  --   --   --  42*     Results from last 7 days   Lab Units 06/05/24  0630 06/04/24  0936   MAGNESIUM mg/dL 2.0 1.5*                        IMAGING & DIAGNOSTIC TESTING     Radiology Results: I have personally reviewed pertinent reports.  XR chest 2 views    Result Date: 6/4/2024  Impression: Mild diffuse interstitial thickening. No focal consolidation or pleural effusion. Workstation performed: [id number]     CT abdomen pelvis with contrast    Result Date: 6/3/2024  Impression: Colonic diverticulosis with residual bowel wall thickening of the sigmoid colon and minimal adjacent fluid in the left paracolic gutter, improved from 5/15/2024 in the area of previously  identified diverticulitis. Nonemergent colonoscopy is recommended to exclude underlying mass lesion mimicking diverticulitis. Partially imaged groundglass opacities at the lung bases, improved from prior. Findings again suspicious for hepatic cirrhosis. See full report for additional findings. Workstation performed: [id number]     Other Diagnostic Testing: I have personally reviewed pertinent reports.    ACTIVE MEDICATIONS     Current Facility-Administered Medications   Medication Dose Route Frequency    albuterol (PROVENTIL HFA,VENTOLIN HFA) inhaler 2 puff  2 puff Inhalation Q6H PRN    albuterol inhalation solution 2.5 mg  2.5 mg Nebulization Q6H PRN    budesonide (PULMICORT) inhalation solution 0.5 mg  0.5 mg Nebulization BID    buPROPion (WELLBUTRIN XL) 24 hr tablet 300 mg  300 mg Oral QAM    diazepam (VALIUM) tablet 5 mg  5 mg Oral HS PRN    enoxaparin (LOVENOX) subcutaneous injection 40 mg  40 mg Subcutaneous Daily    ergocalciferol (VITAMIN D2) capsule 50,000 Units  50,000 Units Oral Q14 Days    famotidine (PEPCID) tablet 40 mg  40 mg Oral HS    ferrous sulfate tablet 325 mg  325 mg Oral Daily With Breakfast    folic acid (FOLVITE) tablet 1 mg  1 mg Oral Daily    gabapentin (NEURONTIN) capsule 100 mg  100 mg Oral Daily    ipratropium (ATROVENT) 0.02 % inhalation solution 0.5 mg  0.5 mg Nebulization TID    levalbuterol (XOPENEX) inhalation solution 1.25 mg  1.25 mg Nebulization TID    levothyroxine tablet 112 mcg  112 mcg Oral Daily    nicotine (NICODERM CQ) 21 mg/24 hr TD 24 hr patch 1 patch  1 patch Transdermal Q24H    ondansetron (ZOFRAN-ODT) dispersible tablet 4 mg  4 mg Oral Q8H PRN    oxyCODONE-acetaminophen (PERCOCET) 5-325 mg per tablet 1 tablet  1 tablet Oral Q6H PRN    pantoprazole (PROTONIX) EC tablet 40 mg  40 mg Oral BID AC    polyethylene glycol (MIRALAX) packet 17 g  17 g Oral Daily PRN    potassium chloride (Klor-Con M20) CR tablet 40 mEq  40 mEq Oral Once    pravastatin (PRAVACHOL) tablet 40 mg   "40 mg Oral Daily With Dinner    senna (SENOKOT) tablet 8.6 mg  1 tablet Oral Daily    sertraline (ZOLOFT) tablet 100 mg  100 mg Oral BID    sodium chloride (OCEAN) 0.65 % nasal spray 1 spray  1 spray Each Nare Q1H PRN    sucralfate (CARAFATE) tablet 1 g  1 g Oral HS       VTE Pharmacologic Prophylaxis: Enoxaparin (Lovenox)  VTE Mechanical Prophylaxis: sequential compression device    Portions of the record may have been created with voice recognition software.  Occasional wrong word or \"sound a like\" substitutions may have occurred due to the inherent limitations of voice recognition software.  Read the chart carefully and recognize, using context, where substitutions have occurred.  ==  [facility]  Internal Medicine Residency PGY-***     "

## 2024-06-06 NOTE — PLAN OF CARE
Problem: PAIN - ADULT  Goal: Verbalizes/displays adequate comfort level or baseline comfort level  Description: Interventions:  - Encourage patient to monitor pain and request assistance  - Assess pain using appropriate pain scale  - Administer analgesics based on type and severity of pain and evaluate response  - Implement non-pharmacological measures as appropriate and evaluate response  - Consider cultural and social influences on pain and pain management  - Notify physician/advanced practitioner if interventions unsuccessful or patient reports new pain  Outcome: Progressing     Problem: INFECTION - ADULT  Goal: Absence or prevention of progression during hospitalization  Description: INTERVENTIONS:  - Assess and monitor for signs and symptoms of infection  - Monitor lab/diagnostic results  - Monitor all insertion sites, i.e. indwelling lines, tubes, and drains  - Monitor endotracheal if appropriate and nasal secretions for changes in amount and color  - Lee appropriate cooling/warming therapies per order  - Administer medications as ordered  - Instruct and encourage patient and family to use good hand hygiene technique  - Identify and instruct in appropriate isolation precautions for identified infection/condition  Outcome: Progressing  Goal: Absence of fever/infection during neutropenic period  Description: INTERVENTIONS:  - Monitor WBC    Outcome: Progressing     Problem: SAFETY ADULT  Goal: Patient will remain free of falls  Description: INTERVENTIONS:  - Educate patient/family on patient safety including physical limitations  - Instruct patient to call for assistance with activity   - Consult OT/PT to assist with strengthening/mobility   - Keep Call bell within reach  - Keep bed low and locked with side rails adjusted as appropriate  - Keep care items and personal belongings within reach  - Initiate and maintain comfort rounds  - Make Fall Risk Sign visible to staff  - Apply yellow socks and bracelet  for high fall risk patients  - Consider moving patient to room near nurses station  Outcome: Progressing  Goal: Maintain or return to baseline ADL function  Description: INTERVENTIONS:  -  Assess patient's ability to carry out ADLs; assess patient's baseline for ADL function and identify physical deficits which impact ability to perform ADLs (bathing, care of mouth/teeth, toileting, grooming, dressing, etc.)  - Assess/evaluate cause of self-care deficits   - Assess range of motion  - Assess patient's mobility; develop plan if impaired  - Assess patient's need for assistive devices and provide as appropriate  - Encourage maximum independence but intervene and supervise when necessary  - Involve family in performance of ADLs  - Assess for home care needs following discharge   - Consider OT consult to assist with ADL evaluation and planning for discharge  - Provide patient education as appropriate  Outcome: Progressing  Goal: Maintains/Returns to pre admission functional level  Description: INTERVENTIONS:  - Perform AM-PAC 6 Click Basic Mobility/ Daily Activity assessment daily.  - Set and communicate daily mobility goal to care team and patient/family/caregiver.   - Collaborate with rehabilitation services on mobility goals if consulted  - Out of bed for toileting  - Record patient progress and toleration of activity level   Outcome: Progressing     Problem: DISCHARGE PLANNING  Goal: Discharge to home or other facility with appropriate resources  Description: INTERVENTIONS:  - Identify barriers to discharge w/patient and caregiver  - Arrange for needed discharge resources and transportation as appropriate  - Identify discharge learning needs (meds, wound care, etc.)  - Arrange for interpretive services to assist at discharge as needed  - Refer to Case Management Department for coordinating discharge planning if the patient needs post-hospital services based on physician/advanced practitioner order or complex needs  related to functional status, cognitive ability, or social support system  Outcome: Progressing     Problem: Knowledge Deficit  Goal: Patient/family/caregiver demonstrates understanding of disease process, treatment plan, medications, and discharge instructions  Description: Complete learning assessment and assess knowledge base.  Interventions:  - Provide teaching at level of understanding  - Provide teaching via preferred learning methods  Outcome: Progressing     Problem: RESPIRATORY - ADULT  Goal: Achieves optimal ventilation and oxygenation  Description: INTERVENTIONS:  - Assess for changes in respiratory status  - Assess for changes in mentation and behavior  - Position to facilitate oxygenation and minimize respiratory effort  - Oxygen administered by appropriate delivery if ordered  - Initiate smoking cessation education as indicated  - Encourage broncho-pulmonary hygiene including cough, deep breathe, Incentive Spirometry  - Assess the need for suctioning and aspirate as needed  - Assess and instruct to report SOB or any respiratory difficulty  - Respiratory Therapy support as indicated  Outcome: Progressing

## 2024-06-07 ENCOUNTER — PATIENT OUTREACH (OUTPATIENT)
Dept: INTERNAL MEDICINE CLINIC | Facility: OTHER | Age: 54
End: 2024-06-07

## 2024-06-07 NOTE — PROGRESS NOTES
ADT alert rcvd pt was dc to [facility] in [place].  OP CM will close case at this time.  Please consult when pt returns home for any psychosocial needs.

## 2024-06-07 NOTE — PROGRESS NOTES
ADT alert received that pt was d/c'd from [facility] 6/3-6/6 for near syncopal episode to [facility]. Will hand off to   to follow while at [facility].

## 2024-06-10 ENCOUNTER — PATIENT OUTREACH (OUTPATIENT)
Dept: CASE MANAGEMENT | Facility: OTHER | Age: 54
End: 2024-06-10

## 2024-06-10 LAB
ATRIAL RATE: 103 BPM
P AXIS: 53 DEGREES
PR INTERVAL: 152 MS
QRS AXIS: 81 DEGREES
QRSD INTERVAL: 100 MS
QT INTERVAL: 308 MS
QTC INTERVAL: 403 MS
T WAVE AXIS: -25 DEGREES
VENTRICULAR RATE: 103 BPM

## 2024-06-10 PROCEDURE — 93010 ELECTROCARDIOGRAM REPORT: CPT | Performed by: INTERNAL MEDICINE

## 2024-06-10 NOTE — PROGRESS NOTES
Tammie received from OPCM the patient Admitted 6/3/24 to [facility]. Discharged 6/6/24 to [facility]. Email sent to facility to inform them I will be following the patient during their skilled stay.  This Admin Coordinator will continue to monitor via chart review.

## 2024-06-12 ENCOUNTER — TELEPHONE (OUTPATIENT)
Dept: HEMATOLOGY ONCOLOGY | Facility: CLINIC | Age: 54
End: 2024-06-12

## 2024-06-12 NOTE — TELEPHONE ENCOUNTER
Let message that she missed her appointment with dr weldon today,provided the hopeline number to reschedule

## 2024-06-13 ENCOUNTER — PRE-ADMISSION TESTING (OUTPATIENT)
Dept: PREADMISSION TESTING | Facility: HOSPITAL | Age: 54
End: 2024-06-13
Payer: COMMERCIAL

## 2024-06-13 VITALS — WEIGHT: 179.45 LBS | BODY MASS INDEX: 31.8 KG/M2 | HEIGHT: 63 IN

## 2024-06-13 LAB
DEPRECATED RDW RBC AUTO: 41.4 FL (ref 37–54)
ERYTHROCYTE [DISTWIDTH] IN BLOOD BY AUTOMATED COUNT: 12.6 % (ref 12.3–15.4)
HCT VFR BLD AUTO: 44 % (ref 34–46.6)
HGB BLD-MCNC: 14.6 G/DL (ref 12–15.9)
MCH RBC QN AUTO: 29.8 PG (ref 26.6–33)
MCHC RBC AUTO-ENTMCNC: 33.2 G/DL (ref 31.5–35.7)
MCV RBC AUTO: 89.8 FL (ref 79–97)
PLATELET # BLD AUTO: 319 10*3/MM3 (ref 140–450)
PMV BLD AUTO: 10 FL (ref 6–12)
RBC # BLD AUTO: 4.9 10*6/MM3 (ref 3.77–5.28)
WBC NRBC COR # BLD AUTO: 6.84 10*3/MM3 (ref 3.4–10.8)

## 2024-06-13 PROCEDURE — 36415 COLL VENOUS BLD VENIPUNCTURE: CPT

## 2024-06-13 PROCEDURE — 85027 COMPLETE CBC AUTOMATED: CPT

## 2024-06-13 NOTE — PAT
An arrival time for procedure was not provided during PAT visit. If patient had any questions or concerns about their arrival time, they were instructed to contact their surgeon/physician.  Additionally, if the patient referred to an arrival time that was acquired from their my chart account, patient was encouraged to verify that time with their surgeon/physician. Arrival times are NOT provided in Pre Admission Testing Department.    Patient to apply Chlorhexadine wipes  to surgical area (as instructed) the night before procedure and the AM of procedure. Wipes provided.    Patient viewed general PAT education video as instructed in their preoperative information received from their surgeon.  Patient stated the general PAT education video was viewed in its entirety and survey completed.  Copies of PAT general education handouts (Incentive Spirometry, Meds to Beds Program, Patient Belongings, Pre-op skin preparation instructions, Blood Glucose testing, Visitor policy, Surgery FAQ, Code H) distributed to patient if not printed. Education related to the PAT pass and skin preparation for surgery (if applicable) completed in PAT as a reinforcement to PAT education video. Patient instructed to return PAT pass provided today as well as completed skin preparation sheet (if applicable) on the day of procedure.     Additionally if patient had not viewed video yet but intended to view it at home or in our waiting area, then referred them to the handout with QR code/link provided during PAT visit.  Instructed patient to complete survey after viewing the video in its entirety.  Encouraged patient/family to read PAT general education handouts thoroughly and notify PAT staff with any questions or concerns. Patient verbalized understanding of all information and priority content.

## 2024-06-15 ENCOUNTER — HOSPITAL ENCOUNTER (EMERGENCY)
Facility: HOSPITAL | Age: 54
Discharge: HOME | End: 2024-06-15
Attending: EMERGENCY MEDICINE
Payer: MEDICARE

## 2024-06-15 ENCOUNTER — APPOINTMENT (EMERGENCY)
Dept: NON INVASIVE DIAGNOSTICS | Facility: HOSPITAL | Age: 54
End: 2024-06-15
Payer: MEDICARE

## 2024-06-15 VITALS
TEMPERATURE: 98.2 F | DIASTOLIC BLOOD PRESSURE: 75 MMHG | RESPIRATION RATE: 14 BRPM | SYSTOLIC BLOOD PRESSURE: 123 MMHG | HEART RATE: 94 BPM | OXYGEN SATURATION: 92 %

## 2024-06-15 DIAGNOSIS — L03.90 CELLULITIS: Primary | ICD-10-CM

## 2024-06-15 LAB
ATRIAL RATE: 95 BPM
P AXIS: 57 DEGREES
PR INTERVAL: 162 MS
QRS AXIS: 65 DEGREES
QRSD INTERVAL: 106 MS
QT INTERVAL: 424 MS
QTC INTERVAL: 532 MS
T WAVE AXIS: 16 DEGREES
VENTRICULAR RATE: 95 BPM

## 2024-06-15 PROCEDURE — 93971 EXTREMITY STUDY: CPT

## 2024-06-15 PROCEDURE — 93971 EXTREMITY STUDY: CPT | Performed by: SURGERY

## 2024-06-15 PROCEDURE — 99283 EMERGENCY DEPT VISIT LOW MDM: CPT

## 2024-06-15 PROCEDURE — 99284 EMERGENCY DEPT VISIT MOD MDM: CPT | Performed by: EMERGENCY MEDICINE

## 2024-06-15 PROCEDURE — 93005 ELECTROCARDIOGRAM TRACING: CPT

## 2024-06-15 PROCEDURE — 93010 ELECTROCARDIOGRAM REPORT: CPT | Performed by: INTERNAL MEDICINE

## 2024-06-15 RX ORDER — CEPHALEXIN 500 MG/1
500 CAPSULE ORAL EVERY 6 HOURS SCHEDULED
Qty: 28 CAPSULE | Refills: 0 | Status: SHIPPED | OUTPATIENT
Start: 2024-06-15 | End: 2024-06-15

## 2024-06-15 RX ORDER — CEPHALEXIN 500 MG/1
500 CAPSULE ORAL EVERY 6 HOURS SCHEDULED
Qty: 28 CAPSULE | Refills: 0 | Status: SHIPPED | OUTPATIENT
Start: 2024-06-15 | End: 2024-06-22

## 2024-06-15 RX ORDER — CEPHALEXIN 500 MG/1
500 CAPSULE ORAL ONCE
Status: COMPLETED | OUTPATIENT
Start: 2024-06-15 | End: 2024-06-15

## 2024-06-15 RX ADMIN — CEPHALEXIN 500 MG: 500 CAPSULE ORAL at 05:35

## 2024-06-15 NOTE — ED ATTENDING ATTESTATION
6/15/2024  I, Sigifredo Hurst DO, saw and evaluated the patient. I have discussed the patient with the resident/non-physician practitioner and agree with the resident's/non-physician practitioner's findings, Plan of Care, and MDM as documented in the resident's/non-physician practitioner's note, except where noted. All available labs and Radiology studies were reviewed.  I was present for key portions of any procedure(s) performed by the resident/non-physician practitioner and I was immediately available to provide assistance.       At this point I agree with the current assessment done in the Emergency Department.  I have conducted an independent evaluation of this patient a history and physical is as follows:    Patient is a 83-year-old female with a history of thrombocytopenia, hyperlipidemia, diabetes, O2 dependent emphysema, brought in by EMS from her rehab facility.  For the past 2 days she has noticed some redness to her left lower leg, no trauma, no fever, no chills.  She was hospitalized [facility] through June 6, 2024 for lightheadedness, multiple episodes of watery diarrhea and nausea.  She improved with symptomatic management, physical therapy recommended PT to go for short-term rehab.  She says she has some baseline shortness of breath which is normal for her due to her lung problems.  No chest pain, no palpitations, no fall or trauma.  EMS indicates patient remained stable during transport.    General:  Patient is well-appearing  Head:  Atraumatic  Eyes:  Conjunctiva pink  ENT:  Mucous membranes are moist  Neck:  Supple  Cardiac:  S1-S2, without murmurs  Lungs: Trace end expiratory wheeze, no rales, no rhonchi, no accessory muscle usage  Abdomen:  Soft, nontender, normal bowel sounds, no CVA tenderness, no tympany, no rigidity, no guarding  Extremities: She has mild left calf and ankle macular erythema, no sloughing of the skin, no crepitus.  She has slight bilateral lower extremity nonpitting edema but  no calf asymmetry.  Neurologic:  Awake, fluent speech, normal comprehension. AAOx3.   Skin:  Pink warm and dry  Psychiatric:  Alert, pleasant, cooperative        ED Course     Patient presents with left lower leg erythema, most likely cellulitis, but will obtain venous Doppler to evaluate the possibility of DVT.    On patient's most recent labs June 6, she does have chronic thrombocytopenia which appear to be stable as well as a mild chronic anemia.  And normal renal function.  If her Doppler is negative, would discharge patient with prescription for Keflex.  If Doppler positive, would discharge with oral anticoagulation. Signed out to Dr. Amato    MEDICAL DECISION MAKING CODING    COLLECTION AND INTERPRETATION OF DATA  I reviewed prior external notes, including dc summary as noted above        Critical Care Time  Procedures

## 2024-06-15 NOTE — ED NOTES
BLS transport requested for patient at this time- O2 required for transport     Jeannie Avalos RN  06/15/24 [id number]

## 2024-06-15 NOTE — ED PROVIDER NOTES
History  Chief Complaint   Patient presents with    Leg Pain     Pt arriving from [facility], c/o LLE swelling and pain, warmth on palpation and redness for about 2 weeks.     53-year-old female presents from [facility] over concern of right leg pain, swelling, erythema.  She states her leg has been feeling warm for the last few days however has progressively worsened.  She seems to have an overall sedentary life however does participate in physical therapy.  She was recently hospitalized for amatory dysfunction and CHF.  She denies overall systemic symptoms such as fever or chills.        Prior to Admission Medications   Prescriptions Last Dose Informant Patient Reported? Taking?   Insulin Pen Needle 31G X 5 MM MISC   No No   Sig: Inject under the skin if needed (Insuline injections)   Patient not taking: Reported on 6/3/2024   albuterol (PROVENTIL HFA,VENTOLIN HFA) 90 mcg/act inhaler   No No   Sig: Inhale 2 puffs every 6 (six) hours as needed for wheezing   buPROPion (WELLBUTRIN XL) 300 mg 24 hr tablet   No No   Sig: Take 1 tablet (300 mg total) by mouth every morning   budesonide (PULMICORT) 0.5 mg/2 mL nebulizer solution   Yes No   Sig: Take 0.5 mg by nebulization 2 (two) times a day Rinse mouth after use.   diazepam (VALIUM) 5 mg tablet   No No   Sig: Take 1 tablet (5 mg total) by mouth daily at bedtime as needed for anxiety   Patient taking differently: Take 5 mg by mouth daily at bedtime as needed for anxiety   ergocalciferol (VITAMIN D2) 50,000 units  Self No No   Sig: Take 1 capsule (50,000 Units total) by mouth every 14 (fourteen) days   famotidine (PEPCID) 40 MG tablet   No No   Sig: Take 1 tablet (40 mg total) by mouth daily at bedtime Take in evening 2 hours prior to bed   ferrous sulfate 324 (65 Fe) mg   No No   Sig: Take 1 tablet (324 mg total) by mouth daily before breakfast   folic acid (FOLVITE) 1 mg tablet   No No   Sig: Take 1 tablet (1 mg total) by mouth daily   furosemide (LASIX) 40 mg tablet    No No   Sig: Take 1 tablet (40 mg total) by mouth daily   gabapentin (NEURONTIN) 100 mg capsule  Self Yes No   Sig: Take 100 mg by mouth every 8 (eight) hours   ipratropium-albuterol (DUO-NEB) 0.5-2.5 mg/3 mL nebulizer solution   No No   Sig: Take 3 mL by nebulization 4 (four) times a day   lactulose (CHRONULAC) 10 g/15 mL solution   Yes No   Sig: Take 20 g by mouth Daily or as needed for bowl movement   levothyroxine 112 mcg tablet   No No   Sig: Take 1 tablet (112 mcg total) by mouth daily   nicotine (NICODERM CQ) 21 mg/24 hr TD 24 hr patch   No No   Sig: Place 1 patch on the skin over 24 hours every 24 hours   omeprazole (PriLOSEC) 40 MG capsule   No No   Sig: take 1 capsule by mouth twice a day   ondansetron (ZOFRAN) 4 mg tablet   No No   Sig: Take 1 tablet (4 mg total) by mouth every 8 (eight) hours as needed for nausea or vomiting   oxyCODONE-acetaminophen (PERCOCET)  mg per tablet  Self Yes No   Sig: Take 1 tablet by mouth every 6 (six) hours as needed for severe pain   oxygen gas   Yes No   Sig: Inhale 2 L/min continuous. May use 3L with exertion per Bella ALVAREZ  Keep sat >88%  Indications: low saturation   sertraline (ZOLOFT) 100 mg tablet   No No   Sig: take 1 tablet by mouth twice a day   simvastatin (ZOCOR) 20 mg tablet   No No   Sig: Take 1 tablet (20 mg total) by mouth daily at bedtime   sucralfate (CARAFATE) 1 g tablet   No No   Sig: Take 1 tablet (1 g total) by mouth daily at bedtime   varenicline (CHANTIX) 0.5 mg tablet   No No   Sig: Take 1 tablet (0.5 mg total) by mouth 2 (two) times a day      Facility-Administered Medications: None       Past Medical History:   Diagnosis Date    Abnormal stress test     Acute cystitis without hematuria 05/07/2018    Acute duodenal ulcer with bleeding 01/16/2023    Allergic sinusitis     last assessed - 03Apr2017    Anemia Around december    Anxiety     last assessed - 04Mar2016    Arthritis     Asthma     last assessed - 04Mar2016    Bilateral lower  extremity edema     last assessed - 12May2017    Callus of foot     last assessed - 22Jun2016    Chest pain     Chronic GERD     last assessed - 16Jan2017    Colon polyp     Constipation     Resolved - 13Jan2017    COPD (chronic obstructive pulmonary disease) (HCC)     Depression     last assessed - 04Mar2016    Disease of thyroid gland     Diverticulitis of colon     last assessed - 04Mar2016    Dyspepsia     Resolved - [id number]    Edema, lower extremity 08/07/2020    Esophageal reflux     last assessed - 04Mar2016    Essential hypertriglyceridemia     last assessed - [id number]    Fatty liver 01/16/2023    Gastroesophageal reflux disease with esophagitis 11/18/2016    GERD (gastroesophageal reflux disease)     Gynecological disorder     last assessed - 04Mar2016    Hydroureteronephrosis 06/30/2022    Hypertension     last assessed - 04Mar2016    Hypokalemia 06/20/2022    Hypotension     last assessed - 25Aug2016    Kidney stone     Left ureteral stone with hydronephrosis 07/21/2022    Migraine     Morbid obesity (HCC) 01/11/2019    Formatting of this note might be different from the original. Added automatically from request for surgery [id number]    Neuropathy     Obesity     Other chest pain 11/08/2018    Pancreatic lesion 03/13/2023    Positive depression screening 06/19/2022    Primary hypertriglyceridemia 06/19/2022    Pulmonary emphysema (HCC)     last assessed - 04Mar2016    Seasonal allergies     Severe obesity (HCC) 01/16/2023    Skin tag 07/17/2023    SOB (shortness of breath)     Strain of groin 07/17/2023    Urinary frequency     last assessed - 22Jun2016    Vagina, candidiasis     last assessed - 22Jun2016    Very heavy cigarette smoker 01/16/2023    Visual impairment        Past Surgical History:   Procedure Laterality Date    CARPAL TUNNEL RELEASE      last assessed - 04MAr2016    CHOLECYSTECTOMY      last assessed - 04Mar2016    COLONOSCOPY      Complete colonoscopy     EYE SURGERY      last assessed -  04Mar2016    FL RETROGRADE PYELOGRAM  6/16/2022    FL RETROGRADE PYELOGRAM  8/16/2022    FOOT SURGERY      last assessed - 04Mar2016    TN CYSTO BLADDER W/URETERAL CATHETERIZATION Left 7/21/2022    Procedure: CYSTOSCOPY RETROGRADE PYELOGRAM WITH INSERTION STENT URETERAL;  Surgeon: Facundo Matamoros MD;  Location: [facility];  Service: Urology    TN CYSTO/URETERO W/LITHOTRIPSY &INDWELL STENT INSRT Left 6/16/2022    Procedure: CYSTOSCOPY URETEROSCOPY WITH LITHOTRIPSY HOLMIUM LASER, RETROGRADE PYELOGRAM AND INSERTION STENT URETERAL;  Surgeon: Sammy Ferrer MD;  Location: [facility];  Service: Urology    TN CYSTO/URETERO W/LITHOTRIPSY &INDWELL STENT INSRT Left 8/16/2022    Procedure: CYSTOSCOPY USCOPE W/HOLMIUM LASER, RETROGRADE PYELOGRAM&STENT;  Surgeon: Sudheer Bradford MD;  Location: [facility];  Service: Urology    TN ESOPHAGOGASTRODUODENOSCOPY TRANSORAL DIAGNOSTIC N/A 2/13/2017    Procedure: ESOPHAGOGASTRODUODENOSCOPY (EGD);  Surgeon: Alison Saleem MD;  Location: [facility];  Service: Gastroenterology       Family History   Problem Relation Age of Onset    Obesity Mother     Thyroid disease Mother     Cancer Mother 71    Vision loss Mother     Obesity Sister     Coronary artery disease Sister     Stroke Sister     Asthma Sister     Glaucoma Sister     No Known Problems Maternal Aunt     Diabetes Maternal Uncle     Lung cancer Maternal Grandmother     Cancer Maternal Grandfather     Diabetes Maternal Grandfather     No Known Problems Paternal Grandmother     No Known Problems Paternal Grandfather     Hypertension Other     Lung cancer Other     Hypertension Other     Lung cancer Other     Lung cancer Other      I have reviewed and agree with the history as documented.    E-Cigarette/Vaping    E-Cigarette Use Former User     Start Date 1/1/19     Quit Date 5/1/19      E-Cigarette/Vaping Substances    Nicotine Yes     THC No     CBD No     Flavoring No     Other No     Unknown No      Social History     Tobacco Use     Smoking status: Some Days     Current packs/day: 0.50     Average packs/day: 0.5 packs/day for 41.5 years (20.7 ttl pk-yrs)     Types: Cigarettes     Start date: 1/1/1983    Smokeless tobacco: Never   Vaping Use    Vaping status: Former    Start date: 1/1/2019    Quit date: 5/1/2019    Substances: Nicotine   Substance Use Topics    Alcohol use: Not Currently    Drug use: Never        Review of Systems   Skin:  Positive for color change.   All other systems reviewed and are negative.      Physical Exam  ED Triage Vitals [06/15/24 0459]   Temperature Pulse Respirations Blood Pressure SpO2   98.2 °F (36.8 °C) 99 20 118/55 92 %      Temp Source Heart Rate Source Patient Position - Orthostatic VS BP Location FiO2 (%)   Oral Monitor Lying Right arm --      Pain Score       --             Orthostatic Vital Signs  Vitals:    06/15/24 0459 06/15/24 0530   BP: 118/55 123/75   Pulse: 99 94   Patient Position - Orthostatic VS: Lying Lying       Physical Exam  Vitals and nursing note reviewed.   Constitutional:       General: She is not in acute distress.     Appearance: She is well-developed.   HENT:      Head: Normocephalic and atraumatic.   Eyes:      Conjunctiva/sclera: Conjunctivae normal.   Cardiovascular:      Rate and Rhythm: Normal rate and regular rhythm.      Heart sounds: No murmur heard.  Pulmonary:      Effort: Pulmonary effort is normal. No respiratory distress.      Breath sounds: Normal breath sounds.   Abdominal:      Palpations: Abdomen is soft.      Tenderness: There is no abdominal tenderness.   Musculoskeletal:         General: No swelling.      Cervical back: Neck supple.   Skin:     General: Skin is warm and dry.      Capillary Refill: Capillary refill takes less than 2 seconds.          Neurological:      Mental Status: She is alert.   Psychiatric:         Mood and Affect: Mood normal.         ED Medications  Medications   cephalexin (KEFLEX) capsule 500 mg (500 mg Oral Given 6/15/24 0535)        Diagnostic Studies  Results Reviewed       None                   VAS lower limb venous duplex study, unilateral/limited    (Results Pending)         Procedures  Procedures      ED Course  ED Course as of 06/15/24 0745   Sat Fernandez 15, 2024   0532 Left leg redness and warmth.  Concern for cellulitis.  Will pursue duplex study to rule out DVT   0714 No DVT                             SBIRT 20yo+      Flowsheet Row Most Recent Value   Initial Alcohol Screen: US AUDIT-C     1. How often do you have a drink containing alcohol? 0 Filed at: 06/15/2024 0501   2. How many drinks containing alcohol do you have on a typical day you are drinking?  0 Filed at: 06/15/2024 0501   3b. FEMALE Any Age, or MALE 65+: How often do you have 4 or more drinks on one occassion? 0 Filed at: 06/15/2024 0501   Audit-C Score 0 Filed at: 06/15/2024 0501   JEFF: How many times in the past year have you...    Used an illegal drug or used a prescription medication for non-medical reasons? Never Filed at: 06/15/2024 0501                  Medical Decision Making      DDx: Cellulitis, DVT, doubt acute heart failure, venous stasis ulcer, lymphedema    Plan: Antibiotics and DVT ultrasound scan given patient's poor sedentary lifestyle and obesity    Ultrasound scan negative.  Patient will be treated with Keflex 4 times daily for 1 week for cellulitis.  Stable for discharge home.  See if dose antibiotics in emergency department.      Risk  Prescription drug management.          Disposition  Final diagnoses:   Cellulitis     Time reflects when diagnosis was documented in both MDM as applicable and the Disposition within this note       Time User Action Codes Description Comment    6/15/2024  6:49 AM Rashdi Bradford Add [L03.90] Cellulitis           ED Disposition       ED Disposition   Discharge    Condition   Stable    Date/Time   Sat Fernandez 15, 2024 0719    Comment   Chely Ruvalcaba discharge to home/self care.                   Follow-up Information        Follow up With Specialties Details Why Contact Info    Andi Mason DO Family Medicine   [address] [place] [address]  [phone]              Current Discharge Medication List        START taking these medications    Details   cephalexin (KEFLEX) 500 mg capsule Take 1 capsule (500 mg total) by mouth every 6 (six) hours for 7 days  Qty: 28 capsule, Refills: 0    Associated Diagnoses: Cellulitis           CONTINUE these medications which have NOT CHANGED    Details   albuterol (PROVENTIL HFA,VENTOLIN HFA) 90 mcg/act inhaler Inhale 2 puffs every 6 (six) hours as needed for wheezing  Qty: 18 g, Refills: 1    Comments: Substitution to a formulary equivalent within the same pharmaceutical class is authorized.  Associated Diagnoses: Viral URI with cough      budesonide (PULMICORT) 0.5 mg/2 mL nebulizer solution Take 0.5 mg by nebulization 2 (two) times a day Rinse mouth after use.      buPROPion (WELLBUTRIN XL) 300 mg 24 hr tablet Take 1 tablet (300 mg total) by mouth every morning  Qty: 90 tablet, Refills: 3    Associated Diagnoses: Recurrent major depressive disorder, in partial remission (HCC); Recurrent major depressive disorder, remission status unspecified (HCC)      diazepam (VALIUM) 5 mg tablet Take 1 tablet (5 mg total) by mouth daily at bedtime as needed for anxiety  Qty: 30 tablet, Refills: 0    Associated Diagnoses: Anxiety      ergocalciferol (VITAMIN D2) 50,000 units Take 1 capsule (50,000 Units total) by mouth every 14 (fourteen) days  Qty: 12 capsule, Refills: 1    Associated Diagnoses: Vitamin D deficiency      famotidine (PEPCID) 40 MG tablet Take 1 tablet (40 mg total) by mouth daily at bedtime Take in evening 2 hours prior to bed  Qty: 90 tablet, Refills: 1    Associated Diagnoses: Duodenal ulcer      ferrous sulfate 324 (65 Fe) mg Take 1 tablet (324 mg total) by mouth daily before breakfast  Qty: 30 tablet, Refills: 3    Associated Diagnoses: Iron deficiency anemia secondary to  inadequate dietary iron intake; Edema, lower extremity      folic acid (FOLVITE) 1 mg tablet Take 1 tablet (1 mg total) by mouth daily  Qty: 90 tablet, Refills: 1    Associated Diagnoses: Folic acid deficiency      furosemide (LASIX) 40 mg tablet Take 1 tablet (40 mg total) by mouth daily  Qty: 180 tablet, Refills: 1    Associated Diagnoses: Acute diastolic congestive heart failure (HCC)      gabapentin (NEURONTIN) 100 mg capsule Take 100 mg by mouth every 8 (eight) hours  Refills: 0      Insulin Pen Needle 31G X 5 MM MISC Inject under the skin if needed (Insuline injections)  Qty: 100 each, Refills: 3    Associated Diagnoses: Diabetes mellitus type 2, insulin dependent (HCC)      ipratropium-albuterol (DUO-NEB) 0.5-2.5 mg/3 mL nebulizer solution Take 3 mL by nebulization 4 (four) times a day  Qty: 360 mL, Refills: 3    Associated Diagnoses: Centrilobular emphysema (HCC)      lactulose (CHRONULAC) 10 g/15 mL solution Take 20 g by mouth Daily or as needed for bowl movement      levothyroxine 112 mcg tablet Take 1 tablet (112 mcg total) by mouth daily  Qty: 90 tablet, Refills: 1    Associated Diagnoses: Acquired hypothyroidism      nicotine (NICODERM CQ) 21 mg/24 hr TD 24 hr patch Place 1 patch on the skin over 24 hours every 24 hours  Qty: 28 patch, Refills: 5    Associated Diagnoses: Tobacco abuse disorder      omeprazole (PriLOSEC) 40 MG capsule take 1 capsule by mouth twice a day  Qty: 60 capsule, Refills: 5    Associated Diagnoses: Gastric ulcer with hemorrhage, unspecified chronicity      ondansetron (ZOFRAN) 4 mg tablet Take 1 tablet (4 mg total) by mouth every 8 (eight) hours as needed for nausea or vomiting  Qty: 20 tablet, Refills: 0    Associated Diagnoses: Nausea      oxyCODONE-acetaminophen (PERCOCET)  mg per tablet Take 1 tablet by mouth every 6 (six) hours as needed for severe pain      oxygen gas Inhale 2 L/min continuous. May use 3L with exertion per Bella ALVAREZ  Keep sat >88%   Indications: low saturation    Comments: patient adjusts to 3L as needed for SOB      sertraline (ZOLOFT) 100 mg tablet take 1 tablet by mouth twice a day  Qty: 180 tablet, Refills: 1    Associated Diagnoses: Recurrent major depressive disorder, in partial remission (HCC)      simvastatin (ZOCOR) 20 mg tablet Take 1 tablet (20 mg total) by mouth daily at bedtime  Qty: 90 tablet, Refills: 1    Associated Diagnoses: Hypercholesterolemia      sucralfate (CARAFATE) 1 g tablet Take 1 tablet (1 g total) by mouth daily at bedtime  Qty: 60 tablet, Refills: 3    Associated Diagnoses: Gastroesophageal reflux disease without esophagitis      varenicline (CHANTIX) 0.5 mg tablet Take 1 tablet (0.5 mg total) by mouth 2 (two) times a day  Qty: 60 tablet, Refills: 0    Associated Diagnoses: Tobacco abuse disorder           No discharge procedures on file.    PDMP Review         Value Time User    PDMP Reviewed  Yes 5/21/2024  6:15 PM Varsha Valladares MD             ED Provider  Attending physically available and evaluated Chely Ruvalcaba. I managed the patient along with the ED Attending.    Electronically Signed by           Rashid Bradford DO  06/15/24 0745

## 2024-06-17 ENCOUNTER — TELEPHONE (OUTPATIENT)
Dept: INTERNAL MEDICINE CLINIC | Age: 54
End: 2024-06-17

## 2024-06-17 ENCOUNTER — PATIENT OUTREACH (OUTPATIENT)
Dept: CASE MANAGEMENT | Facility: OTHER | Age: 54
End: 2024-06-17

## 2024-06-17 NOTE — LETTER
[facility] [place]  [address]  [address]  [place] [address]    Date: 06/20/24    Chely Ruvalcaba  [address]  [place] [address]    Dear Chely:                                                                                                                                Thank you for choosing [facility] emergency department for care.  Your primary care provider wants to make sure that your ongoing medical care is being addressed. If you require follow up care as a result of your emergency department visit, there are a few things the practice would like you to know.                As part of the network's continuing commitment to caring for our patients, we have added more same day appointments and have extended office hours to meet your medical needs. After hours, on-call physicians are available via your primary care provider's main office line.               We encourage you to contact our office prior to seeking treatment to discuss your symptoms with the medical staff.  Together, we can determine the correct course of action.  A majority of non-emergent conditions such as: common cold, flu-like symptoms, fevers, strains/sprains, dislocations, minor burns, cuts and animal bites can be treated at [facility] facilities. Diagnostic testing is available at some sites.               Of course, if you are experiencing a life threatening medical emergency call 911 or proceed directly to the nearest emergency room.    Your nearest [facility] facility is conveniently located at:    [address] [address]  [phone]  SKIP THE WAIT  Conveniently offered at most [facility] locations  Owego your spot online at [email or web address] or on the [facility]    Sincerely,    [facility] [place]  Dept: [phone]

## 2024-06-17 NOTE — PROGRESS NOTES
Chart review completed.  Email sent to facility requesting update on patient.   This care manager assistant will continue to monitor via chart review throughout SNF/STR Surveillance episode.   Update received the patient continues with therapy LCD of 7/3/24.

## 2024-06-17 NOTE — TELEPHONE ENCOUNTER
06/17/24 1:07 PM    Patient contacted post ED visit, first outreach attempt made. Message was left for patient to return a call to the VBI Department at [facility]: Phone [phone].    Thank you.  Matias Plata  [facility]

## 2024-06-18 NOTE — TELEPHONE ENCOUNTER
06/18/24 2:31 PM    Patient contacted post ED visit, second outreach attempt made. Message was left for patient to return a call to the VBI Department at [facility]: Phone [phone].    Thank you.  Matias Plata  [facility]

## 2024-06-20 ENCOUNTER — ANESTHESIA EVENT (OUTPATIENT)
Dept: PERIOP | Facility: HOSPITAL | Age: 54
End: 2024-06-20
Payer: COMMERCIAL

## 2024-06-20 PROBLEM — J18.9 BILATERAL PNEUMONIA: Status: RESOLVED | Noted: 2024-04-30 | Resolved: 2024-06-20

## 2024-06-20 RX ORDER — MIDAZOLAM HYDROCHLORIDE 1 MG/ML
1 INJECTION INTRAMUSCULAR; INTRAVENOUS
Status: CANCELLED | OUTPATIENT
Start: 2024-06-20

## 2024-06-20 RX ORDER — SODIUM CHLORIDE 9 MG/ML
40 INJECTION, SOLUTION INTRAVENOUS AS NEEDED
Status: CANCELLED | OUTPATIENT
Start: 2024-06-20

## 2024-06-20 RX ORDER — SODIUM CHLORIDE 0.9 % (FLUSH) 0.9 %
10 SYRINGE (ML) INJECTION EVERY 12 HOURS SCHEDULED
Status: CANCELLED | OUTPATIENT
Start: 2024-06-20

## 2024-06-20 RX ORDER — LIDOCAINE HYDROCHLORIDE 10 MG/ML
0.5 INJECTION, SOLUTION EPIDURAL; INFILTRATION; INTRACAUDAL; PERINEURAL ONCE AS NEEDED
Status: CANCELLED | OUTPATIENT
Start: 2024-06-20

## 2024-06-20 RX ORDER — FAMOTIDINE 10 MG/ML
20 INJECTION, SOLUTION INTRAVENOUS ONCE
Status: CANCELLED | OUTPATIENT
Start: 2024-06-20 | End: 2024-06-20

## 2024-06-20 RX ORDER — SODIUM CHLORIDE, SODIUM LACTATE, POTASSIUM CHLORIDE, CALCIUM CHLORIDE 600; 310; 30; 20 MG/100ML; MG/100ML; MG/100ML; MG/100ML
9 INJECTION, SOLUTION INTRAVENOUS CONTINUOUS
Status: CANCELLED | OUTPATIENT
Start: 2024-06-20

## 2024-06-20 RX ORDER — SODIUM CHLORIDE 0.9 % (FLUSH) 0.9 %
10 SYRINGE (ML) INJECTION AS NEEDED
Status: CANCELLED | OUTPATIENT
Start: 2024-06-20

## 2024-06-20 NOTE — TELEPHONE ENCOUNTER
06/20/24 2:14 PM    Patient contacted post ED visit, phone outreaches were unsuccessful and a MyChart letter has been sent to the patient as follow-up.    Thank you.  Matias Plata [facility]

## 2024-06-21 ENCOUNTER — ANESTHESIA (OUTPATIENT)
Dept: PERIOP | Facility: HOSPITAL | Age: 54
End: 2024-06-21
Payer: COMMERCIAL

## 2024-06-21 ENCOUNTER — HOSPITAL ENCOUNTER (OUTPATIENT)
Facility: HOSPITAL | Age: 54
Discharge: HOME | End: 2024-06-22
Attending: SURGERY | Admitting: SURGERY
Payer: COMMERCIAL

## 2024-06-21 DIAGNOSIS — E04.1 THYROID NODULE: ICD-10-CM

## 2024-06-21 DIAGNOSIS — E04.2 MULTINODULAR THYROID: Primary | ICD-10-CM

## 2024-06-21 LAB
CALCIUM SPEC-SCNC: 8.7 MG/DL (ref 8.6–10.5)
PTH-INTACT SERPL-MCNC: 62 PG/ML (ref 15–65)

## 2024-06-21 PROCEDURE — 25010000002 FENTANYL CITRATE (PF) 100 MCG/2ML SOLUTION: Performed by: NURSE ANESTHETIST, CERTIFIED REGISTERED

## 2024-06-21 PROCEDURE — 82310 ASSAY OF CALCIUM: CPT | Performed by: SURGERY

## 2024-06-21 PROCEDURE — 25010000002 FENTANYL CITRATE (PF) 50 MCG/ML SOLUTION

## 2024-06-21 PROCEDURE — 25010000002 PROPOFOL 10 MG/ML EMULSION: Performed by: NURSE ANESTHETIST, CERTIFIED REGISTERED

## 2024-06-21 PROCEDURE — 25810000003 LACTATED RINGERS PER 1000 ML: Performed by: ANESTHESIOLOGY

## 2024-06-21 PROCEDURE — 25010000002 SUGAMMADEX 200 MG/2ML SOLUTION: Performed by: NURSE ANESTHETIST, CERTIFIED REGISTERED

## 2024-06-21 PROCEDURE — 25010000002 GLYCOPYRROLATE 1 MG/5ML SOLUTION: Performed by: NURSE ANESTHETIST, CERTIFIED REGISTERED

## 2024-06-21 PROCEDURE — 88307 TISSUE EXAM BY PATHOLOGIST: CPT | Performed by: SURGERY

## 2024-06-21 PROCEDURE — 25010000002 DEXAMETHASONE PER 1 MG: Performed by: NURSE ANESTHETIST, CERTIFIED REGISTERED

## 2024-06-21 PROCEDURE — 25010000002 CEFAZOLIN PER 500 MG: Performed by: SURGERY

## 2024-06-21 PROCEDURE — 83970 ASSAY OF PARATHORMONE: CPT | Performed by: SURGERY

## 2024-06-21 PROCEDURE — 25010000002 ONDANSETRON PER 1 MG: Performed by: SURGERY

## 2024-06-21 PROCEDURE — 25010000002 ONDANSETRON PER 1 MG: Performed by: NURSE ANESTHETIST, CERTIFIED REGISTERED

## 2024-06-21 PROCEDURE — S0260 H&P FOR SURGERY: HCPCS | Performed by: PHYSICIAN ASSISTANT

## 2024-06-21 PROCEDURE — 25010000002 HYDROMORPHONE 1 MG/ML SOLUTION: Performed by: SURGERY

## 2024-06-21 DEVICE — CLIP LIGAT VASC HORIZON TI MD BLU 6CT: Type: IMPLANTABLE DEVICE | Site: THYROID | Status: FUNCTIONAL

## 2024-06-21 DEVICE — ABSORBABLE HEMOSTAT (OXIDIZED REGENERATED CELLULOSE)
Type: IMPLANTABLE DEVICE | Site: NECK | Status: FUNCTIONAL
Brand: SURGICEL

## 2024-06-21 DEVICE — CLIP LIGAT VASC HORIZON TI SM YEL 6CT: Type: IMPLANTABLE DEVICE | Site: THYROID | Status: FUNCTIONAL

## 2024-06-21 RX ORDER — ONDANSETRON 2 MG/ML
INJECTION INTRAMUSCULAR; INTRAVENOUS AS NEEDED
Status: DISCONTINUED | OUTPATIENT
Start: 2024-06-21 | End: 2024-06-21 | Stop reason: SURG

## 2024-06-21 RX ORDER — ACETAMINOPHEN 325 MG/1
650 TABLET ORAL EVERY 4 HOURS PRN
Status: DISCONTINUED | OUTPATIENT
Start: 2024-06-21 | End: 2024-06-22 | Stop reason: HOSPADM

## 2024-06-21 RX ORDER — PHENYLEPHRINE HCL IN 0.9% NACL 1 MG/10 ML
SYRINGE (ML) INTRAVENOUS AS NEEDED
Status: DISCONTINUED | OUTPATIENT
Start: 2024-06-21 | End: 2024-06-21 | Stop reason: SURG

## 2024-06-21 RX ORDER — LEVOTHYROXINE SODIUM 137 UG/1
137 TABLET ORAL
Status: DISCONTINUED | OUTPATIENT
Start: 2024-06-22 | End: 2024-06-22 | Stop reason: HOSPADM

## 2024-06-21 RX ORDER — SODIUM CHLORIDE 0.9 % (FLUSH) 0.9 %
10 SYRINGE (ML) INJECTION AS NEEDED
Status: DISCONTINUED | OUTPATIENT
Start: 2024-06-21 | End: 2024-06-21 | Stop reason: HOSPADM

## 2024-06-21 RX ORDER — LIDOCAINE HYDROCHLORIDE 10 MG/ML
INJECTION, SOLUTION EPIDURAL; INFILTRATION; INTRACAUDAL; PERINEURAL AS NEEDED
Status: DISCONTINUED | OUTPATIENT
Start: 2024-06-21 | End: 2024-06-21 | Stop reason: SURG

## 2024-06-21 RX ORDER — ONDANSETRON 2 MG/ML
4 INJECTION INTRAMUSCULAR; INTRAVENOUS EVERY 6 HOURS PRN
Status: DISCONTINUED | OUTPATIENT
Start: 2024-06-21 | End: 2024-06-22 | Stop reason: HOSPADM

## 2024-06-21 RX ORDER — ONDANSETRON 4 MG/1
4 TABLET, ORALLY DISINTEGRATING ORAL EVERY 6 HOURS PRN
Status: DISCONTINUED | OUTPATIENT
Start: 2024-06-21 | End: 2024-06-22 | Stop reason: HOSPADM

## 2024-06-21 RX ORDER — SUCCINYLCHOLINE/SOD CL,ISO/PF 200MG/10ML
SYRINGE (ML) INTRAVENOUS AS NEEDED
Status: DISCONTINUED | OUTPATIENT
Start: 2024-06-21 | End: 2024-06-21 | Stop reason: SURG

## 2024-06-21 RX ORDER — FAMOTIDINE 20 MG/1
20 TABLET, FILM COATED ORAL 2 TIMES DAILY
Status: DISCONTINUED | OUTPATIENT
Start: 2024-06-21 | End: 2024-06-22 | Stop reason: HOSPADM

## 2024-06-21 RX ORDER — PROPOFOL 10 MG/ML
VIAL (ML) INTRAVENOUS AS NEEDED
Status: DISCONTINUED | OUTPATIENT
Start: 2024-06-21 | End: 2024-06-21 | Stop reason: SURG

## 2024-06-21 RX ORDER — SODIUM CHLORIDE 0.9 % (FLUSH) 0.9 %
10 SYRINGE (ML) INJECTION EVERY 12 HOURS SCHEDULED
Status: DISCONTINUED | OUTPATIENT
Start: 2024-06-21 | End: 2024-06-21 | Stop reason: HOSPADM

## 2024-06-21 RX ORDER — SODIUM CHLORIDE 9 MG/ML
40 INJECTION, SOLUTION INTRAVENOUS AS NEEDED
Status: DISCONTINUED | OUTPATIENT
Start: 2024-06-21 | End: 2024-06-21 | Stop reason: HOSPADM

## 2024-06-21 RX ORDER — MAGNESIUM HYDROXIDE 1200 MG/15ML
LIQUID ORAL AS NEEDED
Status: DISCONTINUED | OUTPATIENT
Start: 2024-06-21 | End: 2024-06-21 | Stop reason: HOSPADM

## 2024-06-21 RX ORDER — ROCURONIUM BROMIDE 10 MG/ML
INJECTION, SOLUTION INTRAVENOUS AS NEEDED
Status: DISCONTINUED | OUTPATIENT
Start: 2024-06-21 | End: 2024-06-21 | Stop reason: SURG

## 2024-06-21 RX ORDER — HYDROMORPHONE HYDROCHLORIDE 1 MG/ML
0.5 INJECTION, SOLUTION INTRAMUSCULAR; INTRAVENOUS; SUBCUTANEOUS
Status: DISCONTINUED | OUTPATIENT
Start: 2024-06-21 | End: 2024-06-21 | Stop reason: HOSPADM

## 2024-06-21 RX ORDER — DEXAMETHASONE SODIUM PHOSPHATE 4 MG/ML
INJECTION, SOLUTION INTRA-ARTICULAR; INTRALESIONAL; INTRAMUSCULAR; INTRAVENOUS; SOFT TISSUE AS NEEDED
Status: DISCONTINUED | OUTPATIENT
Start: 2024-06-21 | End: 2024-06-21 | Stop reason: SURG

## 2024-06-21 RX ORDER — FENTANYL CITRATE 50 UG/ML
INJECTION, SOLUTION INTRAMUSCULAR; INTRAVENOUS AS NEEDED
Status: DISCONTINUED | OUTPATIENT
Start: 2024-06-21 | End: 2024-06-21 | Stop reason: SURG

## 2024-06-21 RX ORDER — FENTANYL CITRATE 50 UG/ML
50 INJECTION, SOLUTION INTRAMUSCULAR; INTRAVENOUS
Status: DISCONTINUED | OUTPATIENT
Start: 2024-06-21 | End: 2024-06-21 | Stop reason: HOSPADM

## 2024-06-21 RX ORDER — DOCUSATE SODIUM 100 MG/1
100 CAPSULE, LIQUID FILLED ORAL 2 TIMES DAILY PRN
Status: DISCONTINUED | OUTPATIENT
Start: 2024-06-21 | End: 2024-06-22 | Stop reason: HOSPADM

## 2024-06-21 RX ORDER — BUPIVACAINE HYDROCHLORIDE AND EPINEPHRINE 2.5; 5 MG/ML; UG/ML
INJECTION, SOLUTION INFILTRATION; PERINEURAL AS NEEDED
Status: DISCONTINUED | OUTPATIENT
Start: 2024-06-21 | End: 2024-06-21 | Stop reason: HOSPADM

## 2024-06-21 RX ORDER — LIDOCAINE HYDROCHLORIDE 10 MG/ML
0.5 INJECTION, SOLUTION EPIDURAL; INFILTRATION; INTRACAUDAL; PERINEURAL ONCE AS NEEDED
Status: COMPLETED | OUTPATIENT
Start: 2024-06-21 | End: 2024-06-21

## 2024-06-21 RX ORDER — FENTANYL CITRATE 50 UG/ML
INJECTION, SOLUTION INTRAMUSCULAR; INTRAVENOUS
Status: COMPLETED
Start: 2024-06-21 | End: 2024-06-21

## 2024-06-21 RX ORDER — FAMOTIDINE 20 MG/1
20 TABLET, FILM COATED ORAL ONCE
Status: COMPLETED | OUTPATIENT
Start: 2024-06-21 | End: 2024-06-21

## 2024-06-21 RX ORDER — MIDAZOLAM HYDROCHLORIDE 1 MG/ML
1 INJECTION INTRAMUSCULAR; INTRAVENOUS
Status: DISCONTINUED | OUTPATIENT
Start: 2024-06-21 | End: 2024-06-21 | Stop reason: HOSPADM

## 2024-06-21 RX ORDER — DROPERIDOL 2.5 MG/ML
0.62 INJECTION, SOLUTION INTRAMUSCULAR; INTRAVENOUS ONCE AS NEEDED
Status: DISCONTINUED | OUTPATIENT
Start: 2024-06-21 | End: 2024-06-21 | Stop reason: HOSPADM

## 2024-06-21 RX ORDER — SODIUM CHLORIDE, SODIUM LACTATE, POTASSIUM CHLORIDE, CALCIUM CHLORIDE 600; 310; 30; 20 MG/100ML; MG/100ML; MG/100ML; MG/100ML
9 INJECTION, SOLUTION INTRAVENOUS CONTINUOUS
Status: DISCONTINUED | OUTPATIENT
Start: 2024-06-21 | End: 2024-06-21

## 2024-06-21 RX ORDER — OXYCODONE HYDROCHLORIDE AND ACETAMINOPHEN 5; 325 MG/1; MG/1
1 TABLET ORAL EVERY 4 HOURS PRN
Status: DISCONTINUED | OUTPATIENT
Start: 2024-06-21 | End: 2024-06-22 | Stop reason: HOSPADM

## 2024-06-21 RX ORDER — GLYCOPYRROLATE 0.2 MG/ML
INJECTION INTRAMUSCULAR; INTRAVENOUS AS NEEDED
Status: DISCONTINUED | OUTPATIENT
Start: 2024-06-21 | End: 2024-06-21 | Stop reason: SURG

## 2024-06-21 RX ORDER — NALOXONE HCL 0.4 MG/ML
0.1 VIAL (ML) INJECTION
Status: DISCONTINUED | OUTPATIENT
Start: 2024-06-21 | End: 2024-06-22 | Stop reason: HOSPADM

## 2024-06-21 RX ORDER — DEXMEDETOMIDINE HYDROCHLORIDE 100 UG/ML
INJECTION, SOLUTION INTRAVENOUS AS NEEDED
Status: DISCONTINUED | OUTPATIENT
Start: 2024-06-21 | End: 2024-06-21 | Stop reason: SURG

## 2024-06-21 RX ADMIN — GLYCOPYRROLATE 0.2 MCG: 0.2 INJECTION INTRAMUSCULAR; INTRAVENOUS at 08:17

## 2024-06-21 RX ADMIN — ONDANSETRON 4 MG: 2 INJECTION INTRAMUSCULAR; INTRAVENOUS at 09:40

## 2024-06-21 RX ADMIN — ONDANSETRON 4 MG: 2 INJECTION INTRAMUSCULAR; INTRAVENOUS at 19:09

## 2024-06-21 RX ADMIN — FENTANYL CITRATE 50 MCG: 50 INJECTION, SOLUTION INTRAMUSCULAR; INTRAVENOUS at 08:32

## 2024-06-21 RX ADMIN — HYDROMORPHONE HYDROCHLORIDE 0.5 MG: 1 INJECTION, SOLUTION INTRAMUSCULAR; INTRAVENOUS; SUBCUTANEOUS at 12:21

## 2024-06-21 RX ADMIN — Medication 200 MCG: at 09:51

## 2024-06-21 RX ADMIN — DEXMEDETOMIDINE HYDROCHLORIDE 8 MCG: 100 INJECTION, SOLUTION INTRAVENOUS at 08:07

## 2024-06-21 RX ADMIN — FAMOTIDINE 20 MG: 20 TABLET, FILM COATED ORAL at 07:01

## 2024-06-21 RX ADMIN — FENTANYL CITRATE 50 MCG: 50 INJECTION, SOLUTION INTRAMUSCULAR; INTRAVENOUS at 10:13

## 2024-06-21 RX ADMIN — DEXMEDETOMIDINE HYDROCHLORIDE 4 MCG: 100 INJECTION, SOLUTION INTRAVENOUS at 09:48

## 2024-06-21 RX ADMIN — OXYCODONE HYDROCHLORIDE AND ACETAMINOPHEN 1 TABLET: 5; 325 TABLET ORAL at 12:46

## 2024-06-21 RX ADMIN — ACETAMINOPHEN 650 MG: 325 TABLET ORAL at 18:08

## 2024-06-21 RX ADMIN — FENTANYL CITRATE 50 MCG: 50 INJECTION, SOLUTION INTRAMUSCULAR; INTRAVENOUS at 10:59

## 2024-06-21 RX ADMIN — FENTANYL CITRATE 50 MCG: 50 INJECTION, SOLUTION INTRAMUSCULAR; INTRAVENOUS at 11:35

## 2024-06-21 RX ADMIN — PROPOFOL 200 MG: 10 INJECTION, EMULSION INTRAVENOUS at 08:00

## 2024-06-21 RX ADMIN — FENTANYL CITRATE 50 MCG: 50 INJECTION, SOLUTION INTRAMUSCULAR; INTRAVENOUS at 08:18

## 2024-06-21 RX ADMIN — DEXAMETHASONE SODIUM PHOSPHATE 8 MG: 4 INJECTION INTRA-ARTICULAR; INTRALESIONAL; INTRAMUSCULAR; INTRAVENOUS; SOFT TISSUE at 08:15

## 2024-06-21 RX ADMIN — DEXMEDETOMIDINE HYDROCHLORIDE 4 MCG: 100 INJECTION, SOLUTION INTRAVENOUS at 09:21

## 2024-06-21 RX ADMIN — SUGAMMADEX 200 MG: 100 INJECTION, SOLUTION INTRAVENOUS at 08:13

## 2024-06-21 RX ADMIN — FAMOTIDINE 20 MG: 20 TABLET, FILM COATED ORAL at 20:24

## 2024-06-21 RX ADMIN — Medication 200 MG: at 08:00

## 2024-06-21 RX ADMIN — FENTANYL CITRATE 25 MCG: 50 INJECTION, SOLUTION INTRAMUSCULAR; INTRAVENOUS at 09:10

## 2024-06-21 RX ADMIN — FENTANYL CITRATE 25 MCG: 50 INJECTION, SOLUTION INTRAMUSCULAR; INTRAVENOUS at 09:31

## 2024-06-21 RX ADMIN — OXYCODONE HYDROCHLORIDE AND ACETAMINOPHEN 1 TABLET: 5; 325 TABLET ORAL at 18:43

## 2024-06-21 RX ADMIN — ROCURONIUM BROMIDE 5 MG: 10 INJECTION INTRAVENOUS at 08:00

## 2024-06-21 RX ADMIN — LIDOCAINE HYDROCHLORIDE 0.5 ML: 10 INJECTION, SOLUTION EPIDURAL; INFILTRATION; INTRACAUDAL; PERINEURAL at 07:01

## 2024-06-21 RX ADMIN — SODIUM CHLORIDE 2000 MG: 900 INJECTION INTRAVENOUS at 08:06

## 2024-06-21 RX ADMIN — FENTANYL CITRATE 50 MCG: 50 INJECTION, SOLUTION INTRAMUSCULAR; INTRAVENOUS at 08:00

## 2024-06-21 RX ADMIN — SODIUM CHLORIDE, POTASSIUM CHLORIDE, SODIUM LACTATE AND CALCIUM CHLORIDE 9 ML/HR: 600; 310; 30; 20 INJECTION, SOLUTION INTRAVENOUS at 07:01

## 2024-06-21 RX ADMIN — LIDOCAINE HYDROCHLORIDE 50 MG: 10 INJECTION, SOLUTION EPIDURAL; INFILTRATION; INTRACAUDAL; PERINEURAL at 08:00

## 2024-06-21 NOTE — ANESTHESIA POSTPROCEDURE EVALUATION
Patient: Milagros Lundberg    Procedure Summary       Date: 06/21/24 Room / Location:  [facility] 04 /  [facility]    Anesthesia Start: 0756 Anesthesia Stop: 1027    Procedure: TOTAL THYROIDECTOMY (Neck) Diagnosis: Multinodular thyroid    Surgeons: Garrison Royal MD Provider: Lester Schulte MD    Anesthesia Type: general ASA Status: 2            Anesthesia Type: general    Vitals  Vitals Value Taken Time   BP     Temp     Pulse 77 06/21/24 1027   Resp     SpO2 89 % 06/21/24 1027   Vitals shown include unfiled device data.    /91, T 97.1, RR 15    Post Anesthesia Care and Evaluation    Patient location during evaluation: PACU  Patient participation: complete - patient participated  Level of consciousness: awake and alert  Pain score: 0  Pain management: adequate    Airway patency: patent  Anesthetic complications: No anesthetic complications  PONV Status: none  Cardiovascular status: hemodynamically stable and acceptable  Respiratory status: nonlabored ventilation, acceptable and nasal cannula  Hydration status: acceptable

## 2024-06-21 NOTE — BRIEF OP NOTE
THYROIDECTOMY  Progress Note    Milagros Lundberg  6/21/2024    Pre-op Diagnosis:   Multinodular thyroid        Post-Op Diagnosis Codes:     * Multinodular thyroid     Procedure/CPT® Codes:        Procedure(s):  TOTAL THYROIDECTOMY              Surgeon(s):  Garrison Royal MD    Anesthesia: General    Staff:   Circulator: Adela Motley RN  Scrub Person: Genesis Shaw  Nursing Assistant: Areli Lundberg PCT  Assistant: Melecio Eduardo PA  Assistant: Melecio Eduardo PA      Estimated Blood Loss:  50 cc    Urine Voided: * No values recorded between 6/21/2024  7:56 AM and 6/21/2024 10:09 AM *    Specimens:                Specimens       ID Source Type Tests Collected By Collected At [facility]    A Thyroid Tissue TISSUE PATHOLOGY EXAM   Garrison Royal MD 6/21/24 0779     Description: RIGHT THYROID LOBE    B Thyroid Tissue TISSUE PATHOLOGY EXAM   Garrison Royal MD 6/21/24 0946     Description: LEFT THYROID LOBE                  Drains: * No LDAs found *    Findings: Diffusely firm, enlarged, and hyperemic thyroid gland        Complications: None    Assistant: Melecio Eduardo PA  was responsible for performing the following activities: Retraction, Suction, Suturing, Closing, and Placing Dressing and their skilled assistance was necessary for the success of this case.    Garrison Royal MD     Date: 6/21/2024  Time: 10:13 EDT

## 2024-06-21 NOTE — ANESTHESIA PREPROCEDURE EVALUATION
Anesthesia Evaluation     Patient summary reviewed and Nursing notes reviewed                Airway   Mallampati: II  TM distance: >3 FB  Neck ROM: full  No difficulty expected  Dental - normal exam     Pulmonary - negative pulmonary ROS and normal exam   Cardiovascular - negative cardio ROS and normal exam        Neuro/Psych  (+) headaches  GI/Hepatic/Renal/Endo    (+) thyroid problem hypothyroidism    Musculoskeletal     Abdominal  - normal exam    Bowel sounds: normal.   Substance History - negative use     OB/GYN negative ob/gyn ROS         Other   arthritis,       Other Comment: LUPUS              Anesthesia Plan    ASA 2     general     intravenous induction     Anesthetic plan, risks, benefits, and alternatives have been provided, discussed and informed consent has been obtained with: patient.    Plan discussed with CRNA.    CODE STATUS:

## 2024-06-21 NOTE — OP NOTE
General Surgery Operative Note    THYROIDECTOMY  Procedure Report    Patient Name:  Milagros Lundberg  YOB: 1970  [id number]     Date of Surgery:  6/21/2024       Pre-op Diagnosis: Multinodular thyroid    Post-op Diagnosis: Multinodular thyroid    Procedure(s):  TOTAL THYROIDECTOMY    Surgeon: Garrison Royal MD    Assistant: Melecio Eduardo PA     Anesthesia: General         Estimated Blood Loss:  50 cc    Complications: None     Implants:    Implant Name Type Inv. Item Serial No.  Lot No. LRB No. Used Action   CLIP LIGAT VASC HORIZON TI MD SKYLAR 6CT - [id number] Implant CLIP LIGAT VASC HORIZON TI MD SKYLAR 6CT  [employer] [id number] N/A 4 Implanted   CLIP LIGAT VASC HORIZON TI SM YEL 6CT - [id number] Implant CLIP LIGAT VASC HORIZON TI SM YEL 6CT  [employer] [id number] N/A 4 Implanted   HEMOST ABS SURGICEL ORIG 4X8IN STRL - [id number] Implant HEMOST ABS SURGICEL ORIG 4X8IN STRL  [employer]  DIV [employer] J [employer] J [id number] N/A 1 Implanted   CLIP LIGAT VASC HORIZON TI MD SKYLAR 6CT - [id number] Implant CLIP LIGAT VASC HORIZON TI MD SKYLAR 6CT  [employer] [id number] N/A 4 Implanted   CLIP LIGAT VASC HORIZON TI MD SKYLAR 6CT - [id number] Implant CLIP LIGAT VASC HORIZON TI MD SKYLAR 6CT  [employer] [id number] N/A 1 Implanted   CLIP LIGAT VASC HORIZON TI SM YEL 6CT - [id number] Implant CLIP LIGAT VASC HORIZON TI SM YEL 6CT  [employer] [id number] N/A 2 Implanted   CLIP LIGAT VASC HORIZON TI SM YEL 6CT - [id number] Implant CLIP LIGAT VASC HORIZON TI SM YEL 6CT  [employer] [id number] N/A 2 Implanted   CLIP LIGAT VASC HORIZON TI MD SKYLAR 6CT - [id number] Implant CLIP LIGAT VASC HORIZON TI MD SKYLAR 6CT  [employer] [id number] N/A 1 Implanted       Specimen:          Specimens       ID Source Type Tests Collected By Collected At Frozen?    A Thyroid Tissue TISSUE PATHOLOGY EXAM   Garrison Royal MD 6/21/24 0726     Description: RIGHT THYROID LOBE    B Thyroid Tissue TISSUE  PATHOLOGY EXAM   Garrison Royal MD 6/21/24 0921     Description: LEFT THYROID LOBE                Findings: Diffusely firm, enlarged, and hyperemic thyroid gland    Indications: The patient is a 53-year-old female with a history of hypothyroidism and thyromegaly with multiple thyroid nodules causing compressive symptoms.  We discussed the risk, benefits, and alternatives to total thyroidectomy and the patient was agreeable.  Informed consent was obtained.    Description of Procedure:     After obtaining informed consent, the patient was taken to the operating room and placed in supine position. After appropriate DVT and antibiotic prophylaxis, general anesthesia was induced with placement of a NIM tube for nerve monitoring. The neck was prepped and draped in standard sterile fashion.    An approximately 6 cm incision was made 2 fingerbreadths superior to the suprasternal notch transversely across the midline neck.  The skin was incised using a 15 blade.  The dermis and subcutaneous tissue were divided using Bovie electrocautery.  The platysma was dissected out and divided using Bovie electrocautery.  Subplatysmal flaps were created superiorly to the thyroid cartilage, inferiorly to the sternal notch, and laterally to the sternocleidomastoid muscles.  The strap muscles were divided in the midline at the median raphae.  Attention was paid to the right neck.  The strap muscles were dissected off the anterior and lateral surfaces of the thyroid lobe using Bovie electrocautery.  Babcocks were placed on the thyroid lobe and it was retracted anteriorly and medially.  Superior thyroid vessels were ligated with combination of clips and Harmonic.  The superior and inferior parathyroid glands were identified and dissected away from the thyroid lobe with blood supply left intact.  The recurrent laryngeal nerve was identified by both nerve stimulation and visualization.  Soft tissues around this area were dissected using  bipolar electrocautery and small vessels ligated with clips.  Once the thyroid was dissected away from the area of the nerve the remainder of the thyroid lobe was dissected off the anterior surface of the trachea using bipolar cautery.  Inferior thyroid vessels were ligated with combination of clips and Harmonic. The thyroid was then transected at the level of the isthmus using the Harmonic device. The specimen was then removed from the body and inspected for parathyroid tissue, and none was noted. It was then sent to pathology as a permanent specimen.    Attention was then paid to the left neck. The strap muscles were dissected off the anterior and lateral surfaces of the thyroid lobe using Bovie electrocautery.  Babcocks were placed on the thyroid lobe and it was retracted anteriorly and medially.  Superior thyroid vessels were ligated with combination of clips and Harmonic.  The superior and inferior parathyroid glands were identified and dissected away from the thyroid lobe with blood supply left intact.  The recurrent laryngeal nerve was identified by both nerve stimulation and visualization.  Soft tissues around this area were dissected using bipolar electrocautery and small vessels ligated with clips.  Once the thyroid was dissected away from the area of the nerve the remainder of the thyroid lobe was dissected off the anterior surface of the trachea using bipolar cautery.  Inferior thyroid vessels were ligated with combination of clips and Harmonic. The specimen was then removed from the body and inspected for parathyroid tissue, and none was noted. It was then sent to pathology as a permanent specimen.    Valsalva maneuver was performed by anesthesia to assess for any further bleeding and any further bleeding was controlled using placement of clips.  After hemostasis was obtained the left and right recurrent laryngeal nerves were grossly intact throughout the entire visualized course and stimulated  appropriately with nerve stimulation.  Surgicel was placed in all areas of dissection.  The strap muscles were loosely reapproximated using interrupted 3-0 Vicryl.  The platysma was reapproximated using interrupted 3-0 Vicryl.  The skin was reapproximated using a running subcuticular 4-0 Monocryl.  A dry dressing was placed on top of this.  The patient awoke from anesthesia without complications and was taken to the PACU in stable condition.        Assistant: Melecio Eduardo PA  was responsible for performing the following activities: Retraction, Suction, Suturing, Closing, and Placing Dressing and their skilled assistance was necessary for the success of this case.    Garrison Royal MD     Date: 6/21/2024  Time: 10:15 EDT

## 2024-06-21 NOTE — PROGRESS NOTES
"Patient Name:  Milagros Lundberg  YOB: 1970  [id number]    Surgery Post - Operative Note    Date of visit: 6/21/2024    Subjective   Subjective: Feels tired. Voice OK       Objective     Objective:    /85 (BP Location: Right arm, Patient Position: Lying)   Pulse 70   Temp 98.3 °F (36.8 °C) (Oral)   Resp 16   Ht 160 cm (63\")   Wt 82 kg (180 lb 12.8 oz)   SpO2 92%   BMI 32.03 kg/m²     CV:  Rate  regular and rhythm  regular  L:  Clear  to auscultation bilaterally   Neck: Dressing c/d/I. No hematoma  ABD:  Soft, nontender  EXT:  No cyanosis, clubbing or edema             Assessment/ Plan: Doing well after Thyroidectomy. Continue Pulmonary toilet    Problem List Items Addressed This Visit    None  Visit Diagnoses       Thyroid nodule        Relevant Medications    levothyroxine (SYNTHROID, LEVOTHROID) tablet 137 mcg (Start on 6/22/2024  6:00 AM)    Other Relevant Orders    Tissue Pathology Exam             Active Hospital Problems    Diagnosis  POA    **Multinodular thyroid [E04.2]  Yes      Resolved Hospital Problems   No resolved problems to display.            Jacob Evans MD  6/21/2024  17:01 EDT    "

## 2024-06-21 NOTE — H&P
"Pre-Op H&P  Milagros Lundberg  [id number]  1970    Chief complaint: \"It is my thyroid\"    HPI:    Patient is a 53 y.o.female who presents with known Hashimoto's thyroiditis.  She has been on replacement therapy.  She continues to be hypothyroid.  After failing conservative therapy the patient is now admitted for total thyroidectomy.    Review of Systems:  General ROS: negative for chills, fever or skin lesions;  No changes since last office visit.  Neg for recent sick exposure  Cardiovascular ROS: no chest pain or dyspnea on exertion  Respiratory ROS: no cough, shortness of breath, or wheezing    Allergies:   Allergies   Allergen Reactions    Augmentin [Amoxicillin-Pot Clavulanate] Rash     Beta lactam allergy details  Antibiotic reaction: rash  Age at reaction: adult  Dose to reaction time: (!) hours  Reason for antibiotic: unknown  Epinephrine required for reaction?: no  Tolerated antibiotics: amoxicillin, cephalexin           Home Meds:    No current facility-administered medications on file prior to encounter.     Current Outpatient Medications on File Prior to Encounter   Medication Sig Dispense Refill    Belimumab (Benlysta) 200 MG/ML solution auto-injector Inject 200 mg under the skin into the appropriate area as directed Every 7 (Seven) Days. (Patient not taking: Reported on 6/13/2024)      celecoxib (CeleBREX) 100 MG capsule Take 1 capsule by mouth Every 12 (Twelve) Hours. (Patient not taking: Reported on 6/13/2024)      DULoxetine (CYMBALTA) 30 MG capsule Take 1 capsule by mouth Daily.      folic acid (FOLVITE) 1 MG tablet Take 1 tablet by mouth Daily.      levothyroxine (SYNTHROID, LEVOTHROID) 137 MCG tablet Take 1 tablet by mouth Every Morning. 30 tablet 11    Polyethylene Glycol 3350 (MIRALAX PO) PRN      vitamin D (ERGOCALCIFEROL) 1.25 MG (16335 UT) capsule capsule Take 1 capsule by mouth 1 (One) Time Per Week.         PMH:   Past Medical History:   Diagnosis Date    Acquired acanthosis " nigricans     Fibromyalgia     Hashimoto's thyroiditis 1995    Headache     Hypothyroidism     Low back pain     Lupus     Menopausal flushing     Rheumatoid arthritis     Simple goiter     Thyroid disease      PSH:    Past Surgical History:   Procedure Laterality Date    CARPAL TUNNEL RELEASE      HYSTERECTOMY      age 37     Patient denies allergy to contrast dye or latex  Immunization History:  Influenza: Yes  Pneumococcal: No  Tetanus: Up-to-date    Social History:   Tobacco:   Social History     Tobacco Use   Smoking Status Never   Smokeless Tobacco Never      Alcohol:     Social History     Substance and Sexual Activity   Alcohol Use No       Vitals:           There were no vitals taken for this visit.    Physical Exam:  General Appearance:    Alert, cooperative, no distress, appears stated age   Head:    Normocephalic, without obvious abnormality, atraumatic   Lungs:   Clear to auscultation bilaterally to the bases    Heart: S1-S2 without rubs murmurs or gallops    Abdomen:  Soft, nontender, bowel sounds present throughout.   Breast Exam:    deferred   Genitalia:    deferred   Extremities:   Extremities normal, atraumatic, no cyanosis or edema   Skin:   Skin color, texture, turgor normal, no rashes or lesions   Neurologic:   Grossly intact   Results Review  LABS:  Lab Results   Component Value Date    WBC 6.84 06/13/2024    HGB 14.6 06/13/2024    HCT 44.0 06/13/2024    MCV 89.8 06/13/2024     06/13/2024    NEUTROABS 4.07 09/06/2023    GLUCOSE 82 02/09/2024    BUN 19 02/09/2024    CREATININE 0.92 02/09/2024    EGFRIFNONA 75 04/28/2021     02/09/2024    K 3.9 02/09/2024     02/09/2024    CO2 19.7 (L) 02/09/2024    CALCIUM 9.7 02/09/2024    ALBUMIN 4.9 02/09/2024    AST 22 02/09/2024    ALT 20 02/09/2024    BILITOT 0.3 02/09/2024       RADIOLOGY:  No radiology results for the last 3 days     I reviewed the patient's new clinical results.    Cancer Staging (if applicable)  Cancer Patient: __  yes __no __unknown; If yes, clinical stage T:__ N:__M:__, stage group or __N/A    Impression: Hashimoto's thyroiditis  Hypothyroidism  Fibromyalgia  Thyroid goiter      Plan: Total thyroidectomy.      LUCIO Cueva   06/21/24   6:53 AM EDT

## 2024-06-21 NOTE — ANESTHESIA PROCEDURE NOTES
Airway  Urgency: elective    Date/Time: 6/21/2024 8:02 AM  Airway not difficult    General Information and Staff    Patient location during procedure: OR  CRNA/CAA: Georgia Ortega CRNA    Indications and Patient Condition  Indications for airway management: airway protection    Preoxygenated: yes  MILS not maintained throughout  Mask difficulty assessment: 1 - vent by mask    Final Airway Details  Final airway type: endotracheal airway      Successful airway: NIM tube  Cuffed: yes   Successful intubation technique: video laryngoscopy  Endotracheal tube insertion site: oral  Blade: Maryse  Blade size: 3  Cormack-Lehane Classification: grade I - full view of glottis  Placement verified by: chest auscultation and capnometry   Measured from: lips  ETT/EBT  to lips (cm): 20  Number of attempts at approach: 1  Assessment: lips, teeth, and gum same as pre-op and atraumatic intubation    Additional Comments  Negative epigastric sounds, Breath sound equal bilaterally with symmetric chest rise and fall. NIM tube location verified by Dr. Royal.

## 2024-06-22 VITALS
BODY MASS INDEX: 32.04 KG/M2 | WEIGHT: 180.8 LBS | HEIGHT: 63 IN | RESPIRATION RATE: 18 BRPM | HEART RATE: 59 BPM | SYSTOLIC BLOOD PRESSURE: 128 MMHG | OXYGEN SATURATION: 98 % | DIASTOLIC BLOOD PRESSURE: 79 MMHG | TEMPERATURE: 97.9 F

## 2024-06-22 LAB — CALCIUM SPEC-SCNC: 8.3 MG/DL (ref 8.6–10.5)

## 2024-06-22 PROCEDURE — 82310 ASSAY OF CALCIUM: CPT | Performed by: SURGERY

## 2024-06-22 RX ORDER — PSEUDOEPHEDRINE HCL 30 MG
100 TABLET ORAL 2 TIMES DAILY PRN
Qty: 30 CAPSULE | Refills: 0 | Status: SHIPPED | OUTPATIENT
Start: 2024-06-22

## 2024-06-22 RX ORDER — CALCIUM CARBONATE 500 MG/1
2 TABLET, CHEWABLE ORAL 2 TIMES DAILY
Qty: 120 TABLET | Refills: 2 | Status: SHIPPED | OUTPATIENT
Start: 2024-06-22 | End: 2024-09-20

## 2024-06-22 RX ORDER — CALCIUM CARBONATE 500 MG/1
2 TABLET, CHEWABLE ORAL 2 TIMES DAILY
Status: DISCONTINUED | OUTPATIENT
Start: 2024-06-22 | End: 2024-06-22 | Stop reason: HOSPADM

## 2024-06-22 RX ORDER — OXYCODONE HYDROCHLORIDE AND ACETAMINOPHEN 5; 325 MG/1; MG/1
1 TABLET ORAL EVERY 4 HOURS PRN
Qty: 10 TABLET | Refills: 0 | Status: SHIPPED | OUTPATIENT
Start: 2024-06-22 | End: 2024-07-01

## 2024-06-22 RX ADMIN — LEVOTHYROXINE SODIUM 137 MCG: 137 TABLET ORAL at 04:28

## 2024-06-22 RX ADMIN — FAMOTIDINE 20 MG: 20 TABLET, FILM COATED ORAL at 08:11

## 2024-06-22 RX ADMIN — OXYCODONE HYDROCHLORIDE AND ACETAMINOPHEN 1 TABLET: 5; 325 TABLET ORAL at 04:28

## 2024-06-22 RX ADMIN — CALCIUM CARBONATE (ANTACID) CHEW TAB 500 MG 2 TABLET: 500 CHEW TAB at 10:14

## 2024-06-22 NOTE — PLAN OF CARE
Goal Outcome Evaluation:  Plan of Care Reviewed With: patient        Progress: improving  Outcome Evaluation: a/o x 4; pleasant, VSS, RA; PRNs given for pain control; no acute changes to report; dressing intact, no drainage overnight; will continue POC              Problem: Adult Inpatient Plan of Care  Goal: Absence of Hospital-Acquired Illness or Injury  Intervention: Identify and Manage Fall Risk  Recent Flowsheet Documentation  Taken 6/22/2024 0400 by Melecio Merida, RN  Safety Promotion/Fall Prevention:   assistive device/personal items within reach   clutter free environment maintained   activity supervised   fall prevention program maintained   nonskid shoes/slippers when out of bed   room organization consistent   safety round/check completed  Taken 6/22/2024 0200 by Melecio Merida RN  Safety Promotion/Fall Prevention:   activity supervised   assistive device/personal items within reach   clutter free environment maintained   fall prevention program maintained   nonskid shoes/slippers when out of bed   safety round/check completed   room organization consistent  Taken 6/22/2024 0000 by Melecio Merida RN  Safety Promotion/Fall Prevention:   activity supervised   assistive device/personal items within reach   clutter free environment maintained   fall prevention program maintained   nonskid shoes/slippers when out of bed   room organization consistent   safety round/check completed  Taken 6/21/2024 2200 by Melecio Merida RN  Safety Promotion/Fall Prevention:   assistive device/personal items within reach   clutter free environment maintained   activity supervised   fall prevention program maintained   nonskid shoes/slippers when out of bed   room organization consistent   safety round/check completed  Taken 6/21/2024 1928 by Melecio Merida RN  Safety Promotion/Fall Prevention:   activity supervised   assistive device/personal items within reach   clutter free environment maintained    fall prevention program maintained   nonskid shoes/slippers when out of bed   room organization consistent   safety round/check completed     Problem: Adult Inpatient Plan of Care  Goal: Absence of Hospital-Acquired Illness or Injury  Intervention: Prevent Skin Injury  Recent Flowsheet Documentation  Taken 6/22/2024 0400 by Melecio Merida RN  Body Position: position changed independently  Skin Protection:   adhesive use limited   transparent dressing maintained   tubing/devices free from skin contact  Taken 6/22/2024 0200 by Melecio Meirda RN  Body Position: position changed independently  Skin Protection:   adhesive use limited   tubing/devices free from skin contact   transparent dressing maintained  Taken 6/22/2024 0000 by Melecio Merida RN  Body Position: position changed independently  Skin Protection:   adhesive use limited   transparent dressing maintained   tubing/devices free from skin contact  Taken 6/21/2024 2200 by Melecio Merida RN  Body Position: position changed independently  Skin Protection:   adhesive use limited   transparent dressing maintained   tubing/devices free from skin contact  Taken 6/21/2024 1928 by Melecio Merida RN  Body Position: position changed independently  Skin Protection:   adhesive use limited   transparent dressing maintained   tubing/devices free from skin contact

## 2024-06-22 NOTE — PLAN OF CARE
Goal Outcome Evaluation:  Plan of Care Reviewed With: patient        Progress: improving  Outcome Evaluation: VSS. RA. Alert and oriented x4. No complaints of pain or nausea. Incision clean and dry with no drainage. Spouse at bedside. Pt resting comfortably. No further complaints at this time. Discharge orders placed.

## 2024-06-22 NOTE — DISCHARGE INSTRUCTIONS
Discharge Activity        1) No driving until no longer taking narcotics.   2) Return to school / work in 1 week.  3) May shower. No tub baths. No swimming.   4) If develop numbness/tingling in fingers/toes/lips, take two additional TUMS. If symptoms do not improve in one hour, take two more and call Dr. Royal's office.

## 2024-06-22 NOTE — DISCHARGE SUMMARY
Patient Name:  Milagros Lundberg  YOB: 1970  [id number]      Date of Discharge: 6/22/2024    Discharge Diagnosis:   Multinodular thyroid    Problem List:  Active Hospital Problems    Diagnosis  POA    **Multinodular thyroid [E04.2]  Yes      Resolved Hospital Problems   No resolved problems to display.       Presenting Problem/History of Present Illness  Multinodular thyroid [E04.2]      Hospital Course  Patient is a 53 y.o. female presented with a multinodular thyroid.  On 6/21/2024 I performed a total thyroidectomy. On postoperative day one, pain was well controlled. No numbness/tingling in fingers/toes/lips. No nausea/vomiting. No fevers/chills. Voiding spontaneously. Ambulating appropriately. The patient was thus found to be safe for discharge to home.           Procedures Performed    Procedure(s):  TOTAL THYROIDECTOMY  -------------------       Consults:   Consults       No orders found for last 30 day(s).            Pertinent Test Results: N/A    Condition on Discharge:  Pain controlled with oral pain medication, tolerating diet, ambulating appropriately      Vital Signs  Temp:  [97.2 °F (36.2 °C)-98.4 °F (36.9 °C)] 98.1 °F (36.7 °C)  Heart Rate:  [58-83] 58  Resp:  [10-20] 18  BP: (115-152)/(64-94) 118/77    Discharge Disposition  Home or Self Care    Discharge Medications     Discharge Medications        New Medications        Instructions Start Date   calcium carbonate 500 MG chewable tablet  Commonly known as: TUMS   2 tablets, Oral, 2 Times Daily      docusate sodium 100 MG capsule   100 mg, Oral, 2 Times Daily PRN      oxyCODONE-acetaminophen 5-325 MG per tablet  Commonly known as: PERCOCET   1 tablet, Oral, Every 4 Hours PRN             Continue These Medications        Instructions Start Date   celecoxib 100 MG capsule  Commonly known as: CeleBREX   100 mg, Oral, Every 12 Hours Scheduled      DULoxetine 30 MG capsule  Commonly known as: CYMBALTA   30 mg, Oral, Daily      folic acid 1  MG tablet  Commonly known as: FOLVITE   1,000 mcg, Oral, Daily      levothyroxine 137 MCG tablet  Commonly known as: SYNTHROID, LEVOTHROID   137 mcg, Oral, Every Morning      MIRALAX PO   PRN      vitamin D 1.25 MG (95480 UT) capsule capsule  Commonly known as: ERGOCALCIFEROL   1 capsule, Oral, Weekly             ASK your doctor about these medications        Instructions Start Date   Benlysta 200 MG/ML solution auto-injector  Generic drug: Belimumab   1 mL, Every 7 Days               Discharge Diet   Diet Instructions       Diet: Regular/House Diet; Thin (IDDSI 0)      Discharge Diet: Regular/House Diet    Fluid Consistency: Thin (IDDSI 0)            Activity at Discharge  Activity Instructions       Discharge Activity      1) No driving until no longer taking narcotics.   2) Return to school / work in 1 week.  3) May shower. No tub baths. No swimming.   4) If develop numbness/tingling in fingers/toes/lips, take two additional TUMS. If symptoms do not improve in one hour, take two more and call Dr. Royal's office.            Follow-up Appointments  No future appointments.  Additional Instructions for the Follow-ups that You Need to Schedule       Discharge Follow-up with Specified Provider: Dr. Royal; 2 Weeks   As directed      To: Dr. Royal   Follow Up: 2 Weeks   Follow Up Details: Call [phone] to make an appointment        Notify Physician or Go To The ED For the Following Conditions   As directed      Fever >100.4, increased pain, rapid neck swelling, new redness/drainage from incision, numbness/tingling in fingers/toes/lips not controlled by extra TUMS    Order Comments: Fever >100.4, increased pain, rapid neck swelling, new redness/drainage from incision, numbness/tingling in fingers/toes/lips not controlled by extra TUMS                 Test Results Pending at Discharge  Pending Labs       Order Current Status    Tissue Pathology Exam In process            Time: Discharge 15 min    Garrison WILSON  MD Genny   06/22/24   11:03 EDT

## 2024-06-24 ENCOUNTER — PATIENT OUTREACH (OUTPATIENT)
Dept: CASE MANAGEMENT | Facility: OTHER | Age: 54
End: 2024-06-24

## 2024-06-24 LAB
CYTO UR: NORMAL
LAB AP CASE REPORT: NORMAL
LAB AP CLINICAL INFORMATION: NORMAL
PATH REPORT.FINAL DX SPEC: NORMAL
PATH REPORT.GROSS SPEC: NORMAL

## 2024-06-24 NOTE — PROGRESS NOTES
Chart review completed.  Email sent to facility requesting update on patient.   This care manager assistant will continue to monitor via chart review throughout SNF/STR Surveillance episode.

## 2024-06-26 ENCOUNTER — OFFICE VISIT (OUTPATIENT)
Dept: OBGYN CLINIC | Facility: CLINIC | Age: 54
End: 2024-06-26
Payer: MEDICARE

## 2024-06-26 ENCOUNTER — TELEPHONE (OUTPATIENT)
Dept: SLEEP CENTER | Facility: CLINIC | Age: 54
End: 2024-06-26

## 2024-06-26 VITALS — DIASTOLIC BLOOD PRESSURE: 52 MMHG | SYSTOLIC BLOOD PRESSURE: 110 MMHG

## 2024-06-26 DIAGNOSIS — N95.0 POST-MENOPAUSAL BLEEDING: Primary | ICD-10-CM

## 2024-06-26 DIAGNOSIS — Z11.51 SCREENING FOR HUMAN PAPILLOMAVIRUS (HPV): ICD-10-CM

## 2024-06-26 PROCEDURE — G0124 SCREEN C/V THIN LAYER BY MD: HCPCS | Performed by: PATHOLOGY

## 2024-06-26 PROCEDURE — G0476 HPV COMBO ASSAY CA SCREEN: HCPCS | Performed by: PHYSICIAN ASSISTANT

## 2024-06-26 PROCEDURE — G0145 SCR C/V CYTO,THINLAYER,RESCR: HCPCS | Performed by: PATHOLOGY

## 2024-06-26 PROCEDURE — 99203 OFFICE O/P NEW LOW 30 MIN: CPT | Performed by: PHYSICIAN ASSISTANT

## 2024-06-26 PROCEDURE — 58100 BIOPSY OF UTERUS LINING: CPT | Performed by: PHYSICIAN ASSISTANT

## 2024-06-26 RX ORDER — POTASSIUM CHLORIDE 20 MEQ/1
TABLET, EXTENDED RELEASE ORAL
COMMUNITY
Start: 2024-06-17

## 2024-06-26 NOTE — TELEPHONE ENCOUNTER
Patient of Dr. Donahue-Sayed in [facility] [place].    Called patient and advised sleep study resulted and does not show sleep apnea. Repeat diagnostic study ordered.     Scheduled repeat diagnostic study 12/22/24.  Added to wait list.

## 2024-06-26 NOTE — PROGRESS NOTES
Chely Ruvalcaba  1970    S:  53 y.o. female with complex medical history presenting as a new patient to office with c/o post menopausal bleeding. She is brought by EMS stretcher from [facility].     She reports two episodes of light spotting with wiping over the past week. Each episode occurred once per day after urination. No heavy bleeding to soil underwear or with BM. Denies pelvic pain, vaginal discharge, itching, odor, constipation/diarrhea, dysuria, frequency, urgency, or hematuria.     She reports she is postmenopausal for about 5 years. Recalls she had a surgery to remove cysts from her ovaries, had a single period following this procedure, then stopped. Thinks fibroids and removal of these sounds familiar but thought the masses were on her ovaries not uterus.     She is unsure of her age at menarche. G0. Quit smoking 3-4 months ago but does use nicoderm patch.     Review of Systems   Constitutional: Negative   Gastrointestinal: Negative  Genitourinary: + VB . Negative for pelvic pain, vaginal discharge, itching, odor, constipation/diarrhea, dysuria, frequency, urgency, or hematuria.      O:  /52   LMP 04/05/2018 (Approximate)   She appears well and is in no distress  Normocephalic, atraumatic.   Normal respiratory effort  Abdomen is soft and nontender  External genitals are normal without lesions or rashes  Vagina is without discharge or bleeding.   Cervix is without lesions or discharge. No CMT. Scant red blood from cervical os.   Uterus and adnexa are not palpable. Mild overlying tenderness of pelvis and abdomen, which she reports is normal for her.   No focal neurological deficits.   Normal mood, affect, and behavior.     Endometrial biopsy    Date/Time: 6/26/2024 3:30 PM    Performed by: Nelida Avina PA-C  Authorized by: Nelida Avina PA-C  Universal Protocol:  Consent: Verbal consent obtained.  Risks and benefits: risks, benefits and alternatives were discussed  Consent given  by: patient  Patient understanding: patient states understanding of the procedure being performed  Patient consent: the patient's understanding of the procedure matches consent given  Procedure consent: procedure consent matches procedure scheduled  Relevant documents: relevant documents present and verified  Test results: test results available and properly labeled  Required items: required blood products, implants, devices, and special equipment available  Patient identity confirmed: verbally with patient    Indication:     Indications: Post-menopausal bleeding      Progression of post-menopausal bleeding:  Unable to specify  Procedure:     Procedure: endometrial biopsy with Pipelle      A bivalve speculum was placed in the vagina: yes      Cervix cleaned and prepped: yes      Unable to perform due to: pain and cervical stenosis    Comments:     Procedure comments:  EMB unable to be completed due to cervical stenosis. Attempts at dilation unsuccessful.   She was instructed on post care and written instructions given.   nothing Per vagina for the next  week  To call if heavy vaginal bleeding, severe pelvic pain/cramping or fever        A/P:  Diagnoses and all orders for this visit:    Post-menopausal bleeding  -     US pelvis complete w transvaginal; Future  -     Liquid-based pap, screening    Screening for human papillomavirus (HPV)  -     Liquid-based pap, screening      -Discussed etiology, evaluation, management of postmenopausal bleeding. Most common etiology atrophy, also possibly structural ie polyps, however always possible that hyperplasia or malignancy is the cause. Given possibility of malignancy, further evaluation necessary. Discussed risks and benefits of endometrial biopsy vs. Transvaginal ultrasound for evaluation of endometrial lining. Pt elects for EMB.     EMB attempted but unsuccessful due to cervical stenosis not responsive to dilation.     Will obtain pelvic US. Discussed should ultrasound  demonstrate thin endometrial lining, evaluation would be complete and reassuring that atrophy was the cause of this bleeding, but with thickened lining, we would recommend hysteroscopy, D&C for definitive diagnosis. This may require repeated attempt at EMB.    All questions answered to her satisfaction.   Written instructions for her care providers and rx for US given.   Plan pending results.

## 2024-06-27 LAB
HPV HR 12 DNA CVX QL NAA+PROBE: POSITIVE
HPV16 DNA CVX QL NAA+PROBE: POSITIVE
HPV18 DNA CVX QL NAA+PROBE: NEGATIVE

## 2024-07-01 ENCOUNTER — PATIENT OUTREACH (OUTPATIENT)
Dept: CASE MANAGEMENT | Facility: OTHER | Age: 54
End: 2024-07-01

## 2024-07-05 ENCOUNTER — PATIENT OUTREACH (OUTPATIENT)
Dept: CASE MANAGEMENT | Facility: OTHER | Age: 54
End: 2024-07-05

## 2024-07-05 NOTE — PROGRESS NOTES
Chart review complete the patient discharged 7/4/24 to her RV. I have removed myself from the care team, updated the Care Coordination note, and closed the episode. Inbasket sent to the OPCM who is following the patient to inform of the discharge and hand off.  A email was sent to the facility requesting discharge instructions. When Admin Coordinator has received the Discharge paperwork  Admin Coordinator will attach to this encounter.

## 2024-07-08 ENCOUNTER — TELEPHONE (OUTPATIENT)
Age: 54
End: 2024-07-08

## 2024-07-08 NOTE — TELEPHONE ENCOUNTER
[facility] RN called regarding this patients care. She was seen on 6/26 for PMB. She had a pelvic US done at her rehab facility that was done abdominally- they are faxing results to us. She has had two episodes of light spotting since she was seen. The nurse would like to know what the next steps are, or if she needs to have the US done transvaginally?    Phone number  Pat Bucio [phone] ask for First Floor

## 2024-07-09 LAB
LAB AP GYN PRIMARY INTERPRETATION: ABNORMAL
Lab: ABNORMAL
PATH INTERP SPEC-IMP: ABNORMAL

## 2024-07-09 NOTE — TELEPHONE ENCOUNTER
If the US was done to evaluate for PMB then may not need to have TV repeated.   We were unable to complete endometrial biopsy in office due to cervical stenosis.   If bleeding is persistent, she should be seen by MD for D&C consult - but US results will be needed to determine if repeat TVUS needed prior to appt.

## 2024-07-10 ENCOUNTER — CONSULT (OUTPATIENT)
Dept: ENDOCRINOLOGY | Facility: CLINIC | Age: 54
End: 2024-07-10
Payer: MEDICARE

## 2024-07-10 ENCOUNTER — PATIENT OUTREACH (OUTPATIENT)
Dept: INTERNAL MEDICINE CLINIC | Facility: OTHER | Age: 54
End: 2024-07-10

## 2024-07-10 VITALS
DIASTOLIC BLOOD PRESSURE: 62 MMHG | OXYGEN SATURATION: 98 % | SYSTOLIC BLOOD PRESSURE: 100 MMHG | HEART RATE: 92 BPM | TEMPERATURE: 98.1 F

## 2024-07-10 DIAGNOSIS — E11.9 TYPE 2 DIABETES MELLITUS WITHOUT COMPLICATION, WITH LONG-TERM CURRENT USE OF INSULIN (HCC): ICD-10-CM

## 2024-07-10 DIAGNOSIS — Z79.4 TYPE 2 DIABETES MELLITUS WITHOUT COMPLICATION, WITH LONG-TERM CURRENT USE OF INSULIN (HCC): ICD-10-CM

## 2024-07-10 DIAGNOSIS — E03.9 ACQUIRED HYPOTHYROIDISM: ICD-10-CM

## 2024-07-10 DIAGNOSIS — I10 BENIGN HYPERTENSION: Primary | ICD-10-CM

## 2024-07-10 PROCEDURE — 99244 OFF/OP CNSLTJ NEW/EST MOD 40: CPT | Performed by: STUDENT IN AN ORGANIZED HEALTH CARE EDUCATION/TRAINING PROGRAM

## 2024-07-10 RX ORDER — LANCETS 30 GAUGE
EACH MISCELLANEOUS
Qty: 100 EACH | Refills: 1 | Status: SHIPPED | OUTPATIENT
Start: 2024-07-10

## 2024-07-10 RX ORDER — BLOOD SUGAR DIAGNOSTIC
STRIP MISCELLANEOUS
Qty: 100 EACH | Refills: 2 | Status: SHIPPED | OUTPATIENT
Start: 2024-07-10

## 2024-07-10 RX ORDER — BLOOD-GLUCOSE METER
EACH MISCELLANEOUS
Qty: 1 KIT | Refills: 0 | Status: SHIPPED | OUTPATIENT
Start: 2024-07-10

## 2024-07-10 NOTE — PROGRESS NOTES
Chely Ruvalcaba 54 y.o. female MRN: [id number]    Encounter: [id number]      Assessment & Plan     Problem List Items Addressed This Visit          Cardiovascular and Mediastinum    Benign hypertension - Primary     Blood pressure at today visit is 100/60.  We will continue current regimen.            Endocrine    Hypothyroidism     She is clinically euthyroid, on levothyroxine 112 mcg once daily, most recent TSH of 0.6, which was checked while she was admitted, I will continue levothyroxine at current dose and will check T4 and TSH before next visit.  Advised that levotyroxine should be taken on an empty stomach. Should avoid taking medications like iron, calcium, tums, [id number], PPI at the same time that may decrease absorption of T4. Should separate administration by 4hrs.           Relevant Orders    T4, free    TSH, 3rd generation    Type 2 diabetes mellitus (HCC)       Lab Results   Component Value Date    HGBA1C 7.8 (H) 05/15/2024     Recently diagnosed with A1c of 7.8%.  We reviewed pathophysiology of type 2 diabetes, importance of glycemic control to avoid complications.  We reviewed different options for treatment, she is not willing to try metformin, she has no contraindication to for metformin, she believes is not working for her.  Given liver cirrhosis and recent studies which showed benefits of GLP-1 agonists in patients with liver cirrhosis, she is agreeable to start Ozempic which was ordered, 0.25 mg weekly for 4 weeks and then 0.5 mg weekly.  We reviewed adverse reactions of GLP-1 agonist including nausea, vomiting, bowel movement changes, abdominal discomfort, pancreatitis, MTC.  She was instructed to check her blood sugar once a day in different times and bring her sugar log to her next visit.  Glucometer and supplies was ordered to her pharmacy.  Return back in 3 months  Labs prior to next visit.         Relevant Medications    semaglutide, 0.25 or 0.5 mg/dose, (Ozempic, 0.25 or 0.5 MG/DOSE,) 2  mg/3 mL injection pen    OneTouch Delica Lancets 30G MISC    Blood Glucose Monitoring Suppl (OneTouch Verio) w/Device KIT    glucose blood (OneTouch Verio) test strip    Other Relevant Orders    Hemoglobin A1C         CC:   Type 2 diabetes, hypothyroidism    History of Present Illness     HPI:  Chely Ruvalcaba is a 54-year-old female who is referred by PCP to establish care with us.  She was recently admitted for near syncope, with hypotension, was found to have diverticulitis, A1c checked which was 7.8%,  She currently resides in rehab and will be discharged on July 17.  She has history of hypothyroidism, on levothyroxine 112 mcg once daily.    She denies palpitations, tremors, feeling anxious, nervous, diarrhea, constipation, excessive sweating.      Review of Systems   Constitutional:  Negative for appetite change, fatigue and unexpected weight change.   HENT:  Negative for trouble swallowing and voice change.    Respiratory:  Positive for shortness of breath.    Cardiovascular:  Negative for chest pain and palpitations.   Gastrointestinal:  Negative for abdominal pain, constipation, diarrhea, nausea and vomiting.   Endocrine: Negative for cold intolerance, heat intolerance, polydipsia, polyphagia and polyuria.       Historical Information   Past Medical History:   Diagnosis Date    Abnormal stress test     Acute cystitis without hematuria 05/07/2018    Acute duodenal ulcer with bleeding 01/16/2023    Allergic sinusitis     last assessed - 03Apr2017    Anemia Around december    Anxiety     last assessed - 04Mar2016    Arthritis     Asthma     last assessed - 04Mar2016    Bilateral lower extremity edema     last assessed - 12May2017    Callus of foot     last assessed - 22Jun2016    Chest pain     Chronic GERD     last assessed - 16Jan2017    Colon polyp     Constipation     Resolved - 13Jan2017    COPD (chronic obstructive pulmonary disease) (HCC)     Depression     last assessed - 04Mar2016    Disease of thyroid  gland     Diverticulitis of colon     last assessed - 04Mar2016    Dyspepsia     Resolved - [id number]    Edema, lower extremity 08/07/2020    Esophageal reflux     last assessed - 04Mar2016    Essential hypertriglyceridemia     last assessed - [id number]    Fatty liver 01/16/2023    Gastroesophageal reflux disease with esophagitis 11/18/2016    GERD (gastroesophageal reflux disease)     Gynecological disorder     last assessed - 04Mar2016    Hydroureteronephrosis 06/30/2022    Hypertension     last assessed - 04Mar2016    Hypokalemia 06/20/2022    Hypotension     last assessed - [id number]    Kidney stone     Left ureteral stone with hydronephrosis 07/21/2022    Migraine     Morbid obesity (HCC) 01/11/2019    Formatting of this note might be different from the original. Added automatically from request for surgery [id number]    Neuropathy     Obesity     Other chest pain 11/08/2018    Pancreatic lesion 03/13/2023    Positive depression screening 06/19/2022    Primary hypertriglyceridemia 06/19/2022    Pulmonary emphysema (HCC)     last assessed - 04Mar2016    Seasonal allergies     Severe obesity (HCC) 01/16/2023    Skin tag 07/17/2023    SOB (shortness of breath)     Strain of groin 07/17/2023    Urinary frequency     last assessed - 22Jun2016    Vagina, candidiasis     last assessed - 22Jun2016    Very heavy cigarette smoker 01/16/2023    Visual impairment      Past Surgical History:   Procedure Laterality Date    CARPAL TUNNEL RELEASE      last assessed - 04MAr2016    CHOLECYSTECTOMY      last assessed - 04Mar2016    COLONOSCOPY      Complete colonoscopy     EYE SURGERY      last assessed - 04Mar2016    FL RETROGRADE PYELOGRAM  6/16/2022    FL RETROGRADE PYELOGRAM  8/16/2022    FOOT SURGERY      last assessed - 04Mar2016    SC CYSTO BLADDER W/URETERAL CATHETERIZATION Left 7/21/2022    Procedure: CYSTOSCOPY RETROGRADE PYELOGRAM WITH INSERTION STENT URETERAL;  Surgeon: Facundo Matamoros MD;  Location: [facility];   Service: Urology    TN CYSTO/URETERO W/LITHOTRIPSY &INDWELL STENT INSRT Left 6/16/2022    Procedure: CYSTOSCOPY URETEROSCOPY WITH LITHOTRIPSY HOLMIUM LASER, RETROGRADE PYELOGRAM AND INSERTION STENT URETERAL;  Surgeon: Sammy Ferrer MD;  Location: [facility];  Service: Urology    TN CYSTO/URETERO W/LITHOTRIPSY &INDWELL STENT INSRT Left 8/16/2022    Procedure: CYSTOSCOPY USCOPE W/HOLMIUM LASER, RETROGRADE PYELOGRAM&STENT;  Surgeon: Sudheer Bradford MD;  Location: [facility];  Service: Urology    TN ESOPHAGOGASTRODUODENOSCOPY TRANSORAL DIAGNOSTIC N/A 2/13/2017    Procedure: ESOPHAGOGASTRODUODENOSCOPY (EGD);  Surgeon: Alison Saleem MD;  Location: [facility];  Service: Gastroenterology     Social History   Social History     Substance and Sexual Activity   Alcohol Use Not Currently     Social History     Substance and Sexual Activity   Drug Use Never     Social History     Tobacco Use   Smoking Status Former    Current packs/day: 0.50    Average packs/day: 0.5 packs/day for 41.5 years (20.8 ttl pk-yrs)    Types: Cigarettes    Start date: 1/1/1983   Smokeless Tobacco Never     Family History:   Family History   Problem Relation Age of Onset    Obesity Mother     Thyroid disease Mother     Cancer Mother 71    Vision loss Mother     Obesity Sister     Coronary artery disease Sister     Stroke Sister     Asthma Sister     Glaucoma Sister     No Known Problems Maternal Aunt     Diabetes Maternal Uncle     Lung cancer Maternal Grandmother     Cancer Maternal Grandfather     Diabetes Maternal Grandfather     No Known Problems Paternal Grandmother     No Known Problems Paternal Grandfather     Hypertension Other     Lung cancer Other     Hypertension Other     Lung cancer Other     Lung cancer Other        Meds/Allergies   Current Outpatient Medications   Medication Sig Dispense Refill    albuterol (PROVENTIL HFA,VENTOLIN HFA) 90 mcg/act inhaler Inhale 2 puffs every 6 (six) hours as needed for wheezing 18 g 1    budesonide  (PULMICORT) 0.5 mg/2 mL nebulizer solution Take 0.5 mg by nebulization 2 (two) times a day Rinse mouth after use.      buPROPion (WELLBUTRIN XL) 300 mg 24 hr tablet Take 1 tablet (300 mg total) by mouth every morning 90 tablet 3    diazepam (VALIUM) 5 mg tablet Take 1 tablet (5 mg total) by mouth daily at bedtime as needed for anxiety (Patient not taking: Reported on 6/26/2024) 30 tablet 0    ergocalciferol (VITAMIN D2) 50,000 units Take 1 capsule (50,000 Units total) by mouth every 14 (fourteen) days 12 capsule 1    famotidine (PEPCID) 40 MG tablet Take 1 tablet (40 mg total) by mouth daily at bedtime Take in evening 2 hours prior to bed 90 tablet 1    ferrous sulfate 324 (65 Fe) mg Take 1 tablet (324 mg total) by mouth daily before breakfast 30 tablet 3    folic acid (FOLVITE) 1 mg tablet Take 1 tablet (1 mg total) by mouth daily 90 tablet 1    furosemide (LASIX) 40 mg tablet Take 1 tablet (40 mg total) by mouth daily 180 tablet 1    gabapentin (NEURONTIN) 100 mg capsule Take 100 mg by mouth every 8 (eight) hours  0    Insulin Pen Needle 31G X 5 MM MISC Inject under the skin if needed (Insuline injections) (Patient not taking: Reported on 6/3/2024) 100 each 3    ipratropium-albuterol (DUO-NEB) 0.5-2.5 mg/3 mL nebulizer solution Take 3 mL by nebulization 4 (four) times a day 360 mL 3    lactulose (CHRONULAC) 10 g/15 mL solution Take 20 g by mouth Daily or as needed for bowl movement      levothyroxine 112 mcg tablet Take 1 tablet (112 mcg total) by mouth daily 90 tablet 1    nicotine (NICODERM CQ) 21 mg/24 hr TD 24 hr patch Place 1 patch on the skin over 24 hours every 24 hours 28 patch 5    omeprazole (PriLOSEC) 40 MG capsule take 1 capsule by mouth twice a day 60 capsule 5    ondansetron (ZOFRAN) 4 mg tablet Take 1 tablet (4 mg total) by mouth every 8 (eight) hours as needed for nausea or vomiting 20 tablet 0    oxyCODONE-acetaminophen (PERCOCET)  mg per tablet Take 1 tablet by mouth every 6 (six) hours as  needed for severe pain      oxygen gas Inhale 2 L/min continuous. May use 3L with exertion per Bella Goodman  CRNP  Keep sat >88%  Indications: low saturation      potassium chloride (Klor-Con M20) 20 mEq tablet       sertraline (ZOLOFT) 100 mg tablet take 1 tablet by mouth twice a day 180 tablet 1    simvastatin (ZOCOR) 20 mg tablet Take 1 tablet (20 mg total) by mouth daily at bedtime 90 tablet 1    sucralfate (CARAFATE) 1 g tablet Take 1 tablet (1 g total) by mouth daily at bedtime 60 tablet 3    varenicline (CHANTIX) 0.5 mg tablet Take 1 tablet (0.5 mg total) by mouth 2 (two) times a day 60 tablet 0     No current facility-administered medications for this visit.     Allergies   Allergen Reactions    Hydrocodone-Acetaminophen Hives    Ketorolac Hives and Dizziness    Toradol [Ketorolac Tromethamine] Other (See Comments)     hypotension    Ultram [Tramadol] Nausea Only, GI Intolerance and Vomiting       Objective   Vitals: Last menstrual period 04/05/2018.    Physical Exam  Constitutional:       General: She is not in acute distress.     Appearance: Normal appearance. She is not ill-appearing, toxic-appearing or diaphoretic.   HENT:      Head: Normocephalic and atraumatic.   Eyes:      Extraocular Movements: Extraocular movements intact.   Cardiovascular:      Rate and Rhythm: Normal rate and regular rhythm.   Pulmonary:      Effort: Pulmonary effort is normal.   Neurological:      Mental Status: She is alert and oriented to person, place, and time.       The history was obtained from the review of the chart, patient.    Lab Results:   Lab Results   Component Value Date/Time    TSH 3RD GENERATON 0.600 05/22/2024 05:25 AM    TSH 3RD GENERATON 2.516 09/14/2023 11:38 AM    Free T4 0.86 09/14/2023 11:38 AM      Latest Reference Range & Units 07/20/18 12:53 10/13/21 10:10 01/16/23 12:12 01/27/23 10:25 09/14/23 11:38 05/15/24 04:57   Hemoglobin A1C Normal 4.0-5.6%; PreDiabetic 5.7-6.4%; Diabetic >=6.5%; Glycemic control  "for adults with diabetes <7.0% % 5.4 5.7 (H) 5.1 5.2 6.0 (H) 7.8 (H)      Latest Reference Range & Units 01/27/23 10:25 09/14/23 11:38 05/22/24 05:25   TSH 3RD GENERATON 0.450 - 4.500 uIU/mL 4.136 2.516 0.600   FREE T4 0.61 - 1.12 ng/dL  0.86        Imaging Studies:       I have personally reviewed pertinent reports.      Portions of the record may have been created with voice recognition software. Occasional wrong word or \"sound a like\" substitutions may have occurred due to the inherent limitations of voice recognition software. Read the chart carefully and recognize, using context, where substitutions have occurred.    "

## 2024-07-10 NOTE — ASSESSMENT & PLAN NOTE
She is clinically euthyroid, on levothyroxine 112 mcg once daily, most recent TSH of 0.6, which was checked while she was admitted, I will continue levothyroxine at current dose and will check T4 and TSH before next visit.  Advised that levotyroxine should be taken on an empty stomach. Should avoid taking medications like iron, calcium, tums, Q4pybonsxk, PPI at the same time that may decrease absorption of T4. Should separate administration by 4hrs.

## 2024-07-10 NOTE — ASSESSMENT & PLAN NOTE
Lab Results   Component Value Date    HGBA1C 7.8 (H) 05/15/2024     Recently diagnosed with A1c of 7.8%.  We reviewed pathophysiology of type 2 diabetes, importance of glycemic control to avoid complications.  We reviewed different options for treatment, she is not willing to try metformin, she has no contraindication to for metformin, she believes is not working for her.  Given liver cirrhosis and recent studies which showed benefits of GLP-1 agonists in patients with liver cirrhosis, she is agreeable to start Ozempic which was ordered, 0.25 mg weekly for 4 weeks and then 0.5 mg weekly.  We reviewed adverse reactions of GLP-1 agonist including nausea, vomiting, bowel movement changes, abdominal discomfort, pancreatitis, MTC.  She was instructed to check her blood sugar once a day in different times and bring her sugar log to her next visit.  Glucometer and supplies was ordered to her pharmacy.  Return back in 3 months  Labs prior to next visit.

## 2024-07-15 ENCOUNTER — LAB (OUTPATIENT)
Dept: LAB | Facility: HOSPITAL | Age: 54
End: 2024-07-15
Payer: COMMERCIAL

## 2024-07-15 ENCOUNTER — PATIENT OUTREACH (OUTPATIENT)
Dept: INTERNAL MEDICINE CLINIC | Age: 54
End: 2024-07-15

## 2024-07-15 ENCOUNTER — TRANSCRIBE ORDERS (OUTPATIENT)
Dept: LAB | Facility: HOSPITAL | Age: 54
End: 2024-07-15
Payer: COMMERCIAL

## 2024-07-15 DIAGNOSIS — E01.0 THYROMEGALY: Primary | ICD-10-CM

## 2024-07-15 DIAGNOSIS — E01.0 THYROMEGALY: ICD-10-CM

## 2024-07-15 PROCEDURE — 82310 ASSAY OF CALCIUM: CPT

## 2024-07-15 PROCEDURE — 36415 COLL VENOUS BLD VENIPUNCTURE: CPT

## 2024-07-15 PROCEDURE — 84443 ASSAY THYROID STIM HORMONE: CPT

## 2024-07-15 PROCEDURE — 83970 ASSAY OF PARATHORMONE: CPT

## 2024-07-15 NOTE — PROGRESS NOTES
Pt handed back to CM on 7/5 due to >30 day episode at SNF for STR. This CM has not ever made contact with pt for care management.  All placed to facility and pt remains there inpt. Will remove self from care team at this time and close out episode.

## 2024-07-16 ENCOUNTER — TELEPHONE (OUTPATIENT)
Age: 54
End: 2024-07-16

## 2024-07-16 LAB
CALCIUM SPEC-SCNC: 9.7 MG/DL (ref 8.6–10.5)
PTH-INTACT SERPL-MCNC: 43.2 PG/ML (ref 15–65)
TSH SERPL DL<=0.05 MIU/L-ACNC: 0.67 UIU/ML (ref 0.27–4.2)

## 2024-07-16 NOTE — TELEPHONE ENCOUNTER
Tete from [facility] RN called stating that the patient doesn't have any further bleeding. Tete wanted to know if there were further tests the patient needed to complete.

## 2024-07-17 ENCOUNTER — TELEPHONE (OUTPATIENT)
Dept: PULMONOLOGY | Facility: CLINIC | Age: 54
End: 2024-07-17

## 2024-07-17 NOTE — TELEPHONE ENCOUNTER
Patient came in for an appointment to find out that the appointment was rescheduled by the access center.  It was rescheduled to [place].  There was not an opening for patient to be seen while she was here but was an opening at 2 pm and they chose not to stay and to keep the [place] appointment for now.  We apologized and I sent a message to our  per the request of the patient.  Vi Juarez

## 2024-07-18 ENCOUNTER — TELEPHONE (OUTPATIENT)
Dept: OBGYN CLINIC | Facility: CLINIC | Age: 54
End: 2024-07-18

## 2024-07-18 NOTE — TELEPHONE ENCOUNTER
----- Message from Miguel ALDRICH sent at 7/17/2024 10:36 AM EDT -----  Regarding: Colpo w/ Additional time for procedure  Hi,     Please see results of pap and Nelida's recommendations. Unfortunately, pod staff does not have the eddy to adjust time frames when scheduling procedures.     Please help.     Thank you!        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 Needs to RTO for colposcopy.  Please let me know once scheduled.   She requires transport from her care facility / stretcher and help with ambulation. Please allow for additional time for procedure when scheduling.

## 2024-07-18 NOTE — TELEPHONE ENCOUNTER
Left message from jenny tracey on vm, per HIPAA, and [facility]   Colposcopy as well as u/s

## 2024-07-23 ENCOUNTER — OFFICE VISIT (OUTPATIENT)
Dept: PULMONOLOGY | Facility: CLINIC | Age: 54
End: 2024-07-23

## 2024-07-23 VITALS
HEART RATE: 87 BPM | SYSTOLIC BLOOD PRESSURE: 118 MMHG | WEIGHT: 248.6 LBS | OXYGEN SATURATION: 95 % | HEIGHT: 67 IN | DIASTOLIC BLOOD PRESSURE: 70 MMHG | BODY MASS INDEX: 39.02 KG/M2

## 2024-07-23 DIAGNOSIS — R93.89 ABNORMAL CT OF THE CHEST: ICD-10-CM

## 2024-07-23 DIAGNOSIS — G47.33 OSA (OBSTRUCTIVE SLEEP APNEA): ICD-10-CM

## 2024-07-23 DIAGNOSIS — J96.01 ACUTE RESPIRATORY FAILURE WITH HYPOXIA (HCC): ICD-10-CM

## 2024-07-23 DIAGNOSIS — F17.211 CIGARETTE NICOTINE DEPENDENCE IN REMISSION: ICD-10-CM

## 2024-07-23 DIAGNOSIS — R91.8 PULMONARY NODULES: ICD-10-CM

## 2024-07-23 DIAGNOSIS — J43.2 CENTRILOBULAR EMPHYSEMA (HCC): Primary | ICD-10-CM

## 2024-07-23 NOTE — PATIENT INSTRUCTIONS
Continue all nebulized therapy  Complete repeat CT chest  Complete PFT's  Incentive spirometer daily   Follow up in 3 months

## 2024-07-23 NOTE — ASSESSMENT & PLAN NOTE
Most recent oxygen titration study completed 05/07/2024 shows patient continues to require 3 L supplemental oxygen  Will continue oxygen at this time no indication to repeat titration study

## 2024-07-23 NOTE — ASSESSMENT & PLAN NOTE
Does not appear in exacerbation  PFTs completed in 2017 found FEV1/FVC ratio 76% DLCO 55%    CT chest 05/21/2024 diffuse groundglass attenuation throughout both lungs, progressed since 5/15/2024. Differential diagnosis includes pulmonary edema (including from noncardiogenic etiologies) and widespread pneumonia, including viral pneumonia. Differential diagnosis   includes several less common entities, including drug related acute interstitial pneumonia and hypersensitivity pneumonitis. Smoking-related interstitial lung disease can have this appearance but progression over the course of 6 days is atypical.  Repeat CT chest ordered by PCP    PFTs ordered facility will schedule  Continue nebulized medication therapy

## 2024-07-23 NOTE — PROGRESS NOTES
"Pulmonary Follow-Up Note   Chely Ruvalcaba 54 y.o. female MRN: [id number]  7/23/2024      Assessment/Plan:    Problem List Items Addressed This Visit       Cigarette nicotine dependence in remission     Remains smoke free  Quit at the beginning of March 2024         Centrilobular emphysema (HCC) - Primary     Does not appear in exacerbation  PFTs completed in 2017 found FEV1/FVC ratio 76% DLCO 55%    CT chest 05/21/2024 diffuse groundglass attenuation throughout both lungs, progressed since 5/15/2024. Differential diagnosis includes pulmonary edema (including from noncardiogenic etiologies) and widespread pneumonia, including viral pneumonia. Differential diagnosis   includes several less common entities, including drug related acute interstitial pneumonia and hypersensitivity pneumonitis. Smoking-related interstitial lung disease can have this appearance but progression over the course of 6 days is atypical.  Repeat CT chest ordered by PCP    PFTs ordered facility will schedule  Continue nebulized medication therapy         Relevant Orders    Complete PFT with post bronchodilator    Nebulizer Supplies    Acute respiratory failure with hypoxia (HCC)     Most recent oxygen titration study completed 05/07/2024 shows patient continues to require 3 L supplemental oxygen  Will continue oxygen at this time no indication to repeat titration study         Pulmonary nodules     1.2 cm AMANDA nodule noted on CT lung screening from 1/2023  CT chest from 03/07/2024 during hospitalization in [place] showed  left upper lobe and right middle lobe pulmonary nodules measuring 2.3 x 2.2 x 7 mm and 11 mm  Follow-up CT chest planned for July ordered by PCP requested to be CCed on imaging           RUTHANN (obstructive sleep apnea)     Has sleep study scheduled for December 2024         Abnormal CT of the chest     CT chest 05/21/2024 showed diffuse groundglass attenuation throughout both lungs, \"progressed since 5/15/2024 differential diagnosis " include pulmonary edema, widespread pneumonia, including viral pneumonia drug-related acute interstitial pneumonia, and hypersensitivity pneumonitis    Repeat CT chest ordered for the end of July by primary care doctor.  Requested to be CCed on imaging              Return in about 3 months (around 10/23/2024).    All of Chely's questions were answered prior to leaving the office today. She will follow-up in 4 months or sooner should the need arise. She is aware to call our office with any further questions or concerns.    History of Present Illness   Reason for Visit: Follow up  Chief Complaint: feels well  HPI: Chely Ruvalcaba is a 54 y.o. female with PMH of obesity, cirrhosis, GERD, iron deficiency anemia, COPD, tobacco abuse and seasonal allergies who presents to the office today for follow up.  Patient was last seen in the office in May with ANTONIO Delarosa.  She had hospitalization in March in [place] for bilateral pneumonia, and 3 hospitalizations since then for Pneumonia, diarrhea and cellulitis.    She presents to the office overall feeling well.  She has complaints of continued fatigue but appears to be from some deconditioning.  Lung sounds are clear but diminished most recent chest x-ray completed in June showed some interstitial thickening otherwise no pleural effusion or pneumothorax.  She remains on 3 L NC supplemental O2.  She denies any fever chills night sweats chest pain or hemoptysis.  She remains on all nebulized therapy  CTA chest PE study from 05/21/2024 showed diffuse groundglass attenuation throughout both lungs, differential diagnosis included pulmonary edema widespread pneumonia drug related interstitial pneumonia hypersensitivity pneumonitis  Repeat CT chest ordered for end of July  Repeat PFTs ordered  Follow up in 3 months    Review of Systems   Constitutional:  Negative for chills and fever.   Respiratory:  Positive for cough and shortness of breath. Negative for chest tightness and  wheezing.    Cardiovascular:  Negative for chest pain and leg swelling.       Historical Information   Past Medical History:   Diagnosis Date    Abnormal stress test     Acute cystitis without hematuria 05/07/2018    Acute duodenal ulcer with bleeding 01/16/2023    Allergic sinusitis     last assessed - 03Apr2017    Anemia Around december    Anxiety     last assessed - 04Mar2016    Arthritis     Asthma     last assessed - 04Mar2016    Bilateral lower extremity edema     last assessed - [id number]    Callus of foot     last assessed - 22Jun2016    Chest pain     Chronic GERD     last assessed - 16Jan2017    Colon polyp     Constipation     Resolved - 13Jan2017    COPD (chronic obstructive pulmonary disease) (HCC)     Depression     last assessed - 04Mar2016    Disease of thyroid gland     Diverticulitis of colon     last assessed - 04Mar2016    Dyspepsia     Resolved - [id number]    Edema, lower extremity 08/07/2020    Esophageal reflux     last assessed - 04Mar2016    Essential hypertriglyceridemia     last assessed - [id number]    Fatty liver 01/16/2023    Gastroesophageal reflux disease with esophagitis 11/18/2016    GERD (gastroesophageal reflux disease)     Gynecological disorder     last assessed - 04Mar2016    Hydroureteronephrosis 06/30/2022    Hypertension     last assessed - 04Mar2016    Hypokalemia 06/20/2022    Hypotension     last assessed - [id number]    Kidney stone     Left ureteral stone with hydronephrosis 07/21/2022    Migraine     Morbid obesity (HCC) 01/11/2019    Formatting of this note might be different from the original. Added automatically from request for surgery [id number]    Neuropathy     Obesity     Other chest pain 11/08/2018    Pancreatic lesion 03/13/2023    Positive depression screening 06/19/2022    Primary hypertriglyceridemia 06/19/2022    Pulmonary emphysema (HCC)     last assessed - 04Mar2016    Seasonal allergies     Severe obesity (HCC) 01/16/2023    Skin tag 07/17/2023    SOB  (shortness of breath)     Strain of groin 07/17/2023    Urinary frequency     last assessed - 22Jun2016    Vagina, candidiasis     last assessed - 22Jun2016    Very heavy cigarette smoker 01/16/2023    Visual impairment      Past Surgical History:   Procedure Laterality Date    CARPAL TUNNEL RELEASE      last assessed - 04MAr2016    CHOLECYSTECTOMY      last assessed - 04Mar2016    COLONOSCOPY      Complete colonoscopy     EYE SURGERY      last assessed - 04Mar2016    FL RETROGRADE PYELOGRAM  6/16/2022    FL RETROGRADE PYELOGRAM  8/16/2022    FOOT SURGERY      last assessed - 04Mar2016    OK CYSTO BLADDER W/URETERAL CATHETERIZATION Left 7/21/2022    Procedure: CYSTOSCOPY RETROGRADE PYELOGRAM WITH INSERTION STENT URETERAL;  Surgeon: Facundo Matamoros MD;  Location: [facility];  Service: Urology    OK CYSTO/URETERO W/LITHOTRIPSY &INDWELL STENT INSRT Left 6/16/2022    Procedure: CYSTOSCOPY URETEROSCOPY WITH LITHOTRIPSY HOLMIUM LASER, RETROGRADE PYELOGRAM AND INSERTION STENT URETERAL;  Surgeon: Sammy Ferrer MD;  Location: [facility];  Service: Urology    OK CYSTO/URETERO W/LITHOTRIPSY &INDWELL STENT INSRT Left 8/16/2022    Procedure: CYSTOSCOPY USCOPE W/HOLMIUM LASER, RETROGRADE PYELOGRAM&STENT;  Surgeon: Sudheer Bradford MD;  Location: [facility];  Service: Urology    OK ESOPHAGOGASTRODUODENOSCOPY TRANSORAL DIAGNOSTIC N/A 2/13/2017    Procedure: ESOPHAGOGASTRODUODENOSCOPY (EGD);  Surgeon: Alison Saleem MD;  Location: [facility];  Service: Gastroenterology     Family History   Problem Relation Age of Onset    Obesity Mother     Thyroid disease Mother     Cancer Mother 71    Vision loss Mother     Obesity Sister     Coronary artery disease Sister     Stroke Sister     Asthma Sister     Glaucoma Sister     No Known Problems Maternal Aunt     Diabetes Maternal Uncle     Lung cancer Maternal Grandmother     Cancer Maternal Grandfather     Diabetes Maternal Grandfather     No Known Problems Paternal Grandmother     No Known  Problems Paternal Grandfather     Hypertension Other     Lung cancer Other     Hypertension Other     Lung cancer Other     Lung cancer Other      Social History   Social History     Substance and Sexual Activity   Alcohol Use Not Currently     Social History     Substance and Sexual Activity   Drug Use Never     Social History     Tobacco Use   Smoking Status Former    Current packs/day: 0.00    Average packs/day: 0.5 packs/day for 41.2 years (20.6 ttl pk-yrs)    Types: Cigarettes    Start date: 1983    Quit date: 3/6/2024    Years since quittin.3   Smokeless Tobacco Never     E-Cigarette/Vaping    E-Cigarette Use Former User     Start Date 19     Quit Date 19      E-Cigarette/Vaping Substances    Nicotine Yes     THC No     CBD No     Flavoring No     Other No     Unknown No        Meds/Allergies     Current Outpatient Medications:     albuterol (PROVENTIL HFA,VENTOLIN HFA) 90 mcg/act inhaler, Inhale 2 puffs every 6 (six) hours as needed for wheezing, Disp: 18 g, Rfl: 1    Blood Glucose Monitoring Suppl (OneTouch Verio) w/Device KIT, Check blood sugar once a day, Disp: 1 kit, Rfl: 0    budesonide (PULMICORT) 0.5 mg/2 mL nebulizer solution, Take 0.5 mg by nebulization 2 (two) times a day Rinse mouth after use., Disp: , Rfl:     buPROPion (WELLBUTRIN XL) 300 mg 24 hr tablet, Take 1 tablet (300 mg total) by mouth every morning, Disp: 90 tablet, Rfl: 3    ergocalciferol (VITAMIN D2) 50,000 units, Take 1 capsule (50,000 Units total) by mouth every 14 (fourteen) days, Disp: 12 capsule, Rfl: 1    famotidine (PEPCID) 40 MG tablet, Take 1 tablet (40 mg total) by mouth daily at bedtime Take in evening 2 hours prior to bed, Disp: 90 tablet, Rfl: 1    ferrous sulfate 324 (65 Fe) mg, Take 1 tablet (324 mg total) by mouth daily before breakfast, Disp: 30 tablet, Rfl: 3    folic acid (FOLVITE) 1 mg tablet, Take 1 tablet (1 mg total) by mouth daily, Disp: 90 tablet, Rfl: 1    furosemide (LASIX) 40 mg tablet, Take  1 tablet (40 mg total) by mouth daily, Disp: 180 tablet, Rfl: 1    gabapentin (NEURONTIN) 100 mg capsule, Take 100 mg by mouth every 8 (eight) hours, Disp: , Rfl: 0    glucose blood (OneTouch Verio) test strip, Check blood sugar once a day, Disp: 100 each, Rfl: 2    Insulin Pen Needle 31G X 5 MM MISC, Inject under the skin if needed (Insuline injections), Disp: 100 each, Rfl: 3    ipratropium-albuterol (DUO-NEB) 0.5-2.5 mg/3 mL nebulizer solution, Take 3 mL by nebulization 4 (four) times a day, Disp: 360 mL, Rfl: 3    lactulose (CHRONULAC) 10 g/15 mL solution, Take 20 g by mouth Daily or as needed for bowl movement, Disp: , Rfl:     levothyroxine 112 mcg tablet, Take 1 tablet (112 mcg total) by mouth daily, Disp: 90 tablet, Rfl: 1    nicotine (NICODERM CQ) 21 mg/24 hr TD 24 hr patch, Place 1 patch on the skin over 24 hours every 24 hours, Disp: 28 patch, Rfl: 5    omeprazole (PriLOSEC) 40 MG capsule, take 1 capsule by mouth twice a day, Disp: 60 capsule, Rfl: 5    ondansetron (ZOFRAN) 4 mg tablet, Take 1 tablet (4 mg total) by mouth every 8 (eight) hours as needed for nausea or vomiting, Disp: 20 tablet, Rfl: 0    OneTouch Delica Lancets 30G MISC, Check blood sugars once a day, Disp: 100 each, Rfl: 1    oxyCODONE-acetaminophen (PERCOCET)  mg per tablet, Take 1 tablet by mouth every 6 (six) hours as needed for severe pain, Disp: , Rfl:     oxygen gas, Inhale 2 L/min continuous. May use 3L with exertion per Bella BAHNP Keep sat >88%  Indications: low saturation, Disp: , Rfl:     potassium chloride (Klor-Con M20) 20 mEq tablet, , Disp: , Rfl:     semaglutide, 0.25 or 0.5 mg/dose, (Ozempic, 0.25 or 0.5 MG/DOSE,) 2 mg/3 mL injection pen, Inject 0.375 mL (0.25 mg total) under the skin every 7 days for 30 days, THEN 0.75 mL (0.5 mg total) every 7 days., Disp: 9 mL, Rfl: 0    sertraline (ZOLOFT) 100 mg tablet, take 1 tablet by mouth twice a day, Disp: 180 tablet, Rfl: 1    simvastatin (ZOCOR) 20 mg tablet, Take  "1 tablet (20 mg total) by mouth daily at bedtime, Disp: 90 tablet, Rfl: 1    sucralfate (CARAFATE) 1 g tablet, Take 1 tablet (1 g total) by mouth daily at bedtime, Disp: 60 tablet, Rfl: 3    varenicline (CHANTIX) 0.5 mg tablet, Take 1 tablet (0.5 mg total) by mouth 2 (two) times a day, Disp: 60 tablet, Rfl: 0    diazepam (VALIUM) 5 mg tablet, Take 1 tablet (5 mg total) by mouth daily at bedtime as needed for anxiety (Patient not taking: Reported on 6/26/2024), Disp: 30 tablet, Rfl: 0  Allergies   Allergen Reactions    Hydrocodone-Acetaminophen Hives    Ketorolac Hives and Dizziness    Toradol [Ketorolac Tromethamine] Other (See Comments)     hypotension    Ultram [Tramadol] Nausea Only, GI Intolerance and Vomiting       Vitals: Blood pressure 118/70, pulse 87, height 5' 7\" (1.702 m), weight 113 kg (248 lb 9.6 oz), last menstrual period 04/05/2018, SpO2 95%. Body mass index is 38.94 kg/m². Oxygen Therapy  SpO2: 95 %  Oxygen Therapy: Supplemental oxygen  O2 Delivery Method: Nasal cannula  O2 Flow Rate (L/min): 3 L/min3 L    Physical Exam:  Physical Exam  Constitutional:       General: She is not in acute distress.     Appearance: She is not ill-appearing.   HENT:      Head: Normocephalic and atraumatic.      Right Ear: External ear normal.      Nose: Nose normal.      Mouth/Throat:      Mouth: Mucous membranes are moist.      Pharynx: Oropharynx is clear.   Eyes:      Extraocular Movements: Extraocular movements intact.      Pupils: Pupils are equal, round, and reactive to light.   Cardiovascular:      Rate and Rhythm: Normal rate and regular rhythm.      Pulses: Normal pulses.      Heart sounds: Normal heart sounds.   Pulmonary:      Effort: Pulmonary effort is normal.      Breath sounds: Normal breath sounds.   Abdominal:      General: Bowel sounds are normal.      Tenderness: There is no abdominal tenderness.   Musculoskeletal:      Cervical back: Normal range of motion and neck supple.      Right lower leg: No " "edema.      Left lower leg: No edema.   Skin:     General: Skin is warm.   Neurological:      Mental Status: She is alert and oriented to person, place, and time.   Psychiatric:         Mood and Affect: Mood normal.         Thought Content: Thought content normal.         Judgment: Judgment normal.       Imaging and other studies: I have personally reviewed pertinent reports.     Chest Xray 06/03/2024 Mild diffuse interstitial thickening. No focal consolidation or pleural effusion.     Pulmonary Results (PFTs, PSG): I have personally reviewed pertinent reports.     PFTs in 2017 showed normal ratio no bronchodilator response  Repeat PFTs ordered      ANTONIO Galvin  [facility]      Portions of the record may have been created with voice recognition software.  Occasional wrong word or \"sound a like\" substitutions may have occurred due to the inherent limitations of voice recognition software.  Read the chart carefully and recognize, using context, where substitutions have occurred or contact the dictating provider.  "

## 2024-07-23 NOTE — ASSESSMENT & PLAN NOTE
"CT chest 05/21/2024 showed diffuse groundglass attenuation throughout both lungs, \"progressed since 5/15/2024 differential diagnosis include pulmonary edema, widespread pneumonia, including viral pneumonia drug-related acute interstitial pneumonia, and hypersensitivity pneumonitis    Repeat CT chest ordered for the end of July by primary care doctor.  Requested to be CCed on imaging  "

## 2024-07-24 LAB
DME PARACHUTE DELIVERY DATE REQUESTED: NORMAL
DME PARACHUTE ITEM DESCRIPTION: NORMAL
DME PARACHUTE ITEM DESCRIPTION: NORMAL
DME PARACHUTE ORDER STATUS: NORMAL
DME PARACHUTE SUPPLIER NAME: NORMAL
DME PARACHUTE SUPPLIER PHONE: NORMAL

## 2024-07-25 ENCOUNTER — TELEPHONE (OUTPATIENT)
Dept: PULMONOLOGY | Facility: CLINIC | Age: 54
End: 2024-07-25

## 2024-07-25 NOTE — TELEPHONE ENCOUNTER
Spoke to Chely's nurse (Tete) to let her know Chely needs to schedule her CT by 07/30 and provided her central scheduling phone number.

## 2024-07-30 ENCOUNTER — TELEPHONE (OUTPATIENT)
Age: 54
End: 2024-07-30

## 2024-07-30 NOTE — TELEPHONE ENCOUNTER
[facility] called in to transfer care from [place] to [facility] due to transportation.

## 2024-08-05 ENCOUNTER — TRANSCRIBE ORDERS (OUTPATIENT)
Dept: GASTROENTEROLOGY | Facility: CLINIC | Age: 54
End: 2024-08-05

## 2024-08-05 ENCOUNTER — OFFICE VISIT (OUTPATIENT)
Dept: GASTROENTEROLOGY | Facility: CLINIC | Age: 54
End: 2024-08-05
Payer: MEDICARE

## 2024-08-05 ENCOUNTER — OFFICE VISIT (OUTPATIENT)
Dept: HEMATOLOGY ONCOLOGY | Facility: CLINIC | Age: 54
End: 2024-08-05
Payer: MEDICARE

## 2024-08-05 ENCOUNTER — TELEPHONE (OUTPATIENT)
Dept: HEMATOLOGY ONCOLOGY | Facility: CLINIC | Age: 54
End: 2024-08-05

## 2024-08-05 VITALS
HEIGHT: 67 IN | WEIGHT: 244 LBS | BODY MASS INDEX: 38.3 KG/M2 | TEMPERATURE: 97.3 F | SYSTOLIC BLOOD PRESSURE: 124 MMHG | DIASTOLIC BLOOD PRESSURE: 70 MMHG

## 2024-08-05 VITALS
HEIGHT: 67 IN | SYSTOLIC BLOOD PRESSURE: 118 MMHG | HEART RATE: 96 BPM | BODY MASS INDEX: 38.94 KG/M2 | DIASTOLIC BLOOD PRESSURE: 89 MMHG | TEMPERATURE: 98 F | OXYGEN SATURATION: 96 % | RESPIRATION RATE: 18 BRPM

## 2024-08-05 DIAGNOSIS — K57.92 DIVERTICULITIS: ICD-10-CM

## 2024-08-05 DIAGNOSIS — K74.60 CIRRHOSIS OF LIVER WITHOUT ASCITES, UNSPECIFIED HEPATIC CIRRHOSIS TYPE (HCC): Primary | ICD-10-CM

## 2024-08-05 DIAGNOSIS — D50.9 IRON DEFICIENCY ANEMIA, UNSPECIFIED IRON DEFICIENCY ANEMIA TYPE: Primary | ICD-10-CM

## 2024-08-05 DIAGNOSIS — D69.6 THROMBOCYTOPENIA (HCC): Chronic | ICD-10-CM

## 2024-08-05 DIAGNOSIS — R19.5 DARK STOOLS: ICD-10-CM

## 2024-08-05 DIAGNOSIS — Z86.010 PERSONAL HISTORY OF COLONIC POLYPS: ICD-10-CM

## 2024-08-05 DIAGNOSIS — K59.00 CONSTIPATION, UNSPECIFIED CONSTIPATION TYPE: ICD-10-CM

## 2024-08-05 DIAGNOSIS — E53.8 LOW SERUM VITAMIN B12: ICD-10-CM

## 2024-08-05 PROCEDURE — 99214 OFFICE O/P EST MOD 30 MIN: CPT | Performed by: FAMILY MEDICINE

## 2024-08-05 PROCEDURE — 99214 OFFICE O/P EST MOD 30 MIN: CPT | Performed by: PHYSICIAN ASSISTANT

## 2024-08-05 RX ORDER — LACTULOSE 10 G/15ML
20 SOLUTION ORAL DAILY PRN
Qty: 240 ML | Refills: 11 | Status: SHIPPED | OUTPATIENT
Start: 2024-08-05

## 2024-08-05 RX ORDER — SODIUM CHLORIDE 9 MG/ML
20 INJECTION, SOLUTION INTRAVENOUS ONCE
OUTPATIENT
Start: 2024-08-12

## 2024-08-05 RX ORDER — CYANOCOBALAMIN 1000 UG/ML
1000 INJECTION, SOLUTION INTRAMUSCULAR; SUBCUTANEOUS ONCE
OUTPATIENT
Start: 2024-08-12 | End: 2024-08-12

## 2024-08-05 NOTE — PROGRESS NOTES
[facility] - Outpatient Follow-up  Chely Ruvalcaba 54 y.o. female MRN: [id number]  Encounter: [id number]          ASSESSMENT AND PLAN:      1. Cirrhosis of liver without ascites, unspecified hepatic cirrhosis type (HCC)  2. Constipation, unspecified constipation type  3. Bilateral lower extremity edema    Summary: Patient is a 54 y.o. 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 Current MELD- 3.0 (12).    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 Her liver disease has been well compensated aside from bilateral lower extremity edema. She has trace b/l ALEJO on exam today and is currently taking Lasix 10 mg once daily and Aldactone 25 mg once daily. She believes her Lasix was reduced due to hypokalemia. Her most recent BMP shows stable Cr and normal lytes. She will continue her current diuretic regimen and recommend the use of compression stockings.     Since her last appointment, she has been hospitalized multiple times for hypokalemia, sepsis secondary to PNA and diarrhea though to be secondary to enteritis. She now requires 3L continuous O2 via NC. During one of her admissions (5/15/2024), she was also incidentally noted to have mild acute uncomplicated sigmoid diverticulitis treated with Augmentin/Flagyl (refer to A/P below). No hospitalizations related to her liver disease or decompensating events.     She is otherwise UTD with an EGD (EUS 10/2023) for variceal screening and imaging for hepatoma screening. She is due for updated MELS labs. She would also like to continue taking lactulose PRN for constipation, reordered today. Additional management as below.     Ascites   - No abdominopelvic ascites but mild b/l ALEJO noted on exam   - Continue Lasix 10 mg once daily  - Continue  Aldactone 25 mg once daily  - Continue potassium supplement 20 mEq BID  - Consider compression stockings for b/l ALEJO  - Encouraged to adhere to a low-sodium (<2000 mg daily) diet.     Esophageal Varices   - EUS (10/2023) without evidence of gastric/esophageal varices but with PHG   - Repeat an EGD x2 years for variceal screening    Hepatic Encephalopathy   - No history or evidence of HE on exam today.   - Currently taking lactulose PRN for constipation  - Patient aware of signs/sxs's of HE and advised to promptly inform provider if experiencing such.    HCC Screening   - CT A/P (6/3/2024) without focal liver lesion and AFP normal (5/13/2024)  - Plan for a US in December 2024 for hepatoma screening.   - Continue imaging q6 months and AFP annually.     Nutritional Status  - Mild sarcopenia noted on exam today.   - Encouraged high-protein diet in addition to a high-protein snack qHS to prevent prolonged fasting.     Transplant Candidacy   - Defer given low-MELD score and significant cardiopulmonary co-morbidities.     - CBC and differential; Future  - Comprehensive metabolic panel; Future  - Protime-INR; Future  - US abdomen complete; Future  - lactulose (CHRONULAC) 10 g/15 mL solution; Take 30 mL (20 g total) by mouth daily as needed (constipation)  Dispense: 240 mL; Refill: 11    4. Dark stools  5. Diverticulitis  6. Personal history of colonic polyps  She reports chronically dark stools in the setting of oral iron supplementation. She does have a history GAVE s/p APC, PHG and a UGIB thought to be secondary to duodenal ulcers. However, her CBC and iron studies (6/6) are c/w her baseline and she denies obvious hematochezia and/or melena raising suspicion that her dark stools are secondary to her oral iron supplements rather than an occult UGIB. Plan to check her H&H with MLED labs and she will continue following with hematology for IV iron infusions.      Furthermore, she was incidentally noted to have mild acute  "uncomplicated sigmoid diverticulitis on cross-sectional imaging (5/15/2024). Although she was asymptomatic, she was treated with a course of Augmentin/Flagyl. Her last colonoscopy (2/28/2022) showed multiple colonic polyps and she was recommended to repeat a colonoscopy x1 year. She is currently overdue.     Ideally, she would have both an EGD for evaluation of dark stools and colonoscopy s/p diverticulitis and for surveillance of polyps. However, she is high-risk for anesthesia given her extensive cardiopulmonary co-morbidities and O2 requirements. Plan to discuss risk vs benefits of scopes with her care team.     Follow-up in 3 months or sooner if necessary.     ______________________________________________________________________    HPI: Patient is a 54 y.o. 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     Interval history  Since her last appointment she has been hospitalized on multiple occasions:  - Hospitalized 3/6/2024 for sepsis secondary to PNA requiring 15 L O2 on admission. She received broad-spectrum antibiotics and steroids. She also had an echo notable for an LVEF of 60 to 65% with grade 1 diastolic dysfunction. She also had a right heart catheterization (3/19) with normal right-sided pressures. Her hospital course was c/b a pneumothorax requiring a chest tube  - Hospitalized 5/14/2024 for generalized malaise found hypokalemia, abnormal CT chest and acute uncomplicated sigmoid diverticulosis. Her potassium was repleted and she was discharged with an Rx for Augmentin/Flagyl.   - Hospitalized 5/21/2024 for severe sepsis 2/2 PNA.   - Hospitalized 6/25/2024 for diarrhea though to tbe 2/2 enteritis.     She also reports chronically dark stools. Describes her stools as being very dark brown, \"almost " "black\". Denies hematochezia or black/tarry stools. She is taking oral iron supplements and following with hematology with plans for IV iron infusions. Her last colonoscopy (2/28/2022) showed multiple colonic polyps. She was recommended to repeat a colonoscopy x1 year and is overdue.     Extended liver history  [place] [facility] followed for multitude of GI-related complaints reestablishing care in January 2023 for anemia which she was reportedly hospitalized in [place] in December 2022 for a bleeding gastric ulcer. She underwent an EGD and colonoscopy in 2022 and has since had two subsequent EGDs with her most recent noting a probable healing ulcer at the junction of the first and second portion of the diodenum, scattered areas of nodular mucos with erosion in the antrum and probable PHG. She continues to follow with Dr. Vu for her GI-related concerns.      She was also noted to have hepatosplenomegaly and diffuse hepatic steatosis on CT from June 2022 in addition to thrombocytopenia. This prompted an RUQ U/S notable for cirrhotic morphology, hepatosplenomegaly and an ovoid hypoechoic structure adjacent to the pancreatic head suggestive of robin hepatitis lymphadenopathy for which she has had a MRI/MRCP showing mild robin hepatic adenopathy with a dominant node adjacent to the pancreatic head. She is scheduled for an EUS. Also had an US elastography notable for F4 cirrhosis and completed a serologic eval negative for competing causes of liver disease.      Denies excessive EtOH use, personal or family history of autoimmune disorders, or known infection with viral hepatitis. In regards to metabolic risk factors, patient's BMI 30.06 with glucose intolerance, hypertension, and hyperlipidemia.    2/13/2024   - EUS (10/10/2023) for evaluation of abdominal lymphadenopathy was notable for a nodular liver, diffuse GAVE s/p APC, PHG, healed duodenal ulcer and 3 hypoechoic periportal " nodes with well-defined margins benign in appearance.  - MRI with MRCP (10/2023) notable for cirrhosis, hepatomegaly, hepatic steatosis and splenomegaly as well as known abdominal lymphadenopathy.   - Seen by her PCP for b/l ALEJO (left worse than right) and started on Aldactone 25 mg once daily. Reportedly had a LE venous Doppler in [place] to r/o DVT and an Echo (8/2023) which was unremarkable.     MELD 3.0: 12 at 5/23/2024  5:06 AM  MELD-Na: 11 at 5/23/2024  5:06 AM  Calculated from:  Serum Creatinine: 0.94 mg/dL (Using min of 1 mg/dL) at 5/23/2024  5:06 AM  Serum Sodium: 141 mmol/L (Using max of 137 mmol/L) at 5/23/2024  5:06 AM  Total Bilirubin: 1.59 mg/dL at 5/21/2024 12:24 PM  Serum Albumin: 3.7 g/dL (Using max of 3.5 g/dL) at 5/21/2024 12:24 PM  INR(ratio): 1.31 at 5/21/2024 12:24 PM  Age at listing (hypothetical): 53 years  Sex: Female at 5/23/2024  5:06 AM      REVIEW OF SYSTEMS:    CONSTITUTIONAL: Denies any fever, chills, rigors, and weight loss.  HEENT: No earache or tinnitus. Denies hearing loss or visual disturbances.  CARDIOVASCULAR: No chest pain or palpitations.   RESPIRATORY: Denies any cough, hemoptysis, shortness of breath or dyspnea on exertion.  GASTROINTESTINAL: As noted in the History of Present Illness.   GENITOURINARY: No problems with urination. Denies any hematuria or dysuria.  NEUROLOGIC: No dizziness or vertigo, denies headaches.   MUSCULOSKELETAL: Denies any muscle or joint pain.   SKIN: Denies skin rashes or itching.   ENDOCRINE: Denies excessive thirst. Denies intolerance to heat or cold.  PSYCHOSOCIAL: Denies depression or anxiety. Denies any recent memory loss.       Historical Information   Past Medical History:   Diagnosis Date   • Abnormal stress test    • Acute cystitis without hematuria 05/07/2018   • Acute duodenal ulcer with bleeding 01/16/2023   • Allergic sinusitis     last assessed - 03Apr2017   • Anemia Around december   • Anxiety     last assessed - 04Mar2016   • Arthritis     • Asthma     last assessed - 04Mar2016   • Bilateral lower extremity edema     last assessed - 12May2017   • Callus of foot     last assessed - 22Jun2016   • Chest pain    • Chronic GERD     last assessed - 16Jan2017   • Colon polyp    • Constipation     Resolved - 13Jan2017   • COPD (chronic obstructive pulmonary disease) ([facility])    • Depression     last assessed - 04Mar2016   • Disease of thyroid gland    • Diverticulitis of colon     last assessed - 04Mar2016   • Dyspepsia     Resolved - [id number]   • Edema, lower extremity 08/07/2020   • Esophageal reflux     last assessed - 04Mar2016   • Essential hypertriglyceridemia     last assessed - 23Feb2017   • Fatty liver 01/16/2023   • Gastroesophageal reflux disease with esophagitis 11/18/2016   • GERD (gastroesophageal reflux disease)    • Gynecological disorder     last assessed - 04Mar2016   • Hydroureteronephrosis 06/30/2022   • Hypertension     last assessed - 04Mar2016   • Hypokalemia 06/20/2022   • Hypotension     last assessed - 25Aug2016   • Kidney stone    • Left ureteral stone with hydronephrosis 07/21/2022   • Migraine    • Morbid obesity ([facility]) 01/11/2019    Formatting of this note might be different from the original. Added automatically from request for surgery [id number]   • Neuropathy    • Obesity    • Other chest pain 11/08/2018   • Pancreatic lesion 03/13/2023   • Positive depression screening 06/19/2022   • Primary hypertriglyceridemia 06/19/2022   • Pulmonary emphysema ([facility])     last assessed - 04Mar2016   • Seasonal allergies    • Severe obesity ([facility]) 01/16/2023   • Skin tag 07/17/2023   • SOB (shortness of breath)    • Strain of groin 07/17/2023   • Urinary frequency     last assessed - 22Jun2016   • Vagina, candidiasis     last assessed - 22Jun2016   • Very heavy cigarette smoker 01/16/2023   • Visual impairment      Past Surgical History:   Procedure Laterality Date   • CARPAL TUNNEL RELEASE      last assessed - 04MAr2016   • CHOLECYSTECTOMY       last assessed - 2016   • COLONOSCOPY      Complete colonoscopy    • EYE SURGERY      last assessed - 2016   • FL RETROGRADE PYELOGRAM  2022   • FL RETROGRADE PYELOGRAM  2022   • FOOT SURGERY      last assessed - 2016   • TX CYSTO BLADDER W/URETERAL CATHETERIZATION Left 2022    Procedure: CYSTOSCOPY RETROGRADE PYELOGRAM WITH INSERTION STENT URETERAL;  Surgeon: Facundo Matamoros MD;  Location: [facility];  Service: Urology   • TX CYSTO/URETERO W/LITHOTRIPSY &INDWELL STENT INSRT Left 2022    Procedure: CYSTOSCOPY URETEROSCOPY WITH LITHOTRIPSY HOLMIUM LASER, RETROGRADE PYELOGRAM AND INSERTION STENT URETERAL;  Surgeon: Sammy Ferrer MD;  Location: [facility];  Service: Urology   • TX CYSTO/URETERO W/LITHOTRIPSY &INDWELL STENT INSRT Left 2022    Procedure: CYSTOSCOPY USCOPE W/HOLMIUM LASER, RETROGRADE PYELOGRAM&STENT;  Surgeon: Sudheer Bradford MD;  Location: [facility];  Service: Urology   • TX ESOPHAGOGASTRODUODENOSCOPY TRANSORAL DIAGNOSTIC N/A 2017    Procedure: ESOPHAGOGASTRODUODENOSCOPY (EGD);  Surgeon: Alison Saleem MD;  Location: [facility];  Service: Gastroenterology     Social History   Social History     Substance and Sexual Activity   Alcohol Use Not Currently     Social History     Substance and Sexual Activity   Drug Use Never     Social History     Tobacco Use   Smoking Status Former   • Current packs/day: 0.00   • Average packs/day: 0.5 packs/day for 41.2 years (20.6 ttl pk-yrs)   • Types: Cigarettes   • Start date: 1983   • Quit date: 3/6/2024   • Years since quittin.4   Smokeless Tobacco Never     Family History   Problem Relation Age of Onset   • Obesity Mother    • Thyroid disease Mother    • Cancer Mother 71   • Vision loss Mother    • Obesity Sister    • Coronary artery disease Sister    • Stroke Sister    • Asthma Sister    • Glaucoma Sister    • No Known Problems Maternal Aunt    • Diabetes Maternal Uncle    • Lung cancer Maternal Grandmother    •  Cancer Maternal Grandfather    • Diabetes Maternal Grandfather    • No Known Problems Paternal Grandmother    • No Known Problems Paternal Grandfather    • Hypertension Other    • Lung cancer Other    • Hypertension Other    • Lung cancer Other    • Lung cancer Other        Meds/Allergies       Current Outpatient Medications:   •  albuterol (PROVENTIL HFA,VENTOLIN HFA) 90 mcg/act inhaler  •  Blood Glucose Monitoring Suppl (OneTouch Verio) w/Device KIT  •  budesonide (PULMICORT) 0.5 mg/2 mL nebulizer solution  •  buPROPion (WELLBUTRIN XL) 300 mg 24 hr tablet  •  diazepam (VALIUM) 5 mg tablet  •  ergocalciferol (VITAMIN D2) 50,000 units  •  famotidine (PEPCID) 40 MG tablet  •  ferrous sulfate 324 (65 Fe) mg  •  folic acid (FOLVITE) 1 mg tablet  •  furosemide (LASIX) 40 mg tablet  •  gabapentin (NEURONTIN) 100 mg capsule  •  glucose blood (OneTouch Verio) test strip  •  Insulin Pen Needle 31G X 5 MM MISC  •  ipratropium-albuterol (DUO-NEB) 0.5-2.5 mg/3 mL nebulizer solution  •  lactulose (CHRONULAC) 10 g/15 mL solution  •  levothyroxine 112 mcg tablet  •  linezolid (ZYVOX) 600 MG/300ML SOLN  •  omeprazole (PriLOSEC) 40 MG capsule  •  ondansetron (ZOFRAN) 4 mg tablet  •  OneTouch Delica Lancets 30G MISC  •  oxyCODONE-acetaminophen (PERCOCET)  mg per tablet  •  oxygen gas  •  phenazopyridine (PYRIDIUM) 100 mg tablet  •  potassium chloride (Klor-Con M20) 20 mEq tablet  •  semaglutide, 0.25 or 0.5 mg/dose, (Ozempic, 0.25 or 0.5 MG/DOSE,) 2 mg/3 mL injection pen  •  sertraline (ZOLOFT) 100 mg tablet  •  simvastatin (ZOCOR) 20 mg tablet  •  spironolactone (ALDACTONE) 25 mg tablet  •  sucralfate (CARAFATE) 1 g tablet  •  varenicline (CHANTIX) 0.5 mg tablet  •  nicotine (NICODERM CQ) 21 mg/24 hr TD 24 hr patch    Allergies   Allergen Reactions   • Hydrocodone-Acetaminophen Hives   • Ketorolac Hives and Dizziness   • Toradol [Ketorolac Tromethamine] Other (See Comments)     hypotension   • Ultram [Tramadol] Nausea Only,  "GI Intolerance and Vomiting           Objective     Blood pressure 124/70, temperature (!) 97.3 °F (36.3 °C), temperature source Tympanic, height 5' 7\" (1.702 m), weight 111 kg (244 lb), last menstrual period 04/05/2018. Body mass index is 38.22 kg/m².        PHYSICAL EXAM:      General Appearance:   Alert, cooperative, no distress; AAOx3, no asterixis   HEENT:   Normocephalic, atraumatic, anicteric; No scleral icterus     Neck:  Supple, symmetrical, trachea midline   Lungs:   Clear to auscultation bilaterally; no rales, rhonchi or wheezing; respirations unlabored    Heart::   Regular rate and rhythm; no murmur, rub, or gallop.   Abdomen:   Soft, non-tender, non-distended; normal bowel sounds; no masses, no organomegaly    Genitalia:   Deferred    Rectal:   Deferred    Extremities:  +Trace b/l ALEJO; No palmar erythema, cyanosis, clubbing    Pulses:  2+ and symmetric    Skin:  No spider angiomas, jaundice, rashes, or lesions    Lymph nodes:  No palpable cervical lymphadenopathy        Lab Results:   No visits with results within 1 Day(s) from this visit.   Latest known visit with results is:   Office Visit on 07/23/2024   Component Date Value   • Supplier Name 07/24/2024 [employer]    • Supplier Phone Number 07/24/2024 [phone]    • Order Status 07/24/2024 Contacting Patient    • Delivery Request Date 07/24/2024 07/24/2024    • Item Description 07/24/2024 Nebulizer Set, Disposable    • Item Description 07/24/2024 Disposable Nebulizer Compressor Filter          Radiology Results:   No results found.    Lisandra Leggett PA-C     **Please note: Dictation voice to text software may have been used in the creation of this record. Occasional wrong word or “sound alike” substitutions may have occurred due to the inherent limitations of voice recognition software. Read the chart carefully and recognize, using context, where substitutions have occurred.**  "

## 2024-08-05 NOTE — Clinical Note
Hello everyone,   I am reaching out regarding Ms. Ruvalcaba. She requires both an EGD for evaluation of dark stools and colonoscopy s/p diverticulitis and for surveillance of polyps. However, she is high-risk for anesthesia given her extensive cardiopulmonary co-morbidities and current O2 requirements. I wanted to get both cardiology and pulmonology's opinions regarding her risk for endoscopic evaluation.   If she is deemed too high risk then I will plan to reconsider her for a colonoscopy in the future if her functional status and O2 requirements improved. I would also monitor her H&H and iron studies following her upcoming iron infusions prior to pursuing an EGD. Let me know your thoughts and thank you in advance!  Best, Lisandra Leggett

## 2024-08-05 NOTE — PROGRESS NOTES
Hematology/Oncology Outpatient Follow-up  Chely Ruvalcaba 54 y.o. female 1970 [id number]    Date:  8/5/2024      Assessment and Plan:  1. Iron deficiency anemia  We reviewed iron deficiency is not a primary hematological problem.  We reviewed causes of iron deficiency to include:   Blood loss (GI bleed, menses, frequent blood donation, frequent epistaxis)  Malabsorption (Uzma-en Y or sleeve gastrectomy, active or recent H. pylori, chronic prolonged PPI use)  Insufficient dietary intake      Patient does have cirrhosis, increased risk of varices.  She has history of GAVE noted on a endoscopy that required APC.  She also has chronic PPI use which could result in malabsorption  She has failed oral iron  She was recommended IV iron back in November but never followed through.  Recommend iron as well as B12, Venofer 200 mg weekly x 4 and B12 1000 mcg weekly x 4.    Follow-up in 4 months with labs.    - CBC and differential; Future  - Ferritin; Future  - Vitamin B12; Future    2. Low serum vitamin B12  - Vitamin B12; Future    3. Thrombocytopenia (HCC)  2/2 to cirrhosis   Platelets have been stable in 80,000 range on average   No intervention needed      HPI:  54-year-old female presents for follow-up visit.  She was last seen in our office in November 2023 by different provider.    She was seen at that time for normocytic anemia with iron deficiency as well as peripancreatic lymphadenopathy.    Medical history significant for GERD, COPD, hypertension, elevated cholesterol, liver cirrhosis.    She follows with GI with last colonoscopy in February 2020.  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    She had taken oral iron and did not have response to this therefore she  was rec Venofer 200 mg weekly x 4 but never completed this.     She states that she was hospitalized in [place] for almost 1 month from March to April 2024.  She then was admitted back here in June.  She is now residing at [facility] in [place].    Interval history:    ROS: Review of Systems   Constitutional:  Positive for fatigue.   Respiratory:  Positive for shortness of breath (COPD on 3L O2). Negative for cough.    Cardiovascular:  Negative for chest pain, palpitations and leg swelling.   Gastrointestinal:  Negative for abdominal pain, blood in stool, constipation, diarrhea, nausea and vomiting.        Denies dark or black stool    Genitourinary:  Negative for difficulty urinating, dysuria and hematuria.   Musculoskeletal:  Negative for arthralgias and myalgias.   Skin: Negative.    Neurological:  Positive for dizziness, weakness and light-headedness. Negative for headaches.   Hematological: Negative.    Psychiatric/Behavioral: Negative.         Past Medical History:   Diagnosis Date    Abnormal stress test     Acute cystitis without hematuria 05/07/2018    Acute duodenal ulcer with bleeding 01/16/2023    Allergic sinusitis     last assessed - 03Apr2017    Anemia Around december    Anxiety     last assessed - 04Mar2016    Arthritis     Asthma     last assessed - 04Mar2016    Bilateral lower extremity edema     last assessed - [id number]    Callus of foot     last assessed - 22Jun2016    Chest pain     Chronic GERD     last assessed - 16Jan2017    Colon polyp     Constipation     Resolved - 13Jan2017    COPD (chronic obstructive pulmonary disease) (HCC)     Depression     last assessed - 04Mar2016    Disease of thyroid gland     Diverticulitis of colon     last assessed - 04Mar2016    Dyspepsia     Resolved - [id number]    Edema, lower extremity 08/07/2020    Esophageal reflux     last assessed - 04Mar2016    Essential hypertriglyceridemia     last assessed - [id number]    Fatty liver 01/16/2023    Gastroesophageal  reflux disease with esophagitis 11/18/2016    GERD (gastroesophageal reflux disease)     Gynecological disorder     last assessed - 04Mar2016    Hydroureteronephrosis 06/30/2022    Hypertension     last assessed - 04Mar2016    Hypokalemia 06/20/2022    Hypotension     last assessed - 25Aug2016    Kidney stone     Left ureteral stone with hydronephrosis 07/21/2022    Migraine     Morbid obesity (HCC) 01/11/2019    Formatting of this note might be different from the original. Added automatically from request for surgery [id number]    Neuropathy     Obesity     Other chest pain 11/08/2018    Pancreatic lesion 03/13/2023    Positive depression screening 06/19/2022    Primary hypertriglyceridemia 06/19/2022    Pulmonary emphysema (HCC)     last assessed - 04Mar2016    Seasonal allergies     Severe obesity (HCC) 01/16/2023    Skin tag 07/17/2023    SOB (shortness of breath)     Strain of groin 07/17/2023    Urinary frequency     last assessed - 22Jun2016    Vagina, candidiasis     last assessed - 22Jun2016    Very heavy cigarette smoker 01/16/2023    Visual impairment        Past Surgical History:   Procedure Laterality Date    CARPAL TUNNEL RELEASE      last assessed - 04MAr2016    CHOLECYSTECTOMY      last assessed - 04Mar2016    COLONOSCOPY      Complete colonoscopy     EYE SURGERY      last assessed - 04Mar2016    FL RETROGRADE PYELOGRAM  6/16/2022    FL RETROGRADE PYELOGRAM  8/16/2022    FOOT SURGERY      last assessed - 04Mar2016    IN CYSTO BLADDER W/URETERAL CATHETERIZATION Left 7/21/2022    Procedure: CYSTOSCOPY RETROGRADE PYELOGRAM WITH INSERTION STENT URETERAL;  Surgeon: Facundo Matamoros MD;  Location: [facility];  Service: Urology    IN CYSTO/URETERO W/LITHOTRIPSY &INDWELL STENT INSRT Left 6/16/2022    Procedure: CYSTOSCOPY URETEROSCOPY WITH LITHOTRIPSY HOLMIUM LASER, RETROGRADE PYELOGRAM AND INSERTION STENT URETERAL;  Surgeon: Sammy Ferrer MD;  Location: [facility];  Service: Urology    IN CYSTO/URETERO  W/LITHOTRIPSY &INDWELL STENT INSRT Left 2022    Procedure: CYSTOSCOPY USCOPE W/HOLMIUM LASER, RETROGRADE PYELOGRAM&STENT;  Surgeon: Sudheer Bradford MD;  Location: [facility];  Service: Urology    OK ESOPHAGOGASTRODUODENOSCOPY TRANSORAL DIAGNOSTIC N/A 2017    Procedure: ESOPHAGOGASTRODUODENOSCOPY (EGD);  Surgeon: Alison Saleem MD;  Location: [facility];  Service: Gastroenterology       Social History     Socioeconomic History    Marital status:      Spouse name: None    Number of children: None    Years of education: None    Highest education level: None   Occupational History    None   Tobacco Use    Smoking status: Former     Current packs/day: 0.00     Average packs/day: 0.5 packs/day for 41.2 years (20.6 ttl pk-yrs)     Types: Cigarettes     Start date: 1983     Quit date: 3/6/2024     Years since quittin.4    Smokeless tobacco: Never   Vaping Use    Vaping status: Former    Start date: 2019    Quit date: 2019    Substances: Nicotine   Substance and Sexual Activity    Alcohol use: Not Currently    Drug use: Never    Sexual activity: Not Currently     Partners: Male     Birth control/protection: None   Other Topics Concern    None   Social History Narrative    None     Social Determinants of Health     Financial Resource Strain: Not on file   Food Insecurity: No Food Insecurity (2024)    Hunger Vital Sign     Worried About Running Out of Food in the Last Year: Never true     Ran Out of Food in the Last Year: Never true   Transportation Needs: No Transportation Needs (2024)    PRAPARE - Transportation     Lack of Transportation (Medical): No     Lack of Transportation (Non-Medical): No   Physical Activity: Not on file   Stress: Not on file   Social Connections: Not on file   Intimate Partner Violence: Not on file   Housing Stability: High Risk (2024)    Housing Stability Vital Sign     Unable to Pay for Housing in the Last Year: Yes     Number of Times Moved in the Last  Year: 1     Homeless in the Last Year: Yes       Family History   Problem Relation Age of Onset    Obesity Mother     Thyroid disease Mother     Cancer Mother 71    Vision loss Mother     Obesity Sister     Coronary artery disease Sister     Stroke Sister     Asthma Sister     Glaucoma Sister     No Known Problems Maternal Aunt     Diabetes Maternal Uncle     Lung cancer Maternal Grandmother     Cancer Maternal Grandfather     Diabetes Maternal Grandfather     No Known Problems Paternal Grandmother     No Known Problems Paternal Grandfather     Hypertension Other     Lung cancer Other     Hypertension Other     Lung cancer Other     Lung cancer Other        Allergies   Allergen Reactions    Hydrocodone-Acetaminophen Hives    Ketorolac Hives and Dizziness    Toradol [Ketorolac Tromethamine] Other (See Comments)     hypotension    Ultram [Tramadol] Nausea Only, GI Intolerance and Vomiting         Current Outpatient Medications:     albuterol (PROVENTIL HFA,VENTOLIN HFA) 90 mcg/act inhaler, Inhale 2 puffs every 6 (six) hours as needed for wheezing, Disp: 18 g, Rfl: 1    Blood Glucose Monitoring Suppl (OneTouch Verio) w/Device KIT, Check blood sugar once a day, Disp: 1 kit, Rfl: 0    budesonide (PULMICORT) 0.5 mg/2 mL nebulizer solution, Take 0.5 mg by nebulization 2 (two) times a day Rinse mouth after use., Disp: , Rfl:     buPROPion (WELLBUTRIN XL) 300 mg 24 hr tablet, Take 1 tablet (300 mg total) by mouth every morning, Disp: 90 tablet, Rfl: 3    diazepam (VALIUM) 5 mg tablet, Take 1 tablet (5 mg total) by mouth daily at bedtime as needed for anxiety (Patient not taking: Reported on 6/26/2024), Disp: 30 tablet, Rfl: 0    ergocalciferol (VITAMIN D2) 50,000 units, Take 1 capsule (50,000 Units total) by mouth every 14 (fourteen) days, Disp: 12 capsule, Rfl: 1    famotidine (PEPCID) 40 MG tablet, Take 1 tablet (40 mg total) by mouth daily at bedtime Take in evening 2 hours prior to bed, Disp: 90 tablet, Rfl: 1    ferrous  sulfate 324 (65 Fe) mg, Take 1 tablet (324 mg total) by mouth daily before breakfast, Disp: 30 tablet, Rfl: 3    folic acid (FOLVITE) 1 mg tablet, Take 1 tablet (1 mg total) by mouth daily, Disp: 90 tablet, Rfl: 1    furosemide (LASIX) 40 mg tablet, Take 1 tablet (40 mg total) by mouth daily, Disp: 180 tablet, Rfl: 1    gabapentin (NEURONTIN) 100 mg capsule, Take 100 mg by mouth every 8 (eight) hours, Disp: , Rfl: 0    glucose blood (OneTouch Verio) test strip, Check blood sugar once a day, Disp: 100 each, Rfl: 2    Insulin Pen Needle 31G X 5 MM MISC, Inject under the skin if needed (Insuline injections), Disp: 100 each, Rfl: 3    ipratropium-albuterol (DUO-NEB) 0.5-2.5 mg/3 mL nebulizer solution, Take 3 mL by nebulization 4 (four) times a day, Disp: 360 mL, Rfl: 3    lactulose (CHRONULAC) 10 g/15 mL solution, Take 20 g by mouth Daily or as needed for bowl movement, Disp: , Rfl:     levothyroxine 112 mcg tablet, Take 1 tablet (112 mcg total) by mouth daily, Disp: 90 tablet, Rfl: 1    linezolid (ZYVOX) 600 MG/300ML SOLN, , Disp: , Rfl:     nicotine (NICODERM CQ) 21 mg/24 hr TD 24 hr patch, Place 1 patch on the skin over 24 hours every 24 hours, Disp: 28 patch, Rfl: 5    omeprazole (PriLOSEC) 40 MG capsule, take 1 capsule by mouth twice a day, Disp: 60 capsule, Rfl: 5    ondansetron (ZOFRAN) 4 mg tablet, Take 1 tablet (4 mg total) by mouth every 8 (eight) hours as needed for nausea or vomiting, Disp: 20 tablet, Rfl: 0    OneTouch Delica Lancets 30G MISC, Check blood sugars once a day, Disp: 100 each, Rfl: 1    oxyCODONE-acetaminophen (PERCOCET)  mg per tablet, Take 1 tablet by mouth every 6 (six) hours as needed for severe pain, Disp: , Rfl:     oxygen gas, Inhale 2 L/min continuous. May use 3L with exertion per Bella Goodman  CRNP Keep sat >88%  Indications: low saturation, Disp: , Rfl:     phenazopyridine (PYRIDIUM) 100 mg tablet, , Disp: , Rfl:     potassium chloride (Klor-Con M20) 20 mEq tablet, , Disp: , Rfl:  "    semaglutide, 0.25 or 0.5 mg/dose, (Ozempic, 0.25 or 0.5 MG/DOSE,) 2 mg/3 mL injection pen, Inject 0.375 mL (0.25 mg total) under the skin every 7 days for 30 days, THEN 0.75 mL (0.5 mg total) every 7 days., Disp: 9 mL, Rfl: 0    sertraline (ZOLOFT) 100 mg tablet, take 1 tablet by mouth twice a day, Disp: 180 tablet, Rfl: 1    simvastatin (ZOCOR) 20 mg tablet, Take 1 tablet (20 mg total) by mouth daily at bedtime, Disp: 90 tablet, Rfl: 1    spironolactone (ALDACTONE) 25 mg tablet, , Disp: , Rfl:     sucralfate (CARAFATE) 1 g tablet, Take 1 tablet (1 g total) by mouth daily at bedtime, Disp: 60 tablet, Rfl: 3    varenicline (CHANTIX) 0.5 mg tablet, Take 1 tablet (0.5 mg total) by mouth 2 (two) times a day, Disp: 60 tablet, Rfl: 0      Physical Exam:  /89 (BP Location: Right arm, Patient Position: Sitting, Cuff Size: Adult)   Pulse 96   Temp 98 °F (36.7 °C) (Temporal)   Resp 18   Ht 5' 7\" (1.702 m)   Wt 112 kg (248 lb)   LMP 04/05/2018 (Approximate)   SpO2 96% Comment: 4L  BMI 38.84 kg/m²     Physical Exam  Vitals reviewed.   Constitutional:       General: She is not in acute distress.     Appearance: She is well-developed. She is obese. She is not ill-appearing.   HENT:      Head: Normocephalic and atraumatic.   Eyes:      General: No scleral icterus.     Conjunctiva/sclera: Conjunctivae normal.   Cardiovascular:      Rate and Rhythm: Normal rate and regular rhythm.      Heart sounds: Normal heart sounds. No murmur heard.  Pulmonary:      Effort: Pulmonary effort is normal. No respiratory distress.      Breath sounds: Normal breath sounds.   Abdominal:      Palpations: Abdomen is soft.      Tenderness: There is no abdominal tenderness.   Musculoskeletal:         General: No tenderness. Normal range of motion.      Cervical back: Normal range of motion and neck supple.      Right lower leg: No edema.      Left lower leg: No edema.      Comments: In wheelchair    Lymphadenopathy:      Cervical: No " cervical adenopathy.   Skin:     General: Skin is warm and dry.   Neurological:      Mental Status: She is alert and oriented to person, place, and time.      Cranial Nerves: No cranial nerve deficit.   Psychiatric:         Mood and Affect: Mood normal.         Behavior: Behavior normal.       Labs:  Lab Results   Component Value Date    WBC 5.11 06/06/2024    HGB 10.0 (L) 06/06/2024    HCT 32.6 (L) 06/06/2024     (H) 06/06/2024    PLT 88 (L) 06/06/2024     I have spent 30 minutes with Patient  today in which greater than 50% of this time was spent in counseling/coordination of care regarding Diagnostic results, Risks and benefits of tx options, Patient and family education, Impressions, Documenting in the medical record, Reviewing / ordering tests, medicine, procedures  , and Obtaining or reviewing history  .     Patient voiced understanding and agreement in the above discussion. Aware to contact our office with questions/symptoms in the interim.     This note has been generated by voice recognition software system.  Therefore, there may be spelling, grammar, and or syntax errors. Please contact if questions arise.

## 2024-08-05 NOTE — TELEPHONE ENCOUNTER
Patient seen today by Cristina Hem/Onc Jackson and has a referral for Iron infusions. Patient is in [facility]. Please call here to schedule the infusion appointments. [phone] First Floor .

## 2024-08-05 NOTE — TELEPHONE ENCOUNTER
MINAL Mendes at [facility], to call us back at the infusion center to get pt scheduled for Iron

## 2024-08-06 NOTE — TELEPHONE ENCOUNTER
Spoke with Cinthya from [facility] and went over scheduled dates and times. Patient Is aware and understanding of her upcoming appointments

## 2024-08-08 ENCOUNTER — NURSE TRIAGE (OUTPATIENT)
Age: 54
End: 2024-08-08

## 2024-08-08 ENCOUNTER — OFFICE VISIT (OUTPATIENT)
Dept: ENDOCRINOLOGY | Facility: CLINIC | Age: 54
End: 2024-08-08
Payer: COMMERCIAL

## 2024-08-08 VITALS
BODY MASS INDEX: 31.71 KG/M2 | HEART RATE: 73 BPM | DIASTOLIC BLOOD PRESSURE: 76 MMHG | OXYGEN SATURATION: 100 % | SYSTOLIC BLOOD PRESSURE: 122 MMHG | HEIGHT: 63 IN | WEIGHT: 179 LBS

## 2024-08-08 DIAGNOSIS — E03.8 HYPOTHYROIDISM DUE TO HASHIMOTO'S THYROIDITIS: Primary | ICD-10-CM

## 2024-08-08 DIAGNOSIS — E89.0 HISTORY OF TOTAL THYROIDECTOMY: ICD-10-CM

## 2024-08-08 DIAGNOSIS — E06.3 HYPOTHYROIDISM DUE TO HASHIMOTO'S THYROIDITIS: Primary | ICD-10-CM

## 2024-08-08 LAB
T4 FREE SERPL-MCNC: 1.44 NG/DL (ref 0.92–1.68)
TSH SERPL DL<=0.05 MIU/L-ACNC: 0.56 UIU/ML (ref 0.27–4.2)

## 2024-08-08 PROCEDURE — 84443 ASSAY THYROID STIM HORMONE: CPT | Performed by: PHYSICIAN ASSISTANT

## 2024-08-08 PROCEDURE — 84439 ASSAY OF FREE THYROXINE: CPT | Performed by: PHYSICIAN ASSISTANT

## 2024-08-08 RX ORDER — NEEDLES, SAFETY 22GX1 1/2"
NEEDLE, DISPOSABLE MISCELLANEOUS
COMMUNITY
Start: 2024-08-05

## 2024-08-08 RX ORDER — CYANOCOBALAMIN 1000 UG/ML
INJECTION, SOLUTION INTRAMUSCULAR; SUBCUTANEOUS
COMMUNITY
Start: 2024-08-05

## 2024-08-08 RX ORDER — METOLAZONE 2.5 MG/1
TABLET ORAL
COMMUNITY
Start: 2024-08-02

## 2024-08-08 NOTE — TELEPHONE ENCOUNTER
"Patient calling to discuss results for recent Pap smear and HPV testing. Reviewed results and recommendations per Nelida Avina PA-C. Review colposcopy procedure. Patient request anesthesia for procedure as she states the pap smear was difficult to obtain and very painful. Message to Nelida Avina for review.    Patient currently resides at [facility] and any appointments will need to be coordinated with staff there.    Reason for Disposition   Information only question and nurse able to answer    Answer Assessment - Initial Assessment Questions  1. REASON FOR CALL or QUESTION: \"What is your reason for calling today?\" or \"How can I best help you?\" or \"What question do you have that I can help answer?\"      Review results of pap/hpv testing and colpo    Protocols used: Information Only Call - No Triage-ADULT-[place]    "

## 2024-08-08 NOTE — PROGRESS NOTES
Office Note      Date: 2024  Patient Name: Milagros Lundberg  MRN: [id number]  : 1970    Chief Complaint   Patient presents with    Thyroid Problem     Hypothyroidism due to Hashimoto's thyroiditis       History of Present Illness:   Milagros Lundberg is a 54 y.o. female who presents for Thyroid Problem (Hypothyroidism due to Hashimoto's thyroiditis)  .   Current rx: levothyroxine 137 mcg    Changes in history: Since last visit, patient had total thyroidectomy to remove large goiter.     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 Felt achy and tired, improving now.     She has some itchiness in her incision. Feels like it is still swollen. Has had some scratchiness in her voice since the surgery.     She is feeling much better overall with her throat and neck. She has had less compressive symptoms.    Subjective          Review of Systems:   Review of Systems   Constitutional:  Positive for fatigue. Negative for activity change and appetite change.   Respiratory:  Negative for chest tightness and shortness of breath.    Musculoskeletal:  Positive for myalgias.   Neurological:  Negative for numbness.   Psychiatric/Behavioral:  The patient is not nervous/anxious.        The following portions of the patient's history were reviewed and updated as appropriate: allergies, current medications, past family history, past medical history, past social history, past surgical history, and problem list.    Objective     Visit Vitals  /76 (BP Location: Left arm,  "Patient Position: Sitting, Cuff Size: Adult)   Pulse 73   Ht 160 cm (63\")   Wt 81.2 kg (179 lb)   SpO2 100%   BMI 31.71 kg/m²           Physical Exam:  Physical Exam  Vitals and nursing note reviewed.   Constitutional:       Appearance: She is well-developed.   HENT:      Head: Normocephalic and atraumatic.   Eyes:      Conjunctiva/sclera: Conjunctivae normal.   Neck:      Thyroid: No thyroid mass, thyromegaly or thyroid tenderness.        Comments: Healed incision, mild surrounding edema  Cardiovascular:      Rate and Rhythm: Normal rate and regular rhythm.   Pulmonary:      Effort: Pulmonary effort is normal.      Breath sounds: Normal breath sounds.   Musculoskeletal:         General: Normal range of motion.      Cervical back: Normal range of motion.   Skin:     General: Skin is warm and dry.   Psychiatric:         Behavior: Behavior normal.         Assessment / Plan      Assessment & Plan:  Problem List Items Addressed This Visit          Endocrine and Metabolic    History of total thyroidectomy    Overview     Total thyroidectomy by Dr. 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 Current recommendations do not require suppression of TSH, Tg monitoring or serial ultrasounds for monitoring. Will follow with yearly neck exams and monitoring of TSH.          Hypothyroidism - Primary    Current Assessment & Plan     Clinically euthyroid. Continued same dose of levothyroxine 137 mcg after total thyroidectomy. Check thyroid function " tests today. Further recommendations regarding medication dosing and lab recheck based on results.           Relevant Medications    levothyroxine (SYNTHROID, LEVOTHROID) 137 MCG tablet    Other Relevant Orders    T4, Free (Completed)    TSH (Completed)          Portions of this note were completed with voice recognition program.  Salma Berry PA-C  [facility]  08/08/2024

## 2024-08-09 PROBLEM — Z90.89 HISTORY OF TOTAL THYROIDECTOMY: Status: ACTIVE | Noted: 2024-08-09

## 2024-08-09 PROBLEM — Z98.890 HISTORY OF TOTAL THYROIDECTOMY: Status: ACTIVE | Noted: 2024-08-09

## 2024-08-09 PROBLEM — E89.0 HISTORY OF TOTAL THYROIDECTOMY: Status: ACTIVE | Noted: 2024-08-09

## 2024-08-09 NOTE — ASSESSMENT & PLAN NOTE
Clinically euthyroid. Continued same dose of levothyroxine 137 mcg after total thyroidectomy. Check thyroid function tests today. Further recommendations regarding medication dosing and lab recheck based on results.

## 2024-08-09 NOTE — ASSESSMENT & PLAN NOTE
Reviewed pathology with patient. 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 Current recommendations do not require suppression of TSH, Tg monitoring or serial ultrasounds for monitoring. Will follow with yearly neck exams and monitoring of TSH.

## 2024-08-12 ENCOUNTER — OFFICE VISIT (OUTPATIENT)
Dept: UROLOGY | Facility: AMBULATORY SURGERY CENTER | Age: 54
End: 2024-08-12

## 2024-08-12 VITALS
WEIGHT: 243 LBS | DIASTOLIC BLOOD PRESSURE: 80 MMHG | OXYGEN SATURATION: 97 % | HEART RATE: 103 BPM | SYSTOLIC BLOOD PRESSURE: 164 MMHG | BODY MASS INDEX: 38.14 KG/M2 | HEIGHT: 67 IN

## 2024-08-12 DIAGNOSIS — R39.15 URINARY URGENCY: ICD-10-CM

## 2024-08-12 DIAGNOSIS — N20.0 NEPHROLITHIASIS: ICD-10-CM

## 2024-08-12 DIAGNOSIS — R32 URINARY INCONTINENCE, UNSPECIFIED TYPE: ICD-10-CM

## 2024-08-12 DIAGNOSIS — R31.0 GROSS HEMATURIA: Primary | ICD-10-CM

## 2024-08-12 LAB
BACTERIA UR QL AUTO: ABNORMAL /HPF
BILIRUB UR QL STRIP: NEGATIVE
BUDDING YEAST: PRESENT
CLARITY UR: ABNORMAL
COLOR UR: YELLOW
GLUCOSE UR STRIP-MCNC: ABNORMAL MG/DL
HGB UR QL STRIP.AUTO: ABNORMAL
KETONES UR STRIP-MCNC: ABNORMAL MG/DL
LEUKOCYTE ESTERASE UR QL STRIP: ABNORMAL
MUCOUS THREADS UR QL AUTO: ABNORMAL
NITRITE UR QL STRIP: NEGATIVE
NON-SQ EPI CELLS URNS QL MICRO: ABNORMAL /HPF
PH UR STRIP.AUTO: 6 [PH]
PROT UR STRIP-MCNC: ABNORMAL MG/DL
RBC #/AREA URNS AUTO: ABNORMAL /HPF
SL AMB  POCT GLUCOSE, UA: 100
SL AMB LEUKOCYTE ESTERASE,UA: NORMAL
SL AMB POCT BILIRUBIN,UA: NORMAL
SL AMB POCT BLOOD,UA: NORMAL
SL AMB POCT CLARITY,UA: NORMAL
SL AMB POCT COLOR,UA: NORMAL
SL AMB POCT KETONES,UA: NORMAL
SL AMB POCT NITRITE,UA: POSITIVE
SL AMB POCT PH,UA: 5
SL AMB POCT SPECIFIC GRAVITY,UA: 1.03
SL AMB POCT URINE PROTEIN: NORMAL
SL AMB POCT UROBILINOGEN: 0.2
SP GR UR STRIP.AUTO: 1.03 (ref 1–1.03)
UROBILINOGEN UR STRIP-ACNC: <2 MG/DL
WBC #/AREA URNS AUTO: ABNORMAL /HPF

## 2024-08-12 PROCEDURE — 87077 CULTURE AEROBIC IDENTIFY: CPT

## 2024-08-12 PROCEDURE — 87186 SC STD MICRODIL/AGAR DIL: CPT

## 2024-08-12 PROCEDURE — 81001 URINALYSIS AUTO W/SCOPE: CPT

## 2024-08-12 PROCEDURE — 87086 URINE CULTURE/COLONY COUNT: CPT

## 2024-08-12 PROCEDURE — 88112 CYTOPATH CELL ENHANCE TECH: CPT | Performed by: STUDENT IN AN ORGANIZED HEALTH CARE EDUCATION/TRAINING PROGRAM

## 2024-08-12 RX ORDER — SCOLOPAMINE TRANSDERMAL SYSTEM 1 MG/1
1 PATCH, EXTENDED RELEASE TRANSDERMAL
COMMUNITY

## 2024-08-12 RX ORDER — SULFAMETHOXAZOLE/TRIMETHOPRIM 800-160 MG
1 TABLET ORAL
COMMUNITY
Start: 2024-04-12

## 2024-08-12 RX ORDER — OXYBUTYNIN CHLORIDE 10 MG/1
10 TABLET, EXTENDED RELEASE ORAL
Qty: 30 TABLET | Refills: 3 | Status: SHIPPED | OUTPATIENT
Start: 2024-08-12 | End: 2024-12-10

## 2024-08-12 RX ORDER — INSULIN GLARGINE 100 [IU]/ML
55 INJECTION, SOLUTION SUBCUTANEOUS
COMMUNITY
Start: 2024-04-12

## 2024-08-12 RX ORDER — INSULIN LISPRO 100 [IU]/ML
15 INJECTION, SOLUTION INTRAVENOUS; SUBCUTANEOUS
COMMUNITY
Start: 2024-04-11

## 2024-08-12 RX ORDER — MIDODRINE HYDROCHLORIDE 2.5 MG/1
TABLET ORAL
COMMUNITY
Start: 2024-08-09

## 2024-08-12 NOTE — ASSESSMENT & PLAN NOTE
Patient reports that she is struggled with urinary urgency and frequency for close to a decade.  However, her symptoms have recently worsened since initiating Lasix.  We discussed pharmacotherapy in the form of anticholinergics or beta 3 agonist.  The patient will trial oxybutynin 10 mg once daily for her lower urinary tract symptoms.  Patient should obtain a PVR at her follow-up visit when she undergoes cystoscopy in the office.

## 2024-08-12 NOTE — PROGRESS NOTES
8/12/2024      Assessment and Plan    54 y.o. female managed by Dr. Bradford    Gross hematuria  History of cigarette smoking. Patient reports that she quit in April 2024  Patient reports 1 episode of gross hematuria in April 2024  Urine dip in the office today large blood, positive nitrites, small leukocytes, and glucose  CT abdomen and pelvis with contrast performed 6/3/2024 did not note any mass or lesion within the kidneys or the bladder   Urine from today's visit will be sent for urinalysis, urine culture, and urine cytology  Patient will be scheduled for an in office cystoscopy to evaluate for source of bleeding    Nephrolithiasis  Patient has a history of kidney stones and her last stone removal procedure was a left ureteroscopy with laser lithotripsy performed 8/2022  CT abdomen and pelvis with contrast performed 6/3/2024 noted stable nonobstructing left renal calculi with a 4 mm calculi in the upper pole and a 2 mm calculi in the lower pole without signs of hydronephrosis.  Stones are stable when compared to the study performed 9/8/2023.  We discussed procedures to electively remove the stones with either ESWL or ureteroscopy with laser lithotripsy.  However, the patient is comfortable with monitoring the stones at this time.  Patient will repeat ultrasound of the kidney and bladder in 1 year    Urinary urgency  Patient reports that she is struggled with urinary urgency and frequency for close to a decade.  However, her symptoms have recently worsened since initiating Lasix.  We discussed pharmacotherapy in the form of anticholinergics or beta 3 agonist.  The patient will trial oxybutynin 10 mg once daily for her lower urinary tract symptoms.  Patient should obtain a PVR at her follow-up visit when she undergoes cystoscopy in the office.        History of Present Illness  Chely Ruvalcaba is a 54 y.o. female here for evaluation of history of nephrolithiasis.  Patient was last seen in the office 3/3/2023.   Patient's last stone removal procedure was a left ureteroscopy with laser lithotripsy performed 8/22.  Patient underwent CT abdomen pelvis with contrast on 6/3/2024 which noted a stable nonobstructing left renal calculi with a 4 mm calculi in the upper pole and a 2 mm calculi in the lower pole without signs of hydronephrosis.  The stones are unchanged from imaging study performed 9/8/2023.  The patient reports that since she was last seen in the office she was admitted to a hospital in [place] for an extended period of time due to developing acute hypoxic respiratory failure, congestive heart failure, and sepsis in April 2024.  The patient has now returned home and is at a assisted living rehab facility.  Today, the patient reports that she continues to have bilateral flank/back pain for the past year, but has worsened over the last month.  Additionally, the patient notes that before she was admitted to the [facility] in April 2024 she had experienced an episode of gross hematuria when she went to the bathroom and had not yet told anyone about this episode.  The patient reports that she previously was a chronic cigarette smoker and the last time she smoked was before being admitted to the hospital in [place].  Furthermore, the patient notes worsening urinary frequency and urgency since beginning Lasix.  Otherwise, the patient denies any dysuria, urinary incontinence, or pelvic/bladder pain.        Review of Systems   Constitutional:  Negative for chills and fever.   HENT:  Negative for ear pain and sore throat.    Eyes:  Negative for pain and visual disturbance.   Respiratory:  Negative for cough and shortness of breath.    Cardiovascular:  Negative for chest pain and palpitations.   Gastrointestinal:  Negative for abdominal pain and vomiting.   Genitourinary:  Positive for flank pain, frequency and hematuria. Negative for decreased urine volume, difficulty urinating, dysuria, pelvic pain and urgency.  "  Musculoskeletal:  Negative for arthralgias and back pain.   Skin:  Negative for color change and rash.   Neurological:  Negative for seizures and syncope.   All other systems reviewed and are negative.               Vitals  Vitals:    08/12/24 1027   BP: 164/80   BP Location: Left arm   Patient Position: Sitting   Cuff Size: Standard   Pulse: 103   SpO2: 97%   Weight: 110 kg (243 lb)   Height: 5' 7\" (1.702 m)       Physical Exam  Vitals reviewed.   Constitutional:       General: She is not in acute distress.     Appearance: Normal appearance. She is not ill-appearing.   HENT:      Head: Normocephalic and atraumatic.      Nose: Nose normal.   Eyes:      General: No scleral icterus.  Pulmonary:      Effort: No respiratory distress.   Abdominal:      General: Abdomen is flat. There is no distension.      Palpations: Abdomen is soft.      Tenderness: There is no abdominal tenderness.   Musculoskeletal:      Cervical back: Normal range of motion.   Skin:     General: Skin is warm.      Coloration: Skin is not jaundiced.   Neurological:      Mental Status: She is alert and oriented to person, place, and time.      Gait: Gait abnormal (Uses wheelchair).   Psychiatric:         Mood and Affect: Mood normal.         Behavior: Behavior normal.           Past History  Past Medical History:   Diagnosis Date    Abnormal stress test     Acute cystitis without hematuria 05/07/2018    Acute duodenal ulcer with bleeding 01/16/2023    Allergic sinusitis     last assessed - 03Apr2017    Anemia Around december    Anxiety     last assessed - 04Mar2016    Arthritis     Asthma     last assessed - 04Mar2016    Bilateral lower extremity edema     last assessed - 12May2017    Callus of foot     last assessed - 22Jun2016    Chest pain     Chronic GERD     last assessed - 16Jan2017    Colon polyp     Constipation     Resolved - 13Jan2017    COPD (chronic obstructive pulmonary disease) ([facility])     Depression     last assessed - 04Mar2016    " Disease of thyroid gland     Diverticulitis of colon     last assessed - 2016    Dyspepsia     Resolved - [id number]    Edema, lower extremity 2020    Esophageal reflux     last assessed - 2016    Essential hypertriglyceridemia     last assessed - [id number]    Fatty liver 2023    Gastroesophageal reflux disease with esophagitis 2016    GERD (gastroesophageal reflux disease)     Gynecological disorder     last assessed - 2016    Hydroureteronephrosis 2022    Hypertension     last assessed - 2016    Hypokalemia 2022    Hypotension     last assessed - [id number]    Kidney stone     Left ureteral stone with hydronephrosis 2022    Migraine     Morbid obesity (HCC) 2019    Formatting of this note might be different from the original. Added automatically from request for surgery [id number]    Neuropathy     Obesity     Other chest pain 2018    Pancreatic lesion 2023    Positive depression screening 2022    Primary hypertriglyceridemia 2022    Pulmonary emphysema (HCC)     last assessed - 2016    Seasonal allergies     Severe obesity (HCC) 2023    Skin tag 2023    SOB (shortness of breath)     Strain of groin 2023    Urinary frequency     last assessed - 2016    Vagina, candidiasis     last assessed - 2016    Very heavy cigarette smoker 2023    Visual impairment      Social History     Socioeconomic History    Marital status:      Spouse name: None    Number of children: None    Years of education: None    Highest education level: None   Occupational History    None   Tobacco Use    Smoking status: Former     Current packs/day: 0.00     Average packs/day: 0.5 packs/day for 41.2 years (20.6 ttl pk-yrs)     Types: Cigarettes     Start date: 1983     Quit date: 3/6/2024     Years since quittin.4    Smokeless tobacco: Never   Vaping Use    Vaping status: Former    Start date: 2019    Quit  date: 2019    Substances: Nicotine   Substance and Sexual Activity    Alcohol use: Not Currently    Drug use: Never    Sexual activity: Not Currently     Partners: Male     Birth control/protection: None   Other Topics Concern    None   Social History Narrative    None     Social Determinants of Health     Financial Resource Strain: Low Risk  (3/8/2024)    Received from [employer]    Overall Financial Resource Strain (CARDIA)     Difficulty of Paying Living Expenses: Not hard at all   Food Insecurity: No Food Insecurity (2024)    Hunger Vital Sign     Worried About Running Out of Food in the Last Year: Never true     Ran Out of Food in the Last Year: Never true   Transportation Needs: No Transportation Needs (2024)    PRAPARE - Transportation     Lack of Transportation (Medical): No     Lack of Transportation (Non-Medical): No   Physical Activity: Sufficiently Active (3/8/2024)    Received from [employer]    Exercise Vital Sign     Days of Exercise per Week: 7 days     Minutes of Exercise per Session: 150+ min   Stress: Not on file   Social Connections: Not on file   Intimate Partner Violence: Not on file   Housing Stability: High Risk (2024)    Housing Stability Vital Sign     Unable to Pay for Housing in the Last Year: Yes     Number of Times Moved in the Last Year: 1     Homeless in the Last Year: Yes     Social History     Tobacco Use   Smoking Status Former    Current packs/day: 0.00    Average packs/day: 0.5 packs/day for 41.2 years (20.6 ttl pk-yrs)    Types: Cigarettes    Start date: 1983    Quit date: 3/6/2024    Years since quittin.4   Smokeless Tobacco Never     Family History   Problem Relation Age of Onset    Obesity Mother     Thyroid disease Mother     Cancer Mother 71    Vision loss Mother     Obesity Sister     Coronary artery disease Sister     Stroke Sister     Asthma Sister     Glaucoma Sister     No Known Problems Maternal Aunt     Diabetes  "Maternal Uncle     Lung cancer Maternal Grandmother     Cancer Maternal Grandfather     Diabetes Maternal Grandfather     No Known Problems Paternal Grandmother     No Known Problems Paternal Grandfather     Hypertension Other     Lung cancer Other     Hypertension Other     Lung cancer Other     Lung cancer Other        The following portions of the patient's history were reviewed and updated as appropriate: allergies, current medications, past medical history, past social history, past surgical history and problem list.    Results  No results found for this or any previous visit (from the past 1 hour(s)).]  No results found for: \"PSA\"  Lab Results   Component Value Date    CALCIUM 8.7 06/06/2024    K 4.0 06/06/2024    CO2 23 06/06/2024     (H) 06/06/2024    BUN 9 06/06/2024    CREATININE 0.78 06/06/2024     Lab Results   Component Value Date    WBC 5.11 06/06/2024    HGB 10.0 (L) 06/06/2024    HCT 32.6 (L) 06/06/2024     (H) 06/06/2024    PLT 88 (L) 06/06/2024      "

## 2024-08-12 NOTE — ASSESSMENT & PLAN NOTE
Patient has a history of kidney stones and her last stone removal procedure was a left ureteroscopy with laser lithotripsy performed 8/2022  CT abdomen and pelvis with contrast performed 6/3/2024 noted stable nonobstructing left renal calculi with a 4 mm calculi in the upper pole and a 2 mm calculi in the lower pole without signs of hydronephrosis.  Stones are stable when compared to the study performed 9/8/2023.  We discussed procedures to electively remove the stones with either ESWL or ureteroscopy with laser lithotripsy.  However, the patient is comfortable with monitoring the stones at this time.  Patient will repeat ultrasound of the kidney and bladder in 1 year

## 2024-08-12 NOTE — ASSESSMENT & PLAN NOTE
History of cigarette smoking. Patient reports that she quit in April 2024  Patient reports 1 episode of gross hematuria in April 2024  Urine dip in the office today large blood, positive nitrites, small leukocytes, and glucose  CT abdomen and pelvis with contrast performed 6/3/2024 did not note any mass or lesion within the kidneys or the bladder   Urine from today's visit will be sent for urinalysis, urine culture, and urine cytology  Patient will be scheduled for an in office cystoscopy to evaluate for source of bleeding

## 2024-08-13 ENCOUNTER — HOSPITAL ENCOUNTER (OUTPATIENT)
Dept: PULMONOLOGY | Facility: HOSPITAL | Age: 54
Discharge: HOME | End: 2024-08-13
Payer: MEDICARE

## 2024-08-13 DIAGNOSIS — J43.2 CENTRILOBULAR EMPHYSEMA (HCC): ICD-10-CM

## 2024-08-13 PROCEDURE — 94060 EVALUATION OF WHEEZING: CPT

## 2024-08-13 PROCEDURE — 94726 PLETHYSMOGRAPHY LUNG VOLUMES: CPT

## 2024-08-13 PROCEDURE — 94060 EVALUATION OF WHEEZING: CPT | Performed by: STUDENT IN AN ORGANIZED HEALTH CARE EDUCATION/TRAINING PROGRAM

## 2024-08-13 PROCEDURE — 94729 DIFFUSING CAPACITY: CPT

## 2024-08-13 PROCEDURE — 94726 PLETHYSMOGRAPHY LUNG VOLUMES: CPT | Performed by: STUDENT IN AN ORGANIZED HEALTH CARE EDUCATION/TRAINING PROGRAM

## 2024-08-13 PROCEDURE — 94729 DIFFUSING CAPACITY: CPT | Performed by: STUDENT IN AN ORGANIZED HEALTH CARE EDUCATION/TRAINING PROGRAM

## 2024-08-13 PROCEDURE — 94760 N-INVAS EAR/PLS OXIMETRY 1: CPT

## 2024-08-13 RX ORDER — ALBUTEROL SULFATE 0.83 MG/ML
2.5 SOLUTION RESPIRATORY (INHALATION) ONCE
Status: COMPLETED | OUTPATIENT
Start: 2024-08-13 | End: 2024-08-13

## 2024-08-13 RX ADMIN — ALBUTEROL SULFATE 2.5 MG: 2.5 SOLUTION RESPIRATORY (INHALATION) at 15:01

## 2024-08-14 LAB — BACTERIA UR CULT: ABNORMAL

## 2024-08-15 ENCOUNTER — TELEPHONE (OUTPATIENT)
Dept: UROLOGY | Facility: MEDICAL CENTER | Age: 54
End: 2024-08-15

## 2024-08-15 DIAGNOSIS — N30.00 ACUTE CYSTITIS WITHOUT HEMATURIA: Primary | ICD-10-CM

## 2024-08-15 PROCEDURE — 88112 CYTOPATH CELL ENHANCE TECH: CPT | Performed by: STUDENT IN AN ORGANIZED HEALTH CARE EDUCATION/TRAINING PROGRAM

## 2024-08-15 NOTE — TELEPHONE ENCOUNTER
Called temporary number on file and spoke to patients nurse, Jo, and told her patients urine culture was positive for ongoing urinary tract infection.  A 7-day course of Augmentin which the patient can start taking today and I would advise her to hydrate well with water.   Jo stated they won't be able to  the med because she is at the facility.  She requested the med, dosage, and how often to take so she can have the physician at the facility put in the order for her.    ----- Message from Ernesto Olvera PA-C sent at 8/15/2024  8:21 AM EDT -----  Please call the patient and advise her that her recent urine culture came back positive for ongoing urinary tract infection.  I have sent in a 7-day course of Augmentin which the patient can start taking today and I would advise her to hydrate well with water.

## 2024-08-15 NOTE — TELEPHONE ENCOUNTER
Patient needs to be scheduled for a colposcopy. She is a patient at [facility] and [facility], and has been there since June after being transferred from [place]. She is  requesting procedure be done under anesthesia due to bad experience in the past.  Unable to find any appointments before nov.  Per jenny rudd, appointment needs to be scheduled and then provider to be informed of patient's request.  She requires transport and stretcher  Patient waits callback

## 2024-08-21 ENCOUNTER — OFFICE VISIT (OUTPATIENT)
Dept: CARDIOLOGY CLINIC | Facility: CLINIC | Age: 54
End: 2024-08-21
Payer: MEDICARE

## 2024-08-21 VITALS
RESPIRATION RATE: 18 BRPM | OXYGEN SATURATION: 95 % | SYSTOLIC BLOOD PRESSURE: 100 MMHG | DIASTOLIC BLOOD PRESSURE: 60 MMHG | HEIGHT: 67 IN | BODY MASS INDEX: 38.14 KG/M2 | HEART RATE: 81 BPM | WEIGHT: 243 LBS

## 2024-08-21 DIAGNOSIS — E66.01 CLASS 2 SEVERE OBESITY DUE TO EXCESS CALORIES WITH SERIOUS COMORBIDITY AND BODY MASS INDEX (BMI) OF 38.0 TO 38.9 IN ADULT (HCC): ICD-10-CM

## 2024-08-21 DIAGNOSIS — I34.0 NONRHEUMATIC MITRAL VALVE REGURGITATION: ICD-10-CM

## 2024-08-21 DIAGNOSIS — I10 BENIGN HYPERTENSION: ICD-10-CM

## 2024-08-21 DIAGNOSIS — I50.32 CHRONIC HEART FAILURE WITH PRESERVED EJECTION FRACTION (HCC): Primary | ICD-10-CM

## 2024-08-21 DIAGNOSIS — E78.5 HYPERLIPIDEMIA, UNSPECIFIED HYPERLIPIDEMIA TYPE: ICD-10-CM

## 2024-08-21 PROBLEM — E66.812 CLASS 2 SEVERE OBESITY DUE TO EXCESS CALORIES WITH SERIOUS COMORBIDITY AND BODY MASS INDEX (BMI) OF 38.0 TO 38.9 IN ADULT (HCC): Status: ACTIVE | Noted: 2023-01-16

## 2024-08-21 PROCEDURE — 99214 OFFICE O/P EST MOD 30 MIN: CPT | Performed by: INTERNAL MEDICINE

## 2024-08-21 RX ORDER — DULAGLUTIDE 0.75 MG/.5ML
0.75 INJECTION, SOLUTION SUBCUTANEOUS
COMMUNITY
Start: 2024-08-13

## 2024-08-21 NOTE — ASSESSMENT & PLAN NOTE
-Continue simvastatin 20 mg daily  -Will repeat fasting lipid panel and CMP in 6 months prior to next office visit

## 2024-08-21 NOTE — ASSESSMENT & PLAN NOTE
Wt Readings from Last 3 Encounters:   08/12/24 110 kg (243 lb)   08/05/24 111 kg (244 lb)   07/23/24 113 kg (248 lb 9.6 oz)       -Per documentation from hospitalization while in [place]  -Can continue furosemide 10 mg daily with 20 mEq potassium daily  -Patient currently on Ozempic 0.5 mg every 7 days and spironolactone 25 mg daily  -Unfortunately due to blood pressure issues limited potential for up titration of medical therapy at this time  -Counseled on need for sodium and fluid restricted diet along weight reduction goal BMI less than 29

## 2024-08-21 NOTE — ASSESSMENT & PLAN NOTE
-Continue spironolactone 25 mg daily, furosemide 10 mg daily  -Counseled on dietary and lifestyle modifications

## 2024-08-21 NOTE — PROGRESS NOTES
Patient ID: Chely Ruvalcaba is a 54 y.o. female.        Plan:      Chronic heart failure with preserved ejection fraction (HCC)  Wt Readings from Last 3 Encounters:   08/12/24 110 kg (243 lb)   08/05/24 111 kg (244 lb)   07/23/24 113 kg (248 lb 9.6 oz)       -Per documentation from hospitalization while in [place]  -Can continue furosemide 10 mg daily with 20 mEq potassium daily  -Patient currently on Ozempic 0.5 mg every 7 days and spironolactone 25 mg daily  -Unfortunately due to blood pressure issues limited potential for up titration of medical therapy at this time  -Counseled on need for sodium and fluid restricted diet along weight reduction goal BMI less than 29        Benign hypertension  -Continue spironolactone 25 mg daily, furosemide 10 mg daily  -Counseled on dietary and lifestyle modifications    Hyperlipidemia  -Continue simvastatin 20 mg daily  -Will repeat fasting lipid panel and CMP in 6 months prior to next office visit    Class 2 severe obesity due to excess calories with serious comorbidity and body mass index (BMI) of 38.0 to 38.9 in adult ([facility])  -Counseled on dietary and lifestyle modifications.    Nonrheumatic mitral valve regurgitation  -Mild on transthoracic echocardiogram August 2023  -Continue to monitor       Follow up Plan/Other summary comments:  -Recommend continued hematology, pulmonology and gastroenterology follow-up  -BNP on laboratory analysis 5/21/2024 within normal range at 19  -Counseled patient on dietary and lifestyle modifications including following a low-salt, low-fat, heart healthy diet with sodium restriction to less than 1800 mg of sodium daily, fluid restriction to less than 1800 mL of fluid daily along with need for weight reduction with goal BMI less than 29  -Patient can continue furosemide 10 mg daily with 20 mEq potassium daily, simvastatin 20 mg daily and Ozempic 0.5 mg every 7 days and spironolactone 25 mg daily  -Patient will be seen in 6 months or sooner if  necessary  -Patient counseled if she were to have any warning or alarm type symptoms she is to seek emergency medical care immediately.    HPI:   -Patient is a 54-year-old female with cirrhosis, GERD, iron deficiency anemia, hypothyroidism, pulmonary emphysema with tobacco use who originally presented to the office in July 2023 after referral from gastroenterology team for shortness of breath and chest discomfort.  Patient at that time have been having her symptoms for many years and was even seen and evaluated in [place] several years prior for the exact same symptomatology and had been informed at that time the workup was relatively unrevealing.  -She was hospitalized for over a month in [place] with concern for pneumothorax, multifocal pneumonia with sepsis, acute hypoxic respiratory failure and COPD exacerbation with documentation also stating acute on chronic heart failure with preserved ejection fraction.  Medications were uptitrated and she was eventually able to be discharged.  -She presents to the office today for follow-up.  Currently in the office today patient denies any chest pain, palpitations, lightheadedness or dizziness, loss of consciousness.  She denies any significant shortness of breath but states that it is improved mostly with the addition of her oxygen therapy.  She is currently on 2 L nasal cannula oxygen.  She denies any orthopnea or bendopnea and notes no significant lower extremity edema.  She notes issues with lower blood pressure readings therefore requiring midodrine therapy.      Most recent or relevant cardiac/vascular testing:    -Nuclear stress test 3/23/2023 showing no diagnostic evidence of ischemia on perfusion imaging.    -Transthoracic echocardiogram 8/11/2023 showing left ventricular systolic function normal estimated LVEF 60% with normal diastolic parameters, mild tricuspid regurgitation and mild mitral regurgitation with IVC normal in size    -Holter monitor 8/11/2023  showing predominantly sinus rhythm average heart rate 88 bpm with rare supraventricular ectopic activity and rare ventricular ectopic activity with no significant arrhythmias appreciated and patient symptoms correlated with sinus rhythm    -Right heart catheterization report 3/19/2024 at [facility] showing normal pulmonary capillary wedge pressure, normal right atrial pressure, normal pulmonary artery pressure and normal right ventricular pressure with normal cardiac output by Chrissie and normal cardiac output by thermal dilution with RA pressure measuring 6, pulmonary wedge pressure 9, PA pressure 24/9/16          Past Surgical History:   Procedure Laterality Date    CARPAL TUNNEL RELEASE      last assessed - 04MAr2016    CHOLECYSTECTOMY      last assessed - 04Mar2016    COLONOSCOPY      Complete colonoscopy     EYE SURGERY      last assessed - 04Mar2016    FL RETROGRADE PYELOGRAM  6/16/2022    FL RETROGRADE PYELOGRAM  8/16/2022    FOOT SURGERY      last assessed - 04Mar2016    SD CYSTO BLADDER W/URETERAL CATHETERIZATION Left 7/21/2022    Procedure: CYSTOSCOPY RETROGRADE PYELOGRAM WITH INSERTION STENT URETERAL;  Surgeon: Facundo Matamoros MD;  Location: [facility];  Service: Urology    SD CYSTO/URETERO W/LITHOTRIPSY &INDWELL STENT INSRT Left 6/16/2022    Procedure: CYSTOSCOPY URETEROSCOPY WITH LITHOTRIPSY HOLMIUM LASER, RETROGRADE PYELOGRAM AND INSERTION STENT URETERAL;  Surgeon: Sammy Ferrer MD;  Location: [facility];  Service: Urology    SD CYSTO/URETERO W/LITHOTRIPSY &INDWELL STENT INSRT Left 8/16/2022    Procedure: CYSTOSCOPY USCOPE W/HOLMIUM LASER, RETROGRADE PYELOGRAM&STENT;  Surgeon: Sudheer Bradford MD;  Location: [facility];  Service: Urology    SD ESOPHAGOGASTRODUODENOSCOPY TRANSORAL DIAGNOSTIC N/A 2/13/2017    Procedure: ESOPHAGOGASTRODUODENOSCOPY (EGD);  Surgeon: Alison Saleem MD;  Location: [facility];  Service: Gastroenterology       Review of Systems   Review of Systems   Constitutional:  Negative  "for chills, diaphoresis, fatigue and fever.   HENT:  Negative for trouble swallowing and voice change.    Eyes:  Negative for pain and redness.   Respiratory:  Negative for shortness of breath and wheezing.    Cardiovascular:  Negative for chest pain, palpitations and leg swelling.   Gastrointestinal:  Negative for abdominal pain, constipation, diarrhea, nausea and vomiting.   Genitourinary:  Negative for dysuria.   Musculoskeletal:  Positive for arthralgias. Negative for neck pain and neck stiffness.   Skin:  Negative for rash.   Neurological:  Negative for dizziness, syncope, light-headedness and headaches.   Psychiatric/Behavioral:  Negative for agitation and confusion.           Objective:     /60 (BP Location: Left arm, Patient Position: Sitting)   Pulse 81   Resp 18   Ht 5' 7\" (1.702 m)   Wt 110 kg (243 lb)   LMP 04/05/2018 (Approximate)   SpO2 95% Comment: 2 L O2  BMI 38.06 kg/m²     PHYSICAL EXAM:  Physical Exam  Vitals reviewed.   Constitutional:       General: She is not in acute distress.     Appearance: She is obese. She is not diaphoretic.   HENT:      Head: Normocephalic and atraumatic.      Comments: Nasal cannula oxygen in place  Eyes:      General:         Right eye: No discharge.         Left eye: No discharge.   Neck:      Comments: Trachea midline, neck obese, difficult to assess JVD.  Cardiovascular:      Rate and Rhythm: Normal rate and regular rhythm.      Heart sounds:      No friction rub.   Pulmonary:      Effort: No respiratory distress.      Breath sounds: No wheezing.      Comments: Decreased breath sounds bilaterally  Chest:      Chest wall: No tenderness.   Abdominal:      General: Bowel sounds are normal.      Palpations: Abdomen is soft.      Tenderness: There is no abdominal tenderness. There is no rebound.   Musculoskeletal:      Right lower leg: No edema.      Left lower leg: No edema.   Skin:     General: Skin is warm and dry.   Neurological:      Mental Status: She " "is alert.      Comments: Awake, alert, able to answer questions appropriately   Psychiatric:         Mood and Affect: Mood normal.         Behavior: Behavior normal.          Meds reviewed.  Current Outpatient Medications on File Prior to Visit   Medication Sig Dispense Refill    albuterol (PROVENTIL HFA,VENTOLIN HFA) 90 mcg/act inhaler Inhale 2 puffs every 6 (six) hours as needed for wheezing 18 g 1    amoxicillin-clavulanate (AUGMENTIN) 875-125 mg per tablet Take 1 tablet by mouth every 12 (twelve) hours for 7 days 14 tablet 0    B-D SYRINGE/NEEDLE 1CC/25GX5/8 25G X 5/8\" 1 ML MISC       Blood Glucose Monitoring Suppl (OneTouch Verio) w/Device KIT Check blood sugar once a day 1 kit 0    budesonide (PULMICORT) 0.5 mg/2 mL nebulizer solution Take 0.5 mg by nebulization 2 (two) times a day Rinse mouth after use.      buPROPion (WELLBUTRIN XL) 300 mg 24 hr tablet Take 1 tablet (300 mg total) by mouth every morning 90 tablet 3    cyanocobalamin 1,000 mcg/mL       diazepam (VALIUM) 5 mg tablet Take 1 tablet (5 mg total) by mouth daily at bedtime as needed for anxiety 30 tablet 0    ergocalciferol (VITAMIN D2) 50,000 units Take 1 capsule (50,000 Units total) by mouth every 14 (fourteen) days 12 capsule 1    famotidine (PEPCID) 40 MG tablet Take 1 tablet (40 mg total) by mouth daily at bedtime Take in evening 2 hours prior to bed 90 tablet 1    ferrous sulfate 324 (65 Fe) mg Take 1 tablet (324 mg total) by mouth daily before breakfast 30 tablet 3    folic acid (FOLVITE) 1 mg tablet Take 1 tablet (1 mg total) by mouth daily 90 tablet 1    furosemide (LASIX) 40 mg tablet Take 1 tablet (40 mg total) by mouth daily (Patient taking differently: Take 10 mg by mouth daily) 180 tablet 1    gabapentin (NEURONTIN) 100 mg capsule Take 100 mg by mouth every 8 (eight) hours  0    glucose blood (OneTouch Verio) test strip Check blood sugar once a day 100 each 2    Insulin Glargine Solostar (Lantus SoloStar) 100 UNIT/ML SOPN Inject 55 " Units under the skin      insulin lispro (HumaLOG) 100 units/mL injection pen Inject 15 Units under the skin      Insulin Pen Needle 31G X 5 MM MISC Inject under the skin if needed (Insuline injections) 100 each 3    ipratropium-albuterol (DUO-NEB) 0.5-2.5 mg/3 mL nebulizer solution Take 3 mL by nebulization 4 (four) times a day 360 mL 3    lactulose (CHRONULAC) 10 g/15 mL solution Take 30 mL (20 g total) by mouth daily as needed (constipation) 240 mL 11    levothyroxine 112 mcg tablet Take 1 tablet (112 mcg total) by mouth daily (Patient taking differently: Take 100 mcg by mouth daily) 90 tablet 1    midodrine (PROAMATINE) 2.5 mg tablet       nicotine (NICODERM CQ) 21 mg/24 hr TD 24 hr patch Place 1 patch on the skin over 24 hours every 24 hours 28 patch 5    omeprazole (PriLOSEC) 40 MG capsule take 1 capsule by mouth twice a day (Patient taking differently: Take 20 mg by mouth daily) 60 capsule 5    ondansetron (ZOFRAN) 4 mg tablet Take 1 tablet (4 mg total) by mouth every 8 (eight) hours as needed for nausea or vomiting 20 tablet 0    OneTouch Delica Lancets 30G MISC Check blood sugars once a day 100 each 1    oxyCODONE-acetaminophen (PERCOCET)  mg per tablet Take 1 tablet by mouth every 6 (six) hours as needed for severe pain      oxygen gas Inhale 2 L/min continuous. May use 3L with exertion per Bella Goodman  CRNP  Keep sat >88%  Indications: low saturation      potassium chloride (Klor-Con M20) 20 mEq tablet       semaglutide, 0.25 or 0.5 mg/dose, (Ozempic, 0.25 or 0.5 MG/DOSE,) 2 mg/3 mL injection pen Inject 0.375 mL (0.25 mg total) under the skin every 7 days for 30 days, THEN 0.75 mL (0.5 mg total) every 7 days. 9 mL 0    sertraline (ZOLOFT) 100 mg tablet take 1 tablet by mouth twice a day 180 tablet 1    simvastatin (ZOCOR) 20 mg tablet Take 1 tablet (20 mg total) by mouth daily at bedtime 90 tablet 1    spironolactone (ALDACTONE) 25 mg tablet       sucralfate (CARAFATE) 1 g tablet Take 1 tablet (1 g  total) by mouth daily at bedtime 60 tablet 3    Trulicity 0.75 MG/0.5ML injection Inject 0.75 mg under the skin      varenicline (CHANTIX) 0.5 mg tablet Take 1 tablet (0.5 mg total) by mouth 2 (two) times a day 60 tablet 0    linezolid (ZYVOX) 600 MG/300ML SOLN  (Patient not taking: Reported on 8/21/2024)      metolazone (ZAROXOLYN) 2.5 mg tablet  (Patient not taking: Reported on 8/21/2024)      oxybutynin (DITROPAN-XL) 10 MG 24 hr tablet Take 1 tablet (10 mg total) by mouth daily at bedtime (Patient not taking: Reported on 8/21/2024) 30 tablet 3    phenazopyridine (PYRIDIUM) 100 mg tablet  (Patient not taking: Reported on 8/21/2024)      scopolamine (TRANSDERM-SCOP) 1 mg/3 days TD 72 hr patch Place 1 patch on the skin (Patient not taking: Reported on 8/21/2024)      sulfamethoxazole-trimethoprim (BACTRIM DS) 800-160 mg per tablet Take 1 tablet by mouth (Patient not taking: Reported on 8/21/2024)      [DISCONTINUED] naloxone (NARCAN) 4 mg/0.1 mL nasal spray Administer 1 spray into a nostril. If no response after 2-3 minutes, give another dose in the other nostril using a new spray. (Patient not taking: Reported on 6/30/2022) 1 each 1     No current facility-administered medications on file prior to visit.      Past Medical History:   Diagnosis Date    Abnormal stress test     Acute cystitis without hematuria 05/07/2018    Acute duodenal ulcer with bleeding 01/16/2023    Allergic sinusitis     last assessed - 03Apr2017    Anemia Around december    Anxiety     last assessed - 04Mar2016    Arthritis     Asthma     last assessed - 04Mar2016    Bilateral lower extremity edema     last assessed - 12May2017    Callus of foot     last assessed - 22Jun2016    Chest pain     Chronic GERD     last assessed - 16Jan2017    Colon polyp     Constipation     Resolved - 13Jan2017    COPD (chronic obstructive pulmonary disease) (HCC)     Depression     last assessed - 04Mar2016    Disease of thyroid gland     Diverticulitis of colon      last assessed - 2016    Dyspepsia     Resolved - [id number]    Edema, lower extremity 2020    Esophageal reflux     last assessed - 2016    Essential hypertriglyceridemia     last assessed - [id number]    Fatty liver 2023    Gastroesophageal reflux disease with esophagitis 2016    GERD (gastroesophageal reflux disease)     Gynecological disorder     last assessed - 2016    Hydroureteronephrosis 2022    Hypertension     last assessed - 2016    Hypokalemia 2022    Hypotension     last assessed - [id number]    Kidney stone     Left ureteral stone with hydronephrosis 2022    Migraine     Morbid obesity (HCC) 2019    Formatting of this note might be different from the original. Added automatically from request for surgery [id number]    Neuropathy     Obesity     Other chest pain 2018    Pancreatic lesion 2023    Positive depression screening 2022    Primary hypertriglyceridemia 2022    Pulmonary emphysema (HCC)     last assessed - 2016    Seasonal allergies     Severe obesity (HCC) 2023    Skin tag 2023    SOB (shortness of breath)     Strain of groin 2023    Urinary frequency     last assessed - 2016    Vagina, candidiasis     last assessed - 2016    Very heavy cigarette smoker 2023    Visual impairment        Social History     Tobacco Use   Smoking Status Former    Current packs/day: 0.00    Average packs/day: 0.5 packs/day for 41.2 years (20.6 ttl pk-yrs)    Types: Cigarettes    Start date: 1983    Quit date: 3/6/2024    Years since quittin.4   Smokeless Tobacco Never       Family History   Problem Relation Age of Onset    Obesity Mother     Thyroid disease Mother     Cancer Mother 71    Vision loss Mother     Obesity Sister     Coronary artery disease Sister     Stroke Sister     Asthma Sister     Glaucoma Sister     No Known Problems Maternal Aunt     Diabetes Maternal Uncle     Lung  cancer Maternal Grandmother     Cancer Maternal Grandfather     Diabetes Maternal Grandfather     No Known Problems Paternal Grandmother     No Known Problems Paternal Grandfather     Hypertension Other     Lung cancer Other     Hypertension Other     Lung cancer Other     Lung cancer Other

## 2024-08-26 ENCOUNTER — TELEPHONE (OUTPATIENT)
Dept: PULMONOLOGY | Facility: CLINIC | Age: 54
End: 2024-08-26

## 2024-08-26 ENCOUNTER — TELEPHONE (OUTPATIENT)
Dept: INFUSION CENTER | Facility: HOSPITAL | Age: 54
End: 2024-08-26

## 2024-08-26 NOTE — TELEPHONE ENCOUNTER
Called and left patient a voicemail.  Pulmonary function testing showed some mild restriction otherwise normal spirometry.  Patient can call the office back if she has any questions or concerns

## 2024-08-27 ENCOUNTER — CONSULT (OUTPATIENT)
Dept: OBGYN CLINIC | Facility: CLINIC | Age: 54
End: 2024-08-27
Payer: MEDICARE

## 2024-08-27 ENCOUNTER — HOSPITAL ENCOUNTER (OUTPATIENT)
Dept: INFUSION CENTER | Facility: HOSPITAL | Age: 54
Discharge: HOME | End: 2024-08-27
Attending: INTERNAL MEDICINE
Payer: MEDICARE

## 2024-08-27 ENCOUNTER — TELEPHONE (OUTPATIENT)
Dept: GASTROENTEROLOGY | Facility: CLINIC | Age: 54
End: 2024-08-27

## 2024-08-27 VITALS
DIASTOLIC BLOOD PRESSURE: 73 MMHG | HEART RATE: 94 BPM | RESPIRATION RATE: 20 BRPM | TEMPERATURE: 97.2 F | SYSTOLIC BLOOD PRESSURE: 105 MMHG

## 2024-08-27 VITALS
BODY MASS INDEX: 40.02 KG/M2 | HEIGHT: 66 IN | DIASTOLIC BLOOD PRESSURE: 68 MMHG | SYSTOLIC BLOOD PRESSURE: 128 MMHG | WEIGHT: 249 LBS

## 2024-08-27 DIAGNOSIS — R87.611 PAP SMEAR OF CERVIX WITH ASCUS, CANNOT EXCLUDE HGSIL: Primary | ICD-10-CM

## 2024-08-27 DIAGNOSIS — D50.9 IRON DEFICIENCY ANEMIA, UNSPECIFIED IRON DEFICIENCY ANEMIA TYPE: Primary | ICD-10-CM

## 2024-08-27 DIAGNOSIS — N95.0 POST-MENOPAUSAL BLEEDING: ICD-10-CM

## 2024-08-27 PROCEDURE — 96372 THER/PROPH/DIAG INJ SC/IM: CPT

## 2024-08-27 PROCEDURE — 99215 OFFICE O/P EST HI 40 MIN: CPT | Performed by: OBSTETRICS & GYNECOLOGY

## 2024-08-27 PROCEDURE — 96365 THER/PROPH/DIAG IV INF INIT: CPT

## 2024-08-27 RX ORDER — CYANOCOBALAMIN 1000 UG/ML
1000 INJECTION, SOLUTION INTRAMUSCULAR; SUBCUTANEOUS ONCE
Status: COMPLETED | OUTPATIENT
Start: 2024-08-27 | End: 2024-08-27

## 2024-08-27 RX ORDER — CYANOCOBALAMIN 1000 UG/ML
1000 INJECTION, SOLUTION INTRAMUSCULAR; SUBCUTANEOUS ONCE
OUTPATIENT
Start: 2024-09-04 | End: 2024-09-03

## 2024-08-27 RX ORDER — SODIUM CHLORIDE 9 MG/ML
20 INJECTION, SOLUTION INTRAVENOUS ONCE
Status: COMPLETED | OUTPATIENT
Start: 2024-08-27 | End: 2024-08-27

## 2024-08-27 RX ORDER — SODIUM CHLORIDE 9 MG/ML
20 INJECTION, SOLUTION INTRAVENOUS ONCE
OUTPATIENT
Start: 2024-09-04

## 2024-08-27 RX ADMIN — CYANOCOBALAMIN 1000 MCG: 1000 INJECTION, SOLUTION INTRAMUSCULAR at 14:02

## 2024-08-27 RX ADMIN — SODIUM CHLORIDE 20 ML/HR: 0.9 INJECTION, SOLUTION INTRAVENOUS at 12:55

## 2024-08-27 RX ADMIN — IRON SUCROSE 200 MG: 20 INJECTION, SOLUTION INTRAVENOUS at 12:55

## 2024-08-27 NOTE — TELEPHONE ENCOUNTER
----- Message from Rashid Pete DO sent at 8/6/2024  6:52 AM EDT -----  If deemed necessary from gastroenterology standpoint for endoscopic evaluation then can proceed.  I will be seeing patient later this month and can discuss perioperative risk stratification with her at that time.  ----- Message -----  From: Lisandra Leggett PA-C  Sent: 8/5/2024   9:57 PM EDT  To: ANTONIO Galvin; Rashid Pete DO; #    Hello everyone,     I am reaching out regarding Ms. Ruvalcaba. She requires both an EGD for evaluation of dark stools and colonoscopy s/p diverticulitis and for surveillance of polyps. However, she is high-risk for anesthesia given her extensive cardiopulmonary co-morbidities and current O2 requirements. I wanted to get both cardiology and pulmonology's opinions regarding her risk for endoscopic evaluation.     If she is deemed too high risk then I will plan to reconsider her for a colonoscopy in the future if her functional status and O2 requirements improved. I would also monitor her H&H and iron studies following her upcoming iron infusions prior to pursuing an EGD. Let me know your thoughts and thank you in advance!    Best,  Lisandra Leggett

## 2024-08-27 NOTE — PLAN OF CARE
Problem: Potential for Falls  Goal: Patient will remain free of falls  Description: INTERVENTIONS:  - Educate patient/family on patient safety including physical limitations  - Instruct patient to call for assistance with activity   - Consult OT/PT to assist with strengthening/mobility   - Keep Call bell within reach  - Keep bed low and locked with side rails adjusted as appropriate  - Keep care items and personal belongings within reach  - Initiate and maintain comfort rounds  - Make Fall Risk Sign visible to staff  - Apply yellow socks and bracelet for high fall risk patients  - Consider moving patient to room near nurses station  Outcome: Progressing      Occupational Therapy  Facility/Department: [facility] PROGRESSIVE CARE  Daily Treatment Note  NAME: Micheal Schaeffer  : 1964  MRN: [id number]    Date of Service: 2018    Discharge Recommendations:  Continue to assess pending progress, 24 hour supervision or assist, Patient would benefit from continued therapy after discharge      RN reports patient is medically stable for therapy treatment this date. Chart reviewed prior to treatment and patient is agreeable for therapy. All lines intact and patient positioned comfortably at end of treatment. All patient needs addressed prior to ending therapy session. Patient Diagnosis(es): The primary encounter diagnosis was Altered mental status, unspecified altered mental status type. Diagnoses of Volume depletion, Chronic left shoulder pain, History of substance abuse, and Methadone dependence ([facility] [address] were also pertinent to this visit. has a past medical history of Substance abuse.   has no past surgical history on file. Restrictions  Restrictions/Precautions  Restrictions/Precautions: Fall Risk, General Precautions  Required Braces or Orthoses?: No  Position Activity Restriction  Other position/activity restrictions: bed rest with bathroom privileges, up with assist  Subjective   General  Chart Reviewed: No  Patient assessed for rehabilitation services?: Yes  Family / Caregiver Present: No  Pain Assessment  Patient Currently in Pain: Denies  Vital Signs  Patient Currently in Pain: Denies   Orientation  Orientation  Orientation Level: Disoriented to situation;Disoriented to place; Disoriented to time;Oriented to person  Objective             Functional Mobility  Functional Mobility Comments: pt declined getting OOB; pt educated on  purpose of therapy/getting OOB and pt refusing. Pt very lethargic throughout session, dec. eye contact, unable to follow directions, confused, non-sensical talk at times.  Cognitive reorientation completed; pt hearing voices  Bed

## 2024-08-27 NOTE — TELEPHONE ENCOUNTER
Called patient to discuss further plan of care following her cardiology appointment on 8/21/2024. She currently resides at [facility] which is the temporary contact information listed in her chart. Nursing staff informed provider that patient was out at an appointment but did take a message requesting she call back.

## 2024-08-27 NOTE — PROGRESS NOTES
Chely Ruvalcaba  tolerated treatment well with no complications.      Chely Ruvalcaba is aware of future appt on 9/4 at 230pm.     AVS printed and given to Chely Ruvalcaba:  Yes

## 2024-08-27 NOTE — PROGRESS NOTES
A/P: Patient is 54 y.o. yo female with ASC-H pap smear, unable to tolerate office procedure, for EUA, LEEP, possible hysteroscopy, possible dilation and curettage.    - Surgical consent signed.  - Patient aware to have pelvic US ASAP to determine need for hysteroscopy, D+C while under anesthesia given history of cervical stenosis and inability to perform EMB in office  - Patient aware she will need medical clearance  - Surgical coordinator to preauthorize with insurance and schedule for procedure in main OR.    I have spent a total time of 45 minutes in caring for this patient on the day of the visit/encounter including Diagnostic results, Prognosis, Risks and benefits of tx options, Instructions for management, Importance of tx compliance, and Risk factor reductions.    S: Patient is a 54 y.o. yo female with the following pap history:    Pap (6/24/2024): ASC-H/ positive HPV  Reports previous pap in 2022 with Dr Brower but unclear timing/results of previous pap smears given that he was not affiliated with any hospital    Patient returns today to discuss options for management.  Discussed options including expectant management versus colposcopy vs excisional procedure with LEEP.  In her case, I have advised LEEP secondary to her possible longer standing abnormality and postmenopausal status as her risk of GEE 3 and therefore needing an excisional procedure according to the ASCCP eric is ~30%. LEEP would provide us with the benefit of both diagnosis and treatment associated with a single episode of anesthesia.      She has not had the pelvic US for PM bleeding yet either.  Discussed that we could likewise perform a hysteroscopy, D+C at the same time if it is indicated based on her endometrial thickness.  If she is unable to complete that prior to the surgery date, would just perform this diagnostic procedure as well.      We reviewed in the intraoperative procedure as well as the postoperative expectations.  Reviewed  the risks of surgery including but not limited to bleeding, transfusion, pain, injury to surrounding structures, persistent or recurrent infection/lesion.  We discussed the benefit in regard to prevention of cervical cancer.  We also discussed the possibility of a repeat LEEP in the future and/or definitive treatment with hysterectomy.  All of her questions were answered.  Consent form was signed.    O:   Vitals:    08/27/24 1527   BP: 128/68

## 2024-08-30 ENCOUNTER — TELEPHONE (OUTPATIENT)
Dept: OBGYN CLINIC | Facility: CLINIC | Age: 54
End: 2024-08-30

## 2024-08-30 NOTE — TELEPHONE ENCOUNTER
----- Message from Lisa Fernando MD sent at 2024  5:58 PM EDT -----  Regarding: surgery form  NOT SURE IF THIS COULD BE DONE ON  BUT I SUSPECT THAT IS TOO SOON; IF NOT, MAYBE ; PLEASE SEND A TEAMS MESSAGE ABOUT TIMING ONCE KNOWN    [facility] GYN Department  Surgery Scheduling Sheet    Patient Name: Chely Ruvalcaba  : 1970    Provider: Lisa Fernando MD     Needed: no; Language: N/A    Procedure: exam under anesthesia, loop electrosurgical excision procedure, possible hysteroscopy, possible dilation and curettage    Diagnosis: ASC-H, PM bleeding    Special Needs or Equipment: none    Anesthesia: choice    Length of stay: outpatient  Does patient have comorbid conditions that will require close perioperative monitoring prior to safe discharge: yes    The patient has comorbid conditions that will require close perioperative monitoring prior to safe discharge, including COPD.    This may require acute care beyond the usual and routine recovery period. As such, inpatient admission post-operatively is expected and appropriate, and anticipated hospital length of stay will be >2 midnights.    Pre-Admission Testing Needed: yes   Labs that should be ordered: per PAT    Order PAT that is recommended in prep for procedure?: Yes    Medical Clearance Needed: yes; Provider: Dr Mason (or doctor at [facility])    MA Form Signed (tubals/hysterectomy): Not Indicated    Surgical Drink Given: no     How many days out of work: n/a  How many days no driving: n/a (does not drive)    Is pre op appt needed?  no  Interval for post op appt: 2 week(s)     For Surgical Scheduler:     Surgery Scheduled On:  [place]: [facility]    Pre-op Appt:   Post op Appt:  Consult/Medical clearance appt:

## 2024-08-30 NOTE — TELEPHONE ENCOUNTER
Talked to patient she is scheduled for her surgical procedure on 9/24/2024 with Ella Fernando at the [facility]. Patient aware of medical clearance needed and will have her provider at the rehab facility fax that to us.

## 2024-09-03 ENCOUNTER — HOSPITAL ENCOUNTER (OUTPATIENT)
Dept: RADIOLOGY | Age: 54
Discharge: HOME | End: 2024-09-03
Payer: MEDICARE

## 2024-09-03 DIAGNOSIS — N95.0 POST-MENOPAUSAL BLEEDING: ICD-10-CM

## 2024-09-03 PROCEDURE — 76856 US EXAM PELVIC COMPLETE: CPT

## 2024-09-03 PROCEDURE — 76830 TRANSVAGINAL US NON-OB: CPT

## 2024-09-04 ENCOUNTER — HOSPITAL ENCOUNTER (OUTPATIENT)
Dept: INFUSION CENTER | Facility: HOSPITAL | Age: 54
Discharge: HOME | End: 2024-09-04
Attending: INTERNAL MEDICINE
Payer: MEDICARE

## 2024-09-04 VITALS
TEMPERATURE: 97 F | HEART RATE: 94 BPM | SYSTOLIC BLOOD PRESSURE: 138 MMHG | RESPIRATION RATE: 18 BRPM | DIASTOLIC BLOOD PRESSURE: 74 MMHG

## 2024-09-04 DIAGNOSIS — D50.9 IRON DEFICIENCY ANEMIA, UNSPECIFIED IRON DEFICIENCY ANEMIA TYPE: Primary | ICD-10-CM

## 2024-09-04 PROCEDURE — 96372 THER/PROPH/DIAG INJ SC/IM: CPT

## 2024-09-04 PROCEDURE — 96365 THER/PROPH/DIAG IV INF INIT: CPT

## 2024-09-04 RX ORDER — CYANOCOBALAMIN 1000 UG/ML
1000 INJECTION, SOLUTION INTRAMUSCULAR; SUBCUTANEOUS ONCE
Status: CANCELLED | OUTPATIENT
Start: 2024-09-11 | End: 2024-09-10

## 2024-09-04 RX ORDER — CYANOCOBALAMIN 1000 UG/ML
1000 INJECTION, SOLUTION INTRAMUSCULAR; SUBCUTANEOUS ONCE
Status: COMPLETED | OUTPATIENT
Start: 2024-09-04 | End: 2024-09-04

## 2024-09-04 RX ORDER — SODIUM CHLORIDE 9 MG/ML
20 INJECTION, SOLUTION INTRAVENOUS ONCE
Status: CANCELLED | OUTPATIENT
Start: 2024-09-11

## 2024-09-04 RX ORDER — SODIUM CHLORIDE 9 MG/ML
20 INJECTION, SOLUTION INTRAVENOUS ONCE
Status: COMPLETED | OUTPATIENT
Start: 2024-09-04 | End: 2024-09-04

## 2024-09-04 RX ADMIN — CYANOCOBALAMIN 1000 MCG: 1000 INJECTION, SOLUTION INTRAMUSCULAR at 14:31

## 2024-09-04 RX ADMIN — IRON SUCROSE 200 MG: 20 INJECTION, SOLUTION INTRAVENOUS at 14:31

## 2024-09-04 RX ADMIN — SODIUM CHLORIDE 20 ML/HR: 9 INJECTION, SOLUTION INTRAVENOUS at 14:31

## 2024-09-04 NOTE — PROGRESS NOTES
Chely Ruvalcaba  tolerated treatment well with no complications.      Chely Ruvalcaba is aware of future appt on 9/11 at 200pm.     AVS printed and given to Chely Ruvalcaba:  Yes

## 2024-09-05 ENCOUNTER — OFFICE VISIT (OUTPATIENT)
Dept: BARIATRICS | Facility: CLINIC | Age: 54
End: 2024-09-05
Payer: MEDICARE

## 2024-09-05 VITALS
WEIGHT: 254.2 LBS | RESPIRATION RATE: 18 BRPM | BODY MASS INDEX: 40.85 KG/M2 | SYSTOLIC BLOOD PRESSURE: 116 MMHG | DIASTOLIC BLOOD PRESSURE: 70 MMHG | OXYGEN SATURATION: 95 % | HEART RATE: 90 BPM | TEMPERATURE: 97.8 F | HEIGHT: 66 IN

## 2024-09-05 DIAGNOSIS — I50.32 CHRONIC HEART FAILURE WITH PRESERVED EJECTION FRACTION (HCC): ICD-10-CM

## 2024-09-05 DIAGNOSIS — E11.9 DIABETES MELLITUS TYPE 2, INSULIN DEPENDENT (HCC): ICD-10-CM

## 2024-09-05 DIAGNOSIS — J43.2 CENTRILOBULAR EMPHYSEMA (HCC): ICD-10-CM

## 2024-09-05 DIAGNOSIS — I10 BENIGN HYPERTENSION: ICD-10-CM

## 2024-09-05 DIAGNOSIS — E11.9 TYPE 2 DIABETES MELLITUS (HCC): ICD-10-CM

## 2024-09-05 DIAGNOSIS — E66.01 OBESITY, CLASS III, BMI 40-49.9 (MORBID OBESITY) (HCC): Primary | ICD-10-CM

## 2024-09-05 DIAGNOSIS — K74.60 CIRRHOSIS OF LIVER WITHOUT ASCITES, UNSPECIFIED HEPATIC CIRRHOSIS TYPE (HCC): ICD-10-CM

## 2024-09-05 DIAGNOSIS — Z79.4 DIABETES MELLITUS TYPE 2, INSULIN DEPENDENT (HCC): ICD-10-CM

## 2024-09-05 DIAGNOSIS — D50.9 IRON DEFICIENCY ANEMIA, UNSPECIFIED IRON DEFICIENCY ANEMIA TYPE: ICD-10-CM

## 2024-09-05 PROBLEM — E66.813 OBESITY, CLASS III, BMI 40-49.9 (MORBID OBESITY): Status: ACTIVE | Noted: 2023-01-16

## 2024-09-05 PROCEDURE — 99214 OFFICE O/P EST MOD 30 MIN: CPT | Performed by: PHYSICIAN ASSISTANT

## 2024-09-05 NOTE — ASSESSMENT & PLAN NOTE
Lab Results   Component Value Date    HGBA1C 7.8 (H) 05/15/2024   -Started on Ozempic in July by Endo, but reports taking Trulicity 0.75mg  because that is what the rehab facility has  -reports she is currently not taking her insulin  -avoid/limit refined carbohydrates

## 2024-09-05 NOTE — ASSESSMENT & PLAN NOTE
-Discussed options of HealthyCORE-Intensive Lifestyle Intervention Program, Very Low Calorie Diet-VLCD, and Conservative Program and the role of weight loss medications.  -Initial weight loss goal of 5-10% weight loss for improved health  -Screening labs: reviewed CMP, Lipid panel, TSH, HgbA1c  -Patient is interested in pursuing  bariatric surgery. Patient has has recent declining health with multiple hospitalizations. Informs me she is in a rehab facility and has applied for housing. She also reports she may be moving to [place]. She has not dicussed her interest in surgery with any of her specialists. Informed her I would reach out to get their opinion, but currently advised working on dietary changes until her health shows more stability.  -Calorie goals, sample menu, portion size guidelines, and food logging reviewed with the patient.    -patient also requesting to sample meal replacement. Sample shake provided.   -Prescribed Ozempic but has been taking Trulicity in rehab facility as she reports they can not get Ozempic due to cost?

## 2024-09-05 NOTE — PATIENT INSTRUCTIONS
Goals:    Food log (ie.) [email or web address],etc.   No sugary beverages. At least 64oz of water daily.  Increase physical activity by 10 minutes daily. Gradually increase physical activity to a goal of 5 days per week for 30 minutes of MODERATE intensity PLUS 2 days per week of FULL BODY resistance training  9454-6963 calories per day  5-10 servings of fruits and vegetables per day  25-35 grams of dietary fiber per day

## 2024-09-05 NOTE — PROGRESS NOTES
Assessment/Plan:    Obesity, Class III, BMI 40-49.9 (morbid obesity) (HCC)  -Discussed options of HealthyCORE-Intensive Lifestyle Intervention Program, Very Low Calorie Diet-VLCD, and Conservative Program and the role of weight loss medications.  -Initial weight loss goal of 5-10% weight loss for improved health  -Screening labs: reviewed CMP, Lipid panel, TSH, HgbA1c  -Patient is interested in pursuing  bariatric surgery. Patient has has recent declining health with multiple hospitalizations. Informs me she is in a rehab facility and has applied for housing. She also reports she may be moving to [place]. She has not dicussed her interest in surgery with any of her specialists. Informed her I would reach out to get their opinion, but currently advised working on dietary changes until her health shows more stability.  -Calorie goals, sample menu, portion size guidelines, and food logging reviewed with the patient.    -patient also requesting to sample meal replacement. Sample shake provided.   -Prescribed Ozempic but has been taking Trulicity in rehab facility as she reports they can not get Ozempic due to cost?    Type 2 diabetes mellitus (HCC)    Lab Results   Component Value Date    HGBA1C 7.8 (H) 05/15/2024   -Started on Ozempic in July by Endo, but reports taking Trulicity 0.75mg  because that is what the rehab facility has  -reports she is currently not taking her insulin  -avoid/limit refined carbohydrates    Centrilobular emphysema (HCC)  -quit smoking in March  -followed by pulmonology  -currently requiring supplemental oxygen  -recent hospitalization for PNA    Chronic heart failure with preserved ejection fraction (HCC)  -Followed by cardiology  -on FR and Na restriction      Cirrhosis (HCC)  -followed by GI  -no noted esophageal varices, + PHG  -compensated      Iron deficiency anemia  -followed by hematology  -receiving iron infusions        Goals:    Food log (ie.)  [email or web address],etc.   No sugary beverages. At least 64oz of water daily.  Increase physical activity by 10 minutes daily. Gradually increase physical activity to a goal of 5 days per week for 30 minutes of MODERATE intensity PLUS 2 days per week of FULL BODY resistance training  [id number] calories per day  5-10 servings of fruits and vegetables per day  25-35 grams of dietary fiber per day    Follow up in approximately  4 months  with Non-Surgical Physician/Advanced Practitioner.     Diagnoses and all orders for this visit:    Obesity, Class III, BMI 40-49.9 (morbid obesity) (HCC)    Diabetes mellitus type 2, insulin dependent (HCC)  -     Ambulatory Referral to Weight Management    Benign hypertension    Type 2 diabetes mellitus (HCC)    Centrilobular emphysema (HCC)    Chronic heart failure with preserved ejection fraction (HCC)    Cirrhosis of liver without ascites, unspecified hepatic cirrhosis type (HCC)    Iron deficiency anemia, unspecified iron deficiency anemia type          Subjective:   Chief Complaint   Patient presents with    Follow-up        Patient ID: Chely Ruvalcaba  is a 54 y.o. female with excess weight/obesity here to pursue weight managment.      HPI Patient presents for overdue [facility] follow up. Patient was last seen in 2023 and interested in bariatric surgery, however unable to quit smoking and was halted from the process. She has had multiple hospitalizations since seen last. She is now homeless in a rehab facility and oxygen dependent. She is also in a wheelchair today. She thought she was seeing a surgeon today to discuss bariatric surgery.      Hydration: water 48-64oz, skim milk, OJ, occasional regular pepsi less than weekly  EXercise: WC bound  Occupation: disabled  Sleep: broken  Smoking: quit March 2024  ETOH: denies    -ALL meals prepared by rehab facility    B: scrambled eggs + toast  S: cheese crackers  L: mashed potato + veggies +  "beef  S:skips  D: [place] + soup OR salad + pizza  S: cheeze it OR sugar cookie       -Started on OZempic 0.25mg in July by endocrinologist - has not started yet. On Trulicity 0.75mg in rehab facility. Has not been taking her insulin in rehab facility    Wt Readings from Last 10 Encounters:   09/05/24 115 kg (254 lb 3.2 oz)   08/27/24 113 kg (249 lb)   08/21/24 110 kg (243 lb)   08/12/24 110 kg (243 lb)   08/05/24 111 kg (244 lb)   07/23/24 113 kg (248 lb 9.6 oz)   06/05/24 115 kg (253 lb 8.5 oz)   05/21/24 117 kg (257 lb 15 oz)   05/17/24 116 kg (256 lb 9.6 oz)   05/07/24 112 kg (248 lb)        The following portions of the patient's history were reviewed and updated as appropriate: allergies, current medications, past family history, past medical history, past social history, past surgical history, and problem list.    Review of Systems   Respiratory:  Positive for cough and shortness of breath.    Cardiovascular:  Negative for chest pain and palpitations.   Gastrointestinal:  Positive for nausea. Negative for abdominal pain and vomiting.   Genitourinary:  Positive for dysuria (chronic, advise ongoing follow up PCP).   Neurological:  Positive for headaches. Negative for dizziness.   Psychiatric/Behavioral:  Negative for dysphoric mood.        Objective:    /70   Pulse 90   Temp 97.8 °F (36.6 °C)   Resp 18   Ht 5' 6\" (1.676 m)   Wt 115 kg (254 lb 3.2 oz)   LMP 04/05/2018 (Approximate)   SpO2 95%   BMI 41.03 kg/m²      Physical Exam  Vitals and nursing note reviewed.   Constitutional:       General: She is not in acute distress.     Appearance: She is not toxic-appearing or diaphoretic.   HENT:      Head: Normocephalic and atraumatic.      Mouth/Throat:      Mouth: Mucous membranes are moist.   Eyes:      General: No scleral icterus.  Pulmonary:      Effort: Pulmonary effort is normal. No respiratory distress.   Abdominal:      Comments: protuberant   Musculoskeletal:      Right lower leg: Edema " (mild) present.      Left lower leg: Edema (mild) present.   Skin:     Coloration: Skin is not jaundiced.   Neurological:      General: No focal deficit present.      Mental Status: She is alert and oriented to person, place, and time.   Psychiatric:         Mood and Affect: Mood normal.         Thought Content: Thought content normal.         Judgment: Judgment normal.

## 2024-09-06 NOTE — ASSESSMENT & PLAN NOTE
-quit smoking in March  -followed by pulmonology  -currently requiring supplemental oxygen  -recent hospitalization for PNA

## 2024-09-09 ENCOUNTER — TELEPHONE (OUTPATIENT)
Age: 54
End: 2024-09-09

## 2024-09-09 NOTE — TELEPHONE ENCOUNTER
----- Message from Lisa Fernando MD sent at 9/6/2024 12:07 PM EDT -----  Can you please call Chely to let her know that her ultrasound showed a thin lining.  It is unlikely that she has any abnormal cells in her uterus at this time, so the hysteroscopy/D+C will not be needed.  I have our surgical coordinator Kenisha working on getting the rest of the surgery approved with her insurance, and then she will be in touch to get it scheduled.    Thanks!!

## 2024-09-11 ENCOUNTER — HOSPITAL ENCOUNTER (OUTPATIENT)
Dept: INFUSION CENTER | Facility: HOSPITAL | Age: 54
Discharge: HOME | End: 2024-09-11
Attending: INTERNAL MEDICINE
Payer: MEDICARE

## 2024-09-11 VITALS
TEMPERATURE: 98 F | DIASTOLIC BLOOD PRESSURE: 79 MMHG | HEART RATE: 89 BPM | SYSTOLIC BLOOD PRESSURE: 123 MMHG | RESPIRATION RATE: 16 BRPM

## 2024-09-11 DIAGNOSIS — D50.9 IRON DEFICIENCY ANEMIA, UNSPECIFIED IRON DEFICIENCY ANEMIA TYPE: Primary | ICD-10-CM

## 2024-09-11 PROCEDURE — 96365 THER/PROPH/DIAG IV INF INIT: CPT

## 2024-09-11 PROCEDURE — 96372 THER/PROPH/DIAG INJ SC/IM: CPT

## 2024-09-11 RX ORDER — SODIUM CHLORIDE 9 MG/ML
20 INJECTION, SOLUTION INTRAVENOUS ONCE
Status: CANCELLED | OUTPATIENT
Start: 2024-09-18

## 2024-09-11 RX ORDER — CYANOCOBALAMIN 1000 UG/ML
1000 INJECTION, SOLUTION INTRAMUSCULAR; SUBCUTANEOUS ONCE
Status: COMPLETED | OUTPATIENT
Start: 2024-09-11 | End: 2024-09-11

## 2024-09-11 RX ORDER — SODIUM CHLORIDE 9 MG/ML
20 INJECTION, SOLUTION INTRAVENOUS ONCE
Status: COMPLETED | OUTPATIENT
Start: 2024-09-11 | End: 2024-09-11

## 2024-09-11 RX ORDER — CYANOCOBALAMIN 1000 UG/ML
1000 INJECTION, SOLUTION INTRAMUSCULAR; SUBCUTANEOUS ONCE
Status: CANCELLED | OUTPATIENT
Start: 2024-09-18 | End: 2024-09-18

## 2024-09-11 RX ADMIN — IRON SUCROSE 200 MG: 20 INJECTION, SOLUTION INTRAVENOUS at 14:09

## 2024-09-11 RX ADMIN — SODIUM CHLORIDE 20 ML/HR: 0.9 INJECTION, SOLUTION INTRAVENOUS at 14:08

## 2024-09-11 RX ADMIN — CYANOCOBALAMIN 1000 MCG: 1000 INJECTION, SOLUTION INTRAMUSCULAR at 14:11

## 2024-09-11 NOTE — PROGRESS NOTES
Chely Ruvalcaba  tolerated treatment well with no complications.      Chely Ruvalcaba is aware of future appt on 9/18 at 2pm.     AVS printed and given to Chely Ruvalcaba:  Yes

## 2024-09-18 ENCOUNTER — HOSPITAL ENCOUNTER (OUTPATIENT)
Dept: INFUSION CENTER | Facility: HOSPITAL | Age: 54
Discharge: HOME | End: 2024-09-18
Attending: INTERNAL MEDICINE
Payer: MEDICARE

## 2024-09-18 VITALS
SYSTOLIC BLOOD PRESSURE: 123 MMHG | TEMPERATURE: 97.3 F | DIASTOLIC BLOOD PRESSURE: 79 MMHG | HEART RATE: 80 BPM | RESPIRATION RATE: 18 BRPM

## 2024-09-18 DIAGNOSIS — D50.9 IRON DEFICIENCY ANEMIA, UNSPECIFIED IRON DEFICIENCY ANEMIA TYPE: Primary | ICD-10-CM

## 2024-09-18 PROCEDURE — 96372 THER/PROPH/DIAG INJ SC/IM: CPT

## 2024-09-18 PROCEDURE — 96365 THER/PROPH/DIAG IV INF INIT: CPT

## 2024-09-18 RX ORDER — SODIUM CHLORIDE 9 MG/ML
20 INJECTION, SOLUTION INTRAVENOUS ONCE
Status: COMPLETED | OUTPATIENT
Start: 2024-09-18 | End: 2024-09-18

## 2024-09-18 RX ORDER — SODIUM CHLORIDE 9 MG/ML
20 INJECTION, SOLUTION INTRAVENOUS ONCE
Status: CANCELLED | OUTPATIENT
Start: 2024-09-18

## 2024-09-18 RX ORDER — CYANOCOBALAMIN 1000 UG/ML
1000 INJECTION, SOLUTION INTRAMUSCULAR; SUBCUTANEOUS ONCE
Status: COMPLETED | OUTPATIENT
Start: 2024-09-18 | End: 2024-09-18

## 2024-09-18 RX ORDER — CYANOCOBALAMIN 1000 UG/ML
1000 INJECTION, SOLUTION INTRAMUSCULAR; SUBCUTANEOUS ONCE
Status: CANCELLED | OUTPATIENT
Start: 2024-09-18 | End: 2024-09-18

## 2024-09-18 RX ADMIN — SODIUM CHLORIDE 20 ML/HR: 0.9 INJECTION, SOLUTION INTRAVENOUS at 14:27

## 2024-09-18 RX ADMIN — IRON SUCROSE 200 MG: 20 INJECTION, SOLUTION INTRAVENOUS at 14:27

## 2024-09-18 RX ADMIN — CYANOCOBALAMIN 1000 MCG: 1000 INJECTION, SOLUTION INTRAMUSCULAR; SUBCUTANEOUS at 14:30

## 2024-09-18 NOTE — PROGRESS NOTES
Chely Ruvalcaba  tolerated treatment well with no complications.      Chely Ruvalcaba is aware that she does not have any future appointments.     AVS printed and given to Chely Ruvalcaba:  Yes

## 2024-09-23 ENCOUNTER — TELEPHONE (OUTPATIENT)
Age: 54
End: 2024-09-23

## 2024-09-23 NOTE — TELEPHONE ENCOUNTER
Tete (a nurse from [facility]) called regarding pt's upcoming Sx scheduled for tomorrow (9/24). Communicated typically they will receive a call the evening prior to her Sx to confirm Sx time and any prep instructions. Tete communicated pt test + for a UTI and just began antibiotics yesterday. She was unsure if this would affect her surgery tomorrow. Please F/U. Thank you    Tete  [phone] *Ask for the 1st flr

## 2024-09-24 NOTE — TELEPHONE ENCOUNTER
"See response from Dr. Fernando: \"It would be better to delay surgery until the UTI is fully treated. It otherwise puts her at increased risks of complications surrounding surgery.\"    Called Dulce at OR and made aware.  Called Tete and made aware of the same.    Routing to office clinical for assistance in coordinating future OR appointment.   "

## 2024-09-25 ENCOUNTER — HOSPITAL ENCOUNTER (OUTPATIENT)
Facility: HOSPITAL | Age: 54
Setting detail: OUTPATIENT SURGERY
End: 2024-09-25
Attending: OBSTETRICS & GYNECOLOGY | Admitting: OBSTETRICS & GYNECOLOGY
Payer: MEDICARE

## 2024-09-25 NOTE — TELEPHONE ENCOUNTER
Talked to patient she is re scheduled for her surgical procedure on 10/22/2024 with Dr. Fernando at the [facility].

## 2024-09-30 ENCOUNTER — TELEPHONE (OUTPATIENT)
Age: 54
End: 2024-09-30

## 2024-09-30 NOTE — TELEPHONE ENCOUNTER
PA for semaglutide, 0.25 or 0.5 mg/dose, (Ozempic, 0.25 or 0.5 MG/DOSE,) 2 mg/3 mL injection pen SUBMITTED     via    [x]CMM-KEY: [id number]  []Surescripts-Case ID #    []Faxed to plan   []Other website    []Phone call Case ID #      Office notes sent, clinical questions answered. Awaiting determination    Turnaround time for your insurance to make a decision on your Prior Authorization can take 7-21 business days.

## 2024-10-01 NOTE — TELEPHONE ENCOUNTER
PA for semaglutide, 0.25 or 0.5 mg/dose,  APPROVED     Date(s) approved 10/01/24-10/01/25    Case #    Patient advised by          []MyChart Message  []Phone call   []LMOM  []L/M to call office as no active Communication consent on file  [x]Unable to leave detailed message as VM not approved on Communication consent       Pharmacy advised by    [x]Fax  []Phone call    Approval letter scanned into Media Yes

## 2024-10-14 NOTE — DISCHARGE INSTRUCTIONS
You were evaluated in the emergency department for leg swelling and redness.  Your ultrasound was negative for blood clot.  We are prescribing you a course of antibiotics for cellulitis.  Please take the entire course.  Return to the emergency department if your leg swelling and redness worsens.   You can access the FollowMyHealth Patient Portal offered by [facility] by registering at the following website: [email or web address]. By joining [employer]’s FollowMyHealth portal, you will also be able to view your health information using other applications (apps) compatible with our system.

## 2024-10-28 ENCOUNTER — TELEPHONE (OUTPATIENT)
Age: 54
End: 2024-10-28

## 2024-10-28 NOTE — TELEPHONE ENCOUNTER
Patients GI provider:  Mariama ALVAREZ    Number to return call: ([phone]    Reason for call: Pt called and asked if her appt on 10/30/24 can be a virtual. Please reach out to pt    Scheduled procedure/appointment date if applicable: Apt/procedure 10/30/24

## 2024-10-29 ENCOUNTER — TELEPHONE (OUTPATIENT)
Age: 54
End: 2024-10-29

## 2024-10-29 DIAGNOSIS — I10 BENIGN HYPERTENSION: ICD-10-CM

## 2024-10-29 DIAGNOSIS — F41.9 ANXIETY: ICD-10-CM

## 2024-10-29 DIAGNOSIS — Z72.0 TOBACCO ABUSE DISORDER: ICD-10-CM

## 2024-10-29 DIAGNOSIS — K21.9 GASTROESOPHAGEAL REFLUX DISEASE WITHOUT ESOPHAGITIS: ICD-10-CM

## 2024-10-29 DIAGNOSIS — E78.00 HYPERCHOLESTEROLEMIA: ICD-10-CM

## 2024-10-29 DIAGNOSIS — K21.9 GASTROESOPHAGEAL REFLUX DISEASE WITHOUT ESOPHAGITIS: Primary | ICD-10-CM

## 2024-10-29 RX ORDER — LEVOTHYROXINE SODIUM 100 UG/1
100 TABLET ORAL DAILY
COMMUNITY
Start: 2024-09-16

## 2024-10-29 RX ORDER — SUCRALFATE 1 G/1
1 TABLET ORAL
Qty: 60 TABLET | Refills: 0 | Status: SHIPPED | OUTPATIENT
Start: 2024-10-29 | End: 2024-10-30 | Stop reason: SDUPTHER

## 2024-10-29 RX ORDER — FUROSEMIDE 20 MG/1
10 TABLET ORAL DAILY
COMMUNITY
Start: 2024-09-04

## 2024-10-29 RX ORDER — OXYBUTYNIN CHLORIDE 10 MG/1
10 TABLET, EXTENDED RELEASE ORAL
COMMUNITY
Start: 2024-09-09

## 2024-10-29 NOTE — TELEPHONE ENCOUNTER
Patient had called in and stated she just got released from rehab and would need to schedule a follow up appointment with Dr. Mason but is requesting it to be virtual.     Please advise out to patient.

## 2024-10-30 ENCOUNTER — TELEMEDICINE (OUTPATIENT)
Dept: GASTROENTEROLOGY | Facility: CLINIC | Age: 54
End: 2024-10-30
Payer: MEDICARE

## 2024-10-30 VITALS — HEIGHT: 66 IN | HEART RATE: 87 BPM | BODY MASS INDEX: 41.03 KG/M2 | OXYGEN SATURATION: 94 %

## 2024-10-30 DIAGNOSIS — K74.60 CIRRHOSIS OF LIVER WITHOUT ASCITES, UNSPECIFIED HEPATIC CIRRHOSIS TYPE (HCC): Primary | ICD-10-CM

## 2024-10-30 DIAGNOSIS — R19.4 CHANGE IN BOWEL HABITS: ICD-10-CM

## 2024-10-30 DIAGNOSIS — D50.9 IRON DEFICIENCY ANEMIA, UNSPECIFIED IRON DEFICIENCY ANEMIA TYPE: ICD-10-CM

## 2024-10-30 DIAGNOSIS — K21.9 GASTROESOPHAGEAL REFLUX DISEASE WITHOUT ESOPHAGITIS: ICD-10-CM

## 2024-10-30 DIAGNOSIS — I10 BENIGN HYPERTENSION: ICD-10-CM

## 2024-10-30 DIAGNOSIS — K57.92 DIVERTICULITIS: ICD-10-CM

## 2024-10-30 DIAGNOSIS — Z86.0100 PERSONAL HISTORY OF COLON POLYPS, UNSPECIFIED: ICD-10-CM

## 2024-10-30 PROCEDURE — 99214 OFFICE O/P EST MOD 30 MIN: CPT | Performed by: PHYSICIAN ASSISTANT

## 2024-10-30 RX ORDER — VARENICLINE TARTRATE 0.5 MG/1
0.5 TABLET, FILM COATED ORAL 2 TIMES DAILY
Qty: 60 TABLET | Refills: 0 | Status: SHIPPED | OUTPATIENT
Start: 2024-10-30

## 2024-10-30 RX ORDER — SPIRONOLACTONE 25 MG/1
25 TABLET ORAL DAILY
Qty: 90 TABLET | Refills: 1 | Status: SHIPPED | OUTPATIENT
Start: 2024-10-30 | End: 2024-10-30 | Stop reason: SDUPTHER

## 2024-10-30 RX ORDER — SIMVASTATIN 20 MG
20 TABLET ORAL
Qty: 90 TABLET | Refills: 1 | Status: SHIPPED | OUTPATIENT
Start: 2024-10-30

## 2024-10-30 RX ORDER — SUCRALFATE 1 G/1
1 TABLET ORAL 3 TIMES DAILY
Qty: 90 TABLET | Refills: 5 | Status: SHIPPED | OUTPATIENT
Start: 2024-10-30

## 2024-10-30 RX ORDER — SPIRONOLACTONE 25 MG/1
25 TABLET ORAL DAILY
Qty: 90 TABLET | Refills: 1 | Status: SHIPPED | OUTPATIENT
Start: 2024-10-30

## 2024-10-30 NOTE — TELEPHONE ENCOUNTER
Please call pt and inquire the exact date she was discharged and from what nursing home, phone number and address      If she was discharged on or before 10/24, please schedule hosp follow up.  If she was discharged on or after 10/25/2024, please forward information.  Thank you

## 2024-10-30 NOTE — TELEPHONE ENCOUNTER
Last office visit 4/30  Next Office Visit 11/21 please see message on scripts. Please advise. Needs coverage provider out of office.

## 2024-10-30 NOTE — TELEPHONE ENCOUNTER
She has a 6 month follow up in November. Would you like that to be transitioned to a Hospital follow up ?

## 2024-10-30 NOTE — TELEPHONE ENCOUNTER
Called and spoke with patient  She was treated at [facility] [place]  [address] [place] [address] # [phone]  Patient stated she does not remember is when she was discharged but it was the send of September.   Patient did not want to make any follow ups at this time   Thank you

## 2024-10-30 NOTE — PROGRESS NOTES
[facility] - Outpatient Follow-up Note  Chely Ruvalcaba 54 y.o. female MRN: [id number]  Encounter: [id number]    ASSESSMENT AND PLAN:      There are no diagnoses linked to this encounter.       12/2024   Lactulose PRN     MELD 3.0: 12 at 5/23/2024  5:06 AM  MELD-Na: 11 at 5/23/2024  5:06 AM  Calculated from:  Serum Creatinine: 0.94 mg/dL (Using min of 1 mg/dL) at 5/23/2024  5:06 AM  Serum Sodium: 141 mmol/L (Using max of 137 mmol/L) at 5/23/2024  5:06 AM  Total Bilirubin: 1.59 mg/dL at 5/21/2024 12:24 PM  Serum Albumin: 3.7 g/dL (Using max of 3.5 g/dL) at 5/21/2024 12:24 PM  INR(ratio): 1.31 at 5/21/2024 12:24 PM  Age at listing (hypothetical): 53 years  Sex: Female at 5/23/2024  5:06 AM    O2 with exertion   Siogmoid diverticulitis 05/2024  Macrocytic anemia     30 minutes     ______________________________________________________________________    SUBJECTIVE: Patient is a 54 y.o. 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     UGI: does have heartburn, sometimes at night with laying down   Takes carafate and famotidine   Nuasae but no emesis   On GLP-1 agonist   Unclear as to whether weight reduction     Diet: pinapple, eggs, sausage, trying to stay away   1 pepsi daily though more water than anything   Sometimews red sauce     2-3 weeks of fecal urgency and diarrhea   Bactrim  for     BM: fecal urgency, loose stools, accidents   5-6 stools daily   No nocturnal BM    Some LE edema   No yellowing eyes or skin   No confused       NSAIDs: none  Etoh: none  Tobacco: none since 03/2024 06/2024: CT A/P: Colonic diverticulosis with residual bowel wall thickening of the sigmoid colon and minimal adjacent fluid in the left paracolic gutter, improved from 5/15/2024  in the area of previously identified diverticulitis. Nonemergent colonoscopy is recommended to exclude underlying mass lesion mimicking diverticulitis.Partially imaged groundglass opacities at the lung bases, improved from prior.Findings again suspicious for hepatic cirrhosis.  06/2024: Hb 10.0, , Plt 88, BUN 8, Cr 0.86, Iron 42, TSAT 16,     Endoscopic history:   EGD: 10/2023: diffuse gastric antral vascular ectasia in the antrum; induced coagulation with argon plasma coagulation; portal hypertensive gastropathy in the body of the stomach; Healed duodenal ulcer. Three hyperechoic periportal nodes with well-defined margins (largest measuring 11 x 7 mm) but together likely about 2cm. These appeared benign and not sampled. nodular liver.   Colon:    Review of Systems   Otherwise Per HPI    Historical Information   Past Medical History:   Diagnosis Date    Abnormal stress test     Acute cystitis without hematuria 05/07/2018    Acute duodenal ulcer with bleeding 01/16/2023    Allergic sinusitis     last assessed - 03Apr2017    Anemia Around december    Anxiety     last assessed - 04Mar2016    Arthritis     Asthma     last assessed - 04Mar2016    Bilateral lower extremity edema     last assessed - [id number]    Callus of foot     last assessed - 22Jun2016    Chest pain     Chronic GERD     last assessed - 16Jan2017    Colon polyp     Constipation     Resolved - 13Jan2017    COPD (chronic obstructive pulmonary disease) (HCC)     Depression     last assessed - 04Mar2016    Disease of thyroid gland     Diverticulitis of colon     last assessed - 04Mar2016    Dyspepsia     Resolved - [id number]    Edema, lower extremity 08/07/2020    Esophageal reflux     last assessed - 04Mar2016    Essential hypertriglyceridemia     last assessed - [id number]    Fatty liver 01/16/2023    Gastroesophageal reflux disease with esophagitis 11/18/2016    GERD (gastroesophageal reflux disease)     Gynecological disorder     last assessed -  04Mar2016    Hydroureteronephrosis 06/30/2022    Hypertension     last assessed - 04Mar2016    Hypokalemia 06/20/2022    Hypotension     last assessed - 25Aug2016    Kidney stone     Left ureteral stone with hydronephrosis 07/21/2022    Migraine     Morbid obesity (HCC) 01/11/2019    Formatting of this note might be different from the original. Added automatically from request for surgery [id number]    Neuropathy     Obesity     Other chest pain 11/08/2018    Pancreatic lesion 03/13/2023    Positive depression screening 06/19/2022    Primary hypertriglyceridemia 06/19/2022    Pulmonary emphysema (HCC)     last assessed - 04Mar2016    Seasonal allergies     Severe obesity (HCC) 01/16/2023    Skin tag 07/17/2023    SOB (shortness of breath)     Strain of groin 07/17/2023    Urinary frequency     last assessed - 22Jun2016    Vagina, candidiasis     last assessed - 22Jun2016    Very heavy cigarette smoker 01/16/2023    Visual impairment      Past Surgical History:   Procedure Laterality Date    CARPAL TUNNEL RELEASE      last assessed - 04MAr2016    CHOLECYSTECTOMY      last assessed - 04Mar2016    COLONOSCOPY      Complete colonoscopy     EYE SURGERY      last assessed - 04Mar2016    FL RETROGRADE PYELOGRAM  6/16/2022    FL RETROGRADE PYELOGRAM  8/16/2022    FOOT SURGERY      last assessed - 04Mar2016    UT CYSTO BLADDER W/URETERAL CATHETERIZATION Left 7/21/2022    Procedure: CYSTOSCOPY RETROGRADE PYELOGRAM WITH INSERTION STENT URETERAL;  Surgeon: Facundo Matamoros MD;  Location: [facility];  Service: Urology    UT CYSTO/URETERO W/LITHOTRIPSY &INDWELL STENT INSRT Left 6/16/2022    Procedure: CYSTOSCOPY URETEROSCOPY WITH LITHOTRIPSY HOLMIUM LASER, RETROGRADE PYELOGRAM AND INSERTION STENT URETERAL;  Surgeon: Sammy Ferrer MD;  Location: [facility];  Service: Urology    UT CYSTO/URETERO W/LITHOTRIPSY &INDWELL STENT INSRT Left 8/16/2022    Procedure: CYSTOSCOPY USCOPE W/HOLMIUM LASER, RETROGRADE PYELOGRAM&STENT;  Surgeon:  "Sudheer Bradford MD;  Location: [facility];  Service: Urology    UT ESOPHAGOGASTRODUODENOSCOPY TRANSORAL DIAGNOSTIC N/A 2017    Procedure: ESOPHAGOGASTRODUODENOSCOPY (EGD);  Surgeon: Alison Saleem MD;  Location: [facility];  Service: Gastroenterology     Social History   Social History     Substance and Sexual Activity   Alcohol Use Not Currently     Social History     Substance and Sexual Activity   Drug Use Never     Social History     Tobacco Use   Smoking Status Former    Current packs/day: 0.00    Average packs/day: 0.5 packs/day for 41.2 years (20.6 ttl pk-yrs)    Types: Cigarettes    Start date: 1983    Quit date: 3/6/2024    Years since quittin.6   Smokeless Tobacco Never     Family History   Problem Relation Age of Onset    Obesity Mother     Thyroid disease Mother     Cancer Mother 71    Vision loss Mother     Obesity Sister     Coronary artery disease Sister     Stroke Sister     Asthma Sister     Glaucoma Sister     No Known Problems Maternal Aunt     Diabetes Maternal Uncle     Lung cancer Maternal Grandmother     Cancer Maternal Grandfather     Diabetes Maternal Grandfather     No Known Problems Paternal Grandmother     No Known Problems Paternal Grandfather     Hypertension Other     Lung cancer Other     Hypertension Other     Lung cancer Other     Lung cancer Other        Meds/Allergies       Current Outpatient Medications:     furosemide (LASIX) 20 mg tablet    levothyroxine 100 mcg tablet    oxybutynin (DITROPAN-XL) 10 MG 24 hr tablet    albuterol (PROVENTIL HFA,VENTOLIN HFA) 90 mcg/act inhaler    B-D SYRINGE/NEEDLE 1CC/25GX5/8 25G X 5/8\" 1 ML MISC    bisacodyl (FLEET) 10 MG/30ML ENEM    Blood Glucose Monitoring Suppl (OneTouch Verio) w/Device KIT    budesonide (PULMICORT) 0.5 mg/2 mL nebulizer solution    buPROPion (WELLBUTRIN XL) 300 mg 24 hr tablet    cyanocobalamin 1,000 mcg/mL    diazepam (VALIUM) 5 mg tablet    ergocalciferol (VITAMIN D2) 50,000 units    famotidine (PEPCID) 40 MG " tablet    ferrous sulfate 324 (65 Fe) mg    folic acid (FOLVITE) 1 mg tablet    gabapentin (NEURONTIN) 100 mg capsule    glucose blood (OneTouch Verio) test strip    Insulin Glargine Solostar (Lantus SoloStar) 100 UNIT/ML SOPN    insulin lispro (HumaLOG) 100 units/mL injection pen    Insulin Pen Needle 31G X 5 MM MISC    ipratropium (ATROVENT HFA) 17 mcg/act inhaler    ipratropium-albuterol (DUO-NEB) 0.5-2.5 mg/3 mL nebulizer solution    lactulose (CHRONULAC) 10 g/15 mL solution    midodrine (PROAMATINE) 2.5 mg tablet    Neomycin-Bacitracin-Polymyxin (GNP Triple Antibiotic) OINT    nicotine (NICODERM CQ) 21 mg/24 hr TD 24 hr patch    omeprazole (PriLOSEC) 20 mg delayed release capsule    omeprazole (PriLOSEC) 40 MG capsule    ondansetron (ZOFRAN) 4 mg tablet    OneTouch Delica Lancets 30G MISC    oxyCODONE-acetaminophen (PERCOCET)  mg per tablet    oxygen gas    potassium chloride (Klor-Con M20) 20 mEq tablet    senna-docusate sodium (SENOKOT S) 8.6-50 mg per tablet    sertraline (ZOLOFT) 100 mg tablet    simethicone (MYLICON) 125 MG chewable tablet    simvastatin (ZOCOR) 20 mg tablet    spironolactone (ALDACTONE) 25 mg tablet    sucralfate (CARAFATE) 1 g tablet    sulfamethoxazole-trimethoprim (BACTRIM DS) 800-160 mg per tablet    Trulicity 0.75 MG/0.5ML injection    varenicline (CHANTIX) 0.5 mg tablet    Allergies   Allergen Reactions    Hydrocodone-Acetaminophen Hives    Ketorolac Hives and Dizziness    Toradol [Ketorolac Tromethamine] Other (See Comments)     hypotension    Ultram [Tramadol] Nausea Only, GI Intolerance and Vomiting       Objective     Last menstrual period 04/05/2018. There is no height or weight on file to calculate BMI.    Physical Exam  Vitals and nursing note reviewed.   Constitutional:       General: She is not in acute distress.     Appearance: She is well-developed.   HENT:      Head: Normocephalic and atraumatic.   Eyes:      General: No scleral icterus.     Conjunctiva/sclera:  Conjunctivae normal.   Cardiovascular:      Rate and Rhythm: Normal rate.   Pulmonary:      Effort: Pulmonary effort is normal.   Abdominal:      General: Bowel sounds are normal. There is no distension.      Palpations: Abdomen is soft.      Tenderness: There is no abdominal tenderness. There is no guarding or rebound.   Skin:     General: Skin is warm and dry.      Coloration: Skin is not jaundiced.   Neurological:      General: No focal deficit present.      Mental Status: She is alert.   Psychiatric:         Mood and Affect: Mood normal.         Behavior: Behavior normal.   -  Lab Results:   No visits with results within 1 Day(s) from this visit.   Latest known visit with results is:   Office Visit on 08/12/2024   Component Date Value    LEUKOCYTE ESTERASE,UA 08/12/2024 small     NITRITE,UA 08/12/2024 positive     SL AMB POCT UROBILINOGEN 08/12/2024 0.2     POCT URINE PROTEIN 08/12/2024 trace      PH,UA 08/12/2024 5.0     BLOOD,UA 08/12/2024 large     SPECIFIC GRAVITY,UA 08/12/2024 1.030     KETONES,UA 08/12/2024 -     BILIRUBIN,UA 08/12/2024 moderate     GLUCOSE, UA 08/12/2024 100      COLOR,UA 08/12/2024 lauren     CLARITY,UA 08/12/2024 cloudy     Case Report 08/12/2024                      Value:Non-gynecologic Cytology                          Case: [id number]                                  Authorizing Provider:  Ernesto Olvera PA-C         Collected:           08/12/2024 1109              Ordering Location:     [facility]        Received:            08/12/2024 1109                                     [facility] [place]                                                            Pathologist:           Dipak Mazariegos DO                                                          Specimen:    Urine, Clean Catch                                                                         Final Diagnosis 08/12/2024                      Value:A. Urine, Clean Catch:  Atypical squamous cells (ASC).  Very rare  atypical squamous cells with hyperchromasia, nuclear enlargement, and increased N/C ratios.  Background of benign squamous cells, rare urothelial cells, and abundant bacteria.    Satisfactory for evaluation.    Comment: Gynecologic exam with cystoscopy and biopsy is recommended. The above diagnostic category is from the recently published book, The Jackie System for Reporting Urinary Cytology, and is in keeping with the ongoing effort for utilization of standardized diagnostic terminology in urine cytology.*    *The Jackie System for Reporting Urinary Cytology. Lucero Ferrer, Madison Carnes, Keith Manrique; 2016.        Gross Description 08/12/2024                      Value:A. 50 mL of dark yellow, very hazy fluid, received in [facility]       Additional Information 08/12/2024                      Value:[employer]'s FDA approved ,  and ThinPrep Imaging Duo System are utilized with strict adherence to the 's instruction manual to prepare gynecologic and non-gynecologic cytology specimens for the production of ThinPrep slides as well as for gynecologic ThinPrep imaging. These processes have been validated by our laboratory and/or by the .    These tests were developed and their performance characteristics determined by [facility] or [facility]. They may not be cleared or approved by the U.S. Food and Drug Administration. The FDA has determined that such clearance or approval is not necessary. These tests are used for clinical purposes. They should not be regarded as investigational or for research. This laboratory has been approved by CLIA 88, designated as a high-complexity laboratory and is qualified to perform these tests.    Interpretation performed at [facility], [facility] [address].      Urine Culture 08/12/2024 >100,000 cfu/ml Klebsiella pneumoniae (A)     Color, UA 08/12/2024 Yellow     Clarity, UA 08/12/2024 Turbid      Specific Gravity, UA 08/12/2024 1.028     pH, UA 08/12/2024 6.0     Leukocytes, UA 08/12/2024 Small (A)     Nitrite, UA 08/12/2024 Negative     Protein, UA 08/12/2024 30 (1+) (A)     Glucose, UA 08/12/2024 200 (1/5%) (A)     Ketones, UA 08/12/2024 Trace (A)     Urobilinogen, UA 08/12/2024 <2.0     Bilirubin, UA 08/12/2024 Negative     Occult Blood, UA 08/12/2024 Moderate (A)     RBC, UA 08/12/2024 None Seen     WBC, UA 08/12/2024 4-10 (A)     Epithelial Cells 08/12/2024 Occasional     Bacteria, UA 08/12/2024 Innumerable (A)     MUCUS THREADS 08/12/2024 Occasional (A)     Budding Yeast 08/12/2024 Present        Radiology Results:   No results found.    Mariama Degroot PA-C    **Please note:  Dictation voice to text software may have been used in the creation of this record.  Occasional wrong word or “sound alike” substitutions may have occurred due to the inherent limitations of voice recognition software.  Read the chart carefully and recognize, using context, where substitutions have occurred.**

## 2024-10-31 ENCOUNTER — TELEMEDICINE (OUTPATIENT)
Dept: ENDOCRINOLOGY | Facility: CLINIC | Age: 54
End: 2024-10-31
Payer: MEDICARE

## 2024-10-31 ENCOUNTER — TELEPHONE (OUTPATIENT)
Dept: GASTROENTEROLOGY | Facility: CLINIC | Age: 54
End: 2024-10-31

## 2024-10-31 VITALS — BODY MASS INDEX: 41.03 KG/M2 | HEIGHT: 66 IN

## 2024-10-31 DIAGNOSIS — E03.9 ACQUIRED HYPOTHYROIDISM: ICD-10-CM

## 2024-10-31 DIAGNOSIS — E78.2 MIXED HYPERLIPIDEMIA: ICD-10-CM

## 2024-10-31 DIAGNOSIS — Z79.4 TYPE 2 DIABETES MELLITUS WITHOUT COMPLICATION, WITH LONG-TERM CURRENT USE OF INSULIN (HCC): Primary | ICD-10-CM

## 2024-10-31 DIAGNOSIS — I10 BENIGN HYPERTENSION: ICD-10-CM

## 2024-10-31 DIAGNOSIS — E11.9 TYPE 2 DIABETES MELLITUS WITHOUT COMPLICATION, WITH LONG-TERM CURRENT USE OF INSULIN (HCC): Primary | ICD-10-CM

## 2024-10-31 PROCEDURE — 99214 OFFICE O/P EST MOD 30 MIN: CPT | Performed by: STUDENT IN AN ORGANIZED HEALTH CARE EDUCATION/TRAINING PROGRAM

## 2024-10-31 RX ORDER — INSULIN GLARGINE 100 [IU]/ML
55 INJECTION, SOLUTION SUBCUTANEOUS
Qty: 52 ML | Refills: 0 | Status: SHIPPED | OUTPATIENT
Start: 2024-10-31 | End: 2025-01-29

## 2024-10-31 RX ORDER — INSULIN LISPRO 100 [IU]/ML
15 INJECTION, SOLUTION INTRAVENOUS; SUBCUTANEOUS
Qty: 42 ML | Refills: 0 | Status: SHIPPED | OUTPATIENT
Start: 2024-10-31 | End: 2025-01-29

## 2024-10-31 NOTE — PROGRESS NOTES
Virtual Regular Visit  Name: Chely Ruvalcaba      : 1970      MRN: [id number]  Encounter Provider: Charley Llanes MD  Encounter Date: 10/31/2024   Encounter department: [facility] [place]    Verification of patient location:    Patient is located at Home in the following state in which I hold an active license [place]    Assessment & Plan  Benign hypertension  She was advised to continue checking her blood pressure at home, the goal is less than 130/80.    Orders:    Albumin / creatinine urine ratio; Future    Mixed hyperlipidemia  Continue simvastatin 20 mg daily.  Check lipid profile.  Orders:    Lipid Panel with Direct LDL reflex; Future    Acquired hypothyroidism  She is clinically euthyroid, on levothyroxine 100 mcg daily, no recent TFT which we will get updated TFT to adjust levothyroxine if necessary.    Orders:    T4, free    TSH, 3rd generation    Type 2 diabetes mellitus without complication, with long-term current use of insulin ([facility])    Lab Results   Component Value Date    HGBA1C 7.8 (H) 05/15/2024     She has no recent A1c done which was ordered, her SMBG shows that blood sugars are above goal, she was advised to call the office regarding her Ozempic dose, if is 0.25 to increase to 0.5, if 0.5, to increase to 1 mg weekly.  Continue insulin at current regimen.  She will benefit from continuous glucose monitoring, Dexcom G7 sensors and reader was sent through [facility].  Ophthalmology and podiatry follow-up  Return back in 3 months for  Labs as ordered.      Orders:    Hemoglobin A1C; Future    Comprehensive metabolic panel; Future    Albumin / creatinine urine ratio; Future    insulin lispro (HumaLOG) 100 units/mL injection pen; Inject 15 Units under the skin 3 (three) times a day with meals    Insulin Glargine Solostar (Lantus SoloStar) 100 UNIT/ML SOPN; Inject 0.55 mL (55 Units total) under the skin daily at bedtime        Encounter provider Charley Llanes MD    The patient  "was identified by name and date of birth. Chely Ruvalcaba was informed that this is a telemedicine visit and that the visit is being conducted through the Epic Embedded platform. She agrees to proceed..  My office door was closed. No one else was in the room.  She acknowledged consent and understanding of privacy and security of the video platform. The patient has agreed to participate and understands they can discontinue the visit at any time.    Patient is aware this is a billable service.     History of Present Illness     Chely Ruvalcaba is a 54 y.o. female who presents for follow up for type 2 diabetes, hypothyroidism and hypertension.      Current regimen:  Ozempic 0.25 mg weekly.  Lantus 55 units nightly.  Humalog 15 units 3 times daily AC.    SMBG : X 2, blood sugars are ranging from 150 - 200.    Eye exam: overdue, will schedule  Foot exam: UTD        History obtained from : patient  Review of Systems   Constitutional:  Negative for appetite change, fatigue and unexpected weight change.   Gastrointestinal:  Negative for constipation, diarrhea, nausea and vomiting.   Endocrine: Negative for cold intolerance, heat intolerance, polydipsia and polyuria.   Psychiatric/Behavioral:  Negative for sleep disturbance.      Medical History Reviewed by provider this encounter:       Current Outpatient Medications on File Prior to Visit   Medication Sig Dispense Refill    albuterol (PROVENTIL HFA,VENTOLIN HFA) 90 mcg/act inhaler Inhale 2 puffs every 6 (six) hours as needed for wheezing 18 g 1    B-D SYRINGE/NEEDLE 1CC/25GX5/8 25G X 5/8\" 1 ML MISC       bisacodyl (FLEET) 10 MG/30ML ENEM Insert 10 mg into the rectum once      Blood Glucose Monitoring Suppl (OneTouch Verio) w/Device KIT Check blood sugar once a day 1 kit 0    budesonide (PULMICORT) 0.5 mg/2 mL nebulizer solution Take 0.5 mg by nebulization 2 (two) times a day Rinse mouth after use.      buPROPion (WELLBUTRIN XL) 300 mg 24 hr tablet Take 1 tablet (300 mg total) by " mouth every morning 90 tablet 3    diazepam (VALIUM) 5 mg tablet Take 1 tablet (5 mg total) by mouth daily at bedtime as needed for anxiety 30 tablet 0    ergocalciferol (VITAMIN D2) 50,000 units Take 1 capsule (50,000 Units total) by mouth every 14 (fourteen) days 12 capsule 1    famotidine (PEPCID) 40 MG tablet Take 1 tablet (40 mg total) by mouth daily at bedtime Take in evening 2 hours prior to bed 90 tablet 1    ferrous sulfate 324 (65 Fe) mg Take 1 tablet (324 mg total) by mouth daily before breakfast 30 tablet 3    folic acid (FOLVITE) 1 mg tablet Take 1 tablet (1 mg total) by mouth daily 90 tablet 1    furosemide (LASIX) 20 mg tablet Take 10 mg by mouth daily      gabapentin (NEURONTIN) 100 mg capsule Take 100 mg by mouth every 8 (eight) hours  0    glucose blood (OneTouch Verio) test strip Check blood sugar once a day 100 each 2    Insulin Glargine Solostar (Lantus SoloStar) 100 UNIT/ML SOPN Inject 55 Units under the skin      insulin lispro (HumaLOG) 100 units/mL injection pen Inject 15 Units under the skin      Insulin Pen Needle 31G X 5 MM MISC Inject under the skin if needed (Insuline injections) 100 each 3    ipratropium (ATROVENT HFA) 17 mcg/act inhaler Inhale 2 puffs every 6 (six) hours      ipratropium-albuterol (DUO-NEB) 0.5-2.5 mg/3 mL nebulizer solution Take 3 mL by nebulization 4 (four) times a day 360 mL 3    lactulose (CHRONULAC) 10 g/15 mL solution Take 30 mL (20 g total) by mouth daily as needed (constipation) 240 mL 11    levothyroxine 100 mcg tablet Take 100 mcg by mouth daily      midodrine (PROAMATINE) 2.5 mg tablet Take 2.5 mg by mouth every other day      omeprazole (PriLOSEC) 40 MG capsule take 1 capsule by mouth twice a day 60 capsule 5    ondansetron (ZOFRAN) 4 mg tablet Take 1 tablet (4 mg total) by mouth every 8 (eight) hours as needed for nausea or vomiting 20 tablet 0    OneTouch Delica Lancets 30G MISC Check blood sugars once a day 100 each 1    oxybutynin (DITROPAN-XL) 10 MG  "24 hr tablet Take 10 mg by mouth daily at bedtime      oxyCODONE-acetaminophen (PERCOCET)  mg per tablet Take 1 tablet by mouth every 6 (six) hours as needed for severe pain      oxygen gas Inhale 2 L/min continuous. May use 3L with exertion per Bella ALVAREZ  Keep sat >88%  Indications: low saturation      potassium chloride (Klor-Con M20) 20 mEq tablet Take 20 mEq by mouth daily      Semaglutide (OZEMPIC, 0.25 OR 0.5 MG/DOSE, SC) Inject under the skin      senna-docusate sodium (SENOKOT S) 8.6-50 mg per tablet Take 1 tablet by mouth daily      sertraline (ZOLOFT) 100 mg tablet take 1 tablet by mouth twice a day 180 tablet 1    simethicone (MYLICON) 125 MG chewable tablet Chew 125 mg every 6 (six) hours as needed for flatulence      simvastatin (ZOCOR) 20 mg tablet Take 1 tablet (20 mg total) by mouth daily at bedtime 90 tablet 1    spironolactone (ALDACTONE) 25 mg tablet Take 1 tablet (25 mg total) by mouth daily 90 tablet 1    sucralfate (CARAFATE) 1 g tablet Take 1 tablet (1 g total) by mouth 3 (three) times a day 90 tablet 5    nicotine (NICODERM CQ) 21 mg/24 hr TD 24 hr patch Place 1 patch on the skin over 24 hours every 24 hours (Patient not taking: Reported on 10/31/2024) 28 patch 5    polyethylene glycol (GOLYTELY) 4000 mL solution Take 4,000 mL by mouth once for 1 dose 4000 mL 0    Trulicity 0.75 MG/0.5ML injection Inject 0.75 mg under the skin (Patient not taking: Reported on 10/30/2024)      varenicline (CHANTIX) 0.5 mg tablet Take 1 tablet (0.5 mg total) by mouth 2 (two) times a day (Patient not taking: Reported on 10/31/2024) 60 tablet 0    [DISCONTINUED] naloxone (NARCAN) 4 mg/0.1 mL nasal spray Administer 1 spray into a nostril. If no response after 2-3 minutes, give another dose in the other nostril using a new spray. (Patient not taking: Reported on 6/30/2022) 1 each 1     No current facility-administered medications on file prior to visit.          Objective     Ht 5' 6\" (1.676 m)   LMP " 04/05/2018 (Approximate)   BMI 41.03 kg/m²   Physical Exam  Vitals and nursing note reviewed.   Constitutional:       General: She is not in acute distress.     Appearance: She is well-developed.   HENT:      Head: Normocephalic and atraumatic.   Pulmonary:      Effort: Pulmonary effort is normal. No respiratory distress.   Abdominal:      Palpations: Abdomen is soft.   Musculoskeletal:      Cervical back: Neck supple.   Neurological:      Mental Status: She is alert.

## 2024-10-31 NOTE — PROGRESS NOTES
Dexcom Gg7 ordered        [facility] [facility] [address]  just now  submitted to [facility] ([employer])

## 2024-10-31 NOTE — ASSESSMENT & PLAN NOTE
She was advised to continue checking her blood pressure at home, the goal is less than 130/80.    Orders:    Albumin / creatinine urine ratio; Future

## 2024-10-31 NOTE — ASSESSMENT & PLAN NOTE
Patient does have a history of iron deficiency anemia and follows with hematology for infusions.  Historically speaking has a history of GAVE s/p APC as well as PHG. Has hx of duodenal ulcer as well. PHG and GAVE could certainly ooze intermittently over time.   Continue to monitor Hb and monitor stool output closely.  If CONCHITA progresses, may need to consider repeating EGD +/- colonoscopy.    Orders:    Colonoscopy; Future    EGD; Future    polyethylene glycol (GOLYTELY) 4000 mL solution; Take 4,000 mL by mouth once for 1 dose

## 2024-10-31 NOTE — ASSESSMENT & PLAN NOTE
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  Chronic liver disease management outlined below:     Ascites: No history of such, though in the past has had bilateral lower extremity edema; she is on a combination of furosemide 10 mg and Aldactone 25 mg daily; she will continue potassium supplement daily; encouraged to adhere to low-sodium diet    Esophageal varices: EUS in 10/2023 with no evidence of gastric/esophageal varices though did have PHG; repeat EGD in 2 years for variceal screening    HE: none based upon limited examination complete virtually today; hx of taking lactulose as needed for constipation, though more recently has been dealing with diarrhea; patient aware of signs and symptoms of HE and advised to promptly inform provider if experiencing    HCC screening: CT A/P in 06/2024 with no liver lesion, AFP normal in 05/2024; check US in 12/2024; continue imaging every 6 months and AFP annually    Transplant candidacy: Hep otology notes in the past commented on deferring given low meld and significant cardiopulmonary comorbidities    MELD 3.0: 12 at 5/23/2024  5:06 AM  MELD-Na: 11 at 5/23/2024  5:06 AM  Calculated from:  Serum Creatinine: 0.94 mg/dL (Using min of 1 mg/dL) at 5/23/2024  5:06 AM  Serum Sodium: 141 mmol/L (Using max of 137 mmol/L) at 5/23/2024  5:06 AM  Total Bilirubin: 1.59 mg/dL at 5/21/2024 12:24 PM  Serum Albumin: 3.7 g/dL (Using max of 3.5 g/dL) at 5/21/2024 12:24 PM  INR(ratio): 1.31 at 5/21/2024 12:24 PM  Age at listing (hypothetical): 53 years  Sex: Female at 5/23/2024  5:06 AM    Orders:    EGD; Future    CBC and differential; Future    Comprehensive metabolic panel; Future    Protime-INR; Future    AFP tumor marker; Future

## 2024-10-31 NOTE — ASSESSMENT & PLAN NOTE
Hx of such in 05/2024.   Tx with Augmentin/Flagyl.   Last colonoscopy in 2022.   Typical recommendation to check colonoscopy 6-8 weeks after resolution of diverticulitis, though this was initially deferred given pt's cardiopulmonary co-morbidities.   Given recent colonoscopy, and that she is due for repeat colonoscopy in 02/2025, may consider deferring until that time.   Orders:    Colonoscopy; Future    polyethylene glycol (GOLYTELY) 4000 mL solution; Take 4,000 mL by mouth once for 1 dose

## 2024-10-31 NOTE — ASSESSMENT & PLAN NOTE
Continue simvastatin 20 mg daily.  Check lipid profile.  Orders:    Lipid Panel with Direct LDL reflex; Future

## 2024-10-31 NOTE — ASSESSMENT & PLAN NOTE
Lab Results   Component Value Date    HGBA1C 7.8 (H) 05/15/2024     She has no recent A1c done which was ordered, her SMBG shows that blood sugars are above goal, she was advised to call the office regarding her Ozempic dose, if is 0.25 to increase to 0.5, if 0.5, to increase to 1 mg weekly.  Continue insulin at current regimen.  She will benefit from continuous glucose monitoring, Dexcom G7 sensors and reader was sent through [facility].  Ophthalmology and podiatry follow-up  Return back in 3 months for  Labs as ordered.      Orders:    Hemoglobin A1C; Future    Comprehensive metabolic panel; Future    Albumin / creatinine urine ratio; Future    insulin lispro (HumaLOG) 100 units/mL injection pen; Inject 15 Units under the skin 3 (three) times a day with meals    Insulin Glargine Solostar (Lantus SoloStar) 100 UNIT/ML SOPN; Inject 0.55 mL (55 Units total) under the skin daily at bedtime

## 2024-10-31 NOTE — ASSESSMENT & PLAN NOTE
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   Continue PPI daily, H2RA QHS.   Temporarily increase sucralfate to TID dosing.   Question as to whether usage of GLP-1 agonist may be contributing to symptoms.   We reviewed focusing on small frequent meals, diet and lifestyle modifications for GERD.   Given treatment refractory symptoms, repeat EGD should be completed at some time in the foreseeable future.   Orders:    sucralfate (CARAFATE) 1 g tablet; Take 1 tablet (1 g total) by mouth 3 (three) times a day

## 2024-10-31 NOTE — TELEPHONE ENCOUNTER
Left message on machine for patient to call office back with dose of furosemide and if she is taking liquid or pill.

## 2024-10-31 NOTE — TELEPHONE ENCOUNTER
Please call Chely.  I was doing some additional digging in her chart and I found an update from the last time she had a colonoscopy that based upon the pathology the endoscopist recommended a repeat colonoscopy in 3 years instead of 1 year, which would take her to 02/2025.  I am thinking that perhaps we can defer the scope tests until early next year so long as her blood counts stay stable and we are able to control her symptoms (if not may need to be earlier).  This way we will have time to see her in the office in person to review everything and make sure we are proceeding with potential scopes requiring sedation in the safest manner possible.  If she will be okay with this plan please let me know.  I would still like her to get the blood work and stool testing completed now.  I would encourage her to have an in person office visit in early January, can someone please assist her in scheduling this?

## 2024-10-31 NOTE — ASSESSMENT & PLAN NOTE
She is clinically euthyroid, on levothyroxine 100 mcg daily, no recent TFT which we will get updated TFT to adjust levothyroxine if necessary.    Orders:    T4, free    TSH, 3rd generation

## 2024-10-31 NOTE — PROGRESS NOTES
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  Chronic liver disease management outlined below:     Ascites: No history of such, though in the past has had bilateral lower extremity edema; she is on a combination of furosemide 10 mg and Aldactone 25 mg daily; she will continue potassium supplement daily; encouraged to adhere to low-sodium diet    Esophageal varices: EUS in 10/2023 with no evidence of gastric/esophageal varices though did have PHG; repeat EGD in 2 years for variceal screening    HE: none based upon limited examination complete virtually today; hx of taking lactulose as needed for constipation, though more recently has been dealing with diarrhea; patient aware of signs and symptoms of HE and advised to promptly inform provider if experiencing    HCC screening: CT A/P in 2024 with no liver lesion, AFP normal in 2024; check US in 2024; continue imaging every 6 months and AFP annually    Transplant candidacy: Hep otology notes in the past commented on deferring given low meld and significant cardiopulmonary comorbidities    MELD 3.0: 12 at 2024  5:06 AM  MELD-Na: 11 at 2024  5:06 AM  Calculated from:  Serum Creatinine: 0.94 mg/dL (Using min of 1 mg/dL) at 2024  5:06 AM  Serum Sodium: 141 mmol/L (Using max of 137 mmol/L) at 2024  5:06 AM  Total Bilirubin: 1.59 mg/dL at 2024 12:24 PM  Serum Albumin: 3.7 g/dL (Using max of 3.5 g/dL) at 2024 12:24 PM  INR(ratio): 1.31 at 2024 12:24 PM  Age at listing (hypothetical): 53  years  Sex: Female at 5/23/2024  5:06 AM    Orders:    EGD; Future    CBC and differential; Future    Comprehensive metabolic panel; Future    Protime-INR; Future    AFP tumor marker; Future    Iron deficiency anemia, unspecified iron deficiency anemia type  Patient does have a history of iron deficiency anemia and follows with hematology for infusions.  Historically speaking has a history of GAVE s/p APC as well as PHG. Has hx of duodenal ulcer as well. PHG and GAVE could certainly ooze intermittently over time.   Continue to monitor Hb and monitor stool output closely.  If CONCHITA progresses, may need to consider repeating EGD +/- colonoscopy.    Orders:    Colonoscopy; Future    EGD; Future    polyethylene glycol (GOLYTELY) 4000 mL solution; Take 4,000 mL by mouth once for 1 dose    Change in bowel habits  Pt shares non-bloody diarrhea for several weeks.   Check blood work and stool studies now to evaluate for infection.   [place] diet. Okay for Banatrol/fiber supplement, natural bulk forming supplements.   Limit imodium until stool studies returned.   Orders:    Clostridium difficile toxin by PCR with EIA; Future    Giardia antigen; Future    Ova and parasite examination; Future    Pancreatic elastase, fecal; Future    Stool Enteric Bacterial Panel by PCR; Future    Tissue transglutaminase, IgA; Future    IgA; Future    TSH w/Reflex; Future    Diverticulitis  Hx of such in 05/2024.   Tx with Augmentin/Flagyl.   Last colonoscopy in 2022.   Typical recommendation to check colonoscopy 6-8 weeks after resolution of diverticulitis, though this was initially deferred given pt's cardiopulmonary co-morbidities.   Given recent colonoscopy, and that she is due for repeat colonoscopy in 02/2025, may consider deferring until that time.   Orders:    Colonoscopy; Future    polyethylene glycol (GOLYTELY) 4000 mL solution; Take 4,000 mL by mouth once for 1 dose    Gastroesophageal reflux disease without esophagitis  Hx of such, pt  does have hx of GAVE, PHG, hx of duodenal ulcer, last scope in 10/2023 demonstrated healing of this.   Continue PPI daily, H2RA QHS.   Temporarily increase sucralfate to TID dosing.   Question as to whether usage of GLP-1 agonist may be contributing to symptoms.   We reviewed focusing on small frequent meals, diet and lifestyle modifications for GERD.   Given treatment refractory symptoms, repeat EGD should be completed at some time in the foreseeable future.   Orders:    sucralfate (CARAFATE) 1 g tablet; Take 1 tablet (1 g total) by mouth 3 (three) times a day    Personal history of colon polyps, unspecified  Pt had several adenomas removed during colonoscopy in 2022.   Endoscopist updated recommendation to repeat colonoscopy in 3 years, taking her to 02/2025.   I will need to share this update with pt, as we were originally under the impression the recommendation was for 1 year f/u.          We will follow up in 3 months in person to reassess symptoms.     Encounter provider Mariama Degroot PA-C    The patient was identified by name and date of birth. Chely Ruvalcaba was informed that this is a telemedicine visit and that the visit is being conducted through the Epic Embedded platform. She agrees to proceed..  My office door was closed. No one else was in the room.  She acknowledged consent and understanding of privacy and security of the video platform. The patient has agreed to participate and understands they can discontinue the visit at any time.    Patient is aware this is a billable service.     History of Present Illness     Chely Ruvalcaba is a 54 y.o. female who presents regarding cirrhosis, abd pain. Pmhx sig for cirrhosis, anxiety and depression, hypothyroidism, central lobar emphysema, glucose intolerance, hypertension, hyperlipidemia, vitamin D deficiency, GERD HFpEF who presents today for a follow-up regarding cirrhosis secondary to MASH.     Patient was last evaluated by Lisandra in 08/2024.  At that time,  the patient was complaining of dark stools in the setting of iron supplementation.  She also had an episode of diverticulitis in 05/2024 treated with Augmentin/Flagyl.  Bidirectional endoscopic evaluation was recommended, though given patient's high risk cardiopulmonary comorbidities and O2 requirements this was deferred.    10/30/24:     Patient shares that she is having heartburn despite her daily regimen of PPI and H2RA.  She is having some breakthrough reflux after reclining at night.  She endorses nausea though no emesis.  She denies any dysphagia or odynophagia.  She is on a GLP-1 agonist, unclear if symptoms worsen around onset of taking medication.  She is also unclear as to whether she has lost or gained any weight since last office visit.  Her diet typically consists of foods like pineapple, eggs, sausage, lots of water and 1 Pepsi daily.  She does eat food that is prepared by her mother, this sometimes contains red sauce.    Patient has also been dealing with a couple of weeks of fecal urgency and diarrhea.  She was on Bactrim for UTI a few weeks ago.  At baseline she tends to have constipation if anything for which she uses lactulose.  Now however she has been dealing with urgent loose stool anywhere from 5-6 stools in a 24-hour period. She has episodes of fecal incontinence.  No nocturnal BMs. No BRBPR or melena.     Pt shares chronic LE edema. No yellowing of eyes, skin. No easy bruising/bleeding. No confusion, falls.       NSAIDs: none  Etoh: none  Tobacco: none since 03/2024 06/2024: CT A/P: Colonic diverticulosis with residual bowel wall thickening of the sigmoid colon and minimal adjacent fluid in the left paracolic gutter, improved from 5/15/2024 in the area of previously identified diverticulitis. Nonemergent colonoscopy is recommended to exclude underlying mass lesion mimicking diverticulitis.Partially imaged groundglass opacities at the lung bases, improved from prior.Findings again  suspicious for hepatic cirrhosis.  06/2024: Hb 10.0, , Plt 88, BUN 8, Cr 0.86, Iron 42, TSAT 16, ferritin 17      Endoscopic history:   EGD: 10/2023: diffuse gastric antral vascular ectasia in the antrum; induced coagulation with argon plasma coagulation; portal hypertensive gastropathy in the body of the stomach; Healed duodenal ulcer. Three hyperechoic periportal nodes with well-defined margins (largest measuring 11 x 7 mm) but together likely about 2cm. These appeared benign and not sampled. nodular liver.   EGD: 04/2023: Probable healing ulcer at junction of first and second portion of duodenum; Nodular mucosa associated with healing ulcer; Scattered areas of nodular mucosa with overlying erosions in the antrum; Probable portal hypertensive gastropathy fundus and body   A. Stomach, GASTRIC ANTRUM R/O H PYLORI, COLD BIOPSY:  Chronic inactive antral gastritis and reactive changes.  Negative for Helicobacter pylori, by H&E stain.  Negative for atrophy, intestinal metaplasia, dysplasia or carcinoma.  B. Esophagus, ESOPHAGUS R/O BARRETTS, COLD BIOPSY : Squamocolumnar junction mucosa with acute and chronic inflammation and reactive changes suggestive of reflux esophagitis. Negative for goblet cell intestinal metaplasia. No epithelial dysplasia and no evidence of malignancy.  Colon: 02/2022: One sessile, adenomatous-appearing polyp; Two pedunculated, adenomatous-appearing polyps measuring 10 mm or larger in the distal sigmoid colon   C. Large Intestine, Sigmoid Colon, SIGMOID COLON POLYPS X2, HOT SNARE: Tubular adenomas.  Negative for high grade dysplasia.   D. Polyp, Colorectal, DESCENDING COLON POLYP, HOT SNARE: Tubular adenoma. Negative for high grade dysplasia.    History obtained from : patient  Review of Systems        Current Outpatient Medications on File Prior to Visit   Medication Sig Dispense Refill    albuterol (PROVENTIL HFA,VENTOLIN HFA) 90 mcg/act inhaler Inhale 2 puffs every 6 (six) hours as  "needed for wheezing 18 g 1    B-D SYRINGE/NEEDLE 1CC/25GX5/8 25G X 5/8\" 1 ML MISC       bisacodyl (FLEET) 10 MG/30ML ENEM Insert 10 mg into the rectum once      Blood Glucose Monitoring Suppl (OneTouch Verio) w/Device KIT Check blood sugar once a day 1 kit 0    budesonide (PULMICORT) 0.5 mg/2 mL nebulizer solution Take 0.5 mg by nebulization 2 (two) times a day Rinse mouth after use.      buPROPion (WELLBUTRIN XL) 300 mg 24 hr tablet Take 1 tablet (300 mg total) by mouth every morning 90 tablet 3    diazepam (VALIUM) 5 mg tablet Take 1 tablet (5 mg total) by mouth daily at bedtime as needed for anxiety 30 tablet 0    ergocalciferol (VITAMIN D2) 50,000 units Take 1 capsule (50,000 Units total) by mouth every 14 (fourteen) days 12 capsule 1    famotidine (PEPCID) 40 MG tablet Take 1 tablet (40 mg total) by mouth daily at bedtime Take in evening 2 hours prior to bed 90 tablet 1    ferrous sulfate 324 (65 Fe) mg Take 1 tablet (324 mg total) by mouth daily before breakfast 30 tablet 3    folic acid (FOLVITE) 1 mg tablet Take 1 tablet (1 mg total) by mouth daily 90 tablet 1    furosemide (LASIX) 20 mg tablet Take 10 mg by mouth daily      gabapentin (NEURONTIN) 100 mg capsule Take 100 mg by mouth every 8 (eight) hours  0    glucose blood (OneTouch Verio) test strip Check blood sugar once a day 100 each 2    insulin lispro (HumaLOG) 100 units/mL injection pen Inject 15 Units under the skin      Insulin Pen Needle 31G X 5 MM MISC Inject under the skin if needed (Insuline injections) 100 each 3    ipratropium (ATROVENT HFA) 17 mcg/act inhaler Inhale 2 puffs every 6 (six) hours      ipratropium-albuterol (DUO-NEB) 0.5-2.5 mg/3 mL nebulizer solution Take 3 mL by nebulization 4 (four) times a day 360 mL 3    lactulose (CHRONULAC) 10 g/15 mL solution Take 30 mL (20 g total) by mouth daily as needed (constipation) 240 mL 11    levothyroxine 100 mcg tablet Take 100 mcg by mouth daily      midodrine (PROAMATINE) 2.5 mg tablet Take " 2.5 mg by mouth every other day      nicotine (NICODERM CQ) 21 mg/24 hr TD 24 hr patch Place 1 patch on the skin over 24 hours every 24 hours 28 patch 5    omeprazole (PriLOSEC) 40 MG capsule take 1 capsule by mouth twice a day 60 capsule 5    ondansetron (ZOFRAN) 4 mg tablet Take 1 tablet (4 mg total) by mouth every 8 (eight) hours as needed for nausea or vomiting 20 tablet 0    OneTouch Delica Lancets 30G MISC Check blood sugars once a day 100 each 1    oxybutynin (DITROPAN-XL) 10 MG 24 hr tablet Take 10 mg by mouth daily at bedtime      oxyCODONE-acetaminophen (PERCOCET)  mg per tablet Take 1 tablet by mouth every 6 (six) hours as needed for severe pain      oxygen gas Inhale 2 L/min continuous. May use 3L with exertion per Bella BAHNP  Keep sat >88%  Indications: low saturation      potassium chloride (Klor-Con M20) 20 mEq tablet Take 20 mEq by mouth daily      senna-docusate sodium (SENOKOT S) 8.6-50 mg per tablet Take 1 tablet by mouth daily      sertraline (ZOLOFT) 100 mg tablet take 1 tablet by mouth twice a day 180 tablet 1    simethicone (MYLICON) 125 MG chewable tablet Chew 125 mg every 6 (six) hours as needed for flatulence      simvastatin (ZOCOR) 20 mg tablet Take 1 tablet (20 mg total) by mouth daily at bedtime 90 tablet 1    varenicline (CHANTIX) 0.5 mg tablet Take 1 tablet (0.5 mg total) by mouth 2 (two) times a day 60 tablet 0    Insulin Glargine Solostar (Lantus SoloStar) 100 UNIT/ML SOPN Inject 55 Units under the skin (Patient not taking: Reported on 10/30/2024)      Trulicity 0.75 MG/0.5ML injection Inject 0.75 mg under the skin (Patient not taking: Reported on 10/30/2024)      [DISCONTINUED] naloxone (NARCAN) 4 mg/0.1 mL nasal spray Administer 1 spray into a nostril. If no response after 2-3 minutes, give another dose in the other nostril using a new spray. (Patient not taking: Reported on 6/30/2022) 1 each 1     No current facility-administered medications on file prior to visit.  "     Social History     Tobacco Use    Smoking status: Some Days     Current packs/day: 0.00     Average packs/day: 0.8 packs/day for 57.4 years (45.0 ttl pk-yrs)     Types: Cigarettes     Start date: 1983     Last attempt to quit: 3/6/2024     Years since quittin.6    Smokeless tobacco: Never   Vaping Use    Vaping status: Former    Start date: 2019    Quit date: 2019    Substances: Nicotine   Substance and Sexual Activity    Alcohol use: Not Currently    Drug use: Never    Sexual activity: Not Currently     Partners: Male     Birth control/protection: Abstinence     Objective     Pulse 87 Comment: patient reported  Ht 5' 6\" (1.676 m)   LMP 2018 (Approximate)   SpO2 94% Comment: on 2L (patient reported)  BMI 41.03 kg/m²   Physical Exam  Nursing note reviewed.   Constitutional:       Appearance: Normal appearance. She is obese.   HENT:      Head: Normocephalic.   Eyes:      General: No scleral icterus.  Pulmonary:      Effort: Pulmonary effort is normal. No respiratory distress.   Skin:     Coloration: Skin is not jaundiced.   Neurological:      General: No focal deficit present.      Mental Status: She is alert. Mental status is at baseline.   Psychiatric:         Mood and Affect: Mood normal.         Behavior: Behavior normal.       Visit Time  Total Visit Duration: 30 minutes    **Please note:  Dictation voice to text software may have been used in the creation of this record.  Occasional wrong word or “sound alike” substitutions may have occurred due to the inherent limitations of voice recognition software.  Read the chart carefully and recognize, using context, where substitutions have occurred.**  "

## 2024-11-04 ENCOUNTER — TELEPHONE (OUTPATIENT)
Age: 54
End: 2024-11-04

## 2024-11-04 NOTE — TELEPHONE ENCOUNTER
PA for insulin lispro (HumaLOG) 100 units/mL injection pen SUBMITTED to [facility]    via    [x]CMM-KEY: [id number]      Office notes sent, clinical questions answered. Awaiting determination    Turnaround time for your insurance to make a decision on your Prior Authorization can take 7-21 business days.

## 2024-11-05 RX ORDER — DIAZEPAM 5 MG/1
5 TABLET ORAL
Qty: 30 TABLET | Refills: 0 | OUTPATIENT
Start: 2024-11-05

## 2024-11-05 RX ORDER — FUROSEMIDE 10 MG/ML
SOLUTION ORAL DAILY
Refills: 0 | OUTPATIENT
Start: 2024-11-05

## 2024-11-05 NOTE — TELEPHONE ENCOUNTER
PA for insulin lispro (HumaLOG) 100 units/mL injection pen  NOT REQUIRED     Reason          Patient advised by          [] MyChart Message  [] Phone call   []LMOM  []L/M to call office as no active Communication consent on file  []Unable to leave detailed message as VM not approved on Communication consent       Pharmacy advised by    [x]Fax  []Phone call

## 2024-11-06 DIAGNOSIS — E87.6 HYPOKALEMIA: Primary | ICD-10-CM

## 2024-11-06 DIAGNOSIS — F41.9 ANXIETY: ICD-10-CM

## 2024-11-07 RX ORDER — DIAZEPAM 5 MG/1
5 TABLET ORAL
Qty: 30 TABLET | Refills: 0 | Status: SHIPPED | OUTPATIENT
Start: 2024-11-07

## 2024-11-07 RX ORDER — POTASSIUM CHLORIDE 1500 MG/1
20 TABLET, EXTENDED RELEASE ORAL DAILY
Qty: 30 TABLET | Refills: 3 | Status: SHIPPED | OUTPATIENT
Start: 2024-11-07

## 2024-11-14 LAB
DME PARACHUTE DELIVERY DATE ACTUAL: NORMAL
DME PARACHUTE DELIVERY DATE REQUESTED: NORMAL
DME PARACHUTE ITEM DESCRIPTION: NORMAL
DME PARACHUTE ITEM DESCRIPTION: NORMAL
DME PARACHUTE ORDER STATUS: NORMAL
DME PARACHUTE SUPPLIER NAME: NORMAL
DME PARACHUTE SUPPLIER PHONE: NORMAL

## 2024-11-26 ENCOUNTER — TELEPHONE (OUTPATIENT)
Dept: OBGYN CLINIC | Facility: CLINIC | Age: 54
End: 2024-11-26

## 2024-11-26 NOTE — TELEPHONE ENCOUNTER
LM cell phone for patient to call back, we need to reschedule her surgical procedure. I am looking at surgical date of 2/14/2024 with Dr. Fernando

## 2024-11-27 ENCOUNTER — TELEPHONE (OUTPATIENT)
Dept: PULMONOLOGY | Facility: CLINIC | Age: 54
End: 2024-11-27

## 2024-12-02 ENCOUNTER — TELEPHONE (OUTPATIENT)
Dept: HEMATOLOGY ONCOLOGY | Facility: CLINIC | Age: 54
End: 2024-12-02

## 2024-12-02 NOTE — TELEPHONE ENCOUNTER
Left message for patient in regards to obtaining labwork prior to upcoming appointment with Cameron Coelho on 12/6.  Advised patient labs are non fasting and in system.  Advised patient to call office back if they are unable to obtain labwork prior to appointment.  [employer] number provided.

## 2024-12-03 NOTE — TELEPHONE ENCOUNTER
Talked to patient she is re scheduled for her surgical procedure on 2/14/2025 with Dr. Fernando at the [facility]. Patient is aware of PCP clearance needed prior to her procedure

## 2024-12-09 ENCOUNTER — NURSE TRIAGE (OUTPATIENT)
Age: 54
End: 2024-12-09

## 2024-12-09 ENCOUNTER — TELEPHONE (OUTPATIENT)
Dept: HEMATOLOGY ONCOLOGY | Facility: CLINIC | Age: 54
End: 2024-12-09

## 2024-12-09 NOTE — TELEPHONE ENCOUNTER
"Pt warm transferred from [facility], Pt states she has been having liquid stools for 2 weeks with abdominal pain. States her stomach is distended and she has pain 8/10 on right lower quad. She is trying to drink water but states she gets nauseated every time she tries to eat or drink. Pt states that this morning she soiled herself and noticed the color of the stool was green and liquid.Pt is currently in [place]. I advised her to go be evaluated in ED. She verbalized understanding.    Reason for Disposition   [1] SEVERE abdominal pain AND [2] age > 60 years    Answer Assessment - Initial Assessment Questions  1. DIARRHEA SEVERITY: \"How bad is the diarrhea?\" \"How many more stools have you had in the past 24 hours than normal?\"       For 2 weeks   2. ONSET: \"When did the diarrhea begin?\"       Began 2 weeks ago its on and off  3. STOOL DESCRIPTION:  \"How loose or watery is the diarrhea?\" \"What is the stool color?\" \"Is there any blood or mucous in the stool?\"      Green and liquid  4. VOMITING: \"Are you also vomiting?\" If Yes, ask: \"How many times in the past 24 hours?\"       Not vomiting  5. ABDOMEN PAIN: \"Are you having any abdomen pain?\" If Yes, ask: \"What does it feel like?\" (e.g., crampy, dull, intermittent, constant)       Abdomen is distended feels full. Pt states the pain has been constant since this morning on the right lower quad.  6. ABDOMEN PAIN SEVERITY: If present, ask: \"How bad is the pain?\"  (e.g., Scale 1-10; mild, moderate, or severe)      8/10  7. ORAL INTAKE: If vomiting, \"Have you been able to drink liquids?\" \"How much liquids have you had in the past 24 hours?\"      Has tried to drink water but states she gets nauseated  8. HYDRATION: \"Any signs of dehydration?\" (e.g., dry mouth [not just dry lips], too weak to stand, dizziness, new weight loss) \"When did you last urinate?\"      Has urinated today  9. EXPOSURE: \"Have you traveled to a foreign country recently?\" \"Have you been exposed to anyone " "with diarrhea?\" \"Could you have eaten any food that was spoiled?\"      unsure  10. ANTIBIOTIC USE: \"Are you taking antibiotics now or have you taken antibiotics in the past 2 months?\"        no  11. OTHER SYMPTOMS: \"Do you have any other symptoms?\" (e.g., fever, blood in stool)        Green stool  12. PREGNANCY: \"Is there any chance you are pregnant?\" \"When was your last menstrual period?\"        no    Protocols used: Diarrhea-Adult-AH    "

## 2024-12-09 NOTE — TELEPHONE ENCOUNTER
WHO - patient    WHAT - Diarrhea/ swelling on lower left side  (green stool)    WHEN - started today     How Often/Duration -    Pain - not much     Alleviating Factors (anything they tried to use to help so far) -    Next Steps - transferred to nurse line.     Callback- patient

## 2024-12-09 NOTE — TELEPHONE ENCOUNTER
Spoke with patient in regards to obtaining labwork prior to upcoming appointment with Cameron Coelho on 12/11.  Advised patient labs are non fasting and in system.  Patient verbalized understanding and stated she will try to obtain labs as she is sick right now.

## 2024-12-10 ENCOUNTER — TELEPHONE (OUTPATIENT)
Age: 54
End: 2024-12-10

## 2024-12-10 NOTE — TELEPHONE ENCOUNTER
Miroslava with [facility] calling regarding pt's supplemental O2. Miroslava explains that pt was initially started on supplemental O2 during a hospital admission in [place] in April of 2024. [facility] began supplying the pt's O2 on 5/3/2024. They have been unsuccessful at obtaining SWO from hospital in [place]. Miroslava is wondering if provider would be able to sign SWO since pt has followed with us since then and was recommended to continue supplemental O2. She is requesting a callback to either herself, Nellie or Khari to confirm whether or not provider is agreeable. If so, they will fax over SWO.     CB #: [phone] option 1

## 2024-12-10 NOTE — TELEPHONE ENCOUNTER
Pls see the below message    Pt had a sleep study showed hypoxemia, if that's enough for them or we can have her come for a nurse visit 6mwt

## 2024-12-11 ENCOUNTER — TELEPHONE (OUTPATIENT)
Age: 54
End: 2024-12-11

## 2024-12-11 ENCOUNTER — TELEPHONE (OUTPATIENT)
Dept: HEMATOLOGY ONCOLOGY | Facility: CLINIC | Age: 54
End: 2024-12-11

## 2024-12-11 NOTE — TELEPHONE ENCOUNTER
Patient's virtual appointment for this afternoon at 1:30 was cancelled due to not having her requested lab work completed.  I called and left a detailed message informing her that labs must be completed prior to visit.

## 2024-12-11 NOTE — TELEPHONE ENCOUNTER
Pt was rescheduled and advised to get labs completed a couple days prior to that appt. Pt had not future questions at this time.

## 2025-01-28 ENCOUNTER — TELEPHONE (OUTPATIENT)
Age: 55
End: 2025-01-28

## 2025-01-28 DIAGNOSIS — Z79.4 TYPE 2 DIABETES MELLITUS WITHOUT COMPLICATION, WITH LONG-TERM CURRENT USE OF INSULIN (HCC): ICD-10-CM

## 2025-01-28 DIAGNOSIS — E06.3 HYPOTHYROIDISM DUE TO HASHIMOTO THYROIDITIS: ICD-10-CM

## 2025-01-28 DIAGNOSIS — E78.00 HYPERCHOLESTEROLEMIA: ICD-10-CM

## 2025-01-28 DIAGNOSIS — F33.9 RECURRENT MAJOR DEPRESSIVE DISORDER, REMISSION STATUS UNSPECIFIED (HCC): ICD-10-CM

## 2025-01-28 DIAGNOSIS — K21.9 GASTROESOPHAGEAL REFLUX DISEASE WITHOUT ESOPHAGITIS: ICD-10-CM

## 2025-01-28 DIAGNOSIS — K26.9 DUODENAL ULCER: ICD-10-CM

## 2025-01-28 DIAGNOSIS — Z79.4 TYPE 2 DIABETES MELLITUS WITH OTHER SPECIFIED COMPLICATION, WITH LONG-TERM CURRENT USE OF INSULIN (HCC): ICD-10-CM

## 2025-01-28 DIAGNOSIS — K25.4 GASTRIC ULCER WITH HEMORRHAGE, UNSPECIFIED CHRONICITY: ICD-10-CM

## 2025-01-28 DIAGNOSIS — I50.32 CHRONIC DIASTOLIC CONGESTIVE HEART FAILURE (HCC): ICD-10-CM

## 2025-01-28 DIAGNOSIS — E55.9 VITAMIN D DEFICIENCY: ICD-10-CM

## 2025-01-28 DIAGNOSIS — R11.0 NAUSEA: ICD-10-CM

## 2025-01-28 DIAGNOSIS — E11.69 TYPE 2 DIABETES MELLITUS WITH OTHER SPECIFIED COMPLICATION, WITH LONG-TERM CURRENT USE OF INSULIN (HCC): ICD-10-CM

## 2025-01-28 DIAGNOSIS — E11.9 TYPE 2 DIABETES MELLITUS WITHOUT COMPLICATION, WITH LONG-TERM CURRENT USE OF INSULIN (HCC): ICD-10-CM

## 2025-01-28 DIAGNOSIS — F33.41 RECURRENT MAJOR DEPRESSIVE DISORDER, IN PARTIAL REMISSION (HCC): ICD-10-CM

## 2025-01-28 DIAGNOSIS — E87.6 HYPOKALEMIA: ICD-10-CM

## 2025-01-28 DIAGNOSIS — J96.11 CHRONIC HYPOXIC RESPIRATORY FAILURE (HCC): Primary | ICD-10-CM

## 2025-01-28 DIAGNOSIS — I10 BENIGN HYPERTENSION: ICD-10-CM

## 2025-01-28 NOTE — TELEPHONE ENCOUNTER
Patient calling to inform cardiology team that she was seen in an ER in [place] for a bundle block. She will be sending a picture of the EKG through [facility]. She wants to know if there was any evidence of this during her last echo, but last echo was in 2023.

## 2025-01-29 ENCOUNTER — TELEPHONE (OUTPATIENT)
Dept: CARDIOLOGY CLINIC | Facility: CLINIC | Age: 55
End: 2025-01-29

## 2025-01-29 RX ORDER — POTASSIUM CHLORIDE 1500 MG/1
20 TABLET, EXTENDED RELEASE ORAL DAILY
Qty: 30 TABLET | Refills: 0 | Status: SHIPPED | OUTPATIENT
Start: 2025-01-29

## 2025-01-29 RX ORDER — INSULIN LISPRO 100 [IU]/ML
15 INJECTION, SOLUTION INTRAVENOUS; SUBCUTANEOUS
Qty: 42 ML | Refills: 1 | Status: SHIPPED | OUTPATIENT
Start: 2025-01-29 | End: 2025-02-06

## 2025-01-29 RX ORDER — LANCETS 30 GAUGE
EACH MISCELLANEOUS
Qty: 100 EACH | Refills: 1 | Status: SHIPPED | OUTPATIENT
Start: 2025-01-29

## 2025-01-29 RX ORDER — INSULIN GLARGINE 100 [IU]/ML
55 INJECTION, SOLUTION SUBCUTANEOUS
Qty: 52 ML | Refills: 1 | Status: SHIPPED | OUTPATIENT
Start: 2025-01-29 | End: 2025-02-06

## 2025-01-29 RX ORDER — SUCRALFATE 1 G/1
1 TABLET ORAL 3 TIMES DAILY
Qty: 90 TABLET | Refills: 5 | Status: SHIPPED | OUTPATIENT
Start: 2025-01-29

## 2025-01-29 RX ORDER — OMEPRAZOLE 40 MG/1
40 CAPSULE, DELAYED RELEASE ORAL 2 TIMES DAILY
Qty: 60 CAPSULE | Refills: 5 | Status: SHIPPED | OUTPATIENT
Start: 2025-01-29

## 2025-01-29 RX ORDER — BLOOD SUGAR DIAGNOSTIC
STRIP MISCELLANEOUS
Qty: 100 EACH | Refills: 1 | Status: SHIPPED | OUTPATIENT
Start: 2025-01-29

## 2025-01-29 NOTE — TELEPHONE ENCOUNTER
-I was able to call and speak with patient.  She notes that while she did have right bundle branch block on ECG she was not having active chest discomfort and had even gone to the ER was evaluated at that time.  She states overall she is doing well with no significant chest discomfort or exertional symptoms at this time but does have chronic arthropathies.  She had undergone nuclear stress testing in the past which was relatively unrevealing.  She has also had chest CTs which is listed heart is unremarkable for patient's age with no thoracic aneurysm.  As patient is overall feeling well at this time we will continue to monitor and follow-up with her as scheduled or sooner if necessary.

## 2025-01-30 ENCOUNTER — PATIENT MESSAGE (OUTPATIENT)
Dept: INTERNAL MEDICINE CLINIC | Facility: OTHER | Age: 55
End: 2025-01-30

## 2025-01-30 RX ORDER — SPIRONOLACTONE 25 MG/1
25 TABLET ORAL DAILY
Qty: 90 TABLET | Refills: 0 | OUTPATIENT
Start: 2025-01-30

## 2025-01-31 ENCOUNTER — TELEPHONE (OUTPATIENT)
Age: 55
End: 2025-01-31

## 2025-01-31 ENCOUNTER — APPOINTMENT (OUTPATIENT)
Dept: LAB | Facility: IMAGING CENTER | Age: 55
End: 2025-01-31
Payer: MEDICARE

## 2025-01-31 ENCOUNTER — OFFICE VISIT (OUTPATIENT)
Dept: INTERNAL MEDICINE CLINIC | Age: 55
End: 2025-01-31
Payer: MEDICARE

## 2025-01-31 ENCOUNTER — APPOINTMENT (OUTPATIENT)
Dept: LAB | Age: 55
End: 2025-01-31
Payer: MEDICARE

## 2025-01-31 ENCOUNTER — HOSPITAL ENCOUNTER (OUTPATIENT)
Dept: RADIOLOGY | Facility: IMAGING CENTER | Age: 55
End: 2025-01-31
Payer: MEDICARE

## 2025-01-31 VITALS
WEIGHT: 250.4 LBS | HEART RATE: 96 BPM | SYSTOLIC BLOOD PRESSURE: 110 MMHG | BODY MASS INDEX: 40.24 KG/M2 | HEIGHT: 66 IN | DIASTOLIC BLOOD PRESSURE: 74 MMHG | OXYGEN SATURATION: 99 % | TEMPERATURE: 98.1 F

## 2025-01-31 DIAGNOSIS — K74.60 CIRRHOSIS OF LIVER WITHOUT ASCITES, UNSPECIFIED HEPATIC CIRRHOSIS TYPE (HCC): ICD-10-CM

## 2025-01-31 DIAGNOSIS — D50.9 IRON DEFICIENCY ANEMIA, UNSPECIFIED IRON DEFICIENCY ANEMIA TYPE: ICD-10-CM

## 2025-01-31 DIAGNOSIS — F41.9 ANXIETY: Primary | ICD-10-CM

## 2025-01-31 DIAGNOSIS — I50.31 ACUTE DIASTOLIC CONGESTIVE HEART FAILURE (HCC): ICD-10-CM

## 2025-01-31 DIAGNOSIS — Z79.4 TYPE 2 DIABETES MELLITUS WITH HYPERGLYCEMIA, WITH LONG-TERM CURRENT USE OF INSULIN (HCC): ICD-10-CM

## 2025-01-31 DIAGNOSIS — E53.8 LOW SERUM VITAMIN B12: ICD-10-CM

## 2025-01-31 DIAGNOSIS — E53.8 FOLIC ACID DEFICIENCY: ICD-10-CM

## 2025-01-31 DIAGNOSIS — M25.552 ARTHRALGIA OF LEFT HIP: ICD-10-CM

## 2025-01-31 DIAGNOSIS — I10 BENIGN HYPERTENSION: ICD-10-CM

## 2025-01-31 DIAGNOSIS — A41.9 SEPSIS DUE TO PNEUMONIA (HCC): ICD-10-CM

## 2025-01-31 DIAGNOSIS — E78.5 HYPERLIPIDEMIA, UNSPECIFIED HYPERLIPIDEMIA TYPE: ICD-10-CM

## 2025-01-31 DIAGNOSIS — E11.9 TYPE 2 DIABETES MELLITUS WITHOUT COMPLICATION, WITH LONG-TERM CURRENT USE OF INSULIN (HCC): ICD-10-CM

## 2025-01-31 DIAGNOSIS — Z79.4 TYPE 2 DIABETES MELLITUS WITHOUT COMPLICATION, WITH LONG-TERM CURRENT USE OF INSULIN (HCC): ICD-10-CM

## 2025-01-31 DIAGNOSIS — R19.4 CHANGE IN BOWEL HABITS: ICD-10-CM

## 2025-01-31 DIAGNOSIS — J18.9 PNEUMONIA OF BOTH UPPER LOBES DUE TO INFECTIOUS ORGANISM: ICD-10-CM

## 2025-01-31 DIAGNOSIS — E03.9 ACQUIRED HYPOTHYROIDISM: ICD-10-CM

## 2025-01-31 DIAGNOSIS — E78.2 MIXED HYPERLIPIDEMIA: ICD-10-CM

## 2025-01-31 DIAGNOSIS — E11.65 TYPE 2 DIABETES MELLITUS WITH HYPERGLYCEMIA, WITH LONG-TERM CURRENT USE OF INSULIN (HCC): ICD-10-CM

## 2025-01-31 DIAGNOSIS — J18.9 SEPSIS DUE TO PNEUMONIA (HCC): ICD-10-CM

## 2025-01-31 DIAGNOSIS — J43.2 CENTRILOBULAR EMPHYSEMA (HCC): ICD-10-CM

## 2025-01-31 DIAGNOSIS — J06.9 URTI (ACUTE UPPER RESPIRATORY INFECTION): ICD-10-CM

## 2025-01-31 DIAGNOSIS — N18.2 STAGE 2 CHRONIC KIDNEY DISEASE: ICD-10-CM

## 2025-01-31 LAB
AFP-TM SERPL-MCNC: 5.21 NG/ML (ref 0–9)
ALBUMIN SERPL BCG-MCNC: 3.5 G/DL (ref 3.5–5)
ALP SERPL-CCNC: 80 U/L (ref 34–104)
ALT SERPL W P-5'-P-CCNC: 19 U/L (ref 7–52)
ANION GAP SERPL CALCULATED.3IONS-SCNC: 11 MMOL/L (ref 4–13)
AST SERPL W P-5'-P-CCNC: 35 U/L (ref 13–39)
BASOPHILS # BLD AUTO: 0.05 THOUSANDS/ΜL (ref 0–0.1)
BASOPHILS NFR BLD AUTO: 1 % (ref 0–1)
BILIRUB SERPL-MCNC: 1.47 MG/DL (ref 0.2–1)
BNP SERPL-MCNC: 13 PG/ML (ref 0–100)
BUN SERPL-MCNC: 19 MG/DL (ref 5–25)
CALCIUM SERPL-MCNC: 9.5 MG/DL (ref 8.4–10.2)
CHLORIDE SERPL-SCNC: 109 MMOL/L (ref 96–108)
CHOLEST SERPL-MCNC: 101 MG/DL (ref ?–200)
CO2 SERPL-SCNC: 24 MMOL/L (ref 21–32)
CREAT SERPL-MCNC: 0.91 MG/DL (ref 0.6–1.3)
CREAT UR-MCNC: 167.1 MG/DL
EOSINOPHIL # BLD AUTO: 0.11 THOUSAND/ΜL (ref 0–0.61)
EOSINOPHIL NFR BLD AUTO: 2 % (ref 0–6)
ERYTHROCYTE [DISTWIDTH] IN BLOOD BY AUTOMATED COUNT: 15.5 % (ref 11.6–15.1)
EST. AVERAGE GLUCOSE BLD GHB EST-MCNC: 117 MG/DL
FERRITIN SERPL-MCNC: 75 NG/ML (ref 11–307)
FOLATE SERPL-MCNC: 9.9 NG/ML
GFR SERPL CREATININE-BSD FRML MDRD: 71 ML/MIN/1.73SQ M
GLUCOSE P FAST SERPL-MCNC: 93 MG/DL (ref 65–99)
HBA1C MFR BLD: 5.7 %
HCT VFR BLD AUTO: 46.5 % (ref 34.8–46.1)
HDLC SERPL-MCNC: 38 MG/DL
HGB BLD-MCNC: 14.5 G/DL (ref 11.5–15.4)
IGA SERPL-MCNC: 553 MG/DL (ref 66–433)
IMM GRANULOCYTES # BLD AUTO: 0.03 THOUSAND/UL (ref 0–0.2)
IMM GRANULOCYTES NFR BLD AUTO: 0 % (ref 0–2)
INR PPP: 1.18 (ref 0.85–1.19)
IRON SATN MFR SERPL: 18 % (ref 15–50)
IRON SERPL-MCNC: 67 UG/DL (ref 50–212)
LDLC SERPL CALC-MCNC: 45 MG/DL (ref 0–100)
LYMPHOCYTES # BLD AUTO: 1.59 THOUSANDS/ΜL (ref 0.6–4.47)
LYMPHOCYTES NFR BLD AUTO: 22 % (ref 14–44)
MCH RBC QN AUTO: 29.9 PG (ref 26.8–34.3)
MCHC RBC AUTO-ENTMCNC: 31.2 G/DL (ref 31.4–37.4)
MCV RBC AUTO: 96 FL (ref 82–98)
MICROALBUMIN UR-MCNC: <7 MG/L
MONOCYTES # BLD AUTO: 0.75 THOUSAND/ΜL (ref 0.17–1.22)
MONOCYTES NFR BLD AUTO: 10 % (ref 4–12)
NEUTROPHILS # BLD AUTO: 4.76 THOUSANDS/ΜL (ref 1.85–7.62)
NEUTS SEG NFR BLD AUTO: 65 % (ref 43–75)
NRBC BLD AUTO-RTO: 0 /100 WBCS
PLATELET # BLD AUTO: 86 THOUSANDS/UL (ref 149–390)
PMV BLD AUTO: 12.5 FL (ref 8.9–12.7)
POTASSIUM SERPL-SCNC: 3.8 MMOL/L (ref 3.5–5.3)
PREALB SERPL-MCNC: 13.2 MG/DL (ref 17–34)
PROT SERPL-MCNC: 7.1 G/DL (ref 6.4–8.4)
PROTHROMBIN TIME: 15.3 SECONDS (ref 12.3–15)
RBC # BLD AUTO: 4.85 MILLION/UL (ref 3.81–5.12)
SARS-COV-2 AG UPPER RESP QL IA: NEGATIVE
SL AMB POCT HEMOGLOBIN AIC: 5.9 (ref ?–6.5)
SL AMB POCT RAPID FLU A: NEGATIVE
SL AMB POCT RAPID FLU B: NEGATIVE
SODIUM SERPL-SCNC: 144 MMOL/L (ref 135–147)
T4 FREE SERPL-MCNC: 0.9 NG/DL (ref 0.61–1.12)
T4 FREE SERPL-MCNC: 1.17 NG/DL (ref 0.61–1.12)
TIBC SERPL-MCNC: 368.2 UG/DL (ref 250–450)
TRANSFERRIN SERPL-MCNC: 263 MG/DL (ref 203–362)
TRIGL SERPL-MCNC: 90 MG/DL (ref ?–150)
TSH SERPL DL<=0.05 MIU/L-ACNC: 0.14 UIU/ML (ref 0.45–4.5)
TSH SERPL DL<=0.05 MIU/L-ACNC: 0.14 UIU/ML (ref 0.45–4.5)
UIBC SERPL-MCNC: 301 UG/DL (ref 155–355)
VALID CONTROL: NORMAL
VIT B12 SERPL-MCNC: 409 PG/ML (ref 180–914)
WBC # BLD AUTO: 7.29 THOUSAND/UL (ref 4.31–10.16)

## 2025-01-31 PROCEDURE — 82728 ASSAY OF FERRITIN: CPT

## 2025-01-31 PROCEDURE — 80061 LIPID PANEL: CPT

## 2025-01-31 PROCEDURE — 84439 ASSAY OF FREE THYROXINE: CPT

## 2025-01-31 PROCEDURE — 83036 HEMOGLOBIN GLYCOSYLATED A1C: CPT

## 2025-01-31 PROCEDURE — 86364 TISS TRNSGLTMNASE EA IG CLAS: CPT

## 2025-01-31 PROCEDURE — 83540 ASSAY OF IRON: CPT

## 2025-01-31 PROCEDURE — 82784 ASSAY IGA/IGD/IGG/IGM EACH: CPT

## 2025-01-31 PROCEDURE — 85025 COMPLETE CBC W/AUTO DIFF WBC: CPT

## 2025-01-31 PROCEDURE — 87804 INFLUENZA ASSAY W/OPTIC: CPT | Performed by: INTERNAL MEDICINE

## 2025-01-31 PROCEDURE — 82570 ASSAY OF URINE CREATININE: CPT

## 2025-01-31 PROCEDURE — 85610 PROTHROMBIN TIME: CPT

## 2025-01-31 PROCEDURE — 73521 X-RAY EXAM HIPS BI 2 VIEWS: CPT

## 2025-01-31 PROCEDURE — 83550 IRON BINDING TEST: CPT

## 2025-01-31 PROCEDURE — 82746 ASSAY OF FOLIC ACID SERUM: CPT

## 2025-01-31 PROCEDURE — 82105 ALPHA-FETOPROTEIN SERUM: CPT

## 2025-01-31 PROCEDURE — 82607 VITAMIN B-12: CPT

## 2025-01-31 PROCEDURE — 84443 ASSAY THYROID STIM HORMONE: CPT

## 2025-01-31 PROCEDURE — 87811 SARS-COV-2 COVID19 W/OPTIC: CPT | Performed by: INTERNAL MEDICINE

## 2025-01-31 PROCEDURE — 99214 OFFICE O/P EST MOD 30 MIN: CPT | Performed by: INTERNAL MEDICINE

## 2025-01-31 PROCEDURE — 82043 UR ALBUMIN QUANTITATIVE: CPT

## 2025-01-31 PROCEDURE — 83880 ASSAY OF NATRIURETIC PEPTIDE: CPT

## 2025-01-31 PROCEDURE — 84134 ASSAY OF PREALBUMIN: CPT

## 2025-01-31 PROCEDURE — 80053 COMPREHEN METABOLIC PANEL: CPT

## 2025-01-31 PROCEDURE — 83036 HEMOGLOBIN GLYCOSYLATED A1C: CPT | Performed by: INTERNAL MEDICINE

## 2025-01-31 RX ORDER — CEFDINIR 300 MG/1
300 CAPSULE ORAL EVERY 12 HOURS SCHEDULED
Qty: 20 CAPSULE | Refills: 0 | Status: SHIPPED | OUTPATIENT
Start: 2025-01-31 | End: 2025-02-10

## 2025-01-31 RX ORDER — DIAZEPAM 5 MG/1
5 TABLET ORAL
Qty: 30 TABLET | Refills: 0 | Status: CANCELLED | OUTPATIENT
Start: 2025-01-31

## 2025-01-31 NOTE — TELEPHONE ENCOUNTER
I called and spoke with Nirmala. I advised her I did not see any SWO forms. She is re faxing them to the [place] office. I asked her to fax then to [phone]

## 2025-01-31 NOTE — ASSESSMENT & PLAN NOTE
-Well-controlled  -Continue semaglutide insulin glargine, insulin Humalog  -Continue with a diabetic diet and with exercise  Lab Results   Component Value Date    HGBA1C 5.9 01/31/2025     Orders:  •  POCT hemoglobin A1c

## 2025-01-31 NOTE — PROGRESS NOTES
Name: Chely Ruvalcaba      : 1970      MRN: [id number]  Encounter Provider: Tabitha Kaminski DO  Encounter Date: 2025   Encounter department: [facility]  :  Assessment & Plan  Anxiety  -Patient would like a refill of diazepam  -She has not had it for about 6 months  -Because of the possibility of sleep apnea, this is contraindicated and patient will return to sleep medicine for a repeat sleep study and follow-up with her PCP ASAP.  -Because patient has not had the medication for months, there is no fear of withdrawal.  -Continue with sertraline at current dose       Stage 2 chronic kidney disease  Lab Results   Component Value Date    EGFR 71 2025    EGFR 87 2024    EGFR 77 2024    CREATININE 0.91 2025    CREATININE 0.78 2024    CREATININE 0.86 2024   -Stable  -Continue to keep well-hydrated and avoid nephrotoxic agents.       Acquired hypothyroidism  -Stable  -Continue with levothyroxine at current dose       Centrilobular emphysema (HCC)  -Stable without acute exacerbation  -Continue with Pulmicort inhaler, as needed DuoNebs and as needed albuterol inhaler       Benign hypertension  -Stable  -Continue with 7, midodrine for hypotension       Type 2 diabetes mellitus with hyperglycemia, with long-term current use of insulin (HCC)  -Well-controlled  -Continue semaglutide insulin glargine, insulin Humalog  -Continue with a diabetic diet and with exercise  Lab Results   Component Value Date    HGBA1C 5.9 2025     Orders:  •  POCT hemoglobin A1c    URTI (acute upper respiratory infection)  - likely viral with bacterial superinfection vs bacterial  -Point-of-care rapid COVID and influenza A and B tests were negative  - we will start pt on cefdinir 300 mg twice daily for 10 days, and she may use over-the-counter Flonase nasal spray for rhinitis and Mucinex for productive cough  - Pt was counseled to use OTC tylenol or ibuprofen with meals  for aches and pains, warm salt water gargles for sore throat and was encouraged to drink lots of fluids, get plenty of rest and wash her hands liberally.  - pt was also counseled to take antibiotics with food and also to use a probiotic like yogurt daily as long as she is taking antibiotics  - She should return to the clinic if her symptoms worsen or do not improve.    Orders:  •  Poct Covid 19 Rapid Antigen Test  •  POCT rapid flu A and B  •  cefdinir (OMNICEF) 300 mg capsule; Take 1 capsule (300 mg total) by mouth every 12 (twelve) hours for 10 days    Arthralgia of left hip  -Symptoms are concerning for osteoarthritis  -Will order an x-ray of the hips and pelvis  -Continue with current analgesics  -Follow-up with PCP              History of Present Illness   HPI    Patient presents for a follow-up visit regarding her anxiety, emphysema, hypothyroidism, hypertension, diabetes mellitus type 2 and hip pain.  She states that she has been taking her medications as prescribed.  She states that she has been out of her diazepam for months and would like a refill.  A review of her [place] PDMP website showed that the last time she got it was about 6 months ago.  She states she has not gotten for a long time cos she has not seen her pcp   Quit smoking 10 months   Smoked for about 40 years, with a max of 2 packs  Does not check her bp at home   She states that she does not watch her salt intake   Does not need to use her rescue inhaler but she uses her other inhalers.  Uses her neb 2 times a day.  Uses her oxygen occasionally when needed and at night  I attempted to prescribe her diazepam for her but got the epic message that it is contraindicated because of her history of obstructive sleep apnea.  Patient tells me that she does not have sleep apnea but a review of her record shows that her sleep study done on 5/31/2024 indicated a need for repeat sleep study and the repeat sleep study that has been ordered but patient  "has not done it yet.  For now, because of the possibility of sleep apnea, valium is contraindicated - pt will return to see pcp     Review of Systems   Constitutional:  Negative for activity change, chills, fatigue, fever and unexpected weight change.   HENT:  Positive for congestion, rhinorrhea and sinus pressure. Negative for ear pain, postnasal drip and sore throat.    Eyes:  Negative for pain.   Respiratory:  Positive for cough (dry), shortness of breath and wheezing. Negative for choking and chest tightness.    Cardiovascular:  Negative for chest pain, palpitations and leg swelling.   Gastrointestinal:  Negative for abdominal pain, constipation, diarrhea, nausea and vomiting.   Genitourinary:  Negative for dysuria and hematuria.   Musculoskeletal:  Negative for arthralgias, back pain, gait problem, joint swelling, myalgias and neck stiffness.   Skin:  Negative for pallor and rash.   Neurological:  Positive for headaches. Negative for dizziness, tremors, seizures, syncope and light-headedness.   Hematological:  Negative for adenopathy.   Psychiatric/Behavioral:  Negative for behavioral problems.        Objective   /74 (Patient Position: Sitting, Cuff Size: Adult)   Pulse 96   Temp 98.1 °F (36.7 °C) (Tympanic)   Ht 5' 6\" (1.676 m)   Wt 114 kg (250 lb 6.4 oz)   LMP 04/05/2018 (Approximate)   SpO2 99%   BMI 40.42 kg/m²      Physical Exam  Constitutional:       General: She is not in acute distress.     Appearance: She is well-developed. She is not diaphoretic.   HENT:      Head: Normocephalic and atraumatic.      Right Ear: External ear normal.      Left Ear: External ear normal. Drainage (blood in eac) present.      Nose: Mucosal edema and congestion present.      Mouth/Throat:      Mouth: Mucous membranes are dry.      Pharynx: Posterior oropharyngeal erythema (with blood) present. No oropharyngeal exudate.   Eyes:      General: No scleral icterus.        Right eye: No discharge.         Left eye: No " discharge.      Conjunctiva/sclera: Conjunctivae normal.      Pupils: Pupils are equal, round, and reactive to light.   Neck:      Thyroid: No thyromegaly.      Vascular: No JVD.      Trachea: No tracheal deviation.   Cardiovascular:      Rate and Rhythm: Normal rate and regular rhythm.      Heart sounds: Murmur (1/6 systolic murmur) heard.      Systolic murmur is present with a grade of 1/6.      No friction rub. No gallop.   Pulmonary:      Effort: Pulmonary effort is normal. No respiratory distress.      Breath sounds: Normal breath sounds. No wheezing or rales.   Chest:      Chest wall: No tenderness.   Abdominal:      General: Bowel sounds are normal. There is no distension.      Palpations: Abdomen is soft. There is no mass.      Tenderness: There is no abdominal tenderness. There is no guarding or rebound.   Musculoskeletal:         General: No tenderness or deformity. Normal range of motion.      Cervical back: Normal range of motion and neck supple.   Lymphadenopathy:      Cervical: No cervical adenopathy.   Skin:     General: Skin is warm and dry.      Coloration: Skin is not pale.      Findings: No erythema or rash.   Neurological:      Mental Status: She is alert and oriented to person, place, and time.      Cranial Nerves: No cranial nerve deficit.      Motor: No abnormal muscle tone.      Coordination: Coordination normal.      Deep Tendon Reflexes: Reflexes are normal and symmetric.   Psychiatric:         Behavior: Behavior normal.

## 2025-01-31 NOTE — TELEPHONE ENCOUNTER
Nirmala [facility]  [phone] Ext [phone]    SWO for O2 has been faxed 12/3/24, 12/23/24, still has not received back.     Please pull from system and return ASAP.

## 2025-01-31 NOTE — ASSESSMENT & PLAN NOTE
-Stable without acute exacerbation  -Continue with Pulmicort inhaler, as needed DuoNebs and as needed albuterol inhaler

## 2025-01-31 NOTE — ASSESSMENT & PLAN NOTE
-Patient would like a refill of diazepam  -She has not had it for about 6 months  -Because of the possibility of sleep apnea, this is contraindicated and patient will return to sleep medicine for a repeat sleep study and follow-up with her PCP ASAP.  -Because patient has not had the medication for months, there is no fear of withdrawal.  -Continue with sertraline at current dose      stretcher

## 2025-01-31 NOTE — ASSESSMENT & PLAN NOTE
Lab Results   Component Value Date    EGFR 71 01/31/2025    EGFR 87 06/06/2024    EGFR 77 06/05/2024    CREATININE 0.91 01/31/2025    CREATININE 0.78 06/06/2024    CREATININE 0.86 06/05/2024   -Stable  -Continue to keep well-hydrated and avoid nephrotoxic agents.

## 2025-02-01 ENCOUNTER — RESULTS FOLLOW-UP (OUTPATIENT)
Dept: INTERNAL MEDICINE CLINIC | Age: 55
End: 2025-02-01

## 2025-02-01 DIAGNOSIS — E06.3 HYPOTHYROIDISM DUE TO HASHIMOTO THYROIDITIS: Primary | ICD-10-CM

## 2025-02-01 RX ORDER — ERGOCALCIFEROL 1.25 MG/1
50000 CAPSULE, LIQUID FILLED ORAL
Qty: 12 CAPSULE | Refills: 0 | Status: SHIPPED | OUTPATIENT
Start: 2025-02-01

## 2025-02-01 RX ORDER — BUPROPION HYDROCHLORIDE 300 MG/1
300 TABLET ORAL EVERY MORNING
Qty: 90 TABLET | Refills: 0 | Status: SHIPPED | OUTPATIENT
Start: 2025-02-01 | End: 2026-01-27

## 2025-02-01 RX ORDER — LEVOTHYROXINE SODIUM 88 UG/1
88 TABLET ORAL
Qty: 100 TABLET | Refills: 3 | Status: SHIPPED | OUTPATIENT
Start: 2025-02-01

## 2025-02-01 RX ORDER — FUROSEMIDE 20 MG/1
10 TABLET ORAL DAILY
Qty: 15 TABLET | Refills: 0 | Status: SHIPPED | OUTPATIENT
Start: 2025-02-01

## 2025-02-01 RX ORDER — SIMVASTATIN 20 MG
20 TABLET ORAL
Qty: 90 TABLET | Refills: 0 | Status: SHIPPED | OUTPATIENT
Start: 2025-02-01

## 2025-02-01 RX ORDER — SERTRALINE HYDROCHLORIDE 100 MG/1
100 TABLET, FILM COATED ORAL 2 TIMES DAILY
Qty: 180 TABLET | Refills: 0 | Status: SHIPPED | OUTPATIENT
Start: 2025-02-01

## 2025-02-01 RX ORDER — ONDANSETRON 4 MG/1
4 TABLET, FILM COATED ORAL EVERY 8 HOURS PRN
Qty: 20 TABLET | Refills: 0 | Status: SHIPPED | OUTPATIENT
Start: 2025-02-01

## 2025-02-01 RX ORDER — LEVOTHYROXINE SODIUM 100 UG/1
100 TABLET ORAL DAILY
Qty: 30 TABLET | Refills: 0 | OUTPATIENT
Start: 2025-02-01

## 2025-02-01 RX ORDER — FAMOTIDINE 40 MG/1
40 TABLET, FILM COATED ORAL
Qty: 90 TABLET | Refills: 0 | Status: SHIPPED | OUTPATIENT
Start: 2025-02-01

## 2025-02-01 NOTE — PROGRESS NOTES
Dose of levothyroxine decreased from 100 mcg to 88 mcg daily secondary to low TSH with elevated free T4

## 2025-02-02 ENCOUNTER — RESULTS FOLLOW-UP (OUTPATIENT)
Dept: ENDOCRINOLOGY | Facility: CLINIC | Age: 55
End: 2025-02-02

## 2025-02-03 ENCOUNTER — RESULTS FOLLOW-UP (OUTPATIENT)
Dept: CARDIOLOGY CLINIC | Facility: CLINIC | Age: 55
End: 2025-02-03

## 2025-02-03 ENCOUNTER — TELEPHONE (OUTPATIENT)
Age: 55
End: 2025-02-03

## 2025-02-03 LAB — TTG IGA SER-ACNC: <2 U/ML (ref 0–3)

## 2025-02-03 NOTE — TELEPHONE ENCOUNTER
PA for Insulin Glargine SoloStar 100 U SUBMITTED to [employer]    via    [x]CMM-KEY: [id number]  []Surescripts-Case ID #   []Availity-Auth ID # NDC #   []Faxed to plan   []Other website   []Phone call Case ID #     [x]PA sent as URGENT    All office notes, labs and other pertaining documents and studies sent. Clinical questions answered. Awaiting determination from insurance company.     Turnaround time for your insurance to make a decision on your Prior Authorization can take 7-21 business days.

## 2025-02-05 ENCOUNTER — TELEPHONE (OUTPATIENT)
Age: 55
End: 2025-02-05

## 2025-02-05 NOTE — TELEPHONE ENCOUNTER
Patient would like to know if her appt tomorrow can be virtual. She is currently Sick.    Patient would like a call back.

## 2025-02-06 ENCOUNTER — TELEMEDICINE (OUTPATIENT)
Dept: ENDOCRINOLOGY | Facility: CLINIC | Age: 55
End: 2025-02-06
Payer: MEDICARE

## 2025-02-06 DIAGNOSIS — E11.65 TYPE 2 DIABETES MELLITUS WITH HYPERGLYCEMIA, WITH LONG-TERM CURRENT USE OF INSULIN (HCC): Primary | ICD-10-CM

## 2025-02-06 DIAGNOSIS — E11.69 TYPE 2 DIABETES MELLITUS WITH OTHER SPECIFIED COMPLICATION, WITH LONG-TERM CURRENT USE OF INSULIN (HCC): ICD-10-CM

## 2025-02-06 DIAGNOSIS — I10 BENIGN HYPERTENSION: ICD-10-CM

## 2025-02-06 DIAGNOSIS — E78.2 MIXED HYPERLIPIDEMIA: ICD-10-CM

## 2025-02-06 DIAGNOSIS — Z79.4 TYPE 2 DIABETES MELLITUS WITHOUT COMPLICATION, WITH LONG-TERM CURRENT USE OF INSULIN (HCC): ICD-10-CM

## 2025-02-06 DIAGNOSIS — E11.9 TYPE 2 DIABETES MELLITUS WITHOUT COMPLICATION, WITH LONG-TERM CURRENT USE OF INSULIN (HCC): ICD-10-CM

## 2025-02-06 DIAGNOSIS — Z79.4 TYPE 2 DIABETES MELLITUS WITH OTHER SPECIFIED COMPLICATION, WITH LONG-TERM CURRENT USE OF INSULIN (HCC): ICD-10-CM

## 2025-02-06 DIAGNOSIS — Z79.4 TYPE 2 DIABETES MELLITUS WITH HYPERGLYCEMIA, WITH LONG-TERM CURRENT USE OF INSULIN (HCC): Primary | ICD-10-CM

## 2025-02-06 DIAGNOSIS — E03.9 ACQUIRED HYPOTHYROIDISM: ICD-10-CM

## 2025-02-06 PROCEDURE — 99214 OFFICE O/P EST MOD 30 MIN: CPT | Performed by: STUDENT IN AN ORGANIZED HEALTH CARE EDUCATION/TRAINING PROGRAM

## 2025-02-06 RX ORDER — INSULIN LISPRO 100 [IU]/ML
12 INJECTION, SOLUTION INTRAVENOUS; SUBCUTANEOUS
Qty: 42 ML | Refills: 1 | Status: SHIPPED | OUTPATIENT
Start: 2025-02-06 | End: 2026-02-05

## 2025-02-06 RX ORDER — INSULIN GLARGINE 100 [IU]/ML
50 INJECTION, SOLUTION SUBCUTANEOUS
Qty: 52 ML | Refills: 1 | Status: SHIPPED | OUTPATIENT
Start: 2025-02-06 | End: 2026-02-05

## 2025-02-06 NOTE — PROGRESS NOTES
Virtual Regular Visit  Name: Chely Ruvalcaba      : 1970      MRN: [id number]  Encounter Provider: Charley Llanes MD  Encounter Date: 2025   Encounter department: [facility] [place]      Verification of patient location:  Patient is located at Home in the following state in which I hold an active license [place] :  Assessment & Plan  Type 2 diabetes mellitus with hyperglycemia, with long-term current use of insulin ([facility])    Lab Results   Component Value Date    HGBA1C 5.7 (H) 2025     Diabetes is well controlled with A1C of 5.7%,   We decreased lantus to 50 units daily and novolog to 12 units TID ac,  Continue Ozempic at current dose 0.5 mg weekly  she was advised to call the office to connect her CGM/Dexcom to the office to download the report for review.  Hypoglycemia symptoms and treatment reviewed.  Ophthalmology follow-up.  Return back in 4 months May  Labs before next visit         Acquired hypothyroidism  She states that she is taking levothyroxine 112 mcg daily, although the order is for 88 mcg which was prescribed by primary team, she check her medications and it appears that she is taking 112 mcg once daily, I decreased it to 6 days a week and to skip on  day.         Benign hypertension  She was advised to monitor her blood pressure at home, blood pressure goal less than 130/80.  Continue current regimen.       Mixed hyperlipidemia  Continue simvastatin 20 mg daily.             Encounter provider Charley Llanes MD    The patient was identified by name and date of birth. Chely Ruvalcaba was informed that this is a telemedicine visit and that the visit is being conducted through the Epic Embedded platform. She agrees to proceed..  My office door was closed. No one else was in the room.  She acknowledged consent and understanding of privacy and security of the video platform. The patient has agreed to participate and understands they can discontinue the visit at any  time.    Patient is aware this is a billable service.     History of Present Illness     HPI  Chely Ruvalcaba is a 54 y.o. female who presents for follow up for type 2 diabetes, hypothyroidism and hypertension.     Ozempic 0.5 mg weekly.  Lantus 55 units nightly.  Humalog 15 units 3 times daily AC.    Using dexcom, not able to download the report.    Hypoglycemia :yes, awareness : yes     RAASI:Aldactone   Statin:simvastatin 20 mg daily     Eye exam:Overdue, will schedule   Foot exam: overdue,         Review of Systems   Constitutional:  Negative for fatigue and unexpected weight change.   Endocrine: Negative for polydipsia and polyuria.       Objective   LMP 04/05/2018 (Approximate)     Physical Exam  Constitutional:       General: She is not in acute distress.     Appearance: She is obese. She is not ill-appearing.   HENT:      Head: Normocephalic and atraumatic.   Pulmonary:      Effort: Pulmonary effort is normal. No respiratory distress.   Neurological:      Mental Status: She is oriented to person, place, and time.

## 2025-02-06 NOTE — TELEPHONE ENCOUNTER
PA for Insulin Glargine SoloStar 100 U CANCELLED due to     []Approval on file-dates approved   [x]Medication already on Formulary  []Brand Name Preferred  []Patient no longer covered by insurance        Message sent to office clinical pool   No      Scanned into Media  yes

## 2025-02-11 VITALS — BODY MASS INDEX: 40.18 KG/M2 | WEIGHT: 250 LBS | HEIGHT: 66 IN

## 2025-02-11 NOTE — PRE-PROCEDURE INSTRUCTIONS
Pre-Surgery Instructions:   Medication Instructions    albuterol (PROVENTIL HFA,VENTOLIN HFA) 90 mcg/act inhaler Take day of surgery.    budesonide (PULMICORT) 0.5 mg/2 mL nebulizer solution Take day of surgery.    buPROPion (WELLBUTRIN XL) 300 mg 24 hr tablet Take day of surgery.    cefdinir (OMNICEF) 300 mg capsule Pt will not be on this day of surgery    ergocalciferol (VITAMIN D2) 50,000 units Hold starting today 2/11    famotidine (PEPCID) 40 MG tablet Take night before surgery    ferrous sulfate 324 (65 Fe) mg Hold day of surgery.    folic acid (FOLVITE) 1 mg tablet Hold starting today 2/11    furosemide (LASIX) 20 mg tablet Hold day of surgery.    gabapentin (NEURONTIN) 100 mg capsule Take day of surgery.    Insulin Glargine Solostar (Lantus SoloStar) 100 UNIT/ML SOPN Take night before surgery    insulin lispro (HumaLOG) 100 units/mL injection pen Hold day of surgery.    ipratropium-albuterol (DUO-NEB) 0.5-2.5 mg/3 mL nebulizer solution Take day of surgery.    lactulose (CHRONULAC) 10 g/15 mL solution Hold day of surgery.    levothyroxine 88 mcg tablet Take day of surgery.    omeprazole (PriLOSEC) 40 MG capsule Take day of surgery.    ondansetron (ZOFRAN) 4 mg tablet Uses PRN- OK to take day of surgery    oxyCODONE-acetaminophen (PERCOCET)  mg per tablet Uses PRN- OK to take day of surgery    potassium chloride (Klor-Con M20) 20 mEq tablet Hold day of surgery.    sertraline (ZOLOFT) 100 mg tablet Take day of surgery.    simvastatin (ZOCOR) 20 mg tablet Take night before surgery    spironolactone (ALDACTONE) 25 mg tablet Hold day of surgery.    sucralfate (CARAFATE) 1 g tablet Take day of surgery.      Medication instructions for day surgery reviewed. Please use only a sip of water to take your instructed medications. Avoid all over the counter vitamins, supplements and NSAIDS Hold starting today 2/11. Tylenol is ok to take as needed.     You will receive a call one business day prior to surgery with an  arrival time and hospital directions. If your surgery is scheduled on a Monday, the hospital will be calling you on the Friday prior to your surgery. If you have not heard from anyone by 8pm, please call the hospital supervisor through the hospital  at [phone]. ([place] [phone] or [place] [phone]).    Do not eat or drink anything after midnight the night before your surgery, including candy, mints, lifesavers, or chewing gum. Do not drink alcohol 24hrs before your surgery. Try not to smoke at least 24hrs before your surgery.       Follow the pre surgery showering instructions as listed in the “My Surgical Experience Booklet” or otherwise provided by your surgeon's office. Do not use a blade to shave the surgical area 1 week before surgery. It is okay to use a clean electric clippers up to 24 hours before surgery. Do not apply any lotions, creams, including makeup, cologne, deodorant, or perfumes after showering on the day of your surgery. Do not use dry shampoo, hair spray, hair gel, or any type of hair products.     No contact lenses, eye make-up, or artificial eyelashes. Remove nail polish, including gel polish, and any artificial, gel, or acrylic nails if possible. Remove all jewelry including rings and body piercing jewelry.     Wear causal clothing that is easy to take on and off. Consider your type of surgery.    Keep any valuables, jewelry, piercings at home. Please bring any specially ordered equipment (sling, braces) if indicated.    Arrange for a responsible person to drive you to and from the hospital on the day of your surgery. Please confirm the visitor policy for the day of your procedure when you receive your phone call with an arrival time.     Call the surgeon's office with any new illnesses, exposures, or additional questions prior to surgery.    Please reference your “My Surgical Experience Booklet” for additional information to prepare for your upcoming surgery.

## 2025-02-13 ENCOUNTER — TELEPHONE (OUTPATIENT)
Age: 55
End: 2025-02-13
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
## 2025-02-14 ENCOUNTER — LAB (OUTPATIENT)
Facility: HOSPITAL | Age: 55
End: 2025-02-14
Payer: COMMERCIAL

## 2025-02-14 ENCOUNTER — OFFICE VISIT (OUTPATIENT)
Age: 55
End: 2025-02-14
Payer: COMMERCIAL

## 2025-02-14 VITALS
OXYGEN SATURATION: 99 % | BODY MASS INDEX: 30.48 KG/M2 | SYSTOLIC BLOOD PRESSURE: 122 MMHG | WEIGHT: 172 LBS | HEART RATE: 76 BPM | DIASTOLIC BLOOD PRESSURE: 74 MMHG | HEIGHT: 63 IN

## 2025-02-14 DIAGNOSIS — E89.0 POSTSURGICAL HYPOTHYROIDISM: ICD-10-CM

## 2025-02-14 DIAGNOSIS — E89.0 POSTSURGICAL HYPOTHYROIDISM: Primary | ICD-10-CM

## 2025-02-14 PROBLEM — Z90.89 HISTORY OF TOTAL THYROIDECTOMY: Status: RESOLVED | Noted: 2024-08-09 | Resolved: 2025-02-14

## 2025-02-14 PROBLEM — E04.2 MULTINODULAR THYROID: Status: RESOLVED | Noted: 2024-06-21 | Resolved: 2025-02-14

## 2025-02-14 PROBLEM — E04.0 SIMPLE GOITER: Status: RESOLVED | Noted: 2021-04-28 | Resolved: 2025-02-14

## 2025-02-14 PROBLEM — Z98.890 HISTORY OF TOTAL THYROIDECTOMY: Status: RESOLVED | Noted: 2024-08-09 | Resolved: 2025-02-14

## 2025-02-14 PROCEDURE — 99213 OFFICE O/P EST LOW 20 MIN: CPT | Performed by: INTERNAL MEDICINE

## 2025-02-14 PROCEDURE — 84443 ASSAY THYROID STIM HORMONE: CPT

## 2025-02-14 NOTE — TELEPHONE ENCOUNTER
Talked to patient she is rescheduled for her surgical procedure with Dr. Fernando to 4/11/2025 at the [facility]

## 2025-02-14 NOTE — PROGRESS NOTES
"     Office Note      Date: 2025  Patient Name: Milagros Lundberg  MRN: [id number]  : 1970    Chief Complaint   Patient presents with    Hypothyroidism       History of Present Illness:   Milagros Lundberg is a 54 y.o. female who presents for Hypothyroidism  .   Current rx: t4- 137    Changes in history: none   Questions /problems:local symptoms are better and overall feels better     Subjective          Review of Systems:   Review of Systems   Constitutional:  Negative for fatigue and unexpected weight change.   HENT:  Negative for trouble swallowing.    Endocrine: Positive for cold intolerance.        Hair loss is better    Psychiatric/Behavioral:  Negative for sleep disturbance.        The following portions of the patient's history were reviewed and updated as appropriate: allergies, current medications, past family history, past medical history, past social history, past surgical history, and problem list.    Objective     Visit Vitals  /74 (BP Location: Left arm, Patient Position: Sitting, Cuff Size: Adult)   Pulse 76   Ht 160 cm (63\")   Wt 78 kg (172 lb)   SpO2 99%   BMI 30.47 kg/m²           Physical Exam:  Physical Exam  Vitals reviewed.   Constitutional:       Appearance: Normal appearance.   Neck:      Comments: No palpable thyroid   Lymphadenopathy:      Cervical: No cervical adenopathy.   Neurological:      Mental Status: She is alert.      Comments: Negative chvosteks sign    Psychiatric:         Mood and Affect: Mood normal.         Behavior: Behavior normal.         Thought Content: Thought content normal.         Judgment: Judgment normal.         Assessment / Plan      Assessment & Plan:  Problem List Items Addressed This Visit       Postsurgical hypothyroidism - Primary    Overview     Total thyroidectomy by Dr. Royal 24  Pathology: 1) RIGHT THYROID LOBE, LOBECTOMY:  Incidental focus of papillary microcarcinoma with associated microcalcification (2 mm)  Margins are " negative  Background chronic lymphocytic thyroiditis (Hashimoto's)  1 benign lymph node (0/1)     2) LEFT THYROID LOBE, LOBECTOMY:  Chronic lymphocytic thyroiditis (Hashimoto's)  No evidence of malignancy notified         Current Assessment & Plan     Stable  Clinically euthyroid. Thyroid levels ordered. Medication to be adjusted accordingly.          Relevant Medications    levothyroxine (SYNTHROID, LEVOTHROID) 137 MCG tablet    Other Relevant Orders    TSH        Electronically signed by : Ankit Clark MD   02/14/2025

## 2025-02-15 LAB — TSH SERPL DL<=0.05 MIU/L-ACNC: 0.53 UIU/ML (ref 0.27–4.2)

## 2025-02-17 DIAGNOSIS — E06.3 HYPOTHYROIDISM DUE TO HASHIMOTO'S THYROIDITIS: ICD-10-CM

## 2025-02-17 RX ORDER — LEVOTHYROXINE SODIUM 137 UG/1
137 TABLET ORAL EVERY MORNING
Qty: 30 TABLET | Refills: 11 | Status: SHIPPED | OUTPATIENT
Start: 2025-02-17

## 2025-02-18 ENCOUNTER — TELEPHONE (OUTPATIENT)
Dept: PULMONOLOGY | Facility: CLINIC | Age: 55
End: 2025-02-18

## 2025-02-18 NOTE — TELEPHONE ENCOUNTER
I called and LVM for patient to call office back to reschedule her follow up with pulmonology. I also stated she needs a 6 min walk test at her next follow up to continue her oxygen therapy.    Telephone Encounter by Deya Cohen at 07/16/18 04:16 PM     Author:  Deya Cohen Service:  (none) Author Type:  Certified Nursing Assistant     Filed:  07/16/18 04:18 PM Encounter Date:  7/16/2018 Status:  Signed     :  Deya Cohen (Certified Nursing Assistant)              BILLY SAVAGE    Patient Age: 46 year old    ACCT STATUS: COPAY  MESSAGE:[GS1.1T]   Patient called to give update  Finished antibiotics is ok for now has an appetite hope antibiotics got rid of C Diff  Last dosage today still has white patch on tongue[GS1.1M]    Electronically Signed by:    Deya Cohen , 7/16/2018[GS1.2T]  Call transferred to clinical staff member.        Next and Last Visit with Provider and Department  Next visit with TYREE NORMAN is on No match found  Next visit with GASTROENTEROLOGY is on No match found  Last visit with TYREE NORMAN was on No match found  Last visit with GASTROENTEROLOGY was on No match found     WEIGHT AND HEIGHT: As of 07/20/2016 weight is 128 lbs.(58.060 kg).   BMI is 21.94 kg/(m^2) calculated from:     Height 5' 2\" (1.575 m) as of 9/10/14     Weight 120 lb (54.432 kg) as of 9/10/14      Allergies     Allergen  Reactions   • Augmentin [Amoxicillin-Pot Clavulanate] Diarrhea   • Clindamycin Diarrhea     No current outpatient prescriptions on file.      PHARMACY to use:           Pharmacy preference(s) on file:    [facility] [address] [id number] [address] [facility]    CALL BACK INFO:[GS1.1T] Ok to leave response (including medical information) on answering machine[GS1.1M]  ROUTING:[GS1.1T] O[GS1.1M]  PCP: Germain Schwartz MD         INS: Payor: NO INSURANCE - SELF PAY / Plan: *No Plan* / Product Type: *No Product type* / Note: This is the primary coverage, but no account was found for this location or the patient's primary location.   ADDRESS:  [address] [place] [id number][GS1.1T]         Revision History        User Key  Date/Time User Provider Type Action    > GS1.2 07/16/18 04:18 PM Deya Cohen Certified Nursing Assistant Sign     GS1.1 07/16/18 04:16 PM Deya Cohen Certified Nursing Assistant     M - Manual, T - Template

## 2025-02-21 ENCOUNTER — TELEPHONE (OUTPATIENT)
Age: 55
End: 2025-02-21

## 2025-02-21 DIAGNOSIS — E87.6 HYPOKALEMIA: ICD-10-CM

## 2025-02-21 DIAGNOSIS — K25.4 GASTRIC ULCER WITH HEMORRHAGE, UNSPECIFIED CHRONICITY: ICD-10-CM

## 2025-02-21 RX ORDER — POTASSIUM CHLORIDE 1500 MG/1
20 TABLET, EXTENDED RELEASE ORAL DAILY
Qty: 30 TABLET | Refills: 5 | Status: SHIPPED | OUTPATIENT
Start: 2025-02-21

## 2025-02-21 RX ORDER — OMEPRAZOLE 40 MG/1
40 CAPSULE, DELAYED RELEASE ORAL 2 TIMES DAILY
Qty: 200 CAPSULE | Refills: 1 | Status: SHIPPED | OUTPATIENT
Start: 2025-02-21

## 2025-02-21 NOTE — TELEPHONE ENCOUNTER
I called and spoke with Tete from [facility] to make them aware patient did not show up for her  appointment. They will reach out to patient.

## 2025-02-21 NOTE — TELEPHONE ENCOUNTER
Patient was scheduled for an appointment on 2/18 and was a no show. The provider could not fill out the paperwork with out seeing patient.

## 2025-02-23 DIAGNOSIS — I50.32 CHRONIC DIASTOLIC CONGESTIVE HEART FAILURE (HCC): ICD-10-CM

## 2025-02-24 DIAGNOSIS — K21.9 GASTROESOPHAGEAL REFLUX DISEASE WITHOUT ESOPHAGITIS: ICD-10-CM

## 2025-02-24 RX ORDER — FUROSEMIDE 20 MG/1
10 TABLET ORAL DAILY
Qty: 15 TABLET | Refills: 0 | Status: SHIPPED | OUTPATIENT
Start: 2025-02-24

## 2025-02-25 ENCOUNTER — TELEPHONE (OUTPATIENT)
Age: 55
End: 2025-02-25

## 2025-02-25 DIAGNOSIS — E66.01 OBESITY, CLASS III, BMI 40-49.9 (MORBID OBESITY) (HCC): Primary | ICD-10-CM

## 2025-02-25 NOTE — TELEPHONE ENCOUNTER
Patient would like to know if we can prescribe her a meal replacement drink?    She would like a call back with an update.

## 2025-02-25 NOTE — TELEPHONE ENCOUNTER
Patient called she has a post op appt on 02/27/25 and she needs to reschedule that to the first week in March , I was unable to find any appt with Dr Fernando available until April . Please see if there is anything you can find and please call patient

## 2025-02-26 ENCOUNTER — TELEMEDICINE (OUTPATIENT)
Dept: HEMATOLOGY ONCOLOGY | Facility: CLINIC | Age: 55
End: 2025-02-26
Payer: MEDICARE

## 2025-02-26 ENCOUNTER — TELEPHONE (OUTPATIENT)
Age: 55
End: 2025-02-26

## 2025-02-26 DIAGNOSIS — D50.9 IRON DEFICIENCY ANEMIA, UNSPECIFIED IRON DEFICIENCY ANEMIA TYPE: Primary | ICD-10-CM

## 2025-02-26 DIAGNOSIS — E53.8 LOW SERUM VITAMIN B12: ICD-10-CM

## 2025-02-26 DIAGNOSIS — E66.01 MORBID OBESITY (HCC): Primary | ICD-10-CM

## 2025-02-26 DIAGNOSIS — D69.6 THROMBOCYTOPENIA (HCC): ICD-10-CM

## 2025-02-26 PROCEDURE — 99214 OFFICE O/P EST MOD 30 MIN: CPT | Performed by: PHYSICIAN ASSISTANT

## 2025-02-26 RX ORDER — SUCRALFATE 1 G/1
1 TABLET ORAL 3 TIMES DAILY
Qty: 300 TABLET | Refills: 1 | Status: SHIPPED | OUTPATIENT
Start: 2025-02-26

## 2025-02-26 RX ORDER — MEDICAL SUPPLY, MISCELLANEOUS
1 EACH MISCELLANEOUS
Qty: 90 EACH | Refills: 1 | Status: SHIPPED | OUTPATIENT
Start: 2025-02-26 | End: 2025-03-04 | Stop reason: CLARIF

## 2025-02-26 NOTE — ASSESSMENT & PLAN NOTE
cirrhosis, increased risk of varices.  She has history of GAVE noted on a endoscopy that required APC.  She also has chronic PPI use which could result in malabsorption  She  failed oral iron  Responded to IV iron     GI to continue to monitor and provide additional IV iron as needed     Heme follow up not needed.

## 2025-02-26 NOTE — PROGRESS NOTES
Virtual Regular VisitName: Chely Ruvalcaba      : 1970      MRN: [id number]  Encounter Provider: Cristina Coelho PA-C  Encounter Date: 2025   Encounter department: [facility]  :  Assessment & Plan  Iron deficiency anemia, unspecified iron deficiency anemia type  cirrhosis, increased risk of varices.  She has history of GAVE noted on a endoscopy that required APC.  She also has chronic PPI use which could result in malabsorption  She  failed oral iron  Responded to IV iron     GI to continue to monitor and provide additional IV iron as needed     Heme follow up not needed.         Low serum vitamin B12  Take 1000 mcg B12 daily   Folic acid 800 mcg daily        Thrombocytopenia (HCC)  2/2 liver disease  Continue f/u with GI           History of Present Illness       presents for follow-up visit.  She was last seen in our office in 2023 by different provider.     She was seen at that time for normocytic anemia with iron deficiency as well as peripancreatic lymphadenopathy.     Medical history significant for GERD, COPD, hypertension, elevated cholesterol, liver cirrhosis.     She follows with GI with last colonoscopy in 2020.  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     She had taken oral iron and did not have response to this therefore she was rec Venofer 200 mg weekly x 4 but never completed this.      She states that she was hospitalized in [place] for almost 1 month from March to 2024.  She then was admitted back here in .  She is now residing at [facility] in [place]    After visit in Aug, she had Venofer 200 mg weekly x 4 and B12 1000 mcg weekly x 4    HPI  Review of  Systems   Constitutional:  Positive for fatigue.   Respiratory:  Negative for cough and shortness of breath (on nasal cannula oxygen, 2 L).    Cardiovascular:  Negative for chest pain.   Gastrointestinal:  Positive for diarrhea (occasionally). Negative for abdominal pain, constipation, nausea and vomiting.   Genitourinary:  Positive for frequency (x few days). Negative for difficulty urinating.   Neurological:  Positive for dizziness, light-headedness and headaches.   Hematological: Negative.    Psychiatric/Behavioral: Negative.         Objective   LMP 04/05/2018 (Approximate)     Physical Exam  Constitutional:       Appearance: Normal appearance. She is obese.   HENT:      Head: Normocephalic and atraumatic.   Eyes:      General: No scleral icterus.  Pulmonary:      Effort: Pulmonary effort is normal. No respiratory distress.      Comments: Nasal cannula present     Skin:     Coloration: Skin is not pale.   Neurological:      Mental Status: She is alert and oriented to person, place, and time.   Psychiatric:         Mood and Affect: Mood normal.         Behavior: Behavior normal.         Administrative Statements   Encounter provider Cristina Coelho PA-C    The Patient is located at Home and in the following state in which I hold an active license [place].    The patient was identified by name and date of birth. Chely Ruvalcaba was informed that this is a telemedicine visit and that the visit is being conducted through the Epic Embedded platform. She agrees to proceed..  My office door was closed. No one else was in the room.  She acknowledged consent and understanding of privacy and security of the video platform. The patient has agreed to participate and understands they can discontinue the visit at any time.    I have spent a total time of 15  minutes in caring for this patient on the day of the visit/encounter including Diagnostic results, Risks and benefits of tx options, Instructions for management, Patient  and family education, Impressions, Documenting in the medical record, Reviewing/placing orders in the medical record (including tests, medications, and/or procedures), and Obtaining or reviewing history  .

## 2025-02-26 NOTE — TELEPHONE ENCOUNTER
"Patient calling in stating she can't join the video visit but she is on the \"wait list\". Please call her if she needs to do anything further than waiting on the wait list. [phone]  "

## 2025-02-26 NOTE — LETTER
February 26, 2025     Lisandra Leggett PA-C  [address]  [address]  [facility] [address]    Patient: Chely Ruvalcaba   YOB: 1970   Date of Visit: 2/26/2025       Dear Dr. Leggett:    Thank you for referring Chely Ruvalcaba to me for evaluation. Below are my notes for this consultation.    If you have questions, please do not hesitate to call me. I look forward to following your patient along with you.         Sincerely,        Cristina Coelho PA-C        CC: No Recipients

## 2025-02-28 NOTE — TELEPHONE ENCOUNTER
Pt called in stating that the prescription can not go to a pharmacy it must go to a medical supplier. Pt states that she uses [facility]. Could it be sent there? Otherwise pt states it will not be covered.

## 2025-02-28 NOTE — PROGRESS NOTES
"Bariatric Nutrition Assessment Note--VIRTUAL      i  Name was verified by patient stating name? yes  ii   verified by patient stating? yes  iii  Verification of patient location yes  iv  Verified patient is in state in which I hold an active license? yes  v  Verified the patient is alone? yes  vi  I would like to verify that you were offered a live visit but are now consenting to this telephone visit? yes  vii  This visit is free yes      Type of surgery    Preop (6 wt checks)  Surgery Date: TBD--leaning toward sleeve vs RYGB (does have reflux so likely leaning toward RYGB)  Surgeon: Dr Brittney Benavidez (23)    Nutrition Assessment   Chely Salcido  46 y o   female     Current wt:  258 6# (via office scale as pt in office to see SW)  Ht:  67\"  Body mass index is 40 5 kg/m²      Wt with BMI of 25: 159#   Pre-Op Excess Wt: 99#  LMP 2018 (Approximate)      Yennifer Jewell Equation:    Estimated calories for weight loss 3379-8506 (1-2# per wk wt loss - sedentary )  Estimated protein needs 61-92gm (1 0-1 5 gms/kg IBW (61kg) )   Estimated fluid needs 1830-2135ml (30-35 ml/kg IBW (61kg) )      Weight History   Onset of Obesity: Childhood  Family history of obesity: Yes  Wt Loss Attempts: Exercise  Fasting  Self Created Diets (Portion Control, Healthy Food Choices, etc )  Patient has tried the above for 6 months or more with insufficient weight loss or weight regain, which is why patient has requested to be evaluated for weight loss surgery today  Maximum Wt Lost: -10  When younger was able to maintain weight 190-200#  Today was heaviest ever      Review of History and Medications   Past Medical History:   Diagnosis Date   • Abnormal stress test    • Allergic sinusitis     last assessed - 2017   • Anxiety     last assessed - [id number]   • Arthritis    • Asthma     last assessed - 38SJO9186   • Bilateral lower extremity edema     last assessed -    • Callus of foot     last assessed -    • " Chest pain    • Chronic GERD     last assessed - 36XFG4735   • Colon polyp    • Constipation     Resolved - 88LZA5779   • COPD (chronic obstructive pulmonary disease) ([facility])    • Depression     last assessed - 74IIN6513   • Disease of thyroid gland    • Diverticulitis of colon     last assessed - 44KYJ1062   • Dyspepsia     Resolved - 75UBT9151   • Esophageal reflux     last assessed - 06TYY5972   • Essential hypertriglyceridemia     last assessed - 38LAG4326   • Gastroesophageal reflux disease with esophagitis 11/18/2016   • GERD (gastroesophageal reflux disease)    • Gynecological disorder     last assessed - 77NKW1909   • Hypertension     last assessed - 97EXK0442   • Hypokalemia 06/20/2022   • Hypotension     last assessed - 86Vpa0694   • Kidney stone    • Migraine    • Morbid obesity ([facility] [address] ) 01/11/2019    Formatting of this note might be different from the original  Added automatically from request for surgery [id number]   • Neuropathy    • Positive depression screening 06/19/2022   • Primary hypertriglyceridemia 06/19/2022   • Pulmonary emphysema ([facility] [address] )     last assessed - 65PEJ2027   • Seasonal allergies    • SOB (shortness of breath)    • Urinary frequency     last assessed - 57Ary2540   • Vagina, candidiasis     last assessed - 90Lab8013   • Visual impairment      Past Surgical History:   Procedure Laterality Date   • CARPAL TUNNEL RELEASE      last assessed - 25GFV0134   • CHOLECYSTECTOMY      last assessed - 07URJ4097   • COLONOSCOPY      Complete colonoscopy    • EYE SURGERY      last assessed - 00XPD8799   • FL RETROGRADE PYELOGRAM  6/16/2022   • FL RETROGRADE PYELOGRAM  8/16/2022   • FOOT SURGERY      last assessed - 53AVF6835   • UT CYSTO BLADDER W/URETERAL CATHETERIZATION Left 7/21/2022    Procedure: CYSTOSCOPY RETROGRADE PYELOGRAM WITH INSERTION STENT URETERAL;  Surgeon: Byron Nj MD;  Location: [facility];  Service: Urology   • UT CYSTO/URETERO W/LITHOTRIPSY &INDWELL STENT INSRT Left 6/16/2022 Procedure: CYSTOSCOPY URETEROSCOPY WITH LITHOTRIPSY HOLMIUM LASER, RETROGRADE PYELOGRAM AND INSERTION STENT URETERAL;  Surgeon: Josefa Zayas MD;  Location: [facility];  Service: Urology   • UT CYSTO/URETERO W/LITHOTRIPSY &INDWELL STENT INSRT Left 8/16/2022    Procedure: CYSTOSCOPY USCOPE W/HOLMIUM LASER, RETROGRADE PYELOGRAM&STENT;  Surgeon: Hussain Dong MD;  Location: [facility];  Service: Urology   • UT ESOPHAGOGASTRODUODENOSCOPY TRANSORAL DIAGNOSTIC N/A 2/13/2017    Procedure: ESOPHAGOGASTRODUODENOSCOPY (EGD); Surgeon: Lucy Jimenez MD;  Location: [facility]; Service: Gastroenterology     Social History     Socioeconomic History   • Marital status:      Spouse name: Not on file   • Number of children: Not on file   • Years of education: Not on file   • Highest education level: Not on file   Occupational History   • Not on file   Tobacco Use   • Smoking status: Some Days     Packs/day: 0 25     Years: 30 00     Total pack years: 7 50     Types: Cigarettes     Start date: 26   • Smokeless tobacco: Never   Vaping Use   • Vaping Use: Former   • Substances: Nicotine   Substance and Sexual Activity   • Alcohol use: Not Currently     Comment: wine twice a year   • Drug use: Never   • Sexual activity: Not Currently   Other Topics Concern   • Not on file   Social History Narrative   • Not on file     Social Determinants of Health     Financial Resource Strain: Not on file   Food Insecurity: No Food Insecurity (7/22/2022)    Hunger Vital Sign    • Worried About Running Out of Food in the Last Year: Never true    • Ran Out of Food in the Last Year: Never true   Transportation Needs: No Transportation Needs (7/22/2022)    PRAPARE - Transportation    • Lack of Transportation (Medical): No    • Lack of Transportation (Non-Medical):  No   Physical Activity: Not on file   Stress: Not on file   Social Connections: Not on file   Intimate Partner Violence: Not on file   Housing Stability: Low Risk  (7/22/2022) Housing Stability Vital Sign    • Unable to Pay for Housing in the Last Year: No    • Number of Places Lived in the Last Year: 1    • Unstable Housing in the Last Year: No       Current Outpatient Medications:   •  albuterol (2 5 mg/3 mL) 0 083 % nebulizer solution, Take 3 mL (2 5 mg total) by nebulization every 6 (six) hours as needed for wheezing or shortness of breath, Disp: 30 mL, Rfl: 0  •  albuterol (PROVENTIL HFA,VENTOLIN HFA) 90 mcg/act inhaler, Inhale 2 puffs every 6 (six) hours as needed for wheezing, Disp: 18 g, Rfl: 1  •  buPROPion (WELLBUTRIN XL) 150 mg 24 hr tablet, Take 1 tablet (150 mg total) by mouth every morning, Disp: 90 tablet, Rfl: 1  •  diazepam (VALIUM) 5 mg tablet, Take 1 tablet (5 mg total) by mouth every 6 (six) hours as needed for anxiety, Disp: 30 tablet, Rfl: 0  •  ergocalciferol (VITAMIN D2) 50,000 units, Take 1 capsule (50,000 Units total) by mouth every 14 (fourteen) days, Disp: 12 capsule, Rfl: 1  •  famotidine (PEPCID) 40 MG tablet, Take 1 tablet (40 mg total) by mouth daily at bedtime Take in evening 2 hours prior to bed, Disp: 90 tablet, Rfl: 3  •  ferrous sulfate 324 (65 Fe) mg, Take 1 tablet (324 mg total) by mouth daily before breakfast, Disp: 30 tablet, Rfl: 1  •  fluticasone (FLONASE) 50 mcg/act nasal spray, 1 spray into each nostril 2 (two) times a day (Patient taking differently: 1 spray into each nostril as needed), Disp: 16 g, Rfl: 5  •  gabapentin (NEURONTIN) 100 mg capsule, Take 100 mg by mouth 2 (two) times a day, Disp: , Rfl: 0  •  levothyroxine 112 mcg tablet, Take 1 tablet (112 mcg total) by mouth daily, Disp: 90 tablet, Rfl: 1  •  nicotine (NICODERM CQ) 21 mg/24 hr TD 24 hr patch, Place 1 patch on the skin over 24 hours every 24 hours, Disp: 28 patch, Rfl: 5  •  omeprazole (PriLOSEC) 40 MG capsule, take 1 capsule by mouth twice a day, Disp: 60 capsule, Rfl: 3  •  oxyCODONE-acetaminophen (PERCOCET)  mg per tablet, Take 1 tablet by mouth every 6 (six) hours as needed, Disp: , Rfl:   •  sertraline (ZOLOFT) 100 mg tablet, Take 1 tablet (100 mg total) by mouth 2 (two) times a day, Disp: 180 tablet, Rfl: 1  •  simvastatin (ZOCOR) 20 mg tablet, Take 1 tablet (20 mg total) by mouth daily at bedtime, Disp: 90 tablet, Rfl: 1  •  sucralfate (CARAFATE) 1 g/10 mL suspension, Take 10 mL (1 g total) by mouth 3 (three) times a day before meals, Disp: 2700 mL, Rfl: 1  •  tiotropium (Spiriva HandiHaler) 18 mcg inhalation capsule, Place 1 capsule (18 mcg total) into inhaler and inhale daily, Disp: 30 capsule, Rfl: 5  •  varenicline (CHANTIX) 0 5 mg tablet, Take 1 tablet (0 5 mg total) by mouth 2 (two) times a day, Disp: 60 tablet, Rfl: 1    Food Intake and Lifestyle Assessment   Food Intake Assessment completed via usual diet recall  Wake: 5am, up for a few hours, then back to sleep around 10-11am  Breakfast: has sigifredo at her bed side  Snack: -   Lunch: usually first meal around 9-9xi-fbfmqb suzanne or tomato soup with grilled cheese  Snack: orange/watermelon/apples/bananas and a glass of whole milk    Grazes on chips or honey mustard pretzel bites  Dinner: 6-7pm:  Last night was chicken and french fries or Hamburger helper OR beef stroganoff or steak, potatoes and peas Or beef roast with potatoes and veggies OR beef stew/soup OR hot dogs (2) and slaw  Snack: popcorn OR fruit (2 navel oranges) and whole [id number]  Awake until 2-3am (trouble sleeping)    Beverage intake: water, whole milk, juice, regular soda, coffee/tea and alcohol (hasn't had in about 4-5 years)  Protein supplement: none at this time  Estimated protein intake per day: <60gm  Estimated fluid intake per day: Pespi (2-3 bottles per day more recently, usually 4-5 bottles), 32-64oz water, 12oz whole milk a few times per week, coffee sometimes (3x/week) with sugar and flavored creamer  Meals eaten away from home: very little  Typical meal pattern: 2 meals per day and 3-4 snacks per day  Eating Behaviors: Consumption of high "calorie/ high fat foods, Consumption of high calorie beverages, Large portion sizes, Frequent snacking/ grazing, Mindless eating, Emotional eating, Craves sweet foods and Craves salty foods  Food allergies or intolerances: Allergies   Allergen Reactions   • Hydrocodone-Acetaminophen Hives   • Ketorolac Hives and Dizziness   • Toradol [Ketorolac Tromethamine] Other (See Comments)     hypotension   • Ultram [Tramadol] Nausea Only, GI Intolerance and Vomiting     Cultural or Moravian considerations: -    Physical Assessment  Physical Activity  Types of exercise: none, just house work  Current physical limitations: back pain    Psychosocial Assessment   Support systems: friend(s)  Socioeconomic factors: does not work, lives alone  She does all the cooking and food shopping  Family lives in [facility], she has been in [place] for the past 6-7 years  Nutrition Diagnosis  Diagnosis: Overweight / Obesity (NC-3 3)  Related to: Physical inactivity and Excessive energy intake  As Evidenced by: BMI >25     Nutrition Prescription: Recommend the following diet  Regular    Interventions and Teaching   Discussed pre-op and post-op nutrition guidelines  Patient educated and handouts provided    Surgical changes to stomach / GI  Capacity of post-surgery stomach  Diet progression  Adequate hydration  Sugar and fat restriction to decrease \"dumping syndrome\"  Fat restriction to decrease steatorrhea  Expected weight loss  Weight loss plateaus/ possibility of weight regain  Exercise  Suggestions for pre-op diet  Nutrition considerations after surgery  Protein supplements  Meal planning and preparation  Appropriate carbohydrate, protein, and fat intake, and food/fluid choices to maximize safe weight loss, nutrient intake, and tolerance   Dietary and lifestyle changes  Possible problems with poor eating habits  Intuitive eating  Techniques for self monitoring and keeping daily food journal  Potential for food intolerance after surgery, and " 18.3 ways to deal with them including: lactose intolerance, nausea, reflux, vomiting, diarrhea, food intolerance, appetite changes, gas  Vitamin / Mineral supplementation of Multivitamin with minerals and Vitamin D  Post-operative pregnancy guidelines    Patient is not currently pregnant and doesn't desire to become pregnant a minimum of one year post-op    Education provided to: patient    Barriers to learning: No barriers identified    Readiness to change: preparation    Prior research on procedure: books, internet and friends or family    Comprehension: verbalizes understanding     Expected Compliance: good    Recommendations  Pt is an appropriate candidate for surgery   Yes    Evaluation / Monitoring  Dietitian to Monitor: Eating pattern as discussed Tolerance of nutrition prescription Body weight Lab values Physical activity    Pre-op weight goals:  • Do NOT Gain  • Can go to BMI of 35:   223#  • Encouraged weight loss w/ diet and lifestyle changes  • Will be started on a 2 week liver shrinking diet, possibly shorter/longer as per the discretion of the surgeon/team, directly prior to surgery    Goals  Eliminate sugar sweetened beverages--decrease the soda and increase the water  Food journal via baritastic  Exercise:  Become less sedentary and try chair yoga, resistance bands, etc  Complete lesson plans 1-6  Eat 3 meals per day  Can use protein shake as a meal replacement  Eliminate mindless snacking  Smoker:  60 days July 30, 90 days Aug 29--needs nicotine test  Will order labs at 3rd wt check (Aug) when needs nicotine test  Surgeon consult on 6/23/23 which will be wt check #1  2nd wt check virtual with RD on 7/24    Time Spent:   1 Hour

## 2025-03-03 ENCOUNTER — HOSPITAL ENCOUNTER (EMERGENCY)
Facility: HOSPITAL | Age: 55
Discharge: HOME | End: 2025-03-03
Attending: EMERGENCY MEDICINE
Payer: MEDICARE

## 2025-03-03 ENCOUNTER — APPOINTMENT (EMERGENCY)
Dept: RADIOLOGY | Facility: HOSPITAL | Age: 55
End: 2025-03-03
Payer: MEDICARE

## 2025-03-03 ENCOUNTER — TELEPHONE (OUTPATIENT)
Dept: BARIATRICS | Facility: CLINIC | Age: 55
End: 2025-03-03

## 2025-03-03 ENCOUNTER — NURSE TRIAGE (OUTPATIENT)
Age: 55
End: 2025-03-03

## 2025-03-03 ENCOUNTER — TELEPHONE (OUTPATIENT)
Dept: CARDIOLOGY CLINIC | Facility: CLINIC | Age: 55
End: 2025-03-03

## 2025-03-03 VITALS
WEIGHT: 250 LBS | OXYGEN SATURATION: 96 % | HEART RATE: 104 BPM | DIASTOLIC BLOOD PRESSURE: 62 MMHG | SYSTOLIC BLOOD PRESSURE: 131 MMHG | BODY MASS INDEX: 40.35 KG/M2 | TEMPERATURE: 97.5 F | RESPIRATION RATE: 16 BRPM

## 2025-03-03 DIAGNOSIS — R06.02 SOB (SHORTNESS OF BREATH): ICD-10-CM

## 2025-03-03 DIAGNOSIS — R00.0 TACHYCARDIA: Primary | ICD-10-CM

## 2025-03-03 LAB
ALBUMIN SERPL BCG-MCNC: 4.1 G/DL (ref 3.5–5)
ALP SERPL-CCNC: 74 U/L (ref 34–104)
ALT SERPL W P-5'-P-CCNC: 24 U/L (ref 7–52)
ANION GAP SERPL CALCULATED.3IONS-SCNC: 9 MMOL/L (ref 4–13)
AST SERPL W P-5'-P-CCNC: 39 U/L (ref 13–39)
BASOPHILS # BLD AUTO: 0.03 THOUSANDS/ÂΜL (ref 0–0.1)
BASOPHILS NFR BLD AUTO: 1 % (ref 0–1)
BILIRUB SERPL-MCNC: 1.18 MG/DL (ref 0.2–1)
BNP SERPL-MCNC: 15 PG/ML (ref 0–100)
BUN SERPL-MCNC: 19 MG/DL (ref 5–25)
CALCIUM SERPL-MCNC: 9.6 MG/DL (ref 8.4–10.2)
CHLORIDE SERPL-SCNC: 110 MMOL/L (ref 96–108)
CO2 SERPL-SCNC: 21 MMOL/L (ref 21–32)
CREAT SERPL-MCNC: 1 MG/DL (ref 0.6–1.3)
D DIMER PPP FEU-MCNC: 0.3 UG/ML FEU
EOSINOPHIL # BLD AUTO: 0.04 THOUSAND/ÂΜL (ref 0–0.61)
EOSINOPHIL NFR BLD AUTO: 1 % (ref 0–6)
ERYTHROCYTE [DISTWIDTH] IN BLOOD BY AUTOMATED COUNT: 16.1 % (ref 11.6–15.1)
FLUAV RNA RESP QL NAA+PROBE: NEGATIVE
FLUBV RNA RESP QL NAA+PROBE: NEGATIVE
GFR SERPL CREATININE-BSD FRML MDRD: 64 ML/MIN/1.73SQ M
GLUCOSE SERPL-MCNC: 179 MG/DL (ref 65–140)
HCT VFR BLD AUTO: 47.1 % (ref 34.8–46.1)
HGB BLD-MCNC: 14.6 G/DL (ref 11.5–15.4)
IMM GRANULOCYTES # BLD AUTO: 0.01 THOUSAND/UL (ref 0–0.2)
IMM GRANULOCYTES NFR BLD AUTO: 0 % (ref 0–2)
LYMPHOCYTES # BLD AUTO: 0.81 THOUSANDS/ÂΜL (ref 0.6–4.47)
LYMPHOCYTES NFR BLD AUTO: 14 % (ref 14–44)
MAGNESIUM SERPL-MCNC: 2.1 MG/DL (ref 1.9–2.7)
MCH RBC QN AUTO: 30.2 PG (ref 26.8–34.3)
MCHC RBC AUTO-ENTMCNC: 31 G/DL (ref 31.4–37.4)
MCV RBC AUTO: 98 FL (ref 82–98)
MONOCYTES # BLD AUTO: 0.29 THOUSAND/ÂΜL (ref 0.17–1.22)
MONOCYTES NFR BLD AUTO: 5 % (ref 4–12)
NEUTROPHILS # BLD AUTO: 4.82 THOUSANDS/ÂΜL (ref 1.85–7.62)
NEUTS SEG NFR BLD AUTO: 79 % (ref 43–75)
NRBC BLD AUTO-RTO: 0 /100 WBCS
PLATELET # BLD AUTO: 85 THOUSANDS/UL (ref 149–390)
PMV BLD AUTO: 10.8 FL (ref 8.9–12.7)
POTASSIUM SERPL-SCNC: 4.4 MMOL/L (ref 3.5–5.3)
PROT SERPL-MCNC: 7.3 G/DL (ref 6.4–8.4)
RBC # BLD AUTO: 4.83 MILLION/UL (ref 3.81–5.12)
RSV RNA RESP QL NAA+PROBE: NEGATIVE
SARS-COV-2 RNA RESP QL NAA+PROBE: NEGATIVE
SODIUM SERPL-SCNC: 140 MMOL/L (ref 135–147)
TSH SERPL DL<=0.05 MIU/L-ACNC: 3.8 UIU/ML (ref 0.45–4.5)
WBC # BLD AUTO: 6 THOUSAND/UL (ref 4.31–10.16)

## 2025-03-03 PROCEDURE — 71045 X-RAY EXAM CHEST 1 VIEW: CPT

## 2025-03-03 PROCEDURE — 99284 EMERGENCY DEPT VISIT MOD MDM: CPT

## 2025-03-03 PROCEDURE — 80053 COMPREHEN METABOLIC PANEL: CPT | Performed by: EMERGENCY MEDICINE

## 2025-03-03 PROCEDURE — 36415 COLL VENOUS BLD VENIPUNCTURE: CPT | Performed by: EMERGENCY MEDICINE

## 2025-03-03 PROCEDURE — 83735 ASSAY OF MAGNESIUM: CPT | Performed by: EMERGENCY MEDICINE

## 2025-03-03 PROCEDURE — 85379 FIBRIN DEGRADATION QUANT: CPT | Performed by: EMERGENCY MEDICINE

## 2025-03-03 PROCEDURE — 84443 ASSAY THYROID STIM HORMONE: CPT | Performed by: EMERGENCY MEDICINE

## 2025-03-03 PROCEDURE — 93005 ELECTROCARDIOGRAM TRACING: CPT

## 2025-03-03 PROCEDURE — 0241U HB NFCT DS VIR RESP RNA 4 TRGT: CPT | Performed by: EMERGENCY MEDICINE

## 2025-03-03 PROCEDURE — 83880 ASSAY OF NATRIURETIC PEPTIDE: CPT | Performed by: EMERGENCY MEDICINE

## 2025-03-03 PROCEDURE — 85025 COMPLETE CBC W/AUTO DIFF WBC: CPT | Performed by: EMERGENCY MEDICINE

## 2025-03-03 PROCEDURE — 96360 HYDRATION IV INFUSION INIT: CPT

## 2025-03-03 PROCEDURE — 99284 EMERGENCY DEPT VISIT MOD MDM: CPT | Performed by: EMERGENCY MEDICINE

## 2025-03-03 RX ADMIN — SODIUM CHLORIDE 1000 ML: 0.9 INJECTION, SOLUTION INTRAVENOUS at 14:51

## 2025-03-03 NOTE — ED PROVIDER NOTES
Time reflects when diagnosis was documented in both MDM as applicable and the Disposition within this note       Time User Action Codes Description Comment    3/3/2025  4:25 PM Rashel Bhardwaj Add [R00.0] Tachycardia           ED Disposition       ED Disposition   Discharge    Condition   Stable    Date/Time   Mon Mar 3, 2025  4:25 PM    Comment   Chely Ruvalcaba discharge to home/self care.                   Assessment & Plan       Medical Decision Making  54-year-old female presenting for evaluation of tachycardia.  Symptoms over the last 2 to 3 days.  Heart rate has been elevated according to her watch.  Admits to some lightheadedness and a mild generalized headache similar to previous headaches.  Denies chest pain, worsening shortness of breath from baseline.  Denies cough, fevers or chills.  Differential includes dehydration versus thyroid issues versus PE.  Will obtain CBC, CMP, mag, TSH.  Will obtain D-dimer.  Will give IV fluids.  Labs within normal limits.  TSH within normal limits.  D-dimer normal.  Patient feeling better.  Still slightly tachycardic.  Told to follow-up with her cardiologist for potential medication management.    Problems Addressed:  Tachycardia: acute illness or injury    Amount and/or Complexity of Data Reviewed  Labs: ordered.  Radiology: ordered.        ED Course as of 03/03/25 2024   Mon Mar 03, 2025   1623 Spoke with patient about her lab work and imaging.  She is feeling better.  Heart rate 108.  Told her she needs to follow-up with her cardiologist in regards to her tachycardia.       Medications   sodium chloride 0.9 % bolus 1,000 mL (0 mL Intravenous Stopped 3/3/25 1551)       ED Risk Strat Scores                            SBIRT 20yo+      Flowsheet Row Most Recent Value   Initial Alcohol Screen: US AUDIT-C     1. How often do you have a drink containing alcohol? 0 Filed at: 03/03/2025 9708   2. How many drinks containing alcohol do you have on a typical day you are drinking?  0  Filed at: 03/03/2025 1404   3a. Male UNDER 65: How often do you have five or more drinks on one occasion? 0 Filed at: 03/03/2025 1404   3b. FEMALE Any Age, or MALE 65+: How often do you have 4 or more drinks on one occassion? 0 Filed at: 03/03/2025 1404   Audit-C Score 0 Filed at: 03/03/2025 1404   JEFF: How many times in the past year have you...    Used an illegal drug or used a prescription medication for non-medical reasons? Never Filed at: 03/03/2025 1404                            History of Present Illness       Chief Complaint   Patient presents with    Headache     Patient arrives with complaints of high heart rate over the past 2-3 days with headache and dizziness.       Past Medical History:   Diagnosis Date    Abnormal stress test     Acute cystitis without hematuria 05/07/2018    Acute duodenal ulcer with bleeding 01/16/2023    Allergic sinusitis     last assessed - 03Apr2017    Anemia Around december    Anxiety     last assessed - 04Mar2016    Arthritis     Asthma     last assessed - 04Mar2016    Bilateral lower extremity edema     last assessed - [id number]    Callus of foot     last assessed - 22Jun2016    Chest pain     CHF (congestive heart failure) ([facility]) 03/2024    Chronic GERD     last assessed - 16Jan2017    Colon polyp     Constipation     Resolved - 13Jan2017    COPD (chronic obstructive pulmonary disease) ([facility])     Depression     last assessed - 04Mar2016    Disease of thyroid gland     Diverticulitis of colon     last assessed - 04Mar2016    Dyspepsia     Resolved - [id number]    Edema, lower extremity 08/07/2020    Esophageal reflux     last assessed - 04Mar2016    Essential hypertriglyceridemia     last assessed - [id number]    Fatty liver 01/16/2023    Gastroesophageal reflux disease with esophagitis 11/18/2016    GERD (gastroesophageal reflux disease)     Gynecological disorder     last assessed - 04Mar2016    History of transfusion     Hydroureteronephrosis 06/30/2022    Hypertension      last assessed - 04Mar2016    Hypokalemia 06/20/2022    Hypotension     last assessed - [id number]    Kidney stone     Left ureteral stone with hydronephrosis 07/21/2022    Migraine     Morbid obesity (HCC) 01/11/2019    Formatting of this note might be different from the original. Added automatically from request for surgery [id number]    Neuropathy     Obesity     Other chest pain 11/08/2018    Pancreatic lesion 03/13/2023    Pneumonia 03/2024    Positive depression screening 06/19/2022    Primary hypertriglyceridemia 06/19/2022    Pulmonary emphysema (HCC)     last assessed - 04Mar2016    Seasonal allergies     Severe obesity (HCC) 01/16/2023    Skin tag 07/17/2023    SOB (shortness of breath)     Strain of groin 07/17/2023    Urinary frequency     last assessed - 22Jun2016    Vagina, candidiasis     last assessed - 22Jun2016    Very heavy cigarette smoker 01/16/2023    Visual impairment       Past Surgical History:   Procedure Laterality Date    CARPAL TUNNEL RELEASE      last assessed - 04MAr2016    CHOLECYSTECTOMY      last assessed - 04Mar2016    COLONOSCOPY      Complete colonoscopy     EYE SURGERY      last assessed - 04Mar2016    FL RETROGRADE PYELOGRAM  6/16/2022    FL RETROGRADE PYELOGRAM  8/16/2022    FOOT SURGERY      last assessed - 04Mar2016    UT CYSTO/URETERO W/LITHOTRIPSY &INDWELL STENT INSRT Left 6/16/2022    Procedure: CYSTOSCOPY URETEROSCOPY WITH LITHOTRIPSY HOLMIUM LASER, RETROGRADE PYELOGRAM AND INSERTION STENT URETERAL;  Surgeon: Sammy Ferrer MD;  Location: [facility];  Service: Urology    UT CYSTO/URETERO W/LITHOTRIPSY &INDWELL STENT INSRT Left 8/16/2022    Procedure: CYSTOSCOPY USCOPE W/HOLMIUM LASER, RETROGRADE PYELOGRAM&STENT;  Surgeon: Sudheer Bradford MD;  Location: [facility];  Service: Urology    UT CYSTOURETHROSCOPY W/URETERAL CATHETERIZATION Left 7/21/2022    Procedure: CYSTOSCOPY RETROGRADE PYELOGRAM WITH INSERTION STENT URETERAL;  Surgeon: Facundo Matamoros MD;  Location: [facility];   Service: Urology    NV ESOPHAGOGASTRODUODENOSCOPY TRANSORAL DIAGNOSTIC N/A 2017    Procedure: ESOPHAGOGASTRODUODENOSCOPY (EGD);  Surgeon: Alison Saleem MD;  Location: [facility];  Service: Gastroenterology      Family History   Problem Relation Age of Onset    Obesity Mother     Thyroid disease Mother     Cancer Mother 71    Vision loss Mother     Thyroid disease unspecified Mother     Obesity Sister     Coronary artery disease Sister     Stroke Sister     Asthma Sister     Glaucoma Sister     No Known Problems Maternal Aunt     Diabetes Maternal Uncle     Lung cancer Maternal Grandmother     Cancer Maternal Grandfather     Diabetes Maternal Grandfather     No Known Problems Paternal Grandmother     No Known Problems Paternal Grandfather     Hypertension Other     Lung cancer Other     Hypertension Other     Lung cancer Other     Lung cancer Other       Social History     Tobacco Use    Smoking status: Former     Current packs/day: 0.00     Average packs/day: 0.9 packs/day for 65.0 years (60.0 ttl pk-yrs)     Types: Cigarettes     Start date: 1983     Quit date: 3/6/2024     Years since quittin.9    Smokeless tobacco: Former     Quit date: 3/3/2024    Tobacco comments:     I quit smoking back in March   Vaping Use    Vaping status: Former    Start date: 2019    Quit date: 2019    Substances: Nicotine   Substance Use Topics    Alcohol use: Not Currently    Drug use: No      E-Cigarette/Vaping    E-Cigarette Use Former User     Start Date 19     Quit Date 19       E-Cigarette/Vaping Substances    Nicotine Yes     THC No     CBD No     Flavoring No     Other No     Unknown No       I have reviewed and agree with the history as documented.     Patient is a 54-year-old female who presents for evaluation of a fast heart rate.  Patient says over the last 3 days her heart rate has been elevated.  She says it has been as high as the 120s according to her watch.  She called her cardiologist   "Abran today who told her to come to the emergency department for evaluation.  The patient also admits to some lightheadedness which she describes as a \"buzz\" type feeling.  She denies room spinning sensation, balance issues.  Denies any visual changes, chest pain, diaphoresis, nausea or vomiting.  Patient says she does have shortness of breath but this has been present since \"March of last year.\"  She does have a history of hypothyroid and takes levothyroxine.  She admits to a mild dry cough but denies any fevers or chills.  She also admits to a generalized headache which is similar to headache she has had in the past.        Review of Systems   Constitutional:  Negative for fever and unexpected weight change.   HENT:  Negative for congestion, ear pain, sore throat and trouble swallowing.    Eyes:  Negative for pain and redness.   Respiratory:  Negative for cough, chest tightness and shortness of breath.    Cardiovascular:  Negative for chest pain and leg swelling.   Gastrointestinal:  Negative for abdominal distention, abdominal pain, diarrhea and vomiting.   Endocrine: Negative for polyuria.   Genitourinary:  Negative for dysuria, hematuria, pelvic pain and vaginal bleeding.   Musculoskeletal:  Negative for back pain and myalgias.   Skin:  Negative for color change and rash.   Neurological:  Positive for light-headedness and headaches. Negative for dizziness, syncope and weakness.           Objective       ED Triage Vitals [03/03/25 1401]   Temperature Pulse Blood Pressure Respirations SpO2 Patient Position - Orthostatic VS   97.6 °F (36.4 °C) 102 155/70 18 93 % Sitting      Temp Source Heart Rate Source BP Location FiO2 (%) Pain Score    Temporal Monitor Left arm -- 7      Vitals      Date and Time Temp Pulse SpO2 Resp BP Pain Score FACES Pain Rating User   03/03/25 1545 97.5 °F (36.4 °C) 104 96 % 16 131/62 -- -- AM   03/03/25 1530 -- 104 96 % 18 115/56 -- -- AM   03/03/25 1500 -- 100 97 % 19 127/58 -- -- AM "   03/03/25 1401 97.6 °F (36.4 °C) 102 93 % 18 155/70 7 -- DK            Physical Exam  Vitals and nursing note reviewed.   Constitutional:       General: She is not in acute distress.     Appearance: She is well-developed.   HENT:      Head: Normocephalic and atraumatic.      Right Ear: External ear normal.      Left Ear: External ear normal.      Nose: Nose normal.      Mouth/Throat:      Mouth: Mucous membranes are moist.      Pharynx: No oropharyngeal exudate.   Eyes:      Conjunctiva/sclera: Conjunctivae normal.      Pupils: Pupils are equal, round, and reactive to light.   Cardiovascular:      Rate and Rhythm: Regular rhythm. Tachycardia present.      Heart sounds: Normal heart sounds. No murmur heard.     No friction rub. No gallop.   Pulmonary:      Effort: Pulmonary effort is normal. No respiratory distress.      Breath sounds: Normal breath sounds. No wheezing or rales.   Abdominal:      General: There is no distension.      Palpations: Abdomen is soft.      Tenderness: There is no abdominal tenderness. There is no guarding.   Musculoskeletal:         General: No swelling, tenderness or deformity. Normal range of motion.      Cervical back: Normal range of motion and neck supple.   Lymphadenopathy:      Cervical: No cervical adenopathy.   Skin:     General: Skin is warm and dry.   Neurological:      General: No focal deficit present.      Mental Status: She is alert and oriented to person, place, and time. Mental status is at baseline.      Cranial Nerves: No cranial nerve deficit.      Sensory: No sensory deficit.      Motor: No weakness or abnormal muscle tone.      Coordination: Coordination normal.         Results Reviewed       Procedure Component Value Units Date/Time    TSH, 3rd generation with Free T4 reflex [[id number]]  (Normal) Collected: 03/03/25 1451    Lab Status: Final result Specimen: Blood from Arm, Left Updated: 03/03/25 1538     TSH 3RD GENERATON 3.803 uIU/mL     FLU/RSV/COVID - if FLU/RSV  clinically relevant (2hr TAT) [[id number]]  (Normal) Collected: 03/03/25 1451    Lab Status: Final result Specimen: Nares from Nose Updated: 03/03/25 1536     SARS-CoV-2 Negative     INFLUENZA A PCR Negative     INFLUENZA B PCR Negative     RSV PCR Negative    Narrative:      This test has been performed using the CoV-2/Flu/RSV plus assay on the [employer] GeneXpert platform. This test has been validated by the  and verified by the performing laboratory.     This test is designed to amplify and detect the following: nucleocapsid (N), envelope (E), and RNA-dependent RNA polymerase (RdRP) genes of the SARS-CoV-2 genome; matrix (M), basic polymerase (PB2), and acidic protein (PA) segments of the influenza A genome; matrix (M) and non-structural protein (NS) segments of the influenza B genome, and the nucleocapsid genes of RSV A and RSV B.     Positive results are indicative of the presence of Flu A, Flu B, RSV, and/or SARS-CoV-2 RNA. Positive results for SARS-CoV-2 or suspected novel influenza should be reported to state, local, or federal health departments according to local reporting requirements.      All results should be assessed in conjunction with clinical presentation and other laboratory markers for clinical management.     FOR PEDIATRIC PATIENTS - copy/paste COVID Guidelines URL to browser: [email or web address]       B-Type Natriuretic Peptide(BNP) [[id number]]  (Normal) Collected: 03/03/25 1451    Lab Status: Final result Specimen: Blood from Arm, Left Updated: 03/03/25 1528     BNP 15 pg/mL     D-dimer, quantitative [[id number]]  (Normal) Collected: 03/03/25 1451    Lab Status: Final result Specimen: Blood from Arm, Left Updated: 03/03/25 1522     D-Dimer, Quant 0.30 ug/ml FEU     Narrative:      In the evaluation for possible pulmonary embolism, in the appropriate (Well's Score of 4 or less) patient, the age adjusted d-dimer cutoff for this  patient can be calculated as:    Age x 0.01 (in ug/mL) for Age-adjusted D-dimer exclusion threshold for a patient over 50 years.    Comprehensive metabolic panel [[id number]]  (Abnormal) Collected: 03/03/25 1451    Lab Status: Final result Specimen: Blood from Arm, Left Updated: 03/03/25 1516     Sodium 140 mmol/L      Potassium 4.4 mmol/L      Chloride 110 mmol/L      CO2 21 mmol/L      ANION GAP 9 mmol/L      BUN 19 mg/dL      Creatinine 1.00 mg/dL      Glucose 179 mg/dL      Calcium 9.6 mg/dL      AST 39 U/L      ALT 24 U/L      Alkaline Phosphatase 74 U/L      Total Protein 7.3 g/dL      Albumin 4.1 g/dL      Total Bilirubin 1.18 mg/dL      eGFR 64 ml/min/1.73sq m     Narrative:      National Kidney Disease Foundation guidelines for Chronic Kidney Disease (CKD):     Stage 1 with normal or high GFR (GFR > 90 mL/min/1.73 square meters)    Stage 2 Mild CKD (GFR = 60-89 mL/min/1.73 square meters)    Stage 3A Moderate CKD (GFR = 45-59 mL/min/1.73 square meters)    Stage 3B Moderate CKD (GFR = 30-44 mL/min/1.73 square meters)    Stage 4 Severe CKD (GFR = 15-29 mL/min/1.73 square meters)    Stage 5 End Stage CKD (GFR <15 mL/min/1.73 square meters)  Note: GFR calculation is accurate only with a steady state creatinine    Magnesium [[id number]]  (Normal) Collected: 03/03/25 1451    Lab Status: Final result Specimen: Blood from Arm, Left Updated: 03/03/25 1516     Magnesium 2.1 mg/dL     CBC and differential [[id number]]  (Abnormal) Collected: 03/03/25 1451    Lab Status: Final result Specimen: Blood from Arm, Left Updated: 03/03/25 1506     WBC 6.00 Thousand/uL      RBC 4.83 Million/uL      Hemoglobin 14.6 g/dL      Hematocrit 47.1 %      MCV 98 fL      MCH 30.2 pg      MCHC 31.0 g/dL      RDW 16.1 %      MPV 10.8 fL      Platelets 85 Thousands/uL      nRBC 0 /100 WBCs      Segmented % 79 %      Immature Grans % 0 %      Lymphocytes % 14 %      Monocytes % 5 %      Eosinophils Relative 1 %      Basophils Relative 1 %       "Absolute Neutrophils 4.82 Thousands/µL      Absolute Immature Grans 0.01 Thousand/uL      Absolute Lymphocytes 0.81 Thousands/µL      Absolute Monocytes 0.29 Thousand/µL      Eosinophils Absolute 0.04 Thousand/µL      Basophils Absolute 0.03 Thousands/µL             XR chest 1 view portable   Final Interpretation by Vic Cee MD (03/03 1611)      Interstitial prominence could be on the basis of slight edema or atypical pneumonitis but unchanged from the prior study.            Workstation performed: [id number]             Procedures    ED Medication and Procedure Management   Prior to Admission Medications   Prescriptions Last Dose Informant Patient Reported? Taking?   B-D SYRINGE/NEEDLE 1CC/25GX5/8 25G X 5/8\" 1 ML MISC  Self Yes No   Blood Glucose Monitoring Suppl (OneTouch Verio) w/Device KIT  Self No No   Sig: Check blood sugar once a day   Patient not taking: Reported on 2/6/2025   Insulin Glargine Solostar (Lantus SoloStar) 100 UNIT/ML SOPN   No No   Sig: Inject 0.5 mL (50 Units total) under the skin daily at bedtime   Insulin Pen Needle 31G X 5 MM MISC  Self No No   Sig: Inject under the skin if needed (Insuline injections)   Nutritional Supplements (Glucerna Meal Replacement) BAR   No No   Sig: Take 1 Bar by mouth daily after breakfast   OneTouch Delica Lancets 30G MISC  Self No No   Sig: Check blood sugars once a day   Patient not taking: Reported on 2/6/2025   albuterol (PROVENTIL HFA,VENTOLIN HFA) 90 mcg/act inhaler  Self No No   Sig: Inhale 2 puffs every 6 (six) hours as needed for wheezing   bisacodyl (FLEET) 10 MG/30ML ENEM  Self Yes No   Sig: Insert 10 mg into the rectum once   Patient not taking: Reported on 1/31/2025   buPROPion (WELLBUTRIN XL) 300 mg 24 hr tablet  Self No No   Sig: Take 1 tablet (300 mg total) by mouth every morning   budesonide (PULMICORT) 0.5 mg/2 mL nebulizer solution  Self Yes No   Sig: Take 0.5 mg by nebulization 2 (two) times a day Rinse mouth after use.   diazepam " (VALIUM) 5 mg tablet  Self No No   Sig: take 1 tablet by mouth at bedtime if needed for anxiety   ergocalciferol (VITAMIN D2) 50,000 units  Self No No   Sig: Take 1 capsule (50,000 Units total) by mouth every 14 (fourteen) days   famotidine (PEPCID) 40 MG tablet  Self No No   Sig: Take 1 tablet (40 mg total) by mouth daily at bedtime Take in evening 2 hours prior to bed   ferrous sulfate 324 (65 Fe) mg  Self No No   Sig: Take 1 tablet (324 mg total) by mouth daily before breakfast   folic acid (FOLVITE) 1 mg tablet  Self No No   Sig: Take 1 tablet (1 mg total) by mouth daily   furosemide (LASIX) 20 mg tablet   No No   Sig: TAKE 1/2 TABLET BY MOUTH DAILY   gabapentin (NEURONTIN) 100 mg capsule  Self Yes No   Sig: Take 100 mg by mouth 2 (two) times a day   glucose blood (OneTouch Verio) test strip  Self No No   Sig: Check blood sugar once a day   Patient not taking: Reported on 2/6/2025   insulin lispro (HumaLOG) 100 units/mL injection pen   No No   Sig: Inject 12 Units under the skin 3 (three) times a day with meals   Patient taking differently: Inject 5 Units under the skin 3 (three) times a day with meals   ipratropium (ATROVENT HFA) 17 mcg/act inhaler  Self Yes No   Sig: Inhale 2 puffs every 6 (six) hours   ipratropium-albuterol (DUO-NEB) 0.5-2.5 mg/3 mL nebulizer solution  Self No No   Sig: Take 3 mL by nebulization 4 (four) times a day   lactulose (CHRONULAC) 10 g/15 mL solution  Self No No   Sig: Take 30 mL (20 g total) by mouth daily as needed (constipation)   levothyroxine 88 mcg tablet  Self No No   Sig: Take 1 tablet (88 mcg total) by mouth daily in the early morning   Patient taking differently: Take 112 mcg by mouth daily in the early morning   midodrine (PROAMATINE) 2.5 mg tablet  Self Yes No   Sig: Take 2.5 mg by mouth every other day   nicotine (NICODERM CQ) 21 mg/24 hr TD 24 hr patch  Self No No   Sig: Place 1 patch on the skin over 24 hours every 24 hours   Patient not taking: Reported on 10/31/2024    omeprazole (PriLOSEC) 40 MG capsule   No No   Sig: take 1 capsule by mouth twice a day   ondansetron (ZOFRAN) 4 mg tablet  Self No No   Sig: Take 1 tablet (4 mg total) by mouth every 8 (eight) hours as needed for nausea or vomiting   oxyCODONE-acetaminophen (PERCOCET)  mg per tablet  Self Yes No   Sig: Take 1 tablet by mouth every 6 (six) hours as needed for severe pain   oxybutynin (DITROPAN-XL) 10 MG 24 hr tablet  Self Yes No   Sig: Take 10 mg by mouth daily at bedtime   Patient not taking: Reported on 1/31/2025   oxygen gas  Self No No   Sig: Inhale 2 L/min at 120,000 mL/hr continuous May use 3L with exertion per Bella ALVAREZ Keep sat >88%   polyethylene glycol (GOLYTELY) 4000 mL solution   No No   Sig: Take 4,000 mL by mouth once for 1 dose   potassium chloride (Klor-Con M20) 20 mEq tablet   No No   Sig: take 1 tablet by mouth once daily   semaglutide, 0.25 or 0.5 mg/dose, (Ozempic, 0.25 or 0.5 MG/DOSE,) 2 mg/3 mL injection pen   No No   Sig: Inject 0.75 mL (0.5 mg total) under the skin every 7 days   Patient taking differently: Inject 0.5 mg under the skin every 7 days Instructed not to start until after 2/14 surgery   senna-docusate sodium (SENOKOT S) 8.6-50 mg per tablet  Self Yes No   Sig: Take 1 tablet by mouth daily   Patient not taking: Reported on 1/31/2025   sertraline (ZOLOFT) 100 mg tablet  Self No No   Sig: Take 1 tablet (100 mg total) by mouth 2 (two) times a day   simethicone (MYLICON) 125 MG chewable tablet  Self Yes No   Sig: Chew 125 mg every 6 (six) hours as needed for flatulence   simvastatin (ZOCOR) 20 mg tablet  Self No No   Sig: Take 1 tablet (20 mg total) by mouth daily at bedtime   spironolactone (ALDACTONE) 25 mg tablet  Self No No   Sig: Take 1 tablet (25 mg total) by mouth daily   sucralfate (CARAFATE) 1 g tablet   No No   Sig: take 1 tablet by mouth three times a day   varenicline (CHANTIX) 0.5 mg tablet  Self No No   Sig: Take 1 tablet (0.5 mg total) by mouth 2 (two)  "times a day   Patient not taking: Reported on 10/31/2024      Facility-Administered Medications: None     Discharge Medication List as of 3/3/2025  4:27 PM        CONTINUE these medications which have NOT CHANGED    Details   albuterol (PROVENTIL HFA,VENTOLIN HFA) 90 mcg/act inhaler Inhale 2 puffs every 6 (six) hours as needed for wheezing, Starting Thu 5/2/2024, Normal      B-D SYRINGE/NEEDLE 1CC/25GX5/8 25G X 5/8\" 1 ML MISC Historical Med      bisacodyl (FLEET) 10 MG/30ML ENEM Insert 10 mg into the rectum once, Historical Med      Blood Glucose Monitoring Suppl (OneTouch Verio) w/Device KIT Check blood sugar once a day, Normal      budesonide (PULMICORT) 0.5 mg/2 mL nebulizer solution Take 0.5 mg by nebulization 2 (two) times a day Rinse mouth after use., Historical Med      buPROPion (WELLBUTRIN XL) 300 mg 24 hr tablet Take 1 tablet (300 mg total) by mouth every morning, Starting Sat 2/1/2025, Until Tue 1/27/2026, Normal      diazepam (VALIUM) 5 mg tablet take 1 tablet by mouth at bedtime if needed for anxiety, Starting Thu 11/7/2024, Normal      ergocalciferol (VITAMIN D2) 50,000 units Take 1 capsule (50,000 Units total) by mouth every 14 (fourteen) days, Starting Sat 2/1/2025, Normal      famotidine (PEPCID) 40 MG tablet Take 1 tablet (40 mg total) by mouth daily at bedtime Take in evening 2 hours prior to bed, Starting Sat 2/1/2025, Normal      ferrous sulfate 324 (65 Fe) mg Take 1 tablet (324 mg total) by mouth daily before breakfast, Starting Tue 5/7/2024, Normal      folic acid (FOLVITE) 1 mg tablet Take 1 tablet (1 mg total) by mouth daily, Starting Tue 4/30/2024, Normal      furosemide (LASIX) 20 mg tablet TAKE 1/2 TABLET BY MOUTH DAILY, Starting Mon 2/24/2025, Normal      gabapentin (NEURONTIN) 100 mg capsule Take 100 mg by mouth 2 (two) times a day, Starting Fri 10/4/2019, Historical Med      glucose blood (OneTouch Verio) test strip Check blood sugar once a day, Normal      Insulin Glargine Solostar " (Lantus SoloStar) 100 UNIT/ML SOPN Inject 0.5 mL (50 Units total) under the skin daily at bedtime, Starting Thu 2/6/2025, Until Thu 2/5/2026, Fill Later      insulin lispro (HumaLOG) 100 units/mL injection pen Inject 12 Units under the skin 3 (three) times a day with meals, Starting Thu 2/6/2025, Until Thu 2/5/2026, Fill Later      Insulin Pen Needle 31G X 5 MM MISC Inject under the skin if needed (Insuline injections), Starting Thu 5/2/2024, Normal      ipratropium (ATROVENT HFA) 17 mcg/act inhaler Inhale 2 puffs every 6 (six) hours, Historical Med      ipratropium-albuterol (DUO-NEB) 0.5-2.5 mg/3 mL nebulizer solution Take 3 mL by nebulization 4 (four) times a day, Starting Tue 5/7/2024, Normal      lactulose (CHRONULAC) 10 g/15 mL solution Take 30 mL (20 g total) by mouth daily as needed (constipation), Starting Mon 8/5/2024, Normal      levothyroxine 88 mcg tablet Take 1 tablet (88 mcg total) by mouth daily in the early morning, Starting Sat 2/1/2025, Normal      midodrine (PROAMATINE) 2.5 mg tablet Take 2.5 mg by mouth every other day, Starting Fri 8/9/2024, Historical Med      nicotine (NICODERM CQ) 21 mg/24 hr TD 24 hr patch Place 1 patch on the skin over 24 hours every 24 hours, Starting Tue 4/30/2024, Normal      Nutritional Supplements (Glucerna Meal Replacement) BAR Take 1 Bar by mouth daily after breakfast, Starting Wed 2/26/2025, Normal      omeprazole (PriLOSEC) 40 MG capsule take 1 capsule by mouth twice a day, Starting Fri 2/21/2025, Normal      ondansetron (ZOFRAN) 4 mg tablet Take 1 tablet (4 mg total) by mouth every 8 (eight) hours as needed for nausea or vomiting, Starting Sat 2/1/2025, Normal      OneTouch Delica Lancets 30G MISC Check blood sugars once a day, Normal      oxybutynin (DITROPAN-XL) 10 MG 24 hr tablet Take 10 mg by mouth daily at bedtime, Starting Mon 9/9/2024, Historical Med      oxyCODONE-acetaminophen (PERCOCET)  mg per tablet Take 1 tablet by mouth every 6 (six) hours as  needed for severe pain, Starting Mon 6/27/2022, Historical Med      oxygen gas Inhale 2 L/min at 120,000 mL/hr continuous May use 3L with exertion per Bella ALVAREZ Keep sat >88%, Starting Sat 2/1/2025, Normal      polyethylene glycol (GOLYTELY) 4000 mL solution Take 4,000 mL by mouth once for 1 dose, Starting Wed 10/30/2024, Normal      potassium chloride (Klor-Con M20) 20 mEq tablet take 1 tablet by mouth once daily, Starting Fri 2/21/2025, Normal      semaglutide, 0.25 or 0.5 mg/dose, (Ozempic, 0.25 or 0.5 MG/DOSE,) 2 mg/3 mL injection pen Inject 0.75 mL (0.5 mg total) under the skin every 7 days, Starting Thu 2/6/2025, Normal      senna-docusate sodium (SENOKOT S) 8.6-50 mg per tablet Take 1 tablet by mouth daily, Historical Med      sertraline (ZOLOFT) 100 mg tablet Take 1 tablet (100 mg total) by mouth 2 (two) times a day, Starting Sat 2/1/2025, Normal      simethicone (MYLICON) 125 MG chewable tablet Chew 125 mg every 6 (six) hours as needed for flatulence, Historical Med      simvastatin (ZOCOR) 20 mg tablet Take 1 tablet (20 mg total) by mouth daily at bedtime, Starting Sat 2/1/2025, Normal      spironolactone (ALDACTONE) 25 mg tablet Take 1 tablet (25 mg total) by mouth daily, Starting Wed 10/30/2024, Normal      sucralfate (CARAFATE) 1 g tablet take 1 tablet by mouth three times a day, Starting Wed 2/26/2025, Normal      varenicline (CHANTIX) 0.5 mg tablet Take 1 tablet (0.5 mg total) by mouth 2 (two) times a day, Starting Wed 10/30/2024, Normal           No discharge procedures on file.  ED SEPSIS DOCUMENTATION   Time reflects when diagnosis was documented in both MDM as applicable and the Disposition within this note       Time User Action Codes Description Comment    3/3/2025  4:25 PM Rashel Bhardwaj Add [R00.0] Tachycardia                  Rashel Bhardwaj DO  03/03/25 2024

## 2025-03-03 NOTE — TELEPHONE ENCOUNTER
Contacted Pt re: consult for bariatric surgery. Pt said she will contact office to reschedule as she is currently in the ED for cardiac symptoms.

## 2025-03-03 NOTE — TELEPHONE ENCOUNTER
-I was able to call and speak with patient about her tachycardic symptoms.  Patient also notes having issues with some shortness of breath.  Patient has multiple medical comorbidities and has had issues with iron deficiency anemia and GI bleeding in the past, thyroid dysfunction, cirrhosis and other issues.  Given this and her symptomatology I did recommend ER evaluation.  Patient noted understanding and was agreeable.  I did offer to contact EMS for her but she wished to go by personal vehicle.  ADT 21 order placed.

## 2025-03-03 NOTE — TELEPHONE ENCOUNTER
"Reason for Conversation: Pt called stating she has been dizzy the past three days and checked her HR on the smart watch. It has been reading anywhere from 102-120.   She denies any palpitations, fluttering, or pounding HR.  Pt stated she does have anxiety and this is driving it up even more.   Pt has an appt on Wednesday and asking for an appt today    VS/Weight: (Note: Please include date/time vitals/weight were measured)  102-120    Pain: No    Risk Factors: Arrhythmia, CHF    Recent relevant testing and date of testing: none     Medication: lasix, potassium    Upcoming Office Visit: Yes, Wednesday    Last Office Visit: 8/21/24    Answer Assessment - Initial Assessment Questions  1. DESCRIPTION: \"Please describe your heart rate or heartbeat that you are having\" (e.g., fast/slow, regular/irregular, skipped or extra beats, \"palpitations\")      tachycardia  2. ONSET: \"When did it start?\" (e.g., minutes, hours, days)       This morning  3. DURATION: \"How long does it last\" (e.g., seconds, minutes, hours)      ongoing  4. PATTERN \"Does it come and go, or has it been constant since it started?\"  \"Does it get worse with exertion?\"   \"Are you feeling it now?\"      Worse with exertion  5. TAP: \"Using your hand, can you tap out what you are feeling on a chair or table in front of you, so that I can hear?\" Note: Not all patients can do this.        N/a  6. HEART RATE: \"Can you tell me your heart rate?\" \"How many beats in 15 seconds?\"  Note: Not all patients can do this.        102 - 120  7. RECURRENT SYMPTOM: \"Have you ever had this before?\" If Yes, ask: \"When was the last time?\" and \"What happened that time?\"       no  8. CAUSE: \"What do you think is causing the palpitations?\"      Denies any palpitations  9. CARDIAC HISTORY: \"Do you have any history of heart disease?\" (e.g., heart attack, angina, bypass surgery, angioplasty, arrhythmia)       unsure  10. OTHER SYMPTOMS: \"Do you have any other symptoms?\" (e.g., dizziness, " chest pain, sweating, difficulty breathing)        dizziness    Protocols used: Heart Rate and Heartbeat Questions-Adult-[place]

## 2025-03-04 LAB
ATRIAL RATE: 100 BPM
P AXIS: 70 DEGREES
PR INTERVAL: 190 MS
QRS AXIS: 98 DEGREES
QRSD INTERVAL: 140 MS
QT INTERVAL: 404 MS
QTC INTERVAL: 522 MS
T WAVE AXIS: 0 DEGREES
VENTRICULAR RATE: 100 BPM

## 2025-03-04 PROCEDURE — 93010 ELECTROCARDIOGRAM REPORT: CPT | Performed by: INTERNAL MEDICINE

## 2025-03-05 ENCOUNTER — VBI (OUTPATIENT)
Dept: INTERNAL MEDICINE CLINIC | Age: 55
End: 2025-03-05

## 2025-03-05 ENCOUNTER — TELEPHONE (OUTPATIENT)
Dept: CARDIOLOGY CLINIC | Facility: CLINIC | Age: 55
End: 2025-03-05

## 2025-03-05 NOTE — TELEPHONE ENCOUNTER
Called both Chely Ruvalcaba and her contact- her Mother due to needing to cancel her appointment for today 03/05/25 at 10:00 doctor does not start until later this afternoon, If patient calls back just need to reschedule the appointment , Thank You

## 2025-03-06 NOTE — TELEPHONE ENCOUNTER
03/06/25 10:21 AM    Patient contacted post ED visit, outreach attempt made but message could not be left. Additional outreach attempt will be made.     Thank you.  Jacob Rocha MA  [facility]

## 2025-03-11 ENCOUNTER — TELEPHONE (OUTPATIENT)
Age: 55
End: 2025-03-11

## 2025-03-11 DIAGNOSIS — E66.01 OBESITY, CLASS III, BMI 40-49.9 (MORBID OBESITY) (HCC): ICD-10-CM

## 2025-03-11 NOTE — TELEPHONE ENCOUNTER
Pt called to report that she is in her [place] address [address] [place] [id number] and needs dexcom g7 sensors sent to her by [employer]. Reported that her insurance needs to be called to be approved for a vacation override. Insurance is "[employer] and her ID number is [id number]. Pt also reported that the prescription sent for the glucerna to [facility] will need a prior auth. Pt asked if she can please be notified once tasks is completed. ty

## 2025-03-11 NOTE — TELEPHONE ENCOUNTER
Called patient. She is in [place] for two more weeks.  She is out of sensors and was instructed to do finger sticks.  I called [facility]. They just need to verify the insurance with the patient. Patient needs to call in. I called patient back and informed her

## 2025-03-14 LAB
DME PARACHUTE DELIVERY DATE REQUESTED: NORMAL
DME PARACHUTE ITEM DESCRIPTION: NORMAL
DME PARACHUTE ORDER STATUS: NORMAL
DME PARACHUTE SUPPLIER NAME: NORMAL
DME PARACHUTE SUPPLIER PHONE: NORMAL

## 2025-03-24 DIAGNOSIS — Z79.4 TYPE 2 DIABETES MELLITUS WITH HYPERGLYCEMIA, WITH LONG-TERM CURRENT USE OF INSULIN (HCC): Primary | ICD-10-CM

## 2025-03-24 DIAGNOSIS — E11.65 TYPE 2 DIABETES MELLITUS WITH HYPERGLYCEMIA, WITH LONG-TERM CURRENT USE OF INSULIN (HCC): Primary | ICD-10-CM

## 2025-03-24 RX ORDER — ACYCLOVIR 400 MG/1
1 TABLET ORAL
Qty: 3 EACH | Refills: 2 | Status: SHIPPED | OUTPATIENT
Start: 2025-03-24

## 2025-03-24 NOTE — TELEPHONE ENCOUNTER
Dexcom sensors  is not on patients active medication list, if patient is to be on Dexcom sensors  please prescribe rx and send message back to the Prior Authorization Team so a prior authorization can be submitted. Thank you

## 2025-03-24 NOTE — TELEPHONE ENCOUNTER
Patient called in with 2 issues:    Dexcom sensors need prior auth.    Also for office:  Patient reports that Glucerna needs to be sent [facility] for oral use.  She states that it needs to be sent to [facility] (she did not have any contact info for them.)

## 2025-03-25 ENCOUNTER — TELEPHONE (OUTPATIENT)
Age: 55
End: 2025-03-25

## 2025-03-25 DIAGNOSIS — I50.32 CHRONIC DIASTOLIC CONGESTIVE HEART FAILURE (HCC): ICD-10-CM

## 2025-03-25 NOTE — TELEPHONE ENCOUNTER
PA for Dexcom G7 Sensor SUBMITTED to [facility]    via    [x]CMM-KEY: [id number]  []Surescripts-Case ID #   []Availity-Auth ID # NDC #   []Faxed to plan   []Other website   []Phone call Case ID #     [x]PA sent as URGENT    All office notes, labs and other pertaining documents and studies sent. Clinical questions answered. Awaiting determination from insurance company.     Turnaround time for your insurance to make a decision on your Prior Authorization can take 7-21 business days.

## 2025-03-26 DIAGNOSIS — I50.32 CHRONIC DIASTOLIC CONGESTIVE HEART FAILURE (HCC): ICD-10-CM

## 2025-03-26 RX ORDER — FUROSEMIDE 20 MG/1
10 TABLET ORAL DAILY
Qty: 15 TABLET | Refills: 0 | Status: SHIPPED | OUTPATIENT
Start: 2025-03-26

## 2025-03-26 NOTE — TELEPHONE ENCOUNTER
PA for Dexcom G7 Sensor NOT REQUIRED     Reason           Patient advised by          [] MyChart Message  [] Phone call   []LMOM  []L/M to call office as no active Communication consent on file  []Unable to leave detailed message as VM not approved on Communication consent       Pharmacy advised by    []Fax  [x]Phone call

## 2025-03-27 RX ORDER — FUROSEMIDE 20 MG/1
10 TABLET ORAL DAILY
Qty: 50 TABLET | Refills: 0 | OUTPATIENT
Start: 2025-03-27

## 2025-03-31 ENCOUNTER — ANESTHESIA EVENT (OUTPATIENT)
Dept: PERIOP | Facility: HOSPITAL | Age: 55
End: 2025-03-31
Payer: MEDICARE

## 2025-03-31 ENCOUNTER — TELEPHONE (OUTPATIENT)
Age: 55
End: 2025-03-31

## 2025-03-31 NOTE — TELEPHONE ENCOUNTER
[facility] calling for signed SWO, the most recent one in chart is not current. Refaxing now, confirmed our fax number.

## 2025-04-02 NOTE — TELEPHONE ENCOUNTER
Alicia from [facility] is calling because she has two CMNs (SWOs) that have to be signed for the patient. The one uploaded in the patient's chart (in media from 3/24) is outdated. She will be re-faxing two CMNs to [phone]. They will have the initial order date as 05/03/2024 and 10/03/2024. Bella Rex' name will be on the forms. If another provider wants to sign then she will have to change the name on the forms. Their number is [phone] in case there are any further questions.      Please advise.

## 2025-04-04 NOTE — PRE-PROCEDURE INSTRUCTIONS
Pre-Surgery Instructions:   Medication Instructions    albuterol (PROVENTIL HFA,VENTOLIN HFA) 90 mcg/act inhaler Uses PRN- OK to take day of surgery-pt was instructed to bring inhaler DOS    budesonide (PULMICORT) 0.5 mg/2 mL nebulizer solution Take day of surgery.    buPROPion (WELLBUTRIN XL) 300 mg 24 hr tablet Take day of surgery.    diazepam (VALIUM) 5 mg tablet Take night before surgery    ergocalciferol (VITAMIN D2) 50,000 units Stop taking 7 days prior to surgery.    famotidine (PEPCID) 40 MG tablet Take night before surgery    ferrous sulfate 324 (65 Fe) mg Hold day of surgery.    folic acid (FOLVITE) 1 mg tablet Stop taking 7 days prior to surgery.    furosemide (LASIX) 20 mg tablet Hold day of surgery.    gabapentin (NEURONTIN) 100 mg capsule Take day of surgery.    Insulin Glargine Solostar (Lantus SoloStar) 100 UNIT/ML SOPN Take night before surgery    insulin lispro (HumaLOG) 100 units/mL injection pen Hold day of surgery.    ipratropium (ATROVENT HFA) 17 mcg/act inhaler Take day of surgery.    ipratropium-albuterol (DUO-NEB) 0.5-2.5 mg/3 mL nebulizer solution Take day of surgery.    lactulose (CHRONULAC) 10 g/15 mL solution Hold day of surgery.    levothyroxine 88 mcg tablet Take day of surgery.    midodrine (PROAMATINE) 2.5 mg tablet Uses PRN- OK to take day of surgery    omeprazole (PriLOSEC) 40 MG capsule Take day of surgery.    ondansetron (ZOFRAN) 4 mg tablet Uses PRN- OK to take day of surgery    oxyCODONE-acetaminophen (PERCOCET)  mg per tablet Take day of surgery.    oxygen gas Take day of surgery.    potassium chloride (Klor-Con M20) 20 mEq tablet Hold day of surgery.    semaglutide, 0.25 or 0.5 mg/dose, (Ozempic, 0.25 or 0.5 MG/DOSE,) 2 mg/3 mL injection pen Stop taking 7 days prior to surgery.    sertraline (ZOLOFT) 100 mg tablet Take day of surgery.    simethicone (MYLICON) 125 MG chewable tablet Hold day of surgery.    simvastatin (ZOCOR) 20 mg tablet Take night before surgery     sucralfate (CARAFATE) 1 g tablet Take day of surgery.      Medication instructions for day of surgery reviewed. Please take all instructed medications with only a sip of water.       You will receive a call one business day prior to surgery with an arrival time and hospital directions. If your surgery is scheduled on a Monday, the hospital will be calling you on the Friday prior to your surgery. If you have not heard from anyone by 8pm, please call the hospital supervisor through the hospital  at [phone]. ([place] [phone] or [place] [phone]).    Do not eat or drink anything after midnight the night before your surgery, including candy, mints, lifesavers, or chewing gum. Do not drink alcohol 24hrs before your surgery. Try not to smoke at least 24hrs before your surgery.       Follow the pre surgery showering instructions as listed in the “My Surgical Experience Booklet” or otherwise provided by your surgeon's office. Do not use a blade to shave the surgical area 1 week before surgery. It is okay to use a clean electric clippers up to 24 hours before surgery. Do not apply any lotions, creams, including makeup, cologne, deodorant, or perfumes after showering on the day of your surgery. Do not use dry shampoo, hair spray, hair gel, or any type of hair products.     No contact lenses, eye make-up, or artificial eyelashes. Remove nail polish, including gel polish, and any artificial, gel, or acrylic nails if possible. Remove all jewelry including rings and body piercing jewelry.     Wear causal clothing that is easy to take on and off. Consider your type of surgery.    Keep any valuables, jewelry, piercings at home. Please bring any specially ordered equipment (sling, braces) if indicated.    Arrange for a responsible person to drive you to and from the hospital on the day of your surgery. Please confirm the visitor policy for the day of your procedure when you receive your phone call with an  arrival time.     Call the surgeon's office with any new illnesses, exposures, or additional questions prior to surgery.    Please reference your “My Surgical Experience Booklet” for additional information to prepare for your upcoming surgery.

## 2025-04-11 ENCOUNTER — ANESTHESIA (OUTPATIENT)
Dept: PERIOP | Facility: HOSPITAL | Age: 55
End: 2025-04-11
Payer: MEDICARE

## 2025-04-11 ENCOUNTER — HOSPITAL ENCOUNTER (OUTPATIENT)
Facility: HOSPITAL | Age: 55
Setting detail: OUTPATIENT SURGERY
Discharge: HOME | End: 2025-04-11
Attending: OBSTETRICS & GYNECOLOGY | Admitting: OBSTETRICS & GYNECOLOGY
Payer: MEDICARE

## 2025-04-11 VITALS
SYSTOLIC BLOOD PRESSURE: 135 MMHG | DIASTOLIC BLOOD PRESSURE: 78 MMHG | TEMPERATURE: 96.6 F | RESPIRATION RATE: 18 BRPM | BODY MASS INDEX: 39.24 KG/M2 | WEIGHT: 250 LBS | HEIGHT: 67 IN | OXYGEN SATURATION: 95 % | HEART RATE: 80 BPM

## 2025-04-11 DIAGNOSIS — N95.0 PMB (POSTMENOPAUSAL BLEEDING): ICD-10-CM

## 2025-04-11 DIAGNOSIS — R87.613 HGSIL (HIGH GRADE SQUAMOUS INTRAEPITHELIAL LESION) ON PAP SMEAR OF CERVIX: ICD-10-CM

## 2025-04-11 PROBLEM — Z98.890 S/P LEEP: Status: ACTIVE | Noted: 2025-04-11

## 2025-04-11 LAB
EXT PREGNANCY TEST URINE: NEGATIVE
EXT. CONTROL: NORMAL
GLUCOSE SERPL-MCNC: 123 MG/DL (ref 65–140)
GLUCOSE SERPL-MCNC: 126 MG/DL (ref 65–140)

## 2025-04-11 PROCEDURE — 82948 REAGENT STRIP/BLOOD GLUCOSE: CPT

## 2025-04-11 PROCEDURE — 88305 TISSUE EXAM BY PATHOLOGIST: CPT | Performed by: PATHOLOGY

## 2025-04-11 PROCEDURE — 88344 IMHCHEM/IMCYTCHM EA MLT ANTB: CPT | Performed by: PATHOLOGY

## 2025-04-11 PROCEDURE — NC001 PR NO CHARGE: Performed by: OBSTETRICS & GYNECOLOGY

## 2025-04-11 PROCEDURE — 57522 CONIZATION OF CERVIX: CPT | Performed by: OBSTETRICS & GYNECOLOGY

## 2025-04-11 PROCEDURE — 88307 TISSUE EXAM BY PATHOLOGIST: CPT | Performed by: PATHOLOGY

## 2025-04-11 PROCEDURE — 81025 URINE PREGNANCY TEST: CPT | Performed by: OBSTETRICS & GYNECOLOGY

## 2025-04-11 RX ORDER — HYDROMORPHONE HCL/PF 1 MG/ML
0.5 SYRINGE (ML) INJECTION
Status: DISCONTINUED | OUTPATIENT
Start: 2025-04-11 | End: 2025-04-11 | Stop reason: HOSPADM

## 2025-04-11 RX ORDER — PROPOFOL 10 MG/ML
INJECTION, EMULSION INTRAVENOUS AS NEEDED
Status: DISCONTINUED | OUTPATIENT
Start: 2025-04-11 | End: 2025-04-11

## 2025-04-11 RX ORDER — IODINE SOLUTION STRONG 5% (LUGOL'S) 5 %
SOLUTION ORAL AS NEEDED
Status: DISCONTINUED | OUTPATIENT
Start: 2025-04-11 | End: 2025-04-11 | Stop reason: HOSPADM

## 2025-04-11 RX ORDER — DEXAMETHASONE SODIUM PHOSPHATE 10 MG/ML
INJECTION, SOLUTION INTRAMUSCULAR; INTRAVENOUS AS NEEDED
Status: DISCONTINUED | OUTPATIENT
Start: 2025-04-11 | End: 2025-04-11

## 2025-04-11 RX ORDER — ACETAMINOPHEN 325 MG/1
650 TABLET ORAL EVERY 8 HOURS PRN
Status: DISCONTINUED | OUTPATIENT
Start: 2025-04-11 | End: 2025-04-11 | Stop reason: HOSPADM

## 2025-04-11 RX ORDER — LIDOCAINE HYDROCHLORIDE 10 MG/ML
INJECTION, SOLUTION EPIDURAL; INFILTRATION; INTRACAUDAL; PERINEURAL AS NEEDED
Status: DISCONTINUED | OUTPATIENT
Start: 2025-04-11 | End: 2025-04-11

## 2025-04-11 RX ORDER — SODIUM CHLORIDE, SODIUM LACTATE, POTASSIUM CHLORIDE, CALCIUM CHLORIDE 600; 310; 30; 20 MG/100ML; MG/100ML; MG/100ML; MG/100ML
75 INJECTION, SOLUTION INTRAVENOUS CONTINUOUS
Status: DISCONTINUED | OUTPATIENT
Start: 2025-04-11 | End: 2025-04-11 | Stop reason: HOSPADM

## 2025-04-11 RX ORDER — SODIUM CHLORIDE, SODIUM LACTATE, POTASSIUM CHLORIDE, CALCIUM CHLORIDE 600; 310; 30; 20 MG/100ML; MG/100ML; MG/100ML; MG/100ML
125 INJECTION, SOLUTION INTRAVENOUS CONTINUOUS
Status: CANCELLED | OUTPATIENT
Start: 2025-04-11

## 2025-04-11 RX ORDER — PROPOFOL 10 MG/ML
INJECTION, EMULSION INTRAVENOUS CONTINUOUS PRN
Status: DISCONTINUED | OUTPATIENT
Start: 2025-04-11 | End: 2025-04-11

## 2025-04-11 RX ORDER — ONDANSETRON 2 MG/ML
4 INJECTION INTRAMUSCULAR; INTRAVENOUS ONCE AS NEEDED
Status: DISCONTINUED | OUTPATIENT
Start: 2025-04-11 | End: 2025-04-11 | Stop reason: HOSPADM

## 2025-04-11 RX ORDER — MIDAZOLAM HYDROCHLORIDE 2 MG/2ML
INJECTION, SOLUTION INTRAMUSCULAR; INTRAVENOUS AS NEEDED
Status: DISCONTINUED | OUTPATIENT
Start: 2025-04-11 | End: 2025-04-11

## 2025-04-11 RX ORDER — LIDOCAINE HYDROCHLORIDE AND EPINEPHRINE 10; 10 MG/ML; UG/ML
INJECTION, SOLUTION INFILTRATION; PERINEURAL AS NEEDED
Status: DISCONTINUED | OUTPATIENT
Start: 2025-04-11 | End: 2025-04-11 | Stop reason: HOSPADM

## 2025-04-11 RX ORDER — SODIUM CHLORIDE, SODIUM LACTATE, POTASSIUM CHLORIDE, CALCIUM CHLORIDE 600; 310; 30; 20 MG/100ML; MG/100ML; MG/100ML; MG/100ML
INJECTION, SOLUTION INTRAVENOUS CONTINUOUS PRN
Status: DISCONTINUED | OUTPATIENT
Start: 2025-04-11 | End: 2025-04-11

## 2025-04-11 RX ORDER — FENTANYL CITRATE/PF 50 MCG/ML
25 SYRINGE (ML) INJECTION
Status: DISCONTINUED | OUTPATIENT
Start: 2025-04-11 | End: 2025-04-11 | Stop reason: HOSPADM

## 2025-04-11 RX ORDER — METOCLOPRAMIDE HYDROCHLORIDE 5 MG/ML
5 INJECTION INTRAMUSCULAR; INTRAVENOUS ONCE AS NEEDED
Status: DISCONTINUED | OUTPATIENT
Start: 2025-04-11 | End: 2025-04-11 | Stop reason: HOSPADM

## 2025-04-11 RX ORDER — FENTANYL CITRATE 50 UG/ML
INJECTION, SOLUTION INTRAMUSCULAR; INTRAVENOUS AS NEEDED
Status: DISCONTINUED | OUTPATIENT
Start: 2025-04-11 | End: 2025-04-11

## 2025-04-11 RX ORDER — ONDANSETRON 2 MG/ML
INJECTION INTRAMUSCULAR; INTRAVENOUS AS NEEDED
Status: DISCONTINUED | OUTPATIENT
Start: 2025-04-11 | End: 2025-04-11

## 2025-04-11 RX ADMIN — FENTANYL CITRATE 25 MCG: 50 INJECTION INTRAMUSCULAR; INTRAVENOUS at 09:29

## 2025-04-11 RX ADMIN — PROPOFOL 100 MCG/KG/MIN: 10 INJECTION, EMULSION INTRAVENOUS at 08:10

## 2025-04-11 RX ADMIN — ACETAMINOPHEN 650 MG: 325 TABLET, FILM COATED ORAL at 10:58

## 2025-04-11 RX ADMIN — FENTANYL CITRATE 25 MCG: 50 INJECTION INTRAMUSCULAR; INTRAVENOUS at 08:32

## 2025-04-11 RX ADMIN — FENTANYL CITRATE 50 MCG: 50 INJECTION INTRAMUSCULAR; INTRAVENOUS at 08:21

## 2025-04-11 RX ADMIN — SODIUM CHLORIDE, SODIUM LACTATE, POTASSIUM CHLORIDE, AND CALCIUM CHLORIDE: .6; .31; .03; .02 INJECTION, SOLUTION INTRAVENOUS at 08:00

## 2025-04-11 RX ADMIN — FENTANYL CITRATE 50 MCG: 50 INJECTION INTRAMUSCULAR; INTRAVENOUS at 08:15

## 2025-04-11 RX ADMIN — LIDOCAINE HYDROCHLORIDE 50 MG: 10 INJECTION, SOLUTION EPIDURAL; INFILTRATION; INTRACAUDAL; PERINEURAL at 08:07

## 2025-04-11 RX ADMIN — MIDAZOLAM 2 MG: 1 INJECTION INTRAMUSCULAR; INTRAVENOUS at 08:00

## 2025-04-11 RX ADMIN — FENTANYL CITRATE 25 MCG: 50 INJECTION INTRAMUSCULAR; INTRAVENOUS at 09:23

## 2025-04-11 RX ADMIN — DEXAMETHASONE SODIUM PHOSPHATE 5 MG: 10 INJECTION, SOLUTION INTRAMUSCULAR; INTRAVENOUS at 08:09

## 2025-04-11 RX ADMIN — PROPOFOL 120 MG: 10 INJECTION, EMULSION INTRAVENOUS at 08:07

## 2025-04-11 RX ADMIN — ONDANSETRON 4 MG: 2 INJECTION INTRAMUSCULAR; INTRAVENOUS at 08:00

## 2025-04-11 RX ADMIN — FENTANYL CITRATE 50 MCG: 50 INJECTION INTRAMUSCULAR; INTRAVENOUS at 08:10

## 2025-04-11 NOTE — OP NOTE
OPERATIVE REPORT  PATIENT NAME: Chely Ruvalcaba    :  1970  MRN: [id number]  Pt Location: [facility] OR ROOM 03    SURGERY DATE: 2025    Surgeons and Role:     * Lisa Fernando MD - Primary     * Cj Cevallos MD - Assisting    Preop Diagnosis:  HGSIL (high grade squamous intraepithelial lesion) on Pap smear of cervix [R87.613]  PMB (postmenopausal bleeding) [N95.0]    Post-Op Diagnosis Codes:     * HGSIL (high grade squamous intraepithelial lesion) on Pap smear of cervix [R87.613]     * PMB (postmenopausal bleeding) [N95.0]    Procedure(s):  LOOP ELECTROSURGICAL EXCISION REOCEDURE. EXAM UNDER ANESTHESIA    Specimen(s):  ID Type Source Tests Collected by Time Destination   1 : LEEP biopsy anterior lip Tissue Cervix, Endocervical TISSUE EXAM Lisa Fernando MD 2025 0836    2 : LEEP bx, posterior lip Tissue Cervix, Endocervical TISSUE EXAM Lisa Fernando MD 2025 0837    3 : endocervical currettings Tissue Cervix, Endocervical TISSUE EXAM Lisa Fernando MD 2025 0837        Estimated Blood Loss:   Minimal    Drains:  None    Anesthesia Type:   Choice    Operative Indications:  HGSIL (high grade squamous intraepithelial lesion) on Pap smear of cervix [R87.613]  PMB (postmenopausal bleeding) [N95.0]      Operative Findings:  External genitalia grossly normal in appearance.  No ulcerations, lacerations, or lesions.  Bimanual exam revealed anteverted uterus was anteverted and normal in size and consistency with no palpable uterine or adnexal masses.  Vagina and cervix were grossly normal in appearance without any lacerations or lesions.  Decreased uptake of Lugol's solution was noted at 11 to 12 o'clock      Complications:   None apparently       Procedure and Technique:  The patient was taken to the operating room for her general anesthesia via LMA was administered without complications.  She was placed in dorsal lithotomy position on Keaton stirrups.  A timeout was performed  to confirm correct patient and correct procedure. The bladder was emptied with a straight catheter and 60 milliliters of clear yellow urine were obtained .     Under direct visualization an insulated speculum was placed and excellent exposure of the cervix was obtained. Lugol's was applied to the cervix and observed for the findings mentioned above. Four cervical quadrants were infiltrated with a total of 8 mL of 1% lidocaine with epinephrine for perioperative analgesia.    First, using a 20x15 mm loop electrode with 60 W of pure cutting current, the ectocervix was completely excised in two passes  first anterior ectocervix and in a second pass posterior ectocervix.  two intacts not fragmented specimen was obtained and sent to pathology.  Then, Endocervical curettage was completed using the [facility] cure[facility], placed on Telfa, and sent to pathology. Ball electrocautery was used to obtain adequate hemostasis. Monsel solution was then applied to maintain hemostasis.     Good hemostasis was confirmed. The bivalve speculum was removed from the vagina.      The patient tolerated the procedure well.  Sponge, lap, needles and instrument counts were reported as correct ×2.  The patient was successfully awakened in the operating room and transferred to the postanesthesia care unit in stable condition.         Patient Disposition:  PACU              SIGNATURE: Cj Cevallos MD  DATE: April 11, 2025  TIME: 8:58 AM

## 2025-04-11 NOTE — ANESTHESIA POSTPROCEDURE EVALUATION
Post-Op Assessment Note    CV Status:  Stable  Pain Score: 0    Pain management: adequate       Mental Status:  Sleepy and arousable   Hydration Status:  Euvolemic   PONV Controlled:  Controlled   Airway Patency:  Patent  Two or more mitigation strategies used for obstructive sleep apnea   Post Op Vitals Reviewed: Yes    No anethesia notable event occurred.    Staff: CRNA   Comments: HOB elevated, 4L simple face mask in PACU, nonobstructing airway        Last Filed PACU Vitals:  Vitals Value Taken Time   Temp 98.2 °F (36.8 °C) 04/11/25 0857   Pulse 81 04/11/25 0859   /54 04/11/25 0900   Resp 18 04/11/25 0859   SpO2 96 % 04/11/25 0859   Vitals shown include unfiled device data.

## 2025-04-11 NOTE — H&P
H&P Exam - Gynecology   Chely Ruvalcaba 54 y.o. female MRN: [id number]  Unit/Bed#: OR [place] Encounter: [id number]    No chief complaint on file.        History of Present Illness   HPI:  Chely Ruvalcaba is a 54 y.o. female who presents with history of ASC-H pap in June of 2024.  She reported a previous normal pap in 2022 but no report was found.  She declined office colposcopy in favor of an excisional procedure.  She had also had an episode of PMB but cervical stenosis prevented an endometrial biopsy in the office.  A pelvic US showed a 3mm lining, so we have decided against endometrial sampling.    See note from 8/27/24 for full details of surgical counseling.    Review of Systems   Constitutional: Negative.    Respiratory: Negative.     Cardiovascular: Negative.    Gastrointestinal: Negative.    Genitourinary: Negative.        Historical Information   Past Medical History:   Diagnosis Date    Abnormal stress test     Acute cystitis without hematuria 05/07/2018    Acute duodenal ulcer with bleeding 01/16/2023    Allergic sinusitis     last assessed - 03Apr2017    Anemia Around december    Anxiety     last assessed - 04Mar2016    Arthritis     Asthma     last assessed - 04Mar2016    Bilateral lower extremity edema     last assessed - [id number]    Callus of foot     last assessed - 22Jun2016    Chest pain     CHF (congestive heart failure) ([facility]) 03/2024    Chronic GERD     last assessed - 16Jan2017    Colon polyp     Constipation     Resolved - 13Jan2017    COPD (chronic obstructive pulmonary disease) ([facility])     Depression     last assessed - 04Mar2016    Disease of thyroid gland     Diverticulitis of colon     last assessed - 04Mar2016    Dyspepsia     Resolved - [id number]    Edema, lower extremity 08/07/2020    Esophageal reflux     last assessed - 04Mar2016    Essential hypertriglyceridemia     last assessed - [id number]    Fatty liver 01/16/2023    Gastroesophageal reflux disease with esophagitis 11/18/2016    GERD  (gastroesophageal reflux disease)     Gynecological disorder     last assessed - 04Mar2016    History of transfusion     Hydroureteronephrosis 06/30/2022    Hypertension     last assessed - 04Mar2016    Hypokalemia 06/20/2022    Hypotension     last assessed - 25Aug2016    Kidney stone     Left ureteral stone with hydronephrosis 07/21/2022    Migraine     Morbid obesity (HCC) 01/11/2019    Formatting of this note might be different from the original. Added automatically from request for surgery [id number]    Neuropathy     Obesity     Other chest pain 11/08/2018    Pancreatic lesion 03/13/2023    Pneumonia 03/2024    Positive depression screening 06/19/2022    Primary hypertriglyceridemia 06/19/2022    Pulmonary emphysema (HCC)     last assessed - 04Mar2016    Seasonal allergies     Severe obesity (HCC) 01/16/2023    Skin tag 07/17/2023    SOB (shortness of breath)     Strain of groin 07/17/2023    Urinary frequency     last assessed - 22Jun2016    Vagina, candidiasis     last assessed - 22Jun2016    Very heavy cigarette smoker 01/16/2023    Visual impairment      Past Surgical History:   Procedure Laterality Date    CARPAL TUNNEL RELEASE      last assessed - 04MAr2016    CHOLECYSTECTOMY      last assessed - 04Mar2016    COLONOSCOPY      Complete colonoscopy     EYE SURGERY      last assessed - 04Mar2016    FL RETROGRADE PYELOGRAM  6/16/2022    FL RETROGRADE PYELOGRAM  8/16/2022    FOOT SURGERY      last assessed - 04Mar2016    CT CYSTO/URETERO W/LITHOTRIPSY &INDWELL STENT INSRT Left 6/16/2022    Procedure: CYSTOSCOPY URETEROSCOPY WITH LITHOTRIPSY HOLMIUM LASER, RETROGRADE PYELOGRAM AND INSERTION STENT URETERAL;  Surgeon: Sammy Ferrer MD;  Location: [facility];  Service: Urology    CT CYSTO/URETERO W/LITHOTRIPSY &INDWELL STENT INSRT Left 8/16/2022    Procedure: CYSTOSCOPY USCOPE W/HOLMIUM LASER, RETROGRADE PYELOGRAM&STENT;  Surgeon: Sudheer Bradford MD;  Location: [facility];  Service: Urology    CT CYSTOURETHROSCOPY  W/URETERAL CATHETERIZATION Left 2022    Procedure: CYSTOSCOPY RETROGRADE PYELOGRAM WITH INSERTION STENT URETERAL;  Surgeon: Facundo Matamoros MD;  Location: [facility];  Service: Urology    OH ESOPHAGOGASTRODUODENOSCOPY TRANSORAL DIAGNOSTIC N/A 2017    Procedure: ESOPHAGOGASTRODUODENOSCOPY (EGD);  Surgeon: Alison Saleem MD;  Location: [facility];  Service: Gastroenterology     OB/GYN History:   Family History   Problem Relation Age of Onset    Obesity Mother     Thyroid disease Mother     Cancer Mother 71    Vision loss Mother     Thyroid disease unspecified Mother     Obesity Sister     Coronary artery disease Sister     Stroke Sister     Asthma Sister     Glaucoma Sister     No Known Problems Maternal Aunt     Diabetes Maternal Uncle     Lung cancer Maternal Grandmother     Cancer Maternal Grandfather     Diabetes Maternal Grandfather     No Known Problems Paternal Grandmother     No Known Problems Paternal Grandfather     Hypertension Other     Lung cancer Other     Hypertension Other     Lung cancer Other     Lung cancer Other      Social History   Social History     Substance and Sexual Activity   Alcohol Use Not Currently     Social History     Substance and Sexual Activity   Drug Use No     Social History     Tobacco Use   Smoking Status Former    Current packs/day: 0.00    Average packs/day: 0.9 packs/day for 65.0 years (60.0 ttl pk-yrs)    Types: Cigarettes    Start date: 1983    Quit date: 3/6/2024    Years since quittin.0   Smokeless Tobacco Former    Quit date: 3/3/2024   Tobacco Comments    I quit smoking back in 2024       Meds/Allergies     Medications Prior to Admission:     albuterol (PROVENTIL HFA,VENTOLIN HFA) 90 mcg/act inhaler    budesonide (PULMICORT) 0.5 mg/2 mL nebulizer solution    buPROPion (WELLBUTRIN XL) 300 mg 24 hr tablet    diazepam (VALIUM) 5 mg tablet    ergocalciferol (VITAMIN D2) 50,000 units    famotidine (PEPCID) 40 MG tablet    ferrous sulfate 324 (65 Fe)  "mg    folic acid (FOLVITE) 1 mg tablet    furosemide (LASIX) 20 mg tablet    gabapentin (NEURONTIN) 100 mg capsule    insulin lispro (HumaLOG) 100 units/mL injection pen    ipratropium (ATROVENT HFA) 17 mcg/act inhaler    ipratropium-albuterol (DUO-NEB) 0.5-2.5 mg/3 mL nebulizer solution    lactulose (CHRONULAC) 10 g/15 mL solution    levothyroxine 88 mcg tablet    midodrine (PROAMATINE) 2.5 mg tablet    omeprazole (PriLOSEC) 40 MG capsule    ondansetron (ZOFRAN) 4 mg tablet    oxyCODONE-acetaminophen (PERCOCET)  mg per tablet    oxygen gas    potassium chloride (Klor-Con M20) 20 mEq tablet    semaglutide, 0.25 or 0.5 mg/dose, (Ozempic, 0.25 or 0.5 MG/DOSE,) 2 mg/3 mL injection pen    sertraline (ZOLOFT) 100 mg tablet    simethicone (MYLICON) 125 MG chewable tablet    simvastatin (ZOCOR) 20 mg tablet    spironolactone (ALDACTONE) 25 mg tablet    sucralfate (CARAFATE) 1 g tablet    B-D SYRINGE/NEEDLE 1CC/25GX5/8 25G X 5/8\" 1 ML MISC    bisacodyl (FLEET) 10 MG/30ML ENEM    Blood Glucose Monitoring Suppl (OneTouch Verio) w/Device KIT    Continuous Glucose Sensor (Dexcom G7 Sensor)    glucose blood (OneTouch Verio) test strip    Insulin Glargine Solostar (Lantus SoloStar) 100 UNIT/ML SOPN    Insulin Pen Needle 31G X 5 MM MISC    nicotine (NICODERM CQ) 21 mg/24 hr TD 24 hr patch    Nutritional Supplements (Glucerna) LIQD    OneTouch Delica Lancets 30G MISC    oxybutynin (DITROPAN-XL) 10 MG 24 hr tablet    polyethylene glycol (GOLYTELY) 4000 mL solution    senna-docusate sodium (SENOKOT S) 8.6-50 mg per tablet    varenicline (CHANTIX) 0.5 mg tablet  Allergies   Allergen Reactions    Hydrocodone-Acetaminophen Hives    Ketorolac Hives and Dizziness    Toradol [Ketorolac Tromethamine] Other (See Comments)     hypotension    Ultram [Tramadol] Nausea Only, GI Intolerance and Vomiting       Objective   Vitals: Blood pressure 108/63, pulse 76, temperature (!) 97.2 °F (36.2 °C), temperature source Temporal, resp. rate (!) " "24, height 5' 6.5\" (1.689 m), weight 113 kg (250 lb), last menstrual period 04/05/2018, SpO2 98%.    No intake or output data in the 24 hours ending 04/11/25 0754    Invasive Devices:   Invasive Devices       Peripheral Intravenous Line  Duration             Peripheral IV 04/11/25 Left Hand <1 day                    Physical Exam  HENT:      Head: Normocephalic and atraumatic.   Cardiovascular:      Rate and Rhythm: Normal rate and regular rhythm.      Heart sounds: Normal heart sounds.   Pulmonary:      Effort: Pulmonary effort is normal.      Breath sounds: Normal breath sounds.   Abdominal:      General: Abdomen is flat.      Palpations: Abdomen is soft. There is no mass.      Tenderness: There is no abdominal tenderness.   Musculoskeletal:         General: No swelling or tenderness.   Neurological:      Mental Status: She is alert.         Lab Results:   Admission on 04/11/2025   Component Date Value    EXT Preg Test, Ur 04/11/2025 Negative     Control 04/11/2025 Valid     POC Glucose 04/11/2025 123           Assessment & Plan     Assessment:  53yo female with ASC-H pap smear   Plan:  EUA, LEEP    "

## 2025-04-12 ENCOUNTER — DOCUMENTATION (OUTPATIENT)
Dept: GASTROENTEROLOGY | Facility: CLINIC | Age: 55
End: 2025-04-12

## 2025-04-12 DIAGNOSIS — E87.6 HYPOKALEMIA: ICD-10-CM

## 2025-04-12 DIAGNOSIS — R60.0 EDEMA, LOWER EXTREMITY: ICD-10-CM

## 2025-04-12 DIAGNOSIS — E66.813 OBESITY, CLASS III, BMI 40-49.9 (MORBID OBESITY): ICD-10-CM

## 2025-04-12 DIAGNOSIS — E53.8 FOLIC ACID DEFICIENCY: ICD-10-CM

## 2025-04-12 DIAGNOSIS — E55.9 VITAMIN D DEFICIENCY: ICD-10-CM

## 2025-04-12 DIAGNOSIS — F41.9 ANXIETY: ICD-10-CM

## 2025-04-12 DIAGNOSIS — D50.8 IRON DEFICIENCY ANEMIA SECONDARY TO INADEQUATE DIETARY IRON INTAKE: ICD-10-CM

## 2025-04-12 DIAGNOSIS — I10 BENIGN HYPERTENSION: ICD-10-CM

## 2025-04-12 DIAGNOSIS — E11.65 TYPE 2 DIABETES MELLITUS WITH HYPERGLYCEMIA, WITH LONG-TERM CURRENT USE OF INSULIN (HCC): ICD-10-CM

## 2025-04-12 DIAGNOSIS — Z79.4 TYPE 2 DIABETES MELLITUS WITH HYPERGLYCEMIA, WITH LONG-TERM CURRENT USE OF INSULIN (HCC): ICD-10-CM

## 2025-04-12 NOTE — PROGRESS NOTES
Records reviewed and outlined below in preparation for office visit  4/15/2025;    MELD 3.0: 11 at 1/31/2025 12:36 PM  MELD-Na: 10 at 1/31/2025 12:36 PM  Calculated from:  Serum Creatinine: 0.91 mg/dL (Using min of 1 mg/dL) at 1/31/2025 12:36 PM  Serum Sodium: 144 mmol/L (Using max of 137 mmol/L) at 1/31/2025 12:36 PM  Total Bilirubin: 1.47 mg/dL at 1/31/2025 12:36 PM  Serum Albumin: 3.5 g/dL at 1/31/2025 12:36 PM  INR(ratio): 1.18 at 1/31/2025 12:36 PM  Age at listing (hypothetical): 54 years  Sex: Female at 1/31/2025 12:36 PM    4/11/2025 surgery for high-grade squamous intraepithelial lesion on Pap smear.  Loop electrosurgical excision under anesthesia (LEEP)    3/3/2025 ER visit for tachycardia headache and dizziness  3/3/2025 laboratory data  Sodium 140  Potassium 4.4  BUN 19  Creatinine 1  Glucose 179  AST 39  ALT 24  Alk phos 74  Albumin 4.1  T. bili 1.18  Magnesium 2.1  WBC 6.0 Hgb 14.6 HCT 47.1 MCV 98 platelet count 85,000  Checks x-ray portable 1 view  Interstitial prominence could be on the basis of slight edema or atypical pneumonitis but unchanged from prior study    2/26/2025 telemedicine visit with hematology oncology  Prior iron infusion history.  August 2024-Venofer 200 mg weekly x 4  Vitamin B12 1000 mcg weekly x 4    1/31/2025 laboratory data  INR 1.18  Pro time 15.3  Hemoglobin A1c 5.3  TSH suppressed at 0.142  Free T41.17  WBC 7.29 Hgb 14.5 HCT 46.5 MCV 96 platelet count 86,000  AFP tumor marker 5.21  B12 409  Cholesterol 101  Triglycerides 90  HDL 38  LDL 45    8/5/2024 hepatology follow-up with Lisandra Leggett  1.cirrhosis.  Meld-3.0 12  2.constipation  3.bilateral lower extremity edema    6/15/2024 ER visit for cellulitis left lower extremity and worsening edema  Treated with Keflex 500 mg every 6 hours for 7 days  6/15/2024 ultrasound bilateral lower extremities  No evidence of acute deep vein thrombosis    6/3/2024 through 6/6/2024 hospitalized [facility]  -Near  syncope  -Hyperlipidemia  -Emphysema  -GERD  -Cirrhosis  -Diastolic CHF  -Chronic low back pain  -Homelessness  -Diverticulosis  -Hypothyroidism    6/3/2024 CT scan and pelvis with contrast  Partially imaged groundglass opacification of the visualized lungs.  Mildly improved compared to prior study  Nodular contour of the liver seen suspicious for cirrhosis  Gallbladder unremarkable  Spleen enlarged  Pancreas unremarkable  Adrenal glands unremarkable  Diverticulosis with residual bowel wall thickening of sigmoid colon and minimal adjacent fluid in the left paracolic gutter improved from 5/15/2024.  Area previously identified as diverticulitis      5/21/2024 hospitalized with pneumonia [facility]  1.severe sepsis secondary to pneumonia  2.centrilobular emphysema  3.cirrhosis      5/2024 diverticulitis  Treated with Augmentin and Flagyl    Admitted 3/6/2024 through 4/11/2024 [facility]  Discharge medications included  -Insulin 55 units daily  -Budesonide 0.5 mg nebulized  -Lactulose 30 mL 3 times a day  -Folic acid 1 mg daily  -Lasix 40 mg daily  -Humalog 15 units 3 times a day  -Prednisone weaning dose  -Bactrim Monday Wednesday and Friday  -Albuterol  Bupropion  mg daily  -Diazepam 5 mg every 6 hours as needed  Vitamin D  Iron 325 mg daily  Fluticasone nasal spray  Gabapentin 100 mg 2 times a day  Levothyroxine 112 mcg daily  Nicotine patch  -Omeprazole 40 mg twice a day  -Oxycodone 10/325 every 6 hours as needed  Scopolamine patch 1 mg over 3-day patch  Sertraline 100 mg daily  Simvastatin 20 mg daily  Carafate 3 times a day  Varenicline 0.5 mg twice a day (Chantix)  Discontinued medications  Semaglutide  Spironolactone 50 mg tablet     Discharge diagnoses  Sepsis  Bilateral pneumonia treated with Bactrim, steroids, ceftriaxone, and Zithromax  Hypoxemia  Exacerbation of COPD  Leukocytosis  CHF.  Right heart catheterization 3/19 normal right-sided pressures,  Acute on chronic diastolic heart  failure  Acute kidney injury  Folic acid deficiency  Cirrhosis  Acute pneumothorax.  Chest tube removed 3/31/2024     4/10/2024 laboratory data  WBC 8.6 Hgb 10.5 HCT 33 platelet count 98,000 MCV 95.4.  Hemoglobin was 12.8 at admission  INR 1.31 (3/6/2024)  T. bili 3.5 at admission dropped to 1.0  Creatinine 0.83 at admission  Sodium 138 at admission  Sodium 141  Potassium 4.5  Creatinine 0.77  Glucose 95  Albumin 2.8  AST 33  ALT 69  T. bili 1.0    3/22/2023 laboratory data  CINDY negative  Celiac antibody panel negative  AMA negative  Smooth muscle antibody-negative  Liver kidney microsomal antibody negative alpha-1 antitrypsin level 244             3/7/2024 CT scan chest [place]  Findings most compatible with pulmonary edema with groundglass opacification bilaterally with slight sparing of the lung apices.  Differential diagnosis include atypical pneumonia or much less likely hemorrhage.  Pulmonary nodules involving the left upper lobe and right middle lobe.  Left upper lobe nodule measures 2.3 x 2.2 x 7 mm right lobe nodule measures 11 mm     Patient did have a right-sided chest tube in place at some point on imaging 3/24/2024        2/13/2023 virtual visit with hepatology Lisandra antony        12/21/2023 laboratory data  Sodium 142  Potassium 4.3  BUN 10  Creatinine 0.73                                          MELD 3.0  11  Glucose 178  AST 48  ALT 21  Alk phos 94  T. bili 1.19  Direct bili 0.33  WBC 6.52 Hgb 11.5 HCT 38.5 MCV 95 platelet count unable to estimate due to clumping     10/10/2023 EGD Dr. 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  Status post APC  -Portal hypertensive gastropathy in the body of the stomach  -Healed duodenal ulcer  -3 hyperechoic periportal lymph nodes with well-defined margins largest measuring 11 x 7 mm but together likely about 2 cm.  Appeared benign not sampled  -Nodular liver  -Polypoid area seen in the midesophagus status post biopsy.  Biopsy  unremarkable  -Negative for esophageal varices     10/4/2023 MRI abdomen with MRCP  Liver enlarged at 23.7 cm.  Surface nodularity suggestive of cirrhosis  Mild diffuse hepatic steatosis.  No biliary ductal dilatation.  No gallstones  Pancreas unremarkable  Spleen enlarged at 15.9 cm similar to recent studies  Therefore enlarged upper abdominal lymph nodes similar to March 2023 MRI.  These have been present since 2015 with changes in measurement as described below.  --1.3 cm lymph node lymph node adjacent to the neck of the pancreas  compared to 1.3 cm in December 2015.  -1.1 cm periportal lymph node , compared to 1.0 cm in December 2015.  - 1.4 cm portacaval lymph node , compared to 1.2 cm in December 2015.  - 0.9 cm portacaval lymph node just medially , compared to 0.7 cm in 2015.     4/11/2023 ER visit in [place] for nausea vomiting diarrhea.  Diarrhea became dark.  Fecal Hemoccult done in the hospital was negative for blood  AST was 55  ALT 25  Alk phos 75  WBC 4.7 Hgb 9.1 MCV 78 platelet count was 94,000  She did have a fever of 101.2 at that time.  She had been using Pepto-Bismol.  At presentation her heart rate was 80 and blood pressure was 134/73     4/5/2023 EGD  Probable healing ulcer at junction of first and second portion of duodenum.  Biopsy revealed benign intestinal mucosa with retained villous architecture.  Mild acute and chronic inflammation noted.  Nodular mucosa associated with healing ulcer status post biopsy.  Biopsy revealed mild acute and chronic inflammation, focal mucosal erosion and reactive surface foveolar epithelial changes consistent with reactive/chemical gastritis.  Negative for H. pylori.  Scattered areas of nodular mucosa with overlying erosions in the antrum, status post biopsy.  Biopsies negative for H. pylori.  Probable portal hypertensive gastropathy fundus and body status post biopsy.  Biopsy revealed mucosa with mild vascular congestion and edema.  Normal esophagus      3/22/2023 MRI abdomen  Redefined cirrhotic morphology of the liver.  Consistent with ultrasound elastography findings.  Prior cholecystectomy  Spleen is enlarged at 16.8 cm  Mild robin hepatic adenopathy.  A dominant node measuring 15 mm correlates with ultrasound finding of a hypoechoic lesion adjacent to the pancreas.     3/22/2023 laboratory data  IgA 417  Alpha-1 antitrypsin level 244  ACE 61  Iron 35  Ferritin 9  Iron saturation 7%  TIBC 510  Folate 8.2  Ceruloplasmin 26.9  Vitamin B12 331  Alpha-fetoprotein tumor marker 6.4  WC 6.51 Hgb 10.4 HCT 38.7 MCV 85 platelet count 145,000  INR 1.05  Fasting insulin 142.4  CINDY negative  Mitochondrial antibody negative  Smooth muscle antibody negative  Celiac antibody negative  Liver kidney microsomal antibody negative  She is not immune to hepatitis B.  She is immune to hepatitis A     3/17/2023 evaluated by Dr. Banuelos of hepatology.  Follow-up in September 2023 2/1/2023 CT scan renal stone study.  Spleen enlarged at 15.1 cm.  Gallbladder absent  Diverticulosis without diverticulitis previously noted hydroureteronephrosis on the left side has resolved.  Several nonobstructing intrarenal calculi left kidney     1/18/2023 EGD  Normal second portion of duodenum.  Biopsies negative for celiac disease.  Superficial ulcer in the duodenal bulb with a clean base.  I described superficial ulceration with surrounding heaped up mucosa.  Moderate edematous erythematous nodular mucosa in the antrum.  Biopsies were benign and negative for H. pylori.  Mild generalized erythema gastric body.  Biopsies negative for H. pylori.  Z-line regular at 39 cm.  No sign of esophageal varices     1/27/2023 ultrasound abdomen  Enlarged liver 20.6 cm.  Nodular surface  Heterogeneous echotexture  Mildly enlarged spleen  In the region of the pancreatic head there is a 1.4 x 1.2 x 1.2 cm ovoid hypoechoic focus likely represent a robin hepatis lymph node        1/27/2023 ultrasound  elastography  F4, cirrhosis     1/27/2023-laboratory data  MELD-Na 7   Sodium 140  Creatinine 0.76  AST 27  ALT 16  Alk phos 80  T. bili 0.71  INR 1.10  Cholesterol 126  Triglycerides 91  HDL 48  LDL 60  Iron 32  Ferritin 5  Iron saturation 6%  TIBC 494  WBC 7.73 Hgb 9.0 HCT 33.2 MCV 87 platelet count 180,000 up from 147  Hemoglobin A1c 5.2  TSH 4.13  Hepatitis A total reactive  Hepatitis B surface antigen nonreactive  Hepatitis B surface antibody less than 3.10  Hepatitis B core total nonreactive  Hepatitis C antibody nonreactive     1/21/2023 CT chest lung cancer screening  Splenomegaly of 15.4 cm.  Nonobstructing left renal calcification  1.2 cm left upper lobe groundglass opacity  Moderate paraseptal emphysema.  Benign intrapulmonary lymph nodes abutting the minor fissure.        On 1/18/2023 patient was seen by advanced practitioner Lisandra Leggett     12/11/2022 through 12/13/2022 admitted to [facility] with dizziness, shortness of breath, chest pain, and dark stool for couple weeks.  Patient also reported chronic generalized abdominal pain.  Hemoglobin admission was 7.1.-Received 1 unit of packed red blood cells.  EGD revealed a nonbleeding duodenal ulcer.  No intervention required.  CT scan of the chest abdomen pelvis was performed.  Revealed apical emphysematous changes in the lungs.  Moderately enlarged liver with nodular contour and fatty infiltration suggestive of cirrhosis.  Moderately enlarged spleen  Moderately enlarged gastrohepatic lymph nodes.  TSH was 7.88  Hemoglobin admission 7.1 at discharge 7.9 MCV was elevated at 103  AST was 45  ALT was 32  Admission BUN was 17 creatinine 0.5 albumin 3.3              12/12/2022 EGD [place]  Normal esophagus  Gastritis-chronic atrophic without bleeding  Acute duodenal ulcer without hemorrhage or perforation     EGD 2/28/22   notable for moderate erythematous mucosa in the body of the stomach and antrum and possible C2 M2 Albrecht's esophagus.  Biopsies negative for H. pylori and Albrecht's esophagus.    Colonoscopy 2/28/22 notable for 1 sessile adenomatous appearing polyp (<5mm) 2 pedunculated edematous appearing polyps (10mm+). Biopsies notable for tubular adenomas. Recommended repeat colonoscopy x3 years and that after review of pathology however following the procedure a 1 year follow-up was recommended     2/28/2022  Colonoscopy   2 pedunculated polyps measuring greater than 10 mm in the distal sigmoid colon.  These were tubular adenomas.  There is another 5 mm polyp noted.     In the procedure report he recommended a 1 year follow-up however after the pathology recommended a 3-year follow-up.     10/29/2020 EGD  Normal esophagus.  No evidence of Albrecht's esophagus.  Erythematous, eroded mucosa in the antrum and duodenal bulb.     10/29/2020 colonoscopy Dr. Keenan  16 polyps removed.  10 mm polyp or larger ascending colon.  10 or more sessile polyps adenomatous appearing, semipedunculated measuring 4 to 10 mm transverse colon.  4 polyps measuring 4 to 10 mm descending colon  1 semipedunculated polyp measuring 10 mm sigmoid colon  Poor preparation  Poor preparation and additional polyps likely missed  Repeat colonoscopy 3 months

## 2025-04-12 NOTE — PROGRESS NOTES
MELD 3.0: 11 at 1/31/2025 12:36 PM  MELD-Na: 10 at 1/31/2025 12:36 PM  Calculated from:  Serum Creatinine: 0.91 mg/dL (Using min of 1 mg/dL) at 1/31/2025 12:36 PM  Serum Sodium: 144 mmol/L (Using max of 137 mmol/L) at 1/31/2025 12:36 PM  Total Bilirubin: 1.47 mg/dL at 1/31/2025 12:36 PM  Serum Albumin: 3.5 g/dL at 1/31/2025 12:36 PM  INR(ratio): 1.18 at 1/31/2025 12:36 PM  Age at listing (hypothetical): 54 years  Sex: Female at 1/31/2025 12:36 PM    4/11/2025 surgery for high-grade squamous intraepithelial lesion on Pap smear.  Loop electrosurgical excision under anesthesia (LEEP)    3/3/2025 ER visit for tachycardia headache and dizziness  3/3/2025 laboratory data  Sodium 140  Potassium 4.4  BUN 19  Creatinine 1  Glucose 179  AST 39  ALT 24  Alk phos 74  Albumin 4.1  T. bili 1.18  Magnesium 2.1  WBC 6.0 Hgb 14.6 HCT 47.1 MCV 98 platelet count 85,000  Checks x-ray portable 1 view  Interstitial prominence could be on the basis of slight edema or atypical pneumonitis but unchanged from prior study    2/26/2025 telemedicine visit with hematology oncology  Prior iron infusion history.  August 2024-Venofer 200 mg weekly x 4  Vitamin B12 1000 mcg weekly x 4    1/31/2025 laboratory data  INR 1.18  Pro time 15.3  Hemoglobin A1c 5.3  TSH suppressed at 0.142  Free T41.17  WBC 7.29 Hgb 14.5 HCT 46.5 MCV 96 platelet count 86,000  AFP tumor marker 5.21  B12 409  Cholesterol 101  Triglycerides 90  HDL 38  LDL 45    8/5/2024 hepatology follow-up with Lisandra Leggett  1.cirrhosis.  Meld-3.0 12  2.constipation  3.bilateral lower extremity edema    6/15/2024 ER visit for cellulitis left lower extremity and worsening edema  Treated with Keflex 500 mg every 6 hours for 7 days  6/15/2024 ultrasound bilateral lower extremities  No evidence of acute deep vein thrombosis    6/3/2024 through 6/6/2024 hospitalized [facility]  -Near syncope  -Hyperlipidemia  -Emphysema  -GERD  -Cirrhosis  -Diastolic CHF  -Chronic low back  pain  -Homelessness  -Diverticulosis  -Hypothyroidism    6/3/2024 CT scan and pelvis with contrast  Partially imaged groundglass opacification of the visualized lungs.  Mildly improved compared to prior study  Nodular contour of the liver seen suspicious for cirrhosis  Gallbladder unremarkable  Spleen enlarged  Pancreas unremarkable  Adrenal glands unremarkable  Diverticulosis with residual bowel wall thickening of sigmoid colon and minimal adjacent fluid in the left paracolic gutter improved from 5/15/2024.  Area previously identified as diverticulitis      5/21/2024 hospitalized with pneumonia [facility]  1.severe sepsis secondary to pneumonia  2.centrilobular emphysema  3.cirrhosis      5/2024 diverticulitis  Treated with Augmentin and Flagyl    Admitted 3/6/2024 through 4/11/2024 [facility]  Discharge medications included  -Insulin 55 units daily  -Budesonide 0.5 mg nebulized  -Lactulose 30 mL 3 times a day  -Folic acid 1 mg daily  -Lasix 40 mg daily  -Humalog 15 units 3 times a day  -Prednisone weaning dose  -Bactrim Monday Wednesday and Friday  -Albuterol  Bupropion  mg daily  -Diazepam 5 mg every 6 hours as needed  Vitamin D  Iron 325 mg daily  Fluticasone nasal spray  Gabapentin 100 mg 2 times a day  Levothyroxine 112 mcg daily  Nicotine patch  -Omeprazole 40 mg twice a day  -Oxycodone 10/325 every 6 hours as needed  Scopolamine patch 1 mg over 3-day patch  Sertraline 100 mg daily  Simvastatin 20 mg daily  Carafate 3 times a day  Varenicline 0.5 mg twice a day (Chantix)  Discontinued medications  Semaglutide  Spironolactone 50 mg tablet     Discharge diagnoses  Sepsis  Bilateral pneumonia treated with Bactrim, steroids, ceftriaxone, and Zithromax  Hypoxemia  Exacerbation of COPD  Leukocytosis  CHF.  Right heart catheterization 3/19 normal right-sided pressures,  Acute on chronic diastolic heart failure  Acute kidney injury  Folic acid deficiency  Cirrhosis  Acute pneumothorax.  Chest  tube removed 3/31/2024     4/10/2024 laboratory data  WBC 8.6 Hgb 10.5 HCT 33 platelet count 98,000 MCV 95.4.  Hemoglobin was 12.8 at admission  INR 1.31 (3/6/2024)  T. bili 3.5 at admission dropped to 1.0  Creatinine 0.83 at admission  Sodium 138 at admission  Sodium 141  Potassium 4.5  Creatinine 0.77  Glucose 95  Albumin 2.8  AST 33  ALT 69  T. bili 1.0    3/22/2023 laboratory data  CINDY negative  Celiac antibody panel negative  AMA negative  Smooth muscle antibody-negative  Liver kidney microsomal antibody negative alpha-1 antitrypsin level 244             3/7/2024 CT scan chest [place]  Findings most compatible with pulmonary edema with groundglass opacification bilaterally with slight sparing of the lung apices.  Differential diagnosis include atypical pneumonia or much less likely hemorrhage.  Pulmonary nodules involving the left upper lobe and right middle lobe.  Left upper lobe nodule measures 2.3 x 2.2 x 7 mm right lobe nodule measures 11 mm     Patient did have a right-sided chest tube in place at some point on imaging 3/24/2024        2/13/2023 virtual visit with hepatology Lisandra antony        12/21/2023 laboratory data  Sodium 142  Potassium 4.3  BUN 10  Creatinine 0.73                                          MELD 3.0  11  Glucose 178  AST 48  ALT 21  Alk phos 94  T. bili 1.19  Direct bili 0.33  WBC 6.52 Hgb 11.5 HCT 38.5 MCV 95 platelet count unable to estimate due to clumping     10/10/2023 EGD Dr. 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  Status post APC  -Portal hypertensive gastropathy in the body of the stomach  -Healed duodenal ulcer  -3 hyperechoic periportal lymph nodes with well-defined margins largest measuring 11 x 7 mm but together likely about 2 cm.  Appeared benign not sampled  -Nodular liver  -Polypoid area seen in the midesophagus status post biopsy.  Biopsy unremarkable  -Negative for esophageal varices     10/4/2023 MRI abdomen with MRCP  Liver  enlarged at 23.7 cm.  Surface nodularity suggestive of cirrhosis  Mild diffuse hepatic steatosis.  No biliary ductal dilatation.  No gallstones  Pancreas unremarkable  Spleen enlarged at 15.9 cm similar to recent studies  Therefore enlarged upper abdominal lymph nodes similar to March 2023 MRI.  These have been present since 2015 with changes in measurement as described below.  --1.3 cm lymph node lymph node adjacent to the neck of the pancreas  compared to 1.3 cm in December 2015.  -1.1 cm periportal lymph node , compared to 1.0 cm in December 2015.  - 1.4 cm portacaval lymph node , compared to 1.2 cm in December 2015.  - 0.9 cm portacaval lymph node just medially , compared to 0.7 cm in 2015.     4/11/2023 ER visit in [place] for nausea vomiting diarrhea.  Diarrhea became dark.  Fecal Hemoccult done in the hospital was negative for blood  AST was 55  ALT 25  Alk phos 75  WBC 4.7 Hgb 9.1 MCV 78 platelet count was 94,000  She did have a fever of 101.2 at that time.  She had been using Pepto-Bismol.  At presentation her heart rate was 80 and blood pressure was 134/73     4/5/2023 EGD  Probable healing ulcer at junction of first and second portion of duodenum.  Biopsy revealed benign intestinal mucosa with retained villous architecture.  Mild acute and chronic inflammation noted.  Nodular mucosa associated with healing ulcer status post biopsy.  Biopsy revealed mild acute and chronic inflammation, focal mucosal erosion and reactive surface foveolar epithelial changes consistent with reactive/chemical gastritis.  Negative for H. pylori.  Scattered areas of nodular mucosa with overlying erosions in the antrum, status post biopsy.  Biopsies negative for H. pylori.  Probable portal hypertensive gastropathy fundus and body status post biopsy.  Biopsy revealed mucosa with mild vascular congestion and edema.  Normal esophagus     3/22/2023 MRI abdomen  Redefined cirrhotic morphology of the liver.  Consistent with  ultrasound elastography findings.  Prior cholecystectomy  Spleen is enlarged at 16.8 cm  Mild robin hepatic adenopathy.  A dominant node measuring 15 mm correlates with ultrasound finding of a hypoechoic lesion adjacent to the pancreas.     3/22/2023 laboratory data  IgA 417  Alpha-1 antitrypsin level 244  ACE 61  Iron 35  Ferritin 9  Iron saturation 7%  TIBC 510  Folate 8.2  Ceruloplasmin 26.9  Vitamin B12 331  Alpha-fetoprotein tumor marker 6.4  WC 6.51 Hgb 10.4 HCT 38.7 MCV 85 platelet count 145,000  INR 1.05  Fasting insulin 142.4  CINDY negative  Mitochondrial antibody negative  Smooth muscle antibody negative  Celiac antibody negative  Liver kidney microsomal antibody negative  She is not immune to hepatitis B.  She is immune to hepatitis A     3/17/2023 evaluated by Dr. Banuelos of hepatology.  Follow-up in September 2023 2/1/2023 CT scan renal stone study.  Spleen enlarged at 15.1 cm.  Gallbladder absent  Diverticulosis without diverticulitis previously noted hydroureteronephrosis on the left side has resolved.  Several nonobstructing intrarenal calculi left kidney     1/18/2023 EGD  Normal second portion of duodenum.  Biopsies negative for celiac disease.  Superficial ulcer in the duodenal bulb with a clean base.  I described superficial ulceration with surrounding heaped up mucosa.  Moderate edematous erythematous nodular mucosa in the antrum.  Biopsies were benign and negative for H. pylori.  Mild generalized erythema gastric body.  Biopsies negative for H. pylori.  Z-line regular at 39 cm.  No sign of esophageal varices     1/27/2023 ultrasound abdomen  Enlarged liver 20.6 cm.  Nodular surface  Heterogeneous echotexture  Mildly enlarged spleen  In the region of the pancreatic head there is a 1.4 x 1.2 x 1.2 cm ovoid hypoechoic focus likely represent a robin hepatis lymph node        1/27/2023 ultrasound elastography  F4, cirrhosis     1/27/2023-laboratory data  MELD-Na 7   Sodium 140  Creatinine 0.76  AST  27  ALT 16  Alk phos 80  T. bili 0.71  INR 1.10  Cholesterol 126  Triglycerides 91  HDL 48  LDL 60  Iron 32  Ferritin 5  Iron saturation 6%  TIBC 494  WBC 7.73 Hgb 9.0 HCT 33.2 MCV 87 platelet count 180,000 up from 147  Hemoglobin A1c 5.2  TSH 4.13  Hepatitis A total reactive  Hepatitis B surface antigen nonreactive  Hepatitis B surface antibody less than 3.10  Hepatitis B core total nonreactive  Hepatitis C antibody nonreactive     1/21/2023 CT chest lung cancer screening  Splenomegaly of 15.4 cm.  Nonobstructing left renal calcification  1.2 cm left upper lobe groundglass opacity  Moderate paraseptal emphysema.  Benign intrapulmonary lymph nodes abutting the minor fissure.        On 1/18/2023 patient was seen by advanced practitioner Lisandra Leggett     12/11/2022 through 12/13/2022 admitted to [facility] with dizziness, shortness of breath, chest pain, and dark stool for couple weeks.  Patient also reported chronic generalized abdominal pain.  Hemoglobin admission was 7.1.-Received 1 unit of packed red blood cells.  EGD revealed a nonbleeding duodenal ulcer.  No intervention required.  CT scan of the chest abdomen pelvis was performed.  Revealed apical emphysematous changes in the lungs.  Moderately enlarged liver with nodular contour and fatty infiltration suggestive of cirrhosis.  Moderately enlarged spleen  Moderately enlarged gastrohepatic lymph nodes.  TSH was 7.88  Hemoglobin admission 7.1 at discharge 7.9 MCV was elevated at 103  AST was 45  ALT was 32  Admission BUN was 17 creatinine 0.5 albumin 3.3              12/12/2022 EGD [place]  Normal esophagus  Gastritis-chronic atrophic without bleeding  Acute duodenal ulcer without hemorrhage or perforation     EGD 2/28/22   notable for moderate erythematous mucosa in the body of the stomach and antrum and possible C2 M2 Albrecht's esophagus. Biopsies negative for H. pylori and Albrecht's esophagus.    Colonoscopy 2/28/22 notable for 1 sessile  adenomatous appearing polyp (<5mm) 2 pedunculated edematous appearing polyps (10mm+). Biopsies notable for tubular adenomas. Recommended repeat colonoscopy x3 years and that after review of pathology however following the procedure a 1 year follow-up was recommended     2/28/2022  Colonoscopy   2 pedunculated polyps measuring greater than 10 mm in the distal sigmoid colon.  These were tubular adenomas.  There is another 5 mm polyp noted.     In the procedure report he recommended a 1 year follow-up however after the pathology recommended a 3-year follow-up.     10/29/2020 EGD  Normal esophagus.  No evidence of Albrecht's esophagus.  Erythematous, eroded mucosa in the antrum and duodenal bulb.     10/29/2020 colonoscopy Dr. Keenan  16 polyps removed.  10 mm polyp or larger ascending colon.  10 or more sessile polyps adenomatous appearing, semipedunculated measuring 4 to 10 mm transverse colon.  4 polyps measuring 4 to 10 mm descending colon  1 semipedunculated polyp measuring 10 mm sigmoid colon  Poor preparation  Poor preparation and additional polyps likely missed  Repeat colonoscopy 3 months

## 2025-04-13 RX ORDER — FOLIC ACID 1 MG/1
1 TABLET ORAL DAILY
Qty: 90 TABLET | Refills: 1 | Status: SHIPPED | OUTPATIENT
Start: 2025-04-13 | End: 2025-04-17 | Stop reason: SDUPTHER

## 2025-04-14 RX ORDER — POTASSIUM CHLORIDE 1500 MG/1
20 TABLET, EXTENDED RELEASE ORAL DAILY
Qty: 30 TABLET | Refills: 5 | Status: SHIPPED | OUTPATIENT
Start: 2025-04-14

## 2025-04-14 RX ORDER — SPIRONOLACTONE 25 MG/1
25 TABLET ORAL DAILY
Qty: 90 TABLET | Refills: 1 | Status: SHIPPED | OUTPATIENT
Start: 2025-04-14

## 2025-04-14 RX ORDER — ERGOCALCIFEROL 1.25 MG/1
50000 CAPSULE, LIQUID FILLED ORAL
Qty: 12 CAPSULE | Refills: 0 | Status: SHIPPED | OUTPATIENT
Start: 2025-04-14

## 2025-04-14 NOTE — ANESTHESIA POSTPROCEDURE EVALUATION
Post-Op Assessment Note    Last Filed PACU Vitals:  Vitals Value Taken Time   Temp 98.2 °F (36.8 °C) 04/11/25 0857   Pulse 85 04/11/25 0952   /63 04/11/25 0945   Resp 19 04/11/25 0952   SpO2 94 % 04/11/25 0951   Vitals shown include unfiled device data.    Modified Vaibhav:     Vitals Value Taken Time   Activity 2 04/11/25 0945   Respiration 1 04/11/25 0945   Circulation 2 04/11/25 0945   Consciousness 2 04/11/25 0945   Oxygen Saturation 1 04/11/25 0945     Modified Vaibhav Score: 8

## 2025-04-14 NOTE — TELEPHONE ENCOUNTER
Next Office Visit 4/17/25      PDMP checked      NOT SURE WHY THE ONLY THING SHOWING UP ON THE PDMP WAS A REFILL LAST jUNE           WHEN SHE HAS FILLED IT SINCE

## 2025-04-15 ENCOUNTER — OFFICE VISIT (OUTPATIENT)
Dept: GASTROENTEROLOGY | Facility: CLINIC | Age: 55
End: 2025-04-15
Payer: MEDICARE

## 2025-04-15 ENCOUNTER — TELEPHONE (OUTPATIENT)
Dept: GASTROENTEROLOGY | Facility: CLINIC | Age: 55
End: 2025-04-15

## 2025-04-15 VITALS
HEART RATE: 86 BPM | BODY MASS INDEX: 41.06 KG/M2 | DIASTOLIC BLOOD PRESSURE: 70 MMHG | OXYGEN SATURATION: 96 % | RESPIRATION RATE: 16 BRPM | SYSTOLIC BLOOD PRESSURE: 118 MMHG | HEIGHT: 67 IN | TEMPERATURE: 97.9 F | WEIGHT: 261.6 LBS

## 2025-04-15 DIAGNOSIS — K26.9 DUODENAL ULCER: ICD-10-CM

## 2025-04-15 DIAGNOSIS — K74.60 CIRRHOSIS OF LIVER WITHOUT ASCITES, UNSPECIFIED HEPATIC CIRRHOSIS TYPE (HCC): ICD-10-CM

## 2025-04-15 DIAGNOSIS — Z86.0100 HISTORY OF COLON POLYPS: ICD-10-CM

## 2025-04-15 DIAGNOSIS — K21.9 GASTROESOPHAGEAL REFLUX DISEASE WITHOUT ESOPHAGITIS: Primary | ICD-10-CM

## 2025-04-15 DIAGNOSIS — D50.9 IRON DEFICIENCY ANEMIA, UNSPECIFIED IRON DEFICIENCY ANEMIA TYPE: ICD-10-CM

## 2025-04-15 DIAGNOSIS — R59.9 ENLARGED LYMPH NODES, UNSPECIFIED: ICD-10-CM

## 2025-04-15 DIAGNOSIS — K58.2 IRRITABLE BOWEL SYNDROME WITH BOTH CONSTIPATION AND DIARRHEA: ICD-10-CM

## 2025-04-15 PROCEDURE — 99215 OFFICE O/P EST HI 40 MIN: CPT | Performed by: INTERNAL MEDICINE

## 2025-04-15 RX ORDER — DIAZEPAM 5 MG/1
5 TABLET ORAL
Qty: 30 TABLET | Refills: 0 | Status: SHIPPED | OUTPATIENT
Start: 2025-04-15 | End: 2025-04-17 | Stop reason: SDUPTHER

## 2025-04-15 RX ORDER — FERROUS SULFATE 324(65)MG
324 TABLET, DELAYED RELEASE (ENTERIC COATED) ORAL
Qty: 30 TABLET | Refills: 0 | Status: SHIPPED | OUTPATIENT
Start: 2025-04-15 | End: 2025-04-17 | Stop reason: SDUPTHER

## 2025-04-15 NOTE — ASSESSMENT & PLAN NOTE
Patient was noted to have 16 significant polyps back in October 2020 in the face of a poor preparation.  Follow-up colonoscopy in 2022 revealed 2 polyps over 10 mm that were tubular adenomas.  Given multiple polyps, she does report having a genetics consultation.  I but I could not find that complete report.  She does not recall having genetic testing performed.  At some point she should consider genetics consultation.    At this point Chely is past due for a colonoscopy however given her multiple comorbidities with underlying lung disease in addition to her cardiac condition, she would be high risk for colonoscopy.  I would like to get clearance from her cardiologist in addition to pulmonary team prior to pursuing colonoscopy.  She is also due for upper endoscopy from a esophageal variceal surveillance standpoint.  I will hold on scheduling these procedures at this time until I hear back from her specialist.

## 2025-04-15 NOTE — ASSESSMENT & PLAN NOTE
Chely does have a long history of irritable bowel syndrome.  Overall her IBS symptoms are stable.  If she gets constipated she will take lactulose.  She may want to try using some lactulose every other day.    She may want to also use fiber every day like some Benefiber 2 teaspoonfuls mixed in 6 to 8 ounce of noncovered liquid daily.

## 2025-04-15 NOTE — LETTER
April 15, 2025     Rashid Pete DO  [address] [facility]  [address]  [facility] [address]    Patient: Chely Ruvalcaba   YOB: 1970   Date of Visit: 4/15/2025       Dear DO Bella Anderson CRNP:    Thank you for referring Chely Ruvalcaba to me for evaluation. Below are my notes for this consultation.    If you have questions, please do not hesitate to call me. I look forward to following your patient along with you.         Sincerely,        Michele Vu DO        CC: ANTONIO Nam DO  4/15/2025 11:21 AM  Incomplete  Name: Chely Ruvalcaba      : 1970      MRN: [id number]  Encounter Provider: Michele Vu DO  Encounter Date: 4/15/2025   Encounter department: [facility] [place]  :  Assessment & Plan  Gastroesophageal reflux disease without esophagitis  Chely does have GERD.  She has symptoms 2 to 3 days a week despite omeprazole 40 mg twice a day.  She is also using famotidine 40 mg 2 hours before bed.  She will continue omeprazole 40 mg twice a day Best to take 30 minutes to 1 hour before 1 meal a day.  Continue famotidine 40 mg 2 hours before bed.  She could try Gaviscon or Tums if needed for breakthrough heartburn.  She does drink about 25 ounces of Pepsi per day and I suggested stopping all soda.  Lifestyle modifications for gastroesophageal reflux disease were discussed and include limiting fried and fatty foods, mints, chocolates, carbonated and caffeinated beverages , and alcohol, etc.  Avoid lying down for 2-3 hours after meals.  If you have nighttime symptoms consider raising the head of the bed up on 4-6 inch blocks.  Pillows typically are not useful.  If you are overweight, weight loss will be helpful.           Duodenal ulcer  Chely does have a history of duodenal ulcer and I am assuming a GI bleed related to this ulcer at 1 point when she was hospitalized in [place].  This ulcer was noted to be healed October 10, 2023.  Continue omeprazole 40 mg twice a day.  I did advise Chely to avoid all NSAIDs including Motrin, Advil, Aleve, ibuprofen, Celebrex, meloxicam, Excedrin excetra.         Irritable bowel syndrome with both constipation and diarrhea  Chely does have a long history of irritable bowel syndrome.  Overall her IBS symptoms are stable.  If she gets constipated she will take lactulose.  She may want to try using some lactulose every other day.    She may want to also use fiber every day like some Benefiber 2 teaspoonfuls mixed in 6 to 8 ounce of noncovered liquid daily.         Iron deficiency anemia, unspecified iron deficiency anemia type  Chely was noted to have an iron deficiency anemia.  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  She was treated with argon plasma coagulation.  Her last CBC in March 2025 revealed a hemoglobin of 14.6 hematocrit 47.1.  Her last iron infusion was probably August 2024.  Repeat iron studies.     Chely does continue to use oral iron.             History of Present Illness  HPI  Chely Ruvalcaba is a 54 y.o. female who presents for follow-up of gastroesophageal reflux disease, irritable bowel syndrome, iron deficiency anemia cirrhosis and history of colon polyps.  I did spend about 30 minutes reviewing records as she has had a variety of hospitalizations since I saw her last in May 2024.  She did follow-up with hepatology in August 2024.  In March she was in the ER for tachycardia headache and dizziness.  In June 2024 she was hospitalized with near syncope hyperlipidemia emphysema diastolic heart failure.    In May she was hospitalized with severe sepsis sepsis secondary to pneumonia.    Chely reports today that she has managed to stay out of the hospital except for recent GYN procedure/biopsy.  Overall though she feels she does not feel good in any good way.  She reports she has very few good days.  She does use  oxygen especially when she goes out walks around.  She may use oxygen during the night as at times she will wake up feeling like she is suffocating.  She will get very short of breath with walking up steps or trying to do chores around the house such as mopping or sweeping.    She does report her bowel habits to be fairly regular for her.  She does have alternating diarrhea and constipation but this is not new.  This also alternates with normal bowel function.  There is been no bleeding or black stools.  She denies any new abdominal pains.  She does have reflux.  Despite taking omeprazole 40 mg twice a day and famotidine 2 hours before bed she may have breakthrough symptoms 2-3 times a week.  There has been no dysphagia.  She has not been able to lose any weight.  She does drink 124 ounce Pepsi a day.      MELD 3.0: 11 at 1/31/2025 12:36 PM  MELD-Na: 10 at 1/31/2025 12:36 PM  Calculated from:  Serum Creatinine: 0.91 mg/dL (Using min of 1 mg/dL) at 1/31/2025 12:36 PM  Serum Sodium: 144 mmol/L (Using max of 137 mmol/L) at 1/31/2025 12:36 PM  Total Bilirubin: 1.47 mg/dL at 1/31/2025 12:36 PM  Serum Albumin: 3.5 g/dL at 1/31/2025 12:36 PM  INR(ratio): 1.18 at 1/31/2025 12:36 PM  Age at listing (hypothetical): 54 years  Sex: Female at 1/31/2025 12:36 PM     4/11/2025 surgery for high-grade squamous intraepithelial lesion on Pap smear.  Loop electrosurgical excision under anesthesia (LEEP)     3/3/2025 ER visit for tachycardia headache and dizziness  3/3/2025 laboratory data  Sodium 140  Potassium 4.4  BUN 19  Creatinine 1  Glucose 179  AST 39  ALT 24  Alk phos 74  Albumin 4.1  T. bili 1.18  Magnesium 2.1  WBC 6.0 Hgb 14.6 HCT 47.1 MCV 98 platelet count 85,000  Checks x-ray portable 1 view  Interstitial prominence could be on the basis of slight edema or atypical pneumonitis but unchanged from prior study     2/26/2025 telemedicine visit with hematology oncology  Prior iron infusion history.  August 2024-Venofer 200 mg  weekly x 4  Vitamin B12 1000 mcg weekly x 4     1/31/2025 laboratory data  INR 1.18  Pro time 15.3  Hemoglobin A1c 5.3  TSH suppressed at 0.142  Free T41.17  WBC 7.29 Hgb 14.5 HCT 46.5 MCV 96 platelet count 86,000  AFP tumor marker 5.21  B12 409  Cholesterol 101  Triglycerides 90  HDL 38  LDL 45     8/5/2024 hepatology follow-up with Lisandra Leggett  1.cirrhosis.  Meld-3.0 12  2.constipation  3.bilateral lower extremity edema     6/15/2024 ER visit for cellulitis left lower extremity and worsening edema  Treated with Keflex 500 mg every 6 hours for 7 days  6/15/2024 ultrasound bilateral lower extremities  No evidence of acute deep vein thrombosis     6/3/2024 through 6/6/2024 hospitalized [facility]  -Near syncope  -Hyperlipidemia  -Emphysema  -GERD  -Cirrhosis  -Diastolic CHF  -Chronic low back pain  -Homelessness  -Diverticulosis  -Hypothyroidism     6/3/2024 CT scan and pelvis with contrast  Partially imaged groundglass opacification of the visualized lungs.  Mildly improved compared to prior study  Nodular contour of the liver seen suspicious for cirrhosis  Gallbladder unremarkable  Spleen enlarged  Pancreas unremarkable  Adrenal glands unremarkable  Diverticulosis with residual bowel wall thickening of sigmoid colon and minimal adjacent fluid in the left paracolic gutter improved from 5/15/2024.  Area previously identified as diverticulitis        5/21/2024 hospitalized with pneumonia [facility]  1.severe sepsis secondary to pneumonia  2.centrilobular emphysema  3.cirrhosis        5/2024 diverticulitis  Treated with Augmentin and Flagyl     Admitted 3/6/2024 through 4/11/2024 [facility]  Discharge medications included  -Insulin 55 units daily  -Budesonide 0.5 mg nebulized  -Lactulose 30 mL 3 times a day  -Folic acid 1 mg daily  -Lasix 40 mg daily  -Humalog 15 units 3 times a day  -Prednisone weaning dose  -Bactrim Monday Wednesday and Friday  -Albuterol  Bupropion  mg  daily  -Diazepam 5 mg every 6 hours as needed  Vitamin D  Iron 325 mg daily  Fluticasone nasal spray  Gabapentin 100 mg 2 times a day  Levothyroxine 112 mcg daily  Nicotine patch  -Omeprazole 40 mg twice a day  -Oxycodone 10/325 every 6 hours as needed  Scopolamine patch 1 mg over 3-day patch  Sertraline 100 mg daily  Simvastatin 20 mg daily  Carafate 3 times a day  Varenicline 0.5 mg twice a day (Chantix)  Discontinued medications  Semaglutide  Spironolactone 50 mg tablet     Discharge diagnoses  Sepsis  Bilateral pneumonia treated with Bactrim, steroids, ceftriaxone, and Zithromax  Hypoxemia  Exacerbation of COPD  Leukocytosis  CHF.  Right heart catheterization 3/19 normal right-sided pressures,  Acute on chronic diastolic heart failure  Acute kidney injury  Folic acid deficiency  Cirrhosis  Acute pneumothorax.  Chest tube removed 3/31/2024     4/10/2024 laboratory data  WBC 8.6 Hgb 10.5 HCT 33 platelet count 98,000 MCV 95.4.  Hemoglobin was 12.8 at admission  INR 1.31 (3/6/2024)  T. bili 3.5 at admission dropped to 1.0  Creatinine 0.83 at admission  Sodium 138 at admission  Sodium 141  Potassium 4.5  Creatinine 0.77  Glucose 95  Albumin 2.8  AST 33  ALT 69  T. bili 1.0     3/22/2023 laboratory data  CINDY negative  Celiac antibody panel negative  AMA negative  Smooth muscle antibody-negative  Liver kidney microsomal antibody negative alpha-1 antitrypsin level 244              3/7/2024 CT scan chest [place]  Findings most compatible with pulmonary edema with groundglass opacification bilaterally with slight sparing of the lung apices.  Differential diagnosis include atypical pneumonia or much less likely hemorrhage.  Pulmonary nodules involving the left upper lobe and right middle lobe.  Left upper lobe nodule measures 2.3 x 2.2 x 7 mm right lobe nodule measures 11 mm     Patient did have a right-sided chest tube in place at some point on imaging 3/24/2024        2/13/2023 virtual visit with hepatology Lisandra  cassia        12/21/2023 laboratory data  Sodium 142  Potassium 4.3  BUN 10  Creatinine 0.73                                          MELD 3.0  11  Glucose 178  AST 48  ALT 21  Alk phos 94  T. bili 1.19  Direct bili 0.33  WBC 6.52 Hgb 11.5 HCT 38.5 MCV 95 platelet count unable to estimate due to clumping     10/10/2023 EGD Dr. 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  Status post APC  -Portal hypertensive gastropathy in the body of the stomach  -Healed duodenal ulcer  -3 hyperechoic periportal lymph nodes with well-defined margins largest measuring 11 x 7 mm but together likely about 2 cm.  Appeared benign not sampled  -Nodular liver  -Polypoid area seen in the midesophagus status post biopsy.  Biopsy unremarkable  -Negative for esophageal varices     10/4/2023 MRI abdomen with MRCP  Liver enlarged at 23.7 cm.  Surface nodularity suggestive of cirrhosis  Mild diffuse hepatic steatosis.  No biliary ductal dilatation.  No gallstones  Pancreas unremarkable  Spleen enlarged at 15.9 cm similar to recent studies  Therefore enlarged upper abdominal lymph nodes similar to March 2023 MRI.  These have been present since 2015 with changes in measurement as described below.  --1.3 cm lymph node lymph node adjacent to the neck of the pancreas  compared to 1.3 cm in December 2015.  -1.1 cm periportal lymph node , compared to 1.0 cm in December 2015.  - 1.4 cm portacaval lymph node , compared to 1.2 cm in December 2015.  - 0.9 cm portacaval lymph node just medially , compared to 0.7 cm in 2015.     4/11/2023 ER visit in [place] for nausea vomiting diarrhea.  Diarrhea became dark.  Fecal Hemoccult done in the hospital was negative for blood  AST was 55  ALT 25  Alk phos 75  WBC 4.7 Hgb 9.1 MCV 78 platelet count was 94,000  She did have a fever of 101.2 at that time.  She had been using Pepto-Bismol.  At presentation her heart rate was 80 and blood pressure was 134/73     4/5/2023 EGD  Probable  healing ulcer at junction of first and second portion of duodenum.  Biopsy revealed benign intestinal mucosa with retained villous architecture.  Mild acute and chronic inflammation noted.  Nodular mucosa associated with healing ulcer status post biopsy.  Biopsy revealed mild acute and chronic inflammation, focal mucosal erosion and reactive surface foveolar epithelial changes consistent with reactive/chemical gastritis.  Negative for H. pylori.  Scattered areas of nodular mucosa with overlying erosions in the antrum, status post biopsy.  Biopsies negative for H. pylori.  Probable portal hypertensive gastropathy fundus and body status post biopsy.  Biopsy revealed mucosa with mild vascular congestion and edema.  Normal esophagus     3/22/2023 MRI abdomen  Redefined cirrhotic morphology of the liver.  Consistent with ultrasound elastography findings.  Prior cholecystectomy  Spleen is enlarged at 16.8 cm  Mild robin hepatic adenopathy.  A dominant node measuring 15 mm correlates with ultrasound finding of a hypoechoic lesion adjacent to the pancreas.     3/22/2023 laboratory data  IgA 417  Alpha-1 antitrypsin level 244  ACE 61  Iron 35  Ferritin 9  Iron saturation 7%  TIBC 510  Folate 8.2  Ceruloplasmin 26.9  Vitamin B12 331  Alpha-fetoprotein tumor marker 6.4  WC 6.51 Hgb 10.4 HCT 38.7 MCV 85 platelet count 145,000  INR 1.05  Fasting insulin 142.4  CINDY negative  Mitochondrial antibody negative  Smooth muscle antibody negative  Celiac antibody negative  Liver kidney microsomal antibody negative  She is not immune to hepatitis B.  She is immune to hepatitis A     3/17/2023 evaluated by Dr. Banuelos of hepatology.  Follow-up in September 2023 2/1/2023 CT scan renal stone study.  Spleen enlarged at 15.1 cm.  Gallbladder absent  Diverticulosis without diverticulitis previously noted hydroureteronephrosis on the left side has resolved.  Several nonobstructing intrarenal calculi left kidney     1/18/2023 EGD  Normal second  portion of duodenum.  Biopsies negative for celiac disease.  Superficial ulcer in the duodenal bulb with a clean base.  I described superficial ulceration with surrounding heaped up mucosa.  Moderate edematous erythematous nodular mucosa in the antrum.  Biopsies were benign and negative for H. pylori.  Mild generalized erythema gastric body.  Biopsies negative for H. pylori.  Z-line regular at 39 cm.  No sign of esophageal varices     1/27/2023 ultrasound abdomen  Enlarged liver 20.6 cm.  Nodular surface  Heterogeneous echotexture  Mildly enlarged spleen  In the region of the pancreatic head there is a 1.4 x 1.2 x 1.2 cm ovoid hypoechoic focus likely represent a robin hepatis lymph node        1/27/2023 ultrasound elastography  F4, cirrhosis     1/27/2023-laboratory data  MELD-Na 7   Sodium 140  Creatinine 0.76  AST 27  ALT 16  Alk phos 80  T. bili 0.71  INR 1.10  Cholesterol 126  Triglycerides 91  HDL 48  LDL 60  Iron 32  Ferritin 5  Iron saturation 6%  TIBC 494  WBC 7.73 Hgb 9.0 HCT 33.2 MCV 87 platelet count 180,000 up from 147  Hemoglobin A1c 5.2  TSH 4.13  Hepatitis A total reactive  Hepatitis B surface antigen nonreactive  Hepatitis B surface antibody less than 3.10  Hepatitis B core total nonreactive  Hepatitis C antibody nonreactive     1/21/2023 CT chest lung cancer screening  Splenomegaly of 15.4 cm.  Nonobstructing left renal calcification  1.2 cm left upper lobe groundglass opacity  Moderate paraseptal emphysema.  Benign intrapulmonary lymph nodes abutting the minor fissure.        On 1/18/2023 patient was seen by advanced practitioner Lisandra Leggett     12/11/2022 through 12/13/2022 admitted to [facility] with dizziness, shortness of breath, chest pain, and dark stool for couple weeks.  Patient also reported chronic generalized abdominal pain.  Hemoglobin admission was 7.1.-Received 1 unit of packed red blood cells.  EGD revealed a nonbleeding duodenal ulcer.  No intervention required.  CT  scan of the chest abdomen pelvis was performed.  Revealed apical emphysematous changes in the lungs.  Moderately enlarged liver with nodular contour and fatty infiltration suggestive of cirrhosis.  Moderately enlarged spleen  Moderately enlarged gastrohepatic lymph nodes.  TSH was 7.88  Hemoglobin admission 7.1 at discharge 7.9 MCV was elevated at 103  AST was 45  ALT was 32  Admission BUN was 17 creatinine 0.5 albumin 3.3              12/12/2022 EGD [place]  Normal esophagus  Gastritis-chronic atrophic without bleeding  Acute duodenal ulcer without hemorrhage or perforation     EGD 2/28/22   notable for moderate erythematous mucosa in the body of the stomach and antrum and possible C2 M2 Albrecht's esophagus. Biopsies negative for H. pylori and Albrecht's esophagus.    Colonoscopy 2/28/22 notable for 1 sessile adenomatous appearing polyp (<5mm) 2 pedunculated edematous appearing polyps (10mm+). Biopsies notable for tubular adenomas. Recommended repeat colonoscopy x3 years and that after review of pathology however following the procedure a 1 year follow-up was recommended     2/28/2022  Colonoscopy   2 pedunculated polyps measuring greater than 10 mm in the distal sigmoid colon.  These were tubular adenomas.  There is another 5 mm polyp noted.     In the procedure report he recommended a 1 year follow-up however after the pathology recommended a 3-year follow-up.     10/29/2020 EGD  Normal esophagus.  No evidence of Albrecht's esophagus.  Erythematous, eroded mucosa in the antrum and duodenal bulb.     10/29/2020 colonoscopy Dr. Keenan  16 polyps removed.  10 mm polyp or larger ascending colon.  10 or more sessile polyps adenomatous appearing, semipedunculated measuring 4 to 10 mm transverse colon.  4 polyps measuring 4 to 10 mm descending colon  1 semipedunculated polyp measuring 10 mm sigmoid colon  Poor preparation  Poor preparation and additional polyps likely missed  Repeat colonoscopy 3  months      Review of Systems  Past Medical History  Past Medical History:   Diagnosis Date   • Abnormal stress test    • Acute cystitis without hematuria 05/07/2018   • Acute duodenal ulcer with bleeding 01/16/2023   • Allergic sinusitis     last assessed - 03Apr2017   • Anemia Around december   • Anxiety     last assessed - 04Mar2016   • Arthritis    • Asthma     last assessed - 04Mar2016   • Bilateral lower extremity edema     last assessed - 12May2017   • Callus of foot     last assessed - 22Jun2016   • Chest pain    • CHF (congestive heart failure) ([facility]) 03/2024   • Chronic GERD     last assessed - 16Jan2017   • Colon polyp    • Constipation     Resolved - 13Jan2017   • COPD (chronic obstructive pulmonary disease) ([facility])    • Depression     last assessed - 04Mar2016   • Disease of thyroid gland    • Diverticulitis of colon     last assessed - 04Mar2016   • Dyspepsia     Resolved - 24Jul2017   • Edema, lower extremity 08/07/2020   • Emphysema of lung ([facility])    • Esophageal reflux     last assessed - 04Mar2016   • Essential hypertriglyceridemia     last assessed - 23Feb2017   • Fatty liver 01/16/2023   • Gastroesophageal reflux disease with esophagitis 11/18/2016   • GERD (gastroesophageal reflux disease)    • Gynecological disorder     last assessed - 04Mar2016   • History of transfusion    • Hydroureteronephrosis 06/30/2022   • Hypertension     last assessed - 04Mar2016   • Hypokalemia 06/20/2022   • Hypotension     last assessed - 25Aug2016   • Kidney stone    • Left ureteral stone with hydronephrosis 07/21/2022   • Migraine    • Morbid obesity ([facility]) 01/11/2019    Formatting of this note might be different from the original. Added automatically from request for surgery [id number]   • Neuropathy    • Obesity    • Other chest pain 11/08/2018   • Pancreatic lesion 03/13/2023   • Pneumonia 03/2024   • Positive depression screening 06/19/2022   • Primary hypertriglyceridemia 06/19/2022   • Pulmonary emphysema ([facility])      last assessed - 04Mar2016   • Seasonal allergies    • Severe obesity ([facility]) 01/16/2023   • Skin tag 07/17/2023   • SOB (shortness of breath)    • Strain of groin 07/17/2023   • Urinary frequency     last assessed - 22Jun2016   • Vagina, candidiasis     last assessed - 22Jun2016   • Very heavy cigarette smoker 01/16/2023   • Visual impairment      Past Surgical History:   Procedure Laterality Date   • CARPAL TUNNEL RELEASE      last assessed - 04MAr2016   • CHOLECYSTECTOMY      last assessed - 04Mar2016   • COLONOSCOPY      Complete colonoscopy    • EYE SURGERY      last assessed - 04Mar2016   • FL RETROGRADE PYELOGRAM  6/16/2022   • FL RETROGRADE PYELOGRAM  8/16/2022   • FOOT SURGERY      last assessed - 04Mar2016   • KS CONIZATION CERVIX W/WO D&C RPR ELTRD EXC N/A 4/11/2025    Procedure: LOOP ELECTROSURGICAL EXCISION REOCEDURE, EXAM UNDER ANESTHESIA;  Surgeon: Lisa Fernando MD;  Location: [facility];  Service: Gynecology   • KS CYSTO/URETERO W/LITHOTRIPSY &INDWELL STENT INSRT Left 6/16/2022    Procedure: CYSTOSCOPY URETEROSCOPY WITH LITHOTRIPSY HOLMIUM LASER, RETROGRADE PYELOGRAM AND INSERTION STENT URETERAL;  Surgeon: Sammy Ferrer MD;  Location: [facility];  Service: Urology   • KS CYSTO/URETERO W/LITHOTRIPSY &INDWELL STENT INSRT Left 8/16/2022    Procedure: CYSTOSCOPY USCOPE W/HOLMIUM LASER, RETROGRADE PYELOGRAM&STENT;  Surgeon: Sudheer Bradford MD;  Location: [facility];  Service: Urology   • KS CYSTOURETHROSCOPY W/URETERAL CATHETERIZATION Left 7/21/2022    Procedure: CYSTOSCOPY RETROGRADE PYELOGRAM WITH INSERTION STENT URETERAL;  Surgeon: Facundo Matamoros MD;  Location: [facility];  Service: Urology   • KS ESOPHAGOGASTRODUODENOSCOPY TRANSORAL DIAGNOSTIC N/A 2/13/2017    Procedure: ESOPHAGOGASTRODUODENOSCOPY (EGD);  Surgeon: Alison Saleem MD;  Location: [facility];  Service: Gastroenterology     Family History   Problem Relation Age of Onset   • Obesity Mother    • Thyroid disease Mother    • Cancer  "Mother 71   • Vision loss Mother    • Thyroid disease unspecified Mother    • Obesity Sister    • Coronary artery disease Sister    • Stroke Sister    • Asthma Sister    • Glaucoma Sister    • No Known Problems Maternal Aunt    • Diabetes Maternal Uncle    • Lung cancer Maternal Grandmother    • Cancer Maternal Grandfather    • Diabetes Maternal Grandfather    • No Known Problems Paternal Grandmother    • No Known Problems Paternal Grandfather    • Hypertension Other    • Lung cancer Other    • Hypertension Other    • Lung cancer Other    • Lung cancer Other       reports that she has been smoking cigarettes. She started smoking about 42 years ago. She has a 60 pack-year smoking history. She has never used smokeless tobacco. She reports that she does not currently use alcohol. She reports that she does not use drugs.  Current Outpatient Medications   Medication Instructions   • albuterol (PROVENTIL HFA,VENTOLIN HFA) 90 mcg/act inhaler 2 puffs, Inhalation, Every 6 hours PRN   • B-D SYRINGE/NEEDLE 1CC/25GX5/8 25G X 5/8\" 1 ML MISC    • bisacodyl (FLEET) 10 mg, Once   • Blood Glucose Monitoring Suppl (OneTouch Verio) w/Device KIT Check blood sugar once a day   • budesonide (PULMICORT) 0.5 mg, 2 times daily   • buPROPion (WELLBUTRIN XL) 300 mg, Oral, Every morning   • Continuous Glucose Sensor (Dexcom G7 Sensor) 1 Device, Does not apply, Every 10 days   • diazepam (VALIUM) 5 mg, Oral, Daily at bedtime PRN   • ergocalciferol (VITAMIN D2) 50,000 Units, Oral, Every 14 days   • famotidine (PEPCID) 40 mg, Oral, Daily at bedtime, Take in evening 2 hours prior to bed   • ferrous sulfate 324 mg, Oral, Daily before breakfast   • folic acid (FOLVITE) 1 mg, Oral, Daily   • furosemide (LASIX) 10 mg, Oral, Daily   • gabapentin (NEURONTIN) 100 mg, 2 times daily   • glucose blood (OneTouch Verio) test strip Check blood sugar once a day   • Insulin Glargine Solostar (LANTUS SOLOSTAR) 50 Units, Subcutaneous, Daily at bedtime   • insulin " lispro (HUMALOG) 12 Units, Subcutaneous, 3 times daily with meals   • Insulin Pen Needle 31G X 5 MM MISC Subcutaneous, As needed   • ipratropium (ATROVENT HFA) 17 mcg/act inhaler 2 puffs, Every 6 hours   • ipratropium-albuterol (DUO-NEB) 0.5-2.5 mg/3 mL nebulizer solution 3 mL, Nebulization, 4 times daily   • lactulose (CHRONULAC) 20 g, Oral, Daily PRN   • levothyroxine 88 mcg, Oral, Daily (early morning)   • midodrine (PROAMATINE) 2.5 mg, Every other day   • nicotine (NICODERM CQ) 21 mg/24 hr TD 24 hr patch 1 patch, Transdermal, Every 24 hours   • Nutritional Supplements (Glucerna) LIQD Take as directed   • omeprazole (PRILOSEC) 40 mg, Oral, 2 times daily   • ondansetron (ZOFRAN) 4 mg, Oral, Every 8 hours PRN   • OneTouch Delica Lancets 30G MISC Check blood sugars once a day   • oxybutynin (DITROPAN-XL) 10 mg, Daily at bedtime   • oxyCODONE-acetaminophen (PERCOCET)  mg per tablet 1 tablet, Every 6 hours PRN   • oxygen 2 L/min (2 L/min), Inhalation, Continuous, May use 3L with exertion per Bella ALVAREZ Keep sat >88%   • polyethylene glycol (GOLYTELY) 4000 mL solution 4,000 mL, Oral, Once   • potassium chloride (Klor-Con M20) 20 mEq tablet 20 mEq, Oral, Daily   • semaglutide (0.25 or 0.5 mg/dose) (OZEMPIC (0.25 OR 0.5 MG/DOSE)) 0.5 mg, Subcutaneous, Every 7 days   • sertraline (ZOLOFT) 100 mg, Oral, 2 times daily   • simethicone (MYLICON) 125 mg, Every 6 hours PRN   • simvastatin (ZOCOR) 20 mg, Oral, Daily at bedtime   • spironolactone (ALDACTONE) 25 mg, Oral, Daily   • sucralfate (CARAFATE) 1 g, Oral, 3 times daily   • varenicline (CHANTIX) 0.5 mg, Oral, 2 times daily     Allergies   Allergen Reactions   • Hydrocodone-Acetaminophen Hives   • Ketorolac Hives and Dizziness   • Toradol [Ketorolac Tromethamine] Other (See Comments)     hypotension   • Ultram [Tramadol] Nausea Only, GI Intolerance and Vomiting      Current Outpatient Medications on File Prior to Visit   Medication Sig Dispense Refill   •  "albuterol (PROVENTIL HFA,VENTOLIN HFA) 90 mcg/act inhaler Inhale 2 puffs every 6 (six) hours as needed for wheezing 18 g 1   • B-D SYRINGE/NEEDLE 1CC/25GX5/8 25G X 5/8\" 1 ML MISC      • budesonide (PULMICORT) 0.5 mg/2 mL nebulizer solution Take 0.5 mg by nebulization 2 (two) times a day Rinse mouth after use.     • buPROPion (WELLBUTRIN XL) 300 mg 24 hr tablet Take 1 tablet (300 mg total) by mouth every morning 90 tablet 0   • diazepam (VALIUM) 5 mg tablet take 1 tablet by mouth at bedtime if needed for anxiety 30 tablet 0   • ergocalciferol (VITAMIN D2) 50,000 units Take 1 capsule (50,000 Units total) by mouth every 14 (fourteen) days 12 capsule 0   • famotidine (PEPCID) 40 MG tablet Take 1 tablet (40 mg total) by mouth daily at bedtime Take in evening 2 hours prior to bed 90 tablet 0   • ferrous sulfate 324 (65 Fe) mg Take 1 tablet (324 mg total) by mouth daily before breakfast 30 tablet 3   • folic acid (FOLVITE) 1 mg tablet Take 1 tablet (1 mg total) by mouth daily 90 tablet 1   • furosemide (LASIX) 20 mg tablet TAKE 1/2 TABLET BY MOUTH DAILY (Patient taking differently: Take 10 mg by mouth daily Pt states it prn) 15 tablet 0   • gabapentin (NEURONTIN) 100 mg capsule Take 100 mg by mouth 2 (two) times a day  0   • Insulin Glargine Solostar (Lantus SoloStar) 100 UNIT/ML SOPN Inject 0.5 mL (50 Units total) under the skin daily at bedtime 52 mL 1   • insulin lispro (HumaLOG) 100 units/mL injection pen Inject 12 Units under the skin 3 (three) times a day with meals (Patient taking differently: Inject 5 Units under the skin 3 (three) times a day with meals) 42 mL 1   • Insulin Pen Needle 31G X 5 MM MISC Inject under the skin if needed (Insuline injections) 100 each 3   • ipratropium (ATROVENT HFA) 17 mcg/act inhaler Inhale 2 puffs every 6 (six) hours     • ipratropium-albuterol (DUO-NEB) 0.5-2.5 mg/3 mL nebulizer solution Take 3 mL by nebulization 4 (four) times a day 360 mL 3   • lactulose (CHRONULAC) 10 g/15 mL " solution Take 30 mL (20 g total) by mouth daily as needed (constipation) 240 mL 11   • levothyroxine 88 mcg tablet Take 1 tablet (88 mcg total) by mouth daily in the early morning (Patient taking differently: Take 112 mcg by mouth daily in the early morning) 100 tablet 3   • Nutritional Supplements (Glucerna) LIQD Take as directed 237 mL 0   • ondansetron (ZOFRAN) 4 mg tablet Take 1 tablet (4 mg total) by mouth every 8 (eight) hours as needed for nausea or vomiting 20 tablet 0   • oxyCODONE-acetaminophen (PERCOCET)  mg per tablet Take 1 tablet by mouth every 6 (six) hours as needed for severe pain     • oxygen gas Inhale 2 L/min at 120,000 mL/hr continuous May use 3L with exertion per Bella BAHNP Keep sat >88% 2 L 0   • potassium chloride (Klor-Con M20) 20 mEq tablet Take 1 tablet (20 mEq total) by mouth daily 30 tablet 5   • semaglutide, 0.25 or 0.5 mg/dose, (Ozempic, 0.25 or 0.5 MG/DOSE,) 2 mg/3 mL injection pen Inject 0.75 mL (0.5 mg total) under the skin every 7 days 9 mL 0   • sertraline (ZOLOFT) 100 mg tablet Take 1 tablet (100 mg total) by mouth 2 (two) times a day 180 tablet 0   • simethicone (MYLICON) 125 MG chewable tablet Chew 125 mg every 6 (six) hours as needed for flatulence     • simvastatin (ZOCOR) 20 mg tablet Take 1 tablet (20 mg total) by mouth daily at bedtime 90 tablet 0   • spironolactone (ALDACTONE) 25 mg tablet Take 1 tablet (25 mg total) by mouth daily 90 tablet 1   • sucralfate (CARAFATE) 1 g tablet take 1 tablet by mouth three times a day 300 tablet 1   • bisacodyl (FLEET) 10 MG/30ML ENEM Insert 10 mg into the rectum once (Patient not taking: Reported on 1/31/2025)     • Blood Glucose Monitoring Suppl (OneTouch Verio) w/Device KIT Check blood sugar once a day (Patient not taking: Reported on 2/6/2025) 1 kit 0   • Continuous Glucose Sensor (Dexcom G7 Sensor) Use 1 Device every 10 days (Patient not taking: Reported on 4/15/2025) 3 each 2   • glucose blood (OneTouch Verio) test  strip Check blood sugar once a day (Patient not taking: Reported on 2025) 100 each 1   • midodrine (PROAMATINE) 2.5 mg tablet Take 2.5 mg by mouth every other day Per pt she takes every other day if she feels she needs it     • nicotine (NICODERM CQ) 21 mg/24 hr TD 24 hr patch Place 1 patch on the skin over 24 hours every 24 hours (Patient not taking: Reported on 10/31/2024) 28 patch 5   • omeprazole (PriLOSEC) 40 MG capsule take 1 capsule by mouth twice a day 200 capsule 1   • OneTouch Delica Lancets 30G MISC Check blood sugars once a day (Patient not taking: Reported on 2025) 100 each 1   • oxybutynin (DITROPAN-XL) 10 MG 24 hr tablet Take 10 mg by mouth daily at bedtime (Patient not taking: Reported on 2025)     • polyethylene glycol (GOLYTELY) 4000 mL solution Take 4,000 mL by mouth once for 1 dose (Patient not taking: Reported on 4/15/2025) 4000 mL 0   • varenicline (CHANTIX) 0.5 mg tablet Take 1 tablet (0.5 mg total) by mouth 2 (two) times a day (Patient not taking: Reported on 10/31/2024) 60 tablet 0   • [DISCONTINUED] naloxone (NARCAN) 4 mg/0.1 mL nasal spray Administer 1 spray into a nostril. If no response after 2-3 minutes, give another dose in the other nostril using a new spray. (Patient not taking: Reported on 2022) 1 each 1     No current facility-administered medications on file prior to visit.      Social History     Tobacco Use   • Smoking status: Some Days     Current packs/day: 0.00     Average packs/day: 0.9 packs/day for 65.0 years (60.0 ttl pk-yrs)     Types: Cigarettes     Start date: 1983     Last attempt to quit: 3/6/2024     Years since quittin.1   • Smokeless tobacco: Never   • Tobacco comments:     I quit smoking back in 2024   Vaping Use   • Vaping status: Former   • Start date: 2019   • Quit date: 2019   • Substances: Nicotine   Substance and Sexual Activity   • Alcohol use: Not Currently   • Drug use: Never   • Sexual activity: Not Currently      "Partners: Male     Birth control/protection: Abstinence        Objective  /70 (BP Location: Right arm, Patient Position: Sitting, Cuff Size: Adult)   Pulse 86   Temp 97.9 °F (36.6 °C) (Temporal)   Resp 16   Ht 5' 6.5\" (1.689 m)   Wt 119 kg (261 lb 9.6 oz)   LMP 2018 (Approximate)   SpO2 96% Comment: 2L of O2.  BMI 41.59 kg/m²      Physical Exam    General Appearance: Alert, cooperative, no distress  HEENT: Normocephalic, atraumatic, anicteric.   Neck: Supple, symmetrical, trachea midline  Lungs: Clear to auscultation bilaterally; no rales, rhonchi or wheezing; respirations unlabored   Heart: Regular rate and rhythm; no murmur, rub, or gallop.  Abdomen: Soft, bowel sounds normal, non-tender, non-distended; no masses, there is no hepatosplenomegaly. No spider angiomas    Genitalia: Deferred   Rectal: Deferred   Extremities: No cyanosis, clubbing or edema   Skin: No jaundice, rashes, or lesions   Lymph nodes: No palpable cervical lymphadenopathy           Michele Vu DO  4/15/2025  8:22 AM  Sign when Signing Visit  Name: Chely Ruvalcaba      : 1970      MRN: [id number]  Encounter Provider: Michele Vu DO  Encounter Date: 4/15/2025   Encounter department: [facility] [place]  :  Assessment & Plan        History of Present Illness  HPI  Chely Ruvalcaba is a 54 y.o. female who presents     MELD 3.0: 11 at 2025 12:36 PM  MELD-Na: 10 at 2025 12:36 PM  Calculated from:  Serum Creatinine: 0.91 mg/dL (Using min of 1 mg/dL) at 2025 12:36 PM  Serum Sodium: 144 mmol/L (Using max of 137 mmol/L) at 2025 12:36 PM  Total Bilirubin: 1.47 mg/dL at 2025 12:36 PM  Serum Albumin: 3.5 g/dL at 2025 12:36 PM  INR(ratio): 1.18 at 2025 12:36 PM  Age at listing (hypothetical): 54 years  Sex: Female at 2025 12:36 PM     2025 surgery for high-grade squamous intraepithelial lesion on Pap smear.  Loop electrosurgical excision under " anesthesia (LEEP)     3/3/2025 ER visit for tachycardia headache and dizziness  3/3/2025 laboratory data  Sodium 140  Potassium 4.4  BUN 19  Creatinine 1  Glucose 179  AST 39  ALT 24  Alk phos 74  Albumin 4.1  T. bili 1.18  Magnesium 2.1  WBC 6.0 Hgb 14.6 HCT 47.1 MCV 98 platelet count 85,000  Checks x-ray portable 1 view  Interstitial prominence could be on the basis of slight edema or atypical pneumonitis but unchanged from prior study     2/26/2025 telemedicine visit with hematology oncology  Prior iron infusion history.  August 2024-Venofer 200 mg weekly x 4  Vitamin B12 1000 mcg weekly x 4     1/31/2025 laboratory data  INR 1.18  Pro time 15.3  Hemoglobin A1c 5.3  TSH suppressed at 0.142  Free T41.17  WBC 7.29 Hgb 14.5 HCT 46.5 MCV 96 platelet count 86,000  AFP tumor marker 5.21  B12 409  Cholesterol 101  Triglycerides 90  HDL 38  LDL 45     8/5/2024 hepatology follow-up with Lisandra Leggett  1.cirrhosis.  Meld-3.0 12  2.constipation  3.bilateral lower extremity edema     6/15/2024 ER visit for cellulitis left lower extremity and worsening edema  Treated with Keflex 500 mg every 6 hours for 7 days  6/15/2024 ultrasound bilateral lower extremities  No evidence of acute deep vein thrombosis     6/3/2024 through 6/6/2024 hospitalized [facility]  -Near syncope  -Hyperlipidemia  -Emphysema  -GERD  -Cirrhosis  -Diastolic CHF  -Chronic low back pain  -Homelessness  -Diverticulosis  -Hypothyroidism     6/3/2024 CT scan and pelvis with contrast  Partially imaged groundglass opacification of the visualized lungs.  Mildly improved compared to prior study  Nodular contour of the liver seen suspicious for cirrhosis  Gallbladder unremarkable  Spleen enlarged  Pancreas unremarkable  Adrenal glands unremarkable  Diverticulosis with residual bowel wall thickening of sigmoid colon and minimal adjacent fluid in the left paracolic gutter improved from 5/15/2024.  Area previously identified as diverticulitis         5/21/2024 hospitalized with pneumonia [facility]  1.severe sepsis secondary to pneumonia  2.centrilobular emphysema  3.cirrhosis        5/2024 diverticulitis  Treated with Augmentin and Flagyl     Admitted 3/6/2024 through 4/11/2024 [facility]  Discharge medications included  -Insulin 55 units daily  -Budesonide 0.5 mg nebulized  -Lactulose 30 mL 3 times a day  -Folic acid 1 mg daily  -Lasix 40 mg daily  -Humalog 15 units 3 times a day  -Prednisone weaning dose  -Bactrim Monday Wednesday and Friday  -Albuterol  Bupropion  mg daily  -Diazepam 5 mg every 6 hours as needed  Vitamin D  Iron 325 mg daily  Fluticasone nasal spray  Gabapentin 100 mg 2 times a day  Levothyroxine 112 mcg daily  Nicotine patch  -Omeprazole 40 mg twice a day  -Oxycodone 10/325 every 6 hours as needed  Scopolamine patch 1 mg over 3-day patch  Sertraline 100 mg daily  Simvastatin 20 mg daily  Carafate 3 times a day  Varenicline 0.5 mg twice a day (Chantix)  Discontinued medications  Semaglutide  Spironolactone 50 mg tablet     Discharge diagnoses  Sepsis  Bilateral pneumonia treated with Bactrim, steroids, ceftriaxone, and Zithromax  Hypoxemia  Exacerbation of COPD  Leukocytosis  CHF.  Right heart catheterization 3/19 normal right-sided pressures,  Acute on chronic diastolic heart failure  Acute kidney injury  Folic acid deficiency  Cirrhosis  Acute pneumothorax.  Chest tube removed 3/31/2024     4/10/2024 laboratory data  WBC 8.6 Hgb 10.5 HCT 33 platelet count 98,000 MCV 95.4.  Hemoglobin was 12.8 at admission  INR 1.31 (3/6/2024)  T. bili 3.5 at admission dropped to 1.0  Creatinine 0.83 at admission  Sodium 138 at admission  Sodium 141  Potassium 4.5  Creatinine 0.77  Glucose 95  Albumin 2.8  AST 33  ALT 69  T. bili 1.0     3/22/2023 laboratory data  CINDY negative  Celiac antibody panel negative  AMA negative  Smooth muscle antibody-negative  Liver kidney microsomal antibody negative alpha-1 antitrypsin level 244               3/7/2024 CT scan chest [place]  Findings most compatible with pulmonary edema with groundglass opacification bilaterally with slight sparing of the lung apices.  Differential diagnosis include atypical pneumonia or much less likely hemorrhage.  Pulmonary nodules involving the left upper lobe and right middle lobe.  Left upper lobe nodule measures 2.3 x 2.2 x 7 mm right lobe nodule measures 11 mm     Patient did have a right-sided chest tube in place at some point on imaging 3/24/2024        2/13/2023 virtual visit with hepatology Lisandra antony        12/21/2023 laboratory data  Sodium 142  Potassium 4.3  BUN 10  Creatinine 0.73                                          MELD 3.0  11  Glucose 178  AST 48  ALT 21  Alk phos 94  T. bili 1.19  Direct bili 0.33  WBC 6.52 Hgb 11.5 HCT 38.5 MCV 95 platelet count unable to estimate due to clumping     10/10/2023 EGD Dr. 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  Status post APC  -Portal hypertensive gastropathy in the body of the stomach  -Healed duodenal ulcer  -3 hyperechoic periportal lymph nodes with well-defined margins largest measuring 11 x 7 mm but together likely about 2 cm.  Appeared benign not sampled  -Nodular liver  -Polypoid area seen in the midesophagus status post biopsy.  Biopsy unremarkable  -Negative for esophageal varices     10/4/2023 MRI abdomen with MRCP  Liver enlarged at 23.7 cm.  Surface nodularity suggestive of cirrhosis  Mild diffuse hepatic steatosis.  No biliary ductal dilatation.  No gallstones  Pancreas unremarkable  Spleen enlarged at 15.9 cm similar to recent studies  Therefore enlarged upper abdominal lymph nodes similar to March 2023 MRI.  These have been present since 2015 with changes in measurement as described below.  --1.3 cm lymph node lymph node adjacent to the neck of the pancreas  compared to 1.3 cm in December 2015.  -1.1 cm periportal lymph node , compared to 1.0 cm in December 2015.  -  1.4 cm portacaval lymph node , compared to 1.2 cm in December 2015.  - 0.9 cm portacaval lymph node just medially , compared to 0.7 cm in 2015.     4/11/2023 ER visit in [place] for nausea vomiting diarrhea.  Diarrhea became dark.  Fecal Hemoccult done in the hospital was negative for blood  AST was 55  ALT 25  Alk phos 75  WBC 4.7 Hgb 9.1 MCV 78 platelet count was 94,000  She did have a fever of 101.2 at that time.  She had been using Pepto-Bismol.  At presentation her heart rate was 80 and blood pressure was 134/73     4/5/2023 EGD  Probable healing ulcer at junction of first and second portion of duodenum.  Biopsy revealed benign intestinal mucosa with retained villous architecture.  Mild acute and chronic inflammation noted.  Nodular mucosa associated with healing ulcer status post biopsy.  Biopsy revealed mild acute and chronic inflammation, focal mucosal erosion and reactive surface foveolar epithelial changes consistent with reactive/chemical gastritis.  Negative for H. pylori.  Scattered areas of nodular mucosa with overlying erosions in the antrum, status post biopsy.  Biopsies negative for H. pylori.  Probable portal hypertensive gastropathy fundus and body status post biopsy.  Biopsy revealed mucosa with mild vascular congestion and edema.  Normal esophagus     3/22/2023 MRI abdomen  Redefined cirrhotic morphology of the liver.  Consistent with ultrasound elastography findings.  Prior cholecystectomy  Spleen is enlarged at 16.8 cm  Mild robin hepatic adenopathy.  A dominant node measuring 15 mm correlates with ultrasound finding of a hypoechoic lesion adjacent to the pancreas.     3/22/2023 laboratory data  IgA 417  Alpha-1 antitrypsin level 244  ACE 61  Iron 35  Ferritin 9  Iron saturation 7%  TIBC 510  Folate 8.2  Ceruloplasmin 26.9  Vitamin B12 331  Alpha-fetoprotein tumor marker 6.4  WC 6.51 Hgb 10.4 HCT 38.7 MCV 85 platelet count 145,000  INR 1.05  Fasting insulin 142.4  CINDY  negative  Mitochondrial antibody negative  Smooth muscle antibody negative  Celiac antibody negative  Liver kidney microsomal antibody negative  She is not immune to hepatitis B.  She is immune to hepatitis A     3/17/2023 evaluated by Dr. Banuelos of hepatology.  Follow-up in September 2023 2/1/2023 CT scan renal stone study.  Spleen enlarged at 15.1 cm.  Gallbladder absent  Diverticulosis without diverticulitis previously noted hydroureteronephrosis on the left side has resolved.  Several nonobstructing intrarenal calculi left kidney     1/18/2023 EGD  Normal second portion of duodenum.  Biopsies negative for celiac disease.  Superficial ulcer in the duodenal bulb with a clean base.  I described superficial ulceration with surrounding heaped up mucosa.  Moderate edematous erythematous nodular mucosa in the antrum.  Biopsies were benign and negative for H. pylori.  Mild generalized erythema gastric body.  Biopsies negative for H. pylori.  Z-line regular at 39 cm.  No sign of esophageal varices     1/27/2023 ultrasound abdomen  Enlarged liver 20.6 cm.  Nodular surface  Heterogeneous echotexture  Mildly enlarged spleen  In the region of the pancreatic head there is a 1.4 x 1.2 x 1.2 cm ovoid hypoechoic focus likely represent a robin hepatis lymph node        1/27/2023 ultrasound elastography  F4, cirrhosis     1/27/2023-laboratory data  MELD-Na 7   Sodium 140  Creatinine 0.76  AST 27  ALT 16  Alk phos 80  T. bili 0.71  INR 1.10  Cholesterol 126  Triglycerides 91  HDL 48  LDL 60  Iron 32  Ferritin 5  Iron saturation 6%  TIBC 494  WBC 7.73 Hgb 9.0 HCT 33.2 MCV 87 platelet count 180,000 up from 147  Hemoglobin A1c 5.2  TSH 4.13  Hepatitis A total reactive  Hepatitis B surface antigen nonreactive  Hepatitis B surface antibody less than 3.10  Hepatitis B core total nonreactive  Hepatitis C antibody nonreactive     1/21/2023 CT chest lung cancer screening  Splenomegaly of 15.4 cm.  Nonobstructing left renal  calcification  1.2 cm left upper lobe groundglass opacity  Moderate paraseptal emphysema.  Benign intrapulmonary lymph nodes abutting the minor fissure.        On 1/18/2023 patient was seen by advanced practitioner Lisandra Leggett     12/11/2022 through 12/13/2022 admitted to [facility] with dizziness, shortness of breath, chest pain, and dark stool for couple weeks.  Patient also reported chronic generalized abdominal pain.  Hemoglobin admission was 7.1.-Received 1 unit of packed red blood cells.  EGD revealed a nonbleeding duodenal ulcer.  No intervention required.  CT scan of the chest abdomen pelvis was performed.  Revealed apical emphysematous changes in the lungs.  Moderately enlarged liver with nodular contour and fatty infiltration suggestive of cirrhosis.  Moderately enlarged spleen  Moderately enlarged gastrohepatic lymph nodes.  TSH was 7.88  Hemoglobin admission 7.1 at discharge 7.9 MCV was elevated at 103  AST was 45  ALT was 32  Admission BUN was 17 creatinine 0.5 albumin 3.3              12/12/2022 EGD [place]  Normal esophagus  Gastritis-chronic atrophic without bleeding  Acute duodenal ulcer without hemorrhage or perforation     EGD 2/28/22   notable for moderate erythematous mucosa in the body of the stomach and antrum and possible C2 M2 Albrecht's esophagus. Biopsies negative for H. pylori and Albrecht's esophagus.    Colonoscopy 2/28/22 notable for 1 sessile adenomatous appearing polyp (<5mm) 2 pedunculated edematous appearing polyps (10mm+). Biopsies notable for tubular adenomas. Recommended repeat colonoscopy x3 years and that after review of pathology however following the procedure a 1 year follow-up was recommended     2/28/2022  Colonoscopy   2 pedunculated polyps measuring greater than 10 mm in the distal sigmoid colon.  These were tubular adenomas.  There is another 5 mm polyp noted.     In the procedure report he recommended a 1 year follow-up however after the pathology  recommended a 3-year follow-up.     10/29/2020 EGD  Normal esophagus.  No evidence of Albrecht's esophagus.  Erythematous, eroded mucosa in the antrum and duodenal bulb.     10/29/2020 colonoscopy Dr. Keenan  16 polyps removed.  10 mm polyp or larger ascending colon.  10 or more sessile polyps adenomatous appearing, semipedunculated measuring 4 to 10 mm transverse colon.  4 polyps measuring 4 to 10 mm descending colon  1 semipedunculated polyp measuring 10 mm sigmoid colon  Poor preparation  Poor preparation and additional polyps likely missed  Repeat colonoscopy 3 months      Review of Systems  Past Medical History  Past Medical History:   Diagnosis Date   • Abnormal stress test    • Acute cystitis without hematuria 05/07/2018   • Acute duodenal ulcer with bleeding 01/16/2023   • Allergic sinusitis     last assessed - 03Apr2017   • Anemia Around december   • Anxiety     last assessed - 04Mar2016   • Arthritis    • Asthma     last assessed - 04Mar2016   • Bilateral lower extremity edema     last assessed - 12May2017   • Callus of foot     last assessed - 22Jun2016   • Chest pain    • CHF (congestive heart failure) ([facility]) 03/2024   • Chronic GERD     last assessed - 16Jan2017   • Colon polyp    • Constipation     Resolved - 13Jan2017   • COPD (chronic obstructive pulmonary disease) ([facility])    • Depression     last assessed - 04Mar2016   • Disease of thyroid gland    • Diverticulitis of colon     last assessed - 04Mar2016   • Dyspepsia     Resolved - [id number]   • Edema, lower extremity 08/07/2020   • Esophageal reflux     last assessed - 04Mar2016   • Essential hypertriglyceridemia     last assessed - [id number]   • Fatty liver 01/16/2023   • Gastroesophageal reflux disease with esophagitis 11/18/2016   • GERD (gastroesophageal reflux disease)    • Gynecological disorder     last assessed - 04Mar2016   • History of transfusion    • Hydroureteronephrosis 06/30/2022   • Hypertension     last assessed - 04Mar2016   •  Hypokalemia 06/20/2022   • Hypotension     last assessed - 25Aug2016   • Kidney stone    • Left ureteral stone with hydronephrosis 07/21/2022   • Migraine    • Morbid obesity (HCC) 01/11/2019    Formatting of this note might be different from the original. Added automatically from request for surgery [id number]   • Neuropathy    • Obesity    • Other chest pain 11/08/2018   • Pancreatic lesion 03/13/2023   • Pneumonia 03/2024   • Positive depression screening 06/19/2022   • Primary hypertriglyceridemia 06/19/2022   • Pulmonary emphysema (HCC)     last assessed - 04Mar2016   • Seasonal allergies    • Severe obesity (HCC) 01/16/2023   • Skin tag 07/17/2023   • SOB (shortness of breath)    • Strain of groin 07/17/2023   • Urinary frequency     last assessed - 22Jun2016   • Vagina, candidiasis     last assessed - 22Jun2016   • Very heavy cigarette smoker 01/16/2023   • Visual impairment      Past Surgical History:   Procedure Laterality Date   • CARPAL TUNNEL RELEASE      last assessed - 04MAr2016   • CHOLECYSTECTOMY      last assessed - 04Mar2016   • COLONOSCOPY      Complete colonoscopy    • EYE SURGERY      last assessed - 04Mar2016   • FL RETROGRADE PYELOGRAM  6/16/2022   • FL RETROGRADE PYELOGRAM  8/16/2022   • FOOT SURGERY      last assessed - 04Mar2016   • PA CONIZATION CERVIX W/WO D&C RPR ELTRD EXC N/A 4/11/2025    Procedure: LOOP ELECTROSURGICAL EXCISION REOCEDURE, EXAM UNDER ANESTHESIA;  Surgeon: Lisa Fernando MD;  Location: [facility];  Service: Gynecology   • PA CYSTO/URETERO W/LITHOTRIPSY &INDWELL STENT INSRT Left 6/16/2022    Procedure: CYSTOSCOPY URETEROSCOPY WITH LITHOTRIPSY HOLMIUM LASER, RETROGRADE PYELOGRAM AND INSERTION STENT URETERAL;  Surgeon: Sammy Ferrer MD;  Location: [facility];  Service: Urology   • PA CYSTO/URETERO W/LITHOTRIPSY &INDWELL STENT INSRT Left 8/16/2022    Procedure: CYSTOSCOPY USCOPE W/HOLMIUM LASER, RETROGRADE PYELOGRAM&STENT;  Surgeon: Sudheer Bradford MD;  Location: [facility];  Service: Urology   • AR CYSTOURETHROSCOPY W/URETERAL CATHETERIZATION Left 7/21/2022    Procedure: CYSTOSCOPY RETROGRADE PYELOGRAM WITH INSERTION STENT URETERAL;  Surgeon: Facundo Matamoros MD;  Location: [facility];  Service: Urology   • AR ESOPHAGOGASTRODUODENOSCOPY TRANSORAL DIAGNOSTIC N/A 2/13/2017    Procedure: ESOPHAGOGASTRODUODENOSCOPY (EGD);  Surgeon: Alison Saleem MD;  Location: [facility];  Service: Gastroenterology     Family History   Problem Relation Age of Onset   • Obesity Mother    • Thyroid disease Mother    • Cancer Mother 71   • Vision loss Mother    • Thyroid disease unspecified Mother    • Obesity Sister    • Coronary artery disease Sister    • Stroke Sister    • Asthma Sister    • Glaucoma Sister    • No Known Problems Maternal Aunt    • Diabetes Maternal Uncle    • Lung cancer Maternal Grandmother    • Cancer Maternal Grandfather    • Diabetes Maternal Grandfather    • No Known Problems Paternal Grandmother    • No Known Problems Paternal Grandfather    • Hypertension Other    • Lung cancer Other    • Hypertension Other    • Lung cancer Other    • Lung cancer Other       reports that she quit smoking about 13 months ago. Her smoking use included cigarettes. She started smoking about 42 years ago. She has a 60 pack-year smoking history. She quit smokeless tobacco use about 13 months ago. She reports that she does not currently use alcohol. She reports that she does not use drugs.  Current Outpatient Medications   Medication Instructions   • albuterol (PROVENTIL HFA,VENTOLIN HFA) 90 mcg/act inhaler 2 puffs, Inhalation, Every 6 hours PRN   • B-D SYRINGE/NEEDLE 1CC/25GX5/8 25G X 5/8\" 1 ML MISC    • bisacodyl (FLEET) 10 mg, Once   • Blood Glucose Monitoring Suppl (OneTouch Verio) w/Device KIT Check blood sugar once a day   • budesonide (PULMICORT) 0.5 mg, 2 times daily   • buPROPion (WELLBUTRIN XL) 300 mg, Oral, Every morning   • Continuous Glucose Sensor (Dexcom G7 Sensor) 1 Device, Does not " apply, Every 10 days   • diazepam (VALIUM) 5 mg, Oral, Daily at bedtime PRN   • ergocalciferol (VITAMIN D2) 50,000 Units, Oral, Every 14 days   • famotidine (PEPCID) 40 mg, Oral, Daily at bedtime, Take in evening 2 hours prior to bed   • ferrous sulfate 324 mg, Oral, Daily before breakfast   • folic acid (FOLVITE) 1 mg, Oral, Daily   • furosemide (LASIX) 10 mg, Oral, Daily   • gabapentin (NEURONTIN) 100 mg, 2 times daily   • glucose blood (OneTouch Verio) test strip Check blood sugar once a day   • Insulin Glargine Solostar (LANTUS SOLOSTAR) 50 Units, Subcutaneous, Daily at bedtime   • insulin lispro (HUMALOG) 12 Units, Subcutaneous, 3 times daily with meals   • Insulin Pen Needle 31G X 5 MM MISC Subcutaneous, As needed   • ipratropium (ATROVENT HFA) 17 mcg/act inhaler 2 puffs, Every 6 hours   • ipratropium-albuterol (DUO-NEB) 0.5-2.5 mg/3 mL nebulizer solution 3 mL, Nebulization, 4 times daily   • lactulose (CHRONULAC) 20 g, Oral, Daily PRN   • levothyroxine 88 mcg, Oral, Daily (early morning)   • midodrine (PROAMATINE) 2.5 mg, Every other day   • nicotine (NICODERM CQ) 21 mg/24 hr TD 24 hr patch 1 patch, Transdermal, Every 24 hours   • Nutritional Supplements (Glucerna) LIQD Take as directed   • omeprazole (PRILOSEC) 40 mg, Oral, 2 times daily   • ondansetron (ZOFRAN) 4 mg, Oral, Every 8 hours PRN   • OneTouch Delica Lancets 30G MISC Check blood sugars once a day   • oxybutynin (DITROPAN-XL) 10 mg, Daily at bedtime   • oxyCODONE-acetaminophen (PERCOCET)  mg per tablet 1 tablet, Every 6 hours PRN   • oxygen 2 L/min (2 L/min), Inhalation, Continuous, May use 3L with exertion per Bella BAHNP Keep sat >88%   • polyethylene glycol (GOLYTELY) 4000 mL solution 4,000 mL, Oral, Once   • potassium chloride (Klor-Con M20) 20 mEq tablet 20 mEq, Oral, Daily   • semaglutide (0.25 or 0.5 mg/dose) (OZEMPIC (0.25 OR 0.5 MG/DOSE)) 0.5 mg, Subcutaneous, Every 7 days   • sertraline (ZOLOFT) 100 mg, Oral, 2 times daily   •  "simethicone (MYLICON) 125 mg, Every 6 hours PRN   • simvastatin (ZOCOR) 20 mg, Oral, Daily at bedtime   • spironolactone (ALDACTONE) 25 mg, Oral, Daily   • sucralfate (CARAFATE) 1 g, Oral, 3 times daily   • varenicline (CHANTIX) 0.5 mg, Oral, 2 times daily     Allergies   Allergen Reactions   • Hydrocodone-Acetaminophen Hives   • Ketorolac Hives and Dizziness   • Toradol [Ketorolac Tromethamine] Other (See Comments)     hypotension   • Ultram [Tramadol] Nausea Only, GI Intolerance and Vomiting     Current Outpatient Medications on File Prior to Visit   Medication Sig Dispense Refill   • albuterol (PROVENTIL HFA,VENTOLIN HFA) 90 mcg/act inhaler Inhale 2 puffs every 6 (six) hours as needed for wheezing 18 g 1   • B-D SYRINGE/NEEDLE 1CC/25GX5/8 25G X 5/8\" 1 ML MISC      • bisacodyl (FLEET) 10 MG/30ML ENEM Insert 10 mg into the rectum once (Patient not taking: Reported on 1/31/2025)     • Blood Glucose Monitoring Suppl (OneTouch Verio) w/Device KIT Check blood sugar once a day (Patient not taking: Reported on 2/6/2025) 1 kit 0   • budesonide (PULMICORT) 0.5 mg/2 mL nebulizer solution Take 0.5 mg by nebulization 2 (two) times a day Rinse mouth after use.     • buPROPion (WELLBUTRIN XL) 300 mg 24 hr tablet Take 1 tablet (300 mg total) by mouth every morning 90 tablet 0   • Continuous Glucose Sensor (Dexcom G7 Sensor) Use 1 Device every 10 days 3 each 2   • diazepam (VALIUM) 5 mg tablet take 1 tablet by mouth at bedtime if needed for anxiety 30 tablet 0   • ergocalciferol (VITAMIN D2) 50,000 units Take 1 capsule (50,000 Units total) by mouth every 14 (fourteen) days 12 capsule 0   • famotidine (PEPCID) 40 MG tablet Take 1 tablet (40 mg total) by mouth daily at bedtime Take in evening 2 hours prior to bed 90 tablet 0   • ferrous sulfate 324 (65 Fe) mg Take 1 tablet (324 mg total) by mouth daily before breakfast 30 tablet 3   • folic acid (FOLVITE) 1 mg tablet Take 1 tablet (1 mg total) by mouth daily 90 tablet 1   • " furosemide (LASIX) 20 mg tablet TAKE 1/2 TABLET BY MOUTH DAILY (Patient taking differently: Take 10 mg by mouth daily Pt states it prn) 15 tablet 0   • gabapentin (NEURONTIN) 100 mg capsule Take 100 mg by mouth 2 (two) times a day  0   • glucose blood (OneTouch Verio) test strip Check blood sugar once a day (Patient not taking: Reported on 2/6/2025) 100 each 1   • Insulin Glargine Solostar (Lantus SoloStar) 100 UNIT/ML SOPN Inject 0.5 mL (50 Units total) under the skin daily at bedtime 52 mL 1   • insulin lispro (HumaLOG) 100 units/mL injection pen Inject 12 Units under the skin 3 (three) times a day with meals (Patient taking differently: Inject 5 Units under the skin 3 (three) times a day with meals) 42 mL 1   • Insulin Pen Needle 31G X 5 MM MISC Inject under the skin if needed (Insuline injections) 100 each 3   • ipratropium (ATROVENT HFA) 17 mcg/act inhaler Inhale 2 puffs every 6 (six) hours     • ipratropium-albuterol (DUO-NEB) 0.5-2.5 mg/3 mL nebulizer solution Take 3 mL by nebulization 4 (four) times a day 360 mL 3   • lactulose (CHRONULAC) 10 g/15 mL solution Take 30 mL (20 g total) by mouth daily as needed (constipation) 240 mL 11   • levothyroxine 88 mcg tablet Take 1 tablet (88 mcg total) by mouth daily in the early morning (Patient taking differently: Take 112 mcg by mouth daily in the early morning) 100 tablet 3   • midodrine (PROAMATINE) 2.5 mg tablet Take 2.5 mg by mouth every other day Per pt she takes every other day if she feels she needs it     • nicotine (NICODERM CQ) 21 mg/24 hr TD 24 hr patch Place 1 patch on the skin over 24 hours every 24 hours (Patient not taking: Reported on 10/31/2024) 28 patch 5   • Nutritional Supplements (Glucerna) LIQD Take as directed 237 mL 0   • omeprazole (PriLOSEC) 40 MG capsule take 1 capsule by mouth twice a day 200 capsule 1   • ondansetron (ZOFRAN) 4 mg tablet Take 1 tablet (4 mg total) by mouth every 8 (eight) hours as needed for nausea or vomiting 20 tablet  0   • OneTouch Delica Lancets 30G MISC Check blood sugars once a day (Patient not taking: Reported on 2/6/2025) 100 each 1   • oxybutynin (DITROPAN-XL) 10 MG 24 hr tablet Take 10 mg by mouth daily at bedtime (Patient not taking: Reported on 1/31/2025)     • oxyCODONE-acetaminophen (PERCOCET)  mg per tablet Take 1 tablet by mouth every 6 (six) hours as needed for severe pain     • oxygen gas Inhale 2 L/min at 120,000 mL/hr continuous May use 3L with exertion per Bella Rex  CRNP Keep sat >88% (Patient taking differently: Inhale 2 L/min continuous May use 3L with exertion per Bella Rex  CRNP Keep sat >88%  Pt only uses at HS or during activity) 2 L 0   • polyethylene glycol (GOLYTELY) 4000 mL solution Take 4,000 mL by mouth once for 1 dose 4000 mL 0   • potassium chloride (Klor-Con M20) 20 mEq tablet Take 1 tablet (20 mEq total) by mouth daily 30 tablet 5   • semaglutide, 0.25 or 0.5 mg/dose, (Ozempic, 0.25 or 0.5 MG/DOSE,) 2 mg/3 mL injection pen Inject 0.75 mL (0.5 mg total) under the skin every 7 days 9 mL 0   • sertraline (ZOLOFT) 100 mg tablet Take 1 tablet (100 mg total) by mouth 2 (two) times a day 180 tablet 0   • simethicone (MYLICON) 125 MG chewable tablet Chew 125 mg every 6 (six) hours as needed for flatulence     • simvastatin (ZOCOR) 20 mg tablet Take 1 tablet (20 mg total) by mouth daily at bedtime 90 tablet 0   • spironolactone (ALDACTONE) 25 mg tablet Take 1 tablet (25 mg total) by mouth daily 90 tablet 1   • sucralfate (CARAFATE) 1 g tablet take 1 tablet by mouth three times a day 300 tablet 1   • varenicline (CHANTIX) 0.5 mg tablet Take 1 tablet (0.5 mg total) by mouth 2 (two) times a day (Patient not taking: Reported on 10/31/2024) 60 tablet 0   • [DISCONTINUED] naloxone (NARCAN) 4 mg/0.1 mL nasal spray Administer 1 spray into a nostril. If no response after 2-3 minutes, give another dose in the other nostril using a new spray. (Patient not taking: Reported on 6/30/2022) 1 each 1     No  current facility-administered medications on file prior to visit.      Social History     Tobacco Use   • Smoking status: Former     Current packs/day: 0.00     Average packs/day: 0.9 packs/day for 65.0 years (60.0 ttl pk-yrs)     Types: Cigarettes     Start date: 1983     Quit date: 3/6/2024     Years since quittin.1   • Smokeless tobacco: Former     Quit date: 3/3/2024   • Tobacco comments:     I quit smoking back in 2024   Vaping Use   • Vaping status: Former   • Start date: 2019   • Quit date: 2019   • Substances: Nicotine   Substance and Sexual Activity   • Alcohol use: Not Currently   • Drug use: No   • Sexual activity: Not Currently     Partners: Male     Birth control/protection: None        Objective  LMP 2018 (Approximate)      Physical Exam    General Appearance: Alert, cooperative, no distress  HEENT: Normocephalic, atraumatic, anicteric.   Neck: Supple, symmetrical, trachea midline  Lungs: Clear to auscultation bilaterally; no rales, rhonchi or wheezing; respirations unlabored   Heart: Regular rate and rhythm; no murmur, rub, or gallop.  Abdomen: Soft, bowel sounds normal, non-tender, non-distended; no masses, there is no hepatosplenomegaly. No spider angiomas    Genitalia: Deferred   Rectal: Deferred   Extremities: No cyanosis, clubbing or edema   Skin: No jaundice, rashes, or lesions   Lymph nodes: No palpable cervical lymphadenopathy

## 2025-04-15 NOTE — ASSESSMENT & PLAN NOTE
Chely does have underlying cirrhosis.  Her MELD 3.0 based upon January 31, 2025 labs is 11.  She does follow with hepatology, her next appointment is in July.  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  She should limit her salt intake to less than 2000 mg/day.  She should avoid all NSAIDs.  She would be due for alpha-fetoprotein tumor marker in July 2025.  She is due for ultrasound of her abdomen and this should probably alternate with MRI for hepatoma surveillance.  Once again she does follow with hepatology.  She was last imaged by CAT scan back in June 2024.  January 31, 2025 alpha-fetoprotein was normal.  I do not see that she has had an alpha-fetoprotein phenotype obtained and I will order that.  She is due for EGD for esophageal variceal surveillance October of this year or next year unless she has a bout of decompensation then would schedule EGD at that time provided she is cleared by cardiology and pulmonary.  Her last EGD was October 2023.  No evidence of varices at that time.  She is immune to hepatitis A.  She should talk to her primary care provider regarding vaccination for hepatitis B  Orders:  •  US abdomen complete with doppler; Future  •  Alpha 1 Antitrypsin Phenotype; Future  •  Protime-INR; Future

## 2025-04-15 NOTE — ASSESSMENT & PLAN NOTE
Chely was noted to have an iron deficiency anemia.  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  She was treated with argon plasma coagulation.  Her last CBC in March 2025 revealed a hemoglobin of 14.6 hematocrit 47.1.  Her last iron infusion was probably August 2024.  Repeat iron studies.     Chely does continue to use oral iron.  Orders:  •  Folate; Future  •  Vitamin B12; Future  •  Iron Panel (Includes Ferritin, Iron Sat%, Iron, and TIBC); Future

## 2025-04-15 NOTE — ASSESSMENT & PLAN NOTE
Chely does have a history of duodenal ulcer and I am assuming a GI bleed related to this ulcer at 1 point when she was hospitalized in [place].  This ulcer was noted to be healed October 10, 2023.  Continue omeprazole 40 mg twice a day.  I did advise Chely to avoid all NSAIDs including Motrin, Advil, Aleve, ibuprofen, Celebrex, meloxicam, Excedrin excetra.

## 2025-04-15 NOTE — ASSESSMENT & PLAN NOTE
Patient did undergo an MRI October 4, 2023 for adenopathy in the peripancreatic region.  Pancreas was unremarkable.  Spleen was enlarged.  .3 cm lymph node lymph node adjacent to the neck of the pancreas  compared to 1.3 cm in December 2015.  -1.1 cm periportal lymph node , compared to 1.0 cm in December 2015.  - 1.4 cm portacaval lymph node , compared to 1.2 cm in December 2015.  - 0.9 cm portacaval lymph node just medially , compared to 0.7 cm in 2015.  EUS October 2023 did reveal 3 hyperechoic periportal lymph nodes with well-defined margins largest measuring 11 x 7 mm.  These did appear benign and were not sampled.  In June 2024 there is no mention of this adenopathy on that CAT scan.

## 2025-04-15 NOTE — TELEPHONE ENCOUNTER
Our mutual patient is needing an EGD and COLONOSCOPY      On: TBD    With:      Physician Approving clearance: Bella Montalvo is asking for Pulmonary clearance to be able to have and schedule the EGD and Colonoscopy.

## 2025-04-15 NOTE — TELEPHONE ENCOUNTER
Our mutual patient is needing an EGD and COLONOSCOPY      On: TBD    With:      Physician Approving clearance: Dr. Yanci Montalvo is asking for Cardiac clearance to be able to have and schedule the EGD and Colonoscopy.

## 2025-04-15 NOTE — LETTER
April 15, 2025     Lisandra Leggett PA-C  [address]  [address]  [facility] [address]    Patient: Chely Ruvalcaba   YOB: 1970   Date of Visit: 4/15/2025       Dear Dr. Lisandra Leggett PA-C:    Thank you for referring Chely Ruvalcaba to me for evaluation. Below are my notes for this consultation.    If you have questions, please do not hesitate to call me. I look forward to following your patient along with you.         Sincerely,        Michele Vu DO        CC: No Recipients    Michele Vu DO  4/15/2025 11:36 AM  Incomplete  Name: Chely Ruvalcaba      : 1970      MRN: [id number]  Encounter Provider: Michele Vu DO  Encounter Date: 4/15/2025   Encounter department: [facility]  :  Assessment & Plan  Gastroesophageal reflux disease without esophagitis  Chely does have GERD.  She has symptoms 2 to 3 days a week despite omeprazole 40 mg twice a day.  She is also using famotidine 40 mg 2 hours before bed.  She will continue omeprazole 40 mg twice a day Best to take 30 minutes to 1 hour before 1 meal a day.  Continue famotidine 40 mg 2 hours before bed.  She could try Gaviscon or Tums if needed for breakthrough heartburn.  She does drink about 25 ounces of Pepsi per day and I suggested stopping all soda.  Lifestyle modifications for gastroesophageal reflux disease were discussed and include limiting fried and fatty foods, mints, chocolates, carbonated and caffeinated beverages , and alcohol, etc.  Avoid lying down for 2-3 hours after meals.  If you have nighttime symptoms consider raising the head of the bed up on 4-6 inch blocks.  Pillows typically are not useful.  If you are overweight, weight loss will be helpful.  Also she is on Carafate 1 g tablets 3 times a day.  I did suggest that she ground-up the Carafate tablet and some water and try to make a slurry and take it that way as it may need more effective.            Duodenal ulcer  Chely does have a history of duodenal ulcer and I am assuming a GI bleed related to this ulcer at 1 point when she was hospitalized in [place].  This ulcer was noted to be healed October 10, 2023.  Continue omeprazole 40 mg twice a day.  I did advise Chely to avoid all NSAIDs including Motrin, Advil, Aleve, ibuprofen, Celebrex, meloxicam, Excedrin excetra.         Irritable bowel syndrome with both constipation and diarrhea  Chely does have a long history of irritable bowel syndrome.  Overall her IBS symptoms are stable.  If she gets constipated she will take lactulose.  She may want to try using some lactulose every other day.    She may want to also use fiber every day like some Benefiber 2 teaspoonfuls mixed in 6 to 8 ounce of noncovered liquid daily.         Iron deficiency anemia, unspecified iron deficiency anemia type  Chely was noted to have an iron deficiency anemia.  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  She was treated with argon plasma coagulation.  Her last CBC in March 2025 revealed a hemoglobin of 14.6 hematocrit 47.1.  Her last iron infusion was probably August 2024.  Repeat iron studies.     Chely does continue to use oral iron.         History of colon polyps  Patient was noted to have 16 significant polyps back in October 2020 in the face of a poor preparation.  Follow-up colonoscopy in 2022 revealed 2 polyps over 10 mm that were tubular adenomas.  Given multiple polyps, she does report having a genetics consultation.  I but I could not find that complete report.  She does not recall having genetic testing performed.  At some point she should consider genetics consultation.    At this point Chely is past due for a colonoscopy however given her multiple comorbidities with underlying lung disease in addition to her cardiac condition, she would be high risk for colonoscopy.  I would like to get clearance from her cardiologist in addition to pulmonary  team prior to pursuing colonoscopy.  She is also due for upper endoscopy from a esophageal variceal surveillance standpoint.  I will hold on scheduling these procedures at this time until I hear back from her specialist.           Cirrhosis of liver without ascites, unspecified hepatic cirrhosis type (HCC)  Chely does have underlying cirrhosis.  Her MELD 3.0 based upon January 31, 2025 labs is 11.  She does follow with hepatology, her next appointment is in July.  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  She should limit her salt intake to less than 2000 mg/day.  She should avoid all NSAIDs.  She would be due for alpha-fetoprotein tumor marker in July 2025.  She is due for ultrasound of her abdomen and this should probably alternate with MRI for hepatoma surveillance.  Once again she does follow with hepatology.  She was last imaged by CAT scan back in June 2024.  January 31, 2025 alpha-fetoprotein was normal.  I do not see that she has had an alpha-fetoprotein phenotype obtained and I will order that.  She is due for EGD for esophageal variceal surveillance October of this year or next year unless she has a bout of decompensation then would schedule EGD at that time provided she is cleared by cardiology and pulmonary.  Her last EGD was October 2023.  No evidence of varices at that time.  She is immune to hepatitis A.  She should talk to her primary care provider regarding vaccination for hepatitis B       Enlarged lymph nodes, unspecified  Patient did undergo an MRI October 4, 2023 for adenopathy in the peripancreatic region.  Pancreas was unremarkable.  Spleen was enlarged.  .3 cm lymph node lymph node adjacent to the neck of the pancreas  compared to 1.3 cm in December 2015.  -1.1 cm periportal lymph node , compared to 1.0 cm in December 2015.  - 1.4 cm portacaval lymph node , compared to 1.2 cm in December 2015.  - 0.9 cm portacaval lymph node just medially , compared to 0.7 cm  in 2015.  EUS October 2023 did reveal 3 hyperechoic periportal lymph nodes with well-defined margins largest measuring 11 x 7 mm.  These did appear benign and were not sampled.  In June 2024 there is no mention of this adenopathy on that CAT scan.             History of Present Illness  HPI  Chely Ruvalcaba is a 54 y.o. female who presents for follow-up of gastroesophageal reflux disease, irritable bowel syndrome, iron deficiency anemia cirrhosis and history of colon polyps.  I did spend about 30 minutes reviewing records as she has had a variety of hospitalizations since I saw her last in May 2024.  She did follow-up with hepatology in August 2024.  In March she was in the ER for tachycardia headache and dizziness.  In June 2024 she was hospitalized with near syncope hyperlipidemia emphysema diastolic heart failure.    In May she was hospitalized with severe sepsis sepsis secondary to pneumonia.    Chely reports today that she has managed to stay out of the hospital except for recent GYN procedure/biopsy.  Overall though she feels she does not feel good in any good way.  She reports she has very few good days.  She does use oxygen especially when she goes out walks around.  She may use oxygen during the night as at times she will wake up feeling like she is suffocating.  She will get very short of breath with walking up steps or trying to do chores around the house such as mopping or sweeping.    She does report her bowel habits to be fairly regular for her.  She does have alternating diarrhea and constipation but this is not new.  This also alternates with normal bowel function.  There is been no bleeding or black stools.  She denies any new abdominal pains.  She does have reflux.  Despite taking omeprazole 40 mg twice a day and famotidine 2 hours before bed she may have breakthrough symptoms 2-3 times a week.  There has been no dysphagia.  She has not been able to lose any weight.  She does drink 124 ounce Pepsi a  day.      MELD 3.0: 11 at 1/31/2025 12:36 PM  MELD-Na: 10 at 1/31/2025 12:36 PM  Calculated from:  Serum Creatinine: 0.91 mg/dL (Using min of 1 mg/dL) at 1/31/2025 12:36 PM  Serum Sodium: 144 mmol/L (Using max of 137 mmol/L) at 1/31/2025 12:36 PM  Total Bilirubin: 1.47 mg/dL at 1/31/2025 12:36 PM  Serum Albumin: 3.5 g/dL at 1/31/2025 12:36 PM  INR(ratio): 1.18 at 1/31/2025 12:36 PM  Age at listing (hypothetical): 54 years  Sex: Female at 1/31/2025 12:36 PM     4/11/2025 surgery for high-grade squamous intraepithelial lesion on Pap smear.  Loop electrosurgical excision under anesthesia (LEEP)     3/3/2025 ER visit for tachycardia headache and dizziness  3/3/2025 laboratory data  Sodium 140  Potassium 4.4  BUN 19  Creatinine 1  Glucose 179  AST 39  ALT 24  Alk phos 74  Albumin 4.1  T. bili 1.18  Magnesium 2.1  WBC 6.0 Hgb 14.6 HCT 47.1 MCV 98 platelet count 85,000  Checks x-ray portable 1 view  Interstitial prominence could be on the basis of slight edema or atypical pneumonitis but unchanged from prior study     2/26/2025 telemedicine visit with hematology oncology  Prior iron infusion history.  August 2024-Venofer 200 mg weekly x 4  Vitamin B12 1000 mcg weekly x 4     1/31/2025 laboratory data  INR 1.18  Pro time 15.3  Hemoglobin A1c 5.3  TSH suppressed at 0.142  Free T41.17  WBC 7.29 Hgb 14.5 HCT 46.5 MCV 96 platelet count 86,000  AFP tumor marker 5.21  B12 409  Cholesterol 101  Triglycerides 90  HDL 38  LDL 45     8/5/2024 hepatology follow-up with Lisandra Leggett  1.cirrhosis.  Meld-3.0 12  2.constipation  3.bilateral lower extremity edema     6/15/2024 ER visit for cellulitis left lower extremity and worsening edema  Treated with Keflex 500 mg every 6 hours for 7 days  6/15/2024 ultrasound bilateral lower extremities  No evidence of acute deep vein thrombosis     6/3/2024 through 6/6/2024 hospitalized [facility]  -Near syncope  -Hyperlipidemia  -Emphysema  -GERD  -Cirrhosis  -Diastolic  CHF  -Chronic low back pain  -Homelessness  -Diverticulosis  -Hypothyroidism     6/3/2024 CT scan and pelvis with contrast  Partially imaged groundglass opacification of the visualized lungs.  Mildly improved compared to prior study  Nodular contour of the liver seen suspicious for cirrhosis  Gallbladder unremarkable  Spleen enlarged  Pancreas unremarkable  Adrenal glands unremarkable  Diverticulosis with residual bowel wall thickening of sigmoid colon and minimal adjacent fluid in the left paracolic gutter improved from 5/15/2024.  Area previously identified as diverticulitis        5/21/2024 hospitalized with pneumonia [facility]  1.severe sepsis secondary to pneumonia  2.centrilobular emphysema  3.cirrhosis        5/2024 diverticulitis  Treated with Augmentin and Flagyl     Admitted 3/6/2024 through 4/11/2024 [facility]  Discharge medications included  -Insulin 55 units daily  -Budesonide 0.5 mg nebulized  -Lactulose 30 mL 3 times a day  -Folic acid 1 mg daily  -Lasix 40 mg daily  -Humalog 15 units 3 times a day  -Prednisone weaning dose  -Bactrim Monday Wednesday and Friday  -Albuterol  Bupropion  mg daily  -Diazepam 5 mg every 6 hours as needed  Vitamin D  Iron 325 mg daily  Fluticasone nasal spray  Gabapentin 100 mg 2 times a day  Levothyroxine 112 mcg daily  Nicotine patch  -Omeprazole 40 mg twice a day  -Oxycodone 10/325 every 6 hours as needed  Scopolamine patch 1 mg over 3-day patch  Sertraline 100 mg daily  Simvastatin 20 mg daily  Carafate 3 times a day  Varenicline 0.5 mg twice a day (Chantix)  Discontinued medications  Semaglutide  Spironolactone 50 mg tablet     Discharge diagnoses  Sepsis  Bilateral pneumonia treated with Bactrim, steroids, ceftriaxone, and Zithromax  Hypoxemia  Exacerbation of COPD  Leukocytosis  CHF.  Right heart catheterization 3/19 normal right-sided pressures,  Acute on chronic diastolic heart failure  Acute kidney injury  Folic acid  deficiency  Cirrhosis  Acute pneumothorax.  Chest tube removed 3/31/2024     4/10/2024 laboratory data  WBC 8.6 Hgb 10.5 HCT 33 platelet count 98,000 MCV 95.4.  Hemoglobin was 12.8 at admission  INR 1.31 (3/6/2024)  T. bili 3.5 at admission dropped to 1.0  Creatinine 0.83 at admission  Sodium 138 at admission  Sodium 141  Potassium 4.5  Creatinine 0.77  Glucose 95  Albumin 2.8  AST 33  ALT 69  T. bili 1.0     3/22/2023 laboratory data  CINDY negative  Celiac antibody panel negative  AMA negative  Smooth muscle antibody-negative  Liver kidney microsomal antibody negative alpha-1 antitrypsin level 244              3/7/2024 CT scan chest [facility] [place]  Findings most compatible with pulmonary edema with groundglass opacification bilaterally with slight sparing of the lung apices.  Differential diagnosis include atypical pneumonia or much less likely hemorrhage.  Pulmonary nodules involving the left upper lobe and right middle lobe.  Left upper lobe nodule measures 2.3 x 2.2 x 7 mm right lobe nodule measures 11 mm     Patient did have a right-sided chest tube in place at some point on imaging 3/24/2024        2/13/2023 virtual visit with hepatology Lisandra antony        12/21/2023 laboratory data  Sodium 142  Potassium 4.3  BUN 10  Creatinine 0.73                                          MELD 3.0  11  Glucose 178  AST 48  ALT 21  Alk phos 94  T. bili 1.19  Direct bili 0.33  WBC 6.52 Hgb 11.5 HCT 38.5 MCV 95 platelet count unable to estimate due to clumping     10/10/2023 EGD Dr. 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  Status post APC  -Portal hypertensive gastropathy in the body of the stomach  -Healed duodenal ulcer  -3 hyperechoic periportal lymph nodes with well-defined margins largest measuring 11 x 7 mm but together likely about 2 cm.  Appeared benign not sampled  -Nodular liver  -Polypoid area seen in the midesophagus status post biopsy.  Biopsy unremarkable  -Negative for esophageal  varices     10/4/2023 MRI abdomen with MRCP  Liver enlarged at 23.7 cm.  Surface nodularity suggestive of cirrhosis  Mild diffuse hepatic steatosis.  No biliary ductal dilatation.  No gallstones  Pancreas unremarkable  Spleen enlarged at 15.9 cm similar to recent studies  Therefore enlarged upper abdominal lymph nodes similar to March 2023 MRI.  These have been present since 2015 with changes in measurement as described below.  --1.3 cm lymph node lymph node adjacent to the neck of the pancreas  compared to 1.3 cm in December 2015.  -1.1 cm periportal lymph node , compared to 1.0 cm in December 2015.  - 1.4 cm portacaval lymph node , compared to 1.2 cm in December 2015.  - 0.9 cm portacaval lymph node just medially , compared to 0.7 cm in 2015.     4/11/2023 ER visit in [place] for nausea vomiting diarrhea.  Diarrhea became dark.  Fecal Hemoccult done in the hospital was negative for blood  AST was 55  ALT 25  Alk phos 75  WBC 4.7 Hgb 9.1 MCV 78 platelet count was 94,000  She did have a fever of 101.2 at that time.  She had been using Pepto-Bismol.  At presentation her heart rate was 80 and blood pressure was 134/73     4/5/2023 EGD  Probable healing ulcer at junction of first and second portion of duodenum.  Biopsy revealed benign intestinal mucosa with retained villous architecture.  Mild acute and chronic inflammation noted.  Nodular mucosa associated with healing ulcer status post biopsy.  Biopsy revealed mild acute and chronic inflammation, focal mucosal erosion and reactive surface foveolar epithelial changes consistent with reactive/chemical gastritis.  Negative for H. pylori.  Scattered areas of nodular mucosa with overlying erosions in the antrum, status post biopsy.  Biopsies negative for H. pylori.  Probable portal hypertensive gastropathy fundus and body status post biopsy.  Biopsy revealed mucosa with mild vascular congestion and edema.  Normal esophagus     3/22/2023 MRI abdomen  Redefined  cirrhotic morphology of the liver.  Consistent with ultrasound elastography findings.  Prior cholecystectomy  Spleen is enlarged at 16.8 cm  Mild robin hepatic adenopathy.  A dominant node measuring 15 mm correlates with ultrasound finding of a hypoechoic lesion adjacent to the pancreas.     3/22/2023 laboratory data  IgA 417  Alpha-1 antitrypsin level 244  ACE 61  Iron 35  Ferritin 9  Iron saturation 7%  TIBC 510  Folate 8.2  Ceruloplasmin 26.9  Vitamin B12 331  Alpha-fetoprotein tumor marker 6.4  WC 6.51 Hgb 10.4 HCT 38.7 MCV 85 platelet count 145,000  INR 1.05  Fasting insulin 142.4  CINDY negative  Mitochondrial antibody negative  Smooth muscle antibody negative  Celiac antibody negative  Liver kidney microsomal antibody negative  She is not immune to hepatitis B.  She is immune to hepatitis A     3/17/2023 evaluated by Dr. Banuelos of hepatology.  Follow-up in September 2023 2/1/2023 CT scan renal stone study.  Spleen enlarged at 15.1 cm.  Gallbladder absent  Diverticulosis without diverticulitis previously noted hydroureteronephrosis on the left side has resolved.  Several nonobstructing intrarenal calculi left kidney     1/18/2023 EGD  Normal second portion of duodenum.  Biopsies negative for celiac disease.  Superficial ulcer in the duodenal bulb with a clean base.  I described superficial ulceration with surrounding heaped up mucosa.  Moderate edematous erythematous nodular mucosa in the antrum.  Biopsies were benign and negative for H. pylori.  Mild generalized erythema gastric body.  Biopsies negative for H. pylori.  Z-line regular at 39 cm.  No sign of esophageal varices     1/27/2023 ultrasound abdomen  Enlarged liver 20.6 cm.  Nodular surface  Heterogeneous echotexture  Mildly enlarged spleen  In the region of the pancreatic head there is a 1.4 x 1.2 x 1.2 cm ovoid hypoechoic focus likely represent a robin hepatis lymph node        1/27/2023 ultrasound elastography  F4, cirrhosis      1/27/2023-laboratory data  MELD-Na 7   Sodium 140  Creatinine 0.76  AST 27  ALT 16  Alk phos 80  T. bili 0.71  INR 1.10  Cholesterol 126  Triglycerides 91  HDL 48  LDL 60  Iron 32  Ferritin 5  Iron saturation 6%  TIBC 494  WBC 7.73 Hgb 9.0 HCT 33.2 MCV 87 platelet count 180,000 up from 147  Hemoglobin A1c 5.2  TSH 4.13  Hepatitis A total reactive  Hepatitis B surface antigen nonreactive  Hepatitis B surface antibody less than 3.10  Hepatitis B core total nonreactive  Hepatitis C antibody nonreactive     1/21/2023 CT chest lung cancer screening  Splenomegaly of 15.4 cm.  Nonobstructing left renal calcification  1.2 cm left upper lobe groundglass opacity  Moderate paraseptal emphysema.  Benign intrapulmonary lymph nodes abutting the minor fissure.        On 1/18/2023 patient was seen by advanced practitioner Lisandra Leggett     12/11/2022 through 12/13/2022 admitted to [facility] with dizziness, shortness of breath, chest pain, and dark stool for couple weeks.  Patient also reported chronic generalized abdominal pain.  Hemoglobin admission was 7.1.-Received 1 unit of packed red blood cells.  EGD revealed a nonbleeding duodenal ulcer.  No intervention required.  CT scan of the chest abdomen pelvis was performed.  Revealed apical emphysematous changes in the lungs.  Moderately enlarged liver with nodular contour and fatty infiltration suggestive of cirrhosis.  Moderately enlarged spleen  Moderately enlarged gastrohepatic lymph nodes.  TSH was 7.88  Hemoglobin admission 7.1 at discharge 7.9 MCV was elevated at 103  AST was 45  ALT was 32  Admission BUN was 17 creatinine 0.5 albumin 3.3              12/12/2022 EGD [place]  Normal esophagus  Gastritis-chronic atrophic without bleeding  Acute duodenal ulcer without hemorrhage or perforation     EGD 2/28/22   notable for moderate erythematous mucosa in the body of the stomach and antrum and possible C2 M2 Albrecht's esophagus. Biopsies negative for H. pylori  and Labrecht's esophagus.    Colonoscopy 2/28/22 notable for 1 sessile adenomatous appearing polyp (<5mm) 2 pedunculated edematous appearing polyps (10mm+). Biopsies notable for tubular adenomas. Recommended repeat colonoscopy x3 years and that after review of pathology however following the procedure a 1 year follow-up was recommended     2/28/2022  Colonoscopy   2 pedunculated polyps measuring greater than 10 mm in the distal sigmoid colon.  These were tubular adenomas.  There is another 5 mm polyp noted.     In the procedure report he recommended a 1 year follow-up however after the pathology recommended a 3-year follow-up.     10/29/2020 EGD  Normal esophagus.  No evidence of Albrecht's esophagus.  Erythematous, eroded mucosa in the antrum and duodenal bulb.     10/29/2020 colonoscopy Dr. Keenan  16 polyps removed.  10 mm polyp or larger ascending colon.  10 or more sessile polyps adenomatous appearing, semipedunculated measuring 4 to 10 mm transverse colon.  4 polyps measuring 4 to 10 mm descending colon  1 semipedunculated polyp measuring 10 mm sigmoid colon  Poor preparation  Poor preparation and additional polyps likely missed  Repeat colonoscopy 3 months      Review of Systems  Past Medical History  Past Medical History:   Diagnosis Date   • Abnormal stress test    • Acute cystitis without hematuria 05/07/2018   • Acute duodenal ulcer with bleeding 01/16/2023   • Allergic sinusitis     last assessed - 03Apr2017   • Anemia Around december   • Anxiety     last assessed - 04Mar2016   • Arthritis    • Asthma     last assessed - 04Mar2016   • Bilateral lower extremity edema     last assessed - 12May2017   • Callus of foot     last assessed - 22Jun2016   • Chest pain    • CHF (congestive heart failure) ([facility]) 03/2024   • Chronic GERD     last assessed - 16Jan2017   • Colon polyp    • Constipation     Resolved - 13Jan2017   • COPD (chronic obstructive pulmonary disease) ([facility])    • Depression     last assessed -  04Mar2016   • Disease of thyroid gland    • Diverticulitis of colon     last assessed - 04Mar2016   • Dyspepsia     Resolved - [id number]   • Edema, lower extremity 08/07/2020   • Emphysema of lung (HCC)    • Esophageal reflux     last assessed - 04Mar2016   • Essential hypertriglyceridemia     last assessed - [id number]   • Fatty liver 01/16/2023   • Gastroesophageal reflux disease with esophagitis 11/18/2016   • GERD (gastroesophageal reflux disease)    • Gynecological disorder     last assessed - 04Mar2016   • History of transfusion    • Hydroureteronephrosis 06/30/2022   • Hypertension     last assessed - 04Mar2016   • Hypokalemia 06/20/2022   • Hypotension     last assessed - 25Aug2016   • Kidney stone    • Left ureteral stone with hydronephrosis 07/21/2022   • Migraine    • Morbid obesity (HCC) 01/11/2019    Formatting of this note might be different from the original. Added automatically from request for surgery [id number]   • Neuropathy    • Obesity    • Other chest pain 11/08/2018   • Pancreatic lesion 03/13/2023   • Pneumonia 03/2024   • Positive depression screening 06/19/2022   • Primary hypertriglyceridemia 06/19/2022   • Pulmonary emphysema (HCC)     last assessed - 04Mar2016   • Seasonal allergies    • Severe obesity (HCC) 01/16/2023   • Skin tag 07/17/2023   • SOB (shortness of breath)    • Strain of groin 07/17/2023   • Urinary frequency     last assessed - 22Jun2016   • Vagina, candidiasis     last assessed - 22Jun2016   • Very heavy cigarette smoker 01/16/2023   • Visual impairment      Past Surgical History:   Procedure Laterality Date   • CARPAL TUNNEL RELEASE      last assessed - 04MAr2016   • CHOLECYSTECTOMY      last assessed - 04Mar2016   • COLONOSCOPY      Complete colonoscopy    • EYE SURGERY      last assessed - 04Mar2016   • FL RETROGRADE PYELOGRAM  6/16/2022   • FL RETROGRADE PYELOGRAM  8/16/2022   • FOOT SURGERY      last assessed - 04Mar2016   • GA CONIZATION CERVIX W/WO D&C RPR ELTRD EXC  N/A 4/11/2025    Procedure: LOOP ELECTROSURGICAL EXCISION REOCEDURE, EXAM UNDER ANESTHESIA;  Surgeon: Lisa Fernando MD;  Location: [facility];  Service: Gynecology   • OR CYSTO/URETERO W/LITHOTRIPSY &INDWELL STENT INSRT Left 6/16/2022    Procedure: CYSTOSCOPY URETEROSCOPY WITH LITHOTRIPSY HOLMIUM LASER, RETROGRADE PYELOGRAM AND INSERTION STENT URETERAL;  Surgeon: Sammy Ferrer MD;  Location: [facility];  Service: Urology   • OR CYSTO/URETERO W/LITHOTRIPSY &INDWELL STENT INSRT Left 8/16/2022    Procedure: CYSTOSCOPY USCOPE W/HOLMIUM LASER, RETROGRADE PYELOGRAM&STENT;  Surgeon: Sudheer Bradford MD;  Location: [facility];  Service: Urology   • OR CYSTOURETHROSCOPY W/URETERAL CATHETERIZATION Left 7/21/2022    Procedure: CYSTOSCOPY RETROGRADE PYELOGRAM WITH INSERTION STENT URETERAL;  Surgeon: Facundo Matamoros MD;  Location: [facility];  Service: Urology   • OR ESOPHAGOGASTRODUODENOSCOPY TRANSORAL DIAGNOSTIC N/A 2/13/2017    Procedure: ESOPHAGOGASTRODUODENOSCOPY (EGD);  Surgeon: Alison Saleem MD;  Location: [facility];  Service: Gastroenterology     Family History   Problem Relation Age of Onset   • Obesity Mother    • Thyroid disease Mother    • Cancer Mother 71   • Vision loss Mother    • Thyroid disease unspecified Mother    • Obesity Sister    • Coronary artery disease Sister    • Stroke Sister    • Asthma Sister    • Glaucoma Sister    • No Known Problems Maternal Aunt    • Diabetes Maternal Uncle    • Lung cancer Maternal Grandmother    • Cancer Maternal Grandfather    • Diabetes Maternal Grandfather    • No Known Problems Paternal Grandmother    • No Known Problems Paternal Grandfather    • Hypertension Other    • Lung cancer Other    • Hypertension Other    • Lung cancer Other    • Lung cancer Other       reports that she has been smoking cigarettes. She started smoking about 42 years ago. She has a 60 pack-year smoking history. She has never used smokeless tobacco. She reports that she does not currently  "use alcohol. She reports that she does not use drugs.  Current Outpatient Medications   Medication Instructions   • albuterol (PROVENTIL HFA,VENTOLIN HFA) 90 mcg/act inhaler 2 puffs, Inhalation, Every 6 hours PRN   • B-D SYRINGE/NEEDLE 1CC/25GX5/8 25G X 5/8\" 1 ML MISC    • bisacodyl (FLEET) 10 mg, Once   • Blood Glucose Monitoring Suppl (OneTouch Verio) w/Device KIT Check blood sugar once a day   • budesonide (PULMICORT) 0.5 mg, 2 times daily   • buPROPion (WELLBUTRIN XL) 300 mg, Oral, Every morning   • Continuous Glucose Sensor (Dexcom G7 Sensor) 1 Device, Does not apply, Every 10 days   • diazepam (VALIUM) 5 mg, Oral, Daily at bedtime PRN   • ergocalciferol (VITAMIN D2) 50,000 Units, Oral, Every 14 days   • famotidine (PEPCID) 40 mg, Oral, Daily at bedtime, Take in evening 2 hours prior to bed   • ferrous sulfate 324 mg, Oral, Daily before breakfast   • folic acid (FOLVITE) 1 mg, Oral, Daily   • furosemide (LASIX) 10 mg, Oral, Daily   • gabapentin (NEURONTIN) 100 mg, 2 times daily   • glucose blood (OneTouch Verio) test strip Check blood sugar once a day   • Insulin Glargine Solostar (LANTUS SOLOSTAR) 50 Units, Subcutaneous, Daily at bedtime   • insulin lispro (HUMALOG) 12 Units, Subcutaneous, 3 times daily with meals   • Insulin Pen Needle 31G X 5 MM MISC Subcutaneous, As needed   • ipratropium (ATROVENT HFA) 17 mcg/act inhaler 2 puffs, Every 6 hours   • ipratropium-albuterol (DUO-NEB) 0.5-2.5 mg/3 mL nebulizer solution 3 mL, Nebulization, 4 times daily   • lactulose (CHRONULAC) 20 g, Oral, Daily PRN   • levothyroxine 88 mcg, Oral, Daily (early morning)   • midodrine (PROAMATINE) 2.5 mg, Every other day   • nicotine (NICODERM CQ) 21 mg/24 hr TD 24 hr patch 1 patch, Transdermal, Every 24 hours   • Nutritional Supplements (Glucerna) LIQD Take as directed   • omeprazole (PRILOSEC) 40 mg, Oral, 2 times daily   • ondansetron (ZOFRAN) 4 mg, Oral, Every 8 hours PRN   • OneTouch Delica Lancets 30G MISC Check blood sugars " "once a day   • oxybutynin (DITROPAN-XL) 10 mg, Daily at bedtime   • oxyCODONE-acetaminophen (PERCOCET)  mg per tablet 1 tablet, Every 6 hours PRN   • oxygen 2 L/min (2 L/min), Inhalation, Continuous, May use 3L with exertion per Bella ALVAREZ Keep sat >88%   • polyethylene glycol (GOLYTELY) 4000 mL solution 4,000 mL, Oral, Once   • potassium chloride (Klor-Con M20) 20 mEq tablet 20 mEq, Oral, Daily   • semaglutide (0.25 or 0.5 mg/dose) (OZEMPIC (0.25 OR 0.5 MG/DOSE)) 0.5 mg, Subcutaneous, Every 7 days   • sertraline (ZOLOFT) 100 mg, Oral, 2 times daily   • simethicone (MYLICON) 125 mg, Every 6 hours PRN   • simvastatin (ZOCOR) 20 mg, Oral, Daily at bedtime   • spironolactone (ALDACTONE) 25 mg, Oral, Daily   • sucralfate (CARAFATE) 1 g, Oral, 3 times daily   • varenicline (CHANTIX) 0.5 mg, Oral, 2 times daily     Allergies   Allergen Reactions   • Hydrocodone-Acetaminophen Hives   • Ketorolac Hives and Dizziness   • Toradol [Ketorolac Tromethamine] Other (See Comments)     hypotension   • Ultram [Tramadol] Nausea Only, GI Intolerance and Vomiting      Current Outpatient Medications on File Prior to Visit   Medication Sig Dispense Refill   • albuterol (PROVENTIL HFA,VENTOLIN HFA) 90 mcg/act inhaler Inhale 2 puffs every 6 (six) hours as needed for wheezing 18 g 1   • B-D SYRINGE/NEEDLE 1CC/25GX5/8 25G X 5/8\" 1 ML MISC      • budesonide (PULMICORT) 0.5 mg/2 mL nebulizer solution Take 0.5 mg by nebulization 2 (two) times a day Rinse mouth after use.     • buPROPion (WELLBUTRIN XL) 300 mg 24 hr tablet Take 1 tablet (300 mg total) by mouth every morning 90 tablet 0   • diazepam (VALIUM) 5 mg tablet take 1 tablet by mouth at bedtime if needed for anxiety 30 tablet 0   • ergocalciferol (VITAMIN D2) 50,000 units Take 1 capsule (50,000 Units total) by mouth every 14 (fourteen) days 12 capsule 0   • famotidine (PEPCID) 40 MG tablet Take 1 tablet (40 mg total) by mouth daily at bedtime Take in evening 2 hours prior to " bed 90 tablet 0   • ferrous sulfate 324 (65 Fe) mg Take 1 tablet (324 mg total) by mouth daily before breakfast 30 tablet 3   • folic acid (FOLVITE) 1 mg tablet Take 1 tablet (1 mg total) by mouth daily 90 tablet 1   • furosemide (LASIX) 20 mg tablet TAKE 1/2 TABLET BY MOUTH DAILY (Patient taking differently: Take 10 mg by mouth daily Pt states it prn) 15 tablet 0   • gabapentin (NEURONTIN) 100 mg capsule Take 100 mg by mouth 2 (two) times a day  0   • Insulin Glargine Solostar (Lantus SoloStar) 100 UNIT/ML SOPN Inject 0.5 mL (50 Units total) under the skin daily at bedtime 52 mL 1   • insulin lispro (HumaLOG) 100 units/mL injection pen Inject 12 Units under the skin 3 (three) times a day with meals (Patient taking differently: Inject 5 Units under the skin 3 (three) times a day with meals) 42 mL 1   • Insulin Pen Needle 31G X 5 MM MISC Inject under the skin if needed (Insuline injections) 100 each 3   • ipratropium (ATROVENT HFA) 17 mcg/act inhaler Inhale 2 puffs every 6 (six) hours     • ipratropium-albuterol (DUO-NEB) 0.5-2.5 mg/3 mL nebulizer solution Take 3 mL by nebulization 4 (four) times a day 360 mL 3   • lactulose (CHRONULAC) 10 g/15 mL solution Take 30 mL (20 g total) by mouth daily as needed (constipation) 240 mL 11   • levothyroxine 88 mcg tablet Take 1 tablet (88 mcg total) by mouth daily in the early morning (Patient taking differently: Take 112 mcg by mouth daily in the early morning) 100 tablet 3   • Nutritional Supplements (Glucerna) LIQD Take as directed 237 mL 0   • ondansetron (ZOFRAN) 4 mg tablet Take 1 tablet (4 mg total) by mouth every 8 (eight) hours as needed for nausea or vomiting 20 tablet 0   • oxyCODONE-acetaminophen (PERCOCET)  mg per tablet Take 1 tablet by mouth every 6 (six) hours as needed for severe pain     • oxygen gas Inhale 2 L/min at 120,000 mL/hr continuous May use 3L with exertion per Bella Rex  CRNP Keep sat >88% 2 L 0   • potassium chloride (Klor-Con M20) 20 mEq  tablet Take 1 tablet (20 mEq total) by mouth daily 30 tablet 5   • semaglutide, 0.25 or 0.5 mg/dose, (Ozempic, 0.25 or 0.5 MG/DOSE,) 2 mg/3 mL injection pen Inject 0.75 mL (0.5 mg total) under the skin every 7 days 9 mL 0   • sertraline (ZOLOFT) 100 mg tablet Take 1 tablet (100 mg total) by mouth 2 (two) times a day 180 tablet 0   • simethicone (MYLICON) 125 MG chewable tablet Chew 125 mg every 6 (six) hours as needed for flatulence     • simvastatin (ZOCOR) 20 mg tablet Take 1 tablet (20 mg total) by mouth daily at bedtime 90 tablet 0   • spironolactone (ALDACTONE) 25 mg tablet Take 1 tablet (25 mg total) by mouth daily 90 tablet 1   • sucralfate (CARAFATE) 1 g tablet take 1 tablet by mouth three times a day 300 tablet 1   • bisacodyl (FLEET) 10 MG/30ML ENEM Insert 10 mg into the rectum once (Patient not taking: Reported on 1/31/2025)     • Blood Glucose Monitoring Suppl (OneTouch Verio) w/Device KIT Check blood sugar once a day (Patient not taking: Reported on 2/6/2025) 1 kit 0   • Continuous Glucose Sensor (Dexcom G7 Sensor) Use 1 Device every 10 days (Patient not taking: Reported on 4/15/2025) 3 each 2   • glucose blood (OneTouch Verio) test strip Check blood sugar once a day (Patient not taking: Reported on 2/6/2025) 100 each 1   • midodrine (PROAMATINE) 2.5 mg tablet Take 2.5 mg by mouth every other day Per pt she takes every other day if she feels she needs it     • nicotine (NICODERM CQ) 21 mg/24 hr TD 24 hr patch Place 1 patch on the skin over 24 hours every 24 hours (Patient not taking: Reported on 10/31/2024) 28 patch 5   • omeprazole (PriLOSEC) 40 MG capsule take 1 capsule by mouth twice a day 200 capsule 1   • OneTouch Delica Lancets 30G MISC Check blood sugars once a day (Patient not taking: Reported on 2/6/2025) 100 each 1   • oxybutynin (DITROPAN-XL) 10 MG 24 hr tablet Take 10 mg by mouth daily at bedtime (Patient not taking: Reported on 1/31/2025)     • polyethylene glycol (GOLYTELY) 4000 mL  "solution Take 4,000 mL by mouth once for 1 dose (Patient not taking: Reported on 4/15/2025) 4000 mL 0   • varenicline (CHANTIX) 0.5 mg tablet Take 1 tablet (0.5 mg total) by mouth 2 (two) times a day (Patient not taking: Reported on 10/31/2024) 60 tablet 0   • [DISCONTINUED] naloxone (NARCAN) 4 mg/0.1 mL nasal spray Administer 1 spray into a nostril. If no response after 2-3 minutes, give another dose in the other nostril using a new spray. (Patient not taking: Reported on 2022) 1 each 1     No current facility-administered medications on file prior to visit.      Social History     Tobacco Use   • Smoking status: Some Days     Current packs/day: 0.00     Average packs/day: 0.9 packs/day for 65.0 years (60.0 ttl pk-yrs)     Types: Cigarettes     Start date: 1983     Last attempt to quit: 3/6/2024     Years since quittin.1   • Smokeless tobacco: Never   • Tobacco comments:     I quit smoking back in 2024   Vaping Use   • Vaping status: Former   • Start date: 2019   • Quit date: 2019   • Substances: Nicotine   Substance and Sexual Activity   • Alcohol use: Not Currently   • Drug use: Never   • Sexual activity: Not Currently     Partners: Male     Birth control/protection: Abstinence        Objective  /70 (BP Location: Right arm, Patient Position: Sitting, Cuff Size: Adult)   Pulse 86   Temp 97.9 °F (36.6 °C) (Temporal)   Resp 16   Ht 5' 6.5\" (1.689 m)   Wt 119 kg (261 lb 9.6 oz)   LMP 2018 (Approximate)   SpO2 96% Comment: 2L of O2.  BMI 41.59 kg/m²      Physical Exam    General Appearance: Alert, cooperative, no distress.  She is using oxygen. Appears older than stated age.  HEENT: Normocephalic, atraumatic, anicteric.   Neck: Supple, symmetrical, trachea midline  Lungs: Clear to auscultation bilaterally; no rales, rhonchi or wheezing; respirations unlabored   Heart: Regular rate and rhythm; no murmur, rub, or gallop.  Abdomen: Soft, bowel sounds normal, , non-distended; " no masses, there is no hepatosplenomegaly. No spider angiomas . Obese abdomen.  There is generalized abdominal tenderness to light palpation.  No spider angiomas or stigmata of chronic liver disease.  Genitalia: Deferred   Rectal: Deferred   Extremities: No cyanosis, clubbing or edema   Skin: No jaundice, rashes, or lesions   Lymph nodes: No palpable cervical lymphadenopathy   I have spent a total time of 58 minutes in caring for this patient on the day of the visit/encounter including Risks and benefits of tx options, Instructions for management, Importance of tx compliance, Risk factor reductions, Impressions, Counseling / Coordination of care, Documenting in the medical record, Reviewing/placing orders in the medical record (including tests, medications, and/or procedures), and Obtaining or reviewing history  .         Michele Vu DO  4/15/2025  8:22 AM  Sign when Signing Visit  Name: Chely Ruvalcaba      : 1970      MRN: [id number]  Encounter Provider: Michele Vu DO  Encounter Date: 4/15/2025   Encounter department: [facility] [place]  :  Assessment & Plan        History of Present Illness  HPI  Chely Ruvalcaba is a 54 y.o. female who presents     MELD 3.0: 11 at 2025 12:36 PM  MELD-Na: 10 at 2025 12:36 PM  Calculated from:  Serum Creatinine: 0.91 mg/dL (Using min of 1 mg/dL) at 2025 12:36 PM  Serum Sodium: 144 mmol/L (Using max of 137 mmol/L) at 2025 12:36 PM  Total Bilirubin: 1.47 mg/dL at 2025 12:36 PM  Serum Albumin: 3.5 g/dL at 2025 12:36 PM  INR(ratio): 1.18 at 2025 12:36 PM  Age at listing (hypothetical): 54 years  Sex: Female at 2025 12:36 PM     2025 surgery for high-grade squamous intraepithelial lesion on Pap smear.  Loop electrosurgical excision under anesthesia (LEEP)     3/3/2025 ER visit for tachycardia headache and dizziness  3/3/2025 laboratory data  Sodium 140  Potassium 4.4  BUN 19  Creatinine  1  Glucose 179  AST 39  ALT 24  Alk phos 74  Albumin 4.1  T. bili 1.18  Magnesium 2.1  WBC 6.0 Hgb 14.6 HCT 47.1 MCV 98 platelet count 85,000  Checks x-ray portable 1 view  Interstitial prominence could be on the basis of slight edema or atypical pneumonitis but unchanged from prior study     2/26/2025 telemedicine visit with hematology oncology  Prior iron infusion history.  August 2024-Venofer 200 mg weekly x 4  Vitamin B12 1000 mcg weekly x 4     1/31/2025 laboratory data  INR 1.18  Pro time 15.3  Hemoglobin A1c 5.3  TSH suppressed at 0.142  Free T41.17  WBC 7.29 Hgb 14.5 HCT 46.5 MCV 96 platelet count 86,000  AFP tumor marker 5.21  B12 409  Cholesterol 101  Triglycerides 90  HDL 38  LDL 45     8/5/2024 hepatology follow-up with Lisandra Leggett  1.cirrhosis.  Meld-3.0 12  2.constipation  3.bilateral lower extremity edema     6/15/2024 ER visit for cellulitis left lower extremity and worsening edema  Treated with Keflex 500 mg every 6 hours for 7 days  6/15/2024 ultrasound bilateral lower extremities  No evidence of acute deep vein thrombosis     6/3/2024 through 6/6/2024 hospitalized [facility]  -Near syncope  -Hyperlipidemia  -Emphysema  -GERD  -Cirrhosis  -Diastolic CHF  -Chronic low back pain  -Homelessness  -Diverticulosis  -Hypothyroidism     6/3/2024 CT scan and pelvis with contrast  Partially imaged groundglass opacification of the visualized lungs.  Mildly improved compared to prior study  Nodular contour of the liver seen suspicious for cirrhosis  Gallbladder unremarkable  Spleen enlarged  Pancreas unremarkable  Adrenal glands unremarkable  Diverticulosis with residual bowel wall thickening of sigmoid colon and minimal adjacent fluid in the left paracolic gutter improved from 5/15/2024.  Area previously identified as diverticulitis        5/21/2024 hospitalized with pneumonia [facility]  1.severe sepsis secondary to pneumonia  2.centrilobular emphysema  3.cirrhosis         5/2024 diverticulitis  Treated with Augmentin and Flagyl     Admitted 3/6/2024 through 4/11/2024 [facility]  Discharge medications included  -Insulin 55 units daily  -Budesonide 0.5 mg nebulized  -Lactulose 30 mL 3 times a day  -Folic acid 1 mg daily  -Lasix 40 mg daily  -Humalog 15 units 3 times a day  -Prednisone weaning dose  -Bactrim Monday Wednesday and Friday  -Albuterol  Bupropion  mg daily  -Diazepam 5 mg every 6 hours as needed  Vitamin D  Iron 325 mg daily  Fluticasone nasal spray  Gabapentin 100 mg 2 times a day  Levothyroxine 112 mcg daily  Nicotine patch  -Omeprazole 40 mg twice a day  -Oxycodone 10/325 every 6 hours as needed  Scopolamine patch 1 mg over 3-day patch  Sertraline 100 mg daily  Simvastatin 20 mg daily  Carafate 3 times a day  Varenicline 0.5 mg twice a day (Chantix)  Discontinued medications  Semaglutide  Spironolactone 50 mg tablet     Discharge diagnoses  Sepsis  Bilateral pneumonia treated with Bactrim, steroids, ceftriaxone, and Zithromax  Hypoxemia  Exacerbation of COPD  Leukocytosis  CHF.  Right heart catheterization 3/19 normal right-sided pressures,  Acute on chronic diastolic heart failure  Acute kidney injury  Folic acid deficiency  Cirrhosis  Acute pneumothorax.  Chest tube removed 3/31/2024     4/10/2024 laboratory data  WBC 8.6 Hgb 10.5 HCT 33 platelet count 98,000 MCV 95.4.  Hemoglobin was 12.8 at admission  INR 1.31 (3/6/2024)  T. bili 3.5 at admission dropped to 1.0  Creatinine 0.83 at admission  Sodium 138 at admission  Sodium 141  Potassium 4.5  Creatinine 0.77  Glucose 95  Albumin 2.8  AST 33  ALT 69  T. bili 1.0     3/22/2023 laboratory data  CINDY negative  Celiac antibody panel negative  AMA negative  Smooth muscle antibody-negative  Liver kidney microsomal antibody negative alpha-1 antitrypsin level 244              3/7/2024 CT scan chest [facility]  Findings most compatible with pulmonary edema with groundglass opacification bilaterally with slight sparing  of the lung apices.  Differential diagnosis include atypical pneumonia or much less likely hemorrhage.  Pulmonary nodules involving the left upper lobe and right middle lobe.  Left upper lobe nodule measures 2.3 x 2.2 x 7 mm right lobe nodule measures 11 mm     Patient did have a right-sided chest tube in place at some point on imaging 3/24/2024        2/13/2023 virtual visit with hepatology Lisandra ronck        12/21/2023 laboratory data  Sodium 142  Potassium 4.3  BUN 10  Creatinine 0.73                                          MELD 3.0  11  Glucose 178  AST 48  ALT 21  Alk phos 94  T. bili 1.19  Direct bili 0.33  WBC 6.52 Hgb 11.5 HCT 38.5 MCV 95 platelet count unable to estimate due to clumping     10/10/2023 EGD Dr. 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  Status post APC  -Portal hypertensive gastropathy in the body of the stomach  -Healed duodenal ulcer  -3 hyperechoic periportal lymph nodes with well-defined margins largest measuring 11 x 7 mm but together likely about 2 cm.  Appeared benign not sampled  -Nodular liver  -Polypoid area seen in the midesophagus status post biopsy.  Biopsy unremarkable  -Negative for esophageal varices     10/4/2023 MRI abdomen with MRCP  Liver enlarged at 23.7 cm.  Surface nodularity suggestive of cirrhosis  Mild diffuse hepatic steatosis.  No biliary ductal dilatation.  No gallstones  Pancreas unremarkable  Spleen enlarged at 15.9 cm similar to recent studies  Therefore enlarged upper abdominal lymph nodes similar to March 2023 MRI.  These have been present since 2015 with changes in measurement as described below.  --1.3 cm lymph node lymph node adjacent to the neck of the pancreas  compared to 1.3 cm in December 2015.  -1.1 cm periportal lymph node , compared to 1.0 cm in December 2015.  - 1.4 cm portacaval lymph node , compared to 1.2 cm in December 2015.  - 0.9 cm portacaval lymph node just medially , compared to 0.7 cm in 2015.      4/11/2023 ER visit in [place] for nausea vomiting diarrhea.  Diarrhea became dark.  Fecal Hemoccult done in the hospital was negative for blood  AST was 55  ALT 25  Alk phos 75  WBC 4.7 Hgb 9.1 MCV 78 platelet count was 94,000  She did have a fever of 101.2 at that time.  She had been using Pepto-Bismol.  At presentation her heart rate was 80 and blood pressure was 134/73     4/5/2023 EGD  Probable healing ulcer at junction of first and second portion of duodenum.  Biopsy revealed benign intestinal mucosa with retained villous architecture.  Mild acute and chronic inflammation noted.  Nodular mucosa associated with healing ulcer status post biopsy.  Biopsy revealed mild acute and chronic inflammation, focal mucosal erosion and reactive surface foveolar epithelial changes consistent with reactive/chemical gastritis.  Negative for H. pylori.  Scattered areas of nodular mucosa with overlying erosions in the antrum, status post biopsy.  Biopsies negative for H. pylori.  Probable portal hypertensive gastropathy fundus and body status post biopsy.  Biopsy revealed mucosa with mild vascular congestion and edema.  Normal esophagus     3/22/2023 MRI abdomen  Redefined cirrhotic morphology of the liver.  Consistent with ultrasound elastography findings.  Prior cholecystectomy  Spleen is enlarged at 16.8 cm  Mild robin hepatic adenopathy.  A dominant node measuring 15 mm correlates with ultrasound finding of a hypoechoic lesion adjacent to the pancreas.     3/22/2023 laboratory data  IgA 417  Alpha-1 antitrypsin level 244  ACE 61  Iron 35  Ferritin 9  Iron saturation 7%  TIBC 510  Folate 8.2  Ceruloplasmin 26.9  Vitamin B12 331  Alpha-fetoprotein tumor marker 6.4  WC 6.51 Hgb 10.4 HCT 38.7 MCV 85 platelet count 145,000  INR 1.05  Fasting insulin 142.4  CINDY negative  Mitochondrial antibody negative  Smooth muscle antibody negative  Celiac antibody negative  Liver kidney microsomal antibody negative  She is not immune to  hepatitis B.  She is immune to hepatitis A     3/17/2023 evaluated by Dr. Banuelos of hepatology.  Follow-up in September 2023 2/1/2023 CT scan renal stone study.  Spleen enlarged at 15.1 cm.  Gallbladder absent  Diverticulosis without diverticulitis previously noted hydroureteronephrosis on the left side has resolved.  Several nonobstructing intrarenal calculi left kidney     1/18/2023 EGD  Normal second portion of duodenum.  Biopsies negative for celiac disease.  Superficial ulcer in the duodenal bulb with a clean base.  I described superficial ulceration with surrounding heaped up mucosa.  Moderate edematous erythematous nodular mucosa in the antrum.  Biopsies were benign and negative for H. pylori.  Mild generalized erythema gastric body.  Biopsies negative for H. pylori.  Z-line regular at 39 cm.  No sign of esophageal varices     1/27/2023 ultrasound abdomen  Enlarged liver 20.6 cm.  Nodular surface  Heterogeneous echotexture  Mildly enlarged spleen  In the region of the pancreatic head there is a 1.4 x 1.2 x 1.2 cm ovoid hypoechoic focus likely represent a robin hepatis lymph node        1/27/2023 ultrasound elastography  F4, cirrhosis     1/27/2023-laboratory data  MELD-Na 7   Sodium 140  Creatinine 0.76  AST 27  ALT 16  Alk phos 80  T. bili 0.71  INR 1.10  Cholesterol 126  Triglycerides 91  HDL 48  LDL 60  Iron 32  Ferritin 5  Iron saturation 6%  TIBC 494  WBC 7.73 Hgb 9.0 HCT 33.2 MCV 87 platelet count 180,000 up from 147  Hemoglobin A1c 5.2  TSH 4.13  Hepatitis A total reactive  Hepatitis B surface antigen nonreactive  Hepatitis B surface antibody less than 3.10  Hepatitis B core total nonreactive  Hepatitis C antibody nonreactive     1/21/2023 CT chest lung cancer screening  Splenomegaly of 15.4 cm.  Nonobstructing left renal calcification  1.2 cm left upper lobe groundglass opacity  Moderate paraseptal emphysema.  Benign intrapulmonary lymph nodes abutting the minor fissure.        On 1/18/2023 patient  was seen by advanced practitioner Lisandra Leggett     12/11/2022 through 12/13/2022 admitted to [facility] with dizziness, shortness of breath, chest pain, and dark stool for couple weeks.  Patient also reported chronic generalized abdominal pain.  Hemoglobin admission was 7.1.-Received 1 unit of packed red blood cells.  EGD revealed a nonbleeding duodenal ulcer.  No intervention required.  CT scan of the chest abdomen pelvis was performed.  Revealed apical emphysematous changes in the lungs.  Moderately enlarged liver with nodular contour and fatty infiltration suggestive of cirrhosis.  Moderately enlarged spleen  Moderately enlarged gastrohepatic lymph nodes.  TSH was 7.88  Hemoglobin admission 7.1 at discharge 7.9 MCV was elevated at 103  AST was 45  ALT was 32  Admission BUN was 17 creatinine 0.5 albumin 3.3              12/12/2022 EGD [place]  Normal esophagus  Gastritis-chronic atrophic without bleeding  Acute duodenal ulcer without hemorrhage or perforation     EGD 2/28/22   notable for moderate erythematous mucosa in the body of the stomach and antrum and possible C2 M2 Albrecht's esophagus. Biopsies negative for H. pylori and Albrecht's esophagus.    Colonoscopy 2/28/22 notable for 1 sessile adenomatous appearing polyp (<5mm) 2 pedunculated edematous appearing polyps (10mm+). Biopsies notable for tubular adenomas. Recommended repeat colonoscopy x3 years and that after review of pathology however following the procedure a 1 year follow-up was recommended     2/28/2022  Colonoscopy   2 pedunculated polyps measuring greater than 10 mm in the distal sigmoid colon.  These were tubular adenomas.  There is another 5 mm polyp noted.     In the procedure report he recommended a 1 year follow-up however after the pathology recommended a 3-year follow-up.     10/29/2020 EGD  Normal esophagus.  No evidence of Albrecht's esophagus.  Erythematous, eroded mucosa in the antrum and duodenal bulb.     10/29/2020  colonoscopy Dr. Keenan  16 polyps removed.  10 mm polyp or larger ascending colon.  10 or more sessile polyps adenomatous appearing, semipedunculated measuring 4 to 10 mm transverse colon.  4 polyps measuring 4 to 10 mm descending colon  1 semipedunculated polyp measuring 10 mm sigmoid colon  Poor preparation  Poor preparation and additional polyps likely missed  Repeat colonoscopy 3 months      Review of Systems  Past Medical History  Past Medical History:   Diagnosis Date   • Abnormal stress test    • Acute cystitis without hematuria 05/07/2018   • Acute duodenal ulcer with bleeding 01/16/2023   • Allergic sinusitis     last assessed - 03Apr2017   • Anemia Around december   • Anxiety     last assessed - 04Mar2016   • Arthritis    • Asthma     last assessed - 04Mar2016   • Bilateral lower extremity edema     last assessed - 12May2017   • Callus of foot     last assessed - 22Jun2016   • Chest pain    • CHF (congestive heart failure) ([facility]) 03/2024   • Chronic GERD     last assessed - 16Jan2017   • Colon polyp    • Constipation     Resolved - 13Jan2017   • COPD (chronic obstructive pulmonary disease) ([facility])    • Depression     last assessed - 04Mar2016   • Disease of thyroid gland    • Diverticulitis of colon     last assessed - 04Mar2016   • Dyspepsia     Resolved - [id number]   • Edema, lower extremity 08/07/2020   • Esophageal reflux     last assessed - 04Mar2016   • Essential hypertriglyceridemia     last assessed - 23Feb2017   • Fatty liver 01/16/2023   • Gastroesophageal reflux disease with esophagitis 11/18/2016   • GERD (gastroesophageal reflux disease)    • Gynecological disorder     last assessed - 04Mar2016   • History of transfusion    • Hydroureteronephrosis 06/30/2022   • Hypertension     last assessed - 04Mar2016   • Hypokalemia 06/20/2022   • Hypotension     last assessed - 25Aug2016   • Kidney stone    • Left ureteral stone with hydronephrosis 07/21/2022   • Migraine    • Morbid obesity ([facility])  01/11/2019    Formatting of this note might be different from the original. Added automatically from request for surgery [id number]   • Neuropathy    • Obesity    • Other chest pain 11/08/2018   • Pancreatic lesion 03/13/2023   • Pneumonia 03/2024   • Positive depression screening 06/19/2022   • Primary hypertriglyceridemia 06/19/2022   • Pulmonary emphysema (HCC)     last assessed - 04Mar2016   • Seasonal allergies    • Severe obesity (HCC) 01/16/2023   • Skin tag 07/17/2023   • SOB (shortness of breath)    • Strain of groin 07/17/2023   • Urinary frequency     last assessed - 22Jun2016   • Vagina, candidiasis     last assessed - 22Jun2016   • Very heavy cigarette smoker 01/16/2023   • Visual impairment      Past Surgical History:   Procedure Laterality Date   • CARPAL TUNNEL RELEASE      last assessed - 04MAr2016   • CHOLECYSTECTOMY      last assessed - 04Mar2016   • COLONOSCOPY      Complete colonoscopy    • EYE SURGERY      last assessed - 04Mar2016   • FL RETROGRADE PYELOGRAM  6/16/2022   • FL RETROGRADE PYELOGRAM  8/16/2022   • FOOT SURGERY      last assessed - 04Mar2016   • KY CONIZATION CERVIX W/WO D&C RPR ELTRD EXC N/A 4/11/2025    Procedure: LOOP ELECTROSURGICAL EXCISION REOCEDURE, EXAM UNDER ANESTHESIA;  Surgeon: Lisa Fernando MD;  Location: [facility];  Service: Gynecology   • KY CYSTO/URETERO W/LITHOTRIPSY &INDWELL STENT INSRT Left 6/16/2022    Procedure: CYSTOSCOPY URETEROSCOPY WITH LITHOTRIPSY HOLMIUM LASER, RETROGRADE PYELOGRAM AND INSERTION STENT URETERAL;  Surgeon: Sammy Ferrer MD;  Location: [facility];  Service: Urology   • KY CYSTO/URETERO W/LITHOTRIPSY &INDWELL STENT INSRT Left 8/16/2022    Procedure: CYSTOSCOPY USCOPE W/HOLMIUM LASER, RETROGRADE PYELOGRAM&STENT;  Surgeon: Sudheer Bradford MD;  Location: [facility];  Service: Urology   • KY CYSTOURETHROSCOPY W/URETERAL CATHETERIZATION Left 7/21/2022    Procedure: CYSTOSCOPY RETROGRADE PYELOGRAM WITH INSERTION STENT URETERAL;  Surgeon: Facundo  "Rhonda Matamoros MD;  Location: [facility];  Service: Urology   • RI ESOPHAGOGASTRODUODENOSCOPY TRANSORAL DIAGNOSTIC N/A 2/13/2017    Procedure: ESOPHAGOGASTRODUODENOSCOPY (EGD);  Surgeon: Alison Saleem MD;  Location: [facility];  Service: Gastroenterology     Family History   Problem Relation Age of Onset   • Obesity Mother    • Thyroid disease Mother    • Cancer Mother 71   • Vision loss Mother    • Thyroid disease unspecified Mother    • Obesity Sister    • Coronary artery disease Sister    • Stroke Sister    • Asthma Sister    • Glaucoma Sister    • No Known Problems Maternal Aunt    • Diabetes Maternal Uncle    • Lung cancer Maternal Grandmother    • Cancer Maternal Grandfather    • Diabetes Maternal Grandfather    • No Known Problems Paternal Grandmother    • No Known Problems Paternal Grandfather    • Hypertension Other    • Lung cancer Other    • Hypertension Other    • Lung cancer Other    • Lung cancer Other       reports that she quit smoking about 13 months ago. Her smoking use included cigarettes. She started smoking about 42 years ago. She has a 60 pack-year smoking history. She quit smokeless tobacco use about 13 months ago. She reports that she does not currently use alcohol. She reports that she does not use drugs.  Current Outpatient Medications   Medication Instructions   • albuterol (PROVENTIL HFA,VENTOLIN HFA) 90 mcg/act inhaler 2 puffs, Inhalation, Every 6 hours PRN   • B-D SYRINGE/NEEDLE 1CC/25GX5/8 25G X 5/8\" 1 ML MISC    • bisacodyl (FLEET) 10 mg, Once   • Blood Glucose Monitoring Suppl (OneTouch Verio) w/Device KIT Check blood sugar once a day   • budesonide (PULMICORT) 0.5 mg, 2 times daily   • buPROPion (WELLBUTRIN XL) 300 mg, Oral, Every morning   • Continuous Glucose Sensor (Dexcom G7 Sensor) 1 Device, Does not apply, Every 10 days   • diazepam (VALIUM) 5 mg, Oral, Daily at bedtime PRN   • ergocalciferol (VITAMIN D2) 50,000 Units, Oral, Every 14 days   • famotidine (PEPCID) 40 mg, Oral, Daily " at bedtime, Take in evening 2 hours prior to bed   • ferrous sulfate 324 mg, Oral, Daily before breakfast   • folic acid (FOLVITE) 1 mg, Oral, Daily   • furosemide (LASIX) 10 mg, Oral, Daily   • gabapentin (NEURONTIN) 100 mg, 2 times daily   • glucose blood (OneTouch Verio) test strip Check blood sugar once a day   • Insulin Glargine Solostar (LANTUS SOLOSTAR) 50 Units, Subcutaneous, Daily at bedtime   • insulin lispro (HUMALOG) 12 Units, Subcutaneous, 3 times daily with meals   • Insulin Pen Needle 31G X 5 MM MISC Subcutaneous, As needed   • ipratropium (ATROVENT HFA) 17 mcg/act inhaler 2 puffs, Every 6 hours   • ipratropium-albuterol (DUO-NEB) 0.5-2.5 mg/3 mL nebulizer solution 3 mL, Nebulization, 4 times daily   • lactulose (CHRONULAC) 20 g, Oral, Daily PRN   • levothyroxine 88 mcg, Oral, Daily (early morning)   • midodrine (PROAMATINE) 2.5 mg, Every other day   • nicotine (NICODERM CQ) 21 mg/24 hr TD 24 hr patch 1 patch, Transdermal, Every 24 hours   • Nutritional Supplements (Glucerna) LIQD Take as directed   • omeprazole (PRILOSEC) 40 mg, Oral, 2 times daily   • ondansetron (ZOFRAN) 4 mg, Oral, Every 8 hours PRN   • OneTouch Delica Lancets 30G MISC Check blood sugars once a day   • oxybutynin (DITROPAN-XL) 10 mg, Daily at bedtime   • oxyCODONE-acetaminophen (PERCOCET)  mg per tablet 1 tablet, Every 6 hours PRN   • oxygen 2 L/min (2 L/min), Inhalation, Continuous, May use 3L with exertion per Bella ALVAREZ Keep sat >88%   • polyethylene glycol (GOLYTELY) 4000 mL solution 4,000 mL, Oral, Once   • potassium chloride (Klor-Con M20) 20 mEq tablet 20 mEq, Oral, Daily   • semaglutide (0.25 or 0.5 mg/dose) (OZEMPIC (0.25 OR 0.5 MG/DOSE)) 0.5 mg, Subcutaneous, Every 7 days   • sertraline (ZOLOFT) 100 mg, Oral, 2 times daily   • simethicone (MYLICON) 125 mg, Every 6 hours PRN   • simvastatin (ZOCOR) 20 mg, Oral, Daily at bedtime   • spironolactone (ALDACTONE) 25 mg, Oral, Daily   • sucralfate (CARAFATE) 1 g,  "Oral, 3 times daily   • varenicline (CHANTIX) 0.5 mg, Oral, 2 times daily     Allergies   Allergen Reactions   • Hydrocodone-Acetaminophen Hives   • Ketorolac Hives and Dizziness   • Toradol [Ketorolac Tromethamine] Other (See Comments)     hypotension   • Ultram [Tramadol] Nausea Only, GI Intolerance and Vomiting     Current Outpatient Medications on File Prior to Visit   Medication Sig Dispense Refill   • albuterol (PROVENTIL HFA,VENTOLIN HFA) 90 mcg/act inhaler Inhale 2 puffs every 6 (six) hours as needed for wheezing 18 g 1   • B-D SYRINGE/NEEDLE 1CC/25GX5/8 25G X 5/8\" 1 ML MISC      • bisacodyl (FLEET) 10 MG/30ML ENEM Insert 10 mg into the rectum once (Patient not taking: Reported on 1/31/2025)     • Blood Glucose Monitoring Suppl (OneTouch Verio) w/Device KIT Check blood sugar once a day (Patient not taking: Reported on 2/6/2025) 1 kit 0   • budesonide (PULMICORT) 0.5 mg/2 mL nebulizer solution Take 0.5 mg by nebulization 2 (two) times a day Rinse mouth after use.     • buPROPion (WELLBUTRIN XL) 300 mg 24 hr tablet Take 1 tablet (300 mg total) by mouth every morning 90 tablet 0   • Continuous Glucose Sensor (Dexcom G7 Sensor) Use 1 Device every 10 days 3 each 2   • diazepam (VALIUM) 5 mg tablet take 1 tablet by mouth at bedtime if needed for anxiety 30 tablet 0   • ergocalciferol (VITAMIN D2) 50,000 units Take 1 capsule (50,000 Units total) by mouth every 14 (fourteen) days 12 capsule 0   • famotidine (PEPCID) 40 MG tablet Take 1 tablet (40 mg total) by mouth daily at bedtime Take in evening 2 hours prior to bed 90 tablet 0   • ferrous sulfate 324 (65 Fe) mg Take 1 tablet (324 mg total) by mouth daily before breakfast 30 tablet 3   • folic acid (FOLVITE) 1 mg tablet Take 1 tablet (1 mg total) by mouth daily 90 tablet 1   • furosemide (LASIX) 20 mg tablet TAKE 1/2 TABLET BY MOUTH DAILY (Patient taking differently: Take 10 mg by mouth daily Pt states it prn) 15 tablet 0   • gabapentin (NEURONTIN) 100 mg capsule " Take 100 mg by mouth 2 (two) times a day  0   • glucose blood (OneTouch Verio) test strip Check blood sugar once a day (Patient not taking: Reported on 2/6/2025) 100 each 1   • Insulin Glargine Solostar (Lantus SoloStar) 100 UNIT/ML SOPN Inject 0.5 mL (50 Units total) under the skin daily at bedtime 52 mL 1   • insulin lispro (HumaLOG) 100 units/mL injection pen Inject 12 Units under the skin 3 (three) times a day with meals (Patient taking differently: Inject 5 Units under the skin 3 (three) times a day with meals) 42 mL 1   • Insulin Pen Needle 31G X 5 MM MISC Inject under the skin if needed (Insuline injections) 100 each 3   • ipratropium (ATROVENT HFA) 17 mcg/act inhaler Inhale 2 puffs every 6 (six) hours     • ipratropium-albuterol (DUO-NEB) 0.5-2.5 mg/3 mL nebulizer solution Take 3 mL by nebulization 4 (four) times a day 360 mL 3   • lactulose (CHRONULAC) 10 g/15 mL solution Take 30 mL (20 g total) by mouth daily as needed (constipation) 240 mL 11   • levothyroxine 88 mcg tablet Take 1 tablet (88 mcg total) by mouth daily in the early morning (Patient taking differently: Take 112 mcg by mouth daily in the early morning) 100 tablet 3   • midodrine (PROAMATINE) 2.5 mg tablet Take 2.5 mg by mouth every other day Per pt she takes every other day if she feels she needs it     • nicotine (NICODERM CQ) 21 mg/24 hr TD 24 hr patch Place 1 patch on the skin over 24 hours every 24 hours (Patient not taking: Reported on 10/31/2024) 28 patch 5   • Nutritional Supplements (Glucerna) LIQD Take as directed 237 mL 0   • omeprazole (PriLOSEC) 40 MG capsule take 1 capsule by mouth twice a day 200 capsule 1   • ondansetron (ZOFRAN) 4 mg tablet Take 1 tablet (4 mg total) by mouth every 8 (eight) hours as needed for nausea or vomiting 20 tablet 0   • OneTouch Delica Lancets 30G MISC Check blood sugars once a day (Patient not taking: Reported on 2/6/2025) 100 each 1   • oxybutynin (DITROPAN-XL) 10 MG 24 hr tablet Take 10 mg by mouth  daily at bedtime (Patient not taking: Reported on 1/31/2025)     • oxyCODONE-acetaminophen (PERCOCET)  mg per tablet Take 1 tablet by mouth every 6 (six) hours as needed for severe pain     • oxygen gas Inhale 2 L/min at 120,000 mL/hr continuous May use 3L with exertion per Bella Rex  CRNP Keep sat >88% (Patient taking differently: Inhale 2 L/min continuous May use 3L with exertion per Bella Rex  CRNP Keep sat >88%  Pt only uses at HS or during activity) 2 L 0   • polyethylene glycol (GOLYTELY) 4000 mL solution Take 4,000 mL by mouth once for 1 dose 4000 mL 0   • potassium chloride (Klor-Con M20) 20 mEq tablet Take 1 tablet (20 mEq total) by mouth daily 30 tablet 5   • semaglutide, 0.25 or 0.5 mg/dose, (Ozempic, 0.25 or 0.5 MG/DOSE,) 2 mg/3 mL injection pen Inject 0.75 mL (0.5 mg total) under the skin every 7 days 9 mL 0   • sertraline (ZOLOFT) 100 mg tablet Take 1 tablet (100 mg total) by mouth 2 (two) times a day 180 tablet 0   • simethicone (MYLICON) 125 MG chewable tablet Chew 125 mg every 6 (six) hours as needed for flatulence     • simvastatin (ZOCOR) 20 mg tablet Take 1 tablet (20 mg total) by mouth daily at bedtime 90 tablet 0   • spironolactone (ALDACTONE) 25 mg tablet Take 1 tablet (25 mg total) by mouth daily 90 tablet 1   • sucralfate (CARAFATE) 1 g tablet take 1 tablet by mouth three times a day 300 tablet 1   • varenicline (CHANTIX) 0.5 mg tablet Take 1 tablet (0.5 mg total) by mouth 2 (two) times a day (Patient not taking: Reported on 10/31/2024) 60 tablet 0   • [DISCONTINUED] naloxone (NARCAN) 4 mg/0.1 mL nasal spray Administer 1 spray into a nostril. If no response after 2-3 minutes, give another dose in the other nostril using a new spray. (Patient not taking: Reported on 6/30/2022) 1 each 1     No current facility-administered medications on file prior to visit.      Social History     Tobacco Use   • Smoking status: Former     Current packs/day: 0.00     Average packs/day: 0.9 packs/day  for 65.0 years (60.0 ttl pk-yrs)     Types: Cigarettes     Start date: 1983     Quit date: 3/6/2024     Years since quittin.1   • Smokeless tobacco: Former     Quit date: 3/3/2024   • Tobacco comments:     I quit smoking back in 2024   Vaping Use   • Vaping status: Former   • Start date: 2019   • Quit date: 2019   • Substances: Nicotine   Substance and Sexual Activity   • Alcohol use: Not Currently   • Drug use: No   • Sexual activity: Not Currently     Partners: Male     Birth control/protection: None        Objective  LMP 2018 (Approximate)      Physical Exam    General Appearance: Alert, cooperative, no distress  HEENT: Normocephalic, atraumatic, anicteric.   Neck: Supple, symmetrical, trachea midline  Lungs: Clear to auscultation bilaterally; no rales, rhonchi or wheezing; respirations unlabored   Heart: Regular rate and rhythm; no murmur, rub, or gallop.  Abdomen: Soft, bowel sounds normal, non-tender, non-distended; no masses, there is no hepatosplenomegaly. No spider angiomas    Genitalia: Deferred   Rectal: Deferred   Extremities: No cyanosis, clubbing or edema   Skin: No jaundice, rashes, or lesions   Lymph nodes: No palpable cervical lymphadenopathy

## 2025-04-15 NOTE — ASSESSMENT & PLAN NOTE
Chely does have GERD.  She has symptoms 2 to 3 days a week despite omeprazole 40 mg twice a day.  She is also using famotidine 40 mg 2 hours before bed.  She will continue omeprazole 40 mg twice a day Best to take 30 minutes to 1 hour before 1 meal a day.  Continue famotidine 40 mg 2 hours before bed.  She could try Gaviscon or Tums if needed for breakthrough heartburn.  She does drink about 25 ounces of Pepsi per day and I suggested stopping all soda.  Lifestyle modifications for gastroesophageal reflux disease were discussed and include limiting fried and fatty foods, mints, chocolates, carbonated and caffeinated beverages , and alcohol, etc.  Avoid lying down for 2-3 hours after meals.  If you have nighttime symptoms consider raising the head of the bed up on 4-6 inch blocks.  Pillows typically are not useful.  If you are overweight, weight loss will be helpful.  Also she is on Carafate 1 g tablets 3 times a day.  I did suggest that she ground-up the Carafate tablet and some water and try to make a slurry and take it that way as it may need more effective.

## 2025-04-15 NOTE — PROGRESS NOTES
Name: Chely Ruvalcaba      : 1970      MRN: [id number]  Encounter Provider: Michele Vu DO  Encounter Date: 4/15/2025   Encounter department: [facility] [place]  :  Assessment & Plan  Gastroesophageal reflux disease without esophagitis  Chely does have GERD.  She has symptoms 2 to 3 days a week despite omeprazole 40 mg twice a day.  She is also using famotidine 40 mg 2 hours before bed.  She will continue omeprazole 40 mg twice a day Best to take 30 minutes to 1 hour before 1 meal a day.  Continue famotidine 40 mg 2 hours before bed.  She could try Gaviscon or Tums if needed for breakthrough heartburn.  She does drink about 25 ounces of Pepsi per day and I suggested stopping all soda.  Lifestyle modifications for gastroesophageal reflux disease were discussed and include limiting fried and fatty foods, mints, chocolates, carbonated and caffeinated beverages , and alcohol, etc.  Avoid lying down for 2-3 hours after meals.  If you have nighttime symptoms consider raising the head of the bed up on 4-6 inch blocks.  Pillows typically are not useful.  If you are overweight, weight loss will be helpful.  Also she is on Carafate 1 g tablets 3 times a day.  I did suggest that she ground-up the Carafate tablet and some water and try to make a slurry and take it that way as it may need more effective.           Duodenal ulcer  Chely does have a history of duodenal ulcer and I am assuming a GI bleed related to this ulcer at 1 point when she was hospitalized in [place].  This ulcer was noted to be healed October 10, 2023.  Continue omeprazole 40 mg twice a day.  I did advise Chely to avoid all NSAIDs including Motrin, Advil, Aleve, ibuprofen, Celebrex, meloxicam, Excedrin excetra.         Irritable bowel syndrome with both constipation and diarrhea  Chely does have a long history of irritable bowel syndrome.  Overall her IBS symptoms are stable.  If she gets constipated she  will take lactulose.  She may want to try using some lactulose every other day.    She may want to also use fiber every day like some Benefiber 2 teaspoonfuls mixed in 6 to 8 ounce of noncovered liquid daily.       Iron deficiency anemia, unspecified iron deficiency anemia type  Chely was noted to have an iron deficiency anemia.  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  She was treated with argon plasma coagulation.  Her last CBC in March 2025 revealed a hemoglobin of 14.6 hematocrit 47.1.  Her last iron infusion was probably August 2024.  Repeat iron studies.     Chely does continue to use oral iron.  Orders:  •  Folate; Future  •  Vitamin B12; Future  •  Iron Panel (Includes Ferritin, Iron Sat%, Iron, and TIBC); Future    History of colon polyps  Patient was noted to have 16 significant polyps back in October 2020 in the face of a poor preparation.  Follow-up colonoscopy in 2022 revealed 2 polyps over 10 mm that were tubular adenomas.  Given multiple polyps, she does report having a genetics consultation.  I but I could not find that complete report.  She does not recall having genetic testing performed.  At some point she should consider genetics consultation.    At this point Chely is past due for a colonoscopy however given her multiple comorbidities with underlying lung disease in addition to her cardiac condition, she would be high risk for colonoscopy.  I would like to get clearance from her cardiologist in addition to pulmonary team prior to pursuing colonoscopy.  She is also due for upper endoscopy from a esophageal variceal surveillance standpoint.  I will hold on scheduling these procedures at this time until I hear back from her specialist.         Cirrhosis of liver without ascites, unspecified hepatic cirrhosis type (HCC)  Chely does have underlying cirrhosis.  Her MELD 3.0 based upon January 31, 2025 labs is 11.  She does follow with hepatology, her next appointment is in  July.  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  She should limit her salt intake to less than 2000 mg/day.  She should avoid all NSAIDs.  She would be due for alpha-fetoprotein tumor marker in July 2025.  She is due for ultrasound of her abdomen and this should probably alternate with MRI for hepatoma surveillance.  Once again she does follow with hepatology.  She was last imaged by CAT scan back in June 2024.  January 31, 2025 alpha-fetoprotein was normal.  I do not see that she has had an alpha-fetoprotein phenotype obtained and I will order that.  She is due for EGD for esophageal variceal surveillance October of this year or next year unless she has a bout of decompensation then would schedule EGD at that time provided she is cleared by cardiology and pulmonary.  Her last EGD was October 2023.  No evidence of varices at that time.  She is immune to hepatitis A.  She should talk to her primary care provider regarding vaccination for hepatitis B  Orders:  •  US abdomen complete with doppler; Future  •  Alpha 1 Antitrypsin Phenotype; Future  •  Protime-INR; Future    Enlarged lymph nodes, unspecified  Patient did undergo an MRI October 4, 2023 for adenopathy in the peripancreatic region.  Pancreas was unremarkable.  Spleen was enlarged.  .3 cm lymph node lymph node adjacent to the neck of the pancreas  compared to 1.3 cm in December 2015.  -1.1 cm periportal lymph node , compared to 1.0 cm in December 2015.  - 1.4 cm portacaval lymph node , compared to 1.2 cm in December 2015.  - 0.9 cm portacaval lymph node just medially , compared to 0.7 cm in 2015.  EUS October 2023 did reveal 3 hyperechoic periportal lymph nodes with well-defined margins largest measuring 11 x 7 mm.  These did appear benign and were not sampled.  In June 2024 there is no mention of this adenopathy on that CAT scan.           History of Present Illness   HPI  Chely Ruvalcaba is a 54 y.o. female who presents for  follow-up of gastroesophageal reflux disease, irritable bowel syndrome, iron deficiency anemia cirrhosis and history of colon polyps.  I did spend about 30 minutes reviewing records as she has had a variety of hospitalizations since I saw her last in May 2024.  She did follow-up with hepatology in August 2024.  In March she was in the ER for tachycardia headache and dizziness.  In June 2024 she was hospitalized with near syncope hyperlipidemia emphysema diastolic heart failure.    In May she was hospitalized with severe sepsis sepsis secondary to pneumonia.    Chely reports today that she has managed to stay out of the hospital except for recent GYN procedure/biopsy.  Overall though she feels she does not feel good in any good way.  She reports she has very few good days.  She does use oxygen especially when she goes out walks around.  She may use oxygen during the night as at times she will wake up feeling like she is suffocating.  She will get very short of breath with walking up steps or trying to do chores around the house such as mopping or sweeping.    She does report her bowel habits to be fairly regular for her.  She does have alternating diarrhea and constipation but this is not new.  This also alternates with normal bowel function.  There is been no bleeding or black stools.  She denies any new abdominal pains.  She does have reflux.  Despite taking omeprazole 40 mg twice a day and famotidine 2 hours before bed she may have breakthrough symptoms 2-3 times a week.  There has been no dysphagia.  She has not been able to lose any weight.  She does drink 124 ounce Pepsi a day.      MELD 3.0: 11 at 1/31/2025 12:36 PM  MELD-Na: 10 at 1/31/2025 12:36 PM  Calculated from:  Serum Creatinine: 0.91 mg/dL (Using min of 1 mg/dL) at 1/31/2025 12:36 PM  Serum Sodium: 144 mmol/L (Using max of 137 mmol/L) at 1/31/2025 12:36 PM  Total Bilirubin: 1.47 mg/dL at 1/31/2025 12:36 PM  Serum Albumin: 3.5 g/dL at 1/31/2025 12:36  PM  INR(ratio): 1.18 at 1/31/2025 12:36 PM  Age at listing (hypothetical): 54 years  Sex: Female at 1/31/2025 12:36 PM     4/11/2025 surgery for high-grade squamous intraepithelial lesion on Pap smear.  Loop electrosurgical excision under anesthesia (LEEP)     3/3/2025 ER visit for tachycardia headache and dizziness  3/3/2025 laboratory data  Sodium 140  Potassium 4.4  BUN 19  Creatinine 1  Glucose 179  AST 39  ALT 24  Alk phos 74  Albumin 4.1  T. bili 1.18  Magnesium 2.1  WBC 6.0 Hgb 14.6 HCT 47.1 MCV 98 platelet count 85,000  Checks x-ray portable 1 view  Interstitial prominence could be on the basis of slight edema or atypical pneumonitis but unchanged from prior study     2/26/2025 telemedicine visit with hematology oncology  Prior iron infusion history.  August 2024-Venofer 200 mg weekly x 4  Vitamin B12 1000 mcg weekly x 4     1/31/2025 laboratory data  INR 1.18  Pro time 15.3  Hemoglobin A1c 5.3  TSH suppressed at 0.142  Free T41.17  WBC 7.29 Hgb 14.5 HCT 46.5 MCV 96 platelet count 86,000  AFP tumor marker 5.21  B12 409  Cholesterol 101  Triglycerides 90  HDL 38  LDL 45     8/5/2024 hepatology follow-up with Lisandra Leggett  1.cirrhosis.  Meld-3.0 12  2.constipation  3.bilateral lower extremity edema     6/15/2024 ER visit for cellulitis left lower extremity and worsening edema  Treated with Keflex 500 mg every 6 hours for 7 days  6/15/2024 ultrasound bilateral lower extremities  No evidence of acute deep vein thrombosis     6/3/2024 through 6/6/2024 hospitalized [facility]  -Near syncope  -Hyperlipidemia  -Emphysema  -GERD  -Cirrhosis  -Diastolic CHF  -Chronic low back pain  -Homelessness  -Diverticulosis  -Hypothyroidism     6/3/2024 CT scan and pelvis with contrast  Partially imaged groundglass opacification of the visualized lungs.  Mildly improved compared to prior study  Nodular contour of the liver seen suspicious for cirrhosis  Gallbladder unremarkable  Spleen enlarged  Pancreas  unremarkable  Adrenal glands unremarkable  Diverticulosis with residual bowel wall thickening of sigmoid colon and minimal adjacent fluid in the left paracolic gutter improved from 5/15/2024.  Area previously identified as diverticulitis        5/21/2024 hospitalized with pneumonia [facility]  1.severe sepsis secondary to pneumonia  2.centrilobular emphysema  3.cirrhosis        5/2024 diverticulitis  Treated with Augmentin and Flagyl     Admitted 3/6/2024 through 4/11/2024 [facility]  Discharge medications included  -Insulin 55 units daily  -Budesonide 0.5 mg nebulized  -Lactulose 30 mL 3 times a day  -Folic acid 1 mg daily  -Lasix 40 mg daily  -Humalog 15 units 3 times a day  -Prednisone weaning dose  -Bactrim Monday Wednesday and Friday  -Albuterol  Bupropion  mg daily  -Diazepam 5 mg every 6 hours as needed  Vitamin D  Iron 325 mg daily  Fluticasone nasal spray  Gabapentin 100 mg 2 times a day  Levothyroxine 112 mcg daily  Nicotine patch  -Omeprazole 40 mg twice a day  -Oxycodone 10/325 every 6 hours as needed  Scopolamine patch 1 mg over 3-day patch  Sertraline 100 mg daily  Simvastatin 20 mg daily  Carafate 3 times a day  Varenicline 0.5 mg twice a day (Chantix)  Discontinued medications  Semaglutide  Spironolactone 50 mg tablet     Discharge diagnoses  Sepsis  Bilateral pneumonia treated with Bactrim, steroids, ceftriaxone, and Zithromax  Hypoxemia  Exacerbation of COPD  Leukocytosis  CHF.  Right heart catheterization 3/19 normal right-sided pressures,  Acute on chronic diastolic heart failure  Acute kidney injury  Folic acid deficiency  Cirrhosis  Acute pneumothorax.  Chest tube removed 3/31/2024     4/10/2024 laboratory data  WBC 8.6 Hgb 10.5 HCT 33 platelet count 98,000 MCV 95.4.  Hemoglobin was 12.8 at admission  INR 1.31 (3/6/2024)  T. bili 3.5 at admission dropped to 1.0  Creatinine 0.83 at admission  Sodium 138 at admission  Sodium 141  Potassium 4.5  Creatinine 0.77  Glucose  95  Albumin 2.8  AST 33  ALT 69  T. bili 1.0     3/22/2023 laboratory data  CINDY negative  Celiac antibody panel negative  AMA negative  Smooth muscle antibody-negative  Liver kidney microsomal antibody negative alpha-1 antitrypsin level 244              3/7/2024 CT scan chest [place]  Findings most compatible with pulmonary edema with groundglass opacification bilaterally with slight sparing of the lung apices.  Differential diagnosis include atypical pneumonia or much less likely hemorrhage.  Pulmonary nodules involving the left upper lobe and right middle lobe.  Left upper lobe nodule measures 2.3 x 2.2 x 7 mm right lobe nodule measures 11 mm     Patient did have a right-sided chest tube in place at some point on imaging 3/24/2024        2/13/2023 virtual visit with hepatology Lisandra antony        12/21/2023 laboratory data  Sodium 142  Potassium 4.3  BUN 10  Creatinine 0.73                                          MELD 3.0  11  Glucose 178  AST 48  ALT 21  Alk phos 94  T. bili 1.19  Direct bili 0.33  WBC 6.52 Hgb 11.5 HCT 38.5 MCV 95 platelet count unable to estimate due to clumping     10/10/2023 EGD Dr. 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  Status post APC  -Portal hypertensive gastropathy in the body of the stomach  -Healed duodenal ulcer  -3 hyperechoic periportal lymph nodes with well-defined margins largest measuring 11 x 7 mm but together likely about 2 cm.  Appeared benign not sampled  -Nodular liver  -Polypoid area seen in the midesophagus status post biopsy.  Biopsy unremarkable  -Negative for esophageal varices     10/4/2023 MRI abdomen with MRCP  Liver enlarged at 23.7 cm.  Surface nodularity suggestive of cirrhosis  Mild diffuse hepatic steatosis.  No biliary ductal dilatation.  No gallstones  Pancreas unremarkable  Spleen enlarged at 15.9 cm similar to recent studies  Therefore enlarged upper abdominal lymph nodes similar to March 2023 MRI.  These have been  present since 2015 with changes in measurement as described below.  --1.3 cm lymph node lymph node adjacent to the neck of the pancreas  compared to 1.3 cm in December 2015.  -1.1 cm periportal lymph node , compared to 1.0 cm in December 2015.  - 1.4 cm portacaval lymph node , compared to 1.2 cm in December 2015.  - 0.9 cm portacaval lymph node just medially , compared to 0.7 cm in 2015.     4/11/2023 ER visit in [place] for nausea vomiting diarrhea.  Diarrhea became dark.  Fecal Hemoccult done in the hospital was negative for blood  AST was 55  ALT 25  Alk phos 75  WBC 4.7 Hgb 9.1 MCV 78 platelet count was 94,000  She did have a fever of 101.2 at that time.  She had been using Pepto-Bismol.  At presentation her heart rate was 80 and blood pressure was 134/73     4/5/2023 EGD  Probable healing ulcer at junction of first and second portion of duodenum.  Biopsy revealed benign intestinal mucosa with retained villous architecture.  Mild acute and chronic inflammation noted.  Nodular mucosa associated with healing ulcer status post biopsy.  Biopsy revealed mild acute and chronic inflammation, focal mucosal erosion and reactive surface foveolar epithelial changes consistent with reactive/chemical gastritis.  Negative for H. pylori.  Scattered areas of nodular mucosa with overlying erosions in the antrum, status post biopsy.  Biopsies negative for H. pylori.  Probable portal hypertensive gastropathy fundus and body status post biopsy.  Biopsy revealed mucosa with mild vascular congestion and edema.  Normal esophagus     3/22/2023 MRI abdomen  Redefined cirrhotic morphology of the liver.  Consistent with ultrasound elastography findings.  Prior cholecystectomy  Spleen is enlarged at 16.8 cm  Mild robin hepatic adenopathy.  A dominant node measuring 15 mm correlates with ultrasound finding of a hypoechoic lesion adjacent to the pancreas.     3/22/2023 laboratory data  IgA 417  Alpha-1 antitrypsin level 244  ACE  61  Iron 35  Ferritin 9  Iron saturation 7%  TIBC 510  Folate 8.2  Ceruloplasmin 26.9  Vitamin B12 331  Alpha-fetoprotein tumor marker 6.4  WC 6.51 Hgb 10.4 HCT 38.7 MCV 85 platelet count 145,000  INR 1.05  Fasting insulin 142.4  CINDY negative  Mitochondrial antibody negative  Smooth muscle antibody negative  Celiac antibody negative  Liver kidney microsomal antibody negative  She is not immune to hepatitis B.  She is immune to hepatitis A     3/17/2023 evaluated by Dr. Banuelos of hepatology.  Follow-up in September 2023 2/1/2023 CT scan renal stone study.  Spleen enlarged at 15.1 cm.  Gallbladder absent  Diverticulosis without diverticulitis previously noted hydroureteronephrosis on the left side has resolved.  Several nonobstructing intrarenal calculi left kidney     1/18/2023 EGD  Normal second portion of duodenum.  Biopsies negative for celiac disease.  Superficial ulcer in the duodenal bulb with a clean base.  I described superficial ulceration with surrounding heaped up mucosa.  Moderate edematous erythematous nodular mucosa in the antrum.  Biopsies were benign and negative for H. pylori.  Mild generalized erythema gastric body.  Biopsies negative for H. pylori.  Z-line regular at 39 cm.  No sign of esophageal varices     1/27/2023 ultrasound abdomen  Enlarged liver 20.6 cm.  Nodular surface  Heterogeneous echotexture  Mildly enlarged spleen  In the region of the pancreatic head there is a 1.4 x 1.2 x 1.2 cm ovoid hypoechoic focus likely represent a robin hepatis lymph node        1/27/2023 ultrasound elastography  F4, cirrhosis     1/27/2023-laboratory data  MELD-Na 7   Sodium 140  Creatinine 0.76  AST 27  ALT 16  Alk phos 80  T. bili 0.71  INR 1.10  Cholesterol 126  Triglycerides 91  HDL 48  LDL 60  Iron 32  Ferritin 5  Iron saturation 6%  TIBC 494  WBC 7.73 Hgb 9.0 HCT 33.2 MCV 87 platelet count 180,000 up from 147  Hemoglobin A1c 5.2  TSH 4.13  Hepatitis A total reactive  Hepatitis B surface antigen  nonreactive  Hepatitis B surface antibody less than 3.10  Hepatitis B core total nonreactive  Hepatitis C antibody nonreactive     1/21/2023 CT chest lung cancer screening  Splenomegaly of 15.4 cm.  Nonobstructing left renal calcification  1.2 cm left upper lobe groundglass opacity  Moderate paraseptal emphysema.  Benign intrapulmonary lymph nodes abutting the minor fissure.        On 1/18/2023 patient was seen by advanced practitioner Lisandra Leggett     12/11/2022 through 12/13/2022 admitted to [facility] with dizziness, shortness of breath, chest pain, and dark stool for couple weeks.  Patient also reported chronic generalized abdominal pain.  Hemoglobin admission was 7.1.-Received 1 unit of packed red blood cells.  EGD revealed a nonbleeding duodenal ulcer.  No intervention required.  CT scan of the chest abdomen pelvis was performed.  Revealed apical emphysematous changes in the lungs.  Moderately enlarged liver with nodular contour and fatty infiltration suggestive of cirrhosis.  Moderately enlarged spleen  Moderately enlarged gastrohepatic lymph nodes.  TSH was 7.88  Hemoglobin admission 7.1 at discharge 7.9 MCV was elevated at 103  AST was 45  ALT was 32  Admission BUN was 17 creatinine 0.5 albumin 3.3              12/12/2022 EGD [place]  Normal esophagus  Gastritis-chronic atrophic without bleeding  Acute duodenal ulcer without hemorrhage or perforation     EGD 2/28/22   notable for moderate erythematous mucosa in the body of the stomach and antrum and possible C2 M2 Albrecht's esophagus. Biopsies negative for H. pylori and Albrecht's esophagus.    Colonoscopy 2/28/22 notable for 1 sessile adenomatous appearing polyp (<5mm) 2 pedunculated edematous appearing polyps (10mm+). Biopsies notable for tubular adenomas. Recommended repeat colonoscopy x3 years and that after review of pathology however following the procedure a 1 year follow-up was recommended     2/28/2022  Colonoscopy   2  pedunculated polyps measuring greater than 10 mm in the distal sigmoid colon.  These were tubular adenomas.  There is another 5 mm polyp noted.     In the procedure report he recommended a 1 year follow-up however after the pathology recommended a 3-year follow-up.     10/29/2020 EGD  Normal esophagus.  No evidence of Albrecht's esophagus.  Erythematous, eroded mucosa in the antrum and duodenal bulb.     10/29/2020 colonoscopy Dr. Keenan  16 polyps removed.  10 mm polyp or larger ascending colon.  10 or more sessile polyps adenomatous appearing, semipedunculated measuring 4 to 10 mm transverse colon.  4 polyps measuring 4 to 10 mm descending colon  1 semipedunculated polyp measuring 10 mm sigmoid colon  Poor preparation  Poor preparation and additional polyps likely missed  Repeat colonoscopy 3 months      Review of Systems  Past Medical History   Past Medical History:   Diagnosis Date   • Abnormal stress test    • Acute cystitis without hematuria 05/07/2018   • Acute duodenal ulcer with bleeding 01/16/2023   • Allergic sinusitis     last assessed - 03Apr2017   • Anemia Around december   • Anxiety     last assessed - 04Mar2016   • Arthritis    • Asthma     last assessed - 04Mar2016   • Bilateral lower extremity edema     last assessed - [id number]   • Callus of foot     last assessed - 22Jun2016   • Chest pain    • CHF (congestive heart failure) ([facility]) 03/2024   • Chronic GERD     last assessed - 16Jan2017   • Colon polyp    • Constipation     Resolved - 13Jan2017   • COPD (chronic obstructive pulmonary disease) ([facility])    • Depression     last assessed - 04Mar2016   • Disease of thyroid gland    • Diverticulitis of colon     last assessed - 04Mar2016   • Dyspepsia     Resolved - [id number]   • Edema, lower extremity 08/07/2020   • Emphysema of lung ([facility])    • Esophageal reflux     last assessed - 04Mar2016   • Essential hypertriglyceridemia     last assessed - [id number]   • Fatty liver 01/16/2023   • Gastroesophageal  reflux disease with esophagitis 11/18/2016   • GERD (gastroesophageal reflux disease)    • Gynecological disorder     last assessed - 04Mar2016   • History of transfusion    • Hydroureteronephrosis 06/30/2022   • Hypertension     last assessed - 04Mar2016   • Hypokalemia 06/20/2022   • Hypotension     last assessed - 25Aug2016   • Kidney stone    • Left ureteral stone with hydronephrosis 07/21/2022   • Migraine    • Morbid obesity (HCC) 01/11/2019    Formatting of this note might be different from the original. Added automatically from request for surgery [id number]   • Neuropathy    • Obesity    • Other chest pain 11/08/2018   • Pancreatic lesion 03/13/2023   • Pneumonia 03/2024   • Positive depression screening 06/19/2022   • Primary hypertriglyceridemia 06/19/2022   • Pulmonary emphysema ([facility])     last assessed - 04Mar2016   • Seasonal allergies    • Severe obesity ([facility]) 01/16/2023   • Skin tag 07/17/2023   • SOB (shortness of breath)    • Strain of groin 07/17/2023   • Urinary frequency     last assessed - 22Jun2016   • Vagina, candidiasis     last assessed - 22Jun2016   • Very heavy cigarette smoker 01/16/2023   • Visual impairment      Past Surgical History:   Procedure Laterality Date   • CARPAL TUNNEL RELEASE      last assessed - 04MAr2016   • CHOLECYSTECTOMY      last assessed - 04Mar2016   • COLONOSCOPY      Complete colonoscopy    • EYE SURGERY      last assessed - 04Mar2016   • FL RETROGRADE PYELOGRAM  6/16/2022   • FL RETROGRADE PYELOGRAM  8/16/2022   • FOOT SURGERY      last assessed - 04Mar2016   • NC CONIZATION CERVIX W/WO D&C RPR ELTRD EXC N/A 4/11/2025    Procedure: LOOP ELECTROSURGICAL EXCISION REOCEDURE, EXAM UNDER ANESTHESIA;  Surgeon: Lisa Fernando MD;  Location: [facility];  Service: Gynecology   • NC CYSTO/URETERO W/LITHOTRIPSY &INDWELL STENT INSRT Left 6/16/2022    Procedure: CYSTOSCOPY URETEROSCOPY WITH LITHOTRIPSY HOLMIUM LASER, RETROGRADE PYELOGRAM AND INSERTION STENT URETERAL;   "Surgeon: Sammy Ferrer MD;  Location: [facility];  Service: Urology   • NC CYSTO/URETERO W/LITHOTRIPSY &INDWELL STENT INSRT Left 8/16/2022    Procedure: CYSTOSCOPY USCOPE W/HOLMIUM LASER, RETROGRADE PYELOGRAM&STENT;  Surgeon: Sudheer Bradford MD;  Location: [facility];  Service: Urology   • NC CYSTOURETHROSCOPY W/URETERAL CATHETERIZATION Left 7/21/2022    Procedure: CYSTOSCOPY RETROGRADE PYELOGRAM WITH INSERTION STENT URETERAL;  Surgeon: Facundo Matamoros MD;  Location: [facility];  Service: Urology   • [place] ESOPHAGOGASTRODUODENOSCOPY TRANSORAL DIAGNOSTIC N/A 2/13/2017    Procedure: ESOPHAGOGASTRODUODENOSCOPY (EGD);  Surgeon: Alison Saleem MD;  [facility];  Service: Gastroenterology     Family History   Problem Relation Age of Onset   • Obesity Mother    • Thyroid disease Mother    • Cancer Mother 71   • Vision loss Mother    • Thyroid disease unspecified Mother    • Obesity Sister    • Coronary artery disease Sister    • Stroke Sister    • Asthma Sister    • Glaucoma Sister    • No Known Problems Maternal Aunt    • Diabetes Maternal Uncle    • Lung cancer Maternal Grandmother    • Cancer Maternal Grandfather    • Diabetes Maternal Grandfather    • No Known Problems Paternal Grandmother    • No Known Problems Paternal Grandfather    • Hypertension Other    • Lung cancer Other    • Hypertension Other    • Lung cancer Other    • Lung cancer Other       reports that she has been smoking cigarettes. She started smoking about 42 years ago. She has a 60 pack-year smoking history. She has never used smokeless tobacco. She reports that she does not currently use alcohol. She reports that she does not use drugs.  Current Outpatient Medications   Medication Instructions   • albuterol (PROVENTIL HFA,VENTOLIN HFA) 90 mcg/act inhaler 2 puffs, Inhalation, Every 6 hours PRN   • B-D SYRINGE/NEEDLE 1CC/25GX5/8 25G X 5/8\" 1 ML MISC    • bisacodyl (FLEET) 10 mg, Once   • Blood Glucose Monitoring Suppl (OneTouch Verio) w/Device " KIT Check blood sugar once a day   • budesonide (PULMICORT) 0.5 mg, 2 times daily   • buPROPion (WELLBUTRIN XL) 300 mg, Oral, Every morning   • Continuous Glucose Sensor (Dexcom G7 Sensor) 1 Device, Does not apply, Every 10 days   • diazepam (VALIUM) 5 mg, Oral, Daily at bedtime PRN   • ergocalciferol (VITAMIN D2) 50,000 Units, Oral, Every 14 days   • famotidine (PEPCID) 40 mg, Oral, Daily at bedtime, Take in evening 2 hours prior to bed   • ferrous sulfate 324 mg, Oral, Daily before breakfast   • folic acid (FOLVITE) 1 mg, Oral, Daily   • furosemide (LASIX) 10 mg, Oral, Daily   • gabapentin (NEURONTIN) 100 mg, 2 times daily   • glucose blood (OneTouch Verio) test strip Check blood sugar once a day   • Insulin Glargine Solostar (LANTUS SOLOSTAR) 50 Units, Subcutaneous, Daily at bedtime   • insulin lispro (HUMALOG) 12 Units, Subcutaneous, 3 times daily with meals   • Insulin Pen Needle 31G X 5 MM MISC Subcutaneous, As needed   • ipratropium (ATROVENT HFA) 17 mcg/act inhaler 2 puffs, Every 6 hours   • ipratropium-albuterol (DUO-NEB) 0.5-2.5 mg/3 mL nebulizer solution 3 mL, Nebulization, 4 times daily   • lactulose (CHRONULAC) 20 g, Oral, Daily PRN   • levothyroxine 88 mcg, Oral, Daily (early morning)   • midodrine (PROAMATINE) 2.5 mg, Every other day   • nicotine (NICODERM CQ) 21 mg/24 hr TD 24 hr patch 1 patch, Transdermal, Every 24 hours   • Nutritional Supplements (Glucerna) LIQD Take as directed   • omeprazole (PRILOSEC) 40 mg, Oral, 2 times daily   • ondansetron (ZOFRAN) 4 mg, Oral, Every 8 hours PRN   • OneTouch Delica Lancets 30G MISC Check blood sugars once a day   • oxybutynin (DITROPAN-XL) 10 mg, Daily at bedtime   • oxyCODONE-acetaminophen (PERCOCET)  mg per tablet 1 tablet, Every 6 hours PRN   • oxygen 2 L/min (2 L/min), Inhalation, Continuous, May use 3L with exertion per Bella Goodman  CRNP Keep sat >88%   • polyethylene glycol (GOLYTELY) 4000 mL solution 4,000 mL, Oral, Once   • potassium chloride  "(Klor-Con M20) 20 mEq tablet 20 mEq, Oral, Daily   • semaglutide (0.25 or 0.5 mg/dose) (OZEMPIC (0.25 OR 0.5 MG/DOSE)) 0.5 mg, Subcutaneous, Every 7 days   • sertraline (ZOLOFT) 100 mg, Oral, 2 times daily   • simethicone (MYLICON) 125 mg, Every 6 hours PRN   • simvastatin (ZOCOR) 20 mg, Oral, Daily at bedtime   • spironolactone (ALDACTONE) 25 mg, Oral, Daily   • sucralfate (CARAFATE) 1 g, Oral, 3 times daily   • varenicline (CHANTIX) 0.5 mg, Oral, 2 times daily     Allergies   Allergen Reactions   • Hydrocodone-Acetaminophen Hives   • Ketorolac Hives and Dizziness   • Toradol [Ketorolac Tromethamine] Other (See Comments)     hypotension   • Ultram [Tramadol] Nausea Only, GI Intolerance and Vomiting      Current Outpatient Medications on File Prior to Visit   Medication Sig Dispense Refill   • albuterol (PROVENTIL HFA,VENTOLIN HFA) 90 mcg/act inhaler Inhale 2 puffs every 6 (six) hours as needed for wheezing 18 g 1   • B-D SYRINGE/NEEDLE 1CC/25GX5/8 25G X 5/8\" 1 ML MISC      • budesonide (PULMICORT) 0.5 mg/2 mL nebulizer solution Take 0.5 mg by nebulization 2 (two) times a day Rinse mouth after use.     • buPROPion (WELLBUTRIN XL) 300 mg 24 hr tablet Take 1 tablet (300 mg total) by mouth every morning 90 tablet 0   • diazepam (VALIUM) 5 mg tablet take 1 tablet by mouth at bedtime if needed for anxiety 30 tablet 0   • ergocalciferol (VITAMIN D2) 50,000 units Take 1 capsule (50,000 Units total) by mouth every 14 (fourteen) days 12 capsule 0   • famotidine (PEPCID) 40 MG tablet Take 1 tablet (40 mg total) by mouth daily at bedtime Take in evening 2 hours prior to bed 90 tablet 0   • ferrous sulfate 324 (65 Fe) mg Take 1 tablet (324 mg total) by mouth daily before breakfast 30 tablet 3   • folic acid (FOLVITE) 1 mg tablet Take 1 tablet (1 mg total) by mouth daily 90 tablet 1   • furosemide (LASIX) 20 mg tablet TAKE 1/2 TABLET BY MOUTH DAILY (Patient taking differently: Take 10 mg by mouth daily Pt states it prn) 15 " tablet 0   • gabapentin (NEURONTIN) 100 mg capsule Take 100 mg by mouth 2 (two) times a day  0   • Insulin Glargine Solostar (Lantus SoloStar) 100 UNIT/ML SOPN Inject 0.5 mL (50 Units total) under the skin daily at bedtime 52 mL 1   • insulin lispro (HumaLOG) 100 units/mL injection pen Inject 12 Units under the skin 3 (three) times a day with meals (Patient taking differently: Inject 5 Units under the skin 3 (three) times a day with meals) 42 mL 1   • Insulin Pen Needle 31G X 5 MM MISC Inject under the skin if needed (Insuline injections) 100 each 3   • ipratropium (ATROVENT HFA) 17 mcg/act inhaler Inhale 2 puffs every 6 (six) hours     • ipratropium-albuterol (DUO-NEB) 0.5-2.5 mg/3 mL nebulizer solution Take 3 mL by nebulization 4 (four) times a day 360 mL 3   • lactulose (CHRONULAC) 10 g/15 mL solution Take 30 mL (20 g total) by mouth daily as needed (constipation) 240 mL 11   • levothyroxine 88 mcg tablet Take 1 tablet (88 mcg total) by mouth daily in the early morning (Patient taking differently: Take 112 mcg by mouth daily in the early morning) 100 tablet 3   • Nutritional Supplements (Glucerna) LIQD Take as directed 237 mL 0   • ondansetron (ZOFRAN) 4 mg tablet Take 1 tablet (4 mg total) by mouth every 8 (eight) hours as needed for nausea or vomiting 20 tablet 0   • oxyCODONE-acetaminophen (PERCOCET)  mg per tablet Take 1 tablet by mouth every 6 (six) hours as needed for severe pain     • oxygen gas Inhale 2 L/min at 120,000 mL/hr continuous May use 3L with exertion per Bella ALVAREZ Keep sat >88% 2 L 0   • potassium chloride (Klor-Con M20) 20 mEq tablet Take 1 tablet (20 mEq total) by mouth daily 30 tablet 5   • semaglutide, 0.25 or 0.5 mg/dose, (Ozempic, 0.25 or 0.5 MG/DOSE,) 2 mg/3 mL injection pen Inject 0.75 mL (0.5 mg total) under the skin every 7 days 9 mL 0   • sertraline (ZOLOFT) 100 mg tablet Take 1 tablet (100 mg total) by mouth 2 (two) times a day 180 tablet 0   • simethicone (MYLICON)  125 MG chewable tablet Chew 125 mg every 6 (six) hours as needed for flatulence     • simvastatin (ZOCOR) 20 mg tablet Take 1 tablet (20 mg total) by mouth daily at bedtime 90 tablet 0   • spironolactone (ALDACTONE) 25 mg tablet Take 1 tablet (25 mg total) by mouth daily 90 tablet 1   • sucralfate (CARAFATE) 1 g tablet take 1 tablet by mouth three times a day 300 tablet 1   • bisacodyl (FLEET) 10 MG/30ML ENEM Insert 10 mg into the rectum once (Patient not taking: Reported on 1/31/2025)     • Blood Glucose Monitoring Suppl (OneTouch Verio) w/Device KIT Check blood sugar once a day (Patient not taking: Reported on 2/6/2025) 1 kit 0   • Continuous Glucose Sensor (Dexcom G7 Sensor) Use 1 Device every 10 days (Patient not taking: Reported on 4/15/2025) 3 each 2   • glucose blood (OneTouch Verio) test strip Check blood sugar once a day (Patient not taking: Reported on 2/6/2025) 100 each 1   • midodrine (PROAMATINE) 2.5 mg tablet Take 2.5 mg by mouth every other day Per pt she takes every other day if she feels she needs it     • nicotine (NICODERM CQ) 21 mg/24 hr TD 24 hr patch Place 1 patch on the skin over 24 hours every 24 hours (Patient not taking: Reported on 10/31/2024) 28 patch 5   • omeprazole (PriLOSEC) 40 MG capsule take 1 capsule by mouth twice a day 200 capsule 1   • OneTouch Delica Lancets 30G MISC Check blood sugars once a day (Patient not taking: Reported on 2/6/2025) 100 each 1   • oxybutynin (DITROPAN-XL) 10 MG 24 hr tablet Take 10 mg by mouth daily at bedtime (Patient not taking: Reported on 1/31/2025)     • polyethylene glycol (GOLYTELY) 4000 mL solution Take 4,000 mL by mouth once for 1 dose (Patient not taking: Reported on 4/15/2025) 4000 mL 0   • varenicline (CHANTIX) 0.5 mg tablet Take 1 tablet (0.5 mg total) by mouth 2 (two) times a day (Patient not taking: Reported on 10/31/2024) 60 tablet 0   • [DISCONTINUED] naloxone (NARCAN) 4 mg/0.1 mL nasal spray Administer 1 spray into a nostril. If no  "response after 2-3 minutes, give another dose in the other nostril using a new spray. (Patient not taking: Reported on 2022) 1 each 1     No current facility-administered medications on file prior to visit.      Social History     Tobacco Use   • Smoking status: Some Days     Current packs/day: 0.00     Average packs/day: 0.9 packs/day for 65.0 years (60.0 ttl pk-yrs)     Types: Cigarettes     Start date: 1983     Last attempt to quit: 3/6/2024     Years since quittin.1   • Smokeless tobacco: Never   • Tobacco comments:     I quit smoking back in 2024   Vaping Use   • Vaping status: Former   • Start date: 2019   • Quit date: 2019   • Substances: Nicotine   Substance and Sexual Activity   • Alcohol use: Not Currently   • Drug use: Never   • Sexual activity: Not Currently     Partners: Male     Birth control/protection: Abstinence        Objective   /70 (BP Location: Right arm, Patient Position: Sitting, Cuff Size: Adult)   Pulse 86   Temp 97.9 °F (36.6 °C) (Temporal)   Resp 16   Ht 5' 6.5\" (1.689 m)   Wt 119 kg (261 lb 9.6 oz)   LMP 2018 (Approximate)   SpO2 96% Comment: 2L of O2.  BMI 41.59 kg/m²      Physical Exam    General Appearance: Alert, cooperative, no distress.  She is using oxygen. Appears older than stated age.  HEENT: Normocephalic, atraumatic, anicteric.   Neck: Supple, symmetrical, trachea midline  Lungs: Clear to auscultation bilaterally; no rales, rhonchi or wheezing; respirations unlabored   Heart: Regular rate and rhythm; no murmur, rub, or gallop.  Abdomen: Soft, bowel sounds normal, , non-distended; no masses, there is no hepatosplenomegaly. No spider angiomas . Obese abdomen.  There is generalized abdominal tenderness to light palpation.  No spider angiomas or stigmata of chronic liver disease.  Genitalia: Deferred   Rectal: Deferred   Extremities: No cyanosis, clubbing or edema   Skin: No jaundice, rashes, or lesions   Lymph nodes: No palpable " cervical lymphadenopathy   I have spent a total time of 58 minutes in caring for this patient on the day of the visit/encounter including Risks and benefits of tx options, Instructions for management, Importance of tx compliance, Risk factor reductions, Impressions, Counseling / Coordination of care, Documenting in the medical record, Reviewing/placing orders in the medical record (including tests, medications, and/or procedures), and Obtaining or reviewing history  .

## 2025-04-15 NOTE — PATIENT INSTRUCTIONS
Assessment & Plan  Gastroesophageal reflux disease without esophagitis  Chely does have GERD.  She has symptoms 2 to 3 days a week despite omeprazole 40 mg twice a day.  She is also using famotidine 40 mg 2 hours before bed.  She will continue omeprazole 40 mg twice a day Best to take 30 minutes to 1 hour before 1 meal a day.  Continue famotidine 40 mg 2 hours before bed.  She could try Gaviscon or Tums if needed for breakthrough heartburn.  She does drink about 25 ounces of Pepsi per day and I suggested stopping all soda.  Lifestyle modifications for gastroesophageal reflux disease were discussed and include limiting fried and fatty foods, mints, chocolates, carbonated and caffeinated beverages , and alcohol, etc.  Avoid lying down for 2-3 hours after meals.  If you have nighttime symptoms consider raising the head of the bed up on 4-6 inch blocks.  Pillows typically are not useful.  If you are overweight, weight loss will be helpful.  Also she is on Carafate 1 g tablets 3 times a day.  I did suggest that she ground-up the Carafate tablet and some water and try to make a slurry and take it that way as it may need more effective.           Duodenal ulcer  Chely does have a history of duodenal ulcer and I am assuming a GI bleed related to this ulcer at 1 point when she was hospitalized in [place].  This ulcer was noted to be healed October 10, 2023.  Continue omeprazole 40 mg twice a day.  I did advise Chely to avoid all NSAIDs including Motrin, Advil, Aleve, ibuprofen, Celebrex, meloxicam, Excedrin excetra.         Irritable bowel syndrome with both constipation and diarrhea  Chely does have a long history of irritable bowel syndrome.  Overall her IBS symptoms are stable.  If she gets constipated she will take lactulose.  She may want to try using some lactulose every other day.    She may want to also use fiber every day like some Benefiber 2 teaspoonfuls mixed in 6 to 8 ounce of noncovered liquid daily.          Iron deficiency anemia, unspecified iron deficiency anemia type  Chely was noted to have an iron deficiency anemia.  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  She was treated with argon plasma coagulation.  Her last CBC in March 2025 revealed a hemoglobin of 14.6 hematocrit 47.1.  Her last iron infusion was probably August 2024.  Repeat iron studies.     Chely does continue to use oral iron.         History of colon polyps  Patient was noted to have 16 significant polyps back in October 2020 in the face of a poor preparation.  Follow-up colonoscopy in 2022 revealed 2 polyps over 10 mm that were tubular adenomas.  Given multiple polyps, she does report having a genetics consultation.  I but I could not find that complete report.  She does not recall having genetic testing performed.  At some point she should consider genetics consultation.    At this point Chely is past due for a colonoscopy however given her multiple comorbidities with underlying lung disease in addition to her cardiac condition, she would be high risk for colonoscopy.  I would like to get clearance from her cardiologist in addition to pulmonary team prior to pursuing colonoscopy.  She is also due for upper endoscopy from a esophageal variceal surveillance standpoint.  I will hold on scheduling these procedures at this time until I hear back from her specialist.           Cirrhosis of liver without ascites, unspecified hepatic cirrhosis type (HCC)  Chely does have underlying cirrhosis.  Her MELD 3.0 based upon January 31, 2025 labs is 11.  She does follow with hepatology, her next appointment is in July.  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  She should limit her salt intake to less than 2000 mg/day.  She should avoid all NSAIDs.  She would be due for alpha-fetoprotein tumor marker in July 2025.  She is due for ultrasound of her abdomen and this should probably alternate with MRI for  hepatoma surveillance.  Once again she does follow with hepatology.  She was last imaged by CAT scan back in June 2024.  January 31, 2025 alpha-fetoprotein was normal.  I do not see that she has had an alpha-fetoprotein phenotype obtained and I will order that.  She is due for EGD for esophageal variceal surveillance October of this year or next year unless she has a bout of decompensation then would schedule EGD at that time provided she is cleared by cardiology and pulmonary.  Her last EGD was October 2023.  No evidence of varices at that time.  She is immune to hepatitis A.  She should talk to her primary care provider regarding vaccination for hepatitis B       Enlarged lymph nodes, unspecified  Patient did undergo an MRI October 4, 2023 for adenopathy in the peripancreatic region.  Pancreas was unremarkable.  Spleen was enlarged.  .3 cm lymph node lymph node adjacent to the neck of the pancreas  compared to 1.3 cm in December 2015.  -1.1 cm periportal lymph node , compared to 1.0 cm in December 2015.  - 1.4 cm portacaval lymph node , compared to 1.2 cm in December 2015.  - 0.9 cm portacaval lymph node just medially , compared to 0.7 cm in 2015.  EUS October 2023 did reveal 3 hyperechoic periportal lymph nodes with well-defined margins largest measuring 11 x 7 mm.  These did appear benign and were not sampled.  In June 2024 there is no mention of this adenopathy on that CAT scan.

## 2025-04-16 ENCOUNTER — OFFICE VISIT (OUTPATIENT)
Dept: PULMONOLOGY | Facility: CLINIC | Age: 55
End: 2025-04-16
Payer: MEDICARE

## 2025-04-16 VITALS
DIASTOLIC BLOOD PRESSURE: 60 MMHG | OXYGEN SATURATION: 98 % | HEART RATE: 64 BPM | RESPIRATION RATE: 20 BRPM | WEIGHT: 258.4 LBS | BODY MASS INDEX: 40.56 KG/M2 | TEMPERATURE: 97.8 F | SYSTOLIC BLOOD PRESSURE: 110 MMHG | HEIGHT: 67 IN

## 2025-04-16 DIAGNOSIS — G47.19 EXCESSIVE DAYTIME SLEEPINESS: ICD-10-CM

## 2025-04-16 DIAGNOSIS — J43.2 CENTRILOBULAR EMPHYSEMA (HCC): Primary | ICD-10-CM

## 2025-04-16 DIAGNOSIS — J96.01 ACUTE HYPOXIC RESPIRATORY FAILURE (HCC): ICD-10-CM

## 2025-04-16 DIAGNOSIS — Z01.818 PRE-OPERATIVE CLEARANCE: ICD-10-CM

## 2025-04-16 DIAGNOSIS — F17.211 CIGARETTE NICOTINE DEPENDENCE IN REMISSION: ICD-10-CM

## 2025-04-16 PROCEDURE — 99214 OFFICE O/P EST MOD 30 MIN: CPT

## 2025-04-16 PROCEDURE — 94618 PULMONARY STRESS TESTING: CPT

## 2025-04-16 PROCEDURE — 88344 IMHCHEM/IMCYTCHM EA MLT ANTB: CPT | Performed by: PATHOLOGY

## 2025-04-16 PROCEDURE — 88307 TISSUE EXAM BY PATHOLOGIST: CPT | Performed by: PATHOLOGY

## 2025-04-16 PROCEDURE — 88305 TISSUE EXAM BY PATHOLOGIST: CPT | Performed by: PATHOLOGY

## 2025-04-16 RX ORDER — ALBUTEROL SULFATE 90 UG/1
2 INHALANT RESPIRATORY (INHALATION) EVERY 6 HOURS PRN
Qty: 18 G | Refills: 1 | Status: SHIPPED | OUTPATIENT
Start: 2025-04-16

## 2025-04-16 RX ORDER — IPRATROPIUM BROMIDE AND ALBUTEROL SULFATE 2.5; .5 MG/3ML; MG/3ML
3 SOLUTION RESPIRATORY (INHALATION) EVERY 6 HOURS PRN
Qty: 360 ML | Refills: 3 | Status: SHIPPED | OUTPATIENT
Start: 2025-04-16

## 2025-04-16 NOTE — ASSESSMENT & PLAN NOTE
- Moderate emphysema on CT imaging  -Recent PFTs with no obstructive airflow defect.  Did have positive bronchodilator response.  Mild restrictive pattern with normal diffusion capacity- this could be 2/2 body habitus  -At least 60-pack-year smoking history quit March 2024  - Reviewed recent chest x-ray from 3/3/2024.  Showed mild interstitial prominence stable since 2024.  No infiltrates or pleural effusion  -No exacerbations or respiratory infections since her last office visit  - Has BRADY with stairs/moderate exertion- suspect 2/2 obesity & deconditioning.  No significant cough, mucus, or wheezing.  Lungs are clear on exam today    Plan:  - Given no obstructive airflow defect on spirometry, no significant pulmonary symptoms or recent exacerbations, will discontinue nebulized budesonide twice daily and switch DuoNebs to as needed every 6 hours.  Hold off on daily maintenance inhaler.    - Follow-up in 6 months or sooner if needed      Orders:    ipratropium-albuterol (DUO-NEB) 0.5-2.5 mg/3 mL nebulizer solution; Take 3 mL by nebulization every 6 (six) hours as needed for wheezing or shortness of breath    albuterol (PROVENTIL HFA,VENTOLIN HFA) 90 mcg/act inhaler; Inhale 2 puffs every 6 (six) hours as needed for wheezing

## 2025-04-16 NOTE — ASSESSMENT & PLAN NOTE
- Describes occasional nighttime symptoms of nocturnal choking/gasping.  Also with excessive daytime sleepiness.  She had a prior sleep study in May 2024 which showed no RUTHANN, however she had very low sleep efficiency during that study  - Discussed with patient, can consider repeat sleep study, however patient declines at this time  -Continue wearing 3 L supplemental oxygen nightly

## 2025-04-16 NOTE — PROGRESS NOTES
Follow-up  Visit - Pulmonary Medicine   Name: Chely Ruvalcaba      : 1970      MRN: [id number]  Encounter Provider: ANTONIO Nam  Encounter Date: 2025   Encounter department: [facility]  :  Assessment & Plan  Centrilobular emphysema (HCC)  - Moderate emphysema on CT imaging  -Recent PFTs with no obstructive airflow defect.  Did have positive bronchodilator response.  Mild restrictive pattern with normal diffusion capacity- this could be 2/2 body habitus  -At least 60-pack-year smoking history quit 2024  - Reviewed recent chest x-ray from 3/3/2024.  Showed mild interstitial prominence stable since .  No infiltrates or pleural effusion  -No exacerbations or respiratory infections since her last office visit  - Has BRADY with stairs/moderate exertion- suspect 2/2 obesity & deconditioning.  No significant cough, mucus, or wheezing.  Lungs are clear on exam today    Plan:  - Given no obstructive airflow defect on spirometry, no significant pulmonary symptoms or recent exacerbations, will discontinue nebulized budesonide twice daily and switch DuoNebs to as needed every 6 hours.  Hold off on daily maintenance inhaler.    - Follow-up in 6 months or sooner if needed      Orders:    ipratropium-albuterol (DUO-NEB) 0.5-2.5 mg/3 mL nebulizer solution; Take 3 mL by nebulization every 6 (six) hours as needed for wheezing or shortness of breath    albuterol (PROVENTIL HFA,VENTOLIN HFA) 90 mcg/act inhaler; Inhale 2 puffs every 6 (six) hours as needed for wheezing    Cigarette nicotine dependence in remission  - Approximately 60-pack-year smoking history  -No masses or suspicious nodules on last CT chest performed in May 2024  -Recommend repeat CT chest next month for her annual LDCT. Risks vs benefits of LDCT screening was discussed with patient including risks vs benefits where risks including false positive test, radiation exposure, risk of overdiagnosis and benefits  included early detection in potentially malignant lesions therefore improving the rate of intervention and therefore improving overall survival/mortality.        Orders:    CT lung screening program; Future    Excessive daytime sleepiness  - Describes occasional nighttime symptoms of nocturnal choking/gasping.  Also with excessive daytime sleepiness.  She had a prior sleep study in May 2024 which showed no RUTHANN, however she had very low sleep efficiency during that study  - Discussed with patient, can consider repeat sleep study, however patient declines at this time  -Continue wearing 3 L supplemental oxygen nightly       Acute hypoxic respiratory failure (HCC)  - Discharged from hospital earlier last year with 3 L supplemental oxygen  -Updated walk test performed in the office today, showed no resting or ambulatory desaturation.  Discussed with patient, no longer needs supplemental oxygen during daytime use.  Continue wearing nightly.      Orders:    POCT Oxygen Titration    Pre-operative clearance  -ARISCAT Score 11. Patient is at low risk for post-op pulmonary complications. Overall, there are no strict pulmonary contraindications to undergoing surgical operation.  Would not recommend proceeding with surgery if patient has current infection or exacerbation. Patient should maintain their inhaler therapy perioperatively.           No follow-ups on file.    History of Present Illness   Chely Ruvalcaba is a 54 y.o. female with a PMH of emphysema, tobacco abuse, seasonal allergies, GERD, obesity, iron deficiency anemia, and cirrhosis who is here today for a follow-up visit and for preoperative clearance for EGD/Colonoscopy.Previously seen in the office in July 2024.  She was maintained on all nebulized regimen of DuoNebs 4 times daily and budesonide twice daily.  She had updated PFTs in August 2024 that showed no obstructive airflow defect, bronchodilator responsiveness, mild restriction on lung volumes with normal  diffusion capacity.  She had a recent chest x-ray on 3/3/2025 that showed mild interstitial prominence unchanged since 2024, no infiltrates or pleural effusions.    Patient denies any exacerbations or respiratory infections since her last office visit.  Gets short of breath if she walks longer distances, stairs, and with house chores.  Denies any cough, wheezing, or mucus.  She is using her budesonide nebs twice daily and DuoNebs 2-3 times daily.  Does not need additional bronchodilator in between.  She is wearing 2 L with activity and sometimes at night.  She does wake up occasionally with shortness of breath, choking/gasping at night and  feels tired during the day.  She is not interested in repeating sleep study at this time.  She denies any chest pain or lower extremity swelling.  Denies any issues with previous anesthesia.      Review of Systems    Aside from what is mentioned in the HPI, ROS is otherwise negative         Medical History Reviewed by provider this encounter:     .    Objective   LMP 04/05/2018 (Approximate)     Physical Exam  Vitals and nursing note reviewed.   Constitutional:       General: She is not in acute distress.     Appearance: Normal appearance. She is well-developed. She is obese.   Cardiovascular:      Rate and Rhythm: Normal rate and regular rhythm.      Heart sounds: Normal heart sounds, S1 normal and S2 normal. No murmur heard.  Pulmonary:      Effort: Pulmonary effort is normal.      Breath sounds: Normal breath sounds. No decreased breath sounds, wheezing, rhonchi or rales.   Musculoskeletal:         General: No swelling.      Right lower leg: No edema.      Left lower leg: No edema.   Neurological:      Mental Status: She is alert.   Psychiatric:         Mood and Affect: Mood and affect normal.         Behavior: Behavior normal. Behavior is cooperative.           Diagnostic Data:  Labs: I personally reviewed the most recent laboratory data pertinent to today's  "visit.      Radiology results:  Radiology Results Review: I have reviewed radiology reports from 3/3/25 including: chest xray.      PFT/spirometry results: Reviewed  No results found for: \"FEV1\", \"FVC\", \"YUZ6OJF\", \"TLC\", \"DLCO\"       Oximetry testin25: The patient's starting pulse ox was 93% on room air with a heart rate of 71 bpm. She walked a total of 294 m in 6 minutes without stopping and without desaturation.  Lowest SpO2 91%.  At completion the pulse ox was 93% on room air with a heart rate of 103 bpm.     Other studies:      ANTONIO Nam      "

## 2025-04-16 NOTE — ASSESSMENT & PLAN NOTE
- Discharged from hospital earlier last year with 3 L supplemental oxygen  -Updated walk test performed in the office today, showed no resting or ambulatory desaturation.  Discussed with patient, no longer needs supplemental oxygen during daytime use.  Continue wearing nightly.      Orders:    POCT Oxygen Titration

## 2025-04-16 NOTE — ASSESSMENT & PLAN NOTE
- Approximately 60-pack-year smoking history  -No masses or suspicious nodules on last CT chest performed in May 2024  -Recommend repeat CT chest next month for her annual LDCT. Risks vs benefits of LDCT screening was discussed with patient including risks vs benefits where risks including false positive test, radiation exposure, risk of overdiagnosis and benefits included early detection in potentially malignant lesions therefore improving the rate of intervention and therefore improving overall survival/mortality.        Orders:    CT lung screening program; Future

## 2025-04-17 ENCOUNTER — OFFICE VISIT (OUTPATIENT)
Dept: INTERNAL MEDICINE CLINIC | Facility: OTHER | Age: 55
End: 2025-04-17
Payer: MEDICARE

## 2025-04-17 VITALS
HEART RATE: 91 BPM | DIASTOLIC BLOOD PRESSURE: 70 MMHG | TEMPERATURE: 98.7 F | HEIGHT: 67 IN | OXYGEN SATURATION: 98 % | WEIGHT: 258 LBS | SYSTOLIC BLOOD PRESSURE: 126 MMHG | BODY MASS INDEX: 40.49 KG/M2

## 2025-04-17 DIAGNOSIS — D50.8 IRON DEFICIENCY ANEMIA SECONDARY TO INADEQUATE DIETARY IRON INTAKE: Primary | ICD-10-CM

## 2025-04-17 DIAGNOSIS — F41.9 ANXIETY: ICD-10-CM

## 2025-04-17 DIAGNOSIS — E53.8 FOLIC ACID DEFICIENCY: ICD-10-CM

## 2025-04-17 DIAGNOSIS — E06.3 HYPOTHYROIDISM DUE TO HASHIMOTO THYROIDITIS: ICD-10-CM

## 2025-04-17 DIAGNOSIS — Z23 ENCOUNTER FOR IMMUNIZATION: ICD-10-CM

## 2025-04-17 DIAGNOSIS — E66.813 OBESITY, CLASS III, BMI 40-49.9 (MORBID OBESITY): ICD-10-CM

## 2025-04-17 DIAGNOSIS — R60.0 EDEMA, LOWER EXTREMITY: ICD-10-CM

## 2025-04-17 DIAGNOSIS — Z12.31 ENCOUNTER FOR SCREENING MAMMOGRAM FOR BREAST CANCER: ICD-10-CM

## 2025-04-17 PROCEDURE — 90471 IMMUNIZATION ADMIN: CPT

## 2025-04-17 PROCEDURE — 99214 OFFICE O/P EST MOD 30 MIN: CPT | Performed by: FAMILY MEDICINE

## 2025-04-17 PROCEDURE — 90677 PCV20 VACCINE IM: CPT

## 2025-04-17 RX ORDER — FOLIC ACID 1 MG/1
1 TABLET ORAL DAILY
Qty: 90 TABLET | Refills: 1 | Status: SHIPPED | OUTPATIENT
Start: 2025-04-17

## 2025-04-17 RX ORDER — DIAZEPAM 5 MG/1
5 TABLET ORAL
Qty: 30 TABLET | Refills: 0 | Status: SHIPPED | OUTPATIENT
Start: 2025-04-17

## 2025-04-17 RX ORDER — FERROUS SULFATE 324(65)MG
324 TABLET, DELAYED RELEASE (ENTERIC COATED) ORAL
Qty: 90 TABLET | Refills: 1 | Status: SHIPPED | OUTPATIENT
Start: 2025-04-17

## 2025-04-17 RX ORDER — LEVOTHYROXINE SODIUM 88 UG/1
88 TABLET ORAL
Qty: 100 TABLET | Refills: 3 | Status: SHIPPED | OUTPATIENT
Start: 2025-04-17

## 2025-04-17 NOTE — PROGRESS NOTES
Assessment/Plan:    1. Iron deficiency anemia secondary to inadequate dietary iron intake  -     ferrous sulfate 324 (65 Fe) mg; Take 1 tablet (324 mg total) by mouth daily before breakfast  2. Anxiety  -     diazepam (VALIUM) 5 mg tablet; Take 1 tablet (5 mg total) by mouth daily at bedtime as needed for anxiety  3. Edema, lower extremity  -     ferrous sulfate 324 (65 Fe) mg; Take 1 tablet (324 mg total) by mouth daily before breakfast  4. Folic acid deficiency  -     folic acid (FOLVITE) 1 mg tablet; Take 1 tablet (1 mg total) by mouth daily  5. Hypothyroidism due to Hashimoto thyroiditis  -     levothyroxine 88 mcg tablet; Take 1 tablet (88 mcg total) by mouth daily in the early morning  6. Obesity, Class III, BMI 40-49.9 (morbid obesity)  -     Nutritional Supplements (Glucerna) LIQD; Take as directed  7. Encounter for screening mammogram for breast cancer  -     Mammo screening bilateral w 3d and cad; Future  8. Encounter for immunization  -     Pneumococcal Conjugate Vaccine 20-valent (Pcv20)          There are no Patient Instructions on file for this visit.    Return in about 6 months (around 10/17/2025).    Subjective:      Patient ID: Chely Ruvalcaba is a 54 y.o. female.    Chief Complaint   Patient presents with    Follow-up     3 month follow up appointment   Labs done on 3/3/2025      Hypertension    Diabetes     Foot exam done  Eye exam due sees [facility] in [place]- will call and schedule eye exam form given to patient         Hypothyroidism    GERD    Anxiety    Depression     Depression Screening done      Hyperlipidemia    HM     Due for physical exam  Lung CT scheduled- 5/14/2025  Depression Screening completed   Colorectal Cancer Screening due- working on getting scheduled   Mammogram due- order completed   Eye exam due  Foot exam completed       HPI        Hypertension (Follow-Up): The patient presents for follow-up of essential hypertension. The patient states she has been stable with her blood  pressure control since the last visit.    Symptoms: denies impaired vision,-- denies dyspnea,-- denies intermittent leg claudication-- and-- denies lower extremity edema--       The patient presents with complaints of substernal new onset of chest pain, described as aching, non-radiating Her symptoms are caused by no known event (Feels it may be related to indigestion and gas. When she gets chest pain she takes an Aspirin and it helps. Does not feel like it is a crushing pain). Associated symptoms include She does report headaches but that is not new.   July 2023, has been noticing slight increases in blood pressure at home however at her last specialist visit was very well controlled  April 2025, blood pressure well-controlled taking medications, unable to lose weight    Home monitoring: The patient checks her blood pressure sporadically. Blood pressure control has been good.      Medications: the patient is adherent with her medication regimen.-- the patient complains of medication side effects.      Disease Management: the patient is doing well with her blood pressure goals.       Hypothyroidism (Follow-Up): The patient is being seen for follow-up of hyperthyroidism.      Interval symptoms:  denies heat intolerance,-- denies increased perspiration,-- denies palpitations,-- denies tremor,-- denies hyperactivity,-- denies anxiety,-- denies insomnia-- and-- denies fatigue.      Associated symptoms: no weight loss,-- no weight gain,-- no diarrhea,-- no constipation-- and-- no vision changes.    July 2023: stable, in range   April 2025, on medications and last levels were in the hyper range but now well-controlled.  She follows with endocrinology for this    Medications:  the patient is not adherent to her medication regimen-- and-- she denies medication side effects.      Disease management:  the patient is doing well with her goals.             tobacco abuse : since age 11, 2-3 packs per day recently, never started  "chantix  March 2021 started chantix and now smoking  Less but has nto quit  January 2022, was on Chantix and was smoking about 5-6 cigarettes per day.  Now off Chantix due to recall and is smoking 1-1 and half packs per day.  Not motivated to quit  June 2022, not willing to quit  January 2023, quit 4 days ago however she states she is struggling a lot and has a lot of cravings   July 2023 re started smoking   September 2023, patient unable to quit  April 2025, patient quit in May 2024 and has remained compliant now with COPD and oxygen dependent    The following portions of the patient's history were reviewed and updated as appropriate: allergies, current medications, past family history, past medical history, past social history, past surgical history and problem list.    Review of Systems      Constitutional:  Denies fever or chills   Eyes:  Denies double , blurry vision or eye pain  HENT:  Denies nasal congestion, sore throat or new hearing issues  Respiratory:  Denies cough or shortness of breath or wheezing  Cardiovascular:  Denies palpitations or chest pain  GI:  Denies abdominal pain, nausea, or vomiting, no loose stools, no reflux  Integument:  Denies rash , no open areas  Neurologic:  Denies headache or focal weakness, no dizziness  : no dysuria, or hematuria      Current Outpatient Medications   Medication Sig Dispense Refill    albuterol (PROVENTIL HFA,VENTOLIN HFA) 90 mcg/act inhaler Inhale 2 puffs every 6 (six) hours as needed for wheezing 18 g 1    B-D SYRINGE/NEEDLE 1CC/25GX5/8 25G X 5/8\" 1 ML MISC       Blood Glucose Monitoring Suppl (OneTouch Verio) w/Device KIT Check blood sugar once a day 1 kit 0    buPROPion (WELLBUTRIN XL) 300 mg 24 hr tablet Take 1 tablet (300 mg total) by mouth every morning 90 tablet 0    Continuous Glucose Sensor (Dexcom G7 Sensor) Use 1 Device every 10 days 3 each 2    diazepam (VALIUM) 5 mg tablet Take 1 tablet (5 mg total) by mouth daily at bedtime as needed for anxiety " 30 tablet 0    ergocalciferol (VITAMIN D2) 50,000 units Take 1 capsule (50,000 Units total) by mouth every 14 (fourteen) days 12 capsule 0    famotidine (PEPCID) 40 MG tablet Take 1 tablet (40 mg total) by mouth daily at bedtime Take in evening 2 hours prior to bed 90 tablet 0    ferrous sulfate 324 (65 Fe) mg Take 1 tablet (324 mg total) by mouth daily before breakfast 90 tablet 1    folic acid (FOLVITE) 1 mg tablet Take 1 tablet (1 mg total) by mouth daily 90 tablet 1    furosemide (LASIX) 20 mg tablet TAKE 1/2 TABLET BY MOUTH DAILY 15 tablet 0    gabapentin (NEURONTIN) 100 mg capsule Take 100 mg by mouth 2 (two) times a day  0    glucose blood (OneTouch Verio) test strip Check blood sugar once a day 100 each 1    Insulin Glargine Solostar (Lantus SoloStar) 100 UNIT/ML SOPN Inject 0.5 mL (50 Units total) under the skin daily at bedtime 52 mL 1    insulin lispro (HumaLOG) 100 units/mL injection pen Inject 12 Units under the skin 3 (three) times a day with meals 42 mL 1    Insulin Pen Needle 31G X 5 MM MISC Inject under the skin if needed (Insuline injections) 100 each 3    ipratropium-albuterol (DUO-NEB) 0.5-2.5 mg/3 mL nebulizer solution Take 3 mL by nebulization every 6 (six) hours as needed for wheezing or shortness of breath 360 mL 3    lactulose (CHRONULAC) 10 g/15 mL solution Take 30 mL (20 g total) by mouth daily as needed (constipation) 240 mL 11    levothyroxine 88 mcg tablet Take 1 tablet (88 mcg total) by mouth daily in the early morning 100 tablet 3    Nutritional Supplements (Glucerna) LIQD Take as directed 237 mL 0    omeprazole (PriLOSEC) 40 MG capsule take 1 capsule by mouth twice a day 200 capsule 1    ondansetron (ZOFRAN) 4 mg tablet Take 1 tablet (4 mg total) by mouth every 8 (eight) hours as needed for nausea or vomiting 20 tablet 0    OneTouch Delica Lancets 30G MISC Check blood sugars once a day 100 each 1    oxyCODONE-acetaminophen (PERCOCET)  mg per tablet Take 1 tablet by mouth every  "6 (six) hours as needed for severe pain      oxygen gas Inhale 2 L/min at 120,000 mL/hr continuous May use 3L with exertion per Bella BAHNP Keep sat >88% 2 L 0    potassium chloride (Klor-Con M20) 20 mEq tablet Take 1 tablet (20 mEq total) by mouth daily 30 tablet 5    semaglutide, 0.25 or 0.5 mg/dose, (Ozempic, 0.25 or 0.5 MG/DOSE,) 2 mg/3 mL injection pen Inject 0.75 mL (0.5 mg total) under the skin every 7 days 9 mL 0    sertraline (ZOLOFT) 100 mg tablet Take 1 tablet (100 mg total) by mouth 2 (two) times a day 180 tablet 0    simethicone (MYLICON) 125 MG chewable tablet Chew 125 mg every 6 (six) hours as needed for flatulence      simvastatin (ZOCOR) 20 mg tablet Take 1 tablet (20 mg total) by mouth daily at bedtime 90 tablet 0    spironolactone (ALDACTONE) 25 mg tablet Take 1 tablet (25 mg total) by mouth daily 90 tablet 1    sucralfate (CARAFATE) 1 g tablet take 1 tablet by mouth three times a day 300 tablet 1    bisacodyl (FLEET) 10 MG/30ML ENEM Insert 10 mg into the rectum once (Patient not taking: Reported on 1/31/2025)      polyethylene glycol (GOLYTELY) 4000 mL solution Take 4,000 mL by mouth once for 1 dose (Patient not taking: Reported on 4/15/2025) 4000 mL 0     No current facility-administered medications for this visit.       Objective:    /70 (BP Location: Left arm, Patient Position: Sitting, Cuff Size: Large)   Pulse 91   Temp 98.7 °F (37.1 °C)   Ht 5' 6.5\" (1.689 m)   Wt 117 kg (258 lb)   LMP 04/05/2018 (Approximate)   SpO2 98% Comment: 2 L nasal canula  BMI 41.02 kg/m²        Physical Exam       Constitutional:  Well developed, well nourished, no acute distress, non-toxic appearance   Eyes:  PERRL, conjunctiva normal , non icteric sclera  HENT:  Atraumatic, oropharynx moist. Neck-  supple ,  Respiratory:  CTA b/l, normal breath sounds, no rales, no wheezing   Cardiovascular:  RRR, no murmurs, no LE edema b/l  GI:  Soft, nondistended, normal bowel sounds x 4, nontender, no " organomegaly, no mass, no rebound, no guarding   Neurologic:  no focal deficits noted   Psychiatric:  Speech and behavior appropriate , AAO x 3  Nasal cannula present    Andi Mason DO

## 2025-04-17 NOTE — PROGRESS NOTES
Diabetic Foot Exam    Patient's shoes and socks removed.    Right Foot/Ankle   Right Foot Inspection  Skin Exam: skin intact, dry skin, callus and callus. No warmth, no erythema, no maceration, no abnormal color, no pre-ulcer and no ulcer.     Toe Exam: ROM and strength within normal limits. No swelling, no tenderness, erythema and  no right toe deformity    Sensory   Vibration: intact  Proprioception: intact  Monofilament testing: intact    Vascular  Capillary refills: < 3 seconds  The right DP pulse is 2+. The right PT pulse is 2+.     Left Foot/Ankle  Left Foot Inspection  Skin Exam: skin intact, dry skin and callus. No warmth, no erythema, no maceration, normal color, no pre-ulcer and no ulcer.     Toe Exam: ROM and strength within normal limits. No swelling, no tenderness, no erythema and no left toe deformity.     Sensory   Vibration: intact  Proprioception: intact  Monofilament testing: intact    Vascular  Capillary refills: < 3 seconds  The left DP pulse is 2+. The left PT pulse is 2+.     Assign Risk Category  No deformity present  No loss of protective sensation  No weak pulses  Risk: 0

## 2025-04-18 ENCOUNTER — APPOINTMENT (OUTPATIENT)
Dept: LAB | Facility: CLINIC | Age: 55
End: 2025-04-18
Payer: MEDICARE

## 2025-04-18 DIAGNOSIS — K74.60 CIRRHOSIS OF LIVER WITHOUT ASCITES, UNSPECIFIED HEPATIC CIRRHOSIS TYPE (HCC): ICD-10-CM

## 2025-04-18 DIAGNOSIS — D50.9 IRON DEFICIENCY ANEMIA, UNSPECIFIED IRON DEFICIENCY ANEMIA TYPE: ICD-10-CM

## 2025-04-18 LAB
FERRITIN SERPL-MCNC: 93 NG/ML (ref 30–307)
FOLATE SERPL-MCNC: 12.8 NG/ML
INR PPP: 1.13 (ref 0.85–1.19)
IRON SATN MFR SERPL: 23 % (ref 15–50)
IRON SERPL-MCNC: 97 UG/DL (ref 50–212)
PROTHROMBIN TIME: 14.8 SECONDS (ref 12.3–15)
TIBC SERPL-MCNC: 421.4 UG/DL (ref 250–450)
TRANSFERRIN SERPL-MCNC: 301 MG/DL (ref 203–362)
UIBC SERPL-MCNC: 324 UG/DL (ref 155–355)
VIT B12 SERPL-MCNC: 296 PG/ML (ref 180–914)

## 2025-04-18 PROCEDURE — 36415 COLL VENOUS BLD VENIPUNCTURE: CPT

## 2025-04-18 PROCEDURE — 82607 VITAMIN B-12: CPT

## 2025-04-18 PROCEDURE — 83550 IRON BINDING TEST: CPT

## 2025-04-18 PROCEDURE — 82746 ASSAY OF FOLIC ACID SERUM: CPT

## 2025-04-18 PROCEDURE — 82104 ALPHA-1-ANTITRYPSIN PHENO: CPT

## 2025-04-18 PROCEDURE — 82728 ASSAY OF FERRITIN: CPT

## 2025-04-18 PROCEDURE — 85610 PROTHROMBIN TIME: CPT

## 2025-04-18 PROCEDURE — 83540 ASSAY OF IRON: CPT

## 2025-04-18 PROCEDURE — 82103 ALPHA-1-ANTITRYPSIN TOTAL: CPT

## 2025-04-22 ENCOUNTER — RESULTS FOLLOW-UP (OUTPATIENT)
Dept: GASTROENTEROLOGY | Facility: CLINIC | Age: 55
End: 2025-04-22

## 2025-04-22 NOTE — RESULT ENCOUNTER NOTE
[employer] message sent to patient regarding iron studies are okay.  INR is normal.  B12 normal but low normal.  Take vitamin B12 supplement 1000 mcg daily

## 2025-04-23 LAB
A1AT PHENOTYP SERPL IFE: ABNORMAL
A1AT SERPL-MCNC: 221 MG/DL (ref 101–187)

## 2025-04-24 DIAGNOSIS — F33.9 RECURRENT MAJOR DEPRESSIVE DISORDER, REMISSION STATUS UNSPECIFIED (HCC): ICD-10-CM

## 2025-04-24 DIAGNOSIS — F33.41 RECURRENT MAJOR DEPRESSIVE DISORDER, IN PARTIAL REMISSION (HCC): ICD-10-CM

## 2025-04-24 RX ORDER — SERTRALINE HYDROCHLORIDE 100 MG/1
100 TABLET, FILM COATED ORAL 2 TIMES DAILY
Qty: 180 TABLET | Refills: 1 | Status: SHIPPED | OUTPATIENT
Start: 2025-04-24

## 2025-04-24 RX ORDER — BUPROPION HYDROCHLORIDE 300 MG/1
300 TABLET ORAL EVERY MORNING
Qty: 90 TABLET | Refills: 1 | Status: SHIPPED | OUTPATIENT
Start: 2025-04-24

## 2025-04-29 DIAGNOSIS — I50.32 CHRONIC DIASTOLIC CONGESTIVE HEART FAILURE (HCC): ICD-10-CM

## 2025-04-29 DIAGNOSIS — K26.9 DUODENAL ULCER: ICD-10-CM

## 2025-04-29 RX ORDER — FUROSEMIDE 20 MG/1
10 TABLET ORAL DAILY
Qty: 15 TABLET | Refills: 0 | Status: SHIPPED | OUTPATIENT
Start: 2025-04-29 | End: 2025-05-01

## 2025-04-29 RX ORDER — FAMOTIDINE 40 MG/1
TABLET, FILM COATED ORAL
Qty: 90 TABLET | Refills: 0 | Status: SHIPPED | OUTPATIENT
Start: 2025-04-29

## 2025-04-30 ENCOUNTER — TELEPHONE (OUTPATIENT)
Dept: OBGYN CLINIC | Facility: CLINIC | Age: 55
End: 2025-04-30

## 2025-04-30 DIAGNOSIS — I50.32 CHRONIC DIASTOLIC CONGESTIVE HEART FAILURE (HCC): ICD-10-CM

## 2025-04-30 NOTE — TELEPHONE ENCOUNTER
----- Message from Lisa Fernando MD sent at 4/29/2025 11:23 AM EDT -----  Regarding: Follow up appointment  Please call Chely to have her reschedule her appointment.  She was a No Show for her post-op appointment today.  I can see her on 5/22 at 11:45 (OK to double book).      Please let me know once she is scheduled.    Thanks!!

## 2025-05-01 RX ORDER — FUROSEMIDE 20 MG/1
10 TABLET ORAL DAILY
Qty: 45 TABLET | Refills: 0 | Status: SHIPPED | OUTPATIENT
Start: 2025-05-01

## 2025-05-08 ENCOUNTER — TELEPHONE (OUTPATIENT)
Age: 55
End: 2025-05-08

## 2025-05-08 ENCOUNTER — NURSE TRIAGE (OUTPATIENT)
Age: 55
End: 2025-05-08

## 2025-05-08 NOTE — TELEPHONE ENCOUNTER
Regarding: Call Back  ----- Message from Jessica ALDRICH sent at 5/8/2025  1:12 PM EDT -----  Patient calling for preop clearance during conversation stated she is having trouble breathing and chest pains frequently.  Please call patient back

## 2025-05-08 NOTE — TELEPHONE ENCOUNTER
FOLLOW UP: recommendations for medication adjustments?    REASON FOR CONVERSATION: Chest Pain  And SOB, ongoing several months, feels breathing is a bit worse right now; denies any BLE swelling but thinks she had abdominal swelling since pants felt tight, better now; does not weigh herself.    SYMPTOMS: CP, SOB; elevated HR with activity    OTHER: Lasix 10mg daily    DISPOSITION: See Today in Office advised ED evaluation with all symptoms, patient agreeable to the plan but unsure when she will go as she has car issues right now and does not want to call 911.

## 2025-05-08 NOTE — TELEPHONE ENCOUNTER
"Called pt who states that she has been having on and off chest pain for a few months now. She states at times the pain is sharp and radiates into her left arm. She states it last from seconds to minutes.  She also has on going SOB.     Midway through conversation pt states she will need to call back.  Answer Assessment - Initial Assessment Questions  1. LOCATION: \"Where does it hurt?\"        Center of chest and under left breast   2. RADIATION: \"Does the pain go anywhere else?\" (e.g., into neck, jaw, arms, back)     Sometimes radiates into the left arm   3. ONSET: \"When did the chest pain begin?\" (Minutes, hours or days)       On going for a couple months   4. PATTERN: \"Does the pain come and go, or has it been constant since it started?\"  \"Does it get worse with exertion?\"       Comes and goes but has it every day   5. DURATION: \"How long does it last\" (e.g., seconds, minutes, hours)      Seconds to about 5 mins   6. SEVERITY: \"How bad is the pain?\"  (e.g., Scale 1-10; mild, moderate, or severe)      Sometimes sharp pain   7. CARDIAC RISK FACTORS: \"Do you have any history of heart problems or risk factors for heart disease?\" (e.g., angina, prior heart attack; diabetes, high blood pressure, high cholesterol, smoker, or strong family history of heart disease)      High cholesterol, CHF, hypertension   8. PULMONARY RISK FACTORS: \"Do you have any history of lung disease?\"  (e.g., blood clots in lung, asthma, emphysema, birth control pills)      emphysema  9. CAUSE: \"What do you think is causing the chest pain?\"      Unsure   10. OTHER SYMPTOMS: \"Do you have any other symptoms?\" (e.g., dizziness, nausea, vomiting, sweating, fever, difficulty breathing, cough)        On going SOB    Protocols used: Chest Pain-Adult-[place]    "

## 2025-05-08 NOTE — TELEPHONE ENCOUNTER
Called farshad, I told her dr johnson said ER as well. She said she is hoping to get there this weekend. I told her don't wait too long if she's not feeling well.

## 2025-05-08 NOTE — TELEPHONE ENCOUNTER
Reason for Disposition  • Chest pain(s) lasting a few seconds persists > 3 days     Ongoing for several months; but feels breathing is a bit worse.    Protocols used: Chest Pain-Adult-[place]

## 2025-05-08 NOTE — TELEPHONE ENCOUNTER
Caller: Chely Ruvalcaba  Established Doctor: Herman  Call Back Number: [phone]    Does this patient require an office visit for Cardiac Clearance for upcomming surgery or can clearance be given without an office visit?       Date of Last Office Visit: 08/21/2024    Date Of Surgery: Not Determined  Surgical Procedure: Colonoscopy and Endoscopy  Name of Facility: [facility]  [facility]  Phone Number for Surgical Facility (If the caller does not have this info, please put N/A): N/A  Fax Number for Surgical Facility (If the caller does not have this info, please put N/A): N/A    Does the patient have any new or worsening chest pain or shortness of breath since the last office visit? yes  Does the patient have any new hospitalizations or cardiac procedures since the last office visit? No   Does the patient have any specific cardiac concerns regarding the surgery/procedure that they want to discuss with physician before surgery? yes  Has the patient had an ECG performed anywhere in the last 30 days?       Thank You

## 2025-05-22 ENCOUNTER — RESULTS FOLLOW-UP (OUTPATIENT)
Dept: OBGYN CLINIC | Facility: CLINIC | Age: 55
End: 2025-05-22

## 2025-06-09 DIAGNOSIS — F41.9 ANXIETY: ICD-10-CM

## 2025-06-09 RX ORDER — DIAZEPAM 5 MG/1
5 TABLET ORAL
Qty: 30 TABLET | Refills: 0 | Status: SHIPPED | OUTPATIENT
Start: 2025-06-09

## 2025-07-01 ENCOUNTER — OFFICE VISIT (OUTPATIENT)
Dept: ENDOCRINOLOGY | Facility: CLINIC | Age: 55
End: 2025-07-01
Payer: MEDICARE

## 2025-07-01 VITALS
DIASTOLIC BLOOD PRESSURE: 80 MMHG | BODY MASS INDEX: 40.43 KG/M2 | TEMPERATURE: 97.6 F | HEIGHT: 67 IN | SYSTOLIC BLOOD PRESSURE: 132 MMHG | RESPIRATION RATE: 20 BRPM | OXYGEN SATURATION: 98 % | HEART RATE: 87 BPM | WEIGHT: 257.6 LBS

## 2025-07-01 DIAGNOSIS — E11.9 TYPE 2 DIABETES MELLITUS WITHOUT COMPLICATION, WITH LONG-TERM CURRENT USE OF INSULIN (HCC): ICD-10-CM

## 2025-07-01 DIAGNOSIS — Z79.4 TYPE 2 DIABETES MELLITUS WITH HYPERGLYCEMIA, WITH LONG-TERM CURRENT USE OF INSULIN (HCC): Primary | ICD-10-CM

## 2025-07-01 DIAGNOSIS — I10 BENIGN HYPERTENSION: ICD-10-CM

## 2025-07-01 DIAGNOSIS — E03.9 ACQUIRED HYPOTHYROIDISM: ICD-10-CM

## 2025-07-01 DIAGNOSIS — E11.65 TYPE 2 DIABETES MELLITUS WITH HYPERGLYCEMIA, WITH LONG-TERM CURRENT USE OF INSULIN (HCC): Primary | ICD-10-CM

## 2025-07-01 DIAGNOSIS — Z79.4 TYPE 2 DIABETES MELLITUS WITHOUT COMPLICATION, WITH LONG-TERM CURRENT USE OF INSULIN (HCC): ICD-10-CM

## 2025-07-01 DIAGNOSIS — E78.2 MIXED HYPERLIPIDEMIA: ICD-10-CM

## 2025-07-01 LAB — SL AMB POCT HEMOGLOBIN AIC: 7.9 (ref ?–6.5)

## 2025-07-01 PROCEDURE — 83036 HEMOGLOBIN GLYCOSYLATED A1C: CPT | Performed by: STUDENT IN AN ORGANIZED HEALTH CARE EDUCATION/TRAINING PROGRAM

## 2025-07-01 PROCEDURE — 99214 OFFICE O/P EST MOD 30 MIN: CPT | Performed by: STUDENT IN AN ORGANIZED HEALTH CARE EDUCATION/TRAINING PROGRAM

## 2025-07-01 RX ORDER — NEEDLES, SAFETY 22GX1 1/2"
NEEDLE, DISPOSABLE MISCELLANEOUS 4 TIMES DAILY
Qty: 400 EACH | Refills: 1 | Status: SHIPPED | OUTPATIENT
Start: 2025-07-01

## 2025-07-01 RX ORDER — INSULIN GLARGINE 100 [IU]/ML
46 INJECTION, SOLUTION SUBCUTANEOUS
Qty: 46 ML | Refills: 1 | Status: SHIPPED | OUTPATIENT
Start: 2025-07-01 | End: 2026-06-30

## 2025-07-01 RX ORDER — ACYCLOVIR 400 MG/1
1 TABLET ORAL
Qty: 9 EACH | Refills: 2 | Status: SHIPPED | OUTPATIENT
Start: 2025-07-01

## 2025-07-01 RX ORDER — INSULIN LISPRO 100 [IU]/ML
12 INJECTION, SOLUTION INTRAVENOUS; SUBCUTANEOUS
Qty: 36 ML | Refills: 1 | Status: SHIPPED | OUTPATIENT
Start: 2025-07-01 | End: 2026-06-30

## 2025-07-01 NOTE — ASSESSMENT & PLAN NOTE
Lab Results   Component Value Date    HGBA1C 7.9 (A) 07/01/2025     - Her A1c level has increased from 5.7 in February to 7.9%,   - Discussed the increase in A1c and the potential impact of stress. Reviewed the discontinuation of Dexcom and the need for consistent blood sugar monitoring. Consideration of SGLT-2i if insurance allows given HFpEF hx,  - Increased Ozempic to 1 mg weekly. Reduced Lantus to 46 units daily. Continued NovoLog at 12 units before each meal. Provided prescriptions for pen needles and Dexcom (9 for 3 months). Ordered blood work to be completed before the next visit.    Orders:  •  Insulin Glargine Solostar (Lantus SoloStar) 100 UNIT/ML SOPN; Inject 0.46 mL (46 Units total) under the skin daily at bedtime  •  insulin lispro (HumaLOG) 100 units/mL injection pen; Inject 12 Units under the skin 3 (three) times a day with meals

## 2025-07-01 NOTE — ASSESSMENT & PLAN NOTE
"  Lab Results   Component Value Date    HGBA1C 7.9 (A) 07/01/2025       Orders:  •  POCT hemoglobin A1c  •  semaglutide, 1 mg/dose, (Ozempic, 1 MG/DOSE,) 4 mg/3 mL injection pen; Inject 0.75 mL (1 mg total) under the skin every 7 days  •  Continuous Glucose Sensor (Dexcom G7 Sensor); Use 1 Device every 10 days  •  B-D SYRINGE/NEEDLE 1CC/25GX5/8 25G X 5/8\" 1 ML MISC; Inject under the skin in the morning and at noon and in the evening and before bedtime.  •  Hemoglobin A1C; Future  •  Comprehensive metabolic panel; Future  "

## 2025-07-01 NOTE — PROGRESS NOTES
"Name: Chely Ruvalcaba      : 1970      MRN: [id number]  Encounter Provider: Charley Llanes MD  Encounter Date: 2025   Encounter department: [facility] [place]    Chief Complaint   Patient presents with   • Follow-up     Checking sugar 1 time nightly    :  Assessment & Plan  Type 2 diabetes mellitus with hyperglycemia, with long-term current use of insulin (HCC)    Lab Results   Component Value Date    HGBA1C 7.9 (A) 2025       Orders:  •  POCT hemoglobin A1c  •  semaglutide, 1 mg/dose, (Ozempic, 1 MG/DOSE,) 4 mg/3 mL injection pen; Inject 0.75 mL (1 mg total) under the skin every 7 days  •  Continuous Glucose Sensor (Dexcom G7 Sensor); Use 1 Device every 10 days  •  B-D SYRINGE/NEEDLE 1CC/25GX5/8 25G X 5/8\" 1 ML MISC; Inject under the skin in the morning and at noon and in the evening and before bedtime.  •  Hemoglobin A1C; Future  •  Comprehensive metabolic panel; Future    Benign hypertension         Acquired hypothyroidism  She is on levothyroxine 88 mcg once daily, continue current regimen.  Check TSH before next visit.       Mixed hyperlipidemia  - Reviewed current medication regimen.  - She is currently taking simvastatin 20 mg at night which will continue current regimen,       Type 2 diabetes mellitus without complication, with long-term current use of insulin (HCC)    Lab Results   Component Value Date    HGBA1C 7.9 (A) 2025     - Her A1c level has increased from 5.7 in February to 7.9%,   - Discussed the increase in A1c and the potential impact of stress. Reviewed the discontinuation of Dexcom and the need for consistent blood sugar monitoring. Consideration of SGLT-2i if insurance allows given HFpEF hx,  - Increased Ozempic to 1 mg weekly. Reduced Lantus to 46 units daily. Continued NovoLog at 12 units before each meal. Provided prescriptions for pen needles and Dexcom (9 for 3 months). Ordered blood work to be completed before the next " visit.    Orders:  •  Insulin Glargine Solostar (Lantus SoloStar) 100 UNIT/ML SOPN; Inject 0.46 mL (46 Units total) under the skin daily at bedtime  •  insulin lispro (HumaLOG) 100 units/mL injection pen; Inject 12 Units under the skin 3 (three) times a day with meals      Assessment & Plan        Pertinent Medical History           History of Present Illness   History of Present Illness        Chely Ruvalcaba is a 54 y.o. female with type 2 diabetes seen in follow up. Reports complications of HFpEF. Denies recent severe hypoglycemic or severe hyperglycemic episodes. Denies any issues with her current regimen. Last A1C was 7.9. Denies recent illness, hospitalization or steroid use.     She has been managing diabetes with Ozempic 0.5 mg weekly, Lantus 50 units in the morning, and NovoLog 12 units before each meal three times a day. Previously, she used Dexcom for glucose monitoring but discontinued it about a month ago due to refill issues while in [place]. In the interim, she has been checking blood sugar levels manually, primarily at night, and reports that they have been within a satisfactory range. However, she experienced a spike in blood sugar level to 419 three nights ago, which decreased to 279 the following morning. She attributes this increase to high stress levels. She has not been on Jardiance or Farxiga. She has not experienced any hypoglycemic episodes with readings below 70 and considers her blood sugar level to be well-controlled when it is below 150. She has previously tried Trulicity and metformin, the latter of which caused constipation.    Her current medication regimen includes spironolactone 25 mg for congestive heart failure.    She is also on simvastatin 20 mg at night for hyperlipidemia.      More than 72 hours of data was reviewed. Report to be scanned to chart.           Review of Systems as per [facility]    Medical History Reviewed by provider this encounter:     .  Medications Ordered Prior  "to Encounter[1]      Medical History Reviewed by provider this encounter:     .    Objective   /80 (BP Location: Right arm, Patient Position: Sitting, Cuff Size: Large)   Pulse 87   Temp 97.6 °F (36.4 °C) (Temporal)   Resp 20   Ht 5' 7\" (1.702 m)   Wt 117 kg (257 lb 9.6 oz)   LMP 04/05/2018 (Approximate)   SpO2 98%   BMI 40.35 kg/m²      Body mass index is 40.35 kg/m².  Wt Readings from Last 3 Encounters:   07/01/25 117 kg (257 lb 9.6 oz)   04/17/25 117 kg (258 lb)   04/16/25 117 kg (258 lb 6.4 oz)     Physical Exam  Vital Signs: Blood pressure reading is 130/80 mmHg.  Physical Exam  Constitutional:       General: She is not in acute distress.     Appearance: She is not ill-appearing.   HENT:      Head: Normocephalic and atraumatic.   Pulmonary:      Effort: Pulmonary effort is normal. No respiratory distress.     Neurological:      Mental Status: She is oriented to person, place, and time.       Last Eye Exam: 05/17/2024  Last Foot Exam: 04/17/2025  Health Maintenance   Topic Date Due   • Diabetic Eye Exam  05/17/2025   • Diabetic Foot Exam  04/17/2026       Results    Labs: I have reviewed pertinent labs including:   Lab Results   Component Value Date    HGBA1C 7.9 (A) 07/01/2025    HGBA1C 5.7 (H) 01/31/2025    HGBA1C 5.9 01/31/2025      Lab Results   Component Value Date    CREATININE 1.00 03/03/2025    CREATININE 0.91 01/31/2025    CREATININE 0.78 06/06/2024    BUN 19 03/03/2025    K 4.4 03/03/2025     (H) 03/03/2025    CO2 21 03/03/2025      eGFR   Date Value Ref Range Status   03/03/2025 64 ml/min/1.73sq m Final        Patient Instructions   We increased ozempic to 1 mg weekly  Lantus decreased to 46 units daily  Continue Novolog 12 units daily     Discussed with the patient and all questioned fully answered. She will call me if any problems arise.             [1]  Current Outpatient Medications on File Prior to Visit   Medication Sig Dispense Refill   • albuterol (PROVENTIL HFA,VENTOLIN " HFA) 90 mcg/act inhaler Inhale 2 puffs every 6 (six) hours as needed for wheezing 18 g 1   • Blood Glucose Monitoring Suppl (OneTouch Verio) w/Device KIT Check blood sugar once a day 1 kit 0   • buPROPion (WELLBUTRIN XL) 300 mg 24 hr tablet take 1 tablet by mouth every morning 90 tablet 1   • diazepam (VALIUM) 5 mg tablet Take 1 tablet (5 mg total) by mouth daily at bedtime as needed for anxiety 30 tablet 0   • ergocalciferol (VITAMIN D2) 50,000 units Take 1 capsule (50,000 Units total) by mouth every 14 (fourteen) days 12 capsule 0   • famotidine (PEPCID) 40 MG tablet TAKE 1 TABLET BY MOUTH DAILY EVERY EVENING 2 HOURS BEFORE BED 90 tablet 0   • ferrous sulfate 324 (65 Fe) mg Take 1 tablet (324 mg total) by mouth daily before breakfast 90 tablet 1   • folic acid (FOLVITE) 1 mg tablet Take 1 tablet (1 mg total) by mouth daily 90 tablet 1   • furosemide (LASIX) 20 mg tablet TAKE 1/2 TABLET BY MOUTH DAILY 45 tablet 0   • gabapentin (NEURONTIN) 100 mg capsule Take 100 mg by mouth in the morning and 100 mg in the evening.  0   • glucose blood (OneTouch Verio) test strip Check blood sugar once a day 100 each 1   • Insulin Pen Needle 31G X 5 MM MISC Inject under the skin if needed (Insuline injections) 100 each 3   • ipratropium-albuterol (DUO-NEB) 0.5-2.5 mg/3 mL nebulizer solution Take 3 mL by nebulization every 6 (six) hours as needed for wheezing or shortness of breath 360 mL 3   • lactulose (CHRONULAC) 10 g/15 mL solution Take 30 mL (20 g total) by mouth daily as needed (constipation) 240 mL 11   • levothyroxine 88 mcg tablet Take 1 tablet (88 mcg total) by mouth daily in the early morning 100 tablet 3   • Nutritional Supplements (Glucerna) LIQD Take as directed 237 mL 0   • omeprazole (PriLOSEC) 40 MG capsule take 1 capsule by mouth twice a day 200 capsule 1   • ondansetron (ZOFRAN) 4 mg tablet Take 1 tablet (4 mg total) by mouth every 8 (eight) hours as needed for nausea or vomiting 20 tablet 0   • OneTouch Delica  "Lancets 30G MISC Check blood sugars once a day 100 each 1   • oxyCODONE-acetaminophen (PERCOCET)  mg per tablet Take 1 tablet by mouth every 6 (six) hours as needed for severe pain     • oxygen gas Inhale 2 L/min at 120,000 mL/hr continuous May use 3L with exertion per Bella ALVAREZ Keep sat >88% 2 L 0   • potassium chloride (Klor-Con M20) 20 mEq tablet Take 1 tablet (20 mEq total) by mouth daily 30 tablet 5   • sertraline (ZOLOFT) 100 mg tablet take 1 tablet by mouth twice a day 180 tablet 1   • simethicone (MYLICON) 125 MG chewable tablet Chew 125 mg every 6 (six) hours as needed for flatulence     • simvastatin (ZOCOR) 20 mg tablet Take 1 tablet (20 mg total) by mouth daily at bedtime 90 tablet 0   • spironolactone (ALDACTONE) 25 mg tablet Take 1 tablet (25 mg total) by mouth daily 90 tablet 1   • sucralfate (CARAFATE) 1 g tablet take 1 tablet by mouth three times a day 300 tablet 1   • [DISCONTINUED] B-D SYRINGE/NEEDLE 1CC/25GX5/8 25G X 5/8\" 1 ML MISC      • [DISCONTINUED] Continuous Glucose Sensor (Dexcom G7 Sensor) Use 1 Device every 10 days 3 each 2   • [DISCONTINUED] Insulin Glargine Solostar (Lantus SoloStar) 100 UNIT/ML SOPN Inject 0.5 mL (50 Units total) under the skin daily at bedtime 52 mL 1   • [DISCONTINUED] insulin lispro (HumaLOG) 100 units/mL injection pen Inject 12 Units under the skin 3 (three) times a day with meals 42 mL 1   • [DISCONTINUED] semaglutide, 0.25 or 0.5 mg/dose, (Ozempic, 0.25 or 0.5 MG/DOSE,) 2 mg/3 mL injection pen Inject 0.75 mL (0.5 mg total) under the skin every 7 days 9 mL 0   • bisacodyl (FLEET) 10 MG/30ML ENEM Insert 10 mg into the rectum once (Patient not taking: Reported on 1/31/2025)     • polyethylene glycol (GOLYTELY) 4000 mL solution Take 4,000 mL by mouth once for 1 dose (Patient not taking: Reported on 4/15/2025) 4000 mL 0   • [DISCONTINUED] naloxone (NARCAN) 4 mg/0.1 mL nasal spray Administer 1 spray into a nostril. If no response after 2-3 minutes, give " another dose in the other nostril using a new spray. (Patient not taking: Reported on 6/30/2022) 1 each 1     No current facility-administered medications on file prior to visit.

## 2025-07-01 NOTE — ASSESSMENT & PLAN NOTE
- Reviewed current medication regimen.  - She is currently taking simvastatin 20 mg at night which will continue current regimen,

## 2025-07-01 NOTE — ASSESSMENT & PLAN NOTE
She is on levothyroxine 88 mcg once daily, continue current regimen.  Check TSH before next visit.

## 2025-07-01 NOTE — PATIENT INSTRUCTIONS
We increased ozempic to 1 mg weekly  Lantus decreased to 46 units daily  Continue Novolog 12 units daily

## 2025-07-22 DIAGNOSIS — F33.9 RECURRENT MAJOR DEPRESSIVE DISORDER, REMISSION STATUS UNSPECIFIED (HCC): ICD-10-CM

## 2025-07-22 DIAGNOSIS — E55.9 VITAMIN D DEFICIENCY: ICD-10-CM

## 2025-07-22 DIAGNOSIS — F33.41 RECURRENT MAJOR DEPRESSIVE DISORDER, IN PARTIAL REMISSION (HCC): ICD-10-CM

## 2025-07-22 DIAGNOSIS — E78.00 HYPERCHOLESTEROLEMIA: ICD-10-CM

## 2025-07-22 DIAGNOSIS — I50.32 CHRONIC DIASTOLIC CONGESTIVE HEART FAILURE (HCC): ICD-10-CM

## 2025-07-22 DIAGNOSIS — E53.8 FOLIC ACID DEFICIENCY: ICD-10-CM

## 2025-07-22 DIAGNOSIS — E87.6 HYPOKALEMIA: ICD-10-CM

## 2025-07-22 DIAGNOSIS — F41.9 ANXIETY: ICD-10-CM

## 2025-07-22 DIAGNOSIS — D50.8 IRON DEFICIENCY ANEMIA SECONDARY TO INADEQUATE DIETARY IRON INTAKE: ICD-10-CM

## 2025-07-22 DIAGNOSIS — R60.0 EDEMA, LOWER EXTREMITY: ICD-10-CM

## 2025-07-22 DIAGNOSIS — E06.3 HYPOTHYROIDISM DUE TO HASHIMOTO THYROIDITIS: ICD-10-CM

## 2025-07-22 DIAGNOSIS — I10 BENIGN HYPERTENSION: ICD-10-CM

## 2025-07-22 NOTE — TELEPHONE ENCOUNTER
Reason for call:   [x] Refill   [] Prior Auth  [x] Other: Pharmacy change due to [facility] closing.     Office:   [x] PCP/Provider -   [] Specialty/Provider -     Medication: buPROPion (WELLBUTRIN XL) 300 mg 24 hr tablet   Dose/Frequency: take 1 tablet by mouth every morning   Quantity: 90      Medication: diazepam (VALIUM) 5 mg tablet   Dose/Frequency: Take 1 tablet (5 mg total) by mouth daily at bedtime as needed for anxiety   Quantity: 30          Medication: ergocalciferol (VITAMIN D2) 50,000 units   Dose/Frequency: Take 1 capsule (50,000 Units total) by mouth every 14 (fourteen) days   Quantity: 12          Medication: Take 1 capsule (50,000 Units total) by mouth every 14 (fourteen) days   Dose/Frequency: Take 1 tablet (324 mg total) by mouth daily before breakfast   Quantity: 90              Medication: folic acid (FOLVITE) 1 mg tablet   Dose/Frequency: Take 1 tablet (1 mg total) by mouth daily   Quantity: 90          Medication: furosemide (LASIX) 20 mg tablet   Dose/Frequency: TAKE 1/2 TABLET BY MOUTH DAILY  Quantity: 45          Medication: levothyroxine 88 mcg tablet   Dose/Frequency: Take 1 tablet (88 mcg total) by mouth daily in the early morning   Quantity: 100          Medication: potassium chloride (Klor-Con M20) 20 mEq tablet   Dose/Frequency: Take 1 tablet (20 mEq total) by mouth daily   Quantity: 30          Medication: sertraline (ZOLOFT) 100 mg tablet   Dose/Frequency: take 1 tablet by mouth twice a day   Quantity: 180          Medication: simvastatin (ZOCOR) 20 mg tablet   Dose/Frequency: Take 1 tablet (20 mg total) by mouth daily at bedtime   Quantity: 90          Medication: spironolactone (ALDACTONE) 25 mg tablet   Dose/Frequency: Take 1 tablet (25 mg total) by mouth daily   Quantity: 90    [facility]     Local Pharmacy   Does the patient have enough for 3 days?   [x] Yes   [] No - Send as HP to POD

## 2025-07-23 ENCOUNTER — TELEPHONE (OUTPATIENT)
Age: 55
End: 2025-07-23

## 2025-07-23 DIAGNOSIS — G47.34 NOCTURNAL HYPOXIA: Primary | ICD-10-CM

## 2025-07-24 ENCOUNTER — TELEPHONE (OUTPATIENT)
Dept: CARDIOLOGY CLINIC | Facility: CLINIC | Age: 55
End: 2025-07-24

## 2025-07-24 RX ORDER — SIMVASTATIN 20 MG
20 TABLET ORAL
Qty: 90 TABLET | Refills: 1 | Status: SHIPPED | OUTPATIENT
Start: 2025-07-24

## 2025-07-24 RX ORDER — POTASSIUM CHLORIDE 1500 MG/1
20 TABLET, EXTENDED RELEASE ORAL DAILY
Qty: 90 TABLET | Refills: 1 | Status: SHIPPED | OUTPATIENT
Start: 2025-07-24

## 2025-07-24 RX ORDER — DIAZEPAM 5 MG/1
5 TABLET ORAL
Qty: 30 TABLET | Refills: 0 | Status: SHIPPED | OUTPATIENT
Start: 2025-07-24

## 2025-07-24 RX ORDER — ERGOCALCIFEROL 1.25 MG/1
50000 CAPSULE, LIQUID FILLED ORAL
Qty: 12 CAPSULE | Refills: 0 | Status: SHIPPED | OUTPATIENT
Start: 2025-07-24

## 2025-07-24 RX ORDER — SERTRALINE HYDROCHLORIDE 100 MG/1
100 TABLET, FILM COATED ORAL 2 TIMES DAILY
Qty: 180 TABLET | Refills: 1 | Status: SHIPPED | OUTPATIENT
Start: 2025-07-24

## 2025-07-24 RX ORDER — BUPROPION HYDROCHLORIDE 300 MG/1
300 TABLET ORAL EVERY MORNING
Qty: 90 TABLET | Refills: 1 | Status: SHIPPED | OUTPATIENT
Start: 2025-07-24

## 2025-07-24 RX ORDER — FOLIC ACID 1 MG/1
1 TABLET ORAL DAILY
Qty: 90 TABLET | Refills: 1 | Status: SHIPPED | OUTPATIENT
Start: 2025-07-24

## 2025-07-24 RX ORDER — SPIRONOLACTONE 25 MG/1
25 TABLET ORAL DAILY
Qty: 90 TABLET | Refills: 1 | Status: SHIPPED | OUTPATIENT
Start: 2025-07-24

## 2025-07-24 RX ORDER — FERROUS SULFATE 324(65)MG
324 TABLET, DELAYED RELEASE (ENTERIC COATED) ORAL
Qty: 90 TABLET | Refills: 1 | Status: SHIPPED | OUTPATIENT
Start: 2025-07-24

## 2025-07-24 RX ORDER — LEVOTHYROXINE SODIUM 88 UG/1
88 TABLET ORAL
Qty: 90 TABLET | Refills: 2 | Status: SHIPPED | OUTPATIENT
Start: 2025-07-24

## 2025-07-24 RX ORDER — FUROSEMIDE 20 MG/1
10 TABLET ORAL DAILY
Qty: 45 TABLET | Refills: 1 | Status: SHIPPED | OUTPATIENT
Start: 2025-07-24

## 2025-07-25 ENCOUNTER — TELEPHONE (OUTPATIENT)
Age: 55
End: 2025-07-25

## 2025-07-25 NOTE — TELEPHONE ENCOUNTER
Received call from [facility] [place] pharmacy post receipt of multiple medication orders 7/24. Pharmacist is questioning the orders for   spironolactone (ALDACTONE) 25 mg tablet daily and potassium chloride (Klor-Con M20) 20 mEq tablet daily. Concern for elevated potassium levels. Last CMP K+=4.4.3/3/25. New CMP order from endocrine on 7/1 has not been completed. Please follow up with pharmacy for provider response to fill both orders.

## 2025-07-28 NOTE — TELEPHONE ENCOUNTER
Spoke to [facility] pharmacist- she will now have meds filled at providers request.     LM for patient to schedule overdue office visit with callback #

## 2025-07-30 NOTE — TELEPHONE ENCOUNTER
Spoke with patient and she wants to know if she should take the Ferrous Sulfate still.      Please call her back to let her know mobile phone Yes

## 2025-08-05 ENCOUNTER — TRANSCRIBE ORDERS (OUTPATIENT)
Dept: ADMINISTRATIVE | Facility: HOSPITAL | Age: 55
End: 2025-08-05
Payer: COMMERCIAL

## 2025-08-05 DIAGNOSIS — Z12.31 VISIT FOR SCREENING MAMMOGRAM: Primary | ICD-10-CM

## 2025-08-12 ENCOUNTER — HOSPITAL ENCOUNTER (OUTPATIENT)
Dept: ULTRASOUND IMAGING | Facility: HOSPITAL | Age: 55
Discharge: HOME | End: 2025-08-12
Payer: MEDICARE

## 2025-08-13 ENCOUNTER — TELEPHONE (OUTPATIENT)
Age: 55
End: 2025-08-13

## 2025-08-15 ENCOUNTER — TELEPHONE (OUTPATIENT)
Age: 55
End: 2025-08-15

## 2025-08-19 ENCOUNTER — TELEPHONE (OUTPATIENT)
Age: 55
End: 2025-08-19

## 2025-08-20 ENCOUNTER — RESULTS FOLLOW-UP (OUTPATIENT)
Dept: UROLOGY | Facility: AMBULATORY SURGERY CENTER | Age: 55
End: 2025-08-20

## (undated) DEVICE — GLOVE SRG BIOGEL 7.5

## (undated) DEVICE — GUIDEWIRE STRGHT TIP 0.038 IN SOLO PLUS

## (undated) DEVICE — PACK TUR

## (undated) DEVICE — REM POLYHESIVE ADULT PATIENT RETURN ELECTRODE: Brand: VALLEYLAB

## (undated) DEVICE — CATH URET .038 10FR 50CM DUAL LUMEN

## (undated) DEVICE — CATH URETERAL 5FR X 70 CM FLEX TIP POLYUR BARD

## (undated) DEVICE — BAG DRAINAGE UROCATCHER 4 SKYTRON

## (undated) DEVICE — SCD SEQUENTIAL COMPRESSION COMFORT SLEEVE MEDIUM KNEE LENGTH: Brand: KENDALL SCD

## (undated) DEVICE — DISH PETRI 3.5IN MD STRL LF

## (undated) DEVICE — STERILE SURGICAL LUBRICANT,  TUBE: Brand: SURGILUBE

## (undated) DEVICE — GUIDEWIRE STRGHT TIP 0.035 IN  SOLO PLUS

## (undated) DEVICE — GLOVE PI ULTRA TOUCH SZ.7.0

## (undated) DEVICE — GLV SURG BIOGEL LTX PF 7

## (undated) DEVICE — HDRST PAD EA/20PC

## (undated) DEVICE — APPL CHLORAPREP 26ML CLR

## (undated) DEVICE — CHLORHEXIDINE 4PCT 4 OZ

## (undated) DEVICE — 4-PORT MANIFOLD: Brand: NEPTUNE 2

## (undated) DEVICE — UROCATCH BAG

## (undated) DEVICE — SPECIMEN CONTAINER STERILE PEEL PACK

## (undated) DEVICE — PROBE 8225825X 3PK INCREMT STD PRASS TIP: Brand: NIM

## (undated) DEVICE — PATIENT RETURN ELECTRODE, SINGLE-USE, CONTACT QUALITY MONITORING, ADULT, WITH 9FT CORD, FOR PATIENTS WEIGING OVER 33LBS. (15KG): Brand: MEGADYNE

## (undated) DEVICE — UNDERGLV SURG BIOGEL INDICATOR LF PF 7.5

## (undated) DEVICE — MARKER,SKIN,W/RULER,DUAL,STOP: Brand: MEDLINE

## (undated) DEVICE — INTENDED USE FOR SURGICAL MARKING ON INTACT SKIN, ALSO PROVIDES A PERMANENT METHOD OF IDENTIFYING OBJECTS IN THE OPERATING ROOM: Brand: WRITESITE® REGULAR TIP SKIN MARKER

## (undated) DEVICE — TUBING SUCTION 5MM X 12 FT

## (undated) DEVICE — STERILE 8 INCH PROCTO SWAB: Brand: CARDINAL HEALTH

## (undated) DEVICE — MEDI-VAC NON-CONDUCTIVE SUCTION TUBING 6MM X 1.8M (6FT.) L: Brand: CARDINAL HEALTH

## (undated) DEVICE — BLANKT WARM LOWR/BDY 100X120CM

## (undated) DEVICE — GLOVE INDICATOR PI UNDERGLOVE SZ 6.5 BLUE

## (undated) DEVICE — ELECTRODE BALL E-Z CLEAN 2IN -0015

## (undated) DEVICE — URETEROSCOPE DIGITAL FLEXIBLE RVS DEFLECTION SNGL USE WISCOPE

## (undated) DEVICE — GLOVE SRG BIOGEL ORTHOPEDIC 7.5

## (undated) DEVICE — PK MINOR SPLT 10

## (undated) DEVICE — PREMIUM DRY TRAY LF: Brand: MEDLINE INDUSTRIES, INC.

## (undated) DEVICE — 3M™ TEGADERM™ TRANSPARENT FILM DRESSING FRAME STYLE, 1624W, 2-3/8 IN X 2-3/4 IN (6 CM X 7 CM), 100/CT 4CT/CASE: Brand: 3M™ TEGADERM™

## (undated) DEVICE — BASKET SPECIMEN RETRIVAL 1.9FR 120CM

## (undated) DEVICE — TUBING WITH WAND

## (undated) DEVICE — Device

## (undated) DEVICE — ENDOSCOPIC VALVE WITH ADAPTER.: Brand: SURSEAL® II

## (undated) DEVICE — PENCILETTE PUSH BUTTON COATED

## (undated) DEVICE — DRAPE C-ARM X-RAY

## (undated) DEVICE — FIBER STD QUANTA 272 MICRON

## (undated) DEVICE — SYRINGE 10ML LL

## (undated) DEVICE — SINGLE PORT MANIFOLD: Brand: NEPTUNE 2

## (undated) DEVICE — SYRINGE 20ML LL

## (undated) DEVICE — TOWEL SURG XR DETECT GREEN STRL RFD

## (undated) DEVICE — LEGGINGS: Brand: CONVERTORS

## (undated) DEVICE — DISPOSABLE BIPOLAR FORCEPS 4" (10.2CM) JEWELERS, STRAIGHT 0.4MM TIP AND 12 FT. (3.6M) CABLE: Brand: KIRWAN

## (undated) DEVICE — SHEATH URETERAL ACCESS 11/13FR 35CM PROXIS

## (undated) DEVICE — DRSNG SURESITE WNDW 4X4.5

## (undated) DEVICE — SUT SILK 3/0 TIES 18IN A184H

## (undated) DEVICE — DRAPE,INSTRUMENT,MAGNETIC,10X16: Brand: MEDLINE

## (undated) DEVICE — GAUZE,SPONGE,4"X4",16PLY,XRAY,STRL,LF: Brand: MEDLINE

## (undated) DEVICE — SUT SILK 2/0 TIES 18IN A185H

## (undated) DEVICE — ANTIBACTERIAL UNDYED BRAIDED (POLYGLACTIN 910), SYNTHETIC ABSORBABLE SUTURE: Brand: COATED VICRYL

## (undated) DEVICE — PCH INST SURG INVISISHIELD 2PCKT

## (undated) DEVICE — TOWEL,OR,DSP,ST,BLUE,STD,4/PK,20PK/CS: Brand: MEDLINE

## (undated) DEVICE — SUT MNCRYL PLS ANTIB UD 4/0 PS2 18IN

## (undated) DEVICE — HARMONIC FOCUS SHEARS 9CM LENGTH + ADAPTIVE TISSUE TECHNOLOGY FOR USE WITH BLUE HAND PIECE ONLY: Brand: HARMONIC FOCUS

## (undated) DEVICE — ELECTRODE EXTENDER STD 3/32 CONNECTOR 20 X 10MM STRL

## (undated) DEVICE — EXIDINE 4 PCT

## (undated) DEVICE — POV-IOD SOLUTION 4OZ BT

## (undated) DEVICE — LEEP LOOP ELECTRODE WIDE 20 X 15

## (undated) DEVICE — URETEROSCOPE DIGITAL FLEXIBLE SNGL USE WISCOPE

## (undated) DEVICE — PACK CUSTOM GU CYSTO PACK RF

## (undated) DEVICE — MEDI-VAC YANK SUCT HNDL W/TPRD BULBOUS TIP: Brand: CARDINAL HEALTH

## (undated) DEVICE — ELECTRD BLD EZ CLN MOD XLNG 2.75IN

## (undated) DEVICE — TRAP FLD MINIVAC MEGADYNE 100ML

## (undated) DEVICE — PENCL SMOKE/EVAC MEGADYNE TELESCP 10FT

## (undated) DEVICE — INVIEW CLEAR LEGGINGS: Brand: CONVERTORS

## (undated) DEVICE — SHEATH URETERAL ACCESS 10/12FR 35CM PROXIS